# Patient Record
Sex: MALE | Race: WHITE | NOT HISPANIC OR LATINO | Employment: OTHER | URBAN - METROPOLITAN AREA
[De-identification: names, ages, dates, MRNs, and addresses within clinical notes are randomized per-mention and may not be internally consistent; named-entity substitution may affect disease eponyms.]

---

## 2023-04-05 LAB
ALBUMIN (MG/L) IN URINE: 1008.6 MG/L
ALBUMIN/CREATININE (UG/MG) IN URINE: 1748 UG/MG CRT (ref 0–30)
ANION GAP IN SER/PLAS: 14 MMOL/L (ref 10–20)
C PEPTIDE (NG/ML) IN SER/PLAS: 10.5 NG/ML (ref 0.7–3.9)
CALCIUM (MG/DL) IN SER/PLAS: 9.5 MG/DL (ref 8.6–10.6)
CARBON DIOXIDE, TOTAL (MMOL/L) IN SER/PLAS: 25 MMOL/L (ref 21–32)
CHLORIDE (MMOL/L) IN SER/PLAS: 104 MMOL/L (ref 98–107)
CHOLESTEROL (MG/DL) IN SER/PLAS: 257 MG/DL (ref 0–199)
CHOLESTEROL IN HDL (MG/DL) IN SER/PLAS: 38.3 MG/DL
CHOLESTEROL/HDL RATIO: 6.7
CREATININE (MG/DL) IN SER/PLAS: 1.35 MG/DL (ref 0.5–1.3)
CREATININE (MG/DL) IN URINE: 57.7 MG/DL (ref 20–370)
ESTIMATED AVERAGE GLUCOSE FOR HBA1C: 166 MG/DL
GFR MALE: 61 ML/MIN/1.73M2
GLUCOSE (MG/DL) IN SER/PLAS: 166 MG/DL (ref 74–99)
HEMOGLOBIN A1C/HEMOGLOBIN TOTAL IN BLOOD: 7.4 %
LDL: 173 MG/DL (ref 0–99)
NON HDL CHOLESTEROL: 219 MG/DL
POTASSIUM (MMOL/L) IN SER/PLAS: 4.8 MMOL/L (ref 3.5–5.3)
SODIUM (MMOL/L) IN SER/PLAS: 138 MMOL/L (ref 136–145)
TRIGLYCERIDE (MG/DL) IN SER/PLAS: 229 MG/DL (ref 0–149)
UREA NITROGEN (MG/DL) IN SER/PLAS: 31 MG/DL (ref 6–23)
VLDL: 46 MG/DL (ref 0–40)

## 2023-06-19 ENCOUNTER — TELEPHONE (OUTPATIENT)
Dept: PRIMARY CARE | Facility: CLINIC | Age: 58
End: 2023-06-19

## 2023-07-24 ENCOUNTER — TELEPHONE (OUTPATIENT)
Dept: PRIMARY CARE | Facility: CLINIC | Age: 58
End: 2023-07-24

## 2023-07-24 DIAGNOSIS — F31.9 BIPOLAR 1 DISORDER (MULTI): ICD-10-CM

## 2023-07-24 RX ORDER — LURASIDONE HYDROCHLORIDE 40 MG/1
1 TABLET, FILM COATED ORAL DAILY
COMMUNITY
Start: 2020-06-10 | End: 2023-07-24 | Stop reason: SDUPTHER

## 2023-07-24 RX ORDER — LURASIDONE HYDROCHLORIDE 40 MG/1
40 TABLET, FILM COATED ORAL DAILY
Qty: 90 TABLET | Refills: 0 | Status: SHIPPED | OUTPATIENT
Start: 2023-07-24 | End: 2023-09-27 | Stop reason: SDUPTHER

## 2023-07-24 NOTE — TELEPHONE ENCOUNTER
Dr. Magana office called cause he is there now and he has been calling for like 4 days and is not getting anyone to talk with but her needs a refill on his Latuda 40 mg takes it 1 time a day please send to the  pharmacy so when he leaves Dr. Magana office he can go get it cause he told them that when he is I a line and something go wrong he get mad and feels like hurting someone

## 2023-08-28 DIAGNOSIS — K21.9 GASTROESOPHAGEAL REFLUX DISEASE WITHOUT ESOPHAGITIS: Primary | ICD-10-CM

## 2023-08-28 RX ORDER — PANTOPRAZOLE SODIUM 40 MG/1
40 TABLET, DELAYED RELEASE ORAL DAILY
Qty: 30 TABLET | Refills: 2 | Status: SHIPPED | OUTPATIENT
Start: 2023-08-28 | End: 2023-08-28

## 2023-09-22 PROBLEM — R10.11 ABDOMINAL PAIN, ACUTE, RIGHT UPPER QUADRANT: Status: ACTIVE | Noted: 2023-09-22

## 2023-09-22 PROBLEM — M25.551 HIP PAIN, RIGHT: Status: ACTIVE | Noted: 2023-09-22

## 2023-09-22 PROBLEM — G89.28 OTHER CHRONIC POSTPROCEDURAL PAIN: Status: ACTIVE | Noted: 2023-09-22

## 2023-09-22 PROBLEM — Z98.61 S/P PTCA (PERCUTANEOUS TRANSLUMINAL CORONARY ANGIOPLASTY): Status: ACTIVE | Noted: 2023-09-22

## 2023-09-22 PROBLEM — R23.4 ESCHAR OF LOWER LEG: Status: ACTIVE | Noted: 2023-09-22

## 2023-09-22 PROBLEM — R09.81 NASAL CONGESTION WITH RHINORRHEA: Status: ACTIVE | Noted: 2023-09-22

## 2023-09-22 PROBLEM — B19.20 HEPATITIS-C: Status: ACTIVE | Noted: 2023-09-22

## 2023-09-22 PROBLEM — I63.9 CEREBROVASCULAR ACCIDENT (CVA) (MULTI): Status: ACTIVE | Noted: 2023-09-22

## 2023-09-22 PROBLEM — R11.0 NAUSEA IN ADULT: Status: ACTIVE | Noted: 2023-09-22

## 2023-09-22 PROBLEM — R06.02 SHORTNESS OF BREATH ON EXERTION: Status: ACTIVE | Noted: 2023-09-22

## 2023-09-22 PROBLEM — M25.612 JOINT STIFFNESS OF BOTH SHOULDERS: Status: ACTIVE | Noted: 2023-09-22

## 2023-09-22 PROBLEM — G89.4 CHRONIC PAIN SYNDROME: Status: ACTIVE | Noted: 2023-09-22

## 2023-09-22 PROBLEM — R10.11 CHRONIC RUQ PAIN: Status: ACTIVE | Noted: 2023-09-22

## 2023-09-22 PROBLEM — I70.213 ATHEROSCLEROSIS OF NATIVE ARTERY OF BOTH LOWER EXTREMITIES WITH INTERMITTENT CLAUDICATION (CMS-HCC): Status: ACTIVE | Noted: 2023-09-22

## 2023-09-22 PROBLEM — R06.89: Status: ACTIVE | Noted: 2023-09-22

## 2023-09-22 PROBLEM — I25.10 ATHEROSCLEROTIC HEART DISEASE OF NATIVE CORONARY ARTERY WITHOUT ANGINA PECTORIS: Status: ACTIVE | Noted: 2023-09-22

## 2023-09-22 PROBLEM — I50.42 CHRONIC COMBINED SYSTOLIC AND DIASTOLIC CHF (CONGESTIVE HEART FAILURE) (MULTI): Status: ACTIVE | Noted: 2023-09-22

## 2023-09-22 PROBLEM — G47.33 OSA (OBSTRUCTIVE SLEEP APNEA): Status: ACTIVE | Noted: 2023-09-22

## 2023-09-22 PROBLEM — R06.00 PAROXYSMAL NOCTURNAL DYSPNEA: Status: ACTIVE | Noted: 2023-09-22

## 2023-09-22 PROBLEM — R52 BURNING PAIN: Status: ACTIVE | Noted: 2023-09-22

## 2023-09-22 PROBLEM — R93.89 ABNORMAL CXR: Status: ACTIVE | Noted: 2023-09-22

## 2023-09-22 PROBLEM — K76.0 HEPATIC STEATOSIS: Status: ACTIVE | Noted: 2023-09-22

## 2023-09-22 PROBLEM — M54.50 LOW BACK PAIN: Status: ACTIVE | Noted: 2023-09-22

## 2023-09-22 PROBLEM — G89.29 CHRONIC BACK PAIN GREATER THAN 3 MONTHS DURATION: Status: ACTIVE | Noted: 2023-09-22

## 2023-09-22 PROBLEM — G62.2 NEUROPATHY, INFLAMMATORY OR TOXIC (MULTI): Status: ACTIVE | Noted: 2023-09-22

## 2023-09-22 PROBLEM — K21.9 GASTROESOPHAGEAL REFLUX DISEASE: Status: ACTIVE | Noted: 2023-09-22

## 2023-09-22 PROBLEM — I73.9 PERIPHERAL ARTERIAL DISEASE (CMS-HCC): Status: ACTIVE | Noted: 2023-09-22

## 2023-09-22 PROBLEM — L08.9 BACTERIAL SKIN INFECTION: Status: ACTIVE | Noted: 2023-09-22

## 2023-09-22 PROBLEM — G89.29 CHRONIC RUQ PAIN: Status: ACTIVE | Noted: 2023-09-22

## 2023-09-22 PROBLEM — M54.16 CHRONIC LUMBAR RADICULOPATHY: Status: ACTIVE | Noted: 2023-09-22

## 2023-09-22 PROBLEM — K86.9 PANCREATIC MALABSORPTION (HHS-HCC): Status: ACTIVE | Noted: 2023-09-22

## 2023-09-22 PROBLEM — G61.9 NEUROPATHY, INFLAMMATORY OR TOXIC (MULTI): Status: ACTIVE | Noted: 2023-09-22

## 2023-09-22 PROBLEM — R10.9 RIGHT FLANK PAIN: Status: ACTIVE | Noted: 2023-09-22

## 2023-09-22 PROBLEM — B96.89 BACTERIAL SKIN INFECTION: Status: ACTIVE | Noted: 2023-09-22

## 2023-09-22 PROBLEM — K59.00 CONSTIPATION: Status: ACTIVE | Noted: 2023-09-22

## 2023-09-22 PROBLEM — E11.9 TYPE 2 DIABETES MELLITUS (MULTI): Status: ACTIVE | Noted: 2023-09-22

## 2023-09-22 PROBLEM — R06.00 DYSPNEA: Status: ACTIVE | Noted: 2023-09-22

## 2023-09-22 PROBLEM — R09.89 PULMONARY CONGESTION: Status: ACTIVE | Noted: 2023-09-22

## 2023-09-22 PROBLEM — R27.8 COORDINATION IMPAIRMENT: Status: ACTIVE | Noted: 2023-09-22

## 2023-09-22 PROBLEM — J34.89 NASAL CONGESTION WITH RHINORRHEA: Status: ACTIVE | Noted: 2023-09-22

## 2023-09-22 PROBLEM — Z86.73 H/O: CVA (CEREBROVASCULAR ACCIDENT): Status: ACTIVE | Noted: 2023-09-22

## 2023-09-22 PROBLEM — Z95.1 PRESENCE OF AORTOCORONARY BYPASS GRAFT: Status: ACTIVE | Noted: 2023-09-22

## 2023-09-22 PROBLEM — M79.2 NEUROPATHIC PAIN: Status: ACTIVE | Noted: 2023-09-22

## 2023-09-22 PROBLEM — R25.2 SPASTICITY AS LATE EFFECT OF CEREBROVASCULAR ACCIDENT (CVA): Status: ACTIVE | Noted: 2023-09-22

## 2023-09-22 PROBLEM — F31.9 BIPOLAR DEPRESSION (MULTI): Status: ACTIVE | Noted: 2023-09-22

## 2023-09-22 PROBLEM — M25.611 JOINT STIFFNESS OF BOTH SHOULDERS: Status: ACTIVE | Noted: 2023-09-22

## 2023-09-22 PROBLEM — M79.604 PAIN OF RIGHT LOWER EXTREMITY: Status: ACTIVE | Noted: 2023-09-22

## 2023-09-22 PROBLEM — K86.9 PANCREATIC LESION (HHS-HCC): Status: ACTIVE | Noted: 2023-09-22

## 2023-09-22 PROBLEM — R29.6 FREQUENT FALLS: Status: ACTIVE | Noted: 2023-09-22

## 2023-09-22 PROBLEM — N40.0 BPH (BENIGN PROSTATIC HYPERPLASIA): Status: ACTIVE | Noted: 2023-09-22

## 2023-09-22 PROBLEM — E87.5 HYPERKALEMIA: Status: ACTIVE | Noted: 2023-09-22

## 2023-09-22 PROBLEM — I69.398 SPASTICITY AS LATE EFFECT OF CEREBROVASCULAR ACCIDENT (CVA): Status: ACTIVE | Noted: 2023-09-22

## 2023-09-22 PROBLEM — G57.70 CAUSALGIA OF LOWER EXTREMITY: Status: ACTIVE | Noted: 2023-09-22

## 2023-09-22 PROBLEM — F17.200 NICOTINE DEPENDENCE: Status: ACTIVE | Noted: 2023-09-22

## 2023-09-22 PROBLEM — Z95.1 S/P CABG X 5: Status: ACTIVE | Noted: 2023-09-22

## 2023-09-22 PROBLEM — E11.42 DIABETIC POLYNEUROPATHY ASSOCIATED WITH TYPE 2 DIABETES MELLITUS (MULTI): Status: ACTIVE | Noted: 2023-09-22

## 2023-09-22 PROBLEM — I51.9 LEFT VENTRICULAR DYSFUNCTION: Status: ACTIVE | Noted: 2023-09-22

## 2023-09-22 PROBLEM — E11.40 DIABETIC NEUROPATHY, PAINFUL (MULTI): Status: ACTIVE | Noted: 2023-09-22

## 2023-09-22 PROBLEM — R29.898 WEAKNESS OF BOTH LOWER EXTREMITIES: Status: ACTIVE | Noted: 2023-09-22

## 2023-09-22 PROBLEM — J44.9 CHRONIC OBSTRUCTIVE PULMONARY DISEASE (MULTI): Status: ACTIVE | Noted: 2023-09-22

## 2023-09-22 PROBLEM — B02.9 SHINGLES: Status: ACTIVE | Noted: 2023-09-22

## 2023-09-22 PROBLEM — E78.5 HYPERLIPIDEMIA: Status: ACTIVE | Noted: 2023-09-22

## 2023-09-22 PROBLEM — Z86.19 HEPATITIS C VIRUS INFECTION CURED AFTER ANTIVIRAL DRUG THERAPY: Status: ACTIVE | Noted: 2023-09-22

## 2023-09-22 PROBLEM — B18.2 CHRONIC HEPATITIS C VIRUS GENOTYPE 1A INFECTION (MULTI): Status: ACTIVE | Noted: 2023-09-22

## 2023-09-22 PROBLEM — K90.9 PANCREATIC MALABSORPTION (HHS-HCC): Status: ACTIVE | Noted: 2023-09-22

## 2023-09-22 PROBLEM — R26.89 POOR BALANCE: Status: ACTIVE | Noted: 2023-09-22

## 2023-09-22 PROBLEM — I10 HYPERTENSION: Status: ACTIVE | Noted: 2023-09-22

## 2023-09-22 PROBLEM — I73.9 CLAUDICATION (CMS-HCC): Status: ACTIVE | Noted: 2023-09-22

## 2023-09-22 PROBLEM — R29.898 OTHER SYMPTOMS AND SIGNS INVOLVING THE MUSCULOSKELETAL SYSTEM: Status: ACTIVE | Noted: 2023-09-22

## 2023-09-22 PROBLEM — L02.92 RECURRENT BOILS: Status: ACTIVE | Noted: 2023-09-22

## 2023-09-22 PROBLEM — M54.9 CHRONIC BACK PAIN GREATER THAN 3 MONTHS DURATION: Status: ACTIVE | Noted: 2023-09-22

## 2023-09-22 PROBLEM — H53.452 DECREASED PERIPHERAL VISION OF LEFT EYE: Status: ACTIVE | Noted: 2023-09-22

## 2023-09-22 RX ORDER — EZETIMIBE 10 MG/1
10 TABLET ORAL
COMMUNITY
Start: 2023-04-24

## 2023-09-22 RX ORDER — ERGOCALCIFEROL 1.25 MG/1
1 CAPSULE ORAL WEEKLY
COMMUNITY
Start: 2019-04-15 | End: 2023-12-01 | Stop reason: WASHOUT

## 2023-09-22 RX ORDER — SACUBITRIL AND VALSARTAN 97; 103 MG/1; MG/1
1 TABLET, FILM COATED ORAL 2 TIMES DAILY
COMMUNITY
Start: 2022-12-22

## 2023-09-22 RX ORDER — FUROSEMIDE 40 MG/1
20 TABLET ORAL
COMMUNITY
Start: 2023-04-24 | End: 2023-12-01 | Stop reason: WASHOUT

## 2023-09-22 RX ORDER — TAMSULOSIN HYDROCHLORIDE 0.4 MG/1
0.4 CAPSULE ORAL DAILY
COMMUNITY
End: 2023-09-27 | Stop reason: SDUPTHER

## 2023-09-22 RX ORDER — ATORVASTATIN CALCIUM 80 MG/1
80 TABLET, FILM COATED ORAL DAILY
COMMUNITY
Start: 2023-04-24 | End: 2023-12-01 | Stop reason: WASHOUT

## 2023-09-22 RX ORDER — ISOSORBIDE MONONITRATE 60 MG/1
60 TABLET, EXTENDED RELEASE ORAL
COMMUNITY
Start: 2023-04-24 | End: 2023-12-01 | Stop reason: WASHOUT

## 2023-09-22 RX ORDER — ASPIRIN 81 MG/1
81 TABLET ORAL DAILY
COMMUNITY
Start: 2023-01-15 | End: 2023-09-27 | Stop reason: ALTCHOICE

## 2023-09-22 RX ORDER — RANOLAZINE 500 MG/1
500 TABLET, EXTENDED RELEASE ORAL 2 TIMES DAILY
COMMUNITY
Start: 2023-04-24

## 2023-09-22 RX ORDER — ALBUTEROL SULFATE 90 UG/1
2 AEROSOL, METERED RESPIRATORY (INHALATION) EVERY 4 HOURS PRN
COMMUNITY
Start: 2020-11-09 | End: 2024-02-27 | Stop reason: ALTCHOICE

## 2023-09-22 RX ORDER — OMEPRAZOLE 40 MG/1
40 CAPSULE, DELAYED RELEASE ORAL
COMMUNITY
End: 2024-02-27 | Stop reason: ALTCHOICE

## 2023-09-22 RX ORDER — CARVEDILOL 6.25 MG/1
6.25 TABLET ORAL EVERY 12 HOURS
COMMUNITY
Start: 2023-04-24

## 2023-09-22 RX ORDER — FLASH GLUCOSE SENSOR
KIT MISCELLANEOUS
COMMUNITY
Start: 2023-08-18 | End: 2023-12-01 | Stop reason: WASHOUT

## 2023-09-22 RX ORDER — GABAPENTIN 100 MG/1
1 CAPSULE ORAL 2 TIMES DAILY
COMMUNITY
Start: 2023-04-03 | End: 2023-12-01 | Stop reason: WASHOUT

## 2023-09-22 RX ORDER — DAPAGLIFLOZIN 10 MG/1
1 TABLET, FILM COATED ORAL
COMMUNITY
Start: 2021-04-20 | End: 2023-12-01 | Stop reason: WASHOUT

## 2023-09-22 RX ORDER — HYDRALAZINE HYDROCHLORIDE 50 MG/1
50 TABLET, FILM COATED ORAL 3 TIMES DAILY
COMMUNITY
Start: 2023-04-24

## 2023-09-22 RX ORDER — CLOPIDOGREL BISULFATE 75 MG/1
75 TABLET ORAL
COMMUNITY
Start: 2023-04-24 | End: 2023-12-22 | Stop reason: ALTCHOICE

## 2023-09-22 RX ORDER — PEN NEEDLE, DIABETIC 29 G X1/2"
NEEDLE, DISPOSABLE MISCELLANEOUS
COMMUNITY
Start: 2023-06-21 | End: 2023-12-01 | Stop reason: WASHOUT

## 2023-09-22 RX ORDER — PANCRELIPASE 36000; 180000; 114000 [USP'U]/1; [USP'U]/1; [USP'U]/1
2 CAPSULE, DELAYED RELEASE PELLETS ORAL 3 TIMES DAILY PRN
COMMUNITY
Start: 2021-05-20

## 2023-09-22 RX ORDER — INSULIN LISPRO 100 [IU]/ML
INJECTION, SOLUTION INTRAVENOUS; SUBCUTANEOUS
COMMUNITY
Start: 2021-06-08 | End: 2023-12-07 | Stop reason: SDUPTHER

## 2023-09-22 RX ORDER — VELPATASVIR AND SOFOSBUVIR 100; 400 MG/1; MG/1
1 TABLET, FILM COATED ORAL DAILY
COMMUNITY
Start: 2022-07-28 | End: 2023-09-27 | Stop reason: ALTCHOICE

## 2023-09-22 RX ORDER — INSULIN GLARGINE 100 [IU]/ML
INJECTION, SOLUTION SUBCUTANEOUS
COMMUNITY
Start: 2016-09-15 | End: 2023-12-01 | Stop reason: WASHOUT

## 2023-09-22 RX ORDER — LEVETIRACETAM 500 MG/1
1 TABLET ORAL 2 TIMES DAILY
COMMUNITY
Start: 2022-04-19 | End: 2024-02-04

## 2023-09-27 ENCOUNTER — OFFICE VISIT (OUTPATIENT)
Dept: PRIMARY CARE | Facility: CLINIC | Age: 58
End: 2023-09-27
Payer: COMMERCIAL

## 2023-09-27 VITALS
HEART RATE: 66 BPM | HEIGHT: 71 IN | BODY MASS INDEX: 26.46 KG/M2 | WEIGHT: 189 LBS | SYSTOLIC BLOOD PRESSURE: 128 MMHG | DIASTOLIC BLOOD PRESSURE: 74 MMHG

## 2023-09-27 DIAGNOSIS — F31.9 BIPOLAR 1 DISORDER (MULTI): ICD-10-CM

## 2023-09-27 DIAGNOSIS — G62.2 NEUROPATHY, INFLAMMATORY OR TOXIC (MULTI): ICD-10-CM

## 2023-09-27 DIAGNOSIS — E11.51 TYPE 2 DIABETES MELLITUS WITH DIABETIC PERIPHERAL ANGIOPATHY WITHOUT GANGRENE, WITH LONG-TERM CURRENT USE OF INSULIN (MULTI): ICD-10-CM

## 2023-09-27 DIAGNOSIS — Z12.11 COLON CANCER SCREENING: Primary | ICD-10-CM

## 2023-09-27 DIAGNOSIS — B18.2 CHRONIC HEPATITIS C VIRUS GENOTYPE 1A INFECTION (MULTI): ICD-10-CM

## 2023-09-27 DIAGNOSIS — N40.0 BENIGN PROSTATIC HYPERPLASIA, UNSPECIFIED WHETHER LOWER URINARY TRACT SYMPTOMS PRESENT: ICD-10-CM

## 2023-09-27 DIAGNOSIS — G61.9 NEUROPATHY, INFLAMMATORY OR TOXIC (MULTI): ICD-10-CM

## 2023-09-27 DIAGNOSIS — G47.33 OSA (OBSTRUCTIVE SLEEP APNEA): ICD-10-CM

## 2023-09-27 DIAGNOSIS — J42 CHRONIC BRONCHITIS, UNSPECIFIED CHRONIC BRONCHITIS TYPE (MULTI): ICD-10-CM

## 2023-09-27 DIAGNOSIS — F17.200 TOBACCO DEPENDENCE: ICD-10-CM

## 2023-09-27 DIAGNOSIS — Z79.4 TYPE 2 DIABETES MELLITUS WITH DIABETIC PERIPHERAL ANGIOPATHY WITHOUT GANGRENE, WITH LONG-TERM CURRENT USE OF INSULIN (MULTI): ICD-10-CM

## 2023-09-27 DIAGNOSIS — N20.0 RENAL CALCULI: ICD-10-CM

## 2023-09-27 PROCEDURE — 99214 OFFICE O/P EST MOD 30 MIN: CPT | Performed by: CLINICAL NURSE SPECIALIST

## 2023-09-27 PROCEDURE — 3051F HG A1C>EQUAL 7.0%<8.0%: CPT | Performed by: CLINICAL NURSE SPECIALIST

## 2023-09-27 PROCEDURE — 3074F SYST BP LT 130 MM HG: CPT | Performed by: CLINICAL NURSE SPECIALIST

## 2023-09-27 PROCEDURE — 3078F DIAST BP <80 MM HG: CPT | Performed by: CLINICAL NURSE SPECIALIST

## 2023-09-27 RX ORDER — IBUPROFEN 200 MG
1 TABLET ORAL EVERY 24 HOURS
Qty: 30 PATCH | Refills: 0 | Status: SHIPPED | OUTPATIENT
Start: 2023-09-27 | End: 2023-09-27

## 2023-09-27 RX ORDER — LURASIDONE HYDROCHLORIDE 40 MG/1
40 TABLET, FILM COATED ORAL DAILY
Qty: 90 TABLET | Refills: 1 | Status: SHIPPED | OUTPATIENT
Start: 2023-09-27 | End: 2023-09-27

## 2023-09-27 RX ORDER — TAMSULOSIN HYDROCHLORIDE 0.4 MG/1
0.4 CAPSULE ORAL DAILY
Qty: 30 CAPSULE | Refills: 1 | Status: SHIPPED | OUTPATIENT
Start: 2023-09-27 | End: 2023-09-27

## 2023-09-27 ASSESSMENT — ENCOUNTER SYMPTOMS
ACTIVITY CHANGE: 0
DEPRESSION: 1
DIARRHEA: 0
UNEXPECTED WEIGHT CHANGE: 0
CONFUSION: 0
BRUISES/BLEEDS EASILY: 0
CHILLS: 0
WEAKNESS: 1
ABDOMINAL PAIN: 0
FATIGUE: 0
HEMATURIA: 0
APPETITE CHANGE: 0
WHEEZING: 0
NUMBNESS: 1
VOMITING: 0
PALPITATIONS: 0
SLEEP DISTURBANCE: 0
BLOOD IN STOOL: 0
CONSTIPATION: 0
LOSS OF SENSATION IN FEET: 0
TROUBLE SWALLOWING: 0
POLYDIPSIA: 0
JOINT SWELLING: 0
WOUND: 0
SEIZURES: 0
SHORTNESS OF BREATH: 1
ARTHRALGIAS: 0
EYE PAIN: 0
NECK PAIN: 0
PHOTOPHOBIA: 0
FLANK PAIN: 0
HEADACHES: 1
SORE THROAT: 0
COUGH: 0
FEVER: 0
DYSURIA: 0
BACK PAIN: 0
MYALGIAS: 0
CHEST TIGHTNESS: 0
OCCASIONAL FEELINGS OF UNSTEADINESS: 1
NAUSEA: 0
DIZZINESS: 0

## 2023-09-27 ASSESSMENT — PATIENT HEALTH QUESTIONNAIRE - PHQ9
9. THOUGHTS THAT YOU WOULD BE BETTER OFF DEAD, OR OF HURTING YOURSELF: NOT AT ALL
8. MOVING OR SPEAKING SO SLOWLY THAT OTHER PEOPLE COULD HAVE NOTICED. OR THE OPPOSITE, BEING SO FIGETY OR RESTLESS THAT YOU HAVE BEEN MOVING AROUND A LOT MORE THAN USUAL: NEARLY EVERY DAY
SUM OF ALL RESPONSES TO PHQ QUESTIONS 1-9: 21
SUM OF ALL RESPONSES TO PHQ9 QUESTIONS 1 AND 2: 6
10. IF YOU CHECKED OFF ANY PROBLEMS, HOW DIFFICULT HAVE THESE PROBLEMS MADE IT FOR YOU TO DO YOUR WORK, TAKE CARE OF THINGS AT HOME, OR GET ALONG WITH OTHER PEOPLE: NOT DIFFICULT AT ALL
5. POOR APPETITE OR OVEREATING: NOT AT ALL
1. LITTLE INTEREST OR PLEASURE IN DOING THINGS: NEARLY EVERY DAY
7. TROUBLE CONCENTRATING ON THINGS, SUCH AS READING THE NEWSPAPER OR WATCHING TELEVISION: NEARLY EVERY DAY
6. FEELING BAD ABOUT YOURSELF - OR THAT YOU ARE A FAILURE OR HAVE LET YOURSELF OR YOUR FAMILY DOWN: NEARLY EVERY DAY
3. TROUBLE FALLING OR STAYING ASLEEP OR SLEEPING TOO MUCH: NEARLY EVERY DAY
4. FEELING TIRED OR HAVING LITTLE ENERGY: NEARLY EVERY DAY
2. FEELING DOWN, DEPRESSED OR HOPELESS: NEARLY EVERY DAY

## 2023-09-27 ASSESSMENT — COLUMBIA-SUICIDE SEVERITY RATING SCALE - C-SSRS
2. HAVE YOU ACTUALLY HAD ANY THOUGHTS OF KILLING YOURSELF?: NO
6. HAVE YOU EVER DONE ANYTHING, STARTED TO DO ANYTHING, OR PREPARED TO DO ANYTHING TO END YOUR LIFE?: NO
1. IN THE PAST MONTH, HAVE YOU WISHED YOU WERE DEAD OR WISHED YOU COULD GO TO SLEEP AND NOT WAKE UP?: NO

## 2023-09-27 NOTE — PATIENT INSTRUCTIONS

## 2023-09-27 NOTE — PROGRESS NOTES
Subjective   Patient ID: Dereck Shen is a 57 y.o. male who presents for Follow-up (Follow up).  HPI    Here today as a follow up appointment.      Following with Cardiology and [facility]F Clinic. Following with [facility], Dr. Newby.      History of Stroke, follow up with Neurologist outpatient. Patient following with [facility] Neurology. Multiple strokes since 2019. Planning further work up for additional imaging/testing.       Patient followed with Dr. Bowser for Rheumatology. No longer seeing Damari Watkins through [facility].     Followed with [facility] regarding bilateral sciatic nerve pain and lower back discomfort. Then followed with Dr. Obando. Now receiving Medical Marijuana. Chronic complaints of pain. History of Drug Abuse.      Patient followed with GI.     Patient has been taking Atorvastatin for elevated Cholesterol. Tolerating medication at this time. Denies any associated signs or symptoms.     Patient was diagnosed with Diabetes in 2009. Patient has been more consistent in his Insulin dosages recently followed with Endo, Insulin adjusted. Scheduled for November for follow up. Most recent A1C 7.4%.      Chronic complaints of RUQ abdominal pain. Monitoring for Pancreas.    History of Kidney stones, still feel like pain is present.     Review of Systems   Constitutional:  Negative for activity change, appetite change, chills, fatigue, fever and unexpected weight change.   HENT:  Negative for ear pain, hearing loss, nosebleeds, sore throat, tinnitus and trouble swallowing.    Eyes:  Negative for photophobia, pain and visual disturbance.   Respiratory:  Positive for shortness of breath. Negative for cough, chest tightness and wheezing.    Cardiovascular:  Negative for chest pain, palpitations and leg swelling.   Gastrointestinal:  Negative for abdominal pain, blood in stool, constipation, diarrhea, nausea and vomiting.   Endocrine: Negative for cold intolerance, heat  intolerance, polydipsia and polyuria.   Genitourinary:  Negative for dysuria, flank pain and hematuria.   Musculoskeletal:  Negative for arthralgias, back pain, joint swelling, myalgias and neck pain.   Skin:  Negative for pallor, rash and wound.   Allergic/Immunologic: Negative for immunocompromised state.   Neurological:  Positive for weakness, numbness and headaches. Negative for dizziness and seizures.   Hematological:  Does not bruise/bleed easily.   Psychiatric/Behavioral:  Negative for confusion and sleep disturbance.        Objective   Physical Exam  Vitals and nursing note reviewed.   Constitutional:       General: He is not in acute distress.     Appearance: Normal appearance.   HENT:      Head: Normocephalic.      Nose: Nose normal.   Eyes:      Conjunctiva/sclera: Conjunctivae normal.   Neck:      Vascular: No carotid bruit.   Cardiovascular:      Rate and Rhythm: Normal rate and regular rhythm.      Pulses: Normal pulses.      Heart sounds: Normal heart sounds.   Pulmonary:      Effort: Pulmonary effort is normal.      Breath sounds: Normal breath sounds.   Abdominal:      General: Bowel sounds are normal.      Palpations: Abdomen is soft.   Musculoskeletal:         General: Normal range of motion.      Cervical back: Normal range of motion.   Skin:     General: Skin is warm and dry.   Neurological:      Mental Status: He is alert and oriented to person, place, and time. Mental status is at baseline.   Psychiatric:         Mood and Affect: Mood normal.         Behavior: Behavior normal.       Assessment/Plan       Reviewed most recent lab work completed with patient.      Peripheral vascular disease: Status post bypass in both lower extremities. Following with Vascular and Cardiology for management.   Diabetes mellitus type 2 with complications including peripheral vascular disease, retinopathy, nephropathy and peripheral neuropathy. He will continue to follow with Endocrinology. Most recent A1C 7.4%.    COPD. Respiratory status at baseline. Smoking Cessation, restart Patches. Successful in the past.   Pancreatic lesion. Seen on CAT scan November 2020. Dr. Alegria. Stable on last examination.   History of chronic back pain. Now following for Medical Marijuana.   History of stroke with continued loss of left peripheral vision. Stable. Follows with Neurology through [facility].   Combined heart failure. He will continue to follow with the heart failure clinic.   Sleep apnea diagnosed on sleep study February 2021. Managed by pulmonology with CPAP.  Hypertension. Blood pressure controlled at OV today. Continue to monitor.   Hyperlipidemia. Continue high-dose Lipitor. Cardiology added Repatha  Diabetic nephropathy. He will continue to follow with nephrology.  History of chronic hepatitis C. Patient was previously seen by gastroenterology but deemed not a candidate for further treatment.   Bipolar: Continue Latuda that has been prescribed.  Renal calculi: Imaging as ordered.   BPH: Flomax as prescribed.       Pneumovax: November 2016.   Colonoscopy: March 2016. Ordered as requested.   Unable to update immunizations due to Insurance.     Patient was identified as a fall risk. Risk prevention instructions provided.

## 2023-10-01 ENCOUNTER — PHARMACY VISIT (OUTPATIENT)
Dept: PHARMACY | Facility: CLINIC | Age: 58
End: 2023-10-01
Payer: MEDICAID

## 2023-10-01 PROCEDURE — RXMED WILLOW AMBULATORY MEDICATION CHARGE

## 2023-10-02 ENCOUNTER — HOSPITAL ENCOUNTER (OUTPATIENT)
Dept: RADIOLOGY | Facility: HOSPITAL | Age: 58
Discharge: HOME | End: 2023-10-02
Payer: COMMERCIAL

## 2023-10-02 DIAGNOSIS — N20.0 RENAL CALCULI: ICD-10-CM

## 2023-10-02 PROCEDURE — 76770 US EXAM ABDO BACK WALL COMP: CPT

## 2023-10-02 PROCEDURE — 76770 US EXAM ABDO BACK WALL COMP: CPT | Performed by: RADIOLOGY

## 2023-10-03 ENCOUNTER — PHARMACY VISIT (OUTPATIENT)
Dept: PHARMACY | Facility: CLINIC | Age: 58
End: 2023-10-03
Payer: MEDICAID

## 2023-10-04 ENCOUNTER — TELEPHONE (OUTPATIENT)
Dept: PRIMARY CARE | Facility: CLINIC | Age: 58
End: 2023-10-04

## 2023-10-04 ENCOUNTER — HOSPITAL ENCOUNTER (OUTPATIENT)
Dept: VASCULAR MEDICINE | Facility: HOSPITAL | Age: 58
Discharge: HOME | End: 2023-10-04
Payer: COMMERCIAL

## 2023-10-04 DIAGNOSIS — I73.9 PVD (PERIPHERAL VASCULAR DISEASE) (CMS-HCC): ICD-10-CM

## 2023-10-04 DIAGNOSIS — I73.9 PAD (PERIPHERAL ARTERY DISEASE) (CMS-HCC): ICD-10-CM

## 2023-10-04 PROCEDURE — 93926 LOWER EXTREMITY STUDY: CPT | Performed by: SURGERY

## 2023-10-04 PROCEDURE — 93926 LOWER EXTREMITY STUDY: CPT

## 2023-10-04 PROCEDURE — 93922 UPR/L XTREMITY ART 2 LEVELS: CPT

## 2023-10-04 PROCEDURE — 93922 UPR/L XTREMITY ART 2 LEVELS: CPT | Performed by: SURGERY

## 2023-10-04 NOTE — TELEPHONE ENCOUNTER
Stephanie Arce, APRN-CNP  Kerry Masterson CMA; Brit Gamino MA  Please notify patient that Ultrasound is normal. Thank you!

## 2023-10-04 NOTE — PROGRESS NOTES
Dereck Shen is a 58 y.o. male on day 0 of admission presenting with No Principal Problem: There is no principal problem currently on the Problem List. Please update the Problem List and refresh..    Subjective   ***       Objective     Physical Exam    Last Recorded Vitals  There were no vitals taken for this visit.  Intake/Output last 3 Shifts:  No intake/output data recorded.    Relevant Results  {If you would like to pull in Medications, type .meds     If you would like to pull in Lab results for the last 24 hours, type .[id number]    If you would like to pull in Imaging results, type .imgrslt :99}    {Link to Stroke Scoring tools - Link :99}                       Assessment/Plan   ***     {This patient does not have an ACP note on file for this encounter, please fill one out - Advance Care Planning Activity :99}      Danielle Kelley, RT

## 2023-10-05 ENCOUNTER — PHARMACY VISIT (OUTPATIENT)
Dept: PHARMACY | Facility: CLINIC | Age: 58
End: 2023-10-05
Payer: MEDICAID

## 2023-10-06 ENCOUNTER — TELEPHONE (OUTPATIENT)
Dept: VASCULAR SURGERY | Facility: CLINIC | Age: 58
End: 2023-10-06

## 2023-10-06 ENCOUNTER — TELEPHONE (OUTPATIENT)
Dept: GASTROENTEROLOGY | Facility: CLINIC | Age: 58
End: 2023-10-06

## 2023-10-06 NOTE — TELEPHONE ENCOUNTER
Referred by Stephanie Arce OA fail on plavix and xarelto 6 strokes, 4 heart attacks, pancreatic CA, 62lb weight loss in last year/diarrhea. No answer LMOM

## 2023-10-06 NOTE — TELEPHONE ENCOUNTER
Result Communication    Resulted Orders   Vascular US ankle brachial index (ANSON) without exercise    Narrative                  [place] [id number]       Phone [phone] Fax [phone]       Vascular Lab Report           PVR ANSON    Patient Name:      CARMENZA OLEARY   Fernandez Physician:  [id number] Fabian Phillips MD  Study Date:        10/4/2023           Ordering Provider:  [id number] ADELA LYLES  MRN/PID:           [id number]            Fellow:  Accession#:        [id number]        Technologist:       Deb Allen [id number]  Date of Birth/Age: 1965 / 58      Technologist 2:                     years  Gender:            M                   Encounter#:         [id number]  Admission Status:  Outpatient          Location Performed: [facility]       Diagnosis/ICD: Peripheral vascular disease, unspecified-I73.9       CONCLUSIONS:  Right Lower PVR: Normal digital perfusion noted. Biphasic flow is noted in the right posterior tibial artery. Triphasic flow is noted in the right common femoral artery and right dorsalis pedis artery.  Left Lower PVR: Normal digital perfusion noted. Biphasic flow is noted in the left dorsalis pedis artery. Triphasic flow is noted in the left common femoral artery and left posterior tibial artery.     Comparison:  Compared with study from 6/13/2023, No change in the right ANSON. The left ASNON increased from .87 to 1.2 today. TBI's increased from .50 to .93 on the right and .54 to .71 on the left.     Imaging & Doppler Findings:     RIGHT Lower PVR                Pressures Ratios  Right Posterior Tibial (Ankle) 169 mmHg  1.07  Right Dorsalis Pedis (Ankle)   174 mmHg  1.10  Right Digit (Great Toe)        147 mmHg  0.93          LEFT Lower PVR                Pressures Ratios  Left Posterior Tibial (Ankle) 190 mmHg  1.20  Left Dorsalis Pedis (Ankle)   181 mmHg  1.15  Left Digit (Great  Toe)        112 mmHg  0.71                             Right     Left  Brachial Pressure 158 mmHg 151 mmHg          [id number] Fabian Phillips MD  Electronically signed by [id number] Fabian Phillips MD on 10/4/2023 at 5:16:07 PM         ** Final **         11:33 AM      Results were not successfully communicated with the patient and they acknowledged their understanding.    Pt has an upcoming appt with Dr. Peace on 10/19/23 to discuss results in person. Of note, ANSON has increased since intervention!

## 2023-10-09 ENCOUNTER — PHARMACY VISIT (OUTPATIENT)
Dept: PHARMACY | Facility: CLINIC | Age: 58
End: 2023-10-09
Payer: MEDICAID

## 2023-10-09 PROCEDURE — RXMED WILLOW AMBULATORY MEDICATION CHARGE

## 2023-10-17 ENCOUNTER — PHARMACY VISIT (OUTPATIENT)
Dept: PHARMACY | Facility: CLINIC | Age: 58
End: 2023-10-17
Payer: MEDICAID

## 2023-10-17 PROCEDURE — RXMED WILLOW AMBULATORY MEDICATION CHARGE

## 2023-10-18 ENCOUNTER — PHARMACY VISIT (OUTPATIENT)
Dept: PHARMACY | Facility: CLINIC | Age: 58
End: 2023-10-18
Payer: MEDICAID

## 2023-10-18 PROCEDURE — RXMED WILLOW AMBULATORY MEDICATION CHARGE

## 2023-10-19 ENCOUNTER — OFFICE VISIT (OUTPATIENT)
Dept: VASCULAR SURGERY | Facility: CLINIC | Age: 58
End: 2023-10-19
Payer: COMMERCIAL

## 2023-10-19 VITALS
WEIGHT: 193 LBS | HEART RATE: 67 BPM | DIASTOLIC BLOOD PRESSURE: 76 MMHG | SYSTOLIC BLOOD PRESSURE: 111 MMHG | BODY MASS INDEX: 26.92 KG/M2

## 2023-10-19 DIAGNOSIS — R09.89 BRUIT OF LEFT CAROTID ARTERY: Primary | ICD-10-CM

## 2023-10-19 PROCEDURE — 99213 OFFICE O/P EST LOW 20 MIN: CPT | Performed by: INTERNAL MEDICINE

## 2023-10-19 PROCEDURE — 3074F SYST BP LT 130 MM HG: CPT | Performed by: INTERNAL MEDICINE

## 2023-10-19 PROCEDURE — 3051F HG A1C>EQUAL 7.0%<8.0%: CPT | Performed by: INTERNAL MEDICINE

## 2023-10-19 PROCEDURE — 3078F DIAST BP <80 MM HG: CPT | Performed by: INTERNAL MEDICINE

## 2023-10-19 ASSESSMENT — ENCOUNTER SYMPTOMS
HEMATOLOGIC/LYMPHATIC NEGATIVE: 1
PSYCHIATRIC NEGATIVE: 1
CONSTITUTIONAL NEGATIVE: 1
RESPIRATORY NEGATIVE: 1
CARDIOVASCULAR NEGATIVE: 1
GASTROINTESTINAL NEGATIVE: 1
EYES NEGATIVE: 1
ALLERGIC/IMMUNOLOGIC NEGATIVE: 1
ENDOCRINE NEGATIVE: 1

## 2023-10-19 NOTE — PROGRESS NOTES
Subjective   Patient ID: Dereck Shen is a 58 y.o. male who presents for Follow-up (6 mo, results).  HPI  58 year old male with a past medical history of CAD s/p CABG, CVA, hep C, diabetes, HLD, HTN, tobacco abuse, sleep apnea and PAD s/p right femoral to popiteal bypass per Dr. Peace on 2/2020, Left femoral bypass on 9/2020, an aortobifemoral angiogram on 9/2021 and an angiogram with right leg superficial femoral artery thrombectomy with iliofemoral angioplasty on 1/2022 that presents to the office for a 6 mth follow up for PAD. He states that he had a stroke since last office visit. He had a CT neck that shows 68% stenosis on right. He does have right sided arm and leg weakness. He does use a cane prior to the stroke. Recent PVR showed no artery disease b/l. He does complain of right leg weakness.    He continues to smoke tobacco.     Vascular testing:  CT neck- Right ICA is 68%  PVR 10/23: Right Lower PVR: Normal digital perfusion noted. Left Lower PVR: Normal digital perfusion noted.     Left arterial duplex 10/23: The left femoropopliteal bypass graft appears widely patent.   PVR June 2023: No evidence of arterial occlusive disease in the right lower extremity at rest.  Evidence of mild arterial occlusive disease in the left lower extremity at rest.   PVR 12/22:   Right Lower PVR: No evidence of arterial occlusive disease in the right lower extremity at rest.  No evidence of arterial occlusive disease in the left lower extremity at rest.    Procedures per Dr. Peace:   1/22: Angiogram with right leg superficial femoral artery thrombectomy with iliofemoral angioplasty  9/2021: Aortobifemoral angiogram  9/2020: Left femoral bypass  2/2020: Right femoral to popiteal bypass    Review of Systems   Constitutional: Negative.    HENT: Negative.     Eyes: Negative.    Respiratory: Negative.     Cardiovascular: Negative.    Gastrointestinal: Negative.    Endocrine: Negative.    Genitourinary: Negative.     Musculoskeletal:  Positive for gait problem.   Skin: Negative.    Allergic/Immunologic: Negative.    Hematological: Negative.    Psychiatric/Behavioral: Negative.           Objective   Physical Exam  Eyes:      Pupils: Pupils are equal, round, and reactive to light.   Cardiovascular:      Rate and Rhythm: Normal rate.   Pulmonary:      Effort: Pulmonary effort is normal.   Abdominal:      General: Bowel sounds are normal.   Musculoskeletal:         General: Normal range of motion.      Cervical back: Normal range of motion.      Comments: Right sided weakness residual post stroke    Skin:     General: Skin is warm and dry.   Neurological:      General: No focal deficit present.      Mental Status: He is alert.   Psychiatric:         Mood and Affect: Mood normal.         Assessment/Plan   58 year old male with a past medical history of CAD s/p CABG, CVA, hep C, diabetes, HLD, HTN, tobacco abuse, sleep apnea and PAD s/p right femoral to popiteal bypass per Dr. Peace on 2/2020, Left femoral bypass on 9/2020, an aortobifemoral angiogram on 9/2021 and an angiogram with right leg superficial femoral artery thrombectomy with iliofemoral angioplasty on 1/2022 that presents to the office for a 6 mth follow up for PAD.    Plan:  Recent PVR and arterial duplex looks good. No evidence of artery disease. Patent bypass graft. He does complain of right leg burning and weakness post stroke. He is getting PT arranged.  Of note, he had a CTA of the neck that shows 68% stenosis of right ICA. Will obtain a carotid duplex. Will call with results   Follow up in 6 mths.

## 2023-10-20 ENCOUNTER — PHARMACY VISIT (OUTPATIENT)
Dept: PHARMACY | Facility: CLINIC | Age: 58
End: 2023-10-20
Payer: MEDICAID

## 2023-10-20 PROCEDURE — RXMED WILLOW AMBULATORY MEDICATION CHARGE

## 2023-10-23 ENCOUNTER — PHARMACY VISIT (OUTPATIENT)
Dept: PHARMACY | Facility: CLINIC | Age: 58
End: 2023-10-23
Payer: MEDICAID

## 2023-10-23 PROCEDURE — RXMED WILLOW AMBULATORY MEDICATION CHARGE

## 2023-11-09 ENCOUNTER — HOSPITAL ENCOUNTER (OUTPATIENT)
Dept: VASCULAR MEDICINE | Facility: HOSPITAL | Age: 58
Discharge: HOME | End: 2023-11-09
Payer: COMMERCIAL

## 2023-11-09 DIAGNOSIS — R09.89 BRUIT OF LEFT CAROTID ARTERY: ICD-10-CM

## 2023-11-09 PROCEDURE — 93880 EXTRACRANIAL BILAT STUDY: CPT | Performed by: INTERNAL MEDICINE

## 2023-11-09 PROCEDURE — 93880 EXTRACRANIAL BILAT STUDY: CPT

## 2023-11-13 ENCOUNTER — TELEPHONE (OUTPATIENT)
Dept: VASCULAR SURGERY | Facility: CLINIC | Age: 58
End: 2023-11-13

## 2023-11-15 ENCOUNTER — OFFICE VISIT (OUTPATIENT)
Dept: GASTROENTEROLOGY | Facility: CLINIC | Age: 58
End: 2023-11-15
Payer: COMMERCIAL

## 2023-11-15 ENCOUNTER — PHARMACY VISIT (OUTPATIENT)
Dept: PHARMACY | Facility: CLINIC | Age: 58
End: 2023-11-15
Payer: MEDICAID

## 2023-11-15 VITALS
OXYGEN SATURATION: 98 % | HEIGHT: 70 IN | WEIGHT: 195 LBS | SYSTOLIC BLOOD PRESSURE: 142 MMHG | DIASTOLIC BLOOD PRESSURE: 86 MMHG | BODY MASS INDEX: 27.92 KG/M2 | HEART RATE: 68 BPM

## 2023-11-15 DIAGNOSIS — Z12.11 SCREENING FOR COLON CANCER: Primary | ICD-10-CM

## 2023-11-15 PROCEDURE — 3079F DIAST BP 80-89 MM HG: CPT | Performed by: INTERNAL MEDICINE

## 2023-11-15 PROCEDURE — 3077F SYST BP >= 140 MM HG: CPT | Performed by: INTERNAL MEDICINE

## 2023-11-15 PROCEDURE — RXMED WILLOW AMBULATORY MEDICATION CHARGE

## 2023-11-15 PROCEDURE — 99213 OFFICE O/P EST LOW 20 MIN: CPT | Performed by: INTERNAL MEDICINE

## 2023-11-15 PROCEDURE — 3051F HG A1C>EQUAL 7.0%<8.0%: CPT | Performed by: INTERNAL MEDICINE

## 2023-11-15 ASSESSMENT — ENCOUNTER SYMPTOMS
ADENOPATHY: 0
NUMBNESS: 0
UNEXPECTED WEIGHT CHANGE: 0
SHORTNESS OF BREATH: 0
BRUISES/BLEEDS EASILY: 0
WEAKNESS: 0
WHEEZING: 0
PALPITATIONS: 0
BACK PAIN: 1
HEADACHES: 0
ARTHRALGIAS: 1
SORE THROAT: 0
VOICE CHANGE: 0
SEIZURES: 0
TROUBLE SWALLOWING: 0
CHILLS: 0
FATIGUE: 0
NERVOUS/ANXIOUS: 0
MYALGIAS: 0
FEVER: 0
CONFUSION: 0
ROS GI COMMENTS: AS DETAILED ABOVE.
DYSURIA: 0
COUGH: 0
HEMATURIA: 0
DIZZINESS: 0

## 2023-11-15 NOTE — PROGRESS NOTES
[facility]    ASSESSMENT and PLAN:       Dereck Shen is a 58 y.o. male with a significant past medical history of CAD, CHF, COPD, BRUNA, HTN, DM2, HLD, hepatitis C, diabetic neuropathy, bipolar, and pancreatic neuroendocrine tumor who presents for consultation requested by his primary care provider (Stephanie Arce, SUMAN-CNS) for a screening colonoscopy.       Colon cancer screening  Due for surveillance colonoscopy.  Asymptomatic at this time.  No previous difficulties with sedation.  He is on Xarelto and Plavix which will need to be held. He is also Farxiga which is recommended to be held based on anesthesia guidelines.  - colonoscopy scheduled in OR  - OTC bowel prep discussed with patient  - instructions for procedure discussed with patient in the office today and hard copy given to patient today  - hold Plavix for 5-7 days prior to procedure, cardiology to approve  - hold Xarelto for 3 days (based on most recent creatinine) prior to procedure, cardiology to approve  - hold Farxiga for 3 days prior to procedure based on anesthesia guidelines, PCP/endocrine to provide ximena-procedure instructions for diabetes control      Constipation  Likely related to medications. Discussed more regular MiraLAX use with patient today.      Hepatitis C  S/p treatment with Epclusa and achieved cure/SVR. No further treatment needed.  - follow up with Dr. Sage as previously recommended        Follow up with GI as needed.        Thang Gonzalez MD        Gastroenterology    [facility]    Clinical   [facility]        Subjective   HISTORY OF PRESENT ILLNESS:     Chief Complaint  New Patient Visit (Screening colonoscopy - OA fail)    History Of Present Illness:    Dereck Shen is a 58 y.o. male with a significant past medical history of CAD, CHF, COPD, BRUNA, HTN, DM2, HLD, hepatitis C, diabetic neuropathy,  bipolar, and pancreatic neuroendocrine tumor who presents for consultation requested by his primary care provider (Stephanie Arce, APRN-CNS) for a screening colonoscopy.       His medication list includes Xarelto, Plavix, Farxiga, Omeprazole, and Pantoprazole.    He was seen by his PCP on 9/27/2023 at which time they stated that his last colonoscopy was in 2016 and repeat was ordered at that time. His PCP noted that he had seen GI for hepatitis C, but was “deemed not a candidate for further treatment”. At his last visit with hepatology (Dr. Sage) in February 2023 she reported that labs showed successful treatment of hepatitis C.    He does have a history of a pancreatic lesion and has followed with surgical oncology (Dr. Golden). This is suspected to be a neuroendocrine tumor and they are currently planning surveillance with imaging. Notes from oncology state that he completed treatment for hepatitis C with Epclusa.    There are no endoscopy reports in the  EMR.      Today he says that he had a colonoscopy around the age of 51. He says that polyps were found at that time and he was told to have another colonoscopy in 5 years. He says that he has never had any issues with sedation/anesthesia in the past.    He complains of constipation and abdominal discomfort. He uses MiraLAX as needed and that leads to a BM every 4-5 days.      Patient denies any heartburn/GERD, N/V, dysphagia, odynophagia, diarrhea, hematemesis, hematochezia, melena, or weight loss.      Review of systems:   Review of Systems   Constitutional:  Negative for chills, fatigue, fever and unexpected weight change.   HENT:  Negative for congestion, sneezing, sore throat, trouble swallowing and voice change.    Respiratory:  Negative for cough, shortness of breath and wheezing.    Cardiovascular:  Negative for chest pain, palpitations and leg swelling.   Gastrointestinal:         As detailed above.   Genitourinary:  Negative for dysuria and  hematuria.   Musculoskeletal:  Positive for arthralgias and back pain. Negative for myalgias.   Skin:  Negative for pallor and rash.   Neurological:  Negative for dizziness, seizures, syncope, weakness, numbness and headaches.   Hematological:  Negative for adenopathy. Does not bruise/bleed easily.   Psychiatric/Behavioral:  Negative for confusion. The patient is not nervous/anxious.    All other systems reviewed and are negative.      I performed a complete 10 point review of systems and it is negative except as noted in HPI or above.      PAST HISTORIES:       Past Medical History:  He has a past medical history of Abnormal electrocardiogram (ECG) (EKG), Acute on chronic combined systolic (congestive) and diastolic (congestive) heart failure (CMS/[facility]) (10/18/2019), Anxiety disorder, unspecified, Aphasia (07/06/2017), Atherosclerotic heart disease of native coronary artery with unspecified angina pectoris (CMS/[facility]) (11/05/2019), Bipolar disorder, unspecified (CMS/[facility]) (06/10/2020), Body mass index (BMI) 31.0-31.9, adult (10/18/2019), Chronic obstructive pulmonary disease with (acute) exacerbation (CMS/[facility]) (12/13/2021), Chronic obstructive pulmonary disease with (acute) exacerbation (CMS/[facility]) (11/25/2020), Chronic systolic (congestive) heart failure (CMS/[facility]) (02/09/2021), Cutaneous abscess of groin (09/12/2019), Encounter for follow-up examination after completed treatment for conditions other than malignant neoplasm (09/12/2019), Encounter for screening for malignant neoplasm of prostate (09/12/2019), Encounter for screening for other suspected endocrine disorder (09/12/2019), Headache, unspecified (09/10/2019), Hemiplegia and hemiparesis following cerebral infarction affecting unspecified side (CMS/[facility]) (06/21/2021), Obesity, unspecified (10/18/2019), Obstructive sleep apnea (adult) (pediatric) (10/07/2019), Old myocardial infarction, Old myocardial infarction, Opioid abuse, in remission (CMS/[facility]) (04/06/2020),  Other conditions influencing health status (11/25/2020), Other specified abnormal findings of blood chemistry (09/12/2019), Pain in unspecified joint (10/18/2019), Personal history of other diseases of male genital organs (09/12/2019), Personal history of other diseases of the circulatory system (11/05/2019), Personal history of other diseases of the digestive system (10/18/2019), Personal history of other diseases of the musculoskeletal system and connective tissue (07/18/2019), Personal history of other diseases of the musculoskeletal system and connective tissue, Personal history of other diseases of the musculoskeletal system and connective tissue, Personal history of other diseases of the musculoskeletal system and connective tissue (07/18/2019), Personal history of other diseases of the nervous system and sense organs (09/12/2019), Personal history of other endocrine, nutritional and metabolic disease (08/11/2020), Personal history of other endocrine, nutritional and metabolic disease (10/18/2019), Personal history of other infectious and parasitic diseases (10/18/2019), Personal history of other mental and behavioral disorders, Personal history of pneumonia (recurrent) (09/12/2019), Post-traumatic stress disorder, unspecified, Postlaminectomy syndrome, not elsewhere classified (12/08/2015), Proteinuria, unspecified (10/25/2019), Radiculopathy, lumbar region (07/20/2017), and Unqualified visual loss, left eye, normal vision right eye (07/06/2017).    Past Surgical History:  He has a past surgical history that includes Other surgical history (06/02/2016); Knee surgery (06/02/2016); Other surgical history (09/12/2019); Elbow surgery (02/10/2020); Other surgical history (09/30/2019); Other surgical history (10/21/2020); Other surgical history (06/07/2019); Other surgical history (09/21/2020); Coronary angioplasty (03/17/2017); Back surgery (03/17/2017); CT angio aorta and bilateral iliofemoral runoff w and or wo  "IV contrast (7/20/2020); MR angio head wo IV contrast (1/4/2022); MR angio neck wo IV contrast (1/4/2022); and CT angio aorta and bilateral iliofemoral runoff w and or wo IV contrast (1/14/2022).      Social History:  He reports that he has quit smoking. His smoking use included cigarettes. He smoked an average of .5 packs per day. He has never used smokeless tobacco. He reports current drug use. Drug: Marijuana. He reports that he does not drink alcohol.    Family History:  No known GI disease, specifically denies pancreatitis, Crohn's, colon cancer, gastroesophageal cancer, or ulcerative colitis.    Family History   Problem Relation Name Age of Onset    No Known Problems Mother      No Known Problems Father          Allergies:  Celecoxib; Tetanus toxoid, adsorbed; Cephalexin; Hydrocodone; Latex; and Tryptophan      Objective   OBJECTIVE:       Last Recorded Vitals:  Vitals:    11/15/23 0907   BP: 142/86   Pulse: 68   SpO2: 98%   Weight: 88.5 kg (195 lb)   Height: 1.778 m (5' 10\")     /86   Pulse 68   Ht 1.778 m (5' 10\")   Wt 88.5 kg (195 lb)   SpO2 98%   BMI 27.98 kg/m²      Physical Exam:    Physical Exam  Vitals reviewed.   Constitutional:       General: He is not in acute distress.     Appearance: He is not ill-appearing.   HENT:      Head: Normocephalic and atraumatic.   Eyes:      General: No scleral icterus.  Cardiovascular:      Rate and Rhythm: Normal rate and regular rhythm.      Pulses: Normal pulses.      Heart sounds: Normal heart sounds. No murmur heard.  Pulmonary:      Effort: Pulmonary effort is normal. No respiratory distress.      Breath sounds: Normal breath sounds. No wheezing.   Abdominal:      General: Bowel sounds are normal.      Palpations: Abdomen is soft.      Tenderness: There is no abdominal tenderness. There is no rebound.   Musculoskeletal:         General: No swelling or deformity.   Skin:     General: Skin is warm and dry.      Coloration: Skin is not jaundiced.      " Findings: No rash.   Neurological:      General: No focal deficit present.      Mental Status: He is alert and oriented to person, place, and time.   Psychiatric:         Mood and Affect: Mood normal.         Behavior: Behavior normal.         Thought Content: Thought content normal.         Judgment: Judgment normal.         Home Medications:  Prior to Admission medications    Medication Sig Start Date End Date Taking? Authorizing Provider   albuterol 90 mcg/actuation inhaler Inhale 2 puffs every 4 hours if needed. 11/9/20   Historical Provider, MD   atorvastatin (Lipitor) 80 mg tablet Take 1 tablet (80 mg) by mouth once daily. 4/24/23   Historical Provider, MD   atorvastatin (Lipitor) 80 mg tablet TAKE 1 TABLET BY MOUTH DAILY 4/4/23 4/3/24  Haylie Tate MD   BD Insulin Syringe Ultra-Fine 1 mL 31 gauge x 5/16 syringe  6/21/23   Historical Provider, MD   carvedilol (Coreg) 6.25 mg tablet Take 1 tablet (6.25 mg) by mouth every 12 hours. 4/24/23   Historical Provider, MD   carvedilol (Coreg) 6.25 mg tablet TAKE 1 TABLET TWICE DAILY WITH MEALS. 1/24/23 1/23/24  Hilda Hammonds, APRN-CNP   clopidogrel (Plavix) 75 mg tablet Take 1 tablet (75 mg) by mouth once daily. 4/24/23   Historical Provider, MD Herrmann 36,000-114,000- 180,000 unit capsule,delayed release(DR/EC) capsule Take 2 capsules by mouth 3 times a day with meals. 5/20/21   Historical Provider, MD   dapagliflozin propanediol (Farxiga) 10 mg Take 1 tablet (10 mg) by mouth once daily with a meal. 4/20/21   Historical Provider, MD   dapagliflozin propanediol (Farxiga) 10 mg TAKE 1 TABLET BY MOUTH EVERY DAY 4/4/23 4/3/24  Haylie Tate MD   Entresto  mg tablet Take 1 tablet by mouth twice a day. 12/22/22   Historical Provider, MD   ergocalciferol (Vitamin D-2) 1.25 MG (37389 UT) capsule Take 1 capsule (1,250 mcg) by mouth once a week. 4/15/19   Historical Provider, MD   ergocalciferol (Vitamin D-2) 1.25 MG (64124 UT) capsule  "TAKE 1 CAPSULE BY MOUTH ONCE WEEKLY 2/14/23 2/13/24  Stephanie Arce, APRN-CNS   ezetimibe (Zetia) 10 mg tablet Take 1 tablet (10 mg) by mouth once daily. 4/24/23   Historical Provider, MD   FreeStyle En 2 Sensor kit  8/18/23   Historical Provider, MD   FreeStyle En reader misc USE TO CHECK SUGARS FOUR TIMES A DAY 9/12/23 9/11/24  Haylie Tate MD   FreeStyle En sensor system kit USE AS DIRECTED AND CHANGE EVERY 14 DAYS 5/17/23 5/16/24  Haylie Tate MD   furosemide (Lasix) 40 mg tablet Take 0.5 tablets (20 mg) by mouth once daily. 4/24/23   Historical Provider, MD   gabapentin (Neurontin) 100 mg capsule Take 1 capsule (100 mg) by mouth 2 times a day. 4/3/23   Historical Provider, MD   gabapentin (Neurontin) 100 mg capsule TAKE 1 CAPSULE BY MOUTH TWO TIMES A DAY 4/3/23 4/2/24  Haylie Tate MD   gabapentin (Neurontin) 400 mg capsule TAKE 1 CAPSULE BY MOUTH TWO TIMES A DAY 7/19/23 7/18/24  Haylie Tate MD   hydrALAZINE (Apresoline) 50 mg tablet Take 1 tablet (50 mg) by mouth 3 times a day. 4/24/23   Historical Provider, MD   insulin glargine (Lantus) 100 unit/mL injection INJECT 35 UNITS SUBCUTANEOUSLY AT BEDTIME 4/4/23 4/3/24  Haylie Tate MD   insulin lispro (HumaLOG) 100 unit/mL injection INJECT 20 UNIT 3 times daily if gluocse vlaue is over 180mg/dL 6/8/21   Historical Provider, MD   insulin syringe-needle U-100 31G X 5/16\" 1 mL syringe USE AS DIRECTED FOUR TIMES PER DAY 4/3/23 4/2/24  Haylie Tate MD   isosorbide mononitrate ER (Imdur) 60 mg 24 hr tablet Take 1 tablet (60 mg) by mouth once daily. 4/24/23   Historical Provider, MD   Lantus U-100 Insulin 100 unit/mL injection Inject under the skin. 9/15/16   Historical Provider, MD   levETIRAcetam (Keppra) 250 mg tablet TAKE 3 TABLETS BY MOUTH TWO TIMES A DAY FOR 14 DAYS THEN TAKE 4 TABLETS TWO TIMES A DAY 9/25/23 9/24/24     levETIRAcetam (Keppra) 500 mg tablet Take 1 " "tablet (500 mg) by mouth 2 times a day. 4/19/22   Historical Provider, MD   lurasidone (Latuda) 40 mg tablet TAKE 1 TABLET BY MOUTH ONCE DAILY 9/27/23 9/26/24  DELMA Carlin   nicotine (Nicoderm CQ) 21 mg/24 hr patch APPLY 1 PATCH TO SKIN EVERY 24 HOURS AS DIRECTED 9/27/23 9/26/24  DELMA Carlin   omeprazole (PriLOSEC) 40 mg DR capsule Take 1 capsule (40 mg) by mouth once daily in the morning. Take before meals.    Historical Provider, MD   pantoprazole (ProtoNix) 40 mg EC tablet TAKE 1 TABLET BY MOUTH ONCE DAILY 8/28/23 8/27/24  DELMA Carlin   ranolazine (Ranexa) 500 mg 12 hr tablet Take 1 tablet (500 mg) by mouth twice a day. 4/24/23   Historical Provider, MD   rivaroxaban (Xarelto) 20 mg tablet Take 1 tablet (20 mg) by mouth once daily. 4/24/23   Historical Provider, MD   tamsulosin (Flomax) 0.4 mg 24 hr capsule TAKE 1 CAPSULE BY MOUTH ONCE DAILY 9/27/23 9/26/24  DELMA Carlin         Relevant Results Recent labs reviewed in the EMR.  Lab Results   Component Value Date    HGB 14.6 08/10/2023    HGB 16.2 07/26/2022    HGB 15.9 06/01/2022    MCV 90 08/10/2023    MCV 90 07/26/2022    MCV 90 06/01/2022     08/10/2023     07/26/2022     06/01/2022       No results found for: \"FERRITIN\", \"IRON\"    Lab Results   Component Value Date     08/10/2023    K 5.1 08/10/2023     (H) 08/10/2023    BUN 40 (H) 08/10/2023    CREATININE 1.43 (H) 08/10/2023       Lab Results   Component Value Date    BILITOT 0.5 08/10/2023     Lab Results   Component Value Date    ALT 5 (L) 08/10/2023    ALT 10 02/06/2023    ALT 7 (L) 11/04/2022    AST 8 (L) 08/10/2023    AST 10 02/06/2023    AST 9 11/04/2022    ALKPHOS 83 08/10/2023    ALKPHOS 83 02/06/2023    ALKPHOS 72 11/04/2022       No results found for: \"CRP\"    No results found for: \"CALPS\"    Radiology: Reviewed imaging reviewed in the EMR.  Vascular US carotid artery duplex bilateral    Result Date: 11/11/2023     "          [place] [id number]      Phone [phone] Fax [phone]  Vascular Lab Report  VASC US CAROTID ARTERY DUPLEX BILATERAL Patient Name:      CARMENZA CIARA Presley Physician: [id number] Aide Nesbitt MD Study Date:        11/9/2023           Ordering Provider: [id number] SURINDER OWENS MRN/PID:           [id number]            Fellow: Accession#:        [id number]        Technologist:      Danielle Kelley [id number] Date of Birth/Age: 1965 / 58      Technologist 2:                    years Gender:            M                   Encounter#:        [id number] Admission Status:  Outpatient          Location           [facility]                                        Performed:  Diagnosis/ICD: Other specified symptoms and signs involving the circulatory and                respiratory systems-R09.89  CONCLUSIONS: Right Carotid: Findings are consistent with less than 50% stenosis of the right proximal internal carotid artery. Laminar flow seen by color Doppler. Right external carotid artery appears patent with no evidence of stenosis. The right vertebral artery is patent with antegrade flow. No evidence of hemodynamically significant stenosis in the right subclavian artery. Left Carotid: Findings are consistent with less than 50% stenosis of the left proximal internal carotid artery. Laminar flow seen by color Doppler. Left external carotid artery appears patent with no evidence of stenosis. The left vertebral artery is patent with antegrade flow. No evidence of hemodynamically significant stenosis in the left subclavian artery.  Comparison: Compared with study from 9/4/2019, no significant change.  Imaging & Doppler Findings: Right Plaque Morph: The proximal right internal carotid artery demonstrates calcified plaque. The distal right common carotid artery demonstrates heterogenous plaque. Left Plaque  Morph: The proximal left internal carotid artery demonstrates calcified plaque. The distal left common carotid artery demonstrates heterogenous plaque.   Right                       Left   PSV     EDV                PSV      EDV 71 cm/s 9 cm/s    CCA P    78 cm/s  12 cm/s 54 cm/s 11 cm/s   CCA D    61 cm/s  12 cm/s 60 cm/s 19 cm/s   ICA P    52 cm/s  14 cm/s 51 cm/s 19 cm/s   ICA M    63 cm/s  25 cm/s 58 cm/s 21 cm/s   ICA D    49 cm/s  14 cm/s 84 cm/s 3 cm/s     ECA     63 cm/s  5 cm/s 40 cm/s 12 cm/s Vertebral  23 cm/s  6 cm/s 85 cm/s 4 cm/s  Subclavian 114 cm/s 0 cm/s                Right Left ICA/CCA Ratio  1.1  0.9   [id number] Aide Nesbitt MD Electronically signed by [id number] Aide Nesbitt MD on 11/11/2023 at 11:54:08 AM  ** Final **     CTA HEAD W IVCON    Result Date: 10/16/2023  * * *Final Report* * * DATE OF EXAM: Oct 16 2023  3:32PM   [facility]   0022  -  CTA HEAD W IVCON  / ACCESSION #  [id number] PROCEDURE REASON: Occlusion and stenosis of unspecified carotid artery      * * * * Physician Interpretation * * * *  EXAMINATION:  CTA NECK W IVCON, CTA HEAD W IVCON HISTORY:  Recent stroke Aug 2023, rule out ICAD TECHNIQUE:   Spiral high resolution axial images were obtained through the head, neck and superior mediastinum following bolus administration of intravenous contrast for CT angiography.     3D maximum intensity projection images were created, reviewed and archived . MQ:  CTAHN_4 Contrast:  80 mL Omnipaque 350 IV CT Radiation dose: Integrated Dose-Length Product (DLP) for this visit =   860 mGy*cm. CT Dose Reduction Employed: Automated exposure control (AEC) COMPARISON: Brain MRI and head and neck MRA from outside institution 01/01/2022 RESULT: BRAIN: Evaluation of the individual slices of the CTA demonstrates no evidence   of an acute stroke.  ASPECT Score = 10 Hemorrhage:    No evidence of acute intracranial hemorrhage.   ECASS hemorrhagic transformation score: Not Applicable Encephalomalacia in the distribution  of the left anterior MCA and right paramedian PCA territories related to prior ischemic insults. NECK: Soft tissues:   The soft tissue planes are maintained throughout.  No evidence of a soft tissue mass in the neck or superior mediastinum.  No significant lymphadenopathy is seen. Spine:  Alignment is normal.  Mild degenerative changes are present. Lung apices:   The visualized lung apices are clear.  Sternotomy wires. CT ARTERIOGRAM: Extracranial Circulation: Aortic Arch: There is a normal branching pattern from the aortic arch.   There is no significant stenosis in the proximal brachiocephalic vessels.  Mild eccentric fibrofatty plaque at the proximal left subclavian without significant luminal stenosis. Carotid Stenosis: Right Common:  No significant stenosis. Right Internal Carotid  Plaque:  No significant plaque formation. Right Internal Carotid Stenosis (% by NASCET Criteria):  0 proximally, however 60% distally on image 226 of series 3. Left Common:  No significant stenosis. Left Internal Carotid Plaque:  No significant plaque formation. Left Internal Carotid Stenosis (% by NASCET Criteria):  30 Cervical Vertebral Arteries: Patency:  Bilateral Dominance:  Right Severe narrowing at the origin of the left cervical vertebral artery secondary to atherosclerotic plaque. Intracranial Circulation: Anterior Circulation:  Overall reduction in caliber of the distal most right cervical ICA and intracranial ICA segments secondary to the moderate to severe distal cervical ICA stenosis.  Extensive atherosclerotic plaque with calcification throughout the intracranial ICA segments with moderate narrowing of both cavernous ICAs, greater on the left, moderate to severe narrowing of the right distal petrous ICA and severe narrowing of the right supraclinoid ICA moderate narrowing the left supraclinoid ICA.  A1 segments are codominant in luminal caliber.   The ACAs are patent.  MCAs are patent without proximal large vessel  occlusion or high-grade luminal stenosis. Vertebrobasilar Circulation:   Extensive atherosclerotic plaque contributes to severe narrowing of both proximal intradural vertebral arteries.  Right proximal PICA is patent.  Left is not well identified.   AICA and SCA origins are patent.  Basilar artery is normal in course and caliber.  PCAs are patent without high-grade luminal narrowing. The major imaged dural venous sinuses including the superior sagittal, straight, transverse and sigmoid sinuses opacify normally.  Arachnoid granulation projecting into the left transverse sinus.  (topogram) images: No additional findings.    IMPRESSION: Moderate to severe tapered narrowing of the distal right cervical ICA with approximately 68% narrowing by NASCET criteria. Severe narrowing at the origin of the left vertebral artery. Extensive intracranial atherosclerotic disease with areas of moderate to severe narrowing of the intracranial ICA segments as discussed. Atherosclerotic disease contributes to severe narrowing of both proximal intradural V4 segments. 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 Arterial blood flow was measured to detect acute large vessel occlusion by computer aided detection software: Not Performed. Concordance between software and imaging review: Not Applicable. : ORAL   Transcribe Date/Time: Oct 16 2023  4:10P Dictated by : ROLAND JENKINS DO This examination was interpreted and the report reviewed and electronically signed by: ROLAND JENKINS DO on Oct 16 2023  4:23PM  EST    CTA NECK W IVCON    Result Date: 10/16/2023  * * *Final Report* * * DATE OF EXAM: Oct 16 2023  3:32PM   [facility]   0024  -  CTA NECK W IVCON  / ACCESSION #  [id number] PROCEDURE REASON: Occlusion and stenosis of unspecified carotid artery      * * * * Physician Interpretation * * * *  EXAMINATION:  CTA NECK W IVCON, CTA HEAD W IVCON HISTORY:  Recent stroke Aug 2023, rule out ICAD TECHNIQUE:   Spiral  high resolution axial images were obtained through the head, neck and superior mediastinum following bolus administration of intravenous contrast for CT angiography.     3D maximum intensity projection images were created, reviewed and archived . MQ:  CTAHN_4 Contrast:  80 mL Omnipaque 350 IV CT Radiation dose: Integrated Dose-Length Product (DLP) for this visit =   860 mGy*cm. CT Dose Reduction Employed: Automated exposure control (AEC) COMPARISON: Brain MRI and head and neck MRA from outside institution 01/01/2022 RESULT: BRAIN: Evaluation of the individual slices of the CTA demonstrates no evidence   of an acute stroke.  ASPECT Score = 10 Hemorrhage:    No evidence of acute intracranial hemorrhage.   ECASS hemorrhagic transformation score: Not Applicable Encephalomalacia in the distribution of the left anterior MCA and right paramedian PCA territories related to prior ischemic insults. NECK: Soft tissues:   The soft tissue planes are maintained throughout.  No evidence of a soft tissue mass in the neck or superior mediastinum.  No significant lymphadenopathy is seen. Spine:  Alignment is normal.  Mild degenerative changes are present. Lung apices:   The visualized lung apices are clear.  Sternotomy wires. CT ARTERIOGRAM: Extracranial Circulation: Aortic Arch: There is a normal branching pattern from the aortic arch.   There is no significant stenosis in the proximal brachiocephalic vessels.  Mild eccentric fibrofatty plaque at the proximal left subclavian without significant luminal stenosis. Carotid Stenosis: Right Common:  No significant stenosis. Right Internal Carotid  Plaque:  No significant plaque formation. Right Internal Carotid Stenosis (% by NASCET Criteria):  0 proximally, however 60% distally on image 226 of series 3. Left Common:  No significant stenosis. Left Internal Carotid Plaque:  No significant plaque formation. Left Internal Carotid Stenosis (% by NASCET Criteria):  30 Cervical Vertebral  Arteries: Patency:  Bilateral Dominance:  Right Severe narrowing at the origin of the left cervical vertebral artery secondary to atherosclerotic plaque. Intracranial Circulation: Anterior Circulation:  Overall reduction in caliber of the distal most right cervical ICA and intracranial ICA segments secondary to the moderate to severe distal cervical ICA stenosis.  Extensive atherosclerotic plaque with calcification throughout the intracranial ICA segments with moderate narrowing of both cavernous ICAs, greater on the left, moderate to severe narrowing of the right distal petrous ICA and severe narrowing of the right supraclinoid ICA moderate narrowing the left supraclinoid ICA.  A1 segments are codominant in luminal caliber.   The ACAs are patent.  MCAs are patent without proximal large vessel occlusion or high-grade luminal stenosis. Vertebrobasilar Circulation:   Extensive atherosclerotic plaque contributes to severe narrowing of both proximal intradural vertebral arteries.  Right proximal PICA is patent.  Left is not well identified.   AICA and SCA origins are patent.  Basilar artery is normal in course and caliber.  PCAs are patent without high-grade luminal narrowing. The major imaged dural venous sinuses including the superior sagittal, straight, transverse and sigmoid sinuses opacify normally.  Arachnoid granulation projecting into the left transverse sinus.  (topogram) images: No additional findings.    IMPRESSION: Moderate to severe tapered narrowing of the distal right cervical ICA with approximately 68% narrowing by NASCET criteria. Severe narrowing at the origin of the left vertebral artery. Extensive intracranial atherosclerotic disease with areas of moderate to severe narrowing of the intracranial ICA segments as discussed. Atherosclerotic disease contributes to severe narrowing of both proximal intradural V4 segments. 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 Arterial blood flow  was measured to detect acute large vessel occlusion by computer aided detection software: Not Performed. Concordance between software and imaging review: Not Applicable. : [facility]   Transcribe Date/Time: Oct 16 2023  4:10P Dictated by : ROLAND JENKINS DO This examination was interpreted and the report reviewed and electronically signed by: ROLAND JENKINS DO on Oct 16 2023  4:23PM  EST

## 2023-11-15 NOTE — LETTER
November 15, 2023     Stephanie Arce, APRJERZYSaint [facility]  [address]  [facility] [address]    Patient: Dereck Shen   YOB: 1965   Date of Visit: 11/15/2023       Dear Dr. Stephanie Arce, SUMAN-CNS:    Thank you for referring Dereck Shne to me for evaluation. Below are my notes for this consultation.  If you have questions, please do not hesitate to call me. I look forward to following your patient along with you.       Sincerely,     Thang Gonzalez MD      CC: MD Simeon Locke MD  ______________________________________________________________________________________        [facility]    ASSESSMENT and PLAN:       Dereck Shen is a 58 y.o. male with a significant past medical history of CAD, CHF, COPD, BRUNA, HTN, DM2, HLD, hepatitis C, diabetic neuropathy, bipolar, and pancreatic neuroendocrine tumor who presents for consultation requested by his primary care provider (Stephanie Arce, SUMAN-CNS) for a screening colonoscopy.       Colon cancer screening  Due for surveillance colonoscopy.  Asymptomatic at this time.  No previous difficulties with sedation.  He is on Xarelto and Plavix which will need to be held. He is also Farxiga which is recommended to be held based on anesthesia guidelines.  - colonoscopy scheduled in OR  - OTC bowel prep discussed with patient  - instructions for procedure discussed with patient in the office today and hard copy given to patient today  - hold Plavix for 5-7 days prior to procedure, cardiology to approve  - hold Xarelto for 3 days (based on most recent creatinine) prior to procedure, cardiology to approve  - hold Farxiga for 3 days prior to procedure based on anesthesia guidelines, PCP/endocrine to provide ximena-procedure instructions for diabetes control      Constipation  Likely related to medications. Discussed more regular MiraLAX use with patient today.      Hepatitis  C  S/p treatment with Epclusa and achieved cure/SVR. No further treatment needed.  - follow up with Dr. Sage as previously recommended        Follow up with GI as needed.        Thang Gonzalez MD        Gastroenterology    [facility]    Clinical   [facility]        Subjective  HISTORY OF PRESENT ILLNESS:     Chief Complaint  New Patient Visit (Screening colonoscopy - OA fail)    History Of Present Illness:    Dereck Shen is a 58 y.o. male with a significant past medical history of CAD, CHF, COPD, BRUNA, HTN, DM2, HLD, hepatitis C, diabetic neuropathy, bipolar, and pancreatic neuroendocrine tumor who presents for consultation requested by his primary care provider (Stephanie Arce, APRN-CNS) for a screening colonoscopy.       His medication list includes Xarelto, Plavix, Farxiga, Omeprazole, and Pantoprazole.    He was seen by his PCP on 9/27/2023 at which time they stated that his last colonoscopy was in 2016 and repeat was ordered at that time. His PCP noted that he had seen GI for hepatitis C, but was “deemed not a candidate for further treatment”. At his last visit with hepatology (Dr. Sage) in February 2023 she reported that labs showed successful treatment of hepatitis C.    He does have a history of a pancreatic lesion and has followed with surgical oncology (Dr. Golden). This is suspected to be a neuroendocrine tumor and they are currently planning surveillance with imaging. Notes from oncology state that he completed treatment for hepatitis C with Epclusa.    There are no endoscopy reports in the  EMR.      Today he says that he had a colonoscopy around the age of 51. He says that polyps were found at that time and he was told to have another colonoscopy in 5 years. He says that he has never had any issues with sedation/anesthesia in the past.    He complains of constipation and abdominal discomfort. He  uses MiraLAX as needed and that leads to a BM every 4-5 days.      Patient denies any heartburn/GERD, N/V, dysphagia, odynophagia, diarrhea, hematemesis, hematochezia, melena, or weight loss.      Review of systems:   Review of Systems   Constitutional:  Negative for chills, fatigue, fever and unexpected weight change.   HENT:  Negative for congestion, sneezing, sore throat, trouble swallowing and voice change.    Respiratory:  Negative for cough, shortness of breath and wheezing.    Cardiovascular:  Negative for chest pain, palpitations and leg swelling.   Gastrointestinal:         As detailed above.   Genitourinary:  Negative for dysuria and hematuria.   Musculoskeletal:  Positive for arthralgias and back pain. Negative for myalgias.   Skin:  Negative for pallor and rash.   Neurological:  Negative for dizziness, seizures, syncope, weakness, numbness and headaches.   Hematological:  Negative for adenopathy. Does not bruise/bleed easily.   Psychiatric/Behavioral:  Negative for confusion. The patient is not nervous/anxious.    All other systems reviewed and are negative.      I performed a complete 10 point review of systems and it is negative except as noted in HPI or above.      PAST HISTORIES:       Past Medical History:  He has a past medical history of Abnormal electrocardiogram (ECG) (EKG), Acute on chronic combined systolic (congestive) and diastolic (congestive) heart failure (CMS/[facility]) (10/18/2019), Anxiety disorder, unspecified, Aphasia (07/06/2017), Atherosclerotic heart disease of native coronary artery with unspecified angina pectoris (CMS/[facility]) (11/05/2019), Bipolar disorder, unspecified (CMS/[facility]) (06/10/2020), Body mass index (BMI) 31.0-31.9, adult (10/18/2019), Chronic obstructive pulmonary disease with (acute) exacerbation (CMS/[facility]) (12/13/2021), Chronic obstructive pulmonary disease with (acute) exacerbation (CMS/[facility]) (11/25/2020), Chronic systolic (congestive) heart failure (CMS/[facility]) (02/09/2021),  Cutaneous abscess of groin (09/12/2019), Encounter for follow-up examination after completed treatment for conditions other than malignant neoplasm (09/12/2019), Encounter for screening for malignant neoplasm of prostate (09/12/2019), Encounter for screening for other suspected endocrine disorder (09/12/2019), Headache, unspecified (09/10/2019), Hemiplegia and hemiparesis following cerebral infarction affecting unspecified side (CMS/[facility]) (06/21/2021), Obesity, unspecified (10/18/2019), Obstructive sleep apnea (adult) (pediatric) (10/07/2019), Old myocardial infarction, Old myocardial infarction, Opioid abuse, in remission (CMS/[facility]) (04/06/2020), Other conditions influencing health status (11/25/2020), Other specified abnormal findings of blood chemistry (09/12/2019), Pain in unspecified joint (10/18/2019), Personal history of other diseases of male genital organs (09/12/2019), Personal history of other diseases of the circulatory system (11/05/2019), Personal history of other diseases of the digestive system (10/18/2019), Personal history of other diseases of the musculoskeletal system and connective tissue (07/18/2019), Personal history of other diseases of the musculoskeletal system and connective tissue, Personal history of other diseases of the musculoskeletal system and connective tissue, Personal history of other diseases of the musculoskeletal system and connective tissue (07/18/2019), Personal history of other diseases of the nervous system and sense organs (09/12/2019), Personal history of other endocrine, nutritional and metabolic disease (08/11/2020), Personal history of other endocrine, nutritional and metabolic disease (10/18/2019), Personal history of other infectious and parasitic diseases (10/18/2019), Personal history of other mental and behavioral disorders, Personal history of pneumonia (recurrent) (09/12/2019), Post-traumatic stress disorder, unspecified, Postlaminectomy syndrome, not elsewhere  "classified (12/08/2015), Proteinuria, unspecified (10/25/2019), Radiculopathy, lumbar region (07/20/2017), and Unqualified visual loss, left eye, normal vision right eye (07/06/2017).    Past Surgical History:  He has a past surgical history that includes Other surgical history (06/02/2016); Knee surgery (06/02/2016); Other surgical history (09/12/2019); Elbow surgery (02/10/2020); Other surgical history (09/30/2019); Other surgical history (10/21/2020); Other surgical history (06/07/2019); Other surgical history (09/21/2020); Coronary angioplasty (03/17/2017); Back surgery (03/17/2017); CT angio aorta and bilateral iliofemoral runoff w and or wo IV contrast (7/20/2020); MR angio head wo IV contrast (1/4/2022); MR angio neck wo IV contrast (1/4/2022); and CT angio aorta and bilateral iliofemoral runoff w and or wo IV contrast (1/14/2022).      Social History:  He reports that he has quit smoking. His smoking use included cigarettes. He smoked an average of .5 packs per day. He has never used smokeless tobacco. He reports current drug use. Drug: Marijuana. He reports that he does not drink alcohol.    Family History:  No known GI disease, specifically denies pancreatitis, Crohn's, colon cancer, gastroesophageal cancer, or ulcerative colitis.    Family History   Problem Relation Name Age of Onset   • No Known Problems Mother     • No Known Problems Father          Allergies:  Celecoxib; Tetanus toxoid, adsorbed; Cephalexin; Hydrocodone; Latex; and Tryptophan      Objective  OBJECTIVE:       Last Recorded Vitals:  Vitals:    11/15/23 0907   BP: 142/86   Pulse: 68   SpO2: 98%   Weight: 88.5 kg (195 lb)   Height: 1.778 m (5' 10\")     /86   Pulse 68   Ht 1.778 m (5' 10\")   Wt 88.5 kg (195 lb)   SpO2 98%   BMI 27.98 kg/m²      Physical Exam:    Physical Exam  Vitals reviewed.   Constitutional:       General: He is not in acute distress.     Appearance: He is not ill-appearing.   HENT:      Head: Normocephalic " and atraumatic.   Eyes:      General: No scleral icterus.  Cardiovascular:      Rate and Rhythm: Normal rate and regular rhythm.      Pulses: Normal pulses.      Heart sounds: Normal heart sounds. No murmur heard.  Pulmonary:      Effort: Pulmonary effort is normal. No respiratory distress.      Breath sounds: Normal breath sounds. No wheezing.   Abdominal:      General: Bowel sounds are normal.      Palpations: Abdomen is soft.      Tenderness: There is no abdominal tenderness. There is no rebound.   Musculoskeletal:         General: No swelling or deformity.   Skin:     General: Skin is warm and dry.      Coloration: Skin is not jaundiced.      Findings: No rash.   Neurological:      General: No focal deficit present.      Mental Status: He is alert and oriented to person, place, and time.   Psychiatric:         Mood and Affect: Mood normal.         Behavior: Behavior normal.         Thought Content: Thought content normal.         Judgment: Judgment normal.         Home Medications:  Prior to Admission medications    Medication Sig Start Date End Date Taking? Authorizing Provider   albuterol 90 mcg/actuation inhaler Inhale 2 puffs every 4 hours if needed. 11/9/20   Historical Provider, MD   atorvastatin (Lipitor) 80 mg tablet Take 1 tablet (80 mg) by mouth once daily. 4/24/23   Historical Provider, MD   atorvastatin (Lipitor) 80 mg tablet TAKE 1 TABLET BY MOUTH DAILY 4/4/23 4/3/24  Haylie Tate MD   BD Insulin Syringe Ultra-Fine 1 mL 31 gauge x 5/16 syringe  6/21/23   Historical Provider, MD   carvedilol (Coreg) 6.25 mg tablet Take 1 tablet (6.25 mg) by mouth every 12 hours. 4/24/23   Historical Provider, MD   carvedilol (Coreg) 6.25 mg tablet TAKE 1 TABLET TWICE DAILY WITH MEALS. 1/24/23 1/23/24  Hilda Hammonds APRN-CNP   clopidogrel (Plavix) 75 mg tablet Take 1 tablet (75 mg) by mouth once daily. 4/24/23   Historical Provider, MD Herrmann 36,000-114,000- 180,000 unit capsule,delayed  release(DR/EC) capsule Take 2 capsules by mouth 3 times a day with meals. 5/20/21   Historical Provider, MD   dapagliflozin propanediol (Farxiga) 10 mg Take 1 tablet (10 mg) by mouth once daily with a meal. 4/20/21   Historical Provider, MD   dapagliflozin propanediol (Farxiga) 10 mg TAKE 1 TABLET BY MOUTH EVERY DAY 4/4/23 4/3/24  Haylie Tate MD   Entresto  mg tablet Take 1 tablet by mouth twice a day. 12/22/22   Historical Provider, MD   ergocalciferol (Vitamin D-2) 1.25 MG (41794 UT) capsule Take 1 capsule (1,250 mcg) by mouth once a week. 4/15/19   Historical Provider, MD   ergocalciferol (Vitamin D-2) 1.25 MG (83677 UT) capsule TAKE 1 CAPSULE BY MOUTH ONCE WEEKLY 2/14/23 2/13/24  SUMAN Carlin-CNS   ezetimibe (Zetia) 10 mg tablet Take 1 tablet (10 mg) by mouth once daily. 4/24/23   Historical Provider, MD   FreeStyle En 2 Sensor kit  8/18/23   Historical Provider, MD   FreeStyle En reader misc USE TO CHECK SUGARS FOUR TIMES A DAY 9/12/23 9/11/24  Haylie Tate MD   FreeStyle En sensor system kit USE AS DIRECTED AND CHANGE EVERY 14 DAYS 5/17/23 5/16/24  Haylie Tate MD   furosemide (Lasix) 40 mg tablet Take 0.5 tablets (20 mg) by mouth once daily. 4/24/23   Historical Provider, MD   gabapentin (Neurontin) 100 mg capsule Take 1 capsule (100 mg) by mouth 2 times a day. 4/3/23   Historical Provider, MD   gabapentin (Neurontin) 100 mg capsule TAKE 1 CAPSULE BY MOUTH TWO TIMES A DAY 4/3/23 4/2/24  Haylie Tate MD   gabapentin (Neurontin) 400 mg capsule TAKE 1 CAPSULE BY MOUTH TWO TIMES A DAY 7/19/23 7/18/24  Haylie Tate MD   hydrALAZINE (Apresoline) 50 mg tablet Take 1 tablet (50 mg) by mouth 3 times a day. 4/24/23   Historical Provider, MD   insulin glargine (Lantus) 100 unit/mL injection INJECT 35 UNITS SUBCUTANEOUSLY AT BEDTIME 4/4/23 4/3/24  Haylie Tate MD   insulin lispro (HumaLOG) 100 unit/mL  "injection INJECT 20 UNIT 3 times daily if gluocse vlaue is over 180mg/dL 6/8/21   Historical Provider, MD   insulin syringe-needle U-100 31G X 5/16\" 1 mL syringe USE AS DIRECTED FOUR TIMES PER DAY 4/3/23 4/2/24  Haylie Tate MD   isosorbide mononitrate ER (Imdur) 60 mg 24 hr tablet Take 1 tablet (60 mg) by mouth once daily. 4/24/23   Historical Provider, MD   Lantus U-100 Insulin 100 unit/mL injection Inject under the skin. 9/15/16   Historical Provider, MD   levETIRAcetam (Keppra) 250 mg tablet TAKE 3 TABLETS BY MOUTH TWO TIMES A DAY FOR 14 DAYS THEN TAKE 4 TABLETS TWO TIMES A DAY 9/25/23 9/24/24     levETIRAcetam (Keppra) 500 mg tablet Take 1 tablet (500 mg) by mouth 2 times a day. 4/19/22   Historical Provider, MD   lurasidone (Latuda) 40 mg tablet TAKE 1 TABLET BY MOUTH ONCE DAILY 9/27/23 9/26/24  SUMAN Carlin-CNS   nicotine (Nicoderm CQ) 21 mg/24 hr patch APPLY 1 PATCH TO SKIN EVERY 24 HOURS AS DIRECTED 9/27/23 9/26/24  DELMA Carlin   omeprazole (PriLOSEC) 40 mg DR capsule Take 1 capsule (40 mg) by mouth once daily in the morning. Take before meals.    Historical Provider, MD   pantoprazole (ProtoNix) 40 mg EC tablet TAKE 1 TABLET BY MOUTH ONCE DAILY 8/28/23 8/27/24  DELMA Carlin   ranolazine (Ranexa) 500 mg 12 hr tablet Take 1 tablet (500 mg) by mouth twice a day. 4/24/23   Historical Provider, MD   rivaroxaban (Xarelto) 20 mg tablet Take 1 tablet (20 mg) by mouth once daily. 4/24/23   Historical Provider, MD   tamsulosin (Flomax) 0.4 mg 24 hr capsule TAKE 1 CAPSULE BY MOUTH ONCE DAILY 9/27/23 9/26/24  DELMA Carlin         Relevant Results Recent labs reviewed in the EMR.  Lab Results   Component Value Date    HGB 14.6 08/10/2023    HGB 16.2 07/26/2022    HGB 15.9 06/01/2022    MCV 90 08/10/2023    MCV 90 07/26/2022    MCV 90 06/01/2022     08/10/2023     07/26/2022     06/01/2022       No results found for: \"FERRITIN\", " "\"IRON\"    Lab Results   Component Value Date     08/10/2023    K 5.1 08/10/2023     (H) 08/10/2023    BUN 40 (H) 08/10/2023    CREATININE 1.43 (H) 08/10/2023       Lab Results   Component Value Date    BILITOT 0.5 08/10/2023     Lab Results   Component Value Date    ALT 5 (L) 08/10/2023    ALT 10 02/06/2023    ALT 7 (L) 11/04/2022    AST 8 (L) 08/10/2023    AST 10 02/06/2023    AST 9 11/04/2022    ALKPHOS 83 08/10/2023    ALKPHOS 83 02/06/2023    ALKPHOS 72 11/04/2022       No results found for: \"CRP\"    No results found for: \"CALPS\"    Radiology: Reviewed imaging reviewed in the EMR.  Vascular US carotid artery duplex bilateral    Result Date: 11/11/2023              [place] [address]      Phone [phone] Fax [phone]  Vascular Lab Report  [facility] US CAROTID ARTERY DUPLEX BILATERAL Patient Name:      CARMENZA URBINA MAMTA   Reading Physician: [id number] Aide Nesbitt MD Study Date:        11/9/2023           Ordering Provider: [id number] SURINDER OWENS MRN/PID:           [id number]            Fellow: Accession#:        [id number]        Technologist:      Danielle Kelley [id number] Date of Birth/Age: 1965 / 58      Technologist 2:                    years Gender:            M                   Encounter#:        [id number] Admission Status:  Outpatient          Location           [facility]                                        Performed:  Diagnosis/ICD: Other specified symptoms and signs involving the circulatory and                respiratory systems-R09.89  CONCLUSIONS: Right Carotid: Findings are consistent with less than 50% stenosis of the right proximal internal carotid artery. Laminar flow seen by color Doppler. Right external carotid artery appears patent with no evidence of stenosis. The right vertebral artery is patent with antegrade flow. No evidence of hemodynamically significant " stenosis in the right subclavian artery. Left Carotid: Findings are consistent with less than 50% stenosis of the left proximal internal carotid artery. Laminar flow seen by color Doppler. Left external carotid artery appears patent with no evidence of stenosis. The left vertebral artery is patent with antegrade flow. No evidence of hemodynamically significant stenosis in the left subclavian artery.  Comparison: Compared with study from 9/4/2019, no significant change.  Imaging & Doppler Findings: Right Plaque Morph: The proximal right internal carotid artery demonstrates calcified plaque. The distal right common carotid artery demonstrates heterogenous plaque. Left Plaque Morph: The proximal left internal carotid artery demonstrates calcified plaque. The distal left common carotid artery demonstrates heterogenous plaque.   Right                       Left   PSV     EDV                PSV      EDV 71 cm/s 9 cm/s    CCA P    78 cm/s  12 cm/s 54 cm/s 11 cm/s   CCA D    61 cm/s  12 cm/s 60 cm/s 19 cm/s   ICA P    52 cm/s  14 cm/s 51 cm/s 19 cm/s   ICA M    63 cm/s  25 cm/s 58 cm/s 21 cm/s   ICA D    49 cm/s  14 cm/s 84 cm/s 3 cm/s     ECA     63 cm/s  5 cm/s 40 cm/s 12 cm/s Vertebral  23 cm/s  6 cm/s 85 cm/s 4 cm/s  Subclavian 114 cm/s 0 cm/s                Right Left ICA/CCA Ratio  1.1  0.9   [id number] Aide Nesbitt MD Electronically signed by [id number] Aide Nesbitt MD on 11/11/2023 at 11:54:08 AM  ** Final **     CTA HEAD W IVCON    Result Date: 10/16/2023  * * *Final Report* * * DATE OF EXAM: Oct 16 2023  3:32PM   [facility]   0022  -  CTA HEAD W IVCON  / ACCESSION #  [id number] PROCEDURE REASON: Occlusion and stenosis of unspecified carotid artery      * * * * Physician Interpretation * * * *  EXAMINATION:  CTA NECK W IVCON, CTA HEAD W IVCON HISTORY:  Recent stroke Aug 2023, rule out ICAD TECHNIQUE:   Spiral high resolution axial images were obtained through the head, neck and superior mediastinum following bolus administration  of intravenous contrast for CT angiography.     3D maximum intensity projection images were created, reviewed and archived . MQ:  CTAHN_4 Contrast:  80 mL Omnipaque 350 IV CT Radiation dose: Integrated Dose-Length Product (DLP) for this visit =   860 mGy*cm. CT Dose Reduction Employed: Automated exposure control (AEC) COMPARISON: Brain MRI and head and neck MRA from outside institution 01/01/2022 RESULT: BRAIN: Evaluation of the individual slices of the CTA demonstrates no evidence   of an acute stroke.  ASPECT Score = 10 Hemorrhage:    No evidence of acute intracranial hemorrhage.   ECASS hemorrhagic transformation score: Not Applicable Encephalomalacia in the distribution of the left anterior MCA and right paramedian PCA territories related to prior ischemic insults. NECK: Soft tissues:   The soft tissue planes are maintained throughout.  No evidence of a soft tissue mass in the neck or superior mediastinum.  No significant lymphadenopathy is seen. Spine:  Alignment is normal.  Mild degenerative changes are present. Lung apices:   The visualized lung apices are clear.  Sternotomy wires. CT ARTERIOGRAM: Extracranial Circulation: Aortic Arch: There is a normal branching pattern from the aortic arch.   There is no significant stenosis in the proximal brachiocephalic vessels.  Mild eccentric fibrofatty plaque at the proximal left subclavian without significant luminal stenosis. Carotid Stenosis: Right Common:  No significant stenosis. Right Internal Carotid  Plaque:  No significant plaque formation. Right Internal Carotid Stenosis (% by NASCET Criteria):  0 proximally, however 60% distally on image 226 of series 3. Left Common:  No significant stenosis. Left Internal Carotid Plaque:  No significant plaque formation. Left Internal Carotid Stenosis (% by NASCET Criteria):  30 Cervical Vertebral Arteries: Patency:  Bilateral Dominance:  Right Severe narrowing at the origin of the left cervical vertebral artery secondary  to atherosclerotic plaque. Intracranial Circulation: Anterior Circulation:  Overall reduction in caliber of the distal most right cervical ICA and intracranial ICA segments secondary to the moderate to severe distal cervical ICA stenosis.  Extensive atherosclerotic plaque with calcification throughout the intracranial ICA segments with moderate narrowing of both cavernous ICAs, greater on the left, moderate to severe narrowing of the right distal petrous ICA and severe narrowing of the right supraclinoid ICA moderate narrowing the left supraclinoid ICA.  A1 segments are codominant in luminal caliber.   The ACAs are patent.  MCAs are patent without proximal large vessel occlusion or high-grade luminal stenosis. Vertebrobasilar Circulation:   Extensive atherosclerotic plaque contributes to severe narrowing of both proximal intradural vertebral arteries.  Right proximal PICA is patent.  Left is not well identified.   AICA and SCA origins are patent.  Basilar artery is normal in course and caliber.  PCAs are patent without high-grade luminal narrowing. The major imaged dural venous sinuses including the superior sagittal, straight, transverse and sigmoid sinuses opacify normally.  Arachnoid granulation projecting into the left transverse sinus.  (topogram) images: No additional findings.    IMPRESSION: Moderate to severe tapered narrowing of the distal right cervical ICA with approximately 68% narrowing by NASCET criteria. Severe narrowing at the origin of the left vertebral artery. Extensive intracranial atherosclerotic disease with areas of moderate to severe narrowing of the intracranial ICA segments as discussed. Atherosclerotic disease contributes to severe narrowing of both proximal intradural V4 segments. 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 Arterial blood flow was measured to detect acute large vessel occlusion by computer aided detection software: Not Performed. Concordance between  software and imaging review: Not Applicable. : ORAL   Transcribe Date/Time: Oct 16 2023  4:10P Dictated by : ROLAND JENKINS DO This examination was interpreted and the report reviewed and electronically signed by: ROLAND JENKINS DO on Oct 16 2023  4:23PM  EST    CTA NECK W IVCON    Result Date: 10/16/2023  * * *Final Report* * * DATE OF EXAM: Oct 16 2023  3:32PM   [facility]   0024  -  CTA NECK W IVCON  / ACCESSION #  [id number] PROCEDURE REASON: Occlusion and stenosis of unspecified carotid artery      * * * * Physician Interpretation * * * *  EXAMINATION:  CTA NECK W IVCON, CTA HEAD W IVCON HISTORY:  Recent stroke Aug 2023, rule out ICAD TECHNIQUE:   Spiral high resolution axial images were obtained through the head, neck and superior mediastinum following bolus administration of intravenous contrast for CT angiography.     3D maximum intensity projection images were created, reviewed and archived . MQ:  CTAHN_4 Contrast:  80 mL Omnipaque 350 IV CT Radiation dose: Integrated Dose-Length Product (DLP) for this visit =   860 mGy*cm. CT Dose Reduction Employed: Automated exposure control (AEC) COMPARISON: Brain MRI and head and neck MRA from outside institution 01/01/2022 RESULT: BRAIN: Evaluation of the individual slices of the CTA demonstrates no evidence   of an acute stroke.  ASPECT Score = 10 Hemorrhage:    No evidence of acute intracranial hemorrhage.   ECASS hemorrhagic transformation score: Not Applicable Encephalomalacia in the distribution of the left anterior MCA and right paramedian PCA territories related to prior ischemic insults. NECK: Soft tissues:   The soft tissue planes are maintained throughout.  No evidence of a soft tissue mass in the neck or superior mediastinum.  No significant lymphadenopathy is seen. Spine:  Alignment is normal.  Mild degenerative changes are present. Lung apices:   The visualized lung apices are clear.  Sternotomy wires. CT ARTERIOGRAM: Extracranial Circulation:  Aortic Arch: There is a normal branching pattern from the aortic arch.   There is no significant stenosis in the proximal brachiocephalic vessels.  Mild eccentric fibrofatty plaque at the proximal left subclavian without significant luminal stenosis. Carotid Stenosis: Right Common:  No significant stenosis. Right Internal Carotid  Plaque:  No significant plaque formation. Right Internal Carotid Stenosis (% by NASCET Criteria):  0 proximally, however 60% distally on image 226 of series 3. Left Common:  No significant stenosis. Left Internal Carotid Plaque:  No significant plaque formation. Left Internal Carotid Stenosis (% by NASCET Criteria):  30 Cervical Vertebral Arteries: Patency:  Bilateral Dominance:  Right Severe narrowing at the origin of the left cervical vertebral artery secondary to atherosclerotic plaque. Intracranial Circulation: Anterior Circulation:  Overall reduction in caliber of the distal most right cervical ICA and intracranial ICA segments secondary to the moderate to severe distal cervical ICA stenosis.  Extensive atherosclerotic plaque with calcification throughout the intracranial ICA segments with moderate narrowing of both cavernous ICAs, greater on the left, moderate to severe narrowing of the right distal petrous ICA and severe narrowing of the right supraclinoid ICA moderate narrowing the left supraclinoid ICA.  A1 segments are codominant in luminal caliber.   The ACAs are patent.  MCAs are patent without proximal large vessel occlusion or high-grade luminal stenosis. Vertebrobasilar Circulation:   Extensive atherosclerotic plaque contributes to severe narrowing of both proximal intradural vertebral arteries.  Right proximal PICA is patent.  Left is not well identified.   AICA and SCA origins are patent.  Basilar artery is normal in course and caliber.  PCAs are patent without high-grade luminal narrowing. The major imaged dural venous sinuses including the superior sagittal, straight,  transverse and sigmoid sinuses opacify normally.  Arachnoid granulation projecting into the left transverse sinus.  (topogram) images: No additional findings.    IMPRESSION: Moderate to severe tapered narrowing of the distal right cervical ICA with approximately 68% narrowing by NASCET criteria. Severe narrowing at the origin of the left vertebral artery. Extensive intracranial atherosclerotic disease with areas of moderate to severe narrowing of the intracranial ICA segments as discussed. Atherosclerotic disease contributes to severe narrowing of both proximal intradural V4 segments. 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 Arterial blood flow was measured to detect acute large vessel occlusion by computer aided detection software: Not Performed. Concordance between software and imaging review: Not Applicable. : ORAL   Transcribe Date/Time: Oct 16 2023  4:10P Dictated by : ROLAND JENKINS DO This examination was interpreted and the report reviewed and electronically signed by: ROLAND JENKINS DO on Oct 16 2023  4:23PM  EST

## 2023-11-15 NOTE — PATIENT INSTRUCTIONS
You have been scheduled for a colonoscopy.  You were given instructions for preparing for this test in the office today.  If you have questions about these instructions, please call my office at [phone].      You will need to stop taking your Xarelto (Rivaroxaban) for 3 days prior to your procedure. You should talk to the physician that manages this medication (your cardiologist, primary care provider, or vascular team) to make sure that it is safe to temporarily stop this medication. You will likely be able to start taking it again the day after the procedure.      You will need to stop taking your Plavix (Clopidogrel) for 5-7 days prior to your procedure. You should talk to the physician that manages this medication (your cardiologist, primary care provider, or vascular team) to make sure that it is safe to temporarily stop this medication. You will likely be able to start taking it again the day after the procedure.      You are currently prescribed one of the Sodium-glucose transport protein 2 (SGLT2) inhibitor medications such as Empagliflozin (Jardiance), Dapagliflozin (Farxiga), Canagliflozin (Invokana), Bexagliflozin (Brenzavvy), Ertugliflozin (Steglatro). These medications help to control diabetes, but they may put at increased risk for a type of diabetic ketoacidosis (DKA) if you are fasting for significant periods of time such as for a procedure. It is recommended that you stop taking this medication before your procedure to decrease your risk.    You should stop taking this medication for 3 days prior to your procedure.    Since this is used to control diabetes, you should talk to your primary care provider (or whoever manages your diabetes medications) to make sure that they don't recommend any other changes to your medications around the time of your procedure. It is important to keep your diabetes controlled and to avoid any hypoglycemia (low blood sugar). Your primary care provider can help  with that.      After your procedure, you can expect me to talk to you to go over the results of the procedure. If polyps were removed I will usually be able to tell you my initial thoughts about those polyps and give you some idea of when you should have another colonoscopy.    If any polyps are removed during your procedure or if any biopsies are obtained those specimens will go to the pathologists to review under the microscope. Once those results are available they will be sent to me electronically to review. These results will also be available to you at that time through the patient portal. I briefly review all of these results by the next business day to determine if there is any urgent information that needs to be communicated to you right away or anything that would significantly change what I told you at the time of the procedure. If there is nothing urgent this is usually a good sign and I will then do a more extensive review of the result and send you a letter with my final recommendations within a week of the result becoming available. If you have questions or need additional information I urge you to call the office at [phone], but I do ask for patience as I spend a good portion of each day performing procedures like the one you are scheduled for and during those times I am not able to take or return routine phone calls.    You were also given information regarding the schedule for your procedure including the time that you need to arrive to the endoscopy unit.  You will also be contacted 2-3 day prior to your procedure to confirm the final arrival time.  If you have questions about this or if you need to cancel or change this appointment please call my office at [phone].      Follow up with GI as needed.

## 2023-11-16 ENCOUNTER — TELEPHONE (OUTPATIENT)
Dept: GASTROENTEROLOGY | Facility: CLINIC | Age: 58
End: 2023-11-16

## 2023-11-30 NOTE — PATIENT INSTRUCTIONS
Thank you for choosing [facility]  for your health care needs.  If you have any questions, concerns or medical needs, please feel free to contact our office at [phone].    Please ensure you complete your blood work one week before the next scheduled appointment.    To continue Lantus 20 units SQ at bedtime   To continue Lispro 10 units before meals; 20 units if sugars are above 200mg/dL   To continue sliding scale with Lispro insulin with meals:  150-200 - 2 units  201-250 - 4 units  251-300 - 6 units  301-350 - 8 units  >251 - 10 units  To continue Farxiga 10g once daily   To continue the use of the FreeStyle En CGM   Please call regarding what pharmacy is to be used  To follow up with neurology; ask them regarding what medication can be used in placed of Gabapentin   For follow up in 4-5 months with glucose log

## 2023-11-30 NOTE — PROGRESS NOTES
Subjective   Dereck Shen is a 58 y.o. male who presents for follow-up of Type 2 diabetes mellitus. The initial diagnosis of diabetes was made in 2009 .     He admits to having new CVAs in July and August 2023.  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  He is to see  neurology in January.  His dose of Keppra was increased.      Known complications due to diabetes included peripheral neuropathy, cardiovascular disease, cerebrovascular disease, and CAD s/p CABG .  The patient underwent a right femoropopliteal to his right lower extremity in February 2020. He underwent a left femoral popliteal to his left lower extremity in September 2020. He had CABG in September 2019. He has CAD s/p 1 stent. He may need to undergo angioplasty of his left femoropopliteal bypass. He had a CVA in January 2022.     Cardiovascular risk factors include advanced age (older than 55 for men, 65 for women), diabetes mellitus, male gender, and microalbuminuria. The patient is on an ACE inhibitor or angiotensin II receptor blocker.  The patient has not been previously hospitalized due to diabetic ketoacidosis.     Current symptoms/problems include none. His clinical course has been stable.     The patient is currently checking the blood glucose multiple times per day.    Patient is using: continuous glucose monitor  FREESTYLE IGOR CGM DATA  Average 149mg/dL   0% of values below target range  80% of values within target range  20% of values above target range    Hypoglycemia frequency: Denies   Hypoglycemia awareness: N/A      Current Regimen  Lantus 20 units SQ bedtime  Lispro 10-20 units TID AC   Farxiga 10mg once daily        MEALS GOLO diet   Breakfast - oatmeal with red or black raspberries, cream of wheat   Lunch - salad   Dinner 6pm -protein, 3 cups of vegetables, 2 cups of fruit   Snacks - walnuts, cashews   Beverages - diet coke, coffee, water, Glucerna       Objective   /76 (BP Location: Right arm,  "Patient Position: Sitting, BP Cuff Size: Adult)   Pulse 67   Ht 1.778 m (5' 10\")   Wt 87.7 kg (193 lb 6.4 oz)   BMI 27.75 kg/m²   Physical Exam  Vitals and nursing note reviewed.   Constitutional:       General: He is not in acute distress.     Appearance: Normal appearance. He is normal weight.   HENT:      Head: Normocephalic and atraumatic.      Nose: Nose normal.      Mouth/Throat:      Mouth: Mucous membranes are moist.   Eyes:      Extraocular Movements: Extraocular movements intact.   Cardiovascular:      Rate and Rhythm: Normal rate and regular rhythm.   Pulmonary:      Effort: Pulmonary effort is normal.      Breath sounds: Normal breath sounds.   Musculoskeletal:         General: Normal range of motion.   Skin:     General: Skin is warm.   Neurological:      Mental Status: He is alert and oriented to person, place, and time.      Comments: Diminished power to right upper and lower extremities    Psychiatric:         Mood and Affect: Mood normal.         Lab Review  Glucose (mg/dL)   Date Value   08/10/2023 226 (H)   04/04/2023 166 (H)   08/30/2022 109 (H)     Hemoglobin A1C (%)   Date Value   09/27/2023 7.5 (H)   04/04/2023 7.4 (A)   08/30/2022 6.8 (A)   01/05/2022 7.8 (A)     Bicarbonate (mmol/L)   Date Value   08/10/2023 23   04/04/2023 25   08/30/2022 30     Urea Nitrogen (mg/dL)   Date Value   08/10/2023 40 (H)   04/04/2023 31 (H)   08/30/2022 24 (H)     Creatinine (mg/dL)   Date Value   08/10/2023 1.43 (H)   04/04/2023 1.35 (H)   08/30/2022 0.90       Health Maintenance:   Foot Exam:  2019  Eye Exam:  2018  Lipid Panel:  September 27, 2023  Urine Albumin:  April 4, 2023    Assessment/Plan      58-year-old male presents for follow-up for type 2 diabetes. His last hemoglobin A1c was found to be 7.5% as of September 2023. His blood pressure is at goal. He is noted to be on a statin.    Type 2 diabetes mellitus (CMS/[facility])  To continue Lantus 20 units SQ at bedtime   To continue Lispro 10 units before " meals; 20 units if sugars are above 200mg/dL   To continue sliding scale with Lispro insulin with meals:  150-200 - 2 units  201-250 - 4 units  251-300 - 6 units  301-350 - 8 units  >251 - 10 units  To continue Farxiga 10g once daily   To continue the use of the FreeStyle En CGM for the intensive glucose monitoring due to the dynamic nature of insulin requirements, the narrow therapeutic index of insulin and the potentially fatal consequences of treatment  Please call regarding what pharmacy is to be used in order to send supplies   To follow up with neurology; ask them regarding what medication can be used in placed of Gabapentin   Counseled that the goal A1C should be 7% or less  Counseled glycemic control is warranted to prevent microvascular complications         Long-term insulin use (CMS/[facility])  Please rotate insulin injection sites    For follow up in 4-5 months with glucose log

## 2023-12-01 ENCOUNTER — OFFICE VISIT (OUTPATIENT)
Dept: ENDOCRINOLOGY | Facility: CLINIC | Age: 58
End: 2023-12-01
Payer: COMMERCIAL

## 2023-12-01 ENCOUNTER — PHARMACY VISIT (OUTPATIENT)
Dept: PHARMACY | Facility: CLINIC | Age: 58
End: 2023-12-01
Payer: MEDICAID

## 2023-12-01 VITALS
HEIGHT: 70 IN | DIASTOLIC BLOOD PRESSURE: 76 MMHG | BODY MASS INDEX: 27.69 KG/M2 | HEART RATE: 67 BPM | SYSTOLIC BLOOD PRESSURE: 122 MMHG | WEIGHT: 193.4 LBS

## 2023-12-01 DIAGNOSIS — Z79.4 TYPE 2 DIABETES MELLITUS WITHOUT COMPLICATION, WITH LONG-TERM CURRENT USE OF INSULIN (MULTI): Primary | ICD-10-CM

## 2023-12-01 DIAGNOSIS — E11.9 TYPE 2 DIABETES MELLITUS WITHOUT COMPLICATION, WITH LONG-TERM CURRENT USE OF INSULIN (MULTI): Primary | ICD-10-CM

## 2023-12-01 PROCEDURE — RXMED WILLOW AMBULATORY MEDICATION CHARGE

## 2023-12-01 PROCEDURE — 3051F HG A1C>EQUAL 7.0%<8.0%: CPT | Performed by: INTERNAL MEDICINE

## 2023-12-01 PROCEDURE — 99214 OFFICE O/P EST MOD 30 MIN: CPT | Performed by: INTERNAL MEDICINE

## 2023-12-01 PROCEDURE — 3078F DIAST BP <80 MM HG: CPT | Performed by: INTERNAL MEDICINE

## 2023-12-01 PROCEDURE — 3074F SYST BP LT 130 MM HG: CPT | Performed by: INTERNAL MEDICINE

## 2023-12-01 PROCEDURE — 95251 CONT GLUC MNTR ANALYSIS I&R: CPT | Performed by: INTERNAL MEDICINE

## 2023-12-01 RX ORDER — INSULIN LISPRO 100 [IU]/ML
INJECTION, SOLUTION INTRAVENOUS; SUBCUTANEOUS
Qty: 10 ML | Status: CANCELLED | OUTPATIENT
Start: 2023-12-01

## 2023-12-01 RX ORDER — SYRINGE,SAFETY WITH NEEDLE,1ML 25GX1"
SYRINGE (EA) MISCELLANEOUS
Qty: 200 EACH | Refills: 11 | Status: SHIPPED | OUTPATIENT
Start: 2023-12-01 | End: 2024-11-30

## 2023-12-01 RX ORDER — HYDROXYZINE HYDROCHLORIDE 50 MG/1
TABLET, FILM COATED ORAL
Status: ON HOLD | COMMUNITY
End: 2024-04-24 | Stop reason: ENTERED-IN-ERROR

## 2023-12-01 NOTE — TELEPHONE ENCOUNTER
Left voicemail with patient pharmacy at [phone] for his freestyle kavya 2 sensors [facility] at [phone], couldn't find pharmacy in epic

## 2023-12-01 NOTE — TELEPHONE ENCOUNTER
Left voicemail with patient pharmacy at [phone] for his freestyle kavya 2 sensors, [facility] home delivery in [facility] pharmacy was not an option in [facility]

## 2023-12-04 ENCOUNTER — TELEPHONE (OUTPATIENT)
Dept: GASTROENTEROLOGY | Facility: CLINIC | Age: 58
End: 2023-12-04

## 2023-12-04 DIAGNOSIS — E11.9 TYPE 2 DIABETES MELLITUS WITHOUT COMPLICATION, WITH LONG-TERM CURRENT USE OF INSULIN (MULTI): ICD-10-CM

## 2023-12-04 DIAGNOSIS — Z79.4 TYPE 2 DIABETES MELLITUS WITHOUT COMPLICATION, WITH LONG-TERM CURRENT USE OF INSULIN (MULTI): ICD-10-CM

## 2023-12-04 NOTE — TELEPHONE ENCOUNTER
----- Message from Thang Gonzalez MD sent at 2023 12:28 PM EST -----  Regarding: RE: Cardiac Clearance  Go ahead and schedule.    -k    ----- Message -----  From: Delisa Pniedo MA  Sent: 2023  12:00 PM EST  To: Thang Gonzalez MD  Subject: RE: Cardiac Clearance                            Good afternoon Sir!  We have received clearances from Dr. Newby and Dr. Peace but are waiting still to hear back from Stephanie Arce. Do you still recommend waiting for clearance from her or may I proceed with scheduling? Thank you  ----- Message -----  From: Iban Peace DO  Sent: 2023  10:12 AM EST  To: Delisa Pinedo MA  Subject: RE: Cardiac Clearance                            He can come off eliquis from vascular standpoint  ----- Message -----  From: Delisa Pinedo MA  Sent: 2023   9:09 AM EST  To: Stephanie Arce, APRN-CNS; Iban Peace DO; #  Subject: RE: Cardiac Clearance                            Dr. Newby states that the Xarelto is managed either by Dr. Peace or Stephanie Arce. Please advise  ----- Message -----  From: Delisa Pinedo MA  Sent: 2023   9:04 AM EST  To:  [id number]  Subject: Cardiac Clearance                                  Dr. Newby,      Your patient, Dereck Shen (: 1965), is being scheduled for an endoscopic procedure (EGD and/or colonoscopy). They are currently taking an anticoagulant or antiplatelet medication that is being managed by your office. Prior to scheduling the procedure we need to know if it is okay for the patient to temporarily hold this medication for their procedure.    Low dose Aspirin (81 mg per day) is considered safe for endoscopic procedures and should be continued through the day of their procedure.      Current Anticoagulation: Plavix and Xarelto    This medication would need to be held starting the following number of days before the procedure(s).    Xarelto (Rivaroxaban): 3 days before procedure (based on patient's  most recent kidney function)  Plavix (Clopidogrel): 5-7 days before procedure          Please Respond to this Message at your Earliest Convenience (for ease you may copy and paste one of the following responses into your reply)      _______  It is CURRENTLY OKAY (safe) for this patient to temporarily hold the above medication for the duration listed.      _______  It is okay (safe) for this patient to temporarily hold the above medication, BUT ONLY for ______ days prior to the planned procedure      _______  If it is NOT currently safe for the patient to temporarily hold this medication, please indicate how long they would need to wait until it might be safe to hold it.          Patients are typically able to restart these medications the day after the procedure(s). If there are specific instructions for restarting this medication (i.e. dose titration) that you would like your patient to follow, please contact them directly.      If you have any questions or need additional information please reply to this message or call our office at [phone].      Thank you.        Delisa Pinedo  Medical Assistant      /[facility]    [facility]  [address] [place] [address]    Phone: [phone]  Fax: [phone]

## 2023-12-04 NOTE — TELEPHONE ENCOUNTER
PATIENT HAS DECIDED NOT PROCEED WITH SCHEDULING COLONOSCOPY    IF PATIENT DECIDES TO SCHEDULE HOLD    ENTRESTO 3 DAYS  PLAVIX 5 DAYS  AND XARELTO 3 DAYS

## 2023-12-05 PROBLEM — Z79.4 LONG-TERM INSULIN USE (MULTI): Status: ACTIVE | Noted: 2023-12-05

## 2023-12-05 RX ORDER — NAPROXEN SODIUM 220 MG
TABLET ORAL
Qty: 100 EACH | Refills: 11 | Status: SHIPPED | OUTPATIENT
Start: 2023-12-05 | End: 2024-02-27 | Stop reason: SDUPTHER

## 2023-12-05 RX ORDER — BLOOD SUGAR DIAGNOSTIC
1 STRIP MISCELLANEOUS 2 TIMES DAILY
Qty: 200 STRIP | Refills: 11 | Status: SHIPPED | OUTPATIENT
Start: 2023-12-05 | End: 2023-12-07 | Stop reason: SDUPTHER

## 2023-12-05 NOTE — ASSESSMENT & PLAN NOTE
To continue Lantus 20 units SQ at bedtime   To continue Lispro 10 units before meals; 20 units if sugars are above 200mg/dL   To continue sliding scale with Lispro insulin with meals:  150-200 - 2 units  201-250 - 4 units  251-300 - 6 units  301-350 - 8 units  >251 - 10 units  To continue Farxiga 10g once daily   To continue the use of the FreeStyle En CGM for the intensive glucose monitoring due to the dynamic nature of insulin requirements, the narrow therapeutic index of insulin and the potentially fatal consequences of treatment  Please call regarding what pharmacy is to be used in order to send supplies   To follow up with neurology; ask them regarding what medication can be used in placed of Gabapentin   Counseled that the goal A1C should be 7% or less  Counseled glycemic control is warranted to prevent microvascular complications

## 2023-12-06 ENCOUNTER — TELEPHONE (OUTPATIENT)
Dept: PRIMARY CARE | Facility: CLINIC | Age: 58
End: 2023-12-06

## 2023-12-06 ENCOUNTER — PHARMACY VISIT (OUTPATIENT)
Dept: PHARMACY | Facility: CLINIC | Age: 58
End: 2023-12-06
Payer: MEDICAID

## 2023-12-06 DIAGNOSIS — K21.9 GASTROESOPHAGEAL REFLUX DISEASE WITHOUT ESOPHAGITIS: ICD-10-CM

## 2023-12-06 DIAGNOSIS — F31.9 BIPOLAR 1 DISORDER (MULTI): ICD-10-CM

## 2023-12-06 DIAGNOSIS — F17.200 TOBACCO DEPENDENCE: ICD-10-CM

## 2023-12-06 DIAGNOSIS — N40.0 BENIGN PROSTATIC HYPERPLASIA, UNSPECIFIED WHETHER LOWER URINARY TRACT SYMPTOMS PRESENT: ICD-10-CM

## 2023-12-06 PROCEDURE — RXMED WILLOW AMBULATORY MEDICATION CHARGE

## 2023-12-06 RX ORDER — IBUPROFEN 200 MG
TABLET ORAL
Qty: 28 PATCH | Refills: 0 | Status: SHIPPED | OUTPATIENT
Start: 2023-12-06 | End: 2024-01-03 | Stop reason: SDUPTHER

## 2023-12-06 RX ORDER — PANTOPRAZOLE SODIUM 40 MG/1
40 TABLET, DELAYED RELEASE ORAL DAILY
Qty: 90 TABLET | Refills: 1 | Status: SHIPPED | OUTPATIENT
Start: 2023-12-06 | End: 2024-04-08 | Stop reason: SDUPTHER

## 2023-12-06 RX ORDER — LURASIDONE HYDROCHLORIDE 40 MG/1
40 TABLET, FILM COATED ORAL DAILY
Qty: 90 TABLET | Refills: 1 | Status: SHIPPED | OUTPATIENT
Start: 2023-12-06 | End: 2024-04-26 | Stop reason: HOSPADM

## 2023-12-06 RX ORDER — TAMSULOSIN HYDROCHLORIDE 0.4 MG/1
0.4 CAPSULE ORAL DAILY
Qty: 90 CAPSULE | Refills: 1 | Status: SHIPPED | OUTPATIENT
Start: 2023-12-06 | End: 2024-04-08 | Stop reason: SDUPTHER

## 2023-12-06 NOTE — TELEPHONE ENCOUNTER
Refills of     nicotine (Nicoderm CQ) 21 mg/24 hr patch [[id number]]     lurasidone (Latuda) 40 mg tablet [[id number]]     tamsulosin (Flomax) 0.4 mg 24 hr capsule [[id number]]     pantoprazole (ProtoNix) 40 mg EC tablet [[id number]]       [facility]

## 2023-12-07 ENCOUNTER — PHARMACY VISIT (OUTPATIENT)
Dept: PHARMACY | Facility: CLINIC | Age: 58
End: 2023-12-07

## 2023-12-07 DIAGNOSIS — E11.9 TYPE 2 DIABETES MELLITUS WITHOUT COMPLICATION, WITH LONG-TERM CURRENT USE OF INSULIN (MULTI): ICD-10-CM

## 2023-12-07 DIAGNOSIS — Z79.4 TYPE 2 DIABETES MELLITUS WITHOUT COMPLICATION, WITH LONG-TERM CURRENT USE OF INSULIN (MULTI): ICD-10-CM

## 2023-12-07 PROCEDURE — RXMED WILLOW AMBULATORY MEDICATION CHARGE

## 2023-12-07 RX ORDER — BLOOD SUGAR DIAGNOSTIC
1 STRIP MISCELLANEOUS 2 TIMES DAILY
Qty: 200 STRIP | Refills: 11 | Status: SHIPPED | OUTPATIENT
Start: 2023-12-07 | End: 2024-12-06

## 2023-12-07 RX ORDER — INSULIN GLARGINE 100 [IU]/ML
20 INJECTION, SOLUTION SUBCUTANEOUS NIGHTLY
Qty: 10 ML | Refills: 5 | Status: SHIPPED | OUTPATIENT
Start: 2023-12-07 | End: 2024-02-27 | Stop reason: SDUPTHER

## 2023-12-07 RX ORDER — INSULIN LISPRO 100 [IU]/ML
INJECTION, SOLUTION INTRAVENOUS; SUBCUTANEOUS
Qty: 20 ML | Refills: 6 | Status: SHIPPED | OUTPATIENT
Start: 2023-12-07 | End: 2024-02-06 | Stop reason: SDUPTHER

## 2023-12-11 ENCOUNTER — PHARMACY VISIT (OUTPATIENT)
Dept: PHARMACY | Facility: CLINIC | Age: 58
End: 2023-12-11
Payer: MEDICAID

## 2023-12-15 PROCEDURE — RXMED WILLOW AMBULATORY MEDICATION CHARGE

## 2023-12-16 ENCOUNTER — PHARMACY VISIT (OUTPATIENT)
Dept: PHARMACY | Facility: CLINIC | Age: 58
End: 2023-12-16
Payer: MEDICAID

## 2023-12-18 ENCOUNTER — PATIENT OUTREACH (OUTPATIENT)
Dept: PRIMARY CARE | Facility: CLINIC | Age: 58
End: 2023-12-18

## 2023-12-18 NOTE — PROGRESS NOTES
Discharge Facility: [facility]  Discharge Diagnosis: Ischemic Stroke  Admission Date: 13 Dec 23  Discharge Date: 15 Dec 23    PCP Appointment Date: 22 Dec 23 set by me.   Specialist Appointment Date: 29 Dec 23 ([facility])  Hospital Encounter and Summary: Liked    See discharge assessment below for further details     Engagement  Call Start Time: 1107 (12/18/2023 11:17 AM)    Medications  Medications reviewed with patient/caregiver?: Yes (12/18/2023 11:17 AM)  Is the patient having any side effects they believe may be caused by any medication additions or changes?: No (12/18/2023 11:17 AM)  Does the patient have all medications ordered at discharge?: Yes (12/18/2023 11:17 AM)  Prescription Comments: None (12/18/2023 11:17 AM)  Is the patient taking all medications as directed (includes completed medication regime)?: Yes (12/18/2023 11:17 AM)  Medication Comments: pt denies any questions, concerns, or issues with medications at this time. only thing mentioned from patient was noted below (12/18/2023 11:17 AM)    Appointments  Does the patient have a primary care provider?: Yes (follow up set by me for 22 Dec 23) (12/18/2023 11:17 AM)  Care Management Interventions: Verified appointment date/time/provider (12/18/2023 11:17 AM)  Has the patient kept scheduled appointments due by today?: Yes (12/18/2023 11:17 AM)  Care Management Interventions: Advised patient to keep appointment (12/18/2023 11:17 AM)    Self Management  What is the home health agency?: N/A (12/18/2023 11:17 AM)  Has home health visited the patient within 72 hours of discharge?: Not applicable (12/18/2023 11:17 AM)  What Durable Medical Equipment (DME) was ordered?: N/A (12/18/2023 11:17 AM)    Patient Teaching  Does the patient have access to their discharge instructions?: Yes (12/18/2023 11:17 AM)  Care Management Interventions: Reviewed instructions with patient (12/18/2023 11:17 AM)  What is the patient's perception of  their health status since discharge?: Improving (12/18/2023 11:17 AM)  Is the patient/caregiver able to teach back the hierarchy of who to call/visit for symptoms/problems? PCP, Specialist, Home Health nurse, Urgent Care, ED, 911: Yes (12/18/2023 11:17 AM)  Patient/Caregiver Education Comments: none (12/18/2023 11:17 AM)    Wrap Up  Wrap Up Additional Comments: pt concerned about popped blood vessel in right eye. pt has no other complaints and denies any difficulty with vision at this time. pt instructed to bring this up with PCP during office visit but that if symptoms get any worse to go and be evaluated in ED. pt stating understanding. (12/18/2023 11:17 AM)  Call End Time: 1117 (12/18/2023 11:17 AM)     Pt grateful for outreach call and is agreeable to being contacted after PCP appt to see how he is doing.

## 2023-12-22 ENCOUNTER — PHARMACY VISIT (OUTPATIENT)
Dept: PHARMACY | Facility: CLINIC | Age: 58
End: 2023-12-22
Payer: MEDICAID

## 2023-12-22 ENCOUNTER — OFFICE VISIT (OUTPATIENT)
Dept: PRIMARY CARE | Facility: CLINIC | Age: 58
End: 2023-12-22
Payer: COMMERCIAL

## 2023-12-22 VITALS
DIASTOLIC BLOOD PRESSURE: 76 MMHG | HEIGHT: 71 IN | SYSTOLIC BLOOD PRESSURE: 128 MMHG | WEIGHT: 191 LBS | BODY MASS INDEX: 26.74 KG/M2 | HEART RATE: 66 BPM

## 2023-12-22 DIAGNOSIS — G81.91 RIGHT HEMIPARESIS (MULTI): Primary | ICD-10-CM

## 2023-12-22 DIAGNOSIS — G89.4 CHRONIC PAIN SYNDROME: ICD-10-CM

## 2023-12-22 DIAGNOSIS — G82.20 PARAPARESIS (MULTI): ICD-10-CM

## 2023-12-22 DIAGNOSIS — Z09 HOSPITAL DISCHARGE FOLLOW-UP: ICD-10-CM

## 2023-12-22 DIAGNOSIS — I63.9 ISCHEMIC STROKE (MULTI): ICD-10-CM

## 2023-12-22 PROCEDURE — 99214 OFFICE O/P EST MOD 30 MIN: CPT | Performed by: CLINICAL NURSE SPECIALIST

## 2023-12-22 PROCEDURE — 3074F SYST BP LT 130 MM HG: CPT | Performed by: CLINICAL NURSE SPECIALIST

## 2023-12-22 PROCEDURE — RXMED WILLOW AMBULATORY MEDICATION CHARGE

## 2023-12-22 PROCEDURE — 3051F HG A1C>EQUAL 7.0%<8.0%: CPT | Performed by: CLINICAL NURSE SPECIALIST

## 2023-12-22 PROCEDURE — 3078F DIAST BP <80 MM HG: CPT | Performed by: CLINICAL NURSE SPECIALIST

## 2023-12-22 RX ORDER — SILVER SULFADIAZINE 10 G/1000G
CREAM TOPICAL EVERY 12 HOURS
Status: ON HOLD | COMMUNITY
Start: 2020-03-24 | End: 2024-04-24 | Stop reason: ENTERED-IN-ERROR

## 2023-12-22 RX ORDER — ATORVASTATIN CALCIUM 80 MG/1
80 TABLET, FILM COATED ORAL NIGHTLY
COMMUNITY
Start: 2023-12-19

## 2023-12-22 RX ORDER — PEN NEEDLE, DIABETIC 31 GX5/16"
NEEDLE, DISPOSABLE MISCELLANEOUS
COMMUNITY
Start: 2023-12-17 | End: 2024-02-27 | Stop reason: SDUPTHER

## 2023-12-22 RX ORDER — ISOSORBIDE DINITRATE 5 MG/1
TABLET ORAL
Status: ON HOLD | COMMUNITY
Start: 2019-04-15 | End: 2024-04-24 | Stop reason: ENTERED-IN-ERROR

## 2023-12-22 RX ORDER — DAPAGLIFLOZIN 10 MG/1
TABLET, FILM COATED ORAL
COMMUNITY
Start: 2021-04-20 | End: 2024-04-03

## 2023-12-22 RX ORDER — SPIRONOLACTONE 25 MG/1
TABLET ORAL
Status: ON HOLD | COMMUNITY
Start: 2019-04-15 | End: 2024-04-24 | Stop reason: ENTERED-IN-ERROR

## 2023-12-22 RX ORDER — TIZANIDINE 2 MG/1
TABLET ORAL
COMMUNITY
Start: 2021-06-09 | End: 2023-12-22 | Stop reason: SDUPTHER

## 2023-12-22 RX ORDER — TIZANIDINE 2 MG/1
2 TABLET ORAL DAILY PRN
Qty: 30 TABLET | Refills: 0 | Status: SHIPPED | OUTPATIENT
Start: 2023-12-22 | End: 2024-05-01

## 2023-12-22 ASSESSMENT — ENCOUNTER SYMPTOMS
HEMATURIA: 0
NAUSEA: 0
NUMBNESS: 1
APPETITE CHANGE: 0
ABDOMINAL PAIN: 0
SHORTNESS OF BREATH: 0
COUGH: 0
FEVER: 0
ACTIVITY CHANGE: 0
WOUND: 0
DEPRESSION: 1
SORE THROAT: 0
MYALGIAS: 0
VOMITING: 0
CONSTIPATION: 0
JOINT SWELLING: 0
CHEST TIGHTNESS: 0
CHILLS: 0
FATIGUE: 0
BLOOD IN STOOL: 0
DIZZINESS: 0
BRUISES/BLEEDS EASILY: 0
NECK PAIN: 0
CONFUSION: 0
FLANK PAIN: 0
EYE PAIN: 0
BACK PAIN: 1
WHEEZING: 0
PHOTOPHOBIA: 0
SEIZURES: 0
LOSS OF SENSATION IN FEET: 1
DIARRHEA: 0
ARTHRALGIAS: 1
POLYDIPSIA: 0
TROUBLE SWALLOWING: 0
OCCASIONAL FEELINGS OF UNSTEADINESS: 1
PALPITATIONS: 0
HEADACHES: 0
DYSURIA: 0
UNEXPECTED WEIGHT CHANGE: 0
SLEEP DISTURBANCE: 0

## 2023-12-22 ASSESSMENT — COLUMBIA-SUICIDE SEVERITY RATING SCALE - C-SSRS
1. IN THE PAST MONTH, HAVE YOU WISHED YOU WERE DEAD OR WISHED YOU COULD GO TO SLEEP AND NOT WAKE UP?: NO
6. HAVE YOU EVER DONE ANYTHING, STARTED TO DO ANYTHING, OR PREPARED TO DO ANYTHING TO END YOUR LIFE?: NO
2. HAVE YOU ACTUALLY HAD ANY THOUGHTS OF KILLING YOURSELF?: NO

## 2023-12-22 ASSESSMENT — PATIENT HEALTH QUESTIONNAIRE - PHQ9
2. FEELING DOWN, DEPRESSED OR HOPELESS: SEVERAL DAYS
10. IF YOU CHECKED OFF ANY PROBLEMS, HOW DIFFICULT HAVE THESE PROBLEMS MADE IT FOR YOU TO DO YOUR WORK, TAKE CARE OF THINGS AT HOME, OR GET ALONG WITH OTHER PEOPLE: SOMEWHAT DIFFICULT
SUM OF ALL RESPONSES TO PHQ9 QUESTIONS 1 AND 2: 2
1. LITTLE INTEREST OR PLEASURE IN DOING THINGS: SEVERAL DAYS

## 2023-12-22 NOTE — PATIENT INSTRUCTIONS

## 2023-12-22 NOTE — PROGRESS NOTES
Subjective   Patient ID: Dereck Shen is a 58 y.o. male who presents for Hospital Follow-up (Had 8 strokes and now has partial paralysis on his R side, arm fingers and leg).  HPI    Here today as a follow up appointment. Hospital discharge follow up.     Patient was admitted to [facility] on 12/13/2023 with Ischemic Stroke. Patient is planning to have ANKUR done after the Holidays. Planning to start PT after appointment 12/29/2023.      Following with Cardiology and CHF Clinic. Following with [facility], Dr. Newby.      History of Stroke, follow up with Neurologist outpatient. Patient following with [facility] Neurology. Multiple strokes since 2019. Planning further work up for additional imaging/testing as.       Patient followed with Dr. Bowser for Rheumatology. No longer seeing Damari Watkins through [facility].     Followed with [facility] regarding bilateral sciatic nerve pain and lower back discomfort. Then followed with Dr. Obando. Now receiving Medical Marijuana. Chronic complaints of pain. History of Drug Abuse.      Patient followed with GI.     Patient has been taking Atorvastatin for elevated Cholesterol. Tolerating medication at this time. Denies any associated signs or symptoms.     Patient was diagnosed with Diabetes in 2009. Patient has been more consistent in his Insulin dosages recently followed with Endo, Insulin adjusted. Scheduled for November for follow up. Most recent A1C 7.0%.      Chronic complaints of RUQ abdominal pain. Monitoring for Pancreas.    Patient has been wearing Nicotine patches. Has decreased to smoking 1 pack a week.     Review of Systems   Constitutional:  Negative for activity change, appetite change, chills, fatigue, fever and unexpected weight change.   HENT:  Negative for ear pain, hearing loss, nosebleeds, sore throat, tinnitus and trouble swallowing.    Eyes:  Negative for photophobia, pain and visual disturbance.   Respiratory:   Negative for cough, chest tightness, shortness of breath and wheezing.    Cardiovascular:  Negative for chest pain, palpitations and leg swelling.   Gastrointestinal:  Negative for abdominal pain, blood in stool, constipation, diarrhea, nausea and vomiting.   Endocrine: Negative for cold intolerance, heat intolerance, polydipsia and polyuria.   Genitourinary:  Negative for dysuria, flank pain and hematuria.   Musculoskeletal:  Positive for arthralgias and back pain. Negative for joint swelling, myalgias and neck pain.   Skin:  Negative for pallor, rash and wound.   Allergic/Immunologic: Negative for immunocompromised state.   Neurological:  Positive for numbness. Negative for dizziness, seizures and headaches.   Hematological:  Does not bruise/bleed easily.   Psychiatric/Behavioral:  Negative for confusion and sleep disturbance.        Objective   Physical Exam  Vitals and nursing note reviewed.   Constitutional:       General: He is not in acute distress.     Appearance: Normal appearance.   HENT:      Head: Normocephalic.      Nose: Nose normal.   Eyes:      Conjunctiva/sclera: Conjunctivae normal.   Neck:      Vascular: No carotid bruit.   Cardiovascular:      Rate and Rhythm: Normal rate and regular rhythm.      Pulses: Normal pulses.      Heart sounds: Normal heart sounds.   Pulmonary:      Effort: Pulmonary effort is normal.      Breath sounds: Normal breath sounds.   Abdominal:      General: Bowel sounds are normal.      Palpations: Abdomen is soft.   Musculoskeletal:         General: Normal range of motion.      Cervical back: Normal range of motion.   Skin:     General: Skin is warm and dry.   Neurological:      Mental Status: He is alert and oriented to person, place, and time. Mental status is at baseline.   Psychiatric:         Mood and Affect: Mood normal.         Behavior: Behavior normal.       Assessment/Plan        Reviewed most recent lab work completed with patient.      Peripheral vascular  disease: Status post bypass in both lower extremities. Following with Vascular and Cardiology for management.   Diabetes mellitus type 2 with complications including peripheral vascular disease, retinopathy, nephropathy and peripheral neuropathy. He will continue to follow with Endocrinology. Most recent A1C 7.0%.   COPD. Respiratory status at baseline. Smoking Cessation, restart Patches. Successful in the past.   Pancreatic lesion. Seen on CAT scan November 2020. Dr. Alegria. Stable on last examination.   History of chronic back pain. Now following for Medical Marijuana.   History of stroke with continued loss of left peripheral vision. Stable. Follows with Neurology through [facility].   Combined heart failure. He will continue to follow with the heart failure clinic.   Sleep apnea diagnosed on sleep study February 2021. Managed by pulmonology with CPAP.  Hypertension. Blood pressure controlled at OV today. Continue to monitor.   Hyperlipidemia. Continue high-dose Lipitor. Cardiology added Repatha. Most recent LDL 52.  Diabetic nephropathy. He will continue to follow with nephrology.  History of chronic hepatitis C. Patient was previously seen by gastroenterology but deemed not a candidate for further treatment.   Bipolar: Continue Latuda that has been prescribed.  BPH: Flomax as prescribed.    Hospital Discharge Follow Up, Ischemic Stroke: Medications adjusted during hospitalization. Planning outpatient Rehab. Follow up with Specialists as directed. Planning additional tests.      Pneumovax: November 2016.   Colonoscopy: March 2016.   Unable to update immunizations due to Insurance.     Stephanie Arce, SUMAN-CNS 12/22/23 7:35 AM Patient was identified as a fall risk. Risk prevention instructions provided.

## 2023-12-27 ENCOUNTER — PATIENT OUTREACH (OUTPATIENT)
Dept: PRIMARY CARE | Facility: CLINIC | Age: 58
End: 2023-12-27

## 2023-12-27 NOTE — PROGRESS NOTES
Unable to reach patient for call back after patient's follow up appointment with PCP.   SVETLANAM with call back number for patient to call if needed   If no voicemail available call attempts x 2 were made to contact the patient to assist with any questions or concerns patient may have.

## 2024-01-03 DIAGNOSIS — F17.200 TOBACCO DEPENDENCE: ICD-10-CM

## 2024-01-03 RX ORDER — IBUPROFEN 200 MG
TABLET ORAL
Qty: 28 PATCH | Refills: 0 | Status: SHIPPED | OUTPATIENT
Start: 2024-01-03 | End: 2024-04-08 | Stop reason: SDUPTHER

## 2024-01-08 ENCOUNTER — TELEPHONE (OUTPATIENT)
Dept: PRIMARY CARE | Facility: CLINIC | Age: 59
End: 2024-01-08

## 2024-01-08 ENCOUNTER — APPOINTMENT (OUTPATIENT)
Dept: VASCULAR SURGERY | Facility: CLINIC | Age: 59
End: 2024-01-08

## 2024-01-08 DIAGNOSIS — I63.9 ISCHEMIC STROKE (MULTI): ICD-10-CM

## 2024-01-09 PROCEDURE — RXMED WILLOW AMBULATORY MEDICATION CHARGE

## 2024-01-10 ENCOUNTER — PHARMACY VISIT (OUTPATIENT)
Dept: PHARMACY | Facility: CLINIC | Age: 59
End: 2024-01-10
Payer: MEDICAID

## 2024-01-11 ENCOUNTER — PHARMACY VISIT (OUTPATIENT)
Dept: PHARMACY | Facility: CLINIC | Age: 59
End: 2024-01-11

## 2024-01-14 ENCOUNTER — PHARMACY VISIT (OUTPATIENT)
Dept: PHARMACY | Facility: CLINIC | Age: 59
End: 2024-01-14
Payer: MEDICAID

## 2024-01-14 PROCEDURE — RXMED WILLOW AMBULATORY MEDICATION CHARGE

## 2024-01-15 ENCOUNTER — PATIENT OUTREACH (OUTPATIENT)
Dept: PRIMARY CARE | Facility: CLINIC | Age: 59
End: 2024-01-15

## 2024-01-15 ENCOUNTER — PHARMACY VISIT (OUTPATIENT)
Dept: PHARMACY | Facility: CLINIC | Age: 59
End: 2024-01-15

## 2024-01-19 ENCOUNTER — APPOINTMENT (OUTPATIENT)
Dept: VASCULAR SURGERY | Facility: CLINIC | Age: 59
End: 2024-01-19

## 2024-01-24 ENCOUNTER — APPOINTMENT (OUTPATIENT)
Dept: VASCULAR SURGERY | Facility: CLINIC | Age: 59
End: 2024-01-24

## 2024-01-24 ENCOUNTER — APPOINTMENT (OUTPATIENT)
Dept: PRIMARY CARE | Facility: CLINIC | Age: 59
End: 2024-01-24

## 2024-01-24 PROCEDURE — RXMED WILLOW AMBULATORY MEDICATION CHARGE

## 2024-01-25 ENCOUNTER — PHARMACY VISIT (OUTPATIENT)
Dept: PHARMACY | Facility: CLINIC | Age: 59
End: 2024-01-25
Payer: MEDICAID

## 2024-02-04 ENCOUNTER — APPOINTMENT (OUTPATIENT)
Dept: CARDIOLOGY | Facility: HOSPITAL | Age: 59
End: 2024-02-04
Payer: COMMERCIAL

## 2024-02-04 ENCOUNTER — HOSPITAL ENCOUNTER (EMERGENCY)
Facility: HOSPITAL | Age: 59
Discharge: HOME | End: 2024-02-04
Attending: STUDENT IN AN ORGANIZED HEALTH CARE EDUCATION/TRAINING PROGRAM
Payer: COMMERCIAL

## 2024-02-04 ENCOUNTER — APPOINTMENT (OUTPATIENT)
Dept: RADIOLOGY | Facility: HOSPITAL | Age: 59
End: 2024-02-04
Payer: COMMERCIAL

## 2024-02-04 VITALS
SYSTOLIC BLOOD PRESSURE: 128 MMHG | OXYGEN SATURATION: 97 % | HEART RATE: 66 BPM | TEMPERATURE: 98.1 F | HEIGHT: 71 IN | WEIGHT: 189 LBS | RESPIRATION RATE: 18 BRPM | BODY MASS INDEX: 26.46 KG/M2 | DIASTOLIC BLOOD PRESSURE: 74 MMHG

## 2024-02-04 DIAGNOSIS — G40.919 BREAKTHROUGH SEIZURE (MULTI): Primary | ICD-10-CM

## 2024-02-04 LAB
ALBUMIN SERPL BCP-MCNC: 3.7 G/DL (ref 3.4–5)
ALP SERPL-CCNC: 77 U/L (ref 33–120)
ALT SERPL W P-5'-P-CCNC: 19 U/L (ref 10–52)
ANION GAP SERPL CALC-SCNC: 9 MMOL/L (ref 10–20)
AST SERPL W P-5'-P-CCNC: 12 U/L (ref 9–39)
BASOPHILS # BLD AUTO: 0.02 X10*3/UL (ref 0–0.1)
BASOPHILS NFR BLD AUTO: 0.4 %
BILIRUB SERPL-MCNC: 0.7 MG/DL (ref 0–1.2)
BUN SERPL-MCNC: 20 MG/DL (ref 6–23)
CALCIUM SERPL-MCNC: 8.2 MG/DL (ref 8.6–10.3)
CHLORIDE SERPL-SCNC: 107 MMOL/L (ref 98–107)
CO2 SERPL-SCNC: 26 MMOL/L (ref 21–32)
CREAT SERPL-MCNC: 1.08 MG/DL (ref 0.5–1.3)
EGFRCR SERPLBLD CKD-EPI 2021: 80 ML/MIN/1.73M*2
EOSINOPHIL # BLD AUTO: 0.03 X10*3/UL (ref 0–0.7)
EOSINOPHIL NFR BLD AUTO: 0.5 %
ERYTHROCYTE [DISTWIDTH] IN BLOOD BY AUTOMATED COUNT: 12.3 % (ref 11.5–14.5)
ETHANOL SERPL-MCNC: <10 MG/DL
GLUCOSE SERPL-MCNC: 86 MG/DL (ref 74–99)
HCT VFR BLD AUTO: 39.7 % (ref 41–52)
HGB BLD-MCNC: 14 G/DL (ref 13.5–17.5)
IMM GRANULOCYTES # BLD AUTO: 0.01 X10*3/UL (ref 0–0.7)
IMM GRANULOCYTES NFR BLD AUTO: 0.2 % (ref 0–0.9)
LACTATE SERPL-SCNC: 1 MMOL/L (ref 0.4–2)
LYMPHOCYTES # BLD AUTO: 1.98 X10*3/UL (ref 1.2–4.8)
LYMPHOCYTES NFR BLD AUTO: 35.1 %
MAGNESIUM SERPL-MCNC: 2.17 MG/DL (ref 1.6–2.4)
MCH RBC QN AUTO: 31 PG (ref 26–34)
MCHC RBC AUTO-ENTMCNC: 35.3 G/DL (ref 32–36)
MCV RBC AUTO: 88 FL (ref 80–100)
MONOCYTES # BLD AUTO: 0.38 X10*3/UL (ref 0.1–1)
MONOCYTES NFR BLD AUTO: 6.7 %
NEUTROPHILS # BLD AUTO: 3.22 X10*3/UL (ref 1.2–7.7)
NEUTROPHILS NFR BLD AUTO: 57.1 %
NRBC BLD-RTO: 0 /100 WBCS (ref 0–0)
PLATELET # BLD AUTO: 185 X10*3/UL (ref 150–450)
POTASSIUM SERPL-SCNC: 4.1 MMOL/L (ref 3.5–5.3)
PROT SERPL-MCNC: 6.8 G/DL (ref 6.4–8.2)
RBC # BLD AUTO: 4.52 X10*6/UL (ref 4.5–5.9)
SODIUM SERPL-SCNC: 138 MMOL/L (ref 136–145)
WBC # BLD AUTO: 5.6 X10*3/UL (ref 4.4–11.3)

## 2024-02-04 PROCEDURE — 70450 CT HEAD/BRAIN W/O DYE: CPT | Performed by: RADIOLOGY

## 2024-02-04 PROCEDURE — 85025 COMPLETE CBC W/AUTO DIFF WBC: CPT | Performed by: STUDENT IN AN ORGANIZED HEALTH CARE EDUCATION/TRAINING PROGRAM

## 2024-02-04 PROCEDURE — 80157 ASSAY CARBAMAZEPINE FREE: CPT | Performed by: STUDENT IN AN ORGANIZED HEALTH CARE EDUCATION/TRAINING PROGRAM

## 2024-02-04 PROCEDURE — 2500000004 HC RX 250 GENERAL PHARMACY W/ HCPCS (ALT 636 FOR OP/ED): Performed by: STUDENT IN AN ORGANIZED HEALTH CARE EDUCATION/TRAINING PROGRAM

## 2024-02-04 PROCEDURE — 36415 COLL VENOUS BLD VENIPUNCTURE: CPT | Performed by: STUDENT IN AN ORGANIZED HEALTH CARE EDUCATION/TRAINING PROGRAM

## 2024-02-04 PROCEDURE — 80053 COMPREHEN METABOLIC PANEL: CPT | Performed by: STUDENT IN AN ORGANIZED HEALTH CARE EDUCATION/TRAINING PROGRAM

## 2024-02-04 PROCEDURE — 70450 CT HEAD/BRAIN W/O DYE: CPT

## 2024-02-04 PROCEDURE — 2500000004 HC RX 250 GENERAL PHARMACY W/ HCPCS (ALT 636 FOR OP/ED)

## 2024-02-04 PROCEDURE — 96374 THER/PROPH/DIAG INJ IV PUSH: CPT

## 2024-02-04 PROCEDURE — 99285 EMERGENCY DEPT VISIT HI MDM: CPT | Mod: 25

## 2024-02-04 PROCEDURE — 93005 ELECTROCARDIOGRAM TRACING: CPT

## 2024-02-04 PROCEDURE — 96375 TX/PRO/DX INJ NEW DRUG ADDON: CPT

## 2024-02-04 PROCEDURE — 96361 HYDRATE IV INFUSION ADD-ON: CPT

## 2024-02-04 PROCEDURE — 83605 ASSAY OF LACTIC ACID: CPT | Performed by: STUDENT IN AN ORGANIZED HEALTH CARE EDUCATION/TRAINING PROGRAM

## 2024-02-04 PROCEDURE — 82077 ASSAY SPEC XCP UR&BREATH IA: CPT | Performed by: STUDENT IN AN ORGANIZED HEALTH CARE EDUCATION/TRAINING PROGRAM

## 2024-02-04 PROCEDURE — 83735 ASSAY OF MAGNESIUM: CPT | Performed by: STUDENT IN AN ORGANIZED HEALTH CARE EDUCATION/TRAINING PROGRAM

## 2024-02-04 RX ORDER — LORAZEPAM 2 MG/ML
INJECTION INTRAMUSCULAR
Status: COMPLETED
Start: 2024-02-04 | End: 2024-02-04

## 2024-02-04 RX ORDER — OXCARBAZEPINE 150 MG/1
450 TABLET, FILM COATED ORAL ONCE
Status: COMPLETED | OUTPATIENT
Start: 2024-02-04 | End: 2024-02-04

## 2024-02-04 RX ORDER — LORAZEPAM 2 MG/ML
4 INJECTION INTRAMUSCULAR ONCE
Status: COMPLETED | OUTPATIENT
Start: 2024-02-04 | End: 2024-02-04

## 2024-02-04 RX ORDER — LEVETIRACETAM 500 MG/1
500 TABLET ORAL 2 TIMES DAILY
Qty: 60 TABLET | Refills: 0 | Status: SHIPPED | OUTPATIENT
Start: 2024-02-04 | End: 2024-02-27 | Stop reason: WASHOUT

## 2024-02-04 RX ADMIN — LORAZEPAM 4 MG: 2 INJECTION, SOLUTION INTRAMUSCULAR; INTRAVENOUS at 15:16

## 2024-02-04 RX ADMIN — OXCARBAZEPINE 450 MG: 150 TABLET, FILM COATED ORAL at 19:26

## 2024-02-04 RX ADMIN — LORAZEPAM 4 MG: 2 INJECTION INTRAMUSCULAR at 15:16

## 2024-02-04 RX ADMIN — SODIUM CHLORIDE 500 ML: 9 INJECTION, SOLUTION INTRAVENOUS at 14:38

## 2024-02-04 RX ADMIN — LEVETIRACETAM 1700 MG: 100 INJECTION, SOLUTION INTRAVENOUS at 15:16

## 2024-02-04 ASSESSMENT — PAIN DESCRIPTION - LOCATION: LOCATION: SHOULDER

## 2024-02-04 ASSESSMENT — PAIN DESCRIPTION - DESCRIPTORS: DESCRIPTORS: PRESSURE

## 2024-02-04 ASSESSMENT — PAIN - FUNCTIONAL ASSESSMENT: PAIN_FUNCTIONAL_ASSESSMENT: 0-10

## 2024-02-04 ASSESSMENT — PAIN DESCRIPTION - PAIN TYPE: TYPE: ACUTE PAIN

## 2024-02-04 NOTE — ED PROVIDER NOTES
HPI   No chief complaint on file.      Dereck Shen is a 58 y.o. male with a past medical history of CHF, anxiety, aphasia, CAD, bipolar disorder, COPD, strokes with hemiplegia, aortic aneurysm, osteoarthritis, BRUNA, and DM2 presenting to the emergency department after a reported seizure at home. The patients mother reports that they were cooking dinner when the patient reported that he did not feel well and needed to rest.  The patient then went unresponsive and began to foam at the mouth.  After approximately 2-3 minutes, the patient regained consciousness and remained post-ictal with lethargy.  EMS was called and the patient was brought to the emergency department.  The patient endorses sharp shoulder and chest pain as well as a feeling of lethargy and weakness. He denies any shortness of breath, nausea, vomiting, dizziness, and lightheadedness.  The patients mother reports that he has had two medication changes including a change from Plavix to Brilinta and a change from Keppra to Oxcarbazepine.                           No data recorded                Patient History   Past Medical History:   Diagnosis Date    Abnormal electrocardiogram (ECG) (EKG)     Abnormal EKG    Acute on chronic combined systolic (congestive) and diastolic (congestive) heart failure (CMS/[facility]) 10/18/2019    Acute on chronic combined systolic and diastolic congestive heart failure    Anxiety disorder, unspecified     Anxiety    Aphasia 07/06/2017    Aphasia, mixed    Atherosclerotic heart disease of native coronary artery with unspecified angina pectoris (CMS/[facility]) 11/05/2019    Atherosclerosis of native coronary artery of native heart with angina pectoris    Bipolar disorder, unspecified (CMS/[facility]) 06/10/2020    Bipolar 1 disorder    Body mass index (BMI) 31.0-31.9, adult 10/18/2019    Body mass index (BMI) of 31.0 to 31.9 in adult    Chronic obstructive pulmonary disease with (acute) exacerbation (CMS/[facility]) 12/13/2021    COPD  exacerbation    Chronic obstructive pulmonary disease with (acute) exacerbation (CMS/[facility]) 11/25/2020    Acute exacerbation of chronic obstructive pulmonary disease    Chronic systolic (congestive) heart failure (CMS/[facility]) 02/09/2021    Chronic systolic congestive heart failure    Cutaneous abscess of groin 09/12/2019    Abscess of groin    Encounter for follow-up examination after completed treatment for conditions other than malignant neoplasm 09/12/2019    Hospital discharge follow-up    Encounter for screening for malignant neoplasm of prostate 09/12/2019    Screening for prostate cancer    Encounter for screening for other suspected endocrine disorder 09/12/2019    Screening for thyroid disorder    Headache, unspecified 09/10/2019    Chronic daily headache    Hemiplegia and hemiparesis following cerebral infarction affecting unspecified side (CMS/[facility]) 06/21/2021    Hemiplegia following cerebrovascular accident (CVA)    Obesity, unspecified 10/18/2019    Obesity (BMI 30.0-34.9)    Obstructive sleep apnea (adult) (pediatric) 10/07/2019    Obstructive sleep apnea, adult    Old myocardial infarction     History of inferior wall myocardial infarction    Old myocardial infarction     History of myocardial infarction    Opioid abuse, in remission (CMS/[facility]) 04/06/2020    History of opioid abuse    Other conditions influencing health status 11/25/2020    History of cough    Other specified abnormal findings of blood chemistry 09/12/2019    Low testosterone in male    Pain in unspecified joint 10/18/2019    Polyarthralgia    Personal history of other diseases of male genital organs 09/12/2019    History of erectile dysfunction    Personal history of other diseases of the circulatory system 11/05/2019    History of coronary artery disease    Personal history of other diseases of the digestive system 10/18/2019    History of constipation    Personal history of other diseases of the musculoskeletal system and connective  tissue 07/18/2019    History of osteoarthritis    Personal history of other diseases of the musculoskeletal system and connective tissue     History of polymyalgia rheumatica    Personal history of other diseases of the musculoskeletal system and connective tissue     History of osteoporosis    Personal history of other diseases of the musculoskeletal system and connective tissue 07/18/2019    History of low back pain    Personal history of other diseases of the nervous system and sense organs 09/12/2019    History of peripheral neuropathy    Personal history of other endocrine, nutritional and metabolic disease 08/11/2020    History of type 2 diabetes mellitus    Personal history of other endocrine, nutritional and metabolic disease 10/18/2019    History of vitamin D deficiency    Personal history of other infectious and parasitic diseases 10/18/2019    History of hepatitis C virus infection    Personal history of other mental and behavioral disorders     History of panic attacks    Personal history of pneumonia (recurrent) 09/12/2019    History of pneumonia    Post-traumatic stress disorder, unspecified     PTSD (post-traumatic stress disorder)    Postlaminectomy syndrome, not elsewhere classified 12/08/2015    Failed back syndrome of lumbar spine    Proteinuria, unspecified 10/25/2019    Proteinuria, unspecified type    Radiculopathy, lumbar region 07/20/2017    Chronic lumbar radiculopathy    Stroke (CMS/HCC) 07/24/2023    Unqualified visual loss, left eye, normal vision right eye 07/06/2017    Visual loss, left eye     Past Surgical History:   Procedure Laterality Date    BACK SURGERY  03/17/2017    Back Surgery    CORONARY ANGIOPLASTY  03/17/2017    PTCA    CT AORTA AND BILATERAL ILIOFEMORAL RUNOFF ANGIOGRAM W AND/OR WO IV CONTRAST  7/20/2020    CT AORTA AND BILATERAL ILIOFEMORAL RUNOFF ANGIOGRAM W AND/OR WO IV CONTRAST 7/20/2020 [facility]    CT AORTA AND BILATERAL ILIOFEMORAL RUNOFF ANGIOGRAM W  AND/OR WO IV CONTRAST  1/14/2022    CT AORTA AND BILATERAL ILIOFEMORAL RUNOFF ANGIOGRAM W AND/OR WO IV CONTRAST 1/14/2022 [facility]    ELBOW SURGERY  02/10/2020    Elbow Surgery    KNEE SURGERY  06/02/2016    Knee Surgery Left    MR HEAD ANGIO WO IV CONTRAST  1/4/2022    MR HEAD ANGIO WO IV CONTRAST 1/4/2022 [facility]    MR NECK ANGIO WO IV CONTRAST  1/4/2022    MR NECK ANGIO WO IV CONTRAST 1/4/2022 [facility]    OTHER SURGICAL HISTORY  06/02/2016    Dental Surgery    OTHER SURGICAL HISTORY  09/12/2019    Cardiac catheterization with stent placement    OTHER SURGICAL HISTORY  09/30/2019    Coronary artery bypass graft    OTHER SURGICAL HISTORY  10/21/2020    Vascular surgical procedure    OTHER SURGICAL HISTORY  06/07/2019    Tonsillectomy    OTHER SURGICAL HISTORY  09/21/2020    Femoropopliteal bypass     Family History   Problem Relation Name Age of Onset    No Known Problems Mother      No Known Problems Father       Social History     Tobacco Use    Smoking status: Former     Packs/day: .5     Types: Cigarettes    Smokeless tobacco: Never    Tobacco comments:     Wants 21 mg patches   Vaping Use    Vaping Use: Never used   Substance Use Topics    Alcohol use: Never    Drug use: Yes     Types: Marijuana     Comment: Medical card       Physical Exam   ED Triage Vitals   Temp Pulse Resp BP   -- -- -- --      SpO2 Temp src Heart Rate Source Patient Position   -- -- -- --      BP Location FiO2 (%)     -- --       Physical Exam  Vitals and nursing note reviewed.   Constitutional:       General: He is not in acute distress.     Appearance: He is well-developed. He is obese. He is ill-appearing and diaphoretic.   HENT:      Head: Normocephalic and atraumatic.      Right Ear: External ear normal.      Left Ear: External ear normal.      Nose: Nose normal.      Mouth/Throat:      Mouth: Mucous membranes are moist.      Pharynx: Oropharynx is clear.   Eyes:      Extraocular Movements:  Extraocular movements intact.      Conjunctiva/sclera: Conjunctivae normal.      Pupils: Pupils are equal, round, and reactive to light.   Cardiovascular:      Rate and Rhythm: Normal rate and regular rhythm.      Pulses: Normal pulses.      Heart sounds: Normal heart sounds. No murmur heard.  Pulmonary:      Effort: Pulmonary effort is normal. No respiratory distress.      Breath sounds: Rhonchi present.   Abdominal:      General: Bowel sounds are normal.      Palpations: Abdomen is soft.      Tenderness: There is no abdominal tenderness.   Musculoskeletal:         General: No swelling.      Cervical back: Normal range of motion and neck supple.   Skin:     General: Skin is warm.      Capillary Refill: Capillary refill takes less than 2 seconds.   Neurological:      Mental Status: He is alert and oriented to person, place, and time. Mental status is at baseline.      GCS: GCS eye subscore is 4. GCS verbal subscore is 5. GCS motor subscore is 6.      Cranial Nerves: Dysarthria and facial asymmetry present.      Motor: Weakness (Right upper extremity paralysis, right lower extremity severe weakness/deficit) present.      Gait: Tandem walk abnormal.      Comments: Right upper extremity paralysis, right lower extremity severe weakness/deficit   Psychiatric:         Mood and Affect: Mood normal.         ED Course & MDM   ED Course as of 02/04/24 1425   Sun Feb 04, 2024   1423 EKG is interpreted by myself demonstrates sinus rhythm with a ventricular rate of 71, normal axis, normal intervals, no evidence of an acute STEMI or malignant arrhythmia [NS]      ED Course User Index  [NS] Kenny Quinteros MD       Medical Decision Making  Differential diagnoses include epileptic seizures, CVA, infection, and pneumonia. Labs were ordered including CBC, CMP, and carbamazapine level. Imaging ordered including CT scan of the head.        Procedure  Procedures     Viraj Ward  02/04/24 1526

## 2024-02-06 DIAGNOSIS — E11.9 TYPE 2 DIABETES MELLITUS WITHOUT COMPLICATION, WITH LONG-TERM CURRENT USE OF INSULIN (MULTI): ICD-10-CM

## 2024-02-06 DIAGNOSIS — Z79.4 TYPE 2 DIABETES MELLITUS WITHOUT COMPLICATION, WITH LONG-TERM CURRENT USE OF INSULIN (MULTI): ICD-10-CM

## 2024-02-06 LAB
CARBAMAZEPINE FREE SERPL-MCNC: 0.6 UG/ML (ref 0.8–2.4)
SCAN RESULT: ABNORMAL

## 2024-02-06 PROCEDURE — RXMED WILLOW AMBULATORY MEDICATION CHARGE

## 2024-02-06 RX ORDER — INSULIN LISPRO 100 [IU]/ML
INJECTION, SOLUTION INTRAVENOUS; SUBCUTANEOUS
Qty: 20 ML | Refills: 6 | Status: SHIPPED | OUTPATIENT
Start: 2024-02-06 | End: 2024-02-09

## 2024-02-07 ENCOUNTER — PHARMACY VISIT (OUTPATIENT)
Dept: PHARMACY | Facility: CLINIC | Age: 59
End: 2024-02-07
Payer: MEDICAID

## 2024-02-08 LAB
ATRIAL RATE: 71 BPM
P AXIS: 76 DEGREES
PR INTERVAL: 175 MS
Q ONSET: 252 MS
QRS COUNT: 12 BEATS
QRS DURATION: 104 MS
QT INTERVAL: 432 MS
QTC CALCULATION(BAZETT): 470 MS
QTC FREDERICIA: 457 MS
R AXIS: 45 DEGREES
T AXIS: 141 DEGREES
T OFFSET: 468 MS
VENTRICULAR RATE: 71 BPM

## 2024-02-09 ENCOUNTER — PHARMACY VISIT (OUTPATIENT)
Dept: PHARMACY | Facility: CLINIC | Age: 59
End: 2024-02-09
Payer: MEDICAID

## 2024-02-09 PROCEDURE — RXMED WILLOW AMBULATORY MEDICATION CHARGE

## 2024-02-09 RX ORDER — INSULIN LISPRO 100 [IU]/ML
INJECTION, SOLUTION INTRAVENOUS; SUBCUTANEOUS
Qty: 20 ML | Refills: 6 | Status: ON HOLD | OUTPATIENT
Start: 2024-02-09 | End: 2024-04-26

## 2024-02-15 ENCOUNTER — PHARMACY VISIT (OUTPATIENT)
Dept: PHARMACY | Facility: CLINIC | Age: 59
End: 2024-02-15
Payer: MEDICAID

## 2024-02-15 PROCEDURE — RXMED WILLOW AMBULATORY MEDICATION CHARGE

## 2024-02-19 ENCOUNTER — TELEPHONE (OUTPATIENT)
Dept: ENDOCRINOLOGY | Facility: CLINIC | Age: 59
End: 2024-02-19

## 2024-02-19 ENCOUNTER — TELEPHONE (OUTPATIENT)
Dept: PRIMARY CARE | Facility: CLINIC | Age: 59
End: 2024-02-19

## 2024-02-19 NOTE — TELEPHONE ENCOUNTER
Patient called to advise that his Gabapentin is no longer working.  He was seen by you in Dec, 2023 and states that he had discussed this at that appointment but did agree to attempt at a trial of medication again.  He did call his PCP and neurologist, however they will not advise on changing the medication because it had been prescribed by you. He would like to know if this can be changed to something else for his diabetic neuropathy.  His next appointment to see you is not until 5/1/24.

## 2024-02-19 NOTE — TELEPHONE ENCOUNTER
He has been on Gabapentin for a long time & says it is no longer working.  He is asking if it can be changed to something else ?  [facility] pharmacy    Does he need appointment ?

## 2024-02-19 NOTE — TELEPHONE ENCOUNTER
His Gabapentin is not prescribed through us. Comes from Endo. Will need to discuss options with them.

## 2024-02-21 RX ORDER — PEN NEEDLE, DIABETIC 31 GX5/16"
1 NEEDLE, DISPOSABLE MISCELLANEOUS 3 TIMES DAILY
Qty: 300 EACH | Refills: 3 | Status: CANCELLED | OUTPATIENT
Start: 2024-02-21 | End: 2025-02-20

## 2024-02-21 NOTE — TELEPHONE ENCOUNTER
1.Patient called to check on the status of the message.    2.He also is requesting a prescription for the BD UF Jocy pen needles (4 mm X 32g).  Patient is using [facility] pharmacy.  Pended for approval.

## 2024-02-27 ENCOUNTER — OFFICE VISIT (OUTPATIENT)
Dept: PRIMARY CARE | Facility: CLINIC | Age: 59
End: 2024-02-27
Payer: COMMERCIAL

## 2024-02-27 ENCOUNTER — PHARMACY VISIT (OUTPATIENT)
Dept: PHARMACY | Facility: CLINIC | Age: 59
End: 2024-02-27
Payer: MEDICAID

## 2024-02-27 VITALS
DIASTOLIC BLOOD PRESSURE: 70 MMHG | HEART RATE: 87 BPM | BODY MASS INDEX: 28.28 KG/M2 | HEIGHT: 71 IN | WEIGHT: 202 LBS | SYSTOLIC BLOOD PRESSURE: 130 MMHG

## 2024-02-27 DIAGNOSIS — G61.9 NEUROPATHY, INFLAMMATORY OR TOXIC (MULTI): ICD-10-CM

## 2024-02-27 DIAGNOSIS — Z79.4 TYPE 2 DIABETES MELLITUS WITH DIABETIC PERIPHERAL ANGIOPATHY WITHOUT GANGRENE, WITH LONG-TERM CURRENT USE OF INSULIN (MULTI): ICD-10-CM

## 2024-02-27 DIAGNOSIS — E11.9 TYPE 2 DIABETES MELLITUS WITHOUT COMPLICATION, WITH LONG-TERM CURRENT USE OF INSULIN (MULTI): ICD-10-CM

## 2024-02-27 DIAGNOSIS — B18.2 CHRONIC HEPATITIS C VIRUS GENOTYPE 1A INFECTION (MULTI): ICD-10-CM

## 2024-02-27 DIAGNOSIS — N40.0 BENIGN PROSTATIC HYPERPLASIA, UNSPECIFIED WHETHER LOWER URINARY TRACT SYMPTOMS PRESENT: ICD-10-CM

## 2024-02-27 DIAGNOSIS — G81.91 RIGHT HEMIPARESIS (MULTI): ICD-10-CM

## 2024-02-27 DIAGNOSIS — G82.20 PARAPARESIS (MULTI): ICD-10-CM

## 2024-02-27 DIAGNOSIS — G62.2 NEUROPATHY, INFLAMMATORY OR TOXIC (MULTI): ICD-10-CM

## 2024-02-27 DIAGNOSIS — E11.51 TYPE 2 DIABETES MELLITUS WITH DIABETIC PERIPHERAL ANGIOPATHY WITHOUT GANGRENE, WITH LONG-TERM CURRENT USE OF INSULIN (MULTI): ICD-10-CM

## 2024-02-27 DIAGNOSIS — Z12.11 COLON CANCER SCREENING: ICD-10-CM

## 2024-02-27 DIAGNOSIS — F31.9 BIPOLAR 1 DISORDER (MULTI): ICD-10-CM

## 2024-02-27 DIAGNOSIS — J42 CHRONIC BRONCHITIS, UNSPECIFIED CHRONIC BRONCHITIS TYPE (MULTI): ICD-10-CM

## 2024-02-27 DIAGNOSIS — N20.0 RENAL CALCULI: ICD-10-CM

## 2024-02-27 DIAGNOSIS — F31.9 BIPOLAR DEPRESSION (MULTI): Primary | ICD-10-CM

## 2024-02-27 DIAGNOSIS — Z79.4 TYPE 2 DIABETES MELLITUS WITHOUT COMPLICATION, WITH LONG-TERM CURRENT USE OF INSULIN (MULTI): ICD-10-CM

## 2024-02-27 PROCEDURE — 1036F TOBACCO NON-USER: CPT | Performed by: CLINICAL NURSE SPECIALIST

## 2024-02-27 PROCEDURE — 3075F SYST BP GE 130 - 139MM HG: CPT | Performed by: CLINICAL NURSE SPECIALIST

## 2024-02-27 PROCEDURE — 3078F DIAST BP <80 MM HG: CPT | Performed by: CLINICAL NURSE SPECIALIST

## 2024-02-27 PROCEDURE — RXMED WILLOW AMBULATORY MEDICATION CHARGE

## 2024-02-27 PROCEDURE — 99214 OFFICE O/P EST MOD 30 MIN: CPT | Performed by: CLINICAL NURSE SPECIALIST

## 2024-02-27 RX ORDER — OXCARBAZEPINE 150 MG/1
450 TABLET, FILM COATED ORAL 2 TIMES DAILY
COMMUNITY
Start: 2024-01-16 | End: 2025-02-11

## 2024-02-27 RX ORDER — INSULIN GLARGINE 100 [IU]/ML
20 INJECTION, SOLUTION SUBCUTANEOUS NIGHTLY
Qty: 10 ML | Refills: 5 | Status: SHIPPED | OUTPATIENT
Start: 2024-02-27 | End: 2024-08-25

## 2024-02-27 RX ORDER — BLOOD SUGAR DIAGNOSTIC
STRIP MISCELLANEOUS
Qty: 100 EACH | Refills: 11 | Status: SHIPPED | OUTPATIENT
Start: 2024-02-27

## 2024-02-27 RX ORDER — PEN NEEDLE, DIABETIC 30 GX3/16"
NEEDLE, DISPOSABLE MISCELLANEOUS
Qty: 100 EACH | Refills: 2 | Status: SHIPPED | OUTPATIENT
Start: 2024-02-27

## 2024-02-27 SDOH — ECONOMIC STABILITY: FOOD INSECURITY: WITHIN THE PAST 12 MONTHS, YOU WORRIED THAT YOUR FOOD WOULD RUN OUT BEFORE YOU GOT MONEY TO BUY MORE.: SOMETIMES TRUE

## 2024-02-27 SDOH — ECONOMIC STABILITY: FOOD INSECURITY: WITHIN THE PAST 12 MONTHS, THE FOOD YOU BOUGHT JUST DIDN'T LAST AND YOU DIDN'T HAVE MONEY TO GET MORE.: SOMETIMES TRUE

## 2024-02-27 ASSESSMENT — ENCOUNTER SYMPTOMS
JOINT SWELLING: 0
WOUND: 0
APPETITE CHANGE: 0
FLANK PAIN: 0
ABDOMINAL PAIN: 0
SHORTNESS OF BREATH: 1
UNEXPECTED WEIGHT CHANGE: 0
PALPITATIONS: 0
SLEEP DISTURBANCE: 0
TROUBLE SWALLOWING: 0
BACK PAIN: 1
DIZZINESS: 0
EYE PAIN: 0
DEPRESSION: 1
VOMITING: 0
MYALGIAS: 0
WHEEZING: 0
COUGH: 0
CONSTIPATION: 0
FATIGUE: 0
DYSURIA: 0
ARTHRALGIAS: 1
BRUISES/BLEEDS EASILY: 0
ACTIVITY CHANGE: 0
HEMATURIA: 0
NAUSEA: 0
CHEST TIGHTNESS: 0
BLOOD IN STOOL: 0
DIARRHEA: 0
POLYDIPSIA: 0
NECK PAIN: 0
FEVER: 0
SORE THROAT: 0
CONFUSION: 0
SEIZURES: 0
LOSS OF SENSATION IN FEET: 0
OCCASIONAL FEELINGS OF UNSTEADINESS: 1
HEADACHES: 0
PHOTOPHOBIA: 0
CHILLS: 0

## 2024-02-27 ASSESSMENT — PATIENT HEALTH QUESTIONNAIRE - PHQ9
1. LITTLE INTEREST OR PLEASURE IN DOING THINGS: NOT AT ALL
SUM OF ALL RESPONSES TO PHQ9 QUESTIONS 1 AND 2: 1
2. FEELING DOWN, DEPRESSED OR HOPELESS: SEVERAL DAYS

## 2024-02-27 ASSESSMENT — COLUMBIA-SUICIDE SEVERITY RATING SCALE - C-SSRS
1. IN THE PAST MONTH, HAVE YOU WISHED YOU WERE DEAD OR WISHED YOU COULD GO TO SLEEP AND NOT WAKE UP?: NO
2. HAVE YOU ACTUALLY HAD ANY THOUGHTS OF KILLING YOURSELF?: NO
6. HAVE YOU EVER DONE ANYTHING, STARTED TO DO ANYTHING, OR PREPARED TO DO ANYTHING TO END YOUR LIFE?: NO

## 2024-02-27 NOTE — PROGRESS NOTES
Subjective   Patient ID: Dereck Shen is a 58 y.o. male who presents for Follow-up (Follow up/).  HPI    Here today as a follow up appointment.      Following with Cardiology and CHF Clinic. Following with [facility], Dr. Newby.      History of Stroke, follow up with Neurologist outpatient. Patient following with [facility] Neurology. Multiple strokes since 2019. Further work up with [facility]. Recently in the ER with a Seizure, medications adjusted.      Patient followed with Dr. Bowser for Rheumatology. No longer seeing Damari Watkins through [facility]. Does not feel that his Latuda is effective anymore. Not interested in following back with [facility] or being referred to traditional Psychiatry.      Followed with [facility] regarding bilateral sciatic nerve pain and lower back discomfort. Then followed with Dr. Obando. Now receiving Medical Marijuana. Chronic complaints of pain. History of Drug Abuse.      Patient followed with GI.     Patient has been taking Atorvastatin for elevated Cholesterol. Tolerating medication at this time. Denies any associated signs or symptoms.     Patient was diagnosed with Diabetes in 2009. Patient has been more consistent in his Insulin dosages recently followed with Endo, Insulin adjusted. Most recent A1C 7.0%. Having trouble getting a hold of Neurology for medication refills. Does not feel that his current dosage of Gabapentin is effective.      Chronic complaints of RUQ abdominal pain. Monitoring for Pancreas. Planning follow up imaging in the near future. Last done June 2023.      Patient has been wearing Nicotine patches. Has quit smoking 3 weeks ago.     Planning to restart Therapy in March.     Review of Systems   Constitutional:  Negative for activity change, appetite change, chills, fatigue, fever and unexpected weight change.   HENT:  Negative for ear pain, hearing loss, nosebleeds, sore throat, tinnitus and trouble swallowing.     Eyes:  Negative for photophobia, pain and visual disturbance.   Respiratory:  Positive for shortness of breath. Negative for cough, chest tightness and wheezing.         Shortness of breath with exertion.    Cardiovascular:  Negative for chest pain, palpitations and leg swelling.   Gastrointestinal:  Negative for abdominal pain, blood in stool, constipation, diarrhea, nausea and vomiting.   Endocrine: Negative for cold intolerance, heat intolerance, polydipsia and polyuria.   Genitourinary:  Negative for dysuria, flank pain and hematuria.   Musculoskeletal:  Positive for arthralgias and back pain. Negative for joint swelling, myalgias and neck pain.   Skin:  Negative for pallor, rash and wound.   Allergic/Immunologic: Negative for immunocompromised state.   Neurological:  Negative for dizziness, seizures and headaches.   Hematological:  Does not bruise/bleed easily.   Psychiatric/Behavioral:  Negative for confusion and sleep disturbance.        Objective   Physical Exam  Vitals and nursing note reviewed.   Constitutional:       General: He is not in acute distress.     Appearance: Normal appearance.   HENT:      Head: Normocephalic.      Nose: Nose normal.   Eyes:      Conjunctiva/sclera: Conjunctivae normal.   Neck:      Vascular: No carotid bruit.   Cardiovascular:      Rate and Rhythm: Normal rate and regular rhythm.      Pulses: Normal pulses.      Heart sounds: Normal heart sounds.   Pulmonary:      Effort: Pulmonary effort is normal.      Breath sounds: Normal breath sounds.   Abdominal:      General: Bowel sounds are normal.      Palpations: Abdomen is soft.   Musculoskeletal:         General: Normal range of motion.      Cervical back: Normal range of motion.   Skin:     General: Skin is warm and dry.   Neurological:      Mental Status: He is alert and oriented to person, place, and time. Mental status is at baseline.   Psychiatric:         Mood and Affect: Mood normal.         Behavior: Behavior normal.        Assessment/Plan         Reviewed most recent lab work completed with patient.      Peripheral vascular disease: Status post bypass in both lower extremities. Following with Vascular and Cardiology for management.   Diabetes mellitus type 2 with complications including peripheral vascular disease, retinopathy, nephropathy and peripheral neuropathy. He will continue to follow with Endocrinology. Most recent A1C 7.0%.   COPD. Respiratory status at baseline. Continue smoking cessation.   Pancreatic lesion. Seen on CAT scan November 2020. Dr. Alegria. Stable on last examination in June 2023.   History of chronic back pain. Now following for Medical Marijuana.   History of stroke with continued loss of left peripheral vision. Stable. Follows with Neurology through [facility].   Combined heart failure. He will continue to follow with the heart failure clinic.   Sleep apnea diagnosed on sleep study February 2021. Managed by pulmonology with CPAP.  Hypertension. Blood pressure controlled at OV today. Continue to monitor.   Hyperlipidemia. Continue high-dose Lipitor. Cardiology added Repatha. Most recent LDL 52.  Diabetic nephropathy. He will continue to follow with Specialist, previously prescribed by Endo.   History of chronic hepatitis C. Patient was previously seen by gastroenterology but deemed not a candidate for further treatment.   Bipolar: Continue Latuda that has been prescribed. [facility] for recommendations on management. Declined referral to traditional Psychiatry.   BPH: Flomax as prescribed.       Pneumovax: November 2016.   Colonoscopy: March 2016.   Cologuard ordered as requested.   Unable to update immunizations due to Insurance.      Stephanie Arce, SUMAN-CNS 02/27/24 9:08 AM

## 2024-02-28 PROCEDURE — RXMED WILLOW AMBULATORY MEDICATION CHARGE

## 2024-02-29 ENCOUNTER — PHARMACY VISIT (OUTPATIENT)
Dept: PHARMACY | Facility: CLINIC | Age: 59
End: 2024-02-29
Payer: MEDICAID

## 2024-03-01 NOTE — PROGRESS NOTES
Pt referred for ccm by provider, reviewing chart and sent message to provider   Per notes DM and COPD stable  Pt with hyperlipedemia and bipolar, await update on what plan is for CCM

## 2024-03-05 NOTE — TELEPHONE ENCOUNTER
Patient state he no longer need the pen needles, primary sent rx, he also called to check status on the gabapentin

## 2024-03-06 DIAGNOSIS — G62.9 NEUROPATHY: Primary | ICD-10-CM

## 2024-03-06 RX ORDER — GABAPENTIN 400 MG/1
400 CAPSULE ORAL 3 TIMES DAILY
Qty: 90 CAPSULE | Refills: 5 | Status: SHIPPED | OUTPATIENT
Start: 2024-03-06 | End: 2024-09-02

## 2024-03-08 ENCOUNTER — PHARMACY VISIT (OUTPATIENT)
Dept: PHARMACY | Facility: CLINIC | Age: 59
End: 2024-03-08
Payer: MEDICAID

## 2024-03-08 PROCEDURE — RXMED WILLOW AMBULATORY MEDICATION CHARGE

## 2024-03-14 ENCOUNTER — PHARMACY VISIT (OUTPATIENT)
Dept: PHARMACY | Facility: CLINIC | Age: 59
End: 2024-03-14
Payer: MEDICAID

## 2024-03-14 ENCOUNTER — PATIENT OUTREACH (OUTPATIENT)
Dept: PRIMARY CARE | Facility: CLINIC | Age: 59
End: 2024-03-14

## 2024-03-14 PROCEDURE — RXMED WILLOW AMBULATORY MEDICATION CHARGE

## 2024-03-14 NOTE — PROGRESS NOTES
90 day outreach complete. Pt questions and concerns addressed. Pt states they are doing well. Pt has graduated from [facility] program.

## 2024-03-16 LAB — NONINV COLON CA DNA+OCC BLD SCRN STL QL: NEGATIVE

## 2024-03-18 ENCOUNTER — TELEPHONE (OUTPATIENT)
Dept: PRIMARY CARE | Facility: CLINIC | Age: 59
End: 2024-03-18

## 2024-03-18 NOTE — TELEPHONE ENCOUNTER
----- Message from SUMAN Carlin-CNS sent at 3/16/2024  9:54 AM EDT -----  Please let patient know that Cologuard is negative. Thank you!

## 2024-03-19 PROCEDURE — RXMED WILLOW AMBULATORY MEDICATION CHARGE

## 2024-03-20 ENCOUNTER — PHARMACY VISIT (OUTPATIENT)
Dept: PHARMACY | Facility: CLINIC | Age: 59
End: 2024-03-20
Payer: MEDICAID

## 2024-03-20 ENCOUNTER — OFFICE VISIT (OUTPATIENT)
Dept: PRIMARY CARE | Facility: CLINIC | Age: 59
End: 2024-03-20
Payer: COMMERCIAL

## 2024-03-20 VITALS
SYSTOLIC BLOOD PRESSURE: 149 MMHG | HEIGHT: 71 IN | BODY MASS INDEX: 28.53 KG/M2 | OXYGEN SATURATION: 98 % | WEIGHT: 203.8 LBS | DIASTOLIC BLOOD PRESSURE: 82 MMHG | HEART RATE: 85 BPM

## 2024-03-20 DIAGNOSIS — F31.9 BIPOLAR DEPRESSION (MULTI): Primary | ICD-10-CM

## 2024-03-20 PROCEDURE — 99213 OFFICE O/P EST LOW 20 MIN: CPT | Performed by: CLINICAL NURSE SPECIALIST

## 2024-03-20 PROCEDURE — 3077F SYST BP >= 140 MM HG: CPT | Performed by: CLINICAL NURSE SPECIALIST

## 2024-03-20 PROCEDURE — 1036F TOBACCO NON-USER: CPT | Performed by: CLINICAL NURSE SPECIALIST

## 2024-03-20 PROCEDURE — 3079F DIAST BP 80-89 MM HG: CPT | Performed by: CLINICAL NURSE SPECIALIST

## 2024-03-20 ASSESSMENT — ENCOUNTER SYMPTOMS
CHILLS: 0
WHEEZING: 0
ARTHRALGIAS: 1
SLEEP DISTURBANCE: 0
EYE PAIN: 0
BRUISES/BLEEDS EASILY: 0
UNEXPECTED WEIGHT CHANGE: 0
ABDOMINAL PAIN: 0
NECK PAIN: 0
DYSURIA: 0
COUGH: 0
CONFUSION: 0
SORE THROAT: 0
POLYDIPSIA: 0
BACK PAIN: 0
APPETITE CHANGE: 0
CHEST TIGHTNESS: 0
CONSTIPATION: 0
HEMATURIA: 0
BLOOD IN STOOL: 0
VOMITING: 0
TROUBLE SWALLOWING: 0
DIZZINESS: 0
JOINT SWELLING: 0
PALPITATIONS: 0
FEVER: 0
HEADACHES: 0
WOUND: 0
ACTIVITY CHANGE: 0
FLANK PAIN: 0
SHORTNESS OF BREATH: 0
NAUSEA: 0
MYALGIAS: 0
PHOTOPHOBIA: 0
FATIGUE: 0
SEIZURES: 0
DIARRHEA: 0

## 2024-03-20 ASSESSMENT — PATIENT HEALTH QUESTIONNAIRE - PHQ9
9. THOUGHTS THAT YOU WOULD BE BETTER OFF DEAD, OR OF HURTING YOURSELF: NOT AT ALL
10. IF YOU CHECKED OFF ANY PROBLEMS, HOW DIFFICULT HAVE THESE PROBLEMS MADE IT FOR YOU TO DO YOUR WORK, TAKE CARE OF THINGS AT HOME, OR GET ALONG WITH OTHER PEOPLE: EXTREMELY DIFFICULT
5. POOR APPETITE OR OVEREATING: SEVERAL DAYS
7. TROUBLE CONCENTRATING ON THINGS, SUCH AS READING THE NEWSPAPER OR WATCHING TELEVISION: NEARLY EVERY DAY
1. LITTLE INTEREST OR PLEASURE IN DOING THINGS: NEARLY EVERY DAY
4. FEELING TIRED OR HAVING LITTLE ENERGY: NEARLY EVERY DAY
SUM OF ALL RESPONSES TO PHQ9 QUESTIONS 1 AND 2: 6
2. FEELING DOWN, DEPRESSED OR HOPELESS: NEARLY EVERY DAY
8. MOVING OR SPEAKING SO SLOWLY THAT OTHER PEOPLE COULD HAVE NOTICED. OR THE OPPOSITE, BEING SO FIGETY OR RESTLESS THAT YOU HAVE BEEN MOVING AROUND A LOT MORE THAN USUAL: NEARLY EVERY DAY
3. TROUBLE FALLING OR STAYING ASLEEP OR SLEEPING TOO MUCH: NEARLY EVERY DAY

## 2024-03-20 NOTE — PROGRESS NOTES
Subjective   Patient ID: Dereck Shen is a 58 y.o. male who presents for Follow-up (Discuss emotional support animal).  HPI    Patient presents in the office today requesting an Emotional Support Animal.      No longer seeing Damari Watkins through [facility]. Does not feel that his Latuda is effective anymore. Not interested in following back with [facility] or being referred to traditional Psychiatry. Had been referred to [facility]. Planning to follow with Psychiatry now through [facility] in [place].     Patient is following with [facility] through [facility] for Therapy.     Patient has moved into a housing in [facility]. States that he needs form completed. Patient states that he needs a Dog. Reports that he needs to have a Dog to help with his Mental Health. Needs to have one that is mellow and will be able to have there for support. Patient states that he lives alone, limited friends. Needs to have for Companionship. States that he doesn't have many visitors. This will prevent his severe symptoms and he feels gives him a reason to look forward to going home and spending time. Motivation to get moving and provide care.     Review of Systems   Constitutional:  Negative for activity change, appetite change, chills, fatigue, fever and unexpected weight change.   HENT:  Negative for ear pain, hearing loss, nosebleeds, sore throat, tinnitus and trouble swallowing.    Eyes:  Negative for photophobia, pain and visual disturbance.   Respiratory:  Negative for cough, chest tightness, shortness of breath and wheezing.    Cardiovascular:  Negative for chest pain, palpitations and leg swelling.   Gastrointestinal:  Negative for abdominal pain, blood in stool, constipation, diarrhea, nausea and vomiting.   Endocrine: Negative for cold intolerance, heat intolerance, polydipsia and polyuria.   Genitourinary:  Negative for dysuria, flank pain and hematuria.   Musculoskeletal:  Positive for  arthralgias. Negative for back pain, joint swelling, myalgias and neck pain.   Skin:  Negative for pallor, rash and wound.   Allergic/Immunologic: Negative for immunocompromised state.   Neurological:  Negative for dizziness, seizures and headaches.   Hematological:  Does not bruise/bleed easily.   Psychiatric/Behavioral:  Negative for confusion and sleep disturbance.        Objective   Physical Exam  Vitals and nursing note reviewed.   Constitutional:       General: He is not in acute distress.     Appearance: Normal appearance.   HENT:      Head: Normocephalic.      Nose: Nose normal.   Eyes:      Conjunctiva/sclera: Conjunctivae normal.   Neck:      Vascular: No carotid bruit.   Cardiovascular:      Rate and Rhythm: Normal rate and regular rhythm.      Pulses: Normal pulses.      Heart sounds: Normal heart sounds.   Pulmonary:      Effort: Pulmonary effort is normal.      Breath sounds: Normal breath sounds.   Abdominal:      General: Bowel sounds are normal.      Palpations: Abdomen is soft.   Musculoskeletal:         General: Normal range of motion.      Cervical back: Normal range of motion.   Skin:     General: Skin is warm and dry.   Neurological:      Mental Status: He is alert and oriented to person, place, and time. Mental status is at baseline.   Psychiatric:         Mood and Affect: Mood normal.         Behavior: Behavior normal.       Assessment/Plan        Bipolar: Long standing history of mental health disorder. Continue Latuda that has been prescribed. [facility] on [facility]. Now also following with Psychiatry. Form completed as requested.    SUMAN Carlin-CNS 03/20/24 1:03 PM

## 2024-03-20 NOTE — LETTER
March 20, 2024    Dereck Shen  [address] [address]      To whomever this may concern,    Patient is under my professional care. I am intimately aware of the patients medical history and functional restrictions brought by their mental condition. My patient meets the definition of disabled under the American with Disabilities Act, the rehabilitation Act of 1973, and the Fair Housing Act.    As a results of mental illness, my patient has certain limitations related to (Anxiety, depression, social interactions, phobia, ect.) In order to assist in alleviating these difficulties, and to improve their ability to lead a better life while fully enjoying and using the dwelling unit you own and/or manage, I am prescribing an Emotional support animal prescription letter that will help my patient in dealing with their disability better.    I am very much aware of the voluminous professional literature related to the therapeutic benefits of emotional support animals for individuals with mental disabilities, such as that faced by my patient. Upon request, I will share quotations to relevant studies and would be more than willing to answer any questions that you may have regarding my recommendation that my patient should have an emotional support animal. Should you have further questions, please do no hesitate to get in touch with me. My office phone number is .      Sincerely,           SUMAN Carlin-CNS

## 2024-04-04 ENCOUNTER — PHARMACY VISIT (OUTPATIENT)
Dept: PHARMACY | Facility: CLINIC | Age: 59
End: 2024-04-04
Payer: MEDICAID

## 2024-04-04 PROCEDURE — RXMED WILLOW AMBULATORY MEDICATION CHARGE

## 2024-04-06 PROCEDURE — RXMED WILLOW AMBULATORY MEDICATION CHARGE

## 2024-04-08 DIAGNOSIS — F17.200 TOBACCO DEPENDENCE: ICD-10-CM

## 2024-04-08 DIAGNOSIS — K21.9 GASTROESOPHAGEAL REFLUX DISEASE WITHOUT ESOPHAGITIS: ICD-10-CM

## 2024-04-08 DIAGNOSIS — N40.0 BENIGN PROSTATIC HYPERPLASIA, UNSPECIFIED WHETHER LOWER URINARY TRACT SYMPTOMS PRESENT: ICD-10-CM

## 2024-04-08 PROCEDURE — RXMED WILLOW AMBULATORY MEDICATION CHARGE

## 2024-04-08 RX ORDER — PANTOPRAZOLE SODIUM 40 MG/1
40 TABLET, DELAYED RELEASE ORAL DAILY
Qty: 90 TABLET | Refills: 1 | Status: CANCELLED | OUTPATIENT
Start: 2024-04-08 | End: 2024-10-05

## 2024-04-08 RX ORDER — IBUPROFEN 200 MG
TABLET ORAL
Qty: 28 PATCH | Refills: 0 | Status: CANCELLED | OUTPATIENT
Start: 2024-04-08 | End: 2025-04-08

## 2024-04-08 RX ORDER — TAMSULOSIN HYDROCHLORIDE 0.4 MG/1
0.4 CAPSULE ORAL DAILY
Qty: 90 CAPSULE | Refills: 1 | Status: CANCELLED | OUTPATIENT
Start: 2024-04-08 | End: 2024-10-05

## 2024-04-09 PROCEDURE — RXMED WILLOW AMBULATORY MEDICATION CHARGE

## 2024-04-09 RX ORDER — PANTOPRAZOLE SODIUM 40 MG/1
40 TABLET, DELAYED RELEASE ORAL DAILY
Qty: 90 TABLET | Refills: 1 | Status: SHIPPED | OUTPATIENT
Start: 2024-04-09 | End: 2024-10-06

## 2024-04-09 RX ORDER — TAMSULOSIN HYDROCHLORIDE 0.4 MG/1
0.4 CAPSULE ORAL DAILY
Qty: 90 CAPSULE | Refills: 1 | Status: SHIPPED | OUTPATIENT
Start: 2024-04-09 | End: 2024-10-06

## 2024-04-09 RX ORDER — IBUPROFEN 200 MG
TABLET ORAL
Qty: 28 PATCH | Refills: 0 | Status: SHIPPED | OUTPATIENT
Start: 2024-04-09 | End: 2025-04-09

## 2024-04-10 ENCOUNTER — PHARMACY VISIT (OUTPATIENT)
Dept: PHARMACY | Facility: CLINIC | Age: 59
End: 2024-04-10
Payer: MEDICAID

## 2024-04-11 ENCOUNTER — PATIENT OUTREACH (OUTPATIENT)
Dept: PRIMARY CARE | Facility: CLINIC | Age: 59
End: 2024-04-11

## 2024-04-11 ENCOUNTER — PHARMACY VISIT (OUTPATIENT)
Dept: PHARMACY | Facility: CLINIC | Age: 59
End: 2024-04-11
Payer: MEDICAID

## 2024-04-12 ENCOUNTER — PHARMACY VISIT (OUTPATIENT)
Dept: PHARMACY | Facility: CLINIC | Age: 59
End: 2024-04-12
Payer: MEDICAID

## 2024-04-12 PROCEDURE — RXMED WILLOW AMBULATORY MEDICATION CHARGE

## 2024-04-15 ENCOUNTER — PHARMACY VISIT (OUTPATIENT)
Dept: PHARMACY | Facility: CLINIC | Age: 59
End: 2024-04-15
Payer: MEDICAID

## 2024-04-23 ENCOUNTER — APPOINTMENT (OUTPATIENT)
Dept: VASCULAR SURGERY | Facility: CLINIC | Age: 59
End: 2024-04-23

## 2024-04-23 ENCOUNTER — PHARMACY VISIT (OUTPATIENT)
Dept: PHARMACY | Facility: CLINIC | Age: 59
End: 2024-04-23
Payer: MEDICAID

## 2024-04-23 DIAGNOSIS — E11.9 TYPE 2 DIABETES MELLITUS WITHOUT COMPLICATION, WITH LONG-TERM CURRENT USE OF INSULIN (MULTI): Primary | ICD-10-CM

## 2024-04-23 DIAGNOSIS — Z79.4 TYPE 2 DIABETES MELLITUS WITHOUT COMPLICATION, WITH LONG-TERM CURRENT USE OF INSULIN (MULTI): Primary | ICD-10-CM

## 2024-04-23 PROCEDURE — RXMED WILLOW AMBULATORY MEDICATION CHARGE

## 2024-04-23 NOTE — TELEPHONE ENCOUNTER
"Patient called because his kavya reader is not connecting to sensor. Patient requests a new rx for kavya reader be sent to  [facility]. It has been advised to contact [facility] to see if it could possibly be replaced under warranty as well. Patient informed that there is a chance insurance will not pay for replacement due to allowed amount within a short period of time.     Patient was not satisfied with the suggestion made and said he doesn't care and will just walk around with high blood sugar all day, \"it doesn't make me no nevermind\".  "

## 2024-04-24 ENCOUNTER — APPOINTMENT (OUTPATIENT)
Dept: CARDIOLOGY | Facility: HOSPITAL | Age: 59
End: 2024-04-24
Payer: COMMERCIAL

## 2024-04-24 ENCOUNTER — ANESTHESIA (OUTPATIENT)
Dept: OPERATING ROOM | Facility: HOSPITAL | Age: 59
End: 2024-04-24
Payer: COMMERCIAL

## 2024-04-24 ENCOUNTER — APPOINTMENT (OUTPATIENT)
Dept: RADIOLOGY | Facility: HOSPITAL | Age: 59
End: 2024-04-24
Payer: COMMERCIAL

## 2024-04-24 ENCOUNTER — ANESTHESIA EVENT (OUTPATIENT)
Dept: OPERATING ROOM | Facility: HOSPITAL | Age: 59
End: 2024-04-24
Payer: COMMERCIAL

## 2024-04-24 ENCOUNTER — HOSPITAL ENCOUNTER (INPATIENT)
Facility: HOSPITAL | Age: 59
LOS: 2 days | Discharge: HOME | End: 2024-04-26
Attending: EMERGENCY MEDICINE | Admitting: STUDENT IN AN ORGANIZED HEALTH CARE EDUCATION/TRAINING PROGRAM
Payer: COMMERCIAL

## 2024-04-24 DIAGNOSIS — I99.8 LIMB ISCHEMIA: Primary | ICD-10-CM

## 2024-04-24 PROBLEM — M54.16 CHRONIC LUMBAR RADICULOPATHY: Status: RESOLVED | Noted: 2023-09-22 | Resolved: 2024-04-24

## 2024-04-24 LAB
ANION GAP BLDV CALCULATED.4IONS-SCNC: 11 MMOL/L (ref 10–25)
ANION GAP SERPL CALC-SCNC: 15 MMOL/L (ref 10–20)
APTT PPP: 37 SECONDS (ref 27–38)
APTT PPP: NORMAL S
ATRIAL RATE: 94 BPM
BASE EXCESS BLDV CALC-SCNC: 1.6 MMOL/L (ref -2–3)
BASOPHILS # BLD AUTO: 0.02 X10*3/UL (ref 0–0.1)
BASOPHILS NFR BLD AUTO: 0.2 %
BODY TEMPERATURE: 37 DEGREES CELSIUS
BUN SERPL-MCNC: 26 MG/DL (ref 6–23)
CA-I BLDV-SCNC: 1.15 MMOL/L (ref 1.1–1.33)
CALCIUM SERPL-MCNC: 9.2 MG/DL (ref 8.6–10.3)
CHLORIDE BLDV-SCNC: 102 MMOL/L (ref 98–107)
CHLORIDE SERPL-SCNC: 104 MMOL/L (ref 98–107)
CK SERPL-CCNC: 94 U/L (ref 0–325)
CO2 SERPL-SCNC: 23 MMOL/L (ref 21–32)
CREAT SERPL-MCNC: 1.31 MG/DL (ref 0.5–1.3)
CRP SERPL-MCNC: 0.15 MG/DL
EGFRCR SERPLBLD CKD-EPI 2021: 63 ML/MIN/1.73M*2
EOSINOPHIL # BLD AUTO: 0 X10*3/UL (ref 0–0.7)
EOSINOPHIL NFR BLD AUTO: 0 %
ERYTHROCYTE [DISTWIDTH] IN BLOOD BY AUTOMATED COUNT: 13.5 % (ref 11.5–14.5)
ERYTHROCYTE [DISTWIDTH] IN BLOOD BY AUTOMATED COUNT: 13.8 % (ref 11.5–14.5)
ERYTHROCYTE [DISTWIDTH] IN BLOOD BY AUTOMATED COUNT: 14 % (ref 11.5–14.5)
ERYTHROCYTE [SEDIMENTATION RATE] IN BLOOD BY WESTERGREN METHOD: 15 MM/H (ref 0–20)
FIBRINOGEN PPP-MCNC: 201 MG/DL (ref 200–400)
GLUCOSE BLD MANUAL STRIP-MCNC: 143 MG/DL (ref 74–99)
GLUCOSE BLD MANUAL STRIP-MCNC: 143 MG/DL (ref 74–99)
GLUCOSE BLD MANUAL STRIP-MCNC: 164 MG/DL (ref 74–99)
GLUCOSE BLDV-MCNC: 343 MG/DL (ref 74–99)
GLUCOSE SERPL-MCNC: 323 MG/DL (ref 74–99)
HCO3 BLDV-SCNC: 25.9 MMOL/L (ref 22–26)
HCT VFR BLD AUTO: 39 % (ref 41–52)
HCT VFR BLD AUTO: 39.6 % (ref 41–52)
HCT VFR BLD AUTO: 45.5 % (ref 41–52)
HCT VFR BLD EST: 45 % (ref 41–52)
HGB BLD-MCNC: 14 G/DL (ref 13.5–17.5)
HGB BLD-MCNC: 14 G/DL (ref 13.5–17.5)
HGB BLD-MCNC: 15.9 G/DL (ref 13.5–17.5)
HGB BLDV-MCNC: 15 G/DL (ref 13.5–17.5)
IMM GRANULOCYTES # BLD AUTO: 0.03 X10*3/UL (ref 0–0.7)
IMM GRANULOCYTES NFR BLD AUTO: 0.3 % (ref 0–0.9)
INHALED O2 CONCENTRATION: 21 %
INR PPP: 1 (ref 0.9–1.1)
INR PPP: 1.3 (ref 0.9–1.1)
LACTATE BLDV-SCNC: 2.2 MMOL/L (ref 0.4–2)
LACTATE SERPL-SCNC: 0.8 MMOL/L (ref 0.4–2)
LYMPHOCYTES # BLD AUTO: 1.34 X10*3/UL (ref 1.2–4.8)
LYMPHOCYTES NFR BLD AUTO: 14.7 %
MAGNESIUM SERPL-MCNC: 2.16 MG/DL (ref 1.6–2.4)
MCH RBC QN AUTO: 31.8 PG (ref 26–34)
MCH RBC QN AUTO: 32.5 PG (ref 26–34)
MCH RBC QN AUTO: 32.9 PG (ref 26–34)
MCHC RBC AUTO-ENTMCNC: 34.9 G/DL (ref 32–36)
MCHC RBC AUTO-ENTMCNC: 35.4 G/DL (ref 32–36)
MCHC RBC AUTO-ENTMCNC: 35.9 G/DL (ref 32–36)
MCV RBC AUTO: 91 FL (ref 80–100)
MCV RBC AUTO: 92 FL (ref 80–100)
MCV RBC AUTO: 92 FL (ref 80–100)
MONOCYTES # BLD AUTO: 0.54 X10*3/UL (ref 0.1–1)
MONOCYTES NFR BLD AUTO: 5.9 %
NEUTROPHILS # BLD AUTO: 7.17 X10*3/UL (ref 1.2–7.7)
NEUTROPHILS NFR BLD AUTO: 78.9 %
NRBC BLD-RTO: 0 /100 WBCS (ref 0–0)
OXYHGB MFR BLDV: 58.5 % (ref 45–75)
P AXIS: 58 DEGREES
PCO2 BLDV: 39 MM HG (ref 41–51)
PH BLDV: 7.43 PH (ref 7.33–7.43)
PLATELET # BLD AUTO: 157 X10*3/UL (ref 150–450)
PLATELET # BLD AUTO: 184 X10*3/UL (ref 150–450)
PLATELET # BLD AUTO: 223 X10*3/UL (ref 150–450)
PO2 BLDV: 35 MM HG (ref 35–45)
POTASSIUM BLDV-SCNC: 4.6 MMOL/L (ref 3.5–5.3)
POTASSIUM SERPL-SCNC: 4.7 MMOL/L (ref 3.5–5.3)
PR INTERVAL: 180 MS
PROTHROMBIN TIME: 11.5 SECONDS (ref 9.8–12.8)
PROTHROMBIN TIME: 14.4 SECONDS (ref 9.8–12.8)
Q ONSET: 251 MS
QRS COUNT: 15 BEATS
QRS DURATION: 107 MS
QT INTERVAL: 376 MS
QTC CALCULATION(BAZETT): 471 MS
QTC FREDERICIA: 436 MS
R AXIS: 38 DEGREES
RBC # BLD AUTO: 4.25 X10*6/UL (ref 4.5–5.9)
RBC # BLD AUTO: 4.31 X10*6/UL (ref 4.5–5.9)
RBC # BLD AUTO: 5 X10*6/UL (ref 4.5–5.9)
SAO2 % BLDV: 62 % (ref 45–75)
SODIUM BLDV-SCNC: 134 MMOL/L (ref 136–145)
SODIUM SERPL-SCNC: 137 MMOL/L (ref 136–145)
T AXIS: -43 DEGREES
T OFFSET: 439 MS
UFH PPP CHRO-ACNC: 0.9 IU/ML
UFH PPP CHRO-ACNC: 1.2 IU/ML
VENTRICULAR RATE: 94 BPM
WBC # BLD AUTO: 6.2 X10*3/UL (ref 4.4–11.3)
WBC # BLD AUTO: 7.1 X10*3/UL (ref 4.4–11.3)
WBC # BLD AUTO: 9.1 X10*3/UL (ref 4.4–11.3)

## 2024-04-24 PROCEDURE — 85027 COMPLETE CBC AUTOMATED: CPT | Performed by: PHYSICIAN ASSISTANT

## 2024-04-24 PROCEDURE — 76000 FLUOROSCOPY <1 HR PHYS/QHP: CPT

## 2024-04-24 PROCEDURE — 83735 ASSAY OF MAGNESIUM: CPT | Performed by: EMERGENCY MEDICINE

## 2024-04-24 PROCEDURE — 84132 ASSAY OF SERUM POTASSIUM: CPT | Performed by: EMERGENCY MEDICINE

## 2024-04-24 PROCEDURE — 7100000002 HC RECOVERY ROOM TIME - EACH INCREMENTAL 1 MINUTE: Performed by: SURGERY

## 2024-04-24 PROCEDURE — C1769 GUIDE WIRE: HCPCS | Performed by: SURGERY

## 2024-04-24 PROCEDURE — 3700000002 HC GENERAL ANESTHESIA TIME - EACH INCREMENTAL 1 MINUTE: Performed by: SURGERY

## 2024-04-24 PROCEDURE — 85610 PROTHROMBIN TIME: CPT | Performed by: EMERGENCY MEDICINE

## 2024-04-24 PROCEDURE — 86140 C-REACTIVE PROTEIN: CPT | Performed by: EMERGENCY MEDICINE

## 2024-04-24 PROCEDURE — 96374 THER/PROPH/DIAG INJ IV PUSH: CPT | Mod: 59

## 2024-04-24 PROCEDURE — 2500000006 HC RX 250 W HCPCS SELF ADMINISTERED DRUGS (ALT 637 FOR ALL PAYERS): Performed by: PHYSICIAN ASSISTANT

## 2024-04-24 PROCEDURE — 2550000001 HC RX 255 CONTRASTS: Performed by: SURGERY

## 2024-04-24 PROCEDURE — 73706 CT ANGIO LWR EXTR W/O&W/DYE: CPT | Mod: LT

## 2024-04-24 PROCEDURE — 3600000003 HC OR TIME - INITIAL BASE CHARGE - PROCEDURE LEVEL THREE: Performed by: SURGERY

## 2024-04-24 PROCEDURE — 85730 THROMBOPLASTIN TIME PARTIAL: CPT | Performed by: EMERGENCY MEDICINE

## 2024-04-24 PROCEDURE — 2020000001 HC ICU ROOM DAILY

## 2024-04-24 PROCEDURE — 96365 THER/PROPH/DIAG IV INF INIT: CPT | Mod: 59

## 2024-04-24 PROCEDURE — 2500000001 HC RX 250 WO HCPCS SELF ADMINISTERED DRUGS (ALT 637 FOR MEDICARE OP): Performed by: PHYSICIAN ASSISTANT

## 2024-04-24 PROCEDURE — 2500000005 HC RX 250 GENERAL PHARMACY W/O HCPCS: Performed by: PHYSICIAN ASSISTANT

## 2024-04-24 PROCEDURE — B41G1ZZ FLUOROSCOPY OF LEFT LOWER EXTREMITY ARTERIES USING LOW OSMOLAR CONTRAST: ICD-10-PCS | Performed by: SURGERY

## 2024-04-24 PROCEDURE — A4217 STERILE WATER/SALINE, 500 ML: HCPCS | Performed by: SURGERY

## 2024-04-24 PROCEDURE — C1894 INTRO/SHEATH, NON-LASER: HCPCS | Performed by: SURGERY

## 2024-04-24 PROCEDURE — 2500000006 HC RX 250 W HCPCS SELF ADMINISTERED DRUGS (ALT 637 FOR ALL PAYERS): Performed by: STUDENT IN AN ORGANIZED HEALTH CARE EDUCATION/TRAINING PROGRAM

## 2024-04-24 PROCEDURE — 2500000004 HC RX 250 GENERAL PHARMACY W/ HCPCS (ALT 636 FOR OP/ED): Performed by: ANESTHESIOLOGY

## 2024-04-24 PROCEDURE — 2500000004 HC RX 250 GENERAL PHARMACY W/ HCPCS (ALT 636 FOR OP/ED): Performed by: SURGERY

## 2024-04-24 PROCEDURE — 36415 COLL VENOUS BLD VENIPUNCTURE: CPT | Performed by: EMERGENCY MEDICINE

## 2024-04-24 PROCEDURE — 2500000004 HC RX 250 GENERAL PHARMACY W/ HCPCS (ALT 636 FOR OP/ED)

## 2024-04-24 PROCEDURE — 2500000004 HC RX 250 GENERAL PHARMACY W/ HCPCS (ALT 636 FOR OP/ED): Performed by: NURSE ANESTHETIST, CERTIFIED REGISTERED

## 2024-04-24 PROCEDURE — 99291 CRITICAL CARE FIRST HOUR: CPT

## 2024-04-24 PROCEDURE — 3600000008 HC OR TIME - EACH INCREMENTAL 1 MINUTE - PROCEDURE LEVEL THREE: Performed by: SURGERY

## 2024-04-24 PROCEDURE — 2500000001 HC RX 250 WO HCPCS SELF ADMINISTERED DRUGS (ALT 637 FOR MEDICARE OP): Performed by: STUDENT IN AN ORGANIZED HEALTH CARE EDUCATION/TRAINING PROGRAM

## 2024-04-24 PROCEDURE — 83605 ASSAY OF LACTIC ACID: CPT | Performed by: STUDENT IN AN ORGANIZED HEALTH CARE EDUCATION/TRAINING PROGRAM

## 2024-04-24 PROCEDURE — 96376 TX/PRO/DX INJ SAME DRUG ADON: CPT

## 2024-04-24 PROCEDURE — 2550000001 HC RX 255 CONTRASTS: Performed by: EMERGENCY MEDICINE

## 2024-04-24 PROCEDURE — 04CN3ZZ EXTIRPATION OF MATTER FROM LEFT POPLITEAL ARTERY, PERCUTANEOUS APPROACH: ICD-10-PCS | Performed by: SURGERY

## 2024-04-24 PROCEDURE — 2500000005 HC RX 250 GENERAL PHARMACY W/O HCPCS: Performed by: STUDENT IN AN ORGANIZED HEALTH CARE EDUCATION/TRAINING PROGRAM

## 2024-04-24 PROCEDURE — 85025 COMPLETE CBC W/AUTO DIFF WBC: CPT | Performed by: EMERGENCY MEDICINE

## 2024-04-24 PROCEDURE — 7100000001 HC RECOVERY ROOM TIME - INITIAL BASE CHARGE: Performed by: SURGERY

## 2024-04-24 PROCEDURE — 2500000005 HC RX 250 GENERAL PHARMACY W/O HCPCS: Performed by: NURSE ANESTHETIST, CERTIFIED REGISTERED

## 2024-04-24 PROCEDURE — 2500000004 HC RX 250 GENERAL PHARMACY W/ HCPCS (ALT 636 FOR OP/ED): Performed by: PHYSICIAN ASSISTANT

## 2024-04-24 PROCEDURE — 99285 EMERGENCY DEPT VISIT HI MDM: CPT | Mod: 25

## 2024-04-24 PROCEDURE — 85610 PROTHROMBIN TIME: CPT | Performed by: SURGERY

## 2024-04-24 PROCEDURE — 85652 RBC SED RATE AUTOMATED: CPT | Performed by: EMERGENCY MEDICINE

## 2024-04-24 PROCEDURE — 2500000004 HC RX 250 GENERAL PHARMACY W/ HCPCS (ALT 636 FOR OP/ED): Performed by: STUDENT IN AN ORGANIZED HEALTH CARE EDUCATION/TRAINING PROGRAM

## 2024-04-24 PROCEDURE — 36415 COLL VENOUS BLD VENIPUNCTURE: CPT | Performed by: STUDENT IN AN ORGANIZED HEALTH CARE EDUCATION/TRAINING PROGRAM

## 2024-04-24 PROCEDURE — 04CL3ZZ EXTIRPATION OF MATTER FROM LEFT FEMORAL ARTERY, PERCUTANEOUS APPROACH: ICD-10-PCS | Performed by: SURGERY

## 2024-04-24 PROCEDURE — 2500000005 HC RX 250 GENERAL PHARMACY W/O HCPCS: Performed by: SURGERY

## 2024-04-24 PROCEDURE — 3E03317 INTRODUCTION OF OTHER THROMBOLYTIC INTO PERIPHERAL VEIN, PERCUTANEOUS APPROACH: ICD-10-PCS | Performed by: SURGERY

## 2024-04-24 PROCEDURE — 73706 CT ANGIO LWR EXTR W/O&W/DYE: CPT | Mod: LEFT SIDE | Performed by: RADIOLOGY

## 2024-04-24 PROCEDURE — 96366 THER/PROPH/DIAG IV INF ADDON: CPT

## 2024-04-24 PROCEDURE — 99221 1ST HOSP IP/OBS SF/LOW 40: CPT | Performed by: SURGERY

## 2024-04-24 PROCEDURE — 2720000007 HC OR 272 NO HCPCS: Performed by: SURGERY

## 2024-04-24 PROCEDURE — 3700000001 HC GENERAL ANESTHESIA TIME - INITIAL BASE CHARGE: Performed by: SURGERY

## 2024-04-24 PROCEDURE — 2500000002 HC RX 250 W HCPCS SELF ADMINISTERED DRUGS (ALT 637 FOR MEDICARE OP, ALT 636 FOR OP/ED): Performed by: PHYSICIAN ASSISTANT

## 2024-04-24 PROCEDURE — 85384 FIBRINOGEN ACTIVITY: CPT | Performed by: SURGERY

## 2024-04-24 PROCEDURE — 85347 COAGULATION TIME ACTIVATED: CPT

## 2024-04-24 PROCEDURE — 2500000002 HC RX 250 W HCPCS SELF ADMINISTERED DRUGS (ALT 637 FOR MEDICARE OP, ALT 636 FOR OP/ED): Performed by: STUDENT IN AN ORGANIZED HEALTH CARE EDUCATION/TRAINING PROGRAM

## 2024-04-24 PROCEDURE — 37799 UNLISTED PX VASCULAR SURGERY: CPT | Performed by: SURGERY

## 2024-04-24 PROCEDURE — C1887 CATHETER, GUIDING: HCPCS | Performed by: SURGERY

## 2024-04-24 PROCEDURE — 85520 HEPARIN ASSAY: CPT | Performed by: SURGERY

## 2024-04-24 PROCEDURE — 2500000004 HC RX 250 GENERAL PHARMACY W/ HCPCS (ALT 636 FOR OP/ED): Performed by: EMERGENCY MEDICINE

## 2024-04-24 PROCEDURE — S4991 NICOTINE PATCH NONLEGEND: HCPCS | Performed by: STUDENT IN AN ORGANIZED HEALTH CARE EDUCATION/TRAINING PROGRAM

## 2024-04-24 PROCEDURE — 82947 ASSAY GLUCOSE BLOOD QUANT: CPT

## 2024-04-24 PROCEDURE — 99223 1ST HOSP IP/OBS HIGH 75: CPT | Performed by: STUDENT IN AN ORGANIZED HEALTH CARE EDUCATION/TRAINING PROGRAM

## 2024-04-24 PROCEDURE — 93005 ELECTROCARDIOGRAM TRACING: CPT

## 2024-04-24 PROCEDURE — 82550 ASSAY OF CK (CPK): CPT | Performed by: STUDENT IN AN ORGANIZED HEALTH CARE EDUCATION/TRAINING PROGRAM

## 2024-04-24 PROCEDURE — 85520 HEPARIN ASSAY: CPT | Performed by: EMERGENCY MEDICINE

## 2024-04-24 RX ORDER — TAMSULOSIN HYDROCHLORIDE 0.4 MG/1
0.4 CAPSULE ORAL DAILY
Status: DISCONTINUED | OUTPATIENT
Start: 2024-04-24 | End: 2024-04-26 | Stop reason: HOSPADM

## 2024-04-24 RX ORDER — IODIXANOL 270 MG/ML
INJECTION, SOLUTION INTRAVASCULAR AS NEEDED
Status: DISCONTINUED | OUTPATIENT
Start: 2024-04-24 | End: 2024-04-24 | Stop reason: HOSPADM

## 2024-04-24 RX ORDER — LIDOCAINE HYDROCHLORIDE 20 MG/ML
JELLY TOPICAL AS NEEDED
Status: DISCONTINUED | OUTPATIENT
Start: 2024-04-24 | End: 2024-04-24 | Stop reason: HOSPADM

## 2024-04-24 RX ORDER — PROPOFOL 10 MG/ML
INJECTION, EMULSION INTRAVENOUS AS NEEDED
Status: DISCONTINUED | OUTPATIENT
Start: 2024-04-24 | End: 2024-04-24

## 2024-04-24 RX ORDER — ACETAMINOPHEN 160 MG/5ML
650 SOLUTION ORAL EVERY 4 HOURS PRN
Status: DISCONTINUED | OUTPATIENT
Start: 2024-04-24 | End: 2024-04-26 | Stop reason: HOSPADM

## 2024-04-24 RX ORDER — POLYETHYLENE GLYCOL 3350 17 G/17G
17 POWDER, FOR SOLUTION ORAL DAILY PRN
Status: DISCONTINUED | OUTPATIENT
Start: 2024-04-24 | End: 2024-04-26 | Stop reason: HOSPADM

## 2024-04-24 RX ORDER — CARVEDILOL 6.25 MG/1
6.25 TABLET ORAL EVERY 12 HOURS
Status: DISCONTINUED | OUTPATIENT
Start: 2024-04-24 | End: 2024-04-26 | Stop reason: HOSPADM

## 2024-04-24 RX ORDER — ONDANSETRON 4 MG/1
4 TABLET, FILM COATED ORAL EVERY 8 HOURS PRN
Status: DISCONTINUED | OUTPATIENT
Start: 2024-04-24 | End: 2024-04-26 | Stop reason: HOSPADM

## 2024-04-24 RX ORDER — DROPERIDOL 2.5 MG/ML
0.62 INJECTION, SOLUTION INTRAMUSCULAR; INTRAVENOUS ONCE AS NEEDED
Status: DISCONTINUED | OUTPATIENT
Start: 2024-04-24 | End: 2024-04-24 | Stop reason: HOSPADM

## 2024-04-24 RX ORDER — OXCARBAZEPINE 150 MG/1
450 TABLET, FILM COATED ORAL 2 TIMES DAILY
Status: DISCONTINUED | OUTPATIENT
Start: 2024-04-24 | End: 2024-04-26 | Stop reason: HOSPADM

## 2024-04-24 RX ORDER — FAMOTIDINE 10 MG/ML
20 INJECTION INTRAVENOUS ONCE
Status: COMPLETED | OUTPATIENT
Start: 2024-04-24 | End: 2024-04-24

## 2024-04-24 RX ORDER — ISOSORBIDE MONONITRATE 60 MG/1
60 TABLET, EXTENDED RELEASE ORAL DAILY
COMMUNITY

## 2024-04-24 RX ORDER — GLYCOPYRROLATE 0.2 MG/ML
INJECTION INTRAMUSCULAR; INTRAVENOUS AS NEEDED
Status: DISCONTINUED | OUTPATIENT
Start: 2024-04-24 | End: 2024-04-24

## 2024-04-24 RX ORDER — INSULIN LISPRO 100 [IU]/ML
0-5 INJECTION, SOLUTION INTRAVENOUS; SUBCUTANEOUS EVERY 6 HOURS
Status: DISCONTINUED | OUTPATIENT
Start: 2024-04-24 | End: 2024-04-26 | Stop reason: HOSPADM

## 2024-04-24 RX ORDER — ONDANSETRON HYDROCHLORIDE 2 MG/ML
INJECTION, SOLUTION INTRAVENOUS AS NEEDED
Status: DISCONTINUED | OUTPATIENT
Start: 2024-04-24 | End: 2024-04-24

## 2024-04-24 RX ORDER — ROCURONIUM BROMIDE 50 MG/5 ML
SYRINGE (ML) INTRAVENOUS AS NEEDED
Status: DISCONTINUED | OUTPATIENT
Start: 2024-04-24 | End: 2024-04-24

## 2024-04-24 RX ORDER — PANTOPRAZOLE SODIUM 40 MG/1
40 TABLET, DELAYED RELEASE ORAL DAILY
Status: DISCONTINUED | OUTPATIENT
Start: 2024-04-24 | End: 2024-04-26 | Stop reason: HOSPADM

## 2024-04-24 RX ORDER — ALBUTEROL SULFATE 0.83 MG/ML
2.5 SOLUTION RESPIRATORY (INHALATION) ONCE AS NEEDED
Status: DISCONTINUED | OUTPATIENT
Start: 2024-04-24 | End: 2024-04-24 | Stop reason: HOSPADM

## 2024-04-24 RX ORDER — CLINDAMYCIN PHOSPHATE 600 MG/50ML
600 INJECTION, SOLUTION INTRAVENOUS EVERY 8 HOURS
Status: DISCONTINUED | OUTPATIENT
Start: 2024-04-24 | End: 2024-04-26 | Stop reason: HOSPADM

## 2024-04-24 RX ORDER — LABETALOL HYDROCHLORIDE 5 MG/ML
5 INJECTION, SOLUTION INTRAVENOUS ONCE AS NEEDED
Status: DISCONTINUED | OUTPATIENT
Start: 2024-04-24 | End: 2024-04-24 | Stop reason: HOSPADM

## 2024-04-24 RX ORDER — HYDRALAZINE HYDROCHLORIDE 50 MG/1
50 TABLET, FILM COATED ORAL 3 TIMES DAILY
Status: DISCONTINUED | OUTPATIENT
Start: 2024-04-24 | End: 2024-04-26 | Stop reason: HOSPADM

## 2024-04-24 RX ORDER — MEPERIDINE HYDROCHLORIDE 25 MG/ML
12.5 INJECTION INTRAMUSCULAR; INTRAVENOUS; SUBCUTANEOUS EVERY 10 MIN PRN
Status: DISCONTINUED | OUTPATIENT
Start: 2024-04-24 | End: 2024-04-24 | Stop reason: HOSPADM

## 2024-04-24 RX ORDER — ONDANSETRON HYDROCHLORIDE 2 MG/ML
4 INJECTION, SOLUTION INTRAVENOUS ONCE AS NEEDED
Status: DISCONTINUED | OUTPATIENT
Start: 2024-04-24 | End: 2024-04-24 | Stop reason: HOSPADM

## 2024-04-24 RX ORDER — RANOLAZINE 500 MG/1
500 TABLET, EXTENDED RELEASE ORAL 2 TIMES DAILY
Status: DISCONTINUED | OUTPATIENT
Start: 2024-04-24 | End: 2024-04-26 | Stop reason: HOSPADM

## 2024-04-24 RX ORDER — ACETAMINOPHEN 325 MG/1
650 TABLET ORAL EVERY 4 HOURS PRN
Status: DISCONTINUED | OUTPATIENT
Start: 2024-04-24 | End: 2024-04-26 | Stop reason: HOSPADM

## 2024-04-24 RX ORDER — INSULIN GLARGINE 100 [IU]/ML
10 INJECTION, SOLUTION SUBCUTANEOUS NIGHTLY
Status: DISCONTINUED | OUTPATIENT
Start: 2024-04-24 | End: 2024-04-26 | Stop reason: HOSPADM

## 2024-04-24 RX ORDER — HEPARIN SODIUM 10000 [USP'U]/100ML
500 INJECTION, SOLUTION INTRAVENOUS CONTINUOUS
Status: DISCONTINUED | OUTPATIENT
Start: 2024-04-24 | End: 2024-04-25

## 2024-04-24 RX ORDER — FENTANYL CITRATE 50 UG/ML
INJECTION, SOLUTION INTRAMUSCULAR; INTRAVENOUS AS NEEDED
Status: DISCONTINUED | OUTPATIENT
Start: 2024-04-24 | End: 2024-04-24

## 2024-04-24 RX ORDER — SODIUM CHLORIDE, SODIUM LACTATE, POTASSIUM CHLORIDE, CALCIUM CHLORIDE 600; 310; 30; 20 MG/100ML; MG/100ML; MG/100ML; MG/100ML
100 INJECTION, SOLUTION INTRAVENOUS CONTINUOUS
Status: DISCONTINUED | OUTPATIENT
Start: 2024-04-24 | End: 2024-04-24

## 2024-04-24 RX ORDER — HYDRALAZINE HYDROCHLORIDE 20 MG/ML
5 INJECTION INTRAMUSCULAR; INTRAVENOUS EVERY 30 MIN PRN
Status: DISCONTINUED | OUTPATIENT
Start: 2024-04-24 | End: 2024-04-24 | Stop reason: HOSPADM

## 2024-04-24 RX ORDER — EZETIMIBE 10 MG/1
10 TABLET ORAL
Status: DISCONTINUED | OUTPATIENT
Start: 2024-04-24 | End: 2024-04-26 | Stop reason: HOSPADM

## 2024-04-24 RX ORDER — IPRATROPIUM BROMIDE AND ALBUTEROL SULFATE 2.5; .5 MG/3ML; MG/3ML
3 SOLUTION RESPIRATORY (INHALATION) EVERY 6 HOURS PRN
Status: DISCONTINUED | OUTPATIENT
Start: 2024-04-24 | End: 2024-04-26 | Stop reason: HOSPADM

## 2024-04-24 RX ORDER — DAPAGLIFLOZIN 10 MG/1
10 TABLET, FILM COATED ORAL EVERY 24 HOURS
COMMUNITY

## 2024-04-24 RX ORDER — ATORVASTATIN CALCIUM 40 MG/1
80 TABLET, FILM COATED ORAL NIGHTLY
Status: DISCONTINUED | OUTPATIENT
Start: 2024-04-24 | End: 2024-04-26 | Stop reason: HOSPADM

## 2024-04-24 RX ORDER — TALC
3 POWDER (GRAM) TOPICAL NIGHTLY PRN
Status: DISCONTINUED | OUTPATIENT
Start: 2024-04-24 | End: 2024-04-26 | Stop reason: HOSPADM

## 2024-04-24 RX ORDER — LIDOCAINE HYDROCHLORIDE 20 MG/ML
INJECTION, SOLUTION INFILTRATION; PERINEURAL AS NEEDED
Status: DISCONTINUED | OUTPATIENT
Start: 2024-04-24 | End: 2024-04-24

## 2024-04-24 RX ORDER — SODIUM CHLORIDE, SODIUM LACTATE, POTASSIUM CHLORIDE, CALCIUM CHLORIDE 600; 310; 30; 20 MG/100ML; MG/100ML; MG/100ML; MG/100ML
100 INJECTION, SOLUTION INTRAVENOUS CONTINUOUS
Status: DISCONTINUED | OUTPATIENT
Start: 2024-04-24 | End: 2024-04-24 | Stop reason: HOSPADM

## 2024-04-24 RX ORDER — ONDANSETRON HYDROCHLORIDE 2 MG/ML
4 INJECTION, SOLUTION INTRAVENOUS EVERY 8 HOURS PRN
Status: DISCONTINUED | OUTPATIENT
Start: 2024-04-24 | End: 2024-04-26 | Stop reason: HOSPADM

## 2024-04-24 RX ORDER — HYDROMORPHONE HYDROCHLORIDE 1 MG/ML
0.6 INJECTION, SOLUTION INTRAMUSCULAR; INTRAVENOUS; SUBCUTANEOUS EVERY 4 HOURS PRN
Status: DISCONTINUED | OUTPATIENT
Start: 2024-04-24 | End: 2024-04-26 | Stop reason: HOSPADM

## 2024-04-24 RX ORDER — ACETAMINOPHEN 650 MG/1
650 SUPPOSITORY RECTAL EVERY 4 HOURS PRN
Status: DISCONTINUED | OUTPATIENT
Start: 2024-04-24 | End: 2024-04-26 | Stop reason: HOSPADM

## 2024-04-24 RX ORDER — SODIUM CHLORIDE, SODIUM LACTATE, POTASSIUM CHLORIDE, CALCIUM CHLORIDE 600; 310; 30; 20 MG/100ML; MG/100ML; MG/100ML; MG/100ML
100 INJECTION, SOLUTION INTRAVENOUS CONTINUOUS
Status: DISCONTINUED | OUTPATIENT
Start: 2024-04-24 | End: 2024-04-26 | Stop reason: HOSPADM

## 2024-04-24 RX ORDER — MIDAZOLAM HYDROCHLORIDE 1 MG/ML
INJECTION, SOLUTION INTRAMUSCULAR; INTRAVENOUS AS NEEDED
Status: DISCONTINUED | OUTPATIENT
Start: 2024-04-24 | End: 2024-04-24

## 2024-04-24 RX ORDER — HEPARIN SODIUM 5000 [USP'U]/ML
INJECTION, SOLUTION INTRAVENOUS; SUBCUTANEOUS AS NEEDED
Status: DISCONTINUED | OUTPATIENT
Start: 2024-04-24 | End: 2024-04-24

## 2024-04-24 RX ORDER — NORETHINDRONE AND ETHINYL ESTRADIOL 0.5-0.035
KIT ORAL AS NEEDED
Status: DISCONTINUED | OUTPATIENT
Start: 2024-04-24 | End: 2024-04-24

## 2024-04-24 RX ORDER — MORPHINE SULFATE 2 MG/ML
2 INJECTION, SOLUTION INTRAMUSCULAR; INTRAVENOUS EVERY 5 MIN PRN
Status: DISCONTINUED | OUTPATIENT
Start: 2024-04-24 | End: 2024-04-24 | Stop reason: HOSPADM

## 2024-04-24 RX ORDER — FUROSEMIDE 40 MG/1
40 TABLET ORAL DAILY PRN
COMMUNITY

## 2024-04-24 RX ORDER — IBUPROFEN 200 MG
1 TABLET ORAL DAILY
Status: DISCONTINUED | OUTPATIENT
Start: 2024-04-24 | End: 2024-04-26 | Stop reason: HOSPADM

## 2024-04-24 RX ORDER — DEXTROSE 50 % IN WATER (D50W) INTRAVENOUS SYRINGE
12.5
Status: DISCONTINUED | OUTPATIENT
Start: 2024-04-24 | End: 2024-04-26 | Stop reason: HOSPADM

## 2024-04-24 RX ORDER — PHENYLEPHRINE HCL IN 0.9% NACL 1 MG/10 ML
SYRINGE (ML) INTRAVENOUS AS NEEDED
Status: DISCONTINUED | OUTPATIENT
Start: 2024-04-24 | End: 2024-04-24

## 2024-04-24 RX ORDER — GABAPENTIN 400 MG/1
400 CAPSULE ORAL 3 TIMES DAILY
Status: DISCONTINUED | OUTPATIENT
Start: 2024-04-24 | End: 2024-04-26 | Stop reason: HOSPADM

## 2024-04-24 RX ORDER — LIDOCAINE HYDROCHLORIDE 10 MG/ML
0.1 INJECTION, SOLUTION EPIDURAL; INFILTRATION; INTRACAUDAL; PERINEURAL ONCE
Status: DISCONTINUED | OUTPATIENT
Start: 2024-04-24 | End: 2024-04-24 | Stop reason: HOSPADM

## 2024-04-24 RX ORDER — HYDROMORPHONE HYDROCHLORIDE 1 MG/ML
1 INJECTION, SOLUTION INTRAMUSCULAR; INTRAVENOUS; SUBCUTANEOUS ONCE
Status: COMPLETED | OUTPATIENT
Start: 2024-04-24 | End: 2024-04-24

## 2024-04-24 RX ORDER — DEXTROSE 50 % IN WATER (D50W) INTRAVENOUS SYRINGE
25
Status: DISCONTINUED | OUTPATIENT
Start: 2024-04-24 | End: 2024-04-26 | Stop reason: HOSPADM

## 2024-04-24 RX ORDER — HEPARIN SODIUM 10000 [USP'U]/100ML
0-4500 INJECTION, SOLUTION INTRAVENOUS CONTINUOUS
Status: DISCONTINUED | OUTPATIENT
Start: 2024-04-24 | End: 2024-04-24

## 2024-04-24 RX ORDER — CLINDAMYCIN PHOSPHATE 600 MG/50ML
INJECTION, SOLUTION INTRAVENOUS AS NEEDED
Status: DISCONTINUED | OUTPATIENT
Start: 2024-04-24 | End: 2024-04-24

## 2024-04-24 RX ADMIN — Medication 100 MCG: at 10:27

## 2024-04-24 RX ADMIN — FAMOTIDINE 20 MG: 10 INJECTION, SOLUTION INTRAVENOUS at 09:00

## 2024-04-24 RX ADMIN — ACETAMINOPHEN 650 MG: 160 SOLUTION ORAL at 20:35

## 2024-04-24 RX ADMIN — NICOTINE 1 PATCH: 21 PATCH, EXTENDED RELEASE TRANSDERMAL at 08:11

## 2024-04-24 RX ADMIN — HYDROMORPHONE HYDROCHLORIDE 0.5 MG: 1 INJECTION, SOLUTION INTRAMUSCULAR; INTRAVENOUS; SUBCUTANEOUS at 15:36

## 2024-04-24 RX ADMIN — EPHEDRINE SULFATE 5 MG: 50 INJECTION, SOLUTION INTRAVENOUS at 10:40

## 2024-04-24 RX ADMIN — OXCARBAZEPINE 450 MG: 150 TABLET, FILM COATED ORAL at 08:08

## 2024-04-24 RX ADMIN — EPHEDRINE SULFATE 10 MG: 50 INJECTION, SOLUTION INTRAVENOUS at 10:36

## 2024-04-24 RX ADMIN — Medication 100 MCG: at 10:31

## 2024-04-24 RX ADMIN — MIDAZOLAM 2 MG: 1 INJECTION INTRAMUSCULAR; INTRAVENOUS at 10:08

## 2024-04-24 RX ADMIN — CLINDAMYCIN PHOSPHATE 600 MG: 600 INJECTION, SOLUTION INTRAVENOUS at 12:01

## 2024-04-24 RX ADMIN — CARVEDILOL 6.25 MG: 6.25 TABLET, FILM COATED ORAL at 07:30

## 2024-04-24 RX ADMIN — Medication 40 MG: at 10:15

## 2024-04-24 RX ADMIN — ALTEPLASE 1 MG/HR: KIT at 17:39

## 2024-04-24 RX ADMIN — ATORVASTATIN CALCIUM 80 MG: 40 TABLET, FILM COATED ORAL at 20:46

## 2024-04-24 RX ADMIN — ALTEPLASE 8 MG: 2.2 INJECTION, POWDER, LYOPHILIZED, FOR SOLUTION INTRAVENOUS at 10:50

## 2024-04-24 RX ADMIN — EZETIMIBE 10 MG: 10 TABLET ORAL at 07:30

## 2024-04-24 RX ADMIN — TICAGRELOR 90 MG: 90 TABLET ORAL at 21:00

## 2024-04-24 RX ADMIN — ALTEPLASE 8 MG: 2.2 INJECTION, POWDER, LYOPHILIZED, FOR SOLUTION INTRAVENOUS at 10:51

## 2024-04-24 RX ADMIN — FENTANYL CITRATE 50 MCG: 50 INJECTION INTRAMUSCULAR; INTRAVENOUS at 10:14

## 2024-04-24 RX ADMIN — SODIUM CHLORIDE, POTASSIUM CHLORIDE, SODIUM LACTATE AND CALCIUM CHLORIDE 100 ML/HR: 600; 310; 30; 20 INJECTION, SOLUTION INTRAVENOUS at 17:56

## 2024-04-24 RX ADMIN — Medication 100 MCG: at 10:36

## 2024-04-24 RX ADMIN — GABAPENTIN 400 MG: 400 CAPSULE ORAL at 20:35

## 2024-04-24 RX ADMIN — Medication 100 MCG: at 10:40

## 2024-04-24 RX ADMIN — EPHEDRINE SULFATE 5 MG: 50 INJECTION, SOLUTION INTRAVENOUS at 11:11

## 2024-04-24 RX ADMIN — RANOLAZINE 500 MG: 500 TABLET, EXTENDED RELEASE ORAL at 20:46

## 2024-04-24 RX ADMIN — Medication 2 L/MIN: at 20:00

## 2024-04-24 RX ADMIN — Medication 200 MCG: at 10:44

## 2024-04-24 RX ADMIN — EPHEDRINE SULFATE 5 MG: 50 INJECTION, SOLUTION INTRAVENOUS at 10:44

## 2024-04-24 RX ADMIN — SODIUM CHLORIDE, POTASSIUM CHLORIDE, SODIUM LACTATE AND CALCIUM CHLORIDE 100 ML/HR: 600; 310; 30; 20 INJECTION, SOLUTION INTRAVENOUS at 09:01

## 2024-04-24 RX ADMIN — HYDRALAZINE HYDROCHLORIDE 50 MG: 25 TABLET ORAL at 08:11

## 2024-04-24 RX ADMIN — SACUBITRIL AND VALSARTAN 1 TABLET: 97; 103 TABLET, FILM COATED ORAL at 08:08

## 2024-04-24 RX ADMIN — SUGAMMADEX 100 MG: 100 INJECTION, SOLUTION INTRAVENOUS at 12:15

## 2024-04-24 RX ADMIN — ONDANSETRON 4 MG: 2 INJECTION INTRAMUSCULAR; INTRAVENOUS at 11:55

## 2024-04-24 RX ADMIN — Medication: at 08:00

## 2024-04-24 RX ADMIN — IOHEXOL 100 ML: 350 INJECTION, SOLUTION INTRAVENOUS at 04:29

## 2024-04-24 RX ADMIN — SUGAMMADEX 100 MG: 100 INJECTION, SOLUTION INTRAVENOUS at 12:26

## 2024-04-24 RX ADMIN — HEPARIN SODIUM AND DEXTROSE 1600 UNITS/HR: 10000; 5 INJECTION INTRAVENOUS at 04:26

## 2024-04-24 RX ADMIN — TAMSULOSIN HYDROCHLORIDE 0.4 MG: 0.4 CAPSULE ORAL at 08:13

## 2024-04-24 RX ADMIN — PANTOPRAZOLE SODIUM 40 MG: 40 TABLET, DELAYED RELEASE ORAL at 08:11

## 2024-04-24 RX ADMIN — RANOLAZINE 500 MG: 500 TABLET, EXTENDED RELEASE ORAL at 08:13

## 2024-04-24 RX ADMIN — FENTANYL CITRATE 50 MCG: 50 INJECTION INTRAMUSCULAR; INTRAVENOUS at 12:09

## 2024-04-24 RX ADMIN — Medication 100 MCG: at 10:52

## 2024-04-24 RX ADMIN — HYDROMORPHONE HYDROCHLORIDE 0.5 MG: 1 INJECTION, SOLUTION INTRAMUSCULAR; INTRAVENOUS; SUBCUTANEOUS at 15:57

## 2024-04-24 RX ADMIN — GLYCOPYRROLATE 0.2 MG: 0.2 INJECTION, SOLUTION INTRAMUSCULAR; INTRAVENOUS at 10:31

## 2024-04-24 RX ADMIN — PROPOFOL 150 MG: 10 INJECTION, EMULSION INTRAVENOUS at 10:14

## 2024-04-24 RX ADMIN — PANCRELIPASE 2 CAPSULE: 36000; 180000; 114000 CAPSULE, DELAYED RELEASE PELLETS ORAL at 08:08

## 2024-04-24 RX ADMIN — PROPOFOL 50 MG: 10 INJECTION, EMULSION INTRAVENOUS at 12:08

## 2024-04-24 RX ADMIN — TICAGRELOR 90 MG: 90 TABLET ORAL at 08:13

## 2024-04-24 RX ADMIN — HYDROMORPHONE HYDROCHLORIDE 0.6 MG: 1 INJECTION, SOLUTION INTRAMUSCULAR; INTRAVENOUS; SUBCUTANEOUS at 17:50

## 2024-04-24 RX ADMIN — HEPARIN SODIUM 5000 UNITS: 5000 INJECTION INTRAVENOUS; SUBCUTANEOUS at 10:45

## 2024-04-24 RX ADMIN — HYDRALAZINE HYDROCHLORIDE 50 MG: 25 TABLET ORAL at 20:35

## 2024-04-24 RX ADMIN — INSULIN LISPRO 1 UNITS: 100 INJECTION, SOLUTION INTRAVENOUS; SUBCUTANEOUS at 20:36

## 2024-04-24 RX ADMIN — CARVEDILOL 6.25 MG: 6.25 TABLET, FILM COATED ORAL at 20:35

## 2024-04-24 RX ADMIN — LIDOCAINE HYDROCHLORIDE 80 MG: 20 INJECTION, SOLUTION INFILTRATION; PERINEURAL at 10:14

## 2024-04-24 RX ADMIN — CLINDAMYCIN PHOSPHATE 600 MG: 600 INJECTION, SOLUTION INTRAVENOUS at 20:35

## 2024-04-24 RX ADMIN — HYDROMORPHONE HYDROCHLORIDE 0.6 MG: 1 INJECTION, SOLUTION INTRAMUSCULAR; INTRAVENOUS; SUBCUTANEOUS at 22:21

## 2024-04-24 RX ADMIN — INSULIN GLARGINE 10 UNITS: 100 INJECTION, SOLUTION SUBCUTANEOUS at 20:36

## 2024-04-24 RX ADMIN — HYDROMORPHONE HYDROCHLORIDE 0.5 MG: 1 INJECTION, SOLUTION INTRAMUSCULAR; INTRAVENOUS; SUBCUTANEOUS at 02:54

## 2024-04-24 RX ADMIN — GABAPENTIN 400 MG: 400 CAPSULE ORAL at 08:11

## 2024-04-24 RX ADMIN — HYDROMORPHONE HYDROCHLORIDE 1 MG: 1 INJECTION, SOLUTION INTRAMUSCULAR; INTRAVENOUS; SUBCUTANEOUS at 04:24

## 2024-04-24 RX ADMIN — ALTEPLASE 1 MG/HR: KIT at 20:00

## 2024-04-24 SDOH — HEALTH STABILITY: MENTAL HEALTH: CURRENT SMOKER: 0

## 2024-04-24 ASSESSMENT — PAIN - FUNCTIONAL ASSESSMENT
PAIN_FUNCTIONAL_ASSESSMENT: 0-10

## 2024-04-24 ASSESSMENT — PAIN SCALES - GENERAL
PAINLEVEL_OUTOF10: 10 - WORST POSSIBLE PAIN
PAINLEVEL_OUTOF10: 5 - MODERATE PAIN
PAINLEVEL_OUTOF10: 2
PAINLEVEL_OUTOF10: 6
PAINLEVEL_OUTOF10: 10 - WORST POSSIBLE PAIN
PAINLEVEL_OUTOF10: 7
PAIN_LEVEL: 5
PAINLEVEL_OUTOF10: 0 - NO PAIN
PAINLEVEL_OUTOF10: 10 - WORST POSSIBLE PAIN
PAINLEVEL_OUTOF10: 0 - NO PAIN
PAINLEVEL_OUTOF10: 0 - NO PAIN
PAINLEVEL_OUTOF10: 4
PAINLEVEL_OUTOF10: 4
PAINLEVEL_OUTOF10: 2
PAINLEVEL_OUTOF10: 0 - NO PAIN
PAINLEVEL_OUTOF10: 0 - NO PAIN
PAINLEVEL_OUTOF10: 7
PAINLEVEL_OUTOF10: 6
PAINLEVEL_OUTOF10: 5 - MODERATE PAIN
PAINLEVEL_OUTOF10: 10 - WORST POSSIBLE PAIN
PAINLEVEL_OUTOF10: 5 - MODERATE PAIN
PAINLEVEL_OUTOF10: 0 - NO PAIN
PAINLEVEL_OUTOF10: 2
PAINLEVEL_OUTOF10: 2
PAINLEVEL_OUTOF10: 0 - NO PAIN
PAINLEVEL_OUTOF10: 10 - WORST POSSIBLE PAIN

## 2024-04-24 ASSESSMENT — COGNITIVE AND FUNCTIONAL STATUS - GENERAL
TURNING FROM BACK TO SIDE WHILE IN FLAT BAD: TOTAL
HELP NEEDED FOR BATHING: TOTAL
STANDING UP FROM CHAIR USING ARMS: TOTAL
PERSONAL GROOMING: TOTAL
MOVING FROM LYING ON BACK TO SITTING ON SIDE OF FLAT BED WITH BEDRAILS: TOTAL
MOBILITY SCORE: 6
DRESSING REGULAR UPPER BODY CLOTHING: TOTAL
DRESSING REGULAR LOWER BODY CLOTHING: TOTAL
EATING MEALS: TOTAL
CLIMB 3 TO 5 STEPS WITH RAILING: TOTAL
DAILY ACTIVITIY SCORE: 6
TOILETING: TOTAL
MOVING TO AND FROM BED TO CHAIR: TOTAL
WALKING IN HOSPITAL ROOM: TOTAL

## 2024-04-24 ASSESSMENT — LIFESTYLE VARIABLES
TOTAL SCORE: 0
HAVE YOU EVER FELT YOU SHOULD CUT DOWN ON YOUR DRINKING: NO
HAVE PEOPLE ANNOYED YOU BY CRITICIZING YOUR DRINKING: NO
EVER FELT BAD OR GUILTY ABOUT YOUR DRINKING: NO
EVER HAD A DRINK FIRST THING IN THE MORNING TO STEADY YOUR NERVES TO GET RID OF A HANGOVER: NO

## 2024-04-24 ASSESSMENT — ENCOUNTER SYMPTOMS
MYALGIAS: 1
COLOR CHANGE: 1
WEAKNESS: 1

## 2024-04-24 ASSESSMENT — PAIN DESCRIPTION - LOCATION: LOCATION: LEG

## 2024-04-24 ASSESSMENT — PAIN DESCRIPTION - ORIENTATION: ORIENTATION: LEFT;LOWER

## 2024-04-24 NOTE — ANESTHESIA PREPROCEDURE EVALUATION
Patient: Dereck Shen    Procedure Information       Date/Time: 04/24/24 0923    Procedure: left leg angiogram with thrombectomy possible angioplasty *C-ARM* (ANGIO JET) (Left)    Location: [facility] OR 08 / Virtual [facility] OR    Surgeons: Iban Peace, DO            Relevant Problems   Anesthesia (within normal limits)      Cardiac   (+) Atherosclerotic heart disease of native coronary artery without angina pectoris   (+) Hyperlipidemia   (+) Hypertension   (+) Limb ischemia   (+) Peripheral arterial disease (CMS-HCC)      Pulmonary   (+) Chronic obstructive pulmonary disease (Multi)   (+) BRUNA (obstructive sleep apnea)   (+) Shortness of breath on exertion      Neuro   (+) Bipolar depression (Multi)   (+) Causalgia of lower extremity   (+) Diabetic polyneuropathy associated with type 2 diabetes mellitus (Multi)   (+) Ischemic stroke (Multi)      GI   (+) Gastroesophageal reflux disease      /Renal   (+) BPH (benign prostatic hyperplasia)      Liver   (+) Chronic hepatitis C virus genotype 1a infection (Multi)   (+) Hepatic steatosis   (+) Hepatitis-C      Endocrine   (+) Diabetic neuropathy, painful (Multi)   (+) Diabetic polyneuropathy associated with type 2 diabetes mellitus (Multi)   (+) Type 2 diabetes mellitus (Multi)      Musculoskeletal   (+) Causalgia of lower extremity   (+) Chronic pain syndrome      ID   (+) Bacterial skin infection   (+) Chronic hepatitis C virus genotype 1a infection (Multi)   (+) Hepatitis-C   (+) Recurrent boils   (+) Shingles       Clinical information reviewed:   Tobacco  Allergies  Meds  Problems  Med Hx  Surg Hx   Fam Hx  Soc   Hx        NPO Detail:  No data recorded     Physical Exam    Airway  Mallampati: II  TM distance: >3 FB  Neck ROM: full     Cardiovascular - normal exam     Dental   (+) upper dentures, lower dentures     Pulmonary - normal exam     Abdominal - normal exam         Anesthesia Plan    History of general anesthesia?: yes  History of complications of  general anesthesia?: no    ASA 3     general     The patient is not a current smoker.    intravenous induction   Postoperative administration of opioids is intended.  Anesthetic plan and risks discussed with patient.  Use of blood products discussed with patient who.    Plan discussed with CRNA.

## 2024-04-24 NOTE — PROGRESS NOTES
Dereck Shen is a 58 y.o. male admitted for Limb ischemia. Pharmacy reviewed the patient's [id number] medications and allergies for accuracy.    The list below reflects the PTA list prior to pharmacy medication history. A summary a changes to the PTA medication list has been listed below. Please review each medication in order reconciliation for additional clarification and justification.    Source of information:  T2P    Medications added:  Isosorbide mono er 60mg  Farxiga 10mg every day   Furosemide 40mg every day prn  Palmira Root every day prn  Elderberry every day prn    Medications modified:  Atorvastatin 80mg --> 80mg hs  Creon 36,000-114,000-180,000 2c tid --> 2c tid prn    Medications to be removed:  Silver sulfadiazine 1%  Atarax 50mg  Isosorbide Dinitrate 5mg  Latuda 40mg  Spironolactone 25mg     Medications of concern:      Prior to Admission Medications   Prescriptions Last Dose Informant Patient Reported? Taking?   Creon 36,000-114,000- 180,000 unit capsule,delayed release(DR/EC) capsule 2024  Yes Yes   Sig: Take 2 capsules by mouth 3 times a day with meals.   Entresto  mg tablet 2024  Yes Yes   Sig: Take 1 tablet by mouth twice a day.   FreeStyle En sensor system kit   No No   Sig: USE AS DIRECTED AND CHANGE EVERY 14 DAYS   OXcarbazepine (Trileptal) 150 mg tablet 2024  Yes Yes   Sig: Take 3 tablets (450 mg) by mouth twice a day.   atorvastatin (Lipitor) 80 mg tablet 2024  Yes Yes   blood sugar diagnostic (FreeStyle Precision Aristeo Strips) strip   No No   Si each 2 times a day.   blood sugar diagnostic strip   No No   Sig: Use as directed twice daily   carvedilol (Coreg) 6.25 mg tablet 2024  Yes Yes   Sig: Take 1 tablet (6.25 mg) by mouth every 12 hours.   ezetimibe (Zetia) 10 mg tablet   Yes No   Sig: Take 1 tablet (10 mg) by mouth once daily.   flash glucose scanning reader misc   No No   Sig: USE TO CHECK SUGARS FOUR TIMES A DAY   flash glucose sensor  "kit kit   No No   Sig: use as directed daily and change every 14 days   gabapentin (Neurontin) 400 mg capsule 2024  No Yes   Sig: Take 1 capsule (400 mg) by mouth 3 times a day.   hydrALAZINE (Apresoline) 50 mg tablet 2024  Yes Yes   Sig: Take 1 tablet (50 mg) by mouth 3 times a day.   hydrOXYzine HCL (Atarax) 50 mg tablet   Yes No   Si tablet Orally PRN   insulin glargine (Lantus) 100 unit/mL (3 mL) pen 2024  No Yes   Sig: Inject 20 Units under the skin once daily at bedtime.   insulin lispro (HumaLOG) 100 unit/mL injection 2024  No Yes   Sig: INJECT 20 UNIT 3 times daily if gluocse vlaue is over 180mg/dL   insulin syringe-needle U-100 31G X \" 1 mL syringe   No No   Sig: USE AS DIRECTED FOUR TIMES PER DAY   isosorbide dinitrate (Isordil) 5 mg tablet 2024  Yes Yes   Sig: Take by mouth.   lurasidone (Latuda) 40 mg tablet Not Taking  No No   Sig: TAKE 1 TABLET BY MOUTH ONCE DAILY   Patient not taking: Reported on 2024   medical cannabis 2024  Yes Yes   Sig: Not [facility] prescribed   nicotine (Nicoderm CQ) 21 mg/24 hr patch   No No   Sig: APPLY 1 PATCH TO SKIN EVERY 24 HOURS AS DIRECTED   pantoprazole (ProtoNix) 40 mg EC tablet 2024  No Yes   Sig: TAKE 1 TABLET BY MOUTH ONCE DAILY   pen needle, diabetic (BD Ultra-Fine Jocy Pen Needle) 32 gauge x \" needle   No No   Sig: Pen Needles for Insulin.   pen needle, diabetic (TechLITE Pen Needle) 32 gauge x \" needle   No No   Sig: Use to inject 1-4 times daily as directed   ranolazine (Ranexa) 500 mg 12 hr tablet 2024  Yes Yes   Sig: Take 1 tablet (500 mg) by mouth twice a day.   rivaroxaban (Xarelto) 20 mg tablet 2024  Yes Yes   Sig: Take 1 tablet (20 mg) by mouth once daily.   spironolactone (Aldactone) 25 mg tablet Not Taking  Yes No   Sig: Take by mouth once daily.   tamsulosin (Flomax) 0.4 mg 24 hr capsule 2024  No Yes   Sig: TAKE 1 CAPSULE BY MOUTH ONCE DAILY   tiZANidine (Zanaflex) 2 mg tablet   No No   Sig: " Take 1 tablet (2 mg) by mouth once daily as needed for muscle spasms.   ticagrelor (Brilinta) 90 mg tablet 4/23/2024  No Yes   Sig: Take 1 tablet (90 mg) by mouth 2 times a day.      Facility-Administered Medications: None       Ale Motta

## 2024-04-24 NOTE — PROGRESS NOTES
History and chart reviewed.  Patient was not in his room in the ER midday.  Patient was in the OR for most of the day.  I will review him tomorrow.  I agree with Dr. Acevedo's assessment and plan    Ashley Celestin MD

## 2024-04-24 NOTE — SIGNIFICANT EVENT
DR. RAFAL HE CARMEL AWARE PATIENT COMPLAINT OF BACK AND LEFT LOWER LEG PAIN . CALF SOFT MOVING TOES CAN FEEL NURSE TOUCHING FOOT

## 2024-04-24 NOTE — NURSING NOTE
Spoke with Dr. Peace regarding pts decrease in sensation, decrease in color and temp of lower left foot. Per physician, as long as calf stays soft and pt able to move toes/foot then continue to monitor.

## 2024-04-24 NOTE — CONSULTS
Reason For Consult  Acute limb ischemia left lower extremity    History Of Present Illness  Dereck Shen is a 58 y.o. male presenting with 12-hour history of significant left foot pain did wax and wane over the last 24 hours.  Yesterday at 1:00 had pain kind of subsided and subsequently presented to emergency department for evaluation at early a.m. hours.  Patient still has sensation primarily pain narcotics taken with slight improvement  Motor function appears to be intact.  No prior history of PEs previous history of femoropopliteal bypass graft..     Past Medical History  He has a past medical history of Abnormal electrocardiogram (ECG) (EKG), Acute on chronic combined systolic (congestive) and diastolic (congestive) heart failure (Multi) (10/18/2019), Anxiety disorder, unspecified, Aphasia (07/06/2017), Atherosclerotic heart disease of native coronary artery with unspecified angina pectoris (CMS-[facility]) (11/05/2019), Bipolar disorder, unspecified (Multi) (06/10/2020), Body mass index (BMI) 31.0-31.9, adult (10/18/2019), Chronic lumbar radiculopathy (09/22/2023), Chronic obstructive pulmonary disease with (acute) exacerbation (Multi) (12/13/2021), Chronic obstructive pulmonary disease with (acute) exacerbation (Multi) (11/25/2020), Chronic systolic (congestive) heart failure (Multi) (02/09/2021), Cutaneous abscess of groin (09/12/2019), Encounter for follow-up examination after completed treatment for conditions other than malignant neoplasm (09/12/2019), Encounter for screening for malignant neoplasm of prostate (09/12/2019), Encounter for screening for other suspected endocrine disorder (09/12/2019), Headache, unspecified (09/10/2019), Hemiplegia and hemiparesis following cerebral infarction affecting unspecified side (Multi) (06/21/2021), Obesity, unspecified (10/18/2019), Obstructive sleep apnea (adult) (pediatric) (10/07/2019), Old myocardial infarction, Old myocardial infarction, Opioid abuse, in  remission (Multi) (04/06/2020), Other conditions influencing health status (11/25/2020), Other specified abnormal findings of blood chemistry (09/12/2019), Pain in unspecified joint (10/18/2019), Personal history of other diseases of male genital organs (09/12/2019), Personal history of other diseases of the circulatory system (11/05/2019), Personal history of other diseases of the digestive system (10/18/2019), Personal history of other diseases of the musculoskeletal system and connective tissue (07/18/2019), Personal history of other diseases of the musculoskeletal system and connective tissue, Personal history of other diseases of the musculoskeletal system and connective tissue, Personal history of other diseases of the musculoskeletal system and connective tissue (07/18/2019), Personal history of other diseases of the nervous system and sense organs (09/12/2019), Personal history of other endocrine, nutritional and metabolic disease (08/11/2020), Personal history of other endocrine, nutritional and metabolic disease (10/18/2019), Personal history of other infectious and parasitic diseases (10/18/2019), Personal history of other mental and behavioral disorders, Personal history of pneumonia (recurrent) (09/12/2019), Post-traumatic stress disorder, unspecified, Postlaminectomy syndrome, not elsewhere classified (12/08/2015), Proteinuria, unspecified (10/25/2019), Radiculopathy, lumbar region (07/20/2017), Stroke (Multi) (07/24/2023), and Unqualified visual loss, left eye, normal vision right eye (07/06/2017).    Surgical History  He has a past surgical history that includes Other surgical history (06/02/2016); Knee surgery (06/02/2016); Other surgical history (09/12/2019); Elbow surgery (02/10/2020); Other surgical history (09/30/2019); Other surgical history (10/21/2020); Other surgical history (06/07/2019); Other surgical history (09/21/2020); Coronary angioplasty (03/17/2017); Back surgery (03/17/2017); CT  angio aorta and bilateral iliofemoral runoff w and or wo IV contrast (7/20/2020); MR angio head wo IV contrast (1/4/2022); MR angio neck wo IV contrast (1/4/2022); and CT angio aorta and bilateral iliofemoral runoff w and or wo IV contrast (1/14/2022).     Social History  He reports that he has quit smoking. His smoking use included cigarettes. He has never used smokeless tobacco. He reports current drug use. Drug: Marijuana. He reports that he does not drink alcohol.    Family History  Family History   Problem Relation Name Age of Onset    No Known Problems Mother      No Known Problems Father          Allergies  Celecoxib; Ibuprofen; Tetanus toxoid, adsorbed; [id number] antimigraine agents; Cephalexin; Hydrocodone; Latex; and Tryptophan    Review of Systems  Review of Systems    Constitutional:  no generalized malaise overall feels well, energy levels intact, no complaints specifically noted  HEENT:  No blurry vision, no visual aides noted, no hearing loss no ear ache no nose bleeds noted, no dysphagia, no congestion otherwise no pertinent positives noted  Cardiovascular:  no palpitations, chest pain or heaviness noted, no leg swelling, no numbness or tingling in the lower extremity noted  Respiratory:  no shortness of breath, no productive cough noted, no conversation dyspnea or difficulty breathing  Gastrointestinal:  no abdominal pain, no nausea or vomiting, appetite intact, no bowel irregularities noted  Genitourinary:   no urinary incontinence, frequency or urgency issues noted, no hematuria or burning sensation issues  Musculoskeletal:  No muscle aches or pains, no joint discomfort noted, no back pain noted otherwise feels well  Skin: no ulcerations, skin color issues or wounds upper or lower extremities  Neurologic: no dizziness, no hemiplegia, no hemiparesis, no obvious visual deficits noted  Psychiatric: no depression, no memory loss noted, no suicidal ideation  Endocrine: no weight loss or gain, no  "temperature concerns hot or cold intolerance  Hemogolotic/Lymphatic: no bruising, excessive bleeding, no swelling in the groins or neck noted  Acute left lower extremity leg pain.     Physical Exam  Physical exam    Constitutional: alert and in no acute distress verbal  Eyes: No erythema swelling or discharge noted  Neck: supple, symmetric, trachea midline, no masses noted  Cardiovascular: Carotid pulses 2+, no obvious bruit, no Jugular distension noted, no thrill, heart regular rate, lower extremity vascular exam   Pulmonary:  Bilateral breath sounds intact, clear with rales rhonchi or wheeze  Abdomen: soft non tender, no pulsatile masses noted, no rebound rigidity or guarding noted  Skin: intact warm no abnormal turgor  Psychiatric: alert without any obvious cognitive issues, oriented to person, place, and time  Nonpalpable DP and PT pulse bilateral lower extremities cap refill greater than 3 seconds     Last Recorded Vitals  Blood pressure 151/80, pulse 76, temperature 36.8 °C (98.3 °F), temperature source Temporal, resp. rate 17, height 1.803 m (5' 11\"), weight 86.2 kg (190 lb), SpO2 100%.    Relevant Results  CTA reviewed demonstrated femoropopliteal bypass graft left lower extremity to be occluded with popliteal patency two-vessel runoff to the foot.     Assessment/Plan     Acute limb ischemia [place] [facility]  Motor function appears to be intact dorsiflex plantarflexion of the left ankle  We discussed response complications of angiogram with percutaneous mechanical thrombectomy  Possible angioplasty  Patient does understand wishes appear secondary to acute limb ischemic issues.          Iban Peace, DO    "

## 2024-04-24 NOTE — CONSULTS
Critical Care Medicine Consult      Reason For Consult:  Status post left lower extremity angiogram with catheter directed thrombolysis and angiojet thrombectomy, continued catheter directed thrombolysis, postoperative critical care management    History Of Present Illness  Dereck Shen is a 58 y.o. male with past medical history of CAD s/p CABG, PAD, HFrEF, HTN, COPD, BRUNA, multiple prior CVA, HCV, neuropathy, pancreatic insufficiency, GERD, BPH, cLBP, bipolar disorder, PTSD, and tobacco use presented to [facility] ED with complaints of left lower extremity pain. Patient stated that the left lower extremity pain started yesterday 4/23/24 and initially was waxing and waning which then became persistent with loss of sensation and weakness. Patient denied any trauma to left lower extremity, recent surgeries,chest pain, shortness of breath, nausea, vomiting, diarrhea, lightheadedness, dizziness, bleeding, headache, cough, or dysuria. Upon ED workup, it was found that patient's left lower extremity was cold to touch with absent pulses. CTA revealed occlusion to LLE bypass graft. ED interventions included heparin infusion, dilaudid, and vascular consultation. Patient underwent left lower extremity angiogram with catheter directed thrombolysis and angiojet thrombectomy and continue catheter directed thrombolysis with Dr. Peace.     Patient seem and examined in ICU bed 4. Patient lying in bed flat with LLE taped to bed to prevent movement. Patient alert and oriented x4. He reports that he has more sensation in his left foot than what he did when he came in but still has pain. Left lower extremity remains pallor and cold to the touch. Sheath with heparin and altepase infusion noted to right groin. Patient reports that he has been as smoker for 48 years and recently cut his cigarette smoking down to 3-5 cigarettes per day. He stated that he had 3 episodes of left lower extremity pain, loss of sensation, and loss of color  at home prior to coming to the hospital. He reports an extensive history of vascular procedures to bilateral lower extremities.       Past Medical History:   Diagnosis Date    Abnormal electrocardiogram (ECG) (EKG)     Abnormal EKG    Acute on chronic combined systolic (congestive) and diastolic (congestive) heart failure (Multi) 10/18/2019    Acute on chronic combined systolic and diastolic congestive heart failure    Anxiety disorder, unspecified     Anxiety    Aphasia 07/06/2017    Aphasia, mixed    Atherosclerotic heart disease of native coronary artery with unspecified angina pectoris (CMS-Former[facility]) 11/05/2019    Atherosclerosis of native coronary artery of native heart with angina pectoris    Bipolar disorder, unspecified (Multi) 06/10/2020    Bipolar 1 disorder    Body mass index (BMI) 31.0-31.9, adult 10/18/2019    Body mass index (BMI) of 31.0 to 31.9 in adult    Chronic lumbar radiculopathy 09/22/2023    Chronic obstructive pulmonary disease with (acute) exacerbation (Multi) 12/13/2021    COPD exacerbation    Chronic obstructive pulmonary disease with (acute) exacerbation (Multi) 11/25/2020    Acute exacerbation of chronic obstructive pulmonary disease    Chronic systolic (congestive) heart failure (Multi) 02/09/2021    Chronic systolic congestive heart failure    Cutaneous abscess of groin 09/12/2019    Abscess of groin    Encounter for follow-up examination after completed treatment for conditions other than malignant neoplasm 09/12/2019    Hospital discharge follow-up    Encounter for screening for malignant neoplasm of prostate 09/12/2019    Screening for prostate cancer    Encounter for screening for other suspected endocrine disorder 09/12/2019    Screening for thyroid disorder    Headache, unspecified 09/10/2019    Chronic daily headache    Hemiplegia and hemiparesis following cerebral infarction affecting unspecified side (Multi) 06/21/2021    Hemiplegia following cerebrovascular accident (CVA)     Obesity, unspecified 10/18/2019    Obesity (BMI 30.0-34.9)    Obstructive sleep apnea (adult) (pediatric) 10/07/2019    Obstructive sleep apnea, adult    Old myocardial infarction     History of inferior wall myocardial infarction    Old myocardial infarction     History of myocardial infarction    Opioid abuse, in remission (Multi) 04/06/2020    History of opioid abuse    Other conditions influencing health status 11/25/2020    History of cough    Other specified abnormal findings of blood chemistry 09/12/2019    Low testosterone in male    Pain in unspecified joint 10/18/2019    Polyarthralgia    Personal history of other diseases of male genital organs 09/12/2019    History of erectile dysfunction    Personal history of other diseases of the circulatory system 11/05/2019    History of coronary artery disease    Personal history of other diseases of the digestive system 10/18/2019    History of constipation    Personal history of other diseases of the musculoskeletal system and connective tissue 07/18/2019    History of osteoarthritis    Personal history of other diseases of the musculoskeletal system and connective tissue     History of polymyalgia rheumatica    Personal history of other diseases of the musculoskeletal system and connective tissue     History of osteoporosis    Personal history of other diseases of the musculoskeletal system and connective tissue 07/18/2019    History of low back pain    Personal history of other diseases of the nervous system and sense organs 09/12/2019    History of peripheral neuropathy    Personal history of other endocrine, nutritional and metabolic disease 08/11/2020    History of type 2 diabetes mellitus    Personal history of other endocrine, nutritional and metabolic disease 10/18/2019    History of vitamin D deficiency    Personal history of other infectious and parasitic diseases 10/18/2019    History of hepatitis C virus infection    Personal history of other mental  and behavioral disorders     History of panic attacks    Personal history of pneumonia (recurrent) 09/12/2019    History of pneumonia    Post-traumatic stress disorder, unspecified     PTSD (post-traumatic stress disorder)    Postlaminectomy syndrome, not elsewhere classified 12/08/2015    Failed back syndrome of lumbar spine    Proteinuria, unspecified 10/25/2019    Proteinuria, unspecified type    Radiculopathy, lumbar region 07/20/2017    Chronic lumbar radiculopathy    Stroke (Multi) 07/24/2023    Unqualified visual loss, left eye, normal vision right eye 07/06/2017    Visual loss, left eye     Past Surgical History:   Procedure Laterality Date    BACK SURGERY  03/17/2017    Back Surgery    CORONARY ANGIOPLASTY  03/17/2017    PTCA    CT AORTA AND BILATERAL ILIOFEMORAL RUNOFF ANGIOGRAM W AND/OR WO IV CONTRAST  7/20/2020    CT AORTA AND BILATERAL ILIOFEMORAL RUNOFF ANGIOGRAM W AND/OR WO IV CONTRAST 7/20/2020 [facility]    CT AORTA AND BILATERAL ILIOFEMORAL RUNOFF ANGIOGRAM W AND/OR WO IV CONTRAST  1/14/2022    CT AORTA AND BILATERAL ILIOFEMORAL RUNOFF ANGIOGRAM W AND/OR WO IV CONTRAST 1/14/2022 [facility]    ELBOW SURGERY  02/10/2020    Elbow Surgery    KNEE SURGERY  06/02/2016    Knee Surgery Left    MR HEAD ANGIO WO IV CONTRAST  1/4/2022    MR HEAD ANGIO WO IV CONTRAST 1/4/2022 [facility]    MR NECK ANGIO WO IV CONTRAST  1/4/2022    MR NECK ANGIO WO IV CONTRAST 1/4/2022 [facility]    OTHER SURGICAL HISTORY  06/02/2016    Dental Surgery    OTHER SURGICAL HISTORY  09/12/2019    Cardiac catheterization with stent placement    OTHER SURGICAL HISTORY  09/30/2019    Coronary artery bypass graft    OTHER SURGICAL HISTORY  10/21/2020    Vascular surgical procedure    OTHER SURGICAL HISTORY  06/07/2019    Tonsillectomy    OTHER SURGICAL HISTORY  09/21/2020    Femoropopliteal bypass     Medications Prior to Admission   Medication Sig Dispense Refill Last Dose    atorvastatin (Lipitor)  80 mg tablet Take 1 tablet (80 mg) by mouth once daily at bedtime.   4/23/2024    carvedilol (Coreg) 6.25 mg tablet Take 1 tablet (6.25 mg) by mouth every 12 hours.   4/24/2024    Creon 36,000-114,000- 180,000 unit capsule,delayed release(DR/EC) capsule Take 2 capsules by mouth 3 times a day as needed.   4/23/2024    Entresto  mg tablet Take 1 tablet by mouth twice a day.   4/24/2024    gabapentin (Neurontin) 400 mg capsule Take 1 capsule (400 mg) by mouth 3 times a day. 90 capsule 5 4/23/2024    hydrALAZINE (Apresoline) 50 mg tablet Take 1 tablet (50 mg) by mouth 3 times a day.   4/23/2024    insulin glargine (Lantus) 100 unit/mL (3 mL) pen Inject 20 Units under the skin once daily at bedtime. 10 mL 5 4/23/2024    insulin lispro (HumaLOG) 100 unit/mL injection INJECT 20 UNIT 3 times daily if gluocse vlaue is over 180mg/dL (Patient taking differently: Inject 10 Units under the skin 3 times a day with meals. Plus sliding scale if BS is over 200) 20 mL 6 4/23/2024    medical cannabis Not [facility] prescribed   4/23/2024    OXcarbazepine (Trileptal) 150 mg tablet Take 3 tablets (450 mg) by mouth twice a day.   4/23/2024    pantoprazole (ProtoNix) 40 mg EC tablet TAKE 1 TABLET BY MOUTH ONCE DAILY 90 tablet 1 4/23/2024    ranolazine (Ranexa) 500 mg 12 hr tablet Take 1 tablet (500 mg) by mouth twice a day.   4/23/2024    rivaroxaban (Xarelto) 20 mg tablet Take 1 tablet (20 mg) by mouth once daily.   4/23/2024    tamsulosin (Flomax) 0.4 mg 24 hr capsule TAKE 1 CAPSULE BY MOUTH ONCE DAILY 90 capsule 1 4/23/2024    ticagrelor (Brilinta) 90 mg tablet Take 1 tablet (90 mg) by mouth 2 times a day. 180 tablet 1 4/23/2024    blood sugar diagnostic (FreeStyle Precision Aristeo Strips) strip 1 each 2 times a day. 200 strip 11     blood sugar diagnostic strip Use as directed twice daily 100 each 1     dapagliflozin propanediol (Farxiga) 10 mg Take 1 tablet (10 mg) by mouth once every 24 hours.       ELDERBERRY FRUIT ORAL Take 1 tablet by  "mouth once daily as needed.       ezetimibe (Zetia) 10 mg tablet Take 1 tablet (10 mg) by mouth once daily.       flash glucose scanning reader misc USE TO CHECK SUGARS FOUR TIMES A DAY 1 each 0     flash glucose sensor kit kit use as directed daily and change every 14 days 6 each 3     FreeStyle En sensor system kit USE AS DIRECTED AND CHANGE EVERY 14 DAYS 6 each 3     furosemide (Lasix) 40 mg tablet Take 1 tablet (40 mg) by mouth once daily as needed.       GINGER ROOT EXTRACT ORAL Take 1 tablet by mouth once daily as needed.       insulin syringe-needle U-100 31G X 5/16\" 1 mL syringe USE AS DIRECTED FOUR TIMES PER  each 11     isosorbide mononitrate ER (Imdur) 60 mg 24 hr tablet Take 1 tablet (60 mg) by mouth once daily. Do not crush or chew.       lurasidone (Latuda) 40 mg tablet TAKE 1 TABLET BY MOUTH ONCE DAILY (Patient not taking: Reported on 4/24/2024) 90 tablet 1 Not Taking    nicotine (Nicoderm CQ) 21 mg/24 hr patch APPLY 1 PATCH TO SKIN EVERY 24 HOURS AS DIRECTED 28 patch 0     pen needle, diabetic (BD Ultra-Fine Jocy Pen Needle) 32 gauge x 5/32\" needle Pen Needles for Insulin. 100 each 2     pen needle, diabetic (TechLITE Pen Needle) 32 gauge x 1/4\" needle Use to inject 1-4 times daily as directed 100 each 11     tiZANidine (Zanaflex) 2 mg tablet Take 1 tablet (2 mg) by mouth once daily as needed for muscle spasms. 30 tablet 0      Celecoxib; Ibuprofen; Tetanus toxoid, adsorbed; [id number] antimigraine agents; Cephalexin; Hydrocodone; Latex; and Tryptophan  Social History     Tobacco Use    Smoking status: Former     Current packs/day: 0.50     Types: Cigarettes    Smokeless tobacco: Never    Tobacco comments:     Wants 21 mg patches   Vaping Use    Vaping status: Never Used   Substance Use Topics    Alcohol use: Never    Drug use: Yes     Types: Marijuana     Comment: Medical card     Family History   Problem Relation Name Age of Onset    No Known Problems Mother      No Known Problems " Father         Scheduled Medications:   atorvastatin, 80 mg, oral, Nightly  carvedilol, 6.25 mg, oral, q12h  clindamycin, 600 mg, intravenous, q8h  ezetimibe, 10 mg, oral, Daily  gabapentin, 400 mg, oral, TID  hydrALAZINE, 50 mg, oral, TID  insulin glargine, 10 Units, subcutaneous, Nightly  insulin lispro, 0-5 Units, subcutaneous, q6h  nicotine, 1 patch, transdermal, Daily  OXcarbazepine, 450 mg, oral, BID  oxygen, , inhalation, Continuous - Inhalation  pancrelipase (Lip-Prot-Amyl), 2 capsule, oral, TID with meals  pantoprazole, 40 mg, oral, Daily  ranolazine, 500 mg, oral, BID  sacubitriL-valsartan, 1 tablet, oral, BID  tamsulosin, 0.4 mg, oral, Daily  ticagrelor, 90 mg, oral, BID         Continuous Medications:   alteplase (Activase) 8 mg in sodium chloride 0.9% 160 mL (0.05 mg/mL) infusion, 1 mg/hr, Last Rate: 1 mg/hr (04/24/24 1739)  heparin (porcine), 500 Units/hr  lactated Ringer's, 100 mL/hr         PRN Medications:   PRN medications: acetaminophen **OR** acetaminophen **OR** acetaminophen, dextrose, dextrose, glucagon, glucagon, HYDROmorphone, ipratropium-albuteroL, melatonin, ondansetron **OR** ondansetron, polyethylene glycol    Review of Systems:  Review of Systems   Musculoskeletal:  Positive for myalgias.   Skin:  Positive for color change.        Pallor to left lower extremity   Neurological:  Positive for weakness.   All other systems reviewed and are negative.       Objective   Vitals:  Most Recent:  Vitals:    04/24/24 1630   BP:    Pulse:    Resp:    Temp: 36.8 °C (98.2 °F)   SpO2:        24hr Min/Max:  Temp  Min: 36.8 °C (98.2 °F)  Max: 36.9 °C (98.5 °F)  Pulse  Min: 65  Max: 93  BP  Min: 125/76  Max: 169/88  Resp  Min: 17  Max: 20  SpO2  Min: 96 %  Max: 100 %    LDA:   Introducer 04/24/24 Femoral Right (Active)   Placement Date: 04/24/24   Hand Hygiene Completed: Yes  Location: Femoral  Orientation: Right  Description (optional): (c)   Placed by: (c)    Number of days: 0       Urethral Catheter  Non-latex 22 Fr. (Active)   Placement Date: 04/24/24   Catheter Type: Non-latex  Tube Size (Fr.): 22 Fr.  Catheter Balloon Size: 5 mL  Urine Returned: Yes   Number of days: 0           Intake/Output Summary (Last 24 hours) at 4/24/2024 1750  Last data filed at 4/24/2024 1700  Gross per 24 hour   Intake 1170 ml   Output 1425 ml   Net -255 ml           Physical exam:    Physical Exam  Constitutional:       General: He is not in acute distress.     Appearance: Normal appearance. He is obese. He is not ill-appearing.   HENT:      Head: Normocephalic.      Nose: Nose normal.      Mouth/Throat:      Mouth: Mucous membranes are dry.   Eyes:      Extraocular Movements: Extraocular movements intact.      Pupils: Pupils are equal, round, and reactive to light.   Cardiovascular:      Rate and Rhythm: Normal rate and regular rhythm.      Pulses: Normal pulses.      Heart sounds: Normal heart sounds. No murmur heard.  Pulmonary:      Effort: Pulmonary effort is normal. No respiratory distress.      Breath sounds: Normal breath sounds.   Abdominal:      General: Bowel sounds are normal. There is no distension.      Palpations: Abdomen is soft.      Tenderness: There is no abdominal tenderness. There is no guarding.   Musculoskeletal:         General: Tenderness present.      Cervical back: Normal range of motion.      Right lower leg: No edema.      Left lower leg: No edema.      Comments: Limited range of motion to lower extremities per vascular surgery's orders. Chronic right upper extremity weakness status post CVA   Skin:     General: Skin is dry.      Capillary Refill: Capillary refill greater than 3 seconds to bilateral lower extremities     Comments: Left lower leg cold to touch and pale in color.    Neurological:      General: No focal deficit present.      Mental Status: He is alert and oriented to person, place, and time. Mental status is at baseline.      Comments: Decreased sensation and pain to left lower extremity    Psychiatric:         Mood and Affect: Mood normal.         Thought Content: Thought content normal.         Judgment: Judgment normal.          Lab/Radiology/Diagnostic Review:  Results for orders placed or performed during the hospital encounter of 04/24/24 (from the past 24 hour(s))   Electrocardiogram, 12-lead PRN ACS symptoms   Result Value Ref Range    Ventricular Rate 94 BPM    Atrial Rate 94 BPM    [place] Interval 180 ms    QRS Duration 107 ms    QT Interval 376 ms    QTC Calculation(Bazett) 471 ms    P Axis 58 degrees    R Axis 38 degrees    T Axis -43 degrees    QRS Count 15 beats    Q Onset 251 ms    T Offset 439 ms    QTC Fredericia 436 ms   CBC and Auto Differential   Result Value Ref Range    WBC 9.1 4.4 - 11.3 x10*3/uL    nRBC 0.0 0.0 - 0.0 /100 WBCs    RBC 5.00 4.50 - 5.90 x10*6/uL    Hemoglobin 15.9 13.5 - 17.5 g/dL    Hematocrit 45.5 41.0 - 52.0 %    MCV 91 80 - 100 fL    MCH 31.8 26.0 - 34.0 pg    MCHC 34.9 32.0 - 36.0 g/dL    RDW 13.5 11.5 - 14.5 %    Platelets 223 150 - 450 x10*3/uL    Neutrophils % 78.9 40.0 - 80.0 %    Immature Granulocytes %, Automated 0.3 0.0 - 0.9 %    Lymphocytes % 14.7 13.0 - 44.0 %    Monocytes % 5.9 2.0 - 10.0 %    Eosinophils % 0.0 0.0 - 6.0 %    Basophils % 0.2 0.0 - 2.0 %    Neutrophils Absolute 7.17 1.20 - 7.70 x10*3/uL    Immature Granulocytes Absolute, Automated 0.03 0.00 - 0.70 x10*3/uL    Lymphocytes Absolute 1.34 1.20 - 4.80 x10*3/uL    Monocytes Absolute 0.54 0.10 - 1.00 x10*3/uL    Eosinophils Absolute 0.00 0.00 - 0.70 x10*3/uL    Basophils Absolute 0.02 0.00 - 0.10 x10*3/uL   Basic metabolic panel   Result Value Ref Range    Glucose 323 (H) 74 - 99 mg/dL    Sodium 137 136 - 145 mmol/L    Potassium 4.7 3.5 - 5.3 mmol/L    Chloride 104 98 - 107 mmol/L    Bicarbonate 23 21 - 32 mmol/L    Anion Gap 15 10 - 20 mmol/L    Urea Nitrogen 26 (H) 6 - 23 mg/dL    Creatinine 1.31 (H) 0.50 - 1.30 mg/dL    eGFR 63 >60 mL/min/1.73m*2    Calcium 9.2 8.6 - 10.3 mg/dL   Magnesium    Result Value Ref Range    Magnesium 2.16 1.60 - 2.40 mg/dL   Protime-INR   Result Value Ref Range    Protime 14.4 (H) 9.8 - 12.8 seconds    INR 1.3 (H) 0.9 - 1.1   APTT   Result Value Ref Range    aPTT 37 27 - 38 seconds   Blood Gas Venous Full Panel   Result Value Ref Range    POCT pH, Venous 7.43 7.33 - 7.43 pH    POCT pCO2, Venous 39 (L) 41 - 51 mm Hg    POCT pO2, Venous 35 35 - 45 mm Hg    POCT SO2, Venous 62 45 - 75 %    POCT Oxy Hemoglobin, Venous 58.5 45.0 - 75.0 %    POCT Hematocrit Calculated, Venous 45.0 41.0 - 52.0 %    POCT Sodium, Venous 134 (L) 136 - 145 mmol/L    POCT Potassium, Venous 4.6 3.5 - 5.3 mmol/L    POCT Chloride, Venous 102 98 - 107 mmol/L    POCT Ionized Calicum, Venous 1.15 1.10 - 1.33 mmol/L    POCT Glucose, Venous 343 (H) 74 - 99 mg/dL    POCT Lactate, Venous 2.2 (H) 0.4 - 2.0 mmol/L    POCT Base Excess, Venous 1.6 -2.0 - 3.0 mmol/L    POCT HCO3 Calculated, Venous 25.9 22.0 - 26.0 mmol/L    POCT Hemoglobin, Venous 15.0 13.5 - 17.5 g/dL    POCT Anion Gap, Venous 11.0 10.0 - 25.0 mmol/L    Patient Temperature 37.0 degrees Celsius    FiO2 21 %   Sedimentation rate, automated   Result Value Ref Range    Sedimentation Rate 15 0 - 20 mm/h   C-reactive protein   Result Value Ref Range    C-Reactive Protein 0.15 <1.00 mg/dL   Creatine Kinase   Result Value Ref Range    Creatine Kinase 94 0 - 325 U/L   Lactate   Result Value Ref Range    Lactate 0.8 0.4 - 2.0 mmol/L   Heparin Assay, UFH   Result Value Ref Range    Heparin Unfractionated 1.2 (HH) See Comment Below for Therapeutic Ranges IU/mL   POCT GLUCOSE   Result Value Ref Range    POCT Glucose 143 (H) 74 - 99 mg/dL   CBC   Result Value Ref Range    WBC 6.2 4.4 - 11.3 x10*3/uL    nRBC 0.0 0.0 - 0.0 /100 WBCs    RBC 4.31 (L) 4.50 - 5.90 x10*6/uL    Hemoglobin 14.0 13.5 - 17.5 g/dL    Hematocrit 39.6 (L) 41.0 - 52.0 %    MCV 92 80 - 100 fL    MCH 32.5 26.0 - 34.0 pg    MCHC 35.4 32.0 - 36.0 g/dL    RDW 13.8 11.5 - 14.5 %    Platelets 184 150 -  450 x10*3/uL   POCT GLUCOSE   Result Value Ref Range    POCT Glucose 143 (H) 74 - 99 mg/dL     FL less than 1 hour    Result Date: 4/24/2024  These images are not reportable by radiology and will not be interpreted by  Radiologists.    Electrocardiogram, 12-lead PRN ACS symptoms    Result Date: 4/24/2024  Sinus rhythm Inferior infarct, old    CT angio lower extremity left w and or wo IV contrast    Result Date: 4/24/2024  STUDY: CT Angiogram of the left lower extremity; 4/24/2024 4:30 AM INDICATION: Left leg pain.  Cold, pulseless foot. COMPARISON: Vascular Lab 01/25/2022. ACCESSION NUMBER(S): [id number] ORDERING CLINICIAN: BASIM POON TECHNIQUE:  Helical CT is performed from the left pelvis through the left feet after bolus administration of Omnipaque 350 100 mL.  Images are reviewed and processed at a workstation according to the CT angiogram protocol with 3-D and/or MIP post processing imaging generated. Automated mA/kV exposure control was utilized and patient examination was performed in strict accordance with principles of ALARA. FINDINGS: Vascular: Common iliac artery demonstrates moderate to severe calcification partially visualized. Severe internal iliac calcification. Moderate external iliac calcification. Common femoral artery demonstrates moderate calcification without evidence of high-grade stenosis. There is occlusion of the native superficial femoral artery. There is a occlusion involving femoral-popliteal bypass graft. The popliteal artery is partially reconstituted via collateral vessels. There is internal stenosis, at least 70%. Severe calcification involving the anterior tibial artery and the tibioperoneal trunk. Minimal visualized flow. At least 50% stenosis of the tibioperoneal trunk. The posterior tibial and anterior tibial artery demonstrate multifocal occlusion. At the level of the ankle, the posterior tibial artery is reconstituted. The anterior tibial artery is reconstituted. The  peroneal artery appears diminutive and possibly distally occluded. Evaluation is limited secondary to bolus. Pelvis: There is no bowel wall thickening or obstruction.  There is no free fluid.  Lymph nodes are not enlarged.  Urinary bladder is unremarkable. Skeleton:  There are no acute fractures.  No suspicious bony lesions.    Occluded left lower extremity femoral-popliteal bypass graft. Vascular surgery consultation recommended Occlusion of the native superficial femoral artery. Reconstitution of the popliteal artery via collaterals with areas of at least 70% stenosis Severe runoff vascular stenosis, with multifocal occlusions of the anterior and posterior tibial arteries. At the level of the ankle there is two-vessel runoff. Signed by Enrique Daniel MD      Assessment/Plan   Principal Problem:    Limb ischemia    Acute left lower limb ischemia status post left lower extremity angiogram with catheter directed thrombolysis and angiojet thrombectomy, continued catheter directed thrombolysis  - Presented with waxing/waning left lower extremity pain, decreased sensation to LLE, cold to touch, pallor, and non-dopplerable pulses  -CTA revealed occluded left femoral-popliteal bypass graft  -Compliant with brilinta and xarelto at home  - Surgical intervention with Dr. Peace as above  - Hold xarelto, resume when ok with vascular surgery  - Neurovascular checks hourly  - Continue catheter directed thrombolysis with alteplase and heparin via right femoral sheath  - PRN dilaudid for pain control  - Continue NPO status, sips with meds, sips of clears, and ice chips  - Titrate oxygen to maintain SPO2 greater than 90%  - Strict bedrest, flat with bilateral lower extremities straight  - Continuous telemetry monitoring  - Coagulation screen, CBC, and fibrinogen every 6 hours  - Maintain brown catheter with current post-surgical restrictions  - Monitor intake and output  -Monitor for signs and symptoms of bleeding  - Transfuse  PRBC for hemoglobin less than 7 or greater than 7 with signs of bleeding and hemodynamic instability  - Cleocin for surgical prophylaxis   - Continue LR at 100ml/hr    CAD  -History of CAD  - Continue home atorvastatin, zetia, brilinta, and ranolazine  - Hold xarelto, resume when ok with vascular surgery  - Encourage lifestyle and diet modifications  - Encourage smoking cessation    COPD  -Cigarette smoker x 48 years, recently cut back to 3-5 cigarettes  - No home oxygen use  - Currently on 2 liters nasal canula status post procedure  - Titrate oxygen to maintain SPO2 greater than 90%  - PRN duonebs  - Encourage smoking cessation    BRUNA  -History of BRUNA  - Home CPAP, utilize at bedtime    Nicotine dependence  - Cigarette smoker x 48 years, recently cut back to 3-5 cigarettes  - Stated that he uses a nicotine patch at home but removes it to smoke a cigarette and then puts the patch back on.  - Counseled on smoking cessation  - Continue to encourage smoking cessation      I spent 35 minutes of cumulative critical care time with the patient.  Greater than 50% of that time was spent in the direct collaboration and or coordination of care of the patient.

## 2024-04-24 NOTE — ANESTHESIA PROCEDURE NOTES
Airway  Date/Time: 4/24/2024 10:18 AM  Urgency: elective    Airway not difficult    Staffing  Performed: CRNA   Authorized by: PAU Mejia    Performed by: PAU Mejia  Patient location during procedure: OR    Indications and Patient Condition  Indications for airway management: anesthesia  Spontaneous Ventilation: absent  Sedation level: deep  Preoxygenated: yes  Patient position: sniffing  Mask difficulty assessment: 2 - vent by mask + OA or adjuvant +/- NMBA  Planned trial extubation    Final Airway Details  Final airway type: endotracheal airway      Successful airway: ETT  Cuffed: yes   Successful intubation technique: video laryngoscopy (Moreno)  Facilitating devices/methods: intubating stylet  Endotracheal tube insertion site: oral  Blade: Daylin  Blade size: #4  ETT size (mm): 7.5  Cormack-Lehane Classification: grade I - full view of glottis  Placement verified by: capnometry   Cuff volume (mL): 8  Measured from: lips  ETT to lips (cm): 22  Number of attempts at approach: 1    Additional Comments  Atraumatic intubation.

## 2024-04-24 NOTE — OP NOTE
left leg angiogram with thrombectomy initiation lysis therapy*C-ARM* (ANGIO JET) (L) Operative Note     Date: 2024  OR Location: [facility]    Name: Dereck Shen, : 1965, Age: 58 y.o., MRN: [id number], Sex: male    Diagnosis  * No Diagnosis Codes entered * * No Diagnosis Codes entered *     Procedures  left leg angiogram with thrombectomy initiation lysis therapy*C-ARM* (ANGIO JET)  40952 - [place] INTRO OF NEEDLE OR INTRACATHETER UPR/LXTR ARTERY      Surgeons      * Iban Peace - Primary    Resident/Fellow/Other Assistant:  Surgeons and Role:     * Marla Aguilar PA-C - NADIRA First Assist    Procedure Summary  Anesthesia: General  ASA: III  Anesthesia Staff: CRNA: PAU Mejia  Estimated Blood Loss: 50mL  Intra-op Medications:   Administrations occurring from 0923 to 1053 on 24:   Medication Name Total Dose   heparin 25,000 Units in dextrose 5% 250 mL (100 Units/mL) infusion (premix) Cannot be calculated   alteplase (Cathflo Activase) 8 mg in sodium chloride 0.9% 40 mL IVPB 8 mg   alteplase (Cathflo Activase) 8 mg in sodium chloride 0.9% 40 mL IVPB 8 mg              Anesthesia Record               Intraprocedure I/O Totals          Output    Est. Blood Loss 50 mL    Total Output 50 mL          Specimen: No specimens collected     Staff:   Circulator: Inez Michaels RN; Jacqui Huerta RN  Scrub Person: Jojo Guillory; Candy Medina         Drains and/or Catheters: * None in log *    Tourniquet Times:         Implants:     Findings: thrombosis left fem-pop graft    Indications: Dereck Shen is an 58 y.o. male who is having surgery for * No pre-op diagnosis entered *. Acute limb ischemia left with motor function intact [place] IIb    The patient was seen in the preoperative area. The risks, benefits, complications, treatment options, non-operative alternatives, expected recovery and outcomes were discussed with the patient. The possibilities of reaction to medication, pulmonary aspiration,  injury to surrounding structures, bleeding, recurrent infection, the need for additional procedures, failure to diagnose a condition, and creating a complication requiring transfusion or operation were discussed with the patient. The patient concurred with the proposed plan, giving informed consent.  The site of surgery was properly noted/marked if necessary per policy. The patient has been actively warmed in preoperative area. Preoperative antibiotics are not indicated. Venous thrombosis prophylaxis are not indicated.    Procedure Details: Patient is brought to the OR suite placed in supine position administered general anesthetic with LMA respiratory support.  Both groins and left lower extremity sterilely prepped and draped in fashion.  Ultrasound guidance was utilized to cannulate the right common femoral artery unfortunately through the stent advancing the wire to the area with no difficulty.  6 [place] sheath was advanced and contra catheter was placed.  Flush angiography was performed demonstrating the aorta to be widely patent takeoff of bilateral common iliacs are widely patent attention left lower extremity demonstrates extrailiac to be widely patent common femoral artery is widely patent there is what appears to be thrombus in the profunda femoris vessel and the femoropopliteal bypass grafts are completely occluded.  At this point Berenstein catheter was advanced over a stiff angled Glidewire was able to access the occluded femoropopliteal bypass graft advancing the wire down into the graft.  This was exchanged for 035 seeker catheter down to the distal aspect of the bypass.  VT wire was utilized to cross the distal anastomosis into the distal popliteal artery.  The seeker cath was advanced confirming that it was in the intra-arterial position with no issues.  Since I did not have a for up and over sheath I exchanged a wire for a 6 Amplatz stiff wire.  6 [place] Omni AngioJet percutaneous mechanical  thrombectomy device was initiated at the proximal graft in a power pulse mode infusing alteplase.  This was done throughout the entire area of thrombosed graft without event.  We waited 30 minutes.  We Smith the AngioJet throughout the entire thrombosed graft.  There is significant amount of clot burden still present.  At this point I exchanged the short sheath mikaela up and over over the destination sheath and then placed a 50 cm Chandu catheter across the entire femoropopliteal bypass graft into the distal popliteal artery.  We infused alteplase at this point initiating thrombolytic therapy.  I secured the catheter as well as the microcatheter on the right side without event.  Patient Toller procedure awoken sent to PACU in stable condition.    Complications:  None; patient tolerated the procedure well.    Disposition: PACU - hemodynamically stable.  Condition: stable         Additional Details: No qualified vascular fellow was available for assistance in the above-noted procedure.  OSCAR Brooks was available for the entirety of the procedure.  She assisted in all components of the procedure from start to completion.     Attending Attestation: I was present and scrubbed for the entire procedure.    Iban Peace  Phone Number: [phone]

## 2024-04-24 NOTE — H&P (VIEW-ONLY)
Reason For Consult  Acute limb ischemia left lower extremity    History Of Present Illness  Dereck Shen is a 58 y.o. male presenting with 12-hour history of significant left foot pain did wax and wane over the last 24 hours.  Yesterday at 1:00 had pain kind of subsided and subsequently presented to emergency department for evaluation at early a.m. hours.  Patient still has sensation primarily pain narcotics taken with slight improvement  Motor function appears to be intact.  No prior history of PEs previous history of femoropopliteal bypass graft..     Past Medical History  He has a past medical history of Abnormal electrocardiogram (ECG) (EKG), Acute on chronic combined systolic (congestive) and diastolic (congestive) heart failure (Multi) (10/18/2019), Anxiety disorder, unspecified, Aphasia (07/06/2017), Atherosclerotic heart disease of native coronary artery with unspecified angina pectoris (CMS-[facility]) (11/05/2019), Bipolar disorder, unspecified (Multi) (06/10/2020), Body mass index (BMI) 31.0-31.9, adult (10/18/2019), Chronic lumbar radiculopathy (09/22/2023), Chronic obstructive pulmonary disease with (acute) exacerbation (Multi) (12/13/2021), Chronic obstructive pulmonary disease with (acute) exacerbation (Multi) (11/25/2020), Chronic systolic (congestive) heart failure (Multi) (02/09/2021), Cutaneous abscess of groin (09/12/2019), Encounter for follow-up examination after completed treatment for conditions other than malignant neoplasm (09/12/2019), Encounter for screening for malignant neoplasm of prostate (09/12/2019), Encounter for screening for other suspected endocrine disorder (09/12/2019), Headache, unspecified (09/10/2019), Hemiplegia and hemiparesis following cerebral infarction affecting unspecified side (Multi) (06/21/2021), Obesity, unspecified (10/18/2019), Obstructive sleep apnea (adult) (pediatric) (10/07/2019), Old myocardial infarction, Old myocardial infarction, Opioid abuse, in  remission (Multi) (04/06/2020), Other conditions influencing health status (11/25/2020), Other specified abnormal findings of blood chemistry (09/12/2019), Pain in unspecified joint (10/18/2019), Personal history of other diseases of male genital organs (09/12/2019), Personal history of other diseases of the circulatory system (11/05/2019), Personal history of other diseases of the digestive system (10/18/2019), Personal history of other diseases of the musculoskeletal system and connective tissue (07/18/2019), Personal history of other diseases of the musculoskeletal system and connective tissue, Personal history of other diseases of the musculoskeletal system and connective tissue, Personal history of other diseases of the musculoskeletal system and connective tissue (07/18/2019), Personal history of other diseases of the nervous system and sense organs (09/12/2019), Personal history of other endocrine, nutritional and metabolic disease (08/11/2020), Personal history of other endocrine, nutritional and metabolic disease (10/18/2019), Personal history of other infectious and parasitic diseases (10/18/2019), Personal history of other mental and behavioral disorders, Personal history of pneumonia (recurrent) (09/12/2019), Post-traumatic stress disorder, unspecified, Postlaminectomy syndrome, not elsewhere classified (12/08/2015), Proteinuria, unspecified (10/25/2019), Radiculopathy, lumbar region (07/20/2017), Stroke (Multi) (07/24/2023), and Unqualified visual loss, left eye, normal vision right eye (07/06/2017).    Surgical History  He has a past surgical history that includes Other surgical history (06/02/2016); Knee surgery (06/02/2016); Other surgical history (09/12/2019); Elbow surgery (02/10/2020); Other surgical history (09/30/2019); Other surgical history (10/21/2020); Other surgical history (06/07/2019); Other surgical history (09/21/2020); Coronary angioplasty (03/17/2017); Back surgery (03/17/2017); CT  angio aorta and bilateral iliofemoral runoff w and or wo IV contrast (7/20/2020); MR angio head wo IV contrast (1/4/2022); MR angio neck wo IV contrast (1/4/2022); and CT angio aorta and bilateral iliofemoral runoff w and or wo IV contrast (1/14/2022).     Social History  He reports that he has quit smoking. His smoking use included cigarettes. He has never used smokeless tobacco. He reports current drug use. Drug: Marijuana. He reports that he does not drink alcohol.    Family History  Family History   Problem Relation Name Age of Onset    No Known Problems Mother      No Known Problems Father          Allergies  Celecoxib; Ibuprofen; Tetanus toxoid, adsorbed; [id number] antimigraine agents; Cephalexin; Hydrocodone; Latex; and Tryptophan    Review of Systems  Review of Systems    Constitutional:  no generalized malaise overall feels well, energy levels intact, no complaints specifically noted  HEENT:  No blurry vision, no visual aides noted, no hearing loss no ear ache no nose bleeds noted, no dysphagia, no congestion otherwise no pertinent positives noted  Cardiovascular:  no palpitations, chest pain or heaviness noted, no leg swelling, no numbness or tingling in the lower extremity noted  Respiratory:  no shortness of breath, no productive cough noted, no conversation dyspnea or difficulty breathing  Gastrointestinal:  no abdominal pain, no nausea or vomiting, appetite intact, no bowel irregularities noted  Genitourinary:   no urinary incontinence, frequency or urgency issues noted, no hematuria or burning sensation issues  Musculoskeletal:  No muscle aches or pains, no joint discomfort noted, no back pain noted otherwise feels well  Skin: no ulcerations, skin color issues or wounds upper or lower extremities  Neurologic: no dizziness, no hemiplegia, no hemiparesis, no obvious visual deficits noted  Psychiatric: no depression, no memory loss noted, no suicidal ideation  Endocrine: no weight loss or gain, no  "temperature concerns hot or cold intolerance  Hemogolotic/Lymphatic: no bruising, excessive bleeding, no swelling in the groins or neck noted  Acute left lower extremity leg pain.     Physical Exam  Physical exam    Constitutional: alert and in no acute distress verbal  Eyes: No erythema swelling or discharge noted  Neck: supple, symmetric, trachea midline, no masses noted  Cardiovascular: Carotid pulses 2+, no obvious bruit, no Jugular distension noted, no thrill, heart regular rate, lower extremity vascular exam   Pulmonary:  Bilateral breath sounds intact, clear with rales rhonchi or wheeze  Abdomen: soft non tender, no pulsatile masses noted, no rebound rigidity or guarding noted  Skin: intact warm no abnormal turgor  Psychiatric: alert without any obvious cognitive issues, oriented to person, place, and time  Nonpalpable DP and PT pulse bilateral lower extremities cap refill greater than 3 seconds     Last Recorded Vitals  Blood pressure 151/80, pulse 76, temperature 36.8 °C (98.3 °F), temperature source Temporal, resp. rate 17, height 1.803 m (5' 11\"), weight 86.2 kg (190 lb), SpO2 100%.    Relevant Results  CTA reviewed demonstrated femoropopliteal bypass graft left lower extremity to be occluded with popliteal patency two-vessel runoff to the foot.     Assessment/Plan     Acute limb ischemia [place] I[place]  Motor function appears to be intact dorsiflex plantarflexion of the left ankle  We discussed response complications of angiogram with percutaneous mechanical thrombectomy  Possible angioplasty  Patient does understand wishes appear secondary to acute limb ischemic issues.          Iban Peace, DO    "

## 2024-04-24 NOTE — H&P
"Medicine History & Physical:    CC: LLE pain    HPI:  58M hx CAD, PAD, HFrEF, HTN, COPD, BRUNA, CVA, DM2, CKD3, HCV, neuropathy, pancreatic insufficiency, GERD, BPH, cLBP, bipolar/PTSD, tobacco use presents for LLE pain    LLE pain started yesterday. initially waxing/waning, then persistent. had some loss of sensation and weakness but that resolved. pain whole leg, 10/10 initially but now 2-3 s/p dilaudid. no other new sx. no trauma, cp, sob, f/c, n/v, diarrhea. no bleeding, dysuria, ha, dizziness, cough.     PMH: CAD, PAD, HFrEF, HTN, COPD, BRUNA, CVA, DM2, CKD3, HCV, neuropathy, pancreatic insufficiency, GERD, BPH, cLBP, bipolar/PTSD, tobacco use  PSH: CABG, RLE bypass/stent, knee/elbow, PCI, back  FH: n/a  SH: still smoking a few cigarettes/day, trying to quit. no etoh, drugs  Meds: see med rec for full. supps?  Allergies: see list    ED Course:  VSS. LLE cold w/ absent pulses on exam. labs/imaging w/ cr 1.3, ekg wnl. cta w/ occluded LLE bypass graft. consulted vascular on-call, rec'd heparin gtt, vascular consult in am. heparin started, given diladuid. admitted to medicine.    ROS reviewed and negative other than noted in HPI    Visit Vitals  /76   Pulse 75   Temp 36.8 °C (98.3 °F) (Temporal)   Resp 18   Ht 1.803 m (5' 11\")   Wt 86.2 kg (190 lb)   SpO2 96%   BMI 26.50 kg/m²   Smoking Status Former   BSA 2.08 m²     Physical Exam:  General: NAD, well-appearing, cooperative. no jaundice, pallor, rash, bruises  HEENT: NC/AT, MMM, no oral lesions or pharyngeal erythema. PERRL. no scleral icterus or conjunctival injection. neck soft w/o masses or LAD.  CV: RRR, no murmurs, rubs, or gallops. No JVD.  Chest: breathing unlabored. CTAB w/ adequate air-entry.  Abd: non-distended. BS+. soft, non-tender. no masses or organomegaly.  Extr: L foot cold w/ absent pulses. R foot 1+ pulses, cool.   Neuro: AAOx3. CNII-XII intact. 5/5 strength, sensation intact x4.     Results:  - all relevant laboratory and radiographic data " reviewed    Assessment/Plan:  58M hx CAD, PAD, HFrEF, HTN, COPD, BRUNA, CVA, DM2, CKD3, HCV, neuropathy, pancreatic insufficiency, GERD, BPH, cLBP, bipolar/PTSD, tobacco use hospitalized for acute limb ischemia    #acute limb ischemia, L:  #hx PAD s/p bypass:  threatened limb - intact strength/sensation, non-dopplerable pulses, cold. no e/o gangrene or infection. 4/24 cta w/ occluded femoral-popliteal bypass graft. compliant w/ ticagrelor/xarelto  - hold xarelto; heparin gtt  - home ticagrelor, statin; dilaudid prn  - vascular surgery consulted; f/u ck, lactate  - nv checks; counseled on smoking cessation    #CAD: stable; ticagrelor, statin, zetia, ranolazine  #HFrEF: compensated; entresto, coreg; off diuretics  #HTN: stable; coreg, entresto, hydral; hold isordil  #COPD: stable; duonebs prn  #BRUNA: home cpap  #hx CVA: no new deficits. ticagrelor, statin  #DM2: not at goal; iss q6h; 1/2 home glargine (10u)  #CKD3: stable, trend  #HCV: no e/o cirrhosis; outpt ID f/u  #neuropathy: home gabapentin  #pancreatic insufficiency: home creon  #GERD: home ppi  #BPH: home flomax  #cLBP: gabapentin; hold tizanidine (not taking)  #bipolar/PTSD: home trileptal; hold latuda (not taking)  #tobacco use disorder: nicotine prn; counseled    #FEN/GI: npo  #PPx: heparin gtt  #Lines/Tubes/Drains: piv  #Analgesia/Sedation: dilaudid  #Code: full  #Dispo: pending above eval  #Contact: joselin (wife) [phone]    Felipe Acevedo MD

## 2024-04-24 NOTE — SIGNIFICANT EVENT
RIGHT GROIN DRESSING INTACT AT SHEATH SITE . GROIN SHEATH IN PLACE AND CATHETER IN SHEATH IN PLACE AND SECURE WHILE IN PACU.

## 2024-04-24 NOTE — ANESTHESIA POSTPROCEDURE EVALUATION
Patient: Dereck Shen    Procedure Summary       Date: 04/24/24 Room / Location: [facility] 08 / [facility]    Anesthesia Start: 1008 Anesthesia Stop: 1247    Procedure: left leg angiogram with thrombectomy initiation lysis therapy*C-ARM* (ANGIO JET) (Left) Diagnosis:     Surgeons: Iban Peace DO Responsible Provider: PAU Mejia    Anesthesia Type: general ASA Status: 3            Anesthesia Type: general    Vitals Value Taken Time   /91 04/24/24 1552   Temp 36.8 °C (98.2 °F) 04/24/24 1235   Pulse 71 04/24/24 1552   Resp 10 04/24/24 1552   SpO2 100 % 04/24/24 1552   Vitals shown include unfiled device data.    Anesthesia Post Evaluation    Patient location during evaluation: PACU  Patient participation: complete - patient participated  Level of consciousness: awake  Pain score: 5  Pain management: adequate  Airway patency: patent  Cardiovascular status: acceptable  Respiratory status: acceptable  Hydration status: acceptable  Postoperative Nausea and Vomiting: none    There were no known notable events for this encounter.

## 2024-04-24 NOTE — ED PROVIDER NOTES
HPI   Chief Complaint   Patient presents with    Leg Pain    Cold Extremity     Left lower leg pain, and temperature difference. Started at 1pm. Hx of stroke, MI, and DVT       HPI:  58-year-old male with history of coronary artery disease status post CABG multiple prior strokes diabetes and peripheral arterial disease status post right David pop bypass with Dr. Lal in 2020 and a left fem bypass in September 2020.  He states between his 2 legs has had a total of 7 different vascular procedures with the last 1 being done on the right leg by Dr. Wright.  He states he had an appointment with Dr. Lal today around 230 this afternoon however he began having left leg pain around 1:00 this afternoon and he says that he missed his appointment with the vascular surgeon because he was having too much pain but he did not come in and get evaluated because the pain subsided and had went away however then recurred early this morning and woke him up out of sleep with severe pain from the knee down.  His left foot is cold to touch without a palpable pulse.  He has a history of prior strokes and has residual neurologic deficits in the right lower extremity but denies any in the left    Limitations to history: None  Independent Historians: EMS report  External Records Reviewed: EMR record  ------------------------------------------------------------------------------------------------------------------------------------------  ROS: a ten point review of systems was performed and negative except as per HPI.  ------------------------------------------------------------------------------------------------------------------------------------------  PMH / PSH: as per HPI, reviewed in EMR and discussed with the patient []  MEDS:  reviewed in EMR and discussed with the patient []  ALLERGIES: reviewed in EMR[]  SocH:  as per HPI, otherwise reviewed in EMR []  FH:  as per HPI, otherwise reviewed in EMR  []  ------------------------------------------------------------------------------------------------------------------------------------------  Physical Exam:  VS: As documented in the triage note and EMR flowsheet from this visit was reviewed  General: Well appearing. No acute distress.   Eyes:  Extraocular movements grossly intact. No scleral icterus.   HEENT: Atraumatic. Normocephalic.    Neck: Supple. No gross masses  CV: RRR, audible S1/S2, 2+ symmetric peripheral pulses  Resp: Clear to auscultation bilaterally. No respiratory distress.  Non-labored respirations  GI: Soft, non-tender, non-distended, no rebound or gaurding  MSK: Well-healed surgical scars on the bilateral legs left foot is cold to touch without any palpable DP or PT pulse left leg is cool to the mid shin area unable to Doppler pulse.  Patient is able to wiggle his toes and flex and extend the ankle of the left leg but this does reproduce pain  Skin: Warm, dry, no obvious rash.  Neuro: Sensation to light touch is intact but slightly diminished in the left foot compared to the right speech fluent. Awake. Alert. Appropriate conversation.  Psych: Appropriate mood and affect for situation  ------------------------------------------------------------------------------------------------------------------------------------------  Hospital Course / Medical Decision Making:  Independent Interpretations:  EKG -   Twelve-lead EKG performed and independently interpreted by me as showing normal sinus rhythm at a ventricular rate of 94 ST segments are flat without any elevations axis is upright and normal QRS duration of 107 QTc was 471 no signs of acute ischemia    Patient was hooked up to the cardiac monitor peripheral IV access was obtained and labs were drawn.  Patient was complaining of left leg pain and and requesting pain medications he has multiple allergies but has tolerated Dilaudid in the past and received this through the IV for pain control.  He  does report compliance with his Xarelto 20 mg daily as well as his Brilinta.  He states that he has atherosclerotic disease in his aorta and he keeps throwing flecks of clot that has been causing his recurrent vascular issues including recurrent strokes.    Patient's left foot cold to touch and unable to Doppler a DP or PT pulse.  But motor function is intact and sensation to light touch is intact as well.  I discussed the case with Dr. Abebe who is on-call for vascular who advised starting him on high intensity heparin and to continue with pain control CT angio of the left leg is currently pending and patient will need to get ABIs as well and recommended continuing the high intensity heparin and thin having the vascular surgeon decide best course of action especially since he has the Xarelto on board.    Patient's pain was improved with Dilaudid he was discussed with the [facility] hospitalist who agreed with the plan for admission for high intensity heparin and pain control and discussion with vascular surgeon for definitive management        Final diagnosis and disposition: [] Acute ischemic limb            Cherelle Saldana MD                                      [place] Coma Scale Score: 15                     Patient History   Past Medical History:   Diagnosis Date    Abnormal electrocardiogram (ECG) (EKG)     Abnormal EKG    Acute on chronic combined systolic (congestive) and diastolic (congestive) heart failure (Multi) 10/18/2019    Acute on chronic combined systolic and diastolic congestive heart failure    Anxiety disorder, unspecified     Anxiety    Aphasia 07/06/2017    Aphasia, mixed    Atherosclerotic heart disease of native coronary artery with unspecified angina pectoris (CMS-[facility]) 11/05/2019    Atherosclerosis of native coronary artery of native heart with angina pectoris    Bipolar disorder, unspecified (Multi) 06/10/2020    Bipolar 1 disorder    Body mass index (BMI) 31.0-31.9, adult 10/18/2019    Body  mass index (BMI) of 31.0 to 31.9 in adult    Chronic obstructive pulmonary disease with (acute) exacerbation (Multi) 12/13/2021    COPD exacerbation    Chronic obstructive pulmonary disease with (acute) exacerbation (Multi) 11/25/2020    Acute exacerbation of chronic obstructive pulmonary disease    Chronic systolic (congestive) heart failure (Multi) 02/09/2021    Chronic systolic congestive heart failure    Cutaneous abscess of groin 09/12/2019    Abscess of groin    Encounter for follow-up examination after completed treatment for conditions other than malignant neoplasm 09/12/2019    Hospital discharge follow-up    Encounter for screening for malignant neoplasm of prostate 09/12/2019    Screening for prostate cancer    Encounter for screening for other suspected endocrine disorder 09/12/2019    Screening for thyroid disorder    Headache, unspecified 09/10/2019    Chronic daily headache    Hemiplegia and hemiparesis following cerebral infarction affecting unspecified side (Multi) 06/21/2021    Hemiplegia following cerebrovascular accident (CVA)    Obesity, unspecified 10/18/2019    Obesity (BMI 30.0-34.9)    Obstructive sleep apnea (adult) (pediatric) 10/07/2019    Obstructive sleep apnea, adult    Old myocardial infarction     History of inferior wall myocardial infarction    Old myocardial infarction     History of myocardial infarction    Opioid abuse, in remission (Multi) 04/06/2020    History of opioid abuse    Other conditions influencing health status 11/25/2020    History of cough    Other specified abnormal findings of blood chemistry 09/12/2019    Low testosterone in male    Pain in unspecified joint 10/18/2019    Polyarthralgia    Personal history of other diseases of male genital organs 09/12/2019    History of erectile dysfunction    Personal history of other diseases of the circulatory system 11/05/2019    History of coronary artery disease    Personal history of other diseases of the digestive  system 10/18/2019    History of constipation    Personal history of other diseases of the musculoskeletal system and connective tissue 07/18/2019    History of osteoarthritis    Personal history of other diseases of the musculoskeletal system and connective tissue     History of polymyalgia rheumatica    Personal history of other diseases of the musculoskeletal system and connective tissue     History of osteoporosis    Personal history of other diseases of the musculoskeletal system and connective tissue 07/18/2019    History of low back pain    Personal history of other diseases of the nervous system and sense organs 09/12/2019    History of peripheral neuropathy    Personal history of other endocrine, nutritional and metabolic disease 08/11/2020    History of type 2 diabetes mellitus    Personal history of other endocrine, nutritional and metabolic disease 10/18/2019    History of vitamin D deficiency    Personal history of other infectious and parasitic diseases 10/18/2019    History of hepatitis C virus infection    Personal history of other mental and behavioral disorders     History of panic attacks    Personal history of pneumonia (recurrent) 09/12/2019    History of pneumonia    Post-traumatic stress disorder, unspecified     PTSD (post-traumatic stress disorder)    Postlaminectomy syndrome, not elsewhere classified 12/08/2015    Failed back syndrome of lumbar spine    Proteinuria, unspecified 10/25/2019    Proteinuria, unspecified type    Radiculopathy, lumbar region 07/20/2017    Chronic lumbar radiculopathy    Stroke (Multi) 07/24/2023    Unqualified visual loss, left eye, normal vision right eye 07/06/2017    Visual loss, left eye     Past Surgical History:   Procedure Laterality Date    BACK SURGERY  03/17/2017    Back Surgery    CORONARY ANGIOPLASTY  03/17/2017    PTCA    CT AORTA AND BILATERAL ILIOFEMORAL RUNOFF ANGIOGRAM W AND/OR WO IV CONTRAST  7/20/2020    CT AORTA AND BILATERAL ILIOFEMORAL RUNOFF  ANGIOGRAM W AND/OR WO IV CONTRAST 7/20/2020 [facility]    CT AORTA AND BILATERAL ILIOFEMORAL RUNOFF ANGIOGRAM W AND/OR WO IV CONTRAST  1/14/2022    CT AORTA AND BILATERAL ILIOFEMORAL RUNOFF ANGIOGRAM W AND/OR WO IV CONTRAST 1/14/2022 [facility]    ELBOW SURGERY  02/10/2020    Elbow Surgery    KNEE SURGERY  06/02/2016    Knee Surgery Left    MR HEAD ANGIO WO IV CONTRAST  1/4/2022    MR HEAD ANGIO WO IV CONTRAST 1/4/2022 [facility]    MR NECK ANGIO WO IV CONTRAST  1/4/2022    MR NECK ANGIO WO IV CONTRAST 1/4/2022 [facility]    OTHER SURGICAL HISTORY  06/02/2016    Dental Surgery    OTHER SURGICAL HISTORY  09/12/2019    Cardiac catheterization with stent placement    OTHER SURGICAL HISTORY  09/30/2019    Coronary artery bypass graft    OTHER SURGICAL HISTORY  10/21/2020    Vascular surgical procedure    OTHER SURGICAL HISTORY  06/07/2019    Tonsillectomy    OTHER SURGICAL HISTORY  09/21/2020    Femoropopliteal bypass     Family History   Problem Relation Name Age of Onset    No Known Problems Mother      No Known Problems Father       Social History     Tobacco Use    Smoking status: Former     Current packs/day: 0.50     Types: Cigarettes    Smokeless tobacco: Never    Tobacco comments:     Wants 21 mg patches   Vaping Use    Vaping status: Never Used   Substance Use Topics    Alcohol use: Never    Drug use: Yes     Types: Marijuana     Comment: Medical card       Physical Exam   ED Triage Vitals [04/24/24 0247]   Temperature Heart Rate Respirations BP   36.8 °C (98.3 °F) 93 18 169/88      Pulse Ox Temp Source Heart Rate Source Patient Position   99 % Temporal Monitor --      BP Location FiO2 (%)     -- --       Physical Exam    ED Course & MDM        Medical Decision Making      Procedure  Procedures     Cherelle Saldana MD  04/24/24 6758

## 2024-04-25 ENCOUNTER — ANESTHESIA (OUTPATIENT)
Dept: OPERATING ROOM | Facility: HOSPITAL | Age: 59
End: 2024-04-25
Payer: COMMERCIAL

## 2024-04-25 ENCOUNTER — APPOINTMENT (OUTPATIENT)
Dept: RADIOLOGY | Facility: HOSPITAL | Age: 59
End: 2024-04-25
Payer: COMMERCIAL

## 2024-04-25 ENCOUNTER — TELEPHONE (OUTPATIENT)
Dept: CARDIOLOGY | Facility: HOSPITAL | Age: 59
End: 2024-04-25

## 2024-04-25 ENCOUNTER — ANESTHESIA EVENT (OUTPATIENT)
Dept: OPERATING ROOM | Facility: HOSPITAL | Age: 59
End: 2024-04-25
Payer: COMMERCIAL

## 2024-04-25 ENCOUNTER — DOCUMENTATION (OUTPATIENT)
Dept: PRIMARY CARE | Facility: CLINIC | Age: 59
End: 2024-04-25
Payer: COMMERCIAL

## 2024-04-25 LAB
ACT BLD: 197 SEC (ref 89–169)
ACT BLD: 299 SEC (ref 89–169)
ACT BLD: 386 SEC (ref 89–169)
ALBUMIN SERPL BCP-MCNC: 2.6 G/DL (ref 3.4–5)
ALP SERPL-CCNC: 63 U/L (ref 33–120)
ALT SERPL W P-5'-P-CCNC: 9 U/L (ref 10–52)
ANION GAP SERPL CALC-SCNC: 13 MMOL/L (ref 10–20)
ANION GAP SERPL CALC-SCNC: 9 MMOL/L (ref 10–20)
ANION GAP SERPL CALC-SCNC: 9 MMOL/L (ref 10–20)
APTT PPP: 109 SECONDS (ref 27–38)
APTT PPP: 33 SECONDS (ref 27–38)
APTT PPP: 35 SECONDS (ref 27–38)
AST SERPL W P-5'-P-CCNC: 16 U/L (ref 9–39)
BILIRUB SERPL-MCNC: 0.5 MG/DL (ref 0–1.2)
BUN SERPL-MCNC: 20 MG/DL (ref 6–23)
BUN SERPL-MCNC: 20 MG/DL (ref 6–23)
BUN SERPL-MCNC: 21 MG/DL (ref 6–23)
CALCIUM SERPL-MCNC: 7.1 MG/DL (ref 8.6–10.3)
CALCIUM SERPL-MCNC: 7.5 MG/DL (ref 8.6–10.3)
CALCIUM SERPL-MCNC: 8 MG/DL (ref 8.6–10.3)
CHLORIDE SERPL-SCNC: 104 MMOL/L (ref 98–107)
CHLORIDE SERPL-SCNC: 105 MMOL/L (ref 98–107)
CHLORIDE SERPL-SCNC: 108 MMOL/L (ref 98–107)
CO2 SERPL-SCNC: 23 MMOL/L (ref 21–32)
CO2 SERPL-SCNC: 23 MMOL/L (ref 21–32)
CO2 SERPL-SCNC: 27 MMOL/L (ref 21–32)
CREAT SERPL-MCNC: 1.01 MG/DL (ref 0.5–1.3)
CREAT SERPL-MCNC: 1.03 MG/DL (ref 0.5–1.3)
CREAT SERPL-MCNC: 1.14 MG/DL (ref 0.5–1.3)
EGFRCR SERPLBLD CKD-EPI 2021: 75 ML/MIN/1.73M*2
EGFRCR SERPLBLD CKD-EPI 2021: 85 ML/MIN/1.73M*2
EGFRCR SERPLBLD CKD-EPI 2021: 87 ML/MIN/1.73M*2
ERYTHROCYTE [DISTWIDTH] IN BLOOD BY AUTOMATED COUNT: 13.8 % (ref 11.5–14.5)
ERYTHROCYTE [DISTWIDTH] IN BLOOD BY AUTOMATED COUNT: 13.8 % (ref 11.5–14.5)
FIBRINOGEN PPP-MCNC: 155 MG/DL (ref 200–400)
FIBRINOGEN PPP-MCNC: 171 MG/DL (ref 200–400)
FIBRINOGEN PPP-MCNC: 186 MG/DL (ref 200–400)
FIBRINOGEN PPP-MCNC: 221 MG/DL (ref 200–400)
GLUCOSE BLD MANUAL STRIP-MCNC: 147 MG/DL (ref 74–99)
GLUCOSE BLD MANUAL STRIP-MCNC: 152 MG/DL (ref 74–99)
GLUCOSE BLD MANUAL STRIP-MCNC: 166 MG/DL (ref 74–99)
GLUCOSE BLD MANUAL STRIP-MCNC: 166 MG/DL (ref 74–99)
GLUCOSE BLD MANUAL STRIP-MCNC: 210 MG/DL (ref 74–99)
GLUCOSE BLD MANUAL STRIP-MCNC: 228 MG/DL (ref 74–99)
GLUCOSE SERPL-MCNC: 130 MG/DL (ref 74–99)
GLUCOSE SERPL-MCNC: 131 MG/DL (ref 74–99)
GLUCOSE SERPL-MCNC: 148 MG/DL (ref 74–99)
HCT VFR BLD AUTO: 34 % (ref 41–52)
HCT VFR BLD AUTO: 39.5 % (ref 41–52)
HGB BLD-MCNC: 11.8 G/DL (ref 13.5–17.5)
HGB BLD-MCNC: 12.8 G/DL (ref 13.5–17.5)
INR PPP: 1 (ref 0.9–1.1)
INR PPP: 1.1 (ref 0.9–1.1)
MCH RBC QN AUTO: 31.4 PG (ref 26–34)
MCH RBC QN AUTO: 32.3 PG (ref 26–34)
MCHC RBC AUTO-ENTMCNC: 32.4 G/DL (ref 32–36)
MCHC RBC AUTO-ENTMCNC: 34.7 G/DL (ref 32–36)
MCV RBC AUTO: 93 FL (ref 80–100)
MCV RBC AUTO: 97 FL (ref 80–100)
NRBC BLD-RTO: 0 /100 WBCS (ref 0–0)
NRBC BLD-RTO: 0 /100 WBCS (ref 0–0)
PLATELET # BLD AUTO: 126 X10*3/UL (ref 150–450)
PLATELET # BLD AUTO: 142 X10*3/UL (ref 150–450)
POTASSIUM SERPL-SCNC: 4.1 MMOL/L (ref 3.5–5.3)
POTASSIUM SERPL-SCNC: 4.4 MMOL/L (ref 3.5–5.3)
POTASSIUM SERPL-SCNC: 4.7 MMOL/L (ref 3.5–5.3)
PROT SERPL-MCNC: 4.7 G/DL (ref 6.4–8.2)
PROTHROMBIN TIME: 11.7 SECONDS (ref 9.8–12.8)
PROTHROMBIN TIME: 12.1 SECONDS (ref 9.8–12.8)
PROTHROMBIN TIME: 12.2 SECONDS (ref 9.8–12.8)
PROTHROMBIN TIME: 12.3 SECONDS (ref 9.8–12.8)
PROTHROMBIN TIME: 12.5 SECONDS (ref 9.8–12.8)
RBC # BLD AUTO: 3.65 X10*6/UL (ref 4.5–5.9)
RBC # BLD AUTO: 4.08 X10*6/UL (ref 4.5–5.9)
SODIUM SERPL-SCNC: 136 MMOL/L (ref 136–145)
UFH PPP CHRO-ACNC: 0.1 IU/ML
UFH PPP CHRO-ACNC: <0.1 IU/ML
UFH PPP CHRO-ACNC: <0.1 IU/ML
WBC # BLD AUTO: 5.5 X10*3/UL (ref 4.4–11.3)
WBC # BLD AUTO: 7.7 X10*3/UL (ref 4.4–11.3)

## 2024-04-25 PROCEDURE — 36415 COLL VENOUS BLD VENIPUNCTURE: CPT | Performed by: SURGERY

## 2024-04-25 PROCEDURE — 2500000004 HC RX 250 GENERAL PHARMACY W/ HCPCS (ALT 636 FOR OP/ED): Performed by: SURGERY

## 2024-04-25 PROCEDURE — 85027 COMPLETE CBC AUTOMATED: CPT | Performed by: PHYSICIAN ASSISTANT

## 2024-04-25 PROCEDURE — 047N3Z1 DILATION OF LEFT POPLITEAL ARTERY USING DRUG-COATED BALLOON, PERCUTANEOUS APPROACH: ICD-10-PCS | Performed by: SURGERY

## 2024-04-25 PROCEDURE — 2500000006 HC RX 250 W HCPCS SELF ADMINISTERED DRUGS (ALT 637 FOR ALL PAYERS): Performed by: SURGERY

## 2024-04-25 PROCEDURE — 85520 HEPARIN ASSAY: CPT | Performed by: STUDENT IN AN ORGANIZED HEALTH CARE EDUCATION/TRAINING PROGRAM

## 2024-04-25 PROCEDURE — 85610 PROTHROMBIN TIME: CPT | Performed by: STUDENT IN AN ORGANIZED HEALTH CARE EDUCATION/TRAINING PROGRAM

## 2024-04-25 PROCEDURE — 99291 CRITICAL CARE FIRST HOUR: CPT

## 2024-04-25 PROCEDURE — 2500000005 HC RX 250 GENERAL PHARMACY W/O HCPCS: Performed by: SURGERY

## 2024-04-25 PROCEDURE — 2500000002 HC RX 250 W HCPCS SELF ADMINISTERED DRUGS (ALT 637 FOR MEDICARE OP, ALT 636 FOR OP/ED): Performed by: SURGERY

## 2024-04-25 PROCEDURE — C1769 GUIDE WIRE: HCPCS | Performed by: SURGERY

## 2024-04-25 PROCEDURE — C1757 CATH, THROMBECTOMY/EMBOLECT: HCPCS | Performed by: SURGERY

## 2024-04-25 PROCEDURE — C2623 CATH, TRANSLUMIN, DRUG-COAT: HCPCS | Performed by: SURGERY

## 2024-04-25 PROCEDURE — 2500000004 HC RX 250 GENERAL PHARMACY W/ HCPCS (ALT 636 FOR OP/ED): Performed by: NURSE ANESTHETIST, CERTIFIED REGISTERED

## 2024-04-25 PROCEDURE — 85384 FIBRINOGEN ACTIVITY: CPT | Performed by: SURGERY

## 2024-04-25 PROCEDURE — 82947 ASSAY GLUCOSE BLOOD QUANT: CPT

## 2024-04-25 PROCEDURE — 2500000001 HC RX 250 WO HCPCS SELF ADMINISTERED DRUGS (ALT 637 FOR MEDICARE OP): Performed by: PHYSICIAN ASSISTANT

## 2024-04-25 PROCEDURE — 7100000001 HC RECOVERY ROOM TIME - INITIAL BASE CHARGE: Performed by: SURGERY

## 2024-04-25 PROCEDURE — 85384 FIBRINOGEN ACTIVITY: CPT | Performed by: PHYSICIAN ASSISTANT

## 2024-04-25 PROCEDURE — 04CN3ZZ EXTIRPATION OF MATTER FROM LEFT POPLITEAL ARTERY, PERCUTANEOUS APPROACH: ICD-10-PCS | Performed by: SURGERY

## 2024-04-25 PROCEDURE — 2020000001 HC ICU ROOM DAILY

## 2024-04-25 PROCEDURE — 2720000007 HC OR 272 NO HCPCS: Performed by: SURGERY

## 2024-04-25 PROCEDURE — 2500000005 HC RX 250 GENERAL PHARMACY W/O HCPCS: Performed by: NURSE ANESTHETIST, CERTIFIED REGISTERED

## 2024-04-25 PROCEDURE — 80048 BASIC METABOLIC PNL TOTAL CA: CPT | Performed by: PHYSICIAN ASSISTANT

## 2024-04-25 PROCEDURE — 3700000001 HC GENERAL ANESTHESIA TIME - INITIAL BASE CHARGE: Performed by: SURGERY

## 2024-04-25 PROCEDURE — 3600000003 HC OR TIME - INITIAL BASE CHARGE - PROCEDURE LEVEL THREE: Performed by: SURGERY

## 2024-04-25 PROCEDURE — 2500000001 HC RX 250 WO HCPCS SELF ADMINISTERED DRUGS (ALT 637 FOR MEDICARE OP): Performed by: SURGERY

## 2024-04-25 PROCEDURE — 3600000008 HC OR TIME - EACH INCREMENTAL 1 MINUTE - PROCEDURE LEVEL THREE: Performed by: SURGERY

## 2024-04-25 PROCEDURE — 04CL3ZZ EXTIRPATION OF MATTER FROM LEFT FEMORAL ARTERY, PERCUTANEOUS APPROACH: ICD-10-PCS | Performed by: SURGERY

## 2024-04-25 PROCEDURE — 2500000004 HC RX 250 GENERAL PHARMACY W/ HCPCS (ALT 636 FOR OP/ED): Mod: JZ | Performed by: INTERNAL MEDICINE

## 2024-04-25 PROCEDURE — 85520 HEPARIN ASSAY: CPT | Performed by: SURGERY

## 2024-04-25 PROCEDURE — 2500000006 HC RX 250 W HCPCS SELF ADMINISTERED DRUGS (ALT 637 FOR ALL PAYERS): Performed by: PHYSICIAN ASSISTANT

## 2024-04-25 PROCEDURE — 99233 SBSQ HOSP IP/OBS HIGH 50: CPT | Performed by: INTERNAL MEDICINE

## 2024-04-25 PROCEDURE — 2500000004 HC RX 250 GENERAL PHARMACY W/ HCPCS (ALT 636 FOR OP/ED): Mod: JZ | Performed by: PHYSICIAN ASSISTANT

## 2024-04-25 PROCEDURE — 85610 PROTHROMBIN TIME: CPT | Performed by: SURGERY

## 2024-04-25 PROCEDURE — 76000 FLUOROSCOPY <1 HR PHYS/QHP: CPT

## 2024-04-25 PROCEDURE — 37184 PRIM ART M-THRMBC 1ST VSL: CPT | Performed by: SURGERY

## 2024-04-25 PROCEDURE — 2550000001 HC RX 255 CONTRASTS: Performed by: SURGERY

## 2024-04-25 PROCEDURE — 37799 UNLISTED PX VASCULAR SURGERY: CPT | Performed by: PHYSICIAN ASSISTANT

## 2024-04-25 PROCEDURE — 84075 ASSAY ALKALINE PHOSPHATASE: CPT | Performed by: PHYSICIAN ASSISTANT

## 2024-04-25 PROCEDURE — 37224 PR REVSC OPN/PRG FEM/POP W/ANGIOPLASTY UNI: CPT | Performed by: SURGERY

## 2024-04-25 PROCEDURE — 85610 PROTHROMBIN TIME: CPT | Performed by: PHYSICIAN ASSISTANT

## 2024-04-25 PROCEDURE — 3700000002 HC GENERAL ANESTHESIA TIME - EACH INCREMENTAL 1 MINUTE: Performed by: SURGERY

## 2024-04-25 PROCEDURE — A4649 SURGICAL SUPPLIES: HCPCS | Performed by: SURGERY

## 2024-04-25 PROCEDURE — 36415 COLL VENOUS BLD VENIPUNCTURE: CPT | Performed by: PHYSICIAN ASSISTANT

## 2024-04-25 PROCEDURE — 7100000002 HC RECOVERY ROOM TIME - EACH INCREMENTAL 1 MINUTE: Performed by: SURGERY

## 2024-04-25 PROCEDURE — B41G1ZZ FLUOROSCOPY OF LEFT LOWER EXTREMITY ARTERIES USING LOW OSMOLAR CONTRAST: ICD-10-PCS | Performed by: SURGERY

## 2024-04-25 PROCEDURE — 80048 BASIC METABOLIC PNL TOTAL CA: CPT | Mod: CCI | Performed by: SURGERY

## 2024-04-25 PROCEDURE — C1887 CATHETER, GUIDING: HCPCS | Performed by: SURGERY

## 2024-04-25 RX ORDER — LABETALOL HYDROCHLORIDE 5 MG/ML
5 INJECTION, SOLUTION INTRAVENOUS ONCE AS NEEDED
Status: DISCONTINUED | OUTPATIENT
Start: 2024-04-25 | End: 2024-04-25 | Stop reason: HOSPADM

## 2024-04-25 RX ORDER — MORPHINE SULFATE 2 MG/ML
2 INJECTION, SOLUTION INTRAMUSCULAR; INTRAVENOUS EVERY 5 MIN PRN
Status: DISCONTINUED | OUTPATIENT
Start: 2024-04-25 | End: 2024-04-25 | Stop reason: HOSPADM

## 2024-04-25 RX ORDER — FENTANYL CITRATE 50 UG/ML
INJECTION, SOLUTION INTRAMUSCULAR; INTRAVENOUS AS NEEDED
Status: DISCONTINUED | OUTPATIENT
Start: 2024-04-25 | End: 2024-04-25

## 2024-04-25 RX ORDER — HEPARIN SODIUM 5000 [USP'U]/ML
INJECTION, SOLUTION INTRAVENOUS; SUBCUTANEOUS AS NEEDED
Status: DISCONTINUED | OUTPATIENT
Start: 2024-04-25 | End: 2024-04-25

## 2024-04-25 RX ORDER — PROPOFOL 10 MG/ML
INJECTION, EMULSION INTRAVENOUS AS NEEDED
Status: DISCONTINUED | OUTPATIENT
Start: 2024-04-25 | End: 2024-04-25

## 2024-04-25 RX ORDER — FAMOTIDINE 10 MG/ML
20 INJECTION INTRAVENOUS ONCE
Status: COMPLETED | OUTPATIENT
Start: 2024-04-25 | End: 2024-04-25

## 2024-04-25 RX ORDER — HEPARIN SODIUM 10000 [USP'U]/100ML
0-4500 INJECTION, SOLUTION INTRAVENOUS CONTINUOUS
Status: DISCONTINUED | OUTPATIENT
Start: 2024-04-25 | End: 2024-04-26 | Stop reason: HOSPADM

## 2024-04-25 RX ORDER — IODIXANOL 320 MG/ML
INJECTION, SOLUTION INTRAVASCULAR AS NEEDED
Status: DISCONTINUED | OUTPATIENT
Start: 2024-04-25 | End: 2024-04-25 | Stop reason: HOSPADM

## 2024-04-25 RX ORDER — LIDOCAINE HYDROCHLORIDE 10 MG/ML
0.1 INJECTION, SOLUTION EPIDURAL; INFILTRATION; INTRACAUDAL; PERINEURAL ONCE
Status: DISCONTINUED | OUTPATIENT
Start: 2024-04-25 | End: 2024-04-25 | Stop reason: HOSPADM

## 2024-04-25 RX ORDER — LIDOCAINE HCL/PF 100 MG/5ML
SYRINGE (ML) INTRAVENOUS AS NEEDED
Status: DISCONTINUED | OUTPATIENT
Start: 2024-04-25 | End: 2024-04-25

## 2024-04-25 RX ORDER — HYDRALAZINE HYDROCHLORIDE 20 MG/ML
5 INJECTION INTRAMUSCULAR; INTRAVENOUS EVERY 30 MIN PRN
Status: DISCONTINUED | OUTPATIENT
Start: 2024-04-25 | End: 2024-04-25 | Stop reason: HOSPADM

## 2024-04-25 RX ORDER — ALBUTEROL SULFATE 0.83 MG/ML
2.5 SOLUTION RESPIRATORY (INHALATION) ONCE AS NEEDED
Status: DISCONTINUED | OUTPATIENT
Start: 2024-04-25 | End: 2024-04-25 | Stop reason: HOSPADM

## 2024-04-25 RX ORDER — SODIUM CHLORIDE, SODIUM LACTATE, POTASSIUM CHLORIDE, CALCIUM CHLORIDE 600; 310; 30; 20 MG/100ML; MG/100ML; MG/100ML; MG/100ML
100 INJECTION, SOLUTION INTRAVENOUS CONTINUOUS
Status: DISCONTINUED | OUTPATIENT
Start: 2024-04-25 | End: 2024-04-25 | Stop reason: HOSPADM

## 2024-04-25 RX ORDER — DROPERIDOL 2.5 MG/ML
0.62 INJECTION, SOLUTION INTRAMUSCULAR; INTRAVENOUS ONCE AS NEEDED
Status: DISCONTINUED | OUTPATIENT
Start: 2024-04-25 | End: 2024-04-25 | Stop reason: HOSPADM

## 2024-04-25 RX ORDER — MEPERIDINE HYDROCHLORIDE 25 MG/ML
12.5 INJECTION INTRAMUSCULAR; INTRAVENOUS; SUBCUTANEOUS EVERY 10 MIN PRN
Status: DISCONTINUED | OUTPATIENT
Start: 2024-04-25 | End: 2024-04-25 | Stop reason: HOSPADM

## 2024-04-25 RX ORDER — NORETHINDRONE AND ETHINYL ESTRADIOL 0.5-0.035
KIT ORAL AS NEEDED
Status: DISCONTINUED | OUTPATIENT
Start: 2024-04-25 | End: 2024-04-25

## 2024-04-25 RX ADMIN — ONDANSETRON 4 MG: 2 INJECTION INTRAMUSCULAR; INTRAVENOUS at 13:52

## 2024-04-25 RX ADMIN — ONDANSETRON HYDROCHLORIDE 4 MG: 4 TABLET, FILM COATED ORAL at 23:29

## 2024-04-25 RX ADMIN — OXCARBAZEPINE 450 MG: 150 TABLET, FILM COATED ORAL at 20:47

## 2024-04-25 RX ADMIN — EZETIMIBE 10 MG: 10 TABLET ORAL at 13:12

## 2024-04-25 RX ADMIN — HEPARIN SODIUM 1184 UNITS/HR: 10000 INJECTION, SOLUTION INTRAVENOUS at 13:26

## 2024-04-25 RX ADMIN — ACETAMINOPHEN 650 MG: 160 SOLUTION ORAL at 00:39

## 2024-04-25 RX ADMIN — PANCRELIPASE 2 CAPSULE: 36000; 180000; 114000 CAPSULE, DELAYED RELEASE PELLETS ORAL at 17:43

## 2024-04-25 RX ADMIN — HYDRALAZINE HYDROCHLORIDE 50 MG: 25 TABLET ORAL at 20:47

## 2024-04-25 RX ADMIN — CLINDAMYCIN PHOSPHATE 600 MG: 600 INJECTION, SOLUTION INTRAVENOUS at 16:07

## 2024-04-25 RX ADMIN — GABAPENTIN 400 MG: 400 CAPSULE ORAL at 15:53

## 2024-04-25 RX ADMIN — HYDRALAZINE HYDROCHLORIDE 50 MG: 25 TABLET ORAL at 15:52

## 2024-04-25 RX ADMIN — ALTEPLASE 1 MG/HR: KIT at 03:51

## 2024-04-25 RX ADMIN — TICAGRELOR 90 MG: 90 TABLET ORAL at 20:47

## 2024-04-25 RX ADMIN — ACETAMINOPHEN 650 MG: 325 TABLET ORAL at 13:46

## 2024-04-25 RX ADMIN — ATORVASTATIN CALCIUM 80 MG: 40 TABLET, FILM COATED ORAL at 20:46

## 2024-04-25 RX ADMIN — HEPARIN SODIUM 5000 UNITS: 5000 INJECTION INTRAVENOUS; SUBCUTANEOUS at 09:48

## 2024-04-25 RX ADMIN — ACETAMINOPHEN 650 MG: 160 SOLUTION ORAL at 05:25

## 2024-04-25 RX ADMIN — SODIUM CHLORIDE, POTASSIUM CHLORIDE, SODIUM LACTATE AND CALCIUM CHLORIDE 100 ML/HR: 600; 310; 30; 20 INJECTION, SOLUTION INTRAVENOUS at 03:51

## 2024-04-25 RX ADMIN — LIDOCAINE HYDROCHLORIDE 100 MG: 20 INJECTION, SOLUTION INTRAVENOUS at 09:32

## 2024-04-25 RX ADMIN — INSULIN GLARGINE 10 UNITS: 100 INJECTION, SOLUTION SUBCUTANEOUS at 20:45

## 2024-04-25 RX ADMIN — EPHEDRINE SULFATE 25 MG: 50 INJECTION, SOLUTION INTRAVENOUS at 09:50

## 2024-04-25 RX ADMIN — CLINDAMYCIN PHOSPHATE 600 MG: 600 INJECTION, SOLUTION INTRAVENOUS at 03:49

## 2024-04-25 RX ADMIN — GABAPENTIN 400 MG: 400 CAPSULE ORAL at 20:47

## 2024-04-25 RX ADMIN — FAMOTIDINE 20 MG: 10 INJECTION, SOLUTION INTRAVENOUS at 13:13

## 2024-04-25 RX ADMIN — OXCARBAZEPINE 450 MG: 150 TABLET, FILM COATED ORAL at 00:23

## 2024-04-25 RX ADMIN — SACUBITRIL AND VALSARTAN 1 TABLET: 97; 103 TABLET, FILM COATED ORAL at 20:47

## 2024-04-25 RX ADMIN — HEPARIN SODIUM 1284 UNITS/HR: 10000 INJECTION, SOLUTION INTRAVENOUS at 20:48

## 2024-04-25 RX ADMIN — CLINDAMYCIN PHOSPHATE 600 MG: 600 INJECTION, SOLUTION INTRAVENOUS at 20:46

## 2024-04-25 RX ADMIN — INSULIN LISPRO 1 UNITS: 100 INJECTION, SOLUTION INTRAVENOUS; SUBCUTANEOUS at 02:27

## 2024-04-25 RX ADMIN — PROPOFOL 200 MG: 10 INJECTION, EMULSION INTRAVENOUS at 09:32

## 2024-04-25 RX ADMIN — PANCRELIPASE 2 CAPSULE: 36000; 180000; 114000 CAPSULE, DELAYED RELEASE PELLETS ORAL at 13:12

## 2024-04-25 RX ADMIN — CARVEDILOL 6.25 MG: 6.25 TABLET, FILM COATED ORAL at 21:00

## 2024-04-25 RX ADMIN — SODIUM CHLORIDE, POTASSIUM CHLORIDE, SODIUM LACTATE AND CALCIUM CHLORIDE 100 ML/HR: 600; 310; 30; 20 INJECTION, SOLUTION INTRAVENOUS at 12:15

## 2024-04-25 RX ADMIN — FENTANYL CITRATE 100 MCG: 50 INJECTION INTRAMUSCULAR; INTRAVENOUS at 09:32

## 2024-04-25 RX ADMIN — RANOLAZINE 500 MG: 500 TABLET, EXTENDED RELEASE ORAL at 20:47

## 2024-04-25 RX ADMIN — HYDROMORPHONE HYDROCHLORIDE 0.6 MG: 1 INJECTION, SOLUTION INTRAMUSCULAR; INTRAVENOUS; SUBCUTANEOUS at 23:29

## 2024-04-25 RX ADMIN — CLINDAMYCIN PHOSPHATE 600 MG: 600 INJECTION, SOLUTION INTRAVENOUS at 09:32

## 2024-04-25 RX ADMIN — SACUBITRIL AND VALSARTAN 1 TABLET: 97; 103 TABLET, FILM COATED ORAL at 00:23

## 2024-04-25 RX ADMIN — HYDROMORPHONE HYDROCHLORIDE 0.6 MG: 1 INJECTION, SOLUTION INTRAMUSCULAR; INTRAVENOUS; SUBCUTANEOUS at 07:07

## 2024-04-25 RX ADMIN — INSULIN LISPRO 2 UNITS: 100 INJECTION, SOLUTION INTRAVENOUS; SUBCUTANEOUS at 20:45

## 2024-04-25 RX ADMIN — HYDROMORPHONE HYDROCHLORIDE 0.6 MG: 1 INJECTION, SOLUTION INTRAMUSCULAR; INTRAVENOUS; SUBCUTANEOUS at 02:27

## 2024-04-25 RX ADMIN — EPHEDRINE SULFATE 25 MG: 50 INJECTION, SOLUTION INTRAVENOUS at 09:38

## 2024-04-25 SDOH — HEALTH STABILITY: MENTAL HEALTH: CURRENT SMOKER: 0

## 2024-04-25 SDOH — SOCIAL STABILITY: SOCIAL INSECURITY: DO YOU FEEL UNSAFE GOING BACK TO THE PLACE WHERE YOU ARE LIVING?: NO

## 2024-04-25 SDOH — SOCIAL STABILITY: SOCIAL INSECURITY: WERE YOU ABLE TO COMPLETE ALL THE BEHAVIORAL HEALTH SCREENINGS?: YES

## 2024-04-25 SDOH — SOCIAL STABILITY: SOCIAL INSECURITY: HAVE YOU HAD THOUGHTS OF HARMING ANYONE ELSE?: NO

## 2024-04-25 SDOH — SOCIAL STABILITY: SOCIAL INSECURITY: DOES ANYONE TRY TO KEEP YOU FROM HAVING/CONTACTING OTHER FRIENDS OR DOING THINGS OUTSIDE YOUR HOME?: NO

## 2024-04-25 SDOH — SOCIAL STABILITY: SOCIAL INSECURITY: HAS ANYONE EVER THREATENED TO HURT YOUR FAMILY OR YOUR PETS?: NO

## 2024-04-25 SDOH — SOCIAL STABILITY: SOCIAL INSECURITY: ABUSE: ADULT

## 2024-04-25 SDOH — SOCIAL STABILITY: SOCIAL INSECURITY: DO YOU FEEL ANYONE HAS EXPLOITED OR TAKEN ADVANTAGE OF YOU FINANCIALLY OR OF YOUR PERSONAL PROPERTY?: NO

## 2024-04-25 SDOH — SOCIAL STABILITY: SOCIAL INSECURITY: HAVE YOU HAD ANY THOUGHTS OF HARMING ANYONE ELSE?: NO

## 2024-04-25 SDOH — SOCIAL STABILITY: SOCIAL INSECURITY: ARE THERE ANY APPARENT SIGNS OF INJURIES/BEHAVIORS THAT COULD BE RELATED TO ABUSE/NEGLECT?: NO

## 2024-04-25 SDOH — SOCIAL STABILITY: SOCIAL INSECURITY: ARE YOU OR HAVE YOU BEEN THREATENED OR ABUSED PHYSICALLY, EMOTIONALLY, OR SEXUALLY BY ANYONE?: NO

## 2024-04-25 ASSESSMENT — LIFESTYLE VARIABLES
AUDIT-C TOTAL SCORE: 0
HOW MANY STANDARD DRINKS CONTAINING ALCOHOL DO YOU HAVE ON A TYPICAL DAY: PATIENT DOES NOT DRINK
HOW OFTEN DO YOU HAVE 6 OR MORE DRINKS ON ONE OCCASION: NEVER
SKIP TO QUESTIONS 9-10: 1
HOW OFTEN DO YOU HAVE A DRINK CONTAINING ALCOHOL: NEVER
AUDIT-C TOTAL SCORE: 0

## 2024-04-25 ASSESSMENT — PAIN SCALES - GENERAL
PAIN_LEVEL: 0
PAINLEVEL_OUTOF10: 3
PAINLEVEL_OUTOF10: 10 - WORST POSSIBLE PAIN
PAINLEVEL_OUTOF10: 0 - NO PAIN
PAINLEVEL_OUTOF10: 0 - NO PAIN
PAINLEVEL_OUTOF10: 7
PAINLEVEL_OUTOF10: 0 - NO PAIN
PAINLEVEL_OUTOF10: 0 - NO PAIN
PAINLEVEL_OUTOF10: 3
PAINLEVEL_OUTOF10: 9
PAINLEVEL_OUTOF10: 0 - NO PAIN
PAINLEVEL_OUTOF10: 6
PAINLEVEL_OUTOF10: 0 - NO PAIN
PAINLEVEL_OUTOF10: 0 - NO PAIN
PAINLEVEL_OUTOF10: 9
PAINLEVEL_OUTOF10: 7

## 2024-04-25 ASSESSMENT — COGNITIVE AND FUNCTIONAL STATUS - GENERAL
MOBILITY SCORE: 6
WALKING IN HOSPITAL ROOM: TOTAL
HELP NEEDED FOR BATHING: TOTAL
DRESSING REGULAR UPPER BODY CLOTHING: TOTAL
CLIMB 3 TO 5 STEPS WITH RAILING: TOTAL
STANDING UP FROM CHAIR USING ARMS: TOTAL
MOBILITY SCORE: 24
MOVING FROM LYING ON BACK TO SITTING ON SIDE OF FLAT BED WITH BEDRAILS: TOTAL
PERSONAL GROOMING: TOTAL
DAILY ACTIVITIY SCORE: 24
TOILETING: TOTAL
PATIENT BASELINE BEDBOUND: NO
TURNING FROM BACK TO SIDE WHILE IN FLAT BAD: TOTAL
MOVING TO AND FROM BED TO CHAIR: TOTAL
EATING MEALS: TOTAL
DRESSING REGULAR LOWER BODY CLOTHING: TOTAL
DAILY ACTIVITIY SCORE: 6

## 2024-04-25 ASSESSMENT — PAIN - FUNCTIONAL ASSESSMENT
PAIN_FUNCTIONAL_ASSESSMENT: 0-10

## 2024-04-25 ASSESSMENT — ACTIVITIES OF DAILY LIVING (ADL)
DRESSING YOURSELF: INDEPENDENT
LACK_OF_TRANSPORTATION: NO
BATHING: INDEPENDENT
PATIENT'S MEMORY ADEQUATE TO SAFELY COMPLETE DAILY ACTIVITIES?: YES
HEARING - LEFT EAR: FUNCTIONAL
TOILETING: INDEPENDENT
GROOMING: INDEPENDENT
JUDGMENT_ADEQUATE_SAFELY_COMPLETE_DAILY_ACTIVITIES: YES
ADEQUATE_TO_COMPLETE_ADL: YES
FEEDING YOURSELF: INDEPENDENT
HEARING - RIGHT EAR: FUNCTIONAL
WALKS IN HOME: INDEPENDENT

## 2024-04-25 ASSESSMENT — PATIENT HEALTH QUESTIONNAIRE - PHQ9
1. LITTLE INTEREST OR PLEASURE IN DOING THINGS: NOT AT ALL
2. FEELING DOWN, DEPRESSED OR HOPELESS: NOT AT ALL
SUM OF ALL RESPONSES TO PHQ9 QUESTIONS 1 & 2: 0

## 2024-04-25 ASSESSMENT — PAIN DESCRIPTION - LOCATION
LOCATION: LEG
LOCATION: BACK

## 2024-04-25 ASSESSMENT — PAIN DESCRIPTION - ORIENTATION: ORIENTATION: LEFT

## 2024-04-25 NOTE — ANESTHESIA POSTPROCEDURE EVALUATION
Patient: Dereck Shen    Procedure Summary       Date: 04/25/24 Room / Location: [facility] 08 / Virtual [facility]    Anesthesia Start: 0928 Anesthesia Stop:     Procedure: left Angiogram Lower Extremity *C-ARM* ANGIO JET, angioplasty, thrombectomy (Left) Diagnosis:     Surgeons: Iban Peace DO Responsible Provider: PAU Smith    Anesthesia Type: general ASA Status: 3            Anesthesia Type: general    Vitals Value Taken Time   /74 04/25/24 1041   Temp 98.2 04/25/24 1041   Pulse 63 04/25/24 1041   Resp 9 04/25/24 1041   SpO2 100 04/25/24 1041       Anesthesia Post Evaluation    Patient location during evaluation: PACU  Patient participation: complete - patient cannot participate  Level of consciousness: sedated  Pain score: 0  Pain management: adequate  Airway patency: patent  Cardiovascular status: acceptable  Respiratory status: acceptable  Hydration status: acceptable  Postoperative Nausea and Vomiting: none        No notable events documented.     Patient is a 73y old  Female who presents with a chief complaint of sepsis/acute renal failure/chest pain (2019 08:46)        HPI:  72 yo F PMHx of Breast Ca s/p right mastectomy chemotherapy tx (), Lung Ca, COPD (no home O2), hysterectomy, appendectomy, cholecystectomy, femoral hernia repair, multiple SBOs w/ resection, opioid dependence, p/w chest and abdominal pain for the last 2 days. Pt has had several episodes of vomiting and has had poor PO intake over the last 2 days as well.    Pt with multiple repeat admissions with CTs read as SBO's but all have resolved with either oral narcan or Movantic/Relistor. Patient has known narcotic bowel but does not take meds at home because she does not like the diarrhea, which results in repeat readmissions for the same problem. Patient is at her baseline mentals status when seen, endorses nausea or vomiting but no abdominal pain.  Prior recent admission also with significant electrolyte abnormalities and severe SHAKIRA, often requiring ICU admission.    Currently feeling "much better" no further nausea. Multiple loose BMs (4-5 documented, loose) no further abdominal distension. feeling hungry  +PNA on imaging +cough, chronic smoker with known lung CA - on IV ABx, no hempotysis    PAST MEDICAL & SURGICAL HISTORY:  Back pain  Bowel obstruction: multiple hospitalizations  Kidney stone  Emphysema  Narcotic Dependence  S/P Radiation Therapy  S/P Chemotherapy, Time Since Greater than 12 Weeks  Narcotic Dysmotile Cilia Syndrome  Chronic Pain Following Surgery or Procedure: following right breast mastectomy  Ankle Fracture: right anle fx s/p cast   History of Small Bowel Obstruction: 2010  Asthma  Breast Cancer  S/P hernia surgery: femoral hernia -   S/P Exploratory Laparotomy  S/P Appendectomy  S/P Cholecystectomy  Liver Mass s/p liver rsxn: s/p resection 2009  History of Hysterectomy:   S/P Right Mastectomy:       Allergies  Biaxin (Rash)  Cipro (Headache)  Flagyl (Headache)  penicillin (Rash; Swelling)  sulfa drugs (Unknown)      MEDICATIONS  (STANDING):  aztreonam  IVPB      aztreonam  IVPB 1000 milliGRAM(s) IV Intermittent every 12 hours  buDESOnide   0.5 milliGRAM(s) Respule 0.5 milliGRAM(s) Inhalation two times a day  heparin  Injectable 5000 Unit(s) SubCutaneous every 12 hours  pantoprazole    Tablet 40 milliGRAM(s) Oral before breakfast  sodium chloride 0.9%. 1000 milliLiter(s) (100 mL/Hr) IV Continuous <Continuous>  tiotropium 18 MICROgram(s) Capsule 1 Capsule(s) Inhalation daily  vancomycin  IVPB 1000 milliGRAM(s) IV Intermittent once    MEDICATIONS  (PRN):  acetaminophen   Tablet .. 650 milliGRAM(s) Oral every 6 hours PRN Moderate Pain (4 - 6)  ALBUTerol    90 MICROgram(s) HFA Inhaler 1 Puff(s) Inhalation every 4 hours PRN Bronchospasm  diazepam    Tablet 2 milliGRAM(s) Oral at bedtime PRN anxiety      Social History:  Marital Status:  (   )    (  X ) Single    (   )    (  )   Lives with: ( X ) alone  (  ) children   (  ) spouse   (  ) parents  (  ) other    Substance Use (street drugs): ( X ) never used  (  ) other:  Tobacco Usage:  (   ) never smoked   (   ) former smoker   ( X  ) current smoker  (     ) pack year  (        ) last cigarette date  Alcohol Usage: denies    Family History   IBD (  ) Yes   (X  ) No  GI Malignancy (  )  Yes    (X ) No      Advanced Directives: ( X    ) None    (      ) DNR    (     ) DNI    (     ) Health Care Proxy:     Review of Systems:    General:  No fevers, chills, night sweats  CV:  No pain, palpitations, hypo/hypertension  Resp:  No dyspnea,+cough +COPD +lung cancer  GI:  see HPI  :  No pain, bleeding, incontinence, nocturia  Muscle: +chronic back pain  Neuro:  No weakness, tingling, memory problems  Psych:  No fatigue, insomnia, mood problems, depression  Endocrine:  No polyuria, polydypsia, cold/heat intolerance  Heme:  No petechiae, ecchymosis, easy bruisability  Skin:  No rash, tattoos, scars, edema      Vital Signs Last 24 Hrs  T(C): 36.7 (2019 03:30), Max: 37.3 (2019 17:00)  T(F): 98 (2019 03:30), Max: 99.2 (2019 17:00)  HR: 78 (2019 03:30) (78 - 123)  BP: 101/60 (2019 03:30) (90/54 - 117/68)  BP(mean): 79 (2019 17:47) (75 - 79)  RR: 16 (2019 03:30) (16 - 24)  SpO2: 98% (2019 03:30) (88% - 100%)    PHYSICAL EXAM:    Constitutional: NAD, well-developed thin frail elderly female non toxic appearing, alert   Neck: No LAD, supple no JVD  Respiratory: distant BS +rhonchi  Cardiovascular: S1 and S2, RRR  Gastrointestinal: BS+, soft, NT/ND, neg HSM  Extremities: No peripheral edema, cyanosis +clubbing of fingertips  Vascular: 2+ peripheral pulses  Neurological: A/O x 3, no focal deficits  Psychiatric: anxious, normal affect  Skin: No rashes, anicteric        LABS:                        10.7   3.3   )-----------( 267      ( 2019 18:47 )             33.9         139  |  88<L>  |  63<H>  ----------------------------<  340<H>  4.0   |  23  |  5.08<H>    Ca    5.8<LL>      2019 18:47    TPro  6.8  /  Alb  3.6  /  TBili  0.4  /  DBili  x   /  AST  50<H>  /  ALT  41  /  AlkPhos  144<H>      PT/INR - ( 2019 18:47 )   PT: 19.9 sec;   INR: 1.70 ratio    PTT - ( 2019 18:47 )  PTT:29.3 sec    Urinalysis Basic - ( 2019 17:49 )    Color: Yellow / Appearance: Slightly Turbid / S.016 / pH: x  Gluc: x / Ketone: Negative  / Bili: Negative / Urobili: Negative   Blood: x / Protein: 100 / Nitrite: Negative   Leuk Esterase: Large / RBC: 2 /hpf /  /HPF   Sq Epi: x / Non Sq Epi: 1 /hpf / Bacteria: Many      RADIOLOGY & ADDITIONAL TESTS:  < from: CT Abdomen and Pelvis No Cont (19 @ 21:07) >  FINDINGS:     LOWER CHEST: Patchy right middle and lower lobe consolidations, more   pronounced in the right lower lobe. Multiple tree-in bud opacities in the   visualized bilateral lung; increased compared with prior exam.    Limited evaluation of intra-abdominal and pelvic organs due to lack of IV   contrast and lack of intra-abdominal fat.    LIVER: Status post partial right hepatic lobectomy.  BILE DUCTS: Tubular branching low attenuation within the remaining right   hepatic lobe periphery likely represents dilated biliary ducts.  GALLBLADDER: Not well visualized.  SPLEEN: Within normal limits.  PANCREAS: Atrophic.  ADRENALS: Within normal limits.  KIDNEYS/URETERS: No hydronephrosis. Multiple nonobstructive bilateral   intrarenal calculi. Calculi.    BLADDER: Underdistended and not well visualized.  REPRODUCTIVE ORGANS: Not well visualized.    BOWEL: Evaluation of bowel limited by lack of oral and intravenous   contrast and paucity of intra-abdominal fat. Numerous fluid-filled   dilated small bowel loops throughout the abdomen, as well as distention   of the stomach with apparent transitioning in the right lower quadrant.   Colon is collapsed on the current exam. Appendix is not visualized.  PERITONEUM: No ascites or pneumoperitoneum.  VESSELS:  Atherosclerotic calcifications of the aortoiliac tree.  RETROPERITONEUM: No bulky lymphadenopathy.    ABDOMINAL WALL: Small right femoral hernia containing a short segment of   small bowel.  BONES: Degenerative changes of the spine. Diffuse bony demineralization.    IMPRESSION:     Limited evaluation of intra-abdominal and pelvic organs due to lack of IV   contrast and lack of intra-abdominal fat.    Numerous fluid-filled dilated small bowel loops throughout the abdomen,   as well as, distention of the stomach with apparent transitioning in the   right lower quadrant similar to that seen on prior exam from 2019.   Small right femoral hernia containing a short segment of small bowel.   Colon is collapsed on the current exam.    New right lower lobe and right middle lobe airspace opacities concerning   for pneumonia.      < from: Xray Chest 1 View- PORTABLE-Urgent (19 @ 17:41) >  INTERPRETATION:  Right lower lung opacity compatible with pneumonia.

## 2024-04-25 NOTE — PROGRESS NOTES
Dereck Shen is a 58 y.o. male on day 1 of admission presenting with Limb ischemia.      Subjective      58M hx CAD, PAD, HFrEF, HTN, COPD, BRUNA, CVA, DM2, CKD3, HCV, neuropathy, pancreatic insufficiency, GERD, BPH, cLBP, bipolar/PTSD, tobacco use presents for LLE pain     LLE pain started yesterday. initially waxing/waning, then persistent. had some loss of sensation and weakness but that resolved. pain whole leg, 10/10 initially but now 2-3 s/p dilaudid. no other new sx. no trauma, cp, sob, f/c, n/v, diarrhea. no bleeding, dysuria, ha, dizziness, cough.      PMH: CAD, PAD, HFrEF, HTN, COPD, BRUNA, CVA, DM2, CKD3, HCV, neuropathy, pancreatic insufficiency, GERD, BPH, cLBP, bipolar/PTSD, tobacco use  PSH: CABG, RLE bypass/stent, knee/elbow, PCI, back  FH: n/a  SH: still smoking a few cigarettes/day, trying to quit. no etoh, drugs  Meds: see med rec for full. supps?  Allergies: see list     ED Course:  VSS. LLE cold w/ absent pulses on exam. labs/imaging w/ cr 1.3, ekg wnl. cta w/ occluded LLE bypass graft. consulted vascular on-call, rec'd heparin gtt, vascular consult in am. heparin started, given diladuid. admitted to medicine.     4/25: No acute events overnight.  Patient denies any leg pain although he has some back stiffness from lying in bed for the last 24 to 48 hours.  He denies any chest pain, shortness of breath, palpitation or lightheadedness.  He denies any abdominal pain.  Vascular surgery recommendations much appreciated.  Continue heparin drip.  Patient will require at least another 24 to 48 hours of inpatient service.    Objective     Last Recorded Vitals  /88   Pulse 75   Temp 36.2 °C (97.2 °F) (Temporal)   Resp (!) 7   Wt 65.8 kg (145 lb)   SpO2 97%   Intake/Output last 3 Shifts:    Intake/Output Summary (Last 24 hours) at 4/25/2024 1643  Last data filed at 4/25/2024 1313  Gross per 24 hour   Intake 2625.66 ml   Output 1475 ml   Net 1150.66 ml       Admission Weight  Weight: 86.2 kg  (190 lb) (04/24/24 0247)    Daily Weight  04/25/24 : 65.8 kg (145 lb)    Image Results  FL less than 1 hour  These images are not reportable by radiology and will not be interpreted   by  Radiologists.      Physical Exam  CONSTITUTIONAL - alert, in no acute distress, not ill-appearing  SKIN - normal skin color ,warm  HEAD - no trauma, normocephalic  EYES - pupils are equal and reactive to light  CHEST - clear to auscultation, no wheezing, no crackles and no rales, good effort  CARDIAC - regular rate and regular rhythm, no murmur  ABDOMEN - no organomegaly, soft, nontender, nondistended, normal bowel sounds, no guarding/rebound/rigidity  EXTREMITIES - no edema, no deformities  NEUROLOGICAL - alert, oriented x3 and Xigduo right-sided hemiparesis  PSYCHIATRIC - alert, pleasant and cordial, age-appropriate    Relevant Results               Assessment/Plan   This patient currently has cardiac telemetry ordered; if you would like to modify or discontinue the telemetry order, click here to go to the orders activity to modify/discontinue the order.       58M hx CAD, PAD, HFrEF, HTN, COPD, BRUNA, CVA, DM2, CKD3, HCV, neuropathy, pancreatic insufficiency, GERD, BPH, cLBP, bipolar/PTSD, tobacco use hospitalized for acute limb ischemia     #acute limb ischemia, L:  #hx PAD s/p bypass:  threatened limb - intact strength/sensation, non-dopplerable pulses, cold. no e/o gangrene or infection. 4/24 cta w/ occluded femoral-popliteal bypass graft. compliant w/ ticagrelor/xarelto  - hold xarelto; heparin gtt  - home ticagrelor, statin; dilaudid prn  - vascular surgery consulted; f/u ck, lactate  - nv checks; counseled on smoking cessation  4/25: Vascular surgery recommendations appreciated.  Patient had catheter directed thrombolysis and thrombectomy.  Continue heparin drip.  Will discuss anticoagulation with the vascular surgery team.     #CAD: stable; ticagrelor, statin, zetia, ranolazine  #HFrEF: compensated; entresto, coreg; off  diuretics  #HTN: stable; coreg, entresto, hydral; hold isordil  #COPD: stable; duonebs prn  #BRUNA: home cpap  #hx CVA: no new deficits. ticagrelor, statin  #DM2: not at goal; iss q6h; 1/2 home glargine (10u)  #CKD3: stable, trend  #HCV: no e/o cirrhosis; outpt ID f/u  #neuropathy: home gabapentin  #pancreatic insufficiency: home creon  #GERD: home ppi  #BPH: home flomax  #cLBP: gabapentin; hold tizanidine (not taking)  #bipolar/PTSD: home trileptal; hold latuda (not taking)  #tobacco use disorder: nicotine prn; counseled     #FEN/GI: npo  #PPx: heparin gtt  #Lines/Tubes/Drains: piv  #Analgesia/Sedation: dilaudid  #Code: full  #Dispo: pending above eval  #Contact: joselin (wife) [phone]         Ashley Celestin MD

## 2024-04-25 NOTE — ANESTHESIA PREPROCEDURE EVALUATION
Patient: Dereck Shen    Procedure Information       Date/Time: 04/25/24 0930    Procedure: left Angiogram Lower Extremity *C-ARM* ANGIO JET (Left)    Location: [facility] / [facility]    Surgeons: Iban Peace, DO            Relevant Problems   Anesthesia (within normal limits)      Cardiac   (+) Atherosclerotic heart disease of native coronary artery without angina pectoris   (+) Hyperlipidemia   (+) Hypertension   (+) Limb ischemia   (+) Peripheral arterial disease (CMS-HCC)      Pulmonary   (+) Chronic obstructive pulmonary disease (Multi)   (+) BRUNA (obstructive sleep apnea)   (+) Shortness of breath on exertion      Neuro   (+) Bipolar depression (Multi)   (+) Causalgia of lower extremity   (+) Diabetic polyneuropathy associated with type 2 diabetes mellitus (Multi)   (+) Ischemic stroke (Multi)      GI   (+) Gastroesophageal reflux disease      /Renal   (+) BPH (benign prostatic hyperplasia)      Liver   (+) Chronic hepatitis C virus genotype 1a infection (Multi)   (+) Hepatic steatosis   (+) Hepatitis-C      Endocrine   (+) Diabetic neuropathy, painful (Multi)   (+) Diabetic polyneuropathy associated with type 2 diabetes mellitus (Multi)   (+) Type 2 diabetes mellitus (Multi)      Musculoskeletal   (+) Causalgia of lower extremity   (+) Chronic pain syndrome      ID   (+) Bacterial skin infection   (+) Chronic hepatitis C virus genotype 1a infection (Multi)   (+) Hepatitis-C   (+) Recurrent boils   (+) Shingles       Clinical information reviewed:   Tobacco  Allergies  Meds  Problems  Med Hx  Surg Hx   Fam Hx  Soc   Hx        NPO Detail:  NPO/Void Status  Date of Last Liquid: 04/22/24  Time of Last Liquid: 2300  Date of Last Solid: 04/22/24  Time of Last Solid: 1700  Last Intake Type: Clear fluids         Physical Exam    Airway  Mallampati: II  TM distance: >3 FB  Neck ROM: full     Cardiovascular - normal exam     Dental - normal exam     Pulmonary - normal exam     Abdominal - normal  exam         Anesthesia Plan    History of general anesthesia?: yes  History of complications of general anesthesia?: no    ASA 3     general     The patient is not a current smoker.    intravenous induction   Postoperative administration of opioids is intended.  Anesthetic plan and risks discussed with patient.  Use of blood products discussed with patient who.    Plan discussed with CRNA.

## 2024-04-25 NOTE — PROGRESS NOTES
Critical Care Daily Progress        Subjective   Patient is a 58 y.o. male admitted on 4/24/2024  2:31 AM with the following indication(s) for ICU care: Status post left lower extremity angiogram with catheter directed thrombolysis and angiojet thrombectomy, continued catheter directed thrombolysis, postoperative critical care management .     Interval History: Patient seen and examined in ICU bed 4. Patient alert and oriented x4, lying in bed. Patient recently returned from OR. Patient returned to OR this morning with Dr. Peace for removal of right femoral sheath and thrombectomy. Patient states that his pain is improved greatly after this morning's procedure. Noted with improved color to left lower extremity and dopplerable DP and PT pulses. Patient is hemodynamically stable. Critical care will sign off, please re-consult if needed.     Complaints: has none..     Scheduled Medications:   atorvastatin, 80 mg, oral, Nightly  carvedilol, 6.25 mg, oral, q12h  clindamycin, 600 mg, intravenous, q8h  ezetimibe, 10 mg, oral, Daily  gabapentin, 400 mg, oral, TID  heparin (porcine) 5,000 Units in sodium chloride 0.9% 500 mL irrigation, 5,000 Units, irrigation, Once  heparin, 80 Units/kg, intravenous, Once  hydrALAZINE, 50 mg, oral, TID  insulin glargine, 10 Units, subcutaneous, Nightly  insulin lispro, 0-5 Units, subcutaneous, q6h  nicotine, 1 patch, transdermal, Daily  OXcarbazepine, 450 mg, oral, BID  oxygen, , inhalation, Continuous - Inhalation  pancrelipase (Lip-Prot-Amyl), 2 capsule, oral, TID with meals  pantoprazole, 40 mg, oral, Daily  ranolazine, 500 mg, oral, BID  sacubitriL-valsartan, 1 tablet, oral, BID  tamsulosin, 0.4 mg, oral, Daily  ticagrelor, 90 mg, oral, BID         Continuous Medications:   alteplase (Activase) 8 mg in sodium chloride 0.9% 160 mL (0.05 mg/mL) infusion, 1 mg/hr, Last Rate: 1 mg/hr (04/25/24 0446)  heparin (porcine), 500 Units/hr  heparin, 0-4,500 Units/hr  lactated Ringer's, 100 mL/hr,  Last Rate: 100 mL/hr (04/25/24 1215)         PRN Medications:   PRN medications: acetaminophen **OR** acetaminophen **OR** acetaminophen, dextrose, dextrose, glucagon, glucagon, HYDROmorphone, ipratropium-albuteroL, melatonin, ondansetron **OR** ondansetron, polyethylene glycol    Objective   Vitals:  Most Recent:  Vitals:    04/25/24 1218   BP:    Pulse:    Resp:    Temp: 36.4 °C (97.5 °F)   SpO2:        24hr Min/Max:  Temp  Min: 36 °C (96.8 °F)  Max: 37 °C (98.6 °F)  Pulse  Min: 63  Max: 75  BP  Min: 92/59  Max: 172/91  Resp  Min: 6  Max: 17  SpO2  Min: 96 %  Max: 100 %    LDA:  Urethral Catheter Non-latex 22 Fr. (Active)   Placement Date: 04/24/24   Catheter Type: Non-latex  Tube Size (Fr.): 22 Fr.  Catheter Balloon Size: 5 mL  Urine Returned: Yes   Number of days: 1         Vent settings:  FiO2 (%):  [21 %] 21 %    Hemodynamic parameters for last 24 hours:       I/O:    Intake/Output Summary (Last 24 hours) at 4/25/2024 1319  Last data filed at 4/25/2024 1313  Gross per 24 hour   Intake 2625.66 ml   Output 1475 ml   Net 1150.66 ml       Physical Exam:   Physical Exam  Constitutional:       General: He is not in acute distress.     Appearance: Normal appearance. He is obese.   HENT:      Head: Normocephalic.      Nose: Nose normal.      Mouth/Throat:      Mouth: Mucous membranes are moist.   Eyes:      Extraocular Movements: Extraocular movements intact.      Pupils: Pupils are equal, round, and reactive to light.   Cardiovascular:      Rate and Rhythm: Normal rate and regular rhythm.      Pulses:           Dorsalis pedis pulses are detected w/ Doppler on the left side.        Posterior tibial pulses are detected w/ Doppler on the left side.      Heart sounds: Normal heart sounds. No murmur heard.  Pulmonary:      Effort: Pulmonary effort is normal. No respiratory distress.      Breath sounds: Normal breath sounds.   Abdominal:      General: Bowel sounds are normal. There is no distension.      Palpations:  Abdomen is soft.      Tenderness: There is no abdominal tenderness.   Musculoskeletal:         General: Normal range of motion.      Cervical back: Normal range of motion.   Skin:     General: Skin is warm and dry.      Capillary Refill: Capillary refill takes 2 to 3 seconds.      Comments: Right femoral puncture site covered with occlusive dressing, no bloody drainage or hematoma noted.    Neurological:      General: No focal deficit present.      Mental Status: He is alert and oriented to person, place, and time. Mental status is at baseline.   Psychiatric:         Mood and Affect: Mood normal.         Thought Content: Thought content normal.         Judgment: Judgment normal.          Lab/Radiology/Diagnostic Review:  Results for orders placed or performed during the hospital encounter of 04/24/24 (from the past 24 hour(s))   CBC   Result Value Ref Range    WBC 6.2 4.4 - 11.3 x10*3/uL    nRBC 0.0 0.0 - 0.0 /100 WBCs    RBC 4.31 (L) 4.50 - 5.90 x10*6/uL    Hemoglobin 14.0 13.5 - 17.5 g/dL    Hematocrit 39.6 (L) 41.0 - 52.0 %    MCV 92 80 - 100 fL    MCH 32.5 26.0 - 34.0 pg    MCHC 35.4 32.0 - 36.0 g/dL    RDW 13.8 11.5 - 14.5 %    Platelets 184 150 - 450 x10*3/uL   POCT GLUCOSE   Result Value Ref Range    POCT Glucose 143 (H) 74 - 99 mg/dL   CBC   Result Value Ref Range    WBC 7.1 4.4 - 11.3 x10*3/uL    nRBC 0.0 0.0 - 0.0 /100 WBCs    RBC 4.25 (L) 4.50 - 5.90 x10*6/uL    Hemoglobin 14.0 13.5 - 17.5 g/dL    Hematocrit 39.0 (L) 41.0 - 52.0 %    MCV 92 80 - 100 fL    MCH 32.9 26.0 - 34.0 pg    MCHC 35.9 32.0 - 36.0 g/dL    RDW 14.0 11.5 - 14.5 %    Platelets 157 150 - 450 x10*3/uL   Fibrinogen   Result Value Ref Range    Fibrinogen 201 200 - 400 mg/dL   Coagulation Screen   Result Value Ref Range    Protime 11.5 9.8 - 12.8 seconds    INR 1.0 0.9 - 1.1    aPTT     Heparin Assay   Result Value Ref Range    Heparin Unfractionated 0.9 See Comment Below for Therapeutic Ranges IU/mL   POCT GLUCOSE   Result Value Ref Range     POCT Glucose 164 (H) 74 - 99 mg/dL   Fibrinogen   Result Value Ref Range    Fibrinogen 171 (L) 200 - 400 mg/dL   Basic Metabolic Panel   Result Value Ref Range    Glucose 131 (H) 74 - 99 mg/dL    Sodium 136 136 - 145 mmol/L    Potassium 4.1 3.5 - 5.3 mmol/L    Chloride 108 (H) 98 - 107 mmol/L    Bicarbonate 23 21 - 32 mmol/L    Anion Gap 9 (L) 10 - 20 mmol/L    Urea Nitrogen 20 6 - 23 mg/dL    Creatinine 1.01 0.50 - 1.30 mg/dL    eGFR 87 >60 mL/min/1.73m*2    Calcium 7.1 (L) 8.6 - 10.3 mg/dL   CBC   Result Value Ref Range    WBC 5.5 4.4 - 11.3 x10*3/uL    nRBC 0.0 0.0 - 0.0 /100 WBCs    RBC 3.65 (L) 4.50 - 5.90 x10*6/uL    Hemoglobin 11.8 (L) 13.5 - 17.5 g/dL    Hematocrit 34.0 (L) 41.0 - 52.0 %    MCV 93 80 - 100 fL    MCH 32.3 26.0 - 34.0 pg    MCHC 34.7 32.0 - 36.0 g/dL    RDW 13.8 11.5 - 14.5 %    Platelets 126 (L) 150 - 450 x10*3/uL   Heparin Assay   Result Value Ref Range    Heparin Unfractionated <0.1 See Comment Below for Therapeutic Ranges IU/mL   Protime-INR   Result Value Ref Range    Protime 12.2 9.8 - 12.8 seconds    INR 1.1 0.9 - 1.1   POCT GLUCOSE   Result Value Ref Range    POCT Glucose 166 (H) 74 - 99 mg/dL   Comprehensive metabolic panel   Result Value Ref Range    Glucose 130 (H) 74 - 99 mg/dL    Sodium 136 136 - 145 mmol/L    Potassium 4.7 3.5 - 5.3 mmol/L    Chloride 105 98 - 107 mmol/L    Bicarbonate 23 21 - 32 mmol/L    Anion Gap 13 10 - 20 mmol/L    Urea Nitrogen 20 6 - 23 mg/dL    Creatinine 1.03 0.50 - 1.30 mg/dL    eGFR 85 >60 mL/min/1.73m*2    Calcium 7.5 (L) 8.6 - 10.3 mg/dL    Albumin 2.6 (L) 3.4 - 5.0 g/dL    Alkaline Phosphatase 63 33 - 120 U/L    Total Protein 4.7 (L) 6.4 - 8.2 g/dL    AST 16 9 - 39 U/L    Bilirubin, Total 0.5 0.0 - 1.2 mg/dL    ALT 9 (L) 10 - 52 U/L   CBC   Result Value Ref Range    WBC 7.7 4.4 - 11.3 x10*3/uL    nRBC 0.0 0.0 - 0.0 /100 WBCs    RBC 4.08 (L) 4.50 - 5.90 x10*6/uL    Hemoglobin 12.8 (L) 13.5 - 17.5 g/dL    Hematocrit 39.5 (L) 41.0 - 52.0 %    MCV 97  80 - 100 fL    MCH 31.4 26.0 - 34.0 pg    MCHC 32.4 32.0 - 36.0 g/dL    RDW 13.8 11.5 - 14.5 %    Platelets 142 (L) 150 - 450 x10*3/uL   Fibrinogen   Result Value Ref Range    Fibrinogen 155 (L) 200 - 400 mg/dL   Heparin Assay   Result Value Ref Range    Heparin Unfractionated <0.1 See Comment Below for Therapeutic Ranges IU/mL   Protime-INR   Result Value Ref Range    Protime 12.5 9.8 - 12.8 seconds    INR 1.1 0.9 - 1.1   POCT GLUCOSE   Result Value Ref Range    POCT Glucose 166 (H) 74 - 99 mg/dL   POCT GLUCOSE   Result Value Ref Range    POCT Glucose 152 (H) 74 - 99 mg/dL   Coagulation Screen   Result Value Ref Range    Protime 12.3 9.8 - 12.8 seconds    INR 1.1 0.9 - 1.1    aPTT 109 (HH) 27 - 38 seconds   Fibrinogen   Result Value Ref Range    Fibrinogen 186 (L) 200 - 400 mg/dL   POCT GLUCOSE   Result Value Ref Range    POCT Glucose 147 (H) 74 - 99 mg/dL     FL less than 1 hour    Result Date: 4/25/2024  These images are not reportable by radiology and will not be interpreted by  Radiologists.    Electrocardiogram, 12-lead PRN ACS symptoms    Result Date: 4/24/2024  Sinus rhythm Inferior infarct, old See ED provider note for full interpretation and clinical correlation Confirmed by Zulema Stephen ([id number]) on 4/24/2024 8:18:25 PM    FL less than 1 hour    Result Date: 4/24/2024  These images are not reportable by radiology and will not be interpreted by  Radiologists.    CT angio lower extremity left w and or wo IV contrast    Result Date: 4/24/2024  STUDY: CT Angiogram of the left lower extremity; 4/24/2024 4:30 AM INDICATION: Left leg pain.  Cold, pulseless foot. COMPARISON: Vascular Lab 01/25/2022. ACCESSION NUMBER(S): [id number] ORDERING CLINICIAN: BASIM POON TECHNIQUE:  Helical CT is performed from the left pelvis through the left feet after bolus administration of Omnipaque 350 100 mL.  Images are reviewed and processed at a workstation according to the CT angiogram protocol with 3-D and/or  MIP post processing imaging generated. Automated mA/kV exposure control was utilized and patient examination was performed in strict accordance with principles of ALARA. FINDINGS: Vascular: Common iliac artery demonstrates moderate to severe calcification partially visualized. Severe internal iliac calcification. Moderate external iliac calcification. Common femoral artery demonstrates moderate calcification without evidence of high-grade stenosis. There is occlusion of the native superficial femoral artery. There is a occlusion involving femoral-popliteal bypass graft. The popliteal artery is partially reconstituted via collateral vessels. There is internal stenosis, at least 70%. Severe calcification involving the anterior tibial artery and the tibioperoneal trunk. Minimal visualized flow. At least 50% stenosis of the tibioperoneal trunk. The posterior tibial and anterior tibial artery demonstrate multifocal occlusion. At the level of the ankle, the posterior tibial artery is reconstituted. The anterior tibial artery is reconstituted. The peroneal artery appears diminutive and possibly distally occluded. Evaluation is limited secondary to bolus. Pelvis: There is no bowel wall thickening or obstruction.  There is no free fluid.  Lymph nodes are not enlarged.  Urinary bladder is unremarkable. Skeleton:  There are no acute fractures.  No suspicious bony lesions.    Occluded left lower extremity femoral-popliteal bypass graft. Vascular surgery consultation recommended Occlusion of the native superficial femoral artery. Reconstitution of the popliteal artery via collaterals with areas of at least 70% stenosis Severe runoff vascular stenosis, with multifocal occlusions of the anterior and posterior tibial arteries. At the level of the ankle there is two-vessel runoff. Signed by Enrique Daniel MD        Assessment/Plan   Principal Problem:    Limb ischemia    Acute left lower limb ischemia status post left lower  extremity angiogram with catheter directed thrombolysis and angiojet thrombectomy, continued catheter directed thrombolysis  - Presented with waxing/waning left lower extremity pain, decreased sensation to LLE, cold to touch, pallor, and non-dopplerable pulses  -CTA revealed occluded left femoral-popliteal bypass graft  -Compliant with brilinta and xarelto at home  - Surgical intervention with Dr. Peace as above  - Hold xarelto, resume when ok with vascular surgery  - Neurovascular checks hourly  - Continue catheter directed thrombolysis with alteplase and heparin via right femoral sheath  - PRN dilaudid for pain control  - Continue NPO status, sips with meds, sips of clears, and ice chips  - Titrate oxygen to maintain SPO2 greater than 90%  - Strict bedrest, flat with bilateral lower extremities straight  - Continuous telemetry monitoring  - Coagulation screen, CBC, and fibrinogen every 6 hours  - Maintain brown catheter with current post-surgical restrictions  - Monitor intake and output  -Monitor for signs and symptoms of bleeding  - Transfuse PRBC for hemoglobin less than 7 or greater than 7 with signs of bleeding and hemodynamic instability  - Cleocin for surgical prophylaxis   - Continue LR at 100ml/hr  4/25/24: Returned to OR with Dr. Peace for thrombectomy and femoral sheath removal today  - Heparin infusion per vascular surgery  - Follow CBC and heparin assay per protocol  - Follow coagulation screen and fibrinogen every 6 hours x48 hours total after initiation of alteplase.  - Resume oral diet  - Monitor right femoral puncture site for signs of bleeding or hematoma.   - Neurovascular checks as above.   - Resume activity as tolerated after bedrest status post procedure discontinued.    CAD  -History of CAD  - Continue home atorvastatin, zetia, brilinta, and ranolazine  - Hold xarelto, resume when ok with vascular surgery  - Encourage lifestyle and diet modifications  - Encourage smoking cessation  4/25/24:  Continue to fold xarelto  - Heparin infusion per vascular surgery.     COPD  -Cigarette smoker x 48 years, recently cut back to 3-5 cigarettes  - No home oxygen use  - Currently on 2 liters nasal canula status post procedure  - Titrate oxygen to maintain SPO2 greater than 90%  - PRN duonebs  - Encourage smoking cessation  4/25/24: On room air. No shortness of breath.   - Continue management as above.      BRUNA  -History of BRUNA  - Home CPAP, utilize at bedtime     Nicotine dependence  - Cigarette smoker x 48 years, recently cut back to 3-5 cigarettes  - Stated that he uses a nicotine patch at home but removes it to smoke a cigarette and then puts the patch back on.  - Counseled on smoking cessation  - Continue to encourage smoking cessation  4/25/24: Continue nicotine patch use.     I spent 30 minutes of cumulative critical care time with the patient.  Greater than 50% of that time was spent in the direct collaboration and or coordination of care of the patient.

## 2024-04-25 NOTE — OP NOTE
left Angiogram Lower Extremity *C-ARM* ANGIO JET, angioplasty, thrombectomy (L) Operative Note     Date: 2024 - 2024  OR Location: [facility]    Name: Dereck Shen, : 1965, Age: 58 y.o., MRN: [id number], Sex: male    Diagnosis  * No Diagnosis Codes entered * * No Diagnosis Codes entered *     Procedures  left Angiogram Lower Extremity *C-ARM* ANGIO JET, angioplasty, thrombectomy  [id number] - SD INTRO OF NEEDLE OR INTRACATHETER UPR/LXTR ARTERY      Surgeons      * Iban Peace - Primary    Resident/Fellow/Other Assistant:  Surgeons and Role:  * No surgeons found with a matching role *    Procedure Summary  Anesthesia: General  ASA: III  Anesthesia Staff: CRNA: PAU Smith  Estimated Blood Loss: 25mL  Intra-op Medications:   Administrations occurring from 0930 to 1100 on 24:   Medication Name Total Dose   heparin (porcine) 5,000 Units in sodium chloride 0.9% 100 mL irrigation 5,000 Units   iodixanol (VISIPaque) 320 mg iodine/mL injection 90 mL   atorvastatin (Lipitor) tablet 80 mg Cannot be calculated   carvedilol (Coreg) tablet 6.25 mg Cannot be calculated   clindamycin (Cleocin) 600 mg in dextrose 5% water 50 mL 50 mL   dextrose 50 % injection 12.5 g Cannot be calculated   dextrose 50 % injection 25 g Cannot be calculated   ezetimibe (Zetia) tablet 10 mg Cannot be calculated   gabapentin (Neurontin) capsule 400 mg Cannot be calculated   glucagon (Glucagen) injection 1 mg Cannot be calculated   glucagon (Glucagen) injection 1 mg Cannot be calculated   hydrALAZINE (Apresoline) tablet 50 mg Cannot be calculated   HYDROmorphone (Dilaudid) injection 0.6 mg Cannot be calculated   insulin glargine (Lantus) injection 10 Units Cannot be calculated   insulin lispro (HumaLOG) injection 0-5 Units Cannot be calculated   ipratropium-albuteroL (Duo-Neb) 0.5-2.5 mg/3 mL nebulizer solution 3 mL Cannot be calculated   melatonin tablet 3 mg Cannot be calculated   nicotine (Nicoderm CQ) 21 mg/24 hr  patch 1 patch Cannot be calculated   OXcarbazepine (Trileptal) tablet 450 mg Cannot be calculated   pancrelipase (Lip-Prot-Amyl) (Creon) 36,000-114,000- 180,000 unit per capsule 2 capsule Cannot be calculated   pantoprazole (ProtoNix) EC tablet 40 mg Cannot be calculated   polyethylene glycol (Glycolax, Miralax) packet 17 g Cannot be calculated   ranolazine (Ranexa) 12 hr tablet 500 mg Cannot be calculated   sacubitriL-valsartan (Entresto)  mg per tablet 1 tablet Cannot be calculated   tamsulosin (Flomax) 24 hr capsule 0.4 mg Cannot be calculated   ticagrelor (Brilinta) tablet 90 mg Cannot be calculated              Anesthesia Record               Intraprocedure I/O Totals       None           Specimen: No specimens collected     Staff:   Circulator: Brady Aguilar RN  Relief Circulator: Tash Serrato RN  Scrub Person: Lawrence Reilly         Drains and/or Catheters:   Urethral Catheter Non-latex 22 Fr. (Active)   Site Assessment Clean;Skin intact 04/25/24 0800   Collection Container Standard drainage bag 04/25/24 0800   Securement Method Securing device (Describe) 04/25/24 0800   Reason for Continuing Urinary Catheterization surgical procedures: urological/gynecological, pelvic oncology, anal, prolonged surgical procedure 04/25/24 0800   Output (mL) 50 mL 04/25/24 0700       Tourniquet Times:         Implants:     Findings: patent bypass graft, outflow stenosis    Indications: Dereck Shen is an 58 y.o. male who is having surgery for * No pre-op diagnosis entered *. Acute limb eschemia left lower extremity    The patient was seen in the preoperative area. The risks, benefits, complications, treatment options, non-operative alternatives, expected recovery and outcomes were discussed with the patient. The possibilities of reaction to medication, pulmonary aspiration, injury to surrounding structures, bleeding, recurrent infection, the need for additional procedures, failure to diagnose a condition, and  creating a complication requiring transfusion or operation were discussed with the patient. The patient concurred with the proposed plan, giving informed consent.  The site of surgery was properly noted/marked if necessary per policy. The patient has been actively warmed in preoperative area. Preoperative antibiotics have been ordered and given within 1 hours of incision. Venous thrombosis prophylaxis are not indicated.    Procedure Details: Patient was brought to the OR suite placed in the supine position administered general anesthetic with LMA respiratory support.  Both groins are sterilely prepped Dresen fashion standard fashion as well as the entire catheter set system.  The catheter and Chandu were disengaged without event.  I advanced the 035 angled Glidewire down into the distal popliteal without issue.  Contra arteriography was performed after removing the Chandu catheter.  This demonstrated the graft as well as common femoral to be widely patent down to the distal anastomosis which appeared to have some thrombus as well as the at the proximal anastomosis.  The 6 [place]ian AngioJet Omni was again reintroduced and advanced through the proximal As well as through the graft to the distal on multiple passes without event.  Completion demonstrate the proximal area to be completely clean there is still stenosis noted at the distal anastomosis.  A 5 x 80 Stilwell XL drug-coated balloon was deployed in inflated over the outflow stenosis.  Completion angiography demonstrated brisk flow down into the popliteal artery with peroneal runoff with reconstitution of the anterior posterior tib at the ankle.  Patient Toller procedure well catheter wires removed compression is applied X hemostasis maintained patient was woken sent to PACU in stable condition.  Complications:  None; patient tolerated the procedure well.    Disposition: PACU - hemodynamically stable.  Condition: stable         Additional Details:      Attending  Attestation: I was present and scrubbed for the entire procedure.    Iban Peace  Phone Number: [phone]

## 2024-04-25 NOTE — PROGRESS NOTES
Attempted to meet with patient to discuss discharge plans. Patient is currently out of the room for procedure. TCC following, will attempt to meet with patient once they have returned to the room.

## 2024-04-25 NOTE — ANESTHESIA PROCEDURE NOTES
Airway  Date/Time: 4/25/2024 9:29 AM  Urgency: elective    Airway not difficult    Staffing  Performed: CRNA   Authorized by: PAU Smith    Performed by: PAU Smith  Patient location during procedure: OR    Indications and Patient Condition  Indications for airway management: anesthesia  Spontaneous Ventilation: absent  Sedation level: deep  Preoxygenated: yes  Patient position: sniffing  MILS maintained throughout  Mask difficulty assessment: 0 - not attempted    Final Airway Details  Final airway type: supraglottic airway      Successful airway: Supraglottic airway: IGEL.  Size 5     Number of attempts at approach: 1  Ventilation between attempts: none  Number of other approaches attempted: 0          
Male

## 2024-04-26 ENCOUNTER — PHARMACY VISIT (OUTPATIENT)
Dept: PHARMACY | Facility: CLINIC | Age: 59
End: 2024-04-26
Payer: MEDICAID

## 2024-04-26 VITALS
OXYGEN SATURATION: 96 % | BODY MASS INDEX: 28.3 KG/M2 | RESPIRATION RATE: 17 BRPM | TEMPERATURE: 97.3 F | DIASTOLIC BLOOD PRESSURE: 74 MMHG | HEART RATE: 65 BPM | SYSTOLIC BLOOD PRESSURE: 119 MMHG | HEIGHT: 71 IN | WEIGHT: 202.16 LBS

## 2024-04-26 LAB
ALBUMIN SERPL BCP-MCNC: 3.2 G/DL (ref 3.4–5)
ALP SERPL-CCNC: 75 U/L (ref 33–120)
ALT SERPL W P-5'-P-CCNC: 11 U/L (ref 10–52)
ANION GAP SERPL CALC-SCNC: 11 MMOL/L
APTT PPP: 47 SECONDS (ref 27–38)
APTT PPP: 60 SECONDS (ref 27–38)
AST SERPL W P-5'-P-CCNC: 13 U/L (ref 9–39)
BASOPHILS # BLD AUTO: 0.02 X10*3/UL (ref 0–0.1)
BASOPHILS NFR BLD AUTO: 0.4 %
BILIRUB SERPL-MCNC: 0.5 MG/DL (ref 0–1.2)
BUN SERPL-MCNC: 19 MG/DL (ref 6–23)
CALCIUM SERPL-MCNC: 7.8 MG/DL (ref 8.6–10.3)
CHLORIDE SERPL-SCNC: 102 MMOL/L (ref 98–107)
CO2 SERPL-SCNC: 27 MMOL/L (ref 21–32)
CREAT SERPL-MCNC: 1.14 MG/DL (ref 0.5–1.3)
EGFRCR SERPLBLD CKD-EPI 2021: 75 ML/MIN/1.73M*2
EOSINOPHIL # BLD AUTO: 0.09 X10*3/UL (ref 0–0.7)
EOSINOPHIL NFR BLD AUTO: 1.6 %
ERYTHROCYTE [DISTWIDTH] IN BLOOD BY AUTOMATED COUNT: 13.2 % (ref 11.5–14.5)
FIBRINOGEN PPP-MCNC: 237 MG/DL (ref 200–400)
FIBRINOGEN PPP-MCNC: 253 MG/DL (ref 200–400)
GLUCOSE BLD MANUAL STRIP-MCNC: 156 MG/DL (ref 74–99)
GLUCOSE BLD MANUAL STRIP-MCNC: 181 MG/DL (ref 74–99)
GLUCOSE BLD MANUAL STRIP-MCNC: 197 MG/DL (ref 74–99)
GLUCOSE SERPL-MCNC: 147 MG/DL (ref 74–99)
HCT VFR BLD AUTO: 34.3 % (ref 41–52)
HGB BLD-MCNC: 11.9 G/DL (ref 13.5–17.5)
IMM GRANULOCYTES # BLD AUTO: 0.01 X10*3/UL (ref 0–0.7)
IMM GRANULOCYTES NFR BLD AUTO: 0.2 % (ref 0–0.9)
INR PPP: 1 (ref 0.9–1.1)
INR PPP: 1 (ref 0.9–1.1)
LYMPHOCYTES # BLD AUTO: 1.41 X10*3/UL (ref 1.2–4.8)
LYMPHOCYTES NFR BLD AUTO: 25.6 %
MCH RBC QN AUTO: 32.1 PG (ref 26–34)
MCHC RBC AUTO-ENTMCNC: 34.7 G/DL (ref 32–36)
MCV RBC AUTO: 93 FL (ref 80–100)
MONOCYTES # BLD AUTO: 0.57 X10*3/UL (ref 0.1–1)
MONOCYTES NFR BLD AUTO: 10.3 %
NEUTROPHILS # BLD AUTO: 3.41 X10*3/UL (ref 1.2–7.7)
NEUTROPHILS NFR BLD AUTO: 61.9 %
NRBC BLD-RTO: 0 /100 WBCS (ref 0–0)
PLATELET # BLD AUTO: 133 X10*3/UL (ref 150–450)
POTASSIUM SERPL-SCNC: 3.9 MMOL/L (ref 3.5–5.3)
PROT SERPL-MCNC: 5.7 G/DL (ref 6.4–8.2)
PROTHROMBIN TIME: 11.4 SECONDS (ref 9.8–12.8)
PROTHROMBIN TIME: 11.5 SECONDS (ref 9.8–12.8)
RBC # BLD AUTO: 3.71 X10*6/UL (ref 4.5–5.9)
SODIUM SERPL-SCNC: 136 MMOL/L (ref 136–145)
UFH PPP CHRO-ACNC: 0.3 IU/ML
UFH PPP CHRO-ACNC: 0.4 IU/ML
WBC # BLD AUTO: 5.5 X10*3/UL (ref 4.4–11.3)

## 2024-04-26 PROCEDURE — 2500000002 HC RX 250 W HCPCS SELF ADMINISTERED DRUGS (ALT 637 FOR MEDICARE OP, ALT 636 FOR OP/ED): Performed by: SURGERY

## 2024-04-26 PROCEDURE — 2500000004 HC RX 250 GENERAL PHARMACY W/ HCPCS (ALT 636 FOR OP/ED): Performed by: SURGERY

## 2024-04-26 PROCEDURE — 85025 COMPLETE CBC W/AUTO DIFF WBC: CPT | Performed by: INTERNAL MEDICINE

## 2024-04-26 PROCEDURE — 85384 FIBRINOGEN ACTIVITY: CPT | Performed by: SURGERY

## 2024-04-26 PROCEDURE — 2500000006 HC RX 250 W HCPCS SELF ADMINISTERED DRUGS (ALT 637 FOR ALL PAYERS): Performed by: SURGERY

## 2024-04-26 PROCEDURE — 36415 COLL VENOUS BLD VENIPUNCTURE: CPT | Performed by: SURGERY

## 2024-04-26 PROCEDURE — S4991 NICOTINE PATCH NONLEGEND: HCPCS | Performed by: SURGERY

## 2024-04-26 PROCEDURE — 99239 HOSP IP/OBS DSCHRG MGMT >30: CPT | Performed by: INTERNAL MEDICINE

## 2024-04-26 PROCEDURE — RXMED WILLOW AMBULATORY MEDICATION CHARGE

## 2024-04-26 PROCEDURE — 2500000001 HC RX 250 WO HCPCS SELF ADMINISTERED DRUGS (ALT 637 FOR MEDICARE OP): Performed by: SURGERY

## 2024-04-26 PROCEDURE — 85610 PROTHROMBIN TIME: CPT | Performed by: SURGERY

## 2024-04-26 PROCEDURE — 85520 HEPARIN ASSAY: CPT | Performed by: SURGERY

## 2024-04-26 PROCEDURE — 82947 ASSAY GLUCOSE BLOOD QUANT: CPT

## 2024-04-26 PROCEDURE — 80053 COMPREHEN METABOLIC PANEL: CPT | Performed by: SURGERY

## 2024-04-26 RX ORDER — INSULIN LISPRO 100 [IU]/ML
10 INJECTION, SOLUTION INTRAVENOUS; SUBCUTANEOUS
Qty: 9 ML | Refills: 0 | Status: SHIPPED | OUTPATIENT
Start: 2024-04-26 | End: 2024-05-26

## 2024-04-26 RX ORDER — OXYCODONE AND ACETAMINOPHEN 5; 325 MG/1; MG/1
1 TABLET ORAL EVERY 6 HOURS PRN
Qty: 5 TABLET | Refills: 0 | Status: SHIPPED | OUTPATIENT
Start: 2024-04-26 | End: 2024-05-26

## 2024-04-26 RX ADMIN — PANTOPRAZOLE SODIUM 40 MG: 40 TABLET, DELAYED RELEASE ORAL at 08:09

## 2024-04-26 RX ADMIN — CLINDAMYCIN PHOSPHATE 600 MG: 600 INJECTION, SOLUTION INTRAVENOUS at 11:32

## 2024-04-26 RX ADMIN — TICAGRELOR 90 MG: 90 TABLET ORAL at 08:08

## 2024-04-26 RX ADMIN — NICOTINE 1 PATCH: 21 PATCH, EXTENDED RELEASE TRANSDERMAL at 08:07

## 2024-04-26 RX ADMIN — EZETIMIBE 10 MG: 10 TABLET ORAL at 08:07

## 2024-04-26 RX ADMIN — HYDROMORPHONE HYDROCHLORIDE 0.6 MG: 1 INJECTION, SOLUTION INTRAMUSCULAR; INTRAVENOUS; SUBCUTANEOUS at 11:34

## 2024-04-26 RX ADMIN — RANOLAZINE 500 MG: 500 TABLET, EXTENDED RELEASE ORAL at 08:07

## 2024-04-26 RX ADMIN — HYDROMORPHONE HYDROCHLORIDE 0.6 MG: 1 INJECTION, SOLUTION INTRAMUSCULAR; INTRAVENOUS; SUBCUTANEOUS at 08:05

## 2024-04-26 RX ADMIN — CARVEDILOL 6.25 MG: 6.25 TABLET, FILM COATED ORAL at 08:09

## 2024-04-26 RX ADMIN — INSULIN LISPRO 1 UNITS: 100 INJECTION, SOLUTION INTRAVENOUS; SUBCUTANEOUS at 08:25

## 2024-04-26 RX ADMIN — HYDRALAZINE HYDROCHLORIDE 50 MG: 25 TABLET ORAL at 08:09

## 2024-04-26 RX ADMIN — GABAPENTIN 400 MG: 400 CAPSULE ORAL at 08:10

## 2024-04-26 RX ADMIN — GABAPENTIN 400 MG: 400 CAPSULE ORAL at 15:39

## 2024-04-26 RX ADMIN — PANCRELIPASE 2 CAPSULE: 36000; 180000; 114000 CAPSULE, DELAYED RELEASE PELLETS ORAL at 08:09

## 2024-04-26 RX ADMIN — OXCARBAZEPINE 450 MG: 150 TABLET, FILM COATED ORAL at 08:06

## 2024-04-26 RX ADMIN — HYDROMORPHONE HYDROCHLORIDE 0.6 MG: 1 INJECTION, SOLUTION INTRAMUSCULAR; INTRAVENOUS; SUBCUTANEOUS at 03:38

## 2024-04-26 RX ADMIN — SACUBITRIL AND VALSARTAN 1 TABLET: 97; 103 TABLET, FILM COATED ORAL at 08:09

## 2024-04-26 RX ADMIN — INSULIN LISPRO 1 UNITS: 100 INJECTION, SOLUTION INTRAVENOUS; SUBCUTANEOUS at 13:29

## 2024-04-26 RX ADMIN — TAMSULOSIN HYDROCHLORIDE 0.4 MG: 0.4 CAPSULE ORAL at 08:07

## 2024-04-26 RX ADMIN — PANCRELIPASE 2 CAPSULE: 36000; 180000; 114000 CAPSULE, DELAYED RELEASE PELLETS ORAL at 11:33

## 2024-04-26 RX ADMIN — CLINDAMYCIN PHOSPHATE 600 MG: 600 INJECTION, SOLUTION INTRAVENOUS at 03:38

## 2024-04-26 RX ADMIN — HYDRALAZINE HYDROCHLORIDE 50 MG: 25 TABLET ORAL at 15:39

## 2024-04-26 RX ADMIN — HEPARIN SODIUM 1284 UNITS/HR: 10000 INJECTION, SOLUTION INTRAVENOUS at 10:30

## 2024-04-26 ASSESSMENT — COGNITIVE AND FUNCTIONAL STATUS - GENERAL
MOBILITY SCORE: 23
TOILETING: TOTAL
HELP NEEDED FOR BATHING: A LITTLE
DAILY ACTIVITIY SCORE: 17
DRESSING REGULAR UPPER BODY CLOTHING: A LITTLE
CLIMB 3 TO 5 STEPS WITH RAILING: A LITTLE
DRESSING REGULAR LOWER BODY CLOTHING: A LITTLE
PERSONAL GROOMING: A LITTLE

## 2024-04-26 ASSESSMENT — PAIN DESCRIPTION - ORIENTATION: ORIENTATION: LEFT

## 2024-04-26 ASSESSMENT — PAIN SCALES - GENERAL
PAINLEVEL_OUTOF10: 8

## 2024-04-26 ASSESSMENT — PAIN DESCRIPTION - DESCRIPTORS: DESCRIPTORS: ACHING;JABBING

## 2024-04-26 ASSESSMENT — PAIN DESCRIPTION - LOCATION: LOCATION: ANKLE

## 2024-04-26 ASSESSMENT — PAIN - FUNCTIONAL ASSESSMENT
PAIN_FUNCTIONAL_ASSESSMENT: 0-10

## 2024-04-26 NOTE — PROGRESS NOTES
04/26/24 1120   Discharge Planning   Living Arrangements Alone   Support Systems Family members;Parent   Assistance Needed none   Type of Residence Private residence   Home or Post Acute Services Other (Comment)  (outpatient PT/OT/ST at [facility])   Patient expects to be discharged to: mother's home   Does the patient need discharge transport arranged? No     Discharge planning assessment completed with patient. His mother is at bedside as well. Patient lives alone, plans to discharge to his mother's house for a few days. He confirmed his PCP is Stephanie Arce. Patient is independent at home, attends outpatient PT/OT/ST through the [facility] system. He plans to resume outpatient services when he is able to do so. Patient and mom are aware that Care Transitions is available should any needs arise.

## 2024-04-29 ENCOUNTER — PATIENT OUTREACH (OUTPATIENT)
Dept: PRIMARY CARE | Facility: CLINIC | Age: 59
End: 2024-04-29
Payer: COMMERCIAL

## 2024-04-29 NOTE — PROGRESS NOTES
Discharge Facility: [place]  Discharge Diagnosis: Vascular Insuffiency    Admission Date: 24 Apr 24  Discharge Date: 26 Apr 24    PCP Appointment Date: Tasked to office for scheduling  Specialist Appointment Date: 1 May 24 (Baudilio Patel)  Hospital Encounter and Summary: Linked    See discharge assessment below for further details     Engagement  Call Start Time: 0924 (4/29/2024  9:34 AM)    Medications  Medications reviewed with patient/caregiver?: Yes (4/29/2024  9:34 AM)  Is the patient having any side effects they believe may be caused by any medication additions or changes?: No (4/29/2024  9:34 AM)  Does the patient have all medications ordered at discharge?: Yes (4/29/2024  9:34 AM)  Prescription Comments: None (4/29/2024  9:34 AM)  Is the patient taking all medications as directed (includes completed medication regime)?: Yes (4/29/2024  9:34 AM)  Medication Comments: None (4/29/2024  9:34 AM)    Appointments  Does the patient have a primary care provider?: Yes (Tasked to office for scheduling.) (4/29/2024  9:34 AM)  Has the patient kept scheduled appointments due by today?: Yes (4/29/2024  9:34 AM)    Self Management  What is the home health agency?: N/A (4/29/2024  9:34 AM)  Has home health visited the patient within 72 hours of discharge?: Not applicable (4/29/2024  9:34 AM)  What Durable Medical Equipment (DME) was ordered?: N/A (4/29/2024  9:34 AM)    Patient Teaching  Does the patient have access to their discharge instructions?: Yes (4/29/2024  9:34 AM)  Care Management Interventions: Reviewed instructions with patient (4/29/2024  9:34 AM)  What is the patient's perception of their health status since discharge?: Improving (4/29/2024  9:34 AM)  Is the patient/caregiver able to teach back the hierarchy of who to call/visit for symptoms/problems? PCP, Specialist, Home Health nurse, Urgent Care, ED, 911: Yes (4/29/2024  9:34 AM)  Patient/Caregiver Education Comments: None (4/29/2024  9:34 AM)    Wrap  Up  Wrap Up Additional Comments: None (4/29/2024  9:34 AM)  Call End Time: 0934 (4/29/2024  9:34 AM)     Pt grateful for outreach call and is agreeable to being contacted in 2 wks to see how he is doing.

## 2024-04-29 NOTE — DISCHARGE SUMMARY
Discharge Diagnosis   58M hx CAD, PAD, HFrEF, HTN, COPD, BRUNA, CVA, DM2, CKD3, HCV, neuropathy, pancreatic insufficiency, GERD, BPH, cLBP, bipolar/PTSD, tobacco use hospitalized for acute limb ischemia     #acute limb ischemia, L:  #hx PAD s/p bypass:  threatened limb - intact strength/sensation, non-dopplerable pulses, cold. no e/o gangrene or infection. 4/24 cta w/ occluded femoral-popliteal bypass graft. compliant w/ ticagrelor/xarelto  - hold xarelto; heparin gtt  - home ticagrelor, statin; dilaudid prn  - vascular surgery consulted; f/u ck, lactate  - nv checks; counseled on smoking cessation  4/25: Vascular surgery recommendations appreciated.  Patient had catheter directed thrombolysis and thrombectomy.  Continue heparin drip.  Will discuss anticoagulation with the vascular surgery team.  4/26: Patient cleared for discharge home.  Continue Brilinta and Xarelto on discharge.  Patient to follow with vascular surgery clinic in 1 week's time.    #Residual right-sided hemiparesis from prior stroke     #CAD: stable; ticagrelor, statin, zetia, ranolazine  #HFrEF: compensated; entresto, coreg; off diuretics  #HTN: stable; coreg, entresto, hydral; hold isordil  #COPD: stable; duonebs prn  #BRUNA: home cpap  #hx CVA: no new deficits. ticagrelor, statin  #DM2: not at goal; iss q6h; 1/2 home glargine (10u)  #CKD3: stable, trend  #HCV: no e/o cirrhosis; outpt ID f/u  #neuropathy: home gabapentin  #pancreatic insufficiency: home creon  #GERD: home ppi  #BPH: home flomax  #cLBP: gabapentin; hold tizanidine (not taking)  #bipolar/PTSD: home trileptal; hold latuda (not taking)  #tobacco use disorder: nicotine prn; counseled     #FEN/GI: npo  #PPx: heparin gtt  #Lines/Tubes/Drains: piv  #Analgesia/Sedation: dilaudid  #Code: full  #Dispo: pending above eval  #Contact: joselin (wife) [phone]    Discharge Meds     Your medication list        CONTINUE taking these medications        Instructions Last Dose Given Next Dose Due  "  atorvastatin 80 mg tablet  Commonly known as: Lipitor           BD Insulin Syringe Ultra-Fine 1 mL 31 gauge x 5/16 syringe  Generic drug: insulin syringe-needle U-100      USE AS DIRECTED FOUR TIMES PER DAY       BD Ultra-Fine Micro Pen Needle 32 gauge x 1/4\" needle  Generic drug: pen needle, diabetic      Use to inject 1-4 times daily as directed       pen needle, diabetic 32 gauge x 5/32\" needle  Commonly known as: BD Ultra-Fine Jocy Pen Needle      Pen Needles for Insulin.       Brilinta 90 mg tablet  Generic drug: ticagrelor      Take 1 tablet (90 mg) by mouth 2 times a day.       carvedilol 6.25 mg tablet  Commonly known as: Coreg           Creon 36,000-114,000- 180,000 unit capsule,delayed release(DR/EC) capsule  Generic drug: pancrelipase (Lip-Prot-Amyl)           ELDERBERRY FRUIT ORAL           Entresto  mg tablet  Generic drug: sacubitriL-valsartan           ezetimibe 10 mg tablet  Commonly known as: Zetia           Farxiga 10 mg  Generic drug: dapagliflozin propanediol           flash glucose sensor kit kit      USE AS DIRECTED AND CHANGE EVERY 14 DAYS       FreeStyle En 2 Sensor kit  Generic drug: flash glucose sensor kit      use as directed daily and change every 14 days       FreeStyle En 2 [place] misc  Generic drug: flash glucose scanning reader      USE TO CHECK SUGARS FOUR TIMES A DAY       FreeStyle Precision [place] Strips strip  Generic drug: blood sugar diagnostic      1 each 2 times a day.       blood sugar diagnostic strip      Use as directed twice daily       furosemide 40 mg tablet  Commonly known as: Lasix           gabapentin 400 mg capsule  Commonly known as: Neurontin      Take 1 capsule (400 mg) by mouth 3 times a day.       JAIMEE ROOT EXTRACT ORAL           hydrALAZINE 50 mg tablet  Commonly known as: Apresoline           insulin lispro 100 unit/mL injection  Commonly known as: HumaLOG      Inject 10 Units under the skin 3 times a day with meals. Plus sliding scale if BS is " over 200       isosorbide mononitrate ER 60 mg 24 hr tablet  Commonly known as: Imdur           Lantus Solostar U-100 Insulin 100 unit/mL (3 mL) pen  Generic drug: insulin glargine      Inject 20 Units under the skin once daily at bedtime.       medical cannabis           nicotine 21 mg/24 hr patch  Commonly known as: Nicoderm CQ      APPLY 1 PATCH TO SKIN EVERY 24 HOURS AS DIRECTED       OXcarbazepine 150 mg tablet  Commonly known as: Trileptal           pantoprazole 40 mg EC tablet  Commonly known as: ProtoNix      TAKE 1 TABLET BY MOUTH ONCE DAILY       ranolazine 500 mg 12 hr tablet  Commonly known as: Ranexa           rivaroxaban 20 mg tablet  Commonly known as: Xarelto           tamsulosin 0.4 mg 24 hr capsule  Commonly known as: Flomax      TAKE 1 CAPSULE BY MOUTH ONCE DAILY       tiZANidine 2 mg tablet  Commonly known as: Zanaflex      Take 1 tablet (2 mg) by mouth once daily as needed for muscle spasms.              STOP taking these medications      lurasidone 40 mg tablet  Commonly known as: Latuda                  Where to Get Your Medications        These medications were sent to [facility]  [address] [id number]      Hours: 8AM to 6PM Mon-Fri, 8AM to 4PM Sat-Sun Phone: [phone]   insulin lispro 100 unit/mL injection         Test Results Pending At Discharge  Pending Labs       No current pending labs.            Hospital Course  58M hx CAD, PAD, HFrEF, HTN, COPD, BRUNA, CVA, DM2, CKD3, HCV, neuropathy, pancreatic insufficiency, GERD, BPH, cLBP, bipolar/PTSD, tobacco use presents for LLE pain     LLE pain started yesterday. initially waxing/waning, then persistent. had some loss of sensation and weakness but that resolved. pain whole leg, 10/10 initially but now 2-3 s/p dilaudid. no other new sx. no trauma, cp, sob, f/c, n/v, diarrhea. no bleeding, dysuria, ha, dizziness, cough.      PMH: CAD, PAD, HFrEF, HTN, COPD, BRUNA, CVA, DM2, CKD3, HCV, neuropathy, pancreatic  insufficiency, GERD, BPH, cLBP, bipolar/PTSD, tobacco use  PSH: CABG, RLE bypass/stent, knee/elbow, PCI, back  FH: n/a  SH: still smoking a few cigarettes/day, trying to quit. no etoh, drugs  Meds: see med rec for full. supps?  Allergies: see list     ED Course:  VSS. LLE cold w/ absent pulses on exam. labs/imaging w/ cr 1.3, ekg wnl. cta w/ occluded LLE bypass graft. consulted vascular on-call, rec'd heparin gtt, vascular consult in am. heparin started, given diladuid. admitted to medicine.     4/25: No acute events overnight.  Patient denies any leg pain although he has some back stiffness from lying in bed for the last 24 to 48 hours.  He denies any chest pain, shortness of breath, palpitation or lightheadedness.  He denies any abdominal pain.  Vascular surgery recommendations much appreciated.  Continue heparin drip.  Patient will require at least another 24 to 48 hours of inpatient service.  4/26: No acute events overnight.  Patient doing very well.  Vascular surgery recommendation much appreciated.  Plan to discharge home today.  Continue Brilinta and Xarelto.  Patient is to follow in the vascular surgery clinic in 1 week's time.    Pertinent Physical Exam At Time of Discharge  Physical Exam  CONSTITUTIONAL - alert, in no acute distress, not ill-appearing  CHEST - clear to auscultation, no wheezing, no crackles and no rales, good effort  CARDIAC - regular rate and regular rhythm, no murmur  ABDOMEN - no organomegaly, soft, nontender, nondistended, normal bowel sounds, no guarding/rebound/rigidity  EXTREMITIES - no edema, no deformities  NEUROLOGICAL - alert, oriented x3 and residual right-sided weakness  PSYCHIATRIC - alert, pleasant and cordial, age-appropriate   Outpatient Follow-Up  Future Appointments   Date Time Provider Department Center   5/1/2024 11:15 AM Haylie Tate MD [id number] [facility]   5/8/2024  4:00 PM [facility]   5/16/2024  3:15 PM Iban Peace, DO  [id number] [facility]   5/30/2024  9:00 AM Stephanie Arce, APRN-CNS [id number] [facility]     Discharge planning took more than 30 minutes    Ashley Celestin MD

## 2024-04-30 ENCOUNTER — PHARMACY VISIT (OUTPATIENT)
Dept: PHARMACY | Facility: CLINIC | Age: 59
End: 2024-04-30
Payer: MEDICAID

## 2024-04-30 PROCEDURE — RXMED WILLOW AMBULATORY MEDICATION CHARGE

## 2024-04-30 NOTE — PROGRESS NOTES
Subjective   Dereck Shen is a 58 y.o. male who presents for follow-up of Type 2 diabetes mellitus. The initial diagnosis of diabetes was made in 2009 .     He was found to have critical limb ischemia and recently underwent left leg angiogram with thrombectomy initiation lysis therapy.    He was discharged from pain clinics due to marijuana use years ago.      Known complications due to diabetes included peripheral neuropathy, cardiovascular disease, cerebrovascular disease, and CAD s/p CABG .  The patient underwent a right femoropopliteal to his right lower extremity in February 2020. He underwent a left femoral popliteal to his left lower extremity in September 2020. He had CABG in September 2019. He has CAD s/p 1 stent. He may need to undergo angioplasty of his left femoropopliteal bypass. He has had multiple CVAs. He recently had critical limb ischemia.      Cardiovascular risk factors include advanced age (older than 55 for men, 65 for women), diabetes mellitus, male gender, and microalbuminuria. The patient is on an ACE inhibitor or angiotensin II receptor blocker.  The patient has not been previously hospitalized due to diabetic ketoacidosis.     Current symptoms/problems include none. His clinical course has been stable.     The patient is currently checking the blood glucose multiple times per day.    Patient is using: continuous glucose monitor  Reportedly 150-180mg/dL  He got a replacement reader yesterday and had to send his old one back    Hypoglycemia frequency: 63mg/dL   Hypoglycemia awareness: N/A      Current Regimen  Lantus 20 units SQ bedtime  Lispro 10 units four times daily   Farxiga 10mg once daily        MEALS - GOLO diet  Breakfast - oatmeal with strawberries   Lunch - salad, tuna fish   Dinner - vegertables, fruit, nothing bigger than his fist, steak, fish  Snacks - walnuts, cashews   Beverages - diet coke, coffee, water, Glucerna     Review of Systems   Constitutional:  Positive for  "fatigue.   HENT:  Positive for hearing loss.    Respiratory:  Positive for apnea and shortness of breath.    Cardiovascular:  Positive for chest pain.   Gastrointestinal:  Positive for abdominal pain, constipation, diarrhea, nausea and vomiting.   Genitourinary:         BPH  Lack of erection    Musculoskeletal:  Positive for arthralgias, back pain and myalgias.   Neurological:  Positive for numbness.        Tingling    Psychiatric/Behavioral:  The patient is nervous/anxious.         Depression  Anxiety attacks    All other systems reviewed and are negative.      Objective   Visit Vitals  Ht 1.803 m (5' 11\")   Wt 90.3 kg (199 lb)   BMI 27.75 kg/m²   Smoking Status Former   BSA 2.13 m²         Physical Exam  Vitals and nursing note reviewed.   Constitutional:       General: He is not in acute distress.     Appearance: Normal appearance. He is normal weight.   HENT:      Head: Normocephalic and atraumatic.      Nose: Nose normal.      Mouth/Throat:      Mouth: Mucous membranes are moist.   Eyes:      Extraocular Movements: Extraocular movements intact.   Cardiovascular:      Rate and Rhythm: Normal rate and regular rhythm.   Pulmonary:      Effort: Pulmonary effort is normal.      Breath sounds: Normal breath sounds.   Musculoskeletal:         General: Normal range of motion.   Skin:     General: Skin is warm.   Neurological:      Mental Status: He is alert and oriented to person, place, and time.   Psychiatric:         Mood and Affect: Mood normal.         Lab Review  Glucose (mg/dL)   Date Value   04/26/2024 147 (H)   04/25/2024 148 (H)   04/25/2024 130 (H)     POC HEMOGLOBIN A1c (%)   Date Value   05/01/2024 7.3 (A)     Hemoglobin A1C (%)   Date Value   12/13/2023 7.0 (H)   09/27/2023 7.5 (H)   04/04/2023 7.4 (A)   08/30/2022 6.8 (A)   01/05/2022 7.8 (A)     Bicarbonate (mmol/L)   Date Value   04/26/2024 27   04/25/2024 27   04/25/2024 23     Urea Nitrogen (mg/dL)   Date Value   04/26/2024 19   04/25/2024 21 " "  04/25/2024 20     Creatinine (mg/dL)   Date Value   04/26/2024 1.14   04/25/2024 1.14   04/25/2024 1.03     Lab Results   Component Value Date    CHOL 257 (H) 04/04/2023    CHOL 131 04/18/2022    CHOL 117 01/05/2022     Lab Results   Component Value Date    HDL 38.3 (A) 04/04/2023    HDL 39.7 (A) 04/18/2022    HDL 34.7 (A) 01/05/2022     No results found for: \"LDLCALC\"  Lab Results   Component Value Date    TRIG 229 (H) 04/04/2023    TRIG 99 04/18/2022    TRIG 124 01/05/2022     Lab Results   Component Value Date    TSH 2.77 04/18/2022       Health Maintenance:   Foot Exam:  2019  Eye Exam:  April 18, 2024  Urine Albumin:  April 4, 2023    Assessment/Plan      58-year-old male presents for follow-up for type 2 diabetes.  His blood pressure is at goal. He is noted to be on a statin.    Type 2 diabetes mellitus (Multi)    To continue Lantus 20 units SQ at bedtime   To continue Lispro 10 units before meals  To continue sliding scale with Lispro insulin with meals:  150-200 - 2 units  201-250 - 4 units  251-300 - 6 units  301-350 - 8 units  >251 - 10 units  To continue Farxiga 10g once daily   To restart the use of the FreeStyle En CGM for the intensive glucose monitoring due to the dynamic nature of insulin requirements, the narrow therapeutic index of insulin and the potentially fatal consequences of treatment  Counseled that the goal A1C should be 7% or less  Counseled glycemic control is warranted to prevent microvascular complications         Long-term insulin use (Multi)  Please rotate insulin injection sites    For follow up in 4-5 months with glucose log      "

## 2024-04-30 NOTE — PATIENT INSTRUCTIONS
Thank you for choosing [facility]  for your health care needs.  If you have any questions, concerns or medical needs, please feel free to contact our office at [phone].    Please ensure you complete your blood work one week before the next scheduled appointment.    To continue Lantus 20 units SQ at bedtime   To continue Lispro 10 units before meals  To continue sliding scale with Lispro insulin with meals:  150-200 - 2 units  201-250 - 4 units  251-300 - 6 units  301-350 - 8 units  >251 - 10 units  To continue Farxiga 10g once daily   To restart the use of the FreeStyle En CGM   Counseled that the goal A1C should be 7% or less  Counseled glycemic control is warranted to prevent microvascular complications  For follow up in 4-5 months with glucose log

## 2024-05-01 ENCOUNTER — OFFICE VISIT (OUTPATIENT)
Dept: ENDOCRINOLOGY | Facility: CLINIC | Age: 59
End: 2024-05-01
Payer: COMMERCIAL

## 2024-05-01 VITALS — BODY MASS INDEX: 27.86 KG/M2 | WEIGHT: 199 LBS | HEIGHT: 71 IN

## 2024-05-01 DIAGNOSIS — E11.9 TYPE 2 DIABETES MELLITUS WITHOUT COMPLICATION, WITH LONG-TERM CURRENT USE OF INSULIN (MULTI): Primary | ICD-10-CM

## 2024-05-01 DIAGNOSIS — Z79.4 TYPE 2 DIABETES MELLITUS WITHOUT COMPLICATION, WITH LONG-TERM CURRENT USE OF INSULIN (MULTI): Primary | ICD-10-CM

## 2024-05-01 LAB — POC HEMOGLOBIN A1C: 7.3 % (ref 4.2–6.5)

## 2024-05-01 PROCEDURE — 99214 OFFICE O/P EST MOD 30 MIN: CPT | Performed by: INTERNAL MEDICINE

## 2024-05-01 PROCEDURE — 83036 HEMOGLOBIN GLYCOSYLATED A1C: CPT | Performed by: INTERNAL MEDICINE

## 2024-05-01 RX ORDER — LANCETS
1 EACH MISCELLANEOUS
Qty: 200 EACH | Refills: 11 | Status: SHIPPED | OUTPATIENT
Start: 2024-05-01 | End: 2025-05-01

## 2024-05-01 ASSESSMENT — ENCOUNTER SYMPTOMS
NAUSEA: 1
NUMBNESS: 1
APNEA: 1
NERVOUS/ANXIOUS: 1
ARTHRALGIAS: 1
SHORTNESS OF BREATH: 1
CONSTIPATION: 1
DIARRHEA: 1
BACK PAIN: 1
VOMITING: 1
FATIGUE: 1
ABDOMINAL PAIN: 1
MYALGIAS: 1

## 2024-05-02 ENCOUNTER — OFFICE VISIT (OUTPATIENT)
Dept: VASCULAR SURGERY | Facility: CLINIC | Age: 59
End: 2024-05-02
Payer: COMMERCIAL

## 2024-05-02 VITALS
HEART RATE: 73 BPM | SYSTOLIC BLOOD PRESSURE: 139 MMHG | WEIGHT: 195 LBS | BODY MASS INDEX: 27.2 KG/M2 | DIASTOLIC BLOOD PRESSURE: 80 MMHG

## 2024-05-02 DIAGNOSIS — I73.9 PAD (PERIPHERAL ARTERY DISEASE) (CMS-HCC): Primary | ICD-10-CM

## 2024-05-02 PROCEDURE — 3079F DIAST BP 80-89 MM HG: CPT | Performed by: SURGERY

## 2024-05-02 PROCEDURE — 3075F SYST BP GE 130 - 139MM HG: CPT | Performed by: SURGERY

## 2024-05-02 PROCEDURE — 99213 OFFICE O/P EST LOW 20 MIN: CPT | Performed by: SURGERY

## 2024-05-02 PROCEDURE — 1036F TOBACCO NON-USER: CPT | Performed by: SURGERY

## 2024-05-02 NOTE — PROGRESS NOTES
Subjective   Patient ID: Dereck Shen is a 58 y.o. male who presents for Follow-up (Hosp ).  HPI  Patient is overall doing well  Minimal pain and discomfort left foot at this time  Overall satisfied with current result  He is ambulatory still having some right-sided discomfort primarily in the quad muscle  Does state some coolness in the calf region.    Review of Systems    Objective   Physical Exam  Physical exam    Constitutional: alert and in no acute distress verbal  Eyes: No erythema swelling or discharge noted  Neck: supple, symmetric, trachea midline, no masses noted  Cardiovascular: Carotid pulses 2+, no obvious bruit, no Jugular distension noted, no thrill, heart regular rate, lower extremity vascular exam intact, cap refill <2 sec  Pulmonary:  Bilateral breath sounds intact, clear with rales rhonchi or wheeze  Abdomen: soft non tender, no pulsatile masses noted, no rebound rigidity or guarding noted  Skin: intact warm no abnormal turgor  Psychiatric: alert without any obvious cognitive issues, oriented to person, place, and time    Assessment/Plan   Status post thrombolytics left femoropopliteal bypass graft  Check arterial duplex bilateral lower extremities  Call with result  Short-term 4-week follow-up         Iban Peace DO 05/02/24 2:14 PM

## 2024-05-05 NOTE — ASSESSMENT & PLAN NOTE
To continue Lantus 20 units SQ at bedtime   To continue Lispro 10 units before meals  To continue sliding scale with Lispro insulin with meals:  150-200 - 2 units  201-250 - 4 units  251-300 - 6 units  301-350 - 8 units  >251 - 10 units  To continue Farxiga 10g once daily   To restart the use of the FreeStyle En CGM for the intensive glucose monitoring due to the dynamic nature of insulin requirements, the narrow therapeutic index of insulin and the potentially fatal consequences of treatment  Counseled that the goal A1C should be 7% or less  Counseled glycemic control is warranted to prevent microvascular complications

## 2024-05-06 DIAGNOSIS — I63.9 ISCHEMIC STROKE (MULTI): Primary | ICD-10-CM

## 2024-05-06 DIAGNOSIS — I50.42 CHRONIC COMBINED SYSTOLIC AND DIASTOLIC CHF (CONGESTIVE HEART FAILURE) (MULTI): ICD-10-CM

## 2024-05-06 DIAGNOSIS — J42 CHRONIC BRONCHITIS, UNSPECIFIED CHRONIC BRONCHITIS TYPE (MULTI): ICD-10-CM

## 2024-05-06 DIAGNOSIS — E11.42 DIABETIC POLYNEUROPATHY ASSOCIATED WITH TYPE 2 DIABETES MELLITUS (MULTI): ICD-10-CM

## 2024-05-06 PROCEDURE — RXMED WILLOW AMBULATORY MEDICATION CHARGE

## 2024-05-08 ENCOUNTER — APPOINTMENT (OUTPATIENT)
Dept: NUTRITION | Facility: HOSPITAL | Age: 59
End: 2024-05-08
Payer: COMMERCIAL

## 2024-05-09 ENCOUNTER — PHARMACY VISIT (OUTPATIENT)
Dept: PHARMACY | Facility: CLINIC | Age: 59
End: 2024-05-09
Payer: MEDICAID

## 2024-05-13 ENCOUNTER — APPOINTMENT (OUTPATIENT)
Dept: PRIMARY CARE | Facility: CLINIC | Age: 59
End: 2024-05-13

## 2024-05-14 ENCOUNTER — PATIENT OUTREACH (OUTPATIENT)
Dept: PRIMARY CARE | Facility: CLINIC | Age: 59
End: 2024-05-14
Payer: COMMERCIAL

## 2024-05-14 NOTE — PROGRESS NOTES
2 wk call back complete. Follow up appt made for outside 14 day window. Pt has no questions and states he is doing well at this time.

## 2024-05-15 ENCOUNTER — APPOINTMENT (OUTPATIENT)
Dept: VASCULAR MEDICINE | Facility: HOSPITAL | Age: 59
End: 2024-05-15
Payer: COMMERCIAL

## 2024-05-16 ENCOUNTER — APPOINTMENT (OUTPATIENT)
Dept: VASCULAR SURGERY | Facility: CLINIC | Age: 59
End: 2024-05-16
Payer: COMMERCIAL

## 2024-05-23 ENCOUNTER — HOSPITAL ENCOUNTER (OUTPATIENT)
Dept: VASCULAR MEDICINE | Facility: HOSPITAL | Age: 59
Discharge: HOME | End: 2024-05-23
Payer: COMMERCIAL

## 2024-05-23 DIAGNOSIS — I73.9 PVD (PERIPHERAL VASCULAR DISEASE) (CMS-HCC): ICD-10-CM

## 2024-05-23 DIAGNOSIS — I73.9 PAD (PERIPHERAL ARTERY DISEASE) (CMS-HCC): ICD-10-CM

## 2024-05-23 PROCEDURE — 93922 UPR/L XTREMITY ART 2 LEVELS: CPT | Performed by: INTERNAL MEDICINE

## 2024-05-23 PROCEDURE — 93925 LOWER EXTREMITY STUDY: CPT | Performed by: INTERNAL MEDICINE

## 2024-05-23 PROCEDURE — 93922 UPR/L XTREMITY ART 2 LEVELS: CPT

## 2024-05-23 PROCEDURE — 93925 LOWER EXTREMITY STUDY: CPT

## 2024-05-28 ENCOUNTER — TELEPHONE (OUTPATIENT)
Dept: VASCULAR SURGERY | Facility: CLINIC | Age: 59
End: 2024-05-28
Payer: COMMERCIAL

## 2024-05-28 PROCEDURE — RXMED WILLOW AMBULATORY MEDICATION CHARGE

## 2024-05-28 NOTE — TELEPHONE ENCOUNTER
Result Communication    Resulted Orders   Vascular US lower extremity arterial duplex bilateral    [facility]  [address] [id number]       Phone [phone] Fax [phone]       Vascular Lab Report     [facility] US LOWER EXTREMITY ARTERIAL DUPLEX BILATERAL    Patient Name:      CARMENZA OLEARY    Fernandez Physician:  [id number] Marylou Saleh MD, HANNAVI  Study Date:        5/23/2024            Ordering Provider:  [id number] SURINDER JERZY OWENS  MRN/PID:           [id number]             Fellow:  Accession#:        [id number]         Technologist:       Danielle Kelley                                                              RVT  Date of Birth/Age: 1965 / 58 years Technologist 2:  Gender:            M                    Encounter#:         [id number]  Admission Status:  Outpatient           Location Performed: [facility]       Diagnosis/ICD: Peripheral vascular disease, unspecified-I73.9  CPT Codes:     21700 Peripheral artery Lower arterial Duplex complete       **Report Amended**  Report Amended By: [id number] Marylou Saleh MD, RPVI      Date and Time: 5/23/2024 at 5:20:29 PM       CONCLUSIONS:     Right Lower Arterial: No evidence of hemodynamically significant stenosis found in the right lower extremity. Known occluded bypass graft noted in right lower extremity. There are two superficial femoral artery (SFA) stents noted in the right lower extremity. Both stents appear patent. Dampeed, monophasic flow in the profunda femoral artery.    Left Bypass Graft: The left femoropopliteal bypass graft appears widely patent. The bypass is constructed of PTFE graft.    Stents: Right lower extremity SFA Stent 1 velcoities: Pre stent:95 cm/s, Prox: 58 cm/s, Mid: 64.5 cm/s, Distal: 70 cm/s, Post stent: 65 cm/s    Right lower extremity SFA stent 2 Pre stent: 123 cm/s, Prox: 134 cm/s, Mid: 121 cm/s, Distal:  117 cm/s. Post: 119 cm/s.     Comparison:  Compared with study from 10/4/2023, left leg bypass graft is again patent; right leg not imaged on the prior study.     Imaging & Doppler Findings:     Left Fem-Pop Bypass Graft Surveillance:  Inflow Artery    240 cm/s  Prox Anast       82 cm/s  Prox Graft       38 cm/s  Prox/Mid Graft   38 cm/s  Mid Graft        32 cm/s  Mid/Distal Graft 33 cm/s  Distal Graft     31 cm/s  Distal Anast     24 cm/s  Outflow Artery   57 cm/s     Right                     Left    PSV                      PSV  99 cm/s         EIA  106 cm/s        CFA  22 cm/s  Profunda Proximal  76 cm/s    SFA Proximal  161 cm/s      SFA Mid  108 cm/s    SFA Distal  60 cm/s      Popliteal  75 cm/s    BARB Proximal  52 cm/s       BARB Mid  69 cm/s     BARB Distal  60 cm/s  Peroneal Proximal  53 cm/s    Peroneal Mid  37 cm/s   Peroneal Distal  61 cm/s    PTA Proximal  56 cm/s       PTA Mid  41 cm/s     PTA Distal       [id number] Marylou Saleh MD, VINI  Electronically signed by [id number] Marylou Saleh MD, VINI on 5/23/2024 at 5:20:29 PM         ** Final (Updated) **         12:02 PM      Results were not successfully communicated with the patient and they did not acknowledge their understanding.  Result Communication    Resulted Orders   Vascular US lower extremity [place] [address]       Phone [phone] Fax [phone]       Vascular Lab Report     [facility]OWER EXTREMITY [facility]ERIAL [facility]UP[facility]ILATERAL    Patient Name:      CARMENZA Presley Physician:  [id number] Marylou Saleh MD, RPVI  Study Date:        5/23/2024            Ordering Provider:  [id number] SURINDER OWESN  MRN/PID:           [id number]             Fellow:  Accession#:        [id number]         Technologist:       Danielle Kelley                                                               RVT  Date of Birth/Age: 1965 / 58 years Technologist 2:  Gender:            M                    Encounter#:         [id number]  Admission Status:  Outpatient           [facility] Performed[facility]       Diagnosis/ICD: Peripheral vascular disease, unspecified-I73.9  CPT Codes:     83238 Peripheral artery Lower arterial Duplex complete       **Report Amended**  Report Amended By: [id number] Marylou Saleh MD, RPVI      Date and Time: 5/23/2024 at 5:20:29 PM       CONCLUSIONS:     Right Lower Arterial: No evidence of hemodynamically significant stenosis found in the right lower extremity. Known occluded bypass graft noted in right lower extremity. There are two superficial femoral artery (SFA) stents noted in the right lower extremity. Both stents appear patent. Dampeed, monophasic flow in the profunda femoral artery.    Left Bypass Graft: The left femoropopliteal bypass graft appears widely patent. The bypass is constructed of PTFE graft.    Stents: Right lower extremity SFA Stent 1 velcoities: Pre stent:95 cm/s, Prox: 58 cm/s, Mid: 64.5 cm/s, Distal: 70 cm/s, Post stent: 65 cm/s    Right lower extremity SFA stent 2 Pre stent: 123 cm/s, Prox: 134 cm/s, Mid: 121 cm/s, Distal: 117 cm/s. Post: 119 cm/s.     Comparison:  Compared with study from 10/4/2023, left leg bypass graft is again patent; right leg not imaged on the prior study.     Imaging & Doppler Findings:     Left Fem-Pop Bypass Graft Surveillance:  Inflow Artery    240 cm/s  Prox Anast       82 cm/s  Prox Graft       38 cm/s  Prox/Mid Graft   38 cm/s  Mid Graft        32 cm/s  Mid/Distal Graft 33 cm/s  Distal Graft     31 cm/s  Distal Anast     24 cm/s  Outflow Artery   57 cm/s     Right                     Left    PSV                      PSV  99 cm/s         EIA  106 cm/s        CFA  22 cm/s  Profunda Proximal  76 cm/s    SFA Proximal  161 cm/s      SFA Mid  108 cm/s    SFA Distal  60 cm/s      Popliteal  75 cm/s    BARB Proximal  52 cm/s        BARB Mid  69 cm/s     BARB Distal  60 cm/s  Peroneal Proximal  53 cm/s    Peroneal Mid  37 cm/s   Peroneal Distal  61 cm/s    PTA Proximal  56 cm/s       PTA Mid  41 cm/s     PTA Distal       [id number] Marylou Saleh MD, RPVI  Electronically signed by [id number] Marylou Saleh MD, RPVI on 5/23/2024 at 5:20:29 PM         ** Final (Updated) **         12:02 PM      Results were not successfully communicated with the patient and they did not acknowledge their understanding.    Pt has an upcoming appt with Dr. Peace this week to discuss results

## 2024-05-29 ENCOUNTER — PATIENT OUTREACH (OUTPATIENT)
Dept: PRIMARY CARE | Facility: CLINIC | Age: 59
End: 2024-05-29
Payer: COMMERCIAL

## 2024-05-29 ENCOUNTER — PHARMACY VISIT (OUTPATIENT)
Dept: PHARMACY | Facility: CLINIC | Age: 59
End: 2024-05-29
Payer: MEDICAID

## 2024-05-29 PROCEDURE — RXMED WILLOW AMBULATORY MEDICATION CHARGE

## 2024-05-29 NOTE — PROGRESS NOTES
Unable to reach patient for one month discharge follow up call.   LVM with call back number for patient to call if needed   If no voicemail available call attempts x 2 were made to contact the patient to assist with any questions or concerns patient may have.

## 2024-05-30 ENCOUNTER — OFFICE VISIT (OUTPATIENT)
Dept: VASCULAR SURGERY | Facility: CLINIC | Age: 59
End: 2024-05-30
Payer: COMMERCIAL

## 2024-05-30 ENCOUNTER — APPOINTMENT (OUTPATIENT)
Dept: PRIMARY CARE | Facility: CLINIC | Age: 59
End: 2024-05-30
Payer: COMMERCIAL

## 2024-05-30 ENCOUNTER — PHARMACY VISIT (OUTPATIENT)
Dept: PHARMACY | Facility: CLINIC | Age: 59
End: 2024-05-30
Payer: MEDICAID

## 2024-05-30 VITALS
HEART RATE: 73 BPM | SYSTOLIC BLOOD PRESSURE: 99 MMHG | DIASTOLIC BLOOD PRESSURE: 64 MMHG | BODY MASS INDEX: 26.67 KG/M2 | WEIGHT: 191.2 LBS

## 2024-05-30 DIAGNOSIS — I73.9 PERIPHERAL ARTERIAL DISEASE (CMS-HCC): Primary | ICD-10-CM

## 2024-05-30 DIAGNOSIS — I99.8 LIMB ISCHEMIA: ICD-10-CM

## 2024-05-30 PROCEDURE — 3074F SYST BP LT 130 MM HG: CPT | Performed by: SURGERY

## 2024-05-30 PROCEDURE — 99213 OFFICE O/P EST LOW 20 MIN: CPT | Performed by: SURGERY

## 2024-05-30 PROCEDURE — 3078F DIAST BP <80 MM HG: CPT | Performed by: SURGERY

## 2024-05-30 NOTE — PROGRESS NOTES
Subjective   Patient ID: Dereck Shen is a 58 y.o. male who presents for No chief complaint on file..  HPI  Patient at this point overall doing very well has some neuropathic changes to the feet however he is active and amatory no calf claudication noted distance eyes back to his usual baseline  No other specific complaints concerns or questions in the office.    Review of Systems    Objective   Physical Exam  Physical exam    Constitutional: alert and in no acute distress verbal  Eyes: No erythema swelling or discharge noted  Neck: supple, symmetric, trachea midline, no masses noted  Cardiovascular: Carotid pulses 2+, no obvious bruit, no Jugular distension noted, no thrill, heart regular rate, lower extremity vascular exam intact, cap refill <2 sec  Pulmonary:  Bilateral breath sounds intact, clear with rales rhonchi or wheeze  Abdomen: soft non tender, no pulsatile masses noted, no rebound rigidity or guarding noted  Skin: intact warm no abnormal turgor  Psychiatric: alert without any obvious cognitive issues, oriented to person, place, and time    Assessment/Plan   Peripheral chill disease with recent critical limb ischemia left lower extremity status post percutaneous intervention  Arterial duplex doneMay 23 demonstrates patent bilateral lower extremities with mild vascular sufficiency of both lower extremities back to baseline  Repeat imaging in 6 months  This was ordered  Continue Brilinta and Eliquis  Continue activity and ambulation as tolerated  Follow-up in 6 months         Iban Peace DO 05/30/24 1:18 PM

## 2024-06-05 PROCEDURE — RXMED WILLOW AMBULATORY MEDICATION CHARGE

## 2024-06-06 ENCOUNTER — PHARMACY VISIT (OUTPATIENT)
Dept: PHARMACY | Facility: CLINIC | Age: 59
End: 2024-06-06
Payer: MEDICAID

## 2024-06-29 ENCOUNTER — PHARMACY VISIT (OUTPATIENT)
Dept: PHARMACY | Facility: CLINIC | Age: 59
End: 2024-06-29
Payer: MEDICAID

## 2024-06-29 PROCEDURE — RXMED WILLOW AMBULATORY MEDICATION CHARGE

## 2024-07-03 PROCEDURE — RXMED WILLOW AMBULATORY MEDICATION CHARGE

## 2024-07-06 ENCOUNTER — PHARMACY VISIT (OUTPATIENT)
Dept: PHARMACY | Facility: CLINIC | Age: 59
End: 2024-07-06
Payer: MEDICAID

## 2024-07-25 ENCOUNTER — PATIENT OUTREACH (OUTPATIENT)
Dept: PRIMARY CARE | Facility: CLINIC | Age: 59
End: 2024-07-25
Payer: COMMERCIAL

## 2024-07-25 NOTE — PROGRESS NOTES
90 day outreach complete. Pt questions and concerns addressed. Pt states they are doing well. Pt has graduated from [facility] program.

## 2024-07-29 PROCEDURE — RXMED WILLOW AMBULATORY MEDICATION CHARGE

## 2024-07-30 ENCOUNTER — PHARMACY VISIT (OUTPATIENT)
Dept: PHARMACY | Facility: CLINIC | Age: 59
End: 2024-07-30
Payer: MEDICAID

## 2024-07-31 PROCEDURE — RXMED WILLOW AMBULATORY MEDICATION CHARGE

## 2024-08-01 ENCOUNTER — PHARMACY VISIT (OUTPATIENT)
Dept: PHARMACY | Facility: CLINIC | Age: 59
End: 2024-08-01
Payer: MEDICAID

## 2024-08-07 PROCEDURE — RXMED WILLOW AMBULATORY MEDICATION CHARGE

## 2024-08-09 ENCOUNTER — PHARMACY VISIT (OUTPATIENT)
Dept: PHARMACY | Facility: CLINIC | Age: 59
End: 2024-08-09
Payer: MEDICAID

## 2024-08-14 PROCEDURE — RXMED WILLOW AMBULATORY MEDICATION CHARGE

## 2024-08-15 DIAGNOSIS — E11.9 TYPE 2 DIABETES MELLITUS WITHOUT COMPLICATION, WITH LONG-TERM CURRENT USE OF INSULIN (MULTI): ICD-10-CM

## 2024-08-15 DIAGNOSIS — Z79.4 TYPE 2 DIABETES MELLITUS WITHOUT COMPLICATION, WITH LONG-TERM CURRENT USE OF INSULIN (MULTI): ICD-10-CM

## 2024-08-15 DIAGNOSIS — I99.8 LIMB ISCHEMIA: ICD-10-CM

## 2024-08-15 RX ORDER — INSULIN GLARGINE 100 [IU]/ML
20 INJECTION, SOLUTION SUBCUTANEOUS NIGHTLY
Qty: 10 ML | Refills: 5 | Status: CANCELLED | OUTPATIENT
Start: 2024-08-15 | End: 2025-02-11

## 2024-08-15 RX ORDER — INSULIN GLARGINE 100 [IU]/ML
20 INJECTION, SOLUTION SUBCUTANEOUS NIGHTLY
Qty: 10 ML | Refills: 5 | Status: SHIPPED | OUTPATIENT
Start: 2024-08-15 | End: 2025-02-11

## 2024-08-15 RX ORDER — INSULIN LISPRO 100 [IU]/ML
10 INJECTION, SOLUTION INTRAVENOUS; SUBCUTANEOUS
Qty: 9 ML | Refills: 0 | Status: SHIPPED | OUTPATIENT
Start: 2024-08-15 | End: 2024-08-16 | Stop reason: SDUPTHER

## 2024-08-16 ENCOUNTER — PHARMACY VISIT (OUTPATIENT)
Dept: PHARMACY | Facility: CLINIC | Age: 59
End: 2024-08-16
Payer: MEDICAID

## 2024-08-16 DIAGNOSIS — I99.8 LIMB ISCHEMIA: ICD-10-CM

## 2024-08-16 PROCEDURE — RXMED WILLOW AMBULATORY MEDICATION CHARGE

## 2024-08-16 RX ORDER — INSULIN LISPRO 100 [IU]/ML
10 INJECTION, SOLUTION INTRAVENOUS; SUBCUTANEOUS
Qty: 15 ML | Refills: 0 | Status: SHIPPED | OUTPATIENT
Start: 2024-08-16 | End: 2024-10-05

## 2024-08-17 ENCOUNTER — PHARMACY VISIT (OUTPATIENT)
Dept: PHARMACY | Facility: CLINIC | Age: 59
End: 2024-08-17
Payer: MEDICAID

## 2024-08-26 PROCEDURE — RXMED WILLOW AMBULATORY MEDICATION CHARGE

## 2024-08-28 ENCOUNTER — PHARMACY VISIT (OUTPATIENT)
Dept: PHARMACY | Facility: CLINIC | Age: 59
End: 2024-08-28
Payer: MEDICAID

## 2024-08-30 ENCOUNTER — PHARMACY VISIT (OUTPATIENT)
Dept: PHARMACY | Facility: CLINIC | Age: 59
End: 2024-08-30
Payer: MEDICAID

## 2024-08-30 PROCEDURE — RXMED WILLOW AMBULATORY MEDICATION CHARGE

## 2024-09-12 ENCOUNTER — TELEPHONE (OUTPATIENT)
Dept: VASCULAR SURGERY | Facility: HOSPITAL | Age: 59
End: 2024-09-12
Payer: COMMERCIAL

## 2024-09-12 NOTE — TELEPHONE ENCOUNTER
Pt states he is having cramping in leg that is getting worse, warm to the touch. He is worried about a blood clot.      Per Dr. Peace pt advised to go to er.

## 2024-09-13 ENCOUNTER — APPOINTMENT (OUTPATIENT)
Dept: RADIOLOGY | Facility: HOSPITAL | Age: 59
End: 2024-09-13
Payer: COMMERCIAL

## 2024-09-13 ENCOUNTER — HOSPITAL ENCOUNTER (EMERGENCY)
Facility: HOSPITAL | Age: 59
Discharge: HOME | End: 2024-09-13
Attending: EMERGENCY MEDICINE
Payer: COMMERCIAL

## 2024-09-13 VITALS
DIASTOLIC BLOOD PRESSURE: 88 MMHG | HEART RATE: 61 BPM | HEIGHT: 70 IN | TEMPERATURE: 98.5 F | RESPIRATION RATE: 14 BRPM | BODY MASS INDEX: 26.48 KG/M2 | SYSTOLIC BLOOD PRESSURE: 108 MMHG | WEIGHT: 185 LBS | OXYGEN SATURATION: 98 %

## 2024-09-13 DIAGNOSIS — M79.605 ACUTE LEG PAIN, LEFT: ICD-10-CM

## 2024-09-13 DIAGNOSIS — I73.9 PAD (PERIPHERAL ARTERY DISEASE) (CMS-HCC): Primary | ICD-10-CM

## 2024-09-13 DIAGNOSIS — E86.0 MILD DEHYDRATION: ICD-10-CM

## 2024-09-13 LAB
ALBUMIN SERPL BCP-MCNC: 3.7 G/DL (ref 3.4–5)
ALP SERPL-CCNC: 101 U/L (ref 33–120)
ALT SERPL W P-5'-P-CCNC: 8 U/L (ref 10–52)
ANION GAP SERPL CALC-SCNC: 12 MMOL/L (ref 10–20)
APTT PPP: 43 SECONDS (ref 27–38)
AST SERPL W P-5'-P-CCNC: 9 U/L (ref 9–39)
BASOPHILS # BLD AUTO: 0.03 X10*3/UL (ref 0–0.1)
BASOPHILS NFR BLD AUTO: 0.5 %
BILIRUB SERPL-MCNC: 0.3 MG/DL (ref 0–1.2)
BUN SERPL-MCNC: 37 MG/DL (ref 6–23)
CALCIUM SERPL-MCNC: 8.2 MG/DL (ref 8.6–10.3)
CHLORIDE SERPL-SCNC: 104 MMOL/L (ref 98–107)
CO2 SERPL-SCNC: 25 MMOL/L (ref 21–32)
CREAT SERPL-MCNC: 1.53 MG/DL (ref 0.5–1.3)
EGFRCR SERPLBLD CKD-EPI 2021: 53 ML/MIN/1.73M*2
EOSINOPHIL # BLD AUTO: 0.07 X10*3/UL (ref 0–0.7)
EOSINOPHIL NFR BLD AUTO: 1.2 %
ERYTHROCYTE [DISTWIDTH] IN BLOOD BY AUTOMATED COUNT: 13.4 % (ref 11.5–14.5)
GLUCOSE SERPL-MCNC: 256 MG/DL (ref 74–99)
HCT VFR BLD AUTO: 42.2 % (ref 41–52)
HGB BLD-MCNC: 14.8 G/DL (ref 13.5–17.5)
IMM GRANULOCYTES # BLD AUTO: 0.03 X10*3/UL (ref 0–0.7)
IMM GRANULOCYTES NFR BLD AUTO: 0.5 % (ref 0–0.9)
INR PPP: 1.4 (ref 0.9–1.1)
LYMPHOCYTES # BLD AUTO: 1.61 X10*3/UL (ref 1.2–4.8)
LYMPHOCYTES NFR BLD AUTO: 27.2 %
MCH RBC QN AUTO: 32.4 PG (ref 26–34)
MCHC RBC AUTO-ENTMCNC: 35.1 G/DL (ref 32–36)
MCV RBC AUTO: 92 FL (ref 80–100)
MONOCYTES # BLD AUTO: 0.49 X10*3/UL (ref 0.1–1)
MONOCYTES NFR BLD AUTO: 8.3 %
NEUTROPHILS # BLD AUTO: 3.68 X10*3/UL (ref 1.2–7.7)
NEUTROPHILS NFR BLD AUTO: 62.3 %
NRBC BLD-RTO: 0 /100 WBCS (ref 0–0)
PLATELET # BLD AUTO: 233 X10*3/UL (ref 150–450)
POTASSIUM SERPL-SCNC: 5.4 MMOL/L (ref 3.5–5.3)
POTASSIUM SERPL-SCNC: 6.8 MMOL/L (ref 3.5–5.3)
PROT SERPL-MCNC: 7.1 G/DL (ref 6.4–8.2)
PROTHROMBIN TIME: 16.1 SECONDS (ref 9.8–12.8)
RBC # BLD AUTO: 4.57 X10*6/UL (ref 4.5–5.9)
SODIUM SERPL-SCNC: 134 MMOL/L (ref 136–145)
WBC # BLD AUTO: 5.9 X10*3/UL (ref 4.4–11.3)

## 2024-09-13 PROCEDURE — 75635 CT ANGIO ABDOMINAL ARTERIES: CPT

## 2024-09-13 PROCEDURE — 2500000001 HC RX 250 WO HCPCS SELF ADMINISTERED DRUGS (ALT 637 FOR MEDICARE OP): Performed by: NURSE PRACTITIONER

## 2024-09-13 PROCEDURE — 2550000001 HC RX 255 CONTRASTS: Performed by: EMERGENCY MEDICINE

## 2024-09-13 PROCEDURE — 85025 COMPLETE CBC W/AUTO DIFF WBC: CPT | Performed by: NURSE PRACTITIONER

## 2024-09-13 PROCEDURE — 85730 THROMBOPLASTIN TIME PARTIAL: CPT | Performed by: NURSE PRACTITIONER

## 2024-09-13 PROCEDURE — 2500000004 HC RX 250 GENERAL PHARMACY W/ HCPCS (ALT 636 FOR OP/ED): Performed by: NURSE PRACTITIONER

## 2024-09-13 PROCEDURE — 75635 CT ANGIO ABDOMINAL ARTERIES: CPT | Performed by: RADIOLOGY

## 2024-09-13 PROCEDURE — 85610 PROTHROMBIN TIME: CPT | Performed by: NURSE PRACTITIONER

## 2024-09-13 PROCEDURE — 99284 EMERGENCY DEPT VISIT MOD MDM: CPT | Mod: 25 | Performed by: NURSE PRACTITIONER

## 2024-09-13 PROCEDURE — 80053 COMPREHEN METABOLIC PANEL: CPT | Performed by: NURSE PRACTITIONER

## 2024-09-13 PROCEDURE — 36415 COLL VENOUS BLD VENIPUNCTURE: CPT | Performed by: NURSE PRACTITIONER

## 2024-09-13 PROCEDURE — 84132 ASSAY OF SERUM POTASSIUM: CPT | Performed by: NURSE PRACTITIONER

## 2024-09-13 PROCEDURE — 96360 HYDRATION IV INFUSION INIT: CPT | Performed by: NURSE PRACTITIONER

## 2024-09-13 RX ORDER — ACETAMINOPHEN 325 MG/1
975 TABLET ORAL ONCE
Status: COMPLETED | OUTPATIENT
Start: 2024-09-13 | End: 2024-09-13

## 2024-09-13 RX ORDER — OXYCODONE HYDROCHLORIDE 5 MG/1
5 TABLET ORAL EVERY 6 HOURS PRN
Qty: 12 TABLET | Refills: 0 | Status: SHIPPED | OUTPATIENT
Start: 2024-09-13 | End: 2024-09-16

## 2024-09-13 RX ORDER — OXYCODONE HYDROCHLORIDE 5 MG/1
5 TABLET ORAL EVERY 6 HOURS PRN
Qty: 12 TABLET | Refills: 0 | Status: SHIPPED | OUTPATIENT
Start: 2024-09-13 | End: 2024-09-13

## 2024-09-13 RX ORDER — OXYCODONE AND ACETAMINOPHEN 5; 325 MG/1; MG/1
1 TABLET ORAL ONCE
Status: COMPLETED | OUTPATIENT
Start: 2024-09-13 | End: 2024-09-13

## 2024-09-13 ASSESSMENT — PAIN DESCRIPTION - LOCATION
LOCATION: LEG
LOCATION: LEG

## 2024-09-13 ASSESSMENT — PAIN DESCRIPTION - ORIENTATION
ORIENTATION: LEFT
ORIENTATION: LEFT

## 2024-09-13 ASSESSMENT — LIFESTYLE VARIABLES
TOTAL SCORE: 0
HAVE PEOPLE ANNOYED YOU BY CRITICIZING YOUR DRINKING: NO
EVER FELT BAD OR GUILTY ABOUT YOUR DRINKING: NO
EVER HAD A DRINK FIRST THING IN THE MORNING TO STEADY YOUR NERVES TO GET RID OF A HANGOVER: NO
HAVE YOU EVER FELT YOU SHOULD CUT DOWN ON YOUR DRINKING: NO

## 2024-09-13 ASSESSMENT — PAIN DESCRIPTION - PAIN TYPE
TYPE: ACUTE PAIN
TYPE: ACUTE PAIN

## 2024-09-13 ASSESSMENT — PAIN SCALES - GENERAL
PAINLEVEL_OUTOF10: 9
PAINLEVEL_OUTOF10: 8
PAINLEVEL_OUTOF10: 8

## 2024-09-13 ASSESSMENT — PAIN - FUNCTIONAL ASSESSMENT
PAIN_FUNCTIONAL_ASSESSMENT: 0-10
PAIN_FUNCTIONAL_ASSESSMENT: 0-10

## 2024-09-13 ASSESSMENT — PAIN DESCRIPTION - FREQUENCY
FREQUENCY: CONSTANT/CONTINUOUS
FREQUENCY: CONSTANT/CONTINUOUS

## 2024-09-13 ASSESSMENT — PAIN DESCRIPTION - DESCRIPTORS: DESCRIPTORS: ACHING

## 2024-09-13 NOTE — ED PROVIDER NOTES
HPI   Chief Complaint   Patient presents with    leg pain concern for DVT     Left calf pain, started Sunday, pain is constant, movement makes the pain worse. History of blood clots in legs.  April 26th, Dr Peace had to revise the pt's left leg bypass surgery to clear blood clots out per pt.       Patient presents to the emergency department for evaluation of left calf pain beginning Sunday, 5 days ago. Patient has a history of severe CAD s/p 5v-CABG (L-Dg^LAD, S-R AM^PDA, S-LatCx 9/2019), PCI of the native Cx 1/4/2021 (grafts patent), chronic combined systolic and diastolic heart failure with severe LV dysfunction prior to CABG and moderate LV dysfunction after CABG such that he did not meet indications for ICD implant at that time, HTN, hyperlipidemia, recurrent strokes (most recently L MCA 8/2023 and thought to be atheroembolic), post-infarct seizures, severe lower extremity PAD with prior revascularization (R fem-pop bypass 2/24/20, L fem-pop bypass 9/14/20, L fem PTA of the graft, R iliac PTA 2/2022, occluded graft RLE by duplex 5/23/24, LLE and DANYA PTA 4/2024), RLE DVT 2/2022 managed by Dr. Peace.  He was last evaluated by Dr. Peace in May of this year and reports compliance with his Brilinta and Xarelto.  He presents to the emergency department today with concern for DVT as he has had them in the past.  Since [place] dog [place] to the game at the [facility] stadi on Sunday, he has had a persistent knot sensation in his left calf.  This is giving him concern for DVT.  He has not noticed any skin temperature changes, color changes, fever, open wounds or changes in baseline chest pain shortness of breath.  He has taken occasional Tylenol for his symptoms.  1 leg has not been more swollen than the other.  He has noticed over the last month with his claudication that he has not been able to push further without pain and actually the window of his push has narrowed as he gets pain sooner in the  left leg than he used to get causing concern for stent narrowing.  He has reduced his smoking and is currently down to 5 cigarettes a day.      History provided by:  Patient   used: No                          [place] Coma Scale Score: 15                  Patient History   Past Medical History:   Diagnosis Date    Abnormal electrocardiogram (ECG) (EKG)     Abnormal EKG    Acute on chronic combined systolic (congestive) and diastolic (congestive) heart failure (Multi) 10/18/2019    Acute on chronic combined systolic and diastolic congestive heart failure    Anxiety disorder, unspecified     Anxiety    Aphasia 07/06/2017    Aphasia, mixed    Atherosclerotic heart disease of native coronary artery with unspecified angina pectoris (CMS-[facility]) 11/05/2019    Atherosclerosis of native coronary artery of native heart with angina pectoris    Bipolar disorder, unspecified (Multi) 06/10/2020    Bipolar 1 disorder    Body mass index (BMI) 31.0-31.9, adult 10/18/2019    Body mass index (BMI) of 31.0 to 31.9 in adult    Chronic lumbar radiculopathy 09/22/2023    Chronic obstructive pulmonary disease with (acute) exacerbation (Multi) 12/13/2021    COPD exacerbation    Chronic obstructive pulmonary disease with (acute) exacerbation (Multi) 11/25/2020    Acute exacerbation of chronic obstructive pulmonary disease    Chronic systolic (congestive) heart failure (Multi) 02/09/2021    Chronic systolic congestive heart failure    Cutaneous abscess of groin 09/12/2019    Abscess of groin    Encounter for follow-up examination after completed treatment for conditions other than malignant neoplasm 09/12/2019    Hospital discharge follow-up    Encounter for screening for malignant neoplasm of prostate 09/12/2019    Screening for prostate cancer    Encounter for screening for other suspected endocrine disorder 09/12/2019    Screening for thyroid disorder    Headache, unspecified 09/10/2019    Chronic daily headache     Hemiplegia and hemiparesis following cerebral infarction affecting unspecified side (Multi) 06/21/2021    Hemiplegia following cerebrovascular accident (CVA)    Obesity, unspecified 10/18/2019    Obesity (BMI 30.0-34.9)    Obstructive sleep apnea (adult) (pediatric) 10/07/2019    Obstructive sleep apnea, adult    Old myocardial infarction     History of inferior wall myocardial infarction    Old myocardial infarction     History of myocardial infarction    Opioid abuse, in remission (Multi) 04/06/2020    History of opioid abuse    Other conditions influencing health status 11/25/2020    History of cough    Other specified abnormal findings of blood chemistry 09/12/2019    Low testosterone in male    Pain in unspecified joint 10/18/2019    Polyarthralgia    Personal history of other diseases of male genital organs 09/12/2019    History of erectile dysfunction    Personal history of other diseases of the circulatory system 11/05/2019    History of coronary artery disease    Personal history of other diseases of the digestive system 10/18/2019    History of constipation    Personal history of other diseases of the musculoskeletal system and connective tissue 07/18/2019    History of osteoarthritis    Personal history of other diseases of the musculoskeletal system and connective tissue     History of polymyalgia rheumatica    Personal history of other diseases of the musculoskeletal system and connective tissue     History of osteoporosis    Personal history of other diseases of the musculoskeletal system and connective tissue 07/18/2019    History of low back pain    Personal history of other diseases of the nervous system and sense organs 09/12/2019    History of peripheral neuropathy    Personal history of other endocrine, nutritional and metabolic disease 08/11/2020    History of type 2 diabetes mellitus    Personal history of other endocrine, nutritional and metabolic disease 10/18/2019    History of vitamin D  deficiency    Personal history of other infectious and parasitic diseases 10/18/2019    History of hepatitis C virus infection    Personal history of other mental and behavioral disorders     History of panic attacks    Personal history of pneumonia (recurrent) 09/12/2019    History of pneumonia    Post-traumatic stress disorder, unspecified     PTSD (post-traumatic stress disorder)    Postlaminectomy syndrome, not elsewhere classified 12/08/2015    Failed back syndrome of lumbar spine    Proteinuria, unspecified 10/25/2019    Proteinuria, unspecified type    Radiculopathy, lumbar region 07/20/2017    Chronic lumbar radiculopathy    Stroke (Multi) 07/24/2023    Unqualified visual loss, left eye, normal vision right eye 07/06/2017    Visual loss, left eye     Past Surgical History:   Procedure Laterality Date    BACK SURGERY  03/17/2017    Back Surgery    CORONARY ANGIOPLASTY  03/17/2017    PTCA    CT AORTA AND BILATERAL ILIOFEMORAL RUNOFF ANGIOGRAM W AND/OR WO IV CONTRAST  7/20/2020    CT AORTA AND BILATERAL ILIOFEMORAL RUNOFF ANGIOGRAM W AND/OR WO IV CONTRAST 7/20/2020 [facility]    CT AORTA AND BILATERAL ILIOFEMORAL RUNOFF ANGIOGRAM W AND/OR WO IV CONTRAST  1/14/2022    CT AORTA AND BILATERAL ILIOFEMORAL RUNOFF ANGIOGRAM W AND/OR WO IV CONTRAST 1/14/2022 [facility]    ELBOW SURGERY  02/10/2020    Elbow Surgery    KNEE SURGERY  06/02/2016    Knee Surgery Left    MR HEAD ANGIO WO IV CONTRAST  1/4/2022    MR HEAD ANGIO WO IV CONTRAST 1/4/2022 [facility]    MR NECK ANGIO WO IV CONTRAST  1/4/2022    MR NECK ANGIO WO IV CONTRAST 1/4/2022 [facility]    OTHER SURGICAL HISTORY  06/02/2016    Dental Surgery    OTHER SURGICAL HISTORY  09/12/2019    Cardiac catheterization with stent placement    OTHER SURGICAL HISTORY  09/30/2019    Coronary artery bypass graft    OTHER SURGICAL HISTORY  10/21/2020    Vascular surgical procedure    OTHER SURGICAL HISTORY  06/07/2019    Tonsillectomy     "OTHER SURGICAL HISTORY  09/21/2020    Femoropopliteal bypass     Family History   Problem Relation Name Age of Onset    No Known Problems Mother      No Known Problems Father       Social History     Tobacco Use    Smoking status: Every Day     Current packs/day: 0.50     Types: Cigarettes     Passive exposure: Current (5-7 cigarettes a day)    Smokeless tobacco: Never   Vaping Use    Vaping status: Never Used   Substance Use Topics    Alcohol use: Never    Drug use: Yes     Types: Marijuana     Comment: Medical card       Physical Exam   Visit Vitals  /88   Pulse 61   Temp 36.9 °C (98.5 °F)   Resp 14   Ht 1.778 m (5' 10\")   Wt 83.9 kg (185 lb)   SpO2 98%   BMI 26.54 kg/m²   Smoking Status Every Day   BSA 2.04 m²      Physical Exam  Vitals reviewed.   Constitutional:       General: He is not in acute distress.     Appearance: Normal appearance.   HENT:      Head: Normocephalic and atraumatic.      Mouth/Throat:      Lips: Pink.      Mouth: Mucous membranes are moist.   Cardiovascular:      Rate and Rhythm: Normal rate and regular rhythm.      Pulses:           Dorsalis pedis pulses are 2+ on the right side and detected w/ Doppler on the left side.        Posterior tibial pulses are 2+ on the right side and 2+ on the left side.      Heart sounds: No murmur heard.     Comments: The left calf measures 36 cm, the right calf measures 37 cm.  There is no localized foot/ankle edema bilaterally.  No palpable cords to the bilateral lower extremities.  Clinically does not appear to have a DVT.  The Doppler to the left pedal pulse is minimally heard with the left posterior tibial bounding.   Pulmonary:      Effort: Pulmonary effort is normal.      Breath sounds: Normal breath sounds. No wheezing or rhonchi.   Abdominal:      General: Bowel sounds are normal.      Palpations: Abdomen is soft.      Tenderness: There is no abdominal tenderness.   Musculoskeletal:      Cervical back: Full passive range of motion without pain " and neck supple.      Right lower leg: No edema.      Left lower leg: No edema.      Comments: CHOI randomly. No bony tenderness to the BLE.    Skin:     General: Skin is warm and dry.      Capillary Refill: Capillary refill takes less than 2 seconds.      Findings: No erythema.   Neurological:      General: No focal deficit present.      Mental Status: He is alert and oriented to person, place, and time.         CT angio aorta and bilateral iliofemoral runoff w and or wo IV contrast   Final Result        VASCULAR:        1. Left Lower Extremity: Occlusion of the left femoropopliteal artery   bypass graft which now demonstrates extrinsic compression by a   prominent lymph node near its origin. There is also chronic occlusion   of the left native SFA with reconstitution as a small caliber   diseased popliteal artery. Below the left knee, there is only faint   opacification of the trifurcation which also demonstrates proximal   disease. Further evaluation is limited due to suboptimal   opacification, consider catheter angiography for better evaluation.        2. Right Lower Extremity: Occluded right femoral to above the knee   popliteal artery bypass graft. There is an endovascular stent that   extends from the distal right common iliac artery through the   proximal SFA which contains areas of noncalcified disease resulting   in mild stenosis proximally. There is a bare segment of the right SFA   that demonstrates heavily calcified disease in its distal most   portion resulting in focal moderate to severe stenosis. The distal   3rd of the SFA contains a stent which terminates in the above the   knee popliteal artery. The stent demonstrates segments of extrinsic   compression by adjacent bulky calcific disease resulting in moderate   stenosis of its mid segment. The remainder of the stent is patent   below the knee, there is faint opacification of very small caliber   tibial vessels. Further evaluation is limited due to  suboptimal   opacification, consider catheter angiography for better evaluation.        3. No thoracic or abdominal aortic aneurysm or acute aortic pathology.        4. Within limitations of non-delayed phase imaging, there is no   definitive evidence of deep venous thrombosis. However there is   persistent clinical concern for deep venous thrombosis ultrasound can   be performed for further evaluation.        NON-VASCULAR:   1. Nonspecific bilateral lymphadenopathy, not significantly changed   compared to prior examination dating back to 02/15/2022.   2. No acute abnormality of the chest, abdomen or pelvis.             I personally reviewed the images/study and I agree with the findings   as stated by resident Momo Bajwa. This study was interpreted   at [place], [place].        MACRO:   Critical Finding:  See findings. Notification was initiated on   9/13/2024 at 4:18 pm by  Momo Bajwa.  (**-YCF-**) Instructions:        Signed by: Cory Fuentes 9/13/2024 5:11 PM   Dictation workstation:   [id number]          Labs Reviewed   COMPREHENSIVE METABOLIC PANEL - Abnormal       Result Value    Glucose 256 (*)     Sodium 134 (*)     Potassium 6.8 (*)     Chloride 104      Bicarbonate 25      Anion Gap 12      Urea Nitrogen 37 (*)     Creatinine 1.53 (*)     eGFR 53 (*)     Calcium 8.2 (*)     Albumin 3.7      Alkaline Phosphatase 101      Total Protein 7.1      AST 9      Bilirubin, Total 0.3      ALT 8 (*)    APTT - Abnormal    aPTT 43 (*)     Narrative:     The APTT is no longer used for monitoring Unfractionated Heparin Therapy. For monitoring Heparin Therapy, use the Heparin Assay.   PROTIME-INR - Abnormal    Protime 16.1 (*)     INR 1.4 (*)    POTASSIUM - Abnormal    Potassium 5.4 (*)    CBC WITH AUTO DIFFERENTIAL    WBC 5.9      nRBC 0.0      RBC 4.57      Hemoglobin 14.8      Hematocrit 42.2      MCV 92      MCH 32.4      MCHC 35.1      RDW 13.4       Platelets 233      Neutrophils % 62.3      Immature Granulocytes %, Automated 0.5      Lymphocytes % 27.2      Monocytes % 8.3      Eosinophils % 1.2      Basophils % 0.5      Neutrophils Absolute 3.68      Immature Granulocytes Absolute, Automated 0.03      Lymphocytes Absolute 1.61      Monocytes Absolute 0.49      Eosinophils Absolute 0.07      Basophils Absolute 0.03         ED Course & MDM     Medical Decision Making  Patient's clinical exam is more consistent with peripheral artery disease than that of a DVT.  Patient had presented with concern for DVT.  Given his clinical exam and pulses, as discussed with ER attending, plan is for CTA of the aorta with runoff to evaluate patency of his femoropopliteal bypass and graft revascularization.  Patient has no changes in baseline chest pain or shortness of breath suggestive of PE although this was considered.  His pulse ox is 99% and again he has no clinical evidence of DVT.  Plan is for baseline labs, renal function and CTA of the aorta with runoff.  Patient provides consent.        ED Course as of 09/13/24 1820   Fri Sep 13, 2024   1435 POTASSIUM(!!): 6.8  Repeat ordered [NA]   1436 EGFR(!): 53 [NA]   1437 Patient's BUN and creatinine are elevated at 37 and 1.53 respectively.  His GFR is 53 therefore he is ready for CTA of the aorta with runoff.  Review of his last EF is 40% from May 2023 therefore he was given a 500 cc fluid bolus over 2 hours. [NA]   1555 POTASSIUM(!): 5.4  No need for hyperkalemia meds per Dr. Salamanca. [NA]   1800 POTASSIUM(!): 5.4 [SG]   1815 Results received from radiology.  Patient reevaluated by myself and Dr. Saldana.  We reevaluated pulses in which we were able to both Doppler pedal and posterior tibial pulses bilaterally.  Skin remains pink warm and dry with no pallor and no coolness.  Patient prefers outpatient management.  He has tolerated 500 cc of IV fluid bolus and has no shortness of breath.  He is tolerating oral fluids.  He is made  aware of his renal function today and the importance of oral rehydration over the weekend as he declines repeat kidney function testing prior to disposition as consultation to Dr. Wright vascular services on-call advises patient can continue outpatient treatment and management with Brilinta and Xarelto as previously prescribed.  Plan is for a dose of Tylenol prior to departure with a short course oxycodone.  Patient will contact his primary care provider on Monday for a BMP recheck and contact Dr Peace as well for close follow-up with vascular services. Strict return precautions discussed. Patient is non-toxic, not hypoxic and appropriate for this outpatient management plan which they prefer. Encouragement to arrange close follow up was discussed as well as provided in a written handout of discharge instructions. Patient was educated on signs of symptoms to watch for indicative of re-evaluation in the emergency department setting to include any worsening of current symptoms. They verbalized understanding of instructions and is amenable to this treatment plan with no social determinants of health that would obscure this plan. Patient departed in stable condition.  [NA]      ED Course User Index  [NA] Cande Lopez, APRN-CNP  [SG] Cherelle Saldana MD         Diagnoses as of 09/13/24 1820   PAD (peripheral artery disease) (CMS-[facility])   Acute leg pain, left   Mild dehydration          Your medication list        START taking these medications        Instructions Last Dose Given Next Dose Due   oxyCODONE 5 mg immediate release tablet  Commonly known as: Roxicodone      Take 1 tablet (5 mg) by mouth every 6 hours if needed for severe pain (7 - 10) for up to 3 days.              CONTINUE taking these medications        Instructions Last Dose Given Next Dose Due   BD Insulin Syringe Ultra-Fine 1 mL 31 gauge x 5/16 syringe  Generic drug: insulin syringe-needle U-100      USE AS DIRECTED FOUR TIMES PER DAY       BD  "Ultra-Fine Micro Pen Needle 32 gauge x 1/4\" needle  Generic drug: pen needle, diabetic      Use to inject 1-4 times daily as directed       pen needle, diabetic 32 gauge x 5/32\" needle  Commonly known as: BD Ultra-Fine Jocy Pen Needle      Pen Needles for Insulin.       flash glucose sensor kit kit      USE AS DIRECTED AND CHANGE EVERY 14 DAYS       FreeStyle En 2 Sensor kit  Generic drug: flash glucose sensor kit      use as directed daily and change every 14 days       FreeStyle En 2 [place] misc  Generic drug: flash glucose scanning reader      USE TO CHECK SUGARS FOUR TIMES A DAY       lancets misc      1 each 4 times a day before meals.              ASK your doctor about these medications        Instructions Last Dose Given Next Dose Due   atorvastatin 80 mg tablet  Commonly known as: Lipitor           carvedilol 6.25 mg tablet  Commonly known as: Coreg           Creon 36,000-114,000- 180,000 unit capsule,delayed release(DR/EC) capsule  Generic drug: pancrelipase (Lip-Prot-Amyl)           ELDERBERRY FRUIT ORAL           Entresto  mg tablet  Generic drug: sacubitriL-valsartan           ezetimibe 10 mg tablet  Commonly known as: Zetia           Farxiga 10 mg  Generic drug: dapagliflozin propanediol           FreeStyle Precision Aristeo Strips strip  Generic drug: blood sugar diagnostic      1 each 2 times a day.       FreeStyle Precision Aristeo Strips strip  Generic drug: blood sugar diagnostic      Use as directed twice daily       furosemide 40 mg tablet  Commonly known as: Lasix           gabapentin 400 mg capsule  Commonly known as: Neurontin      Take 1 capsule (400 mg) by mouth 3 times a day.       JAIMEE ROOT EXTRACT ORAL           hydrALAZINE 50 mg tablet  Commonly known as: Apresoline           insulin lispro 100 unit/mL injection  Commonly known as: HumaLOG      Inject 10 Units under the skin 3 times a day with meals. Plus sliding scale if BS is over 200       isosorbide mononitrate ER 60 mg 24 hr " tablet  Commonly known as: Imdur           Lantus Solostar U-100 Insulin 100 unit/mL (3 mL) pen  Generic drug: insulin glargine      Inject 20 Units under the skin once daily at bedtime.       medical cannabis           nicotine 21 mg/24 hr patch  Commonly known as: Nicoderm CQ      APPLY 1 PATCH TO SKIN EVERY 24 HOURS AS DIRECTED       OXcarbazepine 150 mg tablet  Commonly known as: Trileptal           pantoprazole 40 mg EC tablet  Commonly known as: ProtoNix      TAKE 1 TABLET BY MOUTH ONCE DAILY       ranolazine 500 mg 12 hr tablet  Commonly known as: Ranexa           rivaroxaban 20 mg tablet  Commonly known as: Xarelto           tamsulosin 0.4 mg 24 hr capsule  Commonly known as: Flomax      TAKE 1 CAPSULE BY MOUTH ONCE DAILY       tiZANidine 2 mg tablet  Commonly known as: Zanaflex      Take 1 tablet (2 mg) by mouth once daily as needed for muscle spasms.                 Where to Get Your Medications        These medications were sent to [id number] [place] [id number] [address] [address]      Phone: [phone]   oxyCODONE 5 mg immediate release tablet         Procedure  None     *This report was transcribed using voice recognition software.  Every effort was made to ensure accuracy; however, inadvertent computerized transcription errors may be present.*  ORESTES Benoit  09/13/24         ORESTES Benoit  09/13/24 [id number]

## 2024-09-16 ENCOUNTER — TELEPHONE (OUTPATIENT)
Dept: VASCULAR SURGERY | Facility: HOSPITAL | Age: 59
End: 2024-09-16
Payer: COMMERCIAL

## 2024-09-18 ENCOUNTER — APPOINTMENT (OUTPATIENT)
Dept: VASCULAR SURGERY | Facility: HOSPITAL | Age: 59
End: 2024-09-18
Payer: COMMERCIAL

## 2024-09-19 ENCOUNTER — TELEPHONE (OUTPATIENT)
Dept: PRIMARY CARE | Facility: CLINIC | Age: 59
End: 2024-09-19

## 2024-09-19 ENCOUNTER — LAB (OUTPATIENT)
Dept: LAB | Facility: LAB | Age: 59
End: 2024-09-19
Payer: COMMERCIAL

## 2024-09-19 ENCOUNTER — TELEPHONE (OUTPATIENT)
Dept: CARDIOLOGY | Facility: HOSPITAL | Age: 59
End: 2024-09-19

## 2024-09-19 ENCOUNTER — OFFICE VISIT (OUTPATIENT)
Dept: VASCULAR SURGERY | Facility: HOSPITAL | Age: 59
End: 2024-09-19
Payer: COMMERCIAL

## 2024-09-19 ENCOUNTER — PHARMACY VISIT (OUTPATIENT)
Dept: PHARMACY | Facility: CLINIC | Age: 59
End: 2024-09-19
Payer: MEDICAID

## 2024-09-19 VITALS
DIASTOLIC BLOOD PRESSURE: 72 MMHG | WEIGHT: 185 LBS | BODY MASS INDEX: 26.54 KG/M2 | SYSTOLIC BLOOD PRESSURE: 109 MMHG | HEART RATE: 67 BPM

## 2024-09-19 DIAGNOSIS — U07.1 COVID: Primary | ICD-10-CM

## 2024-09-19 DIAGNOSIS — E87.5 HYPERKALEMIA: ICD-10-CM

## 2024-09-19 DIAGNOSIS — I70.213 ATHEROSCLEROSIS OF NATIVE ARTERY OF BOTH LOWER EXTREMITIES WITH INTERMITTENT CLAUDICATION (CMS-HCC): Primary | ICD-10-CM

## 2024-09-19 DIAGNOSIS — I73.9 PERIPHERAL ARTERIAL DISEASE (CMS-HCC): ICD-10-CM

## 2024-09-19 DIAGNOSIS — I70.213 ATHEROSCLEROSIS OF NATIVE ARTERY OF BOTH LOWER EXTREMITIES WITH INTERMITTENT CLAUDICATION (CMS-HCC): ICD-10-CM

## 2024-09-19 DIAGNOSIS — I99.8 LIMB ISCHEMIA: Primary | ICD-10-CM

## 2024-09-19 LAB
ANION GAP SERPL CALC-SCNC: 16 MMOL/L (ref 10–20)
BUN SERPL-MCNC: 27 MG/DL (ref 6–23)
CALCIUM SERPL-MCNC: 8.2 MG/DL (ref 8.6–10.3)
CHLORIDE SERPL-SCNC: 100 MMOL/L (ref 98–107)
CO2 SERPL-SCNC: 22 MMOL/L (ref 21–32)
CREAT SERPL-MCNC: 1.55 MG/DL (ref 0.5–1.3)
EGFRCR SERPLBLD CKD-EPI 2021: 52 ML/MIN/1.73M*2
GLUCOSE SERPL-MCNC: 156 MG/DL (ref 74–99)
POTASSIUM SERPL-SCNC: 4.6 MMOL/L (ref 3.5–5.3)
SODIUM SERPL-SCNC: 133 MMOL/L (ref 136–145)

## 2024-09-19 PROCEDURE — 3074F SYST BP LT 130 MM HG: CPT | Performed by: SURGERY

## 2024-09-19 PROCEDURE — 80048 BASIC METABOLIC PNL TOTAL CA: CPT

## 2024-09-19 PROCEDURE — RXMED WILLOW AMBULATORY MEDICATION CHARGE

## 2024-09-19 PROCEDURE — 3078F DIAST BP <80 MM HG: CPT | Performed by: SURGERY

## 2024-09-19 PROCEDURE — 99213 OFFICE O/P EST LOW 20 MIN: CPT | Performed by: SURGERY

## 2024-09-19 PROCEDURE — 36415 COLL VENOUS BLD VENIPUNCTURE: CPT

## 2024-09-19 RX ORDER — OXYCODONE HYDROCHLORIDE 5 MG/1
5 TABLET ORAL EVERY 6 HOURS PRN
Qty: 15 TABLET | Refills: 0 | Status: SHIPPED | OUTPATIENT
Start: 2024-09-19 | End: 2024-09-26

## 2024-09-19 NOTE — H&P (VIEW-ONLY)
Subjective   Patient ID: Dereck Shen is a 58 y.o. male who presents for Follow-up (DVT/PAD ).  HPI  Patient is here for follow-up secondary to peripheral arterial disease  It does appear that his left femoropopliteal bypass graft is down  He is having pain with short distance claudication primarily calf and foot at rest and in a dependent position improved    Review of Systems    Objective   Physical Exam  Physical exam    Constitutional: alert and in no acute distress verbal  Eyes: No erythema swelling or discharge noted  Neck: supple, symmetric, trachea midline, no masses noted  Cardiovascular: Carotid pulses 2+, no obvious bruit, no Jugular distension noted, no thrill, heart regular rate, lower extremity vascular exam intact, cap refill <2 sec  Pulmonary:  Bilateral breath sounds intact, clear with rales rhonchi or wheeze  Abdomen: soft non tender, no pulsatile masses noted, no rebound rigidity or guarding noted  Skin: intact warm no abnormal turgor  Psychiatric: alert without any obvious cognitive issues, oriented to person, place, and time    Assessment/Plan   Peripheral arterial disease  4-week history of short distance left foot claudication pain  CT reviewed demonstrating femoropopliteal bypass graft is occluded  Patient undergo angio next week with endovascular team           Iban Peace DO 09/19/24 3:49 PM

## 2024-09-19 NOTE — TELEPHONE ENCOUNTER
Dr. Wright and Dr. Peace discussed his case.  Recommendation is for peripheral angio possible PTA next week with Dr. Wright.    Orders placed.  Called patient with plan.   He is agreeable and will go first thing in the morning for the labs.  He tells me multiple people in his family currently have Covid so he is planning on going to ER to be tested.  Discussed with Ty and sent her a message to have someone review labs and then call to get it scheduled tomorrow for next week likely with hydration.

## 2024-09-19 NOTE — TELEPHONE ENCOUNTER
Unable to take Paxlovid due to his blood thinner medications. Will need to know his symptoms to see if we can do symptoms management. With any acute symptoms, will need to be seen in the ER. Thank you!

## 2024-09-20 ENCOUNTER — TELEPHONE (OUTPATIENT)
Dept: CARDIOLOGY | Facility: HOSPITAL | Age: 59
End: 2024-09-20
Payer: COMMERCIAL

## 2024-09-20 NOTE — TELEPHONE ENCOUNTER
Spoke with patients mother said kidney levels have been little off wanted to know if that would be an issue.     Current Covid symptoms: fatigue

## 2024-09-20 NOTE — TELEPHONE ENCOUNTER
Patient notified. States surgery is now 10/4/2024. States he is not on Plavix he is on Xarelto ans Berlinta. Spoke with geremias it is still a Blood thinner. Not able to take Paxlovid with any blood thinner. Patient notified

## 2024-09-20 NOTE — TELEPHONE ENCOUNTER
Called cath instructions to patient including review of date and arrival time.  Verified no need for dye prep.  Labs reviewed.  Nothing to eat or drink after midnight except Carvedilol/brilinta with a sip of water the morning of the cath. Please hold Xarelto 2 days prior and morning of procedure. You will need a .  Bring your ID, insurance cards and either a perfect list of the medications you are swallowing or the medications in original bottles.  Wear comfortable clothing.  There is a possibility of staying over night for observation so pack a bag with necessities for that.  Patient correctly repeated instructions, verbalized understanding and is agreeable to the plan.                 Called patient and spoke with him about the procedure that Kyle is going to do. Told him about his three hour hydration arrival time 7:30 and start time will be 10:30.     Nurse will need to call with instructions

## 2024-09-20 NOTE — TELEPHONE ENCOUNTER
9/20/24  8569  This is a plan of care note for a 58 year old patient with a history of CVA, CAD, PVD, DM, CHF who was referred for a LE angiogram.    Patient has no allergy to IV dye  Patient has an elevated serum creatinine  Patient has recent labs  Patient does not take metformin  Patient does take OAC    Called to give pre procedure     instructions for LE angiogram to include  Date of procedure, show time of procedure and procedure time  NPO after midnight x for Coreg and Brilinta.  To hold Xarelto 2 days before procedure.   To have a ride due to sedation  To shower the night before and wear loose comfortable clothes  To bring an overnight bag in case of overnight stay  To bring an ID card, insurance card and list of medications.    Patient verbalized understanding of instructions; did ask which method of entry Dr. Wright was going to use and if he could be put under for the procedure.    Per Dr. Wright the groin approach will be used and if patient requested he will need general anesthesia.      Task sent to Ty to call anesthesia.    Will inform patient.    ----- Message from Nurse Sanaz TREVIZO sent at 9/19/2024  3:41 PM EDT -----  Regarding: Needs peripheral angiogram next week  Needs peripheral angiogram next week.  Recently had SAMEERA.  Getting repeat BMP early tomorrow.  He may need hydration based on labs.  May be a good idea to see results prior to scheduling.  
Patient/Caregiver provided printed discharge information.

## 2024-09-20 NOTE — TELEPHONE ENCOUNTER
Called patient to schedule procedure and he told me he tested positive for COVID today. I said we will have to go out seven days then and he will have to re test before coming back in

## 2024-09-20 NOTE — TELEPHONE ENCOUNTER
There's already a message in there with Cardiology. They are aware that he tested positive for COVID and have recommended that he reschedule.

## 2024-09-20 NOTE — TELEPHONE ENCOUNTER
Patient called and asked if pcp can call  to see if patient can take the paxlovid for his surgery,surgeon wanted to discuss it

## 2024-09-23 ENCOUNTER — TELEPHONE (OUTPATIENT)
Dept: CARDIOLOGY | Facility: HOSPITAL | Age: 59
End: 2024-09-23
Payer: COMMERCIAL

## 2024-09-28 ENCOUNTER — PHARMACY VISIT (OUTPATIENT)
Dept: PHARMACY | Facility: CLINIC | Age: 59
End: 2024-09-28
Payer: MEDICAID

## 2024-09-28 DIAGNOSIS — I99.8 LIMB ISCHEMIA: ICD-10-CM

## 2024-09-28 PROCEDURE — RXMED WILLOW AMBULATORY MEDICATION CHARGE

## 2024-09-28 RX ORDER — INSULIN LISPRO 100 [IU]/ML
10 INJECTION, SOLUTION INTRAVENOUS; SUBCUTANEOUS
Qty: 15 ML | Refills: 0 | Status: SHIPPED | OUTPATIENT
Start: 2024-09-28 | End: 2024-10-28

## 2024-10-01 ENCOUNTER — APPOINTMENT (OUTPATIENT)
Dept: ENDOCRINOLOGY | Facility: CLINIC | Age: 59
End: 2024-10-01
Payer: COMMERCIAL

## 2024-10-01 VITALS
HEIGHT: 70 IN | WEIGHT: 189.4 LBS | SYSTOLIC BLOOD PRESSURE: 120 MMHG | DIASTOLIC BLOOD PRESSURE: 66 MMHG | BODY MASS INDEX: 27.11 KG/M2 | HEART RATE: 69 BPM

## 2024-10-01 DIAGNOSIS — Z79.4 TYPE 2 DIABETES MELLITUS WITHOUT COMPLICATION, WITH LONG-TERM CURRENT USE OF INSULIN (MULTI): Primary | ICD-10-CM

## 2024-10-01 DIAGNOSIS — E11.9 TYPE 2 DIABETES MELLITUS WITHOUT COMPLICATION, WITH LONG-TERM CURRENT USE OF INSULIN (MULTI): Primary | ICD-10-CM

## 2024-10-01 DIAGNOSIS — Z79.4 LONG-TERM INSULIN USE (MULTI): ICD-10-CM

## 2024-10-01 LAB — POC HEMOGLOBIN A1C: 8.2 % (ref 4.2–6.5)

## 2024-10-01 PROCEDURE — 99214 OFFICE O/P EST MOD 30 MIN: CPT | Performed by: INTERNAL MEDICINE

## 2024-10-01 PROCEDURE — 3078F DIAST BP <80 MM HG: CPT | Performed by: INTERNAL MEDICINE

## 2024-10-01 PROCEDURE — 3074F SYST BP LT 130 MM HG: CPT | Performed by: INTERNAL MEDICINE

## 2024-10-01 PROCEDURE — 3008F BODY MASS INDEX DOCD: CPT | Performed by: INTERNAL MEDICINE

## 2024-10-01 PROCEDURE — 83036 HEMOGLOBIN GLYCOSYLATED A1C: CPT | Performed by: INTERNAL MEDICINE

## 2024-10-01 PROCEDURE — 95251 CONT GLUC MNTR ANALYSIS I&R: CPT | Performed by: INTERNAL MEDICINE

## 2024-10-01 RX ORDER — LEVETIRACETAM 100 MG/ML
1500 SOLUTION ORAL 2 TIMES DAILY
COMMUNITY
Start: 2024-08-29

## 2024-10-01 RX ORDER — TICAGRELOR 90 MG/1
90 TABLET ORAL 2 TIMES DAILY
COMMUNITY
Start: 2024-07-30

## 2024-10-01 ASSESSMENT — ENCOUNTER SYMPTOMS
UNEXPECTED WEIGHT CHANGE: 1
ARTHRALGIAS: 1
APNEA: 1
SLEEP DISTURBANCE: 1
FATIGUE: 1
DIARRHEA: 1
ROS SKIN COMMENTS: DRY SKIN
WEAKNESS: 1
CONSTIPATION: 1
NUMBNESS: 1
NERVOUS/ANXIOUS: 1
MYALGIAS: 1
BACK PAIN: 1

## 2024-10-01 NOTE — PROGRESS NOTES
Subjective   Dereck Shen is a 58 y.o. male who presents for follow-up of Type 2 diabetes mellitus. The initial diagnosis of diabetes was made in 2009 .     He was found to have critical limb ischemia and recently underwent left leg angiogram with thrombectomy initiation lysis therapy.  He has to have his leg redone again on 10/4 as he has 100% blockage to left SFA.      He recently had COVID     Known complications due to diabetes included peripheral neuropathy, cardiovascular disease, cerebrovascular disease, and CAD s/p CABG .  The patient underwent a right femoropopliteal to his right lower extremity in February 2020. He underwent a left femoral popliteal to his left lower extremity in September 2020. He had CABG in September 2019. He has CAD s/p 1 stent. He may need to undergo angioplasty of his left femoropopliteal bypass. He has had multiple CVAs. He recently had critical limb ischemia.      Cardiovascular risk factors include advanced age (older than 55 for men, 65 for women), diabetes mellitus, male gender, and microalbuminuria. The patient is on an ACE inhibitor or angiotensin II receptor blocker.  The patient has not been previously hospitalized due to diabetic ketoacidosis.     Current symptoms/problems include none. His clinical course has been stable.     The patient is currently checking the blood glucose multiple times per day.    Patient is using: continuous glucose monitor                                                            Hypoglycemia frequency: 0%  Hypoglycemia awareness: N/A      Current Regimen  Lantus 20 units SQ bedtime  Lispro 10 units four times daily   Farxiga 10mg once daily        MEALS -   Breakfast - oatmeal with strawberries   Lunch - salad, tuna fish   Dinner - vegetables, fillet mignon, fruit   Snacks - strawberries, bananas  Beverages - diet coke, coffee, water, Glucerna     Review of Systems   Constitutional:  Positive for fatigue and unexpected weight change (Weight  "loss).   Respiratory:  Positive for apnea.    Gastrointestinal:  Positive for constipation and diarrhea.   Genitourinary:         Nocturia x 1   Musculoskeletal:  Positive for arthralgias, back pain and myalgias.   Skin:         Dry skin    Neurological:  Positive for weakness and numbness.        Tingling    Psychiatric/Behavioral:  Positive for sleep disturbance. The patient is nervous/anxious.         Depression   Panic attacks  Memory loss   All other systems reviewed and are negative.      Objective   Visit Vitals  Smoking Status Every Day     Visit Vitals  /66 (BP Location: Left arm, Patient Position: Sitting, BP Cuff Size: Adult)   Pulse 69   Ht 1.778 m (5' 10\")   Wt 85.9 kg (189 lb 6.4 oz)   BMI 27.18 kg/m²   Smoking Status Every Day   BSA 2.06 m²           Physical Exam  Vitals and nursing note reviewed.   Constitutional:       General: He is not in acute distress.     Appearance: Normal appearance. He is normal weight.   HENT:      Head: Normocephalic and atraumatic.      Nose: Nose normal.      Mouth/Throat:      Mouth: Mucous membranes are moist.   Eyes:      Extraocular Movements: Extraocular movements intact.   Pulmonary:      Effort: Pulmonary effort is normal.   Musculoskeletal:         General: Normal range of motion.   Neurological:      Mental Status: He is alert and oriented to person, place, and time.   Psychiatric:         Mood and Affect: Mood normal.         Lab Review  Lab Results   Component Value Date    HGBA1C 8.2 (A) 10/01/2024     Glucose (mg/dL)   Date Value   09/19/2024 156 (H)   09/13/2024 256 (H)   04/26/2024 147 (H)     POC HEMOGLOBIN A1c (%)   Date Value   05/01/2024 7.3 (A)     Hemoglobin A1C (%)   Date Value   12/13/2023 7.0 (H)   09/27/2023 7.5 (H)   04/04/2023 7.4 (A)   08/30/2022 6.8 (A)   01/05/2022 7.8 (A)     Bicarbonate (mmol/L)   Date Value   09/19/2024 22   09/13/2024 25   04/26/2024 27     Urea Nitrogen (mg/dL)   Date Value   09/19/2024 27 (H)   09/13/2024 37 (H) " "  04/26/2024 19     Creatinine (mg/dL)   Date Value   09/19/2024 1.55 (H)   09/13/2024 1.53 (H)   04/26/2024 1.14     Lab Results   Component Value Date    CHOL 257 (H) 04/04/2023    CHOL 131 04/18/2022    CHOL 117 01/05/2022     Lab Results   Component Value Date    HDL 38.3 (A) 04/04/2023    HDL 39.7 (A) 04/18/2022    HDL 34.7 (A) 01/05/2022     No results found for: \"LDLCALC\"  Lab Results   Component Value Date    TRIG 229 (H) 04/04/2023    TRIG 99 04/18/2022    TRIG 124 01/05/2022     Lab Results   Component Value Date    TSH 2.77 04/18/2022       Health Maintenance:   Foot Exam:  2019  Eye Exam:  April 18, 2024  Urine Albumin:  April 4, 2023    CGM Interpretation/Plan   14 day CGM download was reviewed in detail as documented above and will be attached to chart.  A minimum of 72 hours of glucose data was used to inform the management plan outlined below.  46% of values is within target range.      Assessment/Plan      58-year-old male presents for follow-up for type 2 diabetes.  His blood pressure is at goal. He is noted to be on a statin.    Type 2 diabetes mellitus (Multi)  To continue Lantus 20 units SQ at bedtime   To increase Lispro to 14 units before meals  To continue sliding scale with Lispro insulin with meals:  150-200 - 2 units  201-250 - 4 units  251-300 - 6 units  301-350 - 8 units  >251 - 10 units  To continue Farxiga 10g once daily   To continue the use of your CGM for the intensive glucose monitoring due to the dynamic nature of insulin requirements, the narrow therapeutic index of insulin and the potentially fatal consequences of treatment  Counseled that the time in range should be >70%  Counseled that the goal A1C should be 7% or less  Counseled glycemic control is warranted to prevent microvascular complications      Long-term insulin use (Multi)  Please rotate insulin injection sites    For follow up in 4- months with glucose log       "

## 2024-10-01 NOTE — PATIENT INSTRUCTIONS
Thank you for choosing [facility]  for your health care needs.  If you have any questions, concerns or medical needs, please feel free to contact our office at [phone].    Please ensure you complete your blood work one week before the next scheduled appointment.    To continue Lantus 20 units SQ at bedtime   To increase Lispro to 14 units before meals  To continue sliding scale with Lispro insulin with meals:  150-200 - 2 units  201-250 - 4 units  251-300 - 6 units  301-350 - 8 units  >251 - 10 units  To continue Farxiga 10g once daily   To continue the use of your CGM for the intensive glucose monitoring due to the dynamic nature of insulin requirements, the narrow therapeutic index of insulin and the potentially fatal consequences of treatment  Counseled that the time in range should be >70%  Counseled that the goal A1C should be 7% or less  Counseled glycemic control is warranted to prevent microvascular complications  For follow up in 4- months with glucose log

## 2024-10-04 ENCOUNTER — HOSPITAL ENCOUNTER (OUTPATIENT)
Facility: HOSPITAL | Age: 59
Setting detail: OUTPATIENT SURGERY
Discharge: HOME | End: 2024-10-04
Attending: INTERNAL MEDICINE | Admitting: INTERNAL MEDICINE
Payer: COMMERCIAL

## 2024-10-04 ENCOUNTER — PHARMACY VISIT (OUTPATIENT)
Dept: PHARMACY | Facility: CLINIC | Age: 59
End: 2024-10-04
Payer: MEDICAID

## 2024-10-04 ENCOUNTER — ANESTHESIA (OUTPATIENT)
Dept: CARDIOLOGY | Facility: HOSPITAL | Age: 59
End: 2024-10-04
Payer: COMMERCIAL

## 2024-10-04 ENCOUNTER — APPOINTMENT (OUTPATIENT)
Dept: CARDIOLOGY | Facility: HOSPITAL | Age: 59
End: 2024-10-04
Payer: COMMERCIAL

## 2024-10-04 ENCOUNTER — ANESTHESIA EVENT (OUTPATIENT)
Dept: CARDIOLOGY | Facility: HOSPITAL | Age: 59
End: 2024-10-04
Payer: COMMERCIAL

## 2024-10-04 VITALS
DIASTOLIC BLOOD PRESSURE: 85 MMHG | OXYGEN SATURATION: 100 % | HEART RATE: 67 BPM | RESPIRATION RATE: 16 BRPM | TEMPERATURE: 97.7 F | SYSTOLIC BLOOD PRESSURE: 145 MMHG

## 2024-10-04 DIAGNOSIS — I70.713: ICD-10-CM

## 2024-10-04 DIAGNOSIS — I73.9 PERIPHERAL ARTERIAL DISEASE (CMS-HCC): ICD-10-CM

## 2024-10-04 DIAGNOSIS — I99.8 LIMB ISCHEMIA: Primary | ICD-10-CM

## 2024-10-04 DIAGNOSIS — I70.623: ICD-10-CM

## 2024-10-04 DIAGNOSIS — I70.213 ATHEROSCLEROSIS OF NATIVE ARTERY OF BOTH LOWER EXTREMITIES WITH INTERMITTENT CLAUDICATION (CMS-HCC): Primary | ICD-10-CM

## 2024-10-04 DIAGNOSIS — I99.8 LIMB ISCHEMIA: ICD-10-CM

## 2024-10-04 LAB
ATRIAL RATE: 65 BPM
P AXIS: 14 DEGREES
P OFFSET: 186 MS
P ONSET: 151 MS
PR INTERVAL: 134 MS
Q ONSET: 218 MS
QRS COUNT: 11 BEATS
QRS DURATION: 82 MS
QT INTERVAL: 422 MS
QTC CALCULATION(BAZETT): 438 MS
QTC FREDERICIA: 433 MS
R AXIS: 12 DEGREES
T AXIS: 94 DEGREES
T OFFSET: 429 MS
VENTRICULAR RATE: 65 BPM

## 2024-10-04 PROCEDURE — 3700000002 HC GENERAL ANESTHESIA TIME - EACH INCREMENTAL 1 MINUTE: Performed by: INTERNAL MEDICINE

## 2024-10-04 PROCEDURE — 2500000004 HC RX 250 GENERAL PHARMACY W/ HCPCS (ALT 636 FOR OP/ED): Performed by: INTERNAL MEDICINE

## 2024-10-04 PROCEDURE — 36247 INS CATH ABD/L-EXT ART 3RD: CPT

## 2024-10-04 PROCEDURE — 93005 ELECTROCARDIOGRAM TRACING: CPT

## 2024-10-04 PROCEDURE — 75716 ARTERY X-RAYS ARMS/LEGS: CPT | Performed by: INTERNAL MEDICINE

## 2024-10-04 PROCEDURE — 7100000002 HC RECOVERY ROOM TIME - EACH INCREMENTAL 1 MINUTE: Performed by: INTERNAL MEDICINE

## 2024-10-04 PROCEDURE — 7100000009 HC PHASE TWO TIME - INITIAL BASE CHARGE: Performed by: INTERNAL MEDICINE

## 2024-10-04 PROCEDURE — C1894 INTRO/SHEATH, NON-LASER: HCPCS | Performed by: INTERNAL MEDICINE

## 2024-10-04 PROCEDURE — C1769 GUIDE WIRE: HCPCS | Performed by: INTERNAL MEDICINE

## 2024-10-04 PROCEDURE — 7100000001 HC RECOVERY ROOM TIME - INITIAL BASE CHARGE: Performed by: INTERNAL MEDICINE

## 2024-10-04 PROCEDURE — 96373 THER/PROPH/DIAG INJ IA: CPT | Performed by: INTERNAL MEDICINE

## 2024-10-04 PROCEDURE — 2500000004 HC RX 250 GENERAL PHARMACY W/ HCPCS (ALT 636 FOR OP/ED): Performed by: NURSE ANESTHETIST, CERTIFIED REGISTERED

## 2024-10-04 PROCEDURE — 2550000001 HC RX 255 CONTRASTS: Performed by: INTERNAL MEDICINE

## 2024-10-04 PROCEDURE — 99152 MOD SED SAME PHYS/QHP 5/>YRS: CPT | Performed by: INTERNAL MEDICINE

## 2024-10-04 PROCEDURE — 7100000010 HC PHASE TWO TIME - EACH INCREMENTAL 1 MINUTE: Performed by: INTERNAL MEDICINE

## 2024-10-04 PROCEDURE — 2500000004 HC RX 250 GENERAL PHARMACY W/ HCPCS (ALT 636 FOR OP/ED): Performed by: NURSE PRACTITIONER

## 2024-10-04 PROCEDURE — RXMED WILLOW AMBULATORY MEDICATION CHARGE

## 2024-10-04 PROCEDURE — 2720000007 HC OR 272 NO HCPCS: Performed by: INTERNAL MEDICINE

## 2024-10-04 PROCEDURE — 3700000001 HC GENERAL ANESTHESIA TIME - INITIAL BASE CHARGE: Performed by: INTERNAL MEDICINE

## 2024-10-04 PROCEDURE — 93010 ELECTROCARDIOGRAM REPORT: CPT | Performed by: STUDENT IN AN ORGANIZED HEALTH CARE EDUCATION/TRAINING PROGRAM

## 2024-10-04 PROCEDURE — 2500000004 HC RX 250 GENERAL PHARMACY W/ HCPCS (ALT 636 FOR OP/ED): Performed by: ANESTHESIOLOGY

## 2024-10-04 RX ORDER — ONDANSETRON HYDROCHLORIDE 2 MG/ML
INJECTION, SOLUTION INTRAVENOUS AS NEEDED
Status: DISCONTINUED | OUTPATIENT
Start: 2024-10-04 | End: 2024-10-04

## 2024-10-04 RX ORDER — HYDRALAZINE HYDROCHLORIDE 20 MG/ML
5 INJECTION INTRAMUSCULAR; INTRAVENOUS EVERY 30 MIN PRN
Status: DISCONTINUED | OUTPATIENT
Start: 2024-10-04 | End: 2024-10-04 | Stop reason: HOSPADM

## 2024-10-04 RX ORDER — MORPHINE SULFATE 2 MG/ML
2 INJECTION, SOLUTION INTRAMUSCULAR; INTRAVENOUS EVERY 5 MIN PRN
Status: DISCONTINUED | OUTPATIENT
Start: 2024-10-04 | End: 2024-10-04 | Stop reason: HOSPADM

## 2024-10-04 RX ORDER — LIDOCAINE HYDROCHLORIDE 20 MG/ML
INJECTION, SOLUTION INFILTRATION; PERINEURAL AS NEEDED
Status: DISCONTINUED | OUTPATIENT
Start: 2024-10-04 | End: 2024-10-04 | Stop reason: HOSPADM

## 2024-10-04 RX ORDER — ALBUTEROL SULFATE 0.83 MG/ML
2.5 SOLUTION RESPIRATORY (INHALATION) ONCE AS NEEDED
Status: DISCONTINUED | OUTPATIENT
Start: 2024-10-04 | End: 2024-10-04 | Stop reason: HOSPADM

## 2024-10-04 RX ORDER — MEPERIDINE HYDROCHLORIDE 25 MG/ML
12.5 INJECTION INTRAMUSCULAR; INTRAVENOUS; SUBCUTANEOUS EVERY 10 MIN PRN
Status: DISCONTINUED | OUTPATIENT
Start: 2024-10-04 | End: 2024-10-04 | Stop reason: HOSPADM

## 2024-10-04 RX ORDER — PROPOFOL 10 MG/ML
INJECTION, EMULSION INTRAVENOUS AS NEEDED
Status: DISCONTINUED | OUTPATIENT
Start: 2024-10-04 | End: 2024-10-04

## 2024-10-04 RX ORDER — IODIXANOL 320 MG/ML
INJECTION, SOLUTION INTRAVASCULAR AS NEEDED
Status: DISCONTINUED | OUTPATIENT
Start: 2024-10-04 | End: 2024-10-04 | Stop reason: HOSPADM

## 2024-10-04 RX ORDER — OXYCODONE AND ACETAMINOPHEN 5; 325 MG/1; MG/1
1 TABLET ORAL EVERY 6 HOURS PRN
Qty: 15 TABLET | Refills: 0 | Status: SHIPPED | OUTPATIENT
Start: 2024-10-04

## 2024-10-04 RX ORDER — DROPERIDOL 2.5 MG/ML
0.62 INJECTION, SOLUTION INTRAMUSCULAR; INTRAVENOUS ONCE AS NEEDED
Status: DISCONTINUED | OUTPATIENT
Start: 2024-10-04 | End: 2024-10-04 | Stop reason: HOSPADM

## 2024-10-04 RX ORDER — MIDAZOLAM HYDROCHLORIDE 1 MG/ML
INJECTION, SOLUTION INTRAMUSCULAR; INTRAVENOUS AS NEEDED
Status: DISCONTINUED | OUTPATIENT
Start: 2024-10-04 | End: 2024-10-04

## 2024-10-04 RX ORDER — SODIUM CHLORIDE 9 MG/ML
100 INJECTION, SOLUTION INTRAVENOUS CONTINUOUS
Status: DISCONTINUED | OUTPATIENT
Start: 2024-10-04 | End: 2024-10-04 | Stop reason: HOSPADM

## 2024-10-04 RX ORDER — LIDOCAINE HYDROCHLORIDE 10 MG/ML
0.1 INJECTION, SOLUTION EPIDURAL; INFILTRATION; INTRACAUDAL; PERINEURAL ONCE
Status: DISCONTINUED | OUTPATIENT
Start: 2024-10-04 | End: 2024-10-04 | Stop reason: HOSPADM

## 2024-10-04 RX ORDER — DIPHENHYDRAMINE HYDROCHLORIDE 50 MG/ML
12.5 INJECTION INTRAMUSCULAR; INTRAVENOUS ONCE AS NEEDED
Status: DISCONTINUED | OUTPATIENT
Start: 2024-10-04 | End: 2024-10-04 | Stop reason: HOSPADM

## 2024-10-04 RX ORDER — HYDROMORPHONE HYDROCHLORIDE 1 MG/ML
0.4 INJECTION, SOLUTION INTRAMUSCULAR; INTRAVENOUS; SUBCUTANEOUS ONCE
Status: DISCONTINUED | OUTPATIENT
Start: 2024-10-04 | End: 2024-10-04 | Stop reason: HOSPADM

## 2024-10-04 RX ORDER — LABETALOL HYDROCHLORIDE 5 MG/ML
5 INJECTION, SOLUTION INTRAVENOUS ONCE AS NEEDED
Status: DISCONTINUED | OUTPATIENT
Start: 2024-10-04 | End: 2024-10-04 | Stop reason: HOSPADM

## 2024-10-04 RX ORDER — SODIUM CHLORIDE, SODIUM LACTATE, POTASSIUM CHLORIDE, CALCIUM CHLORIDE 600; 310; 30; 20 MG/100ML; MG/100ML; MG/100ML; MG/100ML
100 INJECTION, SOLUTION INTRAVENOUS CONTINUOUS
Status: DISCONTINUED | OUTPATIENT
Start: 2024-10-04 | End: 2024-10-04 | Stop reason: HOSPADM

## 2024-10-04 RX ORDER — SODIUM CHLORIDE 9 MG/ML
75 INJECTION, SOLUTION INTRAVENOUS CONTINUOUS
Status: DISCONTINUED | OUTPATIENT
Start: 2024-10-04 | End: 2024-10-04 | Stop reason: HOSPADM

## 2024-10-04 RX ORDER — FENTANYL CITRATE 50 UG/ML
INJECTION, SOLUTION INTRAMUSCULAR; INTRAVENOUS AS NEEDED
Status: DISCONTINUED | OUTPATIENT
Start: 2024-10-04 | End: 2024-10-04

## 2024-10-04 RX ORDER — ONDANSETRON HYDROCHLORIDE 2 MG/ML
4 INJECTION, SOLUTION INTRAVENOUS ONCE AS NEEDED
Status: DISCONTINUED | OUTPATIENT
Start: 2024-10-04 | End: 2024-10-04 | Stop reason: HOSPADM

## 2024-10-04 SDOH — HEALTH STABILITY: MENTAL HEALTH: CURRENT SMOKER: 1

## 2024-10-04 ASSESSMENT — PAIN DESCRIPTION - ORIENTATION: ORIENTATION: LEFT

## 2024-10-04 ASSESSMENT — PAIN SCALES - GENERAL
PAINLEVEL_OUTOF10: 5 - MODERATE PAIN
PAINLEVEL_OUTOF10: 7
PAINLEVEL_OUTOF10: 5 - MODERATE PAIN
PAINLEVEL_OUTOF10: 4
PAINLEVEL_OUTOF10: 7
PAINLEVEL_OUTOF10: 7
PAINLEVEL_OUTOF10: 4
PAINLEVEL_OUTOF10: 5 - MODERATE PAIN
PAINLEVEL_OUTOF10: 7
PAINLEVEL_OUTOF10: 4
PAINLEVEL_OUTOF10: 5 - MODERATE PAIN
PAIN_LEVEL: 0
PAINLEVEL_OUTOF10: 7
PAINLEVEL_OUTOF10: 4
PAINLEVEL_OUTOF10: 7
PAINLEVEL_OUTOF10: 4
PAINLEVEL_OUTOF10: 4

## 2024-10-04 ASSESSMENT — COLUMBIA-SUICIDE SEVERITY RATING SCALE - C-SSRS
6. HAVE YOU EVER DONE ANYTHING, STARTED TO DO ANYTHING, OR PREPARED TO DO ANYTHING TO END YOUR LIFE?: NO
1. IN THE PAST MONTH, HAVE YOU WISHED YOU WERE DEAD OR WISHED YOU COULD GO TO SLEEP AND NOT WAKE UP?: NO
2. HAVE YOU ACTUALLY HAD ANY THOUGHTS OF KILLING YOURSELF?: NO

## 2024-10-04 ASSESSMENT — PAIN DESCRIPTION - DESCRIPTORS: DESCRIPTORS: ACHING

## 2024-10-04 ASSESSMENT — PAIN DESCRIPTION - LOCATION: LOCATION: LEG

## 2024-10-04 NOTE — Clinical Note
Vessel(s): left iliac artery, left CFA, left PFA and left SFA. Injected with hand injections. Single view taken.

## 2024-10-04 NOTE — ANESTHESIA POSTPROCEDURE EVALUATION
Patient: Dereck Shen    Procedure Summary       Date: 10/04/24 Room / Location: [facility] CATH LAB 2 / Virtual [facility] Card[facility] Lab    Anesthesia Start: 1045 Anesthesia Stop: 1151    Procedure: Lower Extremity Angiogram (Bilateral) Diagnosis:       Atherosclerosis of native artery of both lower extremities with intermittent claudication (CMS-HCC)      Peripheral arterial disease (CMS-HCC)      (Atherosclerosis of native artery of both lower extremities with intermittent claudication (CMS-HCC) [I70.213])      (Peripheral arterial disease (CMS-HCC) [I73.9])    Providers: Baljinder Wright MD Responsible Provider: PAU Trevino    Anesthesia Type: MAC ASA Status: 4            Anesthesia Type: MAC    Vitals Value Taken Time   /83 10/04/24 1151   Temp 97.8 10/04/24 1151   Pulse 66 10/04/24 1151   Resp 18 10/04/24 1151   SpO2 100 10/04/24 1151       Anesthesia Post Evaluation    Patient location during evaluation: PACU  Patient participation: complete - patient participated  Level of consciousness: awake and alert  Pain score: 0  Pain management: adequate  Airway patency: patent  Cardiovascular status: acceptable  Respiratory status: acceptable  Hydration status: acceptable  Postoperative Nausea and Vomiting: none        There were no known notable events for this encounter.

## 2024-10-04 NOTE — Clinical Note
The left DP pulse is non-palpable. The right DP pulse is 2+. The left PT pulse is non-palpable. The right PT pulse is 2+.

## 2024-10-04 NOTE — Clinical Note
Patient removed from OS waitlist after  donor liver transplant. OS ID [id number].    Donor Has Risk Criteria for Transmission of HIV/HCV/HBV: No  Recipient Notified of Risk Criteria: NA   There were no immediate complications during the procedure.

## 2024-10-04 NOTE — Clinical Note
Vessel(s): right peroneal artery, right BARB, right tibio-peroneal trunk and right dorsalis pedis artery. Injected with hand injections. Single view taken.

## 2024-10-04 NOTE — Clinical Note
Vessel(s): left peroneal artery, left BARB and left tibio-peroneal trunk. Injected with hand injections. Single view taken.

## 2024-10-04 NOTE — ANESTHESIA PREPROCEDURE EVALUATION
Patient: Dereck Shen    Procedure Information       Date/Time: 10/04/24 1030    Procedure: Lower Extremity Angiogram (Bilateral) - w / anesthesia  3 hr hydration  / arrive 7:30    Location: [facility] C[facility] LAB 2 / Virtual [facility] Cardiac Cath Lab    Providers: Baljinder Wright MD            Relevant Problems   Anesthesia (within normal limits)  Awareness during MAC anesthesia      Cardiac   (+) Atherosclerotic heart disease of native coronary artery without angina pectoris   (+) Hyperlipidemia   (+) Hypertension   (+) Limb ischemia   (+) Peripheral arterial disease (CMS-HCC)      Pulmonary   (+) Chronic obstructive pulmonary disease (Multi)   (+) BRUNA (obstructive sleep apnea)   (+) Shortness of breath on exertion      Neuro   (+) Bipolar depression (Multi)   (+) Causalgia of lower extremity   (+) Diabetic polyneuropathy associated with type 2 diabetes mellitus (Multi)   (+) Ischemic stroke (Multi)      GI   (+) Gastroesophageal reflux disease      /Renal   (+) BPH (benign prostatic hyperplasia)      Liver   (+) Chronic hepatitis C virus genotype 1a infection (Multi)   (+) Hepatic steatosis   (+) Hepatitis-C      Endocrine   (+) Diabetic neuropathy, painful (Multi)   (+) Diabetic polyneuropathy associated with type 2 diabetes mellitus (Multi)   (+) Type 2 diabetes mellitus      Musculoskeletal   (+) Causalgia of lower extremity   (+) Chronic pain syndrome      ID   (+) Bacterial skin infection   (+) Chronic hepatitis C virus genotype 1a infection (Multi)   (+) Hepatitis-C   (+) Recurrent boils   (+) Shingles       Clinical information reviewed:   Tobacco  Allergies  Meds   Med Hx  Surg Hx   Fam Hx  Soc Hx        NPO Detail:  NPO/Void Status  Date of Last Liquid: 10/04/24  Time of Last Liquid: 0630  Date of Last Solid: 10/03/24  Time of Last Solid: 2100  Last Intake Type: Clear fluids  Time of Last Void: 0530         Physical Exam    Airway  Mallampati: III  TM distance: >3 FB  Neck ROM: full     Cardiovascular -  normal exam     Dental   (+) upper dentures, lower dentures     Pulmonary - normal exam     Abdominal   (+) obese             Anesthesia Plan    History of general anesthesia?: yes  History of complications of general anesthesia?: no    ASA 4     MAC     The patient is a current smoker.  Patient was previously instructed to abstain from smoking on day of procedure.  Patient smoked on day of procedure.  Education provided regarding risk of obstructive sleep apnea.  intravenous induction   Anesthetic plan and risks discussed with patient.  Use of blood products discussed with patient who.    Plan discussed with attending.

## 2024-10-04 NOTE — POST-PROCEDURE NOTE
Physician Transition of Care Summary  Invasive Cardiovascular Lab    Procedure Date: 10/4/2024  Attending:    * Baljinder Wright - Primary  Resident/Fellow/Other Assistant: Surgeons and Role:  * No surgeons found with a matching role *    Indications:   Pre-op Diagnosis      * Atherosclerosis of native artery of both lower extremities with intermittent claudication (CMS-[facility]) [I70.213]     * Peripheral arterial disease (CMS-[facility]) [I73.9]    Post-procedure diagnosis:   Post-op Diagnosis     * Atherosclerosis of native artery of both lower extremities with intermittent claudication (CMS-[facility]) [I70.213]     * Peripheral arterial disease (CMS-[facility]) [I73.9]    Procedure(s):   Lower Extremity Angiogram  61559 - CHG ANGIOGRAPHY EXTREMITY UNILATERAL RS&I        Procedure Findings:   100% Left SFA    Description of the Procedure:   Peripheral angiogram    Complications:   None    Stents/Implants:   Implants       No implant documentation for this case.            Anticoagulation/Antiplatelet Plan:   None    Estimated Blood Loss:   5 mL    Anesthesia: Moderate Sedation Anesthesia Staff: CRNA: SUMAN Trevino-CRNA    Any Specimen(s) Removed:   No specimens collected during this procedure.    Disposition:   Home      Electronically signed by: Baljinder Wright MD, 10/4/2024 11:28 AM

## 2024-10-04 NOTE — INTERVAL H&P NOTE
12/03/2020  Morphine Sulfate ER  30  60  30  0  Insurance  [employer] JANNETTE BERMUDEZ  12/03/2020  HYDROcodone-Acetaminophen  10  120  30  0  [facility]    Lov:  12/03  VV  Next ov:  02/04     H&P reviewed. The patient was examined and there are no changes to the H&P.

## 2024-10-06 NOTE — ASSESSMENT & PLAN NOTE
To continue Lantus 20 units SQ at bedtime   To increase Lispro to 14 units before meals  To continue sliding scale with Lispro insulin with meals:  150-200 - 2 units  201-250 - 4 units  251-300 - 6 units  301-350 - 8 units  >251 - 10 units  To continue Farxiga 10g once daily   To continue the use of your CGM for the intensive glucose monitoring due to the dynamic nature of insulin requirements, the narrow therapeutic index of insulin and the potentially fatal consequences of treatment  Counseled that the time in range should be >70%  Counseled that the goal A1C should be 7% or less  Counseled glycemic control is warranted to prevent microvascular complications

## 2024-10-07 ENCOUNTER — TELEPHONE (OUTPATIENT)
Dept: CARDIOLOGY | Facility: HOSPITAL | Age: 59
End: 2024-10-07
Payer: COMMERCIAL

## 2024-10-07 DIAGNOSIS — I99.8 LIMB ISCHEMIA: Primary | ICD-10-CM

## 2024-10-07 NOTE — TELEPHONE ENCOUNTER
Called leg angio instructions to patient including review of date and arrival time.  Verified no need for dye prep.  Labs ordered.  Nothing to eat or drink after midnight except Brilinta/Carvedilol with a sip of water the morning of the cath.  Please hold Xarelto two days prior and day of procedure. You will need a .  Bring your ID, insurance cards and either a perfect list of the medications you are swallowing or the medications in original bottles.  Wear comfortable clothing.  There is a possibility of staying over night for observation so pack a bag with necessities for that.  Patient correctly repeated instructions, verbalized understanding and is agreeable to the plan.

## 2024-10-11 DIAGNOSIS — I99.8 LIMB ISCHEMIA: ICD-10-CM

## 2024-10-14 PROCEDURE — RXMED WILLOW AMBULATORY MEDICATION CHARGE

## 2024-10-14 RX ORDER — OXYCODONE AND ACETAMINOPHEN 5; 325 MG/1; MG/1
1 TABLET ORAL EVERY 6 HOURS PRN
Qty: 15 TABLET | Refills: 0 | Status: SHIPPED | OUTPATIENT
Start: 2024-10-14

## 2024-10-15 ENCOUNTER — PHARMACY VISIT (OUTPATIENT)
Dept: PHARMACY | Facility: CLINIC | Age: 59
End: 2024-10-15
Payer: MEDICAID

## 2024-10-16 ENCOUNTER — LAB (OUTPATIENT)
Dept: LAB | Facility: LAB | Age: 59
End: 2024-10-16
Payer: COMMERCIAL

## 2024-10-16 DIAGNOSIS — I99.8 LIMB ISCHEMIA: ICD-10-CM

## 2024-10-16 LAB
ANION GAP SERPL CALC-SCNC: 13 MMOL/L (ref 10–20)
BUN SERPL-MCNC: 20 MG/DL (ref 6–23)
CALCIUM SERPL-MCNC: 8.6 MG/DL (ref 8.6–10.3)
CHLORIDE SERPL-SCNC: 99 MMOL/L (ref 98–107)
CO2 SERPL-SCNC: 27 MMOL/L (ref 21–32)
CREAT SERPL-MCNC: 1.09 MG/DL (ref 0.5–1.3)
EGFRCR SERPLBLD CKD-EPI 2021: 79 ML/MIN/1.73M*2
ERYTHROCYTE [DISTWIDTH] IN BLOOD BY AUTOMATED COUNT: 13.6 % (ref 11.5–14.5)
GLUCOSE SERPL-MCNC: 285 MG/DL (ref 74–99)
HCT VFR BLD AUTO: 45.8 % (ref 41–52)
HGB BLD-MCNC: 15.7 G/DL (ref 13.5–17.5)
MCH RBC QN AUTO: 32 PG (ref 26–34)
MCHC RBC AUTO-ENTMCNC: 34.3 G/DL (ref 32–36)
MCV RBC AUTO: 93 FL (ref 80–100)
NRBC BLD-RTO: 0 /100 WBCS (ref 0–0)
PLATELET # BLD AUTO: 254 X10*3/UL (ref 150–450)
POTASSIUM SERPL-SCNC: 4.8 MMOL/L (ref 3.5–5.3)
RBC # BLD AUTO: 4.91 X10*6/UL (ref 4.5–5.9)
SODIUM SERPL-SCNC: 134 MMOL/L (ref 136–145)
WBC # BLD AUTO: 7.9 X10*3/UL (ref 4.4–11.3)

## 2024-10-16 PROCEDURE — 85027 COMPLETE CBC AUTOMATED: CPT

## 2024-10-16 PROCEDURE — 36415 COLL VENOUS BLD VENIPUNCTURE: CPT

## 2024-10-16 PROCEDURE — 80048 BASIC METABOLIC PNL TOTAL CA: CPT

## 2024-10-21 ENCOUNTER — ANESTHESIA EVENT (OUTPATIENT)
Dept: CARDIOLOGY | Facility: HOSPITAL | Age: 59
End: 2024-10-21
Payer: COMMERCIAL

## 2024-10-21 ENCOUNTER — ANESTHESIA (OUTPATIENT)
Dept: CARDIOLOGY | Facility: HOSPITAL | Age: 59
End: 2024-10-21
Payer: COMMERCIAL

## 2024-10-21 ENCOUNTER — APPOINTMENT (OUTPATIENT)
Dept: CARDIOLOGY | Facility: HOSPITAL | Age: 59
End: 2024-10-21
Payer: COMMERCIAL

## 2024-10-21 ENCOUNTER — HOSPITAL ENCOUNTER (OUTPATIENT)
Facility: HOSPITAL | Age: 59
Discharge: HOME | End: 2024-10-22
Attending: INTERNAL MEDICINE | Admitting: INTERNAL MEDICINE
Payer: COMMERCIAL

## 2024-10-21 DIAGNOSIS — I73.9 PAD (PERIPHERAL ARTERY DISEASE) (CMS-HCC): Primary | ICD-10-CM

## 2024-10-21 DIAGNOSIS — I99.8 LIMB ISCHEMIA: ICD-10-CM

## 2024-10-21 DIAGNOSIS — I70.422 ATHEROSCLEROSIS OF AUTOLOGOUS VEIN BYPASS GRAFT(S) OF THE EXTREMITIES WITH REST PAIN, LEFT LEG: ICD-10-CM

## 2024-10-21 DIAGNOSIS — I73.9 PERIPHERAL ARTERIAL DISEASE (CMS-HCC): ICD-10-CM

## 2024-10-21 LAB
ACT BLD: NORMAL S
GLUCOSE BLD MANUAL STRIP-MCNC: 163 MG/DL (ref 74–99)
GLUCOSE BLD MANUAL STRIP-MCNC: 163 MG/DL (ref 74–99)
GLUCOSE BLD MANUAL STRIP-MCNC: 209 MG/DL (ref 74–99)
GLUCOSE BLD MANUAL STRIP-MCNC: 223 MG/DL (ref 74–99)

## 2024-10-21 PROCEDURE — 7100000001 HC RECOVERY ROOM TIME - INITIAL BASE CHARGE: Performed by: INTERNAL MEDICINE

## 2024-10-21 PROCEDURE — 99253 IP/OBS CNSLTJ NEW/EST LOW 45: CPT | Performed by: NURSE PRACTITIONER

## 2024-10-21 PROCEDURE — C1887 CATHETER, GUIDING: HCPCS | Performed by: INTERNAL MEDICINE

## 2024-10-21 PROCEDURE — 37252 INTRVASC US NONCORONARY 1ST: CPT | Performed by: INTERNAL MEDICINE

## 2024-10-21 PROCEDURE — C1725 CATH, TRANSLUMIN NON-LASER: HCPCS | Performed by: INTERNAL MEDICINE

## 2024-10-21 PROCEDURE — 2720000007 HC OR 272 NO HCPCS: Performed by: INTERNAL MEDICINE

## 2024-10-21 PROCEDURE — S4991 NICOTINE PATCH NONLEGEND: HCPCS | Performed by: NURSE PRACTITIONER

## 2024-10-21 PROCEDURE — 99152 MOD SED SAME PHYS/QHP 5/>YRS: CPT | Performed by: INTERNAL MEDICINE

## 2024-10-21 PROCEDURE — 85347 COAGULATION TIME ACTIVATED: CPT

## 2024-10-21 PROCEDURE — C1769 GUIDE WIRE: HCPCS | Performed by: INTERNAL MEDICINE

## 2024-10-21 PROCEDURE — 2500000004 HC RX 250 GENERAL PHARMACY W/ HCPCS (ALT 636 FOR OP/ED): Performed by: ANESTHESIOLOGY

## 2024-10-21 PROCEDURE — 2500000001 HC RX 250 WO HCPCS SELF ADMINISTERED DRUGS (ALT 637 FOR MEDICARE OP): Performed by: NURSE PRACTITIONER

## 2024-10-21 PROCEDURE — 75710 ARTERY X-RAYS ARM/LEG: CPT | Performed by: INTERNAL MEDICINE

## 2024-10-21 PROCEDURE — 3700000013 HC SEDATION LEVEL 5+ TIME - EACH ADDITIONAL 15 MINUTES: Performed by: INTERNAL MEDICINE

## 2024-10-21 PROCEDURE — 37224 HC REVASCULARIZE FEM/POP ARTERY,ANGIOPLASTY: CPT | Performed by: INTERNAL MEDICINE

## 2024-10-21 PROCEDURE — 2500000004 HC RX 250 GENERAL PHARMACY W/ HCPCS (ALT 636 FOR OP/ED): Performed by: NURSE ANESTHETIST, CERTIFIED REGISTERED

## 2024-10-21 PROCEDURE — 7100000002 HC RECOVERY ROOM TIME - EACH INCREMENTAL 1 MINUTE: Performed by: INTERNAL MEDICINE

## 2024-10-21 PROCEDURE — 2550000001 HC RX 255 CONTRASTS: Performed by: INTERNAL MEDICINE

## 2024-10-21 PROCEDURE — 3700000001 HC GENERAL ANESTHESIA TIME - INITIAL BASE CHARGE: Performed by: INTERNAL MEDICINE

## 2024-10-21 PROCEDURE — 93005 ELECTROCARDIOGRAM TRACING: CPT

## 2024-10-21 PROCEDURE — 92978 ENDOLUMINL IVUS OCT C 1ST: CPT | Performed by: INTERNAL MEDICINE

## 2024-10-21 PROCEDURE — 2500000004 HC RX 250 GENERAL PHARMACY W/ HCPCS (ALT 636 FOR OP/ED): Performed by: INTERNAL MEDICINE

## 2024-10-21 PROCEDURE — 82947 ASSAY GLUCOSE BLOOD QUANT: CPT

## 2024-10-21 PROCEDURE — 37224 PR REVSC OPN/PRG FEM/POP W/ANGIOPLASTY UNI: CPT | Performed by: INTERNAL MEDICINE

## 2024-10-21 PROCEDURE — 99153 MOD SED SAME PHYS/QHP EA: CPT | Performed by: INTERNAL MEDICINE

## 2024-10-21 PROCEDURE — C2623 CATH, TRANSLUMIN, DRUG-COAT: HCPCS | Performed by: INTERNAL MEDICINE

## 2024-10-21 PROCEDURE — C1894 INTRO/SHEATH, NON-LASER: HCPCS | Performed by: INTERNAL MEDICINE

## 2024-10-21 PROCEDURE — 96373 THER/PROPH/DIAG INJ IA: CPT | Mod: 59 | Performed by: INTERNAL MEDICINE

## 2024-10-21 PROCEDURE — 3700000012 HC SEDATION LEVEL 5+ TIME - INITIAL 15 MINUTES 5/> YEARS: Performed by: INTERNAL MEDICINE

## 2024-10-21 PROCEDURE — 3700000002 HC GENERAL ANESTHESIA TIME - EACH INCREMENTAL 1 MINUTE: Performed by: INTERNAL MEDICINE

## 2024-10-21 PROCEDURE — 7100000011 HC EXTENDED STAY RECOVERY HOURLY - NURSING UNIT

## 2024-10-21 PROCEDURE — 2500000004 HC RX 250 GENERAL PHARMACY W/ HCPCS (ALT 636 FOR OP/ED): Performed by: NURSE PRACTITIONER

## 2024-10-21 PROCEDURE — C1753 CATH, INTRAVAS ULTRASOUND: HCPCS | Performed by: INTERNAL MEDICINE

## 2024-10-21 PROCEDURE — 2500000002 HC RX 250 W HCPCS SELF ADMINISTERED DRUGS (ALT 637 FOR MEDICARE OP, ALT 636 FOR OP/ED): Performed by: NURSE PRACTITIONER

## 2024-10-21 RX ORDER — IBUPROFEN 200 MG
1 TABLET ORAL DAILY
Status: DISCONTINUED | OUTPATIENT
Start: 2024-10-21 | End: 2024-10-22 | Stop reason: HOSPADM

## 2024-10-21 RX ORDER — ONDANSETRON HYDROCHLORIDE 2 MG/ML
4 INJECTION, SOLUTION INTRAVENOUS ONCE AS NEEDED
Status: DISCONTINUED | OUTPATIENT
Start: 2024-10-21 | End: 2024-10-21

## 2024-10-21 RX ORDER — IODIXANOL 320 MG/ML
INJECTION, SOLUTION INTRAVASCULAR AS NEEDED
Status: DISCONTINUED | OUTPATIENT
Start: 2024-10-21 | End: 2024-10-21 | Stop reason: HOSPADM

## 2024-10-21 RX ORDER — OXYCODONE AND ACETAMINOPHEN 5; 325 MG/1; MG/1
2 TABLET ORAL EVERY 6 HOURS PRN
Status: DISCONTINUED | OUTPATIENT
Start: 2024-10-21 | End: 2024-10-22 | Stop reason: HOSPADM

## 2024-10-21 RX ORDER — FENTANYL CITRATE 50 UG/ML
INJECTION, SOLUTION INTRAMUSCULAR; INTRAVENOUS AS NEEDED
Status: DISCONTINUED | OUTPATIENT
Start: 2024-10-21 | End: 2024-10-21

## 2024-10-21 RX ORDER — OXYCODONE AND ACETAMINOPHEN 5; 325 MG/1; MG/1
1 TABLET ORAL EVERY 6 HOURS PRN
Status: DISCONTINUED | OUTPATIENT
Start: 2024-10-21 | End: 2024-10-22 | Stop reason: HOSPADM

## 2024-10-21 RX ORDER — GABAPENTIN 400 MG/1
400 CAPSULE ORAL 3 TIMES DAILY
Status: DISCONTINUED | OUTPATIENT
Start: 2024-10-21 | End: 2024-10-22 | Stop reason: HOSPADM

## 2024-10-21 RX ORDER — PROPOFOL 10 MG/ML
INJECTION, EMULSION INTRAVENOUS CONTINUOUS PRN
Status: DISCONTINUED | OUTPATIENT
Start: 2024-10-21 | End: 2024-10-21

## 2024-10-21 RX ORDER — ACETAMINOPHEN 650 MG/1
650 SUPPOSITORY RECTAL EVERY 6 HOURS PRN
Status: DISCONTINUED | OUTPATIENT
Start: 2024-10-21 | End: 2024-10-21

## 2024-10-21 RX ORDER — DAPAGLIFLOZIN 5 MG/1
10 TABLET, FILM COATED ORAL EVERY 24 HOURS
Status: DISCONTINUED | OUTPATIENT
Start: 2024-10-21 | End: 2024-10-22 | Stop reason: HOSPADM

## 2024-10-21 RX ORDER — HYDRALAZINE HYDROCHLORIDE 50 MG/1
50 TABLET, FILM COATED ORAL 3 TIMES DAILY
Status: DISCONTINUED | OUTPATIENT
Start: 2024-10-21 | End: 2024-10-22 | Stop reason: HOSPADM

## 2024-10-21 RX ORDER — RANOLAZINE 500 MG/1
500 TABLET, EXTENDED RELEASE ORAL 2 TIMES DAILY
Status: DISCONTINUED | OUTPATIENT
Start: 2024-10-21 | End: 2024-10-22 | Stop reason: HOSPADM

## 2024-10-21 RX ORDER — SODIUM CHLORIDE 9 MG/ML
1000 INJECTION, SOLUTION INTRAVENOUS ONCE AS NEEDED
Status: DISCONTINUED | OUTPATIENT
Start: 2024-10-21 | End: 2024-10-22 | Stop reason: HOSPADM

## 2024-10-21 RX ORDER — ONDANSETRON HYDROCHLORIDE 2 MG/ML
INJECTION, SOLUTION INTRAVENOUS AS NEEDED
Status: DISCONTINUED | OUTPATIENT
Start: 2024-10-21 | End: 2024-10-21

## 2024-10-21 RX ORDER — ACETAMINOPHEN 325 MG/1
650 TABLET ORAL EVERY 6 HOURS PRN
Status: DISCONTINUED | OUTPATIENT
Start: 2024-10-21 | End: 2024-10-22 | Stop reason: HOSPADM

## 2024-10-21 RX ORDER — PROTAMINE SULFATE 10 MG/ML
INJECTION, SOLUTION INTRAVENOUS CONTINUOUS PRN
Status: COMPLETED | OUTPATIENT
Start: 2024-10-21 | End: 2024-10-21

## 2024-10-21 RX ORDER — ISOSORBIDE MONONITRATE 60 MG/1
60 TABLET, EXTENDED RELEASE ORAL DAILY
Status: DISCONTINUED | OUTPATIENT
Start: 2024-10-21 | End: 2024-10-22 | Stop reason: HOSPADM

## 2024-10-21 RX ORDER — LIDOCAINE HYDROCHLORIDE 20 MG/ML
INJECTION, SOLUTION INFILTRATION; PERINEURAL AS NEEDED
Status: DISCONTINUED | OUTPATIENT
Start: 2024-10-21 | End: 2024-10-21 | Stop reason: HOSPADM

## 2024-10-21 RX ORDER — OXCARBAZEPINE 150 MG/1
450 TABLET, FILM COATED ORAL 2 TIMES DAILY
Status: DISCONTINUED | OUTPATIENT
Start: 2024-10-21 | End: 2024-10-22 | Stop reason: HOSPADM

## 2024-10-21 RX ORDER — INSULIN GLARGINE 100 [IU]/ML
20 INJECTION, SOLUTION SUBCUTANEOUS NIGHTLY
Status: DISCONTINUED | OUTPATIENT
Start: 2024-10-21 | End: 2024-10-22 | Stop reason: HOSPADM

## 2024-10-21 RX ORDER — HYDROMORPHONE HYDROCHLORIDE 0.2 MG/ML
0.2 INJECTION INTRAMUSCULAR; INTRAVENOUS; SUBCUTANEOUS EVERY 5 MIN PRN
Status: DISCONTINUED | OUTPATIENT
Start: 2024-10-21 | End: 2024-10-21

## 2024-10-21 RX ORDER — DEXTROSE 50 % IN WATER (D50W) INTRAVENOUS SYRINGE
12.5
Status: DISCONTINUED | OUTPATIENT
Start: 2024-10-21 | End: 2024-10-22 | Stop reason: HOSPADM

## 2024-10-21 RX ORDER — MIDAZOLAM HYDROCHLORIDE 1 MG/ML
INJECTION, SOLUTION INTRAMUSCULAR; INTRAVENOUS AS NEEDED
Status: DISCONTINUED | OUTPATIENT
Start: 2024-10-21 | End: 2024-10-21

## 2024-10-21 RX ORDER — HEPARIN SODIUM 1000 [USP'U]/ML
INJECTION, SOLUTION INTRAVENOUS; SUBCUTANEOUS AS NEEDED
Status: DISCONTINUED | OUTPATIENT
Start: 2024-10-21 | End: 2024-10-21 | Stop reason: HOSPADM

## 2024-10-21 RX ORDER — INSULIN LISPRO 100 [IU]/ML
10 INJECTION, SOLUTION INTRAVENOUS; SUBCUTANEOUS
Status: DISCONTINUED | OUTPATIENT
Start: 2024-10-21 | End: 2024-10-22 | Stop reason: HOSPADM

## 2024-10-21 RX ORDER — ATORVASTATIN CALCIUM 40 MG/1
80 TABLET, FILM COATED ORAL NIGHTLY
Status: DISCONTINUED | OUTPATIENT
Start: 2024-10-21 | End: 2024-10-22 | Stop reason: HOSPADM

## 2024-10-21 RX ORDER — ACETAMINOPHEN 10 MG/ML
1000 INJECTION, SOLUTION INTRAVENOUS ONCE
Status: DISCONTINUED | OUTPATIENT
Start: 2024-10-21 | End: 2024-10-21

## 2024-10-21 RX ORDER — LEVETIRACETAM 100 MG/ML
1500 SOLUTION ORAL 2 TIMES DAILY
Status: DISCONTINUED | OUTPATIENT
Start: 2024-10-21 | End: 2024-10-22

## 2024-10-21 RX ORDER — CARVEDILOL 6.25 MG/1
6.25 TABLET ORAL EVERY 12 HOURS
Status: DISCONTINUED | OUTPATIENT
Start: 2024-10-21 | End: 2024-10-22 | Stop reason: HOSPADM

## 2024-10-21 RX ORDER — SODIUM CHLORIDE 9 MG/ML
75 INJECTION, SOLUTION INTRAVENOUS CONTINUOUS
Status: ACTIVE | OUTPATIENT
Start: 2024-10-21 | End: 2024-10-21

## 2024-10-21 RX ORDER — FUROSEMIDE 40 MG/1
40 TABLET ORAL DAILY PRN
Status: DISCONTINUED | OUTPATIENT
Start: 2024-10-21 | End: 2024-10-22 | Stop reason: HOSPADM

## 2024-10-21 RX ORDER — ACETAMINOPHEN 160 MG/5ML
650 SOLUTION ORAL EVERY 6 HOURS PRN
Status: DISCONTINUED | OUTPATIENT
Start: 2024-10-21 | End: 2024-10-21

## 2024-10-21 RX ORDER — PANTOPRAZOLE SODIUM 40 MG/1
40 TABLET, DELAYED RELEASE ORAL DAILY
Status: DISCONTINUED | OUTPATIENT
Start: 2024-10-21 | End: 2024-10-22 | Stop reason: HOSPADM

## 2024-10-21 RX ORDER — DEXTROSE 50 % IN WATER (D50W) INTRAVENOUS SYRINGE
25
Status: DISCONTINUED | OUTPATIENT
Start: 2024-10-21 | End: 2024-10-22 | Stop reason: HOSPADM

## 2024-10-21 RX ORDER — EZETIMIBE 10 MG/1
10 TABLET ORAL
Status: DISCONTINUED | OUTPATIENT
Start: 2024-10-21 | End: 2024-10-22 | Stop reason: HOSPADM

## 2024-10-21 RX ORDER — TAMSULOSIN HYDROCHLORIDE 0.4 MG/1
0.4 CAPSULE ORAL DAILY
Status: DISCONTINUED | OUTPATIENT
Start: 2024-10-21 | End: 2024-10-22 | Stop reason: HOSPADM

## 2024-10-21 RX ORDER — TIZANIDINE 4 MG/1
2 TABLET ORAL DAILY PRN
Status: DISCONTINUED | OUTPATIENT
Start: 2024-10-21 | End: 2024-10-22 | Stop reason: HOSPADM

## 2024-10-21 SDOH — SOCIAL STABILITY: SOCIAL INSECURITY: DO YOU FEEL ANYONE HAS EXPLOITED OR TAKEN ADVANTAGE OF YOU FINANCIALLY OR OF YOUR PERSONAL PROPERTY?: NO

## 2024-10-21 SDOH — SOCIAL STABILITY: SOCIAL INSECURITY: ABUSE: ADULT

## 2024-10-21 SDOH — SOCIAL STABILITY: SOCIAL INSECURITY: DO YOU FEEL UNSAFE GOING BACK TO THE PLACE WHERE YOU ARE LIVING?: NO

## 2024-10-21 SDOH — SOCIAL STABILITY: SOCIAL INSECURITY: DOES ANYONE TRY TO KEEP YOU FROM HAVING/CONTACTING OTHER FRIENDS OR DOING THINGS OUTSIDE YOUR HOME?: NO

## 2024-10-21 SDOH — SOCIAL STABILITY: SOCIAL INSECURITY: HAVE YOU HAD ANY THOUGHTS OF HARMING ANYONE ELSE?: NO

## 2024-10-21 SDOH — SOCIAL STABILITY: SOCIAL INSECURITY: HAVE YOU HAD THOUGHTS OF HARMING ANYONE ELSE?: NO

## 2024-10-21 SDOH — SOCIAL STABILITY: SOCIAL INSECURITY: WERE YOU ABLE TO COMPLETE ALL THE BEHAVIORAL HEALTH SCREENINGS?: YES

## 2024-10-21 SDOH — SOCIAL STABILITY: SOCIAL INSECURITY: HAS ANYONE EVER THREATENED TO HURT YOUR FAMILY OR YOUR PETS?: NO

## 2024-10-21 SDOH — SOCIAL STABILITY: SOCIAL INSECURITY: ARE YOU OR HAVE YOU BEEN THREATENED OR ABUSED PHYSICALLY, EMOTIONALLY, OR SEXUALLY BY ANYONE?: NO

## 2024-10-21 SDOH — SOCIAL STABILITY: SOCIAL INSECURITY: ARE THERE ANY APPARENT SIGNS OF INJURIES/BEHAVIORS THAT COULD BE RELATED TO ABUSE/NEGLECT?: NO

## 2024-10-21 SDOH — HEALTH STABILITY: MENTAL HEALTH: CURRENT SMOKER: 1

## 2024-10-21 ASSESSMENT — PAIN SCALES - GENERAL
PAINLEVEL_OUTOF10: 7
PAINLEVEL_OUTOF10: 8
PAINLEVEL_OUTOF10: 5 - MODERATE PAIN
PAINLEVEL_OUTOF10: 0 - NO PAIN
PAINLEVEL_OUTOF10: 7
PAINLEVEL_OUTOF10: 5 - MODERATE PAIN
PAINLEVEL_OUTOF10: 8
PAINLEVEL_OUTOF10: 3
PAINLEVEL_OUTOF10: 7
PAINLEVEL_OUTOF10: 8
PAINLEVEL_OUTOF10: 6
PAIN_LEVEL: 8
PAINLEVEL_OUTOF10: 8

## 2024-10-21 ASSESSMENT — COGNITIVE AND FUNCTIONAL STATUS - GENERAL
PATIENT BASELINE BEDBOUND: NO
MOBILITY SCORE: 22
WALKING IN HOSPITAL ROOM: A LITTLE
DRESSING REGULAR UPPER BODY CLOTHING: A LITTLE
STANDING UP FROM CHAIR USING ARMS: A LITTLE
DRESSING REGULAR LOWER BODY CLOTHING: A LITTLE
DRESSING REGULAR UPPER BODY CLOTHING: A LITTLE
MOBILITY SCORE: 19
CLIMB 3 TO 5 STEPS WITH RAILING: A LITTLE
TOILETING: A LITTLE
HELP NEEDED FOR BATHING: A LITTLE
WALKING IN HOSPITAL ROOM: A LITTLE
TOILETING: A LITTLE
DRESSING REGULAR LOWER BODY CLOTHING: A LITTLE
DAILY ACTIVITIY SCORE: 20
WALKING IN HOSPITAL ROOM: A LITTLE
MOVING TO AND FROM BED TO CHAIR: A LITTLE
DRESSING REGULAR LOWER BODY CLOTHING: A LITTLE
DAILY ACTIVITIY SCORE: 20
HELP NEEDED FOR BATHING: A LITTLE
CLIMB 3 TO 5 STEPS WITH RAILING: A LITTLE
MOBILITY SCORE: 22
TURNING FROM BACK TO SIDE WHILE IN FLAT BAD: A LITTLE
CLIMB 3 TO 5 STEPS WITH RAILING: A LITTLE
DAILY ACTIVITIY SCORE: 23

## 2024-10-21 ASSESSMENT — PAIN DESCRIPTION - ORIENTATION
ORIENTATION: LEFT

## 2024-10-21 ASSESSMENT — LIFESTYLE VARIABLES
AUDIT-C TOTAL SCORE: 0
HOW OFTEN DO YOU HAVE A DRINK CONTAINING ALCOHOL: NEVER
AUDIT-C TOTAL SCORE: 0
SKIP TO QUESTIONS 9-10: 1
HOW OFTEN DO YOU HAVE 6 OR MORE DRINKS ON ONE OCCASION: NEVER
HOW MANY STANDARD DRINKS CONTAINING ALCOHOL DO YOU HAVE ON A TYPICAL DAY: PATIENT DOES NOT DRINK

## 2024-10-21 ASSESSMENT — PAIN DESCRIPTION - LOCATION
LOCATION: LEG

## 2024-10-21 ASSESSMENT — PAIN - FUNCTIONAL ASSESSMENT
PAIN_FUNCTIONAL_ASSESSMENT: 0-10

## 2024-10-21 ASSESSMENT — ACTIVITIES OF DAILY LIVING (ADL)
BATHING: INDEPENDENT
GROOMING: INDEPENDENT
HEARING - LEFT EAR: FUNCTIONAL
WALKS IN HOME: INDEPENDENT
DRESSING YOURSELF: INDEPENDENT
FEEDING YOURSELF: INDEPENDENT
JUDGMENT_ADEQUATE_SAFELY_COMPLETE_DAILY_ACTIVITIES: YES
TOILETING: INDEPENDENT
HEARING - RIGHT EAR: FUNCTIONAL
PATIENT'S MEMORY ADEQUATE TO SAFELY COMPLETE DAILY ACTIVITIES?: YES
ADEQUATE_TO_COMPLETE_ADL: YES
LACK_OF_TRANSPORTATION: NO

## 2024-10-21 ASSESSMENT — PAIN SCALES - PAIN ASSESSMENT IN ADVANCED DEMENTIA (PAINAD)
TOTALSCORE: MEDICATION (SEE MAR)
TOTALSCORE: MEDICATION (SEE MAR)

## 2024-10-21 ASSESSMENT — PAIN DESCRIPTION - DESCRIPTORS
DESCRIPTORS: ACHING
DESCRIPTORS: ACHING;CRAMPING
DESCRIPTORS: ACHING;CRAMPING;PRESSURE

## 2024-10-21 ASSESSMENT — PATIENT HEALTH QUESTIONNAIRE - PHQ9
1. LITTLE INTEREST OR PLEASURE IN DOING THINGS: NOT AT ALL
SUM OF ALL RESPONSES TO PHQ9 QUESTIONS 1 & 2: 0
2. FEELING DOWN, DEPRESSED OR HOPELESS: NOT AT ALL

## 2024-10-21 NOTE — Clinical Note
Patient Clipped and Prepped: left popliteal. Prepped with ChloraPrep, a minimum of 3 minute dry time, longer if needed, no pooling noted, patient draped in sterile fashion.

## 2024-10-21 NOTE — Clinical Note
Vessel(s): left PTA, left peroneal artery, left tibio-peroneal trunk and left dorsalis pedis artery. Injected with hand injections. Multiple views taken.

## 2024-10-21 NOTE — Clinical Note
Accessed site: left popliteal vein.   Ultrasound guidance was used. Access pulled, manual pressure applied and will re-attempt access into the artery

## 2024-10-21 NOTE — H&P
History Of Present Illness  Dereck Shen is a 59 y.o. male presenting with Hx of PVD with multiple PTAs and Bypass. Left Fem-pop by pass graft was occluded, We have been asked to see if we can intervene on native SFA. Plan for retropopliteal approach.     Past Medical History  Past Medical History:   Diagnosis Date    Abnormal electrocardiogram (ECG) (EKG)     Abnormal EKG    Acute on chronic combined systolic (congestive) and diastolic (congestive) heart failure 10/18/2019    Acute on chronic combined systolic and diastolic congestive heart failure    Anxiety disorder, unspecified     Anxiety    Aphasia 07/06/2017    Aphasia, mixed    Atherosclerotic heart disease of native coronary artery with unspecified angina pectoris 11/05/2019    Atherosclerosis of native coronary artery of native heart with angina pectoris    Bipolar disorder, unspecified (Multi) 06/10/2020    Bipolar 1 disorder    Body mass index (BMI) 31.0-31.9, adult 10/18/2019    Body mass index (BMI) of 31.0 to 31.9 in adult    Chronic lumbar radiculopathy 09/22/2023    Chronic obstructive pulmonary disease with (acute) exacerbation (Multi) 12/13/2021    COPD exacerbation    Chronic obstructive pulmonary disease with (acute) exacerbation (Multi) 11/25/2020    Acute exacerbation of chronic obstructive pulmonary disease    Chronic systolic (congestive) heart failure 02/09/2021    Chronic systolic congestive heart failure    Cutaneous abscess of groin 09/12/2019    Abscess of groin    Encounter for follow-up examination after completed treatment for conditions other than malignant neoplasm 09/12/2019    Hospital discharge follow-up    Encounter for screening for malignant neoplasm of prostate 09/12/2019    Screening for prostate cancer    Encounter for screening for other suspected endocrine disorder 09/12/2019    Screening for thyroid disorder    Headache, unspecified 09/10/2019    Chronic daily headache    Hemiplegia and hemiparesis following  cerebral infarction affecting unspecified side (Multi) 06/21/2021    Hemiplegia following cerebrovascular accident (CVA)    Obesity, unspecified 10/18/2019    Obesity (BMI 30.0-34.9)    Obstructive sleep apnea (adult) (pediatric) 10/07/2019    Obstructive sleep apnea, adult    Old myocardial infarction     History of inferior wall myocardial infarction    Old myocardial infarction     History of myocardial infarction    Opioid abuse, in remission (Multi) 04/06/2020    History of opioid abuse    Other conditions influencing health status 11/25/2020    History of cough    Other specified abnormal findings of blood chemistry 09/12/2019    Low testosterone in male    Pain in unspecified joint 10/18/2019    Polyarthralgia    Personal history of other diseases of male genital organs 09/12/2019    History of erectile dysfunction    Personal history of other diseases of the circulatory system 11/05/2019    History of coronary artery disease    Personal history of other diseases of the digestive system 10/18/2019    History of constipation    Personal history of other diseases of the musculoskeletal system and connective tissue 07/18/2019    History of osteoarthritis    Personal history of other diseases of the musculoskeletal system and connective tissue     History of polymyalgia rheumatica    Personal history of other diseases of the musculoskeletal system and connective tissue     History of osteoporosis    Personal history of other diseases of the musculoskeletal system and connective tissue 07/18/2019    History of low back pain    Personal history of other diseases of the nervous system and sense organs 09/12/2019    History of peripheral neuropathy    Personal history of other endocrine, nutritional and metabolic disease 08/11/2020    History of type 2 diabetes mellitus    Personal history of other endocrine, nutritional and metabolic disease 10/18/2019    History of vitamin D deficiency    Personal history of other  infectious and parasitic diseases 10/18/2019    History of hepatitis C virus infection    Personal history of other mental and behavioral disorders     History of panic attacks    Personal history of pneumonia (recurrent) 09/12/2019    History of pneumonia    Post-traumatic stress disorder, unspecified     PTSD (post-traumatic stress disorder)    Postlaminectomy syndrome, not elsewhere classified 12/08/2015    Failed back syndrome of lumbar spine    Proteinuria, unspecified 10/25/2019    Proteinuria, unspecified type    Radiculopathy, lumbar region 07/20/2017    Chronic lumbar radiculopathy    Stroke (Multi) 07/24/2023    Unqualified visual loss, left eye, normal vision right eye 07/06/2017    Visual loss, left eye       Surgical History  Past Surgical History:   Procedure Laterality Date    BACK SURGERY  03/17/2017    Back Surgery    CORONARY ANGIOPLASTY  03/17/2017    PTCA    CT AORTA AND BILATERAL ILIOFEMORAL RUNOFF ANGIOGRAM W AND/OR WO IV CONTRAST  7/20/2020    CT AORTA AND BILATERAL ILIOFEMORAL RUNOFF ANGIOGRAM W AND/OR WO IV CONTRAST 7/20/2020 [facility]    CT AORTA AND BILATERAL ILIOFEMORAL RUNOFF ANGIOGRAM W AND/OR WO IV CONTRAST  1/14/2022    CT AORTA AND BILATERAL ILIOFEMORAL RUNOFF ANGIOGRAM W AND/OR WO IV CONTRAST 1/14/2022 [facility]    ELBOW SURGERY  02/10/2020    Elbow Surgery    INVASIVE VASCULAR PROCEDURE Bilateral 10/4/2024    Procedure: Lower Extremity Angiogram;  Surgeon: Baljinder Wright MD;  Location: [facility] Card[facility];  Service: Cardiovascular;  Laterality: Bilateral;  w / anesthesia  3 hr hydration  / arrive 7:30    KNEE SURGERY  06/02/2016    Knee Surgery Left    MR HEAD ANGIO WO IV CONTRAST  1/4/2022    MR HEAD ANGIO WO IV CONTRAST 1/4/2022 [facility]    MR NECK ANGIO WO IV CONTRAST  1/4/2022    MR NECK ANGIO WO IV CONTRAST 1/4/2022 [facility]    OTHER SURGICAL HISTORY  06/02/2016    Dental Surgery    OTHER SURGICAL HISTORY  09/12/2019    Cardiac  "catheterization with stent placement    OTHER SURGICAL HISTORY  09/30/2019    Coronary artery bypass graft    OTHER SURGICAL HISTORY  10/21/2020    Vascular surgical procedure    OTHER SURGICAL HISTORY  06/07/2019    Tonsillectomy    OTHER SURGICAL HISTORY  09/21/2020    Femoropopliteal bypass        Social History  He reports that he has been smoking cigarettes. He has been exposed to tobacco smoke. He has never used smokeless tobacco. He reports current drug use. Drug: Marijuana. He reports that he does not drink alcohol.    Family History  Family History   Problem Relation Name Age of Onset    No Known Problems Mother      No Known Problems Father          Allergies  Celecoxib; Ibuprofen; Tetanus toxoid, adsorbed; [id number] antimigraine agents; Cephalexin; Hydrocodone; Latex; and Tryptophan    Review of Systems     Physical Exam  HENT:      Head: Normocephalic and atraumatic.   Eyes:      Pupils: Pupils are equal, round, and reactive to light.   Cardiovascular:      Rate and Rhythm: Normal rate and regular rhythm.   Pulmonary:      Effort: Pulmonary effort is normal.   Abdominal:      General: Abdomen is flat.      Palpations: Abdomen is soft.   Musculoskeletal:         General: Normal range of motion.      Cervical back: Normal range of motion.   Skin:     General: Skin is warm.   Neurological:      General: No focal deficit present.      Mental Status: He is alert and oriented to person, place, and time.          Last Recorded Vitals  Blood pressure 160/87, pulse 79, temperature 36.8 °C (98.3 °F), temperature source Temporal, resp. rate 16, height 1.803 m (5' 11\"), weight 83.9 kg (185 lb), SpO2 100%.    Relevant Results             Assessment/Plan   Assessment & Plan  Peripheral arterial disease (CMS-[facility])    Limb ischemia      1) PVD with rest pain of left leg and lifestyle limiting claudication  Plan for left fem intervention       I spent 30 minutes in the professional and overall care of this " patient.      Baljinder Wright MD

## 2024-10-21 NOTE — POST-PROCEDURE NOTE
Physician Transition of Care Summary  Invasive Cardiovascular Lab    Procedure Date: 10/21/2024  Attending:    Leonard Wright - Primary  Resident/Fellow/Other Assistant: Surgeons and Role:  * No surgeons found with a matching role *    Indications:   Pre-op Diagnosis      * Limb ischemia [I99.8]     * Peripheral arterial disease (CMS-HCC) [I73.9]    Post-procedure diagnosis:   Post-op Diagnosis     * Limb ischemia [I99.8]     * Peripheral arterial disease (CMS-HCC) [I73.9]    Procedure(s):   Lower Extremity Angiogram  87517 - CHG ANGIOGRAPHY EXTREMITY UNILATERAL RS&I    Angioplasty - Lower Extremity        Procedure Findings:   Left SFA with total chronic occlusion   See full cath report for details    Description of the Procedure:   Successful angioplasty of the left SFA   Left popliteal artery access     Complications:   None     Stents/Implants:   Implants       No implant documentation for this case.            Anticoagulation/Antiplatelet Plan:   Continue Brilinta, Restart Xarelto tomorrow     Estimated Blood Loss:   10 mL    Anesthesia: Moderate Sedation Anesthesia Staff: CRNA: PAU Trevino    Any Specimen(s) Removed:   No specimens collected during this procedure.    Disposition:   PACU and then to ICU for overnight extended recovery       Electronically signed by: ORESTES Land, 10/21/2024 9:48 AM

## 2024-10-21 NOTE — Clinical Note
There were no immediate complications during the procedure. Cartilage Graft Text: The defect edges were debeveled with a #15 scalpel blade.  Given the location of the defect, shape of the defect, the fact the defect involved a full thickness cartilage defect a cartilage graft was deemed most appropriate.  An appropriate donor site was identified, cleansed, and anesthetized. The cartilage graft was then harvested and transferred to the recipient site, oriented appropriately and then sutured into place.  The secondary defect was then repaired using a primary closure.

## 2024-10-21 NOTE — PROGRESS NOTES
Dereck Shen is a 59 y.o. male admitted for PAD (peripheral artery disease) (CMS-[facility]). Pharmacy reviewed the patient's [id number] medications and allergies for accuracy.    The list below reflects the PTA list prior to pharmacy medication history. A summary a changes to the PTA medication list has been listed below. Please review each medication in order reconciliation for additional clarification and justification.    Source of information:    Medications added:    Medications modified:  INSULIN LISPRO 10 UNITS--------->30UNITS TID PLUS SLIDING SCALE IF BS IS OVER 200    Medications to be removed:  LEVETITACETAM    Medications of concern:  GABAPENTIN 400MG 1 TID LAST FILL 24 30DS  TIZANIDINE 2MG 1 every day PRN      Prior to Admission Medications   Prescriptions Last Dose Informant Patient Reported? Taking?   Brilinta 90 mg tablet   Yes No   Sig: Take 1 tablet (90 mg) by mouth 2 times a day.   Creon 36,000-114,000- 180,000 unit capsule,delayed release(DR/EC) capsule   Yes No   Sig: Take 2 capsules by mouth 3 times a day as needed.   ELDERBERRY FRUIT ORAL   Yes No   Sig: Take 1 tablet by mouth once daily as needed.   Entresto  mg tablet   Yes No   Sig: Take 1 tablet by mouth twice a day.   FreeStyle En sensor system kit   No No   Sig: USE AS DIRECTED AND CHANGE EVERY 14 DAYS   GINGER ROOT EXTRACT ORAL   Yes No   Sig: Take 1 tablet by mouth once daily as needed.   OXcarbazepine (Trileptal) 150 mg tablet   Yes No   Sig: Take 3 tablets (450 mg) by mouth twice a day.   atorvastatin (Lipitor) 80 mg tablet   Yes No   Sig: Take 1 tablet (80 mg) by mouth once daily at bedtime.   blood sugar diagnostic (FreeStyle Precision Aristeo Strips) strip   No No   Si each 2 times a day.   blood sugar diagnostic strip   No No   Sig: Use as directed twice daily   carvedilol (Coreg) 6.25 mg tablet   Yes No   Sig: Take 1 tablet (6.25 mg) by mouth every 12 hours.   dapagliflozin propanediol (Farxiga) 10 mg   Yes  "No   Sig: Take 1 tablet (10 mg) by mouth once every 24 hours.   ezetimibe (Zetia) 10 mg tablet   Yes No   Sig: Take 1 tablet (10 mg) by mouth once daily.   flash glucose scanning reader misc   No No   Sig: USE TO CHECK SUGARS FOUR TIMES A DAY   flash glucose sensor kit kit   No No   Sig: use as directed daily and change every 14 days   furosemide (Lasix) 40 mg tablet   Yes No   Sig: Take 1 tablet (40 mg) by mouth once daily as needed.   gabapentin (Neurontin) 400 mg capsule   No No   Sig: Take 1 capsule (400 mg) by mouth 3 times a day.   hydrALAZINE (Apresoline) 50 mg tablet   Yes No   Sig: Take 1 tablet (50 mg) by mouth 3 times a day.   insulin glargine (Lantus) 100 unit/mL (3 mL) pen   No No   Sig: Inject 20 Units under the skin once daily at bedtime.   insulin lispro (HumaLOG) 100 unit/mL injection   No No   Sig: Inject 10 Units under the skin 3 times a day with meals. Plus sliding scale if BS is over 200   insulin syringe-needle U-100 31G X \" 1 mL syringe   No No   Sig: USE AS DIRECTED FOUR TIMES PER DAY   isosorbide mononitrate ER (Imdur) 60 mg 24 hr tablet   Yes No   Sig: Take 1 tablet (60 mg) by mouth once daily. Do not crush or chew.   lancets misc   No No   Si each 4 times a day before meals.   levETIRAcetam 500 mg/5 mL (5 mL) solution   Yes No   Sig: Take 15 mL (1,500 mg) by mouth 2 times a day.   medical cannabis   Yes No   Sig: Not  prescribed   nicotine (Nicoderm CQ) 21 mg/24 hr patch   No No   Sig: APPLY 1 PATCH TO SKIN EVERY 24 HOURS AS DIRECTED   oxyCODONE-acetaminophen (Percocet) 5-325 mg tablet   No No   Sig: Take 1 tablet by mouth every 6 hours if needed for severe pain (7 - 10).   pantoprazole (ProtoNix) 40 mg EC tablet   No No   Sig: TAKE 1 TABLET BY MOUTH ONCE DAILY   pen needle, diabetic (BD Ultra-Fine Jocy Pen Needle) 32 gauge x \" needle   No No   Sig: Pen Needles for Insulin.   pen needle, diabetic (TechLITE Pen Needle) 32 gauge x \" needle   No No   Sig: Use to inject 1-4 " times daily as directed   ranolazine (Ranexa) 500 mg 12 hr tablet   Yes No   Sig: Take 1 tablet (500 mg) by mouth twice a day.   rivaroxaban (Xarelto) 20 mg tablet   Yes No   Sig: Take 1 tablet (20 mg) by mouth once daily.   tamsulosin (Flomax) 0.4 mg 24 hr capsule   No No   Sig: TAKE 1 CAPSULE BY MOUTH ONCE DAILY   tiZANidine (Zanaflex) 2 mg tablet   No No   Sig: Take 1 tablet (2 mg) by mouth once daily as needed for muscle spasms.      Facility-Administered Medications: None       Cristy Peterson

## 2024-10-21 NOTE — ANESTHESIA PREPROCEDURE EVALUATION
Patient: Dereck Shen    Procedure Information       Date/Time: 10/21/24 0730    Procedure: Lower Extremity Angiogram - w/ anesthesia    Location: [facility]    Providers: Baljinder Wright MD            Relevant Problems   Anesthesia (within normal limits)      Cardiac   (+) Atherosclerotic heart disease of native coronary artery without angina pectoris   (+) Hyperlipidemia   (+) Hypertension   (+) Limb ischemia   (+) Peripheral arterial disease (CMS-HCC)      Pulmonary   (+) Chronic obstructive pulmonary disease (Multi)   (+) BRUNA (obstructive sleep apnea)   (+) Shortness of breath on exertion      Neuro   (+) Bipolar depression (Multi)   (+) Causalgia of lower extremity   (+) Diabetic polyneuropathy associated with type 2 diabetes mellitus (Multi)   (+) Ischemic stroke (Multi)      GI   (+) Gastroesophageal reflux disease      /Renal   (+) BPH (benign prostatic hyperplasia)      Liver   (+) Chronic hepatitis C virus genotype 1a infection (Multi)   (+) Hepatic steatosis   (+) Hepatitis-C      Endocrine   (+) Diabetic neuropathy, painful (Multi)   (+) Diabetic polyneuropathy associated with type 2 diabetes mellitus (Multi)   (+) Type 2 diabetes mellitus      Musculoskeletal   (+) Causalgia of lower extremity   (+) Chronic pain syndrome      ID   (+) Bacterial skin infection   (+) Chronic hepatitis C virus genotype 1a infection (Multi)   (+) Hepatitis-C   (+) Recurrent boils   (+) Shingles       Clinical information reviewed:   Tobacco  Allergies  Meds   Med Hx  Surg Hx   Fam Hx  Soc Hx        NPO Detail:  NPO/Void Status  Date of Last Liquid: 10/21/24  Time of Last Liquid: 0600  Date of Last Solid: 10/20/24  Time of Last Solid: 2000  Last Intake Type: Clear fluids (sips w meds)         Physical Exam    Airway  Mallampati: II     Cardiovascular - normal exam     Dental   (+) upper dentures, lower dentures     Pulmonary - normal exam     Abdominal - normal exam              Anesthesia Plan    History of general anesthesia?: yes  History of complications of general anesthesia?: no    ASA 3     MAC     The patient is a current smoker.  Patient was previously instructed to abstain from smoking on day of procedure.  Patient did not smoke on day of procedure.    intravenous induction   Anesthetic plan and risks discussed with patient.  Use of blood products discussed with patient who.    Plan discussed with attending.

## 2024-10-21 NOTE — Clinical Note
Spoke to Patient, she is taking tylenol only for pain and feels she is doing ok.  I notified her of when she can start bathing as well as walking with her walker will be the only therapy she needs at this time.       Vessel(s): left SFA. Balloon inserted. Drug coated balloon

## 2024-10-21 NOTE — Clinical Note
Vessel(s): left iliac artery, left CFA, left PFA and left SFA. Injected with hand injections. Multiple views taken.

## 2024-10-21 NOTE — DISCHARGE INSTRUCTIONS
If you have any questions, please call the Cath Lab Nurse Practitioner Eugeniejose m Purvis at [phone] and leave a message. She will return your call the same day if calling before 3 PM, M-F. If you call on the weekend you can expect a call back on Monday morning. You may also call the Cath Lab at [phone] M-F, 7-3:30 with any questions. Weekends and after hours please call your cardiologist office number to reach a physician on call. The heart group office number is [phone]           CARDIAC CATHETERIZATION DISCHARGE INSTRUCTIONS     FOR SUDDEN AND SEVERE CHEST PAIN, SHORTNESS OF BREATH, EXCESSIVE BLEEDING, SIGNS OF STROKE, OR CHANGES IN MENTAL STATUS YOU SHOULD CALL 911 IMMEDIATELY.     If your provider has prescribed aspirin and/or clopidogrel (Plavix), or prasugrel (Effient), or ticagrelor (Brilinta), DO NOT STOP THESE MEDICATIONS for any reason without talking to your cardiologist first. If any of these were prescribed, you must take them every day without missing a single dose. If you are getting low on these medications, contact your provider immediately for a refill.     FOR NEXT 24 HOURS  - Upon discharge, you should return home and rest for the remainder of the day and evening. You do not have to stay on bed rest but should not be very active.  It is recommended a responsible adult be with you for the first 24 hours after the procedure.    - No driving for 24 hours after procedure. Please arrange for someone to drive you home from the hospital today.     - Do not drive, operate machinery, or use power tools for 24 hours after your procedure.     - Do not make any legal decisions for 24 hours after your procedure.     - Do not drink alcoholic beverages for 24 hours after your procedure.    WOUND CARE   *FOR FEMORAL (LEG) ACCESS*  ·      Avoid heavy lifting (over 10 pounds) for 7 days, squatting or excessive bending for 2 days, and strenuous exercise for 7 days.  ·      No submerged bathing, swimming, or  hot tubs for the next 7 days, or until fully healed.  ·      Avoid sexual activity for 3-4 days until any groin discomfort has ceased.     *FOR RADIAL (WRIST) ACCESS*  ·      No lifting more than 5 pounds or excessive use of the wrist for 24 hours - for example, treat your wrist as if it is sprained.  ·      Do not engage in vigorous activities (tennis, golf, bowling, weights) for at least 48 hours after the procedure.  ·      Do not submerge the wrist for 7 days after the procedure.  ·      You should expect mild tingling in your hand and tenderness at the puncture site for up to 3 days.    - The transparent dressing should be removed from the site 24 hours after the procedure.  Wash the site gently with soap and water. Rinse well and pat dry. Keep the area clean and dry. You may apply a Band-Aid to the site. Avoid lotions, ointments, or powders until fully healed.     - You may shower the day after your procedure.      - It is normal to notice a small bruise around the puncture site and/or a small grape sized or smaller lump. Any large bruising or large lump warrants a call to the office.     - If bleeding should occur, lay down and apply pressure to the affected area for 10 minutes.  If the bleeding stops notify your physician.  If there is a large amount of bleeding or spurting of blood CALL 911 immediately.  DO NOT drive yourself to the hospital.    - You may experience some tenderness, bruising or minimal inflammation.  If you have any concerns, you may contact the Cath Lab or if any of these symptoms become excessive, contact your cardiologist or go to the emergency room.     OTHER INSTRUCTIONS  - You may take acetaminophen (Tylenol) as directed for discomfort.  If pain is not relieved with acetaminophen (Tylenol), contact your doctor.    - If you notice or experience any of the following, you should notify your doctor or seek medical attention  Chest pain or discomfort  Change in mental status or weakness in  extremities.  Dizziness, light headedness, or feeling faint.  Change in the site where the procedure was performed, such as bleeding or an increased area of bruising or swelling.  Tingling, numbness, pain, or coolness in the leg/arm beyond the site where the procedure was performed.  Signs of infection (i.e. shaking chills, temperature > 100 degrees Fahrenheit, warmth, redness) in the leg/arm area where the procedure was performed.  Changes in urination   Bloody or black stools  Vomiting blood  Severe nose bleeds  Any excessive bleeding    - If you DO NOT have an appointment with your cardiologist within 2-4 weeks following your procedure, please contact their office.      Important ways to reduce your risk of coronary artery disease by healthy diet, maintaining a healthy weight, exercising regularly and stop using tobacco products.     Mediterranean Diet    About this topic  This is a heart healthy diet. It is based on widely used foods and cooking styles from many countries around the [place]. The main pattern for the diet is more plant foods and monounsaturated fats, or good fats, like olive oil. Protein in this diet comes from seafood, legumes, nuts, seeds, and poultry and eggs in lowered amounts. You will also eat more whole grains, vegetables, and fruits and moderate amounts of alcohol are also included. This diet has less red meats, dairy products, and processed foods.    What will the results be?  Your diet will have less saturated fat, cholesterol, calories, sodium, and added sugars. Your diet will be higher in fiber. This will help to keep your blood sugar steady. This diet lowers the chance of heart disease and other health problems.  What lifestyle changes are needed?  If you do not often eat this way, you will need to change your eating habits. Be sure to get regular exercise. It is believed to help the health benefits of this diet.  What changes to diet are needed?  You may need to limit the  "amount of red meat and processed foods in your diet. Ask your dietitian for help planning meals that are right for you.  What foods are good to eat?  Plenty of fish and other seafood  Fresh, frozen, or canned fruits and vegetables  Nuts and nut butters and dried beans, lentils, or peas  Foods high in fiber like whole grains and whole grain products  Olive oil (good fat), peanut or canola oil, margarine, or spreads that list vegetable oil as the first ingredient and do not contain trans fat or partially hydrogenated oil  Small amounts of poultry and eggs  What foods should be limited or avoided?  Red meats  Sweets, desserts, and processed foods  Butter, oils, and fats that contain trans fats or are hydrogenated or partially hydrogenated  Gravies and sauces  What problems could happen?  Your weight may rise because your diet will be higher in fat from olive oil and nuts.  You may have lower iron levels. Be sure to eat foods rich in iron. Also, eat foods rich in vitamin C. This will help your body take in iron.  You may have lower calcium levels because you are eating less dairy products. Ask your doctor if you need to take a calcium supplement.  Wine is often thought of as part of a Mediterranean diet. It is not needed and you may choose not to include it. Avoid wine if you are prone to alcohol abuse or are pregnant. Also, avoid it if you are at risk for breast cancer, have liver problems, or have other illnesses that make it important for you to not have alcohol.  When do I need to call the doctor?  If you have any concerns about your diet     Health risks of obesity    The Basics  Written by the doctors and editors at [facility]  What does it mean to have obesity? -- Doctors use a calculation called \"body mass index,\" or \"BMI,\" to decide whether a person is underweight, at a healthy weight, overweight, or has obesity.  Your BMI will tell you which category you are in based on your weight and height (figure 1):  ?If " "your BMI is between 25 and 29.9, you are overweight.  ?If your BMI is 30 or greater, you have obesity.  Obesity is a problem, because it increases the risks of many different health problems. It can also make it hard for you to move, breathe, and do other things that people who are at a healthy weight can do easily.  What are the health risks of obesity? -- Having obesity increases a person's risk of developing many health problems. Here are just a few examples:  ?Diabetes  ?High blood pressure  ?High cholesterol  ?Heart disease (including heart attacks)  ?Stroke  ?Sleep apnea (a disorder in which you stop breathing for short periods while asleep)  ?Asthma  ?Cancer  Does having obesity shorten a person's life? -- Yes. Studies show that people with obesity die younger than people who are a healthy weight. They also show that the risk of death goes up the heavier a person is. The degree of increased risk depends on how long the person has had obesity, and on what other medical problems they have.  People with \"central obesity\" might also be at risk of dying younger. Central obesity means carrying extra weight in the belly area, even if the BMI is normal.  Should I see an expert? -- Yes. If you are overweight or have obesity, you can talk to your doctor or nurse. They might have suggestions for healthy ways to lose weight. It can also help to work with a dietitian (food and nutrition expert). A dietitian can help you choose healthy foods and plan meals.  Are there medical treatments that can help me lose weight? -- Yes. There are medicines and surgery to help with weight loss. But those treatments are only for people who have not been able to lose weight through lifestyle changes such as diet and exercise. Also, weight loss treatments do not take the place of diet and exercise. People who have those treatments must also change how they eat and how active they are.  What can I do to prevent the problems caused by " obesity? -- The best thing that you can do is lose weight. But even if weight loss is not possible, you can improve your health and lower your risk if you:  ?Become more active - Many types of physical activity can help, including walking. You can start with a few minutes a day and add more as you get stronger and build up your endurance. Anything that gets your body moving is good for you.  ?Improve your diet - It is healthy to have regular meal times, eat smaller portions, and not skip meals. Limit sweets, and avoid processed foods. Try to eat more vegetables and fruits instead.  ?Quit smoking (if you smoke) - Some people start eating more after they stop smoking, so try to make healthy food choices. Even if it increases your appetite, quitting smoking is still one of the best things that you can do to improve your health.  ?Limit alcohol - For females, drink no more than 1 drink a day. For males, drink no more than 2 drinks a day.  What causes obesity? -- The thing that increases a person's risk the most is having an unhealthy lifestyle. Most people develop obesity because they eat too much, eat unhealthy foods, and move too little. That's especially true of people who watch too much TV. But there are also other things that seem to increase the risk of obesity that many people do not know about. Here are some things that might affect a person's chance of developing obesity:  ?Mother's habits during and after pregnancy - People who eat a lot of calories, have diabetes, or smoke during pregnancy have a higher chance of having babies who have obesity as adults. Also, babies who drink formula might be more likely than  babies to develop obesity later in life.  ?Habits and weight gain during childhood - Children or teens who are overweight or have obesity are more likely to become have obesity as an adult.  ?Sleeping too little - People who do not get enough sleep are more likely to develop obesity than  "people who sleep enough.  ?Taking certain medicines - Long-term use of certain medicines, such as some medicines to treat depression, can cause weight gain. If you are concerned that 1 of your medicines might be making you gain weight, talk to your doctor or nurse. They might be able to switch you to a different medicine instead.  ?Certain hormonal conditions - Some hormonal problems can increase the risk of developing obesity. For example, a condition called \"polycystic ovary syndrome\" can cause weight gain, along with other symptoms like irregular periods.  What if I want to get pregnant? -- If you are overweight or have obesity, it might be harder to get pregnant. For males, obesity can also cause sex problems, like having trouble getting or keeping an erection. This is more likely if you also have high blood pressure or diabetes.  What if my child has obesity? -- In children, obesity has many of the same risks as it does in adults. For example, it can increase the risk of diabetes, high blood pressure, asthma, and sleep apnea. It can also cause added problems related to childhood. For example, obesity can make children grow faster than normal and cause girls to go through puberty earlier than usual.  All topics are updated as new evidence becomes available and our peer review process is complete.  This topic retrieved from [employer] on: Jan 11, 2024.  Topic 29064 Version 17.0  Release: 31.6.4 - C32.10  © 2024 [employer], [employer]. and/or its affiliates. All rights reserved.  figure 1: Your body mass index (BMI)        If you use tobacco products please review   Quitting smoking    The Basics  Written by the doctors and editors at [employer]  What are the benefits of quitting smoking? -- Quitting smoking can dramatically improve your health and help you live longer. It lowers your risk of heart disease, lung disease, kidney failure, cancer, infection, stomach problems, and diabetes.  Quitting smoking can also lower your " "chances of getting osteoporosis, a condition that makes your bones weak.  Quitting is not easy for most people, and it might take several tries to completely quit. But help and support are available. Quitting smoking will improve your health no matter how old you are, even if you have smoked for a long time.  What should I do if I want to quit smoking? -- It's a good idea to start by talking with your doctor or nurse. It is possible to quit on your own, without help. But getting help greatly increases your chances of quitting successfully.  When you are ready to quit, you will make a plan to:  ?Set a quit date.  ?Tell your family and friends that you plan to quit.  ?Plan ahead for the challenges you will face, such as cigarette cravings.  ?Remove cigarettes from your home, car, and work.  How can my doctor or nurse help? -- Your doctor or nurse can give you advice on the best way to quit. They can also give you medicines to:  ?Reduce your craving for cigarettes  ?Reduce your \"withdrawal\" symptoms (symptoms that happen when you stop smoking)  Your doctor or nurse can also help you find a counselor to talk to. For most people who are trying to quit smoking, it works best to use both medicines and counseling.  You can also get help from a free phone line (7-434-QUITNOW, or 1-806.595.4371) or go online to www.smokefree.gov.  What are the symptoms of withdrawal? -- When you stop smoking, you might have symptoms such as:  ?Trouble sleeping  ?Feeling irritable, anxious, or restless  ?Getting frustrated or angry  ?Having trouble thinking clearly  These symptoms can be hard to deal with, which is why it can be so hard to quit. But medicines can help.  Some people who stop smoking become temporarily depressed. Some people need treatment for depression, such as counseling or medicines or both. People with depression might:  ?No longer enjoy or care about doing the things they used to like to do  ?Feel sad, down, hopeless, " nervous, or cranky most of the day, almost every day  ?Lose or gain weight  ?Sleep too much or too little  ?Feel tired or like they have no energy  ?Feel guilty or like they are worth nothing  ?Forget things or feel confused  ?Move and speak more slowly than usual  ?Act restless or have trouble staying still  ?Think about death or suicide  If you think you might be depressed, tell your doctor or nurse right away. They can talk to you about your symptoms and recommend treatment if needed.  Get help right away if you are thinking of hurting or killing yourself! -- Sometimes, people with depression think of hurting or killing themselves. If you ever feel like you might hurt yourself, help is available:  ?In the , contact the [employer] [place] Suicide & Crisis Lifeline:  To speak to someone, call or text [employer] [place].  To talk to someone online, go to www.[employer]/chat.  ?Call your doctor or nurse and tell them that it is an emergency.  ?Call for an ambulance (in the [place] and [place], call 9-1-1).  ?Go to the emergency department at your local hospital.  If you think your partner might have depression, or if you are worried that they might hurt themselves, get them help right away.  How does counseling work? -- A counselor can help you figure out:  ?What triggers you to want to smoke, and how to handle these situations  ?How to resist cravings  ?What you can do differently if you have tried to quit before  You can meet with a counselor in 1-on-1 sessions or as part of a group. There are other ways to get counseling, too, such as over the phone, through text messaging, or online.  How do medicines help you stop smoking? -- Different medicines work in different ways:  ?Nicotine replacement therapy - Nicotine is the main drug in cigarettes, and the reason they are addictive. These medicines reduce your body's craving for nicotine. They also help with withdrawal symptoms.  There are different forms of nicotine replacement, including skin  "patches, lozenges, gum, nasal sprays, and inhalers. Most can be bought without a prescription. Also, health insurance might cover some or all of the cost.  It often helps to use 2 forms of nicotine replacement. For example, you might wear a patch all the time, plus use gum or lozenges when you get a craving to smoke.  ?Varenicline - Varenicline (brand names: Chantix, Champix) is a prescription medicine that reduces withdrawal symptoms and cigarette cravings. Varenicline can increase the effects of alcohol in some people. It's a good idea to limit drinking while you're taking it, at least until you know how it affects you.  Even if you are not yet ready to commit to a quit date, varenicline can help reduce cravings. This can make it easier to quit when you are ready.  ?Bupropion - Bupropion (sample brand names: Wellbutrin, Zyban) is a prescription medicine that reduces your desire to smoke. It is also available in a generic version, which is cheaper than the brand name medicines. Doctors do not usually prescribe bupropion for people with seizures or who have had seizures in the past.  It might also be helpful to combine nicotine replacement with bupropion or varenicline. In some cases, a person might even take both bupropion and varenicline. Your doctor or nurse can help you figure out the best combination for you.  What about e-cigarettes? -- Sometimes people wonder if using electronic cigarettes, or \"e-cigarettes,\" can help them quit smoking. Using e-cigarettes is also called \"vaping.\" Doctors do not recommend e-cigarettes in place of using of medicines and counseling. That's because e-cigarettes still contain nicotine as well as other substances that might be harmful. It's also not clear how they can affect a person's health in the long term.  What if I am pregnant and I smoke? -- If you are pregnant, it's really important for the health of your baby that you quit. Ask your doctor what options you have, and what " is safest for your baby.  What if I have already smoked for a long time? -- The longer you have smoked, the higher your chances are of having health problems. But it is never too late to quit smoking. It helps your health even if you are older or have smoked for many years. The best thing you can do to lower your chance of having a health problem caused by smoking is to quit.  Will I gain weight if I quit? -- You might gain a few pounds. This can be frustrating for some people. But it's important to remember that you are improving your health by quitting smoking. You can help prevent gaining a lot of weight by staying active and eating a healthy diet.  What if I am not able to quit? -- If you don't quit on your first try, or if you quit but then start smoking again, don't give up hope. Lots of people have to try more than once before they are able to completely quit.  It might help to try to understand why quitting did not work. There might be something you can do differently when you try again. It can help to figure out which situations make you want to smoke, so you can avoid them.  What else can I do to improve my chances of quitting? -- You can:  ?Get regular exercise. Any type of physical activity, even gentle forms of movement, is good for your health. Physical activity can also help reduce stress.  ?Stay away from people who smoke and places that make you want to smoke. If people close to you smoke, ask them to quit with you or avoid smoking around you.  ?Carry gum, hard candy, or something to put in your mouth. If you get a craving for a cigarette, try 1 of these instead.  ?Don't give up, even if you start smoking again. It takes most people a few tries before they succeed.  All topics are updated as new evidence becomes available and our peer review process is complete.

## 2024-10-21 NOTE — ANESTHESIA POSTPROCEDURE EVALUATION
Patient: Dereck Shen    Procedure Summary       Date: 10/21/24 Room / Location: [facility] / [facility]    Anesthesia Start: 0736 Anesthesia Stop: 0920    Procedures:       Lower Extremity Angiogram      Angioplasty - Lower Extremity Diagnosis:       Limb ischemia      Peripheral arterial disease (CMS-HCC)      (Limb ischemia [I99.8])      (Peripheral arterial disease (CMS-HCC) [I73.9])    Providers: Baljinder Wright MD Responsible Provider: SUMAN Trevino-ELLY    Anesthesia Type: MAC ASA Status: 3            Anesthesia Type: MAC    Vitals Value Taken Time   /86 10/21/24 0920   Temp 97.0 10/21/24 0920   Pulse 70 10/21/24 0920   Resp 16 10/21/24 0920   SpO2 99 % 10/21/24 0919   Vitals shown include unfiled device data.    Anesthesia Post Evaluation    Patient location during evaluation: PACU  Patient participation: complete - patient participated  Level of consciousness: awake and alert  Pain score: 8  Pain management: adequate  Airway patency: patent  Cardiovascular status: acceptable and hemodynamically stable  Respiratory status: acceptable and room air  Hydration status: acceptable  Postoperative Nausea and Vomiting: none  Comments: PT'S VITAL SIGNS DO NOT REFLECT THE INTENSITY OF PAIN PROCLAIMED.  PAIN IS ATTRIBUTED TO REPERFUSION      There were no known notable events for this encounter.

## 2024-10-22 ENCOUNTER — PHARMACY VISIT (OUTPATIENT)
Dept: PHARMACY | Facility: CLINIC | Age: 59
End: 2024-10-22
Payer: MEDICAID

## 2024-10-22 VITALS
BODY MASS INDEX: 27.44 KG/M2 | WEIGHT: 196 LBS | HEIGHT: 71 IN | RESPIRATION RATE: 11 BRPM | OXYGEN SATURATION: 97 % | HEART RATE: 70 BPM | TEMPERATURE: 97.3 F | SYSTOLIC BLOOD PRESSURE: 158 MMHG | DIASTOLIC BLOOD PRESSURE: 102 MMHG

## 2024-10-22 PROBLEM — I99.8 LIMB ISCHEMIA: Status: RESOLVED | Noted: 2024-04-24 | Resolved: 2024-10-22

## 2024-10-22 LAB
ALBUMIN SERPL BCP-MCNC: 3 G/DL (ref 3.4–5)
ANION GAP SERPL CALC-SCNC: 9 MMOL/L (ref 10–20)
BUN SERPL-MCNC: 29 MG/DL (ref 6–23)
CALCIUM SERPL-MCNC: 7.7 MG/DL (ref 8.6–10.3)
CHLORIDE SERPL-SCNC: 104 MMOL/L (ref 98–107)
CHOLEST SERPL-MCNC: 172 MG/DL (ref 0–199)
CHOLESTEROL/HDL RATIO: 4.8
CO2 SERPL-SCNC: 26 MMOL/L (ref 21–32)
CREAT SERPL-MCNC: 1.32 MG/DL (ref 0.5–1.3)
EGFRCR SERPLBLD CKD-EPI 2021: 63 ML/MIN/1.73M*2
ERYTHROCYTE [DISTWIDTH] IN BLOOD BY AUTOMATED COUNT: 13.9 % (ref 11.5–14.5)
GLUCOSE BLD MANUAL STRIP-MCNC: 187 MG/DL (ref 74–99)
GLUCOSE SERPL-MCNC: 142 MG/DL (ref 74–99)
HCT VFR BLD AUTO: 34.4 % (ref 41–52)
HDLC SERPL-MCNC: 35.8 MG/DL
HGB BLD-MCNC: 12 G/DL (ref 13.5–17.5)
LDLC SERPL CALC-MCNC: 107 MG/DL
MCH RBC QN AUTO: 32.1 PG (ref 26–34)
MCHC RBC AUTO-ENTMCNC: 34.9 G/DL (ref 32–36)
MCV RBC AUTO: 92 FL (ref 80–100)
NON HDL CHOLESTEROL: 136 MG/DL (ref 0–149)
NRBC BLD-RTO: 0 /100 WBCS (ref 0–0)
PHOSPHATE SERPL-MCNC: 3.9 MG/DL (ref 2.5–4.9)
PLATELET # BLD AUTO: 198 X10*3/UL (ref 150–450)
POTASSIUM SERPL-SCNC: 4.6 MMOL/L (ref 3.5–5.3)
RBC # BLD AUTO: 3.74 X10*6/UL (ref 4.5–5.9)
SODIUM SERPL-SCNC: 134 MMOL/L (ref 136–145)
TRIGL SERPL-MCNC: 147 MG/DL (ref 0–149)
VLDL: 29 MG/DL (ref 0–40)
WBC # BLD AUTO: 5.4 X10*3/UL (ref 4.4–11.3)

## 2024-10-22 PROCEDURE — 36415 COLL VENOUS BLD VENIPUNCTURE: CPT | Performed by: NURSE PRACTITIONER

## 2024-10-22 PROCEDURE — 2500000001 HC RX 250 WO HCPCS SELF ADMINISTERED DRUGS (ALT 637 FOR MEDICARE OP): Performed by: NURSE PRACTITIONER

## 2024-10-22 PROCEDURE — 2500000002 HC RX 250 W HCPCS SELF ADMINISTERED DRUGS (ALT 637 FOR MEDICARE OP, ALT 636 FOR OP/ED): Performed by: NURSE PRACTITIONER

## 2024-10-22 PROCEDURE — 85027 COMPLETE CBC AUTOMATED: CPT | Performed by: NURSE PRACTITIONER

## 2024-10-22 PROCEDURE — 99239 HOSP IP/OBS DSCHRG MGMT >30: CPT | Performed by: NURSE PRACTITIONER

## 2024-10-22 PROCEDURE — 80069 RENAL FUNCTION PANEL: CPT | Performed by: NURSE PRACTITIONER

## 2024-10-22 PROCEDURE — 99233 SBSQ HOSP IP/OBS HIGH 50: CPT | Performed by: PHYSICIAN ASSISTANT

## 2024-10-22 PROCEDURE — 80061 LIPID PANEL: CPT | Performed by: NURSE PRACTITIONER

## 2024-10-22 PROCEDURE — RXMED WILLOW AMBULATORY MEDICATION CHARGE

## 2024-10-22 PROCEDURE — 82947 ASSAY GLUCOSE BLOOD QUANT: CPT

## 2024-10-22 PROCEDURE — 7100000011 HC EXTENDED STAY RECOVERY HOURLY - NURSING UNIT

## 2024-10-22 PROCEDURE — 2500000004 HC RX 250 GENERAL PHARMACY W/ HCPCS (ALT 636 FOR OP/ED): Performed by: INTERNAL MEDICINE

## 2024-10-22 PROCEDURE — S4991 NICOTINE PATCH NONLEGEND: HCPCS | Performed by: NURSE PRACTITIONER

## 2024-10-22 RX ORDER — ASPIRIN 81 MG/1
81 TABLET ORAL DAILY
Qty: 30 TABLET | Refills: 0 | Status: SHIPPED | OUTPATIENT
Start: 2024-10-22 | End: 2024-11-21

## 2024-10-22 RX ORDER — ASPIRIN 81 MG/1
81 TABLET ORAL DAILY
Status: DISCONTINUED | OUTPATIENT
Start: 2024-10-22 | End: 2024-10-22 | Stop reason: HOSPADM

## 2024-10-22 RX ORDER — OXYCODONE AND ACETAMINOPHEN 5; 325 MG/1; MG/1
1 TABLET ORAL EVERY 6 HOURS PRN
Qty: 15 TABLET | Refills: 0 | Status: SHIPPED | OUTPATIENT
Start: 2024-10-22

## 2024-10-22 SDOH — SOCIAL STABILITY: SOCIAL NETWORK: HOW OFTEN DO YOU GET TOGETHER WITH FRIENDS OR RELATIVES?: THREE TIMES A WEEK

## 2024-10-22 SDOH — SOCIAL STABILITY: SOCIAL INSECURITY: WITHIN THE LAST YEAR, HAVE YOU BEEN AFRAID OF YOUR PARTNER OR EX-PARTNER?: NO

## 2024-10-22 SDOH — SOCIAL STABILITY: SOCIAL INSECURITY
WITHIN THE LAST YEAR, HAVE YOU BEEN RAPED OR FORCED TO HAVE ANY KIND OF SEXUAL ACTIVITY BY YOUR PARTNER OR EX-PARTNER?: NO

## 2024-10-22 SDOH — ECONOMIC STABILITY: FOOD INSECURITY: WITHIN THE PAST 12 MONTHS, THE FOOD YOU BOUGHT JUST DIDN'T LAST AND YOU DIDN'T HAVE MONEY TO GET MORE.: SOMETIMES TRUE

## 2024-10-22 SDOH — HEALTH STABILITY: MENTAL HEALTH
DO YOU FEEL STRESS - TENSE, RESTLESS, NERVOUS, OR ANXIOUS, OR UNABLE TO SLEEP AT NIGHT BECAUSE YOUR MIND IS TROUBLED ALL THE TIME - THESE DAYS?: NOT AT ALL

## 2024-10-22 SDOH — SOCIAL STABILITY: SOCIAL INSECURITY
WITHIN THE LAST YEAR, HAVE YOU BEEN KICKED, HIT, SLAPPED, OR OTHERWISE PHYSICALLY HURT BY YOUR PARTNER OR EX-PARTNER?: NO

## 2024-10-22 SDOH — ECONOMIC STABILITY: FOOD INSECURITY
WITHIN THE PAST 12 MONTHS, YOU WORRIED THAT YOUR FOOD WOULD RUN OUT BEFORE YOU GOT THE MONEY TO BUY MORE.: SOMETIMES TRUE

## 2024-10-22 SDOH — SOCIAL STABILITY: SOCIAL NETWORK
DO YOU BELONG TO ANY CLUBS OR ORGANIZATIONS SUCH AS CHURCH GROUPS, UNIONS, FRATERNAL OR ATHLETIC GROUPS, OR SCHOOL GROUPS?: YES

## 2024-10-22 SDOH — ECONOMIC STABILITY: INCOME INSECURITY: IN THE PAST 12 MONTHS HAS THE ELECTRIC, GAS, OIL, OR WATER COMPANY THREATENED TO SHUT OFF SERVICES IN YOUR HOME?: NO

## 2024-10-22 SDOH — ECONOMIC STABILITY: HOUSING INSECURITY: IN THE LAST 12 MONTHS, WAS THERE A TIME WHEN YOU WERE NOT ABLE TO PAY THE MORTGAGE OR RENT ON TIME?: NO

## 2024-10-22 SDOH — SOCIAL STABILITY: SOCIAL INSECURITY: WITHIN THE LAST YEAR, HAVE YOU BEEN HUMILIATED OR EMOTIONALLY ABUSED IN OTHER WAYS BY YOUR PARTNER OR EX-PARTNER?: NO

## 2024-10-22 SDOH — ECONOMIC STABILITY: HOUSING INSECURITY: AT ANY TIME IN THE PAST 12 MONTHS, WERE YOU HOMELESS OR LIVING IN A SHELTER (INCLUDING NOW)?: NO

## 2024-10-22 SDOH — ECONOMIC STABILITY: FOOD INSECURITY: HOW HARD IS IT FOR YOU TO PAY FOR THE VERY BASICS LIKE FOOD, HOUSING, MEDICAL CARE, AND HEATING?: NOT HARD AT ALL

## 2024-10-22 SDOH — SOCIAL STABILITY: SOCIAL NETWORK
IN A TYPICAL WEEK, HOW MANY TIMES DO YOU TALK ON THE PHONE WITH FAMILY, FRIENDS, OR NEIGHBORS?: MORE THAN THREE TIMES A WEEK

## 2024-10-22 SDOH — SOCIAL STABILITY: SOCIAL NETWORK: HOW OFTEN DO YOU ATTEND CHURCH OR RELIGIOUS SERVICES?: MORE THAN 4 TIMES PER YEAR

## 2024-10-22 SDOH — ECONOMIC STABILITY: TRANSPORTATION INSECURITY: IN THE PAST 12 MONTHS, HAS LACK OF TRANSPORTATION KEPT YOU FROM MEDICAL APPOINTMENTS OR FROM GETTING MEDICATIONS?: YES

## 2024-10-22 SDOH — ECONOMIC STABILITY: HOUSING INSECURITY: IN THE PAST 12 MONTHS, HOW MANY TIMES HAVE YOU MOVED WHERE YOU WERE LIVING?: 1

## 2024-10-22 ASSESSMENT — ACTIVITIES OF DAILY LIVING (ADL)
EFFECT OF PAIN ON DAILY ACTIVITIES: DIFFICULTY SLEEPING
LACK_OF_TRANSPORTATION: YES
LACK_OF_TRANSPORTATION: NO

## 2024-10-22 ASSESSMENT — ENCOUNTER SYMPTOMS
BACK PAIN: 0
FACIAL SWELLING: 0
FREQUENCY: 0
WEAKNESS: 0
FLANK PAIN: 0
SORE THROAT: 0
DIZZINESS: 0
WHEEZING: 0
CHILLS: 0
BLOOD IN STOOL: 0
DIARRHEA: 0
EYE PAIN: 0
FATIGUE: 0
HEADACHES: 0
TROUBLE SWALLOWING: 0
JOINT SWELLING: 0
NAUSEA: 0
CHEST TIGHTNESS: 0
COUGH: 0
FEVER: 0
HEMATURIA: 0
CONSTIPATION: 0
NUMBNESS: 0
WOUND: 0
HALLUCINATIONS: 0
DIAPHORESIS: 0
LIGHT-HEADEDNESS: 0
SHORTNESS OF BREATH: 0
PALPITATIONS: 0
ABDOMINAL PAIN: 0
APPETITE CHANGE: 0
BRUISES/BLEEDS EASILY: 0
DYSURIA: 0
VOMITING: 0

## 2024-10-22 ASSESSMENT — COGNITIVE AND FUNCTIONAL STATUS - GENERAL
DAILY ACTIVITIY SCORE: 20
DRESSING REGULAR UPPER BODY CLOTHING: A LITTLE
TOILETING: A LITTLE
CLIMB 3 TO 5 STEPS WITH RAILING: A LITTLE
MOBILITY SCORE: 22
WALKING IN HOSPITAL ROOM: A LITTLE
DRESSING REGULAR LOWER BODY CLOTHING: A LITTLE
HELP NEEDED FOR BATHING: A LITTLE

## 2024-10-22 ASSESSMENT — PAIN - FUNCTIONAL ASSESSMENT
PAIN_FUNCTIONAL_ASSESSMENT: WONG-BAKER FACES
PAIN_FUNCTIONAL_ASSESSMENT: 0-10
PAIN_FUNCTIONAL_ASSESSMENT: 0-10

## 2024-10-22 ASSESSMENT — PAIN SCALES - GENERAL
PAINLEVEL_OUTOF10: 9
PAINLEVEL_OUTOF10: 6
PAINLEVEL_OUTOF10: 9

## 2024-10-22 ASSESSMENT — PAIN DESCRIPTION - DESCRIPTORS
DESCRIPTORS: ACHING;CRAMPING;THROBBING
DESCRIPTORS: ACHING;CRAMPING;THROBBING

## 2024-10-22 ASSESSMENT — PAIN DESCRIPTION - LOCATION: LOCATION: LEG

## 2024-10-22 ASSESSMENT — PAIN SCALES - WONG BAKER: WONGBAKER_NUMERICALRESPONSE: NO HURT

## 2024-10-22 NOTE — PROGRESS NOTES
"Dereck Shen is a 59 y.o. male on day 0 of admission presenting with PAD (peripheral artery disease) (CMS-[facility]).      Subjective   Dereck Shen is a 59 y.o. male with PMHx of CAD s/p CABG, HFpEF, Bipolar disorder, BPH, DM, GERD, HTN, HLD, BRUNA, Opiate abuse in remission, severe lower extremity PAD with prior revascularization (R fem-pop bypass 2/24/20, L fem-pop bypass 9/14/20, L fem PTA of the graft, R iliac PTA 2/2022, occluded graft RLE by duplex 5/23/24, LLE and DANYA PTA 4/2024 who is s/p successful angioplasty of the left SFA 10/21/24. Medicine consulted for medical management.  He c/o 7/10 LLE pain. He says his calf feels \"weird\" but denies numbness.     10/22/2024: No acute events overnight. Vitals stable, has had some low Bps overnight but improved to 119/73 this morning. Cr elevated 1.32, not ASMEERA as baseline appears ~1.0-1.1. Hgb 12.0.   Patient eager for dc to home today. He needs short course of pain medications until can follow up with RAULITO GlezS reviewed, recently given percocet 5/325 for total of 15 pills by Kobi Mistry. Will give 1 time rx for same         Review of Systems   Constitutional:  Negative for appetite change, chills, diaphoresis, fatigue and fever.   HENT:  Negative for congestion, ear pain, facial swelling, hearing loss, nosebleeds, sore throat, tinnitus and trouble swallowing.    Eyes:  Negative for pain.   Respiratory:  Negative for cough, chest tightness, shortness of breath and wheezing.    Cardiovascular:  Negative for chest pain, palpitations and leg swelling.   Gastrointestinal:  Negative for abdominal pain, blood in stool, constipation, diarrhea, nausea and vomiting.   Genitourinary:  Negative for dysuria, flank pain, frequency, hematuria and urgency.   Musculoskeletal:  Negative for back pain and joint swelling.        L calf pain   Skin:  Negative for rash and wound.   Neurological:  Negative for dizziness, syncope, weakness, light-headedness, numbness and headaches. "   Hematological:  Does not bruise/bleed easily.   Psychiatric/Behavioral:  Negative for behavioral problems, hallucinations and suicidal ideas.           Objective     Last Recorded Vitals  /73   Pulse 68   Temp 36.3 °C (97.3 °F)   Resp 15   Wt 88.9 kg (196 lb)   SpO2 99%     Image Results  Invasive vascular procedure    Result Date: 10/21/2024       [facility], Cath Lab [address] [place] [address]    Phone [phone] Fax [phone] Cardiovascular Catheterization Report Patient Name:      CARMENZA OLEARY   Performing           [id number] Wright                                        Physician:           MD Study Date:        10/21/2024          Verifying Physician: Marilin Wright MD MRN/PID:           [id number]            Cardiologist: Accession#:        [id number]        Ordering Provider:   Marilin WRIGHT Date of Birth/Age: 1965 / 59      Fellow:                    years Gender:            M                   Fellow: Encounter#:        [id number]  Study: Peripheral Intervention  Indications: CARMENZA OLEARY is a 59 year old male who presents with peripheral artery disease, dyslipidemia, hypertension and tobacco use.  Procedure Description: After infiltration with 2% Lidocaine utilizing two-dimensional ultrasound and fluoroscopic guidance, the Left Popliteal was cannulated using a modified Seldinger technique. Subsequently a 6 Georgian sheath was placed antegrade in the Left Popliteal. Selective angiogram of the left popliteal artery was performed by injection thru the sheath in the popliteal artery.  Left Lower Extremity Arterial Findings: Left Common Iliac Artery: The left common iliac artery revealed no evidence of significant disease. Left Extermal Iliac Artery: The left external iliac artery revealed no evidence of significant disease. Left Internal Iliac Artery: The left internal iliac artery  revealed no evidence of significant disease. Left Common Femoral Artery: The left common femoral artery revealed no evidence of significant disease. Left Profunda Femoris Artery: The left profunda femoris artery revealed no evidence of significant disease. Left Superior Femoral Artery: The left superficial femoral artery revealed chronic occlusion and total occlusion. Left Popliteal Artery: The left popliteal artery revealed no evidence of significant disease. Left Tibial-Peroneal Trunk: The left tibial-peroneal trunk revealed no evidence of significant disease. Left Anterior Tibial Artery: The left anterior tibial artery revealed chronic occlusion and total occlusion. Left Posterior Tibial Artery: The left posterior tibial artery revealed severe atherosclerotic disease and diffuse atherosclerotic disease. Left Peroneal Artery: The left peroneal artery revealed no evidence of significant disease. Left Dorsalis Pedis Artery: The left dorsalis pedis artery revealed no evidence of significant disease.  Peripheral Interventions:  Percutaneous peripheral intervention of the entire left superficial femoral artery. The vessel was dilated using a compliant 4.0 mm x 200 mm balloon dilated to 8 NENITA. The stent was post dilated using a drug coated 5.0 mm x 250 mm balloon at 8 NENITA, and a drug coated 5.0 mm x 120 mm balloon at 8 NENITA. The stenosis was successfully reduced from 100% to <10%.  Hemo Personnel: +---------------+---------+ Name           Duty      +---------------+---------+ Baljinder Wright MDPROC MD 1 +---------------+---------+  Hemodynamic Pressures:  +----+---------------------+----------+-------------+--------------+---------+ Site      Date Time      Phase NameSystolic mmHgDiastolic mmHgMean mmHg +----+---------------------+----------+-------------+--------------+---------+   AO10/21/2024 8:14:06 AM  AIR REST          112            49       95  +----+---------------------+----------+-------------+--------------+---------+ LSFA10/21/2024 8:42:24 AM  AIR REST          108            53       76 +----+---------------------+----------+-------------+--------------+---------+  Oxygen Saturation %: +-----------+------------+ Sample SiteHB (g/100ml) +-----------+------------+     SYS ART        15.7 +-----------+------------+     SYS SMOOTH        15.7 +-----------+------------+     PUL ART        15.7 +-----------+------------+     PUL SMOOTH        15.7 +-----------+------------+  Complications: No in-lab complications observed.  Cardiac Cath Post Procedure Notes: Post Procedure           Angioplasty of SFA. Diagnosis: Blood Loss:              Estimated blood loss during the procedure was <10 ml                          mls. Specimens Removed:       Number of specimen(s) removed: none.  Recommendations: Maximize medical therapy. Agressive risk factor modification efforts. Patient counseled and advised to discontinue smoking. ____________________________________________________________________________________ CONCLUSIONS:  1. Successful recannalization of totally occluded left SFA. ICD 10 Codes: Atherosclerosis of autologous vein bypass graft(s) of the extremities with rest pain, left leg-I70.422  CPT Codes: Angiography, Extremity,uni,S&I (PER)-05861; Revasc Fem/Popl Angioplasty unilat(PER)-84345; IVUS non coronary initial vessel-66267  [id number] Baljinder Wright MD Performing Physician Electronically signed by [id number] Baljinder Wright MD on 10/21/2024 at 9:20:41 AM  ** Final **     Invasive vascular procedure    Result Date: 10/5/2024       [facility], Cath Lab [address] [place] [address]    Phone [phone] Fax [phone] Cardiovascular Catheterization Report Patient Name:      CARMENZA OLEARY   Performing           [id number] Baljinder Wright                                        Physician:           MD Study Date:        10/4/2024            Verifying Physician: [id number] Baljinder Wright MD MRN/PID:           [id number]            Cardiologist: Accession#:        [id number]        Ordering Provider:   [id number] WRIGHT Date of Birth/Age: 1965 / 59      Fellow:                    years Gender:            M                   Fellow: Encounter#:        [id number]  Study: Peripheral Angiogram  Indications: CARMENZA OLEARY is a 59 year old male who presents with peripheral artery disease.  Procedure Description: After infiltration with 2% Lidocaine utilizing two-dimensional ultrasound and fluoroscopic guidance, the left femoral artery was cannulated using a modified Seldinger technique. Subsequently a 5 Uzbek sheath was placed antegrade in the left femoral artery. Selective angiogram of the left lower extremity was performed by injection thru the sheath in the left CFA. An IM catheter was used from left groin to selectively engage the right CFA and selective angio of the right CFA with distal runoff was obtained.  Right Lower Extremity Arterial Findings: Right Common Iliac Artery: The right common iliac artery revealed no evidence of significant disease. Right Extermal Iliac Artery: The right external iliac artery revealed a previous patent stent. Right Internal Iliac Artery: The right internal iliac artery revealed no evidence of significant disease. Right Common Femoral Artery: The right common femoral artery revealed no evidence of significant disease. Right Profunda Femoris Artery: The right profunda femoris artery revealed no evidence of significant disease. Right Superior Femoral Artery: The right superficial femoral artery revealed a previous patent stent. Right Popliteal Artery: The right popliteal artery revealed no evidence of significant disease. Right Tibial-Peroneal Trunk: The right tibial-peroneal trunk revealed no evidence of significant disease. Right Anterior Tibial Artery: The  right anterior tibial artery revealed no evidence of significant disease. Right Posterior Tibial Artery: The right posterior tibial artery revealed no evidence of significant disease. Right Peroneal Artery: The right peroneal artery revealed no evidence of significant disease. Right Dorsalis Pedis Artery: The right dorsalis pedis artery revealed no evidence of significant disease.  Left Lower Extremity Arterial Findings: Left Common Iliac Artery: The left common iliac artery revealed no evidence of significant disease. Left Extermal Iliac Artery: The left external iliac artery revealed no evidence of significant disease. Left Internal Iliac Artery: The left internal iliac artery revealed no evidence of significant disease. Left Common Femoral Artery: The left common femoral artery revealed no evidence of significant disease. Left Profunda Femoris Artery: The left profunda femoris artery revealed no evidence of significant disease. Left Superior Femoral Artery: The left superficial femoral artery revealed chronic occlusion and total occlusion. Left Fem-pop bypass was occluded. Left Popliteal Artery: The left popliteal artery revealed no evidence of significant disease. Left Tibial-Peroneal Trunk: The left tibial-peroneal trunk revealed no evidence of significant disease. Left Anterior Tibial Artery: The left anterior tibial artery revealed no evidence of significant disease. Left Posterior Tibial Artery: The left posterior tibial artery revealed no evidence of significant disease. Left Peroneal Artery: The left peroneal artery revealed no evidence of significant disease. Left Dorsalis Pedis Artery: The left dorsalis pedis artery revealed no evidence of significant disease.  Hemo Personnel: +---------------+---------+ Name           Duty      +---------------+---------+ Baljinder Wright MDPJEY GANDARA 1 +---------------+---------+  Hemodynamic Pressures:   +----+---------------------+----------+-------------+--------------+---------+ Site      Date Time      Phase NameSystolic mmHgDiastolic mmHgMean mmHg +----+---------------------+----------+-------------+--------------+---------+  LFA10/4/2024 11:17:03 AM  AIR REST           96            47       64 +----+---------------------+----------+-------------+--------------+---------+  Oxygen Saturation %: +-----------+------------+ Sample SiteHB (g/100ml) +-----------+------------+     SYS ART        14.8 +-----------+------------+     SYS SMOOTH        14.8 +-----------+------------+     PUL ART        14.8 +-----------+------------+     PUL SMOOTH        14.8 +-----------+------------+  Complications: No in-lab complications observed.  Cardiac Cath Post Procedure Notes: Post Procedure           Left SFA occlusion. Diagnosis: Blood Loss:              Estimated blood loss during the procedure was <10 ml                          mls. Specimens Removed:       Number of specimen(s) removed: none.  Recommendations: Maximize medical therapy. Plan for revascularization of left SFA from retropopliteal approach. ____________________________________________________________________________________ CONCLUSIONS:  1. Chronically occluded left SFA and left fem-pop bypass. ICD 10 Codes: Atherosclerosis of other type of bypass graft(s) of the extremities with intermittent claudication, bilateral legs-I70.713; Atherosclerosis of nonbiological bypass graft(s) of the extremities with rest pain, bilateral legs-I70.623  CPT Codes: Angiography, Extremity,Bilat ,S&I (PER)-40861  [id number] Baljinder Wright MD Performing Physician Electronically signed by [id number] Baljinder Wright MD on 10/5/2024 at 9:58:27 AM  ** Final **     ECG 12 lead STAT    Result Date: 10/4/2024  Normal sinus rhythm Possible Left atrial enlargement Inferior infarct , age undetermined Abnormal ECG Confirmed by Romeo Kruse ([id number]) on 10/4/2024 5:51:19 PM       Lab  Results  Results for orders placed or performed during the hospital encounter of 10/21/24 (from the past 24 hours)   ACTIVATED CLOTTING TIME LOW   Result Value Ref Range    POCT Activated Clotting Time Low Range     POCT GLUCOSE   Result Value Ref Range    POCT Glucose 163 (H) 74 - 99 mg/dL   POCT GLUCOSE   Result Value Ref Range    POCT Glucose 163 (H) 74 - 99 mg/dL   POCT GLUCOSE   Result Value Ref Range    POCT Glucose 209 (H) 74 - 99 mg/dL   POCT GLUCOSE   Result Value Ref Range    POCT Glucose 223 (H) 74 - 99 mg/dL   CBC   Result Value Ref Range    WBC 5.4 4.4 - 11.3 x10*3/uL    nRBC 0.0 0.0 - 0.0 /100 WBCs    RBC 3.74 (L) 4.50 - 5.90 x10*6/uL    Hemoglobin 12.0 (L) 13.5 - 17.5 g/dL    Hematocrit 34.4 (L) 41.0 - 52.0 %    MCV 92 80 - 100 fL    MCH 32.1 26.0 - 34.0 pg    MCHC 34.9 32.0 - 36.0 g/dL    RDW 13.9 11.5 - 14.5 %    Platelets 198 150 - 450 x10*3/uL   Renal Function Panel   Result Value Ref Range    Glucose 142 (H) 74 - 99 mg/dL    Sodium 134 (L) 136 - 145 mmol/L    Potassium 4.6 3.5 - 5.3 mmol/L    Chloride 104 98 - 107 mmol/L    Bicarbonate 26 21 - 32 mmol/L    Anion Gap 9 (L) 10 - 20 mmol/L    Urea Nitrogen 29 (H) 6 - 23 mg/dL    Creatinine 1.32 (H) 0.50 - 1.30 mg/dL    eGFR 63 >60 mL/min/1.73m*2    Calcium 7.7 (L) 8.6 - 10.3 mg/dL    Phosphorus 3.9 2.5 - 4.9 mg/dL    Albumin 3.0 (L) 3.4 - 5.0 g/dL     *Note: Due to a large number of results and/or encounters for the requested time period, some results have not been displayed. A complete set of results can be found in Results Review.        Medications  Scheduled medications:  atorvastatin, 80 mg, oral, Nightly  carvedilol, 6.25 mg, oral, q12h  [Held by provider] dapagliflozin propanediol, 10 mg, oral, q24h  ezetimibe, 10 mg, oral, Daily  influenza, 0.5 mL, intramuscular, During hospitalization  gabapentin, 400 mg, oral, TID  hydrALAZINE, 50 mg, oral, TID  insulin glargine, 20 Units, subcutaneous, Nightly  insulin lispro, 10 Units, subcutaneous,  TID  isosorbide mononitrate ER, 60 mg, oral, Daily  [Held by provider] levETIRAcetam, 1,500 mg, oral, BID  nicotine, 1 patch, transdermal, Daily  OXcarbazepine, 450 mg, oral, BID  pantoprazole, 40 mg, oral, Daily  pneumoc 20-sushila conj-dip cr(PF), 0.5 mL, intramuscular, During hospitalization  ranolazine, 500 mg, oral, BID  [Held by provider] rivaroxaban, 20 mg, oral, Daily  [Held by provider] sacubitriL-valsartan 97 mg-103 mg, 1 tablet, oral, BID  tamsulosin, 0.4 mg, oral, Daily  ticagrelor, 90 mg, oral, BID      Continuous medications:     PRN medications:  PRN medications: acetaminophen **OR** [DISCONTINUED] acetaminophen **OR** [DISCONTINUED] acetaminophen, dextrose, dextrose, [Held by provider] furosemide, glucagon, glucagon, oxyCODONE-acetaminophen, oxyCODONE-acetaminophen, oxygen, pancrelipase (Lip-Prot-Amyl), sodium chloride 0.9%, tiZANidine     Physical Exam  Constitutional:       General: He is not in acute distress.     Appearance: Normal appearance.   HENT:      Head: Normocephalic and atraumatic.      Right Ear: External ear normal.      Left Ear: External ear normal.      Nose: Nose normal.      Mouth/Throat:      Mouth: Mucous membranes are moist.      Pharynx: Oropharynx is clear.   Eyes:      Extraocular Movements: Extraocular movements intact.      Conjunctiva/sclera: Conjunctivae normal.      Pupils: Pupils are equal, round, and reactive to light.   Cardiovascular:      Rate and Rhythm: Normal rate and regular rhythm.      Pulses: Normal pulses.      Heart sounds: Normal heart sounds.      Comments: Post-operative dressing is C/D/I, did not remove dressing  NVS intact distal to operative site   Pulmonary:      Effort: Pulmonary effort is normal. No respiratory distress.      Breath sounds: Normal breath sounds. No wheezing, rhonchi or rales.   Abdominal:      General: Bowel sounds are normal.      Palpations: Abdomen is soft.      Tenderness: There is no abdominal tenderness. There is no right CVA  tenderness, left CVA tenderness, guarding or rebound.   Musculoskeletal:         General: No swelling. Normal range of motion.      Cervical back: Normal range of motion and neck supple.   Skin:     General: Skin is warm and dry.      Capillary Refill: Capillary refill takes less than 2 seconds.      Findings: No lesion or rash.   Neurological:      General: No focal deficit present.      Mental Status: He is alert and oriented to person, place, and time. Mental status is at baseline.   Psychiatric:         Mood and Affect: Mood normal.         Behavior: Behavior normal.                  Assessment/Plan   This patient currently has cardiac telemetry ordered; if you would like to modify or discontinue the telemetry order, click here to go to the orders activity to modify/discontinue the order.  Severe PAD s/p angioplasty of the left SFA 10/21/24  Pt is s/p multiple revascularization procedures in the past  Post-op course per primary  Pain control  Brilinta, statin, zetia   Incentive Spirometry      CAD s/p CABG  Continue Lipitor and Coreg   On imdur and ranexa      BRUNA  Pt is no longer on CPAP     Tobacco dependence  Continue nicotine patch      Diabetes  Continue SSI and home Lantus, home Farxiga held post-procedure     HTN, HFpEF  BP is controlled, home Entresto held post-procedure     Hx RLE DVT  Home Xarelto held- defer resumption to primary      Hx bipolar disorder and PTSD  No longer on keppra    Code Status: Full Code          DVT ppx: Per primary     Please see orders for more complete plan    Thank you for involving us in the care of this very pleasant patient. We will follow along with you while they remain admitted. Please contact Hospitalist service if any clarification needed.     Lisa Marlow PA-C

## 2024-10-22 NOTE — PROGRESS NOTES
Spiritual Care Visit    Clinical Encounter Type  Visited With: Patient  Routine Visit: Introduction    Tenriism Encounters  Tenriism Needs: Prayer

## 2024-10-22 NOTE — PROGRESS NOTES
10/22/24 0920   Discharge Planning   Living Arrangements Alone   Support Systems Parent;Family members   Assistance Needed none   Type of Residence Private residence   Number of Stairs to Enter Residence 26   Number of Stairs Within Residence 0   Do you have animals or pets at home? No   Who is requesting discharge planning? Provider   Home or Post Acute Services None   Expected Discharge Disposition Home   Does the patient need discharge transport arranged? No   Financial Resource Strain   How hard is it for you to pay for the very basics like food, housing, medical care, and heating? Not hard   Housing Stability   In the last 12 months, was there a time when you were not able to pay the mortgage or rent on time? N   In the past 12 months, how many times have you moved where you were living? 0   At any time in the past 12 months, were you homeless or living in a shelter (including now)? N   Transportation Needs   In the past 12 months, has lack of transportation kept you from medical appointments or from getting medications? no   In the past 12 months, has lack of transportation kept you from meetings, work, or from getting things needed for daily living? No     Spoke with pt and verified address, phone number and emergency contact information. PCP is Dr. Arce, last seen in May and preferred pharmacy is  [facility], denies issues obtaining or affording medications and takes as ordered. Pt is diabetic and has a CGM and plenty of supplies. He uses a cane,walker or rollator and occasionally a C-Pap. Pt is going to be staying with his mother for 1 week after discharge due to the number of steps he has to get into his apartment.  Pt is independent, lives at home alone and feels safe. Pt denies needs. Plan is home no needs. [facility] 20/22 per nursing. ADOD is today. CT to follow. Anna Mosley BSN/RN-TCC

## 2024-10-22 NOTE — PROGRESS NOTES
Medication Education     Medication education for Dereck Shen was provided to the patient  for the following medication(s):  Aspirin  Oxycodone-acetaminophen     Medication education provided by a Pharmacist:  ADR Counseling Dose, frequency, storage How to take and what to do if a dose is missed Proper dose, indication, possible ADRs How the medication works and benefits of taking it Benefits of taking the medication  Importance of compliance Any drug interactions (including OTCs and herbvals) and importance of notifying a healthcare provider of any medication changes Potential duration of therapy    Identified potential barriers to education:  None    Method(s) of Education:  Verbal    An opportunity to ask questions and receive answers was provided.     Assessment of understanding the patient :  2= meets goals/outcomes    Additional Notes (if applicable): Patient had questions about duration of aspirin therapy. Counseled patient to speak with Dr. Wright about duration of therapy during follow-up appointment on November 7th.    ACE CUNHA

## 2024-10-22 NOTE — DISCHARGE SUMMARY
Discharge Diagnosis  PAD (peripheral artery disease) (CMS-Formerly [facility])    Issues Requiring Follow-Up  PAD s/p PTA left SFA on 10/21/24 - needs to f/u with Dr. Peace.  Message sent to Dr. Peace's office to arrange appt.    Test Results Pending At Discharge  Pending Labs       No current pending labs.            Hospital Course   Dereck Shen was admitted after peripheral angiogram with Dr. Baljinder Wright.  Peripheral angiogram 10/21/24 via left popliteal artery access and is s/p PTA of left SFA.  He continues to c/o left calf pain and taking oxycodone/acetaminophen 5/325 improves his symptoms.  He will be seen by hospitalist service prior to discharge to determine home going pain medications as he was receiving Percocet 5/325 mg script from Dr. Peace prior to admission and reports that he has used them all.  No complications with left popliteal access site.  Pulses easily found with doppler left posterior tibial and left dorsalis pedis.      Plan will be for triple therapy with aspirin 81 mg daily, ticagrelor and Xarelto for 30 days.  After 30 days, recommend stopping aspirin and continuing ticagrelor and Xarelto.  He will f/u with Dr. Peace for further management of PVD.  He follows with Dr. Newby at [facility] for cardiology.  Keppra was initially listed as one of his home medications, but patient reports that he has not taken this for years and only takes oxcarbazepine.  Lipid panel added to morning lab work and is pending.  Currently on atorvastatin 80 mg daily.  Goal LDL <70.  Recommend complete smoking cessation - currently using nicotine patch.    Pertinent Physical Exam At Time of Discharge  Vitals reviewed.   Constitutional:       Appearance: Healthy appearance.   Pulmonary:      Effort: Pulmonary effort is normal.      Breath sounds: Normal breath sounds.   Cardiovascular:      PMI at left midclavicular line. Normal rate. Regular rhythm. S1 with normal intensity. S2 with normal intensity.       Murmurs: There is no  "murmur.      Comments: Pulses easily found with doppler left posterior tibial and left dorsalis pedis  Left popliteal procedure site without swelling, hematoma, ecchymosis or bleeding.  Dressing dry and intact.    Edema:     Peripheral edema absent.   Abdominal:      General: Bowel sounds are normal.   Skin:     General: Skin is warm and dry.   Psychiatric:         Attention and Perception: Attention normal.         Mood and Affect: Mood normal.         Behavior: Behavior is cooperative.          Home Medications     Medication List      START taking these medications     aspirin 81 mg EC tablet; Take 1 tablet (81 mg) by mouth once daily.     CONTINUE taking these medications     atorvastatin 80 mg tablet; Commonly known as: Lipitor   BD Insulin Syringe Ultra-Fine 1 mL 31 gauge x 5/16 syringe; Generic   drug: insulin syringe-needle U-100; USE AS DIRECTED FOUR TIMES PER DAY   * BD Ultra-Fine Micro Pen Needle 32 gauge x 1/4\" needle; Generic drug:   pen needle, diabetic; Use to inject 1-4 times daily as directed   * pen needle, diabetic 32 gauge x 5/32\" needle; Commonly known as: BD   Ultra-Fine Jocy Pen Needle; Pen Needles for Insulin.   Brilinta 90 mg tablet; Generic drug: ticagrelor   carvedilol 6.25 mg tablet; Commonly known as: Coreg   Creon 36,000-114,000- 180,000 unit capsule,delayed release(DR/EC)   capsule; Generic drug: pancrelipase (Lip-Prot-Amyl)   ELDERBERRY FRUIT ORAL   Entresto  mg tablet; Generic drug: sacubitriL-valsartan 97 mg-103   mg   ezetimibe 10 mg tablet; Commonly known as: Zetia   Farxiga 10 mg; Generic drug: dapagliflozin propanediol   * flash glucose sensor kit kit; USE AS DIRECTED AND CHANGE EVERY 14 DAYS   * FreeStyle En 2 Sensor kit; Generic drug: flash glucose sensor kit;   use as directed daily and change every 14 days   FreeStyle En 2 Stites misc; Generic drug: flash glucose scanning   reader; USE TO CHECK SUGARS FOUR TIMES A DAY   * FreeStyle Precision Aristeo Strips strip; " Generic drug: blood sugar   diagnostic; 1 each 2 times a day.   * FreeStyle Precision Aristeo Strips strip; Generic drug: blood sugar   diagnostic; Use as directed twice daily   furosemide 40 mg tablet; Commonly known as: Lasix   gabapentin 400 mg capsule; Commonly known as: Neurontin; Take 1 capsule   (400 mg) by mouth 3 times a day.   GINGER ROOT EXTRACT ORAL   hydrALAZINE 50 mg tablet; Commonly known as: Apresoline   insulin lispro 100 unit/mL injection; Commonly known as: HumaLOG; Inject   10 Units under the skin 3 times a day with meals. Plus sliding scale if BS   is over 200   isosorbide mononitrate ER 60 mg 24 hr tablet; Commonly known as: Imdur   lancets misc; 1 each 4 times a day before meals.   Lantus Solostar U-100 Insulin 100 unit/mL (3 mL) pen; Generic drug:   insulin glargine; Inject 20 Units under the skin once daily at bedtime.   medical cannabis   nicotine 21 mg/24 hr patch; Commonly known as: Nicoderm CQ; APPLY 1   PATCH TO SKIN EVERY 24 HOURS AS DIRECTED   OXcarbazepine 150 mg tablet; Commonly known as: Trileptal   oxyCODONE-acetaminophen 5-325 mg tablet; Commonly known as: Percocet;   Take 1 tablet by mouth every 6 hours if needed for severe pain (7 - 10).   pantoprazole 40 mg EC tablet; Commonly known as: ProtoNix; TAKE 1 TABLET   BY MOUTH ONCE DAILY   ranolazine 500 mg 12 hr tablet; Commonly known as: Ranexa   rivaroxaban 20 mg tablet; Commonly known as: Xarelto   tamsulosin 0.4 mg 24 hr capsule; Commonly known as: Flomax; TAKE 1   CAPSULE BY MOUTH ONCE DAILY   tiZANidine 2 mg tablet; Commonly known as: Zanaflex; Take 1 tablet (2   mg) by mouth once daily as needed for muscle spasms.  * This list has 6 medication(s) that are the same as other medications   prescribed for you. Read the directions carefully, and ask your doctor or   other care provider to review them with you.       Outpatient Follow-Up  Future Appointments   Date Time Provider Department Center   12/2/2024  1:00 PM POR VASC 6 PORVSC  [place] [facility]   12/10/2024  1:45 PM Iban Peace DO [id number] [facility]   2/4/2025  4:15 PM Haylie Tate MD [id number] [facility]      40 minutes spent on coordinating patient discharge.      Nya Vee, APRN-CNP

## 2024-10-28 ENCOUNTER — PHARMACY VISIT (OUTPATIENT)
Dept: PHARMACY | Facility: CLINIC | Age: 59
End: 2024-10-28
Payer: MEDICAID

## 2024-10-28 PROCEDURE — RXMED WILLOW AMBULATORY MEDICATION CHARGE

## 2024-11-07 ENCOUNTER — APPOINTMENT (OUTPATIENT)
Dept: CARDIOLOGY | Facility: HOSPITAL | Age: 59
End: 2024-11-07
Payer: COMMERCIAL

## 2024-11-12 ENCOUNTER — TELEPHONE (OUTPATIENT)
Dept: PRIMARY CARE | Facility: CLINIC | Age: 59
End: 2024-11-12
Payer: COMMERCIAL

## 2024-11-12 NOTE — TELEPHONE ENCOUNTER
Patient wanting to schedule physical and also go over med changes he had with surgery done on 10/21. Told patient nothing available until 1/2025. Please advise if he can be seen sooner or will 1/2025 be ok. I will call patient back. Thanks!

## 2024-11-12 NOTE — TELEPHONE ENCOUNTER
He does follow with multiple Specialists so as long as he is up to date with their appointments ok to schedule anytime. Thank you!

## 2024-11-15 DIAGNOSIS — D3A.8 BENIGN NEUROENDOCRINE NEOPLASM OF PANCREAS: Primary | ICD-10-CM

## 2024-11-18 ENCOUNTER — APPOINTMENT (OUTPATIENT)
Dept: PRIMARY CARE | Facility: CLINIC | Age: 59
End: 2024-11-18
Payer: COMMERCIAL

## 2024-11-19 ENCOUNTER — APPOINTMENT (OUTPATIENT)
Dept: RADIOLOGY | Facility: HOSPITAL | Age: 59
End: 2024-11-19
Payer: COMMERCIAL

## 2024-11-19 ENCOUNTER — HOSPITAL ENCOUNTER (INPATIENT)
Facility: HOSPITAL | Age: 59
LOS: 2 days | Discharge: HOME | End: 2024-11-21
Attending: STUDENT IN AN ORGANIZED HEALTH CARE EDUCATION/TRAINING PROGRAM | Admitting: INTERNAL MEDICINE
Payer: COMMERCIAL

## 2024-11-19 ENCOUNTER — ANESTHESIA EVENT (OUTPATIENT)
Dept: OPERATING ROOM | Facility: HOSPITAL | Age: 59
End: 2024-11-19
Payer: COMMERCIAL

## 2024-11-19 ENCOUNTER — APPOINTMENT (OUTPATIENT)
Dept: CARDIOLOGY | Facility: HOSPITAL | Age: 59
End: 2024-11-19
Payer: COMMERCIAL

## 2024-11-19 ENCOUNTER — ANESTHESIA (OUTPATIENT)
Dept: OPERATING ROOM | Facility: HOSPITAL | Age: 59
End: 2024-11-19
Payer: COMMERCIAL

## 2024-11-19 DIAGNOSIS — R06.02 SHORTNESS OF BREATH ON EXERTION: ICD-10-CM

## 2024-11-19 DIAGNOSIS — I73.9 PAD (PERIPHERAL ARTERY DISEASE) (CMS-HCC): ICD-10-CM

## 2024-11-19 DIAGNOSIS — I50.9 ACUTE ON CHRONIC CONGESTIVE HEART FAILURE, UNSPECIFIED HEART FAILURE TYPE: ICD-10-CM

## 2024-11-19 DIAGNOSIS — I50.20 UNSPECIFIED SYSTOLIC (CONGESTIVE) HEART FAILURE: ICD-10-CM

## 2024-11-19 DIAGNOSIS — I70.222 CRITICAL LIMB ISCHEMIA OF LEFT LOWER EXTREMITY (MULTI): Primary | ICD-10-CM

## 2024-11-19 DIAGNOSIS — I99.8 ACUTE LOWER LIMB ISCHEMIA: ICD-10-CM

## 2024-11-19 DIAGNOSIS — I70.202 FEMORAL ARTERY OCCLUSION, LEFT (CMS-HCC): ICD-10-CM

## 2024-11-19 LAB
ABO GROUP (TYPE) IN BLOOD: NORMAL
ALBUMIN SERPL BCP-MCNC: 3.9 G/DL (ref 3.4–5)
ALP SERPL-CCNC: 118 U/L (ref 33–120)
ALT SERPL W P-5'-P-CCNC: 8 U/L (ref 10–52)
ANION GAP BLDV CALCULATED.4IONS-SCNC: 13 MMOL/L (ref 10–25)
ANION GAP SERPL CALC-SCNC: 12 MMOL/L (ref 10–20)
ANTIBODY SCREEN: NORMAL
APTT PPP: 36 SECONDS (ref 27–38)
AST SERPL W P-5'-P-CCNC: 8 U/L (ref 9–39)
ATRIAL RATE: 99 BPM
BASE EXCESS BLDV CALC-SCNC: -0.2 MMOL/L (ref -2–3)
BASOPHILS # BLD AUTO: 0.03 X10*3/UL (ref 0–0.1)
BASOPHILS NFR BLD AUTO: 0.3 %
BILIRUB SERPL-MCNC: 0.4 MG/DL (ref 0–1.2)
BNP SERPL-MCNC: 997 PG/ML (ref 0–99)
BODY TEMPERATURE: 37 DEGREES CELSIUS
BUN SERPL-MCNC: 22 MG/DL (ref 6–23)
CA-I BLDV-SCNC: 1.21 MMOL/L (ref 1.1–1.33)
CALCIUM SERPL-MCNC: 8.8 MG/DL (ref 8.6–10.3)
CARDIAC TROPONIN I PNL SERPL HS: 14 NG/L (ref 0–20)
CARDIAC TROPONIN I PNL SERPL HS: 15 NG/L (ref 0–20)
CHLORIDE BLDV-SCNC: 102 MMOL/L (ref 98–107)
CHLORIDE SERPL-SCNC: 103 MMOL/L (ref 98–107)
CO2 SERPL-SCNC: 24 MMOL/L (ref 21–32)
CREAT SERPL-MCNC: 1.25 MG/DL (ref 0.5–1.3)
EGFRCR SERPLBLD CKD-EPI 2021: 67 ML/MIN/1.73M*2
EOSINOPHIL # BLD AUTO: 0.07 X10*3/UL (ref 0–0.7)
EOSINOPHIL NFR BLD AUTO: 0.8 %
ERYTHROCYTE [DISTWIDTH] IN BLOOD BY AUTOMATED COUNT: 13.5 % (ref 11.5–14.5)
GLUCOSE BLD MANUAL STRIP-MCNC: 190 MG/DL (ref 74–99)
GLUCOSE BLD MANUAL STRIP-MCNC: 196 MG/DL (ref 74–99)
GLUCOSE BLD MANUAL STRIP-MCNC: 227 MG/DL (ref 74–99)
GLUCOSE BLD MANUAL STRIP-MCNC: 250 MG/DL (ref 74–99)
GLUCOSE BLD MANUAL STRIP-MCNC: 276 MG/DL (ref 74–99)
GLUCOSE BLD MANUAL STRIP-MCNC: 361 MG/DL (ref 74–99)
GLUCOSE BLDV-MCNC: 284 MG/DL (ref 74–99)
GLUCOSE SERPL-MCNC: 257 MG/DL (ref 74–99)
HCO3 BLDV-SCNC: 25.4 MMOL/L (ref 22–26)
HCT VFR BLD AUTO: 48.2 % (ref 41–52)
HCT VFR BLD EST: 48 % (ref 41–52)
HGB BLD-MCNC: 16.5 G/DL (ref 13.5–17.5)
HGB BLDV-MCNC: 16.1 G/DL (ref 13.5–17.5)
IMM GRANULOCYTES # BLD AUTO: 0.02 X10*3/UL (ref 0–0.7)
IMM GRANULOCYTES NFR BLD AUTO: 0.2 % (ref 0–0.9)
INHALED O2 CONCENTRATION: 44 %
INR PPP: 1 (ref 0.9–1.1)
LACTATE BLDV-SCNC: 2 MMOL/L (ref 0.4–2)
LACTATE SERPL-SCNC: 2 MMOL/L (ref 0.4–2)
LIPASE SERPL-CCNC: 24 U/L (ref 9–82)
LYMPHOCYTES # BLD AUTO: 1.96 X10*3/UL (ref 1.2–4.8)
LYMPHOCYTES NFR BLD AUTO: 21.7 %
MAGNESIUM SERPL-MCNC: 2.14 MG/DL (ref 1.6–2.4)
MCH RBC QN AUTO: 31.7 PG (ref 26–34)
MCHC RBC AUTO-ENTMCNC: 34.2 G/DL (ref 32–36)
MCV RBC AUTO: 93 FL (ref 80–100)
MONOCYTES # BLD AUTO: 0.54 X10*3/UL (ref 0.1–1)
MONOCYTES NFR BLD AUTO: 6 %
NEUTROPHILS # BLD AUTO: 6.41 X10*3/UL (ref 1.2–7.7)
NEUTROPHILS NFR BLD AUTO: 71 %
NRBC BLD-RTO: 0 /100 WBCS (ref 0–0)
OXYHGB MFR BLDV: 45.1 % (ref 45–75)
P AXIS: 57 DEGREES
PCO2 BLDV: 44 MM HG (ref 41–51)
PH BLDV: 7.37 PH (ref 7.33–7.43)
PLATELET # BLD AUTO: 259 X10*3/UL (ref 150–450)
PO2 BLDV: 35 MM HG (ref 35–45)
POTASSIUM BLDV-SCNC: 5 MMOL/L (ref 3.5–5.3)
POTASSIUM SERPL-SCNC: 4.4 MMOL/L (ref 3.5–5.3)
PR INTERVAL: 172 MS
PROT SERPL-MCNC: 7.4 G/DL (ref 6.4–8.2)
PROTHROMBIN TIME: 10.9 SECONDS (ref 9.8–12.8)
Q ONSET: 252 MS
QRS COUNT: 16 BEATS
QRS DURATION: 109 MS
QT INTERVAL: 376 MS
QTC CALCULATION(BAZETT): 483 MS
QTC FREDERICIA: 444 MS
R AXIS: 54 DEGREES
RBC # BLD AUTO: 5.2 X10*6/UL (ref 4.5–5.9)
RH FACTOR (ANTIGEN D): NORMAL
SAO2 % BLDV: 49 % (ref 45–75)
SARS-COV-2 RNA RESP QL NAA+PROBE: NOT DETECTED
SODIUM BLDV-SCNC: 135 MMOL/L (ref 136–145)
SODIUM SERPL-SCNC: 135 MMOL/L (ref 136–145)
T AXIS: 161 DEGREES
T OFFSET: 440 MS
UFH PPP CHRO-ACNC: 0.3 IU/ML
UFH PPP CHRO-ACNC: 0.4 IU/ML
VENTRICULAR RATE: 99 BPM
WBC # BLD AUTO: 9 X10*3/UL (ref 4.4–11.3)

## 2024-11-19 PROCEDURE — 84132 ASSAY OF SERUM POTASSIUM: CPT | Performed by: STUDENT IN AN ORGANIZED HEALTH CARE EDUCATION/TRAINING PROGRAM

## 2024-11-19 PROCEDURE — 3700000001 HC GENERAL ANESTHESIA TIME - INITIAL BASE CHARGE: Performed by: SURGERY

## 2024-11-19 PROCEDURE — 2500000002 HC RX 250 W HCPCS SELF ADMINISTERED DRUGS (ALT 637 FOR MEDICARE OP, ALT 636 FOR OP/ED): Performed by: STUDENT IN AN ORGANIZED HEALTH CARE EDUCATION/TRAINING PROGRAM

## 2024-11-19 PROCEDURE — 93005 ELECTROCARDIOGRAM TRACING: CPT

## 2024-11-19 PROCEDURE — 83735 ASSAY OF MAGNESIUM: CPT | Performed by: STUDENT IN AN ORGANIZED HEALTH CARE EDUCATION/TRAINING PROGRAM

## 2024-11-19 PROCEDURE — 2500000002 HC RX 250 W HCPCS SELF ADMINISTERED DRUGS (ALT 637 FOR MEDICARE OP, ALT 636 FOR OP/ED): Performed by: INTERNAL MEDICINE

## 2024-11-19 PROCEDURE — 2500000005 HC RX 250 GENERAL PHARMACY W/O HCPCS: Performed by: ANESTHESIOLOGY

## 2024-11-19 PROCEDURE — 04CS0ZZ EXTIRPATION OF MATTER FROM LEFT POSTERIOR TIBIAL ARTERY, OPEN APPROACH: ICD-10-PCS | Performed by: SURGERY

## 2024-11-19 PROCEDURE — 75635 CT ANGIO ABDOMINAL ARTERIES: CPT | Performed by: RADIOLOGY

## 2024-11-19 PROCEDURE — 84484 ASSAY OF TROPONIN QUANT: CPT | Performed by: STUDENT IN AN ORGANIZED HEALTH CARE EDUCATION/TRAINING PROGRAM

## 2024-11-19 PROCEDURE — 2500000004 HC RX 250 GENERAL PHARMACY W/ HCPCS (ALT 636 FOR OP/ED): Performed by: STUDENT IN AN ORGANIZED HEALTH CARE EDUCATION/TRAINING PROGRAM

## 2024-11-19 PROCEDURE — 71275 CT ANGIOGRAPHY CHEST: CPT | Performed by: RADIOLOGY

## 2024-11-19 PROCEDURE — 2500000004 HC RX 250 GENERAL PHARMACY W/ HCPCS (ALT 636 FOR OP/ED): Performed by: SURGERY

## 2024-11-19 PROCEDURE — 99223 1ST HOSP IP/OBS HIGH 75: CPT | Performed by: INTERNAL MEDICINE

## 2024-11-19 PROCEDURE — 34203 REMOVAL OF LEG ARTERY CLOT: CPT | Performed by: SURGERY

## 2024-11-19 PROCEDURE — 99221 1ST HOSP IP/OBS SF/LOW 40: CPT | Performed by: SURGERY

## 2024-11-19 PROCEDURE — 85730 THROMBOPLASTIN TIME PARTIAL: CPT | Performed by: STUDENT IN AN ORGANIZED HEALTH CARE EDUCATION/TRAINING PROGRAM

## 2024-11-19 PROCEDURE — 94640 AIRWAY INHALATION TREATMENT: CPT

## 2024-11-19 PROCEDURE — 36415 COLL VENOUS BLD VENIPUNCTURE: CPT | Performed by: STUDENT IN AN ORGANIZED HEALTH CARE EDUCATION/TRAINING PROGRAM

## 2024-11-19 PROCEDURE — 27602 DECOMPRESSION OF LOWER LEG: CPT | Performed by: PHYSICIAN ASSISTANT

## 2024-11-19 PROCEDURE — 2500000004 HC RX 250 GENERAL PHARMACY W/ HCPCS (ALT 636 FOR OP/ED): Performed by: ANESTHESIOLOGY

## 2024-11-19 PROCEDURE — 7100000001 HC RECOVERY ROOM TIME - INITIAL BASE CHARGE: Performed by: SURGERY

## 2024-11-19 PROCEDURE — 85520 HEPARIN ASSAY: CPT | Performed by: PHYSICIAN ASSISTANT

## 2024-11-19 PROCEDURE — 2500000004 HC RX 250 GENERAL PHARMACY W/ HCPCS (ALT 636 FOR OP/ED): Performed by: PHYSICIAN ASSISTANT

## 2024-11-19 PROCEDURE — 2020000001 HC ICU ROOM DAILY

## 2024-11-19 PROCEDURE — 3600000009 HC OR TIME - EACH INCREMENTAL 1 MINUTE - PROCEDURE LEVEL FOUR: Performed by: SURGERY

## 2024-11-19 PROCEDURE — 35371 RECHANNELING OF ARTERY: CPT | Performed by: SURGERY

## 2024-11-19 PROCEDURE — 83605 ASSAY OF LACTIC ACID: CPT | Performed by: STUDENT IN AN ORGANIZED HEALTH CARE EDUCATION/TRAINING PROGRAM

## 2024-11-19 PROCEDURE — 83690 ASSAY OF LIPASE: CPT | Performed by: STUDENT IN AN ORGANIZED HEALTH CARE EDUCATION/TRAINING PROGRAM

## 2024-11-19 PROCEDURE — 96374 THER/PROPH/DIAG INJ IV PUSH: CPT

## 2024-11-19 PROCEDURE — 04UL0KZ SUPPLEMENT LEFT FEMORAL ARTERY WITH NONAUTOLOGOUS TISSUE SUBSTITUTE, OPEN APPROACH: ICD-10-PCS | Performed by: SURGERY

## 2024-11-19 PROCEDURE — C1768 GRAFT, VASCULAR: HCPCS | Performed by: SURGERY

## 2024-11-19 PROCEDURE — 85347 COAGULATION TIME ACTIVATED: CPT

## 2024-11-19 PROCEDURE — 2500000001 HC RX 250 WO HCPCS SELF ADMINISTERED DRUGS (ALT 637 FOR MEDICARE OP): Performed by: PHYSICIAN ASSISTANT

## 2024-11-19 PROCEDURE — 35371 RECHANNELING OF ARTERY: CPT | Performed by: PHYSICIAN ASSISTANT

## 2024-11-19 PROCEDURE — 86901 BLOOD TYPING SEROLOGIC RH(D): CPT | Performed by: ANESTHESIOLOGY

## 2024-11-19 PROCEDURE — 96376 TX/PRO/DX INJ SAME DRUG ADON: CPT

## 2024-11-19 PROCEDURE — 75635 CT ANGIO ABDOMINAL ARTERIES: CPT

## 2024-11-19 PROCEDURE — 87635 SARS-COV-2 COVID-19 AMP PRB: CPT | Performed by: STUDENT IN AN ORGANIZED HEALTH CARE EDUCATION/TRAINING PROGRAM

## 2024-11-19 PROCEDURE — 04CL0ZZ EXTIRPATION OF MATTER FROM LEFT FEMORAL ARTERY, OPEN APPROACH: ICD-10-PCS | Performed by: SURGERY

## 2024-11-19 PROCEDURE — 2500000001 HC RX 250 WO HCPCS SELF ADMINISTERED DRUGS (ALT 637 FOR MEDICARE OP): Performed by: ANESTHESIOLOGY

## 2024-11-19 PROCEDURE — 3700000002 HC GENERAL ANESTHESIA TIME - EACH INCREMENTAL 1 MINUTE: Performed by: SURGERY

## 2024-11-19 PROCEDURE — 2720000007 HC OR 272 NO HCPCS: Performed by: SURGERY

## 2024-11-19 PROCEDURE — 99254 IP/OBS CNSLTJ NEW/EST MOD 60: CPT | Performed by: INTERNAL MEDICINE

## 2024-11-19 PROCEDURE — 2500000002 HC RX 250 W HCPCS SELF ADMINISTERED DRUGS (ALT 637 FOR MEDICARE OP, ALT 636 FOR OP/ED): Performed by: PHYSICIAN ASSISTANT

## 2024-11-19 PROCEDURE — 2500000001 HC RX 250 WO HCPCS SELF ADMINISTERED DRUGS (ALT 637 FOR MEDICARE OP): Performed by: INTERNAL MEDICINE

## 2024-11-19 PROCEDURE — 2500000005 HC RX 250 GENERAL PHARMACY W/O HCPCS: Performed by: PHYSICIAN ASSISTANT

## 2024-11-19 PROCEDURE — 85610 PROTHROMBIN TIME: CPT | Performed by: STUDENT IN AN ORGANIZED HEALTH CARE EDUCATION/TRAINING PROGRAM

## 2024-11-19 PROCEDURE — 99291 CRITICAL CARE FIRST HOUR: CPT | Performed by: STUDENT IN AN ORGANIZED HEALTH CARE EDUCATION/TRAINING PROGRAM

## 2024-11-19 PROCEDURE — 7100000002 HC RECOVERY ROOM TIME - EACH INCREMENTAL 1 MINUTE: Performed by: SURGERY

## 2024-11-19 PROCEDURE — 2500000004 HC RX 250 GENERAL PHARMACY W/ HCPCS (ALT 636 FOR OP/ED): Performed by: INTERNAL MEDICINE

## 2024-11-19 PROCEDURE — 2780000003 HC OR 278 NO HCPCS: Performed by: SURGERY

## 2024-11-19 PROCEDURE — 2500000005 HC RX 250 GENERAL PHARMACY W/O HCPCS: Performed by: SURGERY

## 2024-11-19 PROCEDURE — 27602 DECOMPRESSION OF LOWER LEG: CPT | Performed by: SURGERY

## 2024-11-19 PROCEDURE — 99291 CRITICAL CARE FIRST HOUR: CPT | Performed by: INTERNAL MEDICINE

## 2024-11-19 PROCEDURE — C1757 CATH, THROMBECTOMY/EMBOLECT: HCPCS | Performed by: SURGERY

## 2024-11-19 PROCEDURE — 71275 CT ANGIOGRAPHY CHEST: CPT

## 2024-11-19 PROCEDURE — 82947 ASSAY GLUCOSE BLOOD QUANT: CPT

## 2024-11-19 PROCEDURE — 3600000004 HC OR TIME - INITIAL BASE CHARGE - PROCEDURE LEVEL FOUR: Performed by: SURGERY

## 2024-11-19 PROCEDURE — 85025 COMPLETE CBC W/AUTO DIFF WBC: CPT | Performed by: STUDENT IN AN ORGANIZED HEALTH CARE EDUCATION/TRAINING PROGRAM

## 2024-11-19 PROCEDURE — 83880 ASSAY OF NATRIURETIC PEPTIDE: CPT | Performed by: STUDENT IN AN ORGANIZED HEALTH CARE EDUCATION/TRAINING PROGRAM

## 2024-11-19 PROCEDURE — 34203 REMOVAL OF LEG ARTERY CLOT: CPT | Performed by: PHYSICIAN ASSISTANT

## 2024-11-19 PROCEDURE — 96375 TX/PRO/DX INJ NEW DRUG ADDON: CPT

## 2024-11-19 PROCEDURE — 2550000001 HC RX 255 CONTRASTS: Performed by: STUDENT IN AN ORGANIZED HEALTH CARE EDUCATION/TRAINING PROGRAM

## 2024-11-19 DEVICE — XENOSURE BIOLOGIC PATCH, 1CM X 14CM, EIFU
Type: IMPLANTABLE DEVICE | Site: ARTERIAL | Status: FUNCTIONAL
Brand: XENOSURE BIOLOGIC PATCH

## 2024-11-19 RX ORDER — SENNOSIDES 8.6 MG/1
2 TABLET ORAL NIGHTLY
Status: DISCONTINUED | OUTPATIENT
Start: 2024-11-19 | End: 2024-11-21 | Stop reason: HOSPADM

## 2024-11-19 RX ORDER — ONDANSETRON HYDROCHLORIDE 2 MG/ML
4 INJECTION, SOLUTION INTRAVENOUS ONCE
Status: COMPLETED | OUTPATIENT
Start: 2024-11-19 | End: 2024-11-19

## 2024-11-19 RX ORDER — ACETAMINOPHEN 325 MG/1
975 TABLET ORAL ONCE
Status: COMPLETED | OUTPATIENT
Start: 2024-11-19 | End: 2024-11-19

## 2024-11-19 RX ORDER — DEXMEDETOMIDINE IN 0.9 % NACL 20 MCG/5ML
SYRINGE (ML) INTRAVENOUS AS NEEDED
Status: DISCONTINUED | OUTPATIENT
Start: 2024-11-19 | End: 2024-11-19

## 2024-11-19 RX ORDER — ALBUTEROL SULFATE 0.83 MG/ML
2.5 SOLUTION RESPIRATORY (INHALATION) ONCE
Status: DISCONTINUED | OUTPATIENT
Start: 2024-11-19 | End: 2024-11-19 | Stop reason: HOSPADM

## 2024-11-19 RX ORDER — NORETHINDRONE AND ETHINYL ESTRADIOL 0.5-0.035
KIT ORAL AS NEEDED
Status: DISCONTINUED | OUTPATIENT
Start: 2024-11-19 | End: 2024-11-19

## 2024-11-19 RX ORDER — MIDAZOLAM HYDROCHLORIDE 1 MG/ML
INJECTION, SOLUTION INTRAMUSCULAR; INTRAVENOUS AS NEEDED
Status: DISCONTINUED | OUTPATIENT
Start: 2024-11-19 | End: 2024-11-19

## 2024-11-19 RX ORDER — PROPOFOL 10 MG/ML
INJECTION, EMULSION INTRAVENOUS AS NEEDED
Status: DISCONTINUED | OUTPATIENT
Start: 2024-11-19 | End: 2024-11-19

## 2024-11-19 RX ORDER — ROCURONIUM BROMIDE 50 MG/5 ML
SYRINGE (ML) INTRAVENOUS AS NEEDED
Status: DISCONTINUED | OUTPATIENT
Start: 2024-11-19 | End: 2024-11-19

## 2024-11-19 RX ORDER — RANOLAZINE 500 MG/1
500 TABLET, EXTENDED RELEASE ORAL 2 TIMES DAILY
Status: DISCONTINUED | OUTPATIENT
Start: 2024-11-19 | End: 2024-11-21 | Stop reason: HOSPADM

## 2024-11-19 RX ORDER — FUROSEMIDE 10 MG/ML
40 INJECTION INTRAMUSCULAR; INTRAVENOUS DAILY
Status: DISCONTINUED | OUTPATIENT
Start: 2024-11-19 | End: 2024-11-20

## 2024-11-19 RX ORDER — HYDROMORPHONE HYDROCHLORIDE 1 MG/ML
1 INJECTION, SOLUTION INTRAMUSCULAR; INTRAVENOUS; SUBCUTANEOUS ONCE
Status: COMPLETED | OUTPATIENT
Start: 2024-11-19 | End: 2024-11-19

## 2024-11-19 RX ORDER — ACETAMINOPHEN 325 MG/1
650 TABLET ORAL EVERY 6 HOURS SCHEDULED
Status: DISCONTINUED | OUTPATIENT
Start: 2024-11-19 | End: 2024-11-21 | Stop reason: HOSPADM

## 2024-11-19 RX ORDER — INSULIN GLARGINE 100 [IU]/ML
20 INJECTION, SOLUTION SUBCUTANEOUS NIGHTLY
Status: DISCONTINUED | OUTPATIENT
Start: 2024-11-19 | End: 2024-11-21 | Stop reason: HOSPADM

## 2024-11-19 RX ORDER — PHENYLEPHRINE HCL IN 0.9% NACL 1 MG/10 ML
SYRINGE (ML) INTRAVENOUS AS NEEDED
Status: DISCONTINUED | OUTPATIENT
Start: 2024-11-19 | End: 2024-11-19

## 2024-11-19 RX ORDER — ATORVASTATIN CALCIUM 40 MG/1
80 TABLET, FILM COATED ORAL NIGHTLY
Status: DISCONTINUED | OUTPATIENT
Start: 2024-11-19 | End: 2024-11-21 | Stop reason: HOSPADM

## 2024-11-19 RX ORDER — DEXTROSE 50 % IN WATER (D50W) INTRAVENOUS SYRINGE
12.5
Status: DISCONTINUED | OUTPATIENT
Start: 2024-11-19 | End: 2024-11-21 | Stop reason: HOSPADM

## 2024-11-19 RX ORDER — ASPIRIN 81 MG/1
81 TABLET ORAL DAILY
Status: DISCONTINUED | OUTPATIENT
Start: 2024-11-19 | End: 2024-11-21 | Stop reason: HOSPADM

## 2024-11-19 RX ORDER — FAMOTIDINE 10 MG/ML
20 INJECTION INTRAVENOUS ONCE
Status: COMPLETED | OUTPATIENT
Start: 2024-11-19 | End: 2024-11-19

## 2024-11-19 RX ORDER — OXCARBAZEPINE 150 MG/1
450 TABLET, FILM COATED ORAL 2 TIMES DAILY
Status: DISCONTINUED | OUTPATIENT
Start: 2024-11-19 | End: 2024-11-21 | Stop reason: HOSPADM

## 2024-11-19 RX ORDER — METOCLOPRAMIDE HYDROCHLORIDE 5 MG/ML
10 INJECTION INTRAMUSCULAR; INTRAVENOUS ONCE
Status: COMPLETED | OUTPATIENT
Start: 2024-11-19 | End: 2024-11-19

## 2024-11-19 RX ORDER — CLINDAMYCIN PHOSPHATE 600 MG/50ML
600 INJECTION, SOLUTION INTRAVENOUS ONCE
Status: COMPLETED | OUTPATIENT
Start: 2024-11-19 | End: 2024-11-19

## 2024-11-19 RX ORDER — ONDANSETRON HYDROCHLORIDE 2 MG/ML
4 INJECTION, SOLUTION INTRAVENOUS EVERY 8 HOURS PRN
Status: DISCONTINUED | OUTPATIENT
Start: 2024-11-19 | End: 2024-11-21 | Stop reason: HOSPADM

## 2024-11-19 RX ORDER — HEPARIN SODIUM 10000 [USP'U]/100ML
0-4500 INJECTION, SOLUTION INTRAVENOUS CONTINUOUS
Status: DISCONTINUED | OUTPATIENT
Start: 2024-11-19 | End: 2024-11-19 | Stop reason: SDUPTHER

## 2024-11-19 RX ORDER — SODIUM CITRATE AND CITRIC ACID MONOHYDRATE 334; 500 MG/5ML; MG/5ML
30 SOLUTION ORAL ONCE
Status: COMPLETED | OUTPATIENT
Start: 2024-11-19 | End: 2024-11-19

## 2024-11-19 RX ORDER — MORPHINE SULFATE 4 MG/ML
4 INJECTION INTRAVENOUS ONCE
Status: COMPLETED | OUTPATIENT
Start: 2024-11-19 | End: 2024-11-19

## 2024-11-19 RX ORDER — HEPARIN SODIUM 1000 [USP'U]/ML
INJECTION, SOLUTION INTRAVENOUS; SUBCUTANEOUS AS NEEDED
Status: DISCONTINUED | OUTPATIENT
Start: 2024-11-19 | End: 2024-11-19

## 2024-11-19 RX ORDER — FENTANYL CITRATE 50 UG/ML
INJECTION, SOLUTION INTRAMUSCULAR; INTRAVENOUS AS NEEDED
Status: DISCONTINUED | OUTPATIENT
Start: 2024-11-19 | End: 2024-11-19

## 2024-11-19 RX ORDER — CARVEDILOL 6.25 MG/1
6.25 TABLET ORAL EVERY 12 HOURS
Status: DISCONTINUED | OUTPATIENT
Start: 2024-11-19 | End: 2024-11-21 | Stop reason: HOSPADM

## 2024-11-19 RX ORDER — SODIUM CHLORIDE, SODIUM LACTATE, POTASSIUM CHLORIDE, CALCIUM CHLORIDE 600; 310; 30; 20 MG/100ML; MG/100ML; MG/100ML; MG/100ML
75 INJECTION, SOLUTION INTRAVENOUS CONTINUOUS
Status: ACTIVE | OUTPATIENT
Start: 2024-11-19 | End: 2024-11-19

## 2024-11-19 RX ORDER — ONDANSETRON HYDROCHLORIDE 2 MG/ML
4 INJECTION, SOLUTION INTRAVENOUS ONCE AS NEEDED
Status: DISCONTINUED | OUTPATIENT
Start: 2024-11-19 | End: 2024-11-19 | Stop reason: HOSPADM

## 2024-11-19 RX ORDER — ISOSORBIDE MONONITRATE 60 MG/1
60 TABLET, EXTENDED RELEASE ORAL DAILY
Status: DISCONTINUED | OUTPATIENT
Start: 2024-11-19 | End: 2024-11-21 | Stop reason: HOSPADM

## 2024-11-19 RX ORDER — FUROSEMIDE 10 MG/ML
40 INJECTION INTRAMUSCULAR; INTRAVENOUS ONCE
Status: COMPLETED | OUTPATIENT
Start: 2024-11-19 | End: 2024-11-19

## 2024-11-19 RX ORDER — ONDANSETRON 4 MG/1
4 TABLET, ORALLY DISINTEGRATING ORAL EVERY 8 HOURS PRN
Status: DISCONTINUED | OUTPATIENT
Start: 2024-11-19 | End: 2024-11-21 | Stop reason: HOSPADM

## 2024-11-19 RX ORDER — NALOXONE HYDROCHLORIDE 1 MG/ML
0.2 INJECTION INTRAMUSCULAR; INTRAVENOUS; SUBCUTANEOUS EVERY 5 MIN PRN
Status: DISCONTINUED | OUTPATIENT
Start: 2024-11-19 | End: 2024-11-21 | Stop reason: HOSPADM

## 2024-11-19 RX ORDER — HEPARIN SODIUM 10000 [USP'U]/100ML
0-4500 INJECTION, SOLUTION INTRAVENOUS CONTINUOUS
Status: DISCONTINUED | OUTPATIENT
Start: 2024-11-19 | End: 2024-11-20

## 2024-11-19 RX ORDER — INSULIN LISPRO 100 [IU]/ML
0-10 INJECTION, SOLUTION INTRAVENOUS; SUBCUTANEOUS
Status: DISCONTINUED | OUTPATIENT
Start: 2024-11-20 | End: 2024-11-21 | Stop reason: HOSPADM

## 2024-11-19 RX ORDER — HYDRALAZINE HYDROCHLORIDE 50 MG/1
50 TABLET, FILM COATED ORAL 3 TIMES DAILY
Status: DISCONTINUED | OUTPATIENT
Start: 2024-11-19 | End: 2024-11-21 | Stop reason: HOSPADM

## 2024-11-19 RX ORDER — DOCUSATE SODIUM 100 MG/1
100 CAPSULE, LIQUID FILLED ORAL 2 TIMES DAILY
Status: DISCONTINUED | OUTPATIENT
Start: 2024-11-19 | End: 2024-11-21 | Stop reason: HOSPADM

## 2024-11-19 RX ORDER — INSULIN LISPRO 100 [IU]/ML
0-10 INJECTION, SOLUTION INTRAVENOUS; SUBCUTANEOUS EVERY 4 HOURS
Status: DISCONTINUED | OUTPATIENT
Start: 2024-11-19 | End: 2024-11-19

## 2024-11-19 RX ORDER — SODIUM CHLORIDE 9 MG/ML
20 INJECTION, SOLUTION INTRAVENOUS CONTINUOUS
Status: DISCONTINUED | OUTPATIENT
Start: 2024-11-19 | End: 2024-11-19

## 2024-11-19 RX ORDER — DEXTROSE 50 % IN WATER (D50W) INTRAVENOUS SYRINGE
25
Status: DISCONTINUED | OUTPATIENT
Start: 2024-11-19 | End: 2024-11-21 | Stop reason: HOSPADM

## 2024-11-19 RX ORDER — TAMSULOSIN HYDROCHLORIDE 0.4 MG/1
0.4 CAPSULE ORAL DAILY
Status: DISCONTINUED | OUTPATIENT
Start: 2024-11-19 | End: 2024-11-21 | Stop reason: HOSPADM

## 2024-11-19 RX ORDER — HYDROMORPHONE HYDROCHLORIDE 0.2 MG/ML
0.2 INJECTION INTRAMUSCULAR; INTRAVENOUS; SUBCUTANEOUS EVERY 5 MIN PRN
Status: DISCONTINUED | OUTPATIENT
Start: 2024-11-19 | End: 2024-11-19 | Stop reason: HOSPADM

## 2024-11-19 RX ORDER — IPRATROPIUM BROMIDE AND ALBUTEROL SULFATE 2.5; .5 MG/3ML; MG/3ML
3 SOLUTION RESPIRATORY (INHALATION) ONCE
Status: COMPLETED | OUTPATIENT
Start: 2024-11-19 | End: 2024-11-19

## 2024-11-19 RX ORDER — PROTAMINE SULFATE 10 MG/ML
INJECTION, SOLUTION INTRAVENOUS AS NEEDED
Status: DISCONTINUED | OUTPATIENT
Start: 2024-11-19 | End: 2024-11-19

## 2024-11-19 SDOH — SOCIAL STABILITY: SOCIAL INSECURITY: DOES ANYONE TRY TO KEEP YOU FROM HAVING/CONTACTING OTHER FRIENDS OR DOING THINGS OUTSIDE YOUR HOME?: NO

## 2024-11-19 SDOH — SOCIAL STABILITY: SOCIAL INSECURITY: WITHIN THE LAST YEAR, HAVE YOU BEEN AFRAID OF YOUR PARTNER OR EX-PARTNER?: NO

## 2024-11-19 SDOH — ECONOMIC STABILITY: HOUSING INSECURITY: AT ANY TIME IN THE PAST 12 MONTHS, WERE YOU HOMELESS OR LIVING IN A SHELTER (INCLUDING NOW)?: NO

## 2024-11-19 SDOH — SOCIAL STABILITY: SOCIAL INSECURITY: WITHIN THE LAST YEAR, HAVE YOU BEEN HUMILIATED OR EMOTIONALLY ABUSED IN OTHER WAYS BY YOUR PARTNER OR EX-PARTNER?: NO

## 2024-11-19 SDOH — ECONOMIC STABILITY: HOUSING INSECURITY: IN THE LAST 12 MONTHS, WAS THERE A TIME WHEN YOU WERE NOT ABLE TO PAY THE MORTGAGE OR RENT ON TIME?: NO

## 2024-11-19 SDOH — ECONOMIC STABILITY: FOOD INSECURITY: WITHIN THE PAST 12 MONTHS, THE FOOD YOU BOUGHT JUST DIDN'T LAST AND YOU DIDN'T HAVE MONEY TO GET MORE.: NEVER TRUE

## 2024-11-19 SDOH — SOCIAL STABILITY: SOCIAL INSECURITY: ABUSE: ADULT

## 2024-11-19 SDOH — ECONOMIC STABILITY: FOOD INSECURITY: WITHIN THE PAST 12 MONTHS, YOU WORRIED THAT YOUR FOOD WOULD RUN OUT BEFORE YOU GOT THE MONEY TO BUY MORE.: NEVER TRUE

## 2024-11-19 SDOH — SOCIAL STABILITY: SOCIAL INSECURITY: WERE YOU ABLE TO COMPLETE ALL THE BEHAVIORAL HEALTH SCREENINGS?: YES

## 2024-11-19 SDOH — SOCIAL STABILITY: SOCIAL INSECURITY: DO YOU FEEL UNSAFE GOING BACK TO THE PLACE WHERE YOU ARE LIVING?: NO

## 2024-11-19 SDOH — SOCIAL STABILITY: SOCIAL INSECURITY: HAVE YOU HAD THOUGHTS OF HARMING ANYONE ELSE?: YES

## 2024-11-19 SDOH — ECONOMIC STABILITY: INCOME INSECURITY: IN THE PAST 12 MONTHS HAS THE ELECTRIC, GAS, OIL, OR WATER COMPANY THREATENED TO SHUT OFF SERVICES IN YOUR HOME?: NO

## 2024-11-19 SDOH — HEALTH STABILITY: MENTAL HEALTH: CURRENT SMOKER: 1

## 2024-11-19 SDOH — ECONOMIC STABILITY: TRANSPORTATION INSECURITY: IN THE PAST 12 MONTHS, HAS LACK OF TRANSPORTATION KEPT YOU FROM MEDICAL APPOINTMENTS OR FROM GETTING MEDICATIONS?: NO

## 2024-11-19 SDOH — SOCIAL STABILITY: SOCIAL INSECURITY: ARE YOU OR HAVE YOU BEEN THREATENED OR ABUSED PHYSICALLY, EMOTIONALLY, OR SEXUALLY BY ANYONE?: NO

## 2024-11-19 SDOH — SOCIAL STABILITY: SOCIAL INSECURITY: HAS ANYONE EVER THREATENED TO HURT YOUR FAMILY OR YOUR PETS?: NO

## 2024-11-19 SDOH — ECONOMIC STABILITY: HOUSING INSECURITY: IN THE PAST 12 MONTHS, HOW MANY TIMES HAVE YOU MOVED WHERE YOU WERE LIVING?: 0

## 2024-11-19 SDOH — ECONOMIC STABILITY: FOOD INSECURITY: HOW HARD IS IT FOR YOU TO PAY FOR THE VERY BASICS LIKE FOOD, HOUSING, MEDICAL CARE, AND HEATING?: NOT HARD AT ALL

## 2024-11-19 SDOH — SOCIAL STABILITY: SOCIAL INSECURITY: DO YOU FEEL ANYONE HAS EXPLOITED OR TAKEN ADVANTAGE OF YOU FINANCIALLY OR OF YOUR PERSONAL PROPERTY?: NO

## 2024-11-19 SDOH — SOCIAL STABILITY: SOCIAL INSECURITY: ARE THERE ANY APPARENT SIGNS OF INJURIES/BEHAVIORS THAT COULD BE RELATED TO ABUSE/NEGLECT?: NO

## 2024-11-19 SDOH — SOCIAL STABILITY: SOCIAL INSECURITY: HAVE YOU HAD ANY THOUGHTS OF HARMING ANYONE ELSE?: YES

## 2024-11-19 ASSESSMENT — LIFESTYLE VARIABLES
SKIP TO QUESTIONS 9-10: 1
HOW OFTEN DO YOU HAVE 6 OR MORE DRINKS ON ONE OCCASION: NEVER
HAVE PEOPLE ANNOYED YOU BY CRITICIZING YOUR DRINKING: NO
AUDIT-C TOTAL SCORE: 0
TOTAL SCORE: 0
AUDIT-C TOTAL SCORE: 0
HOW MANY STANDARD DRINKS CONTAINING ALCOHOL DO YOU HAVE ON A TYPICAL DAY: PATIENT DOES NOT DRINK
HAVE YOU EVER FELT YOU SHOULD CUT DOWN ON YOUR DRINKING: NO
EVER FELT BAD OR GUILTY ABOUT YOUR DRINKING: NO
HOW OFTEN DO YOU HAVE A DRINK CONTAINING ALCOHOL: NEVER
EVER HAD A DRINK FIRST THING IN THE MORNING TO STEADY YOUR NERVES TO GET RID OF A HANGOVER: NO

## 2024-11-19 ASSESSMENT — PAIN - FUNCTIONAL ASSESSMENT
PAIN_FUNCTIONAL_ASSESSMENT: 0-10
PAIN_FUNCTIONAL_ASSESSMENT: FLACC (FACE, LEGS, ACTIVITY, CRY, CONSOLABILITY)
PAIN_FUNCTIONAL_ASSESSMENT: 0-10
PAIN_FUNCTIONAL_ASSESSMENT: FLACC (FACE, LEGS, ACTIVITY, CRY, CONSOLABILITY)
PAIN_FUNCTIONAL_ASSESSMENT: 0-10

## 2024-11-19 ASSESSMENT — ENCOUNTER SYMPTOMS
ABDOMINAL PAIN: 0
FEVER: 0
DIFFICULTY URINATING: 1
COUGH: 0
SHORTNESS OF BREATH: 0
FACIAL SWELLING: 0
HEADACHES: 0

## 2024-11-19 ASSESSMENT — ACTIVITIES OF DAILY LIVING (ADL)
DRESSING YOURSELF: INDEPENDENT
ADEQUATE_TO_COMPLETE_ADL: YES
WALKS IN HOME: INDEPENDENT
HEARING - RIGHT EAR: FUNCTIONAL
LACK_OF_TRANSPORTATION: NO
FEEDING YOURSELF: INDEPENDENT
LACK_OF_TRANSPORTATION: NO
PATIENT'S MEMORY ADEQUATE TO SAFELY COMPLETE DAILY ACTIVITIES?: YES
HEARING - LEFT EAR: FUNCTIONAL
GROOMING: INDEPENDENT
TOILETING: INDEPENDENT
BATHING: INDEPENDENT
JUDGMENT_ADEQUATE_SAFELY_COMPLETE_DAILY_ACTIVITIES: YES

## 2024-11-19 ASSESSMENT — PATIENT HEALTH QUESTIONNAIRE - PHQ9
1. LITTLE INTEREST OR PLEASURE IN DOING THINGS: MORE THAN HALF THE DAYS
SUM OF ALL RESPONSES TO PHQ9 QUESTIONS 1 & 2: 4
2. FEELING DOWN, DEPRESSED OR HOPELESS: MORE THAN HALF THE DAYS

## 2024-11-19 ASSESSMENT — PAIN SCALES - GENERAL
PAINLEVEL_OUTOF10: 0 - NO PAIN
PAINLEVEL_OUTOF10: 0 - NO PAIN
PAINLEVEL_OUTOF10: 8
PAINLEVEL_OUTOF10: 8
PAINLEVEL_OUTOF10: 0 - NO PAIN
PAINLEVEL_OUTOF10: 9
PAINLEVEL_OUTOF10: 8
PAIN_LEVEL: 3
PAINLEVEL_OUTOF10: 3
PAINLEVEL_OUTOF10: 7
PAINLEVEL_OUTOF10: 7
PAINLEVEL_OUTOF10: 10 - WORST POSSIBLE PAIN
PAINLEVEL_OUTOF10: 8
PAINLEVEL_OUTOF10: 9
PAINLEVEL_OUTOF10: 10 - WORST POSSIBLE PAIN
PAINLEVEL_OUTOF10: 7
PAINLEVEL_OUTOF10: 0 - NO PAIN
PAINLEVEL_OUTOF10: 9

## 2024-11-19 ASSESSMENT — COGNITIVE AND FUNCTIONAL STATUS - GENERAL
DRESSING REGULAR UPPER BODY CLOTHING: A LITTLE
TURNING FROM BACK TO SIDE WHILE IN FLAT BAD: A LITTLE
WALKING IN HOSPITAL ROOM: A LITTLE
CLIMB 3 TO 5 STEPS WITH RAILING: A LOT
DRESSING REGULAR LOWER BODY CLOTHING: A LITTLE
EATING MEALS: A LITTLE
MOBILITY SCORE: 18
PATIENT BASELINE BEDBOUND: NO
PERSONAL GROOMING: A LITTLE
HELP NEEDED FOR BATHING: A LITTLE
DAILY ACTIVITIY SCORE: 18
TOILETING: A LITTLE
MOVING TO AND FROM BED TO CHAIR: A LITTLE
STANDING UP FROM CHAIR USING ARMS: A LITTLE

## 2024-11-19 ASSESSMENT — PAIN DESCRIPTION - LOCATION
LOCATION: GROIN
LOCATION: LEG
LOCATION: LEG
LOCATION: KNEE

## 2024-11-19 ASSESSMENT — PAIN DESCRIPTION - PAIN TYPE: TYPE: ACUTE PAIN

## 2024-11-19 ASSESSMENT — PAIN DESCRIPTION - DESCRIPTORS
DESCRIPTORS: TIGHTNESS;ACHING;CRAMPING
DESCRIPTORS: SORE
DESCRIPTORS: BURNING
DESCRIPTORS: ACHING;BURNING;TIGHTNESS

## 2024-11-19 ASSESSMENT — PAIN DESCRIPTION - ORIENTATION
ORIENTATION: LEFT

## 2024-11-19 NOTE — ANESTHESIA PROCEDURE NOTES
Airway  Date/Time: 11/19/2024 9:34 AM  Urgency: elective    Airway not difficult    Staffing  Performed: attending   Authorized by: Geovany Castillo MD    Performed by: Geovany Castillo MD  Patient location during procedure: OR    Indications and Patient Condition  Indications for airway management: anesthesia and airway protection  Spontaneous Ventilation: absent  Sedation level: deep  Preoxygenated: yes  Patient position: sniffing  Mask difficulty assessment: 2 - vent by mask + OA or adjuvant +/- NMBA    Final Airway Details  Final airway type: endotracheal airway      Successful airway: ETT  Cuffed: yes   Successful intubation technique: video laryngoscopy  Facilitating devices/methods: intubating stylet  Endotracheal tube insertion site: oral  Blade: Daylin  Blade size: #4  ETT size (mm): 7.5  Cormack-Lehane Classification: grade I - full view of glottis  Placement verified by: capnometry   Measured from: lips  ETT to lips (cm): 23  Number of attempts at approach: 1

## 2024-11-19 NOTE — PROGRESS NOTES
Dereck Shen is a 59 y.o. male admitted for Critical limb ischemia of left lower extremity (Multi). Pharmacy reviewed the patient's [id number] medications and allergies for accuracy.    The list below reflects the PTA list prior to pharmacy medication history. A summary a changes to the PTA medication list has been listed below. Please review each medication in order reconciliation for additional clarification and justification.    Source of information:  Pharmacy, dr visit, discharge paperwork from 10/21/24 (previous medrec)  Medications added:    Medications modified:    Medications to be removed:  Oxycodone/Apap 5/325mg     Medications of concern:      Prior to Admission Medications   Prescriptions Last Dose Informant Patient Reported? Taking?   Brilinta 90 mg tablet   Yes No   Sig: Take 1 tablet (90 mg) by mouth 2 times a day.   Creon 36,000-114,000- 180,000 unit capsule,delayed release(DR/EC) capsule   Yes No   Sig: Take 2 capsules by mouth 3 times a day as needed.   ELDERBERRY FRUIT ORAL   Yes No   Sig: Take 1 tablet by mouth once daily as needed.   Entresto  mg tablet   Yes No   Sig: Take 1 tablet by mouth twice a day.   FreeStyle En sensor system kit   No No   Sig: USE AS DIRECTED AND CHANGE EVERY 14 DAYS   GINGER ROOT EXTRACT ORAL   Yes No   Sig: Take 1 tablet by mouth once daily as needed.   OXcarbazepine (Trileptal) 150 mg tablet   Yes No   Sig: Take 3 tablets (450 mg) by mouth twice a day.   aspirin 81 mg EC tablet   No No   Sig: Take 1 tablet (81 mg) by mouth once daily.   atorvastatin (Lipitor) 80 mg tablet   Yes No   Sig: Take 1 tablet (80 mg) by mouth once daily at bedtime.   blood sugar diagnostic (FreeStyle Precision Aristeo Strips) strip   No No   Si each 2 times a day.   blood sugar diagnostic strip   No No   Sig: Use as directed twice daily   carvedilol (Coreg) 6.25 mg tablet   Yes No   Sig: Take 1 tablet (6.25 mg) by mouth every 12 hours.   dapagliflozin propanediol  "(Farxiga) 10 mg   Yes No   Sig: Take 1 tablet (10 mg) by mouth once every 24 hours.   ezetimibe (Zetia) 10 mg tablet   Yes No   Sig: Take 1 tablet (10 mg) by mouth once daily.   flash glucose scanning reader misc   No No   Sig: USE TO CHECK SUGARS FOUR TIMES A DAY   flash glucose sensor kit kit   No No   Sig: use as directed daily and change every 14 days   furosemide (Lasix) 40 mg tablet   Yes No   Sig: Take 1 tablet (40 mg) by mouth once daily as needed.   gabapentin (Neurontin) 400 mg capsule   No No   Sig: Take 1 capsule (400 mg) by mouth 3 times a day.   hydrALAZINE (Apresoline) 50 mg tablet   Yes No   Sig: Take 1 tablet (50 mg) by mouth 3 times a day.   insulin glargine (Lantus) 100 unit/mL (3 mL) pen   No No   Sig: Inject 20 Units under the skin once daily at bedtime.   insulin lispro (HumaLOG) 100 unit/mL injection   No No   Sig: Inject 10 Units under the skin 3 times a day with meals. Plus sliding scale if BS is over 200   Patient taking differently: Inject 30 Units under the skin 3 times daily (morning, midday, late afternoon).   insulin syringe-needle U-100 31G X \" 1 mL syringe   No No   Sig: USE AS DIRECTED FOUR TIMES PER DAY   isosorbide mononitrate ER (Imdur) 60 mg 24 hr tablet   Yes No   Sig: Take 1 tablet (60 mg) by mouth once daily. Do not crush or chew.   lancets misc   No No   Si each 4 times a day before meals.   medical cannabis   Yes No   Sig: Not [facility] prescribed   nicotine (Nicoderm CQ) 21 mg/24 hr patch   No No   Sig: APPLY 1 PATCH TO SKIN EVERY 24 HOURS AS DIRECTED   oxyCODONE-acetaminophen (Percocet) 5-325 mg tablet   No No   Sig: Take 1 tablet by mouth every 6 hours if needed for severe pain (7 - 10).   pantoprazole (ProtoNix) 40 mg EC tablet   No No   Sig: TAKE 1 TABLET BY MOUTH ONCE DAILY   pen needle, diabetic (BD Ultra-Fine Jocy Pen Needle) 32 gauge x \" needle   No No   Sig: Pen Needles for Insulin.   pen needle, diabetic (TechLITE Pen Needle) 32 gauge x \" needle   No No "   Sig: Use to inject 1-4 times daily as directed   ranolazine (Ranexa) 500 mg 12 hr tablet   Yes No   Sig: Take 1 tablet (500 mg) by mouth twice a day.   rivaroxaban (Xarelto) 20 mg tablet   Yes No   Sig: Take 1 tablet (20 mg) by mouth once daily.   tamsulosin (Flomax) 0.4 mg 24 hr capsule   No No   Sig: TAKE 1 CAPSULE BY MOUTH ONCE DAILY   tiZANidine (Zanaflex) 2 mg tablet   No No   Sig: Take 1 tablet (2 mg) by mouth once daily as needed for muscle spasms.      Facility-Administered Medications: None       Ale Motta

## 2024-11-19 NOTE — ED PROVIDER NOTES
HPI   Chief Complaint   Patient presents with    Leg Pain    Shortness of Breath       This is a 59-year-old male with past medical history of severe coronary disease status post CABG, PCI, chronic combined systolic and diastolic heart failure preserved EF LV dysfunction, ICD implant, hypertension, leukemia, recurrent strokes,, PAD who had surgery Dr. Wright in October for a left PTA left SFA presents to the emergency department for pain of his left leg that started an hour ago and seen in his leg feels cold.  He reports also feeling short of breath but denies any chest pain.  He denies any coughing, nausea, vomiting no other issues or concerns otherwise.                          [place] Coma Scale Score: 15                  Patient History   Past Medical History:   Diagnosis Date    (HFpEF) heart failure with preserved ejection fraction     Aphasia     Atherosclerotic heart disease of native coronary artery with unspecified angina pectoris 11/05/2019    Bipolar disorder, unspecified (Multi)     BPH (benign prostatic hyperplasia)     Diabetes (Multi)     DVT (deep venous thrombosis) (Multi) 02/2022    RLE    Erectile dysfunction     GERD (gastroesophageal reflux disease)     Hepatitis C     HLD (hyperlipidemia)     HTN (hypertension)     Obstructive sleep apnea (adult) (pediatric)     Opioid abuse, in remission (Multi)     PAD (peripheral artery disease) (CMS-[facility])     Polymyalgia rheumatica (Multi)     Post-traumatic stress disorder, unspecified     Stroke (Multi) 07/24/2023    multiple     Past Surgical History:   Procedure Laterality Date    BACK SURGERY      CORONARY ANGIOPLASTY WITH STENT PLACEMENT      CORONARY ARTERY BYPASS GRAFT      CT ANGIO AORTA AND BILATERAL ILIOFEMORAL RUNOFF W AND OR WO IV CONTRAST  07/20/2020    CT ANGIO AORTA AND BILATERAL ILIOFEMORAL RUNOFF W AND OR WO IV CONTRAST  01/14/2022    DENTAL SURGERY      ELBOW SURGERY      INVASIVE VASCULAR PROCEDURE Bilateral 10/04/2024    Procedure: Lower  Extremity Angiogram;  Surgeon: Baljinder Wright MD;  Location: [facility];  Service: Cardiovascular;  Laterality: Bilateral;  w / anesthesia  3 hr hydration  / arrive 7:30    INVASIVE VASCULAR PROCEDURE N/A 10/21/2024    Procedure: Lower Extremity Angiogram;  Surgeon: Baljinder Wright MD;  Location: [facility];  Service: Cardiovascular;  Laterality: N/A;  w/ anesthesia    INVASIVE VASCULAR PROCEDURE N/A 10/21/2024    Procedure: Angioplasty - Lower Extremity;  Surgeon: Baljinder Wright MD;  Location: [facility];  Service: Cardiovascular;  Laterality: N/A;    KNEE SURGERY Left     MR HEAD ANGIO WO IV CONTRAST  01/04/2022    MR NECK ANGIO WO IV CONTRAST  01/04/2022    TONSILLECTOMY      TRANSLUMINAL ATHERECTOMY FEMORAL-POPLITEAL / TIBIOPERONEAL       Family History   Problem Relation Name Age of Onset    No Known Problems Mother      No Known Problems Father       Social History     Tobacco Use    Smoking status: Every Day     Current packs/day: 0.50     Types: Cigarettes     Passive exposure: Current (5-7 cigarettes a day)    Smokeless tobacco: Never   Vaping Use    Vaping status: Never Used   Substance Use Topics    Alcohol use: Never    Drug use: Yes     Types: Marijuana     Comment: Medical card       Physical Exam   ED Triage Vitals   Temperature Heart Rate Respirations BP   11/19/24 0336 11/19/24 0339 11/19/24 0338 11/19/24 0338   36.8 °C (98.2 °F) 97 20 178/87      Pulse Ox Temp src Heart Rate Source Patient Position   11/19/24 0338 -- -- --   (!) 85 %         BP Location FiO2 (%)     -- --             Physical Exam  Constitutional:       General: He is in acute distress.      Appearance: He is ill-appearing.   HENT:      Head: Normocephalic and atraumatic.      Right Ear: Tympanic membrane normal.      Left Ear: Tympanic membrane normal.      Mouth/Throat:      Mouth: Mucous membranes are moist.   Eyes:      Extraocular Movements: Extraocular movements intact.      Conjunctiva/sclera:  Conjunctivae normal.      Pupils: Pupils are equal, round, and reactive to light.   Cardiovascular:      Rate and Rhythm: Normal rate and regular rhythm.      Heart sounds: No murmur heard.  Pulmonary:      Effort: Pulmonary effort is normal. No respiratory distress.      Breath sounds: No stridor. Wheezing present. No rales.   Abdominal:      General: Bowel sounds are normal. There is no distension.      Tenderness: There is no abdominal tenderness. There is no guarding or rebound.   Musculoskeletal:         General: No swelling, tenderness or deformity. Normal range of motion.      Comments: He has dopplerable pulses on his right leg but unable to find Doppler pulses on his left for DP and PT.  Left leg does feel cold on exam.   Skin:     General: Skin is warm and dry.      Coloration: Skin is not jaundiced.      Findings: No bruising or lesion.   Neurological:      General: No focal deficit present.      Mental Status: He is alert and oriented to person, place, and time. Mental status is at baseline.      Cranial Nerves: No cranial nerve deficit.      Motor: No weakness.   Psychiatric:         Mood and Affect: Mood normal.       Labs Reviewed   CBC WITH AUTO DIFFERENTIAL       Result Value    WBC 9.0      nRBC 0.0      RBC 5.20      Hemoglobin 16.5      Hematocrit 48.2      MCV 93      MCH 31.7      MCHC 34.2      RDW 13.5      Platelets 259      Neutrophils % 71.0      Immature Granulocytes %, Automated 0.2      Lymphocytes % 21.7      Monocytes % 6.0      Eosinophils % 0.8      Basophils % 0.3      Neutrophils Absolute 6.41      Immature Granulocytes Absolute, Automated 0.02      Lymphocytes Absolute 1.96      Monocytes Absolute 0.54      Eosinophils Absolute 0.07      Basophils Absolute 0.03     MAGNESIUM   COMPREHENSIVE METABOLIC PANEL   LIPASE   LACTATE   PROTIME-INR   APTT   TROPONIN SERIES- (INITIAL, 1 HR)    Narrative:     The following orders were created for panel order Troponin I Series, High  Sensitivity (0, 1 HR).  Procedure                               Abnormality         Status                     ---------                               -----------         ------                     Troponin I, High Sensiti...[[id number]]                      In process                 Troponin, High Sensitivi...[[id number]]                                                   Please view results for these tests on the individual orders.   B-TYPE NATRIURETIC PEPTIDE   BLOOD GAS VENOUS FULL PANEL   SARS-COV-2 PCR   SERIAL TROPONIN-INITIAL   SERIAL TROPONIN, 1 HOUR     CT angio chest for pulmonary embolism    (Results Pending)   CT angio aorta and bilateral iliofemoral runoff including without contrast if performed    (Results Pending)       ED Course & MDM   ED Course as of 11/19/24 0549   Tu Nov 19, 2024   0412 EKG on my read shows sinus rhythm at rate 90 bpm he does have appears to be peaked T waves, no ST elevations seen, normal intervals. [CF]   0440 IMPRESSION:  No acute pulmonary embolus to the segmental level.      Thickened interlobular septal markings with scattered ground-glass  opacities with an upper lung zone predominance. Findings may be seen  with mixed interstitial/alveolar pulmonary edema. Other superimposed  infectious or inflammatory processes are also not excluded.   [CF]      ED Course User Index  [CF] Morena Gee MD         Diagnoses as of 11/19/24 0549   Acute on chronic congestive heart failure, unspecified heart failure type   Acute lower limb ischemia       Medical Decision Making  59-year-old male past medical history of PAD with left stents in the past, congestive heart failure, CABG presents emergency department for left leg pain found to be hypoxic at 85% requiring 6 L.  He did have wheezing on exam and was given treatment of albuterol.  Pulses were unable to be dopplerable in his left leg.  Patient was started on heparin and CT PE and CT angio runoff were ordered.  He was found to  have signs of interstitial edema and his BNP was elevated.  He is now saturating well on 6 L.  His CT angio runoff showed concerns of an new occlusion just below the left knee at the trifurcation.  Dr. Wright who is on for vascular was informed who recommended continuing heparin and to make patient n.p.o. and that Dr. Peace with vascular surgery would operate on him later this morning.  I spoke with Dr. Sinclair who was agreeable with admission and requested 40 of IV Lasix for patient.  Given patient will require surgery patient was admitted to the ICU.        Procedure  Critical Care    Performed by: Morena Gee MD  Authorized by: Morena Gee MD    Critical care provider statement:     Critical care time (minutes):  30    Critical care time was exclusive of:  Separately billable procedures and treating other patients    Critical care was necessary to treat or prevent imminent or life-threatening deterioration of the following conditions:  Circulatory failure    Critical care was time spent personally by me on the following activities:  Blood draw for specimens, development of treatment plan with patient or surrogate, discussions with consultants, examination of patient, evaluation of patient's response to treatment, ordering and performing treatments and interventions, ordering and review of laboratory studies, ordering and review of radiographic studies, pulse oximetry, re-evaluation of patient's condition and review of old charts    Care discussed with: admitting provider         Morena Gee MD  11/19/24 6046       Morena Gee MD  11/19/24 9129

## 2024-11-19 NOTE — CONSULTS
Reason For Consult  Acute limb ischemia left lower extremity    History Of Present Illness  Dereck Shen is a 59 y.o. male presenting with 6 to 8 hours of left lower extremity discoloration and increasing left foot pain  Has had multiple previous lower extremity revascularizations with bypass and native SFA angioplasty  At this time significant pain and discomfort unable to move his digits other than his first digit slightly  Subtle dorsi and plantarflexion available  .     Past Medical History  He has a past medical history of (HFpEF) heart failure with preserved ejection fraction, Aphasia, Atherosclerotic heart disease of native coronary artery with unspecified angina pectoris (11/05/2019), Bipolar disorder, unspecified (Multi), BPH (benign prostatic hyperplasia), Diabetes (Multi), DVT (deep venous thrombosis) (Multi) (02/2022), Erectile dysfunction, GERD (gastroesophageal reflux disease), Hepatitis C, HLD (hyperlipidemia), HTN (hypertension), Obstructive sleep apnea (adult) (pediatric), Opioid abuse, in remission (Multi), PAD (peripheral artery disease) (CMS-HCC), Polymyalgia rheumatica (Multi), Post-traumatic stress disorder, unspecified, and Stroke (Multi) (07/24/2023).    Surgical History  He has a past surgical history that includes Knee surgery (Left); Elbow surgery; Back surgery; CT angio aorta and bilateral iliofemoral runoff including without contrast if performed (07/20/2020); MR angio head wo IV contrast (01/04/2022); MR angio neck wo IV contrast (01/04/2022); CT angio aorta and bilateral iliofemoral runoff including without contrast if performed (01/14/2022); Invasive Vascular Procedure (Bilateral, 10/04/2024); Dental surgery; Coronary angioplasty with stent; Coronary artery bypass graft; Tonsillectomy; Transluminal atherectomy femoral-popliteal / tibioperoneal; Invasive Vascular Procedure (N/A, 10/21/2024); and Invasive Vascular Procedure (N/A, 10/21/2024).     Social History  He reports that he  has been smoking cigarettes. He has been exposed to tobacco smoke. He has never used smokeless tobacco. He reports current drug use. Drug: Marijuana. He reports that he does not drink alcohol.    Family History  Family History   Problem Relation Name Age of Onset    No Known Problems Mother      No Known Problems Father          Allergies  Celecoxib; Ibuprofen; Tetanus toxoid, adsorbed; [id number] antimigraine agents; Cephalexin; Hydrocodone; Latex; and Tryptophan    Review of Systems  Review of Systems    Constitutional:  no generalized malaise overall feels well, energy levels intact, no complaints specifically noted  HEENT:  No blurry vision, no visual aides noted, no hearing loss no ear ache no nose bleeds noted, no dysphagia, no congestion otherwise no pertinent positives noted  Cardiovascular:  no palpitations, chest pain or heaviness noted, no leg swelling, no numbness or tingling in the lower extremity noted  Respiratory:  no shortness of breath, no productive cough noted, no conversation dyspnea or difficulty breathing  Gastrointestinal:  no abdominal pain, no nausea or vomiting, appetite intact, no bowel irregularities noted  Genitourinary:   no urinary incontinence, frequency or urgency issues noted, no hematuria or burning sensation issues  Musculoskeletal:  No muscle aches or pains, no joint discomfort noted, no back pain noted otherwise feels well  Skin: no ulcerations, skin color issues or wounds upper or lower extremities  Neurologic: no dizziness, no hemiplegia, no hemiparesis, no obvious visual deficits noted  Psychiatric: no depression, no memory loss noted, no suicidal ideation  Endocrine: no weight loss or gain, no temperature concerns hot or cold intolerance  Hemogolotic/Lymphatic: no bruising, excessive bleeding, no swelling in the groins or neck noted       Physical Exam  Physical exam    Constitutional: alert and in no acute distress verbal  Eyes: No erythema swelling or discharge  "noted  Neck: supple, symmetric, trachea midline, no masses noted  Cardiovascular: Carotid pulses 2+, no obvious bruit, no Jugular distension noted, no thrill, heart regular rate, lower extremity vascular exam intact, cap refill <2 sec  Pulmonary:  Bilateral breath sounds intact, clear with rales rhonchi or wheeze  Abdomen: soft non tender, no pulsatile masses noted, no rebound rigidity or guarding noted  Skin: intact warm no abnormal turgor  Psychiatric: alert without any obvious cognitive issues, oriented to person, place, and time  None dopplerable DP and PT pulse  Minimal dorsi and plantarflexion left ankle right lower extremity intact motion  Patient has no digital motion other than his great toe       Last Recorded Vitals  Blood pressure (!) 170/100, pulse 86, temperature 36.8 °C (98.2 °F), temperature source Temporal, resp. rate 24, height 1.803 m (5' 11\"), weight 87.2 kg (192 lb 4.8 oz), SpO2 95%.    Relevant Results  CT demonstrates left SFA occlusion with what appears to be an acute thrombus  There is diffuse tibial runoff difficult evaluate secondary to poor opacification  No lactic acidosis noted  Troponins within normal limits       Assessment/Plan     Acute limb ischemia [place] 2B  Explained tumor status complications left femoral exploration SFA prompt thrombectomy  Patient has significant tibial occlusive disease based on previous angiogram which was reviewed  I explained to him high risk of limb loss at this point  Patient does understand and will proceed.  All options given        Iban Peace, DO    "

## 2024-11-19 NOTE — H&P (VIEW-ONLY)
Reason For Consult  Acute limb ischemia left lower extremity    History Of Present Illness  Dereck Shen is a 59 y.o. male presenting with 6 to 8 hours of left lower extremity discoloration and increasing left foot pain  Has had multiple previous lower extremity revascularizations with bypass and native SFA angioplasty  At this time significant pain and discomfort unable to move his digits other than his first digit slightly  Subtle dorsi and plantarflexion available  .     Past Medical History  He has a past medical history of (HFpEF) heart failure with preserved ejection fraction, Aphasia, Atherosclerotic heart disease of native coronary artery with unspecified angina pectoris (11/05/2019), Bipolar disorder, unspecified (Multi), BPH (benign prostatic hyperplasia), Diabetes (Multi), DVT (deep venous thrombosis) (Multi) (02/2022), Erectile dysfunction, GERD (gastroesophageal reflux disease), Hepatitis C, HLD (hyperlipidemia), HTN (hypertension), Obstructive sleep apnea (adult) (pediatric), Opioid abuse, in remission (Multi), PAD (peripheral artery disease) (CMS-HCC), Polymyalgia rheumatica (Multi), Post-traumatic stress disorder, unspecified, and Stroke (Multi) (07/24/2023).    Surgical History  He has a past surgical history that includes Knee surgery (Left); Elbow surgery; Back surgery; CT angio aorta and bilateral iliofemoral runoff including without contrast if performed (07/20/2020); MR angio head wo IV contrast (01/04/2022); MR angio neck wo IV contrast (01/04/2022); CT angio aorta and bilateral iliofemoral runoff including without contrast if performed (01/14/2022); Invasive Vascular Procedure (Bilateral, 10/04/2024); Dental surgery; Coronary angioplasty with stent; Coronary artery bypass graft; Tonsillectomy; Transluminal atherectomy femoral-popliteal / tibioperoneal; Invasive Vascular Procedure (N/A, 10/21/2024); and Invasive Vascular Procedure (N/A, 10/21/2024).     Social History  He reports that he  has been smoking cigarettes. He has been exposed to tobacco smoke. He has never used smokeless tobacco. He reports current drug use. Drug: Marijuana. He reports that he does not drink alcohol.    Family History  Family History   Problem Relation Name Age of Onset    No Known Problems Mother      No Known Problems Father          Allergies  Celecoxib; Ibuprofen; Tetanus toxoid, adsorbed; [id number] antimigraine agents; Cephalexin; Hydrocodone; Latex; and Tryptophan    Review of Systems  Review of Systems    Constitutional:  no generalized malaise overall feels well, energy levels intact, no complaints specifically noted  HEENT:  No blurry vision, no visual aides noted, no hearing loss no ear ache no nose bleeds noted, no dysphagia, no congestion otherwise no pertinent positives noted  Cardiovascular:  no palpitations, chest pain or heaviness noted, no leg swelling, no numbness or tingling in the lower extremity noted  Respiratory:  no shortness of breath, no productive cough noted, no conversation dyspnea or difficulty breathing  Gastrointestinal:  no abdominal pain, no nausea or vomiting, appetite intact, no bowel irregularities noted  Genitourinary:   no urinary incontinence, frequency or urgency issues noted, no hematuria or burning sensation issues  Musculoskeletal:  No muscle aches or pains, no joint discomfort noted, no back pain noted otherwise feels well  Skin: no ulcerations, skin color issues or wounds upper or lower extremities  Neurologic: no dizziness, no hemiplegia, no hemiparesis, no obvious visual deficits noted  Psychiatric: no depression, no memory loss noted, no suicidal ideation  Endocrine: no weight loss or gain, no temperature concerns hot or cold intolerance  Hemogolotic/Lymphatic: no bruising, excessive bleeding, no swelling in the groins or neck noted       Physical Exam  Physical exam    Constitutional: alert and in no acute distress verbal  Eyes: No erythema swelling or discharge  "noted  Neck: supple, symmetric, trachea midline, no masses noted  Cardiovascular: Carotid pulses 2+, no obvious bruit, no Jugular distension noted, no thrill, heart regular rate, lower extremity vascular exam intact, cap refill <2 sec  Pulmonary:  Bilateral breath sounds intact, clear with rales rhonchi or wheeze  Abdomen: soft non tender, no pulsatile masses noted, no rebound rigidity or guarding noted  Skin: intact warm no abnormal turgor  Psychiatric: alert without any obvious cognitive issues, oriented to person, place, and time  None dopplerable DP and PT pulse  Minimal dorsi and plantarflexion left ankle right lower extremity intact motion  Patient has no digital motion other than his great toe       Last Recorded Vitals  Blood pressure (!) 170/100, pulse 86, temperature 36.8 °C (98.2 °F), temperature source Temporal, resp. rate 24, height 1.803 m (5' 11\"), weight 87.2 kg (192 lb 4.8 oz), SpO2 95%.    Relevant Results  CT demonstrates left SFA occlusion with what appears to be an acute thrombus  There is diffuse tibial runoff difficult evaluate secondary to poor opacification  No lactic acidosis noted  Troponins within normal limits       Assessment/Plan     Acute limb ischemia [place] 2B  Explained tumor status complications left femoral exploration SFA prompt thrombectomy  Patient has significant tibial occlusive disease based on previous angiogram which was reviewed  I explained to him high risk of limb loss at this point  Patient does understand and will proceed.  All options given        Iban Peace, DO    "

## 2024-11-19 NOTE — ANESTHESIA POSTPROCEDURE EVALUATION
Patient: Dereck Shen    Procedure Summary       Date: 11/19/24 Room / Location: [facility] 08 / [facility]    Anesthesia Start: 0914 Anesthesia Stop: 1209    Procedures:       Left femoral exploration with left femoral endarterectomy. Femoral & tibial thrombectomy with patch angioplasty and left fore compartment fasciotomy (Left: Leg Lower)      . (Left) Diagnosis:     Surgeons: Iban Peace DO Responsible Provider: Geovany Castillo MD    Anesthesia Type: general ASA Status: 3 - Emergent            Anesthesia Type: general    Vitals Value Taken Time   /71 11/19/24 1240   Temp 36.4 °C (97.6 °F) 11/19/24 1158   Pulse 69 11/19/24 1248   Resp 12 11/19/24 1248   SpO2 100 % 11/19/24 1248   Vitals shown include unfiled device data.    Anesthesia Post Evaluation    Patient location during evaluation: PACU  Patient participation: complete - patient participated  Level of consciousness: awake and alert  Pain score: 3  Pain management: satisfactory to patient  Airway patency: patent  Cardiovascular status: hemodynamically stable  Respiratory status: room air  Hydration status: acceptable  Postoperative Nausea and Vomiting: mild  Comments: BS TO BE CHECKED ON ICU ARRIVAL FOR TREATMENT.    There were no known notable events for this encounter.

## 2024-11-19 NOTE — CONSULTS
Inpatient consult to Cardiology  Consult performed by: Baljinder Wright MD  Consult ordered by: Marla Aguilar PA-C        History Of Present Illness:    Dereck Shen is a 59 y.o. male with past medical history of severe coronary disease status post CABG, PCI, chronic combined systolic and diastolic heart failure with LV dysfunction, ICD implant, hypertension, leukemia, recurrent strokes,, PAD who had surgery in October for a left PTA left SFA complains of pain of his left leg that started late last night/early this morning and states that left his leg feels cold. He also noted that his left leg was white after walking and then a combination of white and purple coloration of the leg. He reports also feeling short of breath. He states that he did have chest pain but used Imdur to help alleviate the pain. He denies any coughing, nausea, vomiting no other issues or concerns otherwise.   BNP was elevated and he was hypoxic on presentation  He is now s/p embolectomy and fasciotomy     Last Recorded Vitals:  Vitals:    11/19/24 1425 11/19/24 1500 11/19/24 1517 11/19/24 1600   BP:       Pulse: 68 67  67   Resp: (!) 7 11  16   Temp:   36.6 °C (97.9 °F)    TempSrc:   Temporal    SpO2: 95% 98%  93%   Weight:       Height:           Last Labs:  CBC - 11/19/2024:  3:59 AM  9.0 16.5 259    48.2      CMP - 11/19/2024:  3:59 AM  8.8 7.4 8 --- 0.4   3.9 3.9 8 118      PTT - 11/19/2024:  3:59 AM  1.0   10.9 36     Troponin I, High Sensitivity   Date/Time Value Ref Range Status   11/19/2024 04:57 AM 15 0 - 20 ng/L Final   11/19/2024 03:59 AM 14 0 - 20 ng/L Final     BNP   Date/Time Value Ref Range Status   11/19/2024 03:59  (H) 0 - 99 pg/mL Final   06/23/2021 01:54  (H) 0 - 99 pg/mL Final     Comment:     .  <100 pg/mL - Heart failure unlikely  100-299 pg/mL - Intermediate probability of acute heart  .               failure exacerbation. Correlate with clinical  .               context and patient history.    >=300 pg/mL  - Heart Failure likely. Correlate with clinical  .               context and patient history.  BNP testing is performed using different testing   methodology at [facility] than at other   system hospitals. Direct result comparisons should   only be made within the same method.     12/17/2020 02:16  (H) 0 - 99 pg/mL Final     Comment:     .  <100 pg/mL - Heart failure unlikely  100-299 pg/mL - Intermediate probability of acute heart  .               failure exacerbation. Correlate with clinical  .               context and patient history.    >=300 pg/mL - Heart Failure likely. Correlate with clinical  .               context and patient history.  BNP testing is performed using different testing   methodology at [facility] than at other   system hospitals. Direct result comparisons should   only be made within the same method.       POC HEMOGLOBIN A1c   Date/Time Value Ref Range Status   10/01/2024 01:47 PM 8.2 (A) 4.2 - 6.5 % Final   05/01/2024 11:33 AM 7.3 (A) 4.2 - 6.5 % Final     LDL Calculated   Date/Time Value Ref Range Status   10/22/2024 05:13  (H) <=99 mg/dL Final     Comment:                                 Near   Borderline      AGE      Desirable  Optimal    High     High     Very High     0-19 Y     0 - 109     ---    110-129   >/= 130     ----    20-24 Y     0 - 119     ---    120-159   >/= 160     ----      >24 Y     0 -  99   100-129  130-159   160-189     >/=190       VLDL   Date/Time Value Ref Range Status   10/22/2024 05:13 AM 29 0 - 40 mg/dL Final   04/04/2023 02:31 PM 46 (H) 0 - 40 mg/dL Final   04/18/2022 04:03 PM 20 0 - 40 mg/dL Final   01/05/2022 06:04 AM 25 0 - 40 mg/dL Final      Last I/O:  I/O last 3 completed shifts:  In: - (0 mL/kg)   Out: 750 (8.9 mL/kg) [Urine:750 (0.2 mL/kg/hr)]  Weight: 84.4 kg     Past Cardiology Tests (Last 3 Years):  EKG:  ECG 12 lead 11/19/2024      ECG 12 lead STAT 10/04/2024      Electrocardiogram, 12-lead PRN ACS symptoms  "04/24/2024      ECG 12 lead 02/04/2024    Echo:  No results found for this or any previous visit from the past 1095 days.    Ejection Fractions:  No results found for: \"EF\"  Cath:  No results found for this or any previous visit from the past 1095 days.    Stress Test:  NUCLEAR STRESS TEST 05/10/2023    Cardiac Imaging:  No results found for this or any previous visit from the past 1095 days.      Past Medical History:  He has a past medical history of (HFpEF) heart failure with preserved ejection fraction, Aphasia, Atherosclerotic heart disease of native coronary artery with unspecified angina pectoris (11/05/2019), Bipolar disorder, unspecified (Multi), BPH (benign prostatic hyperplasia), Diabetes (Multi), DVT (deep venous thrombosis) (Multi) (02/2022), Erectile dysfunction, GERD (gastroesophageal reflux disease), Hepatitis C, HLD (hyperlipidemia), HTN (hypertension), Obstructive sleep apnea (adult) (pediatric), Opioid abuse, in remission (Multi), PAD (peripheral artery disease) (CMS-HCC), Polymyalgia rheumatica (Multi), Post-traumatic stress disorder, unspecified, and Stroke (Multi) (07/24/2023).    Past Surgical History:  He has a past surgical history that includes Knee surgery (Left); Elbow surgery; Back surgery; CT angio aorta and bilateral iliofemoral runoff including without contrast if performed (07/20/2020); MR angio head wo IV contrast (01/04/2022); MR angio neck wo IV contrast (01/04/2022); CT angio aorta and bilateral iliofemoral runoff including without contrast if performed (01/14/2022); Invasive Vascular Procedure (Bilateral, 10/04/2024); Dental surgery; Coronary angioplasty with stent; Coronary artery bypass graft; Tonsillectomy; Transluminal atherectomy femoral-popliteal / tibioperoneal; Invasive Vascular Procedure (N/A, 10/21/2024); and Invasive Vascular Procedure (N/A, 10/21/2024).      Social History:  He reports that he has been smoking cigarettes. He has been exposed to tobacco smoke. He has " never used smokeless tobacco. He reports current drug use. Drug: Marijuana. He reports that he does not drink alcohol.    Family History:  Family History   Problem Relation Name Age of Onset    No Known Problems Mother      No Known Problems Father          Allergies:  Celecoxib; Ibuprofen; Tetanus toxoid, adsorbed; [id number] antimigraine agents; Cephalexin; Hydrocodone; Latex; and Tryptophan    Inpatient Medications:  Scheduled medications   Medication Dose Route Frequency    acetaminophen  650 mg oral q6h PEDRO    aspirin  81 mg oral Daily    atorvastatin  80 mg oral Nightly    carvedilol  6.25 mg oral q12h    docusate sodium  100 mg oral BID    hydrALAZINE  50 mg oral TID    insulin glargine  20 Units subcutaneous Nightly    insulin lispro  0-10 Units subcutaneous q4h    isosorbide mononitrate ER  60 mg oral Daily    OXcarbazepine  450 mg oral BID    oxygen   inhalation Continuous - Inhalation    ranolazine  500 mg oral BID    sennosides  2 tablet oral Nightly    tamsulosin  0.4 mg oral Daily    ticagrelor  90 mg oral BID     PRN medications   Medication    dextrose    dextrose    glucagon    glucagon    heparin    HYDROmorphone    naloxone    ondansetron ODT    Or    ondansetron     Continuous Medications   Medication Dose Last Rate    heparin  0-4,500 Units/hr 1,500 Units/hr (11/19/24 1402)    lactated Ringer's  75 mL/hr 75 mL/hr (11/19/24 1410)    sodium chloride 0.9%  20 mL/hr       Outpatient Medications:  Current Outpatient Medications   Medication Instructions    aspirin 81 mg, oral, Daily    atorvastatin (Lipitor) 80 mg tablet Take 1 tablet (80 mg) by mouth once daily at bedtime.    blood sugar diagnostic (FreeStyle Precision Aristeo Strips) strip 1 each, miscellaneous, 2 times daily    blood sugar diagnostic strip Use as directed twice daily    Brilinta 90 mg, oral, 2 times daily    carvedilol (COREG) 6.25 mg, oral, Every 12 hours    Creon 36,000-114,000- 180,000 unit capsule,delayed release(DR/EC)  "capsule 2 capsules, oral, 3 times daily PRN    dapagliflozin propanediol (FARXIGA) 10 mg, oral, Every 24 hours    ELDERBERRY FRUIT ORAL 1 tablet, oral, Daily PRN    Entresto  mg tablet 1 tablet, oral, 2 times daily    ezetimibe (ZETIA) 10 mg, oral, Daily RT    flash glucose scanning reader misc USE TO CHECK SUGARS FOUR TIMES A DAY    flash glucose sensor kit kit use as directed daily and change every 14 days    FreeStyle En sensor system kit USE AS DIRECTED AND CHANGE EVERY 14 DAYS    furosemide (LASIX) 40 mg, oral, Daily PRN    gabapentin (NEURONTIN) 400 mg, oral, 3 times daily    JAIMEE ROOT EXTRACT ORAL 1 tablet, oral, Daily PRN    hydrALAZINE (APRESOLINE) 50 mg, oral, 3 times daily    insulin lispro (HumaLOG) 100 unit/mL injection Inject 10 Units under the skin 3 times a day with meals. Plus sliding scale if BS is over 200    insulin syringe-needle U-100 31G X 5/16\" 1 mL syringe USE AS DIRECTED FOUR TIMES PER DAY    isosorbide mononitrate ER (IMDUR) 60 mg, oral, Daily, Do not crush or chew.    lancets misc 1 each, miscellaneous, 4 times daily before meals and nightly    Lantus Solostar U-100 Insulin 20 Units, subcutaneous, Nightly    medical cannabis Not [facility] prescribed    nicotine (Nicoderm CQ) 21 mg/24 hr patch APPLY 1 PATCH TO SKIN EVERY 24 HOURS AS DIRECTED    OXcarbazepine (TRILEPTAL) 450 mg, oral, 2 times daily    oxyCODONE-acetaminophen (Percocet) 5-325 mg tablet 1 tablet, oral, Every 6 hours PRN    pantoprazole (ProtoNix) 40 mg EC tablet TAKE 1 TABLET BY MOUTH ONCE DAILY    pen needle, diabetic (BD Ultra-Fine Jocy Pen Needle) 32 gauge x 5/32\" needle Pen Needles for Insulin.    pen needle, diabetic (TechLITE Pen Needle) 32 gauge x 1/4\" needle Use to inject 1-4 times daily as directed    ranolazine (RANEXA) 500 mg, oral, 2 times daily    rivaroxaban (XARELTO) 20 mg, oral, Daily RT    tamsulosin (Flomax) 0.4 mg 24 hr capsule TAKE 1 CAPSULE BY MOUTH ONCE DAILY    tiZANidine (ZANAFLEX) 2 mg, oral, " Daily PRN       Physical Exam:  Constitutional: Patient is resting comfortably. No acute distress      Head and Face: Face is symmetric. Head is grossly normal with no step-offs     Neck: Supple. Trachea midline. No LAD. No JVD.     Lungs: Clear to auscultation bilaterally. Effort normal     Cardiovascular: Normal heart rate and eddie. No murmurs, gallops, or rubs.     Abdominal: Soft, non-tender, non-distended. Bowel sounds present in all four quadrants     Pelvic/: No CVA tenderness     Extremities: Doppler pulse in both foot   Neurological: Patient is A&Ox3. Speech is normal. No focal neural deficits.     Dermatologic: Normal sun and age related skin changes. Skin is warm and dry. Skin tone appropriate for ethnicity. No erythema or lesions noted.     Psychiatric/behavioral: Appropriate mood and affect. No auditory or visual hallucinations.     Assessment/Plan   1) Acute ischemic leg  S/P Thrombectomy and fasciotomy  Continue to monitor  2) Acute on chronic systolic heart failure  Hx of ischemic CMP followed by Dr. Newby  Continue Coreg, Hydralazine and imdur  Needs gentle diuresis  Monitor renal function closely  Check echo  Peripheral IV 11/19/24 20 G Left Antecubital (Active)   Site Assessment Clean;Dry;Intact 11/19/24 1312   Dressing Type Transparent 11/19/24 1312   Line Status Infusing 11/19/24 1312   Dressing Status Clean;Dry;Occlusive 11/19/24 1312   Number of days: 0       Peripheral IV 11/19/24 20 G Right Antecubital (Active)   Site Assessment Clean;Dry;Intact 11/19/24 1300   Dressing Type Transparent 11/19/24 1300   Line Status Heparin locked 11/19/24 1300   Dressing Status Clean;Dry;Occlusive 11/19/24 1300   Number of days: 0       Arterial Line 11/19/24 Left Radial (Active)   Site Assessment Clean;Dry;Intact 11/19/24 1300   Line Status Pulsatile blood flow 11/19/24 1300   Art Line Waveform Appropriate 11/19/24 1300   Art Line Interventions Zeroed and calibrated;Leveled;Connections checked and  tightened;Flushed per protocol 11/19/24 1300   Color/Movement/Sensation Capillary refill less than 3 sec;Distal pulses palpable 11/19/24 1300   Dressing Type Antimicrobial patch;Transparent 11/19/24 1300   Dressing Status Clean;Dry;Occlusive 11/19/24 1300   Number of days: 0       Urethral Catheter Non-latex;Straight-tip 16 Fr. (Active)   Output (mL) 100 mL 11/19/24 1500   Number of days: 0       Code Status:  Full Code    I spent 30 minutes in the professional and overall care of this patient.        Baljinder Wright MD

## 2024-11-19 NOTE — H&P
History Of Present Illness  Dereck Shen is a 59 y.o. male presenting with with past medical history of severe coronary disease status post CABG, PCI, chronic combined systolic and diastolic heart failure preserved EF LV dysfunction, ICD implant, hypertension, leukemia, recurrent strokes,, PAD who had surgery Dr. Wright in October for a left PTA left SFA complains of pain of his left leg that started late last night/early this morning and states that left his leg feels cold. He also noted that his left leg was white after walking and then a combination of white and purple coloration of the leg. He reports also feeling short of breath. He states that he did have chest pain but used Imdur to help alleviate the pain. He denies any coughing, nausea, vomiting no other issues or concerns otherwise.     ED Course:   Vitals on presentation:  36.8 °C (98.2 °F), 97 HR 20 /87 BP 85% on RA  Labs: CBC unremarkable, CMP - sodium 135, glucose 257  BNP - 997  Imaging  CT Angio aorta and bilateral iliofemoral runoff - Persistent occlusion of the left femoral/popliteal bypass graft and native left superficial femoral artery. There is also new occlusion versus severe stenosis involving the vessels below the left knee beginning at the level of the trifurcation with the anterior tibial, posterior tibial and peroneal arteries not well seen through the calf, ankle and foot.  Re demonstrated occlusion of the right femoropopliteal bypass graft stent. There is good opacification of the native right superficial femoral artery with three-vessel runoff to the right ankle.  CT PE - No acute PE  Pulses were unable to be dopplerable in his left leg  Interventions: 6 L NC, 40 mg IV Lasix, Heparin, Albuterol, npo for surgery in am with Dr. Peace    12-point ROS reviewed and found to be negative aside from aforementioned positives in HPI and/or noted in dedicated ROS section below.      Past Medical History  He has a past medical history of  (HFpEF) heart failure with preserved ejection fraction, Aphasia, Atherosclerotic heart disease of native coronary artery with unspecified angina pectoris (11/05/2019), Bipolar disorder, unspecified (Multi), BPH (benign prostatic hyperplasia), Diabetes (Multi), DVT (deep venous thrombosis) (Multi) (02/2022), Erectile dysfunction, GERD (gastroesophageal reflux disease), Hepatitis C, HLD (hyperlipidemia), HTN (hypertension), Obstructive sleep apnea (adult) (pediatric), Opioid abuse, in remission (Multi), PAD (peripheral artery disease) (CMS-HCC), Polymyalgia rheumatica (Multi), Post-traumatic stress disorder, unspecified, and Stroke (Multi) (07/24/2023).    Surgical History  He has a past surgical history that includes Knee surgery (Left); Elbow surgery; Back surgery; CT angio aorta and bilateral iliofemoral runoff including without contrast if performed (07/20/2020); MR angio head wo IV contrast (01/04/2022); MR angio neck wo IV contrast (01/04/2022); CT angio aorta and bilateral iliofemoral runoff including without contrast if performed (01/14/2022); Invasive Vascular Procedure (Bilateral, 10/04/2024); Dental surgery; Coronary angioplasty with stent; Coronary artery bypass graft; Tonsillectomy; Transluminal atherectomy femoral-popliteal / tibioperoneal; Invasive Vascular Procedure (N/A, 10/21/2024); and Invasive Vascular Procedure (N/A, 10/21/2024).     Social History  He reports that he has been smoking cigarettes. He has been exposed to tobacco smoke. He has never used smokeless tobacco. He reports current drug use. Drug: Marijuana. He reports that he does not drink alcohol.    Family History  Family History   Problem Relation Name Age of Onset    No Known Problems Mother      No Known Problems Father          Allergies  Celecoxib; Ibuprofen; Tetanus toxoid, adsorbed; [id number] antimigraine agents; Cephalexin; Hydrocodone; Latex; and Tryptophan    Review of Systems  Constitutional:  Negative for fever, chills,  night sweats, weight loss   Respiratory:  Negative for cough, shortness of breath and wheezing.    Cardiovascular:  Negative for chest pain and leg swelling.   Neurological:  Positive for weakness   Musculoskeletal: Positive for muscle aches, negative for  joint discomfort or back pain   Skin: Negative for ulcerations and wounds       Physical Exam  Constitutional: Patient is resting comfortably. No acute distress     Head and Face: Face is symmetric. Head is grossly normal with no step-offs    Neck: Supple. Trachea midline. No LAD. No JVD.    Lungs: Clear to auscultation bilaterally. Effort normal    Cardiovascular: Normal heart rate and eddie. No murmurs, gallops, or rubs.    Abdominal: Soft, non-tender, non-distended. Bowel sounds present in all four quadrants    Pelvic/: No CVA tenderness    Extremities: Doppler pulses detected on right left but not identified on left left for DP and PT. Left leg cold on exam. Significant pain unable to move digits other than left great toe slightly.       Neurological: Patient is A&Ox3. Speech is normal. No focal neural deficits.    Dermatologic: Normal sun and age related skin changes. Skin is warm and dry. Skin tone appropriate for ethnicity. No erythema or lesions noted.    Psychiatric/behavioral: Appropriate mood and affect. No auditory or visual hallucinations.    Last Recorded Vitals  BP (!) 176/98   Pulse 93   Temp 36.8 °C (98.2 °F) (Temporal)   Resp (!) 27   Wt 87.2 kg (192 lb 4.8 oz)   SpO2 97%     Relevant Results  ECG 12 lead    Result Date: 11/19/2024  Sinus rhythm Probable left atrial enlargement Borderline repolarization abnormality ST elevation, consider anterior injury Borderline prolonged QT interval    CT angio aorta and bilateral iliofemoral runoff including without contrast if performed    Result Date: 11/19/2024  Interpreted By:  Finkelstein, Evan, STUDY: CT ANGIO AORTA AND BILATERAL ILIOFEMORAL RUN OFF INCLUDING WITHOUT CONTRAST IF PERFORMED;   11/19/2024 4:42 am   INDICATION: Signs/Symptoms:no pulses left leg.     COMPARISON: CT runoff 09/13/2024   ACCESSION NUMBER(S): [id number]   ORDERING CLINICIAN: ACE VALENZUELA   TECHNIQUE: Contiguous axial CTA images were acquired through the abdomen and pelvis and bilateral lower extremities following the administration of 100 mL Omnipaque 350 intravenous contrast. Sagittal and coronal images were reconstructed. Maximum intensity projection and three dimensional images were constructed at a separate workstation.   FINDINGS: VASCULAR FINDINGS:   ABDOMINAL AORTA & ILIAC ARTERIES: No evidence of abdominal aortic aneurysm or dissection.   Moderate atherosclerotic calcifications throughout the abdominal aorta and major proximal vasculature. Celiac axis, superior mesenteric artery, and inferior mesenteric artery are patent without any significant narrowing. Bilateral renal arteries are patent without any significant.   Right common iliac and external iliac arteries are patent without any significant narrowing. Patent right common/external iliac stent. Moderate to severe narrowing of the internal iliac arteries bilaterally. Left common and external iliac arteries are patent.     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 Common femoral artery is patent.  Native superficial femoral artery is patent. There is a stent within the mid to lower aspect of the native right superficial femoral artery which is patent. Profunda femoral artery is patent.  Popliteal artery is patent. Tibioperoneal trunk, posterior tibial artery, peroneal artery, and anterior tibial artery are patent. Three-vessel runoff at the ankle.   LEFT LOWER EXTREMITY: Common femoral artery is patent. 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 Redemonstrated occlusion of the native superficial femoral artery, similar compared to prior imaging. There is reconstitution of the popliteal  artery. Poor opacification of the trifurcation of vessels just below the knee with poor opacification of the vessels throughout the entirety of the calf, ankle and foot, new/worsened compared to prior imaging..   ---------------------------------------------------------------   NON-VASCULAR FINDINGS:   LIVER: Within normal limits.   BILE DUCTS: Nondilated.   GALLBLADDER: No evidence of calcified gallstones or wall thickening.   PANCREAS: Within normal limits.   SPLEEN: A splenule is present. The spleen is otherwise unremarkable..   ADRENALS: Within normal limits.   KIDNEYS, URETERS, URINARY BLADDER: Unremarkable. No evidence of hydronephrosis.   BOWEL: No evidence of abnormal dilatation or obstruction of the small or large bowel. No evidence of pneumoperitoneum.   REPRODUCTIVE ORGANS:  Unremarkable   PERITONEUM / RETROPERITONEUM / LYMPH NODES: No evidence of any free fluid. No evidence of any significantly enlarged intra-abdominal or pelvic lymph nodes.   ABDOMINAL WALL: Fat containing inguinal hernias.   LOWER CHEST: Please refer to separately dictated CT chest report.   BONES: No evidence of suspicious lytic or blastic osseous lesions.       Persistent occlusion of the left femoral/popliteal bypass graft and native left superficial femoral artery. There is also new occlusion versus severe stenosis involving the vessels below the left knee beginning at the level of the trifurcation with the anterior tibial, posterior tibial and peroneal arteries not well seen through the calf, ankle and foot.   No acute abnormality of the abdomen or pelvis.   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 There is good opacification of the native right superficial femoral artery with three-vessel runoff to the right ankle.   MACRO: None.   Signed by: Evan Finkelstein 11/19/2024 5:20 AM Dictation workstation:   [id number]    CT angio chest for pulmonary embolism    Result Date: 11/19/2024  Interpreted  By:  Finkelstein, Evan, STUDY: CT ANGIO CHEST FOR PULMONARY EMBOLISM;  11/19/2024 4:27 am   INDICATION: Signs/Symptoms:sob wih hypoxia.     COMPARISON: CT chest 06/23/2021   ACCESSION NUMBER(S): [id number]   ORDERING CLINICIAN: ACE VALENZUELA   TECHNIQUE: Axial CTA images of the chest after intravenous administration of 50 mL Omnipaque 350 using CT angiographic technique. Coronal and sagittal images are reconstructed. MIP images were created and reviewed.   FINDINGS: CHEST WALL AND LOWER NECK: Within normal limits. ABDOMEN: No acute abnormality of the partially visualized abdomen.   VASCULAR: AORTA: No aortic aneurysm. Mild atherosclerotic disease. PULMONARY ARTERY: Normal caliber. No pulmonary embolus to the segmental level.   CHEST:   HEART: Normal size. No pericardial effusion. MEDIASTINUM AND REJI: No pathologically enlarged thoracic lymph nodes. LUNG, PLEURA, LARGE AIRWAYS: Trace left pleural effusion. Thickened interlobular septal markings. Ground-glass opacities scattered throughout the lungs bilaterally with an upper lung zone predominance.   BONES: No acute osseous abnormality. Median sternotomy wires are present.       No acute pulmonary embolus to the segmental level.   Thickened interlobular septal markings with scattered ground-glass opacities with an upper lung zone predominance. Findings may be seen with mixed interstitial/alveolar pulmonary edema. Other superimposed infectious or inflammatory processes are also not excluded.   Trace left pleural effusion   MACRO: None.   Signed by: Evan Finkelstein 11/19/2024 4:36 AM Dictation workstation:   [id number]     Results from last 7 days   Lab Units 11/19/24  0359   WBC AUTO x10*3/uL 9.0   HEMOGLOBIN g/dL 16.5   HEMATOCRIT % 48.2   PLATELETS AUTO x10*3/uL 259   NEUTROS PCT AUTO % 71.0   LYMPHS PCT AUTO % 21.7   MONOS PCT AUTO % 6.0   EOS PCT AUTO % 0.8     Results from last 7 days   Lab Units 11/19/24  0359   WBC AUTO x10*3/uL 9.0   HEMOGLOBIN g/dL  16.5   HEMATOCRIT % 48.2   PLATELETS AUTO x10*3/uL 259   NEUTROS PCT AUTO % 71.0   LYMPHS PCT AUTO % 21.7   MONOS PCT AUTO % 6.0   EOS PCT AUTO % 0.8     Results from last 7 days   Lab Units 11/19/24  0359   SODIUM mmol/L 135*   POTASSIUM mmol/L 4.4   CHLORIDE mmol/L 103   CO2 mmol/L 24   BUN mg/dL 22   CREATININE mg/dL 1.25   CALCIUM mg/dL 8.8   PROTEIN TOTAL g/dL 7.4   BILIRUBIN TOTAL mg/dL 0.4   ALK PHOS U/L 118   ALT U/L 8*   AST U/L 8*   GLUCOSE mg/dL 257*     Scheduled medications  albuterol, 2.5 mg, nebulization, Once  aspirin, 81 mg, oral, Daily  atorvastatin, 80 mg, oral, Nightly  carvedilol, 6.25 mg, oral, q12h  heparin (porcine) 5,000 Units in sodium chloride 0.9% 500 mL irrigation, 5,000 Units, irrigation, Once  hydrALAZINE, 50 mg, oral, TID  [Transfer Hold] insulin glargine, 20 Units, subcutaneous, Nightly  [Transfer Hold] insulin lispro, 0-10 Units, subcutaneous, q4h  isosorbide mononitrate ER, 60 mg, oral, Daily  OXcarbazepine, 450 mg, oral, BID  oxygen, , inhalation, Continuous - Inhalation  ranolazine, 500 mg, oral, BID  tamsulosin, 0.4 mg, oral, Daily  ticagrelor, 90 mg, oral, BID      Continuous medications  heparin, 0-4,500 Units/hr, Last Rate: 1,500 Units/hr (11/19/24 0459)  sodium chloride 0.9%, 20 mL/hr      PRN medications  PRN medications: [Transfer Hold] dextrose, [Transfer Hold] dextrose, [Transfer Hold] glucagon, [Transfer Hold] glucagon, heparin         Assessment/Plan   Critical Limb Ischemia of left lower extremity   Peripheral Artery Disease   Continue Heparin Drip  Vascular surgery consulted and appreciated   NPO - scheduled for surgery 11/19 am with Dr. Peace   CT chest for PE and CT aorta/bilateral iliofemoral runoff completed   Continue to monitor    Coronary Artery Disease   Acute on chronic congestive heart failure   Continue home medications  Continue to monitor      Hypertension   Continue home medications     Type 2 Diabetes Mellitus   Continue home medications   Lantus  nightly and lispro sliding scale     Hyperlipidemia   Continue home medications     DVT Prophylaxis   SCDs, Heparin            NANCY LEE    Pt seen and examined  with my Medical student . I have modified the note to reflect my documentation of HPI and assessment and plan.    Ashley Celestin MD MRCP

## 2024-11-19 NOTE — CONSULTS
Critical Care Medicine Consult    Inpatient consult to Intensivist  Consult performed by: Enrique Arnold MD  Consult ordered by: Ant Sinclair MD          Subjective   Patient is a 59 y.o. male admitted on 11/19/2024  3:41 AM  with chief complaint of left leg pain.  Pain started suddenly around 11 PM last night.  Patient came to the emergency department and was found to have a cool grayish discolored distal left lower extremity with distal pulses that could not be dopplerable.  He was anticoagulated with heparin and vascular surgeon consulted.  Vascular surgeon currently at the bedside and also reviewing CT imaging studies.    Patient smokes 2 packs of cigarettes per day.  He has been smoking for 48 years.  He has multiple medical problems including diabetes, BPH, BRUNA not using regular CPAP, ischemic cardiomyopathy with decreased ejection fraction, history of coronary artery bypass surgery about 5 years ago, hepatitis C, hyperlipidemia, hypertension, PMR, a diffuse vasculopath with multiple lower extremity procedures.      Past Medical History:   Diagnosis Date    (HFpEF) heart failure with preserved ejection fraction     Aphasia     Atherosclerotic heart disease of native coronary artery with unspecified angina pectoris 11/05/2019    Bipolar disorder, unspecified (Multi)     BPH (benign prostatic hyperplasia)     Diabetes (Multi)     DVT (deep venous thrombosis) (Multi) 02/2022    RLE    Erectile dysfunction     GERD (gastroesophageal reflux disease)     Hepatitis C     HLD (hyperlipidemia)     HTN (hypertension)     Obstructive sleep apnea (adult) (pediatric)     Opioid abuse, in remission (Multi)     PAD (peripheral artery disease) ([facility])     Polymyalgia rheumatica (Multi)     Post-traumatic stress disorder, unspecified     Stroke (Multi) 07/24/2023    multiple     Past Surgical History:   Procedure Laterality Date    BACK SURGERY      CORONARY ANGIOPLASTY WITH STENT PLACEMENT      CORONARY ARTERY BYPASS  GRAFT      CT ANGIO AORTA AND BILATERAL ILIOFEMORAL RUNOFF W AND OR WO IV CONTRAST  07/20/2020    CT ANGIO AORTA AND BILATERAL ILIOFEMORAL RUNOFF W AND OR WO IV CONTRAST  01/14/2022    DENTAL SURGERY      ELBOW SURGERY      INVASIVE VASCULAR PROCEDURE Bilateral 10/04/2024    Procedure: Lower Extremity Angiogram;  Surgeon: Baljinder Wright MD;  Location: [facility];  Service: Cardiovascular;  Laterality: Bilateral;  w / anesthesia  3 hr hydration  / arrive 7:30    INVASIVE VASCULAR PROCEDURE N/A 10/21/2024    Procedure: Lower Extremity Angiogram;  Surgeon: Baljinder Wright MD;  Location: [facility];  Service: Cardiovascular;  Laterality: N/A;  w/ anesthesia    INVASIVE VASCULAR PROCEDURE N/A 10/21/2024    Procedure: Angioplasty - Lower Extremity;  Surgeon: Baljinder Wright MD;  Location: [facility];  Service: Cardiovascular;  Laterality: N/A;    KNEE SURGERY Left     MR HEAD ANGIO WO IV CONTRAST  01/04/2022    MR NECK ANGIO WO IV CONTRAST  01/04/2022    TONSILLECTOMY      TRANSLUMINAL ATHERECTOMY FEMORAL-POPLITEAL / TIBIOPERONEAL       Medications Prior to Admission   Medication Sig Dispense Refill Last Dose/Taking    aspirin 81 mg EC tablet Take 1 tablet (81 mg) by mouth once daily. 30 tablet 0     atorvastatin (Lipitor) 80 mg tablet Take 1 tablet (80 mg) by mouth once daily at bedtime.       blood sugar diagnostic (FreeStyle Precision Aristeo Strips) strip 1 each 2 times a day. 200 strip 11     blood sugar diagnostic strip Use as directed twice daily 100 each 1     Brilinta 90 mg tablet Take 1 tablet (90 mg) by mouth 2 times a day.       carvedilol (Coreg) 6.25 mg tablet Take 1 tablet (6.25 mg) by mouth every 12 hours.       Creon 36,000-114,000- 180,000 unit capsule,delayed release(DR/EC) capsule Take 2 capsules by mouth 3 times a day as needed.       dapagliflozin propanediol (Farxiga) 10 mg Take 1 tablet (10 mg) by mouth once every 24 hours.       ELDERBERRY FRUIT ORAL Take 1 tablet by mouth once  "daily as needed.       Entresto  mg tablet Take 1 tablet by mouth twice a day.       ezetimibe (Zetia) 10 mg tablet Take 1 tablet (10 mg) by mouth once daily.       flash glucose scanning reader misc USE TO CHECK SUGARS FOUR TIMES A DAY 1 each 0     flash glucose sensor kit kit use as directed daily and change every 14 days 6 each 3     FreeStyle En sensor system kit USE AS DIRECTED AND CHANGE EVERY 14 DAYS 6 each 3     furosemide (Lasix) 40 mg tablet Take 1 tablet (40 mg) by mouth once daily as needed.       gabapentin (Neurontin) 400 mg capsule Take 1 capsule (400 mg) by mouth 3 times a day. 90 capsule 5     GINGER ROOT EXTRACT ORAL Take 1 tablet by mouth once daily as needed.       hydrALAZINE (Apresoline) 50 mg tablet Take 1 tablet (50 mg) by mouth 3 times a day.       insulin glargine (Lantus) 100 unit/mL (3 mL) pen Inject 20 Units under the skin once daily at bedtime. 10 mL 5     insulin lispro (HumaLOG) 100 unit/mL injection Inject 10 Units under the skin 3 times a day with meals. Plus sliding scale if BS is over 200 (Patient taking differently: Inject 30 Units under the skin 3 times daily (morning, midday, late afternoon).) 15 mL 0     insulin syringe-needle U-100 31G X 5/16\" 1 mL syringe USE AS DIRECTED FOUR TIMES PER  each 11     isosorbide mononitrate ER (Imdur) 60 mg 24 hr tablet Take 1 tablet (60 mg) by mouth once daily. Do not crush or chew.       lancets misc 1 each 4 times a day before meals. 200 each 11     medical cannabis Not [facility] prescribed       nicotine (Nicoderm CQ) 21 mg/24 hr patch APPLY 1 PATCH TO SKIN EVERY 24 HOURS AS DIRECTED 28 patch 0     OXcarbazepine (Trileptal) 150 mg tablet Take 3 tablets (450 mg) by mouth twice a day.       oxyCODONE-acetaminophen (Percocet) 5-325 mg tablet Take 1 tablet by mouth every 6 hours if needed for severe pain (7 - 10). 15 tablet 0     pantoprazole (ProtoNix) 40 mg EC tablet TAKE 1 TABLET BY MOUTH ONCE DAILY 90 tablet 1     pen needle, " "diabetic (BD Ultra-Fine Jocy Pen Needle) 32 gauge x 5/32\" needle Pen Needles for Insulin. 100 each 2     pen needle, diabetic (TechLITE Pen Needle) 32 gauge x 1/4\" needle Use to inject 1-4 times daily as directed 100 each 11     ranolazine (Ranexa) 500 mg 12 hr tablet Take 1 tablet (500 mg) by mouth twice a day.       rivaroxaban (Xarelto) 20 mg tablet Take 1 tablet (20 mg) by mouth once daily.       tamsulosin (Flomax) 0.4 mg 24 hr capsule TAKE 1 CAPSULE BY MOUTH ONCE DAILY 90 capsule 1     tiZANidine (Zanaflex) 2 mg tablet Take 1 tablet (2 mg) by mouth once daily as needed for muscle spasms. 30 tablet 0      Celecoxib; Ibuprofen; Tetanus toxoid, adsorbed; [id number] antimigraine agents; Cephalexin; Hydrocodone; Latex; and Tryptophan  Social History     Tobacco Use    Smoking status: Every Day     Current packs/day: 0.50     Types: Cigarettes     Passive exposure: Current (5-7 cigarettes a day)    Smokeless tobacco: Never   Vaping Use    Vaping status: Never Used   Substance Use Topics    Alcohol use: Never    Drug use: Yes     Types: Marijuana     Comment: Medical card     Family History   Problem Relation Name Age of Onset    No Known Problems Mother      No Known Problems Father         Scheduled Medications:   aspirin, 81 mg, oral, Daily  atorvastatin, 80 mg, oral, Nightly  carvedilol, 6.25 mg, oral, q12h  hydrALAZINE, 50 mg, oral, TID  insulin glargine, 20 Units, subcutaneous, Nightly  insulin lispro, 0-10 Units, subcutaneous, q4h  isosorbide mononitrate ER, 60 mg, oral, Daily  OXcarbazepine, 450 mg, oral, BID  ranolazine, 500 mg, oral, BID  tamsulosin, 0.4 mg, oral, Daily  ticagrelor, 90 mg, oral, BID         Continuous Medications:   heparin, 0-4,500 Units/hr, Last Rate: 1,500 Units/hr (11/19/24 2975)         PRN Medications:   PRN medications: dextrose, dextrose, glucagon, glucagon, heparin    Review of Systems   Constitutional:  Negative for fever.   HENT:  Negative for facial swelling.    Eyes:  " Negative for visual disturbance.   Respiratory:  Negative for cough and shortness of breath.    Cardiovascular:  Negative for chest pain.   Gastrointestinal:  Negative for abdominal pain.   Genitourinary:  Positive for difficulty urinating.   Neurological:  Negative for headaches.   All other systems reviewed and are negative.       Physical Exam  Vitals reviewed.   Constitutional:       General: He is not in acute distress.     Appearance: Normal appearance. He is not ill-appearing, toxic-appearing or diaphoretic.   HENT:      Head: Normocephalic and atraumatic.      Nose: No congestion.      Mouth/Throat:      Pharynx: No oropharyngeal exudate or posterior oropharyngeal erythema.   Eyes:      General: No scleral icterus.  Cardiovascular:      Rate and Rhythm: Normal rate and regular rhythm.      Heart sounds: Normal heart sounds.      Comments: Well-healed median sternotomy incision scar  Pulmonary:      Effort: No respiratory distress.      Breath sounds: Rales (Few bibasilar crackles) present. No wheezing.   Abdominal:      Palpations: Abdomen is soft. There is no mass.      Tenderness: There is no guarding or rebound.   Musculoskeletal:      Cervical back: Neck supple. No rigidity.      Comments: Scar in the left femoral area, the distal left lower extremity is gray and cool.  Distal right lower extremity is pink with fair perfusion.  Patient can only move his left great toe slightly, cannot move his other 4 toes.  Dorsi and plantarflexion at the ankle is decreased.   Skin:     Findings: No rash.   Neurological:      General: No focal deficit present.      Mental Status: He is alert and oriented to person, place, and time.      Cranial Nerves: No cranial nerve deficit.         Objective   Vitals:  Most Recent:  Vitals:    11/19/24 0800   BP: (!) 170/100   Pulse: 86   Resp: 24   Temp:    SpO2: 95%       24hr Min/Max:  Temp  Min: 36.8 °C (98.2 °F)  Max: 36.8 °C (98.2 °F)  Pulse  Min: 86  Max: 101  BP  Min:  168/111  Max: 185/101  Resp  Min: 16  Max: 27  SpO2  Min: 85 %  Max: 97 %        Intake/Output Summary (Last 24 hours) at 11/19/2024 0809  Last data filed at 11/19/2024 0630  Gross per 24 hour   Intake --   Output 750 ml   Net -750 ml       Lab/Radiology/Diagnostic Review:  Results for orders placed or performed during the hospital encounter of 11/19/24 (from the past 24 hours)   CBC and Auto Differential   Result Value Ref Range    WBC 9.0 4.4 - 11.3 x10*3/uL    nRBC 0.0 0.0 - 0.0 /100 WBCs    RBC 5.20 4.50 - 5.90 x10*6/uL    Hemoglobin 16.5 13.5 - 17.5 g/dL    Hematocrit 48.2 41.0 - 52.0 %    MCV 93 80 - 100 fL    MCH 31.7 26.0 - 34.0 pg    MCHC 34.2 32.0 - 36.0 g/dL    RDW 13.5 11.5 - 14.5 %    Platelets 259 150 - 450 x10*3/uL    Neutrophils % 71.0 40.0 - 80.0 %    Immature Granulocytes %, Automated 0.2 0.0 - 0.9 %    Lymphocytes % 21.7 13.0 - 44.0 %    Monocytes % 6.0 2.0 - 10.0 %    Eosinophils % 0.8 0.0 - 6.0 %    Basophils % 0.3 0.0 - 2.0 %    Neutrophils Absolute 6.41 1.20 - 7.70 x10*3/uL    Immature Granulocytes Absolute, Automated 0.02 0.00 - 0.70 x10*3/uL    Lymphocytes Absolute 1.96 1.20 - 4.80 x10*3/uL    Monocytes Absolute 0.54 0.10 - 1.00 x10*3/uL    Eosinophils Absolute 0.07 0.00 - 0.70 x10*3/uL    Basophils Absolute 0.03 0.00 - 0.10 x10*3/uL   Magnesium   Result Value Ref Range    Magnesium 2.14 1.60 - 2.40 mg/dL   Comprehensive metabolic panel   Result Value Ref Range    Glucose 257 (H) 74 - 99 mg/dL    Sodium 135 (L) 136 - 145 mmol/L    Potassium 4.4 3.5 - 5.3 mmol/L    Chloride 103 98 - 107 mmol/L    Bicarbonate 24 21 - 32 mmol/L    Anion Gap 12 10 - 20 mmol/L    Urea Nitrogen 22 6 - 23 mg/dL    Creatinine 1.25 0.50 - 1.30 mg/dL    eGFR 67 >60 mL/min/1.73m*2    Calcium 8.8 8.6 - 10.3 mg/dL    Albumin 3.9 3.4 - 5.0 g/dL    Alkaline Phosphatase 118 33 - 120 U/L    Total Protein 7.4 6.4 - 8.2 g/dL    AST 8 (L) 9 - 39 U/L    Bilirubin, Total 0.4 0.0 - 1.2 mg/dL    ALT 8 (L) 10 - 52 U/L   Lipase    Result Value Ref Range    Lipase 24 9 - 82 U/L   Lactate   Result Value Ref Range    Lactate 2.0 0.4 - 2.0 mmol/L   Protime-INR   Result Value Ref Range    Protime 10.9 9.8 - 12.8 seconds    INR 1.0 0.9 - 1.1   aPTT   Result Value Ref Range    aPTT 36 27 - 38 seconds   B-Type Natriuretic Peptide   Result Value Ref Range     (H) 0 - 99 pg/mL   Blood Gas Venous Full Panel   Result Value Ref Range    POCT pH, Venous 7.37 7.33 - 7.43 pH    POCT pCO2, Venous 44 41 - 51 mm Hg    POCT pO2, Venous 35 35 - 45 mm Hg    POCT SO2, Venous 49 45 - 75 %    POCT Oxy Hemoglobin, Venous 45.1 45.0 - 75.0 %    POCT Hematocrit Calculated, Venous 48.0 41.0 - 52.0 %    POCT Sodium, Venous 135 (L) 136 - 145 mmol/L    POCT Potassium, Venous 5.0 3.5 - 5.3 mmol/L    POCT Chloride, Venous 102 98 - 107 mmol/L    POCT Ionized Calicum, Venous 1.21 1.10 - 1.33 mmol/L    POCT Glucose, Venous 284 (H) 74 - 99 mg/dL    POCT Lactate, Venous 2.0 0.4 - 2.0 mmol/L    POCT Base Excess, Venous -0.2 -2.0 - 3.0 mmol/L    POCT HCO3 Calculated, Venous 25.4 22.0 - 26.0 mmol/L    POCT Hemoglobin, Venous 16.1 13.5 - 17.5 g/dL    POCT Anion Gap, Venous 13.0 10.0 - 25.0 mmol/L    Patient Temperature 37.0 degrees Celsius    FiO2 44 %   Troponin I, High Sensitivity, Initial   Result Value Ref Range    Troponin I, High Sensitivity 14 0 - 20 ng/L   Sars-CoV-2 PCR   Result Value Ref Range    Coronavirus 2019, PCR Not Detected Not Detected   Troponin, High Sensitivity, 1 Hour   Result Value Ref Range    Troponin I, High Sensitivity 15 0 - 20 ng/L     *Note: Due to a large number of results and/or encounters for the requested time period, some results have not been displayed. A complete set of results can be found in Results Review.     CT angio aorta and bilateral iliofemoral runoff including without contrast if performed    Result Date: 11/19/2024  Interpreted By:  Finkelstein, Evan, STUDY: CT ANGIO AORTA AND BILATERAL ILIOFEMORAL RUN OFF INCLUDING WITHOUT  CONTRAST IF PERFORMED;  11/19/2024 4:42 am   INDICATION: Signs/Symptoms:no pulses left leg.     COMPARISON: CT runoff 09/13/2024   ACCESSION NUMBER(S): [id number]   ORDERING CLINICIAN: ACE VALENZUELA   TECHNIQUE: Contiguous axial CTA images were acquired through the abdomen and pelvis and bilateral lower extremities following the administration of 100 mL Omnipaque 350 intravenous contrast. Sagittal and coronal images were reconstructed. Maximum intensity projection and three dimensional images were constructed at a separate workstation.   FINDINGS: VASCULAR FINDINGS:   ABDOMINAL AORTA & ILIAC ARTERIES: No evidence of abdominal aortic aneurysm or dissection.   Moderate atherosclerotic calcifications throughout the abdominal aorta and major proximal vasculature. Celiac axis, superior mesenteric artery, and inferior mesenteric artery are patent without any significant narrowing. Bilateral renal arteries are patent without any significant.   Right common iliac and external iliac arteries are patent without any significant narrowing. Patent right common/external iliac stent. Moderate to severe narrowing of the internal iliac arteries bilaterally. Left common and external iliac arteries are patent.     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 Common femoral artery is patent.  Native superficial femoral artery is patent. There is a stent within the mid to lower aspect of the native right superficial femoral artery which is patent. Profunda femoral artery is patent.  Popliteal artery is patent. Tibioperoneal trunk, posterior tibial artery, peroneal artery, and anterior tibial artery are patent. Three-vessel runoff at the ankle.   LEFT LOWER EXTREMITY: Common femoral artery is patent. 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 Redemonstrated occlusion of the native superficial femoral artery, similar compared to prior imaging. There is  reconstitution of the popliteal artery. Poor opacification of the trifurcation of vessels just below the knee with poor opacification of the vessels throughout the entirety of the calf, ankle and foot, new/worsened compared to prior imaging..   ---------------------------------------------------------------   NON-VASCULAR FINDINGS:   LIVER: Within normal limits.   BILE DUCTS: Nondilated.   GALLBLADDER: No evidence of calcified gallstones or wall thickening.   PANCREAS: Within normal limits.   SPLEEN: A splenule is present. The spleen is otherwise unremarkable..   ADRENALS: Within normal limits.   KIDNEYS, URETERS, URINARY BLADDER: Unremarkable. No evidence of hydronephrosis.   BOWEL: No evidence of abnormal dilatation or obstruction of the small or large bowel. No evidence of pneumoperitoneum.   REPRODUCTIVE ORGANS:  Unremarkable   PERITONEUM / RETROPERITONEUM / LYMPH NODES: No evidence of any free fluid. No evidence of any significantly enlarged intra-abdominal or pelvic lymph nodes.   ABDOMINAL WALL: Fat containing inguinal hernias.   LOWER CHEST: Please refer to separately dictated CT chest report.   BONES: No evidence of suspicious lytic or blastic osseous lesions.       Persistent occlusion of the left femoral/popliteal bypass graft and native left superficial femoral artery. There is also new occlusion versus severe stenosis involving the vessels below the left knee beginning at the level of the trifurcation with the anterior tibial, posterior tibial and peroneal arteries not well seen through the calf, ankle and foot.   No acute abnormality of the abdomen or pelvis.   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 There is good opacification of the native right superficial femoral artery with three-vessel runoff to the right ankle.   MACRO: None.   Signed by: Evan Finkelstein 11/19/2024 5:20 AM Dictation workstation:   [id number]    CT angio chest for pulmonary embolism    Result  Date: 11/19/2024  Interpreted By:  Finkelstein, Evan, STUDY: CT ANGIO CHEST FOR PULMONARY EMBOLISM;  11/19/2024 4:27 am   INDICATION: Signs/Symptoms:sob wih hypoxia.     COMPARISON: CT chest 06/23/2021   ACCESSION NUMBER(S): [id number]   ORDERING CLINICIAN: ACE VALENZUELA   TECHNIQUE: Axial CTA images of the chest after intravenous administration of 50 mL Omnipaque 350 using CT angiographic technique. Coronal and sagittal images are reconstructed. MIP images were created and reviewed.   FINDINGS: CHEST WALL AND LOWER NECK: Within normal limits. ABDOMEN: No acute abnormality of the partially visualized abdomen.   VASCULAR: AORTA: No aortic aneurysm. Mild atherosclerotic disease. PULMONARY ARTERY: Normal caliber. No pulmonary embolus to the segmental level.   CHEST:   HEART: Normal size. No pericardial effusion. MEDIASTINUM AND REJI: No pathologically enlarged thoracic lymph nodes. LUNG, PLEURA, LARGE AIRWAYS: Trace left pleural effusion. Thickened interlobular septal markings. Ground-glass opacities scattered throughout the lungs bilaterally with an upper lung zone predominance.   BONES: No acute osseous abnormality. Median sternotomy wires are present.       No acute pulmonary embolus to the segmental level.   Thickened interlobular septal markings with scattered ground-glass opacities with an upper lung zone predominance. Findings may be seen with mixed interstitial/alveolar pulmonary edema. Other superimposed infectious or inflammatory processes are also not excluded.   Trace left pleural effusion   MACRO: None.   Signed by: Evan Finkelstein 11/19/2024 4:36 AM Dictation workstation:   [id number]    Invasive vascular procedure    Result Date: 10/21/2024       [facility], Cath Lab [address] [place], [place] [id number]    Phone [phone] Fax [phone] Cardiovascular Catheterization Report Patient Name:      CARMENZA M MAMTA   Performing           [id number] Baljinder Wright                                         Physician:           MD Study Date:        10/21/2024          Verifying Physician: [id number] Wright MD MRN/PID:           [id number]            Cardiologist: Accession#:        [id number]        Ordering Provider:   [id number] DANNA WRIGHT Date of Birth/Age: 1965 / 59      Fellow:                    years Gender:            M                   Fellow: Encounter#:        [id number]  Study: Peripheral Intervention  Indications: CARMENZA OLEARY is a 59 year old male who presents with peripheral artery disease, dyslipidemia, hypertension and tobacco use.  Procedure Description: After infiltration with 2% Lidocaine utilizing two-dimensional ultrasound and fluoroscopic guidance, the Left Popliteal was cannulated using a modified Seldinger technique. Subsequently a 6 Swazi sheath was placed antegrade in the Left Popliteal. Selective angiogram of the left popliteal artery was performed by injection thru the sheath in the popliteal artery.  Left Lower Extremity Arterial Findings: Left Common Iliac Artery: The left common iliac artery revealed no evidence of significant disease. Left Extermal Iliac Artery: The left external iliac artery revealed no evidence of significant disease. Left Internal Iliac Artery: The left internal iliac artery revealed no evidence of significant disease. Left Common Femoral Artery: The left common femoral artery revealed no evidence of significant disease. Left Profunda Femoris Artery: The left profunda femoris artery revealed no evidence of significant disease. Left Superior Femoral Artery: The left superficial femoral artery revealed chronic occlusion and total occlusion. Left Popliteal Artery: The left popliteal artery revealed no evidence of significant disease. Left Tibial-Peroneal Trunk: The left tibial-peroneal trunk revealed no evidence of significant disease. Left Anterior Tibial Artery: The left anterior  tibial artery revealed chronic occlusion and total occlusion. Left Posterior Tibial Artery: The left posterior tibial artery revealed severe atherosclerotic disease and diffuse atherosclerotic disease. Left Peroneal Artery: The left peroneal artery revealed no evidence of significant disease. Left Dorsalis Pedis Artery: The left dorsalis pedis artery revealed no evidence of significant disease.  Peripheral Interventions:  Percutaneous peripheral intervention of the entire left superficial femoral artery. The vessel was dilated using a compliant 4.0 mm x 200 mm balloon dilated to 8 NENITA. The stent was post dilated using a drug coated 5.0 mm x 250 mm balloon at 8 NENITA, and a drug coated 5.0 mm x 120 mm balloon at 8 NENITA. The stenosis was successfully reduced from 100% to <10%.  Hemo Personnel: +---------------+---------+ Name           Duty      +---------------+---------+ Baljinder Wright MD 1 +---------------+---------+  Hemodynamic Pressures:  +----+---------------------+----------+-------------+--------------+---------+ Site      Date Time      Phase NameSystolic mmHgDiastolic mmHgMean mmHg +----+---------------------+----------+-------------+--------------+---------+   AO10/21/2024 8:14:06 AM  AIR REST          112            49       71 +----+---------------------+----------+-------------+--------------+---------+ LSFA10/21/2024 8:42:24 AM  AIR REST          108            53       76 +----+---------------------+----------+-------------+--------------+---------+  Oxygen Saturation %: +-----------+------------+ Sample SiteHB (g/100ml) +-----------+------------+     SYS ART        15.7 +-----------+------------+     SYS SMOOTH        15.7 +-----------+------------+     PUL ART        15.7 +-----------+------------+     PUL SMOOTH        15.7 +-----------+------------+  Complications: No in-lab complications observed.  Cardiac Cath Post Procedure Notes: Post Procedure            Angioplasty of SFA. Diagnosis: Blood Loss:              Estimated blood loss during the procedure was <10 ml                          mls. Specimens Removed:       Number of specimen(s) removed: none.  Recommendations: Maximize medical therapy. Agressive risk factor modification efforts. Patient counseled and advised to discontinue smoking. ____________________________________________________________________________________ CONCLUSIONS:  1. Successful recannalization of totally occluded left SFA. ICD 10 Codes: Atherosclerosis of autologous vein bypass graft(s) of the extremities with rest pain, left leg-I70.422  CPT Codes: Angiography, Extremity,uni,S&I (PER)-48727; Revasc Fem/Popl Angioplasty unilat(PER)-50280; IVUS non coronary initial vessel-81798  [id number] Baljinder Wright MD Performing Physician Electronically signed by [id number] Baljinder Wright MD on 10/21/2024 at 9:20:41 AM  ** Final **     Assessment & Recommendation  Assessment & Plan  Critical limb ischemia of left lower extremity (Multi)    Vascular surgeon attending to this problem.  Patient at clear risk for loss of limb.    Currently in no respiratory distress with oxygen saturation 99% on room air despite CT findings and few crackles at the bases.  Last pulmonology visit note reviewed; only on as needed [place].  Patient with known heart disease/cardiomyopathy.  Dr. Wright and several other cardiologists have seen in the past.  Blood pressure a little on the high side, no need to treated aggressively at the present time--blood pressure at cardiologist office visit with Dr. Newby was 162/105.  Patient needs to cease from cigarette smoking.  Recommended adherence to CPAP therapy for his sleep apnea.  Patient with history of strokes as well.  Postinfarct seizures.  Hepatitis C has been treated.  Blood sugar control is quite good with A1c of 7 at last check.  Last dose of Xarelto at 4:30 AM on 1117.  Now on heparin drip.      I spent 38 minutes in the professional and  overall care of this patient.    Enrique Arnold MD

## 2024-11-19 NOTE — ANESTHESIA PREPROCEDURE EVALUATION
Patient: Dereck Shen    Procedure Information       Date/Time: 11/19/24 0845    Procedures:       left femoral exploration with thrombectomy *c-arm* (Left: Leg Lower)      . (Left)    Location: [facility] / [facility]    Surgeons: Iban Peace, DO            Relevant Problems   Anesthesia (within normal limits)      Cardiac   (+) Atherosclerotic heart disease of native coronary artery without angina pectoris   (+) Critical limb ischemia of left lower extremity (Multi)   (+) Hyperlipidemia   (+) Hypertension   (+) PAD (peripheral artery disease) (CMS-HCC)   (+) Peripheral arterial disease (CMS-HCC)      Pulmonary   (+) Chronic obstructive pulmonary disease (Multi)   (+) BRUNA (obstructive sleep apnea)   (+) Shortness of breath on exertion      Neuro   (+) Bipolar depression (Multi)   (+) Causalgia of lower extremity   (+) Diabetic polyneuropathy associated with type 2 diabetes mellitus (Multi)   (+) Ischemic stroke (Multi)      GI   (+) Gastroesophageal reflux disease      /Renal   (+) BPH (benign prostatic hyperplasia)      Liver   (+) Chronic hepatitis C virus genotype 1a infection (Multi)   (+) Hepatic steatosis   (+) Hepatitis-C      Endocrine   (+) Diabetic neuropathy, painful (Multi)   (+) Diabetic polyneuropathy associated with type 2 diabetes mellitus (Multi)   (+) Type 2 diabetes mellitus      Musculoskeletal   (+) Causalgia of lower extremity   (+) Chronic pain syndrome      ID   (+) Bacterial skin infection   (+) Chronic hepatitis C virus genotype 1a infection (Multi)   (+) Hepatitis-C   (+) Recurrent boils   (+) Shingles       Clinical information reviewed:   Tobacco  Allergies  Meds  Problems  Med Hx  Surg Hx   Fam Hx  Soc   Hx        NPO Detail:  No data recorded     Physical Exam    Airway  Mallampati: II  TM distance: >3 FB  Neck ROM: full     Cardiovascular - normal exam     Dental - normal exam     Pulmonary - normal exam     Abdominal - normal exam         Anesthesia  Plan    History of general anesthesia?: yes  History of complications of general anesthesia?: no    ASA 3 - emergent     general   (A-line  GA)  The patient is a current smoker.  Patient was previously instructed to abstain from smoking on day of procedure.  Patient did not smoke on day of procedure.    intravenous induction   Postoperative administration of opioids is intended.  Anesthetic plan and risks discussed with patient.

## 2024-11-19 NOTE — OP NOTE
Left femoral exploration with left femoral endarterectomy. Femoral & tibial thrombectomy with patch angioplasty and left fore compartment fasciotomy (L), . (L) Operative Note     Date: 2024  OR Location: [facility]    Name: Dereck Shen, : 1965, Age: 59 y.o., MRN: [id number], Sex: male    Diagnosis  * No Diagnosis Codes entered * * No Diagnosis Codes entered *     Procedures  Left femoral exploration with left femoral endarterectomy. Femoral & tibial thrombectomy with patch angioplasty and left fore compartment fasciotomy  79812 - SD EXPLORATION N/FLWD SURG LOWER EXTREMITY ARTERY    .  41729 - SD EMBLC/THRMBC AX BRACH INNOMINATE SUBCLA ART      Surgeons      * Iban Peace - Primary    Resident/Fellow/Other Assistant:  Surgeons and Role:     * Marla Aguilar PA-C - NADIR[facility] First Assist    Staff:   Circulator: Kerry Ibrahim Person: Jeane    Anesthesia Staff: Anesthesiologist: Geovany Castillo MD  SRNA: Abdon Kevin    Procedure Summary  Anesthesia: General  ASA: III  Estimated Blood Loss: 100mL  Intra-op Medications:   Administrations occurring from 0845 to 1045 on 24:   Medication Name Total Dose   gelatin absorbable (Gelfoam) 100 sponge 1 each   thrombin-bovine (JMI) 5,000 unit topical solution 10,000 Units   dextrose 50 % injection 12.5 g Cannot be calculated   dextrose 50 % injection 25 g Cannot be calculated   glucagon (Glucagen) injection 1 mg Cannot be calculated   glucagon (Glucagen) injection 1 mg Cannot be calculated   insulin glargine (Lantus) injection 20 Units Cannot be calculated   insulin lispro injection 0-10 Units Cannot be calculated   acetaminophen (Tylenol) tablet 975 mg 975 mg   clindamycin (Cleocin) 600 mg in dextrose 5% IV 50 mL 600 mg   famotidine PF (Pepcid) injection 20 mg 20 mg   metoclopramide (Reglan) injection 10 mg 10 mg   nitroglycerin (Vitaliy-Bid) 2 % ointment 0.5 inch 0.5 inch   ondansetron (Zofran) injection 4 mg 4 mg   sodium citrate-citric acid (Bicitra) solution 30  mL 30 mL   dexmedeTOMidine 4 mcg/mL in NS 5 mL syringe 8 mcg   fentaNYL (Sublimaze) injection 50 mcg/mL 100 mcg   heparin 1,000 units/mL 7,000 Units   LR bolus Cannot be calculated   LR bolus Cannot be calculated   midazolam (Versed) injection 1 mg/mL 2 mg   oxygen (O2) therapy 202 L   phenylephrine 100 mcg/mL syringe 10 mL (prefilled) 100 mcg   propofol (Diprivan) injection 10 mg/mL 175 mg   rocuronium (Zemuron) 50 mg/5 mL prefilled syringe 60 mg              Anesthesia Record               Intraprocedure I/O Totals          Intake    LR bolus 1300.00 mL    Total Intake 1300 mL       Output    Urine 700 mL    Est. Blood Loss 100 mL    Total Output 800 mL       Net    Net Volume 500 mL          Specimen:   ID Type Source Tests Collected by Time   1 : THROMBUS LEFT COMMON & SUPERFICIAL FEMORAL ARTERY Tissue CLOT/THROMBUS SURGICAL PATHOLOGY EXAM Iban Peace DO 11/19/2024 1021                 Drains and/or Catheters:   Urethral Catheter Non-latex;Straight-tip 16 Fr. (Active)   Output (mL) 150 mL 11/19/24 1200       Tourniquet Times:         Implants:  Implants       Type Name Action Serial No.      Implant PATCH, VASCULAR, BIOLOGIC, 1CM X 14CM, TAPERED - [id number] Implanted               Findings: Common femoral thrombus left SFA thrombus tibial thrombus    Indications: Dereck Shen is an 59 y.o. male who is having surgery for * No pre-op diagnosis entered *.  Critical limb ischemia left lower extremity.  Patient has acute limb with no dorsi or plantarflexion minimal motion noted in the first great digit no motion in digits 2 through 5  Significant mottling left foot      The patient was seen in the preoperative area. The risks, benefits, complications, treatment options, non-operative alternatives, expected recovery and outcomes were discussed with the patient. The possibilities of reaction to medication, pulmonary aspiration, injury to surrounding structures, bleeding, recurrent infection, the need for  additional procedures, failure to diagnose a condition, and creating a complication requiring transfusion or operation were discussed with the patient. The patient concurred with the proposed plan, giving informed consent.  The site of surgery was properly noted/marked if necessary per policy. The patient has been actively warmed in preoperative area. Preoperative antibiotics have been ordered and given within 1 hours of incision. Venous thrombosis prophylaxis are not indicated.    Procedure Details: Patient is brought to the OR suite placed in supine position administered general anesthetic with endotracheal tube intubation left lower extremity sterilely prepped and draped standard fashion.  Vertical femoral incision was made subcutaneous dissection electrocautery the previous femoropopliteal bypass graft is identified distally transected section was removed without event and will and ligated with 3-0 Prolene suture.  The arterial anastomosis with the graft was then utilized to manipulate the common femoral artery dissection was undertaken proximally distally the proximal dissection of the common femoral artery was circumvented vessel loop and soft in nature profunda femoris vessels also identified distal SFA was also identified with no event.  Patient systemically heparinized.  The femoral circuit was clamped the graft was completely excised vertically without event.  There is significant organized clot burden there is superficial artery plaque which was excised with clean edge down distally and had to be transected several tacking sutures were placed the to prevent dissection.  Noted in the common femoral.  At this point #3 and 4 Maynor catheter was advanced downstream of the SFA and significant clot burden was extracted and the SFA as well as some small small clot burden in the tibial tibials.  Retrograde bleeding is noted in the SFA.  The common femoral clot was circumferentially dissected in a retrograde  fashion balloon was advanced and essentially the clot burden exposed excellent antegrade bleeding is noted.  Once this was removed there was backbleeding noted in the profunda.  At this point patch angioplasty was initially utilizing a bovine pericardial patch circumferential fashion with 6-0 Prolene suture.  This was met in the middle without event.  I repressurized the circuit.  Several repair sutures were placed with 6-0 Prolene suture.  There is excellent Doppler signal noted in the profundus as well as the distal SFA past the patch angioplasty.  There is dopplerable DP pulse into the foot.  Subsequently a 4 compartment fasciotomy was performed anterior medial as well as posterior lateral incision was made the fasciotomy was extended superiorly medially superiorly inferiorly with Metzenbaum scissors.  This was packed with sterile dressing and compressive wrap was placed.  Again reexamining wound X hemostasis maintained copious irrigated wound separately cervical sutures were placed as well as skin clips sterile dressing was applied patient was woken extubated to the PACU in stable condition.    Complications:  None; patient tolerated the procedure well.    Disposition: PACU - hemodynamically stable.  Condition: stable           Additional Details: No qualified vascular fellow was available for assistance in the above-noted procedure.  OSCAR Brooks was available for the entirety of the procedure.  She assisted in all components of the procedure from start to completion.      Attending Attestation: I was present and scrubbed for the entire procedure.    Iban Peace  Phone Number: [phone]

## 2024-11-19 NOTE — ED TRIAGE NOTES
Pt. Arrived to the ED via private car for left leg pain pt states that the pain started around 0200 today. Pt. States that he had surgery on 10/21/24 to repair artery and they entered through the back of his left knee. Pt. Bryanna any swelling, redness or warmth at the site of his knee. Pt. Also states that he is feeling SOB and is 85% on room air in triage. Pt. Does not wear supplemental O2 at home

## 2024-11-19 NOTE — ANESTHESIA PROCEDURE NOTES
Arterial Line:    Date/Time: 11/19/2024 9:34 AM    Staffing  Performed: SRNA   Authorized by: Geovany Castillo MD    Performed by: Geovany Castillo MD    An arterial line was placed. Procedure performed using ultrasound guidance.in the OR for the following indication(s): continuous blood pressure monitoring and blood sampling needed.    A 20 gauge (size), 1 and 3/4 inch (length), Arrow (type) catheter was placed into the Left radial artery, secured by Tegaderm,   Seldinger technique not used.  Events:  patient tolerated procedure well with no complications.

## 2024-11-20 ENCOUNTER — APPOINTMENT (OUTPATIENT)
Dept: CARDIOLOGY | Facility: HOSPITAL | Age: 59
End: 2024-11-20
Payer: COMMERCIAL

## 2024-11-20 ENCOUNTER — TELEPHONE (OUTPATIENT)
Dept: ENDOCRINOLOGY | Facility: CLINIC | Age: 59
End: 2024-11-20
Payer: COMMERCIAL

## 2024-11-20 LAB
ACT BLD: 181 SEC (ref 89–169)
ACT BLD: 277 SEC (ref 89–169)
ALBUMIN SERPL BCP-MCNC: 3.2 G/DL (ref 3.4–5)
ALP SERPL-CCNC: 84 U/L (ref 33–120)
ALT SERPL W P-5'-P-CCNC: 7 U/L (ref 10–52)
ANION GAP SERPL CALC-SCNC: 11 MMOL/L (ref 10–20)
AORTIC VALVE MEAN GRADIENT: 4 MMHG
AORTIC VALVE PEAK VELOCITY: 1.46 M/S
AST SERPL W P-5'-P-CCNC: 9 U/L (ref 9–39)
AV PEAK GRADIENT: 9 MMHG
AVA (PEAK VEL): 2.84 CM2
AVA (VTI): 2.79 CM2
BASOPHILS # BLD AUTO: 0.02 X10*3/UL (ref 0–0.1)
BASOPHILS NFR BLD AUTO: 0.3 %
BILIRUB SERPL-MCNC: 0.5 MG/DL (ref 0–1.2)
BUN SERPL-MCNC: 26 MG/DL (ref 6–23)
CALCIUM SERPL-MCNC: 8 MG/DL (ref 8.6–10.3)
CHLORIDE SERPL-SCNC: 99 MMOL/L (ref 98–107)
CO2 SERPL-SCNC: 28 MMOL/L (ref 21–32)
CREAT SERPL-MCNC: 1.52 MG/DL (ref 0.5–1.3)
EGFRCR SERPLBLD CKD-EPI 2021: 53 ML/MIN/1.73M*2
EJECTION FRACTION APICAL 4 CHAMBER: 37.8
EJECTION FRACTION: 38 %
EOSINOPHIL # BLD AUTO: 0.15 X10*3/UL (ref 0–0.7)
EOSINOPHIL NFR BLD AUTO: 2.2 %
ERYTHROCYTE [DISTWIDTH] IN BLOOD BY AUTOMATED COUNT: 13.6 % (ref 11.5–14.5)
GLUCOSE BLD MANUAL STRIP-MCNC: 149 MG/DL (ref 74–99)
GLUCOSE BLD MANUAL STRIP-MCNC: 197 MG/DL (ref 74–99)
GLUCOSE BLD MANUAL STRIP-MCNC: 199 MG/DL (ref 74–99)
GLUCOSE BLD MANUAL STRIP-MCNC: 211 MG/DL (ref 74–99)
GLUCOSE BLD MANUAL STRIP-MCNC: 215 MG/DL (ref 74–99)
GLUCOSE SERPL-MCNC: 202 MG/DL (ref 74–99)
HCT VFR BLD AUTO: 35.6 % (ref 41–52)
HGB BLD-MCNC: 12 G/DL (ref 13.5–17.5)
IMM GRANULOCYTES # BLD AUTO: 0.02 X10*3/UL (ref 0–0.7)
IMM GRANULOCYTES NFR BLD AUTO: 0.3 % (ref 0–0.9)
INR PPP: 1 (ref 0.9–1.1)
LEFT ATRIUM VOLUME AREA LENGTH INDEX BSA: 24.8 ML/M2
LEFT VENTRICLE INTERNAL DIMENSION DIASTOLE: 6.1 CM (ref 3.5–6)
LEFT VENTRICULAR OUTFLOW TRACT DIAMETER: 2.2 CM
LV EJECTION FRACTION BIPLANE: 38 %
LYMPHOCYTES # BLD AUTO: 1.51 X10*3/UL (ref 1.2–4.8)
LYMPHOCYTES NFR BLD AUTO: 21.8 %
MAGNESIUM SERPL-MCNC: 1.98 MG/DL (ref 1.6–2.4)
MCH RBC QN AUTO: 31.6 PG (ref 26–34)
MCHC RBC AUTO-ENTMCNC: 33.7 G/DL (ref 32–36)
MCV RBC AUTO: 94 FL (ref 80–100)
MITRAL VALVE E/A RATIO: 1.17
MONOCYTES # BLD AUTO: 0.57 X10*3/UL (ref 0.1–1)
MONOCYTES NFR BLD AUTO: 8.2 %
NEUTROPHILS # BLD AUTO: 4.67 X10*3/UL (ref 1.2–7.7)
NEUTROPHILS NFR BLD AUTO: 67.2 %
NRBC BLD-RTO: 0 /100 WBCS (ref 0–0)
PHOSPHATE SERPL-MCNC: 4 MG/DL (ref 2.5–4.9)
PLATELET # BLD AUTO: 199 X10*3/UL (ref 150–450)
POTASSIUM SERPL-SCNC: 4.4 MMOL/L (ref 3.5–5.3)
PROT SERPL-MCNC: 5.9 G/DL (ref 6.4–8.2)
PROTHROMBIN TIME: 11 SECONDS (ref 9.8–12.8)
RBC # BLD AUTO: 3.8 X10*6/UL (ref 4.5–5.9)
RIGHT VENTRICLE PEAK SYSTOLIC PRESSURE: 19 MMHG
SODIUM SERPL-SCNC: 134 MMOL/L (ref 136–145)
TRICUSPID ANNULAR PLANE SYSTOLIC EXCURSION: 1.9 CM
UFH PPP CHRO-ACNC: 0.4 IU/ML
WBC # BLD AUTO: 6.9 X10*3/UL (ref 4.4–11.3)

## 2024-11-20 PROCEDURE — C8929 TTE W OR WO FOL WCON,DOPPLER: HCPCS

## 2024-11-20 PROCEDURE — 83735 ASSAY OF MAGNESIUM: CPT

## 2024-11-20 PROCEDURE — 2500000005 HC RX 250 GENERAL PHARMACY W/O HCPCS: Performed by: PHYSICIAN ASSISTANT

## 2024-11-20 PROCEDURE — 85610 PROTHROMBIN TIME: CPT | Performed by: INTERNAL MEDICINE

## 2024-11-20 PROCEDURE — 93306 TTE W/DOPPLER COMPLETE: CPT | Performed by: INTERNAL MEDICINE

## 2024-11-20 PROCEDURE — 2500000002 HC RX 250 W HCPCS SELF ADMINISTERED DRUGS (ALT 637 FOR MEDICARE OP, ALT 636 FOR OP/ED): Performed by: PHYSICIAN ASSISTANT

## 2024-11-20 PROCEDURE — 2500000001 HC RX 250 WO HCPCS SELF ADMINISTERED DRUGS (ALT 637 FOR MEDICARE OP): Performed by: PHYSICIAN ASSISTANT

## 2024-11-20 PROCEDURE — 99233 SBSQ HOSP IP/OBS HIGH 50: CPT | Performed by: INTERNAL MEDICINE

## 2024-11-20 PROCEDURE — 80053 COMPREHEN METABOLIC PANEL: CPT | Performed by: INTERNAL MEDICINE

## 2024-11-20 PROCEDURE — 84100 ASSAY OF PHOSPHORUS: CPT

## 2024-11-20 PROCEDURE — 85025 COMPLETE CBC W/AUTO DIFF WBC: CPT | Performed by: INTERNAL MEDICINE

## 2024-11-20 PROCEDURE — 2500000002 HC RX 250 W HCPCS SELF ADMINISTERED DRUGS (ALT 637 FOR MEDICARE OP, ALT 636 FOR OP/ED)

## 2024-11-20 PROCEDURE — 2500000004 HC RX 250 GENERAL PHARMACY W/ HCPCS (ALT 636 FOR OP/ED): Performed by: PHYSICIAN ASSISTANT

## 2024-11-20 PROCEDURE — 85520 HEPARIN ASSAY: CPT | Performed by: PHYSICIAN ASSISTANT

## 2024-11-20 PROCEDURE — 2020000001 HC ICU ROOM DAILY

## 2024-11-20 PROCEDURE — 36415 COLL VENOUS BLD VENIPUNCTURE: CPT | Performed by: PHYSICIAN ASSISTANT

## 2024-11-20 PROCEDURE — 99232 SBSQ HOSP IP/OBS MODERATE 35: CPT | Performed by: PHYSICIAN ASSISTANT

## 2024-11-20 PROCEDURE — 2500000004 HC RX 250 GENERAL PHARMACY W/ HCPCS (ALT 636 FOR OP/ED): Performed by: INTERNAL MEDICINE

## 2024-11-20 PROCEDURE — 82947 ASSAY GLUCOSE BLOOD QUANT: CPT

## 2024-11-20 RX ORDER — OXYCODONE HYDROCHLORIDE 5 MG/1
5 TABLET ORAL EVERY 4 HOURS PRN
Status: DISCONTINUED | OUTPATIENT
Start: 2024-11-20 | End: 2024-11-21 | Stop reason: HOSPADM

## 2024-11-20 RX ORDER — EZETIMIBE 10 MG/1
10 TABLET ORAL NIGHTLY
Status: DISCONTINUED | OUTPATIENT
Start: 2024-11-20 | End: 2024-11-21 | Stop reason: HOSPADM

## 2024-11-20 RX ORDER — OXYCODONE HYDROCHLORIDE 10 MG/1
10 TABLET ORAL EVERY 4 HOURS PRN
Status: DISCONTINUED | OUTPATIENT
Start: 2024-11-20 | End: 2024-11-21 | Stop reason: HOSPADM

## 2024-11-20 RX ORDER — FUROSEMIDE 40 MG/1
40 TABLET ORAL DAILY
Status: DISCONTINUED | OUTPATIENT
Start: 2024-11-20 | End: 2024-11-21 | Stop reason: HOSPADM

## 2024-11-20 ASSESSMENT — ENCOUNTER SYMPTOMS
EYE DISCHARGE: 0
COUGH: 0
VOMITING: 0
COLOR CHANGE: 0
CHILLS: 0
FEVER: 0
EYE REDNESS: 0
ABDOMINAL PAIN: 0
SPEECH DIFFICULTY: 0
DIFFICULTY URINATING: 0
SORE THROAT: 0
ORTHOPNEA: 0
NECK PAIN: 0
PALPITATIONS: 0
DIARRHEA: 0
BACK PAIN: 0
ABDOMINAL DISTENTION: 0
ARTHRALGIAS: 0
CONSTIPATION: 0
NAUSEA: 0
SHORTNESS OF BREATH: 0
DYSURIA: 0
FREQUENCY: 0
WEAKNESS: 0
LIGHT-HEADEDNESS: 0
DIZZINESS: 0
TROUBLE SWALLOWING: 0
WHEEZING: 0

## 2024-11-20 ASSESSMENT — PAIN SCALES - GENERAL
PAINLEVEL_OUTOF10: 6
PAINLEVEL_OUTOF10: 0 - NO PAIN
PAINLEVEL_OUTOF10: 8
PAINLEVEL_OUTOF10: 0 - NO PAIN
PAINLEVEL_OUTOF10: 5 - MODERATE PAIN
PAINLEVEL_OUTOF10: 0 - NO PAIN
PAINLEVEL_OUTOF10: 8
PAINLEVEL_OUTOF10: 8
PAINLEVEL_OUTOF10: 5 - MODERATE PAIN
PAINLEVEL_OUTOF10: 8
PAINLEVEL_OUTOF10: 6
PAINLEVEL_OUTOF10: 8
PAINLEVEL_OUTOF10: 8
PAINLEVEL_OUTOF10: 4
PAINLEVEL_OUTOF10: 7
PAINLEVEL_OUTOF10: 6
PAINLEVEL_OUTOF10: 8
PAINLEVEL_OUTOF10: 9

## 2024-11-20 ASSESSMENT — PAIN - FUNCTIONAL ASSESSMENT
PAIN_FUNCTIONAL_ASSESSMENT: 0-10

## 2024-11-20 ASSESSMENT — COGNITIVE AND FUNCTIONAL STATUS - GENERAL
EATING MEALS: A LITTLE
MOBILITY SCORE: 17
TURNING FROM BACK TO SIDE WHILE IN FLAT BAD: A LITTLE
HELP NEEDED FOR BATHING: A LITTLE
DAILY ACTIVITIY SCORE: 18
TOILETING: A LITTLE
WALKING IN HOSPITAL ROOM: A LOT
CLIMB 3 TO 5 STEPS WITH RAILING: A LOT
DRESSING REGULAR UPPER BODY CLOTHING: A LITTLE
STANDING UP FROM CHAIR USING ARMS: A LITTLE
DRESSING REGULAR LOWER BODY CLOTHING: A LITTLE
PERSONAL GROOMING: A LITTLE
MOVING TO AND FROM BED TO CHAIR: A LITTLE

## 2024-11-20 ASSESSMENT — PAIN DESCRIPTION - ORIENTATION
ORIENTATION: LEFT

## 2024-11-20 ASSESSMENT — PAIN DESCRIPTION - LOCATION
LOCATION: GROIN
LOCATION: LEG
LOCATION: GROIN

## 2024-11-20 ASSESSMENT — PAIN DESCRIPTION - DESCRIPTORS
DESCRIPTORS: OTHER (COMMENT)
DESCRIPTORS: BURNING

## 2024-11-20 NOTE — PROGRESS NOTES
Social work consult placed for SNF Placement. SW reviewed pt's chart and communicated with supervisor ERICA Garcia RN. No SW needs foreseen at this time. SW signing off; available upon request.    Fran Guillen, MSW, LSW ([id number])   Care Transitions

## 2024-11-20 NOTE — PROGRESS NOTES
Dereck Shen is a 59 y.o. male on day 1 of admission presenting with Critical limb ischemia of left lower extremity (Multi).      Subjective   History Of Present Illness  Dereck Shen is a 59 y.o. male presenting with with past medical history of severe coronary disease status post CABG, PCI, chronic combined systolic and diastolic heart failure preserved EF LV dysfunction, ICD implant, hypertension, leukemia, recurrent strokes,, PAD who had surgery Dr. Wright in October for a left PTA left SFA complains of pain of his left leg that started late last night/early this morning and states that left his leg feels cold. He also noted that his left leg was white after walking and then a combination of white and purple coloration of the leg. He reports also feeling short of breath. He states that he did have chest pain but used Imdur to help alleviate the pain. He denies any coughing, nausea, vomiting no other issues or concerns otherwise.      ED Course:   Vitals on presentation:  36.8 °C (98.2 °F), 97 HR 20 /87 BP 85% on RA  Labs: CBC unremarkable, CMP - sodium 135, glucose 257  BNP - 997  Imaging  CT Angio aorta and bilateral iliofemoral runoff - Persistent occlusion of the left femoral/popliteal bypass graft and native left superficial femoral artery. There is also new occlusion versus severe stenosis involving the vessels below the left knee beginning at the level of the trifurcation with the anterior tibial, posterior tibial and peroneal arteries not well seen through the calf, ankle and foot.  Re demonstrated occlusion of the right femoropopliteal bypass graft stent. There is good opacification of the native right superficial femoral artery with three-vessel runoff to the right ankle.  CT PE - No acute PE  Pulses were unable to be dopplerable in his left leg  Interventions: 6 L NC, 40 mg IV Lasix, Heparin, Albuterol, npo for surgery in am with Dr. Peace    11/20: Patient seen this morning. S/p  embolectomy and fasciotomy performed 11/19 am. Patient states feeling better than yesterday and is not complain of any pain. Cardiology consulted and recommends checking echo which was done yesterday, awaiting results. Continuing current medication regimen. Continue recommendations per Vascular Surgery. Will continue to monitor.          Objective     Last Recorded Vitals  /64   Pulse 71   Temp 36 °C (96.8 °F) (Temporal)   Resp 14   Wt 87.2 kg (192 lb 4.8 oz)   SpO2 97%   Intake/Output last 3 Shifts:    Intake/Output Summary (Last 24 hours) at 11/20/2024 0702  Last data filed at 11/20/2024 0600  Gross per 24 hour   Intake 2936.75 ml   Output 3585 ml   Net -648.25 ml       Admission Weight  Weight: 84.4 kg (186 lb) (11/19/24 0338)    Daily Weight  11/19/24 : 87.2 kg (192 lb 4.8 oz)    Image Results  ECG 12 lead  Sinus rhythm  Probable left atrial enlargement  Borderline repolarization abnormality  ST elevation, consider anterior injury  Borderline prolonged QT interval    See ED provider note for full interpretation and clinical correlation  Confirmed by Zulema Stephen ([id number]) on 11/19/2024 11:07:14 AM  CT angio aorta and bilateral iliofemoral runoff including without contrast if performed  Narrative: Interpreted By:  Finkelstein, Evan,   STUDY:  CT ANGIO AORTA AND BILATERAL ILIOFEMORAL RUN OFF INCLUDING WITHOUT  CONTRAST IF PERFORMED;  11/19/2024 4:42 am      INDICATION:  Signs/Symptoms:no pulses left leg.          COMPARISON:  CT runoff 09/13/2024      ACCESSION NUMBER(S):  [id number]      ORDERING CLINICIAN:  ACE VALENZUELA      TECHNIQUE:  Contiguous axial CTA images were acquired through the abdomen and  pelvis and bilateral lower extremities following the administration  of 100 mL Omnipaque 350 intravenous contrast. Sagittal and coronal  images were reconstructed. Maximum intensity projection and three  dimensional images were constructed at a separate workstation.      FINDINGS:  VASCULAR  FINDINGS:      ABDOMINAL AORTA & ILIAC ARTERIES:  No evidence of abdominal aortic aneurysm or dissection.      Moderate atherosclerotic calcifications throughout the abdominal  aorta and major proximal vasculature. Celiac axis, superior  mesenteric artery, and inferior mesenteric artery are patent without  any significant narrowing. Bilateral renal arteries are patent  without any significant.      Right common iliac and external iliac arteries are patent without any  significant narrowing. Patent right common/external iliac stent.  Moderate to severe narrowing of the internal iliac arteries  bilaterally. Left common and external iliac arteries are patent.          RIGHT LOWER EXTREMITY:  Redemonstrated occlusion of the right femoropopliteal bypass graft  stent. Common femoral artery is patent.  Native superficial femoral  artery is patent. There is a stent within the mid to lower aspect of  the native right superficial femoral artery which is patent. Profunda  femoral artery is patent.  Popliteal artery is patent. Tibioperoneal  trunk, posterior tibial artery, peroneal artery, and anterior tibial  artery are patent. Three-vessel runoff at the ankle.      LEFT LOWER EXTREMITY:  Common femoral artery is patent. Persistent occlusion of the left  femoropopliteal bypass graft. Redemonstrated occlusion of the native  superficial femoral artery, similar compared to prior imaging. There  is reconstitution of the popliteal artery. Poor opacification of the  trifurcation of vessels just below the knee with poor opacification  of the vessels throughout the entirety of the calf, ankle and foot,  new/worsened compared to prior imaging..      ---------------------------------------------------------------      NON-VASCULAR FINDINGS:      LIVER: Within normal limits.      BILE DUCTS: Nondilated.      GALLBLADDER: No evidence of calcified gallstones or wall thickening.      PANCREAS: Within normal limits.      SPLEEN: A splenule  is present. The spleen is otherwise unremarkable..      ADRENALS: Within normal limits.      KIDNEYS, URETERS, URINARY BLADDER: Unremarkable. No evidence of  hydronephrosis.      BOWEL: No evidence of abnormal dilatation or obstruction of the small  or large bowel. No evidence of pneumoperitoneum.      REPRODUCTIVE ORGANS:  Unremarkable      PERITONEUM / RETROPERITONEUM / LYMPH NODES: No evidence of any free  fluid. No evidence of any significantly enlarged intra-abdominal or  pelvic lymph nodes.      ABDOMINAL WALL: Fat containing inguinal hernias.      LOWER CHEST: Please refer to separately dictated CT chest report.      BONES: No evidence of suspicious lytic or blastic osseous lesions.      Impression: Persistent occlusion of the left femoral/popliteal bypass graft and  native left superficial femoral artery. There is also new occlusion  versus severe stenosis involving the vessels below the left knee  beginning at the level of the trifurcation with the anterior tibial,  posterior tibial and peroneal arteries not well seen through the  calf, ankle and foot.      No acute abnormality of the abdomen or pelvis.      Redemonstrated occlusion of the right femoropopliteal bypass graft  stent. There is good opacification of the native right superficial  femoral artery with three-vessel runoff to the right ankle.      MACRO:  None.      Signed by: Evan Finkelstein 11/19/2024 5:20 AM  Dictation workstation:   [id number]  CT angio chest for pulmonary embolism  Narrative: Interpreted By:  Finkelstein, Evan,   STUDY:  CT ANGIO CHEST FOR PULMONARY EMBOLISM;  11/19/2024 4:27 am      INDICATION:  Signs/Symptoms:sob wih hypoxia.          COMPARISON:  CT chest 06/23/2021      ACCESSION NUMBER(S):  [id number]      ORDERING CLINICIAN:  ACE VALENZUELA      TECHNIQUE:  Axial CTA images of the chest after intravenous administration of 50  mL Omnipaque 350 using CT angiographic technique. Coronal and  sagittal images are  reconstructed. MIP images were created and  reviewed.      FINDINGS:  CHEST WALL AND LOWER NECK: Within normal limits.  ABDOMEN: No acute abnormality of the partially visualized abdomen.      VASCULAR:  AORTA: No aortic aneurysm. Mild atherosclerotic disease.  PULMONARY ARTERY: Normal caliber. No pulmonary embolus to the  segmental level.      CHEST:      HEART: Normal size. No pericardial effusion.  MEDIASTINUM AND REJI: No pathologically enlarged thoracic lymph nodes.  LUNG, PLEURA, LARGE AIRWAYS: Trace left pleural effusion. Thickened  interlobular septal markings. Ground-glass opacities scattered  throughout the lungs bilaterally with an upper lung zone predominance.      BONES: No acute osseous abnormality. Median sternotomy wires are  present.      Impression: No acute pulmonary embolus to the segmental level.      Thickened interlobular septal markings with scattered ground-glass  opacities with an upper lung zone predominance. Findings may be seen  with mixed interstitial/alveolar pulmonary edema. Other superimposed  infectious or inflammatory processes are also not excluded.      Trace left pleural effusion      MACRO:  None.      Signed by: Evan Finkelstein 11/19/2024 4:36 AM  Dictation workstation:   [id number]      Physical Exam  Constitutional: Patient is resting comfortably. No acute distress      Head and Face: Face is symmetric. Head is grossly normal with no step-offs     Neck: Supple. Trachea midline. No LAD. No JVD.     Lungs: Clear to auscultation bilaterally. Effort normal     Cardiovascular: Normal heart rate and eddie. No murmurs, gallops, or rubs.     Abdominal: Soft, non-tender, non-distended. Bowel sounds present in all four quadrants     Pelvic/: No CVA tenderness     Extremities: Doppler pulse in both feet, able to flex and extend toes on both feet    Neurological: Patient is A&Ox3. Speech is normal. No focal neural deficits.    Dermatologic: Normal sun and age related skin changes.  Skin is warm and dry. Skin tone appropriate for ethnicity. Post op bandaged on left leg      Psychiatric/behavioral: Appropriate mood and affect. No auditory or visual hallucinations.    Relevant Results    Results from last 7 days   Lab Units 11/20/24  0400 11/19/24  0359   WBC AUTO x10*3/uL 6.9 9.0   HEMOGLOBIN g/dL 12.0* 16.5   HEMATOCRIT % 35.6* 48.2   PLATELETS AUTO x10*3/uL 199 259   NEUTROS PCT AUTO % 67.2 71.0   LYMPHS PCT AUTO % 21.8 21.7   MONOS PCT AUTO % 8.2 6.0   EOS PCT AUTO % 2.2 0.8     Results from last 7 days   Lab Units 11/20/24  0400 11/19/24  0359   SODIUM mmol/L 134* 135*   POTASSIUM mmol/L 4.4 4.4   CHLORIDE mmol/L 99 103   CO2 mmol/L 28 24   BUN mg/dL 26* 22   CREATININE mg/dL 1.52* 1.25   CALCIUM mg/dL 8.0* 8.8   PROTEIN TOTAL g/dL 5.9* 7.4   BILIRUBIN TOTAL mg/dL 0.5 0.4   ALK PHOS U/L 84 118   ALT U/L 7* 8*   AST U/L 9 8*   GLUCOSE mg/dL 202* 257*     Scheduled medications  acetaminophen, 650 mg, oral, q6h PEDRO  aspirin, 81 mg, oral, Daily  atorvastatin, 80 mg, oral, Nightly  carvedilol, 6.25 mg, oral, q12h  docusate sodium, 100 mg, oral, BID  furosemide, 40 mg, intravenous, Daily  hydrALAZINE, 50 mg, oral, TID  insulin glargine, 20 Units, subcutaneous, Nightly  insulin lispro, 0-10 Units, subcutaneous, TID AC  isosorbide mononitrate ER, 60 mg, oral, Daily  OXcarbazepine, 450 mg, oral, BID  oxygen, , inhalation, Continuous - Inhalation  perflutren lipid microspheres, 0.5-10 mL of dilution, intravenous, Once in imaging  perflutren protein A microsphere, 0.5 mL, intravenous, Once in imaging  ranolazine, 500 mg, oral, BID  sennosides, 2 tablet, oral, Nightly  sulfur hexafluoride microsphr, 2 mL, intravenous, Once in imaging  tamsulosin, 0.4 mg, oral, Daily  ticagrelor, 90 mg, oral, BID      Continuous medications  heparin, 0-4,500 Units/hr, Last Rate: 1,500 Units/hr (11/19/24 1402)      PRN medications  PRN medications: dextrose, dextrose, glucagon, glucagon, heparin, HYDROmorphone, naloxone,  ondansetron ODT **OR** ondansetron         Assessment/Plan      Critical Limb Ischemia of left lower extremity   Peripheral Artery Disease   Continue Heparin Drip  Vascular surgery consulted and appreciated   NPO - scheduled for surgery 11/19 am with Dr. Peace   CT chest for PE and CT aorta/bilateral iliofemoral runoff completed   Continue to monitor  11/20: Patient seen, s/p embolectomy and fasciotomy performed 11/19 am. Cardiology consulted and recommends checking echo done yesterday, awaiting results. Continue recommendations per Vascular Surgery. Will continue to monitor      Coronary Artery Disease   Acute on chronic congestive heart failure   Continue home medications  Continue to monitor    11/20: Patient seen this morning. Continue on current medication regimen, per cardiology. Will continue to monitor      Hypertension   Continue home medications      Type 2 Diabetes Mellitus   Continue home medications   Lantus nightly and lispro sliding scale      Hyperlipidemia   Continue home medications      DVT Prophylaxis   SCDs, Heparin                   NANCY LEE    Pt seen and examined  with my Medical student . I have modified the note to reflect my documentation of HPI and assessment and plan.    Ashley Celestin MD MRCP

## 2024-11-20 NOTE — PROGRESS NOTES
11/20/24 1046   11/20/24 1046   Discharge Planning   Living Arrangements Alone   Support Systems Parent   Assistance Needed None   Type of Residence Private residence   Do you have animals or pets at home? No   Home or Post Acute Services None   Expected Discharge Disposition Home   Does the patient need discharge transport arranged? No     Spoke with pt and pt mother at bedside. Pt from home and lives in alone in an apartment. Pt endorses will stay with mother at [address] [facility] in [place], [place] for maybe a week after discharge. Pt appeared alert and oriented. Pt states being independent and uses a Rolator and cane at home. Pt states still drives and is able to obtain meds and get to appointments. Mother can assist with transportation if needed. PCP is Stephanie Acosta and has an apt for this Friday. Offered HHC and pt declined. Pt states goes to [facility] in [place] for [facility]. Discussed [facility] program and what they offer. Pt declined and feels medical needs are well managed at this time. Pt states does have a Bipap with 10/4 settings and only uses it about 1-2 times per week. Gets supplies through [facility]. Pt inquired about a lift chair. Explained that insurance does not cover a lift chair. Pt endorses use of medical marijuana once per day at night for PTSD, BP control, and sleep. Pt intends to switch all meds to [facility] pharmacy. Pt george any home going needs. Pt aware to reach out if needs arise.   Annie Garcia RN, BSN, Nikki/ Cecilia ALEJO Supervisor

## 2024-11-20 NOTE — PROGRESS NOTES
Critical Care Daily Progress        Subjective   Patient is a 59 y.o. male admitted on 11/19/2024  3:41 AM with acute leg pain.     Interval History: Postop day 1 left lower extremity surgery with femoral exploration endarterectomy thrombectomy patch angioplasty and fasciotomy.  Developed urinary retention necessitating replacement of Douglass catheter.    Complaints: Pain in the left groin and just inferior to the left knee but now has no pain in the foot and can wiggle toes and plantar and dorsiflex his ankle much better today.  Asking if he can go home.    Scheduled Medications:   acetaminophen, 650 mg, oral, q6h PEDRO  aspirin, 81 mg, oral, Daily  atorvastatin, 80 mg, oral, Nightly  carvedilol, 6.25 mg, oral, q12h  docusate sodium, 100 mg, oral, BID  furosemide, 40 mg, intravenous, Daily  hydrALAZINE, 50 mg, oral, TID  insulin glargine, 20 Units, subcutaneous, Nightly  insulin lispro, 0-10 Units, subcutaneous, TID AC  isosorbide mononitrate ER, 60 mg, oral, Daily  OXcarbazepine, 450 mg, oral, BID  oxygen, , inhalation, Continuous - Inhalation  perflutren lipid microspheres, 0.5-10 mL of dilution, intravenous, Once in imaging  perflutren protein A microsphere, 0.5 mL, intravenous, Once in imaging  ranolazine, 500 mg, oral, BID  sennosides, 2 tablet, oral, Nightly  sulfur hexafluoride microsphr, 2 mL, intravenous, Once in imaging  tamsulosin, 0.4 mg, oral, Daily  ticagrelor, 90 mg, oral, BID         Continuous Medications:   heparin, 0-4,500 Units/hr, Last Rate: 1,500 Units/hr (11/19/24 1402)         PRN Medications:   PRN medications: dextrose, dextrose, glucagon, glucagon, heparin, HYDROmorphone, naloxone, ondansetron ODT **OR** ondansetron    Objective   Vitals:  Most Recent:  Vitals:    11/20/24 0700   BP:    Pulse: 71   Resp: 14   Temp:    SpO2: 97%       24hr Min/Max:  Temp  Min: 36 °C (96.8 °F)  Max: 36.7 °C (98 °F)  Pulse  Min: 66  Max: 86  BP  Min: 90/44  Max: 170/100  Resp  Min: 7  Max: 25  SpO2  Min: 87 %   Max: 100 %        I/O:    Intake/Output Summary (Last 24 hours) at 11/20/2024 0731  Last data filed at 11/20/2024 0600  Gross per 24 hour   Intake 2936.75 ml   Output 3585 ml   Net -648.25 ml       Physical Exam:   Physical Exam  Vitals reviewed.   Constitutional:       General: He is not in acute distress.     Appearance: Normal appearance. He is not toxic-appearing.   HENT:      Head: Normocephalic and atraumatic.      Nose: No congestion.      Mouth/Throat:      Pharynx: No oropharyngeal exudate.   Eyes:      General: No scleral icterus.  Cardiovascular:      Rate and Rhythm: Normal rate and regular rhythm.      Heart sounds: No murmur heard.  Pulmonary:      Effort: No respiratory distress.      Breath sounds: Normal breath sounds. No wheezing or rales.   Abdominal:      General: There is no distension.      Palpations: Abdomen is soft.      Tenderness: There is no guarding or rebound.   Musculoskeletal:      Cervical back: Neck supple. No rigidity.      Comments: Left leg looks much better.  It appears perfused!  He can move his toes well and has no pain in his very distal lower extremity on the left.   Skin:     Findings: No rash.   Neurological:      General: No focal deficit present.      Mental Status: He is alert. Mental status is at baseline.          Lab/Radiology/Diagnostic Review:  Results for orders placed or performed during the hospital encounter of 11/19/24 (from the past 24 hours)   POCT GLUCOSE   Result Value Ref Range    POCT Glucose 361 (H) 74 - 99 mg/dL   Type And Screen   Result Value Ref Range    ABO TYPE O     Rh TYPE NEG     ANTIBODY SCREEN NEG    ACTIVATED CLOTTING TIME LOW   Result Value Ref Range    POCT Activated Clotting Time Low Range 181 (H) 89 - 169 sec   POCT GLUCOSE   Result Value Ref Range    POCT Glucose 276 (H) 74 - 99 mg/dL   ACTIVATED CLOTTING TIME LOW   Result Value Ref Range    POCT Activated Clotting Time Low Range 277 (H) 89 - 169 sec   POCT GLUCOSE   Result Value Ref  Range    POCT Glucose 250 (H) 74 - 99 mg/dL   POCT GLUCOSE   Result Value Ref Range    POCT Glucose 227 (H) 74 - 99 mg/dL   Heparin Assay, UFH   Result Value Ref Range    Heparin Unfractionated 0.4 See Comment Below for Therapeutic Ranges IU/mL   POCT GLUCOSE   Result Value Ref Range    POCT Glucose 196 (H) 74 - 99 mg/dL   POCT GLUCOSE   Result Value Ref Range    POCT Glucose 190 (H) 74 - 99 mg/dL   Heparin Assay, UFH   Result Value Ref Range    Heparin Unfractionated 0.3 See Comment Below for Therapeutic Ranges IU/mL   Comprehensive Metabolic Panel   Result Value Ref Range    Glucose 202 (H) 74 - 99 mg/dL    Sodium 134 (L) 136 - 145 mmol/L    Potassium 4.4 3.5 - 5.3 mmol/L    Chloride 99 98 - 107 mmol/L    Bicarbonate 28 21 - 32 mmol/L    Anion Gap 11 10 - 20 mmol/L    Urea Nitrogen 26 (H) 6 - 23 mg/dL    Creatinine 1.52 (H) 0.50 - 1.30 mg/dL    eGFR 53 (L) >60 mL/min/1.73m*2    Calcium 8.0 (L) 8.6 - 10.3 mg/dL    Albumin 3.2 (L) 3.4 - 5.0 g/dL    Alkaline Phosphatase 84 33 - 120 U/L    Total Protein 5.9 (L) 6.4 - 8.2 g/dL    AST 9 9 - 39 U/L    Bilirubin, Total 0.5 0.0 - 1.2 mg/dL    ALT 7 (L) 10 - 52 U/L   CBC and Auto Differential   Result Value Ref Range    WBC 6.9 4.4 - 11.3 x10*3/uL    nRBC 0.0 0.0 - 0.0 /100 WBCs    RBC 3.80 (L) 4.50 - 5.90 x10*6/uL    Hemoglobin 12.0 (L) 13.5 - 17.5 g/dL    Hematocrit 35.6 (L) 41.0 - 52.0 %    MCV 94 80 - 100 fL    MCH 31.6 26.0 - 34.0 pg    MCHC 33.7 32.0 - 36.0 g/dL    RDW 13.6 11.5 - 14.5 %    Platelets 199 150 - 450 x10*3/uL    Neutrophils % 67.2 40.0 - 80.0 %    Immature Granulocytes %, Automated 0.3 0.0 - 0.9 %    Lymphocytes % 21.8 13.0 - 44.0 %    Monocytes % 8.2 2.0 - 10.0 %    Eosinophils % 2.2 0.0 - 6.0 %    Basophils % 0.3 0.0 - 2.0 %    Neutrophils Absolute 4.67 1.20 - 7.70 x10*3/uL    Immature Granulocytes Absolute, Automated 0.02 0.00 - 0.70 x10*3/uL    Lymphocytes Absolute 1.51 1.20 - 4.80 x10*3/uL    Monocytes Absolute 0.57 0.10 - 1.00 x10*3/uL     Eosinophils Absolute 0.15 0.00 - 0.70 x10*3/uL    Basophils Absolute 0.02 0.00 - 0.10 x10*3/uL   Protime-INR   Result Value Ref Range    Protime 11.0 9.8 - 12.8 seconds    INR 1.0 0.9 - 1.1   Magnesium   Result Value Ref Range    Magnesium 1.98 1.60 - 2.40 mg/dL   Phosphorus   Result Value Ref Range    Phosphorus 4.0 2.5 - 4.9 mg/dL   Heparin Assay, UFH   Result Value Ref Range    Heparin Unfractionated 0.4 See Comment Below for Therapeutic Ranges IU/mL     *Note: Due to a large number of results and/or encounters for the requested time period, some results have not been displayed. A complete set of results can be found in Results Review.     ECG 12 lead    Result Date: 11/19/2024  Sinus rhythm Probable left atrial enlargement Borderline repolarization abnormality ST elevation, consider anterior injury Borderline prolonged QT interval See ED provider note for full interpretation and clinical correlation Confirmed by Zulema Stephen ([id number]) on 11/19/2024 11:07:14 AM    CT angio aorta and bilateral iliofemoral runoff including without contrast if performed    Result Date: 11/19/2024  Interpreted By:  Finkelstein, Evan, STUDY: CT ANGIO AORTA AND BILATERAL ILIOFEMORAL RUN OFF INCLUDING WITHOUT CONTRAST IF PERFORMED;  11/19/2024 4:42 am   INDICATION: Signs/Symptoms:no pulses left leg.     COMPARISON: CT runoff 09/13/2024   ACCESSION NUMBER(S): [id number]   ORDERING CLINICIAN: ACE VALENZUELA   TECHNIQUE: Contiguous axial CTA images were acquired through the abdomen and pelvis and bilateral lower extremities following the administration of 100 mL Omnipaque 350 intravenous contrast. Sagittal and coronal images were reconstructed. Maximum intensity projection and three dimensional images were constructed at a separate workstation.   FINDINGS: VASCULAR FINDINGS:   ABDOMINAL AORTA & ILIAC ARTERIES: No evidence of abdominal aortic aneurysm or dissection.   Moderate atherosclerotic calcifications throughout the  abdominal aorta and major proximal vasculature. Celiac axis, superior mesenteric artery, and inferior mesenteric artery are patent without any significant narrowing. Bilateral renal arteries are patent without any significant.   Right common iliac and external iliac arteries are patent without any significant narrowing. Patent right common/external iliac stent. Moderate to severe narrowing of the internal iliac arteries bilaterally. Left common and external iliac arteries are patent.     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 Common femoral artery is patent.  Native superficial femoral artery is patent. There is a stent within the mid to lower aspect of the native right superficial femoral artery which is patent. Profunda femoral artery is patent.  Popliteal artery is patent. Tibioperoneal trunk, posterior tibial artery, peroneal artery, and anterior tibial artery are patent. Three-vessel runoff at the ankle.   LEFT LOWER EXTREMITY: Common femoral artery is patent. 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 Redemonstrated occlusion of the native superficial femoral artery, similar compared to prior imaging. There is reconstitution of the popliteal artery. Poor opacification of the trifurcation of vessels just below the knee with poor opacification of the vessels throughout the entirety of the calf, ankle and foot, new/worsened compared to prior imaging..   ---------------------------------------------------------------   NON-VASCULAR FINDINGS:   LIVER: Within normal limits.   BILE DUCTS: Nondilated.   GALLBLADDER: No evidence of calcified gallstones or wall thickening.   PANCREAS: Within normal limits.   SPLEEN: A splenule is present. The spleen is otherwise unremarkable..   ADRENALS: Within normal limits.   KIDNEYS, URETERS, URINARY BLADDER: Unremarkable. No evidence of hydronephrosis.   BOWEL: No evidence of abnormal dilatation or  obstruction of the small or large bowel. No evidence of pneumoperitoneum.   REPRODUCTIVE ORGANS:  Unremarkable   PERITONEUM / RETROPERITONEUM / LYMPH NODES: No evidence of any free fluid. No evidence of any significantly enlarged intra-abdominal or pelvic lymph nodes.   ABDOMINAL WALL: Fat containing inguinal hernias.   LOWER CHEST: Please refer to separately dictated CT chest report.   BONES: No evidence of suspicious lytic or blastic osseous lesions.       Persistent occlusion of the left femoral/popliteal bypass graft and native left superficial femoral artery. There is also new occlusion versus severe stenosis involving the vessels below the left knee beginning at the level of the trifurcation with the anterior tibial, posterior tibial and peroneal arteries not well seen through the calf, ankle and foot.   No acute abnormality of the abdomen or pelvis.   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 There is good opacification of the native right superficial femoral artery with three-vessel runoff to the right ankle.   MACRO: None.   Signed by: Evan Finkelstein 11/19/2024 5:20 AM Dictation workstation:   [id number]    CT angio chest for pulmonary embolism    Result Date: 11/19/2024  Interpreted By:  Finkelstein, Evan, STUDY: CT ANGIO CHEST FOR PULMONARY EMBOLISM;  11/19/2024 4:27 am   INDICATION: Signs/Symptoms:sob wih hypoxia.     COMPARISON: CT chest 06/23/2021   ACCESSION NUMBER(S): [id number]   ORDERING CLINICIAN: ACE VALENZUELA   TECHNIQUE: Axial CTA images of the chest after intravenous administration of 50 mL Omnipaque 350 using CT angiographic technique. Coronal and sagittal images are reconstructed. MIP images were created and reviewed.   FINDINGS: CHEST WALL AND LOWER NECK: Within normal limits. ABDOMEN: No acute abnormality of the partially visualized abdomen.   VASCULAR: AORTA: No aortic aneurysm. Mild atherosclerotic disease. PULMONARY ARTERY: Normal caliber. No  pulmonary embolus to the segmental level.   CHEST:   HEART: Normal size. No pericardial effusion. MEDIASTINUM AND REJI: No pathologically enlarged thoracic lymph nodes. LUNG, PLEURA, LARGE AIRWAYS: Trace left pleural effusion. Thickened interlobular septal markings. Ground-glass opacities scattered throughout the lungs bilaterally with an upper lung zone predominance.   BONES: No acute osseous abnormality. Median sternotomy wires are present.       No acute pulmonary embolus to the segmental level.   Thickened interlobular septal markings with scattered ground-glass opacities with an upper lung zone predominance. Findings may be seen with mixed interstitial/alveolar pulmonary edema. Other superimposed infectious or inflammatory processes are also not excluded.   Trace left pleural effusion   MACRO: None.   Signed by: Evan Finkelstein 11/19/2024 4:36 AM Dictation workstation:   [id number]    Invasive vascular procedure    Result Date: 10/21/2024       [facility], Cath Lab [address] [place] [address]    Phone [phone] Fax [phone] Cardiovascular Catheterization Report Patient Name:      CARMENZA OLEARY   Performing           [id number] Wright                                        Physician:           MD Study Date:        10/21/2024          Verifying Physician: Marilin Wright MD MRN/PID:           [id number]            Cardiologist: Accession#:        [id number]        Ordering Provider:   Marilin WRIGHT Date of Birth/Age: 1965 / 59      Fellow:                    years Gender:            M                   Fellow: Encounter#:        [id number]  Study: Peripheral Intervention  Indications: CARMENZA OLEARY is a 59 year old male who presents with peripheral artery disease, dyslipidemia, hypertension and tobacco use.  Procedure Description: After infiltration with 2% Lidocaine utilizing two-dimensional  ultrasound and fluoroscopic guidance, the Left Popliteal was cannulated using a modified Seldinger technique. Subsequently a 6 Wolof sheath was placed antegrade in the Left Popliteal. Selective angiogram of the left popliteal artery was performed by injection thru the sheath in the popliteal artery.  Left Lower Extremity Arterial Findings: Left Common Iliac Artery: The left common iliac artery revealed no evidence of significant disease. Left Extermal Iliac Artery: The left external iliac artery revealed no evidence of significant disease. Left Internal Iliac Artery: The left internal iliac artery revealed no evidence of significant disease. Left Common Femoral Artery: The left common femoral artery revealed no evidence of significant disease. Left Profunda Femoris Artery: The left profunda femoris artery revealed no evidence of significant disease. Left Superior Femoral Artery: The left superficial femoral artery revealed chronic occlusion and total occlusion. Left Popliteal Artery: The left popliteal artery revealed no evidence of significant disease. Left Tibial-Peroneal Trunk: The left tibial-peroneal trunk revealed no evidence of significant disease. Left Anterior Tibial Artery: The left anterior tibial artery revealed chronic occlusion and total occlusion. Left Posterior Tibial Artery: The left posterior tibial artery revealed severe atherosclerotic disease and diffuse atherosclerotic disease. Left Peroneal Artery: The left peroneal artery revealed no evidence of significant disease. Left Dorsalis Pedis Artery: The left dorsalis pedis artery revealed no evidence of significant disease.  Peripheral Interventions:  Percutaneous peripheral intervention of the entire left superficial femoral artery. The vessel was dilated using a compliant 4.0 mm x 200 mm balloon dilated to 8 NENITA. The stent was post dilated using a drug coated 5.0 mm x 250 mm balloon at 8 NENITA, and a drug coated 5.0 mm x 120 mm balloon at 8 NENITA.  The stenosis was successfully reduced from 100% to <10%.  Hemo Personnel: +---------------+---------+ Name           Duty      +---------------+---------+ Baljinder Wright MD 1 +---------------+---------+  Hemodynamic Pressures:  +----+---------------------+----------+-------------+--------------+---------+ Site      Date Time      Phase NameSystolic mmHgDiastolic mmHgMean mmHg +----+---------------------+----------+-------------+--------------+---------+   AO10/21/2024 8:14:06 AM  AIR REST          112            49       71 +----+---------------------+----------+-------------+--------------+---------+ LSFA10/21/2024 8:42:24 AM  AIR REST          108            53       76 +----+---------------------+----------+-------------+--------------+---------+  Oxygen Saturation %: +-----------+------------+ Sample SiteHB (g/100ml) +-----------+------------+     SYS ART        15.7 +-----------+------------+     SYS SMOOTH        15.7 +-----------+------------+     PUL ART        15.7 +-----------+------------+     PUL SMOOTH        15.7 +-----------+------------+  Complications: No in-lab complications observed.  Cardiac Cath Post Procedure Notes: Post Procedure           Angioplasty of SFA. Diagnosis: Blood Loss:              Estimated blood loss during the procedure was <10 ml                          mls. Specimens Removed:       Number of specimen(s) removed: none.  Recommendations: Maximize medical therapy. Agressive risk factor modification efforts. Patient counseled and advised to discontinue smoking. ____________________________________________________________________________________ CONCLUSIONS:  1. Successful recannalization of totally occluded left SFA. 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  Remains on heparin drip, possibly can transition back to his DOAC.  Will defer to the vascular service.  Patient asks if he can ambulate and wants to go home.  Has some issues pertaining to urinary retention.  Vitals look excellent on room air at rest.    COPD.  Asymptomatic presently.  Chronic therapy can continue, tobacco cessation is a must.    Cardiomyopathy with 35% ejection fraction/chronic systolic congestive heart failure.  Lungs have cleared.  Saturations are in the mid to high 90s on room air.  On chronic medications.    Acute kidney injury.  Creatinine has bumped up possibly related to dye load and some volume depletion.  Hydrate orally and track labs (IV fluid in short supply).    Diabetes mellitus.  Blood sugars reasonably well-controlled.  Check before meals and at bedtime and continue insulin.    Remainder of chronic problems appear stable.  Hospitalist following as well as cardiologist. Critical care medicine service will sign off.  Please reconsult if patient's condition changes.    Dragon dictation software was used to dictate this note and thus there may be minor errors in translation/transcription including garbled speech or misspellings. Please contact for clarification if needed.     Enrique Arnold MD

## 2024-11-20 NOTE — PROGRESS NOTES
"GENERAL SURGERY PROGRESS NOTE    Dereck Shen   1965   [id number]     Dereck Shen is a 59 y.o. male on day 1 of admission presenting with Critical limb ischemia of left lower extremity (Multi).      Subjective  Pt PANCHO at bedside. NAEO. Overall feeling much improved since surgery. Pain has almost completely resolved. Able to feel distal left extremity.    Review of Systems   Constitutional:  Negative for chills and fever.   HENT:  Negative for congestion, sore throat and trouble swallowing.    Eyes:  Negative for discharge and redness.   Respiratory:  Negative for cough and shortness of breath.    Cardiovascular:  Negative for chest pain and palpitations.   Gastrointestinal:  Negative for abdominal distention, abdominal pain, constipation, diarrhea, nausea and vomiting.   Endocrine: Negative for cold intolerance and heat intolerance.   Genitourinary:  Negative for difficulty urinating, dysuria, frequency and urgency.   Musculoskeletal:  Negative for arthralgias, back pain and neck pain.   Skin:  Negative for color change and rash.   Neurological:  Negative for dizziness, speech difficulty, weakness and light-headedness.       Objective    Last Recorded Vitals  Blood pressure 148/80, pulse 68, temperature 36 °C (96.8 °F), temperature source Temporal, resp. rate 23, height 1.803 m (5' 11\"), weight 87.2 kg (192 lb 4.8 oz), SpO2 99%.    Intake/Output last 3 Shifts:  I/O last 3 completed shifts:  In: 2936.8 (33.7 mL/kg) [P.O.:960; I.V.:676.8 (7.8 mL/kg); IV Piggyback:1300]  Out: 4335 (49.7 mL/kg) [Urine:4235 (1.3 mL/kg/hr); Blood:100]  Weight: 87.2 kg     Gen: NAD.  A&Ox3  HEENT: NC/AT.  Moist mucous membranes.  Neck: Normal range of motion.  CV: Regular rate. Multiphasic pulses to the b/l DP and PT  Chest: Normal chest rise.  Normal respiratory effort.  Abdomen: Soft.  NT/ND.  No rigidity or guarding. Douglass in place. Groin incision soft, no swelling.  Extremities: No edema.  Moving all extremities. Slightly " delayed capillary refill. Sensation intact. Pulse exam above. LLE calf wrapped, no saturation of the dressing noted.    Relevant Results  Results for orders placed or performed during the hospital encounter of 11/19/24 (from the past 24 hours)   POCT GLUCOSE   Result Value Ref Range    POCT Glucose 227 (H) 74 - 99 mg/dL   Heparin Assay, UFH   Result Value Ref Range    Heparin Unfractionated 0.4 See Comment Below for Therapeutic Ranges IU/mL   POCT GLUCOSE   Result Value Ref Range    POCT Glucose 196 (H) 74 - 99 mg/dL   POCT GLUCOSE   Result Value Ref Range    POCT Glucose 190 (H) 74 - 99 mg/dL   Heparin Assay, UFH   Result Value Ref Range    Heparin Unfractionated 0.3 See Comment Below for Therapeutic Ranges IU/mL   Comprehensive Metabolic Panel   Result Value Ref Range    Glucose 202 (H) 74 - 99 mg/dL    Sodium 134 (L) 136 - 145 mmol/L    Potassium 4.4 3.5 - 5.3 mmol/L    Chloride 99 98 - 107 mmol/L    Bicarbonate 28 21 - 32 mmol/L    Anion Gap 11 10 - 20 mmol/L    Urea Nitrogen 26 (H) 6 - 23 mg/dL    Creatinine 1.52 (H) 0.50 - 1.30 mg/dL    eGFR 53 (L) >60 mL/min/1.73m*2    Calcium 8.0 (L) 8.6 - 10.3 mg/dL    Albumin 3.2 (L) 3.4 - 5.0 g/dL    Alkaline Phosphatase 84 33 - 120 U/L    Total Protein 5.9 (L) 6.4 - 8.2 g/dL    AST 9 9 - 39 U/L    Bilirubin, Total 0.5 0.0 - 1.2 mg/dL    ALT 7 (L) 10 - 52 U/L   CBC and Auto Differential   Result Value Ref Range    WBC 6.9 4.4 - 11.3 x10*3/uL    nRBC 0.0 0.0 - 0.0 /100 WBCs    RBC 3.80 (L) 4.50 - 5.90 x10*6/uL    Hemoglobin 12.0 (L) 13.5 - 17.5 g/dL    Hematocrit 35.6 (L) 41.0 - 52.0 %    MCV 94 80 - 100 fL    MCH 31.6 26.0 - 34.0 pg    MCHC 33.7 32.0 - 36.0 g/dL    RDW 13.6 11.5 - 14.5 %    Platelets 199 150 - 450 x10*3/uL    Neutrophils % 67.2 40.0 - 80.0 %    Immature Granulocytes %, Automated 0.3 0.0 - 0.9 %    Lymphocytes % 21.8 13.0 - 44.0 %    Monocytes % 8.2 2.0 - 10.0 %    Eosinophils % 2.2 0.0 - 6.0 %    Basophils % 0.3 0.0 - 2.0 %    Neutrophils Absolute 4.67  1.20 - 7.70 x10*3/uL    Immature Granulocytes Absolute, Automated 0.02 0.00 - 0.70 x10*3/uL    Lymphocytes Absolute 1.51 1.20 - 4.80 x10*3/uL    Monocytes Absolute 0.57 0.10 - 1.00 x10*3/uL    Eosinophils Absolute 0.15 0.00 - 0.70 x10*3/uL    Basophils Absolute 0.02 0.00 - 0.10 x10*3/uL   Protime-INR   Result Value Ref Range    Protime 11.0 9.8 - 12.8 seconds    INR 1.0 0.9 - 1.1   Magnesium   Result Value Ref Range    Magnesium 1.98 1.60 - 2.40 mg/dL   Phosphorus   Result Value Ref Range    Phosphorus 4.0 2.5 - 4.9 mg/dL   Heparin Assay, UFH   Result Value Ref Range    Heparin Unfractionated 0.4 See Comment Below for Therapeutic Ranges IU/mL   POCT GLUCOSE   Result Value Ref Range    POCT Glucose 199 (H) 74 - 99 mg/dL   Transthoracic Echo (TTE) Complete   Result Value Ref Range    BSA 2.09 m2   POCT GLUCOSE   Result Value Ref Range    POCT Glucose 215 (H) 74 - 99 mg/dL     *Note: Due to a large number of results and/or encounters for the requested time period, some results have not been displayed. A complete set of results can be found in Results Review.       ECG 12 lead    Result Date: 11/19/2024  Sinus rhythm Probable left atrial enlargement Borderline repolarization abnormality ST elevation, consider anterior injury Borderline prolonged QT interval See ED provider note for full interpretation and clinical correlation Confirmed by Zulema Stephen ([id number]) on 11/19/2024 11:07:14 AM    CT angio aorta and bilateral iliofemoral runoff including without contrast if performed    Result Date: 11/19/2024  Interpreted By:  Finkelstein, Evan, STUDY: CT ANGIO AORTA AND BILATERAL ILIOFEMORAL RUN OFF INCLUDING WITHOUT CONTRAST IF PERFORMED;  11/19/2024 4:42 am   INDICATION: Signs/Symptoms:no pulses left leg.     COMPARISON: CT runoff 09/13/2024   ACCESSION NUMBER(S): [id number]   ORDERING CLINICIAN: ACE VALENZUELA   TECHNIQUE: Contiguous axial CTA images were acquired through the abdomen and pelvis and bilateral  lower extremities following the administration of 100 mL Omnipaque 350 intravenous contrast. Sagittal and coronal images were reconstructed. Maximum intensity projection and three dimensional images were constructed at a separate workstation.   FINDINGS: VASCULAR FINDINGS:   ABDOMINAL AORTA & ILIAC ARTERIES: No evidence of abdominal aortic aneurysm or dissection.   Moderate atherosclerotic calcifications throughout the abdominal aorta and major proximal vasculature. Celiac axis, superior mesenteric artery, and inferior mesenteric artery are patent without any significant narrowing. Bilateral renal arteries are patent without any significant.   Right common iliac and external iliac arteries are patent without any significant narrowing. Patent right common/external iliac stent. Moderate to severe narrowing of the internal iliac arteries bilaterally. Left common and external iliac arteries are patent.     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 Common femoral artery is patent.  Native superficial femoral artery is patent. There is a stent within the mid to lower aspect of the native right superficial femoral artery which is patent. Profunda femoral artery is patent.  Popliteal artery is patent. Tibioperoneal trunk, posterior tibial artery, peroneal artery, and anterior tibial artery are patent. Three-vessel runoff at the ankle.   LEFT LOWER EXTREMITY: Common femoral artery is patent. 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 Redemonstrated occlusion of the native superficial femoral artery, similar compared to prior imaging. There is reconstitution of the popliteal artery. Poor opacification of the trifurcation of vessels just below the knee with poor opacification of the vessels throughout the entirety of the calf, ankle and foot, new/worsened compared to prior imaging..   ---------------------------------------------------------------    NON-VASCULAR FINDINGS:   LIVER: Within normal limits.   BILE DUCTS: Nondilated.   GALLBLADDER: No evidence of calcified gallstones or wall thickening.   PANCREAS: Within normal limits.   SPLEEN: A splenule is present. The spleen is otherwise unremarkable..   ADRENALS: Within normal limits.   KIDNEYS, URETERS, URINARY BLADDER: Unremarkable. No evidence of hydronephrosis.   BOWEL: No evidence of abnormal dilatation or obstruction of the small or large bowel. No evidence of pneumoperitoneum.   REPRODUCTIVE ORGANS:  Unremarkable   PERITONEUM / RETROPERITONEUM / LYMPH NODES: No evidence of any free fluid. No evidence of any significantly enlarged intra-abdominal or pelvic lymph nodes.   ABDOMINAL WALL: Fat containing inguinal hernias.   LOWER CHEST: Please refer to separately dictated CT chest report.   BONES: No evidence of suspicious lytic or blastic osseous lesions.       Persistent occlusion of the left femoral/popliteal bypass graft and native left superficial femoral artery. There is also new occlusion versus severe stenosis involving the vessels below the left knee beginning at the level of the trifurcation with the anterior tibial, posterior tibial and peroneal arteries not well seen through the calf, ankle and foot.   No acute abnormality of the abdomen or pelvis.   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 There is good opacification of the native right superficial femoral artery with three-vessel runoff to the right ankle.   MACRO: None.   Signed by: Evan Finkelstein 11/19/2024 5:20 AM Dictation workstation:   [id number]    CT angio chest for pulmonary embolism    Result Date: 11/19/2024  Interpreted By:  Finkelstein, Evan, STUDY: CT ANGIO CHEST FOR PULMONARY EMBOLISM;  11/19/2024 4:27 am   INDICATION: Signs/Symptoms:sob wih hypoxia.     COMPARISON: CT chest 06/23/2021   ACCESSION NUMBER(S): [id number]   ORDERING CLINICIAN: ACE VALENZUELA   TECHNIQUE: Axial CTA images of  the chest after intravenous administration of 50 mL Omnipaque 350 using CT angiographic technique. Coronal and sagittal images are reconstructed. MIP images were created and reviewed.   FINDINGS: CHEST WALL AND LOWER NECK: Within normal limits. ABDOMEN: No acute abnormality of the partially visualized abdomen.   VASCULAR: AORTA: No aortic aneurysm. Mild atherosclerotic disease. PULMONARY ARTERY: Normal caliber. No pulmonary embolus to the segmental level.   CHEST:   HEART: Normal size. No pericardial effusion. MEDIASTINUM AND REJI: No pathologically enlarged thoracic lymph nodes. LUNG, PLEURA, LARGE AIRWAYS: Trace left pleural effusion. Thickened interlobular septal markings. Ground-glass opacities scattered throughout the lungs bilaterally with an upper lung zone predominance.   BONES: No acute osseous abnormality. Median sternotomy wires are present.       No acute pulmonary embolus to the segmental level.   Thickened interlobular septal markings with scattered ground-glass opacities with an upper lung zone predominance. Findings may be seen with mixed interstitial/alveolar pulmonary edema. Other superimposed infectious or inflammatory processes are also not excluded.   Trace left pleural effusion   MACRO: None.   Signed by: Evan Finkelstein 11/19/2024 4:36 AM Dictation workstation:   [id number]      Assessment and Plan  Principal Problem:    Critical limb ischemia of left lower extremity (Multi)    59 y.o. male with acute limb ischemia s/p Left femoral exploration, left femoral endarterectomy, femoral and tibial thrombectomy with patch angioplasty, left fore compartment fasciotomy    Patient is doing well, appears to have appropriate flow to the distal extremity  Continue vascular checks  May remove brown  Ambulate w/ PT/OT  Continue Eliquis, ASA, Brilinta  Appreciate medical and ICU care    Discussed with Dr. Kobi Sierra, DO  PGY3  Vascular surgery

## 2024-11-20 NOTE — TELEPHONE ENCOUNTER
Patient has been contacted to schedule hospital follow up visit. Dr. Tate would like for him to be scheduled 12/16/24. Voicemail left requesting call back to confirm.

## 2024-11-20 NOTE — PROGRESS NOTES
PROGRESS NOTE    HPI:  Dereck Shen is a 59 y.o. male with past medical history of severe coronary disease status post CABG, PCI, chronic combined systolic and diastolic heart failure with LV dysfunction, ICD implant, hypertension, leukemia, recurrent strokes,, PAD who had surgery in October for a left PTA left SFA complains of pain of his left leg that started late last night/early this morning and states that left his leg feels cold. He also noted that his left leg was white after walking and then a combination of white and purple coloration of the leg. He reports also feeling short of breath. He states that he did have chest pain but used Imdur to help alleviate the pain. He denies any coughing, nausea, vomiting no other issues or concerns otherwise.   BNP was elevated and he was hypoxic on presentation  He is now s/p embolectomy and fasciotomy    Subjective Data:  Patient sitting up at bedside eating breakfast.  Currently being seen by vascular service status post embolectomy and fasciotomy yesterday.  Is able to wiggle his toes with no significant pain and feels significantly better.  With control of pain no chest pain or shortness of breath or palpitations.  He is sinus rhythm with controlled blood pressures.  Patient currently with Douglass catheter which will need to be removed prior to discharge.  He will be resumed on oral anticoagulant today.  Dr. Wright was concerned with the shortness of breath and concern for possibility of slight volume overload.  His lungs are clear today.  Creatinine has bumped up slightly to 1.52.  Repeat order for echo per Dr. Wright needs to be performed today.  Patient is hopeful for discharge today.    Overnight Events:    None--     Objective Data:  Last Recorded Vitals:  Vitals:    11/20/24 0500 11/20/24 0600 11/20/24 0700 11/20/24 0800   BP: 93/51 107/64  133/63   BP Location:       Patient Position:       Pulse: 66 72 71 70   Resp: 12 18 14 13   Temp:       TempSrc:        SpO2: 92% 97% 97% 96%   Weight:       Height:           Last Labs:  CBC - 11/20/2024:  4:00 AM  6.9 12.0 199    35.6      CMP - 11/20/2024:  4:00 AM  8.0 5.9 9 --- 0.5   4.0 3.2 7 84      PTT - 11/19/2024:  3:59 AM  1.0   11.0 36     Last I/O:  I/O last 3 completed shifts:  In: 2936.8 (33.7 mL/kg) [P.O.:960; I.V.:676.8 (7.8 mL/kg); IV Piggyback:1300]  Out: 4335 (49.7 mL/kg) [Urine:4235 (1.3 mL/kg/hr); Blood:100]  Weight: 87.2 kg     Past Cardiology Tests (Last 3 Years):  Echo: Pending today      Inpatient Medications:  Scheduled medications   Medication Dose Route Frequency    acetaminophen  650 mg oral q6h PEDRO    aspirin  81 mg oral Daily    atorvastatin  80 mg oral Nightly    carvedilol  6.25 mg oral q12h    docusate sodium  100 mg oral BID    furosemide  40 mg intravenous Daily    hydrALAZINE  50 mg oral TID    insulin glargine  20 Units subcutaneous Nightly    insulin lispro  0-10 Units subcutaneous TID AC    isosorbide mononitrate ER  60 mg oral Daily    OXcarbazepine  450 mg oral BID    oxygen   inhalation Continuous - Inhalation    perflutren lipid microspheres  0.5-10 mL of dilution intravenous Once in imaging    perflutren protein A microsphere  0.5 mL intravenous Once in imaging    ranolazine  500 mg oral BID    sennosides  2 tablet oral Nightly    sulfur hexafluoride microsphr  2 mL intravenous Once in imaging    tamsulosin  0.4 mg oral Daily    ticagrelor  90 mg oral BID       Review of Systems   Constitutional: Negative for fever and malaise/fatigue.   Cardiovascular:  Negative for chest pain, orthopnea and palpitations.   Respiratory:  Negative for shortness of breath and wheezing.    Skin:  Negative for itching and rash.   Musculoskeletal:         Significant improvement in pain to the left lower extremity.  He is able to wiggle his toes at this time   Gastrointestinal:  Negative for abdominal pain, diarrhea, nausea and vomiting.   Genitourinary:  Negative for dysuria.   Neurological:  Negative for  weakness.        Physical Exam  Constitutional:       General: He is not in acute distress.     Appearance: Normal appearance.   HENT:      Mouth/Throat:      Mouth: Mucous membranes are moist.   Neck:      Comments: No JVD  Cardiovascular:      Rate and Rhythm: Normal rate and regular rhythm.      Heart sounds: Normal heart sounds. No murmur heard.     Comments: Telemetry sinus rhythm  Pulmonary:      Effort: Pulmonary effort is normal.      Breath sounds: Normal breath sounds. No rales.   Abdominal:      General: Abdomen is flat. Bowel sounds are normal.      Palpations: Abdomen is soft.   Musculoskeletal:         General: No swelling.      Comments: Dressing to left lower extremity.  Currently being evaluated by vascular surgery   Skin:     General: Skin is warm and dry.   Neurological:      Mental Status: He is alert and oriented to person, place, and time.   Psychiatric:         Mood and Affect: Mood normal.        ASSESSMENT/PLAN  1) Acute ischemic leg  S/P Thrombectomy and fasciotomy  Continue to monitor  2) Acute on chronic systolic heart failure  Hx of ischemic CMP followed by Dr. Newby  Continue Coreg, Hydralazine and imdur  Needs gentle diuresis  Monitor renal function closely  Check echo  11-20-24: Patient is significantly improved in regards to pain to the left lower extremity.  Currently being evaluated by vascular surgery.  Telemetry is sinus rhythm.  Blood pressures are well-controlled.  Discussed the need for complete and total smoking cessation.  With slight bump in creatinine and no evidence of volume overload will discontinue IV Lasix and resume usual home dose of oral Lasix 40 mg once daily.  Repeat echo has been ordered by Dr. Wright and is pending this morning.  Previously known to have EF around 35%.  On outpatient basis patient appears to follow with Dr. [facility]by at [facility].  For now continue aspirin and he is being reloaded on Eliquis with discontinuation of heparin infusion, high  intensity dose atorvastatin, carvedilol, hydralazine, isosorbide, Ranexa and Brilinta.  Patient will need resumed on his usual home dose of Entresto on discharge as well.  Patient will need his Douglass catheter removed to make sure that he can void prior to discharge as well.  Patient hoping for discharge today.  Case reviewed with vascular surgery.      Teo Garcia PA-C  11/20/2024  8:38 AM

## 2024-11-21 ENCOUNTER — PHARMACY VISIT (OUTPATIENT)
Dept: PHARMACY | Facility: CLINIC | Age: 59
End: 2024-11-21
Payer: MEDICAID

## 2024-11-21 VITALS
TEMPERATURE: 98.2 F | DIASTOLIC BLOOD PRESSURE: 78 MMHG | OXYGEN SATURATION: 98 % | HEIGHT: 71 IN | RESPIRATION RATE: 16 BRPM | HEART RATE: 75 BPM | WEIGHT: 208.11 LBS | BODY MASS INDEX: 29.14 KG/M2 | SYSTOLIC BLOOD PRESSURE: 130 MMHG

## 2024-11-21 DIAGNOSIS — E11.9 TYPE 2 DIABETES MELLITUS WITHOUT COMPLICATION, WITH LONG-TERM CURRENT USE OF INSULIN (MULTI): Primary | ICD-10-CM

## 2024-11-21 DIAGNOSIS — Z79.4 TYPE 2 DIABETES MELLITUS WITHOUT COMPLICATION, WITH LONG-TERM CURRENT USE OF INSULIN (MULTI): Primary | ICD-10-CM

## 2024-11-21 DIAGNOSIS — K21.9 GASTROESOPHAGEAL REFLUX DISEASE WITHOUT ESOPHAGITIS: ICD-10-CM

## 2024-11-21 LAB
ANION GAP SERPL CALC-SCNC: 11 MMOL/L
BASOPHILS # BLD AUTO: 0.02 X10*3/UL (ref 0–0.1)
BASOPHILS NFR BLD AUTO: 0.4 %
BUN SERPL-MCNC: 23 MG/DL (ref 6–23)
CALCIUM SERPL-MCNC: 8.5 MG/DL (ref 8.6–10.3)
CHLORIDE SERPL-SCNC: 98 MMOL/L (ref 98–107)
CO2 SERPL-SCNC: 27 MMOL/L (ref 21–32)
CREAT SERPL-MCNC: 1.29 MG/DL (ref 0.5–1.3)
EGFRCR SERPLBLD CKD-EPI 2021: 64 ML/MIN/1.73M*2
EOSINOPHIL # BLD AUTO: 0.07 X10*3/UL (ref 0–0.7)
EOSINOPHIL NFR BLD AUTO: 1.2 %
ERYTHROCYTE [DISTWIDTH] IN BLOOD BY AUTOMATED COUNT: 13.2 % (ref 11.5–14.5)
GLUCOSE BLD MANUAL STRIP-MCNC: 120 MG/DL (ref 74–99)
GLUCOSE SERPL-MCNC: 148 MG/DL (ref 74–99)
HCT VFR BLD AUTO: 33.1 % (ref 41–52)
HGB BLD-MCNC: 11.6 G/DL (ref 13.5–17.5)
IMM GRANULOCYTES # BLD AUTO: 0.01 X10*3/UL (ref 0–0.7)
IMM GRANULOCYTES NFR BLD AUTO: 0.2 % (ref 0–0.9)
INR PPP: 1.6 (ref 0.9–1.1)
LYMPHOCYTES # BLD AUTO: 1.09 X10*3/UL (ref 1.2–4.8)
LYMPHOCYTES NFR BLD AUTO: 19.3 %
MCH RBC QN AUTO: 31.6 PG (ref 26–34)
MCHC RBC AUTO-ENTMCNC: 35 G/DL (ref 32–36)
MCV RBC AUTO: 90 FL (ref 80–100)
MONOCYTES # BLD AUTO: 0.47 X10*3/UL (ref 0.1–1)
MONOCYTES NFR BLD AUTO: 8.3 %
NEUTROPHILS # BLD AUTO: 4 X10*3/UL (ref 1.2–7.7)
NEUTROPHILS NFR BLD AUTO: 70.6 %
NRBC BLD-RTO: 0 /100 WBCS (ref 0–0)
PLATELET # BLD AUTO: 179 X10*3/UL (ref 150–450)
POTASSIUM SERPL-SCNC: 4.2 MMOL/L (ref 3.5–5.3)
PROTHROMBIN TIME: 17.9 SECONDS (ref 9.8–12.8)
RBC # BLD AUTO: 3.67 X10*6/UL (ref 4.5–5.9)
SODIUM SERPL-SCNC: 132 MMOL/L (ref 136–145)
WBC # BLD AUTO: 5.7 X10*3/UL (ref 4.4–11.3)

## 2024-11-21 PROCEDURE — 2500000001 HC RX 250 WO HCPCS SELF ADMINISTERED DRUGS (ALT 637 FOR MEDICARE OP): Performed by: STUDENT IN AN ORGANIZED HEALTH CARE EDUCATION/TRAINING PROGRAM

## 2024-11-21 PROCEDURE — 2500000002 HC RX 250 W HCPCS SELF ADMINISTERED DRUGS (ALT 637 FOR MEDICARE OP, ALT 636 FOR OP/ED): Performed by: STUDENT IN AN ORGANIZED HEALTH CARE EDUCATION/TRAINING PROGRAM

## 2024-11-21 PROCEDURE — RXMED WILLOW AMBULATORY MEDICATION CHARGE

## 2024-11-21 PROCEDURE — 99239 HOSP IP/OBS DSCHRG MGMT >30: CPT | Performed by: INTERNAL MEDICINE

## 2024-11-21 PROCEDURE — 97165 OT EVAL LOW COMPLEX 30 MIN: CPT | Mod: GO | Performed by: OCCUPATIONAL THERAPIST

## 2024-11-21 PROCEDURE — 97161 PT EVAL LOW COMPLEX 20 MIN: CPT | Mod: GP

## 2024-11-21 PROCEDURE — 80048 BASIC METABOLIC PNL TOTAL CA: CPT | Performed by: INTERNAL MEDICINE

## 2024-11-21 PROCEDURE — 82947 ASSAY GLUCOSE BLOOD QUANT: CPT

## 2024-11-21 PROCEDURE — 85025 COMPLETE CBC W/AUTO DIFF WBC: CPT | Performed by: INTERNAL MEDICINE

## 2024-11-21 PROCEDURE — 99233 SBSQ HOSP IP/OBS HIGH 50: CPT | Performed by: INTERNAL MEDICINE

## 2024-11-21 PROCEDURE — 85610 PROTHROMBIN TIME: CPT | Performed by: INTERNAL MEDICINE

## 2024-11-21 PROCEDURE — 36415 COLL VENOUS BLD VENIPUNCTURE: CPT | Performed by: INTERNAL MEDICINE

## 2024-11-21 RX ORDER — FLASH GLUCOSE SENSOR
KIT MISCELLANEOUS
Qty: 6 EACH | Refills: 3 | Status: CANCELLED | OUTPATIENT
Start: 2024-11-21

## 2024-11-21 RX ORDER — PANTOPRAZOLE SODIUM 40 MG/1
40 TABLET, DELAYED RELEASE ORAL DAILY
Qty: 90 TABLET | Refills: 3 | Status: SHIPPED | OUTPATIENT
Start: 2024-11-21 | End: 2025-11-16

## 2024-11-21 RX ORDER — OXYCODONE HYDROCHLORIDE 5 MG/1
5 TABLET ORAL EVERY 6 HOURS PRN
Qty: 15 TABLET | Refills: 0 | Status: SHIPPED | OUTPATIENT
Start: 2024-11-21 | End: 2024-11-26

## 2024-11-21 ASSESSMENT — COGNITIVE AND FUNCTIONAL STATUS - GENERAL
MOBILITY SCORE: 17
TOILETING: A LITTLE
HELP NEEDED FOR BATHING: A LOT
DAILY ACTIVITIY SCORE: 20
TURNING FROM BACK TO SIDE WHILE IN FLAT BAD: A LITTLE
MOVING TO AND FROM BED TO CHAIR: A LITTLE
DRESSING REGULAR LOWER BODY CLOTHING: A LITTLE
WALKING IN HOSPITAL ROOM: A LITTLE
STANDING UP FROM CHAIR USING ARMS: A LITTLE
CLIMB 3 TO 5 STEPS WITH RAILING: TOTAL

## 2024-11-21 ASSESSMENT — ENCOUNTER SYMPTOMS
EYE REDNESS: 0
VOMITING: 0
DIARRHEA: 0
PALPITATIONS: 0
EYE DISCHARGE: 0
TROUBLE SWALLOWING: 0
ABDOMINAL DISTENTION: 0
LIGHT-HEADEDNESS: 0
COUGH: 0
NECK PAIN: 0
DIZZINESS: 0
SPEECH DIFFICULTY: 0
FEVER: 0
FREQUENCY: 0
NAUSEA: 0
BACK PAIN: 0
DIFFICULTY URINATING: 0
CONSTIPATION: 0
CHILLS: 0
ARTHRALGIAS: 0
SORE THROAT: 0
COLOR CHANGE: 0
DYSURIA: 0
SHORTNESS OF BREATH: 0
WEAKNESS: 0
ABDOMINAL PAIN: 0

## 2024-11-21 ASSESSMENT — PAIN SCALES - GENERAL
PAINLEVEL_OUTOF10: 0 - NO PAIN
PAINLEVEL_OUTOF10: 0 - NO PAIN
PAINLEVEL_OUTOF10: 7
PAINLEVEL_OUTOF10: 6

## 2024-11-21 ASSESSMENT — ACTIVITIES OF DAILY LIVING (ADL)
ADL_ASSISTANCE: INDEPENDENT
ADL_ASSISTANCE: INDEPENDENT

## 2024-11-21 ASSESSMENT — PAIN - FUNCTIONAL ASSESSMENT
PAIN_FUNCTIONAL_ASSESSMENT: 0-10

## 2024-11-21 NOTE — PROGRESS NOTES
"GENERAL SURGERY PROGRESS NOTE    Dereck Shen   1965   [id number]     Dereck Shen is a 59 y.o. male on day 2 of admission presenting with Critical limb ischemia of left lower extremity (Multi).      Subjective  Pt PANCHO at bedside. NAEO. Continues to improve since surgery. Pain has almost completely resolved. Pt plans to go home w/o homecare.    Review of Systems   Constitutional:  Negative for chills and fever.   HENT:  Negative for congestion, sore throat and trouble swallowing.    Eyes:  Negative for discharge and redness.   Respiratory:  Negative for cough and shortness of breath.    Cardiovascular:  Negative for chest pain and palpitations.   Gastrointestinal:  Negative for abdominal distention, abdominal pain, constipation, diarrhea, nausea and vomiting.   Endocrine: Negative for cold intolerance and heat intolerance.   Genitourinary:  Negative for difficulty urinating, dysuria, frequency and urgency.   Musculoskeletal:  Negative for arthralgias, back pain and neck pain.   Skin:  Negative for color change and rash.   Neurological:  Negative for dizziness, speech difficulty, weakness and light-headedness.       Objective    Last Recorded Vitals  Blood pressure 107/72, pulse 73, temperature 36.8 °C (98.2 °F), temperature source Temporal, resp. rate 18, height 1.803 m (5' 11\"), weight 94.4 kg (208 lb 1.8 oz), SpO2 95%.    Intake/Output last 3 Shifts:  I/O last 3 completed shifts:  In: 1339.5 (14.2 mL/kg) [P.O.:960; I.V.:379.5 (4 mL/kg)]  Out: 4892 (51.8 mL/kg) [Urine:4892 (1.4 mL/kg/hr)]  Weight: 94.4 kg     Gen: NAD.  A&Ox3  HEENT: NC/AT.  Moist mucous membranes.  Neck: Normal range of motion.  CV: Regular rate. Multiphasic pulses to the b/l DP and PT  Chest: Normal chest rise.  Normal respiratory effort.  Abdomen: Soft.  NT/ND.  No rigidity or guarding. Douglass in place. Groin incision soft, no swelling.  Extremities: No edema.  Moving all extremities. Slightly delayed capillary refill. Sensation " intact. Pulse exam above. LLE calf wrapped, no saturation of the dressing noted.    Relevant Results  Results for orders placed or performed during the hospital encounter of 11/19/24 (from the past 24 hours)   Transthoracic Echo (TTE) Complete   Result Value Ref Range    LVOT diam 2.20 cm    LV Biplane EF 38 %    MV E/A ratio 1.17     Tricuspid annular plane systolic excursion 1.9 cm    AV mn grad 4 mmHg    LA vol index A/L 24.8 ml/m2    AV pk leslie 1.46 m/s    LV EF 38 %    RVSP 19.0 mmHg    LVIDd 6.10 cm    Aortic Valve Area by Continuity of VTI 2.79 cm2    Aortic Valve Area by Continuity of Peak Velocity 2.84 cm2    AV pk grad 9 mmHg    LV A4C EF 37.8    POCT GLUCOSE   Result Value Ref Range    POCT Glucose 215 (H) 74 - 99 mg/dL   POCT GLUCOSE   Result Value Ref Range    POCT Glucose 149 (H) 74 - 99 mg/dL   POCT GLUCOSE   Result Value Ref Range    POCT Glucose 197 (H) 74 - 99 mg/dL   POCT GLUCOSE   Result Value Ref Range    POCT Glucose 211 (H) 74 - 99 mg/dL   CBC and Auto Differential   Result Value Ref Range    WBC 5.7 4.4 - 11.3 x10*3/uL    nRBC 0.0 0.0 - 0.0 /100 WBCs    RBC 3.67 (L) 4.50 - 5.90 x10*6/uL    Hemoglobin 11.6 (L) 13.5 - 17.5 g/dL    Hematocrit 33.1 (L) 41.0 - 52.0 %    MCV 90 80 - 100 fL    MCH 31.6 26.0 - 34.0 pg    MCHC 35.0 32.0 - 36.0 g/dL    RDW 13.2 11.5 - 14.5 %    Platelets 179 150 - 450 x10*3/uL    Neutrophils % 70.6 40.0 - 80.0 %    Immature Granulocytes %, Automated 0.2 0.0 - 0.9 %    Lymphocytes % 19.3 13.0 - 44.0 %    Monocytes % 8.3 2.0 - 10.0 %    Eosinophils % 1.2 0.0 - 6.0 %    Basophils % 0.4 0.0 - 2.0 %    Neutrophils Absolute 4.00 1.20 - 7.70 x10*3/uL    Immature Granulocytes Absolute, Automated 0.01 0.00 - 0.70 x10*3/uL    Lymphocytes Absolute 1.09 (L) 1.20 - 4.80 x10*3/uL    Monocytes Absolute 0.47 0.10 - 1.00 x10*3/uL    Eosinophils Absolute 0.07 0.00 - 0.70 x10*3/uL    Basophils Absolute 0.02 0.00 - 0.10 x10*3/uL   Basic Metabolic Panel   Result Value Ref Range    Glucose  148 (H) 74 - 99 mg/dL    Sodium 132 (L) 136 - 145 mmol/L    Potassium 4.2 3.5 - 5.3 mmol/L    Chloride 98 98 - 107 mmol/L    Bicarbonate 27 21 - 32 mmol/L    Anion Gap 11 mmol/L    Urea Nitrogen 23 6 - 23 mg/dL    Creatinine 1.29 0.50 - 1.30 mg/dL    eGFR 64 >60 mL/min/1.73m*2    Calcium 8.5 (L) 8.6 - 10.3 mg/dL   Protime-INR   Result Value Ref Range    Protime 17.9 (H) 9.8 - 12.8 seconds    INR 1.6 (H) 0.9 - 1.1   POCT GLUCOSE   Result Value Ref Range    POCT Glucose 120 (H) 74 - 99 mg/dL     *Note: Due to a large number of results and/or encounters for the requested time period, some results have not been displayed. A complete set of results can be found in Results Review.       ECG 12 lead    Result Date: 11/19/2024  Sinus rhythm Probable left atrial enlargement Borderline repolarization abnormality ST elevation, consider anterior injury Borderline prolonged QT interval See ED provider note for full interpretation and clinical correlation Confirmed by Zulema Stephen ([id number]) on 11/19/2024 11:07:14 AM    CT angio aorta and bilateral iliofemoral runoff including without contrast if performed    Result Date: 11/19/2024  Interpreted By:  Finkelstein, Evan, STUDY: CT ANGIO AORTA AND BILATERAL ILIOFEMORAL RUN OFF INCLUDING WITHOUT CONTRAST IF PERFORMED;  11/19/2024 4:42 am   INDICATION: Signs/Symptoms:no pulses left leg.     COMPARISON: CT runoff 09/13/2024   ACCESSION NUMBER(S): [id number]   ORDERING CLINICIAN: ACE VALENZUELA   TECHNIQUE: Contiguous axial CTA images were acquired through the abdomen and pelvis and bilateral lower extremities following the administration of 100 mL Omnipaque 350 intravenous contrast. Sagittal and coronal images were reconstructed. Maximum intensity projection and three dimensional images were constructed at a separate workstation.   FINDINGS: VASCULAR FINDINGS:   ABDOMINAL AORTA & ILIAC ARTERIES: No evidence of abdominal aortic aneurysm or dissection.   Moderate  atherosclerotic calcifications throughout the abdominal aorta and major proximal vasculature. Celiac axis, superior mesenteric artery, and inferior mesenteric artery are patent without any significant narrowing. Bilateral renal arteries are patent without any significant.   Right common iliac and external iliac arteries are patent without any significant narrowing. Patent right common/external iliac stent. Moderate to severe narrowing of the internal iliac arteries bilaterally. Left common and external iliac arteries are patent.     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 Common femoral artery is patent.  Native superficial femoral artery is patent. There is a stent within the mid to lower aspect of the native right superficial femoral artery which is patent. Profunda femoral artery is patent.  Popliteal artery is patent. Tibioperoneal trunk, posterior tibial artery, peroneal artery, and anterior tibial artery are patent. Three-vessel runoff at the ankle.   LEFT LOWER EXTREMITY: Common femoral artery is patent. 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 Redemonstrated occlusion of the native superficial femoral artery, similar compared to prior imaging. There is reconstitution of the popliteal artery. Poor opacification of the trifurcation of vessels just below the knee with poor opacification of the vessels throughout the entirety of the calf, ankle and foot, new/worsened compared to prior imaging..   ---------------------------------------------------------------   NON-VASCULAR FINDINGS:   LIVER: Within normal limits.   BILE DUCTS: Nondilated.   GALLBLADDER: No evidence of calcified gallstones or wall thickening.   PANCREAS: Within normal limits.   SPLEEN: A splenule is present. The spleen is otherwise unremarkable..   ADRENALS: Within normal limits.   KIDNEYS, URETERS, URINARY BLADDER: Unremarkable. No evidence of hydronephrosis.    BOWEL: No evidence of abnormal dilatation or obstruction of the small or large bowel. No evidence of pneumoperitoneum.   REPRODUCTIVE ORGANS:  Unremarkable   PERITONEUM / RETROPERITONEUM / LYMPH NODES: No evidence of any free fluid. No evidence of any significantly enlarged intra-abdominal or pelvic lymph nodes.   ABDOMINAL WALL: Fat containing inguinal hernias.   LOWER CHEST: Please refer to separately dictated CT chest report.   BONES: No evidence of suspicious lytic or blastic osseous lesions.       Persistent occlusion of the left femoral/popliteal bypass graft and native left superficial femoral artery. There is also new occlusion versus severe stenosis involving the vessels below the left knee beginning at the level of the trifurcation with the anterior tibial, posterior tibial and peroneal arteries not well seen through the calf, ankle and foot.   No acute abnormality of the abdomen or pelvis.   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 There is good opacification of the native right superficial femoral artery with three-vessel runoff to the right ankle.   MACRO: None.   Signed by: Evan Finkelstein 11/19/2024 5:20 AM Dictation workstation:   [id number]    CT angio chest for pulmonary embolism    Result Date: 11/19/2024  Interpreted By:  Finkelstein, Evan, STUDY: CT ANGIO CHEST FOR PULMONARY EMBOLISM;  11/19/2024 4:27 am   INDICATION: Signs/Symptoms:sob wih hypoxia.     COMPARISON: CT chest 06/23/2021   ACCESSION NUMBER(S): [id number]   ORDERING CLINICIAN: ACE VALENZUELA   TECHNIQUE: Axial CTA images of the chest after intravenous administration of 50 mL Omnipaque 350 using CT angiographic technique. Coronal and sagittal images are reconstructed. MIP images were created and reviewed.   FINDINGS: CHEST WALL AND LOWER NECK: Within normal limits. ABDOMEN: No acute abnormality of the partially visualized abdomen.   VASCULAR: AORTA: No aortic aneurysm. Mild atherosclerotic  disease. PULMONARY ARTERY: Normal caliber. No pulmonary embolus to the segmental level.   CHEST:   HEART: Normal size. No pericardial effusion. MEDIASTINUM AND REJI: No pathologically enlarged thoracic lymph nodes. LUNG, PLEURA, LARGE AIRWAYS: Trace left pleural effusion. Thickened interlobular septal markings. Ground-glass opacities scattered throughout the lungs bilaterally with an upper lung zone predominance.   BONES: No acute osseous abnormality. Median sternotomy wires are present.       No acute pulmonary embolus to the segmental level.   Thickened interlobular septal markings with scattered ground-glass opacities with an upper lung zone predominance. Findings may be seen with mixed interstitial/alveolar pulmonary edema. Other superimposed infectious or inflammatory processes are also not excluded.   Trace left pleural effusion   MACRO: None.   Signed by: Evan Finkelstein 11/19/2024 4:36 AM Dictation workstation:   [id number]      Assessment and Plan  Principal Problem:    Critical limb ischemia of left lower extremity (Multi)    59 y.o. male with acute limb ischemia s/p Left femoral exploration, left femoral endarterectomy, femoral and tibial thrombectomy with patch angioplasty, left fore compartment fasciotomy    Patient is doing well, improved warmth to left lower extremity  Monitor for voiding s/p brown removal  Ambulate as tolerated  Continue Eliquis, ASA, Brilinta  Appreciate medical care  Appropriate for discharge from vascular perspective today, will need to follow up with Dr. Peace in 2 weeks.    Will discuss with Dr. Kobi Sierra, DO  PGY3  Vascular surgery

## 2024-11-21 NOTE — DISCHARGE SUMMARY
"Discharge Diagnosis  Critical Limb Ischemia of left lower extremity   Peripheral Artery Disease   Coronary Artery Disease   Acute on chronic congestive heart failure   Hypertension   Type 2 Diabetes Mellitus   Hyperlipidemia     Issues Requiring Follow-Up  Follow up with your PCP after discharge   Follow up with Vascular Surgeon after discharge     Discharge Meds     Medication List      CONTINUE taking these medications     BD Insulin Syringe Ultra-Fine 1 mL 31 gauge x 5/16 syringe; Generic   drug: insulin syringe-needle U-100; USE AS DIRECTED FOUR TIMES PER DAY   * BD Ultra-Fine Micro Pen Needle 32 gauge x 1/4\" needle; Generic drug:   pen needle, diabetic; Use to inject 1-4 times daily as directed   * pen needle, diabetic 32 gauge x 5/32\" needle; Commonly known as: BD   Ultra-Fine Jocy Pen Needle; Pen Needles for Insulin.   * flash glucose sensor kit kit; USE AS DIRECTED AND CHANGE EVERY 14 DAYS   * FreeStyle En 2 Sensor kit; Generic drug: flash glucose sensor kit;   use as directed daily and change every 14 days   FreeStyle En 2 [place] misc; Generic drug: flash glucose scanning   reader; USE TO CHECK SUGARS FOUR TIMES A DAY   lancets misc; 1 each 4 times a day before meals.  * This list has 4 medication(s) that are the same as other medications   prescribed for you. Read the directions carefully, and ask your doctor or   other care provider to review them with you.     ASK your doctor about these medications     aspirin 81 mg EC tablet; Take 1 tablet (81 mg) by mouth once daily.   atorvastatin 80 mg tablet; Commonly known as: Lipitor   Brilinta 90 mg tablet; Generic drug: ticagrelor   carvedilol 6.25 mg tablet; Commonly known as: Coreg   Creon 36,000-114,000- 180,000 unit capsule,delayed release(DR/EC)   capsule; Generic drug: pancrelipase (Lip-Prot-Amyl)   ELDERBERRY FRUIT ORAL   Entresto  mg tablet; Generic drug: sacubitriL-valsartan   ezetimibe 10 mg tablet; Commonly known as: Zetia   Farxiga 10 " mg; Generic drug: dapagliflozin propanediol   * FreeStyle Precision Aristeo Strips strip; Generic drug: blood sugar   diagnostic; 1 each 2 times a day.   * FreeStyle Precision Aristeo Strips strip; Generic drug: blood sugar   diagnostic; Use as directed twice daily   furosemide 40 mg tablet; Commonly known as: Lasix   gabapentin 400 mg capsule; Commonly known as: Neurontin; Take 1 capsule   (400 mg) by mouth 3 times a day.   GINGER ROOT EXTRACT ORAL   hydrALAZINE 50 mg tablet; Commonly known as: Apresoline   insulin lispro 100 unit/mL injection; Commonly known as: HumaLOG; Inject   10 Units under the skin 3 times a day with meals. Plus sliding scale if BS   is over 200   isosorbide mononitrate ER 60 mg 24 hr tablet; Commonly known as: Imdur   Lantus Solostar U-100 Insulin 100 unit/mL (3 mL) pen; Generic drug:   insulin glargine; Inject 20 Units under the skin once daily at bedtime.   medical cannabis   nicotine 21 mg/24 hr patch; Commonly known as: Nicoderm CQ; APPLY 1   PATCH TO SKIN EVERY 24 HOURS AS DIRECTED   OXcarbazepine 150 mg tablet; Commonly known as: Trileptal   oxyCODONE-acetaminophen 5-325 mg tablet; Commonly known as: Percocet;   Take 1 tablet by mouth every 6 hours if needed for severe pain (7 - 10).   pantoprazole 40 mg EC tablet; Commonly known as: ProtoNix; TAKE 1 TABLET   BY MOUTH ONCE DAILY   ranolazine 500 mg 12 hr tablet; Commonly known as: Ranexa   tamsulosin 0.4 mg 24 hr capsule; Commonly known as: Flomax; TAKE 1   CAPSULE BY MOUTH ONCE DAILY   tiZANidine 2 mg tablet; Commonly known as: Zanaflex; Take 1 tablet (2   mg) by mouth once daily as needed for muscle spasms.  * This list has 2 medication(s) that are the same as other medications   prescribed for you. Read the directions carefully, and ask your doctor or   other care provider to review them with you.       Test Results Pending At Discharge  Pending Labs       Order Current Status    Surgical Pathology Exam In [facility]  Course   Dereck Shen is a 59 y.o. male presenting with with past medical history of severe coronary disease status post CABG, PCI, chronic combined systolic and diastolic heart failure preserved EF LV dysfunction, ICD implant, hypertension, leukemia, recurrent strokes,, PAD who had surgery Dr. Wright in October for a left PTA left SFA complains of pain of his left leg that started late last night/early this morning and states that left his leg feels cold. He also noted that his left leg was white after walking and then a combination of white and purple coloration of the leg. He reports also feeling short of breath. He states that he did have chest pain but used Imdur to help alleviate the pain. He denies any coughing, nausea, vomiting no other issues or concerns otherwise.      ED Course:   Vitals on presentation:  36.8 °C (98.2 °F), 97 HR 20 /87 BP 85% on RA  Labs: CBC unremarkable, CMP - sodium 135, glucose 257  BNP - 997  Imaging  CT Angio aorta and bilateral iliofemoral runoff - Persistent occlusion of the left femoral/popliteal bypass graft and native left superficial femoral artery. There is also new occlusion versus severe stenosis involving the vessels below the left knee beginning at the level of the trifurcation with the anterior tibial, posterior tibial and peroneal arteries not well seen through the calf, ankle and foot.  Re demonstrated occlusion of the right femoropopliteal bypass graft stent. There is good opacification of the native right superficial femoral artery with three-vessel runoff to the right ankle.  CT PE - No acute PE  Pulses were unable to be dopplerable in his left leg  Interventions: 6 L NC, 40 mg IV Lasix, Heparin, Albuterol, npo for surgery in am with Dr. Peace     11/20: Patient seen this morning. S/p embolectomy and fasciotomy performed 11/19 am. Patient states feeling better than yesterday and is not complain of any pain. Cardiology consulted and recommends checking  echo which was done yesterday, awaiting results. Continuing current medication regimen. Continue recommendations per Vascular Surgery. Will continue to monitor.      11/21: Patient seen this morning. S/p embolectomy and fasciotomy performed 11/19 am. Patient states feeling much improved since surgery and the pain has almost complete resolved. He states being able to feel and move left extremity. Echo completed 11/19 shows LV sys function moderately decreased with 38% EF previous echo shoed 35%, abnormal wall motion, abnormal pattern of LV diastolic filling, and an akinetic inferolateral segment and basal inferior wall. Continue current medication regiment per cardiology. Continue recommendations per Vascular Surgery.     Pertinent Physical Exam At Time of Discharge  Physical Exam  Constitutional: Patient is resting comfortably. No acute distress      Head and Face: Face is symmetric. Head is grossly normal with no step-offs     Neck: Supple. Trachea midline. No LAD. No JVD.     Lungs: Clear to auscultation bilaterally. Effort normal     Cardiovascular: Normal heart rate and eddie. No murmurs, gallops, or rubs.     Abdominal: Soft, non-tender, non-distended. Bowel sounds present in all four quadrants     Pelvic/: No CVA tenderness     Extremities: Doppler pulse in both feet, able to flex and extend toes on both feet     Neurological: Patient is A&Ox3. Speech is normal. No focal neural deficits.     Dermatologic: Normal sun and age related skin changes. Skin is warm and dry. Skin tone appropriate for ethnicity. Post op bandaged on left leg      Psychiatric/behavioral: Appropriate mood and affect. No auditory or visual hallucinations.    Outpatient Follow-Up  Future Appointments   Date Time Provider Department Center   11/22/2024  8:20 AM SUMAN Carlin[facility] [id number] [facility]   12/2/2024  1:30 PM POR VASC 6 PORVSC [facility]   12/6/2024  9:00 AM POR MRI [facility]   12/10/2024  1:45 PM Iban Peace  DO [id number] [facility]   12/19/2024  1:45 PM Haylie Tate MD [id number] [facility]   2/4/2025  4:15 PM Haylie Tate MD [id number] [facility]     Discharge planning took more than 30 minutes.      NANCY LEE    Pt seen and examined  with my Medical student . I have modified the note to reflect my documentation of HPI and assessment and plan.    Ashley Celestin MD MRCP

## 2024-11-21 NOTE — PROGRESS NOTES
Dereck Shen is a 59 y.o. male on day 2 of admission presenting with Critical limb ischemia of left lower extremity (Multi).      Subjective   History Of Present Illness  Dereck Shen is a 59 y.o. male presenting with with past medical history of severe coronary disease status post CABG, PCI, chronic combined systolic and diastolic heart failure preserved EF LV dysfunction, ICD implant, hypertension, leukemia, recurrent strokes,, PAD who had surgery Dr. Wright in October for a left PTA left SFA complains of pain of his left leg that started late last night/early this morning and states that left his leg feels cold. He also noted that his left leg was white after walking and then a combination of white and purple coloration of the leg. He reports also feeling short of breath. He states that he did have chest pain but used Imdur to help alleviate the pain. He denies any coughing, nausea, vomiting no other issues or concerns otherwise.      ED Course:   Vitals on presentation:  36.8 °C (98.2 °F), 97 HR 20 /87 BP 85% on RA  Labs: CBC unremarkable, CMP - sodium 135, glucose 257  BNP - 997  Imaging  CT Angio aorta and bilateral iliofemoral runoff - Persistent occlusion of the left femoral/popliteal bypass graft and native left superficial femoral artery. There is also new occlusion versus severe stenosis involving the vessels below the left knee beginning at the level of the trifurcation with the anterior tibial, posterior tibial and peroneal arteries not well seen through the calf, ankle and foot.  Re demonstrated occlusion of the right femoropopliteal bypass graft stent. There is good opacification of the native right superficial femoral artery with three-vessel runoff to the right ankle.  CT PE - No acute PE  Pulses were unable to be dopplerable in his left leg  Interventions: 6 L NC, 40 mg IV Lasix, Heparin, Albuterol, npo for surgery in am with Dr. Peace    11/20: Patient seen this morning. S/p  embolectomy and fasciotomy performed 11/19 am. Patient states feeling better than yesterday and is not complain of any pain. Cardiology consulted and recommends checking echo which was done yesterday, awaiting results. Continuing current medication regimen. Continue recommendations per Vascular Surgery. Will continue to monitor.     11/21: Patient seen this morning. S/p embolectomy and fasciotomy performed 11/19 am. Patient states feeling much improved since surgery and the pain has almost complete resolved. He states being able to feel and move left extremity. Echo completed 11/19 shows LV sys function moderately decreased with 38% EF previous echo shoed 35%, abnormal wall motion, abnormal pattern of LV diastolic filling, and an akinetic inferolateral segment and basal inferior wall. Continue current medication regiment per cardiology. Continue recommendations per Vascular Surgery. Will continue to monitor         Objective     Last Recorded Vitals  /76 (BP Location: Right arm, Patient Position: Lying)   Pulse 72   Temp 36.3 °C (97.3 °F) (Temporal)   Resp 18   Wt 94.4 kg (208 lb 1.8 oz)   SpO2 95%   Intake/Output last 3 Shifts:    Intake/Output Summary (Last 24 hours) at 11/21/2024 0659  Last data filed at 11/21/2024 0600  Gross per 24 hour   Intake --   Output 4032 ml   Net -4032 ml       Admission Weight  Weight: 84.4 kg (186 lb) (11/19/24 0338)    Daily Weight  11/21/24 : 94.4 kg (208 lb 1.8 oz)    Image Results  Transthoracic Echo (TTE) [place] [id number]       Phone [phone] Fax [phone]    TRANSTHORACIC ECHOCARDIOGRAM REPORT    Patient Name:       [facility]   Reading Physician:    [id number] Britney Rush MD  Study Date:         11/20/2024          Ordering Provider:    [id number] DANNA SEARS  MRN/PID:            [id number]             Fellow:  Accession#:         [id number]        Nurse:                Noemi Villa RN  Date of Birth/Age:  1965 / 59      Sonographer:          Laxmi crisostomo RDCS  Gender Assigned at  M                   Additional Staff:  Birth:  Height:             180.34 cm           Admit Date:           11/19/2024  Weight:             87.09 kg            Admission Status:     Inpatient -                                                                Routine  BSA / BMI:          2.07 m2 / 26.78     Department Location:  [facility]                      kg/m2  Blood Pressure: 107 /64 mmHg    Study Type:    TRANSTHORACIC ECHO (TTE) COMPLETE  Diagnosis/ICD: Shortness of breath-R06.02; Unspecified systolic (congestive)                 heart failure (CHF)-I50.20  Indication:    CHF, SHORTNESS OF BREATH  CPT Codes:     Echo Complete w Full Doppler-[id number]    Patient History:  Pertinent History: CABG x5 2019, HTN.    Study Detail: The following Echo studies were performed: 2D, M-Mode, Doppler and                color flow. Technically challenging study due to prominent lung                artifact and body habitus. Optison used as a contrast agent for                endocardial border definition. Total contrast used for this                procedure was 3 mL via IV push.       PHYSICIAN INTERPRETATION:  Left Ventricle: The left ventricular systolic function is moderately decreased, with a Salinas's biplane calculated ejection fraction of 38%. Wall motion is abnormal. The left ventricular cavity size is normal. There is normal septal and normal posterior left ventricular wall thickness. Spectral Doppler shows an abnormal pattern of left ventricular diastolic filling. Akinetic inferolateral segment, and basal inferior wall.  Left Atrium: The left atrium is normal in size.  Right Ventricle: The right ventricle was not well visualized. Right ventricular systolic function  not assessed.  Right Atrium: The right atrium is normal in size.  Aortic Valve: The aortic valve appears structurally normal. The aortic valve dimensionless index is 0.73. There is no evidence of aortic valve regurgitation. The peak instantaneous gradient of the aortic valve is 9 mmHg. The mean gradient of the aortic valve is 4 mmHg.  Mitral Valve: The mitral valve is normal in structure. There is no evidence of mitral valve regurgitation.  Tricuspid Valve: The tricuspid valve is structurally normal. There is trace tricuspid regurgitation.  Pulmonic Valve: The pulmonic valve is structurally normal. There is no indication of pulmonic valve regurgitation.  Pericardium: No pericardial effusion noted.  Aorta: The aortic root is normal.  Systemic Veins: The inferior vena cava appears normal in size, with IVC inspiratory collapse greater than 50%.       CONCLUSIONS:   1. Poorly visualized anatomical structures due to suboptimal image quality.   2. The left ventricular systolic function is moderately decreased, with a Salinas's biplane calculated ejection fraction of 38%.   3. Abnormal wall motion.   4. Spectral Doppler shows an abnormal pattern of left ventricular diastolic filling.   5. Akinetic inferolateral segment, and basal inferior wall.    QUANTITATIVE DATA SUMMARY:     2D MEASUREMENTS:           Normal Ranges:  Ao Root d:       2.80 cm   (2.0-3.7cm)  LAs:             3.60 cm   (2.7-4.0cm)  IVSd:            0.70 cm   (0.6-1.1cm)  LVPWd:           0.70 cm   (0.6-1.1cm)  LVIDd:           6.10 cm   (3.9-5.9cm)  LVIDs:           5.30 cm  LV Mass Index:   78.5 g/m2  LV % FS          13.1 %       LA VOLUME:                    Normal Ranges:  LA Vol A4C:        48.7 ml    (22+/-6mL/m2)  LA Vol A2C:        49.8 ml  LA Vol BP:         51.4 ml  LA Vol Index A4C:  23.5ml/m2  LA Vol Index A2C:  24.0 ml/m2  LA Vol Index BP:   24.8 ml/m2  LA Area A4C:       17.6 cm2  LA Area A2C:       18.6 cm2  LA Major Axis A4C: 5.4 cm  LA  Major Axis A2C: 5.9 cm  LA Volume Index:   24.8 ml/m2       RA VOLUME BY A/L METHOD:          Normal Ranges:  RA Area A4C:             13.5 cm2       AORTA MEASUREMENTS:         Normal Ranges:  Ao Sinus, d:        2.80 cm (2.1-3.5cm)  Ao STJ, d:          2.60 cm (1.7-3.4cm)  Asc Ao, d:          2.90 cm (2.1-3.4cm)       LV SYSTOLIC FUNCTION BY 2D PLANIMETRY (MOD):                       Normal Ranges:  EF-A4C View:    38 % (>=55%)  EF-A2C View:    39 %  EF-Biplane:     38 %  LV EF Reported: 38 %       LV DIASTOLIC FUNCTION:             Normal Ranges:  MV Peak E:             0.75 m/s    (0.7-1.2 m/s)  MV Peak A:             0.65 m/s    (0.42-0.7 m/s)  E/A Ratio:             1.17        (1.0-2.2)  MV e'                  0.069 m/s   (>8.0)  MV lateral e'          0.08 m/s  MV medial e'           0.06 m/s  MV A Dur:              109.00 msec  E/e' Ratio:            10.91       (<8.0)       MITRAL VALVE:          Normal Ranges:  MV DT:        211 msec (150-240msec)       AORTIC VALVE:                     Normal Ranges:  AoV Vmax:                1.46 m/s (<=1.7m/s)  AoV Peak P.5 mmHg (<20mmHg)  AoV Mean P.0 mmHg (1.7-11.5mmHg)  LVOT Max Ayad:            1.09 m/s (<=1.1m/s)  AoV VTI:                 29.40 cm (18-25cm)  LVOT VTI:                21.60 cm  LVOT Diameter:           2.20 cm  (1.8-2.4cm)  AoV Area, VTI:           2.79 cm2 (2.5-5.5cm2)  AoV Area,Vmax:           2.84 cm2 (2.5-4.5cm2)  AoV Dimensionless Index: 0.73       RIGHT VENTRICLE:  TAPSE: 19.1 mm       TRICUSPID VALVE/RVSP:          Normal Ranges:  Peak TR Velocity:     2.00 m/s  RV Syst Pressure:     19 mmHg  (< 30mmHg)  IVC Diam:             1.60 cm       [id number] Britney Rush MD  Electronically signed on 2024 at 1:49:12 PM       ** Final **      Physical Exam  Constitutional: Patient is resting comfortably. No acute distress      Head and Face: Face is symmetric. Head is grossly normal with no step-offs     Neck:  Supple. Trachea midline. No LAD. No JVD.     Lungs: Clear to auscultation bilaterally. Effort normal     Cardiovascular: Normal heart rate and eddie. No murmurs, gallops, or rubs.     Abdominal: Soft, non-tender, non-distended. Bowel sounds present in all four quadrants     Pelvic/: No CVA tenderness     Extremities: Doppler pulse in both feet, able to flex and extend toes on both feet    Neurological: Patient is A&Ox3. Speech is normal. No focal neural deficits.    Dermatologic: Normal sun and age related skin changes. Skin is warm and dry. Skin tone appropriate for ethnicity. Post op bandaged on left leg      Psychiatric/behavioral: Appropriate mood and affect. No auditory or visual hallucinations.    Relevant Results    Results from last 7 days   Lab Units 11/21/24 0323 11/20/24 0400 11/19/24  0359   WBC AUTO x10*3/uL 5.7 6.9 9.0   HEMOGLOBIN g/dL 11.6* 12.0* 16.5   HEMATOCRIT % 33.1* 35.6* 48.2   PLATELETS AUTO x10*3/uL 179 199 259   NEUTROS PCT AUTO % 70.6 67.2 71.0   LYMPHS PCT AUTO % 19.3 21.8 21.7   MONOS PCT AUTO % 8.3 8.2 6.0   EOS PCT AUTO % 1.2 2.2 0.8     Results from last 7 days   Lab Units 11/21/24 0323 11/20/24  0400 11/19/24  0359   SODIUM mmol/L 132* 134* 135*   POTASSIUM mmol/L 4.2 4.4 4.4   CHLORIDE mmol/L 98 99 103   CO2 mmol/L 27 28 24   BUN mg/dL 23 26* 22   CREATININE mg/dL 1.29 1.52* 1.25   CALCIUM mg/dL 8.5* 8.0* 8.8   PROTEIN TOTAL g/dL  --  5.9* 7.4   BILIRUBIN TOTAL mg/dL  --  0.5 0.4   ALK PHOS U/L  --  84 118   ALT U/L  --  7* 8*   AST U/L  --  9 8*   GLUCOSE mg/dL 148* 202* 257*     Scheduled medications  acetaminophen, 650 mg, oral, q6h PEDRO  apixaban, 10 mg, oral, BID   Followed by  [START ON 11/27/2024] apixaban, 5 mg, oral, BID  aspirin, 81 mg, oral, Daily  atorvastatin, 80 mg, oral, Nightly  carvedilol, 6.25 mg, oral, q12h  docusate sodium, 100 mg, oral, BID  ezetimibe, 10 mg, oral, Nightly  furosemide, 40 mg, oral, Daily  hydrALAZINE, 50 mg, oral, TID  insulin glargine, 20  Units, subcutaneous, Nightly  insulin lispro, 0-10 Units, subcutaneous, TID AC  isosorbide mononitrate ER, 60 mg, oral, Daily  OXcarbazepine, 450 mg, oral, BID  oxygen, , inhalation, Continuous - Inhalation  perflutren lipid microspheres, 0.5-10 mL of dilution, intravenous, Once in imaging  ranolazine, 500 mg, oral, BID  sennosides, 2 tablet, oral, Nightly  sulfur hexafluoride microsphr, 2 mL, intravenous, Once in imaging  tamsulosin, 0.4 mg, oral, Daily  ticagrelor, 90 mg, oral, BID      Continuous medications       PRN medications  PRN medications: dextrose, dextrose, glucagon, glucagon, HYDROmorphone, naloxone, ondansetron ODT **OR** ondansetron, oxyCODONE, oxyCODONE         Assessment/Plan      Critical Limb Ischemia of left lower extremity   Peripheral Artery Disease   Continue Heparin Drip  Vascular surgery consulted and appreciated   NPO - scheduled for surgery 11/19 am with Dr. Peace   CT chest for PE and CT aorta/bilateral iliofemoral runoff completed   Continue to monitor  11/20: Patient seen, s/p embolectomy and fasciotomy performed 11/19 am. Cardiology consulted and recommends checking echo done yesterday, awaiting results. Continue recommendations per Vascular Surgery. Will continue to monitor   11/21: Patient seen, s/p embolectomy and fasciotomy performed 11/19 am. Echo completed 11/19 shows LV sys function moderately decreased with 38% EF previous echo shoed 35%, abnormal wall motion, abnormal pattern of LV diastolic filling, and an akinetic inferolateral segment and basal inferior wall. Continue recommendations per Vascular Surgery. Will continue to monitor      Coronary Artery Disease   Acute on chronic congestive heart failure   Continue home medications  Continue to monitor    11/20: Patient seen this morning. Continue on current medication regimen, per cardiology. Will continue to monitor   11/21: Patient seen this morning. Continue on current medication regimen, per cardiology. Will continue to  monitor      Hypertension   Continue home medications      Type 2 Diabetes Mellitus   Continue home medications   Lantus nightly and lispro sliding scale      Hyperlipidemia   Continue home medications      DVT Prophylaxis   SCDs, Heparin                   NANCY LEE    Pt seen and examined  with my Medical student . I have modified the note to reflect my documentation of HPI and assessment and plan.    Ashley Celestin MD MRCP

## 2024-11-21 NOTE — PROGRESS NOTES
Occupational Therapy    Evaluation    Patient Name: Dereck Shen  MRN: [id number]  Today's Date: 11/21/2024  Time Calculation  Start Time: 1006  Stop Time: 1025  Time Calculation (min): 19 min  2030/2030-A    Assessment  IP OT Assessment  OT Assessment: Min A ADLs which mother can assist if needed  Prognosis: Good  Medical Staff Made Aware: Yes  End of Session Communication: Bedside nurse  End of Session Patient Position: Bed, 1 rail up       11/21/24 1006   Inpatient Plan   No Skilled OT No acute OT goals identified   OT - OK to Discharge Yes   OT Discharge Recommendations Low intensity level of continued care   OT Recommended Transfer Status Stand by assist     Subjective     Current Problem:  1. Critical limb ischemia of left lower extremity (Multi)  apixaban (Eliquis) 5 mg tablet    oxyCODONE (Roxicodone) 5 mg immediate release tablet    Referral to Primary Care - Family Practice    Referral to Vascular Surgery      2. Acute on chronic congestive heart failure, unspecified heart failure type        3. Acute lower limb ischemia        4. Femoral artery occlusion, left (CMS-HCC)  Surgical Pathology Exam    Surgical Pathology Exam      5. Shortness of breath on exertion  Transthoracic Echo (TTE) Complete    Transthoracic Echo (TTE) Complete      6. Unspecified systolic (congestive) heart failure  Transthoracic Echo (TTE) Complete          General:  General  Reason for Referral: L leg endart. and thrombectomy 11/20  Referred By: Kobi  Family/Caregiver Present: Yes (mother)  Patient Position Received: Bed, 3 rail up  General Comment: pt. agreeable to OOB mobility    Precautions:  LE Weight Bearing Status: Weight Bearing as Tolerated    Vital Signs:see nurses notes        Pain:  Pain Assessment  Pain Assessment:  (mod. L leg pain hip-to-ankle)    Objective     Cognition:  Overall Cognitive Status: Within Functional Limits  Orientation Level: Oriented X4         Home Living:  Type of Home: Apartment  Lives With:  Alone  Home Adaptive Equipment: Walker rolling or standard  Home Layout:  (in 3rd floor apt. but going to mother's home, 1-level)  Bathroom Shower/Tub: Tub/shower unit  Bathroom Equipment: Shower chair with back     Prior Function:  Level of [place]: Independent with ADLs and functional transfers, Independent with homemaking with ambulation  ADL Assistance: Independent  Homemaking Assistance: Independent  Ambulatory Assistance: Independent    IADL History:  Homemaking Responsibilities: Yes  Meal Prep Responsibility: Primary  Laundry Responsibility: Primary  Cleaning Responsibility: Primary    ADL:  LE Dressing Assistance: Stand by (to don pants while seated EOB)  Toileting Assistance with Device:  (CGA with FWW to/from bathroom)    Activity Tolerance:  Endurance: Decreased tolerance for upright activites    Bed Mobility/Transfers:   Bed Mobility  Bed Mobility:  (SBA in/out bed)  Transfers  Transfer:  (Min.A  stand-to-sit)    Ambulation/Gait Training:  Functional Mobility  Functional Mobility Performed:  (CGA amb. with FWW in room and hallway)    Sitting Balance:WNL        Standing Balance:SBA with FWW        Vision: Vision - Basic Assessment  Current Vision: No visual deficits      Sensation:  Light Touch: No apparent deficits    Strength:  Strength Comments: UEs WNL       Coordination:  Movements are Fluid and Coordinated: Yes     Hand Function:  Hand Function  Gross Grasp: Functional        Outcome Measures: [facility] Daily Activity  Putting on and taking off regular lower body clothing: A little  Bathing (including washing, rinsing, drying): A lot  Putting on and taking off regular upper body clothing: None  Toileting, which includes using toilet, bedpan or urinal: A little  Taking care of personal grooming such as brushing teeth: None  Eating Meals: None  Daily Activity - Total Score: 20                       EDUCATION:     Education Documentation  No documentation found.  Education Comments  No comments  found.

## 2024-11-21 NOTE — DOCUMENTATION CLARIFICATION NOTE
"    PATIENT:               CARMENZA OLE[facility]  ACCT #:                  [id number]  MRN:                       [id number]  :                       1965  ADMIT DATE:       2024 3:41 AM  DISCH DATE:        2024 12:13 PM  RESPONDING PROVIDER #:        [id number]          PROVIDER RESPONSE TEXT:    Acute Hypoxemic Respiratory Failure    CDI QUERY TEXT:    Clarification    Instruction:    Based on your assessment of the patient and the clinical information, please provide the requested documentation by clicking on the appropriate radio button and enter any additional information if prompted.    Question: Is there a diagnosis indicative of the clinical information    When answering this query, please exercise your independent professional judgment. The fact that a question is being asked, does not imply that any particular answer is desired or expected.    The patient's clinical indicators include:  Clinical Information:  59-year-old male with past medical history of severe coronary disease status post CABG, PCI, chronic combined systolic and diastolic heart failure preserved EF LV dysfunction, ICD implant, hypertension, leukemia, recurrent strokes,, PAD who had surgery Dr. Wright in October for a left PTA left SFA presents to the emergency department for pain of his left leg that started an hour ago and seen in his leg feels cold    Clinical Indicators:    ED note  - \"He reports also feeling short of breath but denies any chest pain........Diagnoses as of 24 0549 Acute on chronic congestive heart failure\"     Cards - \"BNP was elevated and he was hypoxic on presentation\"     at 0335 SpO2 85,  at 0447, , RR 20, 185/101, SpO2 94 on 6L NC,  at 0527 HR 98, RR 20, 168/111, SpO2 92 on 6L NC,  at 0800 36.8, HR 93, RR 27, 176/98, SpO2 97    CT chest  - Trace left pleural effusion. Thickened interlobular septal markings. Ground-glass opacities scattered  throughout the lungs " bilaterally with an upper lung zone predominance.    Treatment: supplemental oxygen, CT chest, labs, monitoring VS    Risk Factors: Acute on Chronic CHF  Options provided:  -- Acute Hypoxemic Respiratory Failure  -- No acute respiratory failure  -- Other - I will add my own diagnosis  -- Refer to Clinical Documentation Reviewer    Query created by: Teresa Chambers on 11/21/2024 11:05 AM      Electronically signed by:  BELLO GAR MD 11/21/2024 12:39 PM

## 2024-11-22 ENCOUNTER — HOME HEALTH ADMISSION (OUTPATIENT)
Dept: HOME HEALTH SERVICES | Facility: HOME HEALTH | Age: 59
End: 2024-11-22
Payer: COMMERCIAL

## 2024-11-22 ENCOUNTER — TELEPHONE (OUTPATIENT)
Dept: VASCULAR SURGERY | Facility: HOSPITAL | Age: 59
End: 2024-11-22

## 2024-11-22 ENCOUNTER — PATIENT OUTREACH (OUTPATIENT)
Dept: PRIMARY CARE | Facility: CLINIC | Age: 59
End: 2024-11-22

## 2024-11-22 ENCOUNTER — HOME CARE VISIT (OUTPATIENT)
Dept: HOME HEALTH SERVICES | Facility: HOME HEALTH | Age: 59
End: 2024-11-22
Payer: COMMERCIAL

## 2024-11-22 ENCOUNTER — PHARMACY VISIT (OUTPATIENT)
Dept: PHARMACY | Facility: CLINIC | Age: 59
End: 2024-11-22
Payer: MEDICAID

## 2024-11-22 ENCOUNTER — DOCUMENTATION (OUTPATIENT)
Dept: HOME HEALTH SERVICES | Facility: HOME HEALTH | Age: 59
End: 2024-11-22

## 2024-11-22 ENCOUNTER — APPOINTMENT (OUTPATIENT)
Dept: PRIMARY CARE | Facility: CLINIC | Age: 59
End: 2024-11-22
Payer: COMMERCIAL

## 2024-11-22 VITALS
RESPIRATION RATE: 16 BRPM | HEIGHT: 71 IN | OXYGEN SATURATION: 96 % | TEMPERATURE: 97.3 F | HEART RATE: 72 BPM | DIASTOLIC BLOOD PRESSURE: 64 MMHG | SYSTOLIC BLOOD PRESSURE: 138 MMHG | WEIGHT: 186 LBS | BODY MASS INDEX: 26.04 KG/M2

## 2024-11-22 DIAGNOSIS — I99.8 LIMB ISCHEMIA: Primary | ICD-10-CM

## 2024-11-22 PROCEDURE — G0299 HHS/HOSPICE OF RN EA 15 MIN: HCPCS

## 2024-11-22 PROCEDURE — RXMED WILLOW AMBULATORY MEDICATION CHARGE

## 2024-11-22 RX ORDER — ONDANSETRON 4 MG/1
4 TABLET, FILM COATED ORAL EVERY 8 HOURS PRN
Qty: 10 TABLET | Refills: 0 | Status: SHIPPED | OUTPATIENT
Start: 2024-11-22 | End: 2024-11-26

## 2024-11-22 RX ORDER — FLASH GLUCOSE SENSOR
KIT MISCELLANEOUS
Qty: 2 EACH | Refills: 11 | Status: SHIPPED | OUTPATIENT
Start: 2024-11-22

## 2024-11-22 ASSESSMENT — ACTIVITIES OF DAILY LIVING (ADL)
ENTERING_EXITING_HOME: SUPERVISION
OASIS_M1830: 04

## 2024-11-22 ASSESSMENT — ENCOUNTER SYMPTOMS
PAIN LOCATION - PAIN SEVERITY: 5/10
SUBJECTIVE PAIN PROGRESSION: GRADUALLY IMPROVING
FATIGUES EASILY: 1
PERSON REPORTING PAIN: PATIENT
APPETITE LEVEL: FAIR
PAIN LOCATION - PAIN FREQUENCY: INTERMITTENT
LOWEST PAIN SEVERITY IN PAST 24 HOURS: 4/10
PAIN: 1
PAIN SEVERITY GOAL: 0/10
INDIGESTION: 1
CONSTIPATION: 1
LAST BOWEL MOVEMENT: 67163
PAIN LOCATION - PAIN QUALITY: BURNING
HYPERTENSION: 1
PAIN LOCATION: LEFT LEG
HIGHEST PAIN SEVERITY IN PAST 24 HOURS: 5/10
STOOL FREQUENCY: DAILY

## 2024-11-22 NOTE — HH CARE COORDINATION
Home Care received a Referral for Nursing. We have processed the referral for a Start of Care on 11/23-11/24.     If you have any questions or concerns, please feel free to contact us at [phone]. Follow the prompts, enter your five digit zip code, and you will be directed to your care team on [facility].

## 2024-11-22 NOTE — PROGRESS NOTES
Discharge Facility: [facility]  Discharge Diagnosis: Critical limb ischemia of LLE  Admission Date: 19 Nov 24  Discharge Date: 21 Nov 24    PCP Appointment Date: Pt declined, wishes to follow with Vasc Surg  Specialist Appointment Date: 3 Dec 24 ([facility])  Hospital Encounter and Summary Linked: Yes    See discharge assessment below for further details     Engagement  Call Start Time: 1009 (Spoke with patient) (11/22/2024 10:18 AM)    Medications  Medications reviewed with patient/caregiver?: Yes (11/22/2024 10:18 AM)  Is the patient having any side effects they believe may be caused by any medication additions or changes?: No (11/22/2024 10:18 AM)  Does the patient have all medications ordered at discharge?: Yes (11/22/2024 10:18 AM)  Prescription Comments: pt able to obtain and afford all medications (11/22/2024 10:18 AM)  Is the patient taking all medications as directed (includes completed medication regime)?: Yes (11/22/2024 10:18 AM)  Medication Comments: pt's questions regarding medications answered. (11/22/2024 10:18 AM)    Appointments  Does the patient have a primary care provider?: Yes (pt declining to make follow up appt with PCP at this time.) (11/22/2024 10:18 AM)  Has the patient kept scheduled appointments due by today?: Yes (11/22/2024 10:18 AM)    Self Management  What is the home health agency?: None (11/22/2024 10:18 AM)  Has home health visited the patient within 72 hours of discharge?: Not applicable (11/22/2024 10:18 AM)  What Durable Medical Equipment (DME) was ordered?: None (11/22/2024 10:18 AM)    Patient Teaching  Does the patient have access to their discharge instructions?: Yes (11/22/2024 10:18 AM)  Care Management Interventions: Reviewed instructions with patient (11/22/2024 10:18 AM)  What is the patient's perception of their health status since discharge?: Improving (11/22/2024 10:18 AM)  Is the patient/caregiver able to teach back the hierarchy of who to call/visit for  symptoms/problems? PCP, Specialist, Home Health nurse, Urgent Care, ED, 911: Yes (11/22/2024 10:18 AM)  Patient/Caregiver Education Comments: None (11/22/2024 10:18 AM)    Wrap Up  Wrap Up Additional Comments: pt states he is doing well since discharge. questions regarding medications answered. pt not wishing to make follow up appt with PCP at this time. states he will follow with Vasc surg (11/22/2024 10:18 AM)  Call End Time: 1019 (11/22/2024 10:18 AM)    Pt declining to make PCP follow up appt at this time. Pt states he is doing well and will follow with Vasc Surg. Pt's questions regarding medications answer.

## 2024-11-22 NOTE — TELEPHONE ENCOUNTER
Pts mother is calling, needs dressing change and was not sent home with instructions, states wound care has not been set up for them.   Please advise.  Also eliquis is causing nausea. And not able to eat  Danna [phone]

## 2024-11-26 ENCOUNTER — TELEPHONE (OUTPATIENT)
Dept: VASCULAR SURGERY | Facility: HOSPITAL | Age: 59
End: 2024-11-26
Payer: COMMERCIAL

## 2024-11-26 PROCEDURE — RXMED WILLOW AMBULATORY MEDICATION CHARGE

## 2024-11-26 RX ORDER — OXYCODONE AND ACETAMINOPHEN 5; 325 MG/1; MG/1
1 TABLET ORAL EVERY 6 HOURS PRN
Qty: 5 TABLET | Refills: 0 | Status: SHIPPED | OUTPATIENT
Start: 2024-11-26 | End: 2024-11-29 | Stop reason: WASHOUT

## 2024-11-27 ENCOUNTER — HOME CARE VISIT (OUTPATIENT)
Dept: HOME HEALTH SERVICES | Facility: HOME HEALTH | Age: 59
End: 2024-11-27
Payer: COMMERCIAL

## 2024-11-27 ENCOUNTER — PHARMACY VISIT (OUTPATIENT)
Dept: PHARMACY | Facility: CLINIC | Age: 59
End: 2024-11-27
Payer: MEDICAID

## 2024-11-27 VITALS
OXYGEN SATURATION: 97 % | TEMPERATURE: 98 F | HEART RATE: 68 BPM | SYSTOLIC BLOOD PRESSURE: 118 MMHG | RESPIRATION RATE: 16 BRPM | DIASTOLIC BLOOD PRESSURE: 64 MMHG

## 2024-11-27 PROCEDURE — G0299 HHS/HOSPICE OF RN EA 15 MIN: HCPCS

## 2024-11-27 ASSESSMENT — ENCOUNTER SYMPTOMS
DIZZINESS: 1
SUBJECTIVE PAIN PROGRESSION: WAXING AND WANING
PAIN LOCATION - PAIN SEVERITY: 5/10
APPETITE LEVEL: GOOD
LOWER EXTREMITY EDEMA: 1
CHANGE IN APPETITE: UNCHANGED
MUSCLE WEAKNESS: 1
LAST BOWEL MOVEMENT: 67171
HYPERTENSION: 1
FATIGUES EASILY: 1
PAIN: 1
PAIN LOCATION: LEFT LEG
PERSON REPORTING PAIN: PATIENT

## 2024-11-28 ENCOUNTER — HOME CARE VISIT (OUTPATIENT)
Dept: HOME HEALTH SERVICES | Facility: HOME HEALTH | Age: 59
End: 2024-11-28
Payer: COMMERCIAL

## 2024-11-28 VITALS — OXYGEN SATURATION: 99 % | HEART RATE: 64 BPM | TEMPERATURE: 97.1 F

## 2024-11-28 PROCEDURE — G0299 HHS/HOSPICE OF RN EA 15 MIN: HCPCS

## 2024-11-28 ASSESSMENT — ENCOUNTER SYMPTOMS
CHANGE IN APPETITE: UNCHANGED
DYSPNEA ACTIVITY LEVEL: AFTER AMBULATING 10 - 20 FT
CONTUSION: 1
DENIES PAIN: 1
PERSON REPORTING PAIN: PATIENT
APPETITE LEVEL: GOOD
SHORTNESS OF BREATH: 1

## 2024-11-29 ENCOUNTER — PHARMACY VISIT (OUTPATIENT)
Dept: PHARMACY | Facility: CLINIC | Age: 59
End: 2024-11-29
Payer: MEDICAID

## 2024-11-29 ENCOUNTER — TELEPHONE (OUTPATIENT)
Dept: VASCULAR SURGERY | Facility: HOSPITAL | Age: 59
End: 2024-11-29
Payer: COMMERCIAL

## 2024-11-29 DIAGNOSIS — E11.9 TYPE 2 DIABETES MELLITUS WITHOUT COMPLICATION, UNSPECIFIED WHETHER LONG TERM INSULIN USE (MULTI): ICD-10-CM

## 2024-11-29 DIAGNOSIS — Z86.73 H/O: CVA (CEREBROVASCULAR ACCIDENT): ICD-10-CM

## 2024-11-29 DIAGNOSIS — I50.42 CHRONIC COMBINED SYSTOLIC AND DIASTOLIC CHF (CONGESTIVE HEART FAILURE): Primary | ICD-10-CM

## 2024-11-29 DIAGNOSIS — J42 CHRONIC BRONCHITIS, UNSPECIFIED CHRONIC BRONCHITIS TYPE (MULTI): ICD-10-CM

## 2024-11-29 LAB
LABORATORY COMMENT REPORT: NORMAL
PATH REPORT.FINAL DX SPEC: NORMAL
PATH REPORT.GROSS SPEC: NORMAL
PATH REPORT.MICROSCOPIC SPEC OTHER STN: NORMAL
PATH REPORT.RELEVANT HX SPEC: NORMAL
PATH REPORT.TOTAL CANCER: NORMAL

## 2024-11-29 PROCEDURE — RXMED WILLOW AMBULATORY MEDICATION CHARGE

## 2024-11-29 RX ORDER — OXYCODONE AND ACETAMINOPHEN 5; 325 MG/1; MG/1
1 TABLET ORAL EVERY 6 HOURS PRN
Qty: 16 TABLET | Refills: 0 | Status: SHIPPED | OUTPATIENT
Start: 2024-11-29 | End: 2024-12-03

## 2024-12-02 ENCOUNTER — HOSPITAL ENCOUNTER (OUTPATIENT)
Dept: VASCULAR MEDICINE | Facility: HOSPITAL | Age: 59
Discharge: HOME | End: 2024-12-02
Payer: COMMERCIAL

## 2024-12-02 DIAGNOSIS — I73.9 PERIPHERAL ARTERIAL DISEASE (CMS-HCC): ICD-10-CM

## 2024-12-02 DIAGNOSIS — I73.9 PVD (PERIPHERAL VASCULAR DISEASE) (CMS-HCC): ICD-10-CM

## 2024-12-02 PROCEDURE — 93925 LOWER EXTREMITY STUDY: CPT

## 2024-12-02 PROCEDURE — 93922 UPR/L XTREMITY ART 2 LEVELS: CPT

## 2024-12-02 PROCEDURE — 93922 UPR/L XTREMITY ART 2 LEVELS: CPT | Performed by: INTERNAL MEDICINE

## 2024-12-02 PROCEDURE — 93925 LOWER EXTREMITY STUDY: CPT | Performed by: INTERNAL MEDICINE

## 2024-12-03 ENCOUNTER — PHARMACY VISIT (OUTPATIENT)
Dept: PHARMACY | Facility: CLINIC | Age: 59
End: 2024-12-03
Payer: MEDICAID

## 2024-12-03 ENCOUNTER — APPOINTMENT (OUTPATIENT)
Dept: VASCULAR SURGERY | Facility: HOSPITAL | Age: 59
End: 2024-12-03
Payer: COMMERCIAL

## 2024-12-03 VITALS
BODY MASS INDEX: 28.03 KG/M2 | SYSTOLIC BLOOD PRESSURE: 159 MMHG | WEIGHT: 201 LBS | DIASTOLIC BLOOD PRESSURE: 88 MMHG | HEART RATE: 75 BPM

## 2024-12-03 DIAGNOSIS — I73.9 PAD (PERIPHERAL ARTERY DISEASE) (CMS-HCC): ICD-10-CM

## 2024-12-03 DIAGNOSIS — I99.8 LIMB ISCHEMIA: Primary | ICD-10-CM

## 2024-12-03 PROCEDURE — 3049F LDL-C 100-129 MG/DL: CPT | Performed by: SURGERY

## 2024-12-03 PROCEDURE — 99211 OFF/OP EST MAY X REQ PHY/QHP: CPT | Performed by: SURGERY

## 2024-12-03 PROCEDURE — 3077F SYST BP >= 140 MM HG: CPT | Performed by: SURGERY

## 2024-12-03 PROCEDURE — RXMED WILLOW AMBULATORY MEDICATION CHARGE

## 2024-12-03 PROCEDURE — 3079F DIAST BP 80-89 MM HG: CPT | Performed by: SURGERY

## 2024-12-03 RX ORDER — OXYCODONE AND ACETAMINOPHEN 5; 325 MG/1; MG/1
1 TABLET ORAL EVERY 6 HOURS PRN
Qty: 16 TABLET | Refills: 0 | Status: SHIPPED | OUTPATIENT
Start: 2024-12-03 | End: 2024-12-07

## 2024-12-03 NOTE — PROGRESS NOTES
Subjective   Patient ID: Dereck Shen is a 59 y.o. male who presents for No chief complaint on file..  HPI  59 year old male presents for postoperative follow up visit s/p left femoral endarterectomy left femoral-tibial thrombectomy and 4 compartment fasciotomy 11/19/24 for acute critical limb ischemia. Overall doing well postoperatively. No rest pain of the foot. No claudication. Only complaint is pain at the fasciotomy and groin sites. Has had home healt care nursing to assist with dressing changes, starts going to wound care clinic tomorrow. Continues on eliquis, plavix and asa, is tolerating without issue.     11/19/24 left femoral endarterectomy left femoral-tibial thrombectomy and 4 compartment fasciotomy for acute critical limb ischemia. (Dr Peace)    Review of Systems   Constitutional:  Negative for chills, fatigue and fever.   HENT:  Negative for congestion, sore throat and trouble swallowing.    Eyes:  Negative for visual disturbance.   Respiratory:  Negative for cough, shortness of breath and wheezing.    Cardiovascular:  Negative for chest pain, palpitations and leg swelling.   Gastrointestinal:  Negative for abdominal pain, constipation, diarrhea, nausea and vomiting.   Endocrine: Negative for cold intolerance and heat intolerance.   Genitourinary:  Negative for difficulty urinating.   Musculoskeletal:  Negative for arthralgias, joint swelling and neck pain.   Skin:  Negative for color change and wound.   Neurological:  Negative for dizziness, seizures, syncope, facial asymmetry, speech difficulty, weakness, light-headedness, numbness and headaches.   Hematological:  Negative for adenopathy.   Psychiatric/Behavioral:  Negative for behavioral problems, confusion and sleep disturbance.      Vitals:    12/03/24 1421   BP: 159/88   BP Location: Right arm   Pulse: 75   Weight: 91.2 kg (201 lb)      Objective   Physical Exam  Constitutional:       Appearance: Normal appearance.   HENT:      Head:  Normocephalic.      Right Ear: External ear normal.      Left Ear: External ear normal.      Nose: Nose normal.      Mouth/Throat:      Mouth: Mucous membranes are moist.   Eyes:      Extraocular Movements: Extraocular movements intact.   Neck:      Vascular: No carotid bruit.   Cardiovascular:      Rate and Rhythm: Normal rate and regular rhythm.      Pulses: Normal pulses.      Comments: Mild LLE edema. LLE warm to the toes, motor and sensory intact. AT/PT signals by doppler, foot is warm and well perfused.     Left groin incision with staples, staples removed today, incision is c/d/I    Left medial and later calf fasciotomy open incisions healthy tissue, no surrounding erythema, filling in nicely   Pulmonary:      Effort: Pulmonary effort is normal. No respiratory distress.      Breath sounds: Normal breath sounds.   Abdominal:      Palpations: Abdomen is soft.      Tenderness: There is no abdominal tenderness.   Musculoskeletal:         General: No swelling or tenderness.      Cervical back: Normal range of motion and neck supple.      Right lower leg: No edema.      Left lower leg: Edema present.   Skin:     General: Skin is warm and dry.      Capillary Refill: Capillary refill takes less than 2 seconds.      Findings: No lesion.   Neurological:      General: No focal deficit present.      Mental Status: He is alert and oriented to person, place, and time. Mental status is at baseline.   Psychiatric:         Mood and Affect: Mood normal.         Behavior: Behavior normal.         Thought Content: Thought content normal.         Judgment: Judgment normal.         Assessment/Plan   Problem List Items Addressed This Visit             ICD-10-CM    PAD (peripheral artery disease) (CMS-[facility]) I73.9    Relevant Medications    oxyCODONE-acetaminophen (Percocet) 5-325 mg tablet    Other Relevant Orders    Vascular US PVR Without Exercise    Vascular US lower extremity arterial duplex left     Other Visit Diagnoses          Codes    Limb ischemia    -  Primary I99.8    Relevant Medications    oxyCODONE-acetaminophen (Percocet) 5-325 mg tablet    Other Relevant Orders    Vascular US PVR Without Exercise    Vascular US lower extremity arterial duplex left          59 year old male presents for postoperative follow up visit s/p left femoral endarterectomy left femoral-tibial thrombectomy and 4 compartment fasciotomy 11/19/24 for acute critical limb ischemia. Overall doing well postoperatively. No rest pain of the foot. No claudication.   -reviewed recent arterial duplex and PVR   -incisional and fasciotomy site pain, will send percocet rx to pharmacy  -continue wound care clinic, has appointment tomorrow  -staples removed today from groin incision.   -repeat arterial duplex and PVR/ANSON in 3 months  -ambulation as tolerated  -follow up in 3 months  -call with questions or concerns  Dr Peace present and in agreement        Marla Aguilar PA-C 12/03/24 3:52 PM

## 2024-12-04 ENCOUNTER — APPOINTMENT (OUTPATIENT)
Dept: WOUND CARE | Facility: CLINIC | Age: 59
End: 2024-12-04
Payer: COMMERCIAL

## 2024-12-04 ASSESSMENT — ENCOUNTER SYMPTOMS
NAUSEA: 0
COUGH: 0
LIGHT-HEADEDNESS: 0
SLEEP DISTURBANCE: 0
WEAKNESS: 0
CONFUSION: 0
NECK PAIN: 0
HEADACHES: 0
WHEEZING: 0
DIARRHEA: 0
CONSTIPATION: 0
SEIZURES: 0
PALPITATIONS: 0
FATIGUE: 0
ARTHRALGIAS: 0
ADENOPATHY: 0
FEVER: 0
COLOR CHANGE: 0
CHILLS: 0
DIFFICULTY URINATING: 0
SPEECH DIFFICULTY: 0
NUMBNESS: 0
JOINT SWELLING: 0
WOUND: 0
DIZZINESS: 0
VOMITING: 0
TROUBLE SWALLOWING: 0
SORE THROAT: 0
ABDOMINAL PAIN: 0
FACIAL ASYMMETRY: 0
SHORTNESS OF BREATH: 0

## 2024-12-05 ENCOUNTER — HOME CARE VISIT (OUTPATIENT)
Dept: HOME HEALTH SERVICES | Facility: HOME HEALTH | Age: 59
End: 2024-12-05
Payer: COMMERCIAL

## 2024-12-05 NOTE — HOME HEALTH
Spoke with patient who reports he is okay to miss SN visit this week. Independent in surgical wound care. Has no additional needs at this time.

## 2024-12-06 ENCOUNTER — APPOINTMENT (OUTPATIENT)
Dept: RADIOLOGY | Facility: HOSPITAL | Age: 59
End: 2024-12-06
Payer: COMMERCIAL

## 2024-12-06 ENCOUNTER — DOCUMENTATION (OUTPATIENT)
Dept: PRIMARY CARE | Facility: CLINIC | Age: 59
End: 2024-12-06
Payer: COMMERCIAL

## 2024-12-10 ENCOUNTER — PHARMACY VISIT (OUTPATIENT)
Dept: PHARMACY | Facility: CLINIC | Age: 59
End: 2024-12-10
Payer: MEDICAID

## 2024-12-10 ENCOUNTER — OFFICE VISIT (OUTPATIENT)
Dept: WOUND CARE | Facility: CLINIC | Age: 59
End: 2024-12-10
Payer: COMMERCIAL

## 2024-12-10 ENCOUNTER — APPOINTMENT (OUTPATIENT)
Dept: VASCULAR SURGERY | Facility: CLINIC | Age: 59
End: 2024-12-10
Payer: COMMERCIAL

## 2024-12-10 DIAGNOSIS — I73.9 PAD (PERIPHERAL ARTERY DISEASE) (CMS-HCC): ICD-10-CM

## 2024-12-10 DIAGNOSIS — I99.8 LIMB ISCHEMIA: ICD-10-CM

## 2024-12-10 PROCEDURE — 11042 DBRDMT SUBQ TIS 1ST 20SQCM/<: CPT

## 2024-12-10 PROCEDURE — RXMED WILLOW AMBULATORY MEDICATION CHARGE

## 2024-12-10 PROCEDURE — 99213 OFFICE O/P EST LOW 20 MIN: CPT

## 2024-12-10 RX ORDER — OXYCODONE AND ACETAMINOPHEN 5; 325 MG/1; MG/1
1 TABLET ORAL EVERY 6 HOURS PRN
Qty: 16 TABLET | Refills: 0 | Status: SHIPPED | OUTPATIENT
Start: 2024-12-10 | End: 2024-12-14

## 2024-12-10 RX ORDER — DOXYCYCLINE 100 MG/1
100 CAPSULE ORAL 2 TIMES DAILY
Qty: 10 CAPSULE | Refills: 0 | OUTPATIENT
Start: 2024-12-10

## 2024-12-11 ENCOUNTER — PHARMACY VISIT (OUTPATIENT)
Dept: PHARMACY | Facility: CLINIC | Age: 59
End: 2024-12-11

## 2024-12-11 PROCEDURE — RXMED WILLOW AMBULATORY MEDICATION CHARGE

## 2024-12-11 RX ORDER — ISOSORBIDE MONONITRATE 60 MG/1
60 TABLET, EXTENDED RELEASE ORAL DAILY
Qty: 90 TABLET | Refills: 1 | OUTPATIENT
Start: 2024-06-14

## 2024-12-11 RX ORDER — HYDRALAZINE HYDROCHLORIDE 50 MG/1
50 TABLET, FILM COATED ORAL 3 TIMES DAILY
Qty: 270 TABLET | Refills: 1 | OUTPATIENT
Start: 2024-08-16

## 2024-12-11 RX ORDER — DAPAGLIFLOZIN 10 MG/1
10 TABLET, FILM COATED ORAL
Qty: 90 TABLET | Refills: 2 | OUTPATIENT
Start: 2024-10-11

## 2024-12-11 RX ORDER — PEN NEEDLE, DIABETIC 30 GX3/16"
NEEDLE, DISPOSABLE MISCELLANEOUS
Qty: 400 EACH | Refills: 0 | OUTPATIENT
Start: 2023-12-22

## 2024-12-11 RX ORDER — ATORVASTATIN CALCIUM 80 MG/1
80 TABLET, FILM COATED ORAL NIGHTLY
Qty: 90 TABLET | Refills: 2 | OUTPATIENT
Start: 2024-10-24

## 2024-12-13 ENCOUNTER — HOME CARE VISIT (OUTPATIENT)
Dept: HOME HEALTH SERVICES | Facility: HOME HEALTH | Age: 59
End: 2024-12-13
Payer: COMMERCIAL

## 2024-12-13 ENCOUNTER — PHARMACY VISIT (OUTPATIENT)
Dept: PHARMACY | Facility: CLINIC | Age: 59
End: 2024-12-13
Payer: MEDICAID

## 2024-12-13 PROCEDURE — G0299 HHS/HOSPICE OF RN EA 15 MIN: HCPCS

## 2024-12-13 SDOH — ECONOMIC STABILITY: GENERAL

## 2024-12-13 ASSESSMENT — ENCOUNTER SYMPTOMS
PAIN LOCATION: LEFT LEG
PAIN LOCATION - PAIN QUALITY: ACHY
DIARRHEA: 1
PAIN LOCATION - PAIN SEVERITY: 6/10
SUBJECTIVE PAIN PROGRESSION: GRADUALLY IMPROVING
STOOL FREQUENCY: DAILY
LOWEST PAIN SEVERITY IN PAST 24 HOURS: 4/10
HIGHEST PAIN SEVERITY IN PAST 24 HOURS: 7/10
PAIN LOCATION - PAIN FREQUENCY: CONSTANT
APPETITE LEVEL: FAIR
PERSON REPORTING PAIN: PATIENT
PAIN: 1

## 2024-12-13 ASSESSMENT — ACTIVITIES OF DAILY LIVING (ADL): MONEY MANAGEMENT (EXPENSES/BILLS): INDEPENDENT

## 2024-12-16 DIAGNOSIS — I99.8 LIMB ISCHEMIA: ICD-10-CM

## 2024-12-16 DIAGNOSIS — G62.9 NEUROPATHY: ICD-10-CM

## 2024-12-16 DIAGNOSIS — I73.9 PAD (PERIPHERAL ARTERY DISEASE) (CMS-HCC): ICD-10-CM

## 2024-12-16 RX ORDER — OXYCODONE AND ACETAMINOPHEN 5; 325 MG/1; MG/1
1 TABLET ORAL EVERY 6 HOURS PRN
Qty: 16 TABLET | Refills: 0 | Status: CANCELLED | OUTPATIENT
Start: 2024-12-16 | End: 2024-12-20

## 2024-12-17 ENCOUNTER — APPOINTMENT (OUTPATIENT)
Dept: RADIOLOGY | Facility: HOSPITAL | Age: 59
End: 2024-12-17
Payer: COMMERCIAL

## 2024-12-17 ENCOUNTER — APPOINTMENT (OUTPATIENT)
Dept: WOUND CARE | Facility: CLINIC | Age: 59
End: 2024-12-17
Payer: COMMERCIAL

## 2024-12-17 ENCOUNTER — APPOINTMENT (OUTPATIENT)
Dept: CARDIOLOGY | Facility: HOSPITAL | Age: 59
End: 2024-12-17
Payer: COMMERCIAL

## 2024-12-17 ENCOUNTER — HOSPITAL ENCOUNTER (INPATIENT)
Facility: HOSPITAL | Age: 59
End: 2024-12-17
Attending: EMERGENCY MEDICINE | Admitting: HOSPITALIST
Payer: COMMERCIAL

## 2024-12-17 ENCOUNTER — APPOINTMENT (OUTPATIENT)
Dept: VASCULAR MEDICINE | Facility: HOSPITAL | Age: 59
End: 2024-12-17
Payer: COMMERCIAL

## 2024-12-17 DIAGNOSIS — I73.9 PERIPHERAL VASCULAR DISEASE, UNSPECIFIED (CMS-HCC): ICD-10-CM

## 2024-12-17 DIAGNOSIS — Z98.61 S/P PTCA (PERCUTANEOUS TRANSLUMINAL CORONARY ANGIOPLASTY): Primary | ICD-10-CM

## 2024-12-17 DIAGNOSIS — I70.222 CRITICAL LIMB ISCHEMIA OF LEFT LOWER EXTREMITY (MULTI): ICD-10-CM

## 2024-12-17 DIAGNOSIS — I73.9 PAD (PERIPHERAL ARTERY DISEASE) (CMS-HCC): ICD-10-CM

## 2024-12-17 DIAGNOSIS — Z01.818 ENCOUNTER FOR OTHER PREPROCEDURAL EXAMINATION: ICD-10-CM

## 2024-12-17 DIAGNOSIS — I77.9: ICD-10-CM

## 2024-12-17 DIAGNOSIS — I70.209 ARTERIAL OCCLUSION, LOWER EXTREMITY (CMS-HCC): ICD-10-CM

## 2024-12-17 LAB
ALBUMIN SERPL BCP-MCNC: 3.6 G/DL (ref 3.4–5)
ALP SERPL-CCNC: 97 U/L (ref 33–120)
ALT SERPL W P-5'-P-CCNC: 6 U/L (ref 10–52)
ANION GAP SERPL CALC-SCNC: 12 MMOL/L (ref 10–20)
APTT PPP: 33 SECONDS (ref 27–38)
AST SERPL W P-5'-P-CCNC: 7 U/L (ref 9–39)
ATRIAL RATE: 78 BPM
BASOPHILS # BLD AUTO: 0.02 X10*3/UL (ref 0–0.1)
BASOPHILS NFR BLD AUTO: 0.3 %
BILIRUB SERPL-MCNC: 0.3 MG/DL (ref 0–1.2)
BNP SERPL-MCNC: 139 PG/ML (ref 0–99)
BUN SERPL-MCNC: 39 MG/DL (ref 6–23)
CALCIUM SERPL-MCNC: 8.6 MG/DL (ref 8.6–10.3)
CARDIAC TROPONIN I PNL SERPL HS: 14 NG/L (ref 0–20)
CHLORIDE SERPL-SCNC: 106 MMOL/L (ref 98–107)
CO2 SERPL-SCNC: 23 MMOL/L (ref 21–32)
CREAT SERPL-MCNC: 1.53 MG/DL (ref 0.5–1.3)
D DIMER PPP FEU-MCNC: 1012 NG/ML FEU
EGFRCR SERPLBLD CKD-EPI 2021: 52 ML/MIN/1.73M*2
EOSINOPHIL # BLD AUTO: 0.31 X10*3/UL (ref 0–0.7)
EOSINOPHIL NFR BLD AUTO: 5.1 %
ERYTHROCYTE [DISTWIDTH] IN BLOOD BY AUTOMATED COUNT: 13.4 % (ref 11.5–14.5)
GLUCOSE BLD MANUAL STRIP-MCNC: 175 MG/DL (ref 74–99)
GLUCOSE BLD MANUAL STRIP-MCNC: 254 MG/DL (ref 74–99)
GLUCOSE BLD MANUAL STRIP-MCNC: 278 MG/DL (ref 74–99)
GLUCOSE SERPL-MCNC: 162 MG/DL (ref 74–99)
HCT VFR BLD AUTO: 38.1 % (ref 41–52)
HGB BLD-MCNC: 13.2 G/DL (ref 13.5–17.5)
IMM GRANULOCYTES # BLD AUTO: 0.02 X10*3/UL (ref 0–0.7)
IMM GRANULOCYTES NFR BLD AUTO: 0.3 % (ref 0–0.9)
INR PPP: 1.2 (ref 0.9–1.1)
LYMPHOCYTES # BLD AUTO: 1.84 X10*3/UL (ref 1.2–4.8)
LYMPHOCYTES NFR BLD AUTO: 30.5 %
MCH RBC QN AUTO: 31.6 PG (ref 26–34)
MCHC RBC AUTO-ENTMCNC: 34.6 G/DL (ref 32–36)
MCV RBC AUTO: 91 FL (ref 80–100)
MONOCYTES # BLD AUTO: 0.44 X10*3/UL (ref 0.1–1)
MONOCYTES NFR BLD AUTO: 7.3 %
NEUTROPHILS # BLD AUTO: 3.41 X10*3/UL (ref 1.2–7.7)
NEUTROPHILS NFR BLD AUTO: 56.5 %
NRBC BLD-RTO: 0 /100 WBCS (ref 0–0)
P AXIS: 50 DEGREES
PLATELET # BLD AUTO: 232 X10*3/UL (ref 150–450)
POTASSIUM SERPL-SCNC: 4.1 MMOL/L (ref 3.5–5.3)
PR INTERVAL: 156 MS
PROT SERPL-MCNC: 6.8 G/DL (ref 6.4–8.2)
PROTHROMBIN TIME: 13.1 SECONDS (ref 9.8–12.8)
Q ONSET: 249 MS
QRS COUNT: 13 BEATS
QRS DURATION: 108 MS
QT INTERVAL: 386 MS
QTC CALCULATION(BAZETT): 446 MS
QTC FREDERICIA: 425 MS
R AXIS: 46 DEGREES
RBC # BLD AUTO: 4.18 X10*6/UL (ref 4.5–5.9)
RBC MORPH BLD: NORMAL
SODIUM SERPL-SCNC: 137 MMOL/L (ref 136–145)
T AXIS: 65 DEGREES
T OFFSET: 442 MS
UFH PPP CHRO-ACNC: 0.7 IU/ML
UFH PPP CHRO-ACNC: 0.9 IU/ML
UFH PPP CHRO-ACNC: 1.8 IU/ML
VENTRICULAR RATE: 80 BPM
WBC # BLD AUTO: 6 X10*3/UL (ref 4.4–11.3)

## 2024-12-17 PROCEDURE — 83880 ASSAY OF NATRIURETIC PEPTIDE: CPT | Performed by: EMERGENCY MEDICINE

## 2024-12-17 PROCEDURE — RXMED WILLOW AMBULATORY MEDICATION CHARGE

## 2024-12-17 PROCEDURE — S4991 NICOTINE PATCH NONLEGEND: HCPCS | Performed by: NURSE PRACTITIONER

## 2024-12-17 PROCEDURE — 99285 EMERGENCY DEPT VISIT HI MDM: CPT | Mod: 25 | Performed by: EMERGENCY MEDICINE

## 2024-12-17 PROCEDURE — 85520 HEPARIN ASSAY: CPT | Performed by: EMERGENCY MEDICINE

## 2024-12-17 PROCEDURE — 99222 1ST HOSP IP/OBS MODERATE 55: CPT | Performed by: NURSE PRACTITIONER

## 2024-12-17 PROCEDURE — 75635 CT ANGIO ABDOMINAL ARTERIES: CPT | Mod: FOREIGN READ | Performed by: RADIOLOGY

## 2024-12-17 PROCEDURE — 85379 FIBRIN DEGRADATION QUANT: CPT | Performed by: EMERGENCY MEDICINE

## 2024-12-17 PROCEDURE — 93922 UPR/L XTREMITY ART 2 LEVELS: CPT | Performed by: SURGERY

## 2024-12-17 PROCEDURE — 2500000001 HC RX 250 WO HCPCS SELF ADMINISTERED DRUGS (ALT 637 FOR MEDICARE OP): Performed by: NURSE PRACTITIONER

## 2024-12-17 PROCEDURE — 93922 UPR/L XTREMITY ART 2 LEVELS: CPT

## 2024-12-17 PROCEDURE — 36415 COLL VENOUS BLD VENIPUNCTURE: CPT | Performed by: EMERGENCY MEDICINE

## 2024-12-17 PROCEDURE — 2550000001 HC RX 255 CONTRASTS: Performed by: EMERGENCY MEDICINE

## 2024-12-17 PROCEDURE — 2060000001 HC INTERMEDIATE ICU ROOM DAILY

## 2024-12-17 PROCEDURE — 2500000002 HC RX 250 W HCPCS SELF ADMINISTERED DRUGS (ALT 637 FOR MEDICARE OP, ALT 636 FOR OP/ED): Performed by: PHYSICIAN ASSISTANT

## 2024-12-17 PROCEDURE — 75635 CT ANGIO ABDOMINAL ARTERIES: CPT

## 2024-12-17 PROCEDURE — 82947 ASSAY GLUCOSE BLOOD QUANT: CPT

## 2024-12-17 PROCEDURE — 84484 ASSAY OF TROPONIN QUANT: CPT | Performed by: EMERGENCY MEDICINE

## 2024-12-17 PROCEDURE — 2500000002 HC RX 250 W HCPCS SELF ADMINISTERED DRUGS (ALT 637 FOR MEDICARE OP, ALT 636 FOR OP/ED): Performed by: NURSE PRACTITIONER

## 2024-12-17 PROCEDURE — 96376 TX/PRO/DX INJ SAME DRUG ADON: CPT

## 2024-12-17 PROCEDURE — 85025 COMPLETE CBC W/AUTO DIFF WBC: CPT | Performed by: EMERGENCY MEDICINE

## 2024-12-17 PROCEDURE — 80053 COMPREHEN METABOLIC PANEL: CPT | Performed by: EMERGENCY MEDICINE

## 2024-12-17 PROCEDURE — 96375 TX/PRO/DX INJ NEW DRUG ADDON: CPT

## 2024-12-17 PROCEDURE — 96374 THER/PROPH/DIAG INJ IV PUSH: CPT

## 2024-12-17 PROCEDURE — 2500000004 HC RX 250 GENERAL PHARMACY W/ HCPCS (ALT 636 FOR OP/ED): Performed by: EMERGENCY MEDICINE

## 2024-12-17 PROCEDURE — 85610 PROTHROMBIN TIME: CPT | Performed by: EMERGENCY MEDICINE

## 2024-12-17 PROCEDURE — 2500000001 HC RX 250 WO HCPCS SELF ADMINISTERED DRUGS (ALT 637 FOR MEDICARE OP): Performed by: PHYSICIAN ASSISTANT

## 2024-12-17 PROCEDURE — 93005 ELECTROCARDIOGRAM TRACING: CPT

## 2024-12-17 PROCEDURE — 99254 IP/OBS CNSLTJ NEW/EST MOD 60: CPT | Performed by: PHYSICIAN ASSISTANT

## 2024-12-17 PROCEDURE — 2500000004 HC RX 250 GENERAL PHARMACY W/ HCPCS (ALT 636 FOR OP/ED): Performed by: NURSE PRACTITIONER

## 2024-12-17 PROCEDURE — 2500000001 HC RX 250 WO HCPCS SELF ADMINISTERED DRUGS (ALT 637 FOR MEDICARE OP)

## 2024-12-17 RX ORDER — DEXTROSE 50 % IN WATER (D50W) INTRAVENOUS SYRINGE
12.5
Status: DISCONTINUED | OUTPATIENT
Start: 2024-12-17 | End: 2024-12-24 | Stop reason: HOSPADM

## 2024-12-17 RX ORDER — RANOLAZINE 500 MG/1
500 TABLET, EXTENDED RELEASE ORAL 2 TIMES DAILY
Status: DISCONTINUED | OUTPATIENT
Start: 2024-12-17 | End: 2024-12-24 | Stop reason: HOSPADM

## 2024-12-17 RX ORDER — INSULIN GLARGINE 100 [IU]/ML
20 INJECTION, SOLUTION SUBCUTANEOUS NIGHTLY
Status: DISCONTINUED | OUTPATIENT
Start: 2024-12-17 | End: 2024-12-18

## 2024-12-17 RX ORDER — POLYETHYLENE GLYCOL 3350 17 G/17G
17 POWDER, FOR SOLUTION ORAL DAILY
Status: DISCONTINUED | OUTPATIENT
Start: 2024-12-17 | End: 2024-12-24 | Stop reason: HOSPADM

## 2024-12-17 RX ORDER — GABAPENTIN 400 MG/1
400 CAPSULE ORAL 3 TIMES DAILY
Status: DISCONTINUED | OUTPATIENT
Start: 2024-12-17 | End: 2024-12-24 | Stop reason: HOSPADM

## 2024-12-17 RX ORDER — HEPARIN SODIUM 10000 [USP'U]/100ML
0-4500 INJECTION, SOLUTION INTRAVENOUS CONTINUOUS
Status: DISCONTINUED | OUTPATIENT
Start: 2024-12-17 | End: 2024-12-18

## 2024-12-17 RX ORDER — DEXTROSE 50 % IN WATER (D50W) INTRAVENOUS SYRINGE
25
Status: DISCONTINUED | OUTPATIENT
Start: 2024-12-17 | End: 2024-12-24 | Stop reason: HOSPADM

## 2024-12-17 RX ORDER — HYDROMORPHONE HYDROCHLORIDE 1 MG/ML
1 INJECTION, SOLUTION INTRAMUSCULAR; INTRAVENOUS; SUBCUTANEOUS
Status: DISCONTINUED | OUTPATIENT
Start: 2024-12-17 | End: 2024-12-17

## 2024-12-17 RX ORDER — HYDRALAZINE HYDROCHLORIDE 50 MG/1
50 TABLET, FILM COATED ORAL 3 TIMES DAILY
Status: DISCONTINUED | OUTPATIENT
Start: 2024-12-17 | End: 2024-12-24 | Stop reason: HOSPADM

## 2024-12-17 RX ORDER — SACUBITRIL AND VALSARTAN 97; 103 MG/1; MG/1
1 TABLET, FILM COATED ORAL 2 TIMES DAILY
Status: DISCONTINUED | OUTPATIENT
Start: 2024-12-17 | End: 2024-12-24 | Stop reason: HOSPADM

## 2024-12-17 RX ORDER — TAMSULOSIN HYDROCHLORIDE 0.4 MG/1
0.4 CAPSULE ORAL DAILY
Status: DISCONTINUED | OUTPATIENT
Start: 2024-12-17 | End: 2024-12-24 | Stop reason: HOSPADM

## 2024-12-17 RX ORDER — GABAPENTIN 400 MG/1
400 CAPSULE ORAL 3 TIMES DAILY
Qty: 90 CAPSULE | Refills: 5 | Status: ON HOLD | OUTPATIENT
Start: 2024-12-17 | End: 2025-06-15

## 2024-12-17 RX ORDER — ACETAMINOPHEN 325 MG/1
650 TABLET ORAL EVERY 4 HOURS PRN
Status: DISCONTINUED | OUTPATIENT
Start: 2024-12-17 | End: 2024-12-18

## 2024-12-17 RX ORDER — OXYCODONE AND ACETAMINOPHEN 5; 325 MG/1; MG/1
TABLET ORAL
Status: COMPLETED
Start: 2024-12-17 | End: 2024-12-17

## 2024-12-17 RX ORDER — TAMSULOSIN HYDROCHLORIDE 0.4 MG/1
0.4 CAPSULE ORAL DAILY PRN
COMMUNITY

## 2024-12-17 RX ORDER — DAPAGLIFLOZIN 5 MG/1
10 TABLET, FILM COATED ORAL DAILY
Status: DISCONTINUED | OUTPATIENT
Start: 2024-12-17 | End: 2024-12-24 | Stop reason: HOSPADM

## 2024-12-17 RX ORDER — OXYCODONE AND ACETAMINOPHEN 5; 325 MG/1; MG/1
1 TABLET ORAL ONCE
Status: COMPLETED | OUTPATIENT
Start: 2024-12-17 | End: 2024-12-17

## 2024-12-17 RX ORDER — ISOSORBIDE MONONITRATE 60 MG/1
60 TABLET, EXTENDED RELEASE ORAL DAILY
Status: DISCONTINUED | OUTPATIENT
Start: 2024-12-17 | End: 2024-12-24 | Stop reason: HOSPADM

## 2024-12-17 RX ORDER — INSULIN LISPRO 100 [IU]/ML
0-5 INJECTION, SOLUTION INTRAVENOUS; SUBCUTANEOUS
Status: DISCONTINUED | OUTPATIENT
Start: 2024-12-17 | End: 2024-12-18

## 2024-12-17 RX ORDER — PANTOPRAZOLE SODIUM 40 MG/1
40 TABLET, DELAYED RELEASE ORAL
Status: DISCONTINUED | OUTPATIENT
Start: 2024-12-18 | End: 2024-12-24 | Stop reason: HOSPADM

## 2024-12-17 RX ORDER — OXYCODONE AND ACETAMINOPHEN 5; 325 MG/1; MG/1
1 TABLET ORAL EVERY 6 HOURS PRN
Status: DISCONTINUED | OUTPATIENT
Start: 2024-12-17 | End: 2024-12-18

## 2024-12-17 RX ORDER — EZETIMIBE 10 MG/1
10 TABLET ORAL NIGHTLY
Status: DISCONTINUED | OUTPATIENT
Start: 2024-12-17 | End: 2024-12-24 | Stop reason: HOSPADM

## 2024-12-17 RX ORDER — IBUPROFEN 200 MG
1 TABLET ORAL DAILY
Status: DISCONTINUED | OUTPATIENT
Start: 2024-12-17 | End: 2024-12-24 | Stop reason: HOSPADM

## 2024-12-17 RX ORDER — ATORVASTATIN CALCIUM 40 MG/1
80 TABLET, FILM COATED ORAL NIGHTLY
Status: DISCONTINUED | OUTPATIENT
Start: 2024-12-17 | End: 2024-12-24 | Stop reason: HOSPADM

## 2024-12-17 RX ORDER — ASPIRIN 81 MG/1
81 TABLET ORAL DAILY
Status: DISCONTINUED | OUTPATIENT
Start: 2024-12-17 | End: 2024-12-24 | Stop reason: HOSPADM

## 2024-12-17 RX ORDER — HYDROMORPHONE HYDROCHLORIDE 1 MG/ML
1 INJECTION, SOLUTION INTRAMUSCULAR; INTRAVENOUS; SUBCUTANEOUS
Status: DISCONTINUED | OUTPATIENT
Start: 2024-12-17 | End: 2024-12-17 | Stop reason: SDUPTHER

## 2024-12-17 RX ORDER — CARVEDILOL 6.25 MG/1
6.25 TABLET ORAL 2 TIMES DAILY
Status: DISCONTINUED | OUTPATIENT
Start: 2024-12-17 | End: 2024-12-24 | Stop reason: HOSPADM

## 2024-12-17 RX ORDER — OXCARBAZEPINE 150 MG/1
450 TABLET, FILM COATED ORAL 2 TIMES DAILY
Status: DISCONTINUED | OUTPATIENT
Start: 2024-12-17 | End: 2024-12-24 | Stop reason: HOSPADM

## 2024-12-17 RX ADMIN — GABAPENTIN 400 MG: 400 CAPSULE ORAL at 09:30

## 2024-12-17 RX ADMIN — HYDRALAZINE HYDROCHLORIDE 50 MG: 50 TABLET ORAL at 16:21

## 2024-12-17 RX ADMIN — HYDROMORPHONE HYDROCHLORIDE 0.2 MG: 0.5 INJECTION, SOLUTION INTRAMUSCULAR; INTRAVENOUS; SUBCUTANEOUS at 20:29

## 2024-12-17 RX ADMIN — HYDRALAZINE HYDROCHLORIDE 50 MG: 50 TABLET ORAL at 20:32

## 2024-12-17 RX ADMIN — CARVEDILOL 6.25 MG: 6.25 TABLET, FILM COATED ORAL at 20:32

## 2024-12-17 RX ADMIN — INSULIN GLARGINE 20 UNITS: 100 INJECTION, SOLUTION SUBCUTANEOUS at 20:34

## 2024-12-17 RX ADMIN — TICAGRELOR 90 MG: 90 TABLET ORAL at 20:32

## 2024-12-17 RX ADMIN — GABAPENTIN 400 MG: 400 CAPSULE ORAL at 20:32

## 2024-12-17 RX ADMIN — CARVEDILOL 6.25 MG: 6.25 TABLET, FILM COATED ORAL at 09:29

## 2024-12-17 RX ADMIN — OXYCODONE AND ACETAMINOPHEN 1 TABLET: 5; 325 TABLET ORAL at 01:18

## 2024-12-17 RX ADMIN — NICOTINE 1 PATCH: 21 PATCH, EXTENDED RELEASE TRANSDERMAL at 09:29

## 2024-12-17 RX ADMIN — RANOLAZINE 500 MG: 500 TABLET, EXTENDED RELEASE ORAL at 20:32

## 2024-12-17 RX ADMIN — EZETIMIBE 10 MG: 10 TABLET ORAL at 20:32

## 2024-12-17 RX ADMIN — IOHEXOL 90 ML: 350 INJECTION, SOLUTION INTRAVENOUS at 06:37

## 2024-12-17 RX ADMIN — OXCARBAZEPINE 450 MG: 150 TABLET, FILM COATED ORAL at 20:32

## 2024-12-17 RX ADMIN — TAMSULOSIN HYDROCHLORIDE 0.4 MG: 0.4 CAPSULE ORAL at 11:48

## 2024-12-17 RX ADMIN — INSULIN LISPRO 3 UNITS: 100 INJECTION, SOLUTION INTRAVENOUS; SUBCUTANEOUS at 17:22

## 2024-12-17 RX ADMIN — SACUBITRIL AND VALSARTAN 1 TABLET: 97; 103 TABLET, FILM COATED ORAL at 09:33

## 2024-12-17 RX ADMIN — OXYCODONE HYDROCHLORIDE AND ACETAMINOPHEN 1 TABLET: 5; 325 TABLET ORAL at 12:59

## 2024-12-17 RX ADMIN — HEPARIN SODIUM 1500 UNITS/HR: 10000 INJECTION, SOLUTION INTRAVENOUS at 09:32

## 2024-12-17 RX ADMIN — ATORVASTATIN CALCIUM 80 MG: 40 TABLET, FILM COATED ORAL at 20:32

## 2024-12-17 RX ADMIN — OXYCODONE HYDROCHLORIDE AND ACETAMINOPHEN 1 TABLET: 5; 325 TABLET ORAL at 01:18

## 2024-12-17 RX ADMIN — SACUBITRIL AND VALSARTAN 1 TABLET: 97; 103 TABLET, FILM COATED ORAL at 21:56

## 2024-12-17 RX ADMIN — HYDRALAZINE HYDROCHLORIDE 50 MG: 50 TABLET ORAL at 09:29

## 2024-12-17 RX ADMIN — HYDROMORPHONE HYDROCHLORIDE 0.2 MG: 0.5 INJECTION, SOLUTION INTRAMUSCULAR; INTRAVENOUS; SUBCUTANEOUS at 16:19

## 2024-12-17 RX ADMIN — HYDROMORPHONE HYDROCHLORIDE 1 MG: 1 INJECTION, SOLUTION INTRAMUSCULAR; INTRAVENOUS; SUBCUTANEOUS at 05:14

## 2024-12-17 RX ADMIN — ISOSORBIDE MONONITRATE 60 MG: 60 TABLET, EXTENDED RELEASE ORAL at 09:29

## 2024-12-17 RX ADMIN — GABAPENTIN 400 MG: 400 CAPSULE ORAL at 16:21

## 2024-12-17 RX ADMIN — OXCARBAZEPINE 450 MG: 150 TABLET, FILM COATED ORAL at 09:33

## 2024-12-17 RX ADMIN — RANOLAZINE 500 MG: 500 TABLET, EXTENDED RELEASE ORAL at 09:29

## 2024-12-17 RX ADMIN — DAPAGLIFLOZIN 10 MG: 5 TABLET, FILM COATED ORAL at 09:33

## 2024-12-17 RX ADMIN — HYDROMORPHONE HYDROCHLORIDE 1 MG: 1 INJECTION, SOLUTION INTRAMUSCULAR; INTRAVENOUS; SUBCUTANEOUS at 07:25

## 2024-12-17 RX ADMIN — ASPIRIN 81 MG: 81 TABLET, COATED ORAL at 16:21

## 2024-12-17 RX ADMIN — INSULIN LISPRO 1 UNITS: 100 INJECTION, SOLUTION INTRAVENOUS; SUBCUTANEOUS at 10:49

## 2024-12-17 SDOH — ECONOMIC STABILITY: FOOD INSECURITY: HOW HARD IS IT FOR YOU TO PAY FOR THE VERY BASICS LIKE FOOD, HOUSING, MEDICAL CARE, AND HEATING?: NOT HARD AT ALL

## 2024-12-17 SDOH — SOCIAL STABILITY: SOCIAL INSECURITY: WITHIN THE LAST YEAR, HAVE YOU BEEN AFRAID OF YOUR PARTNER OR EX-PARTNER?: NO

## 2024-12-17 SDOH — ECONOMIC STABILITY: INCOME INSECURITY: IN THE PAST 12 MONTHS HAS THE ELECTRIC, GAS, OIL, OR WATER COMPANY THREATENED TO SHUT OFF SERVICES IN YOUR HOME?: NO

## 2024-12-17 SDOH — SOCIAL STABILITY: SOCIAL INSECURITY: WERE YOU ABLE TO COMPLETE ALL THE BEHAVIORAL HEALTH SCREENINGS?: YES

## 2024-12-17 SDOH — HEALTH STABILITY: PHYSICAL HEALTH: ON AVERAGE, HOW MANY DAYS PER WEEK DO YOU ENGAGE IN MODERATE TO STRENUOUS EXERCISE (LIKE A BRISK WALK)?: 0 DAYS

## 2024-12-17 SDOH — ECONOMIC STABILITY: HOUSING INSECURITY: IN THE LAST 12 MONTHS, WAS THERE A TIME WHEN YOU WERE NOT ABLE TO PAY THE MORTGAGE OR RENT ON TIME?: NO

## 2024-12-17 SDOH — ECONOMIC STABILITY: HOUSING INSECURITY: AT ANY TIME IN THE PAST 12 MONTHS, WERE YOU HOMELESS OR LIVING IN A SHELTER (INCLUDING NOW)?: NO

## 2024-12-17 SDOH — SOCIAL STABILITY: SOCIAL INSECURITY: ABUSE: ADULT

## 2024-12-17 SDOH — HEALTH STABILITY: PHYSICAL HEALTH
HOW OFTEN DO YOU NEED TO HAVE SOMEONE HELP YOU WHEN YOU READ INSTRUCTIONS, PAMPHLETS, OR OTHER WRITTEN MATERIAL FROM YOUR DOCTOR OR PHARMACY?: NEVER

## 2024-12-17 SDOH — ECONOMIC STABILITY: FOOD INSECURITY: WITHIN THE PAST 12 MONTHS, THE FOOD YOU BOUGHT JUST DIDN'T LAST AND YOU DIDN'T HAVE MONEY TO GET MORE.: NEVER TRUE

## 2024-12-17 SDOH — SOCIAL STABILITY: SOCIAL INSECURITY: WITHIN THE LAST YEAR, HAVE YOU BEEN HUMILIATED OR EMOTIONALLY ABUSED IN OTHER WAYS BY YOUR PARTNER OR EX-PARTNER?: NO

## 2024-12-17 SDOH — ECONOMIC STABILITY: HOUSING INSECURITY: IN THE PAST 12 MONTHS, HOW MANY TIMES HAVE YOU MOVED WHERE YOU WERE LIVING?: 0

## 2024-12-17 SDOH — ECONOMIC STABILITY: FOOD INSECURITY: WITHIN THE PAST 12 MONTHS, YOU WORRIED THAT YOUR FOOD WOULD RUN OUT BEFORE YOU GOT THE MONEY TO BUY MORE.: NEVER TRUE

## 2024-12-17 SDOH — ECONOMIC STABILITY: TRANSPORTATION INSECURITY: IN THE PAST 12 MONTHS, HAS LACK OF TRANSPORTATION KEPT YOU FROM MEDICAL APPOINTMENTS OR FROM GETTING MEDICATIONS?: NO

## 2024-12-17 SDOH — HEALTH STABILITY: PHYSICAL HEALTH: ON AVERAGE, HOW MANY MINUTES DO YOU ENGAGE IN EXERCISE AT THIS LEVEL?: 0 MIN

## 2024-12-17 SDOH — SOCIAL STABILITY: SOCIAL INSECURITY: HAVE YOU HAD THOUGHTS OF HARMING ANYONE ELSE?: NO

## 2024-12-17 SDOH — HEALTH STABILITY: MENTAL HEALTH
DO YOU FEEL STRESS - TENSE, RESTLESS, NERVOUS, OR ANXIOUS, OR UNABLE TO SLEEP AT NIGHT BECAUSE YOUR MIND IS TROUBLED ALL THE TIME - THESE DAYS?: VERY MUCH

## 2024-12-17 ASSESSMENT — ENCOUNTER SYMPTOMS
SLEEP DISTURBANCE: 0
NECK PAIN: 0
JOINT SWELLING: 0
NUMBNESS: 1
DIZZINESS: 0
LIGHT-HEADEDNESS: 0
SHORTNESS OF BREATH: 0
DIARRHEA: 0
VOMITING: 0
WOUND: 0
FEVER: 0
TROUBLE SWALLOWING: 0
DIFFICULTY URINATING: 0
ARTHRALGIAS: 0
SORE THROAT: 0
WEAKNESS: 0
COUGH: 0
CONFUSION: 0
COLOR CHANGE: 1
HEADACHES: 0
CONSTIPATION: 0
SPEECH DIFFICULTY: 0
WHEEZING: 0
FACIAL ASYMMETRY: 0
PALPITATIONS: 0
CHILLS: 0
ABDOMINAL PAIN: 0
FATIGUE: 0
NAUSEA: 0
SEIZURES: 0
ADENOPATHY: 0

## 2024-12-17 ASSESSMENT — PAIN SCALES - GENERAL
PAINLEVEL_OUTOF10: 9
PAINLEVEL_OUTOF10: 8
PAINLEVEL_OUTOF10: 9
PAINLEVEL_OUTOF10: 9
PAINLEVEL_OUTOF10: 10 - WORST POSSIBLE PAIN
PAINLEVEL_OUTOF10: 6
PAINLEVEL_OUTOF10: 5 - MODERATE PAIN
PAINLEVEL_OUTOF10: 8
PAINLEVEL_OUTOF10: 9
PAINLEVEL_OUTOF10: 5 - MODERATE PAIN
PAINLEVEL_OUTOF10: 8

## 2024-12-17 ASSESSMENT — PAIN DESCRIPTION - LOCATION
LOCATION: LEG

## 2024-12-17 ASSESSMENT — COGNITIVE AND FUNCTIONAL STATUS - GENERAL
DAILY ACTIVITIY SCORE: 24
PATIENT BASELINE BEDBOUND: NO
MOBILITY SCORE: 24

## 2024-12-17 ASSESSMENT — ACTIVITIES OF DAILY LIVING (ADL)
DRESSING YOURSELF: INDEPENDENT
HEARING - LEFT EAR: FUNCTIONAL
HEARING - RIGHT EAR: FUNCTIONAL
TOILETING: INDEPENDENT
WALKS IN HOME: INDEPENDENT
GROOMING: INDEPENDENT
JUDGMENT_ADEQUATE_SAFELY_COMPLETE_DAILY_ACTIVITIES: YES
PATIENT'S MEMORY ADEQUATE TO SAFELY COMPLETE DAILY ACTIVITIES?: YES
LACK_OF_TRANSPORTATION: NO
FEEDING YOURSELF: INDEPENDENT
ADEQUATE_TO_COMPLETE_ADL: YES
LACK_OF_TRANSPORTATION: NO
BATHING: INDEPENDENT

## 2024-12-17 ASSESSMENT — LIFESTYLE VARIABLES
AUDIT-C TOTAL SCORE: 0
EVER FELT BAD OR GUILTY ABOUT YOUR DRINKING: NO
HOW OFTEN DO YOU HAVE 6 OR MORE DRINKS ON ONE OCCASION: NEVER
AUDIT-C TOTAL SCORE: 0
HAVE YOU EVER FELT YOU SHOULD CUT DOWN ON YOUR DRINKING: NO
TOTAL SCORE: 0
EVER HAD A DRINK FIRST THING IN THE MORNING TO STEADY YOUR NERVES TO GET RID OF A HANGOVER: NO
HOW OFTEN DO YOU HAVE A DRINK CONTAINING ALCOHOL: NEVER
HAVE PEOPLE ANNOYED YOU BY CRITICIZING YOUR DRINKING: NO
HOW MANY STANDARD DRINKS CONTAINING ALCOHOL DO YOU HAVE ON A TYPICAL DAY: PATIENT DOES NOT DRINK
SKIP TO QUESTIONS 9-10: 1

## 2024-12-17 ASSESSMENT — PAIN DESCRIPTION - ORIENTATION
ORIENTATION: LEFT

## 2024-12-17 ASSESSMENT — PAIN DESCRIPTION - PROGRESSION: CLINICAL_PROGRESSION: NOT CHANGED

## 2024-12-17 ASSESSMENT — PAIN - FUNCTIONAL ASSESSMENT
PAIN_FUNCTIONAL_ASSESSMENT: 0-10

## 2024-12-17 NOTE — H&P (VIEW-ONLY)
Inpatient consult to Vascular Surgery  Consult performed by: Marla Aguilar PA-C  Consult ordered by: Cherelle Saldana MD  Reason for consult: LLE limb ichemia          Reason For Consult  LLE limb ischemia    History Of Present Illness  Dereck Shen is a 59 y.o. male with past medical history of CAD s/p CABG, heart failure, HTN, recurrent strokes and severe PAD with history of multiple left lower extremity revascularizations including R fem-pop bypass 2/24/20; L fem-ak pop bypass w/graft 9/14/20; L fem PTA of the graft; R iliac PTA 2/2022; LLE and DANYA PTA 4/2024; angioplasty of the left SFA 10/21/24; with bypass and native SFA angioplasty. Most recently left femoral and tibial thrombectomy with patch angioplasty and 4 compartment fasciotomy 11/19/24 for acute limb ischemia. Reports he noted discoloration of the left foot and temperature change 3 days ago, did not have any pain or motor sensory changes in the foot at that time. He began to have left foot pain starting yesterday which prompted him to present to ER. CTA in the ER with reocclusion of SFA and popliteal. Currently on heparin drip. Pain controlled with analgesia. Since his last procedure, 11/19/24 he had been ambulatory until yesterday, no claudication, no rest pain. Has been following with wound care for fasciotomy sites and left groin incision. He has been taking his Eliquis, brillinta and asa81 daily as directed. Has not missed any doses.     Past Medical History  He has a past medical history of (HFpEF) heart failure with preserved ejection fraction, Aphasia, Atherosclerotic heart disease of native coronary artery with unspecified angina pectoris (11/05/2019), Bipolar disorder, unspecified (Multi), BPH (benign prostatic hyperplasia), Diabetes (Multi), DVT (deep venous thrombosis) (Multi) (02/2022), Erectile dysfunction, GERD (gastroesophageal reflux disease), Hepatitis C, HLD (hyperlipidemia), HTN (hypertension), Obstructive sleep apnea  (adult) (pediatric), Opioid abuse, in remission (Multi), PAD (peripheral artery disease) (CMS-HCC), Polymyalgia rheumatica (Multi), Post-traumatic stress disorder, unspecified, and Stroke (Multi) (07/24/2023).    Surgical History  He has a past surgical history that includes Knee surgery (Left); Elbow surgery; Back surgery; CT angio aorta and bilateral iliofemoral runoff including without contrast if performed (07/20/2020); MR angio head wo IV contrast (01/04/2022); MR angio neck wo IV contrast (01/04/2022); CT angio aorta and bilateral iliofemoral runoff including without contrast if performed (01/14/2022); Invasive Vascular Procedure (Bilateral, 10/04/2024); Dental surgery; Coronary angioplasty with stent; Coronary artery bypass graft; Tonsillectomy; Transluminal atherectomy femoral-popliteal / tibioperoneal; Invasive Vascular Procedure (N/A, 10/21/2024); and Invasive Vascular Procedure (N/A, 10/21/2024).     Social History  He reports that he has been smoking cigarettes. He has been exposed to tobacco smoke. He has never used smokeless tobacco. He reports current drug use. Drug: Marijuana. He reports that he does not drink alcohol.    Family History  Family History   Problem Relation Name Age of Onset    No Known Problems Mother      No Known Problems Father          Allergies  Celecoxib; Ibuprofen; Tetanus toxoid, adsorbed; [id number] antimigraine agents; Cephalexin; Hydrocodone; Latex; and Tryptophan    Review of Systems   Constitutional:  Negative for chills, fatigue and fever.   HENT:  Negative for congestion, sore throat and trouble swallowing.    Eyes:  Negative for visual disturbance.   Respiratory:  Negative for cough, shortness of breath and wheezing.    Cardiovascular:  Negative for chest pain, palpitations and leg swelling.   Gastrointestinal:  Negative for abdominal pain, constipation, diarrhea, nausea and vomiting.   Endocrine: Negative for cold intolerance and heat intolerance.   Genitourinary:   Negative for difficulty urinating.   Musculoskeletal:  Negative for arthralgias, joint swelling and neck pain.   Skin:  Positive for color change. Negative for wound.   Neurological:  Positive for numbness. Negative for dizziness, seizures, syncope, facial asymmetry, speech difficulty, weakness, light-headedness and headaches.   Hematological:  Negative for adenopathy.   Psychiatric/Behavioral:  Negative for behavioral problems, confusion and sleep disturbance.         Physical Exam  Constitutional:       Appearance: Normal appearance.   HENT:      Head: Normocephalic.      Right Ear: External ear normal.      Left Ear: External ear normal.      Nose: Nose normal.      Mouth/Throat:      Mouth: Mucous membranes are moist.   Eyes:      Extraocular Movements: Extraocular movements intact.   Neck:      Vascular: No carotid bruit.   Cardiovascular:      Rate and Rhythm: Normal rate and regular rhythm.      Comments: Left lower extremity warm to the mid shin, cool foot and toes. Minimal motor function of the left foot, able to wiggle the big toe and minimally dorsi and platarflex the ankle. Decreased sensation noted. (+) monophasic popliteal and PT signal by doppler.     Left groin incision healing, small area superior portion of incision with fibrinous slough, no surrounding cellulitis, no drainage    LLE Medial and lateral faciotomy open fasciotomy incisions no surrounding erythema, no drainage.   Pulmonary:      Effort: Pulmonary effort is normal. No respiratory distress.      Breath sounds: Normal breath sounds.   Abdominal:      Palpations: Abdomen is soft.      Tenderness: There is no abdominal tenderness.   Musculoskeletal:         General: No swelling or tenderness.      Cervical back: Normal range of motion and neck supple.      Right lower leg: No edema.      Left lower leg: No edema.   Skin:     General: Skin is warm and dry.      Capillary Refill: Capillary refill takes less than 2 seconds.      Findings: No  "lesion.   Neurological:      General: No focal deficit present.      Mental Status: He is alert and oriented to person, place, and time. Mental status is at baseline.   Psychiatric:         Mood and Affect: Mood normal.         Behavior: Behavior normal.         Thought Content: Thought content normal.         Judgment: Judgment normal.          Last Recorded Vitals  Blood pressure 135/82, pulse 73, temperature 36.4 °C (97.6 °F), temperature source Temporal, resp. rate 18, height 1.803 m (5' 11\"), weight 84.8 kg (187 lb), SpO2 96%.  Vitals:    12/17/24 1300 12/17/24 1330 12/17/24 1351 12/17/24 1528   BP: 104/83 102/50 152/85 135/82   BP Location: Left arm Left arm Right arm Right arm   Patient Position: Sitting Sitting Lying Lying   Pulse: 71 78 73 73   Resp: 18 20 18 18   Temp:   36.5 °C (97.7 °F) 36.4 °C (97.6 °F)   TempSrc:   Temporal Temporal   SpO2: 97% 97% 100% 96%   Weight:       Height:          Relevant Results  Results for orders placed or performed during the hospital encounter of 12/17/24 (from the past 24 hours)   ECG 12 lead   Result Value Ref Range    Ventricular Rate 80 BPM    Atrial Rate 78 BPM    NJ Interval 156 ms    QRS Duration 108 ms    QT Interval 386 ms    QTC Calculation(Bazett) 446 ms    P Axis 50 degrees    R Axis 46 degrees    T Axis 65 degrees    QRS Count 13 beats    Q Onset 249 ms    T Offset 442 ms    QTC Fredericia 425 ms   CBC and Auto Differential   Result Value Ref Range    WBC 6.0 4.4 - 11.3 x10*3/uL    nRBC 0.0 0.0 - 0.0 /100 WBCs    RBC 4.18 (L) 4.50 - 5.90 x10*6/uL    Hemoglobin 13.2 (L) 13.5 - 17.5 g/dL    Hematocrit 38.1 (L) 41.0 - 52.0 %    MCV 91 80 - 100 fL    MCH 31.6 26.0 - 34.0 pg    MCHC 34.6 32.0 - 36.0 g/dL    RDW 13.4 11.5 - 14.5 %    Platelets 232 150 - 450 x10*3/uL    Neutrophils % 56.5 40.0 - 80.0 %    Immature Granulocytes %, Automated 0.3 0.0 - 0.9 %    Lymphocytes % 30.5 13.0 - 44.0 %    Monocytes % 7.3 2.0 - 10.0 %    Eosinophils % 5.1 0.0 - 6.0 %    " Basophils % 0.3 0.0 - 2.0 %    Neutrophils Absolute 3.41 1.20 - 7.70 x10*3/uL    Immature Granulocytes Absolute, Automated 0.02 0.00 - 0.70 x10*3/uL    Lymphocytes Absolute 1.84 1.20 - 4.80 x10*3/uL    Monocytes Absolute 0.44 0.10 - 1.00 x10*3/uL    Eosinophils Absolute 0.31 0.00 - 0.70 x10*3/uL    Basophils Absolute 0.02 0.00 - 0.10 x10*3/uL   Comprehensive metabolic panel   Result Value Ref Range    Glucose 162 (H) 74 - 99 mg/dL    Sodium 137 136 - 145 mmol/L    Potassium 4.1 3.5 - 5.3 mmol/L    Chloride 106 98 - 107 mmol/L    Bicarbonate 23 21 - 32 mmol/L    Anion Gap 12 10 - 20 mmol/L    Urea Nitrogen 39 (H) 6 - 23 mg/dL    Creatinine 1.53 (H) 0.50 - 1.30 mg/dL    eGFR 52 (L) >60 mL/min/1.73m*2    Calcium 8.6 8.6 - 10.3 mg/dL    Albumin 3.6 3.4 - 5.0 g/dL    Alkaline Phosphatase 97 33 - 120 U/L    Total Protein 6.8 6.4 - 8.2 g/dL    AST 7 (L) 9 - 39 U/L    Bilirubin, Total 0.3 0.0 - 1.2 mg/dL    ALT 6 (L) 10 - 52 U/L   Troponin I, High Sensitivity   Result Value Ref Range    Troponin I, High Sensitivity 14 0 - 20 ng/L   B-Type Natriuretic Peptide   Result Value Ref Range     (H) 0 - 99 pg/mL   Morphology   Result Value Ref Range    RBC Morphology No significant RBC morphology present    Coagulation Screen   Result Value Ref Range    Protime 13.1 (H) 9.8 - 12.8 seconds    INR 1.2 (H) 0.9 - 1.1    aPTT 33 27 - 38 seconds   D-Dimer, Quantitative VTE Exclusion   Result Value Ref Range    D-Dimer, Quantitative VTE Exclusion 1,012 (H) <=500 ng/mL FEU   POCT GLUCOSE   Result Value Ref Range    POCT Glucose 175 (H) 74 - 99 mg/dL   Heparin Assay, UFH   Result Value Ref Range    Heparin Unfractionated 1.8 (HH) See Comment Below for Therapeutic Ranges IU/mL     *Note: Due to a large number of results and/or encounters for the requested time period, some results have not been displayed. A complete set of results can be found in Results Review.      12/17/24 CTA Aorta  Vascular:  Abdominal Aorta:   The celiac, SMA,  and DENISE demonstrate no significant stenosis.  The  right and left renal arteries demonstrate no significant stenosis.   The abdominal aorta is not aneurysmal and demonstrates no significant  occlusive disease.  Right Run-off:   The right common iliac artery shows mild stenosis diffusely.  There is  a stent along the entire right external iliac and right common femoral  arteries.  The stent demonstrates areas of mild in-stent stenosis.  There is an occluded right femoral-popliteal bypass graft.  The native right femoral popliteal arteries demonstrate mild  atherosclerotic contour.  There is a stent extending from the mid SFA  to the proximal popliteal artery.  At the distal SFA the stent  demonstrates crush distortion but maintains a patent lumen, though  with areas of mild in-stent stenosis.  The popliteal artery remains patent throughout its course.  There is  patent three-vessel runoff to the ankle, and 2 vessels into the foot.   Opacification became too weak to follow the vessels to the plantar  arch.  Left Run-off:   The left common iliac artery demonstrates mild diffuse atherosclerotic  stenosis.  The left external iliac artery and left common femoral  arteries are relatively normal.  The common femoral artery bifurcation  is patent.  The native left SFA occludes just beyond its origin.  The  profunda femoral artery and its branches remain patent.  There is a chronically occluded left femoral-popliteal bypass graft.   The native left SFA does not reconstitute, remaining occluded  throughout its course.  There is a 2 cm length patent segment of the left popliteal artery,  which is otherwise occluded.  Distal to the patent segment, the  popliteal artery is occluded across the level of the joint.  The distal popliteal artery reconstitutes remains patent into its  trifurcation.  3 patent infrapopliteal vessels are visualized proximally.  The tibial  vessels remain patent to the ankle, where the dorsalis pedis  and  lateral plantar seen across the ankle joint.  The vessels cannot be  followed to the plantar arch due to weak contrast opacification.  Abdomen:  The lung bases are clear.  The liver is unremarkable.  The pancreas,  spleen, adrenal glands, and kidneys demonstrate no acute pathology.  There is no free air or lymph node enlargement.  Pelvis:  There is no bowel wall thickening or obstruction.  There is no free  fluid.  Lymph nodes are not enlarged.  Urinary bladder is  unremarkable.  At the left inguinal region there is thick soft tissue in the  subcutaneous fat overlying the femoral vessels, as well as 2 prominent  lymph nodes.  Skeleton:    There are no acute fractures.  No suspicious bony lesions.  IMPRESSION:  No significant aortic disease.  Mild bilateral common iliac artery  stenosis.  Patent left external iliac and common femoral arteries.  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  Chronic  occlusion of the native left superficial femoral artery.  A 2 cm segment of the left P1 popliteal artery reconstitutes.  The  terminal left popliteal artery also reconstitutes.  Other portions of  the popliteal artery are occluded.  Patent three-vessel infrapopliteal runoff to the left hindfoot, beyond  which contrast opacification was too weak to visualize.  Thick subcutaneous soft tissue - presumed scarred tissue - overlying  the left femoral vessels at the left groin.  Correlate for possibility  of cellulitis.  Patent right iliac and right femoral stents.  Patent right popliteal  artery.  Patent three-vessel infrapopliteal runoff to the right ankle.    12/17/24 PVR/MICHAELA  PRELIMINARY CONCLUSIONS:     Right Lower PVR: No evidence of arterial occlusive disease in the right lower extremity at rest. Normal digital perfusion noted. Multiphasic flow is noted in the right common femoral artery, right popliteal artery, right posterior tibial artery and right dorsalis pedis artery.  Left Lower PVR: Evidence  of severe arterial occlusive disease in the left lower extremity at rest. Decreased digital perfusion noted. Monophasic flow is noted in the left common femoral artery, left popliteal artery, left posterior tibial artery and left dorsalis pedis artery. PTA is inaudible.  Flat toe waveform(TBI=0).     Imaging & Doppler Findings:     RIGHT Lower PVR                Pressures Ratios  Right Posterior Tibial (Ankle) 141 mmHg  1.22  Right Dorsalis Pedis (Ankle)   126 mmHg  1.09  Right Digit (Great Toe)        112 mmHg  0.97           LEFT Lower PVR                Pressures Ratios  Left Posterior Tibial (Ankle) 0 mmHg    0.00  Left Dorsalis Pedis (Ankle)   33 mmHg   0.28  Left Digit (Great Toe)        0 mmHg    0.00     Assessment/Plan   59 year old male with past medical history of CAD s/p CABG, heart failure, HTN, recurrent strokes and severe PAD with history of multiple left lower extremity revascularizations now with critical limb ischemia of the LLE with reocclusion of SFA and popliteal.  Minimal revascularization options at this point and high risk of limb loss discussed with patient. Dr Peace to discuss further with [facility] vascular. Continue heparin drip. Continue DAPT with asa81 and brillinta. Continue neurovascular checks. Upper extremity vein mapping ordered-to be completed 12/18/24 as discussed with vasc lab. OK for diet at this time. Wound care consult for ongoing fasciotomy wound care. Pain control as needed.     Discussed above course of care and treatment plan with Dr Peace who was present at bedside and in agreement. Discussed with primary medical team.

## 2024-12-17 NOTE — ED TRIAGE NOTES
Pt to ed for L Leg pain, redness, and ice cold to touch. Also states that Pt [facility] has had 3 surgeries on leg due to PAD. [facility] surgeries were in September, October, and November with staples removed on 03 dec. [facility] was healing well until about 3 days ago. [facility] has been in contact with surgeon and has appt on Wednesday but pain is getting worse.

## 2024-12-17 NOTE — ED NOTES
Pt arrives to ED via car from home with c/o left leg pain and numbness.    Code Status:  Full Code    HPI     Chief Complaint   Patient presents with    Post-op Problem    Leg Pain       /77 (BP Location: Left arm, Patient Position: Sitting)   Pulse 64   Temp 36.6 °C (97.9 °F)   Resp 18   Wt 84.8 kg (187 lb)   SpO2 97%     Nella Coma Scale Score: 15      LDA:   Peripheral IV 12/17/24 20 G Left Antecubital (Active)   Placement Date/Time: 12/17/24 0334   Size (Gauge): 20 G  Orientation: Left  Location: Antecubital  Site Prep: Alcohol  Insertion attempts: 1  Patient Tolerance: Tolerated well   Number of days: 0        BACKGROUND  Past Medical History:   Diagnosis Date    (HFpEF) heart failure with preserved ejection fraction     Aphasia     Atherosclerotic heart disease of native coronary artery with unspecified angina pectoris 11/05/2019    Bipolar disorder, unspecified (Multi)     BPH (benign prostatic hyperplasia)     Diabetes (Multi)     DVT (deep venous thrombosis) (Multi) 02/2022    RLE    Erectile dysfunction     GERD (gastroesophageal reflux disease)     Hepatitis C     HLD (hyperlipidemia)     HTN (hypertension)     Obstructive sleep apnea (adult) (pediatric)     Opioid abuse, in remission (Multi)     PAD (peripheral artery disease) (CMS-[facility])     Polymyalgia rheumatica (Multi)     Post-traumatic stress disorder, unspecified     Stroke (Multi) 07/24/2023    multiple     Past Surgical History:   Procedure Laterality Date    BACK SURGERY      CORONARY ANGIOPLASTY WITH STENT PLACEMENT      CORONARY ARTERY BYPASS GRAFT      CT ANGIO AORTA AND BILATERAL ILIOFEMORAL RUN OFF INCLUDING WITHOUT CONTRAST IF PERFORMED  07/20/2020    CT ANGIO AORTA AND BILATERAL ILIOFEMORAL RUN OFF INCLUDING WITHOUT CONTRAST IF PERFORMED  01/14/2022    DENTAL SURGERY      ELBOW SURGERY      INVASIVE VASCULAR PROCEDURE Bilateral 10/04/2024    Procedure: Lower Extremity Angiogram;  Surgeon: Baljinder Wright MD;  Location: [facility]  Cardiac [facility] Lab;  Service: Cardiovascular;  Laterality: Bilateral;  w / anesthesia  3 hr hydration  / arrive 7:30    INVASIVE VASCULAR PROCEDURE N/A 10/21/2024    Procedure: Lower Extremity Angiogram;  Surgeon: Baljinder Wright MD;  Location: [facility];  Service: Cardiovascular;  Laterality: N/A;  w/ anesthesia    INVASIVE VASCULAR PROCEDURE N/A 10/21/2024    Procedure: Angioplasty - Lower Extremity;  Surgeon: Baljinder Wright MD;  Location: [facility];  Service: Cardiovascular;  Laterality: N/A;    KNEE SURGERY Left     MR HEAD ANGIO WO IV CONTRAST  01/04/2022    MR NECK ANGIO WO IV CONTRAST  01/04/2022    TONSILLECTOMY      TRANSLUMINAL ATHERECTOMY FEMORAL-POPLITEAL / TIBIOPERONEAL       No current facility-administered medications on file prior to encounter.     Current Outpatient Medications on File Prior to Encounter   Medication Sig Dispense Refill    apixaban (Eliquis) 5 mg tablet Take 2 tablets (10 mg) by mouth 2 times a day for 6 days, THEN 1 tablet (5 mg) 2 times a day. 84 tablet 0    aspirin 81 mg chewable tablet Chew 1 tablet (81 mg) once daily.      atorvastatin (Lipitor) 80 mg tablet Take 1 tablet (80 mg) by mouth once daily at bedtime.      atorvastatin (Lipitor) 80 mg tablet Take 1 tablet (80 mg) by mouth once daily at bedtime. 90 tablet 2    blood sugar diagnostic strip Use as directed twice daily 100 each 1    Brilinta 90 mg tablet Take 1 tablet (90 mg) by mouth 2 times a day.      carvedilol (Coreg) 6.25 mg tablet Take 1 tablet (6.25 mg) by mouth every 12 hours.      Creon 36,000-114,000- 180,000 unit capsule,delayed release(DR/EC) capsule Take 2 capsules by mouth 3 times a day as needed.      dapagliflozin propanediol (Farxiga) 10 mg Take 1 tablet (10 mg) by mouth once every 24 hours.      dapagliflozin propanediol (Farxiga) 10 mg Take 1 tablet (10 mg) by mouth once daily with breakfast. 90 tablet 2    doxycycline (Vibramycin) 100 mg capsule Take 1 capsule (100 mg) by mouth 2 times  a day. 10 capsule 0    ELDERBERRY FRUIT ORAL Take 1 tablet by mouth once daily as needed.      Entresto  mg tablet Take 1 tablet by mouth twice a day.      ezetimibe (Zetia) 10 mg tablet Take 1 tablet (10 mg) by mouth once daily.      flash glucose scanning reader misc USE TO CHECK SUGARS FOUR TIMES A DAY 1 each 0    flash glucose sensor kit (FreeStyle En 2 Sensor) kit use as directed daily and change every 14 days 2 each 11    furosemide (Lasix) 40 mg tablet Take 1 tablet (40 mg) by mouth once daily as needed.      gabapentin (Neurontin) 400 mg capsule Take 1 capsule (400 mg) by mouth 3 times a day. 90 capsule 5    GINGER ROOT EXTRACT ORAL Take 1 tablet by mouth once daily as needed.      hydrALAZINE (Apresoline) 50 mg tablet Take 1 tablet (50 mg) by mouth 3 times a day.      hydrALAZINE (Apresoline) 50 mg tablet Take 1 tablet (50 mg) by mouth 3 times a day. 270 tablet 1    insulin glargine (Lantus) 100 unit/mL (3 mL) pen Inject 20 Units under the skin once daily at bedtime. 10 mL 5    insulin lispro (HumaLOG) 100 unit/mL injection Inject 10 Units under the skin 3 times a day with meals. Plus sliding scale if BS is over 200 15 mL 0    isosorbide mononitrate ER (Imdur) 60 mg 24 hr tablet Take 1 tablet (60 mg) by mouth once daily. Do not crush or chew.      isosorbide mononitrate ER (Imdur) 60 mg 24 hr tablet Take 1 tablet (60 mg) by mouth once daily. 90 tablet 1    lancets misc 1 each 4 times a day before meals. 200 each 11    medical cannabis Not [facility] prescribed      nicotine (Nicoderm CQ) 21 mg/24 hr patch APPLY 1 PATCH TO SKIN EVERY 24 HOURS AS DIRECTED 28 patch 0    OXcarbazepine (Trileptal) 150 mg tablet Take 3 tablets (450 mg) by mouth twice a day.      [] oxyCODONE-acetaminophen (Percocet) 5-325 mg tablet Take 1 tablet by mouth every 6 hours if needed for severe pain (7 - 10) for up to 4 days. 16 tablet 0    pantoprazole (ProtoNix) 40 mg EC tablet TAKE 1 TABLET BY MOUTH ONCE DAILY 90 tablet 3     "pen needle, diabetic (BD Ultra-Fine Jocy Pen Needle) 32 gauge x 5/32\" needle Pen Needles for Insulin. 100 each 2    pen needle, diabetic (BD Ultra-Fine Jocy Pen Needle) 32 gauge x 5/32\" needle Use to inject insulin up to 8 times per day. 400 each 0    pen needle, diabetic (TechLITE Pen Needle) 32 gauge x 1/4\" needle Use to inject 1-4 times daily as directed 100 each 11    ranolazine (Ranexa) 500 mg 12 hr tablet Take 1 tablet (500 mg) by mouth twice a day.      ticagrelor (Brilinta) 90 mg tablet Take 1 tablet (90 mg) by mouth 2 times a day. 180 tablet 0    tiZANidine (Zanaflex) 2 mg tablet Take 1 tablet (2 mg) by mouth once daily as needed for muscle spasms. 30 tablet 0        ASSESSMENT  Diagnoses as of 12/17/24 0953   Arterial occlusion, lower extremity (CMS-HCC)   Peripheral artery occlusion (CMS-HCC)       Medications Currently Running:  heparin, 0-4,500 Units/hr, Last Rate: 1,500 Units/hr (12/17/24 0932)         Medications Given:  ED Medication Administration from 12/17/2024 0017 to 12/17/2024 0953         Date/Time Order Dose Route Action Action by     12/17/2024 0118 EST oxyCODONE-acetaminophen (Percocet) 5-325 mg per tablet 1 tablet 1 tablet oral Given YUSUF Zhang     12/17/2024 0514 EST HYDROmorphone (Dilaudid) injection 1 mg 1 mg intravenous Given JC Larry     12/17/2024 0637 EST iohexol (OMNIPaque) 350 mg iodine/mL solution 90 mL 90 mL intravenous Given YUSUF Bradley     12/17/2024 0725 EST HYDROmorphone (Dilaudid) injection 1 mg 1 mg intravenous Given CHARLES Jay     12/17/2024 0852 EST polyethylene glycol (Glycolax, Miralax) packet 17 g 17 g oral Not Given CHARLES Jay     12/17/2024 0929 EST carvedilol (Coreg) tablet 6.25 mg 6.25 mg oral Given CHARLES Jay     12/17/2024 0929 EST hydrALAZINE (Apresoline) tablet 50 mg 50 mg oral Given CHARLES Jay     12/17/2024 0929 EST isosorbide mononitrate ER (Imdur) 24 hr tablet 60 mg 60 mg oral Given CHARLES Jay     12/17/2024 0929 EST nicotine (Nicoderm CQ) 21 mg/24 " hr patch 1 patch 1 patch transdermal Medication Applied Johnston, D 12/17/2024 0929 EST ranolazine (Ranexa) 12 hr tablet 500 mg 500 mg oral Given Johnston, D 12/17/2024 0930 EST gabapentin (Neurontin) capsule 400 mg 400 mg oral Given Johnston, D 12/17/2024 0932 EST heparin 25,000 Units in dextrose 5% 250 mL (100 Units/mL) infusion (premix) 1,500 Units/hr intravenous New Bag Johnston, D 12/17/2024 0933 EST dapagliflozin propanediol (Farxiga) tablet 10 mg 10 mg oral Given Johnston, D 12/17/2024 0933 EST OXcarbazepine (Trileptal) tablet 450 mg 450 mg oral Given Johnston, D 12/17/2024 0933 EST sacubitriL-valsartan (Entresto)  mg per tablet 1 tablet 1 tablet oral Given Johnston, D 12/17/2024 0943 EST heparin bolus from bag 6,784 Units 0 Units intravenous Stopped Johnston, D                 RESULTS    Imaging:  Vascular US ankle brachial index (ANSON) without exercise         CT angio aorta and bilateral iliofemoral runoff including without contrast if performed   Final Result   No significant aortic disease.  Mild bilateral common iliac artery   stenosis.  Patent left external iliac and common femoral arteries.   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  Chronic   occlusion of the native left superficial femoral artery.   A 2 cm segment of the left P1 popliteal artery reconstitutes.  The   terminal left popliteal artery also reconstitutes.  Other portions of   the popliteal artery are occluded.   Patent three-vessel infrapopliteal runoff to the left hindfoot, beyond   which contrast opacification was too weak to visualize.   Thick subcutaneous soft tissue - presumed scarred tissue - overlying   the left femoral vessels at the left groin.  Correlate for possibility   of cellulitis.   Patent right iliac and right femoral stents.  Patent right popliteal   artery.  Patent three-vessel infrapopliteal runoff to the right ankle.   No findings of an acute process in the  abdomen or pelvis.   Signed by Simeon Barrera MD         }  Labs ::99  Abnormal Labs Reviewed   CBC WITH AUTO DIFFERENTIAL - Abnormal; Notable for the following components:       Result Value    RBC 4.18 (*)     Hemoglobin 13.2 (*)     Hematocrit 38.1 (*)     All other components within normal limits   COMPREHENSIVE METABOLIC PANEL - Abnormal; Notable for the following components:    Glucose 162 (*)     Urea Nitrogen 39 (*)     Creatinine 1.53 (*)     eGFR 52 (*)     AST 7 (*)     ALT 6 (*)     All other components within normal limits   COAGULATION SCREEN - Abnormal; Notable for the following components:    Protime 13.1 (*)     INR 1.2 (*)     All other components within normal limits    Narrative:     The APTT is no longer used for monitoring Unfractionated Heparin Therapy. For monitoring Heparin Therapy, use the Heparin Assay.   D-DIMER, VTE EXCLUSION - Abnormal; Notable for the following components:    D-Dimer, Quantitative VTE Exclusion 1,012 (*)     All other components within normal limits    Narrative:     The VTE Exclusion D-Dimer assay is reported in ng/mL Fibrinogen Equivalent Units (FEU).                                    Per 's instructions for use, a value of less than 500 ng/mL (FEU) may help to exclude DVT or PE in outpatients when the assay is used with a clinical pretest probability assessment.(AE must utilize and document eCalc 'Wells Score Deep Vein Thrombosis Risk' for DVT exclusion only. Emergency Department should utilize  Guidelines for Emergency Department Use of the VTE Exclusion D-Dimer and Clinical Pretest probability assessment model for DVT or PE exclusion.)   B-TYPE NATRIURETIC PEPTIDE - Abnormal; Notable for the following components:     (*)     All other components within normal limits    Narrative:        <100 pg/mL - Heart failure unlikely                  100-299 pg/mL - Intermediate probability of acute heart                                  failure  exacerbation. Correlate with clinical                                  context and patient history.                    >=300 pg/mL - Heart Failure likely. Correlate with clinical                                  context and patient history.                                    BNP testing is performed using different testing methodology at [facility] than at other [facility] hospitals. Direct result comparisons should only be made within the same method.                                    Bryanna Jay RN  12/17/24 0994

## 2024-12-17 NOTE — CARE PLAN
Problem: Diabetes  Goal: No changes in neurological exam by end of shift  Outcome: Progressing  Goal: Learn about and adhere to nutrition recommendations by end of shift  Outcome: Progressing  Goal: Increase self care and/or family involovement by end of shift  Outcome: Progressing     Problem: Pain  Goal: Turns in bed with improved pain control throughout the shift  Outcome: Progressing   The patient's goals for the shift include      The clinical goals for the shift include theraputi heparin drip

## 2024-12-17 NOTE — PROGRESS NOTES
Dereck Shen is a 59 y.o. male admitted for Peripheral artery occlusion (CMS-HCC). Pharmacy reviewed the patient's [id number] medications and allergies for accuracy.    The list below reflects the PTA list prior to pharmacy medication history. A summary a changes to the PTA medication list has been listed below. Please review each medication in order reconciliation for additional clarification and justification.    Source of information: t2p     Medications added:  Flomax .4mg - 1 every day prn     Medications modified:    Medications to be removed:  Lipitor 80mg- duplicate therapy   Brillinta 90mg - duplicate therapy   Farxiga 10mg- duplicate therapy   Doxy 100mg   Hydralazine 50mg - duplicate therapy   Imdur 60mg - duplicate therapy   Percocet 5-325mg    Medications of concern:      Prior to Admission Medications   Prescriptions Last Dose Informant Patient Reported? Taking?   Brilinta 90 mg tablet   Yes No   Sig: Take 1 tablet (90 mg) by mouth 2 times a day.   Creon 36,000-114,000- 180,000 unit capsule,delayed release(DR/EC) capsule   Yes No   Sig: Take 2 capsules by mouth 3 times a day as needed.   ELDERBERRY FRUIT ORAL   Yes No   Sig: Take 1 tablet by mouth once daily as needed.   Entresto  mg tablet   Yes No   Sig: Take 1 tablet by mouth twice a day.   GINGER ROOT EXTRACT ORAL   Yes No   Sig: Take 1 tablet by mouth once daily as needed.   OXcarbazepine (Trileptal) 150 mg tablet   Yes No   Sig: Take 3 tablets (450 mg) by mouth twice a day.   apixaban (Eliquis) 5 mg tablet   No No   Sig: Take 2 tablets (10 mg) by mouth 2 times a day for 6 days, THEN 1 tablet (5 mg) 2 times a day.   aspirin 81 mg chewable tablet   Yes No   Sig: Chew 1 tablet (81 mg) once daily.   atorvastatin (Lipitor) 80 mg tablet   Yes No   Sig: Take 1 tablet (80 mg) by mouth once daily at bedtime.   atorvastatin (Lipitor) 80 mg tablet   No No   Sig: Take 1 tablet (80 mg) by mouth once daily at bedtime.   blood sugar  diagnostic strip   No No   Sig: Use as directed twice daily   carvedilol (Coreg) 6.25 mg tablet   Yes No   Sig: Take 1 tablet (6.25 mg) by mouth every 12 hours.   dapagliflozin propanediol (Farxiga) 10 mg   Yes No   Sig: Take 1 tablet (10 mg) by mouth once every 24 hours.   dapagliflozin propanediol (Farxiga) 10 mg   No No   Sig: Take 1 tablet (10 mg) by mouth once daily with breakfast.   doxycycline (Vibramycin) 100 mg capsule   No No   Sig: Take 1 capsule (100 mg) by mouth 2 times a day.   ezetimibe (Zetia) 10 mg tablet   Yes No   Sig: Take 1 tablet (10 mg) by mouth once daily.   flash glucose scanning reader misc   No No   Sig: USE TO CHECK SUGARS FOUR TIMES A DAY   flash glucose sensor kit (FreeStyle En 2 Sensor) kit   No No   Sig: use as directed daily and change every 14 days   furosemide (Lasix) 40 mg tablet   Yes No   Sig: Take 1 tablet (40 mg) by mouth once daily as needed.   gabapentin (Neurontin) 400 mg capsule   No No   Sig: Take 1 capsule (400 mg) by mouth 3 times a day.   hydrALAZINE (Apresoline) 50 mg tablet   Yes No   Sig: Take 1 tablet (50 mg) by mouth 3 times a day.   hydrALAZINE (Apresoline) 50 mg tablet   No No   Sig: Take 1 tablet (50 mg) by mouth 3 times a day.   insulin glargine (Lantus) 100 unit/mL (3 mL) pen   No No   Sig: Inject 20 Units under the skin once daily at bedtime.   insulin lispro (HumaLOG) 100 unit/mL injection   No No   Sig: Inject 10 Units under the skin 3 times a day with meals. Plus sliding scale if BS is over 200   isosorbide mononitrate ER (Imdur) 60 mg 24 hr tablet   Yes No   Sig: Take 1 tablet (60 mg) by mouth once daily. Do not crush or chew.   isosorbide mononitrate ER (Imdur) 60 mg 24 hr tablet   No No   Sig: Take 1 tablet (60 mg) by mouth once daily.   lancets misc   No No   Si each 4 times a day before meals.   medical cannabis   Yes No   Sig: Not [facility] prescribed   nicotine (Nicoderm CQ) 21 mg/24 hr patch   No No   Sig: APPLY 1 PATCH TO SKIN EVERY 24 HOURS AS  "DIRECTED   oxyCODONE-acetaminophen (Percocet) 5-325 mg tablet   No No   Sig: Take 1 tablet by mouth every 6 hours if needed for severe pain (7 - 10) for up to 4 days.   pantoprazole (ProtoNix) 40 mg EC tablet   No No   Sig: TAKE 1 TABLET BY MOUTH ONCE DAILY   pen needle, diabetic (BD Ultra-Fine Jocy Pen Needle) 32 gauge x 5/32\" needle   No No   Sig: Pen Needles for Insulin.   pen needle, diabetic (BD Ultra-Fine Jocy Pen Needle) 32 gauge x 5/32\" needle   No No   Sig: Use to inject insulin up to 8 times per day.   pen needle, diabetic (TechLITE Pen Needle) 32 gauge x 1/4\" needle   No No   Sig: Use to inject 1-4 times daily as directed   ranolazine (Ranexa) 500 mg 12 hr tablet   Yes No   Sig: Take 1 tablet (500 mg) by mouth twice a day.   tiZANidine (Zanaflex) 2 mg tablet   No No   Sig: Take 1 tablet (2 mg) by mouth once daily as needed for muscle spasms.   ticagrelor (Brilinta) 90 mg tablet   No No   Sig: Take 1 tablet (90 mg) by mouth 2 times a day.      Facility-Administered Medications: None       ZANE COULTER        "

## 2024-12-17 NOTE — H&P
[facility] MEDICINE HISTORY AND PHYSICAL    History Of Present Illness     Dereck Shen is a 59 y.o. male with PMHx of CAD s/p CABG, severe lower extremity PAD with prior revascularization (R fem-pop bypass 2/24/20; L fem-pop bypass 9/14/20; L fem PTA of the graft; R iliac PTA 2/2022; LLE and DANYA PTA 4/2024; angioplasty of the left SFA 10/21/24; and left femoral and tibial thrombectomy with patch angioplasty 11/19/24. He presented with L Leg pain, redness, and ice cold to touch for three days. He has been compliant with all his medications including his Eliquis, Brilinta and aspirin.   ED workup was significant for creatinine 1.53. CT angio of the lower extremities does show what appears to be reocclusion of his SFA and popliteal. Case was discussed with Dr. Kruse who is on for vascular who recommended Dr. Lal consultation and initiation on heparin gtt.   Remainder of ROS reviewed and negative except as indicated in HPI.     Objective     Past Medical History  He has a past medical history of (HFpEF) heart failure with preserved ejection fraction, Aphasia, Atherosclerotic heart disease of native coronary artery with unspecified angina pectoris (11/05/2019), Bipolar disorder, unspecified (Multi), BPH (benign prostatic hyperplasia), Diabetes (Multi), DVT (deep venous thrombosis) (Multi) (02/2022), Erectile dysfunction, GERD (gastroesophageal reflux disease), Hepatitis C, HLD (hyperlipidemia), HTN (hypertension), Obstructive sleep apnea (adult) (pediatric), Opioid abuse, in remission (Multi), PAD (peripheral artery disease) (CMS-HCC), Polymyalgia rheumatica (Multi), Post-traumatic stress disorder, unspecified, and Stroke (Multi) (07/24/2023).    Surgical History  He has a past surgical history that includes Knee surgery (Left); Elbow surgery; Back surgery; CT angio aorta and bilateral iliofemoral runoff including without contrast if performed (07/20/2020); MR angio head wo IV contrast (01/04/2022); MR  angio neck wo IV contrast (01/04/2022); CT angio aorta and bilateral iliofemoral runoff including without contrast if performed (01/14/2022); Invasive Vascular Procedure (Bilateral, 10/04/2024); Dental surgery; Coronary angioplasty with stent; Coronary artery bypass graft; Tonsillectomy; Transluminal atherectomy femoral-popliteal / tibioperoneal; Invasive Vascular Procedure (N/A, 10/21/2024); and Invasive Vascular Procedure (N/A, 10/21/2024).    Social History     Tobacco Use    Smoking status: Every Day     Current packs/day: 0.50     Types: Cigarettes     Passive exposure: Current (5-7 cigarettes a day)    Smokeless tobacco: Never   Vaping Use    Vaping status: Never Used   Substance Use Topics    Alcohol use: Never    Drug use: Yes     Types: Marijuana     Comment: Medical card       Family History  Family History   Problem Relation Name Age of Onset    No Known Problems Mother      No Known Problems Father         Allergies  Celecoxib; Ibuprofen; Tetanus toxoid, adsorbed; [id number] antimigraine agents; Cephalexin; Hydrocodone; Latex; and Tryptophan    Vitals:    12/17/24 0814   BP: 116/75   Pulse: 68   Resp: 16   Temp:    SpO2: 96%       Vitals:    12/17/24 0021   Weight: 84.8 kg (187 lb)       Scheduled medications  atorvastatin, 80 mg, oral, Nightly  carvedilol, 6.25 mg, oral, BID  dapagliflozin propanediol, 10 mg, oral, Daily  ezetimibe, 10 mg, oral, Nightly  gabapentin, 400 mg, oral, TID  hydrALAZINE, 50 mg, oral, TID  insulin glargine, 20 Units, subcutaneous, Nightly  insulin lispro, 0-5 Units, subcutaneous, TID AC  isosorbide mononitrate ER, 60 mg, oral, Daily  nicotine, 1 patch, transdermal, Daily  OXcarbazepine, 450 mg, oral, BID  [START ON 12/18/2024] pantoprazole, 40 mg, oral, Daily before breakfast  polyethylene glycol, 17 g, oral, Daily  ranolazine, 500 mg, oral, BID  sacubitriL-valsartan, 1 tablet, oral, BID      Continuous medications  heparin, 0-4,500 Units/hr      PRN medications  PRN  medications: acetaminophen, dextrose, dextrose, glucagon, glucagon, heparin, HYDROmorphone, oxyCODONE-acetaminophen    Results for orders placed or performed during the hospital encounter of 12/17/24 (from the past 24 hours)   ECG 12 lead   Result Value Ref Range    Ventricular Rate 80 BPM    Atrial Rate 78 BPM    ID Interval 156 ms    QRS Duration 108 ms    QT Interval 386 ms    QTC Calculation(Bazett) 446 ms    P Axis 50 degrees    R Axis 46 degrees    T Axis 65 degrees    QRS Count 13 beats    Q Onset 249 ms    T Offset 442 ms    QTC Fredericia 425 ms   CBC and Auto Differential   Result Value Ref Range    WBC 6.0 4.4 - 11.3 x10*3/uL    nRBC 0.0 0.0 - 0.0 /100 WBCs    RBC 4.18 (L) 4.50 - 5.90 x10*6/uL    Hemoglobin 13.2 (L) 13.5 - 17.5 g/dL    Hematocrit 38.1 (L) 41.0 - 52.0 %    MCV 91 80 - 100 fL    MCH 31.6 26.0 - 34.0 pg    MCHC 34.6 32.0 - 36.0 g/dL    RDW 13.4 11.5 - 14.5 %    Platelets 232 150 - 450 x10*3/uL    Neutrophils % 56.5 40.0 - 80.0 %    Immature Granulocytes %, Automated 0.3 0.0 - 0.9 %    Lymphocytes % 30.5 13.0 - 44.0 %    Monocytes % 7.3 2.0 - 10.0 %    Eosinophils % 5.1 0.0 - 6.0 %    Basophils % 0.3 0.0 - 2.0 %    Neutrophils Absolute 3.41 1.20 - 7.70 x10*3/uL    Immature Granulocytes Absolute, Automated 0.02 0.00 - 0.70 x10*3/uL    Lymphocytes Absolute 1.84 1.20 - 4.80 x10*3/uL    Monocytes Absolute 0.44 0.10 - 1.00 x10*3/uL    Eosinophils Absolute 0.31 0.00 - 0.70 x10*3/uL    Basophils Absolute 0.02 0.00 - 0.10 x10*3/uL   Comprehensive metabolic panel   Result Value Ref Range    Glucose 162 (H) 74 - 99 mg/dL    Sodium 137 136 - 145 mmol/L    Potassium 4.1 3.5 - 5.3 mmol/L    Chloride 106 98 - 107 mmol/L    Bicarbonate 23 21 - 32 mmol/L    Anion Gap 12 10 - 20 mmol/L    Urea Nitrogen 39 (H) 6 - 23 mg/dL    Creatinine 1.53 (H) 0.50 - 1.30 mg/dL    eGFR 52 (L) >60 mL/min/1.73m*2    Calcium 8.6 8.6 - 10.3 mg/dL    Albumin 3.6 3.4 - 5.0 g/dL    Alkaline Phosphatase 97 33 - 120 U/L    Total  Protein 6.8 6.4 - 8.2 g/dL    AST 7 (L) 9 - 39 U/L    Bilirubin, Total 0.3 0.0 - 1.2 mg/dL    ALT 6 (L) 10 - 52 U/L   Troponin I, High Sensitivity   Result Value Ref Range    Troponin I, High Sensitivity 14 0 - 20 ng/L   B-Type Natriuretic Peptide   Result Value Ref Range     (H) 0 - 99 pg/mL   Morphology   Result Value Ref Range    RBC Morphology No significant RBC morphology present    Coagulation Screen   Result Value Ref Range    Protime 13.1 (H) 9.8 - 12.8 seconds    INR 1.2 (H) 0.9 - 1.1    aPTT 33 27 - 38 seconds   D-Dimer, Quantitative VTE Exclusion   Result Value Ref Range    D-Dimer, Quantitative VTE Exclusion 1,012 (H) <=500 ng/mL FEU     *Note: Due to a large number of results and/or encounters for the requested time period, some results have not been displayed. A complete set of results can be found in Results Review.         I personally reviewed all pertinent labwork, imaging and vital signs, as well as medications, nursing, therapy, discharge planning and consult notes.     Constitutional: Well developed, awake, alert, calm, oriented x4, no acute distress, cooperative   Eyes: EOMI, clear sclerae   ENMT: mucous membranes moist, no lesions seen   Head/Neck: Neck supple, no apparent injury, head atraumatic   Respiratory/Thorax: CTAB, good chest expansion, respirations even and unlabored   Cardiovascular: Regular rate and rhythm, no murmurs/rubs/gallops, normal S1 and S 2, weak LLE DPP, foot is mildly erythematous with decreased sensation, slightly cool   Gastrointestinal: Abdomen nondistended, soft, nontender, +BS, no bruits   Musculoskeletal: ROM intact, no joint swelling, normal  strength   Extremities: no cyanosis, edema, contusions or clubbing   Neurological: mildly decreased left foot sensation, pt alert and oriented x4   Psychological: Appropriate affect and behavior, pleasant   Skin: Warm and dry, two deep scabbed incisions on LLE are W/D/I       Assessment/Plan     Reocclusion of LLE SFA  and popliteal arteries  Hx severe lower extremity PAD with multiple prior revascularizations  Most recently L femoral and tibial thrombectomy 11/19/24  Pt is compliant with Eliquis, Brilinta and aspirin   Dr. Lal consulted, continue heparin gtt, keep NPO for now    Hx CAD s/p CABG  Continue DAPT while pt is on heparin gtt per vascular surgery, continue statin and Coreg    SAMEERA  Creatinine 1.53, no hx CKD, monitor for now    Diabetes  Continue Lantus and start SSI protocol    HTN  BP is controlled on home meds    DVT ppx: heparin gtt    Discharge disposition  Home when stable        Sarah Cooper, CNP  [facility]

## 2024-12-17 NOTE — CONSULTS
Inpatient consult to Vascular Surgery  Consult performed by: Marla Aguilar PA-C  Consult ordered by: Cherelle Saldana MD  Reason for consult: LLE limb ichemia          Reason For Consult  LLE limb ischemia    History Of Present Illness  Dereck Shen is a 59 y.o. male with past medical history of CAD s/p CABG, heart failure, HTN, recurrent strokes and severe PAD with history of multiple left lower extremity revascularizations including R fem-pop bypass 2/24/20; L fem-ak pop bypass w/graft 9/14/20; L fem PTA of the graft; R iliac PTA 2/2022; LLE and DANYA PTA 4/2024; angioplasty of the left SFA 10/21/24; with bypass and native SFA angioplasty. Most recently left femoral and tibial thrombectomy with patch angioplasty and 4 compartment fasciotomy 11/19/24 for acute limb ischemia. Reports he noted discoloration of the left foot and temperature change 3 days ago, did not have any pain or motor sensory changes in the foot at that time. He began to have left foot pain starting yesterday which prompted him to present to ER. CTA in the ER with reocclusion of SFA and popliteal. Currently on heparin drip. Pain controlled with analgesia. Since his last procedure, 11/19/24 he had been ambulatory until yesterday, no claudication, no rest pain. Has been following with wound care for fasciotomy sites and left groin incision. He has been taking his Eliquis, brillinta and asa81 daily as directed. Has not missed any doses.     Past Medical History  He has a past medical history of (HFpEF) heart failure with preserved ejection fraction, Aphasia, Atherosclerotic heart disease of native coronary artery with unspecified angina pectoris (11/05/2019), Bipolar disorder, unspecified (Multi), BPH (benign prostatic hyperplasia), Diabetes (Multi), DVT (deep venous thrombosis) (Multi) (02/2022), Erectile dysfunction, GERD (gastroesophageal reflux disease), Hepatitis C, HLD (hyperlipidemia), HTN (hypertension), Obstructive sleep apnea  (adult) (pediatric), Opioid abuse, in remission (Multi), PAD (peripheral artery disease) (CMS-HCC), Polymyalgia rheumatica (Multi), Post-traumatic stress disorder, unspecified, and Stroke (Multi) (07/24/2023).    Surgical History  He has a past surgical history that includes Knee surgery (Left); Elbow surgery; Back surgery; CT angio aorta and bilateral iliofemoral runoff including without contrast if performed (07/20/2020); MR angio head wo IV contrast (01/04/2022); MR angio neck wo IV contrast (01/04/2022); CT angio aorta and bilateral iliofemoral runoff including without contrast if performed (01/14/2022); Invasive Vascular Procedure (Bilateral, 10/04/2024); Dental surgery; Coronary angioplasty with stent; Coronary artery bypass graft; Tonsillectomy; Transluminal atherectomy femoral-popliteal / tibioperoneal; Invasive Vascular Procedure (N/A, 10/21/2024); and Invasive Vascular Procedure (N/A, 10/21/2024).     Social History  He reports that he has been smoking cigarettes. He has been exposed to tobacco smoke. He has never used smokeless tobacco. He reports current drug use. Drug: Marijuana. He reports that he does not drink alcohol.    Family History  Family History   Problem Relation Name Age of Onset    No Known Problems Mother      No Known Problems Father          Allergies  Celecoxib; Ibuprofen; Tetanus toxoid, adsorbed; [id number] antimigraine agents; Cephalexin; Hydrocodone; Latex; and Tryptophan    Review of Systems   Constitutional:  Negative for chills, fatigue and fever.   HENT:  Negative for congestion, sore throat and trouble swallowing.    Eyes:  Negative for visual disturbance.   Respiratory:  Negative for cough, shortness of breath and wheezing.    Cardiovascular:  Negative for chest pain, palpitations and leg swelling.   Gastrointestinal:  Negative for abdominal pain, constipation, diarrhea, nausea and vomiting.   Endocrine: Negative for cold intolerance and heat intolerance.   Genitourinary:   Negative for difficulty urinating.   Musculoskeletal:  Negative for arthralgias, joint swelling and neck pain.   Skin:  Positive for color change. Negative for wound.   Neurological:  Positive for numbness. Negative for dizziness, seizures, syncope, facial asymmetry, speech difficulty, weakness, light-headedness and headaches.   Hematological:  Negative for adenopathy.   Psychiatric/Behavioral:  Negative for behavioral problems, confusion and sleep disturbance.         Physical Exam  Constitutional:       Appearance: Normal appearance.   HENT:      Head: Normocephalic.      Right Ear: External ear normal.      Left Ear: External ear normal.      Nose: Nose normal.      Mouth/Throat:      Mouth: Mucous membranes are moist.   Eyes:      Extraocular Movements: Extraocular movements intact.   Neck:      Vascular: No carotid bruit.   Cardiovascular:      Rate and Rhythm: Normal rate and regular rhythm.      Comments: Left lower extremity warm to the mid shin, cool foot and toes. Minimal motor function of the left foot, able to wiggle the big toe and minimally dorsi and platarflex the ankle. Decreased sensation noted. (+) monophasic popliteal and PT signal by doppler.     Left groin incision healing, small area superior portion of incision with fibrinous slough, no surrounding cellulitis, no drainage    LLE Medial and lateral faciotomy open fasciotomy incisions no surrounding erythema, no drainage.   Pulmonary:      Effort: Pulmonary effort is normal. No respiratory distress.      Breath sounds: Normal breath sounds.   Abdominal:      Palpations: Abdomen is soft.      Tenderness: There is no abdominal tenderness.   Musculoskeletal:         General: No swelling or tenderness.      Cervical back: Normal range of motion and neck supple.      Right lower leg: No edema.      Left lower leg: No edema.   Skin:     General: Skin is warm and dry.      Capillary Refill: Capillary refill takes less than 2 seconds.      Findings: No  "lesion.   Neurological:      General: No focal deficit present.      Mental Status: He is alert and oriented to person, place, and time. Mental status is at baseline.   Psychiatric:         Mood and Affect: Mood normal.         Behavior: Behavior normal.         Thought Content: Thought content normal.         Judgment: Judgment normal.          Last Recorded Vitals  Blood pressure 135/82, pulse 73, temperature 36.4 °C (97.6 °F), temperature source Temporal, resp. rate 18, height 1.803 m (5' 11\"), weight 84.8 kg (187 lb), SpO2 96%.  Vitals:    12/17/24 1300 12/17/24 1330 12/17/24 1351 12/17/24 1528   BP: 104/83 102/50 152/85 135/82   BP Location: Left arm Left arm Right arm Right arm   Patient Position: Sitting Sitting Lying Lying   Pulse: 71 78 73 73   Resp: 18 20 18 18   Temp:   36.5 °C (97.7 °F) 36.4 °C (97.6 °F)   TempSrc:   Temporal Temporal   SpO2: 97% 97% 100% 96%   Weight:       Height:          Relevant Results  Results for orders placed or performed during the hospital encounter of 12/17/24 (from the past 24 hours)   ECG 12 lead   Result Value Ref Range    Ventricular Rate 80 BPM    Atrial Rate 78 BPM    NH Interval 156 ms    QRS Duration 108 ms    QT Interval 386 ms    QTC Calculation(Bazett) 446 ms    P Axis 50 degrees    R Axis 46 degrees    T Axis 65 degrees    QRS Count 13 beats    Q Onset 249 ms    T Offset 442 ms    QTC Fredericia 425 ms   CBC and Auto Differential   Result Value Ref Range    WBC 6.0 4.4 - 11.3 x10*3/uL    nRBC 0.0 0.0 - 0.0 /100 WBCs    RBC 4.18 (L) 4.50 - 5.90 x10*6/uL    Hemoglobin 13.2 (L) 13.5 - 17.5 g/dL    Hematocrit 38.1 (L) 41.0 - 52.0 %    MCV 91 80 - 100 fL    MCH 31.6 26.0 - 34.0 pg    MCHC 34.6 32.0 - 36.0 g/dL    RDW 13.4 11.5 - 14.5 %    Platelets 232 150 - 450 x10*3/uL    Neutrophils % 56.5 40.0 - 80.0 %    Immature Granulocytes %, Automated 0.3 0.0 - 0.9 %    Lymphocytes % 30.5 13.0 - 44.0 %    Monocytes % 7.3 2.0 - 10.0 %    Eosinophils % 5.1 0.0 - 6.0 %    " Basophils % 0.3 0.0 - 2.0 %    Neutrophils Absolute 3.41 1.20 - 7.70 x10*3/uL    Immature Granulocytes Absolute, Automated 0.02 0.00 - 0.70 x10*3/uL    Lymphocytes Absolute 1.84 1.20 - 4.80 x10*3/uL    Monocytes Absolute 0.44 0.10 - 1.00 x10*3/uL    Eosinophils Absolute 0.31 0.00 - 0.70 x10*3/uL    Basophils Absolute 0.02 0.00 - 0.10 x10*3/uL   Comprehensive metabolic panel   Result Value Ref Range    Glucose 162 (H) 74 - 99 mg/dL    Sodium 137 136 - 145 mmol/L    Potassium 4.1 3.5 - 5.3 mmol/L    Chloride 106 98 - 107 mmol/L    Bicarbonate 23 21 - 32 mmol/L    Anion Gap 12 10 - 20 mmol/L    Urea Nitrogen 39 (H) 6 - 23 mg/dL    Creatinine 1.53 (H) 0.50 - 1.30 mg/dL    eGFR 52 (L) >60 mL/min/1.73m*2    Calcium 8.6 8.6 - 10.3 mg/dL    Albumin 3.6 3.4 - 5.0 g/dL    Alkaline Phosphatase 97 33 - 120 U/L    Total Protein 6.8 6.4 - 8.2 g/dL    AST 7 (L) 9 - 39 U/L    Bilirubin, Total 0.3 0.0 - 1.2 mg/dL    ALT 6 (L) 10 - 52 U/L   Troponin I, High Sensitivity   Result Value Ref Range    Troponin I, High Sensitivity 14 0 - 20 ng/L   B-Type Natriuretic Peptide   Result Value Ref Range     (H) 0 - 99 pg/mL   Morphology   Result Value Ref Range    RBC Morphology No significant RBC morphology present    Coagulation Screen   Result Value Ref Range    Protime 13.1 (H) 9.8 - 12.8 seconds    INR 1.2 (H) 0.9 - 1.1    aPTT 33 27 - 38 seconds   D-Dimer, Quantitative VTE Exclusion   Result Value Ref Range    D-Dimer, Quantitative VTE Exclusion 1,012 (H) <=500 ng/mL FEU   POCT GLUCOSE   Result Value Ref Range    POCT Glucose 175 (H) 74 - 99 mg/dL   Heparin Assay, UFH   Result Value Ref Range    Heparin Unfractionated 1.8 (HH) See Comment Below for Therapeutic Ranges IU/mL     *Note: Due to a large number of results and/or encounters for the requested time period, some results have not been displayed. A complete set of results can be found in Results Review.      12/17/24 CTA Aorta  Vascular:  Abdominal Aorta:   The celiac, SMA,  and DENISE demonstrate no significant stenosis.  The  right and left renal arteries demonstrate no significant stenosis.   The abdominal aorta is not aneurysmal and demonstrates no significant  occlusive disease.  Right Run-off:   The right common iliac artery shows mild stenosis diffusely.  There is  a stent along the entire right external iliac and right common femoral  arteries.  The stent demonstrates areas of mild in-stent stenosis.  There is an occluded right femoral-popliteal bypass graft.  The native right femoral popliteal arteries demonstrate mild  atherosclerotic contour.  There is a stent extending from the mid SFA  to the proximal popliteal artery.  At the distal SFA the stent  demonstrates crush distortion but maintains a patent lumen, though  with areas of mild in-stent stenosis.  The popliteal artery remains patent throughout its course.  There is  patent three-vessel runoff to the ankle, and 2 vessels into the foot.   Opacification became too weak to follow the vessels to the plantar  arch.  Left Run-off:   The left common iliac artery demonstrates mild diffuse atherosclerotic  stenosis.  The left external iliac artery and left common femoral  arteries are relatively normal.  The common femoral artery bifurcation  is patent.  The native left SFA occludes just beyond its origin.  The  profunda femoral artery and its branches remain patent.  There is a chronically occluded left femoral-popliteal bypass graft.   The native left SFA does not reconstitute, remaining occluded  throughout its course.  There is a 2 cm length patent segment of the left popliteal artery,  which is otherwise occluded.  Distal to the patent segment, the  popliteal artery is occluded across the level of the joint.  The distal popliteal artery reconstitutes remains patent into its  trifurcation.  3 patent infrapopliteal vessels are visualized proximally.  The tibial  vessels remain patent to the ankle, where the dorsalis pedis  and  lateral plantar seen across the ankle joint.  The vessels cannot be  followed to the plantar arch due to weak contrast opacification.  Abdomen:  The lung bases are clear.  The liver is unremarkable.  The pancreas,  spleen, adrenal glands, and kidneys demonstrate no acute pathology.  There is no free air or lymph node enlargement.  Pelvis:  There is no bowel wall thickening or obstruction.  There is no free  fluid.  Lymph nodes are not enlarged.  Urinary bladder is  unremarkable.  At the left inguinal region there is thick soft tissue in the  subcutaneous fat overlying the femoral vessels, as well as 2 prominent  lymph nodes.  Skeleton:    There are no acute fractures.  No suspicious bony lesions.  IMPRESSION:  No significant aortic disease.  Mild bilateral common iliac artery  stenosis.  Patent left external iliac and common femoral arteries.  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  Chronic  occlusion of the native left superficial femoral artery.  A 2 cm segment of the left P1 popliteal artery reconstitutes.  The  terminal left popliteal artery also reconstitutes.  Other portions of  the popliteal artery are occluded.  Patent three-vessel infrapopliteal runoff to the left hindfoot, beyond  which contrast opacification was too weak to visualize.  Thick subcutaneous soft tissue - presumed scarred tissue - overlying  the left femoral vessels at the left groin.  Correlate for possibility  of cellulitis.  Patent right iliac and right femoral stents.  Patent right popliteal  artery.  Patent three-vessel infrapopliteal runoff to the right ankle.    12/17/24 PVR/MICHAELA  PRELIMINARY CONCLUSIONS:     Right Lower PVR: No evidence of arterial occlusive disease in the right lower extremity at rest. Normal digital perfusion noted. Multiphasic flow is noted in the right common femoral artery, right popliteal artery, right posterior tibial artery and right dorsalis pedis artery.  Left Lower PVR: Evidence  of severe arterial occlusive disease in the left lower extremity at rest. Decreased digital perfusion noted. Monophasic flow is noted in the left common femoral artery, left popliteal artery, left posterior tibial artery and left dorsalis pedis artery. PTA is inaudible.  Flat toe waveform(TBI=0).     Imaging & Doppler Findings:     RIGHT Lower PVR                Pressures Ratios  Right Posterior Tibial (Ankle) 141 mmHg  1.22  Right Dorsalis Pedis (Ankle)   126 mmHg  1.09  Right Digit (Great Toe)        112 mmHg  0.97           LEFT Lower PVR                Pressures Ratios  Left Posterior Tibial (Ankle) 0 mmHg    0.00  Left Dorsalis Pedis (Ankle)   33 mmHg   0.28  Left Digit (Great Toe)        0 mmHg    0.00     Assessment/Plan   59 year old male with past medical history of CAD s/p CABG, heart failure, HTN, recurrent strokes and severe PAD with history of multiple left lower extremity revascularizations now with critical limb ischemia of the LLE with reocclusion of SFA and popliteal.  Minimal revascularization options at this point and high risk of limb loss discussed with patient. Dr Peace to discuss further with [facility] vascular. Continue heparin drip. Continue DAPT with asa81 and brillinta. Continue neurovascular checks. Upper extremity vein mapping ordered-to be completed 12/18/24 as discussed with vasc lab. OK for diet at this time. Wound care consult for ongoing fasciotomy wound care. Pain control as needed.     Discussed above course of care and treatment plan with Dr Peace who was present at bedside and in agreement. Discussed with primary medical team.

## 2024-12-17 NOTE — ED PROVIDER NOTES
HPI   Chief Complaint   Patient presents with    Post-op Problem    Leg Pain       HPI:  59-year-old male with peripheral arterial disease who has had multiple vascular surgeries including left femoral artery surgery and fasciotomy to the left calf presenting to the ED with 3 days of progressive swelling discoloration and now pain to the top of the left foot.  He says that initially had some swelling to the top of the left foot but then yesterday noticed that it felt ice cold when he took his sock off and then could not get any sleep last night because of foot pain.  He was told by his vascular surgeon to come into the ED for evaluation if he experiences any pain he is compliant with all his medications including his Eliquis Brilinta and aspirin    Limitations to history: [None     External Records Reviewed: EMR records  ------------------------------------------------------------------------------------------------------------------------------------------  ROS: a ten point review of systems was performed and negative except as per HPI.  ------------------------------------------------------------------------------------------------------------------------------------------  PMH / PSH: as per HPI, reviewed in EMR and discussed with the patient []  MEDS:  reviewed in EMR and discussed with the patient []  ALLERGIES: reviewed in EMR[]  SocH:  as per HPI, otherwise reviewed in EMR []  FH:  as per HPI, otherwise reviewed in EMR []  ------------------------------------------------------------------------------------------------------------------------------------------  Physical Exam:  VS: As documented in the triage note and EMR flowsheet from this visit was reviewed  General: Well appearing. No acute distress.   Eyes:  Extraocular movements grossly intact. No scleral icterus.   HEENT: Atraumatic. Normocephalic.    Neck: Supple. No gross masses  CV: RRR, audible S1/S2, 2+ symmetric peripheral pulses  Resp: Clear to  auscultation bilaterally. No respiratory distress.  Non-labored respirations  GI: Soft, non-tender, non-distended, no rebound or gaurding  MSK: Left foot with slight purple discoloration cold to touch dopplerable PT pulse unable to locate by Doppler left DP pulse symmetric muscle bulk. No gross step offs or deformities.  Fasciotomy sites to the left calf appear well clean no drainage  Skin: Warm, dry, no obvious rash.  Neuro: Speech fluent. Awake. Alert. Appropriate conversation.  Psych: Appropriate mood and affect for situation  ------------------------------------------------------------------------------------------------------------------------------------------  Hospital Course / Medical Decision Makin-year-old male with known peripheral arterial disease has had multiple vascular surgeries on the left extremity presents with 3 days of worsening foot pain and swelling.  Foot is cold to touch unable to Doppler pulses his discharge summary from  they were unable to palpate pulses at that time however at the end of November it appears that Dr. Zambrano did a revascularization procedure and had documented dopplerable pulses at that time.    Patient was hooked up to the cardiac monitor peripheral IV access obtained labs are drawn.  Labs were reassuring troponin was negative he last took his Eliquis yesterday evening around 5 PM.  CT angio of the lower extremities does show what appears to be reocclusion of his SFA and popliteal.  Case discussed with Dr. Kruse who is on for vascular who recommended Dr. Zambrano consultation and initiation on heparin when and hospitalization patient given Dilaudid for pain control feeling better after ED treatment admitted to [facility] in stable condition    Patient care discussed with: [Admitting team, vascular consultant  Social Determinants affecting care: [Problems affording transportation, inability to afford prescriptions, lack of housing, lack of insurance]    Final  diagnosis and disposition: [] Acute on chronic ischemic limb reocclusion of left leg arterial disease            Cherelle Saldana MD                          Patient History   Past Medical History:   Diagnosis Date    (HFpEF) heart failure with preserved ejection fraction     Aphasia     Atherosclerotic heart disease of native coronary artery with unspecified angina pectoris 11/05/2019    Bipolar disorder, unspecified (Multi)     BPH (benign prostatic hyperplasia)     Diabetes (Multi)     DVT (deep venous thrombosis) (Multi) 02/2022    RLE    Erectile dysfunction     GERD (gastroesophageal reflux disease)     Hepatitis C     HLD (hyperlipidemia)     HTN (hypertension)     Obstructive sleep apnea (adult) (pediatric)     Opioid abuse, in remission (Multi)     PAD (peripheral artery disease) (CMS-HCC)     Polymyalgia rheumatica (Multi)     Post-traumatic stress disorder, unspecified     Stroke (Multi) 07/24/2023    multiple     Past Surgical History:   Procedure Laterality Date    BACK SURGERY      CORONARY ANGIOPLASTY WITH STENT PLACEMENT      CORONARY ARTERY BYPASS GRAFT      CT ANGIO AORTA AND BILATERAL ILIOFEMORAL RUN OFF INCLUDING WITHOUT CONTRAST IF PERFORMED  07/20/2020    CT ANGIO AORTA AND BILATERAL ILIOFEMORAL RUN OFF INCLUDING WITHOUT CONTRAST IF PERFORMED  01/14/2022    DENTAL SURGERY      ELBOW SURGERY      INVASIVE VASCULAR PROCEDURE Bilateral 10/04/2024    Procedure: Lower Extremity Angiogram;  Surgeon: Baljinder Wright MD;  Location: [facility];  Service: Cardiovascular;  Laterality: Bilateral;  w / anesthesia  3 hr hydration  / arrive 7:30    INVASIVE VASCULAR PROCEDURE N/A 10/21/2024    Procedure: Lower Extremity Angiogram;  Surgeon: Baljinder Wright MD;  Location: [facility];  Service: Cardiovascular;  Laterality: N/A;  w/ anesthesia    INVASIVE VASCULAR PROCEDURE N/A 10/21/2024    Procedure: Angioplasty - Lower Extremity;  Surgeon: Baljinder Wright MD;  Location: [facility];   Service: Cardiovascular;  Laterality: N/A;    KNEE SURGERY Left     MR HEAD ANGIO WO IV CONTRAST  01/04/2022    MR NECK ANGIO WO IV CONTRAST  01/04/2022    TONSILLECTOMY      TRANSLUMINAL ATHERECTOMY FEMORAL-POPLITEAL / TIBIOPERONEAL       Family History   Problem Relation Name Age of Onset    No Known Problems Mother      No Known Problems Father       Social History     Tobacco Use    Smoking status: Every Day     Current packs/day: 0.50     Types: Cigarettes     Passive exposure: Current (5-7 cigarettes a day)    Smokeless tobacco: Never   Vaping Use    Vaping status: Never Used   Substance Use Topics    Alcohol use: Never    Drug use: Yes     Types: Marijuana     Comment: Medical card       Physical Exam   ED Triage Vitals [12/17/24 0021]   Temperature Heart Rate Respirations BP   36.6 °C (97.9 °F) 81 18 (!) 169/91      Pulse Ox Temp src Heart Rate Source Patient Position   99 % -- -- --      BP Location FiO2 (%)     -- --       Physical Exam      ED Course & MDM                  No data recorded     [place] Coma Scale Score: 15 (12/17/24 0218 : Raven Larry RN)                           Medical Decision Making      Procedure  Procedures     Cherelle Saldana MD  12/17/24 0884

## 2024-12-18 ENCOUNTER — ANESTHESIA (OUTPATIENT)
Dept: OPERATING ROOM | Facility: HOSPITAL | Age: 59
End: 2024-12-18
Payer: COMMERCIAL

## 2024-12-18 ENCOUNTER — APPOINTMENT (OUTPATIENT)
Dept: VASCULAR MEDICINE | Facility: HOSPITAL | Age: 59
End: 2024-12-18
Payer: COMMERCIAL

## 2024-12-18 ENCOUNTER — ANESTHESIA EVENT (OUTPATIENT)
Dept: OPERATING ROOM | Facility: HOSPITAL | Age: 59
End: 2024-12-18
Payer: COMMERCIAL

## 2024-12-18 ENCOUNTER — APPOINTMENT (OUTPATIENT)
Dept: VASCULAR SURGERY | Facility: HOSPITAL | Age: 59
End: 2024-12-18
Payer: COMMERCIAL

## 2024-12-18 LAB
ABO GROUP (TYPE) IN BLOOD: NORMAL
ANION GAP SERPL CALC-SCNC: 10 MMOL/L (ref 10–20)
ANTIBODY SCREEN: NORMAL
BUN SERPL-MCNC: 36 MG/DL (ref 6–23)
CALCIUM SERPL-MCNC: 9.1 MG/DL (ref 8.6–10.3)
CHLORIDE SERPL-SCNC: 102 MMOL/L (ref 98–107)
CO2 SERPL-SCNC: 29 MMOL/L (ref 21–32)
CREAT SERPL-MCNC: 1.41 MG/DL (ref 0.5–1.3)
EGFRCR SERPLBLD CKD-EPI 2021: 58 ML/MIN/1.73M*2
ERYTHROCYTE [DISTWIDTH] IN BLOOD BY AUTOMATED COUNT: 13.3 % (ref 11.5–14.5)
GLUCOSE BLD MANUAL STRIP-MCNC: 149 MG/DL (ref 74–99)
GLUCOSE BLD MANUAL STRIP-MCNC: 157 MG/DL (ref 74–99)
GLUCOSE BLD MANUAL STRIP-MCNC: 159 MG/DL (ref 74–99)
GLUCOSE BLD MANUAL STRIP-MCNC: 182 MG/DL (ref 74–99)
GLUCOSE BLD MANUAL STRIP-MCNC: 213 MG/DL (ref 74–99)
GLUCOSE SERPL-MCNC: 190 MG/DL (ref 74–99)
HCT VFR BLD AUTO: 40.6 % (ref 41–52)
HGB BLD-MCNC: 14 G/DL (ref 13.5–17.5)
MCH RBC QN AUTO: 32.1 PG (ref 26–34)
MCHC RBC AUTO-ENTMCNC: 34.5 G/DL (ref 32–36)
MCV RBC AUTO: 93 FL (ref 80–100)
NRBC BLD-RTO: 0 /100 WBCS (ref 0–0)
PLATELET # BLD AUTO: 252 X10*3/UL (ref 150–450)
POTASSIUM SERPL-SCNC: 4.2 MMOL/L (ref 3.5–5.3)
RBC # BLD AUTO: 4.36 X10*6/UL (ref 4.5–5.9)
RH FACTOR (ANTIGEN D): NORMAL
SODIUM SERPL-SCNC: 137 MMOL/L (ref 136–145)
UFH PPP CHRO-ACNC: 0.5 IU/ML
UFH PPP CHRO-ACNC: 0.6 IU/ML
UFH PPP CHRO-ACNC: 0.6 IU/ML
WBC # BLD AUTO: 6.3 X10*3/UL (ref 4.4–11.3)

## 2024-12-18 PROCEDURE — 7100000001 HC RECOVERY ROOM TIME - INITIAL BASE CHARGE: Performed by: SURGERY

## 2024-12-18 PROCEDURE — 2500000004 HC RX 250 GENERAL PHARMACY W/ HCPCS (ALT 636 FOR OP/ED): Performed by: ANESTHESIOLOGY

## 2024-12-18 PROCEDURE — 2500000004 HC RX 250 GENERAL PHARMACY W/ HCPCS (ALT 636 FOR OP/ED): Performed by: PHYSICIAN ASSISTANT

## 2024-12-18 PROCEDURE — 04BN0ZZ EXCISION OF LEFT POPLITEAL ARTERY, OPEN APPROACH: ICD-10-PCS | Performed by: SURGERY

## 2024-12-18 PROCEDURE — 2500000004 HC RX 250 GENERAL PHARMACY W/ HCPCS (ALT 636 FOR OP/ED): Performed by: NURSE ANESTHETIST, CERTIFIED REGISTERED

## 2024-12-18 PROCEDURE — 85520 HEPARIN ASSAY: CPT | Performed by: PHYSICIAN ASSISTANT

## 2024-12-18 PROCEDURE — 3600000004 HC OR TIME - INITIAL BASE CHARGE - PROCEDURE LEVEL FOUR: Performed by: SURGERY

## 2024-12-18 PROCEDURE — 35656 BPG FEMORAL-POPLITEAL: CPT | Performed by: PHYSICIAN ASSISTANT

## 2024-12-18 PROCEDURE — 2720000007 HC OR 272 NO HCPCS: Performed by: SURGERY

## 2024-12-18 PROCEDURE — 93970 EXTREMITY STUDY: CPT

## 2024-12-18 PROCEDURE — 93970 EXTREMITY STUDY: CPT | Performed by: SURGERY

## 2024-12-18 PROCEDURE — 2500000005 HC RX 250 GENERAL PHARMACY W/O HCPCS: Performed by: ANESTHESIOLOGY

## 2024-12-18 PROCEDURE — 99233 SBSQ HOSP IP/OBS HIGH 50: CPT | Performed by: PHYSICIAN ASSISTANT

## 2024-12-18 PROCEDURE — 36415 COLL VENOUS BLD VENIPUNCTURE: CPT | Performed by: NURSE PRACTITIONER

## 2024-12-18 PROCEDURE — 36415 COLL VENOUS BLD VENIPUNCTURE: CPT | Performed by: EMERGENCY MEDICINE

## 2024-12-18 PROCEDURE — 2500000001 HC RX 250 WO HCPCS SELF ADMINISTERED DRUGS (ALT 637 FOR MEDICARE OP): Performed by: PHYSICIAN ASSISTANT

## 2024-12-18 PROCEDURE — 2500000002 HC RX 250 W HCPCS SELF ADMINISTERED DRUGS (ALT 637 FOR MEDICARE OP, ALT 636 FOR OP/ED): Performed by: PHYSICIAN ASSISTANT

## 2024-12-18 PROCEDURE — 2020000001 HC ICU ROOM DAILY

## 2024-12-18 PROCEDURE — 85520 HEPARIN ASSAY: CPT | Performed by: EMERGENCY MEDICINE

## 2024-12-18 PROCEDURE — 82947 ASSAY GLUCOSE BLOOD QUANT: CPT

## 2024-12-18 PROCEDURE — 82374 ASSAY BLOOD CARBON DIOXIDE: CPT | Performed by: NURSE PRACTITIONER

## 2024-12-18 PROCEDURE — C1768 GRAFT, VASCULAR: HCPCS | Performed by: SURGERY

## 2024-12-18 PROCEDURE — 2500000001 HC RX 250 WO HCPCS SELF ADMINISTERED DRUGS (ALT 637 FOR MEDICARE OP): Performed by: NURSE PRACTITIONER

## 2024-12-18 PROCEDURE — 83036 HEMOGLOBIN GLYCOSYLATED A1C: CPT | Mod: PORLAB | Performed by: PHYSICIAN ASSISTANT

## 2024-12-18 PROCEDURE — 2500000005 HC RX 250 GENERAL PHARMACY W/O HCPCS: Performed by: SURGERY

## 2024-12-18 PROCEDURE — 2500000004 HC RX 250 GENERAL PHARMACY W/ HCPCS (ALT 636 FOR OP/ED): Performed by: SURGERY

## 2024-12-18 PROCEDURE — 2500000005 HC RX 250 GENERAL PHARMACY W/O HCPCS: Performed by: NURSE ANESTHETIST, CERTIFIED REGISTERED

## 2024-12-18 PROCEDURE — 2500000004 HC RX 250 GENERAL PHARMACY W/ HCPCS (ALT 636 FOR OP/ED): Performed by: EMERGENCY MEDICINE

## 2024-12-18 PROCEDURE — 35656 BPG FEMORAL-POPLITEAL: CPT | Performed by: SURGERY

## 2024-12-18 PROCEDURE — 85027 COMPLETE CBC AUTOMATED: CPT | Performed by: NURSE PRACTITIONER

## 2024-12-18 PROCEDURE — 2500000001 HC RX 250 WO HCPCS SELF ADMINISTERED DRUGS (ALT 637 FOR MEDICARE OP): Performed by: ANESTHESIOLOGY

## 2024-12-18 PROCEDURE — 2500000002 HC RX 250 W HCPCS SELF ADMINISTERED DRUGS (ALT 637 FOR MEDICARE OP, ALT 636 FOR OP/ED): Performed by: NURSE PRACTITIONER

## 2024-12-18 PROCEDURE — 99291 CRITICAL CARE FIRST HOUR: CPT

## 2024-12-18 PROCEDURE — 7100000002 HC RECOVERY ROOM TIME - EACH INCREMENTAL 1 MINUTE: Performed by: SURGERY

## 2024-12-18 PROCEDURE — 99254 IP/OBS CNSLTJ NEW/EST MOD 60: CPT | Performed by: INTERNAL MEDICINE

## 2024-12-18 PROCEDURE — 2500000004 HC RX 250 GENERAL PHARMACY W/ HCPCS (ALT 636 FOR OP/ED)

## 2024-12-18 PROCEDURE — 86901 BLOOD TYPING SEROLOGIC RH(D): CPT | Performed by: SURGERY

## 2024-12-18 PROCEDURE — 3600000009 HC OR TIME - EACH INCREMENTAL 1 MINUTE - PROCEDURE LEVEL FOUR: Performed by: SURGERY

## 2024-12-18 PROCEDURE — 2780000003 HC OR 278 NO HCPCS: Performed by: SURGERY

## 2024-12-18 PROCEDURE — 2500000004 HC RX 250 GENERAL PHARMACY W/ HCPCS (ALT 636 FOR OP/ED): Mod: JZ | Performed by: PHYSICIAN ASSISTANT

## 2024-12-18 PROCEDURE — 041L0AL BYPASS LEFT FEMORAL ARTERY TO POPLITEAL ARTERY WITH AUTOLOGOUS ARTERIAL TISSUE, OPEN APPROACH: ICD-10-PCS | Performed by: SURGERY

## 2024-12-18 PROCEDURE — 3700000002 HC GENERAL ANESTHESIA TIME - EACH INCREMENTAL 1 MINUTE: Performed by: SURGERY

## 2024-12-18 PROCEDURE — 3700000001 HC GENERAL ANESTHESIA TIME - INITIAL BASE CHARGE: Performed by: SURGERY

## 2024-12-18 DEVICE — PROPATEN VASCULAR GRAFT TW RR 8MMX80CM 70CM RINGS HEPARIN
Type: IMPLANTABLE DEVICE | Site: ARTERIAL | Status: FUNCTIONAL
Brand: GORE PROPATEN VASCULAR GRAFT

## 2024-12-18 RX ORDER — INSULIN LISPRO 100 [IU]/ML
14 INJECTION, SOLUTION INTRAVENOUS; SUBCUTANEOUS
Status: DISCONTINUED | OUTPATIENT
Start: 2024-12-18 | End: 2024-12-24 | Stop reason: HOSPADM

## 2024-12-18 RX ORDER — FAMOTIDINE 10 MG/ML
20 INJECTION INTRAVENOUS ONCE
Status: COMPLETED | OUTPATIENT
Start: 2024-12-18 | End: 2024-12-18

## 2024-12-18 RX ORDER — NORETHINDRONE AND ETHINYL ESTRADIOL 0.5-0.035
KIT ORAL AS NEEDED
Status: DISCONTINUED | OUTPATIENT
Start: 2024-12-18 | End: 2024-12-18

## 2024-12-18 RX ORDER — NALOXONE HYDROCHLORIDE 1 MG/ML
0.2 INJECTION INTRAMUSCULAR; INTRAVENOUS; SUBCUTANEOUS EVERY 5 MIN PRN
Status: DISCONTINUED | OUTPATIENT
Start: 2024-12-18 | End: 2024-12-24 | Stop reason: HOSPADM

## 2024-12-18 RX ORDER — CLINDAMYCIN PHOSPHATE 600 MG/50ML
600 INJECTION, SOLUTION INTRAVENOUS EVERY 8 HOURS
Status: COMPLETED | OUTPATIENT
Start: 2024-12-18 | End: 2024-12-19

## 2024-12-18 RX ORDER — PHENYLEPHRINE HCL IN 0.9% NACL 1 MG/10 ML
SYRINGE (ML) INTRAVENOUS AS NEEDED
Status: DISCONTINUED | OUTPATIENT
Start: 2024-12-18 | End: 2024-12-18

## 2024-12-18 RX ORDER — CLINDAMYCIN PHOSPHATE 600 MG/50ML
600 INJECTION, SOLUTION INTRAVENOUS ONCE
Status: COMPLETED | OUTPATIENT
Start: 2024-12-18 | End: 2024-12-18

## 2024-12-18 RX ORDER — ETOMIDATE 2 MG/ML
INJECTION INTRAVENOUS AS NEEDED
Status: DISCONTINUED | OUTPATIENT
Start: 2024-12-18 | End: 2024-12-18

## 2024-12-18 RX ORDER — PHENYLEPHRINE 10 MG/250 ML(40 MCG/ML)IN 0.9 % SOD.CHLORIDE INTRAVENOUS
0-2 CONTINUOUS
Status: DISCONTINUED | OUTPATIENT
Start: 2024-12-18 | End: 2024-12-19

## 2024-12-18 RX ORDER — PHENYLEPHRINE 10 MG/250 ML(40 MCG/ML)IN 0.9 % SOD.CHLORIDE INTRAVENOUS
CONTINUOUS PRN
Status: DISCONTINUED | OUTPATIENT
Start: 2024-12-18 | End: 2024-12-18

## 2024-12-18 RX ORDER — HEPARIN SODIUM 5000 [USP'U]/ML
INJECTION, SOLUTION INTRAVENOUS; SUBCUTANEOUS AS NEEDED
Status: DISCONTINUED | OUTPATIENT
Start: 2024-12-18 | End: 2024-12-18

## 2024-12-18 RX ORDER — ALBUTEROL SULFATE 90 UG/1
2 INHALANT RESPIRATORY (INHALATION) EVERY 4 HOURS PRN
Status: DISCONTINUED | OUTPATIENT
Start: 2024-12-18 | End: 2024-12-24 | Stop reason: HOSPADM

## 2024-12-18 RX ORDER — DOBUTAMINE HYDROCHLORIDE 400 MG/100ML
2.5-2 INJECTION INTRAVENOUS CONTINUOUS
Status: DISCONTINUED | OUTPATIENT
Start: 2024-12-18 | End: 2024-12-18

## 2024-12-18 RX ORDER — ONDANSETRON HYDROCHLORIDE 2 MG/ML
4 INJECTION, SOLUTION INTRAVENOUS EVERY 8 HOURS PRN
Status: DISCONTINUED | OUTPATIENT
Start: 2024-12-18 | End: 2024-12-24 | Stop reason: HOSPADM

## 2024-12-18 RX ORDER — HEPARIN SODIUM 10000 [USP'U]/100ML
0-4500 INJECTION, SOLUTION INTRAVENOUS CONTINUOUS
Status: DISCONTINUED | OUTPATIENT
Start: 2024-12-18 | End: 2024-12-20

## 2024-12-18 RX ORDER — MORPHINE SULFATE 2 MG/ML
2 INJECTION, SOLUTION INTRAMUSCULAR; INTRAVENOUS EVERY 5 MIN PRN
Status: DISCONTINUED | OUTPATIENT
Start: 2024-12-18 | End: 2024-12-18 | Stop reason: HOSPADM

## 2024-12-18 RX ORDER — MORPHINE SULFATE 4 MG/ML
4 INJECTION INTRAVENOUS EVERY 5 MIN PRN
Status: DISCONTINUED | OUTPATIENT
Start: 2024-12-18 | End: 2024-12-18 | Stop reason: HOSPADM

## 2024-12-18 RX ORDER — PROPOFOL 10 MG/ML
INJECTION, EMULSION INTRAVENOUS AS NEEDED
Status: DISCONTINUED | OUTPATIENT
Start: 2024-12-18 | End: 2024-12-18

## 2024-12-18 RX ORDER — ROCURONIUM BROMIDE 50 MG/5 ML
SYRINGE (ML) INTRAVENOUS AS NEEDED
Status: DISCONTINUED | OUTPATIENT
Start: 2024-12-18 | End: 2024-12-18

## 2024-12-18 RX ORDER — ALBUTEROL SULFATE 0.83 MG/ML
2.5 SOLUTION RESPIRATORY (INHALATION) ONCE
Status: DISCONTINUED | OUTPATIENT
Start: 2024-12-18 | End: 2024-12-18 | Stop reason: HOSPADM

## 2024-12-18 RX ORDER — DOCUSATE SODIUM 100 MG/1
100 CAPSULE, LIQUID FILLED ORAL 2 TIMES DAILY
Status: DISCONTINUED | OUTPATIENT
Start: 2024-12-18 | End: 2024-12-24 | Stop reason: HOSPADM

## 2024-12-18 RX ORDER — GLYCOPYRROLATE 0.2 MG/ML
INJECTION INTRAMUSCULAR; INTRAVENOUS AS NEEDED
Status: DISCONTINUED | OUTPATIENT
Start: 2024-12-18 | End: 2024-12-18

## 2024-12-18 RX ORDER — ACETAMINOPHEN 325 MG/1
650 TABLET ORAL EVERY 6 HOURS
Status: DISCONTINUED | OUTPATIENT
Start: 2024-12-18 | End: 2024-12-24 | Stop reason: HOSPADM

## 2024-12-18 RX ORDER — OXYCODONE HYDROCHLORIDE 5 MG/1
5 TABLET ORAL EVERY 4 HOURS PRN
Status: DISCONTINUED | OUTPATIENT
Start: 2024-12-18 | End: 2024-12-24 | Stop reason: HOSPADM

## 2024-12-18 RX ORDER — ONDANSETRON HYDROCHLORIDE 2 MG/ML
4 INJECTION, SOLUTION INTRAVENOUS ONCE AS NEEDED
Status: DISCONTINUED | OUTPATIENT
Start: 2024-12-18 | End: 2024-12-18 | Stop reason: HOSPADM

## 2024-12-18 RX ORDER — PROTAMINE SULFATE 10 MG/ML
INJECTION, SOLUTION INTRAVENOUS AS NEEDED
Status: DISCONTINUED | OUTPATIENT
Start: 2024-12-18 | End: 2024-12-18

## 2024-12-18 RX ORDER — INSULIN LISPRO 100 [IU]/ML
0-10 INJECTION, SOLUTION INTRAVENOUS; SUBCUTANEOUS
Status: DISCONTINUED | OUTPATIENT
Start: 2024-12-18 | End: 2024-12-24 | Stop reason: HOSPADM

## 2024-12-18 RX ORDER — METOCLOPRAMIDE HYDROCHLORIDE 5 MG/ML
10 INJECTION INTRAMUSCULAR; INTRAVENOUS ONCE
Status: COMPLETED | OUTPATIENT
Start: 2024-12-18 | End: 2024-12-18

## 2024-12-18 RX ORDER — ACETAMINOPHEN 10 MG/ML
1000 INJECTION, SOLUTION INTRAVENOUS ONCE
Status: COMPLETED | OUTPATIENT
Start: 2024-12-18 | End: 2024-12-18

## 2024-12-18 RX ORDER — SODIUM CHLORIDE 9 MG/ML
20 INJECTION, SOLUTION INTRAVENOUS CONTINUOUS
Status: DISCONTINUED | OUTPATIENT
Start: 2024-12-18 | End: 2024-12-19

## 2024-12-18 RX ORDER — ACETAMINOPHEN 325 MG/1
975 TABLET ORAL ONCE
Status: COMPLETED | OUTPATIENT
Start: 2024-12-18 | End: 2024-12-18

## 2024-12-18 RX ORDER — FENTANYL CITRATE 50 UG/ML
INJECTION, SOLUTION INTRAMUSCULAR; INTRAVENOUS AS NEEDED
Status: DISCONTINUED | OUTPATIENT
Start: 2024-12-18 | End: 2024-12-18

## 2024-12-18 RX ORDER — OXYCODONE HYDROCHLORIDE 10 MG/1
10 TABLET ORAL EVERY 4 HOURS PRN
Status: DISCONTINUED | OUTPATIENT
Start: 2024-12-18 | End: 2024-12-24 | Stop reason: HOSPADM

## 2024-12-18 RX ORDER — LIDOCAINE HCL/PF 100 MG/5ML
SYRINGE (ML) INTRAVENOUS AS NEEDED
Status: DISCONTINUED | OUTPATIENT
Start: 2024-12-18 | End: 2024-12-18

## 2024-12-18 RX ORDER — ONDANSETRON 4 MG/1
4 TABLET, ORALLY DISINTEGRATING ORAL EVERY 8 HOURS PRN
Status: DISCONTINUED | OUTPATIENT
Start: 2024-12-18 | End: 2024-12-24 | Stop reason: HOSPADM

## 2024-12-18 RX ORDER — INSULIN LISPRO 100 [IU]/ML
10 INJECTION, SOLUTION INTRAVENOUS; SUBCUTANEOUS
Status: DISCONTINUED | OUTPATIENT
Start: 2024-12-18 | End: 2024-12-18

## 2024-12-18 RX ORDER — INSULIN GLARGINE 100 [IU]/ML
24 INJECTION, SOLUTION SUBCUTANEOUS NIGHTLY
Status: DISCONTINUED | OUTPATIENT
Start: 2024-12-18 | End: 2024-12-20

## 2024-12-18 RX ORDER — MIDAZOLAM HYDROCHLORIDE 1 MG/ML
INJECTION, SOLUTION INTRAMUSCULAR; INTRAVENOUS AS NEEDED
Status: DISCONTINUED | OUTPATIENT
Start: 2024-12-18 | End: 2024-12-18

## 2024-12-18 RX ORDER — ONDANSETRON HYDROCHLORIDE 2 MG/ML
4 INJECTION, SOLUTION INTRAVENOUS ONCE
Status: COMPLETED | OUTPATIENT
Start: 2024-12-18 | End: 2024-12-18

## 2024-12-18 RX ORDER — SODIUM CITRATE AND CITRIC ACID MONOHYDRATE 334; 500 MG/5ML; MG/5ML
30 SOLUTION ORAL ONCE
Status: COMPLETED | OUTPATIENT
Start: 2024-12-18 | End: 2024-12-18

## 2024-12-18 RX ADMIN — INSULIN LISPRO 2 UNITS: 100 INJECTION, SOLUTION INTRAVENOUS; SUBCUTANEOUS at 08:33

## 2024-12-18 RX ADMIN — OXYCODONE HYDROCHLORIDE 10 MG: 10 TABLET ORAL at 20:51

## 2024-12-18 RX ADMIN — MORPHINE SULFATE 2 MG: 2 INJECTION, SOLUTION INTRAMUSCULAR; INTRAVENOUS at 16:24

## 2024-12-18 RX ADMIN — Medication 0.5 MCG/KG/MIN: at 16:05

## 2024-12-18 RX ADMIN — SODIUM CITRATE AND CITRIC ACID MONOHYDRATE 30 ML: 500; 334 SOLUTION ORAL at 12:12

## 2024-12-18 RX ADMIN — HYDROMORPHONE HYDROCHLORIDE 0.4 MG: 1 INJECTION, SOLUTION INTRAMUSCULAR; INTRAVENOUS; SUBCUTANEOUS at 00:05

## 2024-12-18 RX ADMIN — ACETAMINOPHEN 1000 MG: 10 INJECTION INTRAVENOUS at 16:07

## 2024-12-18 RX ADMIN — CLINDAMYCIN PHOSPHATE 600 MG: 600 INJECTION, SOLUTION INTRAVENOUS at 20:50

## 2024-12-18 RX ADMIN — HYDROMORPHONE HYDROCHLORIDE 0.4 MG: 1 INJECTION, SOLUTION INTRAMUSCULAR; INTRAVENOUS; SUBCUTANEOUS at 09:27

## 2024-12-18 RX ADMIN — OXYCODONE HYDROCHLORIDE AND ACETAMINOPHEN 1 TABLET: 5; 325 TABLET ORAL at 06:23

## 2024-12-18 RX ADMIN — ATORVASTATIN CALCIUM 80 MG: 40 TABLET, FILM COATED ORAL at 20:50

## 2024-12-18 RX ADMIN — TICAGRELOR 90 MG: 90 TABLET ORAL at 20:51

## 2024-12-18 RX ADMIN — ACETAMINOPHEN 975 MG: 325 TABLET ORAL at 12:12

## 2024-12-18 RX ADMIN — INSULIN GLARGINE 24 UNITS: 100 INJECTION, SOLUTION SUBCUTANEOUS at 20:37

## 2024-12-18 RX ADMIN — FAMOTIDINE 20 MG: 10 INJECTION, SOLUTION INTRAVENOUS at 12:05

## 2024-12-18 RX ADMIN — TICAGRELOR 90 MG: 90 TABLET ORAL at 10:22

## 2024-12-18 RX ADMIN — HYDROMORPHONE HYDROCHLORIDE 0.4 MG: 1 INJECTION, SOLUTION INTRAMUSCULAR; INTRAVENOUS; SUBCUTANEOUS at 04:50

## 2024-12-18 RX ADMIN — ACETAMINOPHEN 650 MG: 325 TABLET ORAL at 20:51

## 2024-12-18 RX ADMIN — METOCLOPRAMIDE 10 MG: 5 INJECTION, SOLUTION INTRAMUSCULAR; INTRAVENOUS at 12:05

## 2024-12-18 RX ADMIN — ONDANSETRON 4 MG: 2 INJECTION INTRAMUSCULAR; INTRAVENOUS at 12:05

## 2024-12-18 RX ADMIN — HYDROMORPHONE HYDROCHLORIDE 0.4 MG: 1 INJECTION, SOLUTION INTRAMUSCULAR; INTRAVENOUS; SUBCUTANEOUS at 22:05

## 2024-12-18 RX ADMIN — EZETIMIBE 10 MG: 10 TABLET ORAL at 20:51

## 2024-12-18 RX ADMIN — PANTOPRAZOLE SODIUM 40 MG: 40 TABLET, DELAYED RELEASE ORAL at 06:23

## 2024-12-18 RX ADMIN — GABAPENTIN 400 MG: 400 CAPSULE ORAL at 20:51

## 2024-12-18 RX ADMIN — NITROGLYCERIN 0.5 INCH: 20 OINTMENT TOPICAL at 12:05

## 2024-12-18 RX ADMIN — HEPARIN SODIUM 1300 UNITS/HR: 10000 INJECTION, SOLUTION INTRAVENOUS at 07:51

## 2024-12-18 RX ADMIN — DOCUSATE SODIUM 100 MG: 100 CAPSULE, LIQUID FILLED ORAL at 20:51

## 2024-12-18 RX ADMIN — RANOLAZINE 500 MG: 500 TABLET, EXTENDED RELEASE ORAL at 20:51

## 2024-12-18 SDOH — HEALTH STABILITY: MENTAL HEALTH: CURRENT SMOKER: 1

## 2024-12-18 ASSESSMENT — PAIN SCALES - GENERAL
PAINLEVEL_OUTOF10: 10 - WORST POSSIBLE PAIN
PAINLEVEL_OUTOF10: 3
PAINLEVEL_OUTOF10: 5 - MODERATE PAIN
PAINLEVEL_OUTOF10: 9
PAINLEVEL_OUTOF10: 10 - WORST POSSIBLE PAIN
PAINLEVEL_OUTOF10: 8
PAINLEVEL_OUTOF10: 3
PAINLEVEL_OUTOF10: 6
PAINLEVEL_OUTOF10: 9
PAINLEVEL_OUTOF10: 10 - WORST POSSIBLE PAIN
PAINLEVEL_OUTOF10: 10 - WORST POSSIBLE PAIN
PAINLEVEL_OUTOF10: 3
PAINLEVEL_OUTOF10: 9
PAINLEVEL_OUTOF10: 0 - NO PAIN
PAINLEVEL_OUTOF10: 9
PAINLEVEL_OUTOF10: 3
PAINLEVEL_OUTOF10: 6
PAINLEVEL_OUTOF10: 5 - MODERATE PAIN
PAIN_LEVEL: 7
PAINLEVEL_OUTOF10: 10 - WORST POSSIBLE PAIN
PAINLEVEL_OUTOF10: 3
PAINLEVEL_OUTOF10: 5 - MODERATE PAIN
PAINLEVEL_OUTOF10: 3
PAINLEVEL_OUTOF10: 8

## 2024-12-18 ASSESSMENT — ENCOUNTER SYMPTOMS
NAUSEA: 0
BRUISES/BLEEDS EASILY: 0
CHILLS: 0
BACK PAIN: 0
SHORTNESS OF BREATH: 0
WEAKNESS: 0
HEADACHES: 0
FATIGUE: 0
DIARRHEA: 0
CONSTIPATION: 0
EYE PAIN: 0
WHEEZING: 0
COUGH: 0
FACIAL SWELLING: 0
DYSURIA: 0
HALLUCINATIONS: 0
TROUBLE SWALLOWING: 0
HEMATURIA: 0
FREQUENCY: 0
CHEST TIGHTNESS: 0
DIAPHORESIS: 0
LIGHT-HEADEDNESS: 0
SORE THROAT: 0
JOINT SWELLING: 0
ABDOMINAL PAIN: 0
FLANK PAIN: 0
FEVER: 0
WOUND: 0
PALPITATIONS: 0
BLOOD IN STOOL: 0
DIZZINESS: 0
VOMITING: 0
APPETITE CHANGE: 0
NUMBNESS: 0

## 2024-12-18 ASSESSMENT — COGNITIVE AND FUNCTIONAL STATUS - GENERAL
DRESSING REGULAR UPPER BODY CLOTHING: A LITTLE
WALKING IN HOSPITAL ROOM: A LOT
PERSONAL GROOMING: A LITTLE
MOVING TO AND FROM BED TO CHAIR: A LITTLE
MOVING FROM LYING ON BACK TO SITTING ON SIDE OF FLAT BED WITH BEDRAILS: A LITTLE
DAILY ACTIVITIY SCORE: 17
TOILETING: A LOT
TURNING FROM BACK TO SIDE WHILE IN FLAT BAD: A LITTLE
MOBILITY SCORE: 24
HELP NEEDED FOR BATHING: A LOT
CLIMB 3 TO 5 STEPS WITH RAILING: A LOT
DRESSING REGULAR LOWER BODY CLOTHING: A LITTLE
STANDING UP FROM CHAIR USING ARMS: A LITTLE
MOBILITY SCORE: 16

## 2024-12-18 ASSESSMENT — PAIN DESCRIPTION - DESCRIPTORS
DESCRIPTORS: BURNING
DESCRIPTORS: CRUSHING;JABBING
DESCRIPTORS: ACHING
DESCRIPTORS: BURNING

## 2024-12-18 ASSESSMENT — PAIN DESCRIPTION - LOCATION
LOCATION: LEG

## 2024-12-18 ASSESSMENT — PAIN - FUNCTIONAL ASSESSMENT
PAIN_FUNCTIONAL_ASSESSMENT: 0-10

## 2024-12-18 ASSESSMENT — PAIN DESCRIPTION - ORIENTATION
ORIENTATION: LEFT

## 2024-12-18 ASSESSMENT — COLUMBIA-SUICIDE SEVERITY RATING SCALE - C-SSRS
6. HAVE YOU EVER DONE ANYTHING, STARTED TO DO ANYTHING, OR PREPARED TO DO ANYTHING TO END YOUR LIFE?: NO
2. HAVE YOU ACTUALLY HAD ANY THOUGHTS OF KILLING YOURSELF?: NO
1. IN THE PAST MONTH, HAVE YOU WISHED YOU WERE DEAD OR WISHED YOU COULD GO TO SLEEP AND NOT WAKE UP?: NO

## 2024-12-18 ASSESSMENT — PAIN SCALES - WONG BAKER: WONGBAKER_NUMERICALRESPONSE: HURTS LITTLE BIT

## 2024-12-18 NOTE — PROGRESS NOTES
Dereck Shen is a 59 y.o. male on day 1 of admission presenting with Peripheral artery occlusion (CMS-[facility]).      Subjective   Dereck Shen is a 59 y.o. male with PMHx of CAD s/p CABG, severe lower extremity PAD with prior revascularization (R fem-pop bypass 2/24/20; L fem-pop bypass 9/14/20; L fem PTA of the graft; R iliac PTA 2/2022; LLE and DANYA PTA 4/2024; angioplasty of the left SFA 10/21/24; and left femoral and tibial thrombectomy with patch angioplasty 11/19/24. He presented with L Leg pain, redness, and ice cold to touch for three days. He has been compliant with all his medications including his Eliquis, Brilinta and aspirin.   ED workup was significant for creatinine 1.53. CT angio of the lower extremities does show what appears to be reocclusion of his SFA and popliteal. Patient evaluated by Dr. Peace- continue hep gtt, DAPT, statin- Minimal revascularization options at this point and high risk of limb loss, obtain upper extremity vein mapping     12/18/2024: No acute events overnight. Vitals stable. CBC/BMP reviewed, creatinine improving slowly.  Glucose 213- appreciate endocrinology recommendations. Check A1c- most recent A1C of 8.2% as of October 2024   Going to OR today with Dr. Peace         Review of Systems   Constitutional:  Negative for appetite change, chills, diaphoresis, fatigue and fever.   HENT:  Negative for congestion, ear pain, facial swelling, hearing loss, nosebleeds, sore throat, tinnitus and trouble swallowing.    Eyes:  Negative for pain.   Respiratory:  Negative for cough, chest tightness, shortness of breath and wheezing.    Cardiovascular:  Negative for chest pain, palpitations and leg swelling.   Gastrointestinal:  Negative for abdominal pain, blood in stool, constipation, diarrhea, nausea and vomiting.   Genitourinary:  Negative for dysuria, flank pain, frequency, hematuria and urgency.   Musculoskeletal:  Negative for back pain and joint swelling.        Bilateral foot  pain   Skin:  Negative for rash and wound.   Neurological:  Negative for dizziness, syncope, weakness, light-headedness, numbness and headaches.   Hematological:  Does not bruise/bleed easily.   Psychiatric/Behavioral:  Negative for behavioral problems, hallucinations and suicidal ideas.           Objective     Last Recorded Vitals  /79 (BP Location: Right arm, Patient Position: Lying)   Pulse 92   Temp 36.9 °C (98.4 °F) (Temporal)   Resp 18   Wt 88.8 kg (195 lb 12.3 oz)   SpO2 93%     Image Results  ECG 12 lead    Result Date: 12/17/2024  Sinus rhythm Inferior infarct, old Baseline wander in lead(s) II,III,aVR,aVL,aVF,V1,V2,V5 See ED provider note for full interpretation and clinical correlation Confirmed by Nay Black ([id number]) on 12/17/2024 11:40:07 AM    Vascular US ankle brachial index (ANSON) without exercise    Result Date: 12/17/2024  Preliminary Cardiology [place] [address]      Phone [phone] Fax [phone]            [facility] Lab Report  [facility] (ANSON[facility] WITHOUT EXERCISE  Patient Name:     CARMENZA Presley Physician: [id number] Darlyn OLEARY MD Study Date:       12/17/2024       Ordering Provider: [id number] BASIM POON MRN/PID:          [id number]         Fellow: Accession#:       [id number]     Technologist:      Deb Allen RVT YOB: 1965        Technologist 2: Gender:           M                Encounter#:        [id number] Admission Status: Emergency        Location           [facility]                                    Performed:  Diagnosis/ICD: Peripheral vascular disease, unspecified-I73.9 CPT Codes:     06855 Peripheral artery ANSON Only  Pertinent History: Pt presents to the ER with LLE pain and a cold left foot.  **CRITICAL RESULT** Critical Result: severe arterial disease of LLE Notification  called to Cherelle Poon on 12/17/2024 at 9:31:33 AM.  PRELIMINARY CONCLUSIONS:  Right Lower PVR: No evidence of arterial occlusive disease in the right lower extremity at rest. Normal digital perfusion noted. Multiphasic flow is noted in the right common femoral artery, right popliteal artery, right posterior tibial artery and right dorsalis pedis artery. Left Lower PVR: Evidence of severe arterial occlusive disease in the left lower extremity at rest. Decreased digital perfusion noted. Monophasic flow is noted in the left common femoral artery, left popliteal artery, left posterior tibial artery and left dorsalis pedis artery. PTA is inaudible. Flat toe waveform(TBI=0).  Imaging & Doppler Findings:  RIGHT Lower PVR                Pressures Ratios Right Posterior Tibial (Ankle) 141 mmHg  1.22 Right Dorsalis Pedis (Ankle)   126 mmHg  1.09 Right Digit (Great Toe)        112 mmHg  0.97   LEFT Lower PVR                Pressures Ratios Left Posterior Tibial (Ankle) 0 mmHg    0.00 Left Dorsalis Pedis (Ankle)   33 mmHg   0.28 Left Digit (Great Toe)        0 mmHg    0.00                      Right Brachial Pressure 116 mmHg   VASCULAR PRELIMINARY REPORT completed by Deb Allen RVT on 12/17/2024 at 9:32:24 AM  ** Final **     CT angio aorta and bilateral iliofemoral runoff including without contrast if performed    Result Date: 12/17/2024  STUDY: CT Angiogram of the Abdomen, Pelvis, and Bilateral Lower Extremities; 12/17/2024 6:38 AM INDICATION: Cold left foot. COMPARISON: CTA AP w/runoffs 11/19/2024, CTA chest 11/19/2024, CTA AP w/runoffs 09/13/2024. ACCESSION NUMBER(S): [id number] ORDERING CLINICIAN: CHERELLE POON TECHNIQUE:  Helical CT is performed from the aortic diaphragmatic hiatus through the feet after bolus administration of 90 mL of Omnipaque 350.  Images are reviewed and processed at a workstation according to the CT angiogram protocol with 3-D and/or MIP post processing imaging generated. Automated mA/kV  exposure control was utilized and patient examination was performed in strict accordance with principles of ALARA. FINDINGS: Vascular: Abdominal Aorta: The celiac, SMA, and DENISE demonstrate no significant stenosis.  The right and left renal arteries demonstrate no significant stenosis. The abdominal aorta is not aneurysmal and demonstrates no significant occlusive disease. Right Run-off: The right common iliac artery shows mild stenosis diffusely.  There is a stent along the entire right external iliac and right common femoral arteries.  The stent demonstrates areas of mild in-stent stenosis. There is an occluded right femoral-popliteal bypass graft. The native right femoral popliteal arteries demonstrate mild atherosclerotic contour.  There is a stent extending from the mid SFA to the proximal popliteal artery.  At the distal SFA the stent demonstrates crush distortion but maintains a patent lumen, though with areas of mild in-stent stenosis. The popliteal artery remains patent throughout its course.  There is patent three-vessel runoff to the ankle, and 2 vessels into the foot. Opacification became too weak to follow the vessels to the plantar arch. Left Run-off: The left common iliac artery demonstrates mild diffuse atherosclerotic stenosis.  The left external iliac artery and left common femoral arteries are relatively normal.  The common femoral artery bifurcation is patent.  The native left SFA occludes just beyond its origin.  The profunda femoral artery and its branches remain patent. There is a chronically occluded left femoral-popliteal bypass graft. The native left SFA does not reconstitute, remaining occluded throughout its course. There is a 2 cm length patent segment of the left popliteal artery, which is otherwise occluded.  Distal to the patent segment, the popliteal artery is occluded across the level of the joint. The distal popliteal artery reconstitutes remains patent into its trifurcation. 3  patent infrapopliteal vessels are visualized proximally.  The tibial vessels remain patent to the ankle, where the dorsalis pedis and lateral plantar seen across the ankle joint.  The vessels cannot be followed to the plantar arch due to weak contrast opacification. Abdomen: The lung bases are clear.  The liver is unremarkable.  The pancreas, spleen, adrenal glands, and kidneys demonstrate no acute pathology. There is no free air or lymph node enlargement. Pelvis: There is no bowel wall thickening or obstruction.  There is no free fluid.  Lymph nodes are not enlarged.  Urinary bladder is unremarkable. At the left inguinal region there is thick soft tissue in the subcutaneous fat overlying the femoral vessels, as well as 2 prominent lymph nodes. Skeleton:  There are no acute fractures.  No suspicious bony lesions.    No significant aortic disease.  Mild bilateral common iliac artery stenosis.  Patent left external iliac and common femoral arteries. 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  Chronic occlusion of the native left superficial femoral artery. A 2 cm segment of the left P1 popliteal artery reconstitutes.  The terminal left popliteal artery also reconstitutes.  Other portions of the popliteal artery are occluded. Patent three-vessel infrapopliteal runoff to the left hindfoot, beyond which contrast opacification was too weak to visualize. Thick subcutaneous soft tissue - presumed scarred tissue - overlying the left femoral vessels at the left groin.  Correlate for possibility of cellulitis. Patent right iliac and right femoral stents.  Patent right popliteal artery.  Patent three-vessel infrapopliteal runoff to the right ankle. No findings of an acute process in the abdomen or pelvis. Signed by Simeon Barrera MD    Vascular US Lower Extremity Arterial Duplex Bilateral    Result Date: 12/3/2024              [address] [address]      Phone  [phone] Fax [phone]  Vascular Lab Report  VASC US LOWER EXTREMITY ARTERIAL DUPLEX BILATERAL Patient Name:      CARMENZA URBINA MAMTA    Reading Physician:  [id number] Britney Rush MD Study Date:        12/2/2024            Ordering Provider:  [id number] ADELA LYLES MRN/PID:           [id number]             Fellow: Accession#:        [id number]         Technologist:       Deb Allen RVT Date of Birth/Age: 1965 / 59 years Technologist 2: Gender:            M                    Encounter#:         [id number] Admission Status:  Outpatient           Location Performed: [facility]  Diagnosis/ICD: Peripheral vascular disease, unspecified-I73.9 CPT Codes:     23568 Peripheral artery Lower arterial Duplex complete  Pertinent History: LLE intervention(11/19/2024).  CONCLUSIONS: Right Lower Arterial: The profunda was monophasic and dampened on prior exam and today is not well visualized and appears occluded. All remaining vessels appear patent. Left Lower Arterial: No occlusion or stenosis noted in the vessels visualized. The distal BARB is dampened and appears nearly occluded. Working around staples and dressings. The EIA is not visualized. The proximal and mid CFA and the profunda is not visualized. Minimal visualization of calf vessels.  Imaging & Doppler Findings:  Right                       Left   PSV                        PSV 89 cm/s         EIA 95 cm/s         CFA        78 cm/s  0 cm/s  Profunda Proximal 80 cm/s    SFA Proximal    88 cm/s 148 cm/s      SFA Mid      100 cm/s 112 cm/s    SFA Distal     108 cm/s 73 cm/s      Popliteal     74 cm/s 100 cm/s   BARB Proximal 86 cm/s       BARB Mid 53 cm/s     BARB Distal     11 cm/s 48 cm/s  Peroneal Proximal 55 cm/s    Peroneal Mid    77 cm/s 40 cm/s   Peroneal Distal 34 cm/s    PTA Proximal    48 cm/s 31 cm/s       PTA Mid 52 cm/s     PTA Distal  [id number] Britney Rush MD Electronically signed by  [id number] Britney Rush MD on 12/3/2024 at 11:43:03 AM  ** Final **     Vascular US ankle brachial index (ANSON) without exercise    Result Date: 12/3/2024              [place] [address]      Phone [phone] Fax [phone]  Vascular Lab Report  [facility] US ANKLE BRACHIAL INDEX (ANSON) WITHOUT EXERCISE Patient Name:      CARMENAZ Presley Physician:  [id number] Britney Rush MD Study Date:        12/2/2024            Ordering Provider:  [id number] ADELA LYLES MRN/PID:           [id number]             Fellow: Accession#:        [id number]         Technologist:       Deb Allen RVT Date of Birth/Age: 1965 / 59 years Technologist 2: Gender:            M                    Encounter#:         [id number] Admission Status:  Outpatient           Location Performed: [facility]  Diagnosis/ICD: Peripheral vascular disease, unspecified-I73.9 CPT Codes:     33645 Peripheral artery ANSON Only  Pertinent History: Recent LLE intervention(11/19/2024).  CONCLUSIONS: Right Lower PVR: No evidence of arterial occlusive disease in the right lower extremity at rest. Normal digital perfusion noted. Multiphasic flow is noted in the right common femoral artery, right posterior tibial artery and right dorsalis pedis artery. Left Lower PVR: Evidence of moderate arterial occlusive disease in the left lower extremity at rest. Decreased digital perfusion noted. Monophasic flow is noted in the left posterior tibial artery and left dorsalis pedis artery. Multiphasic flow is noted in the left common femoral artery.  Comparison: Compared with study from 5/23/2024, The left ANSON decreased from .96 to .67 today. No significant change on the right.  Imaging & Doppler Findings:  RIGHT Lower PVR                Pressures Ratios Right Posterior Tibial (Ankle) 169 mmHg  0.94 Right Dorsalis Pedis (Ankle)   158 mmHg  0.88 Right Digit  (Great Toe)        113 mmHg  0.63   LEFT Lower PVR                Pressures Ratios Left Posterior Tibial (Ankle) 109 mmHg  0.61 Left Dorsalis Pedis (Ankle)   120 mmHg  0.67 Left Digit (Great Toe)        105 mmHg  0.58                      Right Brachial Pressure 180 mmHg   [id number] Rush MD Electronically signed by Enma Rush MD on 12/3/2024 at 11:38:55 AM  ** Final **     Transthoracic Echo (TTE) Complete    Result Date: 11/20/2024              [place] [address]      Phone [phone] Fax [phone] TRANSTHORACIC ECHOCARDIOGRAM REPORT Patient Name:       CARMENZA OLEARY   Reading Physician:    Enma Rush MD Study Date:         11/20/2024          Ordering Provider:    [id number] SEARS MRN/PID:            [id number]            Fellow: Accession#:         [id number]        Nurse:                Noemi Villa RN Date of Birth/Age:  1965 / 59      Sonographer:          Laxmi crisostomo RDCS Gender Assigned at  M                   Additional Staff: Birth: Height:             180.34 cm           Admit Date:           11/19/2024 Weight:             87.09 kg            Admission Status:     Inpatient -                                                               Routine BSA / BMI:          2.07 m2 / 26.78     Department Location:  [facility]                     kg/m2 Blood Pressure: 107 /64 mmHg Study Type:    TRANSTHORACIC ECHO (TTE) COMPLETE Diagnosis/ICD: Shortness of breath-R06.02; Unspecified systolic (congestive)                heart failure (CHF)-I50.20 Indication:    CHF, SHORTNESS OF BREATH CPT Codes:     Echo Complete w Full Doppler-40814 Patient History: Pertinent History: CABG x5 2019, HTN. Study Detail: The following Echo studies were performed: 2D, M-Mode, Doppler and               color  flow. Technically challenging study due to prominent lung               artifact and body habitus. Optison used as a contrast agent for               endocardial border definition. Total contrast used for this               procedure was 3 mL via IV push.  PHYSICIAN INTERPRETATION: Left Ventricle: The left ventricular systolic function is moderately decreased, with a Salinas's biplane calculated ejection fraction of 38%. Wall motion is abnormal. The left ventricular cavity size is normal. There is normal septal and normal posterior left ventricular wall thickness. Spectral Doppler shows an abnormal pattern of left ventricular diastolic filling. Akinetic inferolateral segment, and basal inferior wall. Left Atrium: The left atrium is normal in size. Right Ventricle: The right ventricle was not well visualized. Right ventricular systolic function not assessed. Right Atrium: The right atrium is normal in size. Aortic Valve: The aortic valve appears structurally normal. The aortic valve dimensionless index is 0.73. There is no evidence of aortic valve regurgitation. The peak instantaneous gradient of the aortic valve is 9 mmHg. The mean gradient of the aortic valve is 4 mmHg. Mitral Valve: The mitral valve is normal in structure. There is no evidence of mitral valve regurgitation. Tricuspid Valve: The tricuspid valve is structurally normal. There is trace tricuspid regurgitation. Pulmonic Valve: The pulmonic valve is structurally normal. There is no indication of pulmonic valve regurgitation. Pericardium: No pericardial effusion noted. Aorta: The aortic root is normal. Systemic Veins: The inferior vena cava appears normal in size, with IVC inspiratory collapse greater than 50%.  CONCLUSIONS:  1. Poorly visualized anatomical structures due to suboptimal image quality.  2. The left ventricular systolic function is moderately decreased, with a Salinas's biplane calculated ejection fraction of 38%.  3. Abnormal wall motion.   4. Spectral Doppler shows an abnormal pattern of left ventricular diastolic filling.  5. Akinetic inferolateral segment, and basal inferior wall. QUANTITATIVE DATA SUMMARY:  2D MEASUREMENTS:           Normal Ranges: Ao Root d:       2.80 cm   (2.0-3.7cm) LAs:             3.60 cm   (2.7-4.0cm) IVSd:            0.70 cm   (0.6-1.1cm) LVPWd:           0.70 cm   (0.6-1.1cm) LVIDd:           6.10 cm   (3.9-5.9cm) LVIDs:           5.30 cm LV Mass Index:   78.5 g/m2 LV % FS          13.1 %  LA VOLUME:                    Normal Ranges: LA Vol A4C:        48.7 ml    (22+/-6mL/m2) LA Vol A2C:        49.8 ml LA Vol BP:         51.4 ml LA Vol Index A4C:  23.5ml/m2 LA Vol Index A2C:  24.0 ml/m2 LA Vol Index BP:   24.8 ml/m2 LA Area A4C:       17.6 cm2 LA Area A2C:       18.6 cm2 LA Major Axis A4C: 5.4 cm LA Major Axis A2C: 5.9 cm LA Volume Index:   24.8 ml/m2  RA VOLUME BY A/L METHOD:          Normal Ranges: RA Area A4C:             13.5 cm2  AORTA MEASUREMENTS:         Normal Ranges: Ao Sinus, d:        2.80 cm (2.1-3.5cm) Ao STJ, d:          2.60 cm (1.7-3.4cm) Asc Ao, d:          2.90 cm (2.1-3.4cm)  LV SYSTOLIC FUNCTION BY 2D PLANIMETRY (MOD):                      Normal Ranges: EF-A4C View:    38 % (>=55%) EF-A2C View:    39 % EF-Biplane:     38 % LV EF Reported: 38 %  LV DIASTOLIC FUNCTION:             Normal Ranges: MV Peak E:             0.75 m/s    (0.7-1.2 m/s) MV Peak A:             0.65 m/s    (0.42-0.7 m/s) E/A Ratio:             1.17        (1.0-2.2) MV e'                  0.069 m/s   (>8.0) MV lateral e'          0.08 m/s MV medial e'           0.06 m/s MV A Dur:              109.00 msec E/e' Ratio:            10.91       (<8.0)  MITRAL VALVE:          Normal Ranges: MV DT:        211 msec (150-240msec)  AORTIC VALVE:                     Normal Ranges: AoV Vmax:                1.46 m/s (<=1.7m/s) AoV Peak P.5 mmHg (<20mmHg) AoV Mean P.0 mmHg (1.7-11.5mmHg) LVOT Max Ayad:             1.09 m/s (<=1.1m/s) AoV VTI:                 29.40 cm (18-25cm) LVOT VTI:                21.60 cm LVOT Diameter:           2.20 cm  (1.8-2.4cm) AoV Area, VTI:           2.79 cm2 (2.5-5.5cm2) AoV Area,Vmax:           2.84 cm2 (2.5-4.5cm2) AoV Dimensionless Index: 0.73  RIGHT VENTRICLE: TAPSE: 19.1 mm  TRICUSPID VALVE/RVSP:          Normal Ranges: Peak TR Velocity:     2.00 m/s RV Syst Pressure:     19 mmHg  (< 30mmHg) IVC Diam:             1.60 cm  [id number] Britney Rush MD Electronically signed on 11/20/2024 at 1:49:12 PM  ** Final **     ECG 12 lead    Result Date: 11/19/2024  Sinus rhythm Probable left atrial enlargement Borderline repolarization abnormality ST elevation, consider anterior injury Borderline prolonged QT interval See ED provider note for full interpretation and clinical correlation Confirmed by Zulema Stephen ([id number]) on 11/19/2024 11:07:14 AM    CT angio aorta and bilateral iliofemoral runoff including without contrast if performed    Result Date: 11/19/2024  Interpreted By:  Finkelstein, Evan, STUDY: CT ANGIO AORTA AND BILATERAL ILIOFEMORAL RUN OFF INCLUDING WITHOUT CONTRAST IF PERFORMED;  11/19/2024 4:42 am   INDICATION: Signs/Symptoms:no pulses left leg.     COMPARISON: CT runoff 09/13/2024   ACCESSION NUMBER(S): [id number]   ORDERING CLINICIAN: ACE VALENZUELA   TECHNIQUE: Contiguous axial CTA images were acquired through the abdomen and pelvis and bilateral lower extremities following the administration of 100 mL Omnipaque 350 intravenous contrast. Sagittal and coronal images were reconstructed. Maximum intensity projection and three dimensional images were constructed at a separate workstation.   FINDINGS: VASCULAR FINDINGS:   ABDOMINAL AORTA & ILIAC ARTERIES: No evidence of abdominal aortic aneurysm or dissection.   Moderate atherosclerotic calcifications throughout the abdominal aorta and major proximal vasculature. Celiac axis, superior mesenteric artery, and inferior  mesenteric artery are patent without any significant narrowing. Bilateral renal arteries are patent without any significant.   Right common iliac and external iliac arteries are patent without any significant narrowing. Patent right common/external iliac stent. Moderate to severe narrowing of the internal iliac arteries bilaterally. Left common and external iliac arteries are patent.     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 Common femoral artery is patent.  Native superficial femoral artery is patent. There is a stent within the mid to lower aspect of the native right superficial femoral artery which is patent. Profunda femoral artery is patent.  Popliteal artery is patent. Tibioperoneal trunk, posterior tibial artery, peroneal artery, and anterior tibial artery are patent. Three-vessel runoff at the ankle.   LEFT LOWER EXTREMITY: Common femoral artery is patent. 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 Redemonstrated occlusion of the native superficial femoral artery, similar compared to prior imaging. There is reconstitution of the popliteal artery. Poor opacification of the trifurcation of vessels just below the knee with poor opacification of the vessels throughout the entirety of the calf, ankle and foot, new/worsened compared to prior imaging..   ---------------------------------------------------------------   NON-VASCULAR FINDINGS:   LIVER: Within normal limits.   BILE DUCTS: Nondilated.   GALLBLADDER: No evidence of calcified gallstones or wall thickening.   PANCREAS: Within normal limits.   SPLEEN: A splenule is present. The spleen is otherwise unremarkable..   ADRENALS: Within normal limits.   KIDNEYS, URETERS, URINARY BLADDER: Unremarkable. No evidence of hydronephrosis.   BOWEL: No evidence of abnormal dilatation or obstruction of the small or large bowel. No evidence of pneumoperitoneum.   REPRODUCTIVE ORGANS:   Unremarkable   PERITONEUM / RETROPERITONEUM / LYMPH NODES: No evidence of any free fluid. No evidence of any significantly enlarged intra-abdominal or pelvic lymph nodes.   ABDOMINAL WALL: Fat containing inguinal hernias.   LOWER CHEST: Please refer to separately dictated CT chest report.   BONES: No evidence of suspicious lytic or blastic osseous lesions.       Persistent occlusion of the left femoral/popliteal bypass graft and native left superficial femoral artery. There is also new occlusion versus severe stenosis involving the vessels below the left knee beginning at the level of the trifurcation with the anterior tibial, posterior tibial and peroneal arteries not well seen through the calf, ankle and foot.   No acute abnormality of the abdomen or pelvis.   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 There is good opacification of the native right superficial femoral artery with three-vessel runoff to the right ankle.   MACRO: None.   Signed by: Evan Finkelstein 11/19/2024 5:20 AM Dictation workstation:   [id number]    CT angio chest for pulmonary embolism    Result Date: 11/19/2024  Interpreted By:  Finkelstein, Evan, STUDY: CT ANGIO CHEST FOR PULMONARY EMBOLISM;  11/19/2024 4:27 am   INDICATION: Signs/Symptoms:sob wih hypoxia.     COMPARISON: CT chest 06/23/2021   ACCESSION NUMBER(S): [id number]   ORDERING CLINICIAN: ACE VALENZUELA   TECHNIQUE: Axial CTA images of the chest after intravenous administration of 50 mL Omnipaque 350 using CT angiographic technique. Coronal and sagittal images are reconstructed. MIP images were created and reviewed.   FINDINGS: CHEST WALL AND LOWER NECK: Within normal limits. ABDOMEN: No acute abnormality of the partially visualized abdomen.   VASCULAR: AORTA: No aortic aneurysm. Mild atherosclerotic disease. PULMONARY ARTERY: Normal caliber. No pulmonary embolus to the segmental level.   CHEST:   HEART: Normal size. No pericardial effusion.  MEDIASTINUM AND REJI: No pathologically enlarged thoracic lymph nodes. LUNG, PLEURA, LARGE AIRWAYS: Trace left pleural effusion. Thickened interlobular septal markings. Ground-glass opacities scattered throughout the lungs bilaterally with an upper lung zone predominance.   BONES: No acute osseous abnormality. Median sternotomy wires are present.       No acute pulmonary embolus to the segmental level.   Thickened interlobular septal markings with scattered ground-glass opacities with an upper lung zone predominance. Findings may be seen with mixed interstitial/alveolar pulmonary edema. Other superimposed infectious or inflammatory processes are also not excluded.   Trace left pleural effusion   MACRO: None.   Signed by: Evan Finkelstein 11/19/2024 4:36 AM Dictation workstation:   [id number]       Lab Results  Results for orders placed or performed during the hospital encounter of 12/17/24 (from the past 24 hours)   POCT GLUCOSE   Result Value Ref Range    POCT Glucose 175 (H) 74 - 99 mg/dL   Heparin Assay, [facility]   Result Value Ref Range    Heparin Unfractionated 1.8 (HH) See Comment Below for Therapeutic Ranges IU/mL   POCT GLUCOSE   Result Value Ref Range    POCT Glucose 254 (H) 74 - 99 mg/dL   Heparin Assay, [facility]   Result Value Ref Range    Heparin Unfractionated 0.9 See Comment Below for Therapeutic Ranges IU/mL   POCT GLUCOSE   Result Value Ref Range    POCT Glucose 278 (H) 74 - 99 mg/dL   Heparin Assay, [facility]   Result Value Ref Range    Heparin Unfractionated 0.7 See Comment Below for Therapeutic Ranges IU/mL   Heparin Assay, [facility]   Result Value Ref Range    Heparin Unfractionated 0.6 See Comment Below for Therapeutic Ranges IU/mL   Heparin Assay, [facility]FH   Result Value Ref Range    Heparin Unfractionated 0.6 See Comment Below for Therapeutic Ranges IU/mL   CBC   Result Value Ref Range    WBC 6.3 4.4 - 11.3 x10*3/uL    nRBC 0.0 0.0 - 0.0 /100 WBCs    RBC 4.36 (L) 4.50 - 5.90 x10*6/uL    Hemoglobin 14.0 13.5 - 17.5  g/dL    Hematocrit 40.6 (L) 41.0 - 52.0 %    MCV 93 80 - 100 fL    MCH 32.1 26.0 - 34.0 pg    MCHC 34.5 32.0 - 36.0 g/dL    RDW 13.3 11.5 - 14.5 %    Platelets 252 150 - 450 x10*3/uL   Basic metabolic panel   Result Value Ref Range    Glucose 190 (H) 74 - 99 mg/dL    Sodium 137 136 - 145 mmol/L    Potassium 4.2 3.5 - 5.3 mmol/L    Chloride 102 98 - 107 mmol/L    Bicarbonate 29 21 - 32 mmol/L    Anion Gap 10 10 - 20 mmol/L    Urea Nitrogen 36 (H) 6 - 23 mg/dL    Creatinine 1.41 (H) 0.50 - 1.30 mg/dL    eGFR 58 (L) >60 mL/min/1.73m*2    Calcium 9.1 8.6 - 10.3 mg/dL   POCT GLUCOSE   Result Value Ref Range    POCT Glucose 213 (H) 74 - 99 mg/dL     *Note: Due to a large number of results and/or encounters for the requested time period, some results have not been displayed. A complete set of results can be found in Results Review.        Medications  Scheduled medications:  aspirin, 81 mg, oral, Daily  atorvastatin, 80 mg, oral, Nightly  carvedilol, 6.25 mg, oral, BID  dapagliflozin propanediol, 10 mg, oral, Daily  ezetimibe, 10 mg, oral, Nightly  gabapentin, 400 mg, oral, TID  hydrALAZINE, 50 mg, oral, TID  insulin glargine, 20 Units, subcutaneous, Nightly  insulin lispro, 0-10 Units, subcutaneous, TID AC  insulin lispro, 10 Units, subcutaneous, TID AC  isosorbide mononitrate ER, 60 mg, oral, Daily  nicotine, 1 patch, transdermal, Daily  OXcarbazepine, 450 mg, oral, BID  pantoprazole, 40 mg, oral, Daily before breakfast  polyethylene glycol, 17 g, oral, Daily  ranolazine, 500 mg, oral, BID  sacubitriL-valsartan, 1 tablet, oral, BID  tamsulosin, 0.4 mg, oral, Daily  ticagrelor, 90 mg, oral, BID      Continuous medications:  heparin, 0-4,500 Units/hr, Last Rate: 1,300 Units/hr (12/18/24 0754)      PRN medications:  PRN medications: acetaminophen, dextrose, dextrose, glucagon, glucagon, heparin, HYDROmorphone, HYDROmorphone, oxyCODONE-acetaminophen     Physical Exam  Constitutional:       General: He is not in acute  distress.     Appearance: Normal appearance.   HENT:      Head: Normocephalic and atraumatic.      Right Ear: External ear normal.      Left Ear: External ear normal.      Nose: Nose normal.      Mouth/Throat:      Mouth: Mucous membranes are moist.      Pharynx: Oropharynx is clear.   Eyes:      Extraocular Movements: Extraocular movements intact.      Conjunctiva/sclera: Conjunctivae normal.      Pupils: Pupils are equal, round, and reactive to light.   Cardiovascular:      Rate and Rhythm: Normal rate and regular rhythm.      Pulses: Normal pulses.      Heart sounds: Normal heart sounds.      Comments: Unable to palpate bilateral DP/PT pulses  L foot is pink but cold to touch  Not much motor movement of L foot  Pulmonary:      Effort: Pulmonary effort is normal. No respiratory distress.      Breath sounds: Normal breath sounds. No wheezing, rhonchi or rales.   Abdominal:      General: Bowel sounds are normal.      Palpations: Abdomen is soft.      Tenderness: There is no abdominal tenderness. There is no right CVA tenderness, left CVA tenderness, guarding or rebound.   Musculoskeletal:      Cervical back: Normal range of motion and neck supple.      Comments: Minimal movement of L great toe, no movement about the ankle   Skin:     General: Skin is warm and dry.      Findings: Lesion (L groin) present. No rash.   Neurological:      General: No focal deficit present.      Mental Status: He is alert and oriented to person, place, and time. Mental status is at baseline.   Psychiatric:         Mood and Affect: Mood normal.         Behavior: Behavior normal.                  Assessment/Plan   This patient currently has cardiac telemetry ordered; if you would like to modify or discontinue the telemetry order, click here to go to the orders activity to modify/discontinue the order.  Critical limb ischemia of LLE with reocclusion of L SFA and popliteal arteries  Hx severe lower extremity PAD with multiple prior  revascularizations  Most recently L femoral and tibial thrombectomy 11/19/24 with 4 compartment fasciotomy   Pt reports he is compliant with Eliquis, Brilinta and aspirin   Dr. Peace consulted continue hep gtt, DAPT, statin- Minimal revascularization options at this point and high risk of limb loss, obtain upper extremity vein mapping   Wound care consult for wound care of fasciotomy sites      Hx CAD s/p CABG  Continue DAPT while pt is on heparin gtt per vascular surgery, continue statin and Coreg     SAMEERA  No hx CKD, monitor for now  Avoid nephrotoxins  Consider nephrology consult if worsening      IDDM-II with hyperglycemia  Continue Lantus and humalog 10u TID AC  A1C of 8.2% as of October 2024   Start SSI protocol  Consult endocrinology  Goal glucose <180 for better wound healing      HTN  BP is controlled on home meds    Code Status: Full Code          DVT ppx: Heparin drip     Please see orders for more complete plan    Lisa Marlow PA-C

## 2024-12-18 NOTE — CARE PLAN
The patient's goals for the shift include  staying informed     The clinical goals for the shift include Patient will remain hemodynamically stable throughout the shift.    Over the shift, the patient did not make progress toward the following goals. Barriers to progression include understanding. Recommendations to address these barriers include education.

## 2024-12-18 NOTE — ANESTHESIA PREPROCEDURE EVALUATION
Patient: Dereck Shen    Procedure Information       Date/Time: 12/18/24 1145    Procedure: Creation Bypass Graft Femoral Popliteal Artery Left Limb (Left)    Location: [facility] 08 / Virtual [facility] OR    Surgeons: Iban Peace, DO            Relevant Problems   Anesthesia (within normal limits)      Cardiac   (+) Atherosclerotic heart disease of native coronary artery without angina pectoris   (+) Critical limb ischemia of left lower extremity (Multi)   (+) Hyperlipidemia   (+) Hypertension   (+) PAD (peripheral artery disease) (CMS-HCC)   (+) Peripheral arterial disease (CMS-HCC)   (+) Peripheral artery occlusion (CMS-HCC)      Pulmonary   (+) Chronic obstructive pulmonary disease (Multi)   (+) BRUNA (obstructive sleep apnea)   (+) Shortness of breath on exertion      Neuro   (+) Bipolar depression (Multi)   (+) Causalgia of lower extremity   (+) Diabetic polyneuropathy associated with type 2 diabetes mellitus (Multi)   (+) Ischemic stroke (Multi)      GI   (+) Gastroesophageal reflux disease      /Renal   (+) BPH (benign prostatic hyperplasia)      Liver   (+) Chronic hepatitis C virus genotype 1a infection (Multi)   (+) Hepatic steatosis   (+) Hepatitis-C      Endocrine   (+) Diabetic neuropathy, painful (Multi)   (+) Diabetic polyneuropathy associated with type 2 diabetes mellitus (Multi)   (+) Type 2 diabetes mellitus      Musculoskeletal   (+) Causalgia of lower extremity   (+) Chronic pain syndrome      ID   (+) Bacterial skin infection   (+) Chronic hepatitis C virus genotype 1a infection (Multi)   (+) Hepatitis-C   (+) Recurrent boils   (+) Shingles       Clinical information reviewed:   Tobacco  Allergies  Meds   Med Hx  Surg Hx   Fam Hx  Soc Hx        NPO Detail:  NPO/Void Status  Date of Last Liquid: 12/17/24  Time of Last Liquid: 2350  Date of Last Solid: 12/17/24  Time of Last Solid: 1250  Time of Last Void: 0600         Physical Exam    Airway  Mallampati: III  TM distance: >3 FB  Neck ROM:  full     Cardiovascular - normal exam     Dental   (+) upper dentures, lower dentures     Pulmonary - normal exam     Abdominal - normal exam             Anesthesia Plan    History of general anesthesia?: yes  History of complications of general anesthesia?: no    ASA 3 - emergent     general     The patient is a current smoker.  Patient was previously instructed to abstain from smoking on day of procedure.  Patient did not smoke on day of procedure.    intravenous induction   Anesthetic plan and risks discussed with patient.  Use of blood products discussed with patient who consented to blood products.    Plan discussed with CRNA.

## 2024-12-18 NOTE — ANESTHESIA POSTPROCEDURE EVALUATION
Patient: Dereck Shen    Procedure Summary       Date: 12/18/24 Room / Location: [facility] 08 / [facility]    Anesthesia Start: 1220 Anesthesia Stop: 1528    Procedure: Creation Bypass Graft Femoral Popliteal Artery Left Limb (Left) Diagnosis:       Critical limb ischemia of left lower extremity (Multi)      (Critical limb ischemia of left lower extremity (Multi) [I70.222])    Surgeons: Iban Peace DO Responsible Provider: SUMAN Mejia-CRNA    Anesthesia Type: general ASA Status: 3 - Emergent            Anesthesia Type: general    Vitals Value Taken Time   /66 12/18/24 1630   Temp 36.5 °C (97.7 °F) 12/18/24 1610   Pulse 57 12/18/24 1633   Resp 14 12/18/24 1633   SpO2 97 % 12/18/24 1633   Vitals shown include unfiled device data.    Anesthesia Post Evaluation    Patient location during evaluation: PACU  Patient participation: complete - patient participated  Level of consciousness: sleepy but conscious and awake  Pain score: 7  Pain management: adequate  Airway patency: patent  Cardiovascular status: hemodynamically stable  Respiratory status: room air  Hydration status: acceptable  Postoperative Nausea and Vomiting: none  Comments: PT'S VITAL SIGNS DO NOT REFELECT THE INTENSITY OF PAIN PROCLAIMED    There were no known notable events for this encounter.

## 2024-12-18 NOTE — PROGRESS NOTES
SW attempted to meet with pt to introduce discharge planning. Pt off the floor at this time in procedure. SW to follow once pt returns to floor and as time permits.     LANETTE Riggs ([id number])   Care Transitions

## 2024-12-18 NOTE — ANESTHESIA PROCEDURE NOTES
Airway  Date/Time: 12/18/2024 12:30 PM  Urgency: elective    Airway not difficult    Staffing  Performed: CRNA   Authorized by: PAU Mejia    Performed by: PAU Mejia  Patient location during procedure: OR    Indications and Patient Condition  Indications for airway management: anesthesia  Spontaneous Ventilation: absent  Sedation level: deep  Preoxygenated: yes  Patient position: sniffing  Mask difficulty assessment: 2 - vent by mask + OA or adjuvant +/- NMBA  Planned trial extubation    Final Airway Details  Final airway type: endotracheal airway      Successful airway: ETT  Cuffed: yes   Successful intubation technique: video laryngoscopy (Moreno)  Facilitating devices/methods: intubating stylet  Endotracheal tube insertion site: oral  Blade: Daylin  Blade size: #4  ETT size (mm): 7.5  Cormack-Lehane Classification: grade I - full view of glottis  Placement verified by: chest auscultation and capnometry   Cuff volume (mL): 9  Measured from: lips  ETT to lips (cm): 24  Number of attempts at approach: 1    Additional Comments  Atraumatic intubation.

## 2024-12-18 NOTE — CARE PLAN
The patient's goals for the shift include stay informed.      The clinical goals for the shift include Patient will remain hemodynamically stable throughout the shift.    Over the shift, the patient did make progress toward the following goals. Barriers to progression include understanding. Recommendations to address these barriers include education.

## 2024-12-18 NOTE — CONSULTS
Wound Care Consult     Visit Date: 12/18/2024      Patient Name: Dereck Shen         MRN: [id number]           YOB: 1965   Patient seen for lateral tibia wounds complicated by PMH of CAD s/p CABG, severe lower extremity PAD with prior revascularization (R fem-pop bypass 2/24/20; L fem-pop bypass 9/14/20; L fem PTA of the graft; R iliac PTA 2/2022; LLE and DANYA PTA 4/2024; angioplasty of the left SFA 10/21/24; and left femoral and tibial thrombectomy with patch angioplasty 11/19/24. He presented with L Leg pain, redness, and ice cold to touch for three days. He has been compliant with all his medications including his Eliquis, Brilinta and aspirin. Patient Alert and oriented. Complains of pain in left leg. Bedside RN treated patient for pain before exam. Patient scheduled for surgery with Vascular for reocclusion in his left leg. Exam conducted with Coby JOE and mother at bedside. Skin hygiene and dressing care provided. See detailed assessment below from flowsheet. Recommendations below, reviewed with Lisa MOORE.     Pertinent Labs:   Albumin   Date Value Ref Range Status   12/17/2024 3.6 3.4 - 5.0 g/dL Final   12/06/2019 4.0 3.4 - 5.0 g/dL Final     ALBUMIN (MG/L) IN URINE   Date Value Ref Range Status   04/04/2023 1,008.6 Not Established mg/L Final       Wound Assessment:  Wound 11/19/24 Incision Groin Left;Proximal (Active)   Wound Image   12/17/24 1439   Kyung-Wound Assessment Pink 12/17/24 1439   Drainage Amount None 12/17/24 1439   Dressing Gauze 12/17/24 1439   Dressing Changed New 12/17/24 1439   Dressing Status Clean;Dry 12/18/24 0415       Wound 12/18/24 Incision Pretibial Left (Active)   Wound Image   12/18/24 0949   Site Assessment Eschar;Sloughing 12/18/24 0949   Kyung-Wound Assessment Red;Swelling;Painful 12/18/24 0949   Shape oval 12/18/24 0949   Wound Length (cm) 7 cm 12/18/24 0949   Wound Width (cm) 1.2 cm 12/18/24 0949   Wound Surface Area (cm^2) 8.4 cm^2 12/18/24 0949   Wound  Depth (cm) 0.5 cm 12/18/24 0949   Wound Volume (cm^3) 4.2 cm^3 12/18/24 0949   Wound Healing % -492 12/18/24 0949   Margins Epibole (Rolled edges) 12/18/24 0949   Treatments Cleansed 12/18/24 0949   Drainage Description Serosanguineous 12/18/24 0949   Drainage Amount Scant 12/18/24 0949   Dressing Hydrogel;ABD 12/18/24 0949   Dressing Changed Changed 12/18/24 0949   Dressing Status Clean;Dry 12/18/24 0415       Wound 12/17/24 Pretibial Left;Other (Comment) (Active)   Wound Image   12/18/24 0929   Shape oval 12/18/24 0929   Wound Length (cm) 7 cm 12/18/24 0929   Wound Width (cm) 1 cm 12/18/24 0929   Wound Surface Area (cm^2) 7 cm^2 12/18/24 0929   Wound Depth (cm) 0.5 cm 12/18/24 0929   Wound Volume (cm^3) 3.5 cm^3 12/18/24 0929   Margins Epibole (Rolled edges) 12/18/24 0929   Drainage Description Serosanguineous 12/18/24 0929   Drainage Amount Scant 12/18/24 0929   Dressing Wet-moist;ABD 12/18/24 0929   Dressing Changed Changed 12/18/24 0929   Dressing Status Clean;Dry 12/18/24 0415     Wound location: Proximal Left Tibia   Treatment protocol recommended:  Cleanse with Vashe and pat dry.  Apply hydrogel to aquacel extra cut to size  Cover with ABD and wrap with Kerlix  Apply Ace wrap  Offload heels.    Wound location: Medial Left tibia   Treatment protocol recommended:  Cleanse with vashe and pat dry.  Apply moistened Aquacel extra cut to size  Cover with ABD and wrap with Kerlix  Apply Ace wrap  Offload heels.    Therapeutic surface: Patient on Versa Care AIR standard alternating pressure mattress. Patient able to turn self. Recommend offloading heels.     Nursing updated, continue pressure injury preventions, wound care to be completed by nursing per orders. and re-consult wound RN if needed.    See above recommendations for treatment. I would recommend follow up in [facility] upon discharge or patient will likely continue to require skilled assistance with skin hygiene and wound care upon  discharge.    Please contact me with questions or changes in patient condition.     Alejandra Ortiz RN  12/18/2024  10:24 AM        Alejandra Ortiz RN

## 2024-12-18 NOTE — CONSULTS
Inpatient consult to Endocrinology  Consult performed by: Haylie Tate MD  Consult ordered by: Lisa Centeno PA-C  Reason for consult: IDDM-II withh hyperglycemia, recurrent vascular issues/wounds          Reason For Consult  IDDM-II withh hyperglycemia, recurrent vascular issues/wounds     History Of Present Illness  Dereck Shen is a 59 y.o. male presenting with left leg pain.   He has severe lower extremity PAD with prior revascularization (R fem-pop bypass 2/24/20; L fem-pop bypass 9/14/20; L fem PTA of the graft; R iliac PTA 2/2022; LLE and DANYA PTA 4/2024; angioplasty of the left SFA 10/21/24; and left femoral and tibial thrombectomy with patch angioplasty 11/19/24. He states that he has develoepd erythema and his foot has been cold to the touch for hte last seven days. He has been adherent with Eliquis, Brilinta and aspirin.  CT angio of the lower extremities revealed reocclusion of his SFA and popliteal.   He was started on an heparin infusion.  He is currently NPO for OR at 11:30am.      He has been diabetic since 2009.      His states that his glycemic control has been elevated compared to what was happening before.       Past Medical History  He has a past medical history of (HFpEF) heart failure with preserved ejection fraction, Aphasia, Atherosclerotic heart disease of native coronary artery with unspecified angina pectoris (11/05/2019), Bipolar disorder, unspecified (Multi), BPH (benign prostatic hyperplasia), Diabetes (Multi), DVT (deep venous thrombosis) (Multi) (02/2022), Erectile dysfunction, GERD (gastroesophageal reflux disease), Hepatitis C, HLD (hyperlipidemia), HTN (hypertension), Obstructive sleep apnea (adult) (pediatric), Opioid abuse, in remission (Multi), PAD (peripheral artery disease) (CMS-HCC), Polymyalgia rheumatica (Multi), Post-traumatic stress disorder, unspecified, and Stroke (Multi) (07/24/2023).    Surgical History  He has a past surgical history that  includes Knee surgery (Left); Elbow surgery; Back surgery; CT angio aorta and bilateral iliofemoral runoff including without contrast if performed (07/20/2020); MR angio head wo IV contrast (01/04/2022); MR angio neck wo IV contrast (01/04/2022); CT angio aorta and bilateral iliofemoral runoff including without contrast if performed (01/14/2022); Invasive Vascular Procedure (Bilateral, 10/04/2024); Dental surgery; Coronary angioplasty with stent; Coronary artery bypass graft; Tonsillectomy; Transluminal atherectomy femoral-popliteal / tibioperoneal; Invasive Vascular Procedure (N/A, 10/21/2024); and Invasive Vascular Procedure (N/A, 10/21/2024).     Social History  He reports that he has been smoking cigarettes. He has been exposed to tobacco smoke. He has never used smokeless tobacco. He reports current drug use. Drug: Marijuana. He reports that he does not drink alcohol.    Family History  Family History   Problem Relation Name Age of Onset    No Known Problems Mother      No Known Problems Father          Allergies  Celecoxib; Ibuprofen; Tetanus toxoid, adsorbed; [id number] antimigraine agents; Cephalexin; Hydrocodone; Latex; and Tryptophan    Review of Systems   Musculoskeletal:         Leg pain    All other systems reviewed and are negative.       Physical Exam  Vitals and nursing note reviewed.   Constitutional:       General: He is not in acute distress.     Appearance: Normal appearance. He is normal weight.   HENT:      Head: Normocephalic and atraumatic.      Nose: Nose normal.      Mouth/Throat:      Mouth: Mucous membranes are moist.   Eyes:      Extraocular Movements: Extraocular movements intact.   Pulmonary:      Effort: Pulmonary effort is normal.   Musculoskeletal:         General: Normal range of motion.   Skin:     Comments: ACE dressing to mid left left clean and dry    Neurological:      Mental Status: He is alert and oriented to person, place, and time.   Psychiatric:         Mood and  "Affect: Mood normal.          Last Recorded Vitals  Blood pressure 136/79, pulse 92, temperature 36.9 °C (98.4 °F), temperature source Temporal, resp. rate 18, height 1.803 m (5' 11\"), weight 88.8 kg (195 lb 12.3 oz), SpO2 93%.    Relevant Results  Scheduled medications  aspirin, 81 mg, oral, Daily  atorvastatin, 80 mg, oral, Nightly  carvedilol, 6.25 mg, oral, BID  dapagliflozin propanediol, 10 mg, oral, Daily  ezetimibe, 10 mg, oral, Nightly  gabapentin, 400 mg, oral, TID  hydrALAZINE, 50 mg, oral, TID  insulin glargine, 20 Units, subcutaneous, Nightly  insulin lispro, 0-10 Units, subcutaneous, TID AC  insulin lispro, 10 Units, subcutaneous, TID AC  isosorbide mononitrate ER, 60 mg, oral, Daily  nicotine, 1 patch, transdermal, Daily  OXcarbazepine, 450 mg, oral, BID  pantoprazole, 40 mg, oral, Daily before breakfast  polyethylene glycol, 17 g, oral, Daily  ranolazine, 500 mg, oral, BID  sacubitriL-valsartan, 1 tablet, oral, BID  tamsulosin, 0.4 mg, oral, Daily  ticagrelor, 90 mg, oral, BID      Continuous medications  heparin, 0-4,500 Units/hr, Last Rate: 1,300 Units/hr (12/18/24 0751)      PRN medications  PRN medications: acetaminophen, dextrose, dextrose, glucagon, glucagon, heparin, HYDROmorphone, HYDROmorphone, oxyCODONE-acetaminophen    Results for orders placed or performed during the hospital encounter of 12/17/24 (from the past 96 hours)   ECG 12 lead   Result Value Ref Range    Ventricular Rate 80 BPM    Atrial Rate 78 BPM    NJ Interval 156 ms    QRS Duration 108 ms    QT Interval 386 ms    QTC Calculation(Bazett) 446 ms    P Axis 50 degrees    R Axis 46 degrees    T Axis 65 degrees    QRS Count 13 beats    Q Onset 249 ms    T Offset 442 ms    QTC Fredericia 425 ms   CBC and Auto Differential   Result Value Ref Range    WBC 6.0 4.4 - 11.3 x10*3/uL    nRBC 0.0 0.0 - 0.0 /100 WBCs    RBC 4.18 (L) 4.50 - 5.90 x10*6/uL    Hemoglobin 13.2 (L) 13.5 - 17.5 g/dL    Hematocrit 38.1 (L) 41.0 - 52.0 %    MCV 91 80 " - 100 fL    MCH 31.6 26.0 - 34.0 pg    MCHC 34.6 32.0 - 36.0 g/dL    RDW 13.4 11.5 - 14.5 %    Platelets 232 150 - 450 x10*3/uL    Neutrophils % 56.5 40.0 - 80.0 %    Immature Granulocytes %, Automated 0.3 0.0 - 0.9 %    Lymphocytes % 30.5 13.0 - 44.0 %    Monocytes % 7.3 2.0 - 10.0 %    Eosinophils % 5.1 0.0 - 6.0 %    Basophils % 0.3 0.0 - 2.0 %    Neutrophils Absolute 3.41 1.20 - 7.70 x10*3/uL    Immature Granulocytes Absolute, Automated 0.02 0.00 - 0.70 x10*3/uL    Lymphocytes Absolute 1.84 1.20 - 4.80 x10*3/uL    Monocytes Absolute 0.44 0.10 - 1.00 x10*3/uL    Eosinophils Absolute 0.31 0.00 - 0.70 x10*3/uL    Basophils Absolute 0.02 0.00 - 0.10 x10*3/uL   Comprehensive metabolic panel   Result Value Ref Range    Glucose 162 (H) 74 - 99 mg/dL    Sodium 137 136 - 145 mmol/L    Potassium 4.1 3.5 - 5.3 mmol/L    Chloride 106 98 - 107 mmol/L    Bicarbonate 23 21 - 32 mmol/L    Anion Gap 12 10 - 20 mmol/L    Urea Nitrogen 39 (H) 6 - 23 mg/dL    Creatinine 1.53 (H) 0.50 - 1.30 mg/dL    eGFR 52 (L) >60 mL/min/1.73m*2    Calcium 8.6 8.6 - 10.3 mg/dL    Albumin 3.6 3.4 - 5.0 g/dL    Alkaline Phosphatase 97 33 - 120 U/L    Total Protein 6.8 6.4 - 8.2 g/dL    AST 7 (L) 9 - 39 U/L    Bilirubin, Total 0.3 0.0 - 1.2 mg/dL    ALT 6 (L) 10 - 52 U/L   Troponin I, High Sensitivity   Result Value Ref Range    Troponin I, High Sensitivity 14 0 - 20 ng/L   B-Type Natriuretic Peptide   Result Value Ref Range     (H) 0 - 99 pg/mL   Morphology   Result Value Ref Range    RBC Morphology No significant RBC morphology present    Coagulation Screen   Result Value Ref Range    Protime 13.1 (H) 9.8 - 12.8 seconds    INR 1.2 (H) 0.9 - 1.1    aPTT 33 27 - 38 seconds   D-Dimer, Quantitative VTE Exclusion   Result Value Ref Range    D-Dimer, Quantitative VTE Exclusion 1,012 (H) <=500 ng/mL FEU   POCT GLUCOSE   Result Value Ref Range    POCT Glucose 175 (H) 74 - 99 mg/dL   Heparin Assay, UFH   Result Value Ref Range    Heparin  Unfractionated 1.8 (HH) See Comment Below for Therapeutic Ranges IU/mL   POCT GLUCOSE   Result Value Ref Range    POCT Glucose 254 (H) 74 - 99 mg/dL   Heparin Assay, UFH   Result Value Ref Range    Heparin Unfractionated 0.9 See Comment Below for Therapeutic Ranges IU/mL   POCT GLUCOSE   Result Value Ref Range    POCT Glucose 278 (H) 74 - 99 mg/dL   Heparin Assay, UFH   Result Value Ref Range    Heparin Unfractionated 0.7 See Comment Below for Therapeutic Ranges IU/mL   Heparin Assay, UFH   Result Value Ref Range    Heparin Unfractionated 0.6 See Comment Below for Therapeutic Ranges IU/mL   Heparin Assay, UFH   Result Value Ref Range    Heparin Unfractionated 0.6 See Comment Below for Therapeutic Ranges IU/mL   CBC   Result Value Ref Range    WBC 6.3 4.4 - 11.3 x10*3/uL    nRBC 0.0 0.0 - 0.0 /100 WBCs    RBC 4.36 (L) 4.50 - 5.90 x10*6/uL    Hemoglobin 14.0 13.5 - 17.5 g/dL    Hematocrit 40.6 (L) 41.0 - 52.0 %    MCV 93 80 - 100 fL    MCH 32.1 26.0 - 34.0 pg    MCHC 34.5 32.0 - 36.0 g/dL    RDW 13.3 11.5 - 14.5 %    Platelets 252 150 - 450 x10*3/uL   Basic metabolic panel   Result Value Ref Range    Glucose 190 (H) 74 - 99 mg/dL    Sodium 137 136 - 145 mmol/L    Potassium 4.2 3.5 - 5.3 mmol/L    Chloride 102 98 - 107 mmol/L    Bicarbonate 29 21 - 32 mmol/L    Anion Gap 10 10 - 20 mmol/L    Urea Nitrogen 36 (H) 6 - 23 mg/dL    Creatinine 1.41 (H) 0.50 - 1.30 mg/dL    eGFR 58 (L) >60 mL/min/1.73m*2    Calcium 9.1 8.6 - 10.3 mg/dL   POCT GLUCOSE   Result Value Ref Range    POCT Glucose 213 (H) 74 - 99 mg/dL     *Note: Due to a large number of results and/or encounters for the requested time period, some results have not been displayed. A complete set of results can be found in Results Review.      ECG 12 lead    Result Date: 12/17/2024  Sinus rhythm Inferior infarct, old Baseline wander in lead(s) II,III,aVR,aVL,aVF,V1,V2,V5 See ED provider note for full interpretation and clinical correlation Confirmed by Nay Black  ([id number]) on 12/17/2024 11:40:07 AM    Vascular US ankle brachial index (ANSON) without exercise    Result Date: 12/17/2024  Preliminary Cardiology [place] [address]      Phone [phone] Fax [phone]            Preliminary Vascular Lab Report  VAS US ANKLE BRACHIAL INDEX (ANSON) WITHOUT EXERCISE  Patient Name:     CARMENZA Presley Physician: [id number] Darlyn OLEARY MD Study Date:       12/17/2024       Ordering Provider: [id number] CHERELLE POON MRN/PID:          [id number]         Fellow: Accession#:       [id number]     Technologist:      Deb Allen RVYUSUF YOB: 1965        Technologist 2: Gender:           M                Encounter#:        [id number] Admission Status: Emergency        Location           [facility]                                    Performed:  Diagnosis/ICD: Peripheral vascular disease, unspecified-I73.9 CPT Codes:     40326 Peripheral artery ANSON Only  Pertinent History: Pt presents to the ER with LLE pain and a cold left foot.  **CRITICAL RESULT** Critical Result: severe arterial disease of LLE Notification called to Cherelle Poon on 12/17/2024 at 9:31:33 AM.  PRELIMINARY CONCLUSIONS:  Right Lower PVR: No evidence of arterial occlusive disease in the right lower extremity at rest. Normal digital perfusion noted. Multiphasic flow is noted in the right common femoral artery, right popliteal artery, right posterior tibial artery and right dorsalis pedis artery. Left Lower PVR: Evidence of severe arterial occlusive disease in the left lower extremity at rest. Decreased digital perfusion noted. Monophasic flow is noted in the left common femoral artery, left popliteal artery, left posterior tibial artery and left dorsalis pedis artery. PTA is inaudible. Flat toe waveform(TBI=0).  Imaging & Doppler Findings:  RIGHT Lower PVR                 Pressures Ratios Right Posterior Tibial (Ankle) 141 mmHg  1.22 Right Dorsalis Pedis (Ankle)   126 mmHg  1.09 Right Digit (Great Toe)        112 mmHg  0.97   LEFT Lower PVR                Pressures Ratios Left Posterior Tibial (Ankle) 0 mmHg    0.00 Left Dorsalis Pedis (Ankle)   33 mmHg   0.28 Left Digit (Great Toe)        0 mmHg    0.00                      Right Brachial Pressure 116 mmHg   VASCULAR PRELIMINARY REPORT completed by Deb Allen T on 12/17/2024 at 9:32:24 AM  ** Final **     CT angio aorta and bilateral iliofemoral runoff including without contrast if performed    Result Date: 12/17/2024  STUDY: CT Angiogram of the Abdomen, Pelvis, and Bilateral Lower Extremities; 12/17/2024 6:38 AM INDICATION: Cold left foot. COMPARISON: CTA AP w/runoffs 11/19/2024, CTA chest 11/19/2024, CTA AP w/runoffs 09/13/2024. ACCESSION NUMBER(S): [id number] ORDERING CLINICIAN: BASIM POON TECHNIQUE:  Helical CT is performed from the aortic diaphragmatic hiatus through the feet after bolus administration of 90 mL of Omnipaque 350.  Images are reviewed and processed at a workstation according to the CT angiogram protocol with 3-D and/or MIP post processing imaging generated. Automated mA/kV exposure control was utilized and patient examination was performed in strict accordance with principles of ALARA. FINDINGS: Vascular: Abdominal Aorta: The celiac, SMA, and DENISE demonstrate no significant stenosis.  The right and left renal arteries demonstrate no significant stenosis. The abdominal aorta is not aneurysmal and demonstrates no significant occlusive disease. Right Run-off: The right common iliac artery shows mild stenosis diffusely.  There is a stent along the entire right external iliac and right common femoral arteries.  The stent demonstrates areas of mild in-stent stenosis. There is an occluded right femoral-popliteal bypass graft. The native right femoral popliteal arteries demonstrate mild atherosclerotic contour.   There is a stent extending from the mid SFA to the proximal popliteal artery.  At the distal SFA the stent demonstrates crush distortion but maintains a patent lumen, though with areas of mild in-stent stenosis. The popliteal artery remains patent throughout its course.  There is patent three-vessel runoff to the ankle, and 2 vessels into the foot. Opacification became too weak to follow the vessels to the plantar arch. Left Run-off: The left common iliac artery demonstrates mild diffuse atherosclerotic stenosis.  The left external iliac artery and left common femoral arteries are relatively normal.  The common femoral artery bifurcation is patent.  The native left SFA occludes just beyond its origin.  The profunda femoral artery and its branches remain patent. There is a chronically occluded left femoral-popliteal bypass graft. The native left SFA does not reconstitute, remaining occluded throughout its course. There is a 2 cm length patent segment of the left popliteal artery, which is otherwise occluded.  Distal to the patent segment, the popliteal artery is occluded across the level of the joint. The distal popliteal artery reconstitutes remains patent into its trifurcation. 3 patent infrapopliteal vessels are visualized proximally.  The tibial vessels remain patent to the ankle, where the dorsalis pedis and lateral plantar seen across the ankle joint.  The vessels cannot be followed to the plantar arch due to weak contrast opacification. Abdomen: The lung bases are clear.  The liver is unremarkable.  The pancreas, spleen, adrenal glands, and kidneys demonstrate no acute pathology. There is no free air or lymph node enlargement. Pelvis: There is no bowel wall thickening or obstruction.  There is no free fluid.  Lymph nodes are not enlarged.  Urinary bladder is unremarkable. At the left inguinal region there is thick soft tissue in the subcutaneous fat overlying the femoral vessels, as well as 2 prominent lymph  nodes. Skeleton:  There are no acute fractures.  No suspicious bony lesions.    No significant aortic disease.  Mild bilateral common iliac artery stenosis.  Patent left external iliac and common femoral arteries. 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  Chronic occlusion of the native left superficial femoral artery. A 2 cm segment of the left P1 popliteal artery reconstitutes.  The terminal left popliteal artery also reconstitutes.  Other portions of the popliteal artery are occluded. Patent three-vessel infrapopliteal runoff to the left hindfoot, beyond which contrast opacification was too weak to visualize. Thick subcutaneous soft tissue - presumed scarred tissue - overlying the left femoral vessels at the left groin.  Correlate for possibility of cellulitis. Patent right iliac and right femoral stents.  Patent right popliteal artery.  Patent three-vessel infrapopliteal runoff to the right ankle. No findings of an acute process in the abdomen or pelvis. Signed by Simeon Barrera MD    Vascular US Lower Extremity Arterial Duplex Bilateral    Result Date: 12/3/2024              [place] [id number]      Phone [phone] Fax [phone]  Vascular Lab Report  [facility] US LOWER EXTREMITY ARTERIAL DUPLEX BILATERAL Patient Name:      CARMENZA CIARA OLEARY    Reading Physician:  [id number] Britney Rush MD Study Date:        12/2/2024            Ordering Provider:  [id number] ADELA LYLES MRN/PID:           [id number]             Fellow: Accession#:        [id number]         Technologist:       Deb Allen RVT Date of Birth/Age: 1965 / 59 years Technologist 2: Gender:            M                    Encounter#:         [id number] Admission Status:  Outpatient           Location Performed: [facility]  Diagnosis/ICD: Peripheral vascular disease, unspecified-I73.9 CPT Codes:     50741  Peripheral artery Lower arterial Duplex complete  Pertinent History: LLE intervention(11/19/2024).  CONCLUSIONS: Right Lower Arterial: The profunda was monophasic and dampened on prior exam and today is not well visualized and appears occluded. All remaining vessels appear patent. Left Lower Arterial: No occlusion or stenosis noted in the vessels visualized. The distal BARB is dampened and appears nearly occluded. Working around staples and dressings. The EIA is not visualized. The proximal and mid CFA and the profunda is not visualized. Minimal visualization of calf vessels.  Imaging & Doppler Findings:  Right                       Left   PSV                        PSV 89 cm/s         EIA 95 cm/s         CFA        78 cm/s  0 cm/s  Profunda Proximal 80 cm/s    SFA Proximal    88 cm/s 148 cm/s      SFA Mid      100 cm/s 112 cm/s    SFA Distal     108 cm/s 73 cm/s      Popliteal     74 cm/s 100 cm/s   BARB Proximal 86 cm/s       BARB Mid 53 cm/s     BARB Distal     11 cm/s 48 cm/s  Peroneal Proximal 55 cm/s    Peroneal Mid    77 cm/s 40 cm/s   Peroneal Distal 34 cm/s    PTA Proximal    48 cm/s 31 cm/s       PTA Mid 52 cm/s     PTA Distal  [id number] Britney Rush MD Electronically signed by [id number] Britney Rush MD on 12/3/2024 at 11:43:03 AM  ** Final **     Vascular US ankle brachial index (ANSON) without exercise    Result Date: 12/3/2024              [place] [id number]      Phone [phone] Fax [phone]  Vascular Lab Report  [facility] US ANKLE BRACHIAL INDEX (ANSON) WITHOUT EXERCISE Patient Name:      CARMENZA Presley Physician:  [id number] Britney Rush MD Study Date:        12/2/2024            Ordering Provider:  [id number] ADELA LYLES MRN/PID:           [id number]             Fellow: Accession#:        [id number]         Technologist:       Deb Allen RVYUSUF Date of Birth/Age: 1965 / 59 years  Technologist 2: Gender:            M                    Encounter#:         [id number] Admission Status:  Outpatient           Location Performed: [facility]  Diagnosis/ICD: Peripheral vascular disease, unspecified-I73.9 CPT Codes:     59058 Peripheral artery ANSON Only  Pertinent History: Recent LLE intervention(11/19/2024).  CONCLUSIONS: Right Lower PVR: No evidence of arterial occlusive disease in the right lower extremity at rest. Normal digital perfusion noted. Multiphasic flow is noted in the right common femoral artery, right posterior tibial artery and right dorsalis pedis artery. Left Lower PVR: Evidence of moderate arterial occlusive disease in the left lower extremity at rest. Decreased digital perfusion noted. Monophasic flow is noted in the left posterior tibial artery and left dorsalis pedis artery. Multiphasic flow is noted in the left common femoral artery.  Comparison: Compared with study from 5/23/2024, The left ANSON decreased from .96 to .67 today. No significant change on the right.  Imaging & Doppler Findings:  RIGHT Lower PVR                Pressures Ratios Right Posterior Tibial (Ankle) 169 mmHg  0.94 Right Dorsalis Pedis (Ankle)   158 mmHg  0.88 Right Digit (Great Toe)        113 mmHg  0.63   LEFT Lower PVR                Pressures Ratios Left Posterior Tibial (Ankle) 109 mmHg  0.61 Left Dorsalis Pedis (Ankle)   120 mmHg  0.67 Left Digit (Great Toe)        105 mmHg  0.58                      Right Brachial Pressure 180 mmHg   [id number] Rush MD Electronically signed by [id number] Rush MD on 12/3/2024 at 11:38:55 AM  ** Final **     Transthoracic Echo (TTE) Complete    Result Date: 11/20/2024              [place] [address]      Phone [phone] Fax [phone] TRANSTHORACIC ECHOCARDIOGRAM REPORT Patient Name:       CARMENZA OLEARY   Reading Physician:    Enma Tan                                                                Adri GANDARA Study Date:         11/20/2024          Ordering Provider:    [id number] DANNA SEARS MRN/PID:            [id number]            Fellow: Accession#:         [id number]        Nurse:                Noemi Villa RN Date of Birth/Age:  1965 / 59      Sonographer:          Laxmi crisostomo RDCS Gender Assigned at  M                   Additional Staff: Birth: Height:             180.34 cm           Admit Date:           11/19/2024 Weight:             87.09 kg            Admission Status:     Inpatient -                                                               Routine BSA / BMI:          2.07 m2 / 26.78     Department Location:  [facility]                     kg/m2 Blood Pressure: 107 /64 mmHg Study Type:    TRANSTHORACIC ECHO (TTE) COMPLETE Diagnosis/ICD: Shortness of breath-R06.02; Unspecified systolic (congestive)                heart failure (CHF)-I50.20 Indication:    CHF, SHORTNESS OF BREATH CPT Codes:     Echo Complete w Full Doppler-11079 Patient History: Pertinent History: CABG x5 2019, HTN. Study Detail: The following Echo studies were performed: 2D, M-Mode, Doppler and               color flow. Technically challenging study due to prominent lung               artifact and body habitus. Optison used as a contrast agent for               endocardial border definition. Total contrast used for this               procedure was 3 mL via IV push.  PHYSICIAN INTERPRETATION: Left Ventricle: The left ventricular systolic function is moderately decreased, with a Salinas's biplane calculated ejection fraction of 38%. Wall motion is abnormal. The left ventricular cavity size is normal. There is normal septal and normal posterior left ventricular wall thickness. Spectral Doppler shows an abnormal pattern of left ventricular diastolic filling. Akinetic inferolateral segment, and basal inferior wall. Left Atrium: The left atrium is  normal in size. Right Ventricle: The right ventricle was not well visualized. Right ventricular systolic function not assessed. Right Atrium: The right atrium is normal in size. Aortic Valve: The aortic valve appears structurally normal. The aortic valve dimensionless index is 0.73. There is no evidence of aortic valve regurgitation. The peak instantaneous gradient of the aortic valve is 9 mmHg. The mean gradient of the aortic valve is 4 mmHg. Mitral Valve: The mitral valve is normal in structure. There is no evidence of mitral valve regurgitation. Tricuspid Valve: The tricuspid valve is structurally normal. There is trace tricuspid regurgitation. Pulmonic Valve: The pulmonic valve is structurally normal. There is no indication of pulmonic valve regurgitation. Pericardium: No pericardial effusion noted. Aorta: The aortic root is normal. Systemic Veins: The inferior vena cava appears normal in size, with IVC inspiratory collapse greater than 50%.  CONCLUSIONS:  1. Poorly visualized anatomical structures due to suboptimal image quality.  2. The left ventricular systolic function is moderately decreased, with a Salinas's biplane calculated ejection fraction of 38%.  3. Abnormal wall motion.  4. Spectral Doppler shows an abnormal pattern of left ventricular diastolic filling.  5. Akinetic inferolateral segment, and basal inferior wall. QUANTITATIVE DATA SUMMARY:  2D MEASUREMENTS:           Normal Ranges: Ao Root d:       2.80 cm   (2.0-3.7cm) LAs:             3.60 cm   (2.7-4.0cm) IVSd:            0.70 cm   (0.6-1.1cm) LVPWd:           0.70 cm   (0.6-1.1cm) LVIDd:           6.10 cm   (3.9-5.9cm) LVIDs:           5.30 cm LV Mass Index:   78.5 g/m2 LV % FS          13.1 %  LA VOLUME:                    Normal Ranges: LA Vol A4C:        48.7 ml    (22+/-6mL/m2) LA Vol A2C:        49.8 ml LA Vol BP:         51.4 ml LA Vol Index A4C:  23.5ml/m2 LA Vol Index A2C:  24.0 ml/m2 LA Vol Index BP:   24.8 ml/m2 LA Area A4C:        17.6 cm2 LA Area A2C:       18.6 cm2 LA Major Axis A4C: 5.4 cm LA Major Axis A2C: 5.9 cm LA Volume Index:   24.8 ml/m2  RA VOLUME BY A/L METHOD:          Normal Ranges: RA Area A4C:             13.5 cm2  AORTA MEASUREMENTS:         Normal Ranges: Ao Sinus, d:        2.80 cm (2.1-3.5cm) Ao STJ, d:          2.60 cm (1.7-3.4cm) Asc Ao, d:          2.90 cm (2.1-3.4cm)  LV SYSTOLIC FUNCTION BY 2D PLANIMETRY (MOD):                      Normal Ranges: EF-A4C View:    38 % (>=55%) EF-A2C View:    39 % EF-Biplane:     38 % LV EF Reported: 38 %  LV DIASTOLIC FUNCTION:             Normal Ranges: MV Peak E:             0.75 m/s    (0.7-1.2 m/s) MV Peak A:             0.65 m/s    (0.42-0.7 m/s) E/A Ratio:             1.17        (1.0-2.2) MV e'                  0.069 m/s   (>8.0) MV lateral e'          0.08 m/s MV medial e'           0.06 m/s MV A Dur:              109.00 msec E/e' Ratio:            10.91       (<8.0)  MITRAL VALVE:          Normal Ranges: MV DT:        211 msec (150-240msec)  AORTIC VALVE:                     Normal Ranges: AoV Vmax:                1.46 m/s (<=1.7m/s) AoV Peak P.5 mmHg (<20mmHg) AoV Mean P.0 mmHg (1.7-11.5mmHg) LVOT Max Ayad:            1.09 m/s (<=1.1m/s) AoV VTI:                 29.40 cm (18-25cm) LVOT VTI:                21.60 cm LVOT Diameter:           2.20 cm  (1.8-2.4cm) AoV Area, VTI:           2.79 cm2 (2.5-5.5cm2) AoV Area,Vmax:           2.84 cm2 (2.5-4.5cm2) AoV Dimensionless Index: 0.73  RIGHT VENTRICLE: TAPSE: 19.1 mm  TRICUSPID VALVE/RVSP:          Normal Ranges: Peak TR Velocity:     2.00 m/s RV Syst Pressure:     19 mmHg  (< 30mmHg) IVC Diam:             1.60 cm  [id number] Britney Rush MD Electronically signed on 2024 at 1:49:12 PM  ** Final **     ECG 12 lead    Result Date: 2024  Sinus rhythm Probable left atrial enlargement Borderline repolarization abnormality ST elevation, consider anterior injury Borderline prolonged QT  interval See ED provider note for full interpretation and clinical correlation Confirmed by Zulema Stephen ([id number]) on 11/19/2024 11:07:14 AM    CT angio aorta and bilateral iliofemoral runoff including without contrast if performed    Result Date: 11/19/2024  Interpreted By:  Finkelstein, Evan, STUDY: CT ANGIO AORTA AND BILATERAL ILIOFEMORAL RUN OFF INCLUDING WITHOUT CONTRAST IF PERFORMED;  11/19/2024 4:42 am   INDICATION: Signs/Symptoms:no pulses left leg.     COMPARISON: CT runoff 09/13/2024   ACCESSION NUMBER(S): [id number]   ORDERING CLINICIAN: ACE VALENZUELA   TECHNIQUE: Contiguous axial CTA images were acquired through the abdomen and pelvis and bilateral lower extremities following the administration of 100 mL Omnipaque 350 intravenous contrast. Sagittal and coronal images were reconstructed. Maximum intensity projection and three dimensional images were constructed at a separate workstation.   FINDINGS: VASCULAR FINDINGS:   ABDOMINAL AORTA & ILIAC ARTERIES: No evidence of abdominal aortic aneurysm or dissection.   Moderate atherosclerotic calcifications throughout the abdominal aorta and major proximal vasculature. Celiac axis, superior mesenteric artery, and inferior mesenteric artery are patent without any significant narrowing. Bilateral renal arteries are patent without any significant.   Right common iliac and external iliac arteries are patent without any significant narrowing. Patent right common/external iliac stent. Moderate to severe narrowing of the internal iliac arteries bilaterally. Left common and external iliac arteries are patent.     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 Common femoral artery is patent.  Native superficial femoral artery is patent. There is a stent within the mid to lower aspect of the native right superficial femoral artery which is patent. Profunda femoral artery is patent.  Popliteal artery is patent.  Tibioperoneal trunk, posterior tibial artery, peroneal artery, and anterior tibial artery are patent. Three-vessel runoff at the ankle.   LEFT LOWER EXTREMITY: Common femoral artery is patent. 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 Redemonstrated occlusion of the native superficial femoral artery, similar compared to prior imaging. There is reconstitution of the popliteal artery. Poor opacification of the trifurcation of vessels just below the knee with poor opacification of the vessels throughout the entirety of the calf, ankle and foot, new/worsened compared to prior imaging..   ---------------------------------------------------------------   NON-VASCULAR FINDINGS:   LIVER: Within normal limits.   BILE DUCTS: Nondilated.   GALLBLADDER: No evidence of calcified gallstones or wall thickening.   PANCREAS: Within normal limits.   SPLEEN: A splenule is present. The spleen is otherwise unremarkable..   ADRENALS: Within normal limits.   KIDNEYS, URETERS, URINARY BLADDER: Unremarkable. No evidence of hydronephrosis.   BOWEL: No evidence of abnormal dilatation or obstruction of the small or large bowel. No evidence of pneumoperitoneum.   REPRODUCTIVE ORGANS:  Unremarkable   PERITONEUM / RETROPERITONEUM / LYMPH NODES: No evidence of any free fluid. No evidence of any significantly enlarged intra-abdominal or pelvic lymph nodes.   ABDOMINAL WALL: Fat containing inguinal hernias.   LOWER CHEST: Please refer to separately dictated CT chest report.   BONES: No evidence of suspicious lytic or blastic osseous lesions.       Persistent occlusion of the left femoral/popliteal bypass graft and native left superficial femoral artery. There is also new occlusion versus severe stenosis involving the vessels below the left knee beginning at the level of the trifurcation with the anterior tibial, posterior tibial and peroneal arteries not well seen through the calf, ankle and foot.   No acute abnormality of the  abdomen or pelvis.   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 There is good opacification of the native right superficial femoral artery with three-vessel runoff to the right ankle.   MACRO: None.   Signed by: Evan Finkelstein 11/19/2024 5:20 AM Dictation workstation:   [id number]    CT angio chest for pulmonary embolism    Result Date: 11/19/2024  Interpreted By:  Finkelstein, Evan, STUDY: CT ANGIO CHEST FOR PULMONARY EMBOLISM;  11/19/2024 4:27 am   INDICATION: Signs/Symptoms:sob wih hypoxia.     COMPARISON: CT chest 06/23/2021   ACCESSION NUMBER(S): [id number]   ORDERING CLINICIAN: ACE VALENZUELA   TECHNIQUE: Axial CTA images of the chest after intravenous administration of 50 mL Omnipaque 350 using CT angiographic technique. Coronal and sagittal images are reconstructed. MIP images were created and reviewed.   FINDINGS: CHEST WALL AND LOWER NECK: Within normal limits. ABDOMEN: No acute abnormality of the partially visualized abdomen.   VASCULAR: AORTA: No aortic aneurysm. Mild atherosclerotic disease. PULMONARY ARTERY: Normal caliber. No pulmonary embolus to the segmental level.   CHEST:   HEART: Normal size. No pericardial effusion. MEDIASTINUM AND REJI: No pathologically enlarged thoracic lymph nodes. LUNG, PLEURA, LARGE AIRWAYS: Trace left pleural effusion. Thickened interlobular septal markings. Ground-glass opacities scattered throughout the lungs bilaterally with an upper lung zone predominance.   BONES: No acute osseous abnormality. Median sternotomy wires are present.       No acute pulmonary embolus to the segmental level.   Thickened interlobular septal markings with scattered ground-glass opacities with an upper lung zone predominance. Findings may be seen with mixed interstitial/alveolar pulmonary edema. Other superimposed infectious or inflammatory processes are also not excluded.   Trace left pleural effusion   MACRO: None.   Signed by: Evan Finkelstein  11/19/2024 4:36 AM Dictation workstation:   [id number]       Assessment/Plan     IMPRESSION:  TYPE 2 DIABETES MELLITUS  Long term insulin use   A1C of 8.2% as of October 2024    RECOMMENDATIONS:  To increase Lantus to 24 units subcutaneous bedtime  To increase Humalog to 14 units TID AC   To continue insulin correction scale TID AC   Above insulin regimen will be resumed once back from procedure   NPO currently for OR   [facility]    Hypoglycemic protocol   Awaiting A1C value   Will continue to follow    Thank you for the courtesy of this consult     Haylie Tate MD

## 2024-12-19 ENCOUNTER — APPOINTMENT (OUTPATIENT)
Dept: ENDOCRINOLOGY | Facility: CLINIC | Age: 59
End: 2024-12-19
Payer: COMMERCIAL

## 2024-12-19 LAB
ANION GAP SERPL CALC-SCNC: 10 MMOL/L (ref 10–20)
BUN SERPL-MCNC: 31 MG/DL (ref 6–23)
CALCIUM SERPL-MCNC: 8 MG/DL (ref 8.6–10.3)
CHLORIDE SERPL-SCNC: 103 MMOL/L (ref 98–107)
CO2 SERPL-SCNC: 26 MMOL/L (ref 21–32)
CREAT SERPL-MCNC: 1.3 MG/DL (ref 0.5–1.3)
EGFRCR SERPLBLD CKD-EPI 2021: 64 ML/MIN/1.73M*2
ERYTHROCYTE [DISTWIDTH] IN BLOOD BY AUTOMATED COUNT: 13.4 % (ref 11.5–14.5)
EST. AVERAGE GLUCOSE BLD GHB EST-MCNC: 180 MG/DL
GLUCOSE BLD MANUAL STRIP-MCNC: 129 MG/DL (ref 74–99)
GLUCOSE BLD MANUAL STRIP-MCNC: 134 MG/DL (ref 74–99)
GLUCOSE BLD MANUAL STRIP-MCNC: 156 MG/DL (ref 74–99)
GLUCOSE BLD MANUAL STRIP-MCNC: 82 MG/DL (ref 74–99)
GLUCOSE BLD MANUAL STRIP-MCNC: 83 MG/DL (ref 74–99)
GLUCOSE SERPL-MCNC: 162 MG/DL (ref 74–99)
HBA1C MFR BLD: 7.9 %
HCT VFR BLD AUTO: 32.8 % (ref 41–52)
HGB BLD-MCNC: 11.1 G/DL (ref 13.5–17.5)
MAGNESIUM SERPL-MCNC: 2.02 MG/DL (ref 1.6–2.4)
MCH RBC QN AUTO: 31.6 PG (ref 26–34)
MCHC RBC AUTO-ENTMCNC: 33.8 G/DL (ref 32–36)
MCV RBC AUTO: 93 FL (ref 80–100)
NRBC BLD-RTO: 0 /100 WBCS (ref 0–0)
PHOSPHATE SERPL-MCNC: 4 MG/DL (ref 2.5–4.9)
PLATELET # BLD AUTO: 217 X10*3/UL (ref 150–450)
POTASSIUM SERPL-SCNC: 4.4 MMOL/L (ref 3.5–5.3)
RBC # BLD AUTO: 3.51 X10*6/UL (ref 4.5–5.9)
SODIUM SERPL-SCNC: 135 MMOL/L (ref 136–145)
UFH PPP CHRO-ACNC: 0.4 IU/ML
WBC # BLD AUTO: 7.4 X10*3/UL (ref 4.4–11.3)

## 2024-12-19 PROCEDURE — S4991 NICOTINE PATCH NONLEGEND: HCPCS | Performed by: PHYSICIAN ASSISTANT

## 2024-12-19 PROCEDURE — 2500000002 HC RX 250 W HCPCS SELF ADMINISTERED DRUGS (ALT 637 FOR MEDICARE OP, ALT 636 FOR OP/ED): Performed by: PHYSICIAN ASSISTANT

## 2024-12-19 PROCEDURE — 83735 ASSAY OF MAGNESIUM: CPT | Performed by: PHYSICIAN ASSISTANT

## 2024-12-19 PROCEDURE — 99211 OFF/OP EST MAY X REQ PHY/QHP: CPT | Performed by: SURGERY

## 2024-12-19 PROCEDURE — 36415 COLL VENOUS BLD VENIPUNCTURE: CPT | Performed by: PHYSICIAN ASSISTANT

## 2024-12-19 PROCEDURE — 2500000001 HC RX 250 WO HCPCS SELF ADMINISTERED DRUGS (ALT 637 FOR MEDICARE OP): Performed by: PHYSICIAN ASSISTANT

## 2024-12-19 PROCEDURE — 2500000004 HC RX 250 GENERAL PHARMACY W/ HCPCS (ALT 636 FOR OP/ED): Mod: JZ | Performed by: PHYSICIAN ASSISTANT

## 2024-12-19 PROCEDURE — 99233 SBSQ HOSP IP/OBS HIGH 50: CPT | Performed by: INTERNAL MEDICINE

## 2024-12-19 PROCEDURE — 85027 COMPLETE CBC AUTOMATED: CPT | Performed by: PHYSICIAN ASSISTANT

## 2024-12-19 PROCEDURE — 1100000001 HC PRIVATE ROOM DAILY

## 2024-12-19 PROCEDURE — 2500000004 HC RX 250 GENERAL PHARMACY W/ HCPCS (ALT 636 FOR OP/ED)

## 2024-12-19 PROCEDURE — 80048 BASIC METABOLIC PNL TOTAL CA: CPT | Performed by: PHYSICIAN ASSISTANT

## 2024-12-19 PROCEDURE — 82947 ASSAY GLUCOSE BLOOD QUANT: CPT

## 2024-12-19 PROCEDURE — 97116 GAIT TRAINING THERAPY: CPT | Mod: GP | Performed by: PHYSICAL THERAPIST

## 2024-12-19 PROCEDURE — 97161 PT EVAL LOW COMPLEX 20 MIN: CPT | Mod: GP | Performed by: PHYSICAL THERAPIST

## 2024-12-19 PROCEDURE — 99232 SBSQ HOSP IP/OBS MODERATE 35: CPT | Performed by: INTERNAL MEDICINE

## 2024-12-19 PROCEDURE — 84100 ASSAY OF PHOSPHORUS: CPT

## 2024-12-19 PROCEDURE — 85520 HEPARIN ASSAY: CPT | Performed by: PHYSICIAN ASSISTANT

## 2024-12-19 PROCEDURE — 99233 SBSQ HOSP IP/OBS HIGH 50: CPT | Performed by: PHYSICIAN ASSISTANT

## 2024-12-19 PROCEDURE — 2500000004 HC RX 250 GENERAL PHARMACY W/ HCPCS (ALT 636 FOR OP/ED): Performed by: PHYSICIAN ASSISTANT

## 2024-12-19 RX ORDER — PHENYLEPHRINE 10 MG/250 ML(40 MCG/ML)IN 0.9 % SOD.CHLORIDE INTRAVENOUS
0-2 CONTINUOUS
Status: DISCONTINUED | OUTPATIENT
Start: 2024-12-19 | End: 2024-12-19

## 2024-12-19 RX ADMIN — ACETAMINOPHEN 650 MG: 325 TABLET ORAL at 20:21

## 2024-12-19 RX ADMIN — OXYCODONE 5 MG: 5 TABLET ORAL at 13:03

## 2024-12-19 RX ADMIN — OXYCODONE HYDROCHLORIDE 10 MG: 10 TABLET ORAL at 02:33

## 2024-12-19 RX ADMIN — OXYCODONE HYDROCHLORIDE 10 MG: 10 TABLET ORAL at 22:59

## 2024-12-19 RX ADMIN — ACETAMINOPHEN 650 MG: 325 TABLET ORAL at 02:32

## 2024-12-19 RX ADMIN — HYDROMORPHONE HYDROCHLORIDE 0.4 MG: 1 INJECTION, SOLUTION INTRAMUSCULAR; INTRAVENOUS; SUBCUTANEOUS at 15:08

## 2024-12-19 RX ADMIN — GABAPENTIN 400 MG: 400 CAPSULE ORAL at 08:20

## 2024-12-19 RX ADMIN — Medication 0.5 MCG/KG/MIN: at 02:10

## 2024-12-19 RX ADMIN — PANTOPRAZOLE SODIUM 40 MG: 40 TABLET, DELAYED RELEASE ORAL at 06:01

## 2024-12-19 RX ADMIN — ACETAMINOPHEN 650 MG: 325 TABLET ORAL at 08:20

## 2024-12-19 RX ADMIN — ATORVASTATIN CALCIUM 80 MG: 40 TABLET, FILM COATED ORAL at 20:21

## 2024-12-19 RX ADMIN — CLINDAMYCIN PHOSPHATE 600 MG: 600 INJECTION, SOLUTION INTRAVENOUS at 03:36

## 2024-12-19 RX ADMIN — DOCUSATE SODIUM 100 MG: 100 CAPSULE, LIQUID FILLED ORAL at 08:21

## 2024-12-19 RX ADMIN — TICAGRELOR 90 MG: 90 TABLET ORAL at 08:21

## 2024-12-19 RX ADMIN — RANOLAZINE 500 MG: 500 TABLET, EXTENDED RELEASE ORAL at 08:20

## 2024-12-19 RX ADMIN — RANOLAZINE 500 MG: 500 TABLET, EXTENDED RELEASE ORAL at 20:21

## 2024-12-19 RX ADMIN — OXYCODONE HYDROCHLORIDE 10 MG: 10 TABLET ORAL at 08:20

## 2024-12-19 RX ADMIN — Medication 0.4 MCG/KG/MIN: at 03:30

## 2024-12-19 RX ADMIN — EZETIMIBE 10 MG: 10 TABLET ORAL at 20:21

## 2024-12-19 RX ADMIN — INSULIN LISPRO 14 UNITS: 100 INJECTION, SOLUTION INTRAVENOUS; SUBCUTANEOUS at 08:12

## 2024-12-19 RX ADMIN — OXCARBAZEPINE 450 MG: 150 TABLET, FILM COATED ORAL at 08:19

## 2024-12-19 RX ADMIN — HYDROMORPHONE HYDROCHLORIDE 0.4 MG: 1 INJECTION, SOLUTION INTRAMUSCULAR; INTRAVENOUS; SUBCUTANEOUS at 11:57

## 2024-12-19 RX ADMIN — OXCARBAZEPINE 450 MG: 150 TABLET, FILM COATED ORAL at 00:08

## 2024-12-19 RX ADMIN — HYDROMORPHONE HYDROCHLORIDE 0.4 MG: 1 INJECTION, SOLUTION INTRAMUSCULAR; INTRAVENOUS; SUBCUTANEOUS at 06:30

## 2024-12-19 RX ADMIN — HYDROMORPHONE HYDROCHLORIDE 0.4 MG: 1 INJECTION, SOLUTION INTRAMUSCULAR; INTRAVENOUS; SUBCUTANEOUS at 03:34

## 2024-12-19 RX ADMIN — INSULIN LISPRO 14 UNITS: 100 INJECTION, SOLUTION INTRAVENOUS; SUBCUTANEOUS at 16:34

## 2024-12-19 RX ADMIN — GABAPENTIN 400 MG: 400 CAPSULE ORAL at 20:20

## 2024-12-19 RX ADMIN — ACETAMINOPHEN 650 MG: 325 TABLET ORAL at 15:07

## 2024-12-19 RX ADMIN — TICAGRELOR 90 MG: 90 TABLET ORAL at 20:21

## 2024-12-19 RX ADMIN — NICOTINE 1 PATCH: 21 PATCH, EXTENDED RELEASE TRANSDERMAL at 08:21

## 2024-12-19 RX ADMIN — GABAPENTIN 400 MG: 400 CAPSULE ORAL at 15:08

## 2024-12-19 RX ADMIN — TAMSULOSIN HYDROCHLORIDE 0.4 MG: 0.4 CAPSULE ORAL at 08:20

## 2024-12-19 RX ADMIN — OXCARBAZEPINE 450 MG: 150 TABLET, FILM COATED ORAL at 20:20

## 2024-12-19 RX ADMIN — HEPARIN SODIUM 1300 UNITS/HR: 10000 INJECTION, SOLUTION INTRAVENOUS at 07:24

## 2024-12-19 RX ADMIN — DOCUSATE SODIUM 100 MG: 100 CAPSULE, LIQUID FILLED ORAL at 20:20

## 2024-12-19 RX ADMIN — SODIUM CHLORIDE 500 ML: 9 INJECTION, SOLUTION INTRAVENOUS at 01:07

## 2024-12-19 RX ADMIN — OXYCODONE HYDROCHLORIDE 10 MG: 10 TABLET ORAL at 17:30

## 2024-12-19 RX ADMIN — HYDROMORPHONE HYDROCHLORIDE 0.4 MG: 1 INJECTION, SOLUTION INTRAMUSCULAR; INTRAVENOUS; SUBCUTANEOUS at 19:07

## 2024-12-19 RX ADMIN — ASPIRIN 81 MG: 81 TABLET, COATED ORAL at 08:21

## 2024-12-19 ASSESSMENT — ENCOUNTER SYMPTOMS
WEAKNESS: 0
NUMBNESS: 0
NAUSEA: 0
DIARRHEA: 0
HEADACHES: 0
CHILLS: 0
BACK PAIN: 0
DIZZINESS: 0
WOUND: 0
ABDOMINAL PAIN: 0
FEVER: 0
SORE THROAT: 0
CHEST TIGHTNESS: 0
FREQUENCY: 0
FACIAL SWELLING: 0
HEMATURIA: 0
DIAPHORESIS: 0
DYSURIA: 0
LIGHT-HEADEDNESS: 0
BRUISES/BLEEDS EASILY: 0
FLANK PAIN: 0
FATIGUE: 0
TROUBLE SWALLOWING: 0
CONSTIPATION: 0
VOMITING: 0
APPETITE CHANGE: 0
PALPITATIONS: 0
BLOOD IN STOOL: 0
COUGH: 0
JOINT SWELLING: 0
SHORTNESS OF BREATH: 0
EYE PAIN: 0
HALLUCINATIONS: 0
WHEEZING: 0

## 2024-12-19 ASSESSMENT — COGNITIVE AND FUNCTIONAL STATUS - GENERAL
WALKING IN HOSPITAL ROOM: A LOT
DRESSING REGULAR LOWER BODY CLOTHING: A LOT
MOVING TO AND FROM BED TO CHAIR: A LOT
MOVING TO AND FROM BED TO CHAIR: A LOT
MOVING FROM LYING ON BACK TO SITTING ON SIDE OF FLAT BED WITH BEDRAILS: A LITTLE
DAILY ACTIVITIY SCORE: 13
HELP NEEDED FOR BATHING: A LITTLE
WALKING IN HOSPITAL ROOM: A LOT
DRESSING REGULAR LOWER BODY CLOTHING: A LOT
PERSONAL GROOMING: A LITTLE
MOVING TO AND FROM BED TO CHAIR: A LOT
HELP NEEDED FOR BATHING: A LITTLE
MOVING FROM LYING ON BACK TO SITTING ON SIDE OF FLAT BED WITH BEDRAILS: A LITTLE
CLIMB 3 TO 5 STEPS WITH RAILING: TOTAL
TURNING FROM BACK TO SIDE WHILE IN FLAT BAD: A LITTLE
DRESSING REGULAR UPPER BODY CLOTHING: A LOT
STANDING UP FROM CHAIR USING ARMS: A LITTLE
STANDING UP FROM CHAIR USING ARMS: A LOT
PERSONAL GROOMING: A LOT
DAILY ACTIVITIY SCORE: 19
MOVING FROM LYING ON BACK TO SITTING ON SIDE OF FLAT BED WITH BEDRAILS: A LITTLE
CLIMB 3 TO 5 STEPS WITH RAILING: TOTAL
CLIMB 3 TO 5 STEPS WITH RAILING: TOTAL
MOBILITY SCORE: 14
TURNING FROM BACK TO SIDE WHILE IN FLAT BAD: A LITTLE
MOVING TO AND FROM BED TO CHAIR: A LOT
STANDING UP FROM CHAIR USING ARMS: A LITTLE
STANDING UP FROM CHAIR USING ARMS: A LOT
WALKING IN HOSPITAL ROOM: A LOT
TURNING FROM BACK TO SIDE WHILE IN FLAT BAD: A LITTLE
MOBILITY SCORE: 14
TOILETING: A LITTLE
DAILY ACTIVITIY SCORE: 19
DRESSING REGULAR LOWER BODY CLOTHING: A LOT
CLIMB 3 TO 5 STEPS WITH RAILING: TOTAL
PERSONAL GROOMING: A LITTLE
TOILETING: A LITTLE
WALKING IN HOSPITAL ROOM: A LOT
HELP NEEDED FOR BATHING: A LOT
TURNING FROM BACK TO SIDE WHILE IN FLAT BAD: A LITTLE
MOBILITY SCORE: 13
EATING MEALS: A LITTLE
MOBILITY SCORE: 14
TOILETING: A LOT

## 2024-12-19 ASSESSMENT — PAIN SCALES - GENERAL
PAINLEVEL_OUTOF10: 8
PAINLEVEL_OUTOF10: 10 - WORST POSSIBLE PAIN
PAINLEVEL_OUTOF10: 8
PAINLEVEL_OUTOF10: 10 - WORST POSSIBLE PAIN
PAINLEVEL_OUTOF10: 8
PAINLEVEL_OUTOF10: 10 - WORST POSSIBLE PAIN
PAINLEVEL_OUTOF10: 3
PAINLEVEL_OUTOF10: 10 - WORST POSSIBLE PAIN
PAINLEVEL_OUTOF10: 9
PAINLEVEL_OUTOF10: 10 - WORST POSSIBLE PAIN
PAINLEVEL_OUTOF10: 8
PAINLEVEL_OUTOF10: 10 - WORST POSSIBLE PAIN
PAINLEVEL_OUTOF10: 8
PAINLEVEL_OUTOF10: 6

## 2024-12-19 ASSESSMENT — PAIN DESCRIPTION - DESCRIPTORS
DESCRIPTORS: BURNING
DESCRIPTORS: BURNING

## 2024-12-19 ASSESSMENT — PAIN DESCRIPTION - LOCATION
LOCATION: LEG
LOCATION: GROIN
LOCATION: LEG
LOCATION: LEG

## 2024-12-19 ASSESSMENT — PAIN - FUNCTIONAL ASSESSMENT
PAIN_FUNCTIONAL_ASSESSMENT: 0-10

## 2024-12-19 ASSESSMENT — PAIN DESCRIPTION - ORIENTATION
ORIENTATION: LEFT

## 2024-12-19 ASSESSMENT — ACTIVITIES OF DAILY LIVING (ADL): LACK_OF_TRANSPORTATION: NO

## 2024-12-19 NOTE — PROGRESS NOTES
Critical Care Daily Progress        Subjective   Patient is a 59 y.o. male admitted on 12/17/2024  1:30 AM.     Interval History: Weaned off pressors.     Complaints: has no complaint of leg pain..     Scheduled Medications:   acetaminophen, 650 mg, oral, q6h  aspirin, 81 mg, oral, Daily  atorvastatin, 80 mg, oral, Nightly  [Held by provider] carvedilol, 6.25 mg, oral, BID  [Held by provider] dapagliflozin propanediol, 10 mg, oral, Daily  docusate sodium, 100 mg, oral, BID  ezetimibe, 10 mg, oral, Nightly  gabapentin, 400 mg, oral, TID  [Held by provider] hydrALAZINE, 50 mg, oral, TID  insulin glargine, 24 Units, subcutaneous, Nightly  insulin lispro, 0-10 Units, subcutaneous, TID AC  insulin lispro, 14 Units, subcutaneous, TID AC  isosorbide mononitrate ER, 60 mg, oral, Daily  nicotine, 1 patch, transdermal, Daily  OXcarbazepine, 450 mg, oral, BID  oxygen, , inhalation, Continuous - Inhalation  pantoprazole, 40 mg, oral, Daily before breakfast  polyethylene glycol, 17 g, oral, Daily  ranolazine, 500 mg, oral, BID  [Held by provider] sacubitriL-valsartan, 1 tablet, oral, BID  tamsulosin, 0.4 mg, oral, Daily  ticagrelor, 90 mg, oral, BID         Continuous Medications:   heparin, 0-4,500 Units/hr, Last Rate: 1,300 Units/hr (12/19/24 0724)  phenylephrine, 0-2 mcg/kg/min, Last Rate: Stopped (12/19/24 0515)         PRN Medications:   PRN medications: albuterol, dextrose, dextrose, glucagon, glucagon, HYDROmorphone, naloxone, ondansetron ODT **OR** ondansetron, oxyCODONE, oxyCODONE    Objective   Vitals:  Most Recent:  Vitals:    12/19/24 1130   BP: 90/73   Pulse: 80   Resp: 12   Temp:    SpO2: 98%       24hr Min/Max:  Temp  Min: 35.7 °C (96.3 °F)  Max: 36.6 °C (97.8 °F)  Pulse  Min: 54  Max: 87  BP  Min: 68/46  Max: 159/83  Resp  Min: 0  Max: 23  SpO2  Min: 90 %  Max: 100 %        I/O:    Intake/Output Summary (Last 24 hours) at 12/19/2024 1147  Last data filed at 12/19/2024 0900  Gross per 24 hour   Intake 3621.8 ml    Output 1615 ml   Net 2006.8 ml       Physical Exam:   Physical Exam  Constitutional:       General: He is not in acute distress.     Appearance: He is not toxic-appearing.   HENT:      Head: Normocephalic and atraumatic.      Nose: No congestion.      Mouth/Throat:      Pharynx: No oropharyngeal exudate.   Eyes:      General: No scleral icterus.  Cardiovascular:      Rate and Rhythm: Normal rate and regular rhythm.      Heart sounds: No murmur heard.  Pulmonary:      Effort: No respiratory distress.      Breath sounds: Normal breath sounds.   Abdominal:      Palpations: Abdomen is soft.   Musculoskeletal:      Cervical back: Neck supple. No rigidity.      Comments: Pulses in both legs   Skin:     Findings: No rash.   Neurological:      General: No focal deficit present.      Mental Status: He is alert and oriented to person, place, and time. Mental status is at baseline.          Lab/Radiology/Diagnostic Review:  Results for orders placed or performed during the hospital encounter of 12/17/24 (from the past 24 hours)   Type and Screen   Result Value Ref Range    ABO TYPE O     Rh TYPE NEG     ANTIBODY SCREEN NEG    POCT GLUCOSE   Result Value Ref Range    POCT Glucose 182 (H) 74 - 99 mg/dL   POCT GLUCOSE   Result Value Ref Range    POCT Glucose 149 (H) 74 - 99 mg/dL   Heparin Assay, UFH   Result Value Ref Range    Heparin Unfractionated 0.5 See Comment Below for Therapeutic Ranges IU/mL   POCT GLUCOSE   Result Value Ref Range    POCT Glucose 159 (H) 74 - 99 mg/dL   Basic Metabolic Panel   Result Value Ref Range    Glucose 162 (H) 74 - 99 mg/dL    Sodium 135 (L) 136 - 145 mmol/L    Potassium 4.4 3.5 - 5.3 mmol/L    Chloride 103 98 - 107 mmol/L    Bicarbonate 26 21 - 32 mmol/L    Anion Gap 10 10 - 20 mmol/L    Urea Nitrogen 31 (H) 6 - 23 mg/dL    Creatinine 1.30 0.50 - 1.30 mg/dL    eGFR 64 >60 mL/min/1.73m*2    Calcium 8.0 (L) 8.6 - 10.3 mg/dL   CBC   Result Value Ref Range    WBC 7.4 4.4 - 11.3 x10*3/uL    nRBC  0.0 0.0 - 0.0 /100 WBCs    RBC 3.51 (L) 4.50 - 5.90 x10*6/uL    Hemoglobin 11.1 (L) 13.5 - 17.5 g/dL    Hematocrit 32.8 (L) 41.0 - 52.0 %    MCV 93 80 - 100 fL    MCH 31.6 26.0 - 34.0 pg    MCHC 33.8 32.0 - 36.0 g/dL    RDW 13.4 11.5 - 14.5 %    Platelets 217 150 - 450 x10*3/uL   Magnesium   Result Value Ref Range    Magnesium 2.02 1.60 - 2.40 mg/dL   Phosphorus   Result Value Ref Range    Phosphorus 4.0 2.5 - 4.9 mg/dL   Heparin Assay, UFH   Result Value Ref Range    Heparin Unfractionated 0.4 See Comment Below for Therapeutic Ranges IU/mL   POCT GLUCOSE   Result Value Ref Range    POCT Glucose 134 (H) 74 - 99 mg/dL   POCT GLUCOSE   Result Value Ref Range    POCT Glucose 82 74 - 99 mg/dL     *Note: Due to a large number of results and/or encounters for the requested time period, some results have not been displayed. A complete set of results can be found in Results Review.     Vascular US upper extremity vein mapping bilateral    Result Date: 12/18/2024              [place] [address]      Phone [phone] Fax [phone]  [facility]  [facility] BILATERAL Patient Name:      CARMENZA Presley Physician:  [id number] Fabian Phillips MD Study Date:        12/18/2024          Ordering Provider:  [id number] DARIUS BURT MRN/PID:           [id number]            Fellow: Accession#:        [id number]        Technologist:       Danielle Kelley                                                            RVT Date of Birth/Age: 1965 / 59      Technologist 2:     Deb Allen RVT                    years Gender:            M                   Encounter#:         [id number] Admission Status:  Outpatient          Location Performed: [facility]  Diagnosis/ICD: Encounter for other preprocedural examination-Z01.818 CPT Codes:     04954 Vein mapping complete  CONCLUSIONS: Right  Upper Vein Mapping: Chronic disease is documented in the right cephalic proximal forearm, cephalic mid forearm, cephalic distal forearm, basilic proximal forearm, basilic mid forearm and basilic distal forearm veins. Left Upper Vein Mapping: Left cephalic and basilic veins appear widely patent with no evidence of thrombosis or fibrosis.  Imaging & Doppler Findings:  Right                   Compress Thrombus  Diam Cephalic Prox Arm         Yes      None   2.4 mm Cephalic Mid Arm          Yes      None   2.3 mm Cephalic Distal Arm       Yes      None   1.7 mm Cephalic Prox Forearm      No    Fibrotic Cephalic Mid Forearm       No    Fibrotic Cephalic Distal Forearm    No    Fibrotic Basilic Prox Arm          Yes      None   4.2 mm Basilic Mid Arm           Yes      None   3.7 mm Basilic Distal Arm        Yes      None   3.5 mm Basilic Prox Forearm       No    Fibrotic Basilic Mid Forearm        No    Fibrotic Basilic Distal Forearm     No    Fibrotic  Left                    Compress Thrombus  Diam Cephalic Prox Arm         Yes      None   2.4 mm Cephalic Mid Arm          Yes      None   2.3 mm Cephalic Distal Arm       Yes      None   2.1 mm Cephalic Prox Forearm     Yes      None   3.1 mm Cephalic Mid Forearm      Yes      None   1.8 mm Cephalic Distal Forearm   Yes      None   1.4 mm Basilic Prox Arm          Yes      None   3.3 mm Basilic Mid Arm           Yes      None   2.7 mm Basilic Distal Arm        Yes      None   2.2 mm Basilic Prox Forearm      Yes      None   1.9 mm Basilic Mid Forearm       Yes      None   1.1 mm Basilic Distal Forearm    Yes      None   0.7 mm  [id number] Fabian Phillips MD Electronically signed by [id number] Fabian Phillips MD on 12/18/2024 at 6:21:05 PM  ** Final **     ECG 12 lead    Result Date: 12/17/2024  Sinus rhythm Inferior infarct, old Baseline wander in lead(s) II,III,aVR,aVL,aVF,V1,V2,V5 See ED provider note for full interpretation and clinical correlation Confirmed by Nay Black ([id number])  on 12/17/2024 11:40:07 AM    Vascular US ankle brachial index (ANSON) without exercise    Result Date: 12/17/2024  Preliminary [place] [address]      Phone [phone] Fax [phone]            Preliminary Vascular Lab Report  [facility] US ANKLE BRACHIAL INDEX (ANSON) WITHOUT EXERCISE  Patient Name:     CARMENZA CIARA Presley Physician: [id number] Darlyn OLEARY MD Study Date:       12/17/2024       Ordering Provider: [id number] CHERELLE POON MRN/PID:          [id number]         Fellow: Accession#:       [id number]     Technologist:      Deb Allen RVYUSUF YOB: 1965        Technologist 2: Gender:           M                Encounter#:        [id number] Admission Status: Emergency        Location           [facility]                                    Performed:  Diagnosis/ICD: Peripheral vascular disease, unspecified-I73.9 CPT Codes:     03782 Peripheral artery ANSON Only  Pertinent History: Pt presents to the ER with LLE pain and a cold left foot.  **CRITICAL RESULT** Critical Result: severe arterial disease of LLE Notification called to Cherelle Poon on 12/17/2024 at 9:31:33 AM.  PRELIMINARY CONCLUSIONS:  Right Lower PVR: No evidence of arterial occlusive disease in the right lower extremity at rest. Normal digital perfusion noted. Multiphasic flow is noted in the right common femoral artery, right popliteal artery, right posterior tibial artery and right dorsalis pedis artery. Left Lower PVR: Evidence of severe arterial occlusive disease in the left lower extremity at rest. Decreased digital perfusion noted. Monophasic flow is noted in the left common femoral artery, left popliteal artery, left posterior tibial artery and left dorsalis pedis artery. PTA is inaudible. Flat toe waveform(TBI=0).  Imaging & Doppler Findings:  RIGHT Lower PVR                 Pressures Ratios Right Posterior Tibial (Ankle) 141 mmHg  1.22 Right Dorsalis Pedis (Ankle)   126 mmHg  1.09 Right Digit (Great Toe)        112 mmHg  0.97   LEFT Lower PVR                Pressures Ratios Left Posterior Tibial (Ankle) 0 mmHg    0.00 Left Dorsalis Pedis (Ankle)   33 mmHg   0.28 Left Digit (Great Toe)        0 mmHg    0.00                      Right Brachial Pressure 116 mmHg   VASCULAR PRELIMINARY REPORT completed by Deb Allen T on 12/17/2024 at 9:32:24 AM  ** Final **     CT angio aorta and bilateral iliofemoral runoff including without contrast if performed    Result Date: 12/17/2024  STUDY: CT Angiogram of the Abdomen, Pelvis, and Bilateral Lower Extremities; 12/17/2024 6:38 AM INDICATION: Cold left foot. COMPARISON: CTA AP w/runoffs 11/19/2024, CTA chest 11/19/2024, CTA AP w/runoffs 09/13/2024. ACCESSION NUMBER(S): [id number] ORDERING CLINICIAN: BASIM POON TECHNIQUE:  Helical CT is performed from the aortic diaphragmatic hiatus through the feet after bolus administration of 90 mL of Omnipaque 350.  Images are reviewed and processed at a workstation according to the CT angiogram protocol with 3-D and/or MIP post processing imaging generated. Automated mA/kV exposure control was utilized and patient examination was performed in strict accordance with principles of ALARA. FINDINGS: Vascular: Abdominal Aorta: The celiac, SMA, and DENISE demonstrate no significant stenosis.  The right and left renal arteries demonstrate no significant stenosis. The abdominal aorta is not aneurysmal and demonstrates no significant occlusive disease. Right Run-off: The right common iliac artery shows mild stenosis diffusely.  There is a stent along the entire right external iliac and right common femoral arteries.  The stent demonstrates areas of mild in-stent stenosis. There is an occluded right femoral-popliteal bypass graft. The native right femoral popliteal arteries demonstrate mild atherosclerotic contour.   There is a stent extending from the mid SFA to the proximal popliteal artery.  At the distal SFA the stent demonstrates crush distortion but maintains a patent lumen, though with areas of mild in-stent stenosis. The popliteal artery remains patent throughout its course.  There is patent three-vessel runoff to the ankle, and 2 vessels into the foot. Opacification became too weak to follow the vessels to the plantar arch. Left Run-off: The left common iliac artery demonstrates mild diffuse atherosclerotic stenosis.  The left external iliac artery and left common femoral arteries are relatively normal.  The common femoral artery bifurcation is patent.  The native left SFA occludes just beyond its origin.  The profunda femoral artery and its branches remain patent. There is a chronically occluded left femoral-popliteal bypass graft. The native left SFA does not reconstitute, remaining occluded throughout its course. There is a 2 cm length patent segment of the left popliteal artery, which is otherwise occluded.  Distal to the patent segment, the popliteal artery is occluded across the level of the joint. The distal popliteal artery reconstitutes remains patent into its trifurcation. 3 patent infrapopliteal vessels are visualized proximally.  The tibial vessels remain patent to the ankle, where the dorsalis pedis and lateral plantar seen across the ankle joint.  The vessels cannot be followed to the plantar arch due to weak contrast opacification. Abdomen: The lung bases are clear.  The liver is unremarkable.  The pancreas, spleen, adrenal glands, and kidneys demonstrate no acute pathology. There is no free air or lymph node enlargement. Pelvis: There is no bowel wall thickening or obstruction.  There is no free fluid.  Lymph nodes are not enlarged.  Urinary bladder is unremarkable. At the left inguinal region there is thick soft tissue in the subcutaneous fat overlying the femoral vessels, as well as 2 prominent lymph  nodes. Skeleton:  There are no acute fractures.  No suspicious bony lesions.    No significant aortic disease.  Mild bilateral common iliac artery stenosis.  Patent left external iliac and common femoral arteries. 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  Chronic occlusion of the native left superficial femoral artery. A 2 cm segment of the left P1 popliteal artery reconstitutes.  The terminal left popliteal artery also reconstitutes.  Other portions of the popliteal artery are occluded. Patent three-vessel infrapopliteal runoff to the left hindfoot, beyond which contrast opacification was too weak to visualize. Thick subcutaneous soft tissue - presumed scarred tissue - overlying the left femoral vessels at the left groin.  Correlate for possibility of cellulitis. Patent right iliac and right femoral stents.  Patent right popliteal artery.  Patent three-vessel infrapopliteal runoff to the right ankle. No findings of an acute process in the abdomen or pelvis. Signed by Simeon Barrera MD    Vascular US Lower Extremity Arterial Duplex Bilateral    Result Date: 12/3/2024              [place] [id number]      Phone [phone] Fax [phone]  Vascular Lab Report  [facility] US LOWER EXTREMITY ARTERIAL DUPLEX BILATERAL Patient Name:      CARMENZA CIARA OLEARY    Reading Physician:  [id number] Britney Rush MD Study Date:        12/2/2024            Ordering Provider:  [id number] ADELA LYLES MRN/PID:           [id number]             Fellow: Accession#:        [id number]         Technologist:       Deb Allen RVT Date of Birth/Age: 1965 / 59 years Technologist 2: Gender:            M                    Encounter#:         [id number] Admission Status:  Outpatient           Location Performed: [facility]  Diagnosis/ICD: Peripheral vascular disease, unspecified-I73.9 CPT Codes:     03976  Peripheral artery Lower arterial Duplex complete  Pertinent History: LLE intervention(11/19/2024).  CONCLUSIONS: Right Lower Arterial: The profunda was monophasic and dampened on prior exam and today is not well visualized and appears occluded. All remaining vessels appear patent. Left Lower Arterial: No occlusion or stenosis noted in the vessels visualized. The distal BARB is dampened and appears nearly occluded. Working around staples and dressings. The EIA is not visualized. The proximal and mid CFA and the profunda is not visualized. Minimal visualization of calf vessels.  Imaging & Doppler Findings:  Right                       Left   PSV                        PSV 89 cm/s         EIA 95 cm/s         CFA        78 cm/s  0 cm/s  Profunda Proximal 80 cm/s    SFA Proximal    88 cm/s 148 cm/s      SFA Mid      100 cm/s 112 cm/s    SFA Distal     108 cm/s 73 cm/s      Popliteal     74 cm/s 100 cm/s   BARB Proximal 86 cm/s       BARB Mid 53 cm/s     BARB Distal     11 cm/s 48 cm/s  Peroneal Proximal 55 cm/s    Peroneal Mid    77 cm/s 40 cm/s   Peroneal Distal 34 cm/s    PTA Proximal    48 cm/s 31 cm/s       PTA Mid 52 cm/s     PTA Distal  [id number] Britney Rush MD Electronically signed by [id number] Britney Rush MD on 12/3/2024 at 11:43:03 AM  ** Final **     Vascular US ankle brachial index (ANSON) without exercise    Result Date: 12/3/2024              [place] [id number]      Phone [phone] Fax [phone]  Vascular Lab Report  [facility] US ANKLE BRACHIAL INDEX (ANSON) WITHOUT EXERCISE Patient Name:      CARMENZA Presley Physician:  [id number] Britney Rush MD Study Date:        12/2/2024            Ordering Provider:  [id number] ADELA LYLES MRN/PID:           [id number]             Fellow: Accession#:        [id number]         Technologist:       Deb Allen RVYUSUF Date of Birth/Age: 1965 / 59 years  Technologist 2: Gender:            M                    Encounter#:         [id number] Admission Status:  Outpatient           Location Performed: [facility]  Diagnosis/ICD: Peripheral vascular disease, unspecified-I73.9 CPT Codes:     12749 Peripheral artery ANSON Only  Pertinent History: Recent LLE intervention(11/19/2024).  CONCLUSIONS: Right Lower PVR: No evidence of arterial occlusive disease in the right lower extremity at rest. Normal digital perfusion noted. Multiphasic flow is noted in the right common femoral artery, right posterior tibial artery and right dorsalis pedis artery. Left Lower PVR: Evidence of moderate arterial occlusive disease in the left lower extremity at rest. Decreased digital perfusion noted. Monophasic flow is noted in the left posterior tibial artery and left dorsalis pedis artery. Multiphasic flow is noted in the left common femoral artery.  Comparison: Compared with study from 5/23/2024, The left ANSON decreased from .96 to .67 today. No significant change on the right.  Imaging & Doppler Findings:  RIGHT Lower PVR                Pressures Ratios Right Posterior Tibial (Ankle) 169 mmHg  0.94 Right Dorsalis Pedis (Ankle)   158 mmHg  0.88 Right Digit (Great Toe)        113 mmHg  0.63   LEFT Lower PVR                Pressures Ratios Left Posterior Tibial (Ankle) 109 mmHg  0.61 Left Dorsalis Pedis (Ankle)   120 mmHg  0.67 Left Digit (Great Toe)        105 mmHg  0.58                      Right Brachial Pressure 180 mmHg   [id number] Rush MD Electronically signed by [id number] Rush MD on 12/3/2024 at 11:38:55 AM  ** Final **     Transthoracic Echo (TTE) Complete    Result Date: 11/20/2024              [place] [address]      Phone [phone] Fax [phone] TRANSTHORACIC ECHOCARDIOGRAM REPORT Patient Name:       CARMENZA OLEARY   Reading Physician:    Enma Tan                                                                Adri GANDARA Study Date:         11/20/2024          Ordering Provider:    [id number] DANNA SEARS MRN/PID:            [id number]            Fellow: Accession#:         [id number]        Nurse:                Noemi Villa RN Date of Birth/Age:  1965 / 59      Sonographer:          Laxmi crisostomo RDCS Gender Assigned at  M                   Additional Staff: Birth: Height:             180.34 cm           Admit Date:           11/19/2024 Weight:             87.09 kg            Admission Status:     Inpatient -                                                               Routine BSA / BMI:          2.07 m2 / 26.78     Department Location:  [facility]                     kg/m2 Blood Pressure: 107 /64 mmHg Study Type:    TRANSTHORACIC ECHO (TTE) COMPLETE Diagnosis/ICD: Shortness of breath-R06.02; Unspecified systolic (congestive)                heart failure (CHF)-I50.20 Indication:    CHF, SHORTNESS OF BREATH CPT Codes:     Echo Complete w Full Doppler-08008 Patient History: Pertinent History: CABG x5 2019, HTN. Study Detail: The following Echo studies were performed: 2D, M-Mode, Doppler and               color flow. Technically challenging study due to prominent lung               artifact and body habitus. Optison used as a contrast agent for               endocardial border definition. Total contrast used for this               procedure was 3 mL via IV push.  PHYSICIAN INTERPRETATION: Left Ventricle: The left ventricular systolic function is moderately decreased, with a Salinas's biplane calculated ejection fraction of 38%. Wall motion is abnormal. The left ventricular cavity size is normal. There is normal septal and normal posterior left ventricular wall thickness. Spectral Doppler shows an abnormal pattern of left ventricular diastolic filling. Akinetic inferolateral segment, and basal inferior wall. Left Atrium: The left atrium is  normal in size. Right Ventricle: The right ventricle was not well visualized. Right ventricular systolic function not assessed. Right Atrium: The right atrium is normal in size. Aortic Valve: The aortic valve appears structurally normal. The aortic valve dimensionless index is 0.73. There is no evidence of aortic valve regurgitation. The peak instantaneous gradient of the aortic valve is 9 mmHg. The mean gradient of the aortic valve is 4 mmHg. Mitral Valve: The mitral valve is normal in structure. There is no evidence of mitral valve regurgitation. Tricuspid Valve: The tricuspid valve is structurally normal. There is trace tricuspid regurgitation. Pulmonic Valve: The pulmonic valve is structurally normal. There is no indication of pulmonic valve regurgitation. Pericardium: No pericardial effusion noted. Aorta: The aortic root is normal. Systemic Veins: The inferior vena cava appears normal in size, with IVC inspiratory collapse greater than 50%.  CONCLUSIONS:  1. Poorly visualized anatomical structures due to suboptimal image quality.  2. The left ventricular systolic function is moderately decreased, with a Salinas's biplane calculated ejection fraction of 38%.  3. Abnormal wall motion.  4. Spectral Doppler shows an abnormal pattern of left ventricular diastolic filling.  5. Akinetic inferolateral segment, and basal inferior wall. QUANTITATIVE DATA SUMMARY:  2D MEASUREMENTS:           Normal Ranges: Ao Root d:       2.80 cm   (2.0-3.7cm) LAs:             3.60 cm   (2.7-4.0cm) IVSd:            0.70 cm   (0.6-1.1cm) LVPWd:           0.70 cm   (0.6-1.1cm) LVIDd:           6.10 cm   (3.9-5.9cm) LVIDs:           5.30 cm LV Mass Index:   78.5 g/m2 LV % FS          13.1 %  LA VOLUME:                    Normal Ranges: LA Vol A4C:        48.7 ml    (22+/-6mL/m2) LA Vol A2C:        49.8 ml LA Vol BP:         51.4 ml LA Vol Index A4C:  23.5ml/m2 LA Vol Index A2C:  24.0 ml/m2 LA Vol Index BP:   24.8 ml/m2 LA Area A4C:        17.6 cm2 LA Area A2C:       18.6 cm2 LA Major Axis A4C: 5.4 cm LA Major Axis A2C: 5.9 cm LA Volume Index:   24.8 ml/m2  RA VOLUME BY A/L METHOD:          Normal Ranges: RA Area A4C:             13.5 cm2  AORTA MEASUREMENTS:         Normal Ranges: Ao Sinus, d:        2.80 cm (2.1-3.5cm) Ao STJ, d:          2.60 cm (1.7-3.4cm) Asc Ao, d:          2.90 cm (2.1-3.4cm)  LV SYSTOLIC FUNCTION BY 2D PLANIMETRY (MOD):                      Normal Ranges: EF-A4C View:    38 % (>=55%) EF-A2C View:    39 % EF-Biplane:     38 % LV EF Reported: 38 %  LV DIASTOLIC FUNCTION:             Normal Ranges: MV Peak E:             0.75 m/s    (0.7-1.2 m/s) MV Peak A:             0.65 m/s    (0.42-0.7 m/s) E/A Ratio:             1.17        (1.0-2.2) MV e'                  0.069 m/s   (>8.0) MV lateral e'          0.08 m/s MV medial e'           0.06 m/s MV A Dur:              109.00 msec E/e' Ratio:            10.91       (<8.0)  MITRAL VALVE:          Normal Ranges: MV DT:        211 msec (150-240msec)  AORTIC VALVE:                     Normal Ranges: AoV Vmax:                1.46 m/s (<=1.7m/s) AoV Peak P.5 mmHg (<20mmHg) AoV Mean P.0 mmHg (1.7-11.5mmHg) LVOT Max Ayad:            1.09 m/s (<=1.1m/s) AoV VTI:                 29.40 cm (18-25cm) LVOT VTI:                21.60 cm LVOT Diameter:           2.20 cm  (1.8-2.4cm) AoV Area, VTI:           2.79 cm2 (2.5-5.5cm2) AoV Area,Vmax:           2.84 cm2 (2.5-4.5cm2) AoV Dimensionless Index: 0.73  RIGHT VENTRICLE: TAPSE: 19.1 mm  TRICUSPID VALVE/RVSP:          Normal Ranges: Peak TR Velocity:     2.00 m/s RV Syst Pressure:     19 mmHg  (< 30mmHg) IVC Diam:             1.60 cm  [id number] Britney Rush MD Electronically signed on 2024 at 1:49:12 PM  ** Final **         This patient has a urinary catheter   Reason for the urinary catheter remaining today? Urine catheter unnecessary, will be removed today               Assessment/Plan   Assessment &  Plan  Peripheral artery occlusion ([facility])    Critical limb ischemia of left lower extremity (Multi)  Local wound care.  Track labs.  Transition back to Eliquis and discontinue heparin per vascular surgeon.  Continue DAPT.  Gradually reinstitute antihypertensive drugs.  Remove Douglass today.  Holding blood pressure off of pressors.    Diabetes mellitus, type II.  Receiving insulin, glucose monitoring, and endocrinology care.    Cardiac issues.  History of decreased ejection fraction and hyperlipidemia on Lipitor and Zetia.  Continue Ranexa and DAPT.  On room air with adequate saturations in the high 90s/no pulmonary edema present.    History of CVA.  Seems to have minimal residual, clinically.    History of COPD and active tobacco use.  Encouraged complete smoking cessation, clearly will increase risk of loss of limb.    Has done well all morning.  Critical care services do not appear to be needed.  Will sign off at the current time and defer to vascular surgeon.  Hospitalist following as well.      Dragon dictation software was used to dictate this note and thus there may be minor errors in translation/transcription including garbled speech or misspellings. Please contact for clarification if needed.     Enrique Arnold MD

## 2024-12-19 NOTE — PROGRESS NOTES
"Dereck Shen is a 59 y.o. male on day 2 of admission presenting with Peripheral artery occlusion (CMS-[facility]ly [facility]).    Subjective   Patient complains of pain.  He has ordered breakfast.      I have reviewed histories, allergies and medications have been reviewed and there are no changes       Objective     Physical Exam  Vitals and nursing note reviewed.   Constitutional:       General: He is not in acute distress.     Appearance: Normal appearance. He is normal weight.   HENT:      Head: Normocephalic and atraumatic.      Nose: Nose normal.      Mouth/Throat:      Mouth: Mucous membranes are moist.   Eyes:      Extraocular Movements: Extraocular movements intact.   Cardiovascular:      Rate and Rhythm: Normal rate and regular rhythm.   Pulmonary:      Effort: Pulmonary effort is normal.   Musculoskeletal:         General: Normal range of motion.   Skin:     General: Skin is warm.      Comments: Dressing clean and dry to left leg    Neurological:      Mental Status: He is alert and oriented to person, place, and time.   Psychiatric:         Mood and Affect: Mood normal.         Last Recorded Vitals  Blood pressure (!) 137/93, pulse 85, temperature 36.2 °C (97.2 °F), temperature source Temporal, resp. rate 21, height 1.803 m (5' 11\"), weight 87.7 kg (193 lb 4.8 oz), SpO2 100%.  Intake/Output last 3 Shifts:  I/O last 3 completed shifts:  In: 3575 (40.8 mL/kg) [P.O.:960; I.V.:1865 (21.3 mL/kg); IV Piggyback:750]  Out: 2715 (31 mL/kg) [Urine:2415 (0.8 mL/kg/hr); Blood:300]  Weight: 87.7 kg     Relevant Results  Results from last 7 days   Lab Units 12/19/24  0416 12/18/24  2247 12/18/24  1959 12/18/24  1614 12/18/24  1120 12/18/24  0814 12/18/24  0449 12/17/24  1044 12/17/24  0333   POCT GLUCOSE mg/dL  --  159* 149* 182* 157* 213*  --    < >  --    GLUCOSE mg/dL 162*  --   --   --   --   --  190*  --  162*    < > = values in this interval not displayed.     Scheduled medications  acetaminophen, 650 mg, oral, q6h  aspirin, " 81 mg, oral, Daily  atorvastatin, 80 mg, oral, Nightly  [Held by provider] carvedilol, 6.25 mg, oral, BID  [Held by provider] dapagliflozin propanediol, 10 mg, oral, Daily  docusate sodium, 100 mg, oral, BID  ezetimibe, 10 mg, oral, Nightly  gabapentin, 400 mg, oral, TID  [Held by provider] hydrALAZINE, 50 mg, oral, TID  insulin glargine, 24 Units, subcutaneous, Nightly  insulin lispro, 0-10 Units, subcutaneous, TID AC  insulin lispro, 14 Units, subcutaneous, TID AC  isosorbide mononitrate ER, 60 mg, oral, Daily  nicotine, 1 patch, transdermal, Daily  OXcarbazepine, 450 mg, oral, BID  oxygen, , inhalation, Continuous - Inhalation  pantoprazole, 40 mg, oral, Daily before breakfast  polyethylene glycol, 17 g, oral, Daily  ranolazine, 500 mg, oral, BID  [Held by provider] sacubitriL-valsartan, 1 tablet, oral, BID  tamsulosin, 0.4 mg, oral, Daily  ticagrelor, 90 mg, oral, BID      Continuous medications  heparin, 0-4,500 Units/hr, Last Rate: 1,300 Units/hr (12/19/24 0724)  phenylephrine, 0-2 mcg/kg/min, Last Rate: Stopped (12/19/24 0515)      PRN medications  PRN medications: albuterol, dextrose, dextrose, glucagon, glucagon, HYDROmorphone, naloxone, ondansetron ODT **OR** ondansetron, oxyCODONE, oxyCODONE    Results for orders placed or performed during the hospital encounter of 12/17/24 (from the past 24 hours)   POCT GLUCOSE   Result Value Ref Range    POCT Glucose 213 (H) 74 - 99 mg/dL   POCT GLUCOSE   Result Value Ref Range    POCT Glucose 157 (H) 74 - 99 mg/dL   Type and Screen   Result Value Ref Range    ABO TYPE O     Rh TYPE NEG     ANTIBODY SCREEN NEG    POCT GLUCOSE   Result Value Ref Range    POCT Glucose 182 (H) 74 - 99 mg/dL   POCT GLUCOSE   Result Value Ref Range    POCT Glucose 149 (H) 74 - 99 mg/dL   Heparin Assay, UFH   Result Value Ref Range    Heparin Unfractionated 0.5 See Comment Below for Therapeutic Ranges IU/mL   POCT GLUCOSE   Result Value Ref Range    POCT Glucose 159 (H) 74 - 99 mg/dL   Basic  Metabolic Panel   Result Value Ref Range    Glucose 162 (H) 74 - 99 mg/dL    Sodium 135 (L) 136 - 145 mmol/L    Potassium 4.4 3.5 - 5.3 mmol/L    Chloride 103 98 - 107 mmol/L    Bicarbonate 26 21 - 32 mmol/L    Anion Gap 10 10 - 20 mmol/L    Urea Nitrogen 31 (H) 6 - 23 mg/dL    Creatinine 1.30 0.50 - 1.30 mg/dL    eGFR 64 >60 mL/min/1.73m*2    Calcium 8.0 (L) 8.6 - 10.3 mg/dL   CBC   Result Value Ref Range    WBC 7.4 4.4 - 11.3 x10*3/uL    nRBC 0.0 0.0 - 0.0 /100 WBCs    RBC 3.51 (L) 4.50 - 5.90 x10*6/uL    Hemoglobin 11.1 (L) 13.5 - 17.5 g/dL    Hematocrit 32.8 (L) 41.0 - 52.0 %    MCV 93 80 - 100 fL    MCH 31.6 26.0 - 34.0 pg    MCHC 33.8 32.0 - 36.0 g/dL    RDW 13.4 11.5 - 14.5 %    Platelets 217 150 - 450 x10*3/uL   Magnesium   Result Value Ref Range    Magnesium 2.02 1.60 - 2.40 mg/dL   Phosphorus   Result Value Ref Range    Phosphorus 4.0 2.5 - 4.9 mg/dL   Heparin Assay, UFH   Result Value Ref Range    Heparin Unfractionated 0.4 See Comment Below for Therapeutic Ranges IU/mL     *Note: Due to a large number of results and/or encounters for the requested time period, some results have not been displayed. A complete set of results can be found in Results Review.      Vascular US upper extremity vein mapping bilateral    Result Date: 12/18/2024              [place] [id number]      Phone [phone] Fax [phone]  Vascular Lab Report  VASC US UPPER EXTREMITY VEIN MAPPING BILATERAL Patient Name:      CARMENZA Presley Physician:  [id number] Fabian Phillips MD Study Date:        12/18/2024          Ordering Provider:  [id number] DARIUS BURT MRN/PID:           [id number]            Fellow: Accession#:        [id number]        Technologist:       Danielle Kelley                                                            RVT Date of Birth/Age: 1965 / 59      Technologist 2:     Deb Allen RVT                     years Gender:            M                   Encounter#:         [id number] Admission Status:  Outpatient          Location Performed: [facility]  Diagnosis/ICD: Encounter for other preprocedural examination-Z01.818 CPT Codes:     19625 Vein mapping complete  CONCLUSIONS: Right Upper Vein Mapping: Chronic disease is documented in the right cephalic proximal forearm, cephalic mid forearm, cephalic distal forearm, basilic proximal forearm, basilic mid forearm and basilic distal forearm veins. Left Upper Vein Mapping: Left cephalic and basilic veins appear widely patent with no evidence of thrombosis or fibrosis.  Imaging & Doppler Findings:  Right                   Compress Thrombus  Diam Cephalic Prox Arm         Yes      None   2.4 mm Cephalic Mid Arm          Yes      None   2.3 mm Cephalic Distal Arm       Yes      None   1.7 mm Cephalic Prox Forearm      No    Fibrotic Cephalic Mid Forearm       No    Fibrotic Cephalic Distal Forearm    No    Fibrotic Basilic Prox Arm          Yes      None   4.2 mm Basilic Mid Arm           Yes      None   3.7 mm Basilic Distal Arm        Yes      None   3.5 mm Basilic Prox Forearm       No    Fibrotic Basilic Mid Forearm        No    Fibrotic Basilic Distal Forearm     No    Fibrotic  Left                    Compress Thrombus  Diam Cephalic Prox Arm         Yes      None   2.4 mm Cephalic Mid Arm          Yes      None   2.3 mm Cephalic Distal Arm       Yes      None   2.1 mm Cephalic Prox Forearm     Yes      None   3.1 mm Cephalic Mid Forearm      Yes      None   1.8 mm Cephalic Distal Forearm   Yes      None   1.4 mm Basilic Prox Arm          Yes      None   3.3 mm Basilic Mid Arm           Yes      None   2.7 mm Basilic Distal Arm        Yes      None   2.2 mm Basilic Prox Forearm      Yes      None   1.9 mm Basilic Mid Forearm       Yes      None   1.1 mm Basilic Distal Forearm    Yes      None   0.7 mm  [id number] Fabian Phillips MD Electronically signed by  [id number] Fabian Phillips MD on 12/18/2024 at 6:21:05 PM  ** Final **     ECG 12 lead    Result Date: 12/17/2024  Sinus rhythm Inferior infarct, old Baseline wander in lead(s) II,III,aVR,aVL,aVF,V1,V2,V5 See ED provider note for full interpretation and clinical correlation Confirmed by Nay Black ([id number]) on 12/17/2024 11:40:07 AM    Vascular US ankle brachial index (ANSON) without exercise    Result Date: 12/17/2024  Preliminary [place]iology [place] [address]      Phone [phone] Fax [phone]            Preliminary Vascular Lab Report  VASC US ANKLE BRACHIAL INDEX (ANSON) WITHOUT EXERCISE  Patient Name:     CARMENZA Presley Physician: [id number] Darlyn OLEARY MD Study Date:       12/17/2024       Ordering Provider: [id number] CHERELLE POON MRN/PID:          [id number]         Fellow: Accession#:       [id number]     Technologist:      Deb Allen RVT YOB: 1965        Technologist 2: Gender:           M                Encounter#:        [id number] Admission Status: Emergency        Location           [facility]                                    Performed:  Diagnosis/ICD: Peripheral vascular disease, unspecified-I73.9 CPT Codes:     46285 Peripheral artery ANSON Only  Pertinent History: Pt presents to the ER with LLE pain and a cold left foot.  **CRITICAL RESULT** Critical Result: severe arterial disease of LLE Notification called to Cherelle Poon on 12/17/2024 at 9:31:33 AM.  PRELIMINARY CONCLUSIONS:  Right Lower PVR: No evidence of arterial occlusive disease in the right lower extremity at rest. Normal digital perfusion noted. Multiphasic flow is noted in the right common femoral artery, right popliteal artery, right posterior tibial artery and right dorsalis pedis artery. Left Lower PVR: Evidence of severe arterial occlusive disease in the left lower extremity  at rest. Decreased digital perfusion noted. Monophasic flow is noted in the left common femoral artery, left popliteal artery, left posterior tibial artery and left dorsalis pedis artery. PTA is inaudible. Flat toe waveform(TBI=0).  Imaging & Doppler Findings:  RIGHT Lower PVR                Pressures Ratios Right Posterior Tibial (Ankle) 141 mmHg  1.22 Right Dorsalis Pedis (Ankle)   126 mmHg  1.09 Right Digit (Great Toe)        112 mmHg  0.97   LEFT Lower PVR                Pressures Ratios Left Posterior Tibial (Ankle) 0 mmHg    0.00 Left Dorsalis Pedis (Ankle)   33 mmHg   0.28 Left Digit (Great Toe)        0 mmHg    0.00                      Right Brachial Pressure 116 mmHg   VASCULAR PRELIMINARY REPORT completed by Deb Allen T on 12/17/2024 at 9:32:24 AM  ** Final **     CT angio aorta and bilateral iliofemoral runoff including without contrast if performed    Result Date: 12/17/2024  STUDY: CT Angiogram of the Abdomen, Pelvis, and Bilateral Lower Extremities; 12/17/2024 6:38 AM INDICATION: Cold left foot. COMPARISON: CTA AP w/runoffs 11/19/2024, CTA chest 11/19/2024, CTA AP w/runoffs 09/13/2024. ACCESSION NUMBER(S): [id number] ORDERING CLINICIAN: BASIM POON TECHNIQUE:  Helical CT is performed from the aortic diaphragmatic hiatus through the feet after bolus administration of 90 mL of Omnipaque 350.  Images are reviewed and processed at a workstation according to the CT angiogram protocol with 3-D and/or MIP post processing imaging generated. Automated mA/kV exposure control was utilized and patient examination was performed in strict accordance with principles of ALARA. FINDINGS: Vascular: Abdominal Aorta: The celiac, SMA, and DENISE demonstrate no significant stenosis.  The right and left renal arteries demonstrate no significant stenosis. The abdominal aorta is not aneurysmal and demonstrates no significant occlusive disease. Right Run-off: The right common iliac artery shows mild stenosis diffusely.   There is a stent along the entire right external iliac and right common femoral arteries.  The stent demonstrates areas of mild in-stent stenosis. There is an occluded right femoral-popliteal bypass graft. The native right femoral popliteal arteries demonstrate mild atherosclerotic contour.  There is a stent extending from the mid SFA to the proximal popliteal artery.  At the distal SFA the stent demonstrates crush distortion but maintains a patent lumen, though with areas of mild in-stent stenosis. The popliteal artery remains patent throughout its course.  There is patent three-vessel runoff to the ankle, and 2 vessels into the foot. Opacification became too weak to follow the vessels to the plantar arch. Left Run-off: The left common iliac artery demonstrates mild diffuse atherosclerotic stenosis.  The left external iliac artery and left common femoral arteries are relatively normal.  The common femoral artery bifurcation is patent.  The native left SFA occludes just beyond its origin.  The profunda femoral artery and its branches remain patent. There is a chronically occluded left femoral-popliteal bypass graft. The native left SFA does not reconstitute, remaining occluded throughout its course. There is a 2 cm length patent segment of the left popliteal artery, which is otherwise occluded.  Distal to the patent segment, the popliteal artery is occluded across the level of the joint. The distal popliteal artery reconstitutes remains patent into its trifurcation. 3 patent infrapopliteal vessels are visualized proximally.  The tibial vessels remain patent to the ankle, where the dorsalis pedis and lateral plantar seen across the ankle joint.  The vessels cannot be followed to the plantar arch due to weak contrast opacification. Abdomen: The lung bases are clear.  The liver is unremarkable.  The pancreas, spleen, adrenal glands, and kidneys demonstrate no acute pathology. There is no free air or lymph node  enlargement. Pelvis: There is no bowel wall thickening or obstruction.  There is no free fluid.  Lymph nodes are not enlarged.  Urinary bladder is unremarkable. At the left inguinal region there is thick soft tissue in the subcutaneous fat overlying the femoral vessels, as well as 2 prominent lymph nodes. Skeleton:  There are no acute fractures.  No suspicious bony lesions.    No significant aortic disease.  Mild bilateral common iliac artery stenosis.  Patent left external iliac and common femoral arteries. 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  Chronic occlusion of the native left superficial femoral artery. A 2 cm segment of the left P1 popliteal artery reconstitutes.  The terminal left popliteal artery also reconstitutes.  Other portions of the popliteal artery are occluded. Patent three-vessel infrapopliteal runoff to the left hindfoot, beyond which contrast opacification was too weak to visualize. Thick subcutaneous soft tissue - presumed scarred tissue - overlying the left femoral vessels at the left groin.  Correlate for possibility of cellulitis. Patent right iliac and right femoral stents.  Patent right popliteal artery.  Patent three-vessel infrapopliteal runoff to the right ankle. No findings of an acute process in the abdomen or pelvis. Signed by Simeon Barrera MD    Vascular US Lower Extremity Arterial Duplex Bilateral    Result Date: 12/3/2024              [place] [address]      Phone [phone] Fax [phone]  Vascular Lab Report  [facility] US LOWER EXTREMITY ARTERIAL DUPLEX BILATERAL Patient Name:      CARMENZA URBINA MAMTA    Reading Physician:  [id number] Britney Rush MD Study Date:        12/2/2024            Ordering Provider:  [id number] ADELA LYLES MRN/PID:           [id number]             Fellow: Accession#:        [id number]         Technologist:       Deb Allen RVT  Date of Birth/Age: 1965 / 59 years Technologist 2: Gender:            M                    Encounter#:         [id number] Admission Status:  Outpatient           Location Performed: [facility]  Diagnosis/ICD: Peripheral vascular disease, unspecified-I73.9 CPT Codes:     10647 Peripheral artery Lower arterial Duplex complete  Pertinent History: LLE intervention(11/19/2024).  CONCLUSIONS: Right Lower Arterial: The profunda was monophasic and dampened on prior exam and today is not well visualized and appears occluded. All remaining vessels appear patent. Left Lower Arterial: No occlusion or stenosis noted in the vessels visualized. The distal BARB is dampened and appears nearly occluded. Working around staples and dressings. The EIA is not visualized. The proximal and mid CFA and the profunda is not visualized. Minimal visualization of calf vessels.  Imaging & Doppler Findings:  Right                       Left   PSV                        PSV 89 cm/s         EIA 95 cm/s         CFA        78 cm/s  0 cm/s  Profunda Proximal 80 cm/s    SFA Proximal    88 cm/s 148 cm/s      SFA Mid      100 cm/s 112 cm/s    SFA Distal     108 cm/s 73 cm/s      Popliteal     74 cm/s 100 cm/s   BARB Proximal 86 cm/s       BARB Mid 53 cm/s     BARB Distal     11 cm/s 48 cm/s  Peroneal Proximal 55 cm/s    Peroneal Mid    77 cm/s 40 cm/s   Peroneal Distal 34 cm/s    PTA Proximal    48 cm/s 31 cm/s       PTA Mid 52 cm/s     PTA Distal  [id number] Britney Rush MD Electronically signed by [id number] Britney Rush MD on 12/3/2024 at 11:43:03 AM  ** Final **     Vascular US ankle brachial index (ANSON) without exercise    Result Date: 12/3/2024              [place] [address]      Phone [phone] Fax [phone]  Vascular Lab Report  [facility] US ANKLE BRACHIAL INDEX (ANSON) WITHOUT EXERCISE Patient Name:      CARMENZA Presley Physician:  Enma Tan                                                              Adri GANDARA Study Date:        12/2/2024            Ordering Provider:  [id number] ADELA LYLES MRN/PID:           [id number]             Fellow: Accession#:        [id number]         Technologist:       Deb Allen RVYUSUF Date of Birth/Age: 1965 / 59 years Technologist 2: Gender:            M                    Encounter#:         [id number] Admission Status:  Outpatient           Location Performed: [facility]  Diagnosis/ICD: Peripheral vascular disease, unspecified-I73.9 CPT Codes:     95438 Peripheral artery ANSON Only  Pertinent History: Recent LLE intervention(11/19/2024).  CONCLUSIONS: Right Lower PVR: No evidence of arterial occlusive disease in the right lower extremity at rest. Normal digital perfusion noted. Multiphasic flow is noted in the right common femoral artery, right posterior tibial artery and right dorsalis pedis artery. Left Lower PVR: Evidence of moderate arterial occlusive disease in the left lower extremity at rest. Decreased digital perfusion noted. Monophasic flow is noted in the left posterior tibial artery and left dorsalis pedis artery. Multiphasic flow is noted in the left common femoral artery.  Comparison: Compared with study from 5/23/2024, The left ANSON decreased from .96 to .67 today. No significant change on the right.  Imaging & Doppler Findings:  RIGHT Lower PVR                Pressures Ratios Right Posterior Tibial (Ankle) 169 mmHg  0.94 Right Dorsalis Pedis (Ankle)   158 mmHg  0.88 Right Digit (Great Toe)        113 mmHg  0.63   LEFT Lower PVR                Pressures Ratios Left Posterior Tibial (Ankle) 109 mmHg  0.61 Left Dorsalis Pedis (Ankle)   120 mmHg  0.67 Left Digit (Great Toe)        105 mmHg  0.58                      Right Brachial Pressure 180 mmHg   [id number] Britney Rush MD Electronically signed by [id number] Rush MD on 12/3/2024 at 11:38:55 AM  ** Final **     Transthoracic Echo (TTE) Complete    Result Date:  11/20/2024              [place] [id number]      Phone [phone] Fax [phone] TRANSTHORACIC ECHOCARDIOGRAM REPORT Patient Name:       CARMENZA URBINA MAMTA Presley Physician:    [id number] Britney Rush MD Study Date:         11/20/2024          Ordering Provider:    [id number] DANNA SEARS MRN/PID:            [id number]            Fellow: Accession#:         [id number]        Nurse:                Noemi Villa RN Date of Birth/Age:  1965 / 59      Sonographer:          Laxmi crisostomo RDCS Gender Assigned at                     Additional Staff: Birth: Height:             180.34 cm           Admit Date:           11/19/2024 Weight:             87.09 kg            Admission Status:     Inpatient -                                                               Routine BSA / BMI:          2.07 m2 / 26.78     Department Location:  [facility]                     kg/m2 Blood Pressure: 107 /64 mmHg Study Type:    TRANSTHORACIC ECHO (TTE) COMPLETE Diagnosis/ICD: Shortness of breath-R06.02; Unspecified systolic (congestive)                heart failure (CHF)-I50.20 Indication:    CHF, SHORTNESS OF BREATH CPT Codes:     Echo Complete w Full Doppler-74022 Patient History: Pertinent History: CABG x5 2019, HTN. Study Detail: The following Echo studies were performed: 2D, M-Mode, Doppler and               color flow. Technically challenging study due to prominent lung               artifact and body habitus. Optison used as a contrast agent for               endocardial border definition. Total contrast used for this               procedure was 3 mL via IV push.  PHYSICIAN INTERPRETATION: Left Ventricle: The left ventricular systolic function is moderately decreased, with a Salinas's biplane calculated ejection fraction of 38%. Wall motion is abnormal.  The left ventricular cavity size is normal. There is normal septal and normal posterior left ventricular wall thickness. Spectral Doppler shows an abnormal pattern of left ventricular diastolic filling. Akinetic inferolateral segment, and basal inferior wall. Left Atrium: The left atrium is normal in size. Right Ventricle: The right ventricle was not well visualized. Right ventricular systolic function not assessed. Right Atrium: The right atrium is normal in size. Aortic Valve: The aortic valve appears structurally normal. The aortic valve dimensionless index is 0.73. There is no evidence of aortic valve regurgitation. The peak instantaneous gradient of the aortic valve is 9 mmHg. The mean gradient of the aortic valve is 4 mmHg. Mitral Valve: The mitral valve is normal in structure. There is no evidence of mitral valve regurgitation. Tricuspid Valve: The tricuspid valve is structurally normal. There is trace tricuspid regurgitation. Pulmonic Valve: The pulmonic valve is structurally normal. There is no indication of pulmonic valve regurgitation. Pericardium: No pericardial effusion noted. Aorta: The aortic root is normal. Systemic Veins: The inferior vena cava appears normal in size, with IVC inspiratory collapse greater than 50%.  CONCLUSIONS:  1. Poorly visualized anatomical structures due to suboptimal image quality.  2. The left ventricular systolic function is moderately decreased, with a Salinas's biplane calculated ejection fraction of 38%.  3. Abnormal wall motion.  4. Spectral Doppler shows an abnormal pattern of left ventricular diastolic filling.  5. Akinetic inferolateral segment, and basal inferior wall. QUANTITATIVE DATA SUMMARY:  2D MEASUREMENTS:           Normal Ranges: Ao Root d:       2.80 cm   (2.0-3.7cm) LAs:             3.60 cm   (2.7-4.0cm) IVSd:            0.70 cm   (0.6-1.1cm) LVPWd:           0.70 cm   (0.6-1.1cm) LVIDd:           6.10 cm   (3.9-5.9cm) LVIDs:           5.30 cm LV Mass  Index:   78.5 g/m2 LV % FS          13.1 %  LA VOLUME:                    Normal Ranges: LA Vol A4C:        48.7 ml    (22+/-6mL/m2) LA Vol A2C:        49.8 ml LA Vol BP:         51.4 ml LA Vol Index A4C:  23.5ml/m2 LA Vol Index A2C:  24.0 ml/m2 LA Vol Index BP:   24.8 ml/m2 LA Area A4C:       17.6 cm2 LA Area A2C:       18.6 cm2 LA Major Axis A4C: 5.4 cm LA Major Axis A2C: 5.9 cm LA Volume Index:   24.8 ml/m2  RA VOLUME BY A/L METHOD:          Normal Ranges: RA Area A4C:             13.5 cm2  AORTA MEASUREMENTS:         Normal Ranges: Ao Sinus, d:        2.80 cm (2.1-3.5cm) Ao STJ, d:          2.60 cm (1.7-3.4cm) Asc Ao, d:          2.90 cm (2.1-3.4cm)  LV SYSTOLIC FUNCTION BY 2D PLANIMETRY (MOD):                      Normal Ranges: EF-A4C View:    38 % (>=55%) EF-A2C View:    39 % EF-Biplane:     38 % LV EF Reported: 38 %  LV DIASTOLIC FUNCTION:             Normal Ranges: MV Peak E:             0.75 m/s    (0.7-1.2 m/s) MV Peak A:             0.65 m/s    (0.42-0.7 m/s) E/A Ratio:             1.17        (1.0-2.2) MV e'                  0.069 m/s   (>8.0) MV lateral e'          0.08 m/s MV medial e'           0.06 m/s MV A Dur:              109.00 msec E/e' Ratio:            10.91       (<8.0)  MITRAL VALVE:          Normal Ranges: MV DT:        211 msec (150-240msec)  AORTIC VALVE:                     Normal Ranges: AoV Vmax:                1.46 m/s (<=1.7m/s) AoV Peak P.5 mmHg (<20mmHg) AoV Mean P.0 mmHg (1.7-11.5mmHg) LVOT Max Ayad:            1.09 m/s (<=1.1m/s) AoV VTI:                 29.40 cm (18-25cm) LVOT VTI:                21.60 cm LVOT Diameter:           2.20 cm  (1.8-2.4cm) AoV Area, VTI:           2.79 cm2 (2.5-5.5cm2) AoV Area,Vmax:           2.84 cm2 (2.5-4.5cm2) AoV Dimensionless Index: 0.73  RIGHT VENTRICLE: TAPSE: 19.1 mm  TRICUSPID VALVE/RVSP:          Normal Ranges: Peak TR Velocity:     2.00 m/s RV Syst Pressure:     19 mmHg  (< 30mmHg) IVC Diam:              1.60   [id number] Britney Rush MD Electronically signed on 11/20/2024 at 1:49:12 PM  ** Final **                Assessment/Plan   Assessment & Plan  Peripheral artery occlusion (CMS-HCC)    Critical limb ischemia of left lower extremity (Multi)    IMPRESSION:  TYPE 2 DIABETES MELLITUS  Long term insulin use   A1C of 8.2% as of October 2024     RECOMMENDATIONS:  To continue Lantus 24 units subcutaneous bedtime  To continue Humalog 14 units TID AC   To continue insulin correction scale TID AC   Diabetic diet   Accu-Cheks ACHS    Hypoglycemic protocol   Awaiting A1C value   Will continue to follow      Haylie Tate MD

## 2024-12-19 NOTE — PROGRESS NOTES
Dereck Shen is a 59 y.o. male on day 2 of admission presenting with Peripheral artery occlusion (CMS-[facility]).      Subjective   Dereck Shen is a 59 y.o. male with PMHx of CAD s/p CABG, severe lower extremity PAD with prior revascularization (R fem-pop bypass 2/24/20; L fem-pop bypass 9/14/20; L fem PTA of the graft; R iliac PTA 2/2022; LLE and DANYA PTA 4/2024; angioplasty of the left SFA 10/21/24; and left femoral and tibial thrombectomy with patch angioplasty 11/19/24. He presented with L Leg pain, redness, and ice cold to touch for three days. He has been compliant with all his medications including his Eliquis, Brilinta and aspirin.   ED workup was significant for creatinine 1.53. CT angio of the lower extremities does show what appears to be reocclusion of his SFA and popliteal. Patient evaluated by Dr. Peace- continue hep gtt, DAPT, statin- Minimal revascularization options at this point and high risk of limb loss, obtain upper extremity vein mapping     12/19/2024: No acute events overnight. He had left femoral-below knee bypass with graft with Dr. Peace yesterday.  Vitals stable. CBC/BMP reviewed, creatinine improving slowly to 1.30 today (baseline ~1.0-1.3).  Glucose 162- appreciate endocrinology recommendations. Pain is tolerable, still currently needing IV meds but notes the PO meds work longer for him         Review of Systems   Constitutional:  Negative for appetite change, chills, diaphoresis, fatigue and fever.   HENT:  Negative for congestion, ear pain, facial swelling, hearing loss, nosebleeds, sore throat, tinnitus and trouble swallowing.    Eyes:  Negative for pain.   Respiratory:  Negative for cough, chest tightness, shortness of breath and wheezing.    Cardiovascular:  Negative for chest pain, palpitations and leg swelling.   Gastrointestinal:  Negative for abdominal pain, blood in stool, constipation, diarrhea, nausea and vomiting.   Genitourinary:  Negative for dysuria, flank pain,  frequency, hematuria and urgency.   Musculoskeletal:  Negative for back pain and joint swelling.        Bilateral foot pain   Skin:  Negative for rash and wound.   Neurological:  Negative for dizziness, syncope, weakness, light-headedness, numbness and headaches.   Hematological:  Does not bruise/bleed easily.   Psychiatric/Behavioral:  Negative for behavioral problems, hallucinations and suicidal ideas.           Objective     Last Recorded Vitals  BP (!) 137/93   Pulse 85   Temp 36.2 °C (97.2 °F) (Temporal)   Resp 21   Wt 87.7 kg (193 lb 4.8 oz)   SpO2 100%     Image Results  ECG 12 lead    Result Date: 12/17/2024  Sinus rhythm Inferior infarct, old Baseline wander in lead(s) II,III,aVR,aVL,aVF,V1,V2,V5 See ED provider note for full interpretation and clinical correlation Confirmed by Nay Black ([id number]) on 12/17/2024 11:40:07 AM    Vascular US ankle brachial index (ANSON) without exercise    Result Date: 12/17/2024  Preliminary Cardiology Report              [place] [address]      Phone [phone] Fax [phone]            Preliminary Vascular Lab Report  VASC US ANKLE BRACHIAL INDEX (ANSON) WITHOUT EXERCISE  Patient Name:     CARMENZA Presley Physician: [id number] Darlyn OLEARY MD Study Date:       12/17/2024       Ordering Provider: [id number] BASIM POON MRN/PID:          [id number]         Fellow: Accession#:       [id number]     Technologist:      Deb Allen RVT YOB: 1965        Technologist 2: Gender:           M                Encounter#:        [id number] Admission Status: Emergency        Location           [facility]                                    Performed:  Diagnosis/ICD: Peripheral vascular disease, unspecified-I73.9 CPT Codes:     91942 Peripheral artery ANSON Only  Pertinent History: Pt presents to the ER with LLE pain and a cold left foot.  **CRITICAL  RESULT** Critical Result: severe arterial disease of LLE Notification called to Cherelle Poon on 12/17/2024 at 9:31:33 AM.  PRELIMINARY CONCLUSIONS:  Right Lower PVR: No evidence of arterial occlusive disease in the right lower extremity at rest. Normal digital perfusion noted. Multiphasic flow is noted in the right common femoral artery, right popliteal artery, right posterior tibial artery and right dorsalis pedis artery. Left Lower PVR: Evidence of severe arterial occlusive disease in the left lower extremity at rest. Decreased digital perfusion noted. Monophasic flow is noted in the left common femoral artery, left popliteal artery, left posterior tibial artery and left dorsalis pedis artery. PTA is inaudible. Flat toe waveform(TBI=0).  Imaging & Doppler Findings:  RIGHT Lower PVR                Pressures Ratios Right Posterior Tibial (Ankle) 141 mmHg  1.22 Right Dorsalis Pedis (Ankle)   126 mmHg  1.09 Right Digit (Great Toe)        112 mmHg  0.97   LEFT Lower PVR                Pressures Ratios Left Posterior Tibial (Ankle) 0 mmHg    0.00 Left Dorsalis Pedis (Ankle)   33 mmHg   0.28 Left Digit (Great Toe)        0 mmHg    0.00                      Right Brachial Pressure 116 mmHg   VASCULAR PRELIMINARY REPORT completed by Deb Allen RVT on 12/17/2024 at 9:32:24 AM  ** Final **     CT angio aorta and bilateral iliofemoral runoff including without contrast if performed    Result Date: 12/17/2024  STUDY: CT Angiogram of the Abdomen, Pelvis, and Bilateral Lower Extremities; 12/17/2024 6:38 AM INDICATION: Cold left foot. COMPARISON: CTA AP w/runoffs 11/19/2024, CTA chest 11/19/2024, CTA AP w/runoffs 09/13/2024. ACCESSION NUMBER(S): [id number] ORDERING CLINICIAN: CHERELLE POON TECHNIQUE:  Helical CT is performed from the aortic diaphragmatic hiatus through the feet after bolus administration of 90 mL of Omnipaque 350.  Images are reviewed and processed at a workstation according to the CT angiogram protocol  with 3-D and/or MIP post processing imaging generated. Automated mA/kV exposure control was utilized and patient examination was performed in strict accordance with principles of ALARA. FINDINGS: Vascular: Abdominal Aorta: The celiac, SMA, and DENISE demonstrate no significant stenosis.  The right and left renal arteries demonstrate no significant stenosis. The abdominal aorta is not aneurysmal and demonstrates no significant occlusive disease. Right Run-off: The right common iliac artery shows mild stenosis diffusely.  There is a stent along the entire right external iliac and right common femoral arteries.  The stent demonstrates areas of mild in-stent stenosis. There is an occluded right femoral-popliteal bypass graft. The native right femoral popliteal arteries demonstrate mild atherosclerotic contour.  There is a stent extending from the mid SFA to the proximal popliteal artery.  At the distal SFA the stent demonstrates crush distortion but maintains a patent lumen, though with areas of mild in-stent stenosis. The popliteal artery remains patent throughout its course.  There is patent three-vessel runoff to the ankle, and 2 vessels into the foot. Opacification became too weak to follow the vessels to the plantar arch. Left Run-off: The left common iliac artery demonstrates mild diffuse atherosclerotic stenosis.  The left external iliac artery and left common femoral arteries are relatively normal.  The common femoral artery bifurcation is patent.  The native left SFA occludes just beyond its origin.  The profunda femoral artery and its branches remain patent. There is a chronically occluded left femoral-popliteal bypass graft. The native left SFA does not reconstitute, remaining occluded throughout its course. There is a 2 cm length patent segment of the left popliteal artery, which is otherwise occluded.  Distal to the patent segment, the popliteal artery is occluded across the level of the joint. The distal  popliteal artery reconstitutes remains patent into its trifurcation. 3 patent infrapopliteal vessels are visualized proximally.  The tibial vessels remain patent to the ankle, where the dorsalis pedis and lateral plantar seen across the ankle joint.  The vessels cannot be followed to the plantar arch due to weak contrast opacification. Abdomen: The lung bases are clear.  The liver is unremarkable.  The pancreas, spleen, adrenal glands, and kidneys demonstrate no acute pathology. There is no free air or lymph node enlargement. Pelvis: There is no bowel wall thickening or obstruction.  There is no free fluid.  Lymph nodes are not enlarged.  Urinary bladder is unremarkable. At the left inguinal region there is thick soft tissue in the subcutaneous fat overlying the femoral vessels, as well as 2 prominent lymph nodes. Skeleton:  There are no acute fractures.  No suspicious bony lesions.    No significant aortic disease.  Mild bilateral common iliac artery stenosis.  Patent left external iliac and common femoral arteries. 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  Chronic occlusion of the native left superficial femoral artery. A 2 cm segment of the left P1 popliteal artery reconstitutes.  The terminal left popliteal artery also reconstitutes.  Other portions of the popliteal artery are occluded. Patent three-vessel infrapopliteal runoff to the left hindfoot, beyond which contrast opacification was too weak to visualize. Thick subcutaneous soft tissue - presumed scarred tissue - overlying the left femoral vessels at the left groin.  Correlate for possibility of cellulitis. Patent right iliac and right femoral stents.  Patent right popliteal artery.  Patent three-vessel infrapopliteal runoff to the right ankle. No findings of an acute process in the abdomen or pelvis. Signed by Simeon Barrera MD    Vascular US Lower Extremity Arterial Duplex Bilateral    Result Date: 12/3/2024              [place] [address]      Phone [phone] Fax [phone]  V[facility] Lab Report  [facility] BILATERAL Patient Name:      CARMENZA URBINA MAMTA Presley Physician:  [id number] Britney Rush MD Study Date:        12/2/2024            Ordering Provider:  [id number] ADELA LYLES MRN/PID:           [id number]             Fellow: Accession#:        [id number]         Technologist:       Deb Allen RVYUSUF Date of Birth/Age: 1965 / 59 years Technologist 2: Gender:            M                    Encounter#:         [id number] Admission Status:  Outpatient           Location Performed: [facility]  Diagnosis/ICD: Peripheral vascular disease, unspecified-I73.9 CPT Codes:     52022 Peripheral artery Lower arterial Duplex complete  Pertinent History: LLE intervention(11/19/2024).  CONCLUSIONS: Right Lower Arterial: The profunda was monophasic and dampened on prior exam and today is not well visualized and appears occluded. All remaining vessels appear patent. Left Lower Arterial: No occlusion or stenosis noted in the vessels visualized. The distal BARB is dampened and appears nearly occluded. Working around staples and dressings. The EIA is not visualized. The proximal and mid CFA and the profunda is not visualized. Minimal visualization of calf vessels.  Imaging & Doppler Findings:  Right                       Left   PSV                        PSV 89 cm/s         EIA 95 cm/s         CFA        78 cm/s  0 cm/s  Profunda Proximal 80 cm/s    SFA Proximal    88 cm/s 148 cm/s      SFA Mid      100 cm/s 112 cm/s    SFA Distal     108 cm/s 73 cm/s      Popliteal     74 cm/s 100 cm/s   BARB Proximal 86 cm/s       BARB Mid 53 cm/s     BARB Distal     11 cm/s 48 cm/s  Peroneal Proximal 55 cm/s    Peroneal Mid    77 cm/s 40 cm/s   Peroneal Distal 34 cm/s    PTA Proximal    48 cm/s 31 cm/s       PTA Mid 52 cm/s      PTA Distal  [id number] Britney Rush MD Electronically signed by [id number] Britney Rush MD on 12/3/2024 at 11:43:03 AM  ** Final **     Vascular US ankle brachial index (ANSON) without exercise    Result Date: 12/3/2024              [place] [id number]      Phone [phone] Fax [phone]  Vascular Lab Report  VASC US ANKLE BRACHIAL INDEX (ANSON) WITHOUT EXERCISE Patient Name:      CARMENZA Presley Physician:  [id number] Rush MD Study Date:        12/2/2024            Ordering Provider:  [id number] ADELA LYLES MRN/PID:           [id number]             Fellow: Accession#:        [id number]         Technologist:       Deb Allen RVT Date of Birth/Age: 1965 / 59 years Technologist 2: Gender:            M                    Encounter#:         [id number] Admission Status:  Outpatient           Location Performed: [facility]  Diagnosis/ICD: Peripheral vascular disease, unspecified-I73.9 CPT Codes:     22710 Peripheral artery ANSON Only  Pertinent History: Recent LLE intervention(11/19/2024).  CONCLUSIONS: Right Lower PVR: No evidence of arterial occlusive disease in the right lower extremity at rest. Normal digital perfusion noted. Multiphasic flow is noted in the right common femoral artery, right posterior tibial artery and right dorsalis pedis artery. Left Lower PVR: Evidence of moderate arterial occlusive disease in the left lower extremity at rest. Decreased digital perfusion noted. Monophasic flow is noted in the left posterior tibial artery and left dorsalis pedis artery. Multiphasic flow is noted in the left common femoral artery.  Comparison: Compared with study from 5/23/2024, The left ANSON decreased from .96 to .67 today. No significant change on the right.  Imaging & Doppler Findings:  RIGHT Lower PVR                Pressures Ratios Right Posterior Tibial (Ankle) 169  mmHg  0.94 Right Dorsalis Pedis (Ankle)   158 mmHg  0.88 Right Digit (Great Toe)        113 mmHg  0.63   LEFT Lower PVR                Pressures Ratios Left Posterior Tibial (Ankle) 109 mmHg  0.61 Left Dorsalis Pedis (Ankle)   120 mmHg  0.67 Left Digit (Great Toe)        105 mmHg  0.58                      Right Brachial Pressure 180 mmHg   [id number] Rush MD Electronically signed by Enma Rush MD on 12/3/2024 at 11:38:55 AM  ** Final **     Transthoracic Echo (TTE) Complete    Result Date: 11/20/2024              [place] [address]      Phone [phone] Fax [phone] TRANSTHORACIC ECHOCARDIOGRAM REPORT Patient Name:       CARMENZA OLEARY   Reading Physician:    [id number] Rush MD Study Date:         11/20/2024          Ordering Provider:    [id number] SEARS MRN/PID:            [id number]            Fellow: Accession#:         [id number]        Nurse:                Noemi Villa RN Date of Birth/Age:  1965 / 59      Sonographer:          Laxmi crisostomo RDCS Gender Assigned at  M                   Additional Staff: Birth: Height:             180.34 cm           Admit Date:           11/19/2024 Weight:             87.09 kg            Admission Status:     Inpatient -                                                               Routine BSA / BMI:          2.07 m2 / 26.78     Department Location:  [facility]                     kg/m2 Blood Pressure: 107 /64 mmHg Study Type:    TRANSTHORACIC ECHO (TTE) COMPLETE Diagnosis/ICD: Shortness of breath-R06.02; Unspecified systolic (congestive)                heart failure (CHF)-I50.20 Indication:    CHF, SHORTNESS OF BREATH CPT Codes:     Echo Complete w Full Doppler-41895 Patient History: Pertinent History: CABG x5 2019, HTN. Study Detail: The following Echo  studies were performed: 2D, M-Mode, Doppler and               color flow. Technically challenging study due to prominent lung               artifact and body habitus. Optison used as a contrast agent for               endocardial border definition. Total contrast used for this               procedure was 3 mL via IV push.  PHYSICIAN INTERPRETATION: Left Ventricle: The left ventricular systolic function is moderately decreased, with a Salinas's biplane calculated ejection fraction of 38%. Wall motion is abnormal. The left ventricular cavity size is normal. There is normal septal and normal posterior left ventricular wall thickness. Spectral Doppler shows an abnormal pattern of left ventricular diastolic filling. Akinetic inferolateral segment, and basal inferior wall. Left Atrium: The left atrium is normal in size. Right Ventricle: The right ventricle was not well visualized. Right ventricular systolic function not assessed. Right Atrium: The right atrium is normal in size. Aortic Valve: The aortic valve appears structurally normal. The aortic valve dimensionless index is 0.73. There is no evidence of aortic valve regurgitation. The peak instantaneous gradient of the aortic valve is 9 mmHg. The mean gradient of the aortic valve is 4 mmHg. Mitral Valve: The mitral valve is normal in structure. There is no evidence of mitral valve regurgitation. Tricuspid Valve: The tricuspid valve is structurally normal. There is trace tricuspid regurgitation. Pulmonic Valve: The pulmonic valve is structurally normal. There is no indication of pulmonic valve regurgitation. Pericardium: No pericardial effusion noted. Aorta: The aortic root is normal. Systemic Veins: The inferior vena cava appears normal in size, with IVC inspiratory collapse greater than 50%.  CONCLUSIONS:  1. Poorly visualized anatomical structures due to suboptimal image quality.  2. The left ventricular systolic function is moderately decreased, with a Salinas's  biplane calculated ejection fraction of 38%.  3. Abnormal wall motion.  4. Spectral Doppler shows an abnormal pattern of left ventricular diastolic filling.  5. Akinetic inferolateral segment, and basal inferior wall. QUANTITATIVE DATA SUMMARY:  2D MEASUREMENTS:           Normal Ranges: Ao Root d:       2.80 cm   (2.0-3.7cm) LAs:             3.60 cm   (2.7-4.0cm) IVSd:            0.70 cm   (0.6-1.1cm) LVPWd:           0.70 cm   (0.6-1.1cm) LVIDd:           6.10 cm   (3.9-5.9cm) LVIDs:           5.30 cm LV Mass Index:   78.5 g/m2 LV % FS          13.1 %  LA VOLUME:                    Normal Ranges: LA Vol A4C:        48.7 ml    (22+/-6mL/m2) LA Vol A2C:        49.8 ml LA Vol BP:         51.4 ml LA Vol Index A4C:  23.5ml/m2 LA Vol Index A2C:  24.0 ml/m2 LA Vol Index BP:   24.8 ml/m2 LA Area A4C:       17.6 cm2 LA Area A2C:       18.6 cm2 LA Major Axis A4C: 5.4 cm LA Major Axis A2C: 5.9 cm LA Volume Index:   24.8 ml/m2  RA VOLUME BY A/L METHOD:          Normal Ranges: RA Area A4C:             13.5 cm2  AORTA MEASUREMENTS:         Normal Ranges: Ao Sinus, d:        2.80 cm (2.1-3.5cm) Ao STJ, d:          2.60 cm (1.7-3.4cm) Asc Ao, d:          2.90 cm (2.1-3.4cm)  LV SYSTOLIC FUNCTION BY 2D PLANIMETRY (MOD):                      Normal Ranges: EF-A4C View:    38 % (>=55%) EF-A2C View:    39 % EF-Biplane:     38 % LV EF Reported: 38 %  LV DIASTOLIC FUNCTION:             Normal Ranges: MV Peak E:             0.75 m/s    (0.7-1.2 m/s) MV Peak A:             0.65 m/s    (0.42-0.7 m/s) E/A Ratio:             1.17        (1.0-2.2) MV e'                  0.069 m/s   (>8.0) MV lateral e'          0.08 m/s MV medial e'           0.06 m/s MV A Dur:              109.00 msec E/e' Ratio:            10.91       (<8.0)  MITRAL VALVE:          Normal Ranges: MV DT:        211 msec (150-240msec)  AORTIC VALVE:                     Normal Ranges: AoV Vmax:                1.46 m/s (<=1.7m/s) AoV Peak P.5 mmHg (<20mmHg) AoV  Mean P.0 mmHg (1.7-11.5mmHg) LVOT Max Ayad:            1.09 m/s (<=1.1m/s) AoV VTI:                 29.40 cm (18-25cm) LVOT VTI:                21.60 cm LVOT Diameter:           2.20 cm  (1.8-2.4cm) AoV Area, VTI:           2.79 cm2 (2.5-5.5cm2) AoV Area,Vmax:           2.84 cm2 (2.5-4.5cm2) AoV Dimensionless Index: 0.73  RIGHT VENTRICLE: TAPSE: 19.1 mm  TRICUSPID VALVE/RVSP:          Normal Ranges: Peak TR Velocity:     2.00 m/s RV Syst Pressure:     19 mmHg  (< 30mmHg) IVC Diam:             1.60 cm  [id number] Britney Rush MD Electronically signed on 2024 at 1:49:12 PM  ** Final **     ECG 12 lead    Result Date: 2024  Sinus rhythm Probable left atrial enlargement Borderline repolarization abnormality ST elevation, consider anterior injury Borderline prolonged QT interval See ED provider note for full interpretation and clinical correlation Confirmed by Zulema Stephen ([id number]) on 2024 11:07:14 AM    CT angio aorta and bilateral iliofemoral runoff including without contrast if performed    Result Date: 2024  Interpreted By:  Finkelstein, Evan, STUDY: CT ANGIO AORTA AND BILATERAL ILIOFEMORAL RUN OFF INCLUDING WITHOUT CONTRAST IF PERFORMED;  2024 4:42 am   INDICATION: Signs/Symptoms:no pulses left leg.     COMPARISON: CT runoff 2024   ACCESSION NUMBER(S): [id number]   ORDERING CLINICIAN: ACE VALENZUELA   TECHNIQUE: Contiguous axial CTA images were acquired through the abdomen and pelvis and bilateral lower extremities following the administration of 100 mL Omnipaque 350 intravenous contrast. Sagittal and coronal images were reconstructed. Maximum intensity projection and three dimensional images were constructed at a separate workstation.   FINDINGS: VASCULAR FINDINGS:   ABDOMINAL AORTA & ILIAC ARTERIES: No evidence of abdominal aortic aneurysm or dissection.   Moderate atherosclerotic calcifications throughout the abdominal aorta and major proximal  vasculature. Celiac axis, superior mesenteric artery, and inferior mesenteric artery are patent without any significant narrowing. Bilateral renal arteries are patent without any significant.   Right common iliac and external iliac arteries are patent without any significant narrowing. Patent right common/external iliac stent. Moderate to severe narrowing of the internal iliac arteries bilaterally. Left common and external iliac arteries are patent.     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 Common femoral artery is patent.  Native superficial femoral artery is patent. There is a stent within the mid to lower aspect of the native right superficial femoral artery which is patent. Profunda femoral artery is patent.  Popliteal artery is patent. Tibioperoneal trunk, posterior tibial artery, peroneal artery, and anterior tibial artery are patent. Three-vessel runoff at the ankle.   LEFT LOWER EXTREMITY: Common femoral artery is patent. 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 Redemonstrated occlusion of the native superficial femoral artery, similar compared to prior imaging. There is reconstitution of the popliteal artery. Poor opacification of the trifurcation of vessels just below the knee with poor opacification of the vessels throughout the entirety of the calf, ankle and foot, new/worsened compared to prior imaging..   ---------------------------------------------------------------   NON-VASCULAR FINDINGS:   LIVER: Within normal limits.   BILE DUCTS: Nondilated.   GALLBLADDER: No evidence of calcified gallstones or wall thickening.   PANCREAS: Within normal limits.   SPLEEN: A splenule is present. The spleen is otherwise unremarkable..   ADRENALS: Within normal limits.   KIDNEYS, URETERS, URINARY BLADDER: Unremarkable. No evidence of hydronephrosis.   BOWEL: No evidence of abnormal dilatation or obstruction of the small or large bowel.  No evidence of pneumoperitoneum.   REPRODUCTIVE ORGANS:  Unremarkable   PERITONEUM / RETROPERITONEUM / LYMPH NODES: No evidence of any free fluid. No evidence of any significantly enlarged intra-abdominal or pelvic lymph nodes.   ABDOMINAL WALL: Fat containing inguinal hernias.   LOWER CHEST: Please refer to separately dictated CT chest report.   BONES: No evidence of suspicious lytic or blastic osseous lesions.       Persistent occlusion of the left femoral/popliteal bypass graft and native left superficial femoral artery. There is also new occlusion versus severe stenosis involving the vessels below the left knee beginning at the level of the trifurcation with the anterior tibial, posterior tibial and peroneal arteries not well seen through the calf, ankle and foot.   No acute abnormality of the abdomen or pelvis.   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 There is good opacification of the native right superficial femoral artery with three-vessel runoff to the right ankle.   MACRO: None.   Signed by: Evan Finkelstein 11/19/2024 5:20 AM Dictation workstation:   [id number]    CT angio chest for pulmonary embolism    Result Date: 11/19/2024  Interpreted By:  Finkelstein, Evan, STUDY: CT ANGIO CHEST FOR PULMONARY EMBOLISM;  11/19/2024 4:27 am   INDICATION: Signs/Symptoms:sob wih hypoxia.     COMPARISON: CT chest 06/23/2021   ACCESSION NUMBER(S): [id number]   ORDERING CLINICIAN: ACE VALENZUELA   TECHNIQUE: Axial CTA images of the chest after intravenous administration of 50 mL Omnipaque 350 using CT angiographic technique. Coronal and sagittal images are reconstructed. MIP images were created and reviewed.   FINDINGS: CHEST WALL AND LOWER NECK: Within normal limits. ABDOMEN: No acute abnormality of the partially visualized abdomen.   VASCULAR: AORTA: No aortic aneurysm. Mild atherosclerotic disease. PULMONARY ARTERY: Normal caliber. No pulmonary embolus to the segmental level.    CHEST:   HEART: Normal size. No pericardial effusion. MEDIASTINUM AND REJI: No pathologically enlarged thoracic lymph nodes. LUNG, PLEURA, LARGE AIRWAYS: Trace left pleural effusion. Thickened interlobular septal markings. Ground-glass opacities scattered throughout the lungs bilaterally with an upper lung zone predominance.   BONES: No acute osseous abnormality. Median sternotomy wires are present.       No acute pulmonary embolus to the segmental level.   Thickened interlobular septal markings with scattered ground-glass opacities with an upper lung zone predominance. Findings may be seen with mixed interstitial/alveolar pulmonary edema. Other superimposed infectious or inflammatory processes are also not excluded.   Trace left pleural effusion   MACRO: None.   Signed by: Evan Finkelstein 11/19/2024 4:36 AM Dictation workstation:   [id number]       Lab Results  Results for orders placed or performed during the hospital encounter of 12/17/24 (from the past 24 hours)   POCT GLUCOSE   Result Value Ref Range    POCT Glucose 213 (H) 74 - 99 mg/dL   POCT GLUCOSE   Result Value Ref Range    POCT Glucose 157 (H) 74 - 99 mg/dL   Type and Screen   Result Value Ref Range    ABO TYPE O     Rh TYPE NEG     ANTIBODY SCREEN NEG    POCT GLUCOSE   Result Value Ref Range    POCT Glucose 182 (H) 74 - 99 mg/dL   POCT GLUCOSE   Result Value Ref Range    POCT Glucose 149 (H) 74 - 99 mg/dL   Heparin Assay, UFH   Result Value Ref Range    Heparin Unfractionated 0.5 See Comment Below for Therapeutic Ranges IU/mL   POCT GLUCOSE   Result Value Ref Range    POCT Glucose 159 (H) 74 - 99 mg/dL   Basic Metabolic Panel   Result Value Ref Range    Glucose 162 (H) 74 - 99 mg/dL    Sodium 135 (L) 136 - 145 mmol/L    Potassium 4.4 3.5 - 5.3 mmol/L    Chloride 103 98 - 107 mmol/L    Bicarbonate 26 21 - 32 mmol/L    Anion Gap 10 10 - 20 mmol/L    Urea Nitrogen 31 (H) 6 - 23 mg/dL    Creatinine 1.30 0.50 - 1.30 mg/dL    eGFR 64 >60 mL/min/1.73m*2     Calcium 8.0 (L) 8.6 - 10.3 mg/dL   CBC   Result Value Ref Range    WBC 7.4 4.4 - 11.3 x10*3/uL    nRBC 0.0 0.0 - 0.0 /100 WBCs    RBC 3.51 (L) 4.50 - 5.90 x10*6/uL    Hemoglobin 11.1 (L) 13.5 - 17.5 g/dL    Hematocrit 32.8 (L) 41.0 - 52.0 %    MCV 93 80 - 100 fL    MCH 31.6 26.0 - 34.0 pg    MCHC 33.8 32.0 - 36.0 g/dL    RDW 13.4 11.5 - 14.5 %    Platelets 217 150 - 450 x10*3/uL   Magnesium   Result Value Ref Range    Magnesium 2.02 1.60 - 2.40 mg/dL   Phosphorus   Result Value Ref Range    Phosphorus 4.0 2.5 - 4.9 mg/dL   Heparin Assay, UFH   Result Value Ref Range    Heparin Unfractionated 0.4 See Comment Below for Therapeutic Ranges IU/mL     *Note: Due to a large number of results and/or encounters for the requested time period, some results have not been displayed. A complete set of results can be found in Results Review.        Medications  Scheduled medications:  acetaminophen, 650 mg, oral, q6h  aspirin, 81 mg, oral, Daily  atorvastatin, 80 mg, oral, Nightly  [Held by provider] carvedilol, 6.25 mg, oral, BID  [Held by provider] dapagliflozin propanediol, 10 mg, oral, Daily  docusate sodium, 100 mg, oral, BID  ezetimibe, 10 mg, oral, Nightly  gabapentin, 400 mg, oral, TID  [Held by provider] hydrALAZINE, 50 mg, oral, TID  insulin glargine, 24 Units, subcutaneous, Nightly  insulin lispro, 0-10 Units, subcutaneous, TID AC  insulin lispro, 14 Units, subcutaneous, TID AC  isosorbide mononitrate ER, 60 mg, oral, Daily  nicotine, 1 patch, transdermal, Daily  OXcarbazepine, 450 mg, oral, BID  oxygen, , inhalation, Continuous - Inhalation  pantoprazole, 40 mg, oral, Daily before breakfast  polyethylene glycol, 17 g, oral, Daily  ranolazine, 500 mg, oral, BID  [Held by provider] sacubitriL-valsartan, 1 tablet, oral, BID  tamsulosin, 0.4 mg, oral, Daily  ticagrelor, 90 mg, oral, BID      Continuous medications:  heparin, 0-4,500 Units/hr, Last Rate: 1,300 Units/hr (12/19/24 0724)  phenylephrine, 0-2 mcg/kg/min, Last  Rate: Stopped (12/19/24 0515)      PRN medications:  PRN medications: albuterol, dextrose, dextrose, glucagon, glucagon, HYDROmorphone, naloxone, ondansetron ODT **OR** ondansetron, oxyCODONE, oxyCODONE     Physical Exam  Constitutional:       General: He is not in acute distress.     Appearance: Normal appearance.   HENT:      Head: Normocephalic and atraumatic.      Right Ear: External ear normal.      Left Ear: External ear normal.      Nose: Nose normal.      Mouth/Throat:      Mouth: Mucous membranes are moist.      Pharynx: Oropharynx is clear.   Eyes:      Extraocular Movements: Extraocular movements intact.      Conjunctiva/sclera: Conjunctivae normal.      Pupils: Pupils are equal, round, and reactive to light.   Cardiovascular:      Rate and Rhythm: Normal rate and regular rhythm.      Pulses: Normal pulses.      Heart sounds: Normal heart sounds.      Comments: Unable to palpate bilateral DP/PT pulses  L foot is pink and warm  Pulmonary:      Effort: Pulmonary effort is normal. No respiratory distress.      Breath sounds: Normal breath sounds. No wheezing, rhonchi or rales.   Abdominal:      General: Bowel sounds are normal.      Palpations: Abdomen is soft.      Tenderness: There is no abdominal tenderness. There is no right CVA tenderness, left CVA tenderness, guarding or rebound.   Musculoskeletal:      Cervical back: Normal range of motion and neck supple.      Comments: Minimal movement of L ankle   Skin:     General: Skin is warm and dry.      Findings: Lesion (L groin, LLE) present. No rash.      Comments: Post-operative dressing with small amount of bloody strikethrough, did not remove dressing  NVS intact distal to operative site    Neurological:      General: No focal deficit present.      Mental Status: He is alert and oriented to person, place, and time. Mental status is at baseline.   Psychiatric:         Mood and Affect: Mood normal.         Behavior: Behavior normal.                   Assessment/Plan      Critical limb ischemia of LLE with reocclusion of L SFA and popliteal arteries s/p left femoral-below knee bypass with graft 12/18/24  Hx severe lower extremity PAD with multiple prior revascularizations  Most recently L femoral and tibial thrombectomy 11/19/24 with 4 compartment fasciotomy   Pt reports he is compliant with Eliquis, Brilinta and aspirin   Monitor incision sites for signs of bleeding  Neurovascular checks and pulse checks  Wound care consult for wound care of fasciotomy sites   Continue aspirin and brilinta  Continue heparin infusion post revascularization- Continue to hold home Eliquis- will defer timing of transition back to DOAC to vascular surgery service  Pain control     Hx CAD s/p CABG  Continue home Lipitor and Zetia  Continue home aspirin and brilinta  Continue home ranexa     SAMEERA  Baseline appears to be ~1.0-1.3  Avoid nephrotoxins  Consider nephrology consult if worsening   Hold home farxiga and entresto in setting of SAMEERA, resume as appropriate      IDDM-II with hyperglycemia  Continue Lantus and humalog TID AC  A1C of 8.2% as of October 2024   Start SSI protocol  Consult endocrinology  Goal glucose <180 for better wound healing      HTN  BP is controlled on home meds    History of multiple CVA  History of R PCA stroke in 2019, L MCA stroke in 2021, bihemispheric small infarcts in 2022, and L MCA scattered embolic infarcts in 2023   Continue as above    COPD without acute exacerbation  Cigarette smoker x48 years, continues to smoke but cut back to 3 cigarettes per day   Albuterol MDI as needed for shortness of breath/wheezing     BRUNA  Noncompliant with CPAP    Tobacco use disorder   Reports that he has cut back to 3 cigarettes per day and has been trying very hard to quit.   Smoking cessation education >3 minutes provided   NRT if needed    Code Status: Full Code          DVT ppx: Heparin drip     Please see orders for more complete plan    Lisa Marlow  FRANK

## 2024-12-19 NOTE — PROGRESS NOTES
"Dereck Shen is a 59 y.o. male on day 2 of admission presenting with Peripheral artery occlusion (CMS-[facility]).    Subjective   Patient overall doing very well  States some tightness in his left calf however no pain or discomfort in his foot  He does state his range of motion is much improved at this time         Objective     Physical Exam  Physical exam    Constitutional: alert and in no acute distress verbal  Eyes: No erythema swelling or discharge noted  Neck: supple, symmetric, trachea midline, no masses noted  Cardiovascular: Carotid pulses 2+, no obvious bruit, no Jugular distension noted, no thrill, heart regular rate, lower extremity vascular exam intact, cap refill <2 sec  Pulmonary:  Bilateral breath sounds intact, clear with rales rhonchi or wheeze  Abdomen: soft non tender, no pulsatile masses noted, no rebound rigidity or guarding noted  Skin: intact warm no abnormal turgor  Psychiatric: alert without any obvious cognitive issues, oriented to person, place, and time  Provide DP PT left foot with cap refill less than 2 seconds    Last Recorded Vitals  Blood pressure 115/66, pulse 85, temperature 36.2 °C (97.2 °F), temperature source Temporal, resp. rate 10, height 1.803 m (5' 11\"), weight 87.7 kg (193 lb 4.8 oz), SpO2 97%.  Intake/Output last 3 Shifts:  I/O last 3 completed shifts:  In: 3575 (40.8 mL/kg) [P.O.:960; I.V.:1865 (21.3 mL/kg); IV Piggyback:750]  Out: 2715 (31 mL/kg) [Urine:2415 (0.8 mL/kg/hr); Blood:300]  Weight: 87.7 kg     Relevant Results                This patient has a urinary catheter   Reason for the urinary catheter remaining today? Urine catheter unnecessary, will be removed today               Assessment/Plan   Left SFA occlusion  Status post left femoropopliteal bypass with vein patch left distal anastomosis postop day 1  Increase activity PT OT for eval  DC Oduglass catheter today  Okay for transfer to general surgical floor             Iban Peace, DO      "

## 2024-12-19 NOTE — PROGRESS NOTES
Physical Therapy    Physical Therapy Evaluation    Patient Name: Dereck Shen  MRN: [id number]  Today's Date: 12/19/2024   Time Calculation  Start Time: 1333  Stop Time: 1400  Time Calculation (min): 27 min  3315/3315-A    Assessment/Plan   PT Assessment  PT Assessment Results: Decreased strength, Decreased range of motion, Decreased mobility, Pain, Decreased safety awareness, Impaired judgement (LLE)  Rehab Prognosis: Good  Barriers to Discharge Home: Cognition needs, Physical needs  Cognition Needs: Insight of patient limited regarding functional ability/needs, Recollection or understanding of precautions/restrictions limited  Physical Needs: Ambulating household distances limited by function/safety, High falls risk due to function or environment  Evaluation/Treatment Tolerance: Patient tolerated treatment well  Barriers to Participation: Insight into problems  End of Session Communication: Bedside nurse  Assessment Comment: pt placido poor safety awareness, recurrent falls at baseline complicated by acute LLE pain. Recommend LOW INTENSITY REHAB for home safety assessment  End of Session Patient Position: Bed, 3 rail up, Alarm on  IP OR SWING BED PT PLAN  Inpatient or Swing Bed: Inpatient  PT Plan  Treatment/Interventions: Bed mobility, Transfer training, Gait training, Neuromuscular re-education, Therapeutic exercise, Therapeutic activity (gentle SELF AROM LLE only)  PT Plan: Ongoing PT  PT Frequency: 5 times per week  PT Discharge Recommendations: Low intensity level of continued care  PT Recommended Transfer Status: Assist x1  PT - OK to Discharge: Yes    Subjective     Current Problem:  1. Critical limb ischemia of left lower extremity (Multi)  Vascular US upper extremity vein mapping bilateral    Vascular US upper extremity vein mapping bilateral    Case Request Operating Room: Creation Bypass Graft Femoral Popliteal Artery    Case Request Operating Room: Creation Bypass Graft Femoral Popliteal Artery     "  2. Arterial occlusion, lower extremity (CMS-HCC)        3. Peripheral artery occlusion (CMS-HCC)        4. Peripheral vascular disease, unspecified (CMS-HCC)  Vascular US ankle brachial index (ANSON) without exercise      5. Encounter for other preprocedural examination  Vascular US upper extremity vein mapping bilateral        General Visit Information:  General  Reason for Referral: RLe pain, redness. VASCULAR SURG 12/18 LLE for critical limb ischemia  Referred By: BASSEM BURT/SAL LYLES. PT FOR IMPAIRED MOBILITY/GAIT TRAINING/RECENT SURG PERISPHERAL VASCULAR  Past Medical History Relevant to Rehab: CAD s/p CABG, severe lower extremity PAD with prior revascularization (R fem-pop bypass 2/24/20; L fem-pop bypass 9/14/20; L fem PTA of the graft; R iliac PTA 2/2022; LLE and DANYA PTA 4/2024; angioplasty of the left SFA 10/21/24; and left femoral and tibial thrombectomy with patch angioplasty 11/19/24.  Family/Caregiver Present: No  Prior to Session Communication: Bedside nurse  Patient Position Received: Bed, 3 rail up  General Comment: pt seen in ICU, pt offers contradictory statements/ argumentative, perseverates on brown    Home Living:  Home Living  Type of Home: Apartment  Lives With: Alone  Home Layout: One level  Home Access: Stairs to enter with rails  Entrance Stairs-Rails: Both  Entrance Stairs-Number of Steps: 32 (NO elevator to 3rd floor APT)  Home Living Comments: NOTE: pt had been staying with his mother in HER APT x6 mos d/t multiple surgeries (NO STEPS TO ENTER at mother's APT)    Prior Level of Function:  Prior Function Per Pt/Caregiver Report  Level of [place]: Independent with ADLs and functional transfers    Precautions:  Precautions  LE Weight Bearing Status: Weight Bearing as Tolerated  Medical Precautions: Fall precautions  Precautions Comment: high risk falls: pt states he \"FALLS ALOT AT HOME because on all those meds\" (describes lightheaded)     Objective     Pain:  Pain Assessment  Pain " Assessment: 0-10  0-10 (Numeric) Pain Score: 8  Pain Type: Surgical pain (Vascular pain)  Pain Location: Leg  Pain Orientation: Left  Clinical Progression: Gradually worsening (with leg in dep position)  Pain Interventions: Elevated  Response to Interventions: Content/relaxed    Cognition:  Cognition  Overall Cognitive Status: Within Functional Limits  Orientation Level: Disoriented to situation  Safety/Judgement: Exceptions to WFL  Impulsive: Moderately    General Assessments:  General Observation  General Observation: transfer OOB, worked on standing with improved WB LLE stability, progressed from stepping in place to amb kvng//forth around bed, RTB to elevate LLE   Activity Tolerance  Endurance: Tolerates 10 - 20 min exercise with multiple rests  Activity Tolerance Comments: RLE pain  Sensation  Sensation Comment: LLE post vascular surg     Dynamic Sitting Balance  Dynamic Sitting-Comments: good  Dynamic Standing Balance  Dynamic Standing-Comments: fair walker support plus assist    Functional Assessments:     Bed Mobility 1  Bed Mobility 1: Supine to sitting, Sitting to supine  Level of Assistance 1: Contact guard, Close supervision  Transfer 1  Transfer From 1: Bed to  Technique 1: Sit to stand  Transfer Device 1: Walker  Transfer Level of Assistance 1: Minimum assistance, +2, Maximum verbal cues, Maximum tactile cues  Trials/Comments 1: excessive instructions needed for safety awareness  Ambulation/Gait Training 1  Device 1: Rolling walker  Assistance 1: Minimum assistance (+1 for lines)  Quality of Gait 1: Antalgic  Comments/Distance (ft) 1: 25  Stairs  Stairs: No       Extremity/Trunk Assessments:        RLE   RLE : Within Functional Limits  LLE   LLE : Exceptions to WFL  AROM LLE (degrees)  LLE AROM Comment: grossly 50%, pt holding LLE in flexed/ext rotation positioning  Strength LLE  LLE Overall Strength: Unable to assess, Due to pain, Greater than or equal to 3/5 as evidenced by functional  mobility    Outcome Measures:     [facility] Basic Mobility  Turning from your back to your side while in a flat bed without using bedrails: None  Moving from lying on your back to sitting on the side of a flat bed without using bedrails: A little  Moving to and from bed to chair (including a wheelchair): A lot  Standing up from a chair using your arms (e.g. wheelchair or bedside chair): A lot  To walk in hospital room: A lot  Climbing 3-5 steps with railing: Total  Basic Mobility - Total Score: 14     FSS-ICU  Ambulation: Walks <50 feet with any assistance x1 or walks any distance with assistance x2 people  Rolling: Modified independence, requires use of assistive device  Sitting: Modified independence, requires use of assistive device  Transfer Sit-to-Stand: Minimal assistance (performs 75% or more of task)  Transfer Supine-to-Sit: Minimal assistance (performs 75% or more of task)  Total Score: 21     Goals:  Encounter Problems       Encounter Problems (Active)       Mobility       STG - Patient will ambulate household distance using FWW modified indep safely/consistently       Start:  12/19/24    Expected End:  01/02/25               PT Transfers       STG - Patient will transfer sit to and from stand using FWW modified indep safely/consistently       Start:  12/19/24    Expected End:  01/02/25               Pain - Adult          Strengthening        RLE ONLY: Pt will perform 10+ reps AROM  to improve functional strength needed for improved mobility, SELF/AROM LLE as prateek       Start:  12/19/24    Expected End:  01/02/25                 Education Documentation  Mobility Training, taught by Janel Luther, PT at 12/19/2024  3:55 PM.  Learner: Patient  Readiness: Nonacceptance  Method: Explanation, Demonstration  Response: No Evidence of Learning  Comment: safe use walker for mobility, fall prevention--OOB only with assist    Education Comments  No comments found.

## 2024-12-19 NOTE — PROGRESS NOTES
12/19/24 1500   Discharge Planning   Living Arrangements Alone   Support Systems Family members   Type of Residence Private residence   Home or Post Acute Services In home services   Expected Discharge Disposition Home   Does the patient need discharge transport arranged? No   Financial Resource Strain   How hard is it for you to pay for the very basics like food, housing, medical care, and heating? Not hard   Housing Stability   In the last 12 months, was there a time when you were not able to pay the mortgage or rent on time? N   At any time in the past 12 months, were you homeless or living in a shelter (including now)? N   Transportation Needs   In the past 12 months, has lack of transportation kept you from medical appointments or from getting medications? no   In the past 12 months, has lack of transportation kept you from meetings, work, or from getting things needed for daily living? No   Stroke Family Assessment   Stroke Family Assessment Needed No   Intensity of Service   Intensity of Service >30 min     Discharge planning assessment completed with pt. Pt is currently staying with his mom in floor level apartment and does have his own place in [place] but is on third floor. Pt prefers to return home with mom at discharge with [facility]. [facility] [place] and his nephew does drive him to appointments. Pt is independent with ADLs including cleaning, groceries, bathing, etc. Pt does have and utilizes walker, cane and rollator. PCP is DELMA Dumas and next appointment is Jan. 3rd. [facility]. Pt not on 02 but did mention he has CPAP machine at home. SW to follow.

## 2024-12-19 NOTE — PROGRESS NOTES
Per Lisa MARIE during rounds pt will remain for a couple of days. Pt bypass of leg completed yesterday. SW following for discharge planning.

## 2024-12-19 NOTE — OP NOTE
Creation Bypass Graft Femoral Popliteal Artery Left Limb (L) Operative Note     Date: 2024  OR Location: [facility]    Name: Dereck Shen, : 1965, Age: 59 y.o., MRN: [id number], Sex: male    Diagnosis  Pre-op Diagnosis      * Critical limb ischemia of left lower extremity (Multi) [I70.222] Post-op Diagnosis     * Critical limb ischemia of left lower extremity (Multi) [I70.222]     Procedures  Creation Bypass Graft Femoral Popliteal Artery Left Limb  [id number] - LA BYPASS GRAFT OTHR,FEM-POP      Surgeons      * Iban Peace - Primary    Resident/Fellow/Other Assistant:  Surgeons and Role:     * Marla Aguilar PA-C - NADIRA First Assist    Staff:   Amadeoulator: Mere  Circulator: Inez Ibrahim Person: Shayy    Anesthesia Staff: CRNA: SUMAN Mejia-CRNA    Procedure Summary  Anesthesia: General  ASA: III  Estimated Blood Loss: 300mL  Intra-op Medications:   Administrations occurring from 1145 to 1415 on 24:   Medication Name Total Dose   aspirin EC tablet 81 mg Cannot be calculated   HYDROmorphone (Dilaudid) injection 0.4 mg Cannot be calculated   insulin glargine (Lantus) injection 24 Units Cannot be calculated   insulin lispro injection 0-10 Units Cannot be calculated   insulin lispro injection 14 Units Cannot be calculated   polyethylene glycol (Glycolax, Miralax) packet 17 g Cannot be calculated   ticagrelor (Brilinta) tablet 90 mg Cannot be calculated   acetaminophen (Tylenol) tablet 975 mg 975 mg   clindamycin (Cleocin) 600 mg in dextrose 5% IV 50 mL 600 mg   famotidine PF (Pepcid) injection 20 mg 20 mg   metoclopramide (Reglan) injection 10 mg 10 mg   nitroglycerin (Vitaliy-Bid) 2 % ointment 0.5 inch 0.5 inch   ondansetron (Zofran) injection 4 mg 4 mg   sodium citrate-citric acid (Bicitra) solution 30 mL 30 mL   acetaminophen (Tylenol) tablet 650 mg Cannot be calculated   ePHEDrine injection 45 mg   etomidate (Amidate) injection 16 mg   fentaNYL (Sublimaze) injection 50 mcg/mL 25 mcg    glycopyrrolate (Robinul) injection 0.2 mg   heparin injection 5,000 units/mL 8,000 Units   heparin bolus from bag 3,000-6,000 Units Cannot be calculated   HYDROmorphone (Dilaudid) injection 0.2 mg Cannot be calculated   lidocaine (cardiac) injection 2% prefilled syringe 80 mg   midazolam (Versed) injection 1 mg/mL 2 mg   oxyCODONE-acetaminophen (Percocet) 5-325 mg per tablet 1 tablet Cannot be calculated   phenylephrine (Aristeo-Synephrine) 10 mg/250 mL NS (40 mcg/mL) infusion 1.66 mg   phenylephrine 100 mcg/mL syringe 10 mL (prefilled) 700 mcg   propofol (Diprivan) injection 10 mg/mL 50 mg   rocuronium (Zemuron) 50 mg/5 mL prefilled syringe 50 mg   sodium chloride 0.9% infusion 95.83 mL              Anesthesia Record               Intraprocedure I/O Totals          Intake    Dexmedetomidine 0.00 mL    The total shown is the total volume documented since Anesthesia Start was filed.    Phenylephrine Drip 0.00 mL    The total shown is the total volume documented since Anesthesia Start was filed.    sodium chloride 0.9% infusion 550.00 mL    clindamycin (Cleocin) 600 mg in dextrose 5% IV 50 mL 50.00 mL    Total Intake 600 mL       Output    Urine 500 mL    Est. Blood Loss 300 mL    Total Output 800 mL       Net    Net Volume -200 mL          Specimen: No specimens collected              Drains and/or Catheters:   Urethral Catheter Non-latex 18 Fr. (Active)   Site Assessment Clean;Skin intact 12/19/24 1316   Collection Container Standard drainage bag 12/19/24 0400   Securement Method Securing device (Describe) 12/19/24 0400   Reason for Continuing Urinary Catheterization surgical procedures: urological/gynecological, pelvic oncology, anal, prolonged surgical procedure 12/19/24 0400   Output (mL) 0 mL 12/19/24 1556       Tourniquet Times:         Implants:  Implants       Type Name Action Serial No.      Implant GRAFT, KARLA, 8 MM X 80 CM, THIN WALL, RING 70CM - [id number] Implanted [id number]               Findings:      Indications: Dereck Shen is an 59 y.o. male who is having surgery for Critical limb ischemia of left lower extremity (Multi) [I70.222].      The patient was seen in the preoperative area. The risks, benefits, complications, treatment options, non-operative alternatives, expected recovery and outcomes were discussed with the patient. The possibilities of reaction to medication, pulmonary aspiration, injury to surrounding structures, bleeding, recurrent infection, the need for additional procedures, failure to diagnose a condition, and creating a complication requiring transfusion or operation were discussed with the patient. The patient concurred with the proposed plan, giving informed consent.  The site of surgery was properly noted/marked if necessary per policy. The patient has been actively warmed in preoperative area. Preoperative antibiotics have been ordered and given within 1 hours of incision. Venous thrombosis prophylaxis are not indicated.    Procedure Details: Patient is brought to the OR suite placed in supine position administered general anesthetic with endotracheal tube intubation.  Left lower extremity sterilely prepped and draped standard fashion.  Bony popliteal incision was made subcutaneous dissection electrocautery the popliteal artery is identified proximally distally after adequate length circumvented vessel loop.  Small branches GSV is also identified proximal and distally circumvented with 2-0 silk suture 5 cm segment is transected ligated proximally distally it was copiously heparinized and left and sterile saline.  It was marked proximal to distal femoral incision was made over previous incision subcutaneous dissection undertaken electrocautery blunt dissection primarily all way down to the common femoral over the previous noted patch angioplasty.  There is excellent pulsatility was dissected proximally distally without event.  Patient systemically systemically  heparinized.  Attention to the below-knee popliteal segment dissing distally and proximally clamped arteriotomy made 3 cm in patch angioplasty with the previously harvested vein was made after opening it and cutting it to appropriate size this was anastomosed with 6-0 Prolene suture circumferential fashion starting at 2 ends meeting in the middle.  I unclamped this with X hemostasis.  Subsartorial plane was made and I tunneled a 8 mm ringed propatent graft with a [place] tunneler without event.  The proximal femoral artery was clamped arteriotomy made over the previously noted patch anastomosis formulae 6-0 Prolene suture in circumferential fashion I copiously antegrade blood this clamped the graft and copiously heparinized.  The above-knee popliteal patch angioplasty was incised over the Goodman patch and anastomosis made with 6-0 Prolene suture circumferential fashion.  I unclamped this in the standard fashion excellent flow was noted distally Doppler signals noted in DP and the ankle multiphasic.  Hemostasis maintained copious irrigated both wounds of tissue Vicryl sutures were placed as well as skin clips sterile dressing was applied patient was woken vascular status intact sent to PACU in stable condition.    Complications:  None; patient tolerated the procedure well.    Disposition: PACU - hemodynamically stable.  Condition: stable       Additional Details: No qualified vascular fellow was available for assistance in the above-noted procedure.  OSCAR Brooks was available for the entirety of the procedure.  She assisted in all components of the procedure from start to completion.     Attending Attestation: I was present and scrubbed for the entire procedure.    Iban Peace  Phone Number: [phone]

## 2024-12-19 NOTE — PROGRESS NOTES
Occupational Therapy                 Therapy Communication Note    Patient Name: Dereck Shen  MRN: [id number]  Department: [facility]  Room: 08/08-A  Today's Date: 12/19/2024     Discipline: Occupational Therapy    OT Missed Visit: Yes     Missed Visit Reason: Missed Visit Reason:  (OT eval attempted. patient unavailable. speaking with SW at bedside.)    Missed Time: Attempt    Comment:

## 2024-12-19 NOTE — ASSESSMENT & PLAN NOTE
Local wound care.  Track labs.  Transition back to Eliquis and discontinue heparin per vascular surgeon.  Continue DAPT.  Gradually reinstitute antihypertensive drugs.  Remove Douglass today.  Holding blood pressure off of pressors.    Diabetes mellitus, type II.  Receiving insulin, glucose monitoring, and endocrinology care.    Cardiac issues.  History of decreased ejection fraction and hyperlipidemia on Lipitor and Zetia.  Continue Ranexa and DAPT.  On room air with adequate saturations in the high 90s/no pulmonary edema present.    History of CVA.  Seems to have minimal residual, clinically.    History of COPD and active tobacco use.  Encouraged complete smoking cessation, clearly will increase risk of loss of limb.    Has done well all morning.  Critical care services do not appear to be needed.  Will sign off at the current time and defer to vascular surgeon.  Hospitalist following as well.

## 2024-12-19 NOTE — CONSULTS
"Nutrition Initial Assessment:   Nutrition Assessment    Reason for Assessment: Admission nursing screening    Medical history per chart:  60 y/o male with past medical history of CAD s/p CABG, heart failure, HTN, DM, recurrent strokes and severe PAD with history of multiple left lower extremity revascularizations now with critical limb ischemia of the LLE with reocclusion of SFA and popliteal. Per note, high risk of limb loss. S/p creation bypass graft femoral popliteal artery of left limb on 12/18/24. Found to have SAMEERA.    12/19/24: Discussed care during IDT rounds. Pt endorsed decreased PO intake since admission. Hypothesized consuming ~50% of meals since admit. Denied unintentional wt loss or poor PO intake prior to admission. Requesting Ensure Max Protein TID. Endorsed the need to swallow twice during eating/drinking since a stroke in 2022--hypothesized this occurs 3x/week and denied previous hx of a following modified consistency diet. Discussed wt discrepancy since admission--pt believes 187# from 12/17/24 is most accurate reading.    Nutrition History:     Food and Nutrient History: 12/19/24: Typically eats fruits, vegetables, nuts/seeds, Premier Protein shake before every meal. Occasionally has seafood or filet mignon. Pt hypothesized that he was \"overeating on the good stuff\" prior to admission, though did have a decreased appetite.        Current Diet: Adult diet Consistent Carb; CCD 60 gm/meal  Supplement(s): No  Average meal Intake during admission:  100% of 1 documented meal per flowsheets.    Average supplement intake during admission: none ordered at this time     Nutrition Related Findings:   Oral Symptoms: swallowing Teeth: Dentures upper, Dentures lower     Food allergies: NKFA. is allergic to celecoxib; ibuprofen; tetanus toxoid, adsorbed; [id number] antimigraine agents; cephalexin; hydrocodone; latex; and tryptophan.  Meds/Labs reviewed.  acetaminophen, 650 mg, oral, q6h  aspirin, 81 mg, " "oral, Daily  atorvastatin, 80 mg, oral, Nightly  [Held by provider] carvedilol, 6.25 mg, oral, BID  [Held by provider] dapagliflozin propanediol, 10 mg, oral, Daily  docusate sodium, 100 mg, oral, BID  ezetimibe, 10 mg, oral, Nightly  gabapentin, 400 mg, oral, TID  [Held by provider] hydrALAZINE, 50 mg, oral, TID  insulin glargine, 24 Units, subcutaneous, Nightly  insulin lispro, 0-10 Units, subcutaneous, TID AC  insulin lispro, 14 Units, subcutaneous, TID AC  isosorbide mononitrate ER, 60 mg, oral, Daily  nicotine, 1 patch, transdermal, Daily  OXcarbazepine, 450 mg, oral, BID  oxygen, , inhalation, Continuous - Inhalation  pantoprazole, 40 mg, oral, Daily before breakfast  polyethylene glycol, 17 g, oral, Daily  ranolazine, 500 mg, oral, BID  [Held by provider] sacubitriL-valsartan, 1 tablet, oral, BID  tamsulosin, 0.4 mg, oral, Daily  ticagrelor, 90 mg, oral, BID       heparin, 0-4,500 Units/hr, Last Rate: 1,300 Units/hr (12/19/24 0724)         Nutrition Significant Labs:    Results from last 7 days   Lab Units 12/19/24  0416 12/18/24  0449 12/17/24  0333   GLUCOSE mg/dL 162* 190* 162*   SODIUM mmol/L 135* 137 137   POTASSIUM mmol/L 4.4 4.2 4.1   CHLORIDE mmol/L 103 102 106   CO2 mmol/L 26 29 23   BUN mg/dL 31* 36* 39*   CREATININE mg/dL 1.30 1.41* 1.53*   EGFR mL/min/1.73m*2 64 58* 52*   CALCIUM mg/dL 8.0* 9.1 8.6   PHOSPHORUS mg/dL 4.0  --   --    MAGNESIUM mg/dL 2.02  --   --      Lab Results   Component Value Date    HGBA1C 7.9 (H) 12/18/2024    HGBA1C 8.2 (A) 10/01/2024     Results from last 7 days   Lab Units 12/19/24  1138 12/19/24  0809 12/18/24 2247 12/18/24 1959 12/18/24  1614 12/18/24  1120 12/18/24  0814 12/17/24 2010   POCT GLUCOSE mg/dL 82 134* 159* 149* 182* 157* 213* 278*       Anthropometrics:  Height: 180.3 cm (5' 11\")   Weight: 87.7 kg (193 lb 4.8 oz)   BMI (Calculated): 26.97  IBW/kg (Dietitian Calculated): 78.1 kg        Weight History:   Wt Readings from Last 10 Encounters:   12/19/24 87.7 " kg (193 lb 4.8 oz)   12/03/24 91.2 kg (201 lb)   11/22/24 84.4 kg (186 lb)   11/21/24 94.4 kg (208 lb 1.8 oz)   10/22/24 88.9 kg (196 lb)   10/01/24 85.9 kg (189 lb 6.4 oz)   09/19/24 83.9 kg (185 lb)   09/13/24 83.9 kg (185 lb)   05/30/24 86.7 kg (191 lb 3.2 oz)   05/02/24 88.5 kg (195 lb)        Weight Change %:  Weight History / % Weight Change: 12/19/24: Pt reports he began trying to lose wt in ?2008 at 278#; has lost wt gradually over time and has now been stable at 185-187# x 1-1.5 years. Chart review of wt hx reveals fluctuations between 185-208# since May 2024--may be related to fluid fluctuations with CHF diagnosis.             Nutrition Focused Physical Exam Findings:  defer: Pt denied unintentional wt loss prior to admission and endorsed good PO intake (although decreased appetite) prior to admit.  Subcutaneous Fat Loss:      Muscle Wasting:     Edema:  Edema: none (No edema present per notes)  Physical Findings:  Skin: Positive (incision wounds x2, pretibial wound x1 per wound RN)    Estimated Needs:   Total Energy Estimated Needs (kCal): 2120 kCal  Method for Estimating Needs: 25 kcal/kg (using admission wt 84.8 kg)  Total Protein Estimated Needs (g): 102 g  Method for Estimating Needs: 1.2 g/kg (using admission wt 84.8 kg)  Total Fluid Estimated Needs (mL): 2000 mL  Method for Estimating Needs: suggested upper limit with CHF, or per provider        Nutrition Diagnosis   Nutrition Diagnosis:  Malnutrition Diagnosis  Patient has Malnutrition Diagnosis: No    Nutrition Diagnosis  Patient has Nutrition Diagnosis: Yes  Diagnosis Status (1): New  Nutrition Diagnosis 1: Increased nutrient needs (protein, vitamins, minerals)  Related to (1): increased metabolic demand secondary to healing  As Evidenced by (1): multiple wounds documented  Additional Nutrition Diagnosis: Diagnosis 2  Nutrition Diagnosis 2: Predicted inadequate energy intake  Related to (2): decreased appetite  As Evidenced by (2): patient report  of consuming approximately 50% of meals during admission       Nutrition Interventions/Recommendations   Nutrition Interventions and Recommendations:        Nutrition Prescription:  Individualized Nutrition Prescription Provided for : Supplements        Nutrition Interventions:   Food and/or Nutrient Delivery Interventions  Interventions: Medical food supplement  Medical Food Supplement: Commercial beverage  Goal: consume >75%  Additional Interventions: Will place order for Ensure Max Protein to encourage adequate protein intake to optimize wound healing. Pt requesting supplement TID.    Coordination of Nutrition Care by a Nutrition Professional  Collaboration and Referral of Nutrition Care: Collaboration by nutrition professional with other providers (Dr. Cruz, HEATH pSears)  Goal: Consider SLP evaluation due to pt report of need to swallow multiple times when eating/drinking since stroke in 2022. (Notified HEATH Spears of pt reported swallowing issues)    Nutrition Education:   Education Documentation  No documentation found.      Nutrition Counseling  Counseling Theoretical Approach: Other (Comment)  Goal: Discussed ONS, diet, increased protein needs with wound healing       Nutrition Monitoring and Evaluation   Monitoring/Evaluation:   Food/Nutrient Related History Monitoring  Monitoring and Evaluation Plan: Amount of food  Amount of Food: Estimated amout of food  Criteria: consume >75%    Body Composition/Growth/Weight History  Monitoring and Evaluation Plan: Weight change  Weight Change: Weight change percentage  Criteria: stable weight    Biochemical Data, Medical Tests and Procedures  Monitoring and Evaluation Plan: Electrolyte/renal panel, Glucose/endocrine profile  Electrolyte and Renal Panel: Magnesium, Phosphorus, Potassium, Sodium, BUN, Creatinine  Criteria: WDL  Glucose/Endocrine Profile: Glucose, casual, Hemoglobin A1c (HgbA1c)  Criteria: WDL    Nutrition Focused Physical Findings  Monitoring and  Evaluation Plan: Skin  Skin: Impaired wound healing  Criteria: promote healing            Time Spent/Follow-up Reminder:   Follow Up  Time Spent (min): 65 minutes  Last Date of Nutrition Visit: 12/19/24  Nutrition Follow-Up Needed?: Dietitian to reassess per policy  Follow up Comment: 12/22-12/24

## 2024-12-20 ENCOUNTER — HOME CARE VISIT (OUTPATIENT)
Dept: HOME HEALTH SERVICES | Facility: HOME HEALTH | Age: 59
End: 2024-12-20
Payer: COMMERCIAL

## 2024-12-20 LAB
ANION GAP SERPL CALC-SCNC: 13 MMOL/L (ref 10–20)
BUN SERPL-MCNC: 28 MG/DL (ref 6–23)
CALCIUM SERPL-MCNC: 8.3 MG/DL (ref 8.6–10.3)
CHLORIDE SERPL-SCNC: 99 MMOL/L (ref 98–107)
CO2 SERPL-SCNC: 25 MMOL/L (ref 21–32)
CREAT SERPL-MCNC: 1.22 MG/DL (ref 0.5–1.3)
EGFRCR SERPLBLD CKD-EPI 2021: 69 ML/MIN/1.73M*2
ERYTHROCYTE [DISTWIDTH] IN BLOOD BY AUTOMATED COUNT: 13.2 % (ref 11.5–14.5)
GLUCOSE BLD MANUAL STRIP-MCNC: 128 MG/DL (ref 74–99)
GLUCOSE BLD MANUAL STRIP-MCNC: 129 MG/DL (ref 74–99)
GLUCOSE BLD MANUAL STRIP-MCNC: 149 MG/DL (ref 74–99)
GLUCOSE BLD MANUAL STRIP-MCNC: 154 MG/DL (ref 74–99)
GLUCOSE SERPL-MCNC: 112 MG/DL (ref 74–99)
HCT VFR BLD AUTO: 30.4 % (ref 41–52)
HGB BLD-MCNC: 10.3 G/DL (ref 13.5–17.5)
MAGNESIUM SERPL-MCNC: 2.03 MG/DL (ref 1.6–2.4)
MCH RBC QN AUTO: 31.4 PG (ref 26–34)
MCHC RBC AUTO-ENTMCNC: 33.9 G/DL (ref 32–36)
MCV RBC AUTO: 93 FL (ref 80–100)
NRBC BLD-RTO: 0 /100 WBCS (ref 0–0)
PLATELET # BLD AUTO: 198 X10*3/UL (ref 150–450)
POTASSIUM SERPL-SCNC: 4.4 MMOL/L (ref 3.5–5.3)
RBC # BLD AUTO: 3.28 X10*6/UL (ref 4.5–5.9)
SODIUM SERPL-SCNC: 133 MMOL/L (ref 136–145)
UFH PPP CHRO-ACNC: 0.4 IU/ML
WBC # BLD AUTO: 6.1 X10*3/UL (ref 4.4–11.3)

## 2024-12-20 PROCEDURE — 85520 HEPARIN ASSAY: CPT | Performed by: PHYSICIAN ASSISTANT

## 2024-12-20 PROCEDURE — 2500000001 HC RX 250 WO HCPCS SELF ADMINISTERED DRUGS (ALT 637 FOR MEDICARE OP): Performed by: PHYSICIAN ASSISTANT

## 2024-12-20 PROCEDURE — 83735 ASSAY OF MAGNESIUM: CPT | Performed by: PHYSICIAN ASSISTANT

## 2024-12-20 PROCEDURE — 82374 ASSAY BLOOD CARBON DIOXIDE: CPT | Performed by: PHYSICIAN ASSISTANT

## 2024-12-20 PROCEDURE — 97165 OT EVAL LOW COMPLEX 30 MIN: CPT | Mod: GO

## 2024-12-20 PROCEDURE — 2500000004 HC RX 250 GENERAL PHARMACY W/ HCPCS (ALT 636 FOR OP/ED): Performed by: PHYSICIAN ASSISTANT

## 2024-12-20 PROCEDURE — 82947 ASSAY GLUCOSE BLOOD QUANT: CPT

## 2024-12-20 PROCEDURE — 97110 THERAPEUTIC EXERCISES: CPT | Mod: GP,CQ

## 2024-12-20 PROCEDURE — S4991 NICOTINE PATCH NONLEGEND: HCPCS | Performed by: PHYSICIAN ASSISTANT

## 2024-12-20 PROCEDURE — 2500000001 HC RX 250 WO HCPCS SELF ADMINISTERED DRUGS (ALT 637 FOR MEDICARE OP): Performed by: SURGERY

## 2024-12-20 PROCEDURE — 2500000002 HC RX 250 W HCPCS SELF ADMINISTERED DRUGS (ALT 637 FOR MEDICARE OP, ALT 636 FOR OP/ED): Performed by: PHYSICIAN ASSISTANT

## 2024-12-20 PROCEDURE — 2500000005 HC RX 250 GENERAL PHARMACY W/O HCPCS: Performed by: PHYSICIAN ASSISTANT

## 2024-12-20 PROCEDURE — 99233 SBSQ HOSP IP/OBS HIGH 50: CPT | Performed by: PHYSICIAN ASSISTANT

## 2024-12-20 PROCEDURE — 1100000001 HC PRIVATE ROOM DAILY

## 2024-12-20 PROCEDURE — 97116 GAIT TRAINING THERAPY: CPT | Mod: GP,CQ

## 2024-12-20 PROCEDURE — 97535 SELF CARE MNGMENT TRAINING: CPT | Mod: GO

## 2024-12-20 PROCEDURE — 85027 COMPLETE CBC AUTOMATED: CPT | Performed by: PHYSICIAN ASSISTANT

## 2024-12-20 PROCEDURE — 36415 COLL VENOUS BLD VENIPUNCTURE: CPT | Performed by: PHYSICIAN ASSISTANT

## 2024-12-20 PROCEDURE — 99233 SBSQ HOSP IP/OBS HIGH 50: CPT | Performed by: INTERNAL MEDICINE

## 2024-12-20 RX ORDER — ACETAMINOPHEN 500 MG
5 TABLET ORAL NIGHTLY PRN
Status: DISCONTINUED | OUTPATIENT
Start: 2024-12-20 | End: 2024-12-24 | Stop reason: HOSPADM

## 2024-12-20 RX ORDER — INSULIN GLARGINE 100 [IU]/ML
20 INJECTION, SOLUTION SUBCUTANEOUS NIGHTLY
Status: DISCONTINUED | OUTPATIENT
Start: 2024-12-20 | End: 2024-12-24 | Stop reason: HOSPADM

## 2024-12-20 RX ADMIN — INSULIN LISPRO 14 UNITS: 100 INJECTION, SOLUTION INTRAVENOUS; SUBCUTANEOUS at 11:57

## 2024-12-20 RX ADMIN — APIXABAN 5 MG: 5 TABLET, FILM COATED ORAL at 09:51

## 2024-12-20 RX ADMIN — ACETAMINOPHEN 650 MG: 325 TABLET ORAL at 21:08

## 2024-12-20 RX ADMIN — ACETAMINOPHEN 650 MG: 325 TABLET ORAL at 14:06

## 2024-12-20 RX ADMIN — Medication 5 MG: at 22:50

## 2024-12-20 RX ADMIN — ACETAMINOPHEN 650 MG: 325 TABLET ORAL at 01:41

## 2024-12-20 RX ADMIN — OXCARBAZEPINE 450 MG: 150 TABLET, FILM COATED ORAL at 21:08

## 2024-12-20 RX ADMIN — OXYCODONE HYDROCHLORIDE 10 MG: 10 TABLET ORAL at 08:19

## 2024-12-20 RX ADMIN — NICOTINE 1 PATCH: 21 PATCH, EXTENDED RELEASE TRANSDERMAL at 08:32

## 2024-12-20 RX ADMIN — HYDROMORPHONE HYDROCHLORIDE 0.4 MG: 1 INJECTION, SOLUTION INTRAMUSCULAR; INTRAVENOUS; SUBCUTANEOUS at 14:06

## 2024-12-20 RX ADMIN — OXYCODONE HYDROCHLORIDE 10 MG: 10 TABLET ORAL at 21:09

## 2024-12-20 RX ADMIN — RANOLAZINE 500 MG: 500 TABLET, EXTENDED RELEASE ORAL at 08:18

## 2024-12-20 RX ADMIN — GABAPENTIN 400 MG: 400 CAPSULE ORAL at 08:19

## 2024-12-20 RX ADMIN — TICAGRELOR 90 MG: 90 TABLET ORAL at 21:08

## 2024-12-20 RX ADMIN — INSULIN LISPRO 14 UNITS: 100 INJECTION, SOLUTION INTRAVENOUS; SUBCUTANEOUS at 16:39

## 2024-12-20 RX ADMIN — Medication 21 PERCENT: at 20:06

## 2024-12-20 RX ADMIN — DOCUSATE SODIUM 100 MG: 100 CAPSULE, LIQUID FILLED ORAL at 21:08

## 2024-12-20 RX ADMIN — GABAPENTIN 400 MG: 400 CAPSULE ORAL at 21:08

## 2024-12-20 RX ADMIN — RANOLAZINE 500 MG: 500 TABLET, EXTENDED RELEASE ORAL at 21:08

## 2024-12-20 RX ADMIN — ATORVASTATIN CALCIUM 80 MG: 40 TABLET, FILM COATED ORAL at 21:08

## 2024-12-20 RX ADMIN — OXCARBAZEPINE 450 MG: 150 TABLET, FILM COATED ORAL at 08:18

## 2024-12-20 RX ADMIN — HYDROMORPHONE HYDROCHLORIDE 0.4 MG: 1 INJECTION, SOLUTION INTRAMUSCULAR; INTRAVENOUS; SUBCUTANEOUS at 18:25

## 2024-12-20 RX ADMIN — HYDROMORPHONE HYDROCHLORIDE 0.4 MG: 1 INJECTION, SOLUTION INTRAMUSCULAR; INTRAVENOUS; SUBCUTANEOUS at 09:51

## 2024-12-20 RX ADMIN — ACETAMINOPHEN 650 MG: 325 TABLET ORAL at 08:19

## 2024-12-20 RX ADMIN — GABAPENTIN 400 MG: 400 CAPSULE ORAL at 14:06

## 2024-12-20 RX ADMIN — ISOSORBIDE MONONITRATE 60 MG: 60 TABLET, EXTENDED RELEASE ORAL at 08:18

## 2024-12-20 RX ADMIN — HEPARIN SODIUM 1300 UNITS/HR: 10000 INJECTION, SOLUTION INTRAVENOUS at 06:07

## 2024-12-20 RX ADMIN — ASPIRIN 81 MG: 81 TABLET, COATED ORAL at 08:19

## 2024-12-20 RX ADMIN — OXYCODONE HYDROCHLORIDE 10 MG: 10 TABLET ORAL at 12:49

## 2024-12-20 RX ADMIN — INSULIN LISPRO 14 UNITS: 100 INJECTION, SOLUTION INTRAVENOUS; SUBCUTANEOUS at 08:19

## 2024-12-20 RX ADMIN — HYDROMORPHONE HYDROCHLORIDE 0.4 MG: 1 INJECTION, SOLUTION INTRAMUSCULAR; INTRAVENOUS; SUBCUTANEOUS at 04:09

## 2024-12-20 RX ADMIN — TICAGRELOR 90 MG: 90 TABLET ORAL at 08:31

## 2024-12-20 RX ADMIN — OXYCODONE HYDROCHLORIDE 10 MG: 10 TABLET ORAL at 17:03

## 2024-12-20 RX ADMIN — INSULIN LISPRO 2 UNITS: 100 INJECTION, SOLUTION INTRAVENOUS; SUBCUTANEOUS at 16:39

## 2024-12-20 RX ADMIN — TAMSULOSIN HYDROCHLORIDE 0.4 MG: 0.4 CAPSULE ORAL at 08:19

## 2024-12-20 RX ADMIN — HYDROMORPHONE HYDROCHLORIDE 0.4 MG: 1 INJECTION, SOLUTION INTRAMUSCULAR; INTRAVENOUS; SUBCUTANEOUS at 00:09

## 2024-12-20 RX ADMIN — PANTOPRAZOLE SODIUM 40 MG: 40 TABLET, DELAYED RELEASE ORAL at 06:07

## 2024-12-20 RX ADMIN — OXYCODONE HYDROCHLORIDE 10 MG: 10 TABLET ORAL at 03:02

## 2024-12-20 RX ADMIN — DOCUSATE SODIUM 100 MG: 100 CAPSULE, LIQUID FILLED ORAL at 08:18

## 2024-12-20 RX ADMIN — APIXABAN 5 MG: 5 TABLET, FILM COATED ORAL at 21:08

## 2024-12-20 RX ADMIN — HYDROMORPHONE HYDROCHLORIDE 0.4 MG: 1 INJECTION, SOLUTION INTRAMUSCULAR; INTRAVENOUS; SUBCUTANEOUS at 22:48

## 2024-12-20 RX ADMIN — EZETIMIBE 10 MG: 10 TABLET ORAL at 21:08

## 2024-12-20 ASSESSMENT — PAIN DESCRIPTION - ORIENTATION
ORIENTATION: LEFT
ORIENTATION: RIGHT
ORIENTATION: RIGHT
ORIENTATION: LEFT

## 2024-12-20 ASSESSMENT — COGNITIVE AND FUNCTIONAL STATUS - GENERAL
DRESSING REGULAR LOWER BODY CLOTHING: A LOT
WALKING IN HOSPITAL ROOM: A LOT
MOVING FROM LYING ON BACK TO SITTING ON SIDE OF FLAT BED WITH BEDRAILS: A LITTLE
DRESSING REGULAR LOWER BODY CLOTHING: A LOT
MOVING TO AND FROM BED TO CHAIR: A LOT
WALKING IN HOSPITAL ROOM: A LOT
MOBILITY SCORE: 15
DAILY ACTIVITIY SCORE: 19
DRESSING REGULAR LOWER BODY CLOTHING: A LOT
CLIMB 3 TO 5 STEPS WITH RAILING: TOTAL
TOILETING: A LITTLE
MOVING TO AND FROM BED TO CHAIR: A LOT
HELP NEEDED FOR BATHING: A LOT
TURNING FROM BACK TO SIDE WHILE IN FLAT BAD: A LITTLE
WALKING IN HOSPITAL ROOM: A LOT
DRESSING REGULAR UPPER BODY CLOTHING: A LITTLE
HELP NEEDED FOR BATHING: A LITTLE
TOILETING: A LITTLE
PERSONAL GROOMING: A LITTLE
TOILETING: A LOT
MOBILITY SCORE: 14
TURNING FROM BACK TO SIDE WHILE IN FLAT BAD: A LITTLE
TURNING FROM BACK TO SIDE WHILE IN FLAT BAD: A LITTLE
PERSONAL GROOMING: A LITTLE
CLIMB 3 TO 5 STEPS WITH RAILING: TOTAL
PERSONAL GROOMING: A LITTLE
DAILY ACTIVITIY SCORE: 19
DAILY ACTIVITIY SCORE: 16
MOBILITY SCORE: 14
HELP NEEDED FOR BATHING: A LITTLE
STANDING UP FROM CHAIR USING ARMS: A LITTLE
STANDING UP FROM CHAIR USING ARMS: A LITTLE
MOVING TO AND FROM BED TO CHAIR: A LOT
CLIMB 3 TO 5 STEPS WITH RAILING: TOTAL
STANDING UP FROM CHAIR USING ARMS: A LOT

## 2024-12-20 ASSESSMENT — PAIN DESCRIPTION - LOCATION
LOCATION: LEG

## 2024-12-20 ASSESSMENT — ENCOUNTER SYMPTOMS
CHILLS: 0
TROUBLE SWALLOWING: 0
APPETITE CHANGE: 0
BRUISES/BLEEDS EASILY: 0
FLANK PAIN: 0
WHEEZING: 0
LIGHT-HEADEDNESS: 0
PALPITATIONS: 0
NAUSEA: 0
CONSTIPATION: 0
WEAKNESS: 0
COUGH: 0
EYE PAIN: 0
BACK PAIN: 0
CHEST TIGHTNESS: 0
DYSURIA: 0
SHORTNESS OF BREATH: 0
DIARRHEA: 0
FEVER: 0
FATIGUE: 0
FACIAL SWELLING: 0
VOMITING: 0
WOUND: 0
HALLUCINATIONS: 0
SORE THROAT: 0
FREQUENCY: 0
BLOOD IN STOOL: 0
JOINT SWELLING: 0
DIAPHORESIS: 0
NUMBNESS: 0
HEMATURIA: 0
DIZZINESS: 0
HEADACHES: 0
ABDOMINAL PAIN: 0

## 2024-12-20 ASSESSMENT — PAIN SCALES - GENERAL
PAINLEVEL_OUTOF10: 8
PAINLEVEL_OUTOF10: 9
PAINLEVEL_OUTOF10: 7
PAINLEVEL_OUTOF10: 7
PAINLEVEL_OUTOF10: 8
PAINLEVEL_OUTOF10: 6
PAINLEVEL_OUTOF10: 6
PAINLEVEL_OUTOF10: 8
PAINLEVEL_OUTOF10: 6
PAINLEVEL_OUTOF10: 9
PAINLEVEL_OUTOF10: 8
PAINLEVEL_OUTOF10: 8
PAINLEVEL_OUTOF10: 7
PAINLEVEL_OUTOF10: 8
PAINLEVEL_OUTOF10: 8
PAINLEVEL_OUTOF10: 6
PAINLEVEL_OUTOF10: 7

## 2024-12-20 ASSESSMENT — ACTIVITIES OF DAILY LIVING (ADL)
HOME_MANAGEMENT_TIME_ENTRY: 12
ADL_ASSISTANCE: INDEPENDENT
BATHING_ASSISTANCE: MODERATE

## 2024-12-20 NOTE — DOCUMENTATION CLARIFICATION NOTE
PATIENT:               [facility]  ACCT #:                  [id number]  MRN:                       [id number]  :                       1965  ADMIT DATE:       2024 1:30 AM  DISCH DATE:  RESPONDING PROVIDER #:        [id number]          PROVIDER RESPONSE TEXT:    Macrocytic anemia    CDI QUERY TEXT:    Clarification    Instruction:    Based on your assessment of the patient and the clinical information, please provide the requested documentation by clicking on the appropriate radio button and enter any additional information if prompted.    Question: Is there a diagnosis indicative of the lab values and clinical indicators    When answering this query, please exercise your independent professional judgment. The fact that a question is being asked, does not imply that any particular answer is desired or expected.    The patient's clinical indicators include:  Clinical Information:  - 58 yo male admitted with Reocclusion of LLE SFA and popliteal arteries.  PMH includes PAD w 3 prior surgeries in last 3 mo, dCHF, CAD, DM, CVAs with r side weakness, DVTs and continued smoking.    Clinical Indicators:  -     hgb 13.2,  hct 38.1      hgb 14.0,  hct 40.6      hgb 11.1,    hct 32.8     hgb 10.3,  hct 30.4      Treatment: Type and screen, lab monitoring, 500 cc IVF bolus on .    Risk Factors: Postop revascularization procedure on .  Options provided:  -- Acute blood loss anemia  -- Macrocytic anemia  -- Anemia due to chronic disease, Please specify chronic disease below  -- Other - I will add my own diagnosis  -- Refer to Clinical Documentation Reviewer    Query created by: Niru Rizvi on 2024 12:57 PM      Electronically signed by:  GIOVANNI SIMENTAL PA-C 2024 2:32 PM

## 2024-12-20 NOTE — CARE PLAN
Problem: Diabetes  Goal: Achieve decreasing blood glucose levels by end of shift  Outcome: Progressing  Goal: Increase stability of blood glucose readings by end of shift  Outcome: Progressing  Goal: Decrease in ketones present in urine by end of shift  Outcome: Progressing  Goal: Maintain electrolyte levels within acceptable range throughout shift  Outcome: Progressing  Goal: Maintain glucose levels >70mg/dl to <250mg/dl throughout shift  Outcome: Progressing  Goal: No changes in neurological exam by end of shift  Outcome: Progressing  Goal: Learn about and adhere to nutrition recommendations by end of shift  Outcome: Progressing  Goal: Vital signs within normal range for age by end of shift  Outcome: Progressing  Goal: Increase self care and/or family involovement by end of shift  Outcome: Progressing  Goal: Receive DSME education by end of shift  Outcome: Progressing     Problem: Pain  Goal: Takes deep breaths with improved pain control throughout the shift  Outcome: Progressing  Goal: Turns in bed with improved pain control throughout the shift  Outcome: Progressing  Goal: Walks with improved pain control throughout the shift  Outcome: Progressing  Goal: Performs ADL's with improved pain control throughout shift  Outcome: Progressing  Goal: Participates in PT with improved pain control throughout the shift  Outcome: Progressing  Goal: Free from opioid side effects throughout the shift  Outcome: Progressing  Goal: Free from acute confusion related to pain meds throughout the shift  Outcome: Progressing     Problem: Pain - Adult  Goal: Verbalizes/displays adequate comfort level or baseline comfort level  Outcome: Progressing     Problem: Safety - Adult  Goal: Free from fall injury  Outcome: Progressing     Problem: Discharge Planning  Goal: Discharge to home or other facility with appropriate resources  Outcome: Progressing     Problem: Chronic Conditions and Co-morbidities  Goal: Patient's chronic conditions  and co-morbidity symptoms are monitored and maintained or improved  Outcome: Progressing     Problem: Nutrition  Goal: Oral intake greater 75%  Outcome: Progressing  Goal: Consume prescribed supplement  Outcome: Progressing  Goal: BG  mg/dL  Outcome: Progressing  Goal: Lab values WNL  Outcome: Progressing  Goal: Electrolytes WNL  Outcome: Progressing  Goal: Promote healing  Outcome: Progressing  Goal: Maintain stable weight  Outcome: Progressing  Goal: Reduce weight from edema/fluid  Outcome: Progressing     Problem: Skin  Goal: Decreased wound size/increased tissue granulation at next dressing change  Outcome: Progressing  Flowsheets (Taken 12/20/2024 0727)  Decreased wound size/increased tissue granulation at next dressing change: Promote sleep for wound healing  Goal: Participates in plan/prevention/treatment measures  Outcome: Progressing  Flowsheets (Taken 12/20/2024 0727)  Participates in plan/prevention/treatment measures:   Discuss with provider PT/OT consult   Increase activity/out of bed for meals  Goal: Prevent/manage excess moisture  Outcome: Progressing  Flowsheets (Taken 12/20/2024 0727)  Prevent/manage excess moisture: Moisturize dry skin  Goal: Prevent/minimize sheer/friction injuries  Outcome: Progressing  Flowsheets (Taken 12/20/2024 0727)  Prevent/minimize sheer/friction injuries: Turn/reposition every 2 hours/use positioning/transfer devices  Goal: Promote/optimize nutrition  Outcome: Progressing  Flowsheets (Taken 12/20/2024 0727)  Promote/optimize nutrition: Monitor/record intake including meals  Goal: Promote skin healing  Outcome: Progressing  Flowsheets (Taken 12/20/2024 0727)  Promote skin healing:   Protective dressings over bony prominences   Turn/reposition every 2 hours/use positioning/transfer devices    The patient's goals for the shift include      The clinical goals for the shift include Patient will remain free from falls and injury during shift

## 2024-12-20 NOTE — PROGRESS NOTES
Occupational Therapy    Evaluation/Treatment    Patient Name: Dereck Shen  MRN: [id number]  Department: [facility]– [place] [facility] [address]  Room: [address] [place] [address]-A  Today's Date: 12/20/24  Time Calculation  Start Time: 1123  Stop Time: 1145  Time Calculation (min): 22 min  Additional evaluation time 9:32 to 9:44 AM for interview (12 minutes )       Assessment:  OT Assessment: Pt is 59 year old male admitted for vascular surgery. Pt requiring SBA-MOD A for mobility and MIN-MOD A for ADLs. Education on adaptive equipment  to promote independence with ADLs. Pt would benefit from skilled OT services during hospital stay to address goals.  Prognosis: Good  Barriers to Discharge Home: Caregiver assistance, Physical needs  Caregiver Assistance: Patient lives alone and/or does not have reliable caregiver assistance (No barrier, if able to stay with mother)  Physical Needs: Stair navigation to access bed limited by function/safety, Intermittent ADL assistance needed, Intermittent mobility assistance needed  Evaluation/Treatment Tolerance: Patient tolerated treatment well  End of Session Communication: Bedside nurse  End of Session Patient Position: Bed, 3 rail up, Alarm on  OT Assessment Results: Decreased ADL status, Decreased safe judgment during ADL, Decreased endurance, Decreased functional mobility, Decreased IADLs  Prognosis: Good  Evaluation/Treatment Tolerance: Patient tolerated treatment well  Plan:  Treatment Interventions: ADL retraining, Functional transfer training, Endurance training, Equipment evaluation/education, Compensatory technique education  OT Frequency: 3 times per week  OT Discharge Recommendations: Low intensity level of continued care  Equipment Recommended upon Discharge:  (Discussed hip kit and clamp-on tub grab bar)  OT Recommended Transfer Status: Assist of 1  OT - OK to Discharge: Yes (When medically appropriate)  Treatment Interventions: ADL retraining, Functional transfer training, Endurance training, Equipment  evaluation/education, Compensatory technique education    Subjective   Current Problem:  1. Critical limb ischemia of left lower extremity (Multi)  Vascular US upper extremity vein mapping bilateral    Vascular US upper extremity vein mapping bilateral    Case Request Operating Room: Creation Bypass Graft Femoral Popliteal Artery    Case Request Operating Room: Creation Bypass Graft Femoral Popliteal Artery      2. Arterial occlusion, lower extremity (CMS-HCC)        3. Peripheral artery occlusion (CMS-HCC)        4. Peripheral vascular disease, unspecified (CMS-HCC)  Vascular US ankle brachial index (ANSON) without exercise      5. Encounter for other preprocedural examination  Vascular US upper extremity vein mapping bilateral      6. PAD (peripheral artery disease) (CMS-HCC)  Referral to Wound Clinic        General:   OT Received On: 12/20/24  General  Reason for Referral: POD#2 s/p left fem-below knee popliteal bypass with vein patch left distal anastomosis  Referred By: Kobi  Past Medical History Relevant to Rehab: CAD s/p CABG, severe lower extremity PAD with prior revascularization (R fem-pop bypass 2/24/20; L fem-pop bypass 9/14/20; L fem PTA of the graft; R iliac PTA 2/2022; LLE and DANYA PTA 4/2024; angioplasty of the left SFA 10/21/24; and left femoral and tibial thrombectomy with patch angioplasty 11/19/24.  Family/Caregiver Present: No  Prior to Session Communication: Bedside nurse  Patient Position Received: Bed, 3 rail up, Alarm on  General Comment: Pt supine upon arrival. Attempted to see 9:32-9:44AM , Participated in interview process but pt requested OT to return for mobility 2/2 fatigue following PT session. Pt pleasant and cooperative during both session.   Precautions:  Hearing/Visual Limitations: WFL (+) glass  LE Weight Bearing Status: Weight Bearing as Tolerated  Medical Precautions: Fall precautions    Vital Sign (Past 2hrs)                 Pain:  Pain Assessment  Pain Assessment: 0-10  0-10  (Numeric) Pain Score: 6  Pain Type: Acute pain, Surgical pain  Pain Location: Leg  Pain Orientation: Left    Objective   Cognition:  Overall Cognitive Status: Within Functional Limits  Orientation Level: Oriented X4           Home Living:  Type of Home: Apartment  Lives With: Alone  Home Adaptive Equipment: Walker rolling or standard, Cane  Home Layout: One level  Home Access: Stairs to enter with rails  Entrance Stairs-Rails: Both  Entrance Stairs-Number of Steps: 3 flights, no elevator  Bathroom Shower/Tub: Tub/shower unit  Bathroom Toilet: Standard  Bathroom Equipment: Shower chair with back (Plan for grab bar)  Home Living Comments: Plan to stay with mother following discharge 2/2 stairs to enter apartment. Pt reports mother lives in 1 level apartment without stair to enter.  Prior Function:  Level of [place]: Independent with ADLs and functional transfers  ADL Assistance: Independent  Homemaking Assistance: Independent  Ambulatory Assistance: Independent (MOD I with cane vs walker)  IADL History:     ADL:  Eating Assistance: Independent  Grooming Assistance: Stand by (Anticipated, seated)  Bathing Assistance: Moderate (Anticipated)  UE Dressing Assistance: Stand by (Anticipated)  LE Dressing Assistance: Minimal (Donning hospital pants with reacher)  LE Dressing Deficit: Use of adaptive equipment  Toileting Assistance with Device: Moderate (Anticipated)  Activities of Daily Living: LE Bathing  LE Bathing Comments: Discussed AE and DME for shower set up to promote independence and safety. Discussed use of clamp on grab bar.    LE Dressing  LE Dressing: Yes  LE Dressing Adaptive Equipment: Reacher  Pants Level of Assistance: Minimum assistance, Moderate verbal cues  LE Dressing Where Assessed: Edge of bed  LE Dressing Comments: Donning pants with reacher seated EOB, sit>stand with MIN A. Partial assistance to don over hips in standing. Demo provided for use of sock aid  Activity Tolerance:  Endurance:  Tolerates 10 - 20 min exercise with multiple rests  Functional Standing Tolerance:     Bed Mobility/Transfers: Bed Mobility  Bed Mobility: Yes  Bed Mobility 1  Bed Mobility 1: Supine to sitting, Sitting to supine  Level of Assistance 1: Close supervision    Transfers  Transfer: Yes  Transfer 1  Transfer From 1: Sit to, Stand to  Transfer to 1: Sit, Stand  Technique 1: Sit to stand, Stand to sit  Transfer Device 1: Walker  Transfer Level of Assistance 1: Minimum assistance, Minimal verbal cues  Trials/Comments 1: Weakness in LLE, MIN cuing for technique. Moderate hip flexion for initial standing posture. Able to improve upright posture with increase time.      Functional Mobility:  Functional Mobility  Functional Mobility Performed: No  Sitting Balance:  Static Sitting Balance  Static Sitting-Balance Support: No upper extremity supported  Static Sitting-Level of Assistance: Close supervision  Dynamic Sitting Balance  Dynamic Sitting-Balance Support: Bilateral upper extremity supported  Dynamic Sitting-Level of Assistance: Close supervision  Standing Balance:  Static Standing Balance  Static Standing-Balance Support: Bilateral upper extremity supported  Static Standing-Level of Assistance: Minimum assistance  Dynamic Standing Balance  Dynamic Standing-Balance Support: Bilateral upper extremity supported  Dynamic Standing-Level of Assistance: Minimum assistance, Moderate assistance       Vision:Vision - Basic Assessment  Current Vision:  ((+) glasses)  Sensation:  Sensation Comment: Numbness in LLE  Strength:  Strength Comments: BUE grossly WFL  Other Activity:     Home Environment:     Perception:     Coordination:      Hand Function:  Hand Function  Gross Grasp: Functional  Coordination: Functional      Outcome Measures: [facility] Daily Activity  Putting on and taking off regular lower body clothing: A lot  Bathing (including washing, rinsing, drying): A lot  Putting on and taking off regular upper body clothing: A  little  Toileting, which includes using toilet, bedpan or urinal: A lot  Taking care of personal grooming such as brushing teeth: A little  Eating Meals: None  Daily Activity - Total Score: 16        Education Documentation  Precautions, taught by Alexandra Canada OT at 12/20/2024 12:20 PM.  Learner: Patient  Readiness: Acceptance  Method: Explanation  Response: Verbalizes Understanding    ADL Training, taught by Alexandra Canada OT at 12/20/2024 12:20 PM.  Learner: Patient  Readiness: Acceptance  Method: Explanation  Response: Verbalizes Understanding    Education Comments  No comments found.        OP EDUCATION:  Education  Education Comment: Education provided on hip kit items to improve energy conservation and independence in ADLs. Demo provided for use of items. Pt performed lower body dressing with reacher, therapist provided cuing for technique. Discussion regarding set up and problem solving for bathing. Pt receptive to all education.    Goals:  Encounter Problems       Encounter Problems (Active)       ADLs       Patient will perform UB and LB bathing with supervision level of assistance. (Progressing)       Start:  12/20/24    Expected End:  01/03/25            Patient with complete lower body dressing with stand by assist level of assistance  with reacher, shoe horn, and sock-aid (Progressing)       Start:  12/20/24    Expected End:  01/03/25            Patient will complete daily grooming tasks  with supervision level of assistance and PRN adaptive equipment. (Progressing)       Start:  12/20/24    Expected End:  01/03/25            Patient will complete toileting including hygiene clothing management/hygiene with stand by assist level of assistance. (Progressing)       Start:  12/20/24    Expected End:  01/03/25               TRANSFERS       Patient will complete functional transfers with least restrictive device with stand by assist level of assistance. (Progressing)       Start:  12/20/24    Expected  End:  01/03/25

## 2024-12-20 NOTE — PROGRESS NOTES
"Dereck Shen is a 59 y.o. male on day 3 of admission presenting with Peripheral artery occlusion (CMS-HCC).    Subjective   Patient admits to pain and swelling to his left leg.  He also has left groin pain.  He has been cleared to ambulate.    He has already eaten breakfast.    Lantus was not given last evening due to glucose value of 83mg/dL.     I have reviewed histories, allergies and medications have been reviewed and there are no changes       Objective     Physical Exam  Vitals and nursing note reviewed.   Constitutional:       General: He is not in acute distress.     Appearance: Normal appearance. He is normal weight.   HENT:      Head: Normocephalic and atraumatic.      Nose: Nose normal.      Mouth/Throat:      Mouth: Mucous membranes are moist.   Eyes:      Extraocular Movements: Extraocular movements intact.   Pulmonary:      Effort: Pulmonary effort is normal.   Musculoskeletal:         General: Normal range of motion.      Comments: Dressing to left leg clean and dry   Healed surgical scar to medial thigh    Neurological:      Mental Status: He is alert and oriented to person, place, and time.   Psychiatric:         Mood and Affect: Mood normal.         Last Recorded Vitals  Blood pressure 111/76, pulse 88, temperature 36.9 °C (98.4 °F), temperature source Temporal, resp. rate 18, height 1.803 m (5' 11\"), weight 87.7 kg (193 lb 4.8 oz), SpO2 97%.  Intake/Output last 3 Shifts:  I/O last 3 completed shifts:  In: 3861.8 (44 mL/kg) [P.O.:1440; I.V.:1671.8 (19.1 mL/kg); IV Piggyback:750]  Out: 3434 (39.2 mL/kg) [Urine:3134 (1 mL/kg/hr); Blood:300]  Weight: 87.7 kg     Relevant Results  Results from last 7 days   Lab Units 12/20/24  0437 12/19/24 2122 12/19/24 2009 12/19/24  1603 12/19/24  1138 12/19/24  0809 12/19/24  0416 12/18/24  0814 12/18/24  0449 12/17/24  1044 12/17/24  0333   POCT GLUCOSE mg/dL  --  129* 83 156* 82 134*  --    < >  --    < >  --    GLUCOSE mg/dL 112*  --   --   --   --   --  " 162*  --  190*  --  162*    < > = values in this interval not displayed.     Scheduled medications  acetaminophen, 650 mg, oral, q6h  aspirin, 81 mg, oral, Daily  atorvastatin, 80 mg, oral, Nightly  [Held by provider] carvedilol, 6.25 mg, oral, BID  [Held by provider] dapagliflozin propanediol, 10 mg, oral, Daily  docusate sodium, 100 mg, oral, BID  ezetimibe, 10 mg, oral, Nightly  gabapentin, 400 mg, oral, TID  [Held by provider] hydrALAZINE, 50 mg, oral, TID  insulin glargine, 24 Units, subcutaneous, Nightly  insulin lispro, 0-10 Units, subcutaneous, TID AC  insulin lispro, 14 Units, subcutaneous, TID AC  isosorbide mononitrate ER, 60 mg, oral, Daily  nicotine, 1 patch, transdermal, Daily  OXcarbazepine, 450 mg, oral, BID  oxygen, , inhalation, Continuous - Inhalation  pantoprazole, 40 mg, oral, Daily before breakfast  polyethylene glycol, 17 g, oral, Daily  ranolazine, 500 mg, oral, BID  [Held by provider] sacubitriL-valsartan, 1 tablet, oral, BID  tamsulosin, 0.4 mg, oral, Daily  ticagrelor, 90 mg, oral, BID      Continuous medications  heparin, 0-4,500 Units/hr, Last Rate: 1,300 Units/hr (12/20/24 0607)      PRN medications  PRN medications: [Transfer Hold] albuterol, dextrose, dextrose, glucagon, glucagon, HYDROmorphone, naloxone, ondansetron ODT **OR** ondansetron, oxyCODONE, oxyCODONE    Results for orders placed or performed during the hospital encounter of 12/17/24 (from the past 24 hours)   POCT GLUCOSE   Result Value Ref Range    POCT Glucose 134 (H) 74 - 99 mg/dL   POCT GLUCOSE   Result Value Ref Range    POCT Glucose 82 74 - 99 mg/dL   POCT GLUCOSE   Result Value Ref Range    POCT Glucose 156 (H) 74 - 99 mg/dL   POCT GLUCOSE   Result Value Ref Range    POCT Glucose 83 74 - 99 mg/dL   POCT GLUCOSE   Result Value Ref Range    POCT Glucose 129 (H) 74 - 99 mg/dL   Basic Metabolic Panel   Result Value Ref Range    Glucose 112 (H) 74 - 99 mg/dL    Sodium 133 (L) 136 - 145 mmol/L    Potassium 4.4 3.5 - 5.3  mmol/L    Chloride 99 98 - 107 mmol/L    Bicarbonate 25 21 - 32 mmol/L    Anion Gap 13 10 - 20 mmol/L    Urea Nitrogen 28 (H) 6 - 23 mg/dL    Creatinine 1.22 0.50 - 1.30 mg/dL    eGFR 69 >60 mL/min/1.73m*2    Calcium 8.3 (L) 8.6 - 10.3 mg/dL   CBC   Result Value Ref Range    WBC 6.1 4.4 - 11.3 x10*3/uL    nRBC 0.0 0.0 - 0.0 /100 WBCs    RBC 3.28 (L) 4.50 - 5.90 x10*6/uL    Hemoglobin 10.3 (L) 13.5 - 17.5 g/dL    Hematocrit 30.4 (L) 41.0 - 52.0 %    MCV 93 80 - 100 fL    MCH 31.4 26.0 - 34.0 pg    MCHC 33.9 32.0 - 36.0 g/dL    RDW 13.2 11.5 - 14.5 %    Platelets 198 150 - 450 x10*3/uL   Magnesium   Result Value Ref Range    Magnesium 2.03 1.60 - 2.40 mg/dL   Heparin Assay, UFH   Result Value Ref Range    Heparin Unfractionated 0.4 See Comment Below for Therapeutic Ranges IU/mL     *Note: Due to a large number of results and/or encounters for the requested time period, some results have not been displayed. A complete set of results can be found in Results Review.      Vascular US upper extremity vein mapping bilateral    Result Date: 12/18/2024              [place] [id number]      Phone [phone] Fax [phone]  Vascular Lab Report  VASC US UPPER EXTREMITY VEIN MAPPING BILATERAL Patient Name:      CARMENZAABDON Presley Physician:  [id number] Fabian Phillips MD Study Date:        12/18/2024          Ordering Provider:  [id number] DARIUS BURT MRN/PID:           [id number]            Fellow: Accession#:        [id number]        Technologist:       Danielle Kelley                                                            RVT Date of Birth/Age: 1965 / 59      Technologist 2:     Deb Allen RVT                    years Gender:            M                   Encounter#:         [id number] Admission Status:  Outpatient          Location Performed: [facility]  Diagnosis/ICD: Encounter for other  preprocedural examination-Z01.818 CPT Codes:     18254 Vein mapping complete  CONCLUSIONS: Right Upper Vein Mapping: Chronic disease is documented in the right cephalic proximal forearm, cephalic mid forearm, cephalic distal forearm, basilic proximal forearm, basilic mid forearm and basilic distal forearm veins. Left Upper Vein Mapping: Left cephalic and basilic veins appear widely patent with no evidence of thrombosis or fibrosis.  Imaging & Doppler Findings:  Right                   Compress Thrombus  Diam Cephalic Prox Arm         Yes      None   2.4 mm Cephalic Mid Arm          Yes      None   2.3 mm Cephalic Distal Arm       Yes      None   1.7 mm Cephalic Prox Forearm      No    Fibrotic Cephalic Mid Forearm       No    Fibrotic Cephalic Distal Forearm    No    Fibrotic Basilic Prox Arm          Yes      None   4.2 mm Basilic Mid Arm           Yes      None   3.7 mm Basilic Distal Arm        Yes      None   3.5 mm Basilic Prox Forearm       No    Fibrotic Basilic Mid Forearm        No    Fibrotic Basilic Distal Forearm     No    Fibrotic  Left                    Compress Thrombus  Diam Cephalic Prox Arm         Yes      None   2.4 mm Cephalic Mid Arm          Yes      None   2.3 mm Cephalic Distal Arm       Yes      None   2.1 mm Cephalic Prox Forearm     Yes      None   3.1 mm Cephalic Mid Forearm      Yes      None   1.8 mm Cephalic Distal Forearm   Yes      None   1.4 mm Basilic Prox Arm          Yes      None   3.3 mm Basilic Mid Arm           Yes      None   2.7 mm Basilic Distal Arm        Yes      None   2.2 mm Basilic Prox Forearm      Yes      None   1.9 mm Basilic Mid Forearm       Yes      None   1.1 mm Basilic Distal Forearm    Yes      None   0.7 mm  [id number] Fabian Phillips MD Electronically signed by [id number] Fabian Phillips MD on 12/18/2024 at 6:21:05 PM  ** Final **     ECG 12 lead    Result Date: 12/17/2024  Sinus rhythm Inferior infarct, old Baseline wander in lead(s) II,III,aVR,aVL,aVF,V1,V2,V5 See ED  provider note for full interpretation and clinical correlation Confirmed by Nay Black ([id number]) on 12/17/2024 11:40:07 AM    Vascular US ankle brachial index (ANSON) without exercise    Result Date: 12/17/2024  Preliminary Cardiology [place] [address]      Phone [phone] Fax [phone]            Preliminary Vascular Lab Report  VASC US ANKLE BRACHIAL INDEX (ANSON) WITHOUT EXERCISE  Patient Name:     CARMENZA Presley Physician: [id number] Darlyn OLEARY MD Study Date:       12/17/2024       Ordering Provider: [id number] CHERELLE POON MRN/PID:          [id number]         Fellow: Accession#:       [id number]     Technologist:      Deb Allen RVT YOB: 1965        Technologist 2: Gender:           M                Encounter#:        [id number] Admission Status: Emergency        Location           [facility]                                    Performed:  Diagnosis/ICD: Peripheral vascular disease, unspecified-I73.9 CPT Codes:     34226 Peripheral artery ANSON Only  Pertinent History: Pt presents to the ER with LLE pain and a cold left foot.  **CRITICAL RESULT** Critical Result: severe arterial disease of LLE Notification called to Cherelle Poon on 12/17/2024 at 9:31:33 AM.  PRELIMINARY CONCLUSIONS:  Right Lower PVR: No evidence of arterial occlusive disease in the right lower extremity at rest. Normal digital perfusion noted. Multiphasic flow is noted in the right common femoral artery, right popliteal artery, right posterior tibial artery and right dorsalis pedis artery. Left Lower PVR: Evidence of severe arterial occlusive disease in the left lower extremity at rest. Decreased digital perfusion noted. Monophasic flow is noted in the left common femoral artery, left popliteal artery, left posterior tibial artery and left dorsalis pedis artery. PTA is inaudible.  Flat toe waveform(TBI=0).  Imaging & Doppler Findings:  RIGHT Lower PVR                Pressures Ratios Right Posterior Tibial (Ankle) 141 mmHg  1.22 Right Dorsalis Pedis (Ankle)   126 mmHg  1.09 Right Digit (Great Toe)        112 mmHg  0.97   LEFT Lower PVR                Pressures Ratios Left Posterior Tibial (Ankle) 0 mmHg    0.00 Left Dorsalis Pedis (Ankle)   33 mmHg   0.28 Left Digit (Great Toe)        0 mmHg    0.00                      Right Brachial Pressure 116 mmHg   VASCULAR PRELIMINARY REPORT completed by Deb Allen RVT on 12/17/2024 at 9:32:24 AM  ** Final **     CT angio aorta and bilateral iliofemoral runoff including without contrast if performed    Result Date: 12/17/2024  STUDY: CT Angiogram of the Abdomen, Pelvis, and Bilateral Lower Extremities; 12/17/2024 6:38 AM INDICATION: Cold left foot. COMPARISON: CTA AP w/runoffs 11/19/2024, CTA chest 11/19/2024, CTA AP w/runoffs 09/13/2024. ACCESSION NUMBER(S): [id number] ORDERING CLINICIAN: BASIM POON TECHNIQUE:  Helical CT is performed from the aortic diaphragmatic hiatus through the feet after bolus administration of 90 mL of Omnipaque 350.  Images are reviewed and processed at a workstation according to the CT angiogram protocol with 3-D and/or MIP post processing imaging generated. Automated mA/kV exposure control was utilized and patient examination was performed in strict accordance with principles of ALARA. FINDINGS: Vascular: Abdominal Aorta: The celiac, SMA, and DENISE demonstrate no significant stenosis.  The right and left renal arteries demonstrate no significant stenosis. The abdominal aorta is not aneurysmal and demonstrates no significant occlusive disease. Right Run-off: The right common iliac artery shows mild stenosis diffusely.  There is a stent along the entire right external iliac and right common femoral arteries.  The stent demonstrates areas of mild in-stent stenosis. There is an occluded right femoral-popliteal bypass graft.  The native right femoral popliteal arteries demonstrate mild atherosclerotic contour.  There is a stent extending from the mid SFA to the proximal popliteal artery.  At the distal SFA the stent demonstrates crush distortion but maintains a patent lumen, though with areas of mild in-stent stenosis. The popliteal artery remains patent throughout its course.  There is patent three-vessel runoff to the ankle, and 2 vessels into the foot. Opacification became too weak to follow the vessels to the plantar arch. Left Run-off: The left common iliac artery demonstrates mild diffuse atherosclerotic stenosis.  The left external iliac artery and left common femoral arteries are relatively normal.  The common femoral artery bifurcation is patent.  The native left SFA occludes just beyond its origin.  The profunda femoral artery and its branches remain patent. There is a chronically occluded left femoral-popliteal bypass graft. The native left SFA does not reconstitute, remaining occluded throughout its course. There is a 2 cm length patent segment of the left popliteal artery, which is otherwise occluded.  Distal to the patent segment, the popliteal artery is occluded across the level of the joint. The distal popliteal artery reconstitutes remains patent into its trifurcation. 3 patent infrapopliteal vessels are visualized proximally.  The tibial vessels remain patent to the ankle, where the dorsalis pedis and lateral plantar seen across the ankle joint.  The vessels cannot be followed to the plantar arch due to weak contrast opacification. Abdomen: The lung bases are clear.  The liver is unremarkable.  The pancreas, spleen, adrenal glands, and kidneys demonstrate no acute pathology. There is no free air or lymph node enlargement. Pelvis: There is no bowel wall thickening or obstruction.  There is no free fluid.  Lymph nodes are not enlarged.  Urinary bladder is unremarkable. At the left inguinal region there is thick soft  tissue in the subcutaneous fat overlying the femoral vessels, as well as 2 prominent lymph nodes. Skeleton:  There are no acute fractures.  No suspicious bony lesions.    No significant aortic disease.  Mild bilateral common iliac artery stenosis.  Patent left external iliac and common femoral arteries. 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  Chronic occlusion of the native left superficial femoral artery. A 2 cm segment of the left P1 popliteal artery reconstitutes.  The terminal left popliteal artery also reconstitutes.  Other portions of the popliteal artery are occluded. Patent three-vessel infrapopliteal runoff to the left hindfoot, beyond which contrast opacification was too weak to visualize. Thick subcutaneous soft tissue - presumed scarred tissue - overlying the left femoral vessels at the left groin.  Correlate for possibility of cellulitis. Patent right iliac and right femoral stents.  Patent right popliteal artery.  Patent three-vessel infrapopliteal runoff to the right ankle. No findings of an acute process in the abdomen or pelvis. Signed by Simeon Barrera MD    Vascular US Lower Extremity Arterial Duplex Bilateral    Result Date: 12/3/2024              [place] [id number]      Phone [phone] Fax [phone]  [facility]  [facility] Patient Name:      CARMENZA OLEARY    Reading Physician:  [id number] Britney Rush MD Study Date:        12/2/2024            Ordering Provider:  [id number] ADELA LYLES MRN/PID:           [id number]             Fellow: Accession#:        [id number]         Technologist:       Deb Allen RVYUSUF Date of Birth/Age: 1965 / 59 years Technologist 2: Gender:            M                    Encounter#:         [id number] Admission Status:  Outpatient           Location Performed: [facility]   Diagnosis/ICD: Peripheral vascular disease, unspecified-I73.9 CPT Codes:     07266 Peripheral artery Lower arterial Duplex complete  Pertinent History: LLE intervention(11/19/2024).  CONCLUSIONS: Right Lower Arterial: The profunda was monophasic and dampened on prior exam and today is not well visualized and appears occluded. All remaining vessels appear patent. Left Lower Arterial: No occlusion or stenosis noted in the vessels visualized. The distal BARB is dampened and appears nearly occluded. Working around staples and dressings. The EIA is not visualized. The proximal and mid CFA and the profunda is not visualized. Minimal visualization of calf vessels.  Imaging & Doppler Findings:  Right                       Left   PSV                        PSV 89 cm/s         EIA 95 cm/s         CFA        78 cm/s  0 cm/s  Profunda Proximal 80 cm/s    SFA Proximal    88 cm/s 148 cm/s      SFA Mid      100 cm/s 112 cm/s    SFA Distal     108 cm/s 73 cm/s      Popliteal     74 cm/s 100 cm/s   BARB Proximal 86 cm/s       BARB Mid 53 cm/s     BARB Distal     11 cm/s 48 cm/s  Peroneal Proximal 55 cm/s    Peroneal Mid    77 cm/s 40 cm/s   Peroneal Distal 34 cm/s    PTA Proximal    48 cm/s 31 cm/s       PTA Mid 52 cm/s     PTA Distal  [id number] Britney Rush MD Electronically signed by [id number] Britney Rush MD on 12/3/2024 at 11:43:03 AM  ** Final **     Vascular US ankle brachial index (ANSON) without exercise    Result Date: 12/3/2024              [place] [address]      Phone [phone] Fax [phone]  Vascular Lab Report  [facility] US ANKLE BRACHIAL INDEX (ANSON) WITHOUT EXERCISE Patient Name:      CARMENZA Presley Physician:  [id number] Rush MD Study Date:        12/2/2024            Ordering Provider:  [id number] ADELA LYLES MRN/PID:           [id number]             Fellow: Accession#:        [id number]          Technologist:       Deb Allen RVYUSUF Date of Birth/Age: 1965 / 59 years Technologist 2: Gender:            M                    Encounter#:         [id number] Admission Status:  Outpatient           Location Performed: [facility]  Diagnosis/ICD: Peripheral vascular disease, unspecified-I73.9 CPT Codes:     19042 Peripheral artery ANSON Only  Pertinent History: Recent LLE intervention(11/19/2024).  CONCLUSIONS: Right Lower PVR: No evidence of arterial occlusive disease in the right lower extremity at rest. Normal digital perfusion noted. Multiphasic flow is noted in the right common femoral artery, right posterior tibial artery and right dorsalis pedis artery. Left Lower PVR: Evidence of moderate arterial occlusive disease in the left lower extremity at rest. Decreased digital perfusion noted. Monophasic flow is noted in the left posterior tibial artery and left dorsalis pedis artery. Multiphasic flow is noted in the left common femoral artery.  Comparison: Compared with study from 5/23/2024, The left ANSON decreased from .96 to .67 today. No significant change on the right.  Imaging & Doppler Findings:  RIGHT Lower PVR                Pressures Ratios Right Posterior Tibial (Ankle) 169 mmHg  0.94 Right Dorsalis Pedis (Ankle)   158 mmHg  0.88 Right Digit (Great Toe)        113 mmHg  0.63   LEFT Lower PVR                Pressures Ratios Left Posterior Tibial (Ankle) 109 mmHg  0.61 Left Dorsalis Pedis (Ankle)   120 mmHg  0.67 Left Digit (Great Toe)        105 mmHg  0.58                      Right Brachial Pressure 180 mmHg   [id number] Britney Rush MD Electronically signed by [id number] Britney Rush MD on 12/3/2024 at 11:38:55 AM  ** Final **     Transthoracic Echo (TTE) Complete    Result Date: 11/20/2024              [place] [address]      Phone [phone] Fax [phone] TRANSTHORACIC ECHOCARDIOGRAM REPORT Patient Name:       CARMENZA M MAMTA    Reading Physician:    [id number] Britney Rush MD Study Date:         11/20/2024          Ordering Provider:    [id number] DANNA SEARS MRN/PID:            [id number]            Fellow: Accession#:         [id number]        Nurse:                Noemi Villa RN Date of Birth/Age:  1965 / 59      Sonographer:          Laxmi crisostomo RDCS Gender Assigned at  M                   Additional Staff: Birth: Height:             180.34 cm           Admit Date:           11/19/2024 Weight:             87.09 kg            Admission Status:     Inpatient -                                                               Routine BSA / BMI:          2.07 m2 / 26.78     Department Location:  [facility]                     kg/m2 Blood Pressure: 107 /64 mmHg Study Type:    TRANSTHORACIC ECHO (TTE) COMPLETE Diagnosis/ICD: Shortness of breath-R06.02; Unspecified systolic (congestive)                heart failure (CHF)-I50.20 Indication:    CHF, SHORTNESS OF BREATH CPT Codes:     Echo Complete w Full Doppler-86237 Patient History: Pertinent History: CABG x5 2019, HTN. Study Detail: The following Echo studies were performed: 2D, M-Mode, Doppler and               color flow. Technically challenging study due to prominent lung               artifact and body habitus. Optison used as a contrast agent for               endocardial border definition. Total contrast used for this               procedure was 3 mL via IV push.  PHYSICIAN INTERPRETATION: Left Ventricle: The left ventricular systolic function is moderately decreased, with a Salinas's biplane calculated ejection fraction of 38%. Wall motion is abnormal. The left ventricular cavity size is normal. There is normal septal and normal posterior left ventricular wall thickness. Spectral Doppler shows an abnormal pattern of left ventricular diastolic filling.  Akinetic inferolateral segment, and basal inferior wall. Left Atrium: The left atrium is normal in size. Right Ventricle: The right ventricle was not well visualized. Right ventricular systolic function not assessed. Right Atrium: The right atrium is normal in size. Aortic Valve: The aortic valve appears structurally normal. The aortic valve dimensionless index is 0.73. There is no evidence of aortic valve regurgitation. The peak instantaneous gradient of the aortic valve is 9 mmHg. The mean gradient of the aortic valve is 4 mmHg. Mitral Valve: The mitral valve is normal in structure. There is no evidence of mitral valve regurgitation. Tricuspid Valve: The tricuspid valve is structurally normal. There is trace tricuspid regurgitation. Pulmonic Valve: The pulmonic valve is structurally normal. There is no indication of pulmonic valve regurgitation. Pericardium: No pericardial effusion noted. Aorta: The aortic root is normal. Systemic Veins: The inferior vena cava appears normal in size, with IVC inspiratory collapse greater than 50%.  CONCLUSIONS:  1. Poorly visualized anatomical structures due to suboptimal image quality.  2. The left ventricular systolic function is moderately decreased, with a Salinas's biplane calculated ejection fraction of 38%.  3. Abnormal wall motion.  4. Spectral Doppler shows an abnormal pattern of left ventricular diastolic filling.  5. Akinetic inferolateral segment, and basal inferior wall. QUANTITATIVE DATA SUMMARY:  2D MEASUREMENTS:           Normal Ranges: Ao Root d:       2.80 cm   (2.0-3.7cm) LAs:             3.60 cm   (2.7-4.0cm) IVSd:            0.70 cm   (0.6-1.1cm) LVPWd:           0.70 cm   (0.6-1.1cm) LVIDd:           6.10 cm   (3.9-5.9cm) LVIDs:           5.30 cm LV Mass Index:   78.5 g/m2 LV % FS          13.1 %  LA VOLUME:                    Normal Ranges: LA Vol A4C:        48.7 ml    (22+/-6mL/m2) LA Vol A2C:        49.8 ml LA Vol BP:         51.4 ml LA Vol Index A4C:   23.5ml/m2 LA Vol Index A2C:  24.0 ml/m2 LA Vol Index BP:   24.8 ml/m2 LA Area A4C:       17.6 cm2 LA Area A2C:       18.6 cm2 LA Major Axis A4C: 5.4 cm LA Major Axis A2C: 5.9 cm LA Volume Index:   24.8 ml/m2  RA VOLUME BY A/L METHOD:          Normal Ranges: RA Area A4C:             13.5 cm2  AORTA MEASUREMENTS:         Normal Ranges: Ao Sinus, d:        2.80 cm (2.1-3.5cm) Ao STJ, d:          2.60 cm (1.7-3.4cm) Asc Ao, d:          2.90 cm (2.1-3.4cm)  LV SYSTOLIC FUNCTION BY 2D PLANIMETRY (MOD):                      Normal Ranges: EF-A4C View:    38 % (>=55%) EF-A2C View:    39 % EF-Biplane:     38 % LV EF Reported: 38 %  LV DIASTOLIC FUNCTION:             Normal Ranges: MV Peak E:             0.75 m/s    (0.7-1.2 m/s) MV Peak A:             0.65 m/s    (0.42-0.7 m/s) E/A Ratio:             1.17        (1.0-2.2) MV e'                  0.069 m/s   (>8.0) MV lateral e'          0.08 m/s MV medial e'           0.06 m/s MV A Dur:              109.00 msec E/e' Ratio:            10.91       (<8.0)  MITRAL VALVE:          Normal Ranges: MV DT:        211 msec (150-240msec)  AORTIC VALVE:                     Normal Ranges: AoV Vmax:                1.46 m/s (<=1.7m/s) AoV Peak P.5 mmHg (<20mmHg) AoV Mean P.0 mmHg (1.7-11.5mmHg) LVOT Max Ayad:            1.09 m/s (<=1.1m/s) AoV VTI:                 29.40 cm (18-25cm) LVOT VTI:                21.60 cm LVOT Diameter:           2.20 cm  (1.8-2.4cm) AoV Area, VTI:           2.79 cm2 (2.5-5.5cm2) AoV Area,Vmax:           2.84 cm2 (2.5-4.5cm2) AoV Dimensionless Index: 0.73  RIGHT VENTRICLE: TAPSE: 19.1 mm  TRICUSPID VALVE/RVSP:          Normal Ranges: Peak TR Velocity:     2.00 m/s RV Syst Pressure:     19 mmHg  (< 30mmHg) IVC Diam:             1.60 cm  [id number] Britney Rush MD Electronically signed on 2024 at 1:49:12 PM  ** Final **                Assessment/Plan   Assessment & Plan  Peripheral artery occlusion (CMS-HCC)    Critical limb  ischemia of left lower extremity (Multi)        IMPRESSION:  TYPE 2 DIABETES MELLITUS  Long term insulin use   A1C of 7.9%      RECOMMENDATIONS:  To decrease Lantus to 20 units subcutaneous bedtime  To continue Humalog 14 units TID AC   To continue insulin correction scale TID AC   Diabetic diet   Accu-Cheks ACHS    Hypoglycemic protocol   Counseled that the goal A1C should be 7% or less  Counseled glycemic control is warranted to prevent microvascular complications  Will continue to follow    Haylie Tate MD     oral

## 2024-12-20 NOTE — PROGRESS NOTES
Dereck Shen is a 59 y.o. male on day 3 of admission presenting with Peripheral artery occlusion (CMS-[facility]).      Subjective   Dereck Shen is a 59 y.o. male with PMHx of CAD s/p CABG, severe lower extremity PAD with prior revascularization (R fem-pop bypass 2/24/20; L fem-pop bypass 9/14/20; L fem PTA of the graft; R iliac PTA 2/2022; LLE and DANYA PTA 4/2024; angioplasty of the left SFA 10/21/24; and left femoral and tibial thrombectomy with patch angioplasty 11/19/24. He presented with L Leg pain, redness, and ice cold to touch for three days. He has been compliant with all his medications including his Eliquis, Brilinta and aspirin. ED workup was significant for creatinine 1.53. CT angio of the lower extremities does show what appears to be reocclusion of his SFA and popliteal. Patient evaluated by Dr. Peace- continue hep gtt, DAPT, statin- Minimal revascularization options at this point and high risk of limb loss, obtain upper extremity vein mapping. Patient is now status post left femoropopliteal bypass with vein patch left distal anastomosis 12/18/24 with Dr. Peace    12/20/2024: No acute events overnight. Vitals stable. Pain 7-8/10. CBC/BMP reviewed, creatinine improving slowly to 1.22 today (baseline ~1.0-1.3).  Glucose 112- appreciate endocrinology recommendations. Start working with PT/OT and trying to wean off IV pain meds   He reports more pain today than previous surgeries. Vascular surgery going to reassess soon.          Review of Systems   Constitutional:  Negative for appetite change, chills, diaphoresis, fatigue and fever.   HENT:  Negative for congestion, ear pain, facial swelling, hearing loss, nosebleeds, sore throat, tinnitus and trouble swallowing.    Eyes:  Negative for pain.   Respiratory:  Negative for cough, chest tightness, shortness of breath and wheezing.    Cardiovascular:  Negative for chest pain, palpitations and leg swelling.   Gastrointestinal:  Negative for abdominal pain,  blood in stool, constipation, diarrhea, nausea and vomiting.   Genitourinary:  Negative for dysuria, flank pain, frequency, hematuria and urgency.   Musculoskeletal:  Negative for back pain and joint swelling.        Bilateral foot pain  L groin pain   Skin:  Negative for rash and wound.   Neurological:  Negative for dizziness, syncope, weakness, light-headedness, numbness and headaches.   Hematological:  Does not bruise/bleed easily.   Psychiatric/Behavioral:  Negative for behavioral problems, hallucinations and suicidal ideas.           Objective     Last Recorded Vitals  /76 (BP Location: Left arm, Patient Position: Lying)   Pulse 88   Temp 36.9 °C (98.4 °F) (Temporal)   Resp 18   Wt 90.6 kg (199 lb 11.2 oz)   SpO2 97%     Image Results  ECG 12 lead    Result Date: 12/17/2024  Sinus rhythm Inferior infarct, old Baseline wander in lead(s) II,III,aVR,aVL,aVF,V1,V2,V5 See ED provider note for full interpretation and clinical correlation Confirmed by Nay Black ([id number]) on 12/17/2024 11:40:07 AM    Vascular US ankle brachial index (ANSON) without exercise    Result Date: 12/17/2024  Preliminary [place] [address]      Phone [phone] Fax [phone]            Preliminary Vascular Lab Report  [facility] US ANKLE BRACHIAL INDEX (ANSON) WITHOUT EXERCISE  Patient Name:     CARMENZA Presley Physician: [id number] Darlyn OLEARY MD Study Date:       12/17/2024       Ordering Provider: [id number] BASIM POON MRN/PID:          [id number]         Fellow: Accession#:       [id number]     Technologist:      Deb Allen RVT YOB: 1965        Technologist 2: Gender:           M                Encounter#:        [id number] Admission Status: Emergency        Location           [facility]                                    Performed:  Diagnosis/ICD: Peripheral  vascular disease, unspecified-I73.9 CPT Codes:     86796 Peripheral artery ANSON Only  Pertinent History: Pt presents to the ER with LLE pain and a cold left foot.  **CRITICAL RESULT** Critical Result: severe arterial disease of LLE Notification called to Cherelle Poon on 12/17/2024 at 9:31:33 AM.  PRELIMINARY CONCLUSIONS:  Right Lower PVR: No evidence of arterial occlusive disease in the right lower extremity at rest. Normal digital perfusion noted. Multiphasic flow is noted in the right common femoral artery, right popliteal artery, right posterior tibial artery and right dorsalis pedis artery. Left Lower PVR: Evidence of severe arterial occlusive disease in the left lower extremity at rest. Decreased digital perfusion noted. Monophasic flow is noted in the left common femoral artery, left popliteal artery, left posterior tibial artery and left dorsalis pedis artery. PTA is inaudible. Flat toe waveform(TBI=0).  Imaging & Doppler Findings:  RIGHT Lower PVR                Pressures Ratios Right Posterior Tibial (Ankle) 141 mmHg  1.22 Right Dorsalis Pedis (Ankle)   126 mmHg  1.09 Right Digit (Great Toe)        112 mmHg  0.97   LEFT Lower PVR                Pressures Ratios Left Posterior Tibial (Ankle) 0 mmHg    0.00 Left Dorsalis Pedis (Ankle)   33 mmHg   0.28 Left Digit (Great Toe)        0 mmHg    0.00                      Right Brachial Pressure 116 mmHg   VASCULAR PRELIMINARY REPORT completed by Deb Allen RVT on 12/17/2024 at 9:32:24 AM  ** Final **     CT angio aorta and bilateral iliofemoral runoff including without contrast if performed    Result Date: 12/17/2024  STUDY: CT Angiogram of the Abdomen, Pelvis, and Bilateral Lower Extremities; 12/17/2024 6:38 AM INDICATION: Cold left foot. COMPARISON: CTA AP w/runoffs 11/19/2024, CTA chest 11/19/2024, CTA AP w/runoffs 09/13/2024. ACCESSION NUMBER(S): [id number] ORDERING CLINICIAN: CHERELLE POON TECHNIQUE:  Helical CT is performed from the aortic  diaphragmatic hiatus through the feet after bolus administration of 90 mL of Omnipaque 350.  Images are reviewed and processed at a workstation according to the CT angiogram protocol with 3-D and/or MIP post processing imaging generated. Automated mA/kV exposure control was utilized and patient examination was performed in strict accordance with principles of ALARA. FINDINGS: Vascular: Abdominal Aorta: The celiac, SMA, and DENISE demonstrate no significant stenosis.  The right and left renal arteries demonstrate no significant stenosis. The abdominal aorta is not aneurysmal and demonstrates no significant occlusive disease. Right Run-off: The right common iliac artery shows mild stenosis diffusely.  There is a stent along the entire right external iliac and right common femoral arteries.  The stent demonstrates areas of mild in-stent stenosis. There is an occluded right femoral-popliteal bypass graft. The native right femoral popliteal arteries demonstrate mild atherosclerotic contour.  There is a stent extending from the mid SFA to the proximal popliteal artery.  At the distal SFA the stent demonstrates crush distortion but maintains a patent lumen, though with areas of mild in-stent stenosis. The popliteal artery remains patent throughout its course.  There is patent three-vessel runoff to the ankle, and 2 vessels into the foot. Opacification became too weak to follow the vessels to the plantar arch. Left Run-off: The left common iliac artery demonstrates mild diffuse atherosclerotic stenosis.  The left external iliac artery and left common femoral arteries are relatively normal.  The common femoral artery bifurcation is patent.  The native left SFA occludes just beyond its origin.  The profunda femoral artery and its branches remain patent. There is a chronically occluded left femoral-popliteal bypass graft. The native left SFA does not reconstitute, remaining occluded throughout its course. There is a 2 cm length  patent segment of the left popliteal artery, which is otherwise occluded.  Distal to the patent segment, the popliteal artery is occluded across the level of the joint. The distal popliteal artery reconstitutes remains patent into its trifurcation. 3 patent infrapopliteal vessels are visualized proximally.  The tibial vessels remain patent to the ankle, where the dorsalis pedis and lateral plantar seen across the ankle joint.  The vessels cannot be followed to the plantar arch due to weak contrast opacification. Abdomen: The lung bases are clear.  The liver is unremarkable.  The pancreas, spleen, adrenal glands, and kidneys demonstrate no acute pathology. There is no free air or lymph node enlargement. Pelvis: There is no bowel wall thickening or obstruction.  There is no free fluid.  Lymph nodes are not enlarged.  Urinary bladder is unremarkable. At the left inguinal region there is thick soft tissue in the subcutaneous fat overlying the femoral vessels, as well as 2 prominent lymph nodes. Skeleton:  There are no acute fractures.  No suspicious bony lesions.    No significant aortic disease.  Mild bilateral common iliac artery stenosis.  Patent left external iliac and common femoral arteries. 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  Chronic occlusion of the native left superficial femoral artery. A 2 cm segment of the left P1 popliteal artery reconstitutes.  The terminal left popliteal artery also reconstitutes.  Other portions of the popliteal artery are occluded. Patent three-vessel infrapopliteal runoff to the left hindfoot, beyond which contrast opacification was too weak to visualize. Thick subcutaneous soft tissue - presumed scarred tissue - overlying the left femoral vessels at the left groin.  Correlate for possibility of cellulitis. Patent right iliac and right femoral stents.  Patent right popliteal artery.  Patent three-vessel infrapopliteal runoff to the right ankle. No findings of  an acute process in the abdomen or pelvis. Signed by Simeon Barrera MD    Vascular US Lower Extremity Arterial Duplex Bilateral    Result Date: 12/3/2024              [place] [address]      Phone [phone] Fax [phone]  Vascular Lab Report  [facility] US LOWER EXTREMITY ARTERIAL DUPLEX BILATERAL Patient Name:      CARMENZA OLEARY    Reading Physician:  [id number] Britney Rush MD Study Date:        12/2/2024            Ordering Provider:  [id number] ADELA LYLES MRN/PID:           [id number]             Fellow: Accession#:        [id number]         Technologist:       Deb Allen RVYUSUF Date of Birth/Age: 1965 / 59 years Technologist 2: Gender:            M                    Encounter#:         [id number] Admission Status:  Outpatient           Location Performed: [facility]  Diagnosis/ICD: Peripheral vascular disease, unspecified-I73.9 CPT Codes:     35851 Peripheral artery Lower arterial Duplex complete  Pertinent History: LLE intervention(11/19/2024).  CONCLUSIONS: Right Lower Arterial: The profunda was monophasic and dampened on prior exam and today is not well visualized and appears occluded. All remaining vessels appear patent. Left Lower Arterial: No occlusion or stenosis noted in the vessels visualized. The distal BARB is dampened and appears nearly occluded. Working around staples and dressings. The EIA is not visualized. The proximal and mid CFA and the profunda is not visualized. Minimal visualization of calf vessels.  Imaging & Doppler Findings:  Right                       Left   PSV                        PSV 89 cm/s         EIA 95 cm/s         CFA        78 cm/s  0 cm/s  Profunda Proximal 80 cm/s    SFA Proximal    88 cm/s 148 cm/s      SFA Mid      100 cm/s 112 cm/s    SFA Distal     108 cm/s 73 cm/s      Popliteal     74 cm/s 100 cm/s   BARB Proximal 86 cm/s       BARB Mid 53 cm/s      BARB Distal     11 cm/s 48 cm/s  Peroneal Proximal 55 cm/s    Peroneal Mid    77 cm/s 40 cm/s   Peroneal Distal 34 cm/s    PTA Proximal    48 cm/s 31 cm/s       PTA Mid 52 cm/s     PTA Distal  [id number] Britney Rush MD Electronically signed by [id number] Rush MD on 12/3/2024 at 11:43:03 AM  ** Final **     Vascular US ankle brachial index (ANSON) without exercise    Result Date: 12/3/2024              [place] [address]      Phone [phone] Fax [phone]  Vascular Lab Report  VASC US ANKLE BRACHIAL INDEX (ANSON) WITHOUT EXERCISE Patient Name:      CARMENZA Presley Physician:  [id number] Rush MD Study Date:        12/2/2024            Ordering Provider:  [id number] ADELA LYLES MRN/PID:           [id number]             Fellow: Accession#:        [id number]         Technologist:       Deb Allen RVT Date of Birth/Age: 1965 / 59 years Technologist 2: Gender:            M                    Encounter#:         [id number] Admission Status:  Outpatient           Location Performed: [facility]  Diagnosis/ICD: Peripheral vascular disease, unspecified-I73.9 CPT Codes:     95534 Peripheral artery ANSON Only  Pertinent History: Recent LLE intervention(11/19/2024).  CONCLUSIONS: Right Lower PVR: No evidence of arterial occlusive disease in the right lower extremity at rest. Normal digital perfusion noted. Multiphasic flow is noted in the right common femoral artery, right posterior tibial artery and right dorsalis pedis artery. Left Lower PVR: Evidence of moderate arterial occlusive disease in the left lower extremity at rest. Decreased digital perfusion noted. Monophasic flow is noted in the left posterior tibial artery and left dorsalis pedis artery. Multiphasic flow is noted in the left common femoral artery.  Comparison: Compared with study from 5/23/2024, The left ANSON  decreased from .96 to .67 today. No significant change on the right.  Imaging & Doppler Findings:  RIGHT Lower PVR                Pressures Ratios Right Posterior Tibial (Ankle) 169 mmHg  0.94 Right Dorsalis Pedis (Ankle)   158 mmHg  0.88 Right Digit (Great Toe)        113 mmHg  0.63   LEFT Lower PVR                Pressures Ratios Left Posterior Tibial (Ankle) 109 mmHg  0.61 Left Dorsalis Pedis (Ankle)   120 mmHg  0.67 Left Digit (Great Toe)        105 mmHg  0.58                      Right Brachial Pressure 180 mmHg   [id number] Rush MD Electronically signed by Enma Rush MD on 12/3/2024 at 11:38:55 AM  ** Final **     Transthoracic Echo (TTE) Complete    Result Date: 11/20/2024              [place] [address]      Phone [phone] Fax [phone] TRANSTHORACIC ECHOCARDIOGRAM REPORT Patient Name:       CARMENZA URBINA MAMTA   Reading Physician:    [id number] Rush MD Study Date:         11/20/2024          Ordering Provider:    [id number] SEARS MRN/PID:            [id number]            Fellow: Accession#:         [id number]        Nurse:                Noemi Villa RN Date of Birth/Age:  1965 / 59      Sonographer:          Laxmi crisostomo RDCS Gender Assigned at  M                   Additional Staff: Birth: Height:             180.34 cm           Admit Date:           11/19/2024 Weight:             87.09 kg            Admission Status:     Inpatient -                                                               Routine BSA / BMI:          2.07 m2 / 26.78     Department Location:  [facility]                     kg/m2 Blood Pressure: 107 /64 mmHg Study Type:    TRANSTHORACIC ECHO (TTE) COMPLETE Diagnosis/ICD: Shortness of breath-R06.02; Unspecified systolic (congestive)                heart failure  (CHF)-I50.20 Indication:    CHF, SHORTNESS OF BREATH CPT Codes:     Echo Complete w Full Doppler-01029 Patient History: Pertinent History: CABG x5 2019, HTN. Study Detail: The following Echo studies were performed: 2D, M-Mode, Doppler and               color flow. Technically challenging study due to prominent lung               artifact and body habitus. Optison used as a contrast agent for               endocardial border definition. Total contrast used for this               procedure was 3 mL via IV push.  PHYSICIAN INTERPRETATION: Left Ventricle: The left ventricular systolic function is moderately decreased, with a Salinas's biplane calculated ejection fraction of 38%. Wall motion is abnormal. The left ventricular cavity size is normal. There is normal septal and normal posterior left ventricular wall thickness. Spectral Doppler shows an abnormal pattern of left ventricular diastolic filling. Akinetic inferolateral segment, and basal inferior wall. Left Atrium: The left atrium is normal in size. Right Ventricle: The right ventricle was not well visualized. Right ventricular systolic function not assessed. Right Atrium: The right atrium is normal in size. Aortic Valve: The aortic valve appears structurally normal. The aortic valve dimensionless index is 0.73. There is no evidence of aortic valve regurgitation. The peak instantaneous gradient of the aortic valve is 9 mmHg. The mean gradient of the aortic valve is 4 mmHg. Mitral Valve: The mitral valve is normal in structure. There is no evidence of mitral valve regurgitation. Tricuspid Valve: The tricuspid valve is structurally normal. There is trace tricuspid regurgitation. Pulmonic Valve: The pulmonic valve is structurally normal. There is no indication of pulmonic valve regurgitation. Pericardium: No pericardial effusion noted. Aorta: The aortic root is normal. Systemic Veins: The inferior vena cava appears normal in size, with IVC inspiratory collapse  greater than 50%.  CONCLUSIONS:  1. Poorly visualized anatomical structures due to suboptimal image quality.  2. The left ventricular systolic function is moderately decreased, with a Salinas's biplane calculated ejection fraction of 38%.  3. Abnormal wall motion.  4. Spectral Doppler shows an abnormal pattern of left ventricular diastolic filling.  5. Akinetic inferolateral segment, and basal inferior wall. QUANTITATIVE DATA SUMMARY:  2D MEASUREMENTS:           Normal Ranges: Ao Root d:       2.80 cm   (2.0-3.7cm) LAs:             3.60 cm   (2.7-4.0cm) IVSd:            0.70 cm   (0.6-1.1cm) LVPWd:           0.70 cm   (0.6-1.1cm) LVIDd:           6.10 cm   (3.9-5.9cm) LVIDs:           5.30 cm LV Mass Index:   78.5 g/m2 LV % FS          13.1 %  LA VOLUME:                    Normal Ranges: LA Vol A4C:        48.7 ml    (22+/-6mL/m2) LA Vol A2C:        49.8 ml LA Vol BP:         51.4 ml LA Vol Index A4C:  23.5ml/m2 LA Vol Index A2C:  24.0 ml/m2 LA Vol Index BP:   24.8 ml/m2 LA Area A4C:       17.6 cm2 LA Area A2C:       18.6 cm2 LA Major Axis A4C: 5.4 cm LA Major Axis A2C: 5.9 cm LA Volume Index:   24.8 ml/m2  RA VOLUME BY A/L METHOD:          Normal Ranges: RA Area A4C:             13.5 cm2  AORTA MEASUREMENTS:         Normal Ranges: Ao Sinus, d:        2.80 cm (2.1-3.5cm) Ao STJ, d:          2.60 cm (1.7-3.4cm) Asc Ao, d:          2.90 cm (2.1-3.4cm)  LV SYSTOLIC FUNCTION BY 2D PLANIMETRY (MOD):                      Normal Ranges: EF-A4C View:    38 % (>=55%) EF-A2C View:    39 % EF-Biplane:     38 % LV EF Reported: 38 %  LV DIASTOLIC FUNCTION:             Normal Ranges: MV Peak E:             0.75 m/s    (0.7-1.2 m/s) MV Peak A:             0.65 m/s    (0.42-0.7 m/s) E/A Ratio:             1.17        (1.0-2.2) MV e'                  0.069 m/s   (>8.0) MV lateral e'          0.08 m/s MV medial e'           0.06 m/s MV A Dur:              109.00 msec E/e' Ratio:            10.91       (<8.0)  MITRAL VALVE:           Normal Ranges: MV DT:        211 msec (150-240msec)  AORTIC VALVE:                     Normal Ranges: AoV Vmax:                1.46 m/s (<=1.7m/s) AoV Peak P.5 mmHg (<20mmHg) AoV Mean P.0 mmHg (1.7-11.5mmHg) LVOT Max Ayad:            1.09 m/s (<=1.1m/s) AoV VTI:                 29.40 cm (18-25cm) LVOT VTI:                21.60 cm LVOT Diameter:           2.20 cm  (1.8-2.4cm) AoV Area, VTI:           2.79 cm2 (2.5-5.5cm2) AoV Area,Vmax:           2.84 cm2 (2.5-4.5cm2) AoV Dimensionless Index: 0.73  RIGHT VENTRICLE: TAPSE: 19.1 mm  TRICUSPID VALVE/RVSP:          Normal Ranges: Peak TR Velocity:     2.00 m/s RV Syst Pressure:     19 mmHg  (< 30mmHg) IVC Diam:             1.60 cm  [id number] Britney Rush MD Electronically signed on 2024 at 1:49:12 PM  ** Final **     ECG 12 lead    Result Date: 2024  Sinus rhythm Probable left atrial enlargement Borderline repolarization abnormality ST elevation, consider anterior injury Borderline prolonged QT interval See ED provider note for full interpretation and clinical correlation Confirmed by Zulema Stephen ([id number]) on 2024 11:07:14 AM    CT angio aorta and bilateral iliofemoral runoff including without contrast if performed    Result Date: 2024  Interpreted By:  Finkelstein, Evan, STUDY: CT ANGIO AORTA AND BILATERAL ILIOFEMORAL RUN OFF INCLUDING WITHOUT CONTRAST IF PERFORMED;  2024 4:42 am   INDICATION: Signs/Symptoms:no pulses left leg.     COMPARISON: CT runoff 2024   ACCESSION NUMBER(S): [id number]   ORDERING CLINICIAN: ACE VALENZUELA   TECHNIQUE: Contiguous axial CTA images were acquired through the abdomen and pelvis and bilateral lower extremities following the administration of 100 mL Omnipaque 350 intravenous contrast. Sagittal and coronal images were reconstructed. Maximum intensity projection and three dimensional images were constructed at a separate workstation.   FINDINGS: VASCULAR  FINDINGS:   ABDOMINAL AORTA & ILIAC ARTERIES: No evidence of abdominal aortic aneurysm or dissection.   Moderate atherosclerotic calcifications throughout the abdominal aorta and major proximal vasculature. Celiac axis, superior mesenteric artery, and inferior mesenteric artery are patent without any significant narrowing. Bilateral renal arteries are patent without any significant.   Right common iliac and external iliac arteries are patent without any significant narrowing. Patent right common/external iliac stent. Moderate to severe narrowing of the internal iliac arteries bilaterally. Left common and external iliac arteries are patent.     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 Common femoral artery is patent.  Native superficial femoral artery is patent. There is a stent within the mid to lower aspect of the native right superficial femoral artery which is patent. Profunda femoral artery is patent.  Popliteal artery is patent. Tibioperoneal trunk, posterior tibial artery, peroneal artery, and anterior tibial artery are patent. Three-vessel runoff at the ankle.   LEFT LOWER EXTREMITY: Common femoral artery is patent. 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 Redemonstrated occlusion of the native superficial femoral artery, similar compared to prior imaging. There is reconstitution of the popliteal artery. Poor opacification of the trifurcation of vessels just below the knee with poor opacification of the vessels throughout the entirety of the calf, ankle and foot, new/worsened compared to prior imaging..   ---------------------------------------------------------------   NON-VASCULAR FINDINGS:   LIVER: Within normal limits.   BILE DUCTS: Nondilated.   GALLBLADDER: No evidence of calcified gallstones or wall thickening.   PANCREAS: Within normal limits.   SPLEEN: A splenule is present. The spleen is otherwise unremarkable..    ADRENALS: Within normal limits.   KIDNEYS, URETERS, URINARY BLADDER: Unremarkable. No evidence of hydronephrosis.   BOWEL: No evidence of abnormal dilatation or obstruction of the small or large bowel. No evidence of pneumoperitoneum.   REPRODUCTIVE ORGANS:  Unremarkable   PERITONEUM / RETROPERITONEUM / LYMPH NODES: No evidence of any free fluid. No evidence of any significantly enlarged intra-abdominal or pelvic lymph nodes.   ABDOMINAL WALL: Fat containing inguinal hernias.   LOWER CHEST: Please refer to separately dictated CT chest report.   BONES: No evidence of suspicious lytic or blastic osseous lesions.       Persistent occlusion of the left femoral/popliteal bypass graft and native left superficial femoral artery. There is also new occlusion versus severe stenosis involving the vessels below the left knee beginning at the level of the trifurcation with the anterior tibial, posterior tibial and peroneal arteries not well seen through the calf, ankle and foot.   No acute abnormality of the abdomen or pelvis.   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 There is good opacification of the native right superficial femoral artery with three-vessel runoff to the right ankle.   MACRO: None.   Signed by: Evan Finkelstein 11/19/2024 5:20 AM Dictation workstation:   [id number]    CT angio chest for pulmonary embolism    Result Date: 11/19/2024  Interpreted By:  Finkelstein, Evan, STUDY: CT ANGIO CHEST FOR PULMONARY EMBOLISM;  11/19/2024 4:27 am   INDICATION: Signs/Symptoms:sob wih hypoxia.     COMPARISON: CT chest 06/23/2021   ACCESSION NUMBER(S): [id number]   ORDERING CLINICIAN: ACE VALENZUELA   TECHNIQUE: Axial CTA images of the chest after intravenous administration of 50 mL Omnipaque 350 using CT angiographic technique. Coronal and sagittal images are reconstructed. MIP images were created and reviewed.   FINDINGS: CHEST WALL AND LOWER NECK: Within normal limits. ABDOMEN: No  acute abnormality of the partially visualized abdomen.   VASCULAR: AORTA: No aortic aneurysm. Mild atherosclerotic disease. PULMONARY ARTERY: Normal caliber. No pulmonary embolus to the segmental level.   CHEST:   HEART: Normal size. No pericardial effusion. MEDIASTINUM AND REJI: No pathologically enlarged thoracic lymph nodes. LUNG, PLEURA, LARGE AIRWAYS: Trace left pleural effusion. Thickened interlobular septal markings. Ground-glass opacities scattered throughout the lungs bilaterally with an upper lung zone predominance.   BONES: No acute osseous abnormality. Median sternotomy wires are present.       No acute pulmonary embolus to the segmental level.   Thickened interlobular septal markings with scattered ground-glass opacities with an upper lung zone predominance. Findings may be seen with mixed interstitial/alveolar pulmonary edema. Other superimposed infectious or inflammatory processes are also not excluded.   Trace left pleural effusion   MACRO: None.   Signed by: Evan Finkelstein 11/19/2024 4:36 AM Dictation workstation:   [id number]       Lab Results  Results for orders placed or performed during the hospital encounter of 12/17/24 (from the past 24 hours)   POCT GLUCOSE   Result Value Ref Range    POCT Glucose 82 74 - 99 mg/dL   POCT GLUCOSE   Result Value Ref Range    POCT Glucose 156 (H) 74 - 99 mg/dL   POCT GLUCOSE   Result Value Ref Range    POCT Glucose 83 74 - 99 mg/dL   POCT GLUCOSE   Result Value Ref Range    POCT Glucose 129 (H) 74 - 99 mg/dL   Basic Metabolic Panel   Result Value Ref Range    Glucose 112 (H) 74 - 99 mg/dL    Sodium 133 (L) 136 - 145 mmol/L    Potassium 4.4 3.5 - 5.3 mmol/L    Chloride 99 98 - 107 mmol/L    Bicarbonate 25 21 - 32 mmol/L    Anion Gap 13 10 - 20 mmol/L    Urea Nitrogen 28 (H) 6 - 23 mg/dL    Creatinine 1.22 0.50 - 1.30 mg/dL    eGFR 69 >60 mL/min/1.73m*2    Calcium 8.3 (L) 8.6 - 10.3 mg/dL   CBC   Result Value Ref Range    WBC 6.1 4.4 - 11.3 x10*3/uL    nRBC  0.0 0.0 - 0.0 /100 WBCs    RBC 3.28 (L) 4.50 - 5.90 x10*6/uL    Hemoglobin 10.3 (L) 13.5 - 17.5 g/dL    Hematocrit 30.4 (L) 41.0 - 52.0 %    MCV 93 80 - 100 fL    MCH 31.4 26.0 - 34.0 pg    MCHC 33.9 32.0 - 36.0 g/dL    RDW 13.2 11.5 - 14.5 %    Platelets 198 150 - 450 x10*3/uL   Magnesium   Result Value Ref Range    Magnesium 2.03 1.60 - 2.40 mg/dL   Heparin Assay, UFH   Result Value Ref Range    Heparin Unfractionated 0.4 See Comment Below for Therapeutic Ranges IU/mL   POCT GLUCOSE   Result Value Ref Range    POCT Glucose 149 (H) 74 - 99 mg/dL     *Note: Due to a large number of results and/or encounters for the requested time period, some results have not been displayed. A complete set of results can be found in Results Review.        Medications  Scheduled medications:  acetaminophen, 650 mg, oral, q6h  aspirin, 81 mg, oral, Daily  atorvastatin, 80 mg, oral, Nightly  [Held by provider] carvedilol, 6.25 mg, oral, BID  [Held by provider] dapagliflozin propanediol, 10 mg, oral, Daily  docusate sodium, 100 mg, oral, BID  ezetimibe, 10 mg, oral, Nightly  gabapentin, 400 mg, oral, TID  [Held by provider] hydrALAZINE, 50 mg, oral, TID  insulin glargine, 24 Units, subcutaneous, Nightly  insulin lispro, 0-10 Units, subcutaneous, TID AC  insulin lispro, 14 Units, subcutaneous, TID AC  isosorbide mononitrate ER, 60 mg, oral, Daily  nicotine, 1 patch, transdermal, Daily  OXcarbazepine, 450 mg, oral, BID  oxygen, , inhalation, Continuous - Inhalation  pantoprazole, 40 mg, oral, Daily before breakfast  polyethylene glycol, 17 g, oral, Daily  ranolazine, 500 mg, oral, BID  [Held by provider] sacubitriL-valsartan, 1 tablet, oral, BID  tamsulosin, 0.4 mg, oral, Daily  ticagrelor, 90 mg, oral, BID      Continuous medications:  heparin, 0-4,500 Units/hr, Last Rate: 1,300 Units/hr (12/20/24 0607)      PRN medications:  PRN medications: [Transfer Hold] albuterol, dextrose, dextrose, glucagon, glucagon, HYDROmorphone, naloxone,  ondansetron ODT **OR** ondansetron, oxyCODONE, oxyCODONE     Physical Exam  Constitutional:       General: He is not in acute distress.     Appearance: Normal appearance.   HENT:      Head: Normocephalic and atraumatic.      Right Ear: External ear normal.      Left Ear: External ear normal.      Nose: Nose normal.      Mouth/Throat:      Mouth: Mucous membranes are moist.      Pharynx: Oropharynx is clear.   Eyes:      Extraocular Movements: Extraocular movements intact.      Conjunctiva/sclera: Conjunctivae normal.      Pupils: Pupils are equal, round, and reactive to light.   Cardiovascular:      Rate and Rhythm: Normal rate and regular rhythm.      Pulses: Normal pulses.      Heart sounds: Normal heart sounds.      Comments: Unable to palpate bilateral DP/PT pulses  L foot is pink and warm  Pulmonary:      Effort: Pulmonary effort is normal. No respiratory distress.      Breath sounds: Normal breath sounds. No wheezing, rhonchi or rales.   Abdominal:      General: Bowel sounds are normal.      Palpations: Abdomen is soft.      Tenderness: There is no abdominal tenderness. There is no right CVA tenderness, left CVA tenderness, guarding or rebound.   Musculoskeletal:      Cervical back: Normal range of motion and neck supple.      Comments: Minimal movement of L ankle   Skin:     General: Skin is warm and dry.      Findings: Lesion (L groin, LLE) present. No rash.      Comments: Post-operative dressing with small amount of bloody strikethrough, did not remove dressing  NVS intact distal to operative site    Neurological:      General: No focal deficit present.      Mental Status: He is alert and oriented to person, place, and time. Mental status is at baseline.   Psychiatric:         Mood and Affect: Mood normal.         Behavior: Behavior normal.                  Assessment/Plan      Critical limb ischemia of LLE with reocclusion of L SFA and popliteal arteries- s/p left femoropopliteal bypass with vein patch  left distal anastomosis 12/18/24  Hx severe lower extremity PAD with multiple prior revascularizations  Most recently L femoral and tibial thrombectomy 11/19/24 with 4 compartment fasciotomy   Pt reports he is compliant with Eliquis, Brilinta and aspirin   Monitor incision sites for signs of bleeding  Neurovascular checks and pulse checks  Wound care consult for wound care of fasciotomy sites   Continue aspirin and brilinta  Heparin drip changed back to PO Eliquis 12/20/24  Pain control as able     Hx CAD s/p CABG  Continue home Lipitor and Zetia  Continue home aspirin and brilinta  Continue home ranexa     SAMEERA  Baseline appears to be ~1.0-1.3  Avoid nephrotoxins  Consider nephrology consult if worsening   Hold home farxiga and entresto in setting of SAMEERA, resume as appropriate      IDDM-II with hyperglycemia  Continue Lantus and humalog TID AC  A1C of 8.2% as of October 2024   Start SSI protocol  Consult endocrinology  Goal glucose <180 for better wound healing      HTN  BP is low- hold home meds and reintroduce as able    History of multiple CVA  History of R PCA stroke in 2019, L MCA stroke in 2021, bihemispheric small infarcts in 2022, and L MCA scattered embolic infarcts in 2023   Continue as above    COPD without acute exacerbation  Cigarette smoker x48 years, continues to smoke but cut back to 3 cigarettes per day   Albuterol MDI as needed for shortness of breath/wheezing     BRUNA  Noncompliant with CPAP    Tobacco use disorder   Reports that he has cut back to 3 cigarettes per day and has been trying very hard to quit.   Smoking cessation education >3 minutes provided   NRT if needed    Code Status: Full Code          DVT ppx: Heparin drip     Please see orders for more complete plan    Lisa Marlow PA-C

## 2024-12-20 NOTE — PROGRESS NOTES
Physical Therapy    Physical Therapy Treatment    Patient Name: Dereck Shen  MRN: [id number]  Department: [facility]  Room: [address] [place] [address]  Today's Date: 12/20/2024  Time Calculation  Start Time: 0829  Stop Time: 0855  Time Calculation (min): 26 min         Assessment/Plan   PT Assessment  End of Session Communication: Bedside nurse  Assessment Comment: patient incresed gait to 45 fet, still with limtied knee flexion . verbal cues for heel strike. patient wiht  left knee threateningt to buckle at beggining  of standing and gait  but improved with gait  End of Session Patient Position: Bed, 3 rail up (sitting EOB with alarm on)  PT Plan  Inpatient/Swing Bed or Outpatient: Inpatient  PT Plan  Treatment/Interventions: Bed mobility, Transfer training, Gait training, Neuromuscular re-education, Therapeutic exercise, Therapeutic activity (gentle SELF AROM LLE only)  PT Plan: Ongoing PT  PT Frequency: 5 times per week  PT Discharge Recommendations: Low intensity level of continued care  PT Recommended Transfer Status: Assist x1  PT - OK to Discharge: Yes      General Visit Information:   PT  Visit  PT Received On: 12/20/24  Response to Previous Treatment: Patient reporting fatigue but able to participate.  General  Prior to Session Communication: Bedside nurse  Patient Position Received: Bed, 3 rail up, Alarm on  General Comment: patient in bed agrees to therapy.   Subjective   Precautions:       Vital Signs (Past 2hrs)                 Objective   Pain:  Pain Assessment  Pain Assessment: 0-10  0-10 (Numeric) Pain Score: 8  Pain Type: Acute pain  Pain Location: Leg  Pain Orientation: Right  Pain Interventions: Ambulation/increased activity  Response to Interventions: No change in pain  Cognition:  Cognition  Overall Cognitive Status: Within Functional Limits    Activity Tolerance:  Activity Tolerance  Endurance: Tolerates 30 min exercise with multiple rests  Treatments:  patient performed YOLIE LE ankle pumps, SAQ, heel slides,  hip abduction 20 reps each with est between. transfer training supine to sit with min assist. sit to stand with min assist. gait training with FWW and min assist x2  45 feet with min assist x2 stand to sit with min assist.    Outcome Measures:  [facility] Basic Mobility  Turning from your back to your side while in a flat bed without using bedrails: None  Moving from lying on your back to sitting on the side of a flat bed without using bedrails: A little  Moving to and from bed to chair (including a wheelchair): A lot  Standing up from a chair using your arms (e.g. wheelchair or bedside chair): A lot  To walk in hospital room: A lot  Climbing 3-5 steps with railing: Total  Basic Mobility - Total Score: 14    Education Documentation  Body Mechanics, taught by Kerrie Car PTA at 12/20/2024  9:48 AM.  Learner: Patient  Readiness: Acceptance  Method: Explanation  Response: Verbalizes Understanding    Home Exercise Program, taught by Kerrie Car PTA at 12/20/2024  9:48 AM.  Learner: Patient  Readiness: Acceptance  Method: Explanation  Response: Verbalizes Understanding    Mobility Training, taught by Kerrie Car PTA at 12/20/2024  9:48 AM.  Learner: Patient  Readiness: Acceptance  Method: Explanation  Response: Verbalizes Understanding    Education Comments  No comments found.        OP EDUCATION:       Encounter Problems       Encounter Problems (Active)       Mobility       STG - Patient will ambulate household distance using FWW modified indep safely/consistently (Progressing)       Start:  12/19/24    Expected End:  01/02/25               PT Transfers       STG - Patient will transfer sit to and from stand using FWW modified indep safely/consistently (Progressing)       Start:  12/19/24    Expected End:  01/02/25               Pain - Adult          Strengthening        RLE ONLY: Pt will perform 10+ reps AROM  to improve functional strength needed for improved mobility, SELF/AROM LLE as prateek (Progressing)        Start:  12/19/24    Expected End:  01/02/25

## 2024-12-20 NOTE — PROGRESS NOTES
Dereck Sehn is a 59 y.o. male on day 3 of admission presenting with Peripheral artery occlusion (CMS-HCC).    Subjective   POD#2 s/p left fem-below knee popliteal bypass with vein patch left distal anastomosis     Overall doing well. Some incisional pain but managed with analgesia. Left foot feels good, improvement of motor and sensory function per patient. Douglass removed today, voiding without issue. OOB and ambulated with PT. Hep gtt discontinued Eliquis started this morning.        Objective     Physical Exam  Constitutional:       Appearance: Normal appearance.   HENT:      Head: Normocephalic.      Right Ear: External ear normal.      Left Ear: External ear normal.      Nose: Nose normal.      Mouth/Throat:      Mouth: Mucous membranes are moist.   Eyes:      Extraocular Movements: Extraocular movements intact.   Neck:      Vascular: No carotid bruit.   Cardiovascular:      Rate and Rhythm: Normal rate and regular rhythm.      Pulses: Normal pulses.      Comments: LLE warm to the toes, left foot warm and well perfused. Motor and sensory function of the left foot much improved from preoperatively. Strong PT/AT/DP signals by doppler. Left groin and left prxomal medial calf incisions with staples in place c/d/I soft surrounding. Left medial and lateral calf open fasciotomy incisions healing. No drainage.   Pulmonary:      Effort: Pulmonary effort is normal. No respiratory distress.      Breath sounds: Normal breath sounds.   Abdominal:      Palpations: Abdomen is soft.      Tenderness: There is no abdominal tenderness.   Musculoskeletal:         General: No swelling or tenderness.      Cervical back: Normal range of motion and neck supple.      Right lower leg: No edema.      Left lower leg: Edema present.   Skin:     General: Skin is warm and dry.      Capillary Refill: Capillary refill takes less than 2 seconds.      Findings: No lesion.   Neurological:      General: No focal deficit present.      Mental  "Status: He is alert and oriented to person, place, and time. Mental status is at baseline.   Psychiatric:         Mood and Affect: Mood normal.         Behavior: Behavior normal.         Thought Content: Thought content normal.         Judgment: Judgment normal.         Last Recorded Vitals  Blood pressure 127/75, pulse 91, temperature 37.3 °C (99.1 °F), temperature source Temporal, resp. rate 18, height 1.803 m (5' 11\"), weight 90.6 kg (199 lb 11.2 oz), SpO2 96%.  Vitals:    12/20/24 0135 12/20/24 0445 12/20/24 0600 12/20/24 0952   BP: 101/62 111/76  127/75   BP Location: Left arm Left arm  Left arm   Patient Position: Lying Lying  Lying   Pulse: 87 88  91   Resp: 17 18  18   Temp: 36.6 °C (97.9 °F) 36.9 °C (98.4 °F)  37.3 °C (99.1 °F)   TempSrc: Temporal Temporal  Temporal   SpO2: 94% 97%  96%   Weight:   90.6 kg (199 lb 11.2 oz)    Height:          Intake/Output last 3 Shifts:  I/O last 3 completed shifts:  In: 2511.8 (27.7 mL/kg) [P.O.:1440; I.V.:471.8 (5.2 mL/kg); IV Piggyback:600]  Out: 2059 (22.7 mL/kg) [Urine:2059 (0.6 mL/kg/hr)]  Weight: 90.6 kg     Relevant Results         Results for orders placed or performed during the hospital encounter of 12/17/24 (from the past 24 hours)   POCT GLUCOSE   Result Value Ref Range    POCT Glucose 156 (H) 74 - 99 mg/dL   POCT GLUCOSE   Result Value Ref Range    POCT Glucose 83 74 - 99 mg/dL   POCT GLUCOSE   Result Value Ref Range    POCT Glucose 129 (H) 74 - 99 mg/dL   Basic Metabolic Panel   Result Value Ref Range    Glucose 112 (H) 74 - 99 mg/dL    Sodium 133 (L) 136 - 145 mmol/L    Potassium 4.4 3.5 - 5.3 mmol/L    Chloride 99 98 - 107 mmol/L    Bicarbonate 25 21 - 32 mmol/L    Anion Gap 13 10 - 20 mmol/L    Urea Nitrogen 28 (H) 6 - 23 mg/dL    Creatinine 1.22 0.50 - 1.30 mg/dL    eGFR 69 >60 mL/min/1.73m*2    Calcium 8.3 (L) 8.6 - 10.3 mg/dL   CBC   Result Value Ref Range    WBC 6.1 4.4 - 11.3 x10*3/uL    nRBC 0.0 0.0 - 0.0 /100 WBCs    RBC 3.28 (L) 4.50 - 5.90 " x10*6/uL    Hemoglobin 10.3 (L) 13.5 - 17.5 g/dL    Hematocrit 30.4 (L) 41.0 - 52.0 %    MCV 93 80 - 100 fL    MCH 31.4 26.0 - 34.0 pg    MCHC 33.9 32.0 - 36.0 g/dL    RDW 13.2 11.5 - 14.5 %    Platelets 198 150 - 450 x10*3/uL   Magnesium   Result Value Ref Range    Magnesium 2.03 1.60 - 2.40 mg/dL   Heparin Assay, UFH   Result Value Ref Range    Heparin Unfractionated 0.4 See Comment Below for Therapeutic Ranges IU/mL   POCT GLUCOSE   Result Value Ref Range    POCT Glucose 149 (H) 74 - 99 mg/dL   POCT GLUCOSE   Result Value Ref Range    POCT Glucose 129 (H) 74 - 99 mg/dL     *Note: Due to a large number of results and/or encounters for the requested time period, some results have not been displayed. A complete set of results can be found in Results Review.      Scheduled medications  acetaminophen, 650 mg, oral, q6h  apixaban, 5 mg, oral, BID  aspirin, 81 mg, oral, Daily  atorvastatin, 80 mg, oral, Nightly  [Held by provider] carvedilol, 6.25 mg, oral, BID  [Held by provider] dapagliflozin propanediol, 10 mg, oral, Daily  docusate sodium, 100 mg, oral, BID  ezetimibe, 10 mg, oral, Nightly  gabapentin, 400 mg, oral, TID  [Held by provider] hydrALAZINE, 50 mg, oral, TID  insulin glargine, 20 Units, subcutaneous, Nightly  insulin lispro, 0-10 Units, subcutaneous, TID AC  insulin lispro, 14 Units, subcutaneous, TID AC  isosorbide mononitrate ER, 60 mg, oral, Daily  nicotine, 1 patch, transdermal, Daily  OXcarbazepine, 450 mg, oral, BID  oxygen, , inhalation, Continuous - Inhalation  pantoprazole, 40 mg, oral, Daily before breakfast  polyethylene glycol, 17 g, oral, Daily  ranolazine, 500 mg, oral, BID  [Held by provider] sacubitriL-valsartan, 1 tablet, oral, BID  tamsulosin, 0.4 mg, oral, Daily  ticagrelor, 90 mg, oral, BID      Continuous medications     PRN medications  PRN medications: albuterol, dextrose, dextrose, glucagon, glucagon, HYDROmorphone, naloxone, ondansetron ODT **OR** ondansetron, oxyCODONE, oxyCODONE                         Assessment/Plan   Assessment & Plan  Peripheral artery occlusion (CMS-HCC)    Critical limb ischemia of left lower extremity (Multi)  59 year old male PAD s/p multiple lower extremity revascularizations, critical limb ischemia with Left SFA occlusion now POD#2 s/p left fem-below knee popliteal bypass with vein patch left distal anastomosis. Overall doing well postoperatively. Hemodynamically stable. Pain controlled. Left foot warm, well perfused strong pedal signals by doppler  -Pain control as needed, wean IV narcotics  -continue peripheral vascular checks   -Encourage IS and deep breathing  -Diet as tolerated, bowel reg  -Douglass discontinued today, voiding without issue  -close monitoring of fluid status, strict I&Os  -Encourage PT/OT, OOB and ambulation  -wound care consult for continued wound care of prior fasciotomy sites  -social work/transitional care coordinator for home care services on discharge vs STR  -continue care per primary hospitalist team  -discussed above course of care and treatment plan with Dr Peace who is in agreement. Discussed with primary hospitalist medical team.         Marla Aguilar PA-C

## 2024-12-20 NOTE — ASSESSMENT & PLAN NOTE
59 year old male PAD s/p multiple lower extremity revascularizations, critical limb ischemia with Left SFA occlusion now POD#2 s/p left fem-below knee popliteal bypass with vein patch left distal anastomosis. Overall doing well postoperatively. Hemodynamically stable. Pain controlled. Left foot warm, well perfused strong pedal signals by doppler  -Pain control as needed, wean IV narcotics  -continue peripheral vascular checks   -Encourage IS and deep breathing  -Diet as tolerated, bowel reg  -Douglass discontinued today, voiding without issue  -close monitoring of fluid status, strict I&Os  -Encourage PT/OT, OOB and ambulation  -wound care consult for continued wound care of prior fasciotomy sites  -social work/transitional care coordinator for home care services on discharge vs STR  -continue care per primary hospitalist team  -discussed above course of care and treatment plan with Dr Peace who is in agreement. Discussed with primary hospitalist medical team.

## 2024-12-20 NOTE — CARE PLAN
The patient's goals for the shift include      The clinical goals for the shift include patient will remain safe and free from falls during this shift      Problem: Diabetes  Goal: Achieve decreasing blood glucose levels by end of shift  Outcome: Progressing  Goal: Increase stability of blood glucose readings by end of shift  Outcome: Progressing  Goal: Decrease in ketones present in urine by end of shift  Outcome: Progressing  Goal: Maintain electrolyte levels within acceptable range throughout shift  Outcome: Progressing  Goal: Maintain glucose levels >70mg/dl to <250mg/dl throughout shift  Outcome: Progressing  Goal: No changes in neurological exam by end of shift  Outcome: Progressing  Goal: Learn about and adhere to nutrition recommendations by end of shift  Outcome: Progressing  Goal: Vital signs within normal range for age by end of shift  Outcome: Progressing  Goal: Increase self care and/or family involovement by end of shift  Outcome: Progressing  Goal: Receive DSME education by end of shift  Outcome: Progressing     Problem: Pain  Goal: Takes deep breaths with improved pain control throughout the shift  Outcome: Progressing  Goal: Turns in bed with improved pain control throughout the shift  Outcome: Progressing  Goal: Walks with improved pain control throughout the shift  Outcome: Progressing  Goal: Performs ADL's with improved pain control throughout shift  Outcome: Progressing  Goal: Participates in PT with improved pain control throughout the shift  Outcome: Progressing  Goal: Free from opioid side effects throughout the shift  Outcome: Progressing  Goal: Free from acute confusion related to pain meds throughout the shift  Outcome: Progressing     Problem: Pain - Adult  Goal: Verbalizes/displays adequate comfort level or baseline comfort level  Outcome: Progressing     Problem: Safety - Adult  Goal: Free from fall injury  Outcome: Progressing     Problem: Discharge Planning  Goal: Discharge to home or  other facility with appropriate resources  Outcome: Progressing     Problem: Chronic Conditions and Co-morbidities  Goal: Patient's chronic conditions and co-morbidity symptoms are monitored and maintained or improved  Outcome: Progressing     Problem: Nutrition  Goal: Oral intake greater 75%  Outcome: Progressing  Goal: Consume prescribed supplement  Outcome: Progressing  Goal: BG  mg/dL  Outcome: Progressing  Goal: Lab values WNL  Outcome: Progressing  Goal: Electrolytes WNL  Outcome: Progressing  Goal: Promote healing  Outcome: Progressing  Goal: Maintain stable weight  Outcome: Progressing  Goal: Reduce weight from edema/fluid  Outcome: Progressing     Problem: Skin  Goal: Decreased wound size/increased tissue granulation at next dressing change  Outcome: Progressing  Flowsheets (Taken 12/19/2024 2035)  Decreased wound size/increased tissue granulation at next dressing change: Promote sleep for wound healing  Goal: Participates in plan/prevention/treatment measures  Outcome: Progressing  Goal: Prevent/manage excess moisture  Outcome: Progressing  Flowsheets (Taken 12/19/2024 2035)  Prevent/manage excess moisture: Moisturize dry skin  Goal: Prevent/minimize sheer/friction injuries  Outcome: Progressing  Goal: Promote/optimize nutrition  Outcome: Progressing  Goal: Promote skin healing  Outcome: Progressing

## 2024-12-21 LAB
ANION GAP SERPL CALC-SCNC: 15 MMOL/L (ref 10–20)
APPEARANCE UR: CLEAR
BILIRUB UR STRIP.AUTO-MCNC: NEGATIVE MG/DL
BUN SERPL-MCNC: 38 MG/DL (ref 6–23)
CALCIUM SERPL-MCNC: 8.1 MG/DL (ref 8.6–10.3)
CHLORIDE SERPL-SCNC: 98 MMOL/L (ref 98–107)
CO2 SERPL-SCNC: 23 MMOL/L (ref 21–32)
COLOR UR: YELLOW
CREAT SERPL-MCNC: 1.64 MG/DL (ref 0.5–1.3)
EGFRCR SERPLBLD CKD-EPI 2021: 48 ML/MIN/1.73M*2
ERYTHROCYTE [DISTWIDTH] IN BLOOD BY AUTOMATED COUNT: 13.2 % (ref 11.5–14.5)
GLUCOSE BLD MANUAL STRIP-MCNC: 119 MG/DL (ref 74–99)
GLUCOSE BLD MANUAL STRIP-MCNC: 152 MG/DL (ref 74–99)
GLUCOSE BLD MANUAL STRIP-MCNC: 159 MG/DL (ref 74–99)
GLUCOSE BLD MANUAL STRIP-MCNC: 244 MG/DL (ref 74–99)
GLUCOSE SERPL-MCNC: 147 MG/DL (ref 74–99)
GLUCOSE UR STRIP.AUTO-MCNC: ABNORMAL MG/DL
HCT VFR BLD AUTO: 24.3 % (ref 41–52)
HGB BLD-MCNC: 8.5 G/DL (ref 13.5–17.5)
HOLD SPECIMEN: NORMAL
HYALINE CASTS #/AREA URNS AUTO: ABNORMAL /LPF
KETONES UR STRIP.AUTO-MCNC: NEGATIVE MG/DL
LEUKOCYTE ESTERASE UR QL STRIP.AUTO: NEGATIVE
MAGNESIUM SERPL-MCNC: 1.97 MG/DL (ref 1.6–2.4)
MCH RBC QN AUTO: 32.2 PG (ref 26–34)
MCHC RBC AUTO-ENTMCNC: 35 G/DL (ref 32–36)
MCV RBC AUTO: 92 FL (ref 80–100)
MUCOUS THREADS #/AREA URNS AUTO: ABNORMAL /LPF
NITRITE UR QL STRIP.AUTO: NEGATIVE
NRBC BLD-RTO: 0 /100 WBCS (ref 0–0)
PH UR STRIP.AUTO: 5 [PH]
PLATELET # BLD AUTO: 179 X10*3/UL (ref 150–450)
POTASSIUM SERPL-SCNC: 4.7 MMOL/L (ref 3.5–5.3)
PROT UR STRIP.AUTO-MCNC: ABNORMAL MG/DL
RBC # BLD AUTO: 2.64 X10*6/UL (ref 4.5–5.9)
RBC # UR STRIP.AUTO: NEGATIVE /UL
RBC #/AREA URNS AUTO: ABNORMAL /HPF
SODIUM SERPL-SCNC: 131 MMOL/L (ref 136–145)
SP GR UR STRIP.AUTO: 1.02
UROBILINOGEN UR STRIP.AUTO-MCNC: NORMAL MG/DL
WBC # BLD AUTO: 7.1 X10*3/UL (ref 4.4–11.3)
WBC #/AREA URNS AUTO: ABNORMAL /HPF

## 2024-12-21 PROCEDURE — 2500000004 HC RX 250 GENERAL PHARMACY W/ HCPCS (ALT 636 FOR OP/ED): Performed by: PHYSICIAN ASSISTANT

## 2024-12-21 PROCEDURE — 97112 NEUROMUSCULAR REEDUCATION: CPT | Mod: GO,CO

## 2024-12-21 PROCEDURE — 97535 SELF CARE MNGMENT TRAINING: CPT | Mod: GO,CO

## 2024-12-21 PROCEDURE — 81001 URINALYSIS AUTO W/SCOPE: CPT | Performed by: FAMILY MEDICINE

## 2024-12-21 PROCEDURE — 97530 THERAPEUTIC ACTIVITIES: CPT | Mod: GO,CO

## 2024-12-21 PROCEDURE — 97110 THERAPEUTIC EXERCISES: CPT | Mod: GP,CQ

## 2024-12-21 PROCEDURE — 2500000001 HC RX 250 WO HCPCS SELF ADMINISTERED DRUGS (ALT 637 FOR MEDICARE OP): Performed by: PHYSICIAN ASSISTANT

## 2024-12-21 PROCEDURE — 82947 ASSAY GLUCOSE BLOOD QUANT: CPT

## 2024-12-21 PROCEDURE — 99231 SBSQ HOSP IP/OBS SF/LOW 25: CPT | Performed by: INTERNAL MEDICINE

## 2024-12-21 PROCEDURE — 2500000002 HC RX 250 W HCPCS SELF ADMINISTERED DRUGS (ALT 637 FOR MEDICARE OP, ALT 636 FOR OP/ED): Performed by: PHYSICIAN ASSISTANT

## 2024-12-21 PROCEDURE — 36415 COLL VENOUS BLD VENIPUNCTURE: CPT | Performed by: PHYSICIAN ASSISTANT

## 2024-12-21 PROCEDURE — 2500000002 HC RX 250 W HCPCS SELF ADMINISTERED DRUGS (ALT 637 FOR MEDICARE OP, ALT 636 FOR OP/ED): Performed by: INTERNAL MEDICINE

## 2024-12-21 PROCEDURE — 1100000001 HC PRIVATE ROOM DAILY

## 2024-12-21 PROCEDURE — 97530 THERAPEUTIC ACTIVITIES: CPT | Mod: GP,CQ

## 2024-12-21 PROCEDURE — 83735 ASSAY OF MAGNESIUM: CPT | Performed by: PHYSICIAN ASSISTANT

## 2024-12-21 PROCEDURE — 2500000001 HC RX 250 WO HCPCS SELF ADMINISTERED DRUGS (ALT 637 FOR MEDICARE OP): Performed by: SURGERY

## 2024-12-21 PROCEDURE — 99211 OFF/OP EST MAY X REQ PHY/QHP: CPT | Performed by: SURGERY

## 2024-12-21 PROCEDURE — S4991 NICOTINE PATCH NONLEGEND: HCPCS | Performed by: PHYSICIAN ASSISTANT

## 2024-12-21 PROCEDURE — 99233 SBSQ HOSP IP/OBS HIGH 50: CPT | Performed by: FAMILY MEDICINE

## 2024-12-21 PROCEDURE — 2500000005 HC RX 250 GENERAL PHARMACY W/O HCPCS: Performed by: PHYSICIAN ASSISTANT

## 2024-12-21 PROCEDURE — 97116 GAIT TRAINING THERAPY: CPT | Mod: GP,CQ

## 2024-12-21 PROCEDURE — 85027 COMPLETE CBC AUTOMATED: CPT | Performed by: PHYSICIAN ASSISTANT

## 2024-12-21 PROCEDURE — 80048 BASIC METABOLIC PNL TOTAL CA: CPT | Performed by: PHYSICIAN ASSISTANT

## 2024-12-21 RX ADMIN — Medication 5 MG: at 20:20

## 2024-12-21 RX ADMIN — HYDROMORPHONE HYDROCHLORIDE 0.4 MG: 1 INJECTION, SOLUTION INTRAMUSCULAR; INTRAVENOUS; SUBCUTANEOUS at 12:02

## 2024-12-21 RX ADMIN — OXYCODONE HYDROCHLORIDE 10 MG: 10 TABLET ORAL at 01:43

## 2024-12-21 RX ADMIN — RANOLAZINE 500 MG: 500 TABLET, EXTENDED RELEASE ORAL at 09:09

## 2024-12-21 RX ADMIN — NICOTINE 1 PATCH: 21 PATCH, EXTENDED RELEASE TRANSDERMAL at 09:09

## 2024-12-21 RX ADMIN — GABAPENTIN 400 MG: 400 CAPSULE ORAL at 09:09

## 2024-12-21 RX ADMIN — APIXABAN 5 MG: 5 TABLET, FILM COATED ORAL at 09:09

## 2024-12-21 RX ADMIN — INSULIN LISPRO 6 UNITS: 100 INJECTION, SOLUTION INTRAVENOUS; SUBCUTANEOUS at 12:27

## 2024-12-21 RX ADMIN — HYDROMORPHONE HYDROCHLORIDE 0.4 MG: 1 INJECTION, SOLUTION INTRAMUSCULAR; INTRAVENOUS; SUBCUTANEOUS at 22:16

## 2024-12-21 RX ADMIN — HYDROMORPHONE HYDROCHLORIDE 0.4 MG: 1 INJECTION, SOLUTION INTRAMUSCULAR; INTRAVENOUS; SUBCUTANEOUS at 17:57

## 2024-12-21 RX ADMIN — INSULIN LISPRO 14 UNITS: 100 INJECTION, SOLUTION INTRAVENOUS; SUBCUTANEOUS at 12:40

## 2024-12-21 RX ADMIN — Medication 21 PERCENT: at 08:54

## 2024-12-21 RX ADMIN — TICAGRELOR 90 MG: 90 TABLET ORAL at 20:18

## 2024-12-21 RX ADMIN — Medication 21 PERCENT: at 20:24

## 2024-12-21 RX ADMIN — ASPIRIN 81 MG: 81 TABLET, COATED ORAL at 09:09

## 2024-12-21 RX ADMIN — RANOLAZINE 500 MG: 500 TABLET, EXTENDED RELEASE ORAL at 20:18

## 2024-12-21 RX ADMIN — ACETAMINOPHEN 650 MG: 325 TABLET ORAL at 20:18

## 2024-12-21 RX ADMIN — ONDANSETRON 4 MG: 2 INJECTION INTRAMUSCULAR; INTRAVENOUS at 16:24

## 2024-12-21 RX ADMIN — ATORVASTATIN CALCIUM 80 MG: 40 TABLET, FILM COATED ORAL at 20:18

## 2024-12-21 RX ADMIN — ACETAMINOPHEN 650 MG: 325 TABLET ORAL at 15:09

## 2024-12-21 RX ADMIN — OXCARBAZEPINE 450 MG: 150 TABLET, FILM COATED ORAL at 09:08

## 2024-12-21 RX ADMIN — GABAPENTIN 400 MG: 400 CAPSULE ORAL at 20:18

## 2024-12-21 RX ADMIN — PANTOPRAZOLE SODIUM 40 MG: 40 TABLET, DELAYED RELEASE ORAL at 05:03

## 2024-12-21 RX ADMIN — INSULIN GLARGINE 20 UNITS: 100 INJECTION, SOLUTION SUBCUTANEOUS at 20:24

## 2024-12-21 RX ADMIN — OXYCODONE HYDROCHLORIDE 10 MG: 10 TABLET ORAL at 20:18

## 2024-12-21 RX ADMIN — OXYCODONE HYDROCHLORIDE 10 MG: 10 TABLET ORAL at 13:09

## 2024-12-21 RX ADMIN — APIXABAN 5 MG: 5 TABLET, FILM COATED ORAL at 20:18

## 2024-12-21 RX ADMIN — OXCARBAZEPINE 450 MG: 150 TABLET, FILM COATED ORAL at 20:18

## 2024-12-21 RX ADMIN — TICAGRELOR 90 MG: 90 TABLET ORAL at 09:10

## 2024-12-21 RX ADMIN — ACETAMINOPHEN 650 MG: 325 TABLET ORAL at 01:43

## 2024-12-21 RX ADMIN — DOCUSATE SODIUM 100 MG: 100 CAPSULE, LIQUID FILLED ORAL at 20:18

## 2024-12-21 RX ADMIN — DOCUSATE SODIUM 100 MG: 100 CAPSULE, LIQUID FILLED ORAL at 09:08

## 2024-12-21 RX ADMIN — EZETIMIBE 10 MG: 10 TABLET ORAL at 20:18

## 2024-12-21 RX ADMIN — GABAPENTIN 400 MG: 400 CAPSULE ORAL at 15:09

## 2024-12-21 RX ADMIN — OXYCODONE HYDROCHLORIDE 10 MG: 10 TABLET ORAL at 09:09

## 2024-12-21 RX ADMIN — TAMSULOSIN HYDROCHLORIDE 0.4 MG: 0.4 CAPSULE ORAL at 09:09

## 2024-12-21 RX ADMIN — ACETAMINOPHEN 650 MG: 325 TABLET ORAL at 09:09

## 2024-12-21 ASSESSMENT — COGNITIVE AND FUNCTIONAL STATUS - GENERAL
TURNING FROM BACK TO SIDE WHILE IN FLAT BAD: A LITTLE
WALKING IN HOSPITAL ROOM: A LITTLE
MOBILITY SCORE: 19
HELP NEEDED FOR BATHING: A LITTLE
TURNING FROM BACK TO SIDE WHILE IN FLAT BAD: A LITTLE
STANDING UP FROM CHAIR USING ARMS: A LITTLE
DRESSING REGULAR UPPER BODY CLOTHING: A LITTLE
MOVING TO AND FROM BED TO CHAIR: A LITTLE
MOVING FROM LYING ON BACK TO SITTING ON SIDE OF FLAT BED WITH BEDRAILS: A LITTLE
MOVING TO AND FROM BED TO CHAIR: A LITTLE
CLIMB 3 TO 5 STEPS WITH RAILING: A LOT
CLIMB 3 TO 5 STEPS WITH RAILING: A LITTLE
MOBILITY SCORE: 15
MOVING TO AND FROM BED TO CHAIR: A LITTLE
DAILY ACTIVITIY SCORE: 19
DRESSING REGULAR LOWER BODY CLOTHING: A LOT
HELP NEEDED FOR BATHING: A LITTLE
WALKING IN HOSPITAL ROOM: A LOT
DRESSING REGULAR UPPER BODY CLOTHING: A LITTLE
CLIMB 3 TO 5 STEPS WITH RAILING: A LOT
WALKING IN HOSPITAL ROOM: A LOT
MOVING FROM LYING ON BACK TO SITTING ON SIDE OF FLAT BED WITH BEDRAILS: A LITTLE
MOBILITY SCORE: 15
DAILY ACTIVITIY SCORE: 19
TURNING FROM BACK TO SIDE WHILE IN FLAT BAD: A LITTLE
TOILETING: A LITTLE
STANDING UP FROM CHAIR USING ARMS: A LOT
STANDING UP FROM CHAIR USING ARMS: A LOT
DRESSING REGULAR LOWER BODY CLOTHING: A LOT
TOILETING: A LITTLE

## 2024-12-21 ASSESSMENT — ENCOUNTER SYMPTOMS
FATIGUE: 0
DIARRHEA: 0
CONSTIPATION: 0
DIAPHORESIS: 0
BLOOD IN STOOL: 0
JOINT SWELLING: 0
FACIAL SWELLING: 0
LIGHT-HEADEDNESS: 0
WOUND: 0
PALPITATIONS: 0
WHEEZING: 0
CHILLS: 0
SORE THROAT: 0
WEAKNESS: 0
FEVER: 0
CHEST TIGHTNESS: 0
HEADACHES: 0
SHORTNESS OF BREATH: 0
FREQUENCY: 0
HALLUCINATIONS: 0
NAUSEA: 0
HEMATURIA: 0
DYSURIA: 0
BACK PAIN: 0
NUMBNESS: 0
ABDOMINAL PAIN: 0
BRUISES/BLEEDS EASILY: 0
COUGH: 0
APPETITE CHANGE: 0
VOMITING: 0
TROUBLE SWALLOWING: 0
DIZZINESS: 0
FLANK PAIN: 0
EYE PAIN: 0

## 2024-12-21 ASSESSMENT — PAIN DESCRIPTION - LOCATION
LOCATION: LEG

## 2024-12-21 ASSESSMENT — PAIN DESCRIPTION - ORIENTATION
ORIENTATION: LEFT

## 2024-12-21 ASSESSMENT — PAIN SCALES - GENERAL
PAINLEVEL_OUTOF10: 7
PAINLEVEL_OUTOF10: 10 - WORST POSSIBLE PAIN
PAINLEVEL_OUTOF10: 7
PAINLEVEL_OUTOF10: 7
PAINLEVEL_OUTOF10: 9
PAINLEVEL_OUTOF10: 7
PAINLEVEL_OUTOF10: 10 - WORST POSSIBLE PAIN
PAINLEVEL_OUTOF10: 9
PAINLEVEL_OUTOF10: 6
PAINLEVEL_OUTOF10: 10 - WORST POSSIBLE PAIN
PAINLEVEL_OUTOF10: 9
PAINLEVEL_OUTOF10: 10 - WORST POSSIBLE PAIN
PAINLEVEL_OUTOF10: 9
PAINLEVEL_OUTOF10: 5 - MODERATE PAIN
PAINLEVEL_OUTOF10: 8
PAINLEVEL_OUTOF10: 6

## 2024-12-21 ASSESSMENT — PAIN DESCRIPTION - DESCRIPTORS
DESCRIPTORS: DISCOMFORT;SHARP
DESCRIPTORS: DISCOMFORT;SORE
DESCRIPTORS: DISCOMFORT
DESCRIPTORS: ACHING;DISCOMFORT
DESCRIPTORS: DISCOMFORT;SORE
DESCRIPTORS: DISCOMFORT
DESCRIPTORS: DISCOMFORT;SORE
DESCRIPTORS: DISCOMFORT;SORE
DESCRIPTORS: DISCOMFORT

## 2024-12-21 ASSESSMENT — ACTIVITIES OF DAILY LIVING (ADL): HOME_MANAGEMENT_TIME_ENTRY: 8

## 2024-12-21 NOTE — PROGRESS NOTES
Occupational Therapy    OT Treatment    Patient Name: Dereck Shen  MRN: [id number]  Today's Date: 12/21/2024  Time Calculation  Start Time: 1056  Stop Time: 1134  Time Calculation (min): 38 min       3315/3315-A    Assessment:  Evaluation/Treatment Tolerance: Patient limited by pain  End of Session Communication: Bedside nurse  End of Session Patient Position: Up in chair  OT Assessment Results: Decreased endurance, Decreased functional mobility  Evaluation/Treatment Tolerance: Patient limited by pain    Plan:  Treatment Interventions: Functional transfer training, Endurance training, Neuromuscular reeducation, Patient/family training  OT Recommended Transfer Status: Maximum assist, Assist of 2  Treatment Interventions: Functional transfer training, Endurance training, Neuromuscular reeducation, Patient/family training  Subjective Pt states that he is having 10/10 pain in his LLE    Current Problem:  Patient Active Problem List   Diagnosis    Abdominal pain, acute, right upper quadrant    Abnormal CXR    Atherosclerosis of native artery of both lower extremities with intermittent claudication (CMS-HCC)    Atherosclerotic heart disease of native coronary artery without angina pectoris    Bacterial skin infection    Bipolar depression (Multi)    BPH (benign prostatic hyperplasia)    Burning pain    Causalgia of lower extremity    Hip pain, right    Pain of right lower extremity    Ischemic stroke (Multi)    Chronic combined systolic and diastolic CHF (congestive heart failure)    Chronic back pain greater than 3 months duration    Chronic hepatitis C virus genotype 1a infection (Multi)    Chronic obstructive pulmonary disease (Multi)    Chronic pain syndrome    Chronic RUQ pain    Claudication (CMS-HCC)    Coordination impairment    Decreased peripheral vision of left eye    Diabetic neuropathy, painful (Multi)    Diabetic polyneuropathy associated with type 2 diabetes mellitus (Multi)    Eschar of lower leg     Frequent falls    Gastroesophageal reflux disease    H/O: CVA (cerebrovascular accident)    Hepatic steatosis    Hepatitis C virus infection cured after antiviral drug therapy    Hepatitis-C    Hyperkalemia    Hyperlipidemia    Hypertension    Joint stiffness of both shoulders    Left ventricular dysfunction    Low back pain    Nasal congestion with rhinorrhea    Constipation    Nausea in adult    Neuropathic pain    Nicotine dependence    Neuropathy, inflammatory or toxic (Multi)    BRUNA (obstructive sleep apnea)    Other symptoms and signs involving the musculoskeletal system    Pancreatic lesion (HHS-HCC)    Pancreatic malabsorption (HHS-HCC)    Paroxysmal nocturnal dyspnea    Peripheral arterial disease (CMS-HCC)    Poor balance    Other chronic postprocedural pain    Presence of aortocoronary bypass graft    Pulmonary congestion    Recurrent boils    Reduced chest expansion on inspiration    S/P CABG x 5    Right flank pain    Paraparesis (Multi)    Type 2 diabetes mellitus    Spasticity as late effect of cerebrovascular accident (CVA)    Shortness of breath on exertion    Dyspnea    Shingles    S/P PTCA (percutaneous transluminal coronary angioplasty)    Long-term insulin use (Multi)    Right hemiparesis (Multi)    Hospital discharge follow-up    PAD (peripheral artery disease) (CMS-HCC)    Critical limb ischemia of left lower extremity (Multi)    Peripheral artery occlusion (CMS-HCC)       General:  OT Received On: 12/21/24  Family/Caregiver Present: Yes  Co-Treatment: PT  Co-Treatment Reason: To maximize safety while working on specific goals  Prior to Session Communication: Bedside nurse  Patient Position Received: Bed, 3 rail up         Pain:  Pain Assessment  Pain Assessment: 0-10  0-10 (Numeric) Pain Score: 10 - Worst possible pain  Pain Type: Acute pain  Pain Location: Leg  Pain Orientation: Left  Pain Descriptors: Discomfort  Pain Interventions: Medication (See MAR)  Response to Interventions:  (Pt had  been medicated 2 hrs earlier but endorsed 10/10 pain with no grimacing or groaning)  Objective      Activities of Daily Living:       Functional Standing Tolerance: good       Bed Mobility/Transfers: Bed Mobility  Bed Mobility: Yes  Bed Mobility 2  Bed Mobility  2: Supine to sitting  Level of Assistance 2: Contact guard        Therapy/Activity:          Balance/Neuromuscular Re-Education  Balance/Neuromuscular Re-Education Activity Performed:  (static standing for 5 min)    Strength: Generalized strength       Other Activity:  Other Activity Performed: Yes    Outcome Measures:   Education Documentation  Precautions, taught by KHALIF Morrison at 12/21/2024 12:21 PM.  Learner: Patient  Readiness: Acceptance  Method: Explanation, Demonstration  Response: Verbalizes Understanding, Demonstrated Understanding    ADL Training, taught by KHALIF Morrison at 12/21/2024 12:21 PM.  Learner: Patient  Readiness: Acceptance  Method: Explanation, Demonstration  Response: Verbalizes Understanding, Demonstrated Understanding    Body Mechanics, taught by KHALIF Morrison at 12/21/2024 12:21 PM.  Learner: Patient  Readiness: Acceptance  Method: Explanation, Demonstration  Response: Verbalizes Understanding, Demonstrated Understanding    Home Exercise Program, taught by KHALIF Morrison at 12/21/2024 12:21 PM.  Learner: Patient  Readiness: Acceptance  Method: Explanation, Demonstration  Response: Verbalizes Understanding, Demonstrated Understanding    Mobility Training, taught by KHALIF Morrison at 12/21/2024 12:21 PM.  Learner: Patient  Readiness: Acceptance  Method: Explanation, Demonstration  Response: Verbalizes Understanding, Demonstrated Understanding    Education Comments  No comments found.        EDUCATION:  Education  Education Comment: Education provided on hip kit items to improve energy conservation and independence in ADLs. Demo provided for use of items. Pt performed lower body  dressing with reacher, therapist provided cuing for technique. Discussion regarding set up and problem solving for bathing. Pt receptive to all education.    Goals:  Encounter Problems       Encounter Problems (Active)       ADLs       Patient will perform UB and LB bathing with supervision level of assistance. (Progressing)       Start:  12/20/24    Expected End:  01/03/25            Patient with complete lower body dressing with stand by assist level of assistance  with reacher, shoe horn, and sock-aid (Not Progressing)       Start:  12/20/24    Expected End:  01/03/25            Patient will complete daily grooming tasks  with supervision level of assistance and PRN adaptive equipment. (Progressing)       Start:  12/20/24    Expected End:  01/03/25            Patient will complete toileting including hygiene clothing management/hygiene with stand by assist level of assistance. (Progressing)       Start:  12/20/24    Expected End:  01/03/25               TRANSFERS       Patient will complete functional transfers with least restrictive device with stand by assist level of assistance. (Progressing)       Start:  12/20/24    Expected End:  01/03/25

## 2024-12-21 NOTE — PROGRESS NOTES
Dereck Shen is a 59 y.o. male on day 4 of admission presenting with Peripheral artery occlusion (CMS-HCC).    Subjective   POD#3 s/p left fem-below knee popliteal bypass with vein patch left distal anastomosis     Had urinary retention overnight and replacement of brown. Patient has been working with PT. Pulse exam stable with palpable dp pulses noted. Patient does report worsening calf pain. Hgb this AM is 8.5 from 10.3        Objective     Physical Exam  Constitutional:       Appearance: Normal appearance.   HENT:      Head: Normocephalic.      Right Ear: External ear normal.      Left Ear: External ear normal.      Nose: Nose normal.      Mouth/Throat:      Mouth: Mucous membranes are moist.   Eyes:      Extraocular Movements: Extraocular movements intact.   Neck:      Vascular: No carotid bruit.   Cardiovascular:      Rate and Rhythm: Normal rate and regular rhythm.      Pulses: Normal pulses.      Comments: LLE warm to touch well perfused. Motor and sensory function of the left foot intact. Palpable DP.  Strong PT/AT/DP signals by doppler. Left groin and left prxomal medial calf incisions with staples in place c/d/I soft surrounding with minimal swelling. Left medial and lateral calf open fasciotomy incisions healing. No drainage.   Pulmonary:      Effort: Pulmonary effort is normal. No respiratory distress.      Breath sounds: Normal breath sounds.   Abdominal:      Palpations: Abdomen is soft.      Tenderness: There is no abdominal tenderness.   Genitourinary:     Comments: Brown present with adequate UOP  Musculoskeletal:         General: No swelling or tenderness.      Cervical back: Normal range of motion and neck supple.      Right lower leg: No edema.      Left lower leg: Edema present.   Skin:     General: Skin is warm and dry.      Capillary Refill: Capillary refill takes less than 2 seconds.      Findings: No lesion.   Neurological:      General: No focal deficit present.      Mental Status: He  "is alert and oriented to person, place, and time. Mental status is at baseline.   Psychiatric:         Mood and Affect: Mood normal.         Behavior: Behavior normal.         Thought Content: Thought content normal.         Judgment: Judgment normal.         Last Recorded Vitals  Blood pressure 93/58, pulse 81, temperature 36.5 °C (97.7 °F), temperature source Temporal, resp. rate 17, height 1.803 m (5' 11\"), weight 91.9 kg (202 lb 8 oz), SpO2 97%.  Vitals:    12/21/24 0548 12/21/24 0559 12/21/24 0903 12/21/24 1315   BP: 96/61  108/63 93/58   BP Location: Right arm  Right arm Right arm   Patient Position: Lying  Lying Lying   Pulse: 79  83 81   Resp: 18  18 17   Temp: 37 °C (98.6 °F)  36.7 °C (98 °F) 36.5 °C (97.7 °F)   TempSrc: Temporal  Temporal Temporal   SpO2: 91%  98% 97%   Weight:  91.9 kg (202 lb 8 oz)     Height:          Intake/Output last 3 Shifts:  I/O last 3 completed shifts:  In: 240 (2.6 mL/kg) [P.O.:240]  Out: 1750 (19.1 mL/kg) [Urine:1750 (0.5 mL/kg/hr)]  Weight: 91.9 kg     Relevant Results         Results for orders placed or performed during the hospital encounter of 12/17/24 (from the past 24 hours)   POCT GLUCOSE   Result Value Ref Range    POCT Glucose 154 (H) 74 - 99 mg/dL   POCT GLUCOSE   Result Value Ref Range    POCT Glucose 128 (H) 74 - 99 mg/dL   Basic Metabolic Panel   Result Value Ref Range    Glucose 147 (H) 74 - 99 mg/dL    Sodium 131 (L) 136 - 145 mmol/L    Potassium 4.7 3.5 - 5.3 mmol/L    Chloride 98 98 - 107 mmol/L    Bicarbonate 23 21 - 32 mmol/L    Anion Gap 15 10 - 20 mmol/L    Urea Nitrogen 38 (H) 6 - 23 mg/dL    Creatinine 1.64 (H) 0.50 - 1.30 mg/dL    eGFR 48 (L) >60 mL/min/1.73m*2    Calcium 8.1 (L) 8.6 - 10.3 mg/dL   CBC   Result Value Ref Range    WBC 7.1 4.4 - 11.3 x10*3/uL    nRBC 0.0 0.0 - 0.0 /100 WBCs    RBC 2.64 (L) 4.50 - 5.90 x10*6/uL    Hemoglobin 8.5 (L) 13.5 - 17.5 g/dL    Hematocrit 24.3 (L) 41.0 - 52.0 %    MCV 92 80 - 100 fL    MCH 32.2 26.0 - 34.0 pg    " MCHC 35.0 32.0 - 36.0 g/dL    RDW 13.2 11.5 - 14.5 %    Platelets 179 150 - 450 x10*3/uL   Magnesium   Result Value Ref Range    Magnesium 1.97 1.60 - 2.40 mg/dL   POCT GLUCOSE   Result Value Ref Range    POCT Glucose 159 (H) 74 - 99 mg/dL   Urinalysis with Reflex Culture and Microscopic   Result Value Ref Range    Color, Urine Yellow Light-Yellow, Yellow, Dark-Yellow    Appearance, Urine Clear Clear    Specific Gravity, Urine 1.021 1.005 - 1.035    pH, Urine 5.0 5.0, 5.5, 6.0, 6.5, 7.0, 7.5, 8.0    Protein, Urine 30 (1+) (A) NEGATIVE, 10 (TRACE), 20 (TRACE) mg/dL    Glucose, Urine OVER (4+) (A) Normal mg/dL    Blood, Urine NEGATIVE NEGATIVE    Ketones, Urine NEGATIVE NEGATIVE mg/dL    Bilirubin, Urine NEGATIVE NEGATIVE    Urobilinogen, Urine Normal Normal mg/dL    Nitrite, Urine NEGATIVE NEGATIVE    Leukocyte Esterase, Urine NEGATIVE NEGATIVE   Urinalysis Microscopic   Result Value Ref Range    WBC, Urine 1-5 1-5, NONE /HPF    RBC, Urine 3-5 NONE, 1-2, 3-5 /HPF    Mucus, Urine FEW Reference range not established. /LPF    Hyaline Casts, Urine 4+ (A) NONE /LPF   POCT GLUCOSE   Result Value Ref Range    POCT Glucose 244 (H) 74 - 99 mg/dL     *Note: Due to a large number of results and/or encounters for the requested time period, some results have not been displayed. A complete set of results can be found in Results Review.      Scheduled medications  acetaminophen, 650 mg, oral, q6h  apixaban, 5 mg, oral, BID  aspirin, 81 mg, oral, Daily  atorvastatin, 80 mg, oral, Nightly  [Held by provider] carvedilol, 6.25 mg, oral, BID  [Held by provider] dapagliflozin propanediol, 10 mg, oral, Daily  docusate sodium, 100 mg, oral, BID  ezetimibe, 10 mg, oral, Nightly  gabapentin, 400 mg, oral, TID  [Held by provider] hydrALAZINE, 50 mg, oral, TID  insulin glargine, 20 Units, subcutaneous, Nightly  insulin lispro, 0-10 Units, subcutaneous, TID AC  insulin lispro, 14 Units, subcutaneous, TID AC  isosorbide mononitrate ER, 60 mg,  oral, Daily  nicotine, 1 patch, transdermal, Daily  OXcarbazepine, 450 mg, oral, BID  oxygen, , inhalation, Continuous - Inhalation  pantoprazole, 40 mg, oral, Daily before breakfast  polyethylene glycol, 17 g, oral, Daily  ranolazine, 500 mg, oral, BID  [Held by provider] sacubitriL-valsartan, 1 tablet, oral, BID  tamsulosin, 0.4 mg, oral, Daily  ticagrelor, 90 mg, oral, BID      Continuous medications     PRN medications  PRN medications: albuterol, dextrose, dextrose, glucagon, glucagon, HYDROmorphone, melatonin, naloxone, ondansetron ODT **OR** ondansetron, oxyCODONE, oxyCODONE                        Assessment/Plan   Assessment & Plan  Peripheral artery occlusion (CMS-[facility])    Critical limb ischemia of left lower extremity (Multi)    59 year old male PAD s/p multiple lower extremity revascularizations, critical limb ischemia with Left SFA occlusion now POD#2 s/p left fem-below knee popliteal bypass with vein patch left distal anastomosis.     -Pain control as needed, wean IV narcotics  -continue peripheral vascular checks   -Encourage IS and deep breathing  -Diet as tolerated, bowel reg  -Douglass replaced, urology consult for urinary retention  -close monitoring of fluid status, strict I&Os  -Encourage PT/OT, OOB and ambulation  -wound care consult for continued wound care of prior fasciotomy sites  -social work/transitional care coordinator for home care services on discharge vs STR  -continue care per primary hospitalist team  - AM RON [place] Dr. Kobi Barrera, DO PGY-3  General Surgery

## 2024-12-21 NOTE — PROGRESS NOTES
Dereck Shen is a 59 y.o. male on day 4 of admission presenting with Peripheral artery occlusion (CMS-[facility]).      Subjective   Dereck Shen is a 59 y.o. male with PMHx of CAD s/p CABG, severe lower extremity PAD with prior revascularization (R fem-pop bypass 2/24/20; L fem-pop bypass 9/14/20; L fem PTA of the graft; R iliac PTA 2/2022; LLE and DANYA PTA 4/2024; angioplasty of the left SFA 10/21/24; and left femoral and tibial thrombectomy with patch angioplasty 11/19/24. He presented with L Leg pain, redness, and ice cold to touch for three days. He has been compliant with all his medications including his Eliquis, Brilinta and aspirin. ED workup was significant for creatinine 1.53. CT angio of the lower extremities does show what appears to be reocclusion of his SFA and popliteal. Patient evaluated by Dr. Peace- continue hep gtt, DAPT, statin- Minimal revascularization options at this point and high risk of limb loss, obtain upper extremity vein mapping. Patient is now status post left femoropopliteal bypass with vein patch left distal anastomosis 12/18/24 with Dr. Peace    12/20/2024: No acute events overnight. Vitals stable. Pain 7-8/10. CBC/BMP reviewed, creatinine improving slowly to 1.22 today (baseline ~1.0-1.3).  Glucose 112- appreciate endocrinology recommendations. Start working with PT/OT and trying to wean off IV pain meds   He reports more pain today than previous surgeries. Vascular surgery going to reassess soon.     12/21:               Today patient seen in the bedside chair in the presence of his mother.  He is awake alert and interactive appropriately and appears NAD at the time of visit.  Nurses reported significant bladder scan this morning with straight cath yielding 725 mL and then later bladder scan for 580.  In view of this acute urinary retention will place Douglass catheter and obtain UA/C&S.  Patient does report he has had urinary symptoms of urgency.  Apparently had seen urologist  years ago before he had the symptoms with unremarkable workup though describes enlarged prostate.  He is on Flomax.  Vascular surgery describes good perfusion postoperatively.  Await their recommendations for timing of discharge.           Review of Systems   Constitutional:  Negative for appetite change, chills, diaphoresis, fatigue and fever.   HENT:  Negative for congestion, ear pain, facial swelling, hearing loss, nosebleeds, sore throat, tinnitus and trouble swallowing.    Eyes:  Negative for pain.   Respiratory:  Negative for cough, chest tightness, shortness of breath and wheezing.    Cardiovascular:  Negative for chest pain, palpitations and leg swelling.   Gastrointestinal:  Negative for abdominal pain, blood in stool, constipation, diarrhea, nausea and vomiting.   Genitourinary:  Positive for urgency. Negative for dysuria, flank pain, frequency and hematuria.   Musculoskeletal:  Negative for back pain and joint swelling.        Bilateral foot pain  L groin pain   Skin:  Negative for rash and wound.   Neurological:  Negative for dizziness, syncope, weakness, light-headedness, numbness and headaches.   Hematological:  Does not bruise/bleed easily.   Psychiatric/Behavioral:  Negative for behavioral problems, hallucinations and suicidal ideas.           Objective     Last Recorded Vitals  /67   Pulse 71   Temp 36.1 °C (97 °F) (Temporal)   Resp 20   Wt 91.9 kg (202 lb 8 oz)   SpO2 96%     Image Results  ECG 12 lead    Result Date: 12/17/2024  Sinus rhythm Inferior infarct, old Baseline wander in lead(s) II,III,aVR,aVL,aVF,V1,V2,V5 See ED provider note for full interpretation and clinical correlation Confirmed by Nay Black ([id number]) on 12/17/2024 11:40:07 AM    Vascular US ankle brachial index (ANSON) without exercise    Result Date: 12/17/2024  Preliminary Cardiology Report              [place] [id number]      Phone [phone] Fax [phone]             Preliminary Vascular Lab Report  [facility] US ANKLE BRACHIAL INDEX (ANSON) WITHOUT EXERCISE  Patient Name:     CARMENZA URBINA       Fernandez Physician: [id number] Darlyn OLEARY MD Study Date:       12/17/2024       Ordering Provider: [id number] CHERELLE POON MRN/PID:          [id number]         Fellow: Accession#:       [id number]     Technologist:      Deb Allen RVYUSUF YOB: 1965        Technologist 2: Gender:           M                Encounter#:        [id number] Admission Status: Emergency        Location           [facility]                                    Performed:  Diagnosis/ICD: Peripheral vascular disease, unspecified-I73.9 CPT Codes:     62056 Peripheral artery ANSON Only  Pertinent History: Pt presents to the ER with LLE pain and a cold left foot.  **CRITICAL RESULT** Critical Result: severe arterial disease of LLE Notification called to Cherelle Poon on 12/17/2024 at 9:31:33 AM.  PRELIMINARY CONCLUSIONS:  Right Lower PVR: No evidence of arterial occlusive disease in the right lower extremity at rest. Normal digital perfusion noted. Multiphasic flow is noted in the right common femoral artery, right popliteal artery, right posterior tibial artery and right dorsalis pedis artery. Left Lower PVR: Evidence of severe arterial occlusive disease in the left lower extremity at rest. Decreased digital perfusion noted. Monophasic flow is noted in the left common femoral artery, left popliteal artery, left posterior tibial artery and left dorsalis pedis artery. PTA is inaudible. Flat toe waveform(TBI=0).  Imaging & Doppler Findings:  RIGHT Lower PVR                Pressures Ratios Right Posterior Tibial (Ankle) 141 mmHg  1.22 Right Dorsalis Pedis (Ankle)   126 mmHg  1.09 Right Digit (Great Toe)        112 mmHg  0.97   LEFT Lower PVR                Pressures Ratios Left Posterior Tibial (Ankle) 0 mmHg    0.00 Left Dorsalis Pedis (Ankle)   33  mmHg   0.28 Left Digit (Great Toe)        0 mmHg    0.00                      Right Brachial Pressure 116 mmHg   VASCULAR PRELIMINARY REPORT completed by Deb Allen T on 12/17/2024 at 9:32:24 AM  ** Final **     CT angio aorta and bilateral iliofemoral runoff including without contrast if performed    Result Date: 12/17/2024  STUDY: CT Angiogram of the Abdomen, Pelvis, and Bilateral Lower Extremities; 12/17/2024 6:38 AM INDICATION: Cold left foot. COMPARISON: CTA AP w/runoffs 11/19/2024, CTA chest 11/19/2024, CTA AP w/runoffs 09/13/2024. ACCESSION NUMBER(S): [id number] ORDERING CLINICIAN: BASIM POON TECHNIQUE:  Helical CT is performed from the aortic diaphragmatic hiatus through the feet after bolus administration of 90 mL of Omnipaque 350.  Images are reviewed and processed at a workstation according to the CT angiogram protocol with 3-D and/or MIP post processing imaging generated. Automated mA/kV exposure control was utilized and patient examination was performed in strict accordance with principles of ALARA. FINDINGS: Vascular: Abdominal Aorta: The celiac, SMA, and DENISE demonstrate no significant stenosis.  The right and left renal arteries demonstrate no significant stenosis. The abdominal aorta is not aneurysmal and demonstrates no significant occlusive disease. Right Run-off: The right common iliac artery shows mild stenosis diffusely.  There is a stent along the entire right external iliac and right common femoral arteries.  The stent demonstrates areas of mild in-stent stenosis. There is an occluded right femoral-popliteal bypass graft. The native right femoral popliteal arteries demonstrate mild atherosclerotic contour.  There is a stent extending from the mid SFA to the proximal popliteal artery.  At the distal SFA the stent demonstrates crush distortion but maintains a patent lumen, though with areas of mild in-stent stenosis. The popliteal artery remains patent throughout its course.  There is  patent three-vessel runoff to the ankle, and 2 vessels into the foot. Opacification became too weak to follow the vessels to the plantar arch. Left Run-off: The left common iliac artery demonstrates mild diffuse atherosclerotic stenosis.  The left external iliac artery and left common femoral arteries are relatively normal.  The common femoral artery bifurcation is patent.  The native left SFA occludes just beyond its origin.  The profunda femoral artery and its branches remain patent. There is a chronically occluded left femoral-popliteal bypass graft. The native left SFA does not reconstitute, remaining occluded throughout its course. There is a 2 cm length patent segment of the left popliteal artery, which is otherwise occluded.  Distal to the patent segment, the popliteal artery is occluded across the level of the joint. The distal popliteal artery reconstitutes remains patent into its trifurcation. 3 patent infrapopliteal vessels are visualized proximally.  The tibial vessels remain patent to the ankle, where the dorsalis pedis and lateral plantar seen across the ankle joint.  The vessels cannot be followed to the plantar arch due to weak contrast opacification. Abdomen: The lung bases are clear.  The liver is unremarkable.  The pancreas, spleen, adrenal glands, and kidneys demonstrate no acute pathology. There is no free air or lymph node enlargement. Pelvis: There is no bowel wall thickening or obstruction.  There is no free fluid.  Lymph nodes are not enlarged.  Urinary bladder is unremarkable. At the left inguinal region there is thick soft tissue in the subcutaneous fat overlying the femoral vessels, as well as 2 prominent lymph nodes. Skeleton:  There are no acute fractures.  No suspicious bony lesions.    No significant aortic disease.  Mild bilateral common iliac artery stenosis.  Patent left external iliac and common femoral arteries. 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   Chronic occlusion of the native left superficial femoral artery. A 2 cm segment of the left P1 popliteal artery reconstitutes.  The terminal left popliteal artery also reconstitutes.  Other portions of the popliteal artery are occluded. Patent three-vessel infrapopliteal runoff to the left hindfoot, beyond which contrast opacification was too weak to visualize. Thick subcutaneous soft tissue - presumed scarred tissue - overlying the left femoral vessels at the left groin.  Correlate for possibility of cellulitis. Patent right iliac and right femoral stents.  Patent right popliteal artery.  Patent three-vessel infrapopliteal runoff to the right ankle. No findings of an acute process in the abdomen or pelvis. Signed by Simeon Barrera MD    Vascular US Lower Extremity Arterial Duplex Bilateral    Result Date: 12/3/2024              [place] [address]      Phone [phone] Fax [phone]  Vascular Lab Report  [facility] US LOWER EXTREMITY ARTERIAL DUPLEX BILATERAL Patient Name:      CARMENZA OLEARY    Reading Physician:  [id number] Britney Rush MD Study Date:        12/2/2024            Ordering Provider:  [id number] ADELA LYLES MRN/PID:           [id number]             Fellow: Accession#:        [id number]         Technologist:       Deb Allen RVYUSUF Date of Birth/Age: 1965 / 59 years Technologist 2: Gender:            M                    Encounter#:         [id number] Admission Status:  Outpatient           Location Performed: [facility]  Diagnosis/ICD: Peripheral vascular disease, unspecified-I73.9 CPT Codes:     22903 Peripheral artery Lower arterial Duplex complete  Pertinent History: LLE intervention(11/19/2024).  CONCLUSIONS: Right Lower Arterial: The profunda was monophasic and dampened on prior exam and today is not well visualized and appears occluded. All remaining vessels appear patent. Left  Lower Arterial: No occlusion or stenosis noted in the vessels visualized. The distal BARB is dampened and appears nearly occluded. Working around staples and dressings. The EIA is not visualized. The proximal and mid CFA and the profunda is not visualized. Minimal visualization of calf vessels.  Imaging & Doppler Findings:  Right                       Left   PSV                        PSV 89 cm/s         EIA 95 cm/s         CFA        78 cm/s  0 cm/s  Profunda Proximal 80 cm/s    SFA Proximal    88 cm/s 148 cm/s      SFA Mid      100 cm/s 112 cm/s    SFA Distal     108 cm/s 73 cm/s      Popliteal     74 cm/s 100 cm/s   BARB Proximal 86 cm/s       BARB Mid 53 cm/s     BARB Distal     11 cm/s 48 cm/s  Peroneal Proximal 55 cm/s    Peroneal Mid    77 cm/s 40 cm/s   Peroneal Distal 34 cm/s    PTA Proximal    48 cm/s 31 cm/s       PTA Mid 52 cm/s     PTA Distal  [id number] Britney Rush MD Electronically signed by [id number] Britney Rush MD on 12/3/2024 at 11:43:03 AM  ** Final **     Vascular US ankle brachial index (ANSON) without exercise    Result Date: 12/3/2024              [place] [address]      Phone [phone] Fax [phone]  Vascular Lab Report  [facility] US ANKLE BRACHIAL INDEX (ANSON) WITHOUT EXERCISE Patient Name:      CARMENZA Presley Physician:  [id number] Britney Rush MD Study Date:        12/2/2024            Ordering Provider:  [id number] ADELA LYLES MRN/PID:           [id number]             Fellow: Accession#:        [id number]         Technologist:       Deb Allen RVT Date of Birth/Age: 1965 / 59 years Technologist 2: Gender:            M                    Encounter#:         [id number] Admission Status:  Outpatient           Location Performed: [facility]  Diagnosis/ICD: Peripheral vascular disease, unspecified-I73.9 CPT Codes:     69096 Peripheral artery ANSON Only   Pertinent History: Recent LLE intervention(11/19/2024).  CONCLUSIONS: Right Lower PVR: No evidence of arterial occlusive disease in the right lower extremity at rest. Normal digital perfusion noted. Multiphasic flow is noted in the right common femoral artery, right posterior tibial artery and right dorsalis pedis artery. Left Lower PVR: Evidence of moderate arterial occlusive disease in the left lower extremity at rest. Decreased digital perfusion noted. Monophasic flow is noted in the left posterior tibial artery and left dorsalis pedis artery. Multiphasic flow is noted in the left common femoral artery.  Comparison: Compared with study from 5/23/2024, The left ANSON decreased from .96 to .67 today. No significant change on the right.  Imaging & Doppler Findings:  RIGHT Lower PVR                Pressures Ratios Right Posterior Tibial (Ankle) 169 mmHg  0.94 Right Dorsalis Pedis (Ankle)   158 mmHg  0.88 Right Digit (Great Toe)        113 mmHg  0.63   LEFT Lower PVR                Pressures Ratios Left Posterior Tibial (Ankle) 109 mmHg  0.61 Left Dorsalis Pedis (Ankle)   120 mmHg  0.67 Left Digit (Great Toe)        105 mmHg  0.58                      Right Brachial Pressure 180 mmHg   [id number] Rush MD Electronically signed by Enma Rush MD on 12/3/2024 at 11:38:55 AM  ** Final **     Transthoracic Echo (TTE) Complete    Result Date: 11/20/2024              [place] [address]      Phone [phone] Fax [phone] TRANSTHORACIC ECHOCARDIOGRAM REPORT Patient Name:       CARMENZA OLEARY   Reading Physician:    [id number] Rush MD Study Date:         11/20/2024          Ordering Provider:    [id number] DANNA SEARS MRN/PID:            [id number]            Fellow: Accession#:         [id number]        Nurse:                Noemi Villa RN Date of Birth/Age:  1965 / 59       Sonographer:          Laxmi crisostomo                                     RDRADHA Gender Assigned at  M                   Additional Staff: Birth: Height:             180.34 cm           Admit Date:           11/19/2024 Weight:             87.09 kg            Admission Status:     Inpatient -                                                               Routine BSA / BMI:          2.07 m2 / 26.78     Department Location:  [facility]                     kg/m2 Blood Pressure: 107 /64 mmHg Study Type:    TRANSTHORACIC ECHO (TTE) COMPLETE Diagnosis/ICD: Shortness of breath-R06.02; Unspecified systolic (congestive)                heart failure (CHF)-I50.20 Indication:    CHF, SHORTNESS OF BREATH CPT Codes:     Echo Complete w Full Doppler-64219 Patient History: Pertinent History: CABG x5 2019, HTN. Study Detail: The following Echo studies were performed: 2D, M-Mode, Doppler and               color flow. Technically challenging study due to prominent lung               artifact and body habitus. Optison used as a contrast agent for               endocardial border definition. Total contrast used for this               procedure was 3 mL via IV push.  PHYSICIAN INTERPRETATION: Left Ventricle: The left ventricular systolic function is moderately decreased, with a Salinas's biplane calculated ejection fraction of 38%. Wall motion is abnormal. The left ventricular cavity size is normal. There is normal septal and normal posterior left ventricular wall thickness. Spectral Doppler shows an abnormal pattern of left ventricular diastolic filling. Akinetic inferolateral segment, and basal inferior wall. Left Atrium: The left atrium is normal in size. Right Ventricle: The right ventricle was not well visualized. Right ventricular systolic function not assessed. Right Atrium: The right atrium is normal in size. Aortic Valve: The aortic valve appears structurally normal. The aortic valve dimensionless index is  0.73. There is no evidence of aortic valve regurgitation. The peak instantaneous gradient of the aortic valve is 9 mmHg. The mean gradient of the aortic valve is 4 mmHg. Mitral Valve: The mitral valve is normal in structure. There is no evidence of mitral valve regurgitation. Tricuspid Valve: The tricuspid valve is structurally normal. There is trace tricuspid regurgitation. Pulmonic Valve: The pulmonic valve is structurally normal. There is no indication of pulmonic valve regurgitation. Pericardium: No pericardial effusion noted. Aorta: The aortic root is normal. Systemic Veins: The inferior vena cava appears normal in size, with IVC inspiratory collapse greater than 50%.  CONCLUSIONS:  1. Poorly visualized anatomical structures due to suboptimal image quality.  2. The left ventricular systolic function is moderately decreased, with a Salinas's biplane calculated ejection fraction of 38%.  3. Abnormal wall motion.  4. Spectral Doppler shows an abnormal pattern of left ventricular diastolic filling.  5. Akinetic inferolateral segment, and basal inferior wall. QUANTITATIVE DATA SUMMARY:  2D MEASUREMENTS:           Normal Ranges: Ao Root d:       2.80 cm   (2.0-3.7cm) LAs:             3.60 cm   (2.7-4.0cm) IVSd:            0.70 cm   (0.6-1.1cm) LVPWd:           0.70 cm   (0.6-1.1cm) LVIDd:           6.10 cm   (3.9-5.9cm) LVIDs:           5.30 cm LV Mass Index:   78.5 g/m2 LV % FS          13.1 %  LA VOLUME:                    Normal Ranges: LA Vol A4C:        48.7 ml    (22+/-6mL/m2) LA Vol A2C:        49.8 ml LA Vol BP:         51.4 ml LA Vol Index A4C:  23.5ml/m2 LA Vol Index A2C:  24.0 ml/m2 LA Vol Index BP:   24.8 ml/m2 LA Area A4C:       17.6 cm2 LA Area A2C:       18.6 cm2 LA Major Axis A4C: 5.4 cm LA Major Axis A2C: 5.9 cm LA Volume Index:   24.8 ml/m2  RA VOLUME BY A/L METHOD:          Normal Ranges: RA Area A4C:             13.5 cm2  AORTA MEASUREMENTS:         Normal Ranges: Ao Sinus, d:        2.80 cm  (2.1-3.5cm) Ao STJ, d:          2.60 cm (1.7-3.4cm) Asc Ao, d:          2.90 cm (2.1-3.4cm)  LV SYSTOLIC FUNCTION BY 2D PLANIMETRY (MOD):                      Normal Ranges: EF-A4C View:    38 % (>=55%) EF-A2C View:    39 % EF-Biplane:     38 % LV EF Reported: 38 %  LV DIASTOLIC FUNCTION:             Normal Ranges: MV Peak E:             0.75 m/s    (0.7-1.2 m/s) MV Peak A:             0.65 m/s    (0.42-0.7 m/s) E/A Ratio:             1.17        (1.0-2.2) MV e'                  0.069 m/s   (>8.0) MV lateral e'          0.08 m/s MV medial e'           0.06 m/s MV A Dur:              109.00 msec E/e' Ratio:            10.91       (<8.0)  MITRAL VALVE:          Normal Ranges: MV DT:        211 msec (150-240msec)  AORTIC VALVE:                     Normal Ranges: AoV Vmax:                1.46 m/s (<=1.7m/s) AoV Peak P.5 mmHg (<20mmHg) AoV Mean P.0 mmHg (1.7-11.5mmHg) LVOT Max Ayad:            1.09 m/s (<=1.1m/s) AoV VTI:                 29.40 cm (18-25cm) LVOT VTI:                21.60 cm LVOT Diameter:           2.20 cm  (1.8-2.4cm) AoV Area, VTI:           2.79 cm2 (2.5-5.5cm2) AoV Area,Vmax:           2.84 cm2 (2.5-4.5cm2) AoV Dimensionless Index: 0.73  RIGHT VENTRICLE: TAPSE: 19.1 mm  TRICUSPID VALVE/RVSP:          Normal Ranges: Peak TR Velocity:     2.00 m/s RV Syst Pressure:     19 mmHg  (< 30mmHg) IVC Diam:             1.60 cm  [id number] Britney Rush MD Electronically signed on 2024 at 1:49:12 PM  ** Final **     ECG 12 lead    Result Date: 2024  Sinus rhythm Probable left atrial enlargement Borderline repolarization abnormality ST elevation, consider anterior injury Borderline prolonged QT interval See ED provider note for full interpretation and clinical correlation Confirmed by Zulema Stephen ([id number]) on 2024 11:07:14 AM    CT angio aorta and bilateral iliofemoral runoff including without contrast if performed    Result Date: 2024  Interpreted By:   Finkelstein, Evan, STUDY: CT ANGIO AORTA AND BILATERAL ILIOFEMORAL RUN OFF INCLUDING WITHOUT CONTRAST IF PERFORMED;  11/19/2024 4:42 am   INDICATION: Signs/Symptoms:no pulses left leg.     COMPARISON: CT runoff 09/13/2024   ACCESSION NUMBER(S): [id number]   ORDERING CLINICIAN: ACE VALENZUELA   TECHNIQUE: Contiguous axial CTA images were acquired through the abdomen and pelvis and bilateral lower extremities following the administration of 100 mL Omnipaque 350 intravenous contrast. Sagittal and coronal images were reconstructed. Maximum intensity projection and three dimensional images were constructed at a separate workstation.   FINDINGS: VASCULAR FINDINGS:   ABDOMINAL AORTA & ILIAC ARTERIES: No evidence of abdominal aortic aneurysm or dissection.   Moderate atherosclerotic calcifications throughout the abdominal aorta and major proximal vasculature. Celiac axis, superior mesenteric artery, and inferior mesenteric artery are patent without any significant narrowing. Bilateral renal arteries are patent without any significant.   Right common iliac and external iliac arteries are patent without any significant narrowing. Patent right common/external iliac stent. Moderate to severe narrowing of the internal iliac arteries bilaterally. Left common and external iliac arteries are patent.     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 Common femoral artery is patent.  Native superficial femoral artery is patent. There is a stent within the mid to lower aspect of the native right superficial femoral artery which is patent. Profunda femoral artery is patent.  Popliteal artery is patent. Tibioperoneal trunk, posterior tibial artery, peroneal artery, and anterior tibial artery are patent. Three-vessel runoff at the ankle.   LEFT LOWER EXTREMITY: Common femoral artery is patent. 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 Redemonstrated occlusion  of the native superficial femoral artery, similar compared to prior imaging. There is reconstitution of the popliteal artery. Poor opacification of the trifurcation of vessels just below the knee with poor opacification of the vessels throughout the entirety of the calf, ankle and foot, new/worsened compared to prior imaging..   ---------------------------------------------------------------   NON-VASCULAR FINDINGS:   LIVER: Within normal limits.   BILE DUCTS: Nondilated.   GALLBLADDER: No evidence of calcified gallstones or wall thickening.   PANCREAS: Within normal limits.   SPLEEN: A splenule is present. The spleen is otherwise unremarkable..   ADRENALS: Within normal limits.   KIDNEYS, URETERS, URINARY BLADDER: Unremarkable. No evidence of hydronephrosis.   BOWEL: No evidence of abnormal dilatation or obstruction of the small or large bowel. No evidence of pneumoperitoneum.   REPRODUCTIVE ORGANS:  Unremarkable   PERITONEUM / RETROPERITONEUM / LYMPH NODES: No evidence of any free fluid. No evidence of any significantly enlarged intra-abdominal or pelvic lymph nodes.   ABDOMINAL WALL: Fat containing inguinal hernias.   LOWER CHEST: Please refer to separately dictated CT chest report.   BONES: No evidence of suspicious lytic or blastic osseous lesions.       Persistent occlusion of the left femoral/popliteal bypass graft and native left superficial femoral artery. There is also new occlusion versus severe stenosis involving the vessels below the left knee beginning at the level of the trifurcation with the anterior tibial, posterior tibial and peroneal arteries not well seen through the calf, ankle and foot.   No acute abnormality of the abdomen or pelvis.   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 There is good opacification of the native right superficial femoral artery with three-vessel runoff to the right ankle.   MACRO: None.   Signed by: Evan Finkelstein 11/19/2024 5:20 AM  Dictation workstation:   [id number]    CT angio chest for pulmonary embolism    Result Date: 11/19/2024  Interpreted By:  Finkelstein, Evan, STUDY: CT ANGIO CHEST FOR PULMONARY EMBOLISM;  11/19/2024 4:27 am   INDICATION: Signs/Symptoms:sob wih hypoxia.     COMPARISON: CT chest 06/23/2021   ACCESSION NUMBER(S): [id number]   ORDERING CLINICIAN: ACE VALENZUELA   TECHNIQUE: Axial CTA images of the chest after intravenous administration of 50 mL Omnipaque 350 using CT angiographic technique. Coronal and sagittal images are reconstructed. MIP images were created and reviewed.   FINDINGS: CHEST WALL AND LOWER NECK: Within normal limits. ABDOMEN: No acute abnormality of the partially visualized abdomen.   VASCULAR: AORTA: No aortic aneurysm. Mild atherosclerotic disease. PULMONARY ARTERY: Normal caliber. No pulmonary embolus to the segmental level.   CHEST:   HEART: Normal size. No pericardial effusion. MEDIASTINUM AND REJI: No pathologically enlarged thoracic lymph nodes. LUNG, PLEURA, LARGE AIRWAYS: Trace left pleural effusion. Thickened interlobular septal markings. Ground-glass opacities scattered throughout the lungs bilaterally with an upper lung zone predominance.   BONES: No acute osseous abnormality. Median sternotomy wires are present.       No acute pulmonary embolus to the segmental level.   Thickened interlobular septal markings with scattered ground-glass opacities with an upper lung zone predominance. Findings may be seen with mixed interstitial/alveolar pulmonary edema. Other superimposed infectious or inflammatory processes are also not excluded.   Trace left pleural effusion   MACRO: None.   Signed by: Evan Finkelstein 11/19/2024 4:36 AM Dictation workstation:   [id number]       Lab Results  Results for orders placed or performed during the hospital encounter of 12/17/24 (from the past 24 hours)   POCT GLUCOSE   Result Value Ref Range    POCT Glucose 128 (H) 74 - 99 mg/dL   Basic Metabolic Panel    Result Value Ref Range    Glucose 147 (H) 74 - 99 mg/dL    Sodium 131 (L) 136 - 145 mmol/L    Potassium 4.7 3.5 - 5.3 mmol/L    Chloride 98 98 - 107 mmol/L    Bicarbonate 23 21 - 32 mmol/L    Anion Gap 15 10 - 20 mmol/L    Urea Nitrogen 38 (H) 6 - 23 mg/dL    Creatinine 1.64 (H) 0.50 - 1.30 mg/dL    eGFR 48 (L) >60 mL/min/1.73m*2    Calcium 8.1 (L) 8.6 - 10.3 mg/dL   CBC   Result Value Ref Range    WBC 7.1 4.4 - 11.3 x10*3/uL    nRBC 0.0 0.0 - 0.0 /100 WBCs    RBC 2.64 (L) 4.50 - 5.90 x10*6/uL    Hemoglobin 8.5 (L) 13.5 - 17.5 g/dL    Hematocrit 24.3 (L) 41.0 - 52.0 %    MCV 92 80 - 100 fL    MCH 32.2 26.0 - 34.0 pg    MCHC 35.0 32.0 - 36.0 g/dL    RDW 13.2 11.5 - 14.5 %    Platelets 179 150 - 450 x10*3/uL   Magnesium   Result Value Ref Range    Magnesium 1.97 1.60 - 2.40 mg/dL   POCT GLUCOSE   Result Value Ref Range    POCT Glucose 159 (H) 74 - 99 mg/dL   Urinalysis with Reflex Culture and Microscopic   Result Value Ref Range    Color, Urine Yellow Light-Yellow, Yellow, Dark-Yellow    Appearance, Urine Clear Clear    Specific Gravity, Urine 1.021 1.005 - 1.035    pH, Urine 5.0 5.0, 5.5, 6.0, 6.5, 7.0, 7.5, 8.0    Protein, Urine 30 (1+) (A) NEGATIVE, 10 (TRACE), 20 (TRACE) mg/dL    Glucose, Urine OVER (4+) (A) Normal mg/dL    Blood, Urine NEGATIVE NEGATIVE    Ketones, Urine NEGATIVE NEGATIVE mg/dL    Bilirubin, Urine NEGATIVE NEGATIVE    Urobilinogen, Urine Normal Normal mg/dL    Nitrite, Urine NEGATIVE NEGATIVE    Leukocyte Esterase, Urine NEGATIVE NEGATIVE   Urinalysis Microscopic   Result Value Ref Range    WBC, Urine 1-5 1-5, NONE /HPF    RBC, Urine 3-5 NONE, 1-2, 3-5 /HPF    Mucus, Urine FEW Reference range not established. /LPF    Hyaline Casts, Urine 4+ (A) NONE /LPF   POCT GLUCOSE   Result Value Ref Range    POCT Glucose 244 (H) 74 - 99 mg/dL   POCT GLUCOSE   Result Value Ref Range    POCT Glucose 119 (H) 74 - 99 mg/dL     *Note: Due to a large number of results and/or encounters for the requested time  period, some results have not been displayed. A complete set of results can be found in Results Review.        Medications  Scheduled medications:  acetaminophen, 650 mg, oral, q6h  apixaban, 5 mg, oral, BID  aspirin, 81 mg, oral, Daily  atorvastatin, 80 mg, oral, Nightly  [Held by provider] carvedilol, 6.25 mg, oral, BID  [Held by provider] dapagliflozin propanediol, 10 mg, oral, Daily  docusate sodium, 100 mg, oral, BID  ezetimibe, 10 mg, oral, Nightly  gabapentin, 400 mg, oral, TID  [Held by provider] hydrALAZINE, 50 mg, oral, TID  insulin glargine, 20 Units, subcutaneous, Nightly  insulin lispro, 0-10 Units, subcutaneous, TID AC  insulin lispro, 14 Units, subcutaneous, TID AC  isosorbide mononitrate ER, 60 mg, oral, Daily  nicotine, 1 patch, transdermal, Daily  OXcarbazepine, 450 mg, oral, BID  oxygen, , inhalation, Continuous - Inhalation  pantoprazole, 40 mg, oral, Daily before breakfast  polyethylene glycol, 17 g, oral, Daily  ranolazine, 500 mg, oral, BID  [Held by provider] sacubitriL-valsartan, 1 tablet, oral, BID  tamsulosin, 0.4 mg, oral, Daily  ticagrelor, 90 mg, oral, BID      Continuous medications:       PRN medications:  PRN medications: albuterol, dextrose, dextrose, glucagon, glucagon, HYDROmorphone, melatonin, naloxone, ondansetron ODT **OR** ondansetron, oxyCODONE, oxyCODONE     Physical Exam  Constitutional:       General: He is not in acute distress.     Appearance: Normal appearance.   HENT:      Head: Normocephalic and atraumatic.      Right Ear: External ear normal.      Left Ear: External ear normal.      Nose: Nose normal.      Mouth/Throat:      Mouth: Mucous membranes are moist.      Pharynx: Oropharynx is clear.   Eyes:      Extraocular Movements: Extraocular movements intact.      Conjunctiva/sclera: Conjunctivae normal.      Pupils: Pupils are equal, round, and reactive to light.   Cardiovascular:      Rate and Rhythm: Normal rate and regular rhythm.      Pulses: Normal pulses.       Heart sounds: Normal heart sounds.      Comments: Unable to palpate bilateral DP/PT pulses  L foot is pink and warm with reasonable capillary refill  Pulmonary:      Effort: Pulmonary effort is normal. No respiratory distress.      Breath sounds: Normal breath sounds. No wheezing, rhonchi or rales.   Abdominal:      General: Bowel sounds are normal.      Palpations: Abdomen is soft.      Tenderness: There is no abdominal tenderness. There is no right CVA tenderness, left CVA tenderness, guarding or rebound.   Musculoskeletal:      Cervical back: Normal range of motion and neck supple.      Comments: Minimal movement of L ankle   Skin:     General: Skin is warm and dry.      Findings: Lesion (L groin, LLE) present. No rash.      Comments: Post-operative dressing with small amount of bloody strikethrough, did not remove dressing  NVS intact distal to operative site    Neurological:      General: No focal deficit present.      Mental Status: He is alert and oriented to person, place, and time. Mental status is at baseline.   Psychiatric:         Mood and Affect: Mood normal.         Behavior: Behavior normal.                  Assessment/Plan      Critical limb ischemia of LLE with reocclusion of L SFA and popliteal arteries- s/p left femoropopliteal bypass with vein patch left distal anastomosis 12/18/24  Hx severe lower extremity PAD with multiple prior revascularizations  Most recently L femoral and tibial thrombectomy 11/19/24 with 4 compartment fasciotomy   Pt reports he is compliant with Eliquis, Brilinta and aspirin   Monitor incision sites for signs of bleeding  Neurovascular checks and pulse checks  Wound care consult for wound care of fasciotomy sites   Continue aspirin and brilinta  Heparin drip changed back to PO Eliquis 12/20/24  Pain control as able  12/21: Appears to be doing well postoperatively.  Await vascular surgery discharge timing.    Acute urinary retention  12/21: Nurses noted significant urinary  retention which required following straight cath.  Douglass catheter placed and urine studies ordered.  Likely will discharge at this point with Douglass catheter unless can do voiding trial day of discharge.  Will need to follow-up with urology.  Apparently has history of BPH.     Hx CAD s/p CABG  Continue home Lipitor and Zetia  Continue home aspirin and brilinta  Continue home ranexa     SAMEERA  Baseline appears to be ~1.0-1.3  Avoid nephrotoxins  Consider nephrology consult if worsening   Hold home farxiga and entresto in setting of SAMEERA, resume as appropriate      IDDM-II with hyperglycemia  Continue Lantus and humalog TID AC  A1C of 8.2% as of October 2024   Start SSI protocol  Consult endocrinology  Goal glucose <180 for better wound healing      HTN  BP is low- hold home meds and reintroduce as able    History of multiple CVA  History of R PCA stroke in 2019, L MCA stroke in 2021, bihemispheric small infarcts in 2022, and L MCA scattered embolic infarcts in 2023   Continue as above    COPD without acute exacerbation  Cigarette smoker x48 years, continues to smoke but cut back to 3 cigarettes per day   Albuterol MDI as needed for shortness of breath/wheezing     BRUNA  Noncompliant with CPAP    Tobacco use disorder   Reports that he has cut back to 3 cigarettes per day and has been trying very hard to quit.   Smoking cessation education >3 minutes provided   NRT if needed    Code Status: Full Code          DVT ppx: Eliquis     Please see orders for more complete plan    Teo Carranza MD

## 2024-12-21 NOTE — CARE PLAN
The patient's goals for the shift include        Problem: Diabetes  Goal: Achieve decreasing blood glucose levels by end of shift  Outcome: Progressing  Goal: Increase stability of blood glucose readings by end of shift  Outcome: Progressing  Goal: Decrease in ketones present in urine by end of shift  Outcome: Progressing  Goal: Maintain electrolyte levels within acceptable range throughout shift  Outcome: Progressing  Goal: Maintain glucose levels >70mg/dl to <250mg/dl throughout shift  Outcome: Progressing  Goal: No changes in neurological exam by end of shift  Outcome: Progressing  Goal: Learn about and adhere to nutrition recommendations by end of shift  Outcome: Progressing  Goal: Vital signs within normal range for age by end of shift  Outcome: Progressing  Goal: Increase self care and/or family involovement by end of shift  Outcome: Progressing  Goal: Receive DSME education by end of shift  Outcome: Progressing     Problem: Pain  Goal: Takes deep breaths with improved pain control throughout the shift  Outcome: Progressing  Goal: Turns in bed with improved pain control throughout the shift  Outcome: Progressing  Goal: Walks with improved pain control throughout the shift  Outcome: Progressing  Goal: Performs ADL's with improved pain control throughout shift  Outcome: Progressing  Goal: Participates in PT with improved pain control throughout the shift  Outcome: Progressing  Goal: Free from opioid side effects throughout the shift  Outcome: Progressing  Goal: Free from acute confusion related to pain meds throughout the shift  Outcome: Progressing     Problem: Pain - Adult  Goal: Verbalizes/displays adequate comfort level or baseline comfort level  Outcome: Progressing     Problem: Safety - Adult  Goal: Free from fall injury  Outcome: Progressing     Problem: Discharge Planning  Goal: Discharge to home or other facility with appropriate resources  Outcome: Progressing     Problem: Chronic Conditions and  Co-morbidities  Goal: Patient's chronic conditions and co-morbidity symptoms are monitored and maintained or improved  Outcome: Progressing     Problem: Nutrition  Goal: Oral intake greater 75%  Outcome: Progressing  Goal: Consume prescribed supplement  Outcome: Progressing  Goal: BG  mg/dL  Outcome: Progressing  Goal: Lab values WNL  Outcome: Progressing  Goal: Electrolytes WNL  Outcome: Progressing  Goal: Promote healing  Outcome: Progressing  Goal: Maintain stable weight  Outcome: Progressing  Goal: Reduce weight from edema/fluid  Outcome: Progressing     Problem: Skin  Goal: Decreased wound size/increased tissue granulation at next dressing change  Outcome: Progressing  Flowsheets (Taken 12/21/2024 0739)  Decreased wound size/increased tissue granulation at next dressing change: Promote sleep for wound healing  Goal: Participates in plan/prevention/treatment measures  Outcome: Progressing  Flowsheets (Taken 12/21/2024 0739)  Participates in plan/prevention/treatment measures:   Discuss with provider PT/OT consult   Elevate heels   Increase activity/out of bed for meals  Goal: Prevent/manage excess moisture  Outcome: Progressing  Flowsheets (Taken 12/21/2024 0739)  Prevent/manage excess moisture:   Cleanse incontinence/protect with barrier cream   Moisturize dry skin   Follow provider orders for dressing changes   Monitor for/manage infection if present  Goal: Prevent/minimize sheer/friction injuries  Outcome: Progressing  Flowsheets (Taken 12/21/2024 0739)  Prevent/minimize sheer/friction injuries:   Complete micro-shifts as needed if patient unable. Adjust patient position to relieve pressure points, not a full turn   HOB 30 degrees or less   Increase activity/out of bed for meals   Turn/reposition every 2 hours/use positioning/transfer devices  Goal: Promote/optimize nutrition  Outcome: Progressing  Flowsheets (Taken 12/21/2024 0739)  Promote/optimize nutrition: Monitor/record intake including  meals  Goal: Promote skin healing  Outcome: Progressing  Flowsheets (Taken 12/21/2024 8398)  Promote skin healing:   Assess skin/pad under line(s)/device(s)   Ensure correct size (line/device) and apply per  instructions   Rotate device position/do not position patient on device

## 2024-12-21 NOTE — PROGRESS NOTES
Physical Therapy    Physical Therapy Treatment    Patient Name: Dereck Shen  MRN: [id number]  Department: [facility] 3 E  Room: [address] [place], [place] [address]  Today's Date: 12/21/2024  Time Calculation  Start Time: 1055  Stop Time: 1133  Time Calculation (min): 38 min         Assessment/Plan   PT Assessment  PT Assessment Results: Decreased strength, Decreased endurance, Decreased mobility, Impaired balance, Pain (pt. increased gait to 2 x's 20' with FWW and CGA of 1-2. pt. encouraged to progress with all mobility, bur reports 10/10 pain declined to ambulate further. pt. up in chair, doc present, mother present, Staff to place Douglass.)  End of Session Communication: Bedside nurse, PCT/NA/CTA  End of Session Patient Position: Up in chair, Alarm off, not on at start of session  PT Plan  Inpatient/Swing Bed or Outpatient: Inpatient  PT Plan  Treatment/Interventions: Bed mobility, Transfer training, Gait training, Neuromuscular re-education, Therapeutic exercise, Therapeutic activity (gentle SELF AROM LLE only)  PT Plan: Ongoing PT  PT Frequency: 5 times per week  PT Discharge Recommendations: Low intensity level of continued care  PT Recommended Transfer Status: Assist x1  PT - OK to Discharge: Yes      General Visit Information:      General  Family/Caregiver Present: Yes  Co-Treatment: OT  Co-Treatment Reason: To maximize functional mobility outcomes, fall risk  Prior to Session Communication: Bedside nurse, PCT/NA/JOSEPH  Patient Position Received: Bed, 3 rail up, Alarm on  General Comment: pt. supine in bed, reports 10/10 pain but declines to wait for  nursing to bring pain meds and reports much time has passed since asking. Agreeable for tx, Nursing awaiting conclusion of theerapy session to place Douglass.    Subjective   Precautions:  Precautions  LE Weight Bearing Status: Weight Bearing as Tolerated    Vital Signs (Past 2hrs)                 Objective   Pain:  Pain Assessment  Pain Assessment: 0-10  0-10 (Numeric) Pain Score: 10 - Worst  possible pain  Pain Type: Acute pain  Pain Location: Leg  Pain Orientation: Left  Cognition:  Cognition  Orientation Level: Oriented X4  Coordination:     Postural Control:     Extremity/Trunk Assessments:    Activity Tolerance:  Activity Tolerance  Endurance: Tolerates 10 - 20 min exercise with multiple rests  Treatments:  Therapeutic Exercise  Therapeutic Exercise Performed: Yes (AP's, LAQ's, Marching, Hip Abd./Add all x's 18 reps each  Yanelis. LE's. Would benefit from addition of tband on RLE.)         Bed Mobility  Bed Mobility: Yes (Supine--->Sit CGA of 1-2)    Ambulation/Gait Training  Ambulation/Gait Training Performed: Yes (Gait training with FWW and CGA of 2 x's 20' with vc's to extend stride length.)  Transfers  Transfer: Yes (Sit<----> Stand from EOB CGA of 1-2 with vc's for hand placement.)    Outcome Measures:  [facility] Basic Mobility  Turning from your back to your side while in a flat bed without using bedrails: None  Moving from lying on your back to sitting on the side of a flat bed without using bedrails: A little  Moving to and from bed to chair (including a wheelchair): A little  Standing up from a chair using your arms (e.g. wheelchair or bedside chair): A little  To walk in hospital room: A little  Climbing 3-5 steps with railing: A little  Basic Mobility - Total Score: 19    Education Documentation  Precautions, taught by Tarah Rockwell PTA at 12/21/2024 11:58 AM.  Learner: Patient  Readiness: Acceptance  Method: Explanation, Demonstration  Response: Verbalizes Understanding, Demonstrated Understanding    ADL Training, taught by Tarah Rockwell PTA at 12/21/2024 11:58 AM.  Learner: Patient  Readiness: Acceptance  Method: Explanation, Demonstration  Response: Verbalizes Understanding, Demonstrated Understanding    Body Mechanics, taught by Tarah Rockwell PTA at 12/21/2024 11:58 AM.  Learner: Patient  Readiness: Acceptance  Method: Explanation, Demonstration  Response: Verbalizes  Understanding, Demonstrated Understanding    Home Exercise Program, taught by Tarah Rockwell PTA at 12/21/2024 11:58 AM.  Learner: Patient  Readiness: Acceptance  Method: Explanation, Demonstration  Response: Verbalizes Understanding, Demonstrated Understanding    Mobility Training, taught by Tarah Rockwell PTA at 12/21/2024 11:58 AM.  Learner: Patient  Readiness: Acceptance  Method: Explanation, Demonstration  Response: Verbalizes Understanding, Demonstrated Understanding    Education Comments  No comments found.        OP EDUCATION:  Outpatient Education  Education Provided: Other (Increase mobililty with assist.)    Encounter Problems       Encounter Problems (Active)       Mobility       STG - Patient will ambulate household distance using FWW modified indep safely/consistently (Progressing)       Start:  12/19/24    Expected End:  01/02/25               PT Transfers       STG - Patient will transfer sit to and from stand using FWW modified indep safely/consistently (Progressing)       Start:  12/19/24    Expected End:  01/02/25               Pain - Adult          Strengthening        RLE ONLY: Pt will perform 10+ reps AROM  to improve functional strength needed for improved mobility, SELF/AROM LLE as prateek (Progressing)       Start:  12/19/24    Expected End:  01/02/25

## 2024-12-21 NOTE — PROGRESS NOTES
"Dereck Shen is a 59 y.o. male on day 4 of admission presenting with Peripheral artery occlusion (CMS-[facility]).    Subjective   Left leg pain  Appetite intact  Glargine documented as refused last night      Scheduled medications  acetaminophen, 650 mg, oral, q6h  apixaban, 5 mg, oral, BID  aspirin, 81 mg, oral, Daily  atorvastatin, 80 mg, oral, Nightly  [Held by provider] carvedilol, 6.25 mg, oral, BID  [Held by provider] dapagliflozin propanediol, 10 mg, oral, Daily  docusate sodium, 100 mg, oral, BID  ezetimibe, 10 mg, oral, Nightly  gabapentin, 400 mg, oral, TID  [Held by provider] hydrALAZINE, 50 mg, oral, TID  insulin glargine, 20 Units, subcutaneous, Nightly  insulin lispro, 0-10 Units, subcutaneous, TID AC  insulin lispro, 14 Units, subcutaneous, TID AC  isosorbide mononitrate ER, 60 mg, oral, Daily  nicotine, 1 patch, transdermal, Daily  OXcarbazepine, 450 mg, oral, BID  oxygen, , inhalation, Continuous - Inhalation  pantoprazole, 40 mg, oral, Daily before breakfast  polyethylene glycol, 17 g, oral, Daily  ranolazine, 500 mg, oral, BID  [Held by provider] sacubitriL-valsartan, 1 tablet, oral, BID  tamsulosin, 0.4 mg, oral, Daily  ticagrelor, 90 mg, oral, BID         Objective   Physical Exam  Constitutional:       General: He is not in acute distress.  Neurological:      Mental Status: He is alert.         Last Recorded Vitals  Blood pressure 108/63, pulse 83, temperature 36.7 °C (98 °F), temperature source Temporal, resp. rate 18, height 1.803 m (5' 11\"), weight 91.9 kg (202 lb 8 oz), SpO2 98%.  Intake/Output last 3 Shifts:  I/O last 3 completed shifts:  In: 240 (2.6 mL/kg) [P.O.:240]  Out: 1750 (19.1 mL/kg) [Urine:1750 (0.5 mL/kg/hr)]  Weight: 91.9 kg     Relevant Results  Results from last 7 days   Lab Units 12/21/24  0647 12/21/24  0500 12/20/24  2014 12/20/24  1614 12/20/24  1120 12/20/24  0712 12/20/24  0437 12/19/24  0809 12/19/24  0416 12/18/24  0814 12/18/24  0449 12/17/24  1044 12/17/24  0333 "   POCT GLUCOSE mg/dL 159*  --  128* 154* 129* 149*  --    < >  --    < >  --    < >  --    GLUCOSE mg/dL  --  147*  --   --   --   --  112*  --  162*  --  190*  --  162*    < > = values in this interval not displayed.         Assessment/Plan   Assessment & Plan  Peripheral artery occlusion (CMS-[facility])    Critical limb ischemia of left lower extremity (Multi)    TYPE 2 DIABETES MELLITUS  LONG TERM CURRENT INSULIN USE  Glucose control adequate  No glargine last night      RECOMMENDATIONS  Continue lispro 14 units QAC plus scale  HOLD if NPO or glucose under 100 mg/dl.     Plan to resume glargine at 20 units juan pablo Pollard MD

## 2024-12-21 NOTE — CARE PLAN
The patient's goals for the shift include      The clinical goals for the shift include patient will remain safe and free from falls during this shift      Problem: Diabetes  Goal: Achieve decreasing blood glucose levels by end of shift  Outcome: Progressing  Goal: Increase stability of blood glucose readings by end of shift  Outcome: Progressing  Goal: Decrease in ketones present in urine by end of shift  Outcome: Progressing  Goal: Maintain electrolyte levels within acceptable range throughout shift  Outcome: Progressing  Goal: Maintain glucose levels >70mg/dl to <250mg/dl throughout shift  Outcome: Progressing  Goal: No changes in neurological exam by end of shift  Outcome: Progressing  Goal: Learn about and adhere to nutrition recommendations by end of shift  Outcome: Progressing  Goal: Vital signs within normal range for age by end of shift  Outcome: Progressing  Goal: Increase self care and/or family involovement by end of shift  Outcome: Progressing  Goal: Receive DSME education by end of shift  Outcome: Progressing     Problem: Pain  Goal: Takes deep breaths with improved pain control throughout the shift  Outcome: Progressing  Goal: Turns in bed with improved pain control throughout the shift  Outcome: Progressing  Goal: Walks with improved pain control throughout the shift  Outcome: Progressing  Goal: Performs ADL's with improved pain control throughout shift  Outcome: Progressing  Goal: Participates in PT with improved pain control throughout the shift  Outcome: Progressing  Goal: Free from opioid side effects throughout the shift  Outcome: Progressing  Goal: Free from acute confusion related to pain meds throughout the shift  Outcome: Progressing     Problem: Pain - Adult  Goal: Verbalizes/displays adequate comfort level or baseline comfort level  Outcome: Progressing     Problem: Safety - Adult  Goal: Free from fall injury  Outcome: Progressing     Problem: Discharge Planning  Goal: Discharge to home or  other facility with appropriate resources  Outcome: Progressing     Problem: Chronic Conditions and Co-morbidities  Goal: Patient's chronic conditions and co-morbidity symptoms are monitored and maintained or improved  Outcome: Progressing     Problem: Nutrition  Goal: Oral intake greater 75%  Outcome: Progressing  Goal: Consume prescribed supplement  Outcome: Progressing  Goal: BG  mg/dL  Outcome: Progressing  Goal: Lab values WNL  Outcome: Progressing  Goal: Electrolytes WNL  Outcome: Progressing  Goal: Promote healing  Outcome: Progressing  Goal: Maintain stable weight  Outcome: Progressing  Goal: Reduce weight from edema/fluid  Outcome: Progressing     Problem: Skin  Goal: Decreased wound size/increased tissue granulation at next dressing change  Outcome: Progressing  Flowsheets (Taken 12/20/2024 1940)  Decreased wound size/increased tissue granulation at next dressing change: Promote sleep for wound healing  Goal: Participates in plan/prevention/treatment measures  Outcome: Progressing  Goal: Prevent/manage excess moisture  Outcome: Progressing  Flowsheets (Taken 12/20/2024 1940)  Prevent/manage excess moisture:   Monitor for/manage infection if present   Moisturize dry skin  Goal: Prevent/minimize sheer/friction injuries  Outcome: Progressing  Flowsheets (Taken 12/20/2024 1940)  Prevent/minimize sheer/friction injuries:   Turn/reposition every 2 hours/use positioning/transfer devices   Use pull sheet  Goal: Promote/optimize nutrition  Outcome: Progressing  Goal: Promote skin healing  Outcome: Progressing

## 2024-12-22 VITALS
BODY MASS INDEX: 28.59 KG/M2 | HEIGHT: 71 IN | WEIGHT: 204.2 LBS | TEMPERATURE: 98.9 F | OXYGEN SATURATION: 96 % | RESPIRATION RATE: 18 BRPM | HEART RATE: 86 BPM | DIASTOLIC BLOOD PRESSURE: 76 MMHG | SYSTOLIC BLOOD PRESSURE: 111 MMHG

## 2024-12-22 LAB
ANION GAP SERPL CALC-SCNC: 10 MMOL/L (ref 10–20)
BUN SERPL-MCNC: 50 MG/DL (ref 6–23)
CALCIUM SERPL-MCNC: 8 MG/DL (ref 8.6–10.3)
CHLORIDE SERPL-SCNC: 100 MMOL/L (ref 98–107)
CO2 SERPL-SCNC: 23 MMOL/L (ref 21–32)
CREAT SERPL-MCNC: 1.63 MG/DL (ref 0.5–1.3)
EGFRCR SERPLBLD CKD-EPI 2021: 49 ML/MIN/1.73M*2
ERYTHROCYTE [DISTWIDTH] IN BLOOD BY AUTOMATED COUNT: 12.9 % (ref 11.5–14.5)
ERYTHROCYTE [DISTWIDTH] IN BLOOD BY AUTOMATED COUNT: 13.1 % (ref 11.5–14.5)
GLUCOSE BLD MANUAL STRIP-MCNC: 133 MG/DL (ref 74–99)
GLUCOSE BLD MANUAL STRIP-MCNC: 135 MG/DL (ref 74–99)
GLUCOSE BLD MANUAL STRIP-MCNC: 136 MG/DL (ref 74–99)
GLUCOSE BLD MANUAL STRIP-MCNC: 189 MG/DL (ref 74–99)
GLUCOSE SERPL-MCNC: 141 MG/DL (ref 74–99)
HCT VFR BLD AUTO: 23.9 % (ref 41–52)
HCT VFR BLD AUTO: 24.1 % (ref 41–52)
HGB BLD-MCNC: 8.3 G/DL (ref 13.5–17.5)
HGB BLD-MCNC: 8.4 G/DL (ref 13.5–17.5)
MAGNESIUM SERPL-MCNC: 2.03 MG/DL (ref 1.6–2.4)
MCH RBC QN AUTO: 31.6 PG (ref 26–34)
MCH RBC QN AUTO: 31.9 PG (ref 26–34)
MCHC RBC AUTO-ENTMCNC: 34.7 G/DL (ref 32–36)
MCHC RBC AUTO-ENTMCNC: 34.9 G/DL (ref 32–36)
MCV RBC AUTO: 91 FL (ref 80–100)
MCV RBC AUTO: 92 FL (ref 80–100)
NRBC BLD-RTO: 0 /100 WBCS (ref 0–0)
NRBC BLD-RTO: 0 /100 WBCS (ref 0–0)
PLATELET # BLD AUTO: 166 X10*3/UL (ref 150–450)
PLATELET # BLD AUTO: 186 X10*3/UL (ref 150–450)
POTASSIUM SERPL-SCNC: 4.7 MMOL/L (ref 3.5–5.3)
RBC # BLD AUTO: 2.63 X10*6/UL (ref 4.5–5.9)
RBC # BLD AUTO: 2.63 X10*6/UL (ref 4.5–5.9)
SODIUM SERPL-SCNC: 128 MMOL/L (ref 136–145)
WBC # BLD AUTO: 4.8 X10*3/UL (ref 4.4–11.3)
WBC # BLD AUTO: 5.9 X10*3/UL (ref 4.4–11.3)

## 2024-12-22 PROCEDURE — 1100000001 HC PRIVATE ROOM DAILY

## 2024-12-22 PROCEDURE — 2500000004 HC RX 250 GENERAL PHARMACY W/ HCPCS (ALT 636 FOR OP/ED): Performed by: PHYSICIAN ASSISTANT

## 2024-12-22 PROCEDURE — 85027 COMPLETE CBC AUTOMATED: CPT | Performed by: PHYSICIAN ASSISTANT

## 2024-12-22 PROCEDURE — 2500000001 HC RX 250 WO HCPCS SELF ADMINISTERED DRUGS (ALT 637 FOR MEDICARE OP): Performed by: PHYSICIAN ASSISTANT

## 2024-12-22 PROCEDURE — 2500000002 HC RX 250 W HCPCS SELF ADMINISTERED DRUGS (ALT 637 FOR MEDICARE OP, ALT 636 FOR OP/ED): Performed by: INTERNAL MEDICINE

## 2024-12-22 PROCEDURE — 2500000005 HC RX 250 GENERAL PHARMACY W/O HCPCS: Performed by: PHYSICIAN ASSISTANT

## 2024-12-22 PROCEDURE — 2500000001 HC RX 250 WO HCPCS SELF ADMINISTERED DRUGS (ALT 637 FOR MEDICARE OP): Performed by: SURGERY

## 2024-12-22 PROCEDURE — 83735 ASSAY OF MAGNESIUM: CPT | Performed by: PHYSICIAN ASSISTANT

## 2024-12-22 PROCEDURE — S4991 NICOTINE PATCH NONLEGEND: HCPCS | Performed by: PHYSICIAN ASSISTANT

## 2024-12-22 PROCEDURE — 80048 BASIC METABOLIC PNL TOTAL CA: CPT | Performed by: PHYSICIAN ASSISTANT

## 2024-12-22 PROCEDURE — 99233 SBSQ HOSP IP/OBS HIGH 50: CPT | Performed by: FAMILY MEDICINE

## 2024-12-22 PROCEDURE — 2500000002 HC RX 250 W HCPCS SELF ADMINISTERED DRUGS (ALT 637 FOR MEDICARE OP, ALT 636 FOR OP/ED): Performed by: PHYSICIAN ASSISTANT

## 2024-12-22 PROCEDURE — 82947 ASSAY GLUCOSE BLOOD QUANT: CPT

## 2024-12-22 PROCEDURE — 97116 GAIT TRAINING THERAPY: CPT | Mod: GP,CQ

## 2024-12-22 PROCEDURE — 36415 COLL VENOUS BLD VENIPUNCTURE: CPT | Performed by: PHYSICIAN ASSISTANT

## 2024-12-22 PROCEDURE — 2500000004 HC RX 250 GENERAL PHARMACY W/ HCPCS (ALT 636 FOR OP/ED): Performed by: FAMILY MEDICINE

## 2024-12-22 RX ORDER — SODIUM CHLORIDE 9 MG/ML
100 INJECTION, SOLUTION INTRAVENOUS CONTINUOUS
Status: DISCONTINUED | OUTPATIENT
Start: 2024-12-22 | End: 2024-12-24 | Stop reason: HOSPADM

## 2024-12-22 RX ADMIN — INSULIN LISPRO 2 UNITS: 100 INJECTION, SOLUTION INTRAVENOUS; SUBCUTANEOUS at 11:38

## 2024-12-22 RX ADMIN — ACETAMINOPHEN 650 MG: 325 TABLET ORAL at 14:42

## 2024-12-22 RX ADMIN — Medication 21 PERCENT: at 20:08

## 2024-12-22 RX ADMIN — GABAPENTIN 400 MG: 400 CAPSULE ORAL at 20:03

## 2024-12-22 RX ADMIN — EZETIMIBE 10 MG: 10 TABLET ORAL at 20:03

## 2024-12-22 RX ADMIN — OXYCODONE 5 MG: 5 TABLET ORAL at 14:42

## 2024-12-22 RX ADMIN — GABAPENTIN 400 MG: 400 CAPSULE ORAL at 14:42

## 2024-12-22 RX ADMIN — PANTOPRAZOLE SODIUM 40 MG: 40 TABLET, DELAYED RELEASE ORAL at 06:25

## 2024-12-22 RX ADMIN — ACETAMINOPHEN 650 MG: 325 TABLET ORAL at 08:06

## 2024-12-22 RX ADMIN — ASPIRIN 81 MG: 81 TABLET, COATED ORAL at 08:06

## 2024-12-22 RX ADMIN — ATORVASTATIN CALCIUM 80 MG: 40 TABLET, FILM COATED ORAL at 20:02

## 2024-12-22 RX ADMIN — NICOTINE 1 PATCH: 21 PATCH, EXTENDED RELEASE TRANSDERMAL at 08:05

## 2024-12-22 RX ADMIN — DOCUSATE SODIUM 100 MG: 100 CAPSULE, LIQUID FILLED ORAL at 08:05

## 2024-12-22 RX ADMIN — HYDROMORPHONE HYDROCHLORIDE 0.4 MG: 1 INJECTION, SOLUTION INTRAMUSCULAR; INTRAVENOUS; SUBCUTANEOUS at 22:18

## 2024-12-22 RX ADMIN — SODIUM CHLORIDE 100 ML/HR: 9 INJECTION, SOLUTION INTRAVENOUS at 14:42

## 2024-12-22 RX ADMIN — OXCARBAZEPINE 450 MG: 150 TABLET, FILM COATED ORAL at 20:03

## 2024-12-22 RX ADMIN — TICAGRELOR 90 MG: 90 TABLET ORAL at 20:03

## 2024-12-22 RX ADMIN — Medication 21 PERCENT: at 07:59

## 2024-12-22 RX ADMIN — RANOLAZINE 500 MG: 500 TABLET, EXTENDED RELEASE ORAL at 20:03

## 2024-12-22 RX ADMIN — APIXABAN 5 MG: 5 TABLET, FILM COATED ORAL at 08:05

## 2024-12-22 RX ADMIN — OXYCODONE 5 MG: 5 TABLET ORAL at 20:02

## 2024-12-22 RX ADMIN — Medication 5 MG: at 20:02

## 2024-12-22 RX ADMIN — INSULIN GLARGINE 20 UNITS: 100 INJECTION, SOLUTION SUBCUTANEOUS at 20:02

## 2024-12-22 RX ADMIN — OXYCODONE HYDROCHLORIDE 10 MG: 10 TABLET ORAL at 06:27

## 2024-12-22 RX ADMIN — ACETAMINOPHEN 650 MG: 325 TABLET ORAL at 20:02

## 2024-12-22 RX ADMIN — OXCARBAZEPINE 450 MG: 150 TABLET, FILM COATED ORAL at 08:05

## 2024-12-22 RX ADMIN — TAMSULOSIN HYDROCHLORIDE 0.4 MG: 0.4 CAPSULE ORAL at 08:06

## 2024-12-22 RX ADMIN — ACETAMINOPHEN 650 MG: 325 TABLET ORAL at 01:08

## 2024-12-22 RX ADMIN — HYDROMORPHONE HYDROCHLORIDE 0.4 MG: 1 INJECTION, SOLUTION INTRAMUSCULAR; INTRAVENOUS; SUBCUTANEOUS at 08:06

## 2024-12-22 RX ADMIN — TICAGRELOR 90 MG: 90 TABLET ORAL at 08:06

## 2024-12-22 RX ADMIN — INSULIN LISPRO 14 UNITS: 100 INJECTION, SOLUTION INTRAVENOUS; SUBCUTANEOUS at 11:40

## 2024-12-22 RX ADMIN — APIXABAN 5 MG: 5 TABLET, FILM COATED ORAL at 20:03

## 2024-12-22 RX ADMIN — RANOLAZINE 500 MG: 500 TABLET, EXTENDED RELEASE ORAL at 08:05

## 2024-12-22 RX ADMIN — GABAPENTIN 400 MG: 400 CAPSULE ORAL at 08:05

## 2024-12-22 RX ADMIN — INSULIN LISPRO 14 UNITS: 100 INJECTION, SOLUTION INTRAVENOUS; SUBCUTANEOUS at 08:07

## 2024-12-22 RX ADMIN — INSULIN LISPRO 14 UNITS: 100 INJECTION, SOLUTION INTRAVENOUS; SUBCUTANEOUS at 16:22

## 2024-12-22 RX ADMIN — OXYCODONE 5 MG: 5 TABLET ORAL at 10:31

## 2024-12-22 RX ADMIN — ISOSORBIDE MONONITRATE 60 MG: 60 TABLET, EXTENDED RELEASE ORAL at 08:06

## 2024-12-22 RX ADMIN — DOCUSATE SODIUM 100 MG: 100 CAPSULE, LIQUID FILLED ORAL at 20:03

## 2024-12-22 RX ADMIN — HYDROMORPHONE HYDROCHLORIDE 0.4 MG: 1 INJECTION, SOLUTION INTRAMUSCULAR; INTRAVENOUS; SUBCUTANEOUS at 16:30

## 2024-12-22 ASSESSMENT — COGNITIVE AND FUNCTIONAL STATUS - GENERAL
STANDING UP FROM CHAIR USING ARMS: A LITTLE
MOVING TO AND FROM BED TO CHAIR: A LITTLE
DAILY ACTIVITIY SCORE: 19
MOBILITY SCORE: 18
TOILETING: A LITTLE
CLIMB 3 TO 5 STEPS WITH RAILING: A LOT
DRESSING REGULAR UPPER BODY CLOTHING: A LITTLE
HELP NEEDED FOR BATHING: A LITTLE
MOBILITY SCORE: 15
MOVING TO AND FROM BED TO CHAIR: A LITTLE
DAILY ACTIVITIY SCORE: 19
MOBILITY SCORE: 20
TURNING FROM BACK TO SIDE WHILE IN FLAT BAD: A LITTLE
CLIMB 3 TO 5 STEPS WITH RAILING: A LOT
WALKING IN HOSPITAL ROOM: A LOT
HELP NEEDED FOR BATHING: A LITTLE
STANDING UP FROM CHAIR USING ARMS: A LOT
DRESSING REGULAR LOWER BODY CLOTHING: A LOT
DRESSING REGULAR UPPER BODY CLOTHING: A LITTLE
CLIMB 3 TO 5 STEPS WITH RAILING: A LOT
STANDING UP FROM CHAIR USING ARMS: A LITTLE
TOILETING: A LITTLE
DRESSING REGULAR LOWER BODY CLOTHING: A LOT
WALKING IN HOSPITAL ROOM: A LITTLE
WALKING IN HOSPITAL ROOM: A LOT
MOVING FROM LYING ON BACK TO SITTING ON SIDE OF FLAT BED WITH BEDRAILS: A LITTLE

## 2024-12-22 ASSESSMENT — ENCOUNTER SYMPTOMS
FEVER: 0
WEAKNESS: 0
TROUBLE SWALLOWING: 0
DIARRHEA: 0
JOINT SWELLING: 0
DIAPHORESIS: 0
VOMITING: 0
COUGH: 0
SHORTNESS OF BREATH: 0
WHEEZING: 0
NAUSEA: 0
CHILLS: 0
HEADACHES: 0
FATIGUE: 0
EYE PAIN: 0
DIZZINESS: 0
BRUISES/BLEEDS EASILY: 0
DYSURIA: 0
ABDOMINAL PAIN: 0
LIGHT-HEADEDNESS: 0
WOUND: 0
HEMATURIA: 0
BACK PAIN: 0
PALPITATIONS: 0
SORE THROAT: 0
NUMBNESS: 0
CONSTIPATION: 0
FACIAL SWELLING: 0
HALLUCINATIONS: 0
FLANK PAIN: 0
BLOOD IN STOOL: 0
CHEST TIGHTNESS: 0
FREQUENCY: 0
APPETITE CHANGE: 0

## 2024-12-22 ASSESSMENT — PAIN DESCRIPTION - LOCATION
LOCATION: LEG

## 2024-12-22 ASSESSMENT — PAIN - FUNCTIONAL ASSESSMENT
PAIN_FUNCTIONAL_ASSESSMENT: 0-10

## 2024-12-22 ASSESSMENT — PAIN SCALES - GENERAL
PAINLEVEL_OUTOF10: 9
PAINLEVEL_OUTOF10: 10 - WORST POSSIBLE PAIN
PAINLEVEL_OUTOF10: 5 - MODERATE PAIN
PAINLEVEL_OUTOF10: 6
PAINLEVEL_OUTOF10: 6
PAINLEVEL_OUTOF10: 7
PAINLEVEL_OUTOF10: 7
PAINLEVEL_OUTOF10: 8
PAINLEVEL_OUTOF10: 8
PAINLEVEL_OUTOF10: 9
PAINLEVEL_OUTOF10: 5 - MODERATE PAIN
PAINLEVEL_OUTOF10: 6
PAINLEVEL_OUTOF10: 4
PAINLEVEL_OUTOF10: 7

## 2024-12-22 ASSESSMENT — PAIN DESCRIPTION - ORIENTATION
ORIENTATION: LEFT

## 2024-12-22 ASSESSMENT — PAIN DESCRIPTION - DESCRIPTORS
DESCRIPTORS: DISCOMFORT;SORE
DESCRIPTORS: DISCOMFORT;SORE
DESCRIPTORS: DISCOMFORT
DESCRIPTORS: DISCOMFORT;SORE
DESCRIPTORS: DISCOMFORT
DESCRIPTORS: DISCOMFORT;SORE
DESCRIPTORS: DISCOMFORT;SHARP

## 2024-12-22 NOTE — CARE PLAN
The patient's goals for the shift include        Problem: Diabetes  Goal: Achieve decreasing blood glucose levels by end of shift  Outcome: Progressing  Goal: Increase stability of blood glucose readings by end of shift  Outcome: Progressing  Goal: Decrease in ketones present in urine by end of shift  Outcome: Progressing  Goal: Maintain electrolyte levels within acceptable range throughout shift  Outcome: Progressing  Goal: Maintain glucose levels >70mg/dl to <250mg/dl throughout shift  Outcome: Progressing  Goal: No changes in neurological exam by end of shift  Outcome: Progressing  Goal: Learn about and adhere to nutrition recommendations by end of shift  Outcome: Progressing  Goal: Vital signs within normal range for age by end of shift  Outcome: Progressing  Goal: Increase self care and/or family involovement by end of shift  Outcome: Progressing  Goal: Receive DSME education by end of shift  Outcome: Progressing     Problem: Pain  Goal: Takes deep breaths with improved pain control throughout the shift  Outcome: Progressing  Goal: Turns in bed with improved pain control throughout the shift  Outcome: Progressing  Goal: Walks with improved pain control throughout the shift  Outcome: Progressing  Goal: Performs ADL's with improved pain control throughout shift  Outcome: Progressing  Goal: Participates in PT with improved pain control throughout the shift  Outcome: Progressing  Goal: Free from opioid side effects throughout the shift  Outcome: Progressing  Goal: Free from acute confusion related to pain meds throughout the shift  Outcome: Progressing     Problem: Pain - Adult  Goal: Verbalizes/displays adequate comfort level or baseline comfort level  Outcome: Progressing     Problem: Safety - Adult  Goal: Free from fall injury  Outcome: Progressing     Problem: Discharge Planning  Goal: Discharge to home or other facility with appropriate resources  Outcome: Progressing     Problem: Chronic Conditions and  Co-morbidities  Goal: Patient's chronic conditions and co-morbidity symptoms are monitored and maintained or improved  Outcome: Progressing     Problem: Nutrition  Goal: Oral intake greater 75%  Outcome: Progressing  Goal: Consume prescribed supplement  Outcome: Progressing  Goal: BG  mg/dL  Outcome: Progressing  Goal: Lab values WNL  Outcome: Progressing  Goal: Electrolytes WNL  Outcome: Progressing  Goal: Promote healing  Outcome: Progressing  Goal: Maintain stable weight  Outcome: Progressing  Goal: Reduce weight from edema/fluid  Outcome: Progressing     Problem: Skin  Goal: Decreased wound size/increased tissue granulation at next dressing change  Outcome: Progressing  Flowsheets (Taken 12/21/2024 1926)  Decreased wound size/increased tissue granulation at next dressing change:   Promote sleep for wound healing   Protective dressings over bony prominences  Goal: Participates in plan/prevention/treatment measures  Outcome: Progressing  Flowsheets (Taken 12/21/2024 1926)  Participates in plan/prevention/treatment measures:   Discuss with provider PT/OT consult   Elevate heels   Increase activity/out of bed for meals  Goal: Prevent/manage excess moisture  Outcome: Progressing  Flowsheets (Taken 12/21/2024 1926)  Prevent/manage excess moisture:   Follow provider orders for dressing changes   Monitor for/manage infection if present  Goal: Prevent/minimize sheer/friction injuries  Outcome: Progressing  Flowsheets (Taken 12/21/2024 1926)  Prevent/minimize sheer/friction injuries:   Complete micro-shifts as needed if patient unable. Adjust patient position to relieve pressure points, not a full turn   Increase activity/out of bed for meals   Use pull sheet   HOB 30 degrees or less   Turn/reposition every 2 hours/use positioning/transfer devices  Goal: Promote/optimize nutrition  Outcome: Progressing  Flowsheets (Taken 12/21/2024 1926)  Promote/optimize nutrition:   Consume > 50% meals/supplements   Monitor/record  intake including meals   Offer water/supplements/favorite foods  Goal: Promote skin healing  Outcome: Progressing  Flowsheets (Taken 12/21/2024 1926)  Promote skin healing:   Assess skin/pad under line(s)/device(s)   Protective dressings over bony prominences   Turn/reposition every 2 hours/use positioning/transfer devices   Ensure correct size (line/device) and apply per  instructions   Rotate device position/do not position patient on device     Problem: Fall/Injury  Goal: Not fall by end of shift  Outcome: Progressing  Goal: Verbalize understanding of personal risk factors for fall in the hospital  Outcome: Progressing  Goal: Verbalize understanding of risk factor reduction measures to prevent injury from fall in the home  Outcome: Progressing  Goal: Use assistive devices by end of the shift  Outcome: Progressing  Goal: Pace activities to prevent fatigue by end of the shift  Outcome: Progressing

## 2024-12-22 NOTE — CARE PLAN
The patient's goals for the shift include        Problem: Acute Urinary Retention  Goal: Patient demonstrates ability to care for urinary catheter  Outcome: Progressing     Problem: Acute Urinary Retention  Goal: Patient's catheter remains patent without signs/symptoms of infection  Outcome: Progressing     Problem: Acute Urinary Retention  Goal: I will have no complications from indwelling catheter  Outcome: Progressing

## 2024-12-22 NOTE — CARE PLAN
The patient's goals for the shift include        Problem: Diabetes  Goal: Achieve decreasing blood glucose levels by end of shift  Outcome: Progressing  Goal: Increase stability of blood glucose readings by end of shift  Outcome: Progressing  Goal: Decrease in ketones present in urine by end of shift  Outcome: Progressing  Goal: Maintain electrolyte levels within acceptable range throughout shift  Outcome: Progressing  Goal: Maintain glucose levels >70mg/dl to <250mg/dl throughout shift  Outcome: Progressing  Goal: No changes in neurological exam by end of shift  Outcome: Progressing  Goal: Learn about and adhere to nutrition recommendations by end of shift  Outcome: Progressing  Goal: Vital signs within normal range for age by end of shift  Outcome: Progressing  Goal: Increase self care and/or family involovement by end of shift  Outcome: Progressing  Goal: Receive DSME education by end of shift  Outcome: Progressing     Problem: Pain  Goal: Takes deep breaths with improved pain control throughout the shift  Outcome: Progressing  Goal: Turns in bed with improved pain control throughout the shift  Outcome: Progressing  Goal: Walks with improved pain control throughout the shift  Outcome: Progressing  Goal: Performs ADL's with improved pain control throughout shift  Outcome: Progressing  Goal: Participates in PT with improved pain control throughout the shift  Outcome: Progressing  Goal: Free from opioid side effects throughout the shift  Outcome: Progressing  Goal: Free from acute confusion related to pain meds throughout the shift  Outcome: Progressing     Problem: Pain - Adult  Goal: Verbalizes/displays adequate comfort level or baseline comfort level  Outcome: Progressing     Problem: Safety - Adult  Goal: Free from fall injury  Outcome: Progressing     Problem: Discharge Planning  Goal: Discharge to home or other facility with appropriate resources  Outcome: Progressing     Problem: Chronic Conditions and  Co-morbidities  Goal: Patient's chronic conditions and co-morbidity symptoms are monitored and maintained or improved  Outcome: Progressing     Problem: Nutrition  Goal: Oral intake greater 75%  Outcome: Progressing  Goal: Consume prescribed supplement  Outcome: Progressing  Goal: BG  mg/dL  Outcome: Progressing  Goal: Lab values WNL  Outcome: Progressing  Goal: Electrolytes WNL  Outcome: Progressing  Goal: Promote healing  Outcome: Progressing  Goal: Maintain stable weight  Outcome: Progressing  Goal: Reduce weight from edema/fluid  Outcome: Progressing     Problem: Skin  Goal: Decreased wound size/increased tissue granulation at next dressing change  Outcome: Progressing  Flowsheets (Taken 12/22/2024 0710)  Decreased wound size/increased tissue granulation at next dressing change: Promote sleep for wound healing  Goal: Participates in plan/prevention/treatment measures  Outcome: Progressing  Flowsheets (Taken 12/22/2024 0710)  Participates in plan/prevention/treatment measures:   Discuss with provider PT/OT consult   Elevate heels   Increase activity/out of bed for meals  Goal: Prevent/manage excess moisture  Outcome: Progressing  Flowsheets (Taken 12/22/2024 0710)  Prevent/manage excess moisture:   Cleanse incontinence/protect with barrier cream   Monitor for/manage infection if present   Follow provider orders for dressing changes  Goal: Prevent/minimize sheer/friction injuries  Outcome: Progressing  Flowsheets (Taken 12/22/2024 0710)  Prevent/minimize sheer/friction injuries:   Complete micro-shifts as needed if patient unable. Adjust patient position to relieve pressure points, not a full turn   HOB 30 degrees or less   Increase activity/out of bed for meals   Turn/reposition every 2 hours/use positioning/transfer devices  Goal: Promote/optimize nutrition  Outcome: Progressing  Flowsheets (Taken 12/22/2024 0710)  Promote/optimize nutrition: Monitor/record intake including meals  Goal: Promote skin  healing  Outcome: Progressing  Flowsheets (Taken 12/22/2024 6608)  Promote skin healing:   Ensure correct size (line/device) and apply per  instructions   Assess skin/pad under line(s)/device(s)   Rotate device position/do not position patient on device     Problem: Fall/Injury  Goal: Not fall by end of shift  Outcome: Progressing  Goal: Verbalize understanding of personal risk factors for fall in the hospital  Outcome: Progressing  Goal: Verbalize understanding of risk factor reduction measures to prevent injury from fall in the home  Outcome: Progressing  Goal: Use assistive devices by end of the shift  Outcome: Progressing  Goal: Pace activities to prevent fatigue by end of the shift  Outcome: Progressing

## 2024-12-22 NOTE — PROGRESS NOTES
Dereck Shen is a 59 y.o. male on day 5 of admission presenting with Peripheral artery occlusion (CMS-[facility]).      Subjective   Dereck Shen is a 59 y.o. male with PMHx of CAD s/p CABG, severe lower extremity PAD with prior revascularization (R fem-pop bypass 2/24/20; L fem-pop bypass 9/14/20; L fem PTA of the graft; R iliac PTA 2/2022; LLE and DANYA PTA 4/2024; angioplasty of the left SFA 10/21/24; and left femoral and tibial thrombectomy with patch angioplasty 11/19/24. He presented with L Leg pain, redness, and ice cold to touch for three days. He has been compliant with all his medications including his Eliquis, Brilinta and aspirin. ED workup was significant for creatinine 1.53. CT angio of the lower extremities does show what appears to be reocclusion of his SFA and popliteal. Patient evaluated by Dr. Peace- continue hep gtt, DAPT, statin- Minimal revascularization options at this point and high risk of limb loss, obtain upper extremity vein mapping. Patient is now status post left femoropopliteal bypass with vein patch left distal anastomosis 12/18/24 with Dr. Peace    12/20/2024: No acute events overnight. Vitals stable. Pain 7-8/10. CBC/BMP reviewed, creatinine improving slowly to 1.22 today (baseline ~1.0-1.3).  Glucose 112- appreciate endocrinology recommendations. Start working with PT/OT and trying to wean off IV pain meds   He reports more pain today than previous surgeries. Vascular surgery going to reassess soon.     12/21:               Today patient seen in the bedside chair in the presence of his mother.  He is awake alert and interactive appropriately and appears NAD at the time of visit.  Nurses reported significant bladder scan this morning with straight cath yielding 725 mL and then later bladder scan for 580.  In view of this acute urinary retention will place Douglass catheter and obtain UA/C&S.  Patient does report he has had urinary symptoms of urgency.  Apparently had seen urologist  "years ago before he had the symptoms with unremarkable workup though describes enlarged prostate.  He is on Flomax.  Vascular surgery describes good perfusion postoperatively.  Await their recommendations for timing of discharge.    12/22:               POD #4.  Today once again patient seen in the bedside chair and is eating a meal.  Mother present.  He remains awake alert and interactive appropriately and appears NAD at the time of visit.  However he has been repeatedly requesting IV Dilaudid to the nurses for \"breakthrough pain\" over the oxycodone.  It was explained to him that his blood pressures are too soft to add that.  He appears to be developing worsening hyponatremia of uncertain etiology though consideration for increased ADH related to pain.  He also had acute urinary retention and Douglass placed and may to some extent represent postobstructive diuresis.  With his soft blood pressures well initially try IV fluids and follow labs.  Await further vascular surgery recommendations for timing of discharge.           Review of Systems   Constitutional:  Negative for appetite change, chills, diaphoresis, fatigue and fever.   HENT:  Negative for congestion, ear pain, facial swelling, hearing loss, nosebleeds, sore throat, tinnitus and trouble swallowing.    Eyes:  Negative for pain.   Respiratory:  Negative for cough, chest tightness, shortness of breath and wheezing.    Cardiovascular:  Negative for chest pain, palpitations and leg swelling.   Gastrointestinal:  Negative for abdominal pain, blood in stool, constipation, diarrhea, nausea and vomiting.   Genitourinary:  Positive for urgency. Negative for dysuria, flank pain, frequency and hematuria.   Musculoskeletal:  Negative for back pain and joint swelling.        Bilateral foot pain  L groin pain   Skin:  Negative for rash and wound.   Neurological:  Negative for dizziness, syncope, weakness, light-headedness, numbness and headaches.   Hematological:  Does not " bruise/bleed easily.   Psychiatric/Behavioral:  Negative for behavioral problems, hallucinations and suicidal ideas.           Objective     Last Recorded Vitals  BP 91/55 (BP Location: Right arm, Patient Position: Lying) Comment: RN notified  Pulse 75   Temp 36.8 °C (98.2 °F) (Temporal)   Resp 18   Wt 92.6 kg (204 lb 3.2 oz)   SpO2 98%     Image Results  ECG 12 lead    Result Date: 12/17/2024  Sinus rhythm Inferior infarct, old Baseline wander in lead(s) II,III,aVR,aVL,aVF,V1,V2,V5 See ED provider note for full interpretation and clinical correlation Confirmed by Nay Black ([id number]) on 12/17/2024 11:40:07 AM    Vascular US ankle brachial index (ANSON) without exercise    Result Date: 12/17/2024  Preliminary Cardiology [place] [address]      Phone [phone] Fax [phone]            [facility]  [facility]ANSON[facility] EXERCISE  Patient Name:     CARMENZA Presley Physician: [id number] Darlyn OLEARY MD Study Date:       12/17/2024       Ordering Provider: [id number] CHERELLE POON MRN/PID:          [id number]         Fellow: Accession#:       [id number]     Technologist:      Deb Allen RVT YOB: 1965        Technologist 2: Gender:           M                Encounter#:        [id number] Admission Status: Emergency        Location           [facility]                                    Performed:  Diagnosis/ICD: Peripheral vascular disease, unspecified-I73.9 CPT Codes:     94304 Peripheral artery ANSON Only  Pertinent History: Pt presents to the ER with LLE pain and a cold left foot.  **CRITICAL RESULT** Critical Result: severe arterial disease of LLE Notification called to Cherelle Poon on 12/17/2024 at 9:31:33 AM.  PRELIMINARY CONCLUSIONS:  Right Lower PVR: No evidence of arterial occlusive disease in the right  lower extremity at rest. Normal digital perfusion noted. Multiphasic flow is noted in the right common femoral artery, right popliteal artery, right posterior tibial artery and right dorsalis pedis artery. Left Lower PVR: Evidence of severe arterial occlusive disease in the left lower extremity at rest. Decreased digital perfusion noted. Monophasic flow is noted in the left common femoral artery, left popliteal artery, left posterior tibial artery and left dorsalis pedis artery. PTA is inaudible. Flat toe waveform(TBI=0).  Imaging & Doppler Findings:  RIGHT Lower PVR                Pressures Ratios Right Posterior Tibial (Ankle) 141 mmHg  1.22 Right Dorsalis Pedis (Ankle)   126 mmHg  1.09 Right Digit (Great Toe)        112 mmHg  0.97   LEFT Lower PVR                Pressures Ratios Left Posterior Tibial (Ankle) 0 mmHg    0.00 Left Dorsalis Pedis (Ankle)   33 mmHg   0.28 Left Digit (Great Toe)        0 mmHg    0.00                      Right Brachial Pressure 116 mmHg   VASCULAR PRELIMINARY REPORT completed by Deb Allen YUSUF on 12/17/2024 at 9:32:24 AM  ** Final **     CT angio aorta and bilateral iliofemoral runoff including without contrast if performed    Result Date: 12/17/2024  STUDY: CT Angiogram of the Abdomen, Pelvis, and Bilateral Lower Extremities; 12/17/2024 6:38 AM INDICATION: Cold left foot. COMPARISON: CTA AP w/runoffs 11/19/2024, CTA chest 11/19/2024, CTA AP w/runoffs 09/13/2024. ACCESSION NUMBER(S): [id number] ORDERING CLINICIAN: BASIM POON TECHNIQUE:  Helical CT is performed from the aortic diaphragmatic hiatus through the feet after bolus administration of 90 mL of Omnipaque 350.  Images are reviewed and processed at a workstation according to the CT angiogram protocol with 3-D and/or MIP post processing imaging generated. Automated mA/kV exposure control was utilized and patient examination was performed in strict accordance with principles of ALARA. FINDINGS: Vascular: Abdominal Aorta: The  celiac, SMA, and DENISE demonstrate no significant stenosis.  The right and left renal arteries demonstrate no significant stenosis. The abdominal aorta is not aneurysmal and demonstrates no significant occlusive disease. Right Run-off: The right common iliac artery shows mild stenosis diffusely.  There is a stent along the entire right external iliac and right common femoral arteries.  The stent demonstrates areas of mild in-stent stenosis. There is an occluded right femoral-popliteal bypass graft. The native right femoral popliteal arteries demonstrate mild atherosclerotic contour.  There is a stent extending from the mid SFA to the proximal popliteal artery.  At the distal SFA the stent demonstrates crush distortion but maintains a patent lumen, though with areas of mild in-stent stenosis. The popliteal artery remains patent throughout its course.  There is patent three-vessel runoff to the ankle, and 2 vessels into the foot. Opacification became too weak to follow the vessels to the plantar arch. Left Run-off: The left common iliac artery demonstrates mild diffuse atherosclerotic stenosis.  The left external iliac artery and left common femoral arteries are relatively normal.  The common femoral artery bifurcation is patent.  The native left SFA occludes just beyond its origin.  The profunda femoral artery and its branches remain patent. There is a chronically occluded left femoral-popliteal bypass graft. The native left SFA does not reconstitute, remaining occluded throughout its course. There is a 2 cm length patent segment of the left popliteal artery, which is otherwise occluded.  Distal to the patent segment, the popliteal artery is occluded across the level of the joint. The distal popliteal artery reconstitutes remains patent into its trifurcation. 3 patent infrapopliteal vessels are visualized proximally.  The tibial vessels remain patent to the ankle, where the dorsalis pedis and lateral plantar seen  across the ankle joint.  The vessels cannot be followed to the plantar arch due to weak contrast opacification. Abdomen: The lung bases are clear.  The liver is unremarkable.  The pancreas, spleen, adrenal glands, and kidneys demonstrate no acute pathology. There is no free air or lymph node enlargement. Pelvis: There is no bowel wall thickening or obstruction.  There is no free fluid.  Lymph nodes are not enlarged.  Urinary bladder is unremarkable. At the left inguinal region there is thick soft tissue in the subcutaneous fat overlying the femoral vessels, as well as 2 prominent lymph nodes. Skeleton:  There are no acute fractures.  No suspicious bony lesions.    No significant aortic disease.  Mild bilateral common iliac artery stenosis.  Patent left external iliac and common femoral arteries. 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  Chronic occlusion of the native left superficial femoral artery. A 2 cm segment of the left P1 popliteal artery reconstitutes.  The terminal left popliteal artery also reconstitutes.  Other portions of the popliteal artery are occluded. Patent three-vessel infrapopliteal runoff to the left hindfoot, beyond which contrast opacification was too weak to visualize. Thick subcutaneous soft tissue - presumed scarred tissue - overlying the left femoral vessels at the left groin.  Correlate for possibility of cellulitis. Patent right iliac and right femoral stents.  Patent right popliteal artery.  Patent three-vessel infrapopliteal runoff to the right ankle. No findings of an acute process in the abdomen or pelvis. Signed by Simeon aBrrera MD    Vascular US Lower Extremity Arterial Duplex Bilateral    Result Date: 12/3/2024              [place] [address]      Phone [phone] Fax [phone]  [facility]  [facility]UP[facility] BILATERAL Patient Name:      CARMENZA OLEARY    Reading Physician:   [id number] Britney Rush MD Study Date:        12/2/2024            Ordering Provider:  [id number] ADELA LYLES MRN/PID:           [id number]             Fellow: Accession#:        [id number]         Technologist:       Deb Allen RVT Date of Birth/Age: 1965 / 59 years Technologist 2: Gender:            M                    Encounter#:         [id number] Admission Status:  Outpatient           Location Performed: [facility]  Diagnosis/ICD: Peripheral vascular disease, unspecified-I73.9 CPT Codes:     79819 Peripheral artery Lower arterial Duplex complete  Pertinent History: LLE intervention(11/19/2024).  CONCLUSIONS: Right Lower Arterial: The profunda was monophasic and dampened on prior exam and today is not well visualized and appears occluded. All remaining vessels appear patent. Left Lower Arterial: No occlusion or stenosis noted in the vessels visualized. The distal BARB is dampened and appears nearly occluded. Working around staples and dressings. The EIA is not visualized. The proximal and mid CFA and the profunda is not visualized. Minimal visualization of calf vessels.  Imaging & Doppler Findings:  Right                       Left   PSV                        PSV 89 cm/s         EIA 95 cm/s         CFA        78 cm/s  0 cm/s  Profunda Proximal 80 cm/s    SFA Proximal    88 cm/s 148 cm/s      SFA Mid      100 cm/s 112 cm/s    SFA Distal     108 cm/s 73 cm/s      Popliteal     74 cm/s 100 cm/s   BARB Proximal 86 cm/s       BARB Mid 53 cm/s     BARB Distal     11 cm/s 48 cm/s  Peroneal Proximal 55 cm/s    Peroneal Mid    77 cm/s 40 cm/s   Peroneal Distal 34 cm/s    PTA Proximal    48 cm/s 31 cm/s       PTA Mid 52 cm/s     PTA Distal  [id number] Britney Rush MD Electronically signed by [id number] Britney Rush MD on 12/3/2024 at 11:43:03 AM  ** Final **     Vascular US ankle brachial index (ANSON) without exercise    Result Date: 12/3/2024               [place] [id number]      Phone [phone] Fax [phone]  Vascular Lab Report  [facility] US ANKLE BRACHIAL INDEX (ANSON) WITHOUT EXERCISE Patient Name:      CARMENZA Presley Physician:  [id number] Britney Rush MD Study Date:        12/2/2024            Ordering Provider:  [id number] ADELA LYLES MRN/PID:           [id number]             Fellow: Accession#:        [id number]         Technologist:       Deb Allen RVYUSUF Date of Birth/Age: 1965 / 59 years Technologist 2: Gender:            M                    Encounter#:         [id number] Admission Status:  Outpatient           Location Performed: [facility]  Diagnosis/ICD: Peripheral vascular disease, unspecified-I73.9 CPT Codes:     28860 Peripheral artery ANSON Only  Pertinent History: Recent LLE intervention(11/19/2024).  CONCLUSIONS: Right Lower PVR: No evidence of arterial occlusive disease in the right lower extremity at rest. Normal digital perfusion noted. Multiphasic flow is noted in the right common femoral artery, right posterior tibial artery and right dorsalis pedis artery. Left Lower PVR: Evidence of moderate arterial occlusive disease in the left lower extremity at rest. Decreased digital perfusion noted. Monophasic flow is noted in the left posterior tibial artery and left dorsalis pedis artery. Multiphasic flow is noted in the left common femoral artery.  Comparison: Compared with study from 5/23/2024, The left ANSON decreased from .96 to .67 today. No significant change on the right.  Imaging & Doppler Findings:  RIGHT Lower PVR                Pressures Ratios Right Posterior Tibial (Ankle) 169 mmHg  0.94 Right Dorsalis Pedis (Ankle)   158 mmHg  0.88 Right Digit (Great Toe)        113 mmHg  0.63   LEFT Lower PVR                Pressures Ratios Left Posterior Tibial (Ankle) 109 mmHg  0.61 Left Dorsalis Pedis (Ankle)   120  mmHg  0.67 Left Digit (Great Toe)        105 mmHg  0.58                      Right Brachial Pressure 180 mmHg   [id number] Britney Rush MD Electronically signed by [id number] Rush MD on 12/3/2024 at 11:38:55 AM  ** Final **     Transthoracic Echo (TTE) Complete    Result Date: 11/20/2024              [place] [address]      Phone [phone] Fax [phone] TRANSTHORACIC ECHOCARDIOGRAM REPORT Patient Name:       CARMENZA URBINA MAMTA   Reading Physician:    [id number] Rsuh MD Study Date:         11/20/2024          Ordering Provider:    [id number] DANNA SEARS MRN/PID:            [id number]            Fellow: Accession#:         [id number]        Nurse:                Noemi Villa RN Date of Birth/Age:  1965 / 59      Sonographer:          Laxmi crisostomo RDCS Gender Assigned at  M                   Additional Staff: Birth: Height:             180.34 cm           Admit Date:           11/19/2024 Weight:             87.09 kg            Admission Status:     Inpatient -                                                               Routine BSA / BMI:          2.07 m2 / 26.78     Department Location:  [facility]                     kg/m2 Blood Pressure: 107 /64 mmHg Study Type:    TRANSTHORACIC ECHO (TTE) COMPLETE Diagnosis/ICD: Shortness of breath-R06.02; Unspecified systolic (congestive)                heart failure (CHF)-I50.20 Indication:    CHF, SHORTNESS OF BREATH CPT Codes:     Echo Complete w Full Doppler-50628 Patient History: Pertinent History: CABG x5 2019, HTN. Study Detail: The following Echo studies were performed: 2D, M-Mode, Doppler and               color flow. Technically challenging study due to prominent lung               artifact and body habitus. Optison used as a contrast agent for               endocardial  border definition. Total contrast used for this               procedure was 3 mL via IV push.  PHYSICIAN INTERPRETATION: Left Ventricle: The left ventricular systolic function is moderately decreased, with a Salinas's biplane calculated ejection fraction of 38%. Wall motion is abnormal. The left ventricular cavity size is normal. There is normal septal and normal posterior left ventricular wall thickness. Spectral Doppler shows an abnormal pattern of left ventricular diastolic filling. Akinetic inferolateral segment, and basal inferior wall. Left Atrium: The left atrium is normal in size. Right Ventricle: The right ventricle was not well visualized. Right ventricular systolic function not assessed. Right Atrium: The right atrium is normal in size. Aortic Valve: The aortic valve appears structurally normal. The aortic valve dimensionless index is 0.73. There is no evidence of aortic valve regurgitation. The peak instantaneous gradient of the aortic valve is 9 mmHg. The mean gradient of the aortic valve is 4 mmHg. Mitral Valve: The mitral valve is normal in structure. There is no evidence of mitral valve regurgitation. Tricuspid Valve: The tricuspid valve is structurally normal. There is trace tricuspid regurgitation. Pulmonic Valve: The pulmonic valve is structurally normal. There is no indication of pulmonic valve regurgitation. Pericardium: No pericardial effusion noted. Aorta: The aortic root is normal. Systemic Veins: The inferior vena cava appears normal in size, with IVC inspiratory collapse greater than 50%.  CONCLUSIONS:  1. Poorly visualized anatomical structures due to suboptimal image quality.  2. The left ventricular systolic function is moderately decreased, with a Salinas's biplane calculated ejection fraction of 38%.  3. Abnormal wall motion.  4. Spectral Doppler shows an abnormal pattern of left ventricular diastolic filling.  5. Akinetic inferolateral segment, and basal inferior wall. QUANTITATIVE  DATA SUMMARY:  2D MEASUREMENTS:           Normal Ranges: Ao Root d:       2.80 cm   (2.0-3.7cm) LAs:             3.60 cm   (2.7-4.0cm) IVSd:            0.70 cm   (0.6-1.1cm) LVPWd:           0.70 cm   (0.6-1.1cm) LVIDd:           6.10 cm   (3.9-5.9cm) LVIDs:           5.30 cm LV Mass Index:   78.5 g/m2 LV % FS          13.1 %  LA VOLUME:                    Normal Ranges: LA Vol A4C:        48.7 ml    (22+/-6mL/m2) LA Vol A2C:        49.8 ml LA Vol BP:         51.4 ml LA Vol Index A4C:  23.5ml/m2 LA Vol Index A2C:  24.0 ml/m2 LA Vol Index BP:   24.8 ml/m2 LA Area A4C:       17.6 cm2 LA Area A2C:       18.6 cm2 LA Major Axis A4C: 5.4 cm LA Major Axis A2C: 5.9 cm LA Volume Index:   24.8 ml/m2  RA VOLUME BY A/L METHOD:          Normal Ranges: RA Area A4C:             13.5 cm2  AORTA MEASUREMENTS:         Normal Ranges: Ao Sinus, d:        2.80 cm (2.1-3.5cm) Ao STJ, d:          2.60 cm (1.7-3.4cm) Asc Ao, d:          2.90 cm (2.1-3.4cm)  LV SYSTOLIC FUNCTION BY 2D PLANIMETRY (MOD):                      Normal Ranges: EF-A4C View:    38 % (>=55%) EF-A2C View:    39 % EF-Biplane:     38 % LV EF Reported: 38 %  LV DIASTOLIC FUNCTION:             Normal Ranges: MV Peak E:             0.75 m/s    (0.7-1.2 m/s) MV Peak A:             0.65 m/s    (0.42-0.7 m/s) E/A Ratio:             1.17        (1.0-2.2) MV e'                  0.069 m/s   (>8.0) MV lateral e'          0.08 m/s MV medial e'           0.06 m/s MV A Dur:              109.00 msec E/e' Ratio:            10.91       (<8.0)  MITRAL VALVE:          Normal Ranges: MV DT:        211 msec (150-240msec)  AORTIC VALVE:                     Normal Ranges: AoV Vmax:                1.46 m/s (<=1.7m/s) AoV Peak P.5 mmHg (<20mmHg) AoV Mean P.0 mmHg (1.7-11.5mmHg) LVOT Max Ayad:            1.09 m/s (<=1.1m/s) AoV VTI:                 29.40 cm (18-25cm) LVOT VTI:                21.60 cm LVOT Diameter:           2.20 cm  (1.8-2.4cm) AoV Area, VTI:            2.79 cm2 (2.5-5.5cm2) AoV Area,Vmax:           2.84 cm2 (2.5-4.5cm2) AoV Dimensionless Index: 0.73  RIGHT VENTRICLE: TAPSE: 19.1 mm  TRICUSPID VALVE/RVSP:          Normal Ranges: Peak TR Velocity:     2.00 m/s RV Syst Pressure:     19 mmHg  (< 30mmHg) IVC Diam:             1.60 cm  [id number] Britney Rush MD Electronically signed on 11/20/2024 at 1:49:12 PM  ** Final **     ECG 12 lead    Result Date: 11/19/2024  Sinus rhythm Probable left atrial enlargement Borderline repolarization abnormality ST elevation, consider anterior injury Borderline prolonged QT interval See ED provider note for full interpretation and clinical correlation Confirmed by Zulema Stephen ([id number]) on 11/19/2024 11:07:14 AM    CT angio aorta and bilateral iliofemoral runoff including without contrast if performed    Result Date: 11/19/2024  Interpreted By:  Finkelstein, Evan, STUDY: CT ANGIO AORTA AND BILATERAL ILIOFEMORAL RUN OFF INCLUDING WITHOUT CONTRAST IF PERFORMED;  11/19/2024 4:42 am   INDICATION: Signs/Symptoms:no pulses left leg.     COMPARISON: CT runoff 09/13/2024   ACCESSION NUMBER(S): [id number]   ORDERING CLINICIAN: ACE VALENZUELA   TECHNIQUE: Contiguous axial CTA images were acquired through the abdomen and pelvis and bilateral lower extremities following the administration of 100 mL Omnipaque 350 intravenous contrast. Sagittal and coronal images were reconstructed. Maximum intensity projection and three dimensional images were constructed at a separate workstation.   FINDINGS: VASCULAR FINDINGS:   ABDOMINAL AORTA & ILIAC ARTERIES: No evidence of abdominal aortic aneurysm or dissection.   Moderate atherosclerotic calcifications throughout the abdominal aorta and major proximal vasculature. Celiac axis, superior mesenteric artery, and inferior mesenteric artery are patent without any significant narrowing. Bilateral renal arteries are patent without any significant.   Right common iliac and external iliac  arteries are patent without any significant narrowing. Patent right common/external iliac stent. Moderate to severe narrowing of the internal iliac arteries bilaterally. Left common and external iliac arteries are patent.     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 Common femoral artery is patent.  Native superficial femoral artery is patent. There is a stent within the mid to lower aspect of the native right superficial femoral artery which is patent. Profunda femoral artery is patent.  Popliteal artery is patent. Tibioperoneal trunk, posterior tibial artery, peroneal artery, and anterior tibial artery are patent. Three-vessel runoff at the ankle.   LEFT LOWER EXTREMITY: Common femoral artery is patent. 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 Redemonstrated occlusion of the native superficial femoral artery, similar compared to prior imaging. There is reconstitution of the popliteal artery. Poor opacification of the trifurcation of vessels just below the knee with poor opacification of the vessels throughout the entirety of the calf, ankle and foot, new/worsened compared to prior imaging..   ---------------------------------------------------------------   NON-VASCULAR FINDINGS:   LIVER: Within normal limits.   BILE DUCTS: Nondilated.   GALLBLADDER: No evidence of calcified gallstones or wall thickening.   PANCREAS: Within normal limits.   SPLEEN: A splenule is present. The spleen is otherwise unremarkable..   ADRENALS: Within normal limits.   KIDNEYS, URETERS, URINARY BLADDER: Unremarkable. No evidence of hydronephrosis.   BOWEL: No evidence of abnormal dilatation or obstruction of the small or large bowel. No evidence of pneumoperitoneum.   REPRODUCTIVE ORGANS:  Unremarkable   PERITONEUM / RETROPERITONEUM / LYMPH NODES: No evidence of any free fluid. No evidence of any significantly enlarged intra-abdominal or pelvic lymph nodes.    ABDOMINAL WALL: Fat containing inguinal hernias.   LOWER CHEST: Please refer to separately dictated CT chest report.   BONES: No evidence of suspicious lytic or blastic osseous lesions.       Persistent occlusion of the left femoral/popliteal bypass graft and native left superficial femoral artery. There is also new occlusion versus severe stenosis involving the vessels below the left knee beginning at the level of the trifurcation with the anterior tibial, posterior tibial and peroneal arteries not well seen through the calf, ankle and foot.   No acute abnormality of the abdomen or pelvis.   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 There is good opacification of the native right superficial femoral artery with three-vessel runoff to the right ankle.   MACRO: None.   Signed by: Evan Finkelstein 11/19/2024 5:20 AM Dictation workstation:   [id number]    CT angio chest for pulmonary embolism    Result Date: 11/19/2024  Interpreted By:  Finkelstein, Evan, STUDY: CT ANGIO CHEST FOR PULMONARY EMBOLISM;  11/19/2024 4:27 am   INDICATION: Signs/Symptoms:sob wih hypoxia.     COMPARISON: CT chest 06/23/2021   ACCESSION NUMBER(S): [id number]   ORDERING CLINICIAN: ACE VALENZUELA   TECHNIQUE: Axial CTA images of the chest after intravenous administration of 50 mL Omnipaque 350 using CT angiographic technique. Coronal and sagittal images are reconstructed. MIP images were created and reviewed.   FINDINGS: CHEST WALL AND LOWER NECK: Within normal limits. ABDOMEN: No acute abnormality of the partially visualized abdomen.   VASCULAR: AORTA: No aortic aneurysm. Mild atherosclerotic disease. PULMONARY ARTERY: Normal caliber. No pulmonary embolus to the segmental level.   CHEST:   HEART: Normal size. No pericardial effusion. MEDIASTINUM AND REJI: No pathologically enlarged thoracic lymph nodes. LUNG, PLEURA, LARGE AIRWAYS: Trace left pleural effusion. Thickened interlobular septal markings.  Ground-glass opacities scattered throughout the lungs bilaterally with an upper lung zone predominance.   BONES: No acute osseous abnormality. Median sternotomy wires are present.       No acute pulmonary embolus to the segmental level.   Thickened interlobular septal markings with scattered ground-glass opacities with an upper lung zone predominance. Findings may be seen with mixed interstitial/alveolar pulmonary edema. Other superimposed infectious or inflammatory processes are also not excluded.   Trace left pleural effusion   MACRO: None.   Signed by: Evan Finkelstein 11/19/2024 4:36 AM Dictation workstation:   [id number]       Lab Results  Results for orders placed or performed during the hospital encounter of 12/17/24 (from the past 24 hours)   POCT GLUCOSE   Result Value Ref Range    POCT Glucose 119 (H) 74 - 99 mg/dL   POCT GLUCOSE   Result Value Ref Range    POCT Glucose 152 (H) 74 - 99 mg/dL   CBC   Result Value Ref Range    WBC 5.9 4.4 - 11.3 x10*3/uL    nRBC 0.0 0.0 - 0.0 /100 WBCs    RBC 2.63 (L) 4.50 - 5.90 x10*6/uL    Hemoglobin 8.3 (L) 13.5 - 17.5 g/dL    Hematocrit 23.9 (L) 41.0 - 52.0 %    MCV 91 80 - 100 fL    MCH 31.6 26.0 - 34.0 pg    MCHC 34.7 32.0 - 36.0 g/dL    RDW 12.9 11.5 - 14.5 %    Platelets 166 150 - 450 x10*3/uL   Basic Metabolic Panel   Result Value Ref Range    Glucose 141 (H) 74 - 99 mg/dL    Sodium 128 (L) 136 - 145 mmol/L    Potassium 4.7 3.5 - 5.3 mmol/L    Chloride 100 98 - 107 mmol/L    Bicarbonate 23 21 - 32 mmol/L    Anion Gap 10 10 - 20 mmol/L    Urea Nitrogen 50 (H) 6 - 23 mg/dL    Creatinine 1.63 (H) 0.50 - 1.30 mg/dL    eGFR 49 (L) >60 mL/min/1.73m*2    Calcium 8.0 (L) 8.6 - 10.3 mg/dL   CBC   Result Value Ref Range    WBC 4.8 4.4 - 11.3 x10*3/uL    nRBC 0.0 0.0 - 0.0 /100 WBCs    RBC 2.63 (L) 4.50 - 5.90 x10*6/uL    Hemoglobin 8.4 (L) 13.5 - 17.5 g/dL    Hematocrit 24.1 (L) 41.0 - 52.0 %    MCV 92 80 - 100 fL    MCH 31.9 26.0 - 34.0 pg    MCHC 34.9 32.0 - 36.0 g/dL     RDW 13.1 11.5 - 14.5 %    Platelets 186 150 - 450 x10*3/uL   Magnesium   Result Value Ref Range    Magnesium 2.03 1.60 - 2.40 mg/dL   POCT GLUCOSE   Result Value Ref Range    POCT Glucose 135 (H) 74 - 99 mg/dL   POCT GLUCOSE   Result Value Ref Range    POCT Glucose 189 (H) 74 - 99 mg/dL     *Note: Due to a large number of results and/or encounters for the requested time period, some results have not been displayed. A complete set of results can be found in Results Review.        Medications  Scheduled medications:  acetaminophen, 650 mg, oral, q6h  apixaban, 5 mg, oral, BID  aspirin, 81 mg, oral, Daily  atorvastatin, 80 mg, oral, Nightly  [Held by provider] carvedilol, 6.25 mg, oral, BID  [Held by provider] dapagliflozin propanediol, 10 mg, oral, Daily  docusate sodium, 100 mg, oral, BID  ezetimibe, 10 mg, oral, Nightly  gabapentin, 400 mg, oral, TID  [Held by provider] hydrALAZINE, 50 mg, oral, TID  insulin glargine, 20 Units, subcutaneous, Nightly  insulin lispro, 0-10 Units, subcutaneous, TID AC  insulin lispro, 14 Units, subcutaneous, TID AC  isosorbide mononitrate ER, 60 mg, oral, Daily  nicotine, 1 patch, transdermal, Daily  OXcarbazepine, 450 mg, oral, BID  oxygen, , inhalation, Continuous - Inhalation  pantoprazole, 40 mg, oral, Daily before breakfast  polyethylene glycol, 17 g, oral, Daily  ranolazine, 500 mg, oral, BID  [Held by provider] sacubitriL-valsartan, 1 tablet, oral, BID  tamsulosin, 0.4 mg, oral, Daily  ticagrelor, 90 mg, oral, BID      Continuous medications:  sodium chloride 0.9%, 100 mL/hr, Last Rate: 100 mL/hr (12/22/24 1450)        PRN medications:  PRN medications: albuterol, dextrose, dextrose, glucagon, glucagon, HYDROmorphone, melatonin, naloxone, ondansetron ODT **OR** ondansetron, oxyCODONE, oxyCODONE     Physical Exam  Constitutional:       General: He is not in acute distress.     Appearance: Normal appearance.   HENT:      Head: Normocephalic and atraumatic.      Right Ear: External  ear normal.      Left Ear: External ear normal.      Nose: Nose normal.      Mouth/Throat:      Mouth: Mucous membranes are moist.      Pharynx: Oropharynx is clear.   Eyes:      Extraocular Movements: Extraocular movements intact.      Conjunctiva/sclera: Conjunctivae normal.      Pupils: Pupils are equal, round, and reactive to light.   Cardiovascular:      Rate and Rhythm: Normal rate and regular rhythm.      Pulses: Normal pulses.      Heart sounds: Normal heart sounds.      Comments: Unable to palpate bilateral DP/PT pulses  L foot is pink and warm with reasonable capillary refill  Pulmonary:      Effort: Pulmonary effort is normal. No respiratory distress.      Breath sounds: Normal breath sounds. No wheezing, rhonchi or rales.   Abdominal:      General: Bowel sounds are normal.      Palpations: Abdomen is soft.      Tenderness: There is no abdominal tenderness. There is no right CVA tenderness, left CVA tenderness, guarding or rebound.   Musculoskeletal:      Cervical back: Normal range of motion and neck supple.      Comments: Minimal movement of L ankle   Skin:     General: Skin is warm and dry.      Findings: Lesion (L groin, LLE) present. No rash.      Comments: Post-operative dressing with small amount of bloody strikethrough, did not remove dressing  NVS intact distal to operative site    Neurological:      General: No focal deficit present.      Mental Status: He is alert and oriented to person, place, and time. Mental status is at baseline.   Psychiatric:         Mood and Affect: Mood normal.         Behavior: Behavior normal.                  Assessment/Plan      Critical limb ischemia of LLE with reocclusion of L SFA and popliteal arteries- s/p left femoropopliteal bypass with vein patch left distal anastomosis 12/18/24  Hx severe lower extremity PAD with multiple prior revascularizations  Most recently L femoral and tibial thrombectomy 11/19/24 with 4 compartment fasciotomy   Pt reports he is  compliant with Eliquis, Brilinta and aspirin   Monitor incision sites for signs of bleeding  Neurovascular checks and pulse checks  Wound care consult for wound care of fasciotomy sites   Continue aspirin and brilinta  Heparin drip changed back to PO Eliquis 12/20/24  Pain control as able  12/21: Appears to be doing well postoperatively.  Await vascular surgery discharge timing.    Acute urinary retention  12/21: Nurses noted significant urinary retention which required following straight cath.  Douglass catheter placed and urine studies ordered.  Likely will discharge at this point with Douglass catheter unless can do voiding trial day of discharge.  Will need to follow-up with urology.  Apparently has history of BPH.  12/22: Once again discussed with patient and his mother that perhaps on the day of discharge could do a voiding trial.  Suspect to some extent his opioid analgesia may be contributing to this.  Informed them I thought it was likely he would require Douglass at discharge and follow-up with urology.  Consideration for Flomax though holding off in view of his soft blood pressures.     Hx CAD s/p CABG  Continue home Lipitor and Zetia  Continue home aspirin and brilinta  Continue home ranexa     SAMEERA  Baseline appears to be ~1.0-1.3  Avoid nephrotoxins  Consider nephrology consult if worsening   Hold home farxiga and entresto in setting of SAMEERA, resume as appropriate   12/22: Creatinine reasonably stable though above baseline.  He did have acute urinary retention.  Continue to monitor.     IDDM-II with hyperglycemia  Continue Lantus and humalog TID AC  A1C of 8.2% as of October 2024   Start SSI protocol  Consult endocrinology  Goal glucose <180 for better wound healing      HTN  BP is low- hold home meds and reintroduce as able    History of multiple CVA  History of R PCA stroke in 2019, L MCA stroke in 2021, bihemispheric small infarcts in 2022, and L MCA scattered embolic infarcts in 2023   Continue as  above      COPD without acute exacerbation  Cigarette smoker x48 years, continues to smoke but cut back to 3 cigarettes per day   Albuterol MDI as needed for shortness of breath/wheezing     BRUNA  Noncompliant with CPAP    Tobacco use disorder   Reports that he has cut back to 3 cigarettes per day and has been trying very hard to quit.   Smoking cessation education >3 minutes provided   NRT if needed    Code Status: Full Code          DVT ppx: Eliquis     Please see orders for more complete plan    Teo Carranza MD

## 2024-12-22 NOTE — PROGRESS NOTES
Physical Therapy    Physical Therapy Treatment    Patient Name: Dereck Shen  MRN: [id number]  Department: [facility]  Room: [address] [place], [place] [address]  Today's Date: 12/22/2024  Time Calculation  Start Time: 1310  Stop Time: 1325  Time Calculation (min): 15 min         Assessment/Plan   PT Assessment  PT Assessment Results: Decreased range of motion, Decreased endurance, Impaired balance, Decreased mobility, Pain (pt. reports 8/10 pain this session that stayed constant throughout, but no s/s noted. pt. increased gait to120' with FWW and SBA vc's to lengthen stride R>L. Encouraged increased mobilty and OOB to ready for safe discharge. Pt. now supine in bed, call)  Rehab Prognosis:  (light in reach, all needs met. Mother present.)  End of Session Patient Position: Bed, 3 rail up, Alarm off, caregiver present  PT Plan  Inpatient/Swing Bed or Outpatient: Inpatient  PT Plan  Treatment/Interventions: Bed mobility, Transfer training, Gait training, Neuromuscular re-education, Therapeutic exercise, Therapeutic activity (gentle SELF AROM LLE only)  PT Plan: Ongoing PT  PT Frequency: 5 times per week  PT Discharge Recommendations: Low intensity level of continued care  PT Recommended Transfer Status: Assist x1  PT - OK to Discharge: Yes      General Visit Information:      General  Prior to Session Communication: Bedside nurse, PCT/NA/CTA  Patient Position Received: Up in chair, Alarm on  General Comment: Douglass, Mother present. Pt. requesting ambulation prior to geting back to bed. Encouraged pt. to increase time out of bed. Reports chair is uncomfortanble. Will look for recliner for room if available.    Subjective   Precautions:       Vital Signs (Past 2hrs)        Date/Time Vitals Session Patient Position Pulse Resp SpO2 BP MAP (mmHg)    12/22/24 1328 --  --  75  18  98 %  91/55  67                         Objective   Pain:  Pain Assessment  Pain Assessment: 0-10  0-10 (Numeric) Pain Score: 8  Cognition:  Cognition  Overall Cognitive  "Status: Within Functional Limits  Orientation Level: Oriented X4  Coordination:     Postural Control:     Extremity/Trunk Assessments:    Activity Tolerance:  Activity Tolerance  Endurance: Tolerates 10 - 20 min exercise with multiple rests  Treatments:  Bed Mobility  Bed Mobility: Yes (Sit--->Supine SBA)    Ambulation/Gait Training  Ambulation/Gait Training Performed: Yes (Gait training with FWW x's 120' with SBA and vc's to lengthen stride length YOLIE. LE\"s R>L LE. pt. able to correct this session. VC's to correct posture to neutral.)  Transfers  Transfer: Yes (Sit----> Stand CGA)    Outcome Measures:  [facility] Basic Mobility  Turning from your back to your side while in a flat bed without using bedrails: None  Moving from lying on your back to sitting on the side of a flat bed without using bedrails: None  Moving to and from bed to chair (including a wheelchair): None  Standing up from a chair using your arms (e.g. wheelchair or bedside chair): A little  To walk in hospital room: A little  Climbing 3-5 steps with railing: A lot  Basic Mobility - Total Score: 20    Education Documentation  Body Mechanics, taught by Tarah Rockwell PTA at 12/22/2024  1:35 PM.  Learner: Patient  Readiness: Acceptance  Method: Explanation, Demonstration  Response: Verbalizes Understanding, Demonstrated Understanding    Home Exercise Program, taught by Tarah Rockwell PTA at 12/22/2024  1:35 PM.  Learner: Patient  Readiness: Acceptance  Method: Explanation, Demonstration  Response: Verbalizes Understanding, Demonstrated Understanding    Mobility Training, taught by Tarah Rockwell PTA at 12/22/2024  1:35 PM.  Learner: Patient  Readiness: Acceptance  Method: Explanation, Demonstration  Response: Verbalizes Understanding, Demonstrated Understanding    Education Comments  No comments found.        OP EDUCATION:       Encounter Problems       Encounter Problems (Active)       Mobility       STG - Patient will ambulate household distance " using FWW modified indep safely/consistently (Progressing)       Start:  12/19/24    Expected End:  01/02/25               PT Transfers       STG - Patient will transfer sit to and from stand using FWW modified indep safely/consistently (Progressing)       Start:  12/19/24    Expected End:  01/02/25               Pain - Adult          Strengthening        RLE ONLY: Pt will perform 10+ reps AROM  to improve functional strength needed for improved mobility, SELF/AROM LLE as prateek (Progressing)       Start:  12/19/24    Expected End:  01/02/25

## 2024-12-23 ENCOUNTER — APPOINTMENT (OUTPATIENT)
Dept: RADIOLOGY | Facility: HOSPITAL | Age: 59
End: 2024-12-23
Payer: COMMERCIAL

## 2024-12-23 LAB
ANION GAP SERPL CALC-SCNC: 10 MMOL/L (ref 10–20)
BUN SERPL-MCNC: 55 MG/DL (ref 6–23)
CALCIUM SERPL-MCNC: 8 MG/DL (ref 8.6–10.3)
CARDIAC TROPONIN I PNL SERPL HS: 14 NG/L (ref 0–20)
CARDIAC TROPONIN I PNL SERPL HS: 17 NG/L (ref 0–20)
CHLORIDE SERPL-SCNC: 102 MMOL/L (ref 98–107)
CO2 SERPL-SCNC: 22 MMOL/L (ref 21–32)
CREAT SERPL-MCNC: 1.25 MG/DL (ref 0.5–1.3)
EGFRCR SERPLBLD CKD-EPI 2021: 67 ML/MIN/1.73M*2
ERYTHROCYTE [DISTWIDTH] IN BLOOD BY AUTOMATED COUNT: 13.1 % (ref 11.5–14.5)
GLUCOSE BLD MANUAL STRIP-MCNC: 121 MG/DL (ref 74–99)
GLUCOSE BLD MANUAL STRIP-MCNC: 131 MG/DL (ref 74–99)
GLUCOSE BLD MANUAL STRIP-MCNC: 140 MG/DL (ref 74–99)
GLUCOSE BLD MANUAL STRIP-MCNC: 144 MG/DL (ref 74–99)
GLUCOSE SERPL-MCNC: 127 MG/DL (ref 74–99)
HCT VFR BLD AUTO: 24.9 % (ref 41–52)
HGB BLD-MCNC: 8.3 G/DL (ref 13.5–17.5)
MCH RBC QN AUTO: 30.7 PG (ref 26–34)
MCHC RBC AUTO-ENTMCNC: 33.3 G/DL (ref 32–36)
MCV RBC AUTO: 92 FL (ref 80–100)
NRBC BLD-RTO: 0 /100 WBCS (ref 0–0)
PLATELET # BLD AUTO: 213 X10*3/UL (ref 150–450)
POTASSIUM SERPL-SCNC: 4.6 MMOL/L (ref 3.5–5.3)
RBC # BLD AUTO: 2.7 X10*6/UL (ref 4.5–5.9)
SODIUM SERPL-SCNC: 129 MMOL/L (ref 136–145)
WBC # BLD AUTO: 3.8 X10*3/UL (ref 4.4–11.3)

## 2024-12-23 PROCEDURE — 82374 ASSAY BLOOD CARBON DIOXIDE: CPT | Performed by: FAMILY MEDICINE

## 2024-12-23 PROCEDURE — 99232 SBSQ HOSP IP/OBS MODERATE 35: CPT | Performed by: INTERNAL MEDICINE

## 2024-12-23 PROCEDURE — 97110 THERAPEUTIC EXERCISES: CPT | Mod: GP,CQ

## 2024-12-23 PROCEDURE — 99233 SBSQ HOSP IP/OBS HIGH 50: CPT | Performed by: UROLOGY

## 2024-12-23 PROCEDURE — 84484 ASSAY OF TROPONIN QUANT: CPT | Performed by: STUDENT IN AN ORGANIZED HEALTH CARE EDUCATION/TRAINING PROGRAM

## 2024-12-23 PROCEDURE — 99232 SBSQ HOSP IP/OBS MODERATE 35: CPT | Performed by: HOSPITALIST

## 2024-12-23 PROCEDURE — 82947 ASSAY GLUCOSE BLOOD QUANT: CPT

## 2024-12-23 PROCEDURE — 2500000002 HC RX 250 W HCPCS SELF ADMINISTERED DRUGS (ALT 637 FOR MEDICARE OP, ALT 636 FOR OP/ED): Performed by: PHYSICIAN ASSISTANT

## 2024-12-23 PROCEDURE — 85027 COMPLETE CBC AUTOMATED: CPT | Performed by: FAMILY MEDICINE

## 2024-12-23 PROCEDURE — 36415 COLL VENOUS BLD VENIPUNCTURE: CPT | Performed by: FAMILY MEDICINE

## 2024-12-23 PROCEDURE — 2500000002 HC RX 250 W HCPCS SELF ADMINISTERED DRUGS (ALT 637 FOR MEDICARE OP, ALT 636 FOR OP/ED): Performed by: INTERNAL MEDICINE

## 2024-12-23 PROCEDURE — 2500000005 HC RX 250 GENERAL PHARMACY W/O HCPCS: Performed by: PHYSICIAN ASSISTANT

## 2024-12-23 PROCEDURE — 97116 GAIT TRAINING THERAPY: CPT | Mod: GP,CQ

## 2024-12-23 PROCEDURE — S4991 NICOTINE PATCH NONLEGEND: HCPCS | Performed by: PHYSICIAN ASSISTANT

## 2024-12-23 PROCEDURE — 2500000001 HC RX 250 WO HCPCS SELF ADMINISTERED DRUGS (ALT 637 FOR MEDICARE OP): Performed by: PHYSICIAN ASSISTANT

## 2024-12-23 PROCEDURE — 1200000002 HC GENERAL ROOM WITH TELEMETRY DAILY

## 2024-12-23 PROCEDURE — 36415 COLL VENOUS BLD VENIPUNCTURE: CPT | Performed by: STUDENT IN AN ORGANIZED HEALTH CARE EDUCATION/TRAINING PROGRAM

## 2024-12-23 PROCEDURE — 71045 X-RAY EXAM CHEST 1 VIEW: CPT

## 2024-12-23 PROCEDURE — 2500000004 HC RX 250 GENERAL PHARMACY W/ HCPCS (ALT 636 FOR OP/ED): Performed by: PHYSICIAN ASSISTANT

## 2024-12-23 PROCEDURE — 2500000004 HC RX 250 GENERAL PHARMACY W/ HCPCS (ALT 636 FOR OP/ED): Performed by: FAMILY MEDICINE

## 2024-12-23 RX ORDER — NITROGLYCERIN 0.4 MG/1
0.4 TABLET SUBLINGUAL EVERY 5 MIN PRN
Status: DISCONTINUED | OUTPATIENT
Start: 2024-12-23 | End: 2024-12-24 | Stop reason: HOSPADM

## 2024-12-23 RX ADMIN — SODIUM CHLORIDE 100 ML/HR: 9 INJECTION, SOLUTION INTRAVENOUS at 00:52

## 2024-12-23 RX ADMIN — HYDROMORPHONE HYDROCHLORIDE 0.4 MG: 1 INJECTION, SOLUTION INTRAMUSCULAR; INTRAVENOUS; SUBCUTANEOUS at 20:21

## 2024-12-23 RX ADMIN — ACETAMINOPHEN 650 MG: 325 TABLET ORAL at 14:14

## 2024-12-23 RX ADMIN — DOCUSATE SODIUM 100 MG: 100 CAPSULE, LIQUID FILLED ORAL at 21:23

## 2024-12-23 RX ADMIN — INSULIN LISPRO 14 UNITS: 100 INJECTION, SOLUTION INTRAVENOUS; SUBCUTANEOUS at 08:04

## 2024-12-23 RX ADMIN — ACETAMINOPHEN 650 MG: 325 TABLET ORAL at 01:01

## 2024-12-23 RX ADMIN — OXYCODONE HYDROCHLORIDE 10 MG: 10 TABLET ORAL at 23:24

## 2024-12-23 RX ADMIN — ACETAMINOPHEN 650 MG: 325 TABLET ORAL at 21:23

## 2024-12-23 RX ADMIN — Medication 21 PERCENT: at 08:00

## 2024-12-23 RX ADMIN — TICAGRELOR 90 MG: 90 TABLET ORAL at 21:25

## 2024-12-23 RX ADMIN — ACETAMINOPHEN 650 MG: 325 TABLET ORAL at 08:11

## 2024-12-23 RX ADMIN — RANOLAZINE 500 MG: 500 TABLET, EXTENDED RELEASE ORAL at 08:10

## 2024-12-23 RX ADMIN — TAMSULOSIN HYDROCHLORIDE 0.4 MG: 0.4 CAPSULE ORAL at 08:11

## 2024-12-23 RX ADMIN — OXYCODONE 5 MG: 5 TABLET ORAL at 08:11

## 2024-12-23 RX ADMIN — INSULIN LISPRO 14 UNITS: 100 INJECTION, SOLUTION INTRAVENOUS; SUBCUTANEOUS at 11:05

## 2024-12-23 RX ADMIN — OXCARBAZEPINE 450 MG: 150 TABLET, FILM COATED ORAL at 21:23

## 2024-12-23 RX ADMIN — APIXABAN 5 MG: 5 TABLET, FILM COATED ORAL at 08:11

## 2024-12-23 RX ADMIN — OXCARBAZEPINE 450 MG: 150 TABLET, FILM COATED ORAL at 08:10

## 2024-12-23 RX ADMIN — ATORVASTATIN CALCIUM 80 MG: 40 TABLET, FILM COATED ORAL at 21:23

## 2024-12-23 RX ADMIN — SODIUM CHLORIDE 100 ML/HR: 9 INJECTION, SOLUTION INTRAVENOUS at 08:10

## 2024-12-23 RX ADMIN — INSULIN LISPRO 14 UNITS: 100 INJECTION, SOLUTION INTRAVENOUS; SUBCUTANEOUS at 16:53

## 2024-12-23 RX ADMIN — GABAPENTIN 400 MG: 400 CAPSULE ORAL at 14:16

## 2024-12-23 RX ADMIN — NICOTINE 1 PATCH: 21 PATCH, EXTENDED RELEASE TRANSDERMAL at 08:12

## 2024-12-23 RX ADMIN — HYDROMORPHONE HYDROCHLORIDE 0.4 MG: 1 INJECTION, SOLUTION INTRAMUSCULAR; INTRAVENOUS; SUBCUTANEOUS at 10:04

## 2024-12-23 RX ADMIN — GABAPENTIN 400 MG: 400 CAPSULE ORAL at 21:23

## 2024-12-23 RX ADMIN — DOCUSATE SODIUM 100 MG: 100 CAPSULE, LIQUID FILLED ORAL at 08:11

## 2024-12-23 RX ADMIN — TICAGRELOR 90 MG: 90 TABLET ORAL at 08:11

## 2024-12-23 RX ADMIN — OXYCODONE 5 MG: 5 TABLET ORAL at 01:01

## 2024-12-23 RX ADMIN — PANTOPRAZOLE SODIUM 40 MG: 40 TABLET, DELAYED RELEASE ORAL at 06:32

## 2024-12-23 RX ADMIN — ASPIRIN 81 MG: 81 TABLET, COATED ORAL at 08:11

## 2024-12-23 RX ADMIN — GABAPENTIN 400 MG: 400 CAPSULE ORAL at 08:11

## 2024-12-23 RX ADMIN — Medication 3 L/MIN: at 20:00

## 2024-12-23 RX ADMIN — INSULIN GLARGINE 20 UNITS: 100 INJECTION, SOLUTION SUBCUTANEOUS at 21:25

## 2024-12-23 RX ADMIN — OXYCODONE 5 MG: 5 TABLET ORAL at 15:16

## 2024-12-23 RX ADMIN — RANOLAZINE 500 MG: 500 TABLET, EXTENDED RELEASE ORAL at 21:23

## 2024-12-23 RX ADMIN — ISOSORBIDE MONONITRATE 60 MG: 60 TABLET, EXTENDED RELEASE ORAL at 08:11

## 2024-12-23 RX ADMIN — SODIUM CHLORIDE 100 ML/HR: 9 INJECTION, SOLUTION INTRAVENOUS at 18:55

## 2024-12-23 RX ADMIN — HYDROMORPHONE HYDROCHLORIDE 0.4 MG: 1 INJECTION, SOLUTION INTRAMUSCULAR; INTRAVENOUS; SUBCUTANEOUS at 04:41

## 2024-12-23 RX ADMIN — APIXABAN 5 MG: 5 TABLET, FILM COATED ORAL at 21:23

## 2024-12-23 RX ADMIN — EZETIMIBE 10 MG: 10 TABLET ORAL at 21:23

## 2024-12-23 ASSESSMENT — PAIN DESCRIPTION - LOCATION
LOCATION: LEG

## 2024-12-23 ASSESSMENT — PAIN - FUNCTIONAL ASSESSMENT
PAIN_FUNCTIONAL_ASSESSMENT: 0-10

## 2024-12-23 ASSESSMENT — COGNITIVE AND FUNCTIONAL STATUS - GENERAL
HELP NEEDED FOR BATHING: A LITTLE
STANDING UP FROM CHAIR USING ARMS: A LITTLE
TOILETING: A LITTLE
MOVING TO AND FROM BED TO CHAIR: A LITTLE
CLIMB 3 TO 5 STEPS WITH RAILING: A LOT
CLIMB 3 TO 5 STEPS WITH RAILING: A LOT
MOBILITY SCORE: 18
MOVING TO AND FROM BED TO CHAIR: A LITTLE
DRESSING REGULAR UPPER BODY CLOTHING: A LITTLE
WALKING IN HOSPITAL ROOM: A LOT
MOBILITY SCORE: 18
WALKING IN HOSPITAL ROOM: A LITTLE
DRESSING REGULAR LOWER BODY CLOTHING: A LOT
HELP NEEDED FOR BATHING: A LITTLE
MOBILITY SCORE: 19
CLIMB 3 TO 5 STEPS WITH RAILING: A LOT
DAILY ACTIVITIY SCORE: 19
STANDING UP FROM CHAIR USING ARMS: A LITTLE
DRESSING REGULAR LOWER BODY CLOTHING: A LOT
WALKING IN HOSPITAL ROOM: A LOT
TOILETING: A LITTLE
DRESSING REGULAR UPPER BODY CLOTHING: A LITTLE
MOVING TO AND FROM BED TO CHAIR: A LITTLE
STANDING UP FROM CHAIR USING ARMS: A LITTLE
DAILY ACTIVITIY SCORE: 19

## 2024-12-23 ASSESSMENT — PAIN DESCRIPTION - DESCRIPTORS
DESCRIPTORS: DISCOMFORT
DESCRIPTORS: DISCOMFORT;SORE
DESCRIPTORS: DISCOMFORT
DESCRIPTORS: DISCOMFORT;SORE
DESCRIPTORS: DISCOMFORT;SORE
DESCRIPTORS: DISCOMFORT;ACHING
DESCRIPTORS: DISCOMFORT;SORE

## 2024-12-23 ASSESSMENT — PAIN DESCRIPTION - ORIENTATION
ORIENTATION: LEFT
ORIENTATION: RIGHT
ORIENTATION: LEFT
ORIENTATION: LEFT

## 2024-12-23 ASSESSMENT — PAIN SCALES - GENERAL
PAINLEVEL_OUTOF10: 5 - MODERATE PAIN
PAINLEVEL_OUTOF10: 9
PAINLEVEL_OUTOF10: 6
PAINLEVEL_OUTOF10: 4
PAINLEVEL_OUTOF10: 7
PAINLEVEL_OUTOF10: 5 - MODERATE PAIN
PAINLEVEL_OUTOF10: 10 - WORST POSSIBLE PAIN
PAINLEVEL_OUTOF10: 7
PAINLEVEL_OUTOF10: 6
PAINLEVEL_OUTOF10: 5 - MODERATE PAIN
PAINLEVEL_OUTOF10: 8
PAINLEVEL_OUTOF10: 4
PAINLEVEL_OUTOF10: 0 - NO PAIN
PAINLEVEL_OUTOF10: 6

## 2024-12-23 NOTE — PROGRESS NOTES
"Nutrition Follow Up Assessment:   Nutrition Assessment         Medical history per chart:  60 y/o male with past medical history of CAD s/p CABG, heart failure, HTN, DM, recurrent strokes and severe PAD with history of multiple left lower extremity revascularizations now with critical limb ischemia of the LLE with reocclusion of SFA and popliteal. Per note, high risk of limb loss. S/p creation bypass graft femoral popliteal artery of left limb on 12/18/24. Found to have SAMEERA.   ~developing worsening hyponatremia of uncertain etiology though consideration for increased ADH related to pain.     12/19/24: Discussed care during IDT rounds. Pt endorsed decreased PO intake since admission. Hypothesized consuming ~50% of meals since admit. Denied unintentional wt loss or poor PO intake prior to admission. Requesting Ensure Max Protein TID. Endorsed the need to swallow twice during eating/drinking since a stroke in 2022--hypothesized this occurs 3x/week and denied previous hx of a following modified consistency diet. Discussed wt discrepancy since admission--pt believes 187# from 12/17/24 is most accurate reading.    12/23: Patient reports he continues to tolerate his diet, consuming >75% of most meals. He is drinking the ensure max with every meal.  Would like flavor changed to vanilla.     Nutrition History:     Food and Nutrient History: 12/19/24: Typically eats fruits, vegetables, nuts/seeds, Premier Protein shake before every meal. Occasionally has seafood or filet mignon. Pt hypothesized that he was \"overeating on the good stuff\" prior to admission, though did have a decreased appetite.        Current Diet: Adult diet Consistent Carb; CCD 60 gm/meal      Nutrition Related Findings:   Oral Symptoms: Teeth: Dentures lower, Dentures upper   GI symptoms: no GI issues at this time.   Food allergies: NKFA.   Meds/Labs reviewed.  acetaminophen, 650 mg, oral, q6h  apixaban, 5 mg, oral, BID  aspirin, 81 mg, oral, " "Daily  atorvastatin, 80 mg, oral, Nightly  [Held by provider] carvedilol, 6.25 mg, oral, BID  [Held by provider] dapagliflozin propanediol, 10 mg, oral, Daily  docusate sodium, 100 mg, oral, BID  ezetimibe, 10 mg, oral, Nightly  gabapentin, 400 mg, oral, TID  [Held by provider] hydrALAZINE, 50 mg, oral, TID  insulin glargine, 20 Units, subcutaneous, Nightly  insulin lispro, 0-10 Units, subcutaneous, TID AC  insulin lispro, 14 Units, subcutaneous, TID AC  isosorbide mononitrate ER, 60 mg, oral, Daily  nicotine, 1 patch, transdermal, Daily  OXcarbazepine, 450 mg, oral, BID  oxygen, , inhalation, Continuous - Inhalation  pantoprazole, 40 mg, oral, Daily before breakfast  polyethylene glycol, 17 g, oral, Daily  ranolazine, 500 mg, oral, BID  [Held by provider] sacubitriL-valsartan, 1 tablet, oral, BID  tamsulosin, 0.4 mg, oral, Daily  ticagrelor, 90 mg, oral, BID       sodium chloride 0.9%, 100 mL/hr, Last Rate: 100 mL/hr (12/23/24 1321)         Nutrition Significant Labs:  Results from last 7 days   Lab Units 12/23/24  0445 12/22/24  0511 12/21/24  0500 12/20/24  0437 12/19/24  0416   GLUCOSE mg/dL 127* 141* 147* 112* 162*   SODIUM mmol/L 129* 128* 131* 133* 135*   POTASSIUM mmol/L 4.6 4.7 4.7 4.4 4.4   CHLORIDE mmol/L 102 100 98 99 103   CO2 mmol/L 22 23 23 25 26   BUN mg/dL 55* 50* 38* 28* 31*   CREATININE mg/dL 1.25 1.63* 1.64* 1.22 1.30   EGFR mL/min/1.73m*2 67 49* 48* 69 64   CALCIUM mg/dL 8.0* 8.0* 8.1* 8.3* 8.0*   PHOSPHORUS mg/dL  --   --   --   --  4.0   MAGNESIUM mg/dL  --  2.03 1.97 2.03 2.02     Lab Results   Component Value Date    HGBA1C 7.9 (H) 12/18/2024    HGBA1C 8.2 (A) 10/01/2024     Results from last 7 days   Lab Units 12/23/24  1101 12/23/24  0612 12/22/24 2017 12/22/24  1613 12/22/24  1134 12/22/24  0640 12/21/24 2009 12/21/24  1621   POCT GLUCOSE mg/dL 144* 131* 136* 133* 189* 135* 152* 119*     Anthropometrics:  Height: 180.3 cm (5' 11\")   Weight: 96.2 kg (212 lb)   BMI (Calculated): " 29.58  IBW/kg (Dietitian Calculated): 78.1 kg          Weight History:   Wt Readings from Last 10 Encounters:   12/23/24 96.2 kg (212 lb)   12/03/24 91.2 kg (201 lb)   11/22/24 84.4 kg (186 lb)   11/21/24 94.4 kg (208 lb 1.8 oz)   10/22/24 88.9 kg (196 lb)   10/01/24 85.9 kg (189 lb 6.4 oz)   09/19/24 83.9 kg (185 lb)   09/13/24 83.9 kg (185 lb)   05/30/24 86.7 kg (191 lb 3.2 oz)   05/02/24 88.5 kg (195 lb)        Weight Change %:  Weight History / % Weight Change: 12/19/24: Pt reports he began trying to lose wt in ?2008 at 278#; has lost wt gradually over time and has now been stable at 185-187# x 1-1.5 years. Chart review of wt hx reveals fluctuations between 185-208# since May 2024--may be related to fluid fluctuations with CHF diagnosis.             Nutrition Focused Physical Exam Findings:  defer  Subcutaneous Fat Loss:      Muscle Wasting:     Edema:  Edema: none (No edema present per notes)  Physical Findings:  Skin: Positive (incision wounds x2, pretibial wound x1 per wound RN)    Estimated Needs:   Total Energy Estimated Needs (kCal): 2120 kCal  Method for Estimating Needs: 25 kcal/kg (using admission wt 84.8 kg)  Total Protein Estimated Needs (g): 102 g  Method for Estimating Needs: 1.2 g/kg (using admission wt 84.8 kg)  Total Fluid Estimated Needs (mL): 2000 mL  Method for Estimating Needs: suggested upper limit with CHF, or per provider        Nutrition Diagnosis   Nutrition Diagnosis:  Malnutrition Diagnosis  Patient has Malnutrition Diagnosis: No    Nutrition Diagnosis  Patient has Nutrition Diagnosis: Yes  Diagnosis Status (1): New  Nutrition Diagnosis 1: Increased nutrient needs (protein, vitamins, minerals)  Related to (1): increased metabolic demand secondary to healing  As Evidenced by (1): multiple wounds documented  Additional Nutrition Diagnosis: Diagnosis 2  Nutrition Diagnosis 2: Predicted inadequate energy intake  Related to (2): decreased appetite  As Evidenced by (2): patient report of  consuming approximately 50% of meals during admission       Nutrition Interventions/Recommendations   Nutrition Interventions and Recommendations:        Nutrition Prescription:  Individualized Nutrition Prescription Provided for : Supplements        Nutrition Interventions:   Food and/or Nutrient Delivery Interventions  Interventions: Medical food supplement, Meals and snacks  Meals and Snacks: Carbohydrate-modified diet  Goal: consume >75%  Medical Food Supplement: Commercial beverage  Goal: consume >75%  Additional Interventions: ensure max with meals         Nutrition Education:   Education Documentation  No documentation found.      Nutrition Counseling  Counseling Theoretical Approach: Other (Comment)  Goal: reviewed ons       Nutrition Monitoring and Evaluation   Monitoring/Evaluation:   Food/Nutrient Related History Monitoring  Monitoring and Evaluation Plan: Amount of food  Amount of Food: Estimated amout of food  Criteria: consume >75%    Body Composition/Growth/Weight History  Monitoring and Evaluation Plan: Weight  Weight: Measured weight  Criteria: stable    Biochemical Data, Medical Tests and Procedures  Monitoring and Evaluation Plan: Electrolyte/renal panel  Electrolyte and Renal Panel: Magnesium, Phosphorus, Potassium  Criteria: wnl  Glucose/Endocrine Profile: Glucose, casual  Criteria: wnl    Nutrition Focused Physical Findings  Monitoring and Evaluation Plan: Skin  Skin: Impaired wound healing  Criteria: promote healing            Time Spent/Follow-up Reminder:   Follow Up  Time Spent (min): 30 minutes  Last Date of Nutrition Visit: 12/23/24  Nutrition Follow-Up Needed?: Dietitian to reassess per policy  Follow up Comment: 12/27

## 2024-12-23 NOTE — PROGRESS NOTES
Occupational Therapy                 Therapy Communication Note    Patient Name: Dereck Shen  MRN: [id number]  Department: [facility]  Room: [address] [place] [address]  Today's Date: 12/23/2024     Discipline: Occupational Therapy          Missed Visit Reason: Missed Visit Reason:  (Declining therapy d/t anticipating discharge)    Missed Time: Attempt    Comment:

## 2024-12-23 NOTE — PROGRESS NOTES
Physical Therapy    Physical Therapy Treatment    Patient Name: Dereck Shen  MRN: [id number]  Department: [facility] 3 E  Room: [address] [place] [address]  Today's Date: 12/23/2024  Time Calculation  Start Time: 0850  Stop Time: 0915  Time Calculation (min): 25 min         Assessment/Plan   PT Assessment  PT Assessment Results: Decreased strength, Decreased endurance  Evaluation/Treatment Tolerance: Patient tolerated treatment well  End of Session Communication: Bedside nurse  Assessment Comment: pt perfomred seated exercises EOB with cues to slow down. pt amb with a step to gait at first then started to step through. cues to look up when amb. pt stated he has done this since is back surgery in the 90's. [facility] pt on importance of moving and getting up to the chair muliple times a day.  End of Session Patient Position: Up in chair  PT Plan  Inpatient/Swing Bed or Outpatient: Inpatient  PT Plan  Treatment/Interventions: Bed mobility, Transfer training, Gait training, Neuromuscular re-education, Therapeutic exercise, Therapeutic activity (gentle SELF AROM LLE only)  PT Plan: Ongoing PT  PT Frequency: 5 times per week  PT Discharge Recommendations: Low intensity level of continued care  PT Recommended Transfer Status: Assist x1  PT - OK to Discharge: Yes      General Visit Information:   PT  Visit  PT Received On: 12/23/24       Subjective   Precautions:       Vital Signs (Past 2hrs)                 Objective   Pain:  Pain Assessment  0-10 (Numeric) Pain Score: 5 - Moderate pain  Pain Type: Acute pain  Pain Location: Leg  Pain Orientation: Left  Cognition:  Cognition  Overall Cognitive Status: Within Functional Limits  Orientation Level: Oriented X4  Coordination:          Treatments:  Therapeutic Exercise  Therapeutic Exercise Performed:  (LAQ, hip flexion, ankle pumps x 20 ea)    Bed Mobility 1  Bed Mobility 1: Supine to sitting, Sitting to supine  Level of Assistance 1: Close supervision    Ambulation/Gait Training 1  Device 1: Rolling  walker  Assistance 1: Contact guard  Quality of Gait 1: Wide base of support, Diminished heel strike, Decreased step length  Comments/Distance (ft) 1: 150  Transfer 1  Technique 1: Sit to stand, Stand to sit  Transfer Device 1: Walker  Transfer Level of Assistance 1: Contact guard    Outcome Measures:  [facility] Basic Mobility  Turning from your back to your side while in a flat bed without using bedrails: None  Moving from lying on your back to sitting on the side of a flat bed without using bedrails: None  Moving to and from bed to chair (including a wheelchair): A little  Standing up from a chair using your arms (e.g. wheelchair or bedside chair): A little  To walk in hospital room: A little  Climbing 3-5 steps with railing: A lot  Basic Mobility - Total Score: 19    Education Documentation  Body Mechanics, taught by Chelsey Yo PTA at 12/23/2024  9:33 AM.  Learner: Patient  Readiness: Acceptance  Method: Explanation  Response: Verbalizes Understanding    Home Exercise Program, taught by Chelsey Yo PTA at 12/23/2024  9:33 AM.  Learner: Patient  Readiness: Acceptance  Method: Explanation  Response: Verbalizes Understanding    Mobility Training, taught by Chelsey Yo PTA at 12/23/2024  9:33 AM.  Learner: Patient  Readiness: Acceptance  Method: Explanation  Response: Verbalizes Understanding    Education Comments  No comments found.        OP EDUCATION:       Encounter Problems       Encounter Problems (Active)       Mobility       STG - Patient will ambulate household distance using FWW modified indep safely/consistently (Progressing)       Start:  12/19/24    Expected End:  01/02/25               PT Transfers       STG - Patient will transfer sit to and from stand using FWW modified indep safely/consistently (Progressing)       Start:  12/19/24    Expected End:  01/02/25               Pain - Adult          Strengthening        RLE ONLY: Pt will perform 10+ reps AROM  to improve functional  strength needed for improved mobility, SELF/AROM LLE as prateek (Progressing)       Start:  12/19/24    Expected End:  01/02/25

## 2024-12-23 NOTE — CONSULTS
Reason For Consult  BPH, postop urinary retention, failed voiding trial x 2    History Of Present Illness  Dereck Shen is a 59 y.o. male presenting with status post vascular surgery developed urinary retention.    Known to  service for BPH on Flomax    Tmax 99.2, T-current 98.7    Douglass catheter 1100 cc clear yellow    Creatinine 1.25, white count 3.8, hemoglobin 8.3     Past Medical History  He has a past medical history of (HFpEF) heart failure with preserved ejection fraction, Aphasia, Atherosclerotic heart disease of native coronary artery with unspecified angina pectoris (11/05/2019), Bipolar disorder, unspecified (Multi), BPH (benign prostatic hyperplasia), Diabetes (Multi), DVT (deep venous thrombosis) (Multi) (02/2022), Erectile dysfunction, GERD (gastroesophageal reflux disease), Hepatitis C, HLD (hyperlipidemia), HTN (hypertension), Obstructive sleep apnea (adult) (pediatric), Opioid abuse, in remission (Multi), PAD (peripheral artery disease) (CMS-HCC), Polymyalgia rheumatica (Multi), Post-traumatic stress disorder, unspecified, and Stroke (Multi) (07/24/2023).    Surgical History  He has a past surgical history that includes Knee surgery (Left); Elbow surgery; Back surgery; CT angio aorta and bilateral iliofemoral runoff including without contrast if performed (07/20/2020); MR angio head wo IV contrast (01/04/2022); MR angio neck wo IV contrast (01/04/2022); CT angio aorta and bilateral iliofemoral runoff including without contrast if performed (01/14/2022); Invasive Vascular Procedure (Bilateral, 10/04/2024); Dental surgery; Coronary angioplasty with stent; Coronary artery bypass graft; Tonsillectomy; Transluminal atherectomy femoral-popliteal / tibioperoneal; Invasive Vascular Procedure (N/A, 10/21/2024); and Invasive Vascular Procedure (N/A, 10/21/2024).     Social History  He reports that he has been smoking cigarettes. He has been exposed to tobacco smoke. He has never used smokeless  "tobacco. He reports current drug use. Drug: Marijuana. He reports that he does not drink alcohol.    Family History  Family History   Problem Relation Name Age of Onset    No Known Problems Mother      No Known Problems Father          Allergies  Celecoxib; Ibuprofen; Tetanus toxoid, adsorbed; [id number] antimigraine agents; Cephalexin; Hydrocodone; Latex; and Tryptophan    Review of Systems       Physical Exam       Last Recorded Vitals  Blood pressure 105/61, pulse 72, temperature 36.7 °C (98 °F), temperature source Temporal, resp. rate 16, height 1.803 m (5' 11\"), weight 96.2 kg (212 lb), SpO2 98%.    Relevant Results           Assessment/Plan     Impression  BPH  Postop urinary retention, failed voiding trial x 2    Plan  Continue Douglass catheter  Outpatient voiding trial with Dr. Kulkarni in 1 week  Call if problem    I spent  minutes in the professional and overall care of this patient.      Leonardo Suárez MD    "

## 2024-12-23 NOTE — PROGRESS NOTES
Dereck Shen [id number]   Service: Internal Medicine / Hospitalist Date of service: 12/23/2024                          Full Code          Dereck Shen is a 59 y.o. male presenting with Peripheral artery occlusion         Subjective    Dereck Shen is a 59 y.o. male with PMHx of CAD s/p CABG, severe lower extremity PAD with prior revascularization (R fem-pop bypass 2/24/20; L fem-pop bypass 9/14/20; L fem PTA of the graft; R iliac PTA 2/2022; LLE and DANYA PTA 4/2024; angioplasty of the left SFA 10/21/24; and left femoral and tibial thrombectomy with patch angioplasty 11/19/24. He presented with L Leg pain, redness, and ice cold to touch for three days. He has been compliant with all his medications including his Eliquis, Brilinta and aspirin. ED workup was significant for creatinine 1.53. CT angio of the lower extremities does show what appears to be reocclusion of his SFA and popliteal. Patient evaluated by Dr. Peace- continue hep gtt, DAPT, statin- Minimal revascularization options at this point and high risk of limb loss, obtain upper extremity vein mapping. Patient is now status post left femoropopliteal bypass with vein patch left distal anastomosis 12/18/24 with Dr. Peace     12/20/2024: No acute events overnight. Vitals stable. Pain 7-8/10. CBC/BMP reviewed, creatinine improving slowly to 1.22 today (baseline ~1.0-1.3).  Glucose 112- appreciate endocrinology recommendations. Start working with PT/OT and trying to wean off IV pain meds   He reports more pain today than previous surgeries. Vascular surgery going to reassess soon.      12/21:               Today patient seen in the bedside chair in the presence of his mother.  He is awake alert and interactive appropriately and appears NAD at the time of visit.  Nurses reported significant bladder scan this morning with straight cath yielding 725 mL and then later bladder scan for 580.  In view of this acute urinary retention will place Douglass  "catheter and obtain UA/C&S.  Patient does report he has had urinary symptoms of urgency.  Apparently had seen urologist years ago before he had the symptoms with unremarkable workup though describes enlarged prostate.  He is on Flomax.  Vascular surgery describes good perfusion postoperatively.  Await their recommendations for timing of discharge.     12/22:               POD #4.  Today once again patient seen in the bedside chair and is eating a meal.  Mother present.  He remains awake alert and interactive appropriately and appears NAD at the time of visit.  However he has been repeatedly requesting IV Dilaudid to the nurses for \"breakthrough pain\" over the oxycodone.  It was explained to him that his blood pressures are too soft to add that.  He appears to be developing worsening hyponatremia of uncertain etiology though consideration for increased ADH related to pain.  He also had acute urinary retention and Douglass placed and may to some extent represent postobstructive diuresis.  With his soft blood pressures well initially try IV fluids and follow labs.  Await further vascular surgery recommendations for timing of discharge.    12/23....................No specific complaints voiced by patient at the time of evaluation.  Patient contemplating possible discharge today.  Discharge planning discussed with vascular service admitting service.  No reported : palpitations, syncope, presyncope, nausea, vomiting or chest pains.      Review of Systems:   Review of system otherwise negative if not aforementioned above in subjective.    Objective              Physical Exam     Constitutional:       Appearance: Patient appeared in no acute cardiopulmonary distress.     Comments: Patient alert and oriented to person place time and situation.  HEENT:      Head: Normocephalic and atraumatic.Trachea midline      Nose:No observed congestion or rhinorrhea.     Mouth/Throat: Mucous membranes Moist, Trachea appeared  midline.  Eyes: "      Extraocular Movements: Extraocular movements intact.      Pupils: Pupils are equal, round, and reactive to light.      Comments: No scleral icterus or conjunctival injection appreciated.   Cardiovascular:      Rate and Rhythm: Normal rate and regular rhythm. No clicks rubs or gallops, normal S1 and S2.No peripheral stigmata of endocarditis appreciated.     Pulmonary:      Lungs appeared clear to auscultation, no adventitious sound appreciated.  Abdominal:      General: Abdomen soft, nontender, active bowel sounds, no involuntary guarding or rebound tenderness appreciated.     Comments: None   Musculoskeletal:       Patient appeared to have full active range of motion for upper and lower extremities, no acute apparent joint deformity appreciated on examination.   Comment: Status post left lower extremity femoropopliteal bypass, incision here appears clean, minimal swelling appreciated, no clinical signs of cyanosis appreciated, peripheral pulses appeared intact for left lower extremity.  No appreciation of neuromuscular deficit.         Lymphadenopathy:      No appreciable palpable lymphadenopathy  Skin:     General: Skin is warm.      Coloration:  No jaundice     Findings: No abnormal appearing skin rashes or lesions that appeared acute noted on unclothed area of the skin..   Neurological:      General: No focal sensory or motor deficits appreciated, no meningeal signs or dysmetria noted.      Cranial Nerves: Cranial nerves II to XII appearing grossly intact.     Genitals:  Deferred  Psychiatric:         The patient appears to be displaying normal mood and affect at the time of evaluation.    Labs:     Lab Results   Component Value Date    GLUCOSE 127 (H) 12/23/2024    CALCIUM 8.0 (L) 12/23/2024     (L) 12/23/2024    K 4.6 12/23/2024    CO2 22 12/23/2024     12/23/2024    BUN 55 (H) 12/23/2024    CREATININE 1.25 12/23/2024      Lab Results   Component Value Date    WBC 3.8 (L) 12/23/2024    HGB 8.3  (L) 12/23/2024    HCT 24.9 (L) 12/23/2024    MCV 92 12/23/2024     12/23/2024      [unfilled]   [unfilled]   No results found for the last 90 days.              X-rays/ Images    [unfilled]           Medical Problems       Problem List       * (Principal) Peripheral artery occlusion (CMS-HCC)    PAD (peripheral artery disease) (CMS-HCC)    Abdominal pain, acute, right upper quadrant    Abnormal CXR    Atherosclerosis of native artery of both lower extremities with intermittent claudication (CMS-HCC)    Atherosclerotic heart disease of native coronary artery without angina pectoris    Bacterial skin infection    Bipolar depression (Multi)    BPH (benign prostatic hyperplasia)    Burning pain    Causalgia of lower extremity    Hip pain, right    Pain of right lower extremity    Ischemic stroke (Multi)    Chronic combined systolic and diastolic CHF (congestive heart failure)    Chronic back pain greater than 3 months duration    Chronic hepatitis C virus genotype 1a infection (Multi)    Chronic obstructive pulmonary disease (Multi)    Chronic pain syndrome    Chronic RUQ pain    Claudication (CMS-HCC)    Coordination impairment    Decreased peripheral vision of left eye    Diabetic neuropathy, painful (Multi)    Diabetic polyneuropathy associated with type 2 diabetes mellitus (Multi)    Eschar of lower leg    Frequent falls    Gastroesophageal reflux disease    H/O: CVA (cerebrovascular accident)    Hepatic steatosis    Hepatitis C virus infection cured after antiviral drug therapy    Hepatitis-C    Hyperkalemia    Hyperlipidemia    Hypertension    Joint stiffness of both shoulders    Left ventricular dysfunction    Low back pain    Nasal congestion with rhinorrhea    Constipation    Nausea in adult    Neuropathic pain    Nicotine dependence    Neuropathy, inflammatory or toxic (Multi)    BRUNA (obstructive sleep apnea)    Other symptoms and signs involving the musculoskeletal system    Pancreatic lesion  (HHS-HCC)    Pancreatic malabsorption (HHS-HCC)    Paroxysmal nocturnal dyspnea    Peripheral arterial disease (CMS-HCC)    Poor balance    Other chronic postprocedural pain    Presence of aortocoronary bypass graft    Pulmonary congestion    Recurrent boils    Reduced chest expansion on inspiration    S/P CABG x 5    Right flank pain    Paraparesis (Multi)    Type 2 diabetes mellitus    Spasticity as late effect of cerebrovascular accident (CVA)    Shortness of breath on exertion    Dyspnea    Shingles    S/P PTCA (percutaneous transluminal coronary angioplasty)    Long-term insulin use (Multi)    Right hemiparesis (Multi)    Hospital discharge follow-up    Critical limb ischemia of left lower extremity (Multi)               Above medical problems may be reflective of historical medical problems that may have resolved and may not related to acute clinical condition/medical problems.    Clinical impression/plan:      Critical limb ischemia of LLE with reocclusion of L SFA and popliteal arteries- s/p left femoropopliteal bypass with vein patch left distal anastomosis 12/18/24  Hx severe lower extremity PAD with multiple prior revascularizations  Most recently L femoral and tibial thrombectomy 11/19/24 with 4 compartment fasciotomy   Pt reports he is compliant with Eliquis, Brilinta and aspirin   Monitor incision sites for signs of bleeding  Neurovascular checks and pulse checks  Wound care consult for wound care of fasciotomy sites   Continue aspirin and brilinta  Heparin drip changed back to PO Eliquis 12/20/24  Pain control as able  12/21: Appears to be doing well postoperatively.  Await vascular surgery discharge timing.     Acute urinary retention  12/21: Nurses noted significant urinary retention which required following straight cath.  Douglass catheter placed and urine studies ordered.  Likely will discharge at this point with Douglass catheter unless can do voiding trial day of discharge.  Will need to follow-up  with urology.  Apparently has history of BPH.  12/22: Once again discussed with patient and his mother that perhaps on the day of discharge could do a voiding trial.  Suspect to some extent his opioid analgesia may be contributing to this.  Informed them I thought it was likely he would require Douglass at discharge and follow-up with urology.  Consideration for Flomax though holding off in view of his soft blood pressures.     Hx CAD s/p CABG  Continue home Lipitor and Zetia  Continue home aspirin and brilinta  Continue home ranexa     SAMEERA  Baseline appears to be ~1.0-1.3  Avoid nephrotoxins  Consider nephrology consult if worsening   Hold home farxiga and entresto in setting of SAMEERA, resume as appropriate   12/22: Creatinine reasonably stable though above baseline.  He did have acute urinary retention.  Continue to monitor.     IDDM-II with hyperglycemia  Continue Lantus and humalog TID AC  A1C of 8.2% as of October 2024   Start SSI protocol  Consult endocrinology  Goal glucose <180 for better wound healing      HTN  BP is low- hold home meds and reintroduce as able     History of multiple CVA  History of R PCA stroke in 2019, L MCA stroke in 2021, bihemispheric small infarcts in 2022, and L MCA scattered embolic infarcts in 2023   Continue as above        COPD without acute exacerbation  Cigarette smoker x48 years, continues to smoke but cut back to 3 cigarettes per day   Albuterol MDI as needed for shortness of breath/wheezing      BRUNA  Noncompliant with CPAP     Tobacco use disorder   Reports that he has cut back to 3 cigarettes per day and has been trying very hard to quit.   Smoking cessation education >3 minutes provided   NRT if needed     Code Status: Full Code         DVT ppx: Eliquis        Disposition/additional care plan/interventions: 12/23/2024    Critical limb ischemia............. reported left SFA occlusion, status post left femoral-popliteal bypass.    Analgesics therapy as per surgical  service    Acute urinary retention in the postoperative period with associated BPH, continue stewardship and recommendation as per urology.  Plan for discharge home with Douglass catheter.    Continue postoperative care as per vascular service.    Tobacco cessation advised    COPD without acute exacerbation continue to monitor.    Monitor H&H    Continue stool softeners    Asymptomatic moderate hyponatremia suspicion of multifactorial nature..............Monitor sodium levels           The patient was informed of differential diagnosis , work up , plan of care and possible sequelae of clinical disposition.Patient in agreement with plan of care. Further recommendations forthcoming in accordance with patient's clinical disposition and response to care.    Discharge planning:Anticipate discharge in next 24 hours    Care time:Greater than 35-minutes           Dictation performed with assistance of voice recognition device therefore transcription errors are possible.

## 2024-12-23 NOTE — CARE PLAN
The patient's goals for the shift include        Problem: Diabetes  Goal: Achieve decreasing blood glucose levels by end of shift  Outcome: Progressing  Goal: Increase stability of blood glucose readings by end of shift  Outcome: Progressing  Goal: Decrease in ketones present in urine by end of shift  Outcome: Progressing  Goal: Maintain electrolyte levels within acceptable range throughout shift  Outcome: Progressing  Goal: Maintain glucose levels >70mg/dl to <250mg/dl throughout shift  Outcome: Progressing  Goal: No changes in neurological exam by end of shift  Outcome: Progressing  Goal: Learn about and adhere to nutrition recommendations by end of shift  Outcome: Progressing  Goal: Vital signs within normal range for age by end of shift  Outcome: Progressing  Goal: Increase self care and/or family involovement by end of shift  Outcome: Progressing  Goal: Receive DSME education by end of shift  Outcome: Progressing     Problem: Pain  Goal: Takes deep breaths with improved pain control throughout the shift  Outcome: Progressing  Goal: Turns in bed with improved pain control throughout the shift  Outcome: Progressing  Goal: Walks with improved pain control throughout the shift  Outcome: Progressing  Goal: Performs ADL's with improved pain control throughout shift  Outcome: Progressing  Goal: Participates in PT with improved pain control throughout the shift  Outcome: Progressing  Goal: Free from opioid side effects throughout the shift  Outcome: Progressing  Goal: Free from acute confusion related to pain meds throughout the shift  Outcome: Progressing     Problem: Pain - Adult  Goal: Verbalizes/displays adequate comfort level or baseline comfort level  Outcome: Progressing     Problem: Safety - Adult  Goal: Free from fall injury  Outcome: Progressing     Problem: Discharge Planning  Goal: Discharge to home or other facility with appropriate resources  Outcome: Progressing     Problem: Chronic Conditions and  Co-morbidities  Goal: Patient's chronic conditions and co-morbidity symptoms are monitored and maintained or improved  Outcome: Progressing     Problem: Nutrition  Goal: Oral intake greater 75%  Outcome: Progressing  Goal: Consume prescribed supplement  Outcome: Progressing  Goal: BG  mg/dL  Outcome: Progressing  Goal: Lab values WNL  Outcome: Progressing  Goal: Electrolytes WNL  Outcome: Progressing  Goal: Promote healing  Outcome: Progressing  Goal: Maintain stable weight  Outcome: Progressing  Goal: Reduce weight from edema/fluid  Outcome: Progressing     Problem: Skin  Goal: Decreased wound size/increased tissue granulation at next dressing change  Outcome: Progressing  Flowsheets (Taken 12/23/2024 0713)  Decreased wound size/increased tissue granulation at next dressing change:   Promote sleep for wound healing   Protective dressings over bony prominences  Goal: Participates in plan/prevention/treatment measures  Outcome: Progressing  Flowsheets (Taken 12/23/2024 0713)  Participates in plan/prevention/treatment measures:   Discuss with provider PT/OT consult   Elevate heels   Increase activity/out of bed for meals  Goal: Prevent/manage excess moisture  Outcome: Progressing  Flowsheets (Taken 12/23/2024 0713)  Prevent/manage excess moisture:   Cleanse incontinence/protect with barrier cream   Monitor for/manage infection if present   Follow provider orders for dressing changes  Goal: Prevent/minimize sheer/friction injuries  Outcome: Progressing  Flowsheets (Taken 12/23/2024 0713)  Prevent/minimize sheer/friction injuries:   Complete micro-shifts as needed if patient unable. Adjust patient position to relieve pressure points, not a full turn   Turn/reposition every 2 hours/use positioning/transfer devices   Increase activity/out of bed for meals   HOB 30 degrees or less  Goal: Promote/optimize nutrition  Outcome: Progressing  Flowsheets (Taken 12/23/2024 0713)  Promote/optimize nutrition: Monitor/record  intake including meals  Goal: Promote skin healing  Outcome: Progressing  Flowsheets (Taken 12/23/2024 1461)  Promote skin healing:   Assess skin/pad under line(s)/device(s)   Ensure correct size (line/device) and apply per  instructions   Protective dressings over bony prominences   Rotate device position/do not position patient on device   Turn/reposition every 2 hours/use positioning/transfer devices     Problem: Fall/Injury  Goal: Not fall by end of shift  Outcome: Progressing  Goal: Verbalize understanding of personal risk factors for fall in the hospital  Outcome: Progressing  Goal: Verbalize understanding of risk factor reduction measures to prevent injury from fall in the home  Outcome: Progressing  Goal: Use assistive devices by end of the shift  Outcome: Progressing  Goal: Pace activities to prevent fatigue by end of the shift  Outcome: Progressing  Goal: Be free from injury by end of the shift  Outcome: Progressing     Problem: Acute Urinary Retention  Goal: Patient demonstrates ability to care for urinary catheter  Outcome: Progressing  Goal: Patient's catheter remains patent without signs/symptoms of infection  Outcome: Progressing  Goal: I will have no complications from indwelling catheter  Outcome: Progressing

## 2024-12-23 NOTE — PROGRESS NOTES
12/23/24 1414   Discharge Planning   Home or Post Acute Services In home services   Type of Home Care Services Home nursing visits;Home OT;Home PT   Expected Discharge Disposition Home Health   Does the patient need discharge transport arranged? No   Patient Choice   Patient / Family choosing to utilize agency / facility established prior to hospitalization Yes   Intensity of Service   Intensity of Service 0-30 min     Met with patient to confirm discharge plans. Patient is planning to resume [facility] services at discharge. He confirmed his mom and sister assist with his wound care needs. He is active with the [facility] and will continue to follow there as well.

## 2024-12-23 NOTE — PROGRESS NOTES
"Dereck Shen is a 59 y.o. male on day 6 of admission presenting with Peripheral artery occlusion (CMS-HCC).    Subjective   Patient admits to having left leg pain for which he needs both IV and oral pain medications.  He is tolerating meals well and is only eat 50% of meals in order to control his blood sugars.    He desires to go home.     I have reviewed histories, allergies and medications have been reviewed and there are no changes       Objective     Physical Exam  Vitals and nursing note reviewed.   Constitutional:       General: He is not in acute distress.     Appearance: Normal appearance. He is normal weight.   HENT:      Head: Normocephalic and atraumatic.      Nose: Nose normal.      Mouth/Throat:      Mouth: Mucous membranes are moist.   Eyes:      Extraocular Movements: Extraocular movements intact.   Pulmonary:      Effort: Pulmonary effort is normal.   Genitourinary:     Comments: Douglass catheter in situ with tamy urine   Musculoskeletal:         General: Normal range of motion.   Neurological:      Mental Status: He is alert and oriented to person, place, and time.   Psychiatric:         Mood and Affect: Mood normal.         Last Recorded Vitals  Blood pressure 105/61, pulse 72, temperature 36.7 °C (98 °F), temperature source Temporal, resp. rate 16, height 1.803 m (5' 11\"), weight 96.2 kg (212 lb), SpO2 98%.  Intake/Output last 3 Shifts:  I/O last 3 completed shifts:  In: 2845 (29.6 mL/kg) [P.O.:1410; I.V.:1435 (14.9 mL/kg)]  Out: 1750 (18.2 mL/kg) [Urine:1750 (0.5 mL/kg/hr)]  Weight: 96.2 kg     Relevant Results  Results from last 7 days   Lab Units 12/23/24  1101 12/23/24  0612 12/23/24  0445 12/22/24  2017 12/22/24  1613 12/22/24  1134 12/22/24  0640 12/22/24  0511 12/21/24  0647 12/21/24  0500 12/20/24  0712 12/20/24  0437 12/19/24  0809 12/19/24  0416   POCT GLUCOSE mg/dL 144* 131*  --  136* 133* 189*   < >  --    < >  --    < >  --    < >  --    GLUCOSE mg/dL  --   --  127*  --   --   -- "   --  141*  --  147*  --  112*  --  162*    < > = values in this interval not displayed.     Scheduled medications  acetaminophen, 650 mg, oral, q6h  apixaban, 5 mg, oral, BID  aspirin, 81 mg, oral, Daily  atorvastatin, 80 mg, oral, Nightly  [Held by provider] carvedilol, 6.25 mg, oral, BID  [Held by provider] dapagliflozin propanediol, 10 mg, oral, Daily  docusate sodium, 100 mg, oral, BID  ezetimibe, 10 mg, oral, Nightly  gabapentin, 400 mg, oral, TID  [Held by provider] hydrALAZINE, 50 mg, oral, TID  insulin glargine, 20 Units, subcutaneous, Nightly  insulin lispro, 0-10 Units, subcutaneous, TID AC  insulin lispro, 14 Units, subcutaneous, TID AC  isosorbide mononitrate ER, 60 mg, oral, Daily  nicotine, 1 patch, transdermal, Daily  OXcarbazepine, 450 mg, oral, BID  oxygen, , inhalation, Continuous - Inhalation  pantoprazole, 40 mg, oral, Daily before breakfast  polyethylene glycol, 17 g, oral, Daily  ranolazine, 500 mg, oral, BID  [Held by provider] sacubitriL-valsartan, 1 tablet, oral, BID  tamsulosin, 0.4 mg, oral, Daily  ticagrelor, 90 mg, oral, BID      Continuous medications  sodium chloride 0.9%, 100 mL/hr, Last Rate: 100 mL/hr (12/23/24 1025)      PRN medications  PRN medications: albuterol, dextrose, dextrose, glucagon, glucagon, HYDROmorphone, melatonin, naloxone, ondansetron ODT **OR** ondansetron, oxyCODONE, oxyCODONE    Results for orders placed or performed during the hospital encounter of 12/17/24 (from the past 24 hours)   POCT GLUCOSE   Result Value Ref Range    POCT Glucose 133 (H) 74 - 99 mg/dL   POCT GLUCOSE   Result Value Ref Range    POCT Glucose 136 (H) 74 - 99 mg/dL   CBC   Result Value Ref Range    WBC 3.8 (L) 4.4 - 11.3 x10*3/uL    nRBC 0.0 0.0 - 0.0 /100 WBCs    RBC 2.70 (L) 4.50 - 5.90 x10*6/uL    Hemoglobin 8.3 (L) 13.5 - 17.5 g/dL    Hematocrit 24.9 (L) 41.0 - 52.0 %    MCV 92 80 - 100 fL    MCH 30.7 26.0 - 34.0 pg    MCHC 33.3 32.0 - 36.0 g/dL    RDW 13.1 11.5 - 14.5 %    Platelets  213 150 - 450 x10*3/uL   Basic Metabolic Panel   Result Value Ref Range    Glucose 127 (H) 74 - 99 mg/dL    Sodium 129 (L) 136 - 145 mmol/L    Potassium 4.6 3.5 - 5.3 mmol/L    Chloride 102 98 - 107 mmol/L    Bicarbonate 22 21 - 32 mmol/L    Anion Gap 10 10 - 20 mmol/L    Urea Nitrogen 55 (H) 6 - 23 mg/dL    Creatinine 1.25 0.50 - 1.30 mg/dL    eGFR 67 >60 mL/min/1.73m*2    Calcium 8.0 (L) 8.6 - 10.3 mg/dL   POCT GLUCOSE   Result Value Ref Range    POCT Glucose 131 (H) 74 - 99 mg/dL   POCT GLUCOSE   Result Value Ref Range    POCT Glucose 144 (H) 74 - 99 mg/dL     *Note: Due to a large number of results and/or encounters for the requested time period, some results have not been displayed. A complete set of results can be found in Results Review.      Vascular US ankle brachial index (ANSON) without exercise    Result Date: 12/20/2024              [place] [address]      Phone [phone] Fax [phone]  Vascular Lab Report  [facility] US ANKLE BRACHIAL INDEX (ANSON) WITHOUT EXERCISE Patient Name:      CARMENZA Presley Physician:  [id number] Darlyn Daly MD Study Date:        12/17/2024           Ordering Provider:  [id number] BASIM POON MRN/PID:           [id number]             Fellow: Accession#:        [id number]         Technologist:       Deb Allen RVT Date of Birth/Age: 1965 / 59 years Technologist 2: Gender:            M                    Encounter#:         [id number] Admission Status:  Emergency            Location Performed: [facility]  Diagnosis/ICD: Peripheral vascular disease, unspecified-I73.9 CPT Codes:     60409 Peripheral artery ANSON Only  Pertinent History: Pt presents to the ER with LLE pain and a cold left foot.  **CRITICAL RESULT** Critical Result: severe arterial disease of LLE Notification called to  Cherelle Saldana on 12/17/2024 at 9:31:33 AM.  CONCLUSIONS: Right Lower PVR: No evidence of arterial occlusive disease in the right lower extremity at rest. Normal digital perfusion noted. Multiphasic flow is noted in the right common femoral artery, right popliteal artery, right posterior tibial artery and right dorsalis pedis artery. Left Lower PVR: Evidence of severe arterial occlusive disease in the left lower extremity at rest. Decreased digital perfusion noted. Monophasic flow is noted in the left common femoral artery, left popliteal artery, left posterior tibial artery and left dorsalis pedis artery. PTA is inaudible. Flat toe waveform(TBI=0).  Comparison: Compared with study from 12/2/2024, The left ANSON decreased from 0.67 to 0.28.  Imaging & Doppler Findings:  RIGHT Lower PVR                Pressures Ratios Right Posterior Tibial (Ankle) 141 mmHg  1.22 Right Dorsalis Pedis (Ankle)   126 mmHg  1.09 Right Digit (Great Toe)        112 mmHg  0.97   LEFT Lower PVR                Pressures Ratios Left Posterior Tibial (Ankle) 0 mmHg    0.00 Left Dorsalis Pedis (Ankle)   33 mmHg   0.28 Left Digit (Great Toe)        0 mmHg    0.00                      Right Brachial Pressure 116 mmHg   [id number] Darlyn Daly MD Electronically signed by [id number] Darlyn Daly MD on 12/20/2024 at 4:14:37 PM  ** Final **     Vascular US upper extremity vein mapping bilateral    Result Date: 12/18/2024              [place] [address]      Phone [phone] Fax [phone]  Vascular Lab Report  [facility] US UPPER EXTREMITY VEIN MAPPING BILATERAL Patient Name:      CARMENZA Presley Physician:  [id number] Fabian Phillips MD Study Date:        12/18/2024          Ordering Provider:  [id number] DARIUS BURT MRN/PID:           [id number]            Fellow: Accession#:        [id number]        Technologist:       Danielle Kelley                                                             RVT Date of Birth/Age: 1965 / 59      Technologist 2:     Deb Allen RVT                    years Gender:            M                   Encounter#:         [id number] Admission Status:  Outpatient          Location Performed: [facility]  Diagnosis/ICD: Encounter for other preprocedural examination-Z01.818 CPT Codes:     91681 Vein mapping complete  CONCLUSIONS: Right Upper Vein Mapping: Chronic disease is documented in the right cephalic proximal forearm, cephalic mid forearm, cephalic distal forearm, basilic proximal forearm, basilic mid forearm and basilic distal forearm veins. Left Upper Vein Mapping: Left cephalic and basilic veins appear widely patent with no evidence of thrombosis or fibrosis.  Imaging & Doppler Findings:  Right                   Compress Thrombus  Diam Cephalic Prox Arm         Yes      None   2.4 mm Cephalic Mid Arm          Yes      None   2.3 mm Cephalic Distal Arm       Yes      None   1.7 mm Cephalic Prox Forearm      No    Fibrotic Cephalic Mid Forearm       No    Fibrotic Cephalic Distal Forearm    No    Fibrotic Basilic Prox Arm          Yes      None   4.2 mm Basilic Mid Arm           Yes      None   3.7 mm Basilic Distal Arm        Yes      None   3.5 mm Basilic Prox Forearm       No    Fibrotic Basilic Mid Forearm        No    Fibrotic Basilic Distal Forearm     No    Fibrotic  Left                    Compress Thrombus  Diam Cephalic Prox Arm         Yes      None   2.4 mm Cephalic Mid Arm          Yes      None   2.3 mm Cephalic Distal Arm       Yes      None   2.1 mm Cephalic Prox Forearm     Yes      None   3.1 mm Cephalic Mid Forearm      Yes      None   1.8 mm Cephalic Distal Forearm   Yes      None   1.4 mm Basilic Prox Arm          Yes      None   3.3 mm Basilic Mid Arm           Yes      None   2.7 mm Basilic Distal Arm        Yes      None   2.2 mm Basilic Prox Forearm      Yes      None   1.9 mm Basilic  Mid Forearm       Yes      None   1.1 mm Basilic Distal Forearm    Yes      None   0.7 mm  [id number] Fabian Phillips MD Electronically signed by [id number] Fabian Phillips MD on 12/18/2024 at 6:21:05 PM  ** Final **     ECG 12 lead    Result Date: 12/17/2024  Sinus rhythm Inferior infarct, old Baseline wander in lead(s) II,III,aVR,aVL,aVF,V1,V2,V5 See ED provider note for full interpretation and clinical correlation Confirmed by Nay Black ([id number]) on 12/17/2024 11:40:07 AM    CT angio aorta and bilateral iliofemoral runoff including without contrast if performed    Result Date: 12/17/2024  STUDY: CT Angiogram of the Abdomen, Pelvis, and Bilateral Lower Extremities; 12/17/2024 6:38 AM INDICATION: Cold left foot. COMPARISON: CTA AP w/runoffs 11/19/2024, CTA chest 11/19/2024, CTA AP w/runoffs 09/13/2024. ACCESSION NUMBER(S): [id number] ORDERING CLINICIAN: BASIM POON TECHNIQUE:  Helical CT is performed from the aortic diaphragmatic hiatus through the feet after bolus administration of 90 mL of Omnipaque 350.  Images are reviewed and processed at a workstation according to the CT angiogram protocol with 3-D and/or MIP post processing imaging generated. Automated mA/kV exposure control was utilized and patient examination was performed in strict accordance with principles of ALARA. FINDINGS: Vascular: Abdominal Aorta: The celiac, SMA, and DENISE demonstrate no significant stenosis.  The right and left renal arteries demonstrate no significant stenosis. The abdominal aorta is not aneurysmal and demonstrates no significant occlusive disease. Right Run-off: The right common iliac artery shows mild stenosis diffusely.  There is a stent along the entire right external iliac and right common femoral arteries.  The stent demonstrates areas of mild in-stent stenosis. There is an occluded right femoral-popliteal bypass graft. The native right femoral popliteal arteries demonstrate mild atherosclerotic contour.  There is a stent extending  from the mid SFA to the proximal popliteal artery.  At the distal SFA the stent demonstrates crush distortion but maintains a patent lumen, though with areas of mild in-stent stenosis. The popliteal artery remains patent throughout its course.  There is patent three-vessel runoff to the ankle, and 2 vessels into the foot. Opacification became too weak to follow the vessels to the plantar arch. Left Run-off: The left common iliac artery demonstrates mild diffuse atherosclerotic stenosis.  The left external iliac artery and left common femoral arteries are relatively normal.  The common femoral artery bifurcation is patent.  The native left SFA occludes just beyond its origin.  The profunda femoral artery and its branches remain patent. There is a chronically occluded left femoral-popliteal bypass graft. The native left SFA does not reconstitute, remaining occluded throughout its course. There is a 2 cm length patent segment of the left popliteal artery, which is otherwise occluded.  Distal to the patent segment, the popliteal artery is occluded across the level of the joint. The distal popliteal artery reconstitutes remains patent into its trifurcation. 3 patent infrapopliteal vessels are visualized proximally.  The tibial vessels remain patent to the ankle, where the dorsalis pedis and lateral plantar seen across the ankle joint.  The vessels cannot be followed to the plantar arch due to weak contrast opacification. Abdomen: The lung bases are clear.  The liver is unremarkable.  The pancreas, spleen, adrenal glands, and kidneys demonstrate no acute pathology. There is no free air or lymph node enlargement. Pelvis: There is no bowel wall thickening or obstruction.  There is no free fluid.  Lymph nodes are not enlarged.  Urinary bladder is unremarkable. At the left inguinal region there is thick soft tissue in the subcutaneous fat overlying the femoral vessels, as well as 2 prominent lymph nodes. Skeleton:  There are  no acute fractures.  No suspicious bony lesions.    No significant aortic disease.  Mild bilateral common iliac artery stenosis.  Patent left external iliac and common femoral arteries. 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  Chronic occlusion of the native left superficial femoral artery. A 2 cm segment of the left P1 popliteal artery reconstitutes.  The terminal left popliteal artery also reconstitutes.  Other portions of the popliteal artery are occluded. Patent three-vessel infrapopliteal runoff to the left hindfoot, beyond which contrast opacification was too weak to visualize. Thick subcutaneous soft tissue - presumed scarred tissue - overlying the left femoral vessels at the left groin.  Correlate for possibility of cellulitis. Patent right iliac and right femoral stents.  Patent right popliteal artery.  Patent three-vessel infrapopliteal runoff to the right ankle. No findings of an acute process in the abdomen or pelvis. Signed by Simeon Barrera MD    Vascular US Lower Extremity Arterial Duplex Bilateral    Result Date: 12/3/2024              [place] [id number]      Phone [phone] Fax [phone]  Vascular Lab Report  [facility] US LOWER EXTREMITY ARTERIAL DUPLEX BILATERAL Patient Name:      CARMENZA CIARA OLEARY    Reading Physician:  [id number] Britney Rush MD Study Date:        12/2/2024            Ordering Provider:  [id number] ADELA LYLES MRN/PID:           [id number]             Fellow: Accession#:        [id number]         Technologist:       Deb Allen RVT Date of Birth/Age: 1965 / 59 years Technologist 2: Gender:            M                    Encounter#:         [id number] Admission Status:  Outpatient           Location Performed: [facility]  Diagnosis/ICD: Peripheral vascular disease, unspecified-I73.9 CPT Codes:     12350 Peripheral artery Lower arterial  Duplex complete  Pertinent History: LLE intervention(11/19/2024).  CONCLUSIONS: Right Lower Arterial: The profunda was monophasic and dampened on prior exam and today is not well visualized and appears occluded. All remaining vessels appear patent. Left Lower Arterial: No occlusion or stenosis noted in the vessels visualized. The distal BARB is dampened and appears nearly occluded. Working around staples and dressings. The EIA is not visualized. The proximal and mid CFA and the profunda is not visualized. Minimal visualization of calf vessels.  Imaging & Doppler Findings:  Right                       Left   PSV                        PSV 89 cm/s         EIA 95 cm/s         CFA        78 cm/s  0 cm/s  Profunda Proximal 80 cm/s    SFA Proximal    88 cm/s 148 cm/s      SFA Mid      100 cm/s 112 cm/s    SFA Distal     108 cm/s 73 cm/s      Popliteal     74 cm/s 100 cm/s   BARB Proximal 86 cm/s       BARB Mid 53 cm/s     BARB Distal     11 cm/s 48 cm/s  Peroneal Proximal 55 cm/s    Peroneal Mid    77 cm/s 40 cm/s   Peroneal Distal 34 cm/s    PTA Proximal    48 cm/s 31 cm/s       PTA Mid 52 cm/s     PTA Distal  [id number] Britney Rush MD Electronically signed by [id number] Britney Rush MD on 12/3/2024 at 11:43:03 AM  ** Final **     Vascular US ankle brachial index (ANSON) without exercise    Result Date: 12/3/2024              [place] [address]      Phone [phone] Fax [phone]  [facility] Lab Report  [facility]KLE BRACH[facility] (ANSON) WITHOUT EXERCISE Patient Name:      CARMENZA Presley Physician:  [id number] Britney Rush MD Study Date:        12/2/2024            Ordering Provider:  [id number] ADELA LYLES MRN/PID:           [id number]             Fellow: Accession#:        [id number]         Technologist:       Deb Allen RVT Date of Birth/Age: 1965 / 59 years Technologist 2: Gender:             M                    Encounter#:         [id number] Admission Status:  Outpatient           Location Performed: [facility]  Diagnosis/ICD: Peripheral vascular disease, unspecified-I73.9 CPT Codes:     16337 Peripheral artery ANSON Only  Pertinent History: Recent LLE intervention(11/19/2024).  CONCLUSIONS: Right Lower PVR: No evidence of arterial occlusive disease in the right lower extremity at rest. Normal digital perfusion noted. Multiphasic flow is noted in the right common femoral artery, right posterior tibial artery and right dorsalis pedis artery. Left Lower PVR: Evidence of moderate arterial occlusive disease in the left lower extremity at rest. Decreased digital perfusion noted. Monophasic flow is noted in the left posterior tibial artery and left dorsalis pedis artery. Multiphasic flow is noted in the left common femoral artery.  Comparison: Compared with study from 5/23/2024, The left ANSON decreased from .96 to .67 today. No significant change on the right.  Imaging & Doppler Findings:  RIGHT Lower PVR                Pressures Ratios Right Posterior Tibial (Ankle) 169 mmHg  0.94 Right Dorsalis Pedis (Ankle)   158 mmHg  0.88 Right Digit (Great Toe)        113 mmHg  0.63   LEFT Lower PVR                Pressures Ratios Left Posterior Tibial (Ankle) 109 mmHg  0.61 Left Dorsalis Pedis (Ankle)   120 mmHg  0.67 Left Digit (Great Toe)        105 mmHg  0.58                      Right Brachial Pressure 180 mmHg   [id number] Britney Rush MD Electronically signed by [id number] Britney Rush MD on 12/3/2024 at 11:38:55 AM  ** Final **                Assessment/Plan   Assessment & Plan  Peripheral artery occlusion (CMS-[facility])    Critical limb ischemia of left lower extremity (Multi)    IMPRESSION:  TYPE 2 DIABETES MELLITUS  Long term insulin use   A1C of 7.9%      RECOMMENDATIONS:  To continue Lantus 20 units subcutaneous bedtime  To continue Humalog 14 units TID AC   To continue insulin correction scale TID AC    Diabetic diet   Accu-Cheks ACHS    Hypoglycemic protocol   Will continue to follow         Haylie Tate MD

## 2024-12-23 NOTE — PROGRESS NOTES
Dereck Shen is a 59 y.o. male on day 6 of admission presenting with Peripheral artery occlusion (CMS-HCC).    Subjective   POD#5 s/p left fem-below knee popliteal bypass with vein patch left distal anastomosis     No acute overnight events. OOB and working with PT. Incisional pain of the LLE using PO oxy and prn IV dilaudid. Douglass in place has outpatient urology follow up. Eager to be discharged soon       Objective     Physical Exam  Constitutional:       Appearance: Normal appearance.   HENT:      Head: Normocephalic.      Right Ear: External ear normal.      Left Ear: External ear normal.      Nose: Nose normal.      Mouth/Throat:      Mouth: Mucous membranes are moist.   Eyes:      Extraocular Movements: Extraocular movements intact.   Neck:      Vascular: No carotid bruit.   Cardiovascular:      Rate and Rhythm: Normal rate and regular rhythm.      Pulses: Normal pulses.      Comments: LLE warm to touch well perfused. Motor and sensory function of the left foot intact. Strong PT/AT/DP signals by doppler. Left groin and left prxomal medial calf incisions with staples in place c/d/I soft surrounding with minimal swelling.   Pulmonary:      Effort: Pulmonary effort is normal. No respiratory distress.      Breath sounds: Normal breath sounds.   Abdominal:      Palpations: Abdomen is soft.      Tenderness: There is no abdominal tenderness.   Genitourinary:     Comments: Douglass present with adequate UOP  Musculoskeletal:         General: No swelling or tenderness.      Cervical back: Normal range of motion and neck supple.      Right lower leg: No edema.      Left lower leg: Edema present.   Skin:     General: Skin is warm and dry.      Capillary Refill: Capillary refill takes less than 2 seconds.      Findings: No lesion.   Neurological:      General: No focal deficit present.      Mental Status: He is alert and oriented to person, place, and time. Mental status is at baseline.   Psychiatric:         Mood and  "Affect: Mood normal.         Behavior: Behavior normal.         Thought Content: Thought content normal.         Judgment: Judgment normal.      Last Recorded Vitals  Blood pressure 105/61, pulse 72, temperature 36.7 °C (98 °F), temperature source Temporal, resp. rate 16, height 1.803 m (5' 11\"), weight 96.2 kg (212 lb), SpO2 98%.  Vitals:    12/23/24 0320 12/23/24 0441 12/23/24 0600 12/23/24 0933   BP: 145/65 130/88  105/61   BP Location: Right arm   Right arm   Patient Position: Lying   Lying   Pulse: 105 80  72   Resp: 20   16   Temp: 37.1 °C (98.7 °F)   36.7 °C (98 °F)   TempSrc: Temporal   Temporal   SpO2: 93%   98%   Weight:   96.2 kg (212 lb)    Height:          Intake/Output last 3 Shifts:  I/O last 3 completed shifts:  In: 2845 (29.6 mL/kg) [P.O.:1410; I.V.:1435 (14.9 mL/kg)]  Out: 1750 (18.2 mL/kg) [Urine:1750 (0.5 mL/kg/hr)]  Weight: 96.2 kg     Relevant Results               Results for orders placed or performed during the hospital encounter of 12/17/24 (from the past 24 hours)   POCT GLUCOSE   Result Value Ref Range    POCT Glucose 133 (H) 74 - 99 mg/dL   POCT GLUCOSE   Result Value Ref Range    POCT Glucose 136 (H) 74 - 99 mg/dL   CBC   Result Value Ref Range    WBC 3.8 (L) 4.4 - 11.3 x10*3/uL    nRBC 0.0 0.0 - 0.0 /100 WBCs    RBC 2.70 (L) 4.50 - 5.90 x10*6/uL    Hemoglobin 8.3 (L) 13.5 - 17.5 g/dL    Hematocrit 24.9 (L) 41.0 - 52.0 %    MCV 92 80 - 100 fL    MCH 30.7 26.0 - 34.0 pg    MCHC 33.3 32.0 - 36.0 g/dL    RDW 13.1 11.5 - 14.5 %    Platelets 213 150 - 450 x10*3/uL   Basic Metabolic Panel   Result Value Ref Range    Glucose 127 (H) 74 - 99 mg/dL    Sodium 129 (L) 136 - 145 mmol/L    Potassium 4.6 3.5 - 5.3 mmol/L    Chloride 102 98 - 107 mmol/L    Bicarbonate 22 21 - 32 mmol/L    Anion Gap 10 10 - 20 mmol/L    Urea Nitrogen 55 (H) 6 - 23 mg/dL    Creatinine 1.25 0.50 - 1.30 mg/dL    eGFR 67 >60 mL/min/1.73m*2    Calcium 8.0 (L) 8.6 - 10.3 mg/dL   POCT GLUCOSE   Result Value Ref Range    POCT " Glucose 131 (H) 74 - 99 mg/dL   POCT GLUCOSE   Result Value Ref Range    POCT Glucose 144 (H) 74 - 99 mg/dL     *Note: Due to a large number of results and/or encounters for the requested time period, some results have not been displayed. A complete set of results can be found in Results Review.                       Assessment/Plan   Assessment & Plan  Peripheral artery occlusion (CMS-HCC)    Critical limb ischemia of left lower extremity (Multi)  59 year old male PAD s/p multiple lower extremity revascularizations, critical limb ischemia with Left SFA occlusion now POD#5 s/p left fem-below knee popliteal bypass with vein patch left distal anastomosis. Overall doing well postoperatively. Hemodynamically stable. Pain controlled, though still using IV narcotics for breakthrough. Left foot warm, well perfused strong pedal signals by doppler  -Pain control as needed, wean IV narcotics in anticipation for discharge  -continue peripheral vascular checks   -Encourage IS and deep breathing  -Diet as tolerated, bowel reg  -Brown catheter replaced after urinary retention, seen by urology plan for brown on discharge has urology follow up outpatient  -close monitoring of fluid status, strict I&Os  -Encourage PT/OT, OOB and ambulation  -wound care consult for continued wound care of prior fasciotomy sites  -social work/transitional care coordinator for home care services on discharge vs STR  -continue care per primary hospitalist team  -OK for discharge from vascular surgery perspective  -discussed above course of care and treatment plan with Dr Peace who is in agreement. Discussed with primary hospitalist medical team.         Marla Aguilar PA-C

## 2024-12-23 NOTE — ASSESSMENT & PLAN NOTE
59 year old male PAD s/p multiple lower extremity revascularizations, critical limb ischemia with Left SFA occlusion now POD#5 s/p left fem-below knee popliteal bypass with vein patch left distal anastomosis. Overall doing well postoperatively. Hemodynamically stable. Pain controlled, though still using IV narcotics for breakthrough. Left foot warm, well perfused strong pedal signals by doppler  -Pain control as needed, wean IV narcotics in anticipation for discharge  -continue peripheral vascular checks   -Encourage IS and deep breathing  -Diet as tolerated, bowel reg  -Brown catheter replaced after urinary retention, seen by urology plan for brown on discharge has urology follow up outpatient  -close monitoring of fluid status, strict I&Os  -Encourage PT/OT, OOB and ambulation  -wound care consult for continued wound care of prior fasciotomy sites  -social work/transitional care coordinator for home care services on discharge vs STR  -continue care per primary hospitalist team  -OK for discharge from vascular surgery perspective  -discussed above course of care and treatment plan with Dr Peace who is in agreement. Discussed with primary hospitalist medical team.

## 2024-12-23 NOTE — CARE PLAN
The patient's goals for the shift include        Problem: Diabetes  Goal: Achieve decreasing blood glucose levels by end of shift  Outcome: Progressing  Goal: Increase stability of blood glucose readings by end of shift  Outcome: Progressing  Goal: Decrease in ketones present in urine by end of shift  Outcome: Progressing  Goal: Maintain electrolyte levels within acceptable range throughout shift  Outcome: Progressing  Goal: Maintain glucose levels >70mg/dl to <250mg/dl throughout shift  Outcome: Progressing  Goal: No changes in neurological exam by end of shift  Outcome: Progressing  Goal: Learn about and adhere to nutrition recommendations by end of shift  Outcome: Progressing  Goal: Vital signs within normal range for age by end of shift  Outcome: Progressing  Goal: Increase self care and/or family involovement by end of shift  Outcome: Progressing  Goal: Receive DSME education by end of shift  Outcome: Progressing     Problem: Pain  Goal: Takes deep breaths with improved pain control throughout the shift  Outcome: Progressing  Goal: Turns in bed with improved pain control throughout the shift  Outcome: Progressing  Goal: Walks with improved pain control throughout the shift  Outcome: Progressing  Goal: Performs ADL's with improved pain control throughout shift  Outcome: Progressing  Goal: Participates in PT with improved pain control throughout the shift  Outcome: Progressing  Goal: Free from opioid side effects throughout the shift  Outcome: Progressing  Goal: Free from acute confusion related to pain meds throughout the shift  Outcome: Progressing     Problem: Pain - Adult  Goal: Verbalizes/displays adequate comfort level or baseline comfort level  Outcome: Progressing     Problem: Safety - Adult  Goal: Free from fall injury  Outcome: Progressing     Problem: Discharge Planning  Goal: Discharge to home or other facility with appropriate resources  Outcome: Progressing     Problem: Chronic Conditions and  Co-morbidities  Goal: Patient's chronic conditions and co-morbidity symptoms are monitored and maintained or improved  Outcome: Progressing     Problem: Nutrition  Goal: Oral intake greater 75%  Outcome: Progressing  Goal: Consume prescribed supplement  Outcome: Progressing  Goal: BG  mg/dL  Outcome: Progressing  Goal: Lab values WNL  Outcome: Progressing  Goal: Electrolytes WNL  Outcome: Progressing  Goal: Promote healing  Outcome: Progressing  Goal: Maintain stable weight  Outcome: Progressing  Goal: Reduce weight from edema/fluid  Outcome: Progressing     Problem: Skin  Goal: Decreased wound size/increased tissue granulation at next dressing change  Outcome: Progressing  Flowsheets (Taken 12/22/2024 1912)  Decreased wound size/increased tissue granulation at next dressing change:   Promote sleep for wound healing   Protective dressings over bony prominences  Goal: Participates in plan/prevention/treatment measures  Outcome: Progressing  Flowsheets (Taken 12/22/2024 1912)  Participates in plan/prevention/treatment measures:   Discuss with provider PT/OT consult   Elevate heels   Increase activity/out of bed for meals  Goal: Prevent/manage excess moisture  Outcome: Progressing  Flowsheets (Taken 12/22/2024 1912)  Prevent/manage excess moisture:   Follow provider orders for dressing changes   Monitor for/manage infection if present  Goal: Prevent/minimize sheer/friction injuries  Outcome: Progressing  Flowsheets (Taken 12/22/2024 1912)  Prevent/minimize sheer/friction injuries:   HOB 30 degrees or less   Increase activity/out of bed for meals   Turn/reposition every 2 hours/use positioning/transfer devices   Use pull sheet  Goal: Promote/optimize nutrition  Outcome: Progressing  Flowsheets (Taken 12/22/2024 1912)  Promote/optimize nutrition:   Consume > 50% meals/supplements   Monitor/record intake including meals   Offer water/supplements/favorite foods  Goal: Promote skin healing  Outcome:  Progressing  Flowsheets (Taken 12/22/2024 1912)  Promote skin healing:   Assess skin/pad under line(s)/device(s)   Protective dressings over bony prominences   Turn/reposition every 2 hours/use positioning/transfer devices   Ensure correct size (line/device) and apply per  instructions   Rotate device position/do not position patient on device     Problem: Fall/Injury  Goal: Not fall by end of shift  Outcome: Progressing  Goal: Verbalize understanding of personal risk factors for fall in the hospital  Outcome: Progressing  Goal: Verbalize understanding of risk factor reduction measures to prevent injury from fall in the home  Outcome: Progressing  Goal: Use assistive devices by end of the shift  Outcome: Progressing  Goal: Pace activities to prevent fatigue by end of the shift  Outcome: Progressing     Problem: Acute Urinary Retention  Goal: Patient demonstrates ability to care for urinary catheter  Outcome: Progressing  Goal: Patient's catheter remains patent without signs/symptoms of infection  Outcome: Progressing  Goal: I will have no complications from indwelling catheter  Outcome: Progressing

## 2024-12-24 ENCOUNTER — DOCUMENTATION (OUTPATIENT)
Dept: HOME HEALTH SERVICES | Facility: HOME HEALTH | Age: 59
End: 2024-12-24
Payer: COMMERCIAL

## 2024-12-24 ENCOUNTER — PHARMACY VISIT (OUTPATIENT)
Dept: PHARMACY | Facility: CLINIC | Age: 59
End: 2024-12-24
Payer: MEDICAID

## 2024-12-24 VITALS
HEART RATE: 76 BPM | RESPIRATION RATE: 17 BRPM | HEIGHT: 71 IN | SYSTOLIC BLOOD PRESSURE: 99 MMHG | DIASTOLIC BLOOD PRESSURE: 62 MMHG | BODY MASS INDEX: 29.81 KG/M2 | WEIGHT: 212.96 LBS | TEMPERATURE: 98.8 F | OXYGEN SATURATION: 98 %

## 2024-12-24 LAB
ANION GAP SERPL CALC-SCNC: 10 MMOL/L (ref 10–20)
BUN SERPL-MCNC: 42 MG/DL (ref 6–23)
CALCIUM SERPL-MCNC: 8.1 MG/DL (ref 8.6–10.3)
CARDIAC TROPONIN I PNL SERPL HS: 19 NG/L (ref 0–20)
CHLORIDE SERPL-SCNC: 105 MMOL/L (ref 98–107)
CO2 SERPL-SCNC: 21 MMOL/L (ref 21–32)
CREAT SERPL-MCNC: 1.07 MG/DL (ref 0.5–1.3)
EGFRCR SERPLBLD CKD-EPI 2021: 80 ML/MIN/1.73M*2
GLUCOSE BLD MANUAL STRIP-MCNC: 135 MG/DL (ref 74–99)
GLUCOSE BLD MANUAL STRIP-MCNC: 172 MG/DL (ref 74–99)
GLUCOSE SERPL-MCNC: 146 MG/DL (ref 74–99)
POTASSIUM SERPL-SCNC: 4.5 MMOL/L (ref 3.5–5.3)
SODIUM SERPL-SCNC: 131 MMOL/L (ref 136–145)

## 2024-12-24 PROCEDURE — 2500000001 HC RX 250 WO HCPCS SELF ADMINISTERED DRUGS (ALT 637 FOR MEDICARE OP): Performed by: PHYSICIAN ASSISTANT

## 2024-12-24 PROCEDURE — 2500000004 HC RX 250 GENERAL PHARMACY W/ HCPCS (ALT 636 FOR OP/ED): Performed by: PHYSICIAN ASSISTANT

## 2024-12-24 PROCEDURE — 80048 BASIC METABOLIC PNL TOTAL CA: CPT | Performed by: HOSPITALIST

## 2024-12-24 PROCEDURE — RXMED WILLOW AMBULATORY MEDICATION CHARGE

## 2024-12-24 PROCEDURE — 36415 COLL VENOUS BLD VENIPUNCTURE: CPT | Performed by: HOSPITALIST

## 2024-12-24 PROCEDURE — 84484 ASSAY OF TROPONIN QUANT: CPT | Performed by: STUDENT IN AN ORGANIZED HEALTH CARE EDUCATION/TRAINING PROGRAM

## 2024-12-24 PROCEDURE — 82947 ASSAY GLUCOSE BLOOD QUANT: CPT

## 2024-12-24 PROCEDURE — S4991 NICOTINE PATCH NONLEGEND: HCPCS | Performed by: PHYSICIAN ASSISTANT

## 2024-12-24 PROCEDURE — 2500000002 HC RX 250 W HCPCS SELF ADMINISTERED DRUGS (ALT 637 FOR MEDICARE OP, ALT 636 FOR OP/ED): Performed by: PHYSICIAN ASSISTANT

## 2024-12-24 PROCEDURE — 99232 SBSQ HOSP IP/OBS MODERATE 35: CPT | Performed by: HOSPITALIST

## 2024-12-24 RX ORDER — OXYCODONE HYDROCHLORIDE 5 MG/1
10 TABLET ORAL EVERY 8 HOURS PRN
Qty: 15 TABLET | Refills: 0 | Status: SHIPPED | OUTPATIENT
Start: 2024-12-24 | End: 2024-12-30 | Stop reason: ALTCHOICE

## 2024-12-24 RX ADMIN — RANOLAZINE 500 MG: 500 TABLET, EXTENDED RELEASE ORAL at 09:03

## 2024-12-24 RX ADMIN — ASPIRIN 81 MG: 81 TABLET, COATED ORAL at 09:02

## 2024-12-24 RX ADMIN — TAMSULOSIN HYDROCHLORIDE 0.4 MG: 0.4 CAPSULE ORAL at 09:03

## 2024-12-24 RX ADMIN — ISOSORBIDE MONONITRATE 60 MG: 60 TABLET, EXTENDED RELEASE ORAL at 09:03

## 2024-12-24 RX ADMIN — INSULIN LISPRO 14 UNITS: 100 INJECTION, SOLUTION INTRAVENOUS; SUBCUTANEOUS at 09:54

## 2024-12-24 RX ADMIN — ACETAMINOPHEN 650 MG: 325 TABLET ORAL at 03:47

## 2024-12-24 RX ADMIN — NICOTINE 1 PATCH: 21 PATCH, EXTENDED RELEASE TRANSDERMAL at 09:02

## 2024-12-24 RX ADMIN — OXYCODONE HYDROCHLORIDE 10 MG: 10 TABLET ORAL at 09:01

## 2024-12-24 RX ADMIN — POLYETHYLENE GLYCOL 3350 17 G: 17 POWDER, FOR SOLUTION ORAL at 09:01

## 2024-12-24 RX ADMIN — DOCUSATE SODIUM 100 MG: 100 CAPSULE, LIQUID FILLED ORAL at 09:03

## 2024-12-24 RX ADMIN — OXCARBAZEPINE 450 MG: 150 TABLET, FILM COATED ORAL at 09:02

## 2024-12-24 RX ADMIN — APIXABAN 5 MG: 5 TABLET, FILM COATED ORAL at 09:04

## 2024-12-24 RX ADMIN — ACETAMINOPHEN 650 MG: 325 TABLET ORAL at 09:03

## 2024-12-24 RX ADMIN — TICAGRELOR 90 MG: 90 TABLET ORAL at 09:03

## 2024-12-24 RX ADMIN — INSULIN LISPRO 14 UNITS: 100 INJECTION, SOLUTION INTRAVENOUS; SUBCUTANEOUS at 11:38

## 2024-12-24 RX ADMIN — GABAPENTIN 400 MG: 400 CAPSULE ORAL at 09:03

## 2024-12-24 RX ADMIN — PANTOPRAZOLE SODIUM 40 MG: 40 TABLET, DELAYED RELEASE ORAL at 06:04

## 2024-12-24 RX ADMIN — OXYCODONE HYDROCHLORIDE 10 MG: 10 TABLET ORAL at 03:47

## 2024-12-24 ASSESSMENT — PAIN SCALES - GENERAL
PAINLEVEL_OUTOF10: 8
PAINLEVEL_OUTOF10: 0 - NO PAIN
PAINLEVEL_OUTOF10: 7
PAINLEVEL_OUTOF10: 0 - NO PAIN

## 2024-12-24 ASSESSMENT — PAIN DESCRIPTION - ORIENTATION
ORIENTATION: LEFT
ORIENTATION: LEFT

## 2024-12-24 ASSESSMENT — PAIN - FUNCTIONAL ASSESSMENT
PAIN_FUNCTIONAL_ASSESSMENT: 0-10
PAIN_FUNCTIONAL_ASSESSMENT: 0-10

## 2024-12-24 ASSESSMENT — PAIN DESCRIPTION - LOCATION
LOCATION: LEG
LOCATION: LEG

## 2024-12-24 NOTE — DISCHARGE SUMMARY
"Discharge Diagnosis  Peripheral artery occlusion (CMS-[facility])    Issues Requiring Follow-Up  pad    Test Results Pending At Discharge  Pending Labs       No current pending labs.            Hospital Course   See other completed discharge summary    Pertinent Physical Exam At Time of Discharge  Physical Exam    Home Medications     Medication List      START taking these medications     oxyCODONE 5 mg immediate release tablet; Commonly known as: Roxicodone;   Take 2 tablets (10 mg) by mouth every 8 hours if needed for severe pain (7   - 10) for up to 20 doses.     CONTINUE taking these medications     aspirin 81 mg chewable tablet   atorvastatin 80 mg tablet; Commonly known as: Lipitor; Take 1 tablet (80   mg) by mouth once daily at bedtime.   * pen needle, diabetic 32 gauge x 5/32\" needle; Commonly known as: BD   Ultra-Fine Jocy Pen Needle; Use to inject insulin up to 8 times per day.   * BD Ultra-Fine Micro Pen Needle 32 gauge x 1/4\" needle; Generic drug:   pen needle, diabetic; Use to inject 1-4 times daily as directed   * pen needle, diabetic 32 gauge x 5/32\" needle; Commonly known as: BD   Ultra-Fine Jocy Pen Needle; Pen Needles for Insulin.   carvedilol 6.25 mg tablet; Commonly known as: Coreg   Creon 36,000-114,000- 180,000 unit capsule,delayed release(DR/EC)   capsule; Generic drug: pancrelipase (Lip-Prot-Amyl)   dapagliflozin propanediol 10 mg; Commonly known as: Farxiga; Take 1   tablet (10 mg) by mouth once daily with breakfast.   ELDERBERRY FRUIT ORAL   Eliquis 5 mg tablet; Generic drug: apixaban; Take 2 tablets (10 mg) by   mouth 2 times a day for 6 days, THEN 1 tablet (5 mg) 2 times a day.; Start   taking on: November 21, 2024   Entresto  mg tablet; Generic drug: sacubitriL-valsartan   ezetimibe 10 mg tablet; Commonly known as: Zetia   Flomax 0.4 mg 24 hr capsule; Generic drug: tamsulosin   FreeStyle En 2 Diggs misc; Generic drug: flash glucose scanning   reader; USE TO CHECK SUGARS FOUR TIMES A " DAY   FreeStyle En 2 Sensor kit; Generic drug: flash glucose sensor kit;   use as directed daily and change every 14 days   furosemide 40 mg tablet; Commonly known as: Lasix   gabapentin 400 mg capsule; Commonly known as: Neurontin; Take 1 capsule   (400 mg) by mouth 3 times a day.   GINGER ROOT EXTRACT ORAL   hydrALAZINE 50 mg tablet; Commonly known as: Apresoline; Take 1 tablet   (50 mg) by mouth 3 times a day.   insulin lispro 100 unit/mL injection; Commonly known as: HumaLOG; Inject   10 Units under the skin 3 times a day with meals. Plus sliding scale if BS   is over 200   isosorbide mononitrate ER 60 mg 24 hr tablet; Commonly known as: Imdur;   Take 1 tablet (60 mg) by mouth once daily.   lancets misc; 1 each 4 times a day before meals.   Lantus Solostar U-100 Insulin 100 unit/mL (3 mL) pen; Generic drug:   insulin glargine; Inject 20 Units under the skin once daily at bedtime.   medical cannabis   nicotine 21 mg/24 hr patch; Commonly known as: Nicoderm CQ; APPLY 1   PATCH TO SKIN EVERY 24 HOURS AS DIRECTED   OXcarbazepine 150 mg tablet; Commonly known as: Trileptal   pantoprazole 40 mg EC tablet; Commonly known as: ProtoNix; TAKE 1 TABLET   BY MOUTH ONCE DAILY   ranolazine 500 mg 12 hr tablet; Commonly known as: Ranexa   ticagrelor 90 mg tablet; Commonly known as: Brilinta; Take 1 tablet (90   mg) by mouth 2 times a day.   tiZANidine 2 mg tablet; Commonly known as: Zanaflex; Take 1 tablet (2   mg) by mouth once daily as needed for muscle spasms.  * This list has 3 medication(s) that are the same as other medications   prescribed for you. Read the directions carefully, and ask your doctor or   other care provider to review them with you.     STOP taking these medications     FreeStyle Precision Aristeo Strips strip; Generic drug: blood sugar   diagnostic   oxyCODONE-acetaminophen 5-325 mg tablet; Commonly known as: Percocet       Outpatient Follow-Up  Future Appointments   Date Time Provider Department Center    1/2/2025  2:45 PM Iban Peace, DO [id number] [facility]   1/3/2025 10:00 AM SUMAN Carlin-CNS [id number] [facility]   2/4/2025  4:15 PM Haylie Tate MD [id number] [facility]   3/3/2025 10:00 AM POR VASC 7 [facility]   3/3/2025 11:00 AM POR VASC 8 [facility]   3/11/2025  2:00 PM Iban Peace, DO [id number] [facility]       Amish Campos, DO

## 2024-12-24 NOTE — PROGRESS NOTES
Physical Therapy                 Therapy Communication Note    Patient Name: Dereck Shen  MRN: [id number]  Department: [facility] 3 E  Room: [address] [place], [place] [address]  Today's Date: 12/24/2024     Discipline: Physical Therapy    PT Missed Visit: Yes     Missed Visit Reason: Missed Visit Reason:  (pt ref stating he is waiting for Dr to be DC)    Missed Time: Attempt    Comment:

## 2024-12-24 NOTE — CARE PLAN
The patient's goals for the shift include      The clinical goals for the shift include remainfree from falls and inury and have a decrease in pain    Over the shift, the patient did not make progress toward the following goals. Barriers to progression include n/a. Recommendations to address these barriers include n/a.

## 2024-12-24 NOTE — PROGRESS NOTES
Dereck Shen [id number]   Service: Internal Medicine / Hospitalist Date of service: 12/24/2024                                  Full Code            Dereck Shen is a 59 y.o. male presenting with Peripheral artery occlusion            Subjective     Dereck Shen is a 59 y.o. male with PMHx of CAD s/p CABG, severe lower extremity PAD with prior revascularization (R fem-pop bypass 2/24/20; L fem-pop bypass 9/14/20; L fem PTA of the graft; R iliac PTA 2/2022; LLE and DANYA PTA 4/2024; angioplasty of the left SFA 10/21/24; and left femoral and tibial thrombectomy with patch angioplasty 11/19/24. He presented with L Leg pain, redness, and ice cold to touch for three days. He has been compliant with all his medications including his Eliquis, Brilinta and aspirin. ED workup was significant for creatinine 1.53. CT angio of the lower extremities does show what appears to be reocclusion of his SFA and popliteal. Patient evaluated by Dr. Peace- continue hep gtt, DAPT, statin- Minimal revascularization options at this point and high risk of limb loss, obtain upper extremity vein mapping. Patient is now status post left femoropopliteal bypass with vein patch left distal anastomosis 12/18/24 with Dr. Peace     12/20/2024: No acute events overnight. Vitals stable. Pain 7-8/10. CBC/BMP reviewed, creatinine improving slowly to 1.22 today (baseline ~1.0-1.3).  Glucose 112- appreciate endocrinology recommendations. Start working with PT/OT and trying to wean off IV pain meds   He reports more pain today than previous surgeries. Vascular surgery going to reassess soon.      12/21:               Today patient seen in the bedside chair in the presence of his mother.  He is awake alert and interactive appropriately and appears NAD at the time of visit.  Nurses reported significant bladder scan this morning with straight cath yielding 725 mL and then later bladder scan for 580.  In view of this acute urinary retention will  "place Douglass catheter and obtain UA/C&S.  Patient does report he has had urinary symptoms of urgency.  Apparently had seen urologist years ago before he had the symptoms with unremarkable workup though describes enlarged prostate.  He is on Flomax.  Vascular surgery describes good perfusion postoperatively.  Await their recommendations for timing of discharge.     12/22:               POD #4.  Today once again patient seen in the bedside chair and is eating a meal.  Mother present.  He remains awake alert and interactive appropriately and appears NAD at the time of visit.  However he has been repeatedly requesting IV Dilaudid to the nurses for \"breakthrough pain\" over the oxycodone.  It was explained to him that his blood pressures are too soft to add that.  He appears to be developing worsening hyponatremia of uncertain etiology though consideration for increased ADH related to pain.  He also had acute urinary retention and Douglass placed and may to some extent represent postobstructive diuresis.  With his soft blood pressures well initially try IV fluids and follow labs.  Await further vascular surgery recommendations for timing of discharge.     12/23....................No specific complaints voiced by patient at the time of evaluation.  Patient contemplating possible discharge today.  Discharge planning discussed with vascular service admitting service.  No reported : palpitations, syncope, presyncope, nausea, vomiting or chest pains.      12/24.....................No reported: Chest pains, nausea, vomiting, diaphoresis, palpitations, syncope, dyspnea, fevers or chills.      Review of Systems:   Review of system otherwise negative if not aforementioned above in subjective.     Objective                    Physical Exam      Constitutional:       Appearance: Patient appeared in no acute cardiopulmonary distress.     Comments: Patient alert and oriented to person place time and situation.  HEENT:      Head: " Normocephalic and atraumatic.Trachea midline      Nose:No observed congestion or rhinorrhea.     Mouth/Throat: Mucous membranes Moist, Trachea appeared  midline.  Eyes:      Extraocular Movements: Extraocular movements intact.      Pupils: Pupils are equal, round, and reactive to light.      Comments: No scleral icterus or conjunctival injection appreciated.   Cardiovascular:      Rate and Rhythm: Normal rate and regular rhythm. No clicks rubs or gallops, normal S1 and S2.No peripheral stigmata of endocarditis appreciated.     Pulmonary:      Lungs appeared clear to auscultation, no adventitious sound appreciated.  Abdominal:      General: Abdomen soft, nontender, active bowel sounds, no involuntary guarding or rebound tenderness appreciated.     Comments: None   Musculoskeletal:       Patient appeared to have full active range of motion for upper and lower extremities, no acute apparent joint deformity appreciated on examination.   Comment: Status post left lower extremity femoropopliteal bypass, incision here appears clean, minimal swelling appreciated, no clinical signs of cyanosis appreciated, peripheral pulses appeared intact for left lower extremity.  No appreciation of neuromuscular deficit.          Lymphadenopathy:      No appreciable palpable lymphadenopathy  Skin:     General: Skin is warm.      Coloration:  No jaundice     Findings: No abnormal appearing skin rashes or lesions that appeared acute noted on unclothed area of the skin..   Neurological:      General: No focal sensory or motor deficits appreciated, no meningeal signs or dysmetria noted.      Cranial Nerves: Cranial nerves II to XII appearing grossly intact.     Genitals:  Deferred  Psychiatric:         The patient appears to be displaying normal mood and affect at the time of evaluation.     Labs:           Lab Results   Component Value Date     GLUCOSE 127 (H) 12/23/2024     CALCIUM 8.0 (L) 12/23/2024      (L) 12/23/2024     K 4.6  12/23/2024     CO2 22 12/23/2024      12/23/2024     BUN 55 (H) 12/23/2024     CREATININE 1.25 12/23/2024            Lab Results   Component Value Date     WBC 3.8 (L) 12/23/2024     HGB 8.3 (L) 12/23/2024     HCT 24.9 (L) 12/23/2024     MCV 92 12/23/2024      12/23/2024      Lab Results   Component Value Date    GLUCOSE 146 (H) 12/24/2024    CALCIUM 8.1 (L) 12/24/2024     (L) 12/24/2024    K 4.5 12/24/2024    CO2 21 12/24/2024     12/24/2024    BUN 42 (H) 12/24/2024    CREATININE 1.07 12/24/2024      Lab Results   Component Value Date    WBC 3.8 (L) 12/23/2024    HGB 8.3 (L) 12/23/2024    HCT 24.9 (L) 12/23/2024    MCV 92 12/23/2024     12/23/2024      [unfilled]   [unfilled]   No results found for the last 90 days.                  X-rays/ Images     [unfilled]   Procedure Component Value Units Date/Time   XR chest 1 view [[id number]] Collected: 12/23/24 2135   Order Status: Completed Updated: 12/23/24 2135   Narrative:     Interpreted By:  Lawrence Leahy,  STUDY:  Chest dated  12/23/2024.      INDICATION:  Signs/Symptoms:stat      COMPARISON:  Chest dated 12/13/2021.      ACCESSION NUMBER(S):  [id number]      ORDERING CLINICIAN:  HAMIDA MAURER      TECHNIQUE:  One view of the chest.      FINDINGS:  The lungs are clear.  No pneumothorax or effusion is evident. The  cardiomediastinal silhouette is  not enlarged.Surgical changes are  seen of the mediastinum.The soft tissues are grossly unremarkable.       Impression:     No acute cardiopulmonary process is evident.      MACRO:  None      Signed by: Lawrence Leahy 12/23/2024 9:34 PM  Dictation workstation:   [id number]                 Medical Problems         Problem List         * (Principal) Peripheral artery occlusion (CMS-HCC)     PAD (peripheral artery disease) (CMS-HCC)     Abdominal pain, acute, right upper quadrant     Abnormal CXR     Atherosclerosis of native artery of both lower extremities with intermittent  claudication (CMS-HCC)     Atherosclerotic heart disease of native coronary artery without angina pectoris     Bacterial skin infection     Bipolar depression (Multi)     BPH (benign prostatic hyperplasia)     Burning pain     Causalgia of lower extremity     Hip pain, right     Pain of right lower extremity     Ischemic stroke (Multi)     Chronic combined systolic and diastolic CHF (congestive heart failure)     Chronic back pain greater than 3 months duration     Chronic hepatitis C virus genotype 1a infection (Multi)     Chronic obstructive pulmonary disease (Multi)     Chronic pain syndrome     Chronic RUQ pain     Claudication (CMS-HCC)     Coordination impairment     Decreased peripheral vision of left eye     Diabetic neuropathy, painful (Multi)     Diabetic polyneuropathy associated with type 2 diabetes mellitus (Multi)     Eschar of lower leg     Frequent falls     Gastroesophageal reflux disease     H/O: CVA (cerebrovascular accident)     Hepatic steatosis     Hepatitis C virus infection cured after antiviral drug therapy     Hepatitis-C     Hyperkalemia     Hyperlipidemia     Hypertension     Joint stiffness of both shoulders     Left ventricular dysfunction     Low back pain     Nasal congestion with rhinorrhea     Constipation     Nausea in adult     Neuropathic pain     Nicotine dependence     Neuropathy, inflammatory or toxic (Multi)     BRUNA (obstructive sleep apnea)     Other symptoms and signs involving the musculoskeletal system     Pancreatic lesion (HHS-HCC)     Pancreatic malabsorption (HHS-HCC)     Paroxysmal nocturnal dyspnea     Peripheral arterial disease (CMS-HCC)     Poor balance     Other chronic postprocedural pain     Presence of aortocoronary bypass graft     Pulmonary congestion     Recurrent boils     Reduced chest expansion on inspiration     S/P CABG x 5     Right flank pain     Paraparesis (Multi)     Type 2 diabetes mellitus     Spasticity as late effect of cerebrovascular  accident (CVA)     Shortness of breath on exertion     Dyspnea     Shingles     S/P PTCA (percutaneous transluminal coronary angioplasty)     Long-term insulin use (Multi)     Right hemiparesis (Multi)     Hospital discharge follow-up     Critical limb ischemia of left lower extremity (Multi)                  Above medical problems may be reflective of historical medical problems that may have resolved and may not related to acute clinical condition/medical problems.     Clinical impression/plan:        Critical limb ischemia of LLE with reocclusion of L SFA and popliteal arteries- s/p left femoropopliteal bypass with vein patch left distal anastomosis 12/18/24  Hx severe lower extremity PAD with multiple prior revascularizations  Most recently L femoral and tibial thrombectomy 11/19/24 with 4 compartment fasciotomy   Pt reports he is compliant with Eliquis, Brilinta and aspirin   Monitor incision sites for signs of bleeding  Neurovascular checks and pulse checks  Wound care consult for wound care of fasciotomy sites   Continue aspirin and brilinta  Heparin drip changed back to PO Eliquis 12/20/24  Pain control as able  12/21: Appears to be doing well postoperatively.  Await vascular surgery discharge timing.     Acute urinary retention  12/21: Nurses noted significant urinary retention which required following straight cath.  Douglass catheter placed and urine studies ordered.  Likely will discharge at this point with Douglass catheter unless can do voiding trial day of discharge.  Will need to follow-up with urology.  Apparently has history of BPH.  12/22: Once again discussed with patient and his mother that perhaps on the day of discharge could do a voiding trial.  Suspect to some extent his opioid analgesia may be contributing to this.  Informed them I thought it was likely he would require Douglass at discharge and follow-up with urology.  Consideration for Flomax though holding off in view of his soft blood  pressures.     Hx CAD s/p CABG  Continue home Lipitor and Zetia  Continue home aspirin and brilinta  Continue home ranexa     SAMEERA  Baseline appears to be ~1.0-1.3  Avoid nephrotoxins  Consider nephrology consult if worsening   Hold home farxiga and entresto in setting of SAMEERA, resume as appropriate   12/22: Creatinine reasonably stable though above baseline.  He did have acute urinary retention.  Continue to monitor.     IDDM-II with hyperglycemia  Continue Lantus and humalog TID AC  A1C of 8.2% as of October 2024   Start SSI protocol  Consult endocrinology  Goal glucose <180 for better wound healing      HTN  BP is low- hold home meds and reintroduce as able     History of multiple CVA  History of R PCA stroke in 2019, L MCA stroke in 2021, bihemispheric small infarcts in 2022, and L MCA scattered embolic infarcts in 2023   Continue as above        COPD without acute exacerbation  Cigarette smoker x48 years, continues to smoke but cut back to 3 cigarettes per day   Albuterol MDI as needed for shortness of breath/wheezing      BRUNA  Noncompliant with CPAP     Tobacco use disorder   Reports that he has cut back to 3 cigarettes per day and has been trying very hard to quit.   Smoking cessation education >3 minutes provided   NRT if needed     Code Status: Full Code         DVT ppx: Eliquis           Disposition/additional care plan/interventions: 12/23/2024     Critical limb ischemia............. reported left SFA occlusion, status post left femoral-popliteal bypass.     Analgesics therapy as per surgical service     Acute urinary retention in the postoperative period with associated BPH, continue stewardship and recommendation as per urology.  Plan for discharge home with Douglass catheter.     Continue postoperative care as per vascular service.     Tobacco cessation advised     COPD without acute exacerbation continue to monitor.     Monitor H&H     Continue stool softeners     Asymptomatic moderate hyponatremia  suspicion of multifactorial nature..............Monitor sodium levels              The patient was informed of differential diagnosis , work up , plan of care and possible sequelae of clinical disposition.Patient in agreement with plan of care. Further recommendations forthcoming in accordance with patient's clinical disposition and response to care.     Disposition/additional care plan/interventions: 12/24/2024         It appeared that patient be discharged today by surgical service.    No recurrence of chest pain.    Clinical suspicion chest pain likely musculoskeletal.  However patient was warned that despite negative cardiac biomarkers if  he should have any recurrence of chest pains he  should seek medical attention immediately.  Patient mother present at the bedside.    Follow-up with urology recommended    Patient should seek medical attention immediately if condition should worsen, new symptoms develop , no further improvement or recurrence of presenting symptomatology, patient warned.         Dictation performed with assistance of voice recognition device therefore transcription errors are possible.

## 2024-12-24 NOTE — HH CARE COORDINATION
Home Care received a Referral to Resume Care for Nursing. We have processed the referral for a Resumption of Care on 12/25-12/26.     If you have any questions or concerns, please feel free to contact us at [phone]. Follow the prompts, enter your five digit zip code, and you will be directed to your care team on [facility].

## 2024-12-24 NOTE — SIGNIFICANT EVENT
Pt is a 58 y/o M admitted for CAD s/p CABG, severe lower extremity PAD with prior revascularization admitted for reocclusion of his SFA and popliteal. Pt had a L femoropopliteal bypass with vein patch L distal anastomosis 12/18/24 with Dr. Peace. On evaluation on 12/23/24 the pt had no specific complaints and discharged was contemplated.  Rapid response called around 8 PM for pt complaints of chest pain. Pt reportedly told nursing he felt like he was having a heart attack. Entered room, found patient somewhat somnolent. He was noted to be diaphoretic and somewhat pale. I interviewed him and he said he had been having chest pain for the past hour. He also said his lungs felt very heavy. Pt did not seem to be in acute distress. A&Ox3.  Vital signs stable with pulse 101, 18 rr with bp 130/89. Labs reviewed from earlier in the morning, with no major issues noted.  EKG X2 done showing no ST changes, arrhythmias or changes from prior EKGs.  CXR done, no acute process noted.  Troponin 14 -> 17, will recheck in AM.  PRN Dilaudid given for chest pain. Messaged by nursing approximately 30 mins later that symptoms had resolved and the patient said he felt better. Will continue to monitor.

## 2024-12-24 NOTE — NURSING NOTE
Pt c/o chest pain, RR called, Per Dr Dove - EKG ok, resulted troponin (-), CXR (-), respiratory placed pt on 15L 50% venti mask, Pt give .04 dilaudid for pain, placed on TELE (NSR).  Thirty minutes later pain in chest and right arm was gone and pt said this was the best he felt since he has been here. Continued to monitor.

## 2024-12-24 NOTE — NURSING NOTE
Discharge instructions reviewed with pt and mother. Questions answered. Meds delivered to bedside. Pt assisted to get dressed and transported off floor via w/c and  staff.

## 2024-12-26 ENCOUNTER — HOME CARE VISIT (OUTPATIENT)
Dept: HOME HEALTH SERVICES | Facility: HOME HEALTH | Age: 59
End: 2024-12-26
Payer: COMMERCIAL

## 2024-12-26 ENCOUNTER — TELEPHONE (OUTPATIENT)
Dept: VASCULAR SURGERY | Facility: HOSPITAL | Age: 59
End: 2024-12-26
Payer: COMMERCIAL

## 2024-12-26 ENCOUNTER — PATIENT OUTREACH (OUTPATIENT)
Dept: PRIMARY CARE | Facility: CLINIC | Age: 59
End: 2024-12-26
Payer: COMMERCIAL

## 2024-12-26 ENCOUNTER — PHARMACY VISIT (OUTPATIENT)
Dept: PHARMACY | Facility: CLINIC | Age: 59
End: 2024-12-26
Payer: MEDICAID

## 2024-12-26 VITALS
SYSTOLIC BLOOD PRESSURE: 98 MMHG | HEART RATE: 67 BPM | TEMPERATURE: 97.5 F | DIASTOLIC BLOOD PRESSURE: 58 MMHG | OXYGEN SATURATION: 97 %

## 2024-12-26 DIAGNOSIS — N40.0 BENIGN PROSTATIC HYPERPLASIA, UNSPECIFIED WHETHER LOWER URINARY TRACT SYMPTOMS PRESENT: ICD-10-CM

## 2024-12-26 PROCEDURE — G0299 HHS/HOSPICE OF RN EA 15 MIN: HCPCS

## 2024-12-26 PROCEDURE — RXMED WILLOW AMBULATORY MEDICATION CHARGE

## 2024-12-26 RX ORDER — TAMSULOSIN HYDROCHLORIDE 0.4 MG/1
0.4 CAPSULE ORAL DAILY
Qty: 90 CAPSULE | Refills: 1 | Status: SHIPPED | OUTPATIENT
Start: 2024-12-26 | End: 2025-06-24

## 2024-12-26 ASSESSMENT — ENCOUNTER SYMPTOMS
LAST BOWEL MOVEMENT: 67200
PERSON REPORTING PAIN: PATIENT
SUBJECTIVE PAIN PROGRESSION: UNCHANGED
APPETITE LEVEL: GOOD
STOOL FREQUENCY: DAILY
PAIN: 1
FATIGUES EASILY: 1
PAIN LOCATION - PAIN SEVERITY: 8/10
CHANGE IN APPETITE: UNCHANGED
SHORTNESS OF BREATH: 1
DYSPNEA ON EXERTION: 1
DYSPNEA ACTIVITY LEVEL: AFTER AMBULATING LESS THAN 10 FT
PAIN LOCATION: INCISION
LOWER EXTREMITY EDEMA: 1

## 2024-12-26 ASSESSMENT — ACTIVITIES OF DAILY LIVING (ADL)
ENTERING_EXITING_HOME: SUPERVISION
OASIS_M1830: 04

## 2024-12-27 ENCOUNTER — OFFICE VISIT (OUTPATIENT)
Dept: UROLOGY | Facility: CLINIC | Age: 59
End: 2024-12-27
Payer: COMMERCIAL

## 2024-12-27 ENCOUNTER — PATIENT OUTREACH (OUTPATIENT)
Dept: PRIMARY CARE | Facility: CLINIC | Age: 59
End: 2024-12-27
Payer: COMMERCIAL

## 2024-12-27 ENCOUNTER — TELEPHONE (OUTPATIENT)
Dept: UROLOGY | Facility: CLINIC | Age: 59
End: 2024-12-27

## 2024-12-27 VITALS
WEIGHT: 188 LBS | HEIGHT: 71 IN | DIASTOLIC BLOOD PRESSURE: 77 MMHG | BODY MASS INDEX: 26.32 KG/M2 | HEART RATE: 77 BPM | SYSTOLIC BLOOD PRESSURE: 116 MMHG

## 2024-12-27 DIAGNOSIS — N40.1 BENIGN PROSTATIC HYPERPLASIA WITH LOWER URINARY TRACT SYMPTOMS, SYMPTOM DETAILS UNSPECIFIED: Primary | ICD-10-CM

## 2024-12-27 DIAGNOSIS — Z12.5 ENCOUNTER FOR SCREENING PROSTATE SPECIFIC ANTIGEN (PSA) MEASUREMENT: ICD-10-CM

## 2024-12-27 PROCEDURE — 3049F LDL-C 100-129 MG/DL: CPT | Performed by: UROLOGY

## 2024-12-27 PROCEDURE — 99213 OFFICE O/P EST LOW 20 MIN: CPT | Performed by: UROLOGY

## 2024-12-27 PROCEDURE — 3078F DIAST BP <80 MM HG: CPT | Performed by: UROLOGY

## 2024-12-27 PROCEDURE — 3008F BODY MASS INDEX DOCD: CPT | Performed by: UROLOGY

## 2024-12-27 PROCEDURE — 3051F HG A1C>EQUAL 7.0%<8.0%: CPT | Performed by: UROLOGY

## 2024-12-27 PROCEDURE — 3074F SYST BP LT 130 MM HG: CPT | Performed by: UROLOGY

## 2024-12-27 NOTE — TELEPHONE ENCOUNTER
Patient coming in 1/2 to see Dr. Suárez, [facility] F/U w/cath.  His Mom call saying that this bag has come loose and she cannot fix it.  Asking if she can bring him in to be looked at instead of going to ER.  Please advise  Thank you

## 2024-12-27 NOTE — PROGRESS NOTES
Discharge Facility: [facility]  Discharge Diagnosis: Blood clot in Legs  Admission Date: 17 Dec 24  Discharge Date: 24 Dec 24    PCP Appointment Date: [address] (Set by another)  Specialist Appointment Date: 2 Jan 25 (Urology, Jose Armando)  Hospital Encounter and Summary Linked: Yes    See discharge assessment below for further details     Engagement  Call Start Time: 0912 (Spoke with patient) (12/27/2024  9:22 AM)    Medications  Medications reviewed with patient/caregiver?: Yes (12/27/2024  9:22 AM)  Is the patient having any side effects they believe may be caused by any medication additions or changes?: No (12/27/2024  9:22 AM)  Does the patient have all medications ordered at discharge?: Yes (12/27/2024  9:22 AM)  Prescription Comments: pt able to obtain and afford all meds. pt in contact with Vasc Surg department regarding med issue that he is having. (12/27/2024  9:22 AM)  Is the patient taking all medications as directed (includes completed medication regime)?: Yes (12/27/2024  9:22 AM)  Medication Comments: pt has no questions on medications at this time. (12/27/2024  9:22 AM)    Appointments  Does the patient have a primary care provider?: Yes (follow up set by another for [address]) (12/27/2024  9:22 AM)  Care Management Interventions: Verified appointment date/time/provider (12/27/2024  9:22 AM)  Has the patient kept scheduled appointments due by today?: Yes (12/27/2024  9:22 AM)  Care Management Interventions: Advised patient to keep appointment (12/27/2024  9:22 AM)    Self Management  What is the home health agency?: None (12/27/2024  9:22 AM)  Has home health visited the patient within 72 hours of discharge?: Not applicable (12/27/2024  9:22 AM)  What Durable Medical Equipment (DME) was ordered?: None (12/27/2024  9:22 AM)    Patient Teaching  Does the patient have access to their discharge instructions?: Yes (12/27/2024  9:22 AM)  Care Management Interventions: Reviewed instructions with patient (12/27/2024   9:22 AM)  What is the patient's perception of their health status since discharge?: Improving (12/27/2024  9:22 AM)  Is the patient/caregiver able to teach back the hierarchy of who to call/visit for symptoms/problems? PCP, Specialist, Home Health nurse, Urgent Care, ED, 911: Yes (12/27/2024  9:22 AM)  Patient/Caregiver Education Comments: None (12/27/2024  9:22 AM)    Wrap Up  Wrap Up Additional Comments: Pt states he is doing well, just a little sore at this time. pt in contact with Vasc Surg department regarding issue with medications at this time. pt has no other questions or concerns regarding medications or discharge instructions at this time. (12/27/2024  9:22 AM)  Call End Time: 0922 (12/27/2024  9:22 AM)    Pt grateful for outreach call and is agreeable to being contacted after PCP appt to see how they are doing.

## 2024-12-27 NOTE — PROGRESS NOTES
12/27/2024  Hospital follow-up for urinary retention    Exam: Douglass catheter in place, urine clear yellow    We discussed benign prostate hypertrophy, postop urinary retention, voiding trial  We discussed continue Flomax 0.4 mg daily  We discussed the PSA screening  All the questions were answered, the patient expressed understanding and agreed to the plan.    Impression  BPH  Postop retention  PSA screening    Plan  Continue Flomax 0.4 mg daily  Voiding trial today, ER if needed  PSA screening  Appointment in 3 months for PVR    Chief Complaint   Patient presents with    Urinary Retention     Pt here for a hospital follow up for retention   Pt states Cath is coming losses and they are worried its coming out and is very painful when he walks         Physical Exam     TODAYS LAB RESULTS:  Contains abnormal data Urinalysis Microscopic  Order: [id number] - Reflex for Order [id number]   Status: Final result       Visible to patient: Yes (seen)    0 Result Notes        Component  Ref Range & Units 6 d ago 1 yr ago 5 yr ago   WBC, Urine  1-5, NONE /HPF 1-5 1 R 1 Abnormal  R   RBC, Urine  NONE, 1-2, 3-5 /HPF 3-5 3 R 6 Abnormal  R   Mucus, Urine  Reference range not established. /LPF FEW  1+ R   Hyaline Casts, Urine  NONE /LPF 4+ Abnormal      Resulting Agency [facility]             Specimen Collected: 12/21/24 10:39 Last Resulted: 12/21/24 13:06        Lab Flowsheet        Order Details        View Encounter        Lab and Collection Details        Routing        Result History     View All Conversations on this Encounter     R=Reference range differs from displayed range     Result Care Coordination      Patient Communication     Add Comments   Seen Back to Top      Contains abnormal data Urinalysis with Reflex Culture and Microscopic  Order: [id number] - Part of Panel Order [id number]   Status: Final result       Visible to patient: Yes (seen)    0 Result Notes         Component  Ref  Range & Units 6 d ago  (12/21/24) 1 yr ago  (2/28/23) 5 yr ago  (12/10/19) 5 yr ago  (8/29/19)   Color, Urine  Light-Yellow, Yellow, Dark-Yellow Yellow Yellow R Yellow R STRAW R   Appearance, Urine  Clear Clear Clear R Clear R CLEAR R   Specific Gravity, Urine  1.005 - 1.035 1.021 1.030 1.018 1.013   pH, Urine  5.0, 5.5, 6.0, 6.5, 7.0, 7.5, 8.0 5.0 5.0 R 6.0 R 5.0 R   Protein, Urine  NEGATIVE, 10 (TRACE), 20 (TRACE) mg/dL 30 (1+) Abnormal  100(2+) Abnormal  R 100(2+) Abnormal  R NEGATIVE R   Glucose, Urine  Normal mg/dL OVER (4+) Abnormal  >=500(3+) Abnormal  R Negative R >=500 (3+) Abnormal  R   Blood, Urine  NEGATIVE NEGATIVE Negative SMALL(1+) Abnormal  NEGATIVE   Ketones, Urine  NEGATIVE mg/dL NEGATIVE Negative Negative NEGATIVE   Bilirubin, Urine  NEGATIVE NEGATIVE Negative Negative NEGATIVE   Urobilinogen, Urine  Normal mg/dL Normal <2.0 R <2.0 R <2.0 R   Nitrite, Urine  NEGATIVE NEGATIVE Negative Negative NEGATIVE   Leukocyte Esterase, Urine  NEGATIVE NEGATIVE Negative Negative NEGATIVE   Resulting Agency [facility]                 ASSESSMENT&PLAN:      IMPRESSIONS:          12/20/2024  Patient did fine     Exam: Incision healed     First Semen specimen: no sperm     Plan:  Drop off a second semen specimen in a week          Chief Complaint   Patient presents with    Contraception       Patient is here today for 10 week follow up after vasectomy.  He has non complaints at this time.         Physical Exam      TODAYS LAB RESULTS:        ASSESSMENT&PLAN:        IMPRESSIONS:           10/11/2024  A scalpeless vasectomy was performed under local and patient tolerated it well.     the patient was prepped and draped in the usual sterile fashion.  While in the supine position, the left vas deferens was isolated and the skin and vas were anesthetized using 1% lidocaine.  A small incision was made in the skin using a scalpeless clamp.  The vas was delivered up into the  wound and a segment was freed up and divided.  The proximal and distal ends were fulgurated. Fascial interposition stitch placed using 3-0 chromic   The ends of the vas were placed back into the scrotum and the skin was closed with all suture.  The right vas deferens was isolated and the skin and vas were anesthetized using 1% lidocaine.  A small incision was made in the skin using a scalpeless clamp.   the vas was delivered up into the wound and a segment was freed up and divided.  The proximal and distal ends were fulgurated and tied with 3-0 Chromic suture.  The ends of the vas were placed back into the scrotum and the skin was closed with suture.  The patient tolerated the procedure well and was discharged to home. He was provided with post-vasectomy instructions. He was advised to rest for 24 hours and abstain from sexual intercourse for 1 week. He was advised for the continued need for contraception until he is documented as sterile with semen analysis.     Impression  Family planning  Status post vasectomy     Plan  Norco ×20  Keflex 500 mg twice daily ×7 days  Appointment with specimen in 10 weeks     I have personally reviewed the [facility] report for this patient. This report is scanned into the electronic medical record. I have considered the risk of abuse, dependence, addictions and diversion. I believe that it is clinically appropriate for this patient to be prescribed this medication             Chief Complaint   Patient presents with    elective sterilization       Patient is here today vasectomy.         Physical Exam      TODAYS LAB RESULTS:        ASSESSMENT&PLAN:        IMPRESSIONS:       12/23/2024  Assessment/Plan  Impression  BPH  Postop urinary retention, failed voiding trial x 2     Plan  Continue Douglass catheter  Outpatient voiding trial with Dr. Kulkarni in 1 week  Call if problem                PSA  3/18/2021 0.3

## 2024-12-28 ENCOUNTER — HOME CARE VISIT (OUTPATIENT)
Dept: HOME HEALTH SERVICES | Facility: HOME HEALTH | Age: 59
End: 2024-12-28
Payer: COMMERCIAL

## 2024-12-28 PROCEDURE — G0152 HHCP-SERV OF OT,EA 15 MIN: HCPCS

## 2024-12-28 PROCEDURE — G0151 HHCP-SERV OF PT,EA 15 MIN: HCPCS

## 2024-12-28 SDOH — ECONOMIC STABILITY: HOUSING INSECURITY: TEMPORARY ARRANGEMENT: 1

## 2024-12-28 ASSESSMENT — ENCOUNTER SYMPTOMS
PAIN LOCATION: RIGHT LEG
PERSON REPORTING PAIN: PATIENT
PAIN LOCATION - RELIEVING FACTORS: MEDS
PAIN LOCATION: RIGHT LEG
LOWEST PAIN SEVERITY IN PAST 24 HOURS: 6/10
OCCASIONAL FEELINGS OF UNSTEADINESS: 1
PAIN LOCATION: LEFT LEG
LOWEST PAIN SEVERITY IN PAST 24 HOURS: 6/10
HIGHEST PAIN SEVERITY IN PAST 24 HOURS: 10/10
PAIN: 1
PAIN LOCATION: LEFT LEG
PAIN LOCATION - RELIEVING FACTORS: MEDICATION
PERSON REPORTING PAIN: PATIENT
PAIN: 1
PAIN LOCATION - EXACERBATING FACTORS: WALKING
HIGHEST PAIN SEVERITY IN PAST 24 HOURS: 10/10
PAIN LOCATION - PAIN QUALITY: ACHING

## 2024-12-28 ASSESSMENT — ACTIVITIES OF DAILY LIVING (ADL)
AMBULATION ASSISTANCE ON FLAT SURFACES: 1
AMBULATION_DISTANCE/DURATION_TOLERATED: SHORT HOUSEHOLD

## 2024-12-28 NOTE — CASE COMMUNICATION
Dr. Peace,    The patients left groin incision has a red raised bump.  I took a photo which should be available in the media section.  He has serosanguinous drainage that he says is consistently going through his underwear.  No foul odor.  No purulent drainage observed.      His skin fold in the groin was moist and soft.  I told him I am not capable of prescribing medicine, but that I unofficially would recommend some sort of a barrier  cream for that area provided it remains far away from his incision.     Given this complexity of his wounds, I also wondered whether you might find a wound center referral to be appropriate.      Regards,  Dr. Momo Salguero, PT, DPT

## 2024-12-30 ENCOUNTER — PHARMACY VISIT (OUTPATIENT)
Dept: PHARMACY | Facility: CLINIC | Age: 59
End: 2024-12-30
Payer: MEDICAID

## 2024-12-30 ENCOUNTER — HOSPITAL ENCOUNTER (INPATIENT)
Facility: HOSPITAL | Age: 59
End: 2024-12-30
Attending: STUDENT IN AN ORGANIZED HEALTH CARE EDUCATION/TRAINING PROGRAM | Admitting: STUDENT IN AN ORGANIZED HEALTH CARE EDUCATION/TRAINING PROGRAM
Payer: COMMERCIAL

## 2024-12-30 ENCOUNTER — TELEPHONE (OUTPATIENT)
Dept: PRIMARY CARE | Facility: CLINIC | Age: 59
End: 2024-12-30
Payer: COMMERCIAL

## 2024-12-30 ENCOUNTER — HOME CARE VISIT (OUTPATIENT)
Dept: HOME HEALTH SERVICES | Facility: HOME HEALTH | Age: 59
End: 2024-12-30
Payer: COMMERCIAL

## 2024-12-30 ENCOUNTER — APPOINTMENT (OUTPATIENT)
Dept: RADIOLOGY | Facility: HOSPITAL | Age: 59
End: 2024-12-30
Payer: COMMERCIAL

## 2024-12-30 ENCOUNTER — TELEPHONE (OUTPATIENT)
Dept: VASCULAR SURGERY | Facility: HOSPITAL | Age: 59
End: 2024-12-30
Payer: COMMERCIAL

## 2024-12-30 DIAGNOSIS — I99.8 LIMB ISCHEMIA: Primary | ICD-10-CM

## 2024-12-30 DIAGNOSIS — T81.49XA POSTOPERATIVE ABSCESS: Primary | ICD-10-CM

## 2024-12-30 LAB
ALBUMIN SERPL BCP-MCNC: 3.2 G/DL (ref 3.4–5)
ALP SERPL-CCNC: 79 U/L (ref 33–120)
ALT SERPL W P-5'-P-CCNC: 8 U/L (ref 10–52)
ANION GAP SERPL CALC-SCNC: 11 MMOL/L (ref 10–20)
AST SERPL W P-5'-P-CCNC: 10 U/L (ref 9–39)
BASOPHILS # BLD AUTO: 0.03 X10*3/UL (ref 0–0.1)
BASOPHILS NFR BLD AUTO: 0.5 %
BILIRUB SERPL-MCNC: 0.3 MG/DL (ref 0–1.2)
BUN SERPL-MCNC: 35 MG/DL (ref 6–23)
CALCIUM SERPL-MCNC: 8.2 MG/DL (ref 8.6–10.3)
CHLORIDE SERPL-SCNC: 105 MMOL/L (ref 98–107)
CO2 SERPL-SCNC: 23 MMOL/L (ref 21–32)
CREAT SERPL-MCNC: 1.37 MG/DL (ref 0.5–1.3)
CRP SERPL-MCNC: 7.35 MG/DL
EGFRCR SERPLBLD CKD-EPI 2021: 60 ML/MIN/1.73M*2
EOSINOPHIL # BLD AUTO: 0.13 X10*3/UL (ref 0–0.7)
EOSINOPHIL NFR BLD AUTO: 2.3 %
ERYTHROCYTE [DISTWIDTH] IN BLOOD BY AUTOMATED COUNT: 13.4 % (ref 11.5–14.5)
GLUCOSE SERPL-MCNC: 127 MG/DL (ref 74–99)
HCT VFR BLD AUTO: 25.9 % (ref 41–52)
HGB BLD-MCNC: 8.5 G/DL (ref 13.5–17.5)
HYPOCHROMIA BLD QL SMEAR: NORMAL
IMM GRANULOCYTES # BLD AUTO: 0.03 X10*3/UL (ref 0–0.7)
IMM GRANULOCYTES NFR BLD AUTO: 0.5 % (ref 0–0.9)
LACTATE SERPL-SCNC: 1.5 MMOL/L (ref 0.4–2)
LYMPHOCYTES # BLD AUTO: 1.48 X10*3/UL (ref 1.2–4.8)
LYMPHOCYTES NFR BLD AUTO: 25.9 %
MCH RBC QN AUTO: 31.3 PG (ref 26–34)
MCHC RBC AUTO-ENTMCNC: 32.8 G/DL (ref 32–36)
MCV RBC AUTO: 95 FL (ref 80–100)
MONOCYTES # BLD AUTO: 0.54 X10*3/UL (ref 0.1–1)
MONOCYTES NFR BLD AUTO: 9.4 %
NEUTROPHILS # BLD AUTO: 3.51 X10*3/UL (ref 1.2–7.7)
NEUTROPHILS NFR BLD AUTO: 61.4 %
NRBC BLD-RTO: 0 /100 WBCS (ref 0–0)
PLATELET # BLD AUTO: 429 X10*3/UL (ref 150–450)
POLYCHROMASIA BLD QL SMEAR: NORMAL
POTASSIUM SERPL-SCNC: 5.2 MMOL/L (ref 3.5–5.3)
PROT SERPL-MCNC: 6.8 G/DL (ref 6.4–8.2)
RBC # BLD AUTO: 2.72 X10*6/UL (ref 4.5–5.9)
RBC MORPH BLD: NORMAL
SODIUM SERPL-SCNC: 134 MMOL/L (ref 136–145)
WBC # BLD AUTO: 5.7 X10*3/UL (ref 4.4–11.3)

## 2024-12-30 PROCEDURE — G0158 HHC OT ASSISTANT EA 15: HCPCS | Mod: CO

## 2024-12-30 PROCEDURE — RXMED WILLOW AMBULATORY MEDICATION CHARGE

## 2024-12-30 PROCEDURE — 2500000004 HC RX 250 GENERAL PHARMACY W/ HCPCS (ALT 636 FOR OP/ED): Performed by: STUDENT IN AN ORGANIZED HEALTH CARE EDUCATION/TRAINING PROGRAM

## 2024-12-30 PROCEDURE — 87040 BLOOD CULTURE FOR BACTERIA: CPT | Mod: PORLAB | Performed by: NURSE PRACTITIONER

## 2024-12-30 PROCEDURE — 2550000001 HC RX 255 CONTRASTS: Performed by: STUDENT IN AN ORGANIZED HEALTH CARE EDUCATION/TRAINING PROGRAM

## 2024-12-30 PROCEDURE — 87186 SC STD MICRODIL/AGAR DIL: CPT | Mod: PORLAB | Performed by: NURSE PRACTITIONER

## 2024-12-30 PROCEDURE — 99285 EMERGENCY DEPT VISIT HI MDM: CPT | Mod: 25 | Performed by: STUDENT IN AN ORGANIZED HEALTH CARE EDUCATION/TRAINING PROGRAM

## 2024-12-30 PROCEDURE — 85652 RBC SED RATE AUTOMATED: CPT | Mod: PORLAB | Performed by: NURSE PRACTITIONER

## 2024-12-30 PROCEDURE — 96375 TX/PRO/DX INJ NEW DRUG ADDON: CPT

## 2024-12-30 PROCEDURE — 74177 CT ABD & PELVIS W/CONTRAST: CPT | Mod: FOREIGN READ | Performed by: RADIOLOGY

## 2024-12-30 PROCEDURE — 36415 COLL VENOUS BLD VENIPUNCTURE: CPT | Performed by: NURSE PRACTITIONER

## 2024-12-30 PROCEDURE — 80053 COMPREHEN METABOLIC PANEL: CPT | Performed by: NURSE PRACTITIONER

## 2024-12-30 PROCEDURE — 96367 TX/PROPH/DG ADDL SEQ IV INF: CPT

## 2024-12-30 PROCEDURE — 87070 CULTURE OTHR SPECIMN AEROBIC: CPT | Mod: PORLAB | Performed by: NURSE PRACTITIONER

## 2024-12-30 PROCEDURE — 83605 ASSAY OF LACTIC ACID: CPT | Performed by: NURSE PRACTITIONER

## 2024-12-30 PROCEDURE — 99223 1ST HOSP IP/OBS HIGH 75: CPT | Performed by: STUDENT IN AN ORGANIZED HEALTH CARE EDUCATION/TRAINING PROGRAM

## 2024-12-30 PROCEDURE — 96365 THER/PROPH/DIAG IV INF INIT: CPT | Mod: 59

## 2024-12-30 PROCEDURE — 86140 C-REACTIVE PROTEIN: CPT | Performed by: NURSE PRACTITIONER

## 2024-12-30 PROCEDURE — 85025 COMPLETE CBC W/AUTO DIFF WBC: CPT | Performed by: NURSE PRACTITIONER

## 2024-12-30 PROCEDURE — 74177 CT ABD & PELVIS W/CONTRAST: CPT

## 2024-12-30 RX ORDER — MORPHINE SULFATE 4 MG/ML
4 INJECTION INTRAVENOUS ONCE
Status: DISCONTINUED | OUTPATIENT
Start: 2024-12-30 | End: 2024-12-30

## 2024-12-30 RX ORDER — HYDROMORPHONE HYDROCHLORIDE 1 MG/ML
1 INJECTION, SOLUTION INTRAMUSCULAR; INTRAVENOUS; SUBCUTANEOUS ONCE
Status: COMPLETED | OUTPATIENT
Start: 2024-12-30 | End: 2024-12-30

## 2024-12-30 RX ORDER — ONDANSETRON HYDROCHLORIDE 2 MG/ML
4 INJECTION, SOLUTION INTRAVENOUS EVERY 4 HOURS PRN
Status: DISCONTINUED | OUTPATIENT
Start: 2024-12-30 | End: 2025-01-02

## 2024-12-30 RX ORDER — OXYCODONE HYDROCHLORIDE 5 MG/1
5 TABLET ORAL EVERY 4 HOURS PRN
Qty: 15 TABLET | Refills: 0 | Status: SHIPPED | OUTPATIENT
Start: 2024-12-30 | End: 2025-01-07 | Stop reason: HOSPADM

## 2024-12-30 RX ORDER — HYDROMORPHONE HYDROCHLORIDE 1 MG/ML
1 INJECTION, SOLUTION INTRAMUSCULAR; INTRAVENOUS; SUBCUTANEOUS EVERY 4 HOURS PRN
Status: DISCONTINUED | OUTPATIENT
Start: 2024-12-30 | End: 2024-12-31

## 2024-12-30 RX ADMIN — HYDROMORPHONE HYDROCHLORIDE 1 MG: 1 INJECTION, SOLUTION INTRAMUSCULAR; INTRAVENOUS; SUBCUTANEOUS at 18:45

## 2024-12-30 RX ADMIN — IOHEXOL 75 ML: 350 INJECTION, SOLUTION INTRAVENOUS at 19:19

## 2024-12-30 RX ADMIN — ONDANSETRON 4 MG: 2 INJECTION INTRAMUSCULAR; INTRAVENOUS at 22:53

## 2024-12-30 RX ADMIN — VANCOMYCIN HYDROCHLORIDE 1500 MG: 1.5 INJECTION, POWDER, LYOPHILIZED, FOR SOLUTION INTRAVENOUS at 22:54

## 2024-12-30 RX ADMIN — HYDROMORPHONE HYDROCHLORIDE 1 MG: 1 INJECTION, SOLUTION INTRAMUSCULAR; INTRAVENOUS; SUBCUTANEOUS at 21:49

## 2024-12-30 RX ADMIN — PIPERACILLIN SODIUM AND TAZOBACTAM SODIUM 3.38 G: 3; .375 INJECTION, SOLUTION INTRAVENOUS at 21:31

## 2024-12-30 ASSESSMENT — ENCOUNTER SYMPTOMS
PAIN LOCATION - PAIN FREQUENCY: CONSTANT
CONSTITUTIONAL NEGATIVE: 1
CARDIOVASCULAR NEGATIVE: 1
PAIN LOCATION: LEFT HIP
GASTROINTESTINAL NEGATIVE: 1
PERSON REPORTING PAIN: PATIENT
PAIN: 1
PAIN LOCATION - PAIN SEVERITY: 8/10
NEUROLOGICAL NEGATIVE: 1
PAIN LOCATION - PAIN QUALITY: ACHE
EYES NEGATIVE: 1
ENDOCRINE NEGATIVE: 1
RESPIRATORY NEGATIVE: 1
PSYCHIATRIC NEGATIVE: 1
MUSCULOSKELETAL NEGATIVE: 1

## 2024-12-30 ASSESSMENT — PAIN SCALES - GENERAL
PAINLEVEL_OUTOF10: 8
PAINLEVEL_OUTOF10: 7
PAINLEVEL_OUTOF10: 7
PAINLEVEL_OUTOF10: 5 - MODERATE PAIN

## 2024-12-30 ASSESSMENT — PAIN DESCRIPTION - LOCATION: LOCATION: LEG

## 2024-12-30 ASSESSMENT — PAIN - FUNCTIONAL ASSESSMENT: PAIN_FUNCTIONAL_ASSESSMENT: 0-10

## 2024-12-30 ASSESSMENT — PAIN DESCRIPTION - ORIENTATION: ORIENTATION: LEFT

## 2024-12-30 NOTE — ED PROVIDER NOTES
HPI   Chief Complaint   Patient presents with    Wound Infection       This is a 59-year-old  male presenting to the emergency room for a left groin wound infection.  The patient had a femoropopliteal bypass graft secondary to a peripheral artery occlusion.  Patient originally underwent surgery in November 19th.  He he suffered from reocclusion and had to undergo revision on 12/18.  The surgeries were performed by Dr. Peace.  The patient suffered an infection at the groin site in November.  He was seen by the wound care center and was given 5 days of antibiotics.  He reported that he had improvement after the antibiotic course.  He noticed that he was having some drainage from the left groin 3 days ago.  It was reddened around the wound.  He was evaluated by the home health nurse today and was told that he had an infection to the wound.  He is not having any significant pain to the area.  Denies any fevers, chills, nausea, vomiting..  He reports normal urine and bowel function.  He denies any abdominal pain.  Denies any new or worsening paresthesia to the extremity.  Patient is not having chest pain, shortness of breath, dizziness, palpitations, syncope, or headache.      History provided by:  Patient   used: No            Patient History   Past Medical History:   Diagnosis Date    (HFpEF) heart failure with preserved ejection fraction     Aphasia     Atherosclerotic heart disease of native coronary artery with unspecified angina pectoris 11/05/2019    Bipolar disorder, unspecified (Multi)     BPH (benign prostatic hyperplasia)     Diabetes (Multi)     DVT (deep venous thrombosis) (Multi) 02/2022    RLE    Erectile dysfunction     GERD (gastroesophageal reflux disease)     Hepatitis C     HLD (hyperlipidemia)     HTN (hypertension)     Obstructive sleep apnea (adult) (pediatric)     Opioid abuse, in remission (Multi)     PAD (peripheral artery disease) (CMS-[facility])     Polymyalgia  rheumatica (Multi)     Post-traumatic stress disorder, unspecified     Stroke (Multi) 07/24/2023    multiple     Past Surgical History:   Procedure Laterality Date    BACK SURGERY      CORONARY ANGIOPLASTY WITH STENT PLACEMENT      CORONARY ARTERY BYPASS GRAFT      CT ANGIO AORTA AND BILATERAL ILIOFEMORAL RUN OFF INCLUDING WITHOUT CONTRAST IF PERFORMED  07/20/2020    CT ANGIO AORTA AND BILATERAL ILIOFEMORAL RUN OFF INCLUDING WITHOUT CONTRAST IF PERFORMED  01/14/2022    DENTAL SURGERY      ELBOW SURGERY      INVASIVE VASCULAR PROCEDURE Bilateral 10/04/2024    Procedure: Lower Extremity Angiogram;  Surgeon: Baljinder Wright MD;  Location: [facility];  Service: Cardiovascular;  Laterality: Bilateral;  w / anesthesia  3 hr hydration  / arrive 7:30    INVASIVE VASCULAR PROCEDURE N/A 10/21/2024    Procedure: Lower Extremity Angiogram;  Surgeon: Baljinder Wright MD;  Location: [facility];  Service: Cardiovascular;  Laterality: N/A;  w/ anesthesia    INVASIVE VASCULAR PROCEDURE N/A 10/21/2024    Procedure: Angioplasty - Lower Extremity;  Surgeon: Baljinder Wright MD;  Location: [facility];  Service: Cardiovascular;  Laterality: N/A;    KNEE SURGERY Left     MR HEAD ANGIO WO IV CONTRAST  01/04/2022    MR NECK ANGIO WO IV CONTRAST  01/04/2022    TONSILLECTOMY      TRANSLUMINAL ATHERECTOMY FEMORAL-POPLITEAL / TIBIOPERONEAL       Family History   Problem Relation Name Age of Onset    No Known Problems Mother      No Known Problems Father       Social History     Tobacco Use    Smoking status: Every Day     Current packs/day: 0.50     Types: Cigarettes     Passive exposure: Current (5-7 cigarettes a day)    Smokeless tobacco: Never   Vaping Use    Vaping status: Never Used   Substance Use Topics    Alcohol use: Never    Drug use: Yes     Types: Marijuana     Comment: Medical card       Physical Exam   ED Triage Vitals [12/30/24 1603]   Temperature Heart Rate Respirations BP   36.1 °C (97 °F) 71 18 112/64       Pulse Ox Temp Source Heart Rate Source Patient Position   100 % Tympanic Monitor Sitting      BP Location FiO2 (%)     Left arm --       Physical Exam  Vitals and nursing note reviewed.   Constitutional:       Appearance: Normal appearance.   HENT:      Head: Normocephalic and atraumatic.      Right Ear: External ear normal.      Left Ear: External ear normal.      Nose: Nose normal.      Mouth/Throat:      Pharynx: Oropharynx is clear.   Eyes:      Conjunctiva/sclera: Conjunctivae normal.   Cardiovascular:      Rate and Rhythm: Normal rate and regular rhythm.      Pulses: Normal pulses.   Pulmonary:      Effort: Pulmonary effort is normal.      Breath sounds: Normal breath sounds.   Abdominal:      General: Bowel sounds are normal.      Palpations: Abdomen is soft.   Genitourinary:     Comments: No CVA tenderness or pubic pain.  Musculoskeletal:         General: No deformity. Normal range of motion.   Skin:     Capillary Refill: Capillary refill takes less than 2 seconds.      Comments: The patient has a surgical wound noted to the left groin with staples intact.  He also has surgical wound noted to the medial aspect of the left calf with staples intact.  He has 2 fasciotomy incisions noted to the distal left lower extremity that are open to air.  The left groin incision has mild erythema and purulent drainage noted from the proximal aspect.  No dehiscence.  No bruit or thrill appreciated at the left groin wound.  Please see photo for details.   Neurological:      General: No focal deficit present.      Mental Status: He is alert and oriented to person, place, and time.   Psychiatric:         Mood and Affect: Mood normal.         Judgment: Judgment normal.           ED Course & MDM                  No data recorded     Parsons Coma Scale Score: 15 (12/30/24 1604 : Sonia Loomis RN)                           Medical Decision Making  The patient was seen and evaluated by the nurse practitioner, Kerry Sims,  in triage.  Preliminary orders were placed based on the patient's presentation.  Further evaluation will be provided by oncoming provider.  Orders are subject to change based on provider's evaluation.    Kerry Sims CNP        Procedure  Procedures     SUMAN Rodriguez-CNP  12/30/24 2052

## 2024-12-30 NOTE — ED TRIAGE NOTES
Here for incision infection sent in by [facility] for redness, bleeding, warmth, and pain. Bipass surgery for gortex on Tuesday. Pt denies fever, chills, nausea, or vomiting

## 2024-12-30 NOTE — TELEPHONE ENCOUNTER
Patient called patient got of surgery 12/24 in groin area and and concerned of in infection in groin area. Unable to see the surgeon until Thursday wanted to be seen and see if a antibiotic can be called in please advise

## 2024-12-30 NOTE — TELEPHONE ENCOUNTER
Pts mother called back asking if there was any way for an antibiotic to be called in. I explained again that the doctor would need to assess the wound and see if it is actually infected and if an antibiotic was needed. I did offer for them to go to [facility] and they said it was too far. She stated that they did not want to go and sit at the er. And hung up the phone.

## 2024-12-30 NOTE — TELEPHONE ENCOUNTER
Pt called stating the visiting nurse is worried his incisions are looking infected and needs to be seen ASAP. They are draining and turning red. Warm to the touch  I recommended that he go to the er as  is not in clinic.

## 2024-12-30 NOTE — TELEPHONE ENCOUNTER
Recently had a surgery on leg & now has an infection.  Mom is calling asking for antibiotic.  I talked to Stephanie---per Stephanie , he needs to call surgeon's office for antibiotic

## 2024-12-31 ENCOUNTER — HOME CARE VISIT (OUTPATIENT)
Dept: HOME HEALTH SERVICES | Facility: HOME HEALTH | Age: 59
End: 2024-12-31
Payer: COMMERCIAL

## 2024-12-31 ENCOUNTER — TELEPHONE (OUTPATIENT)
Dept: VASCULAR SURGERY | Facility: HOSPITAL | Age: 59
End: 2024-12-31
Payer: COMMERCIAL

## 2024-12-31 LAB
ANION GAP SERPL CALC-SCNC: 13 MMOL/L (ref 10–20)
APPEARANCE UR: CLEAR
BILIRUB UR STRIP.AUTO-MCNC: NEGATIVE MG/DL
BUN SERPL-MCNC: 34 MG/DL (ref 6–23)
CALCIUM SERPL-MCNC: 8.3 MG/DL (ref 8.6–10.3)
CHLORIDE SERPL-SCNC: 103 MMOL/L (ref 98–107)
CO2 SERPL-SCNC: 24 MMOL/L (ref 21–32)
COLOR UR: YELLOW
CREAT SERPL-MCNC: 1.51 MG/DL (ref 0.5–1.3)
EGFRCR SERPLBLD CKD-EPI 2021: 53 ML/MIN/1.73M*2
ERYTHROCYTE [DISTWIDTH] IN BLOOD BY AUTOMATED COUNT: 13.3 % (ref 11.5–14.5)
ERYTHROCYTE [SEDIMENTATION RATE] IN BLOOD BY WESTERGREN METHOD: 62 MM/H (ref 0–20)
GLUCOSE BLD MANUAL STRIP-MCNC: 131 MG/DL (ref 74–99)
GLUCOSE BLD MANUAL STRIP-MCNC: 167 MG/DL (ref 74–99)
GLUCOSE BLD MANUAL STRIP-MCNC: 198 MG/DL (ref 74–99)
GLUCOSE BLD MANUAL STRIP-MCNC: 212 MG/DL (ref 74–99)
GLUCOSE BLD MANUAL STRIP-MCNC: 324 MG/DL (ref 74–99)
GLUCOSE SERPL-MCNC: 168 MG/DL (ref 74–99)
GLUCOSE UR STRIP.AUTO-MCNC: ABNORMAL MG/DL
HCT VFR BLD AUTO: 27.1 % (ref 41–52)
HGB BLD-MCNC: 8.9 G/DL (ref 13.5–17.5)
HOLD SPECIMEN: NORMAL
HYALINE CASTS #/AREA URNS AUTO: ABNORMAL /LPF
KETONES UR STRIP.AUTO-MCNC: NEGATIVE MG/DL
LEUKOCYTE ESTERASE UR QL STRIP.AUTO: NEGATIVE
MCH RBC QN AUTO: 30.5 PG (ref 26–34)
MCHC RBC AUTO-ENTMCNC: 32.8 G/DL (ref 32–36)
MCV RBC AUTO: 93 FL (ref 80–100)
NITRITE UR QL STRIP.AUTO: NEGATIVE
NRBC BLD-RTO: 0 /100 WBCS (ref 0–0)
PH UR STRIP.AUTO: 5 [PH]
PLATELET # BLD AUTO: 469 X10*3/UL (ref 150–450)
POTASSIUM SERPL-SCNC: 4.9 MMOL/L (ref 3.5–5.3)
PROT UR STRIP.AUTO-MCNC: ABNORMAL MG/DL
RBC # BLD AUTO: 2.92 X10*6/UL (ref 4.5–5.9)
RBC # UR STRIP.AUTO: NEGATIVE /UL
RBC #/AREA URNS AUTO: ABNORMAL /HPF
SODIUM SERPL-SCNC: 135 MMOL/L (ref 136–145)
SP GR UR STRIP.AUTO: 1.03
UROBILINOGEN UR STRIP.AUTO-MCNC: NORMAL MG/DL
WBC # BLD AUTO: 5.4 X10*3/UL (ref 4.4–11.3)
WBC #/AREA URNS AUTO: ABNORMAL /HPF

## 2024-12-31 PROCEDURE — 81001 URINALYSIS AUTO W/SCOPE: CPT | Performed by: NURSE PRACTITIONER

## 2024-12-31 PROCEDURE — 85027 COMPLETE CBC AUTOMATED: CPT | Performed by: STUDENT IN AN ORGANIZED HEALTH CARE EDUCATION/TRAINING PROGRAM

## 2024-12-31 PROCEDURE — 96376 TX/PRO/DX INJ SAME DRUG ADON: CPT

## 2024-12-31 PROCEDURE — 82947 ASSAY GLUCOSE BLOOD QUANT: CPT

## 2024-12-31 PROCEDURE — 2500000004 HC RX 250 GENERAL PHARMACY W/ HCPCS (ALT 636 FOR OP/ED): Performed by: STUDENT IN AN ORGANIZED HEALTH CARE EDUCATION/TRAINING PROGRAM

## 2024-12-31 PROCEDURE — 36415 COLL VENOUS BLD VENIPUNCTURE: CPT | Performed by: STUDENT IN AN ORGANIZED HEALTH CARE EDUCATION/TRAINING PROGRAM

## 2024-12-31 PROCEDURE — 1100000001 HC PRIVATE ROOM DAILY

## 2024-12-31 PROCEDURE — 2500000001 HC RX 250 WO HCPCS SELF ADMINISTERED DRUGS (ALT 637 FOR MEDICARE OP): Performed by: INTERNAL MEDICINE

## 2024-12-31 PROCEDURE — 99233 SBSQ HOSP IP/OBS HIGH 50: CPT | Performed by: INTERNAL MEDICINE

## 2024-12-31 PROCEDURE — S4991 NICOTINE PATCH NONLEGEND: HCPCS | Performed by: STUDENT IN AN ORGANIZED HEALTH CARE EDUCATION/TRAINING PROGRAM

## 2024-12-31 PROCEDURE — 2500000004 HC RX 250 GENERAL PHARMACY W/ HCPCS (ALT 636 FOR OP/ED): Performed by: INTERNAL MEDICINE

## 2024-12-31 PROCEDURE — 2500000001 HC RX 250 WO HCPCS SELF ADMINISTERED DRUGS (ALT 637 FOR MEDICARE OP): Performed by: STUDENT IN AN ORGANIZED HEALTH CARE EDUCATION/TRAINING PROGRAM

## 2024-12-31 PROCEDURE — 51798 US URINE CAPACITY MEASURE: CPT

## 2024-12-31 PROCEDURE — 81003 URINALYSIS AUTO W/O SCOPE: CPT | Performed by: NURSE PRACTITIONER

## 2024-12-31 PROCEDURE — 2500000002 HC RX 250 W HCPCS SELF ADMINISTERED DRUGS (ALT 637 FOR MEDICARE OP, ALT 636 FOR OP/ED): Performed by: STUDENT IN AN ORGANIZED HEALTH CARE EDUCATION/TRAINING PROGRAM

## 2024-12-31 PROCEDURE — 2500000001 HC RX 250 WO HCPCS SELF ADMINISTERED DRUGS (ALT 637 FOR MEDICARE OP)

## 2024-12-31 PROCEDURE — 2500000002 HC RX 250 W HCPCS SELF ADMINISTERED DRUGS (ALT 637 FOR MEDICARE OP, ALT 636 FOR OP/ED)

## 2024-12-31 PROCEDURE — 96365 THER/PROPH/DIAG IV INF INIT: CPT

## 2024-12-31 PROCEDURE — 80048 BASIC METABOLIC PNL TOTAL CA: CPT | Performed by: STUDENT IN AN ORGANIZED HEALTH CARE EDUCATION/TRAINING PROGRAM

## 2024-12-31 PROCEDURE — 96366 THER/PROPH/DIAG IV INF ADDON: CPT

## 2024-12-31 PROCEDURE — 2500000005 HC RX 250 GENERAL PHARMACY W/O HCPCS

## 2024-12-31 RX ORDER — ONDANSETRON 4 MG/1
4 TABLET, ORALLY DISINTEGRATING ORAL EVERY 8 HOURS PRN
Status: ACTIVE | OUTPATIENT
Start: 2024-12-31

## 2024-12-31 RX ORDER — IBUPROFEN 200 MG
1 TABLET ORAL DAILY
Status: DISPENSED | OUTPATIENT
Start: 2024-12-31

## 2024-12-31 RX ORDER — POLYETHYLENE GLYCOL 3350 17 G/17G
17 POWDER, FOR SOLUTION ORAL DAILY
Status: DISPENSED | OUTPATIENT
Start: 2024-12-31

## 2024-12-31 RX ORDER — INSULIN LISPRO 100 [IU]/ML
0-15 INJECTION, SOLUTION INTRAVENOUS; SUBCUTANEOUS
Status: DISPENSED | OUTPATIENT
Start: 2024-12-31

## 2024-12-31 RX ORDER — SACUBITRIL AND VALSARTAN 97; 103 MG/1; MG/1
1 TABLET, FILM COATED ORAL 2 TIMES DAILY
Status: ACTIVE | OUTPATIENT
Start: 2024-12-31

## 2024-12-31 RX ORDER — ACETAMINOPHEN 160 MG/5ML
650 SOLUTION ORAL EVERY 4 HOURS PRN
Status: DISCONTINUED | OUTPATIENT
Start: 2024-12-31 | End: 2024-12-31

## 2024-12-31 RX ORDER — ACETAMINOPHEN 650 MG/1
650 SUPPOSITORY RECTAL EVERY 4 HOURS PRN
Status: DISCONTINUED | OUTPATIENT
Start: 2024-12-31 | End: 2024-12-31

## 2024-12-31 RX ORDER — FUROSEMIDE 40 MG/1
40 TABLET ORAL DAILY
Status: ACTIVE | OUTPATIENT
Start: 2024-12-31

## 2024-12-31 RX ORDER — DEXTROSE 50 % IN WATER (D50W) INTRAVENOUS SYRINGE
25
Status: ACTIVE | OUTPATIENT
Start: 2024-12-31

## 2024-12-31 RX ORDER — LIDOCAINE HYDROCHLORIDE 20 MG/ML
1 JELLY TOPICAL ONCE
Status: COMPLETED | OUTPATIENT
Start: 2024-12-31 | End: 2024-12-31

## 2024-12-31 RX ORDER — NAPROXEN SODIUM 220 MG/1
81 TABLET, FILM COATED ORAL DAILY
Status: DISPENSED | OUTPATIENT
Start: 2024-12-31

## 2024-12-31 RX ORDER — INSULIN GLARGINE 100 [IU]/ML
20 INJECTION, SOLUTION SUBCUTANEOUS EVERY 24 HOURS
Status: DISCONTINUED | OUTPATIENT
Start: 2024-12-31 | End: 2024-12-31

## 2024-12-31 RX ORDER — RANOLAZINE 500 MG/1
500 TABLET, EXTENDED RELEASE ORAL 2 TIMES DAILY
Status: DISPENSED | OUTPATIENT
Start: 2024-12-31

## 2024-12-31 RX ORDER — ONDANSETRON HYDROCHLORIDE 2 MG/ML
4 INJECTION, SOLUTION INTRAVENOUS EVERY 8 HOURS PRN
Status: DISPENSED | OUTPATIENT
Start: 2024-12-31

## 2024-12-31 RX ORDER — EZETIMIBE 10 MG/1
10 TABLET ORAL DAILY
Status: DISPENSED | OUTPATIENT
Start: 2024-12-31

## 2024-12-31 RX ORDER — ATORVASTATIN CALCIUM 40 MG/1
80 TABLET, FILM COATED ORAL NIGHTLY
Status: DISPENSED | OUTPATIENT
Start: 2024-12-31

## 2024-12-31 RX ORDER — HYDRALAZINE HYDROCHLORIDE 50 MG/1
50 TABLET, FILM COATED ORAL 3 TIMES DAILY
Status: DISPENSED | OUTPATIENT
Start: 2024-12-31

## 2024-12-31 RX ORDER — ISOSORBIDE MONONITRATE 60 MG/1
60 TABLET, EXTENDED RELEASE ORAL DAILY
Status: DISPENSED | OUTPATIENT
Start: 2024-12-31

## 2024-12-31 RX ORDER — GABAPENTIN 400 MG/1
400 CAPSULE ORAL 3 TIMES DAILY
Status: DISCONTINUED | OUTPATIENT
Start: 2024-12-31 | End: 2025-01-01

## 2024-12-31 RX ORDER — OXCARBAZEPINE 150 MG/1
450 TABLET, FILM COATED ORAL 2 TIMES DAILY
Status: DISPENSED | OUTPATIENT
Start: 2024-12-31

## 2024-12-31 RX ORDER — OXYCODONE AND ACETAMINOPHEN 5; 325 MG/1; MG/1
1 TABLET ORAL EVERY 6 HOURS PRN
Status: DISCONTINUED | OUTPATIENT
Start: 2024-12-31 | End: 2025-01-01

## 2024-12-31 RX ORDER — INSULIN GLARGINE 100 [IU]/ML
20 INJECTION, SOLUTION SUBCUTANEOUS EVERY 24 HOURS
Status: DISPENSED | OUTPATIENT
Start: 2024-12-31

## 2024-12-31 RX ORDER — DEXTROSE 50 % IN WATER (D50W) INTRAVENOUS SYRINGE
12.5
Status: ACTIVE | OUTPATIENT
Start: 2024-12-31

## 2024-12-31 RX ORDER — LIDOCAINE HYDROCHLORIDE 20 MG/ML
JELLY TOPICAL
Status: COMPLETED
Start: 2024-12-31 | End: 2024-12-31

## 2024-12-31 RX ORDER — VANCOMYCIN HYDROCHLORIDE 1 G/20ML
INJECTION, POWDER, LYOPHILIZED, FOR SOLUTION INTRAVENOUS DAILY PRN
Status: DISCONTINUED | OUTPATIENT
Start: 2024-12-31 | End: 2025-01-02

## 2024-12-31 RX ORDER — TAMSULOSIN HYDROCHLORIDE 0.4 MG/1
0.4 CAPSULE ORAL DAILY
Status: DISPENSED | OUTPATIENT
Start: 2024-12-31

## 2024-12-31 RX ORDER — VANCOMYCIN HYDROCHLORIDE 1 G/200ML
1000 INJECTION, SOLUTION INTRAVENOUS EVERY 12 HOURS
Status: DISCONTINUED | OUTPATIENT
Start: 2024-12-31 | End: 2025-01-01

## 2024-12-31 RX ORDER — ACETAMINOPHEN 325 MG/1
650 TABLET ORAL EVERY 4 HOURS PRN
Status: DISCONTINUED | OUTPATIENT
Start: 2024-12-31 | End: 2024-12-31

## 2024-12-31 RX ORDER — CARVEDILOL 6.25 MG/1
6.25 TABLET ORAL EVERY 12 HOURS
Status: DISCONTINUED | OUTPATIENT
Start: 2024-12-31 | End: 2025-01-01

## 2024-12-31 RX ORDER — INSULIN LISPRO 100 [IU]/ML
5 INJECTION, SOLUTION INTRAVENOUS; SUBCUTANEOUS
Status: ACTIVE | OUTPATIENT
Start: 2024-12-31

## 2024-12-31 RX ORDER — DAPAGLIFLOZIN 5 MG/1
10 TABLET, FILM COATED ORAL
Status: DISPENSED | OUTPATIENT
Start: 2024-12-31

## 2024-12-31 RX ORDER — TIZANIDINE 4 MG/1
2 TABLET ORAL DAILY PRN
Status: DISCONTINUED | OUTPATIENT
Start: 2024-12-31 | End: 2025-01-01

## 2024-12-31 RX ORDER — OXYCODONE HYDROCHLORIDE 5 MG/1
5 TABLET ORAL EVERY 4 HOURS PRN
Status: DISCONTINUED | OUTPATIENT
Start: 2024-12-31 | End: 2024-12-31

## 2024-12-31 RX ADMIN — VANCOMYCIN HYDROCHLORIDE 1000 MG: 1 INJECTION, SOLUTION INTRAVENOUS at 10:56

## 2024-12-31 RX ADMIN — INSULIN LISPRO 3 UNITS: 100 INJECTION, SOLUTION INTRAVENOUS; SUBCUTANEOUS at 16:49

## 2024-12-31 RX ADMIN — OXCARBAZEPINE 450 MG: 150 TABLET, FILM COATED ORAL at 01:50

## 2024-12-31 RX ADMIN — INSULIN LISPRO 6 UNITS: 100 INJECTION, SOLUTION INTRAVENOUS; SUBCUTANEOUS at 11:59

## 2024-12-31 RX ADMIN — GABAPENTIN 400 MG: 400 CAPSULE ORAL at 20:38

## 2024-12-31 RX ADMIN — INSULIN LISPRO 12 UNITS: 100 INJECTION, SOLUTION INTRAVENOUS; SUBCUTANEOUS at 01:58

## 2024-12-31 RX ADMIN — HYDROMORPHONE HYDROCHLORIDE 1 MG: 1 INJECTION, SOLUTION INTRAMUSCULAR; INTRAVENOUS; SUBCUTANEOUS at 10:20

## 2024-12-31 RX ADMIN — HYDROMORPHONE HYDROCHLORIDE 0.4 MG: 1 INJECTION, SOLUTION INTRAMUSCULAR; INTRAVENOUS; SUBCUTANEOUS at 23:48

## 2024-12-31 RX ADMIN — ATORVASTATIN CALCIUM 80 MG: 40 TABLET, FILM COATED ORAL at 20:38

## 2024-12-31 RX ADMIN — HYDRALAZINE HYDROCHLORIDE 50 MG: 50 TABLET ORAL at 20:38

## 2024-12-31 RX ADMIN — INSULIN GLARGINE 20 UNITS: 100 INJECTION, SOLUTION SUBCUTANEOUS at 23:49

## 2024-12-31 RX ADMIN — LIDOCAINE HYDROCHLORIDE 1 APPLICATION: 20 JELLY TOPICAL at 05:32

## 2024-12-31 RX ADMIN — HYDROMORPHONE HYDROCHLORIDE 1 MG: 1 INJECTION, SOLUTION INTRAMUSCULAR; INTRAVENOUS; SUBCUTANEOUS at 15:10

## 2024-12-31 RX ADMIN — PIPERACILLIN SODIUM AND TAZOBACTAM SODIUM 3.38 G: 3; .375 INJECTION, SOLUTION INTRAVENOUS at 23:49

## 2024-12-31 RX ADMIN — VANCOMYCIN HYDROCHLORIDE 1000 MG: 1 INJECTION, SOLUTION INTRAVENOUS at 23:49

## 2024-12-31 RX ADMIN — NICOTINE 1 PATCH: 21 PATCH, EXTENDED RELEASE TRANSDERMAL at 10:19

## 2024-12-31 RX ADMIN — GABAPENTIN 400 MG: 400 CAPSULE ORAL at 15:56

## 2024-12-31 RX ADMIN — APIXABAN 5 MG: 5 TABLET, FILM COATED ORAL at 20:38

## 2024-12-31 RX ADMIN — HYDROMORPHONE HYDROCHLORIDE 1 MG: 1 INJECTION, SOLUTION INTRAMUSCULAR; INTRAVENOUS; SUBCUTANEOUS at 01:36

## 2024-12-31 RX ADMIN — TICAGRELOR 90 MG: 90 TABLET ORAL at 20:38

## 2024-12-31 RX ADMIN — ATORVASTATIN CALCIUM 80 MG: 40 TABLET, FILM COATED ORAL at 01:49

## 2024-12-31 RX ADMIN — PIPERACILLIN SODIUM AND TAZOBACTAM SODIUM 3.38 G: 3; .375 INJECTION, SOLUTION INTRAVENOUS at 06:39

## 2024-12-31 RX ADMIN — PIPERACILLIN SODIUM AND TAZOBACTAM SODIUM 3.38 G: 3; .375 INJECTION, SOLUTION INTRAVENOUS at 15:56

## 2024-12-31 RX ADMIN — HYDROMORPHONE HYDROCHLORIDE 1 MG: 1 INJECTION, SOLUTION INTRAMUSCULAR; INTRAVENOUS; SUBCUTANEOUS at 05:46

## 2024-12-31 RX ADMIN — RANOLAZINE 500 MG: 500 TABLET, FILM COATED, EXTENDED RELEASE ORAL at 20:38

## 2024-12-31 RX ADMIN — OXYCODONE HYDROCHLORIDE AND ACETAMINOPHEN 1 TABLET: 5; 325 TABLET ORAL at 20:37

## 2024-12-31 RX ADMIN — INSULIN LISPRO 5 UNITS: 100 INJECTION, SOLUTION INTRAVENOUS; SUBCUTANEOUS at 16:49

## 2024-12-31 RX ADMIN — INSULIN GLARGINE 20 UNITS: 100 INJECTION, SOLUTION SUBCUTANEOUS at 01:59

## 2024-12-31 SDOH — SOCIAL STABILITY: SOCIAL INSECURITY: WITHIN THE LAST YEAR, HAVE YOU BEEN AFRAID OF YOUR PARTNER OR EX-PARTNER?: NO

## 2024-12-31 SDOH — SOCIAL STABILITY: SOCIAL INSECURITY: HAVE YOU HAD THOUGHTS OF HARMING ANYONE ELSE?: NO

## 2024-12-31 SDOH — SOCIAL STABILITY: SOCIAL INSECURITY: WITHIN THE LAST YEAR, HAVE YOU BEEN HUMILIATED OR EMOTIONALLY ABUSED IN OTHER WAYS BY YOUR PARTNER OR EX-PARTNER?: NO

## 2024-12-31 SDOH — ECONOMIC STABILITY: FOOD INSECURITY: WITHIN THE PAST 12 MONTHS, THE FOOD YOU BOUGHT JUST DIDN'T LAST AND YOU DIDN'T HAVE MONEY TO GET MORE.: NEVER TRUE

## 2024-12-31 SDOH — SOCIAL STABILITY: SOCIAL INSECURITY: WERE YOU ABLE TO COMPLETE ALL THE BEHAVIORAL HEALTH SCREENINGS?: YES

## 2024-12-31 SDOH — ECONOMIC STABILITY: FOOD INSECURITY: WITHIN THE PAST 12 MONTHS, YOU WORRIED THAT YOUR FOOD WOULD RUN OUT BEFORE YOU GOT THE MONEY TO BUY MORE.: NEVER TRUE

## 2024-12-31 SDOH — ECONOMIC STABILITY: INCOME INSECURITY: IN THE PAST 12 MONTHS HAS THE ELECTRIC, GAS, OIL, OR WATER COMPANY THREATENED TO SHUT OFF SERVICES IN YOUR HOME?: NO

## 2024-12-31 ASSESSMENT — COGNITIVE AND FUNCTIONAL STATUS - GENERAL
TURNING FROM BACK TO SIDE WHILE IN FLAT BAD: A LITTLE
PATIENT BASELINE BEDBOUND: NO
DAILY ACTIVITIY SCORE: 21
MOBILITY SCORE: 18
DRESSING REGULAR LOWER BODY CLOTHING: A LITTLE
WALKING IN HOSPITAL ROOM: A LITTLE
MOBILITY SCORE: 24
STANDING UP FROM CHAIR USING ARMS: A LITTLE
CLIMB 3 TO 5 STEPS WITH RAILING: A LITTLE
MOVING FROM LYING ON BACK TO SITTING ON SIDE OF FLAT BED WITH BEDRAILS: A LITTLE
HELP NEEDED FOR BATHING: A LITTLE
MOVING TO AND FROM BED TO CHAIR: A LITTLE
TOILETING: A LITTLE
DAILY ACTIVITIY SCORE: 24

## 2024-12-31 ASSESSMENT — LIFESTYLE VARIABLES
HOW OFTEN DO YOU HAVE 6 OR MORE DRINKS ON ONE OCCASION: NEVER
HOW MANY STANDARD DRINKS CONTAINING ALCOHOL DO YOU HAVE ON A TYPICAL DAY: PATIENT DOES NOT DRINK
AUDIT-C TOTAL SCORE: 0
PRESCIPTION_ABUSE_PAST_12_MONTHS: NO
HOW OFTEN DO YOU HAVE A DRINK CONTAINING ALCOHOL: NEVER
SUBSTANCE_ABUSE_PAST_12_MONTHS: NO
SKIP TO QUESTIONS 9-10: 1
AUDIT-C TOTAL SCORE: 0

## 2024-12-31 ASSESSMENT — ACTIVITIES OF DAILY LIVING (ADL)
LACK_OF_TRANSPORTATION: NO
PATIENT'S MEMORY ADEQUATE TO SAFELY COMPLETE DAILY ACTIVITIES?: YES
HEARING - LEFT EAR: FUNCTIONAL
HEARING - RIGHT EAR: FUNCTIONAL
JUDGMENT_ADEQUATE_SAFELY_COMPLETE_DAILY_ACTIVITIES: YES
WALKS IN HOME: INDEPENDENT
BATHING: INDEPENDENT
LACK_OF_TRANSPORTATION: NO
GROOMING: INDEPENDENT
FEEDING YOURSELF: INDEPENDENT
TOILETING: INDEPENDENT
ADEQUATE_TO_COMPLETE_ADL: YES
DRESSING YOURSELF: INDEPENDENT

## 2024-12-31 ASSESSMENT — ENCOUNTER SYMPTOMS
FEVER: 0
NAUSEA: 0
CHILLS: 0
LIGHT-HEADEDNESS: 0
HEADACHES: 0
FATIGUE: 0
VOMITING: 0
WOUND: 1
CHEST TIGHTNESS: 0
DIZZINESS: 0
COLOR CHANGE: 1
COUGH: 0
PALPITATIONS: 0
SHORTNESS OF BREATH: 0
DIARRHEA: 0
APPETITE CHANGE: 0
ABDOMINAL PAIN: 0

## 2024-12-31 ASSESSMENT — PAIN DESCRIPTION - DESCRIPTORS
DESCRIPTORS: BURNING
DESCRIPTORS: ACHING
DESCRIPTORS: BURNING

## 2024-12-31 ASSESSMENT — PAIN SCALES - GENERAL
PAINLEVEL_OUTOF10: 10 - WORST POSSIBLE PAIN
PAINLEVEL_OUTOF10: 5 - MODERATE PAIN
PAINLEVEL_OUTOF10: 4
PAINLEVEL_OUTOF10: 0 - NO PAIN
PAINLEVEL_OUTOF10: 10 - WORST POSSIBLE PAIN
PAINLEVEL_OUTOF10: 0 - NO PAIN
PAINLEVEL_OUTOF10: 7
PAINLEVEL_OUTOF10: 8
PAINLEVEL_OUTOF10: 5 - MODERATE PAIN
PAINLEVEL_OUTOF10: 10 - WORST POSSIBLE PAIN

## 2024-12-31 ASSESSMENT — PAIN DESCRIPTION - LOCATION
LOCATION: LEG

## 2024-12-31 ASSESSMENT — PAIN DESCRIPTION - ORIENTATION
ORIENTATION: LEFT

## 2024-12-31 ASSESSMENT — PATIENT HEALTH QUESTIONNAIRE - PHQ9
SUM OF ALL RESPONSES TO PHQ9 QUESTIONS 1 & 2: 0
1. LITTLE INTEREST OR PLEASURE IN DOING THINGS: NOT AT ALL
2. FEELING DOWN, DEPRESSED OR HOPELESS: NOT AT ALL

## 2024-12-31 ASSESSMENT — PAIN - FUNCTIONAL ASSESSMENT
PAIN_FUNCTIONAL_ASSESSMENT: 0-10

## 2024-12-31 ASSESSMENT — PAIN DESCRIPTION - PAIN TYPE: TYPE: CHRONIC PAIN

## 2024-12-31 NOTE — CONSULTS
GENERAL SURGERY CONSULTATION NOTE    Dereck Shen   1965   [id number]     Inpatient consult to Vascular Surgery  Consult performed by: Tony Barrera DO  Consult ordered by: Ivanna Lyons MD          Reason For Consult  Left groin wound    History Of Present Illness  Dereck Shen is a 59 y.o. male with history of recent revision of fem-pop bypass graft presenting with left groin drainage. He stats his [facility] nurse noticed some redness and drainage first on Saturday. He then had worsening of the drainage and called Dr. Peace's office. He was then sent into the ED for further evaluation. In the ED his work up showed a fluid collection near the proximal stenosis of his fem-pop graft. Given his graft in close proximity he was admitted and placed on IV abx     Past Medical History  Past Medical History:   Diagnosis Date    (HFpEF) heart failure with preserved ejection fraction     Aphasia     Atherosclerotic heart disease of native coronary artery with unspecified angina pectoris 11/05/2019    Bipolar disorder, unspecified (Multi)     BPH (benign prostatic hyperplasia)     Diabetes (Multi)     DVT (deep venous thrombosis) (Multi) 02/2022    RLE    Erectile dysfunction     GERD (gastroesophageal reflux disease)     Hepatitis C     HLD (hyperlipidemia)     HTN (hypertension)     Obstructive sleep apnea (adult) (pediatric)     Opioid abuse, in remission (Multi)     PAD (peripheral artery disease) (CMS-[facility])     Polymyalgia rheumatica (Multi)     Post-traumatic stress disorder, unspecified     Stroke (Multi) 07/24/2023    multiple       Surgical History  Past Surgical History:   Procedure Laterality Date    BACK SURGERY      CORONARY ANGIOPLASTY WITH STENT PLACEMENT      CORONARY ARTERY BYPASS GRAFT      CT ANGIO AORTA AND BILATERAL ILIOFEMORAL RUN OFF INCLUDING WITHOUT CONTRAST IF PERFORMED  07/20/2020    CT ANGIO AORTA AND BILATERAL ILIOFEMORAL RUN OFF INCLUDING WITHOUT CONTRAST IF PERFORMED  01/14/2022     DENTAL SURGERY      ELBOW SURGERY      INVASIVE VASCULAR PROCEDURE Bilateral 10/04/2024    Procedure: Lower Extremity Angiogram;  Surgeon: Baljinder Wright MD;  Location: [facility];  Service: Cardiovascular;  Laterality: Bilateral;  w / anesthesia  3 hr hydration  / arrive 7:30    INVASIVE VASCULAR PROCEDURE N/A 10/21/2024    Procedure: Lower Extremity Angiogram;  Surgeon: Baljinder Wright MD;  Location: [facility];  Service: Cardiovascular;  Laterality: N/A;  w/ anesthesia    INVASIVE VASCULAR PROCEDURE N/A 10/21/2024    Procedure: Angioplasty - Lower Extremity;  Surgeon: Baljinder Wright MD;  Location: [facility];  Service: Cardiovascular;  Laterality: N/A;    KNEE SURGERY Left     MR HEAD ANGIO WO IV CONTRAST  01/04/2022    MR NECK ANGIO WO IV CONTRAST  01/04/2022    TONSILLECTOMY      TRANSLUMINAL ATHERECTOMY FEMORAL-POPLITEAL / TIBIOPERONEAL         Medications  No current facility-administered medications on file prior to encounter.     Current Outpatient Medications on File Prior to Encounter   Medication Sig Dispense Refill    apixaban (Eliquis) 5 mg tablet Take 2 tablets (10 mg) by mouth 2 times a day for 6 days, THEN 1 tablet (5 mg) 2 times a day. 84 tablet 0    aspirin 81 mg chewable tablet Chew 1 tablet (81 mg) once daily.      atorvastatin (Lipitor) 80 mg tablet Take 1 tablet (80 mg) by mouth once daily at bedtime. 90 tablet 2    carvedilol (Coreg) 6.25 mg tablet Take 1 tablet (6.25 mg) by mouth every 12 hours.      Creon 36,000-114,000- 180,000 unit capsule,delayed release(DR/EC) capsule Take 2 capsules by mouth 3 times a day as needed.      dapagliflozin propanediol (Farxiga) 10 mg Take 1 tablet (10 mg) by mouth once daily with breakfast. 90 tablet 2    ELDERBERRY FRUIT ORAL Take 1 tablet by mouth once daily as needed.      Entresto  mg tablet Take 1 tablet by mouth twice a day.      ezetimibe (Zetia) 10 mg tablet Take 1 tablet (10 mg) by mouth once daily.      flash  "glucose scanning reader misc USE TO CHECK SUGARS FOUR TIMES A DAY 1 each 0    flash glucose sensor kit (FreeStyle En 2 Sensor) kit use as directed daily and change every 14 days 2 each 11    furosemide (Lasix) 40 mg tablet Take 1 tablet (40 mg) by mouth once daily as needed.      gabapentin (Neurontin) 400 mg capsule Take 1 capsule (400 mg) by mouth 3 times a day. 90 capsule 5    GINGER ROOT EXTRACT ORAL Take 1 tablet by mouth once daily as needed.      hydrALAZINE (Apresoline) 50 mg tablet Take 1 tablet (50 mg) by mouth 3 times a day. 270 tablet 1    insulin glargine (Lantus) 100 unit/mL (3 mL) pen Inject 20 Units under the skin once daily at bedtime. 10 mL 5    insulin lispro (HumaLOG) 100 unit/mL injection Inject 10 Units under the skin 3 times a day with meals. Plus sliding scale if BS is over 200 15 mL 0    isosorbide mononitrate ER (Imdur) 60 mg 24 hr tablet Take 1 tablet (60 mg) by mouth once daily. 90 tablet 1    lancets misc 1 each 4 times a day before meals. 200 each 11    medical cannabis Not [facility] prescribed      nicotine (Nicoderm CQ) 21 mg/24 hr patch APPLY 1 PATCH TO SKIN EVERY 24 HOURS AS DIRECTED 28 patch 0    OXcarbazepine (Trileptal) 150 mg tablet Take 3 tablets (450 mg) by mouth twice a day.      oxyCODONE (Roxicodone) 5 mg immediate release tablet Take 1 tablet (5 mg) by mouth every 4 hours if needed for severe pain (7 - 10) for up to 5 days. 15 tablet 0    pantoprazole (ProtoNix) 40 mg EC tablet TAKE 1 TABLET BY MOUTH ONCE DAILY 90 tablet 3    pen needle, diabetic (BD Ultra-Fine Jocy Pen Needle) 32 gauge x 5/32\" needle Pen Needles for Insulin. 100 each 2    pen needle, diabetic (BD Ultra-Fine Jocy Pen Needle) 32 gauge x 5/32\" needle Use to inject insulin up to 8 times per day. 400 each 0    pen needle, diabetic (TechLITE Pen Needle) 32 gauge x 1/4\" needle Use to inject 1-4 times daily as directed 100 each 11    ranolazine (Ranexa) 500 mg 12 hr tablet Take 1 tablet (500 mg) by mouth twice a day. " "     tamsulosin (Flomax) 0.4 mg 24 hr capsule Take 1 capsule (0.4 mg) by mouth once daily as needed.      tamsulosin (Flomax) 0.4 mg 24 hr capsule TAKE 1 CAPSULE BY MOUTH ONCE DAILY 90 capsule 1    ticagrelor (Brilinta) 90 mg tablet Take 1 tablet (90 mg) by mouth 2 times a day. 180 tablet 0    tiZANidine (Zanaflex) 2 mg tablet Take 1 tablet (2 mg) by mouth once daily as needed for muscle spasms. 30 tablet 0    [DISCONTINUED] oxyCODONE (Roxicodone) 5 mg immediate release tablet Take 2 tablets (10 mg) by mouth every 8 hours if needed for severe pain (7 - 10) for up to 20 doses. 15 tablet 0    [DISCONTINUED] tamsulosin (Flomax) 0.4 mg 24 hr capsule TAKE 1 CAPSULE BY MOUTH ONCE DAILY 90 capsule 1       Allergies  Celecoxib; Ibuprofen; Tetanus toxoid, adsorbed; [id number] antimigraine agents; Cephalexin; Hydrocodone; Latex; and Tryptophan     Social History  He reports that he has been smoking cigarettes. He has been exposed to tobacco smoke. He has never used smokeless tobacco. He reports current drug use. Drug: Marijuana. He reports that he does not drink alcohol.    Family History  Family History   Problem Relation Name Age of Onset    No Known Problems Mother      No Known Problems Father          Review of Systems   Constitutional:  Negative for appetite change, chills, fatigue and fever.   Respiratory:  Negative for cough, chest tightness and shortness of breath.    Cardiovascular:  Negative for chest pain and palpitations.   Gastrointestinal:  Negative for abdominal pain, diarrhea, nausea and vomiting.   Skin:  Positive for color change and wound.   Neurological:  Negative for dizziness, light-headedness and headaches.       Last Recorded Vitals  Blood pressure 107/59, pulse 87, temperature 36.7 °C (98.1 °F), temperature source Temporal, resp. rate 17, height 1.803 m (5' 11\"), weight 85.3 kg (188 lb), SpO2 100%.     Physical Exam  Vitals reviewed.   Constitutional:       General: He is not in acute " distress.  HENT:      Head: Normocephalic and atraumatic.      Mouth/Throat:      Mouth: Mucous membranes are moist.      Pharynx: Oropharynx is clear.   Eyes:      General: No scleral icterus.     Conjunctiva/sclera: Conjunctivae normal.      Pupils: Pupils are equal, round, and reactive to light.   Cardiovascular:      Rate and Rhythm: Normal rate.      Pulses: Normal pulses.   Pulmonary:      Effort: Pulmonary effort is normal. No respiratory distress.   Chest:      Chest wall: No tenderness.   Abdominal:      General: Abdomen is flat. There is no distension.      Palpations: Abdomen is soft.      Tenderness: There is no abdominal tenderness.   Musculoskeletal:         General: No swelling. Normal range of motion.   Skin:     General: Skin is warm and dry.      Capillary Refill: Capillary refill takes 2 to 3 seconds.      Comments: Left groin incision site with turbid and bloody drainage coming from the superior portion, fasciotomy sites healing well with granulation tissue, distal anastomosis site c/d/I with staples and no fluctuance or drainage noted.   Neurological:      Mental Status: He is alert.          Relevant Results:  Labs:  Results for orders placed or performed during the hospital encounter of 12/30/24 (from the past 24 hours)   CBC and Auto Differential   Result Value Ref Range    WBC 5.7 4.4 - 11.3 x10*3/uL    nRBC 0.0 0.0 - 0.0 /100 WBCs    RBC 2.72 (L) 4.50 - 5.90 x10*6/uL    Hemoglobin 8.5 (L) 13.5 - 17.5 g/dL    Hematocrit 25.9 (L) 41.0 - 52.0 %    MCV 95 80 - 100 fL    MCH 31.3 26.0 - 34.0 pg    MCHC 32.8 32.0 - 36.0 g/dL    RDW 13.4 11.5 - 14.5 %    Platelets 429 150 - 450 x10*3/uL    Neutrophils % 61.4 40.0 - 80.0 %    Immature Granulocytes %, Automated 0.5 0.0 - 0.9 %    Lymphocytes % 25.9 13.0 - 44.0 %    Monocytes % 9.4 2.0 - 10.0 %    Eosinophils % 2.3 0.0 - 6.0 %    Basophils % 0.5 0.0 - 2.0 %    Neutrophils Absolute 3.51 1.20 - 7.70 x10*3/uL    Immature Granulocytes Absolute, Automated  0.03 0.00 - 0.70 x10*3/uL    Lymphocytes Absolute 1.48 1.20 - 4.80 x10*3/uL    Monocytes Absolute 0.54 0.10 - 1.00 x10*3/uL    Eosinophils Absolute 0.13 0.00 - 0.70 x10*3/uL    Basophils Absolute 0.03 0.00 - 0.10 x10*3/uL   Comprehensive metabolic panel   Result Value Ref Range    Glucose 127 (H) 74 - 99 mg/dL    Sodium 134 (L) 136 - 145 mmol/L    Potassium 5.2 3.5 - 5.3 mmol/L    Chloride 105 98 - 107 mmol/L    Bicarbonate 23 21 - 32 mmol/L    Anion Gap 11 10 - 20 mmol/L    Urea Nitrogen 35 (H) 6 - 23 mg/dL    Creatinine 1.37 (H) 0.50 - 1.30 mg/dL    eGFR 60 (L) >60 mL/min/1.73m*2    Calcium 8.2 (L) 8.6 - 10.3 mg/dL    Albumin 3.2 (L) 3.4 - 5.0 g/dL    Alkaline Phosphatase 79 33 - 120 U/L    Total Protein 6.8 6.4 - 8.2 g/dL    AST 10 9 - 39 U/L    Bilirubin, Total 0.3 0.0 - 1.2 mg/dL    ALT 8 (L) 10 - 52 U/L   C-Reactive Protein   Result Value Ref Range    C-Reactive Protein 7.35 (H) <1.00 mg/dL   Sedimentation Rate   Result Value Ref Range    Sedimentation Rate 62 (H) 0 - 20 mm/h   Lactate   Result Value Ref Range    Lactate 1.5 0.4 - 2.0 mmol/L   Blood Culture    Specimen: Peripheral Venipuncture; Blood culture   Result Value Ref Range    Blood Culture Loaded on Instrument - Culture in progress    Blood Culture    Specimen: Peripheral Venipuncture; Blood culture   Result Value Ref Range    Blood Culture Loaded on Instrument - Culture in progress    Morphology   Result Value Ref Range    RBC Morphology See Below     Polychromasia Mild     Hypochromia Mild    Tissue/Wound Culture/Smear    Specimen: Skin/Superficial Abscess; Tissue/Biopsy   Result Value Ref Range    Gram Stain (2+) Few Polymorphonuclear leukocytes (A)     Gram Stain (4+) Abundant Gram negative bacilli (A)     Gram Stain (2+) Few Gram positive cocci (A)    POCT GLUCOSE   Result Value Ref Range    POCT Glucose 324 (H) 74 - 99 mg/dL   POCT GLUCOSE   Result Value Ref Range    POCT Glucose 167 (H) 74 - 99 mg/dL   Basic metabolic panel   Result Value Ref  Range    Glucose 168 (H) 74 - 99 mg/dL    Sodium 135 (L) 136 - 145 mmol/L    Potassium 4.9 3.5 - 5.3 mmol/L    Chloride 103 98 - 107 mmol/L    Bicarbonate 24 21 - 32 mmol/L    Anion Gap 13 10 - 20 mmol/L    Urea Nitrogen 34 (H) 6 - 23 mg/dL    Creatinine 1.51 (H) 0.50 - 1.30 mg/dL    eGFR 53 (L) >60 mL/min/1.73m*2    Calcium 8.3 (L) 8.6 - 10.3 mg/dL   CBC   Result Value Ref Range    WBC 5.4 4.4 - 11.3 x10*3/uL    nRBC 0.0 0.0 - 0.0 /100 WBCs    RBC 2.92 (L) 4.50 - 5.90 x10*6/uL    Hemoglobin 8.9 (L) 13.5 - 17.5 g/dL    Hematocrit 27.1 (L) 41.0 - 52.0 %    MCV 93 80 - 100 fL    MCH 30.5 26.0 - 34.0 pg    MCHC 32.8 32.0 - 36.0 g/dL    RDW 13.3 11.5 - 14.5 %    Platelets 469 (H) 150 - 450 x10*3/uL   Urinalysis with Reflex Culture and Microscopic   Result Value Ref Range    Color, Urine Yellow Light-Yellow, Yellow, Dark-Yellow    Appearance, Urine Clear Clear    Specific Gravity, Urine 1.031 1.005 - 1.035    pH, Urine 5.0 5.0, 5.5, 6.0, 6.5, 7.0, 7.5, 8.0    Protein, Urine 30 (1+) (A) NEGATIVE, 10 (TRACE), 20 (TRACE) mg/dL    Glucose, Urine OVER (4+) (A) Normal mg/dL    Blood, Urine NEGATIVE NEGATIVE    Ketones, Urine NEGATIVE NEGATIVE mg/dL    Bilirubin, Urine NEGATIVE NEGATIVE    Urobilinogen, Urine Normal Normal mg/dL    Nitrite, Urine NEGATIVE NEGATIVE    Leukocyte Esterase, Urine NEGATIVE NEGATIVE   Extra Urine Gray Tube   Result Value Ref Range    Extra Tube Hold for add-ons.    Urinalysis Microscopic   Result Value Ref Range    WBC, Urine NONE 1-5, NONE /HPF    RBC, Urine NONE NONE, 1-2, 3-5 /HPF    Hyaline Casts, Urine OCCASIONAL (A) NONE /LPF   POCT GLUCOSE   Result Value Ref Range    POCT Glucose 131 (H) 74 - 99 mg/dL   POCT GLUCOSE   Result Value Ref Range    POCT Glucose 212 (H) 74 - 99 mg/dL   POCT GLUCOSE   Result Value Ref Range    POCT Glucose 198 (H) 74 - 99 mg/dL     *Note: Due to a large number of results and/or encounters for the requested time period, some results have not been displayed. A complete  set of results can be found in Results Review.       Imaging:  CT abdomen pelvis w IV contrast  Narrative: STUDY:  CT Abdomen and Pelvis with IV Contrast; 12/30/2024 at 7:20 p.m.  INDICATION:  Left femoral wound infection.  COMPARISON:  CTA AP with runoffs 12/17/2024.  ACCESSION NUMBER(S):  [id number]  ORDERING CLINICIAN:  RICK MACEDO  TECHNIQUE:  CT of the abdomen and pelvis was performed.  Contiguous axial images  were obtained at 3 mm slice thickness through the abdomen and pelvis.   Coronal and sagittal reconstructions at 3 mm slice thickness were  performed.  Omnipaque 350 75 mL was administered intravenously.    FINDINGS:  LOWER CHEST:  No cardiomegaly.  No pericardial effusion.  Small bilateral pleural  effusions     ABDOMEN:     LIVER:  No hepatomegaly.  Smooth surface contour.  Normal attenuation.     BILE DUCTS:  No intrahepatic or extrahepatic biliary ductal dilatation.     GALLBLADDER:  The gallbladder is negative.  STOMACH:  No abnormalities identified.     PANCREAS:  Negative for pancreatitis     SPLEEN:  No splenomegaly or focal splenic lesion.     ADRENAL GLANDS:  No thickening or nodules.     KIDNEYS AND URETERS:  Kidneys are normal in size and location.  No renal or ureteral  calculi.     PELVIS:     BLADDER:  Portion of the right side of bladder extends into a right inguinal  hernia measuring 3 cm diameter     REPRODUCTIVE ORGANS:  No abnormalities identified.     BOWEL:  Constipation without bowel obstruction.  Normal appendix     VESSELS:  The portal veins, superior mesenteric vein and splenic vein are  patent.  Old occluded right groin bypass graft.  Old occluded left  groin bypass graft.  There is a patent bypass graft left groin arising  from the left common femoral artery and extending distally towards the  left thigh.  Abdominal aorta is normal in caliber.      PERITONEUM/RETROPERITONEUM/LYMPH NODES:  No free fluid.  No pneumoperitoneum.  Left inguinal lymphadenopathy.  2.1 cm left  inguinal lymph node image  167 and 2 cm left inguinal lymph node image 163.     ABDOMINAL WALL:  No abnormalities identified.  SOFT TISSUES:   There is a complex fluid collection left groin measuring 2.9 x 4.6 x  7.5 cm.  The fluid collection extends from the skin surface deep  adjacent to the left common femoral artery and surrounds the proximal  portion of the cortex bypass graft.  There are multiple locules of gas  within the fluid collection as well as mild peripheral enhancement.   Proximal left thigh soft tissue swelling.     BONES:  No acute fracture or aggressive osseous lesion.  Degenerative disc  space narrowing L5/S1.  Impression: Complex fluid collection left groin 2.9 x 4.6 x 7.5 cm is suggestive  of abscess.  There are multiple locules of gas within the fluid  collection as well as mild peripheral enhancement.  The fluid  collection extends from just below the skin surface and staples deep  to the level the left common femoral artery and proximal portion of  the [place]-Casey bypass graft.  Reactive left groin lymphadenopathy  adjacent to the abscess as well as proximal left thigh soft tissue  edema.  Critical findings discussed with Dr. Knutson at 9:00 PM  12/30/2024.  Signed by Geovany Joel MD      Assessment and Plan  Assessment & Plan  Postoperative abscess    BPH (benign prostatic hyperplasia)    Chronic pain syndrome    Nicotine dependence    S/P CABG x 5    Type 2 diabetes mellitus    Long-term insulin use (Multi)    59 y.o. male with post operative left groin fluid collection overlying proximal anastomosis site of fem po graft  - Continue broad spectrum abx  - Wound opened drained and packed with betadine gauze at bedside, will change dressing tomorrow AM  - Cultures sent  - OK for brilinta and asa and eliqus  - OK for diet, no urgent surgical intervention at this time  - medical management per primary team  - Will monitor the wound      Discussed with attending Dr. Kobi Barrera, DO -  PGY3  General Surgery

## 2024-12-31 NOTE — TELEPHONE ENCOUNTER
Patient is in the ER. He went there last night and is currently on IV ATB. He was talking about surgery? He wanted to know if Dr. Peace was coming in today to do his surgery because if he wasn't, he wants to go home and come back in Thursday for it. He stated he will not stay if he's not seeing Dr. Peace today

## 2024-12-31 NOTE — PROGRESS NOTES
Vancomycin Dosing by Pharmacy- INITIAL    Dereck Shen is a 59 y.o. year old male who Pharmacy has been consulted for vancomycin dosing for Abdominal Infection/Abscess. Based on the patient's indication and renal status this patient will be dosed based on a goal AUC of 400-600.     Renal function is currently stable.    Visit Vitals  /62   Pulse 75   Temp 36.1 °C (97 °F) (Tympanic)   Resp 18        Lab Results   Component Value Date    CREATININE 1.37 (H) 2024    CREATININE 1.07 2024    CREATININE 1.25 2024    CREATININE 1.63 (H) 2024        Patient weight is as follows:   Vitals:    24 1603   Weight: 85.3 kg (188 lb)       Cultures:  No results found for the encounter in last 14 days.        No intake/output data recorded.  I/O during current shift:  No intake/output data recorded.    Temp (24hrs), Av.1 °C (97 °F), Min:36.1 °C (97 °F), Max:36.1 °C (97 °F)         Assessment/Plan     Patient has already been given a loading dose of 1500 mg.  Will initiate vancomycin maintenance,  1000 mg every 12 hours.    This dosing regimen is predicted by [employer] to result in the following pharmacokinetic parameters:  Regimen: 1000 mg IV every 12 hours.  Start time: 11:00 on 2024  Exposure target: AUC24 (range)400-600 mg/L.hr   [id number]: 483 mg/L.hr  AUC24,ss: 571 mg/L.hr  Probability of AUC24 > 400: 94 %  Ctrough,ss: 17.4 mg/L  Probability of Ctrough,ss > 20: 31 %  Follow-up level will be ordered on 25 at AM Labs unless clinically indicated sooner.  Will continue to monitor renal function daily while on vancomycin and order serum creatinine at least every 48 hours if not already ordered.  Follow for continued vancomycin needs, clinical response, and signs/symptoms of toxicity.       Asuncion Chester, PharmD

## 2024-12-31 NOTE — PROGRESS NOTES
Dereck Shen is a 59 y.o. male admitted for Postoperative abscess. Pharmacy reviewed the patient's [id number] medications and allergies for accuracy.    The list below reflects the PTA list prior to pharmacy medication history. A summary a changes to the PTA medication list has been listed below. Please review each medication in order reconciliation for additional clarification and justification.    Source of information:  Discharge paperwork 12/24/24  No Changes!  Medications added:    Medications modified:    Medications to be removed:    Medications of concern:      Prior to Admission Medications   Prescriptions Last Dose Informant Patient Reported? Taking?   Creon 36,000-114,000- 180,000 unit capsule,delayed release(DR/EC) capsule   Yes No   Sig: Take 2 capsules by mouth 3 times a day as needed.   ELDERBERRY FRUIT ORAL   Yes No   Sig: Take 1 tablet by mouth once daily as needed.   Entresto  mg tablet   Yes No   Sig: Take 1 tablet by mouth twice a day.   GINGER ROOT EXTRACT ORAL   Yes No   Sig: Take 1 tablet by mouth once daily as needed.   OXcarbazepine (Trileptal) 150 mg tablet   Yes No   Sig: Take 3 tablets (450 mg) by mouth twice a day.   apixaban (Eliquis) 5 mg tablet   No No   Sig: Take 2 tablets (10 mg) by mouth 2 times a day for 6 days, THEN 1 tablet (5 mg) 2 times a day.   aspirin 81 mg chewable tablet   Yes No   Sig: Chew 1 tablet (81 mg) once daily.   atorvastatin (Lipitor) 80 mg tablet   No No   Sig: Take 1 tablet (80 mg) by mouth once daily at bedtime.   carvedilol (Coreg) 6.25 mg tablet   Yes No   Sig: Take 1 tablet (6.25 mg) by mouth every 12 hours.   dapagliflozin propanediol (Farxiga) 10 mg   No No   Sig: Take 1 tablet (10 mg) by mouth once daily with breakfast.   ezetimibe (Zetia) 10 mg tablet   Yes No   Sig: Take 1 tablet (10 mg) by mouth once daily.   flash glucose scanning reader misc   No No   Sig: USE TO CHECK SUGARS FOUR TIMES A DAY   flash glucose sensor kit (FreeStyle  "En 2 Sensor) kit   No No   Sig: use as directed daily and change every 14 days   furosemide (Lasix) 40 mg tablet   Yes No   Sig: Take 1 tablet (40 mg) by mouth once daily as needed.   gabapentin (Neurontin) 400 mg capsule   No No   Sig: Take 1 capsule (400 mg) by mouth 3 times a day.   hydrALAZINE (Apresoline) 50 mg tablet   No No   Sig: Take 1 tablet (50 mg) by mouth 3 times a day.   insulin glargine (Lantus) 100 unit/mL (3 mL) pen   No No   Sig: Inject 20 Units under the skin once daily at bedtime.   insulin lispro (HumaLOG) 100 unit/mL injection   No No   Sig: Inject 10 Units under the skin 3 times a day with meals. Plus sliding scale if BS is over 200   isosorbide mononitrate ER (Imdur) 60 mg 24 hr tablet   No No   Sig: Take 1 tablet (60 mg) by mouth once daily.   lancets misc   No No   Si each 4 times a day before meals.   medical cannabis   Yes No   Sig: Not [facility] prescribed   nicotine (Nicoderm CQ) 21 mg/24 hr patch   No No   Sig: APPLY 1 PATCH TO SKIN EVERY 24 HOURS AS DIRECTED   oxyCODONE (Roxicodone) 5 mg immediate release tablet   No No   Sig: Take 1 tablet (5 mg) by mouth every 4 hours if needed for severe pain (7 - 10) for up to 5 days.   pantoprazole (ProtoNix) 40 mg EC tablet   No No   Sig: TAKE 1 TABLET BY MOUTH ONCE DAILY   pen needle, diabetic (BD Ultra-Fine Jocy Pen Needle) 32 gauge x 5/32\" needle   No No   Sig: Pen Needles for Insulin.   pen needle, diabetic (BD Ultra-Fine Jocy Pen Needle) 32 gauge x 5/32\" needle   No No   Sig: Use to inject insulin up to 8 times per day.   pen needle, diabetic (TechLITE Pen Needle) 32 gauge x 1/4\" needle   No No   Sig: Use to inject 1-4 times daily as directed   ranolazine (Ranexa) 500 mg 12 hr tablet   Yes No   Sig: Take 1 tablet (500 mg) by mouth twice a day.   tamsulosin (Flomax) 0.4 mg 24 hr capsule   Yes No   Sig: Take 1 capsule (0.4 mg) by mouth once daily as needed.   tamsulosin (Flomax) 0.4 mg 24 hr capsule   No No   Sig: TAKE 1 CAPSULE BY MOUTH ONCE " DAILY   tiZANidine (Zanaflex) 2 mg tablet   No No   Sig: Take 1 tablet (2 mg) by mouth once daily as needed for muscle spasms.   ticagrelor (Brilinta) 90 mg tablet   No No   Sig: Take 1 tablet (90 mg) by mouth 2 times a day.      Facility-Administered Medications: None       Ale oMtta

## 2024-12-31 NOTE — CARE PLAN
The patient's goals for the shift include        Problem: Pain - Adult  Goal: Verbalizes/displays adequate comfort level or baseline comfort level  Outcome: Progressing     Problem: Safety - Adult  Goal: Free from fall injury  Outcome: Progressing     Problem: Discharge Planning  Goal: Discharge to home or other facility with appropriate resources  Outcome: Progressing     Problem: Chronic Conditions and Co-morbidities  Goal: Patient's chronic conditions and co-morbidity symptoms are monitored and maintained or improved  Outcome: Progressing     Problem: Skin  Goal: Decreased wound size/increased tissue granulation at next dressing change  Outcome: Progressing  Flowsheets (Taken 12/31/2024 1518)  Decreased wound size/increased tissue granulation at next dressing change:   Promote sleep for wound healing   Protective dressings over bony prominences  Goal: Participates in plan/prevention/treatment measures  Outcome: Progressing  Flowsheets (Taken 12/31/2024 1518)  Participates in plan/prevention/treatment measures:   Discuss with provider PT/OT consult   Elevate heels  Goal: Prevent/manage excess moisture  Outcome: Progressing  Flowsheets (Taken 12/31/2024 1518)  Prevent/manage excess moisture:   Follow provider orders for dressing changes   Monitor for/manage infection if present  Goal: Prevent/minimize sheer/friction injuries  Outcome: Progressing  Flowsheets (Taken 12/31/2024 1518)  Prevent/minimize sheer/friction injuries:   HOB 30 degrees or less   Increase activity/out of bed for meals   Use pull sheet   Turn/reposition every 2 hours/use positioning/transfer devices  Goal: Promote/optimize nutrition  Outcome: Progressing  Flowsheets (Taken 12/31/2024 1518)  Promote/optimize nutrition:   Consume > 50% meals/supplements   Monitor/record intake including meals   Offer water/supplements/favorite foods  Goal: Promote skin healing  Outcome: Progressing  Flowsheets (Taken 12/31/2024 1518)  Promote skin healing:    Assess skin/pad under line(s)/device(s)   Protective dressings over bony prominences   Turn/reposition every 2 hours/use positioning/transfer devices   Ensure correct size (line/device) and apply per  instructions   Rotate device position/do not position patient on device     Problem: Fall/Injury  Goal: Not fall by end of shift  Outcome: Progressing  Goal: Be free from injury by end of the shift  Outcome: Progressing  Goal: Verbalize understanding of personal risk factors for fall in the hospital  Outcome: Progressing  Goal: Verbalize understanding of risk factor reduction measures to prevent injury from fall in the home  Outcome: Progressing  Goal: Use assistive devices by end of the shift  Outcome: Progressing  Goal: Pace activities to prevent fatigue by end of the shift  Outcome: Progressing     Problem: Pain  Goal: Takes deep breaths with improved pain control throughout the shift  Outcome: Progressing  Goal: Turns in bed with improved pain control throughout the shift  Outcome: Progressing  Goal: Walks with improved pain control throughout the shift  Outcome: Progressing  Goal: Performs ADL's with improved pain control throughout shift  Outcome: Progressing  Goal: Participates in PT with improved pain control throughout the shift  Outcome: Progressing  Goal: Free from opioid side effects throughout the shift  Outcome: Progressing  Goal: Free from acute confusion related to pain meds throughout the shift  Outcome: Progressing     Problem: Respiratory  Goal: Clear secretions with interventions this shift  Outcome: Progressing  Goal: Minimize anxiety/maximize coping throughout shift  Outcome: Progressing  Goal: Minimal/no exertional discomfort or dyspnea this shift  Outcome: Progressing  Goal: No signs of respiratory distress (eg. Use of accessory muscles. Peds grunting)  Outcome: Progressing  Goal: Patent airway maintained this shift  Outcome: Progressing     Problem: Nutrition  Goal: Adequate PO  fluid intake  Outcome: Progressing  Goal: Nutrition support is meeting 75% of nutrient needs  Outcome: Progressing  Goal: Lab values WNL  Outcome: Progressing  Goal: Electrolytes WNL  Outcome: Progressing  Goal: Promote healing  Outcome: Progressing     Problem: Diabetes  Goal: Achieve decreasing blood glucose levels by end of shift  Outcome: Progressing  Goal: Increase stability of blood glucose readings by end of shift  Outcome: Progressing  Goal: Decrease in ketones present in urine by end of shift  Outcome: Progressing  Goal: Maintain electrolyte levels within acceptable range throughout shift  Outcome: Progressing  Goal: Maintain glucose levels >70mg/dl to <250mg/dl throughout shift  Outcome: Progressing  Goal: No changes in neurological exam by end of shift  Outcome: Progressing  Goal: Learn about and adhere to nutrition recommendations by end of shift  Outcome: Progressing  Goal: Vital signs within normal range for age by end of shift  Outcome: Progressing  Goal: Increase self care and/or family involovement by end of shift  Outcome: Progressing  Goal: Receive DSME education by end of shift  Outcome: Progressing

## 2024-12-31 NOTE — H&P
History Of Present Illness  Dereck Shen is a 59 y.o. male with PMHx of CAD s/p CABG, severe lower extremity PAD with prior revascularization (R fem-pop bypass 2/24/20; L fem-pop bypass 9/14/20; L fem PTA of the graft; R iliac PTA 2/2022; LLE and DANYA PTA 4/2024; angioplasty of the left SFA 10/21/24; and left femoral and tibial thrombectomy with patch angioplasty 11/19/24. He was recently in the hospital in December for critical limb ischemia of left side and had s/p left fem-below knee popliteal bypass with vein patch left distal anastomosis 12/24. He presented today with L groin pain. According to the patient, he was in usual state till Saturday when he started having left groin pain at the surgical site. Home nurse noticed more discharge in last 12 hours and asked him to evaluate with surgeon. Pain comes and goes. Does not radiate. No fever or chills. He therefore came to ER for further evaluation.    He quite smoking after 48 years and uses a nicotine patch. Denied alcohol use. He is compliant with all of his blood thinners including aspirin, Brillinta and apixaban.     ED workup concerning for left groin abscess on the ct scan. Ed contact vascular who recommended medicine admission with vascular consult. Patient was started on broad spectrum abx. Patient will be admitted under hospital medicine for further management.      Past Medical History  He has a past medical history of (HFpEF) heart failure with preserved ejection fraction, Aphasia, Atherosclerotic heart disease of native coronary artery with unspecified angina pectoris (11/05/2019), Bipolar disorder, unspecified (Multi), BPH (benign prostatic hyperplasia), Diabetes (Multi), DVT (deep venous thrombosis) (Multi) (02/2022), Erectile dysfunction, GERD (gastroesophageal reflux disease), Hepatitis C, HLD (hyperlipidemia), HTN (hypertension), Obstructive sleep apnea (adult) (pediatric), Opioid abuse, in remission (Multi), PAD (peripheral artery disease)  (CMS-[facility]), Polymyalgia rheumatica (Multi), Post-traumatic stress disorder, unspecified, and Stroke (Multi) (07/24/2023).    Surgical History  He has a past surgical history that includes Knee surgery (Left); Elbow surgery; Back surgery; CT angio aorta and bilateral iliofemoral runoff including without contrast if performed (07/20/2020); MR angio head wo IV contrast (01/04/2022); MR angio neck wo IV contrast (01/04/2022); CT angio aorta and bilateral iliofemoral runoff including without contrast if performed (01/14/2022); Invasive Vascular Procedure (Bilateral, 10/04/2024); Dental surgery; Coronary angioplasty with stent; Coronary artery bypass graft; Tonsillectomy; Transluminal atherectomy femoral-popliteal / tibioperoneal; Invasive Vascular Procedure (N/A, 10/21/2024); and Invasive Vascular Procedure (N/A, 10/21/2024).     Social History  He reports that he has been smoking cigarettes. He has been exposed to tobacco smoke. He has never used smokeless tobacco. He reports current drug use. Drug: Marijuana. He reports that he does not drink alcohol.    Family History  Family History   Problem Relation Name Age of Onset    No Known Problems Mother      No Known Problems Father          Allergies  Celecoxib; Ibuprofen; Tetanus toxoid, adsorbed; [id number] antimigraine agents; Cephalexin; Hydrocodone; Latex; and Tryptophan    Review of Systems   Constitutional: Negative.    HENT: Negative.     Eyes: Negative.    Respiratory: Negative.     Cardiovascular: Negative.    Gastrointestinal: Negative.    Endocrine: Negative.    Genitourinary: Negative.    Musculoskeletal: Negative.    Skin: Negative.    Neurological: Negative.    Psychiatric/Behavioral: Negative.          Physical Exam  Constitutional:       Appearance: He is ill-appearing.   HENT:      Head: Normocephalic and atraumatic.      Nose: Nose normal.      Mouth/Throat:      Mouth: Mucous membranes are moist.      Pharynx: Oropharynx is clear.   Eyes:       Extraocular Movements: Extraocular movements intact.      Conjunctiva/sclera: Conjunctivae normal.      Pupils: Pupils are equal, round, and reactive to light.   Cardiovascular:      Rate and Rhythm: Normal rate and regular rhythm.      Pulses: Normal pulses.      Heart sounds: Normal heart sounds.   Abdominal:      General: Abdomen is flat. There is distension.      Tenderness: There is abdominal tenderness.      Comments: Left groin tenderness with foul smelling discharge noted on the dressing   Musculoskeletal:         General: Normal range of motion.      Right lower leg: No edema.      Left lower leg: Edema present.   Skin:     General: Skin is warm.      Capillary Refill: Capillary refill takes less than 2 seconds.   Neurological:      General: No focal deficit present.      Mental Status: He is alert and oriented to person, place, and time.   Psychiatric:         Mood and Affect: Mood normal.         Behavior: Behavior normal.         Thought Content: Thought content normal.          Last Recorded Vitals  /89   Pulse 74   Temp 36.1 °C (97 °F) (Tympanic)   Resp 18   Wt 85.3 kg (188 lb)   SpO2 98%     Relevant Results             Assessment/Plan   Assessment & Plan  Postoperative abscess    BPH (benign prostatic hyperplasia)    Chronic pain syndrome    Nicotine dependence    S/P CABG x 5    Type 2 diabetes mellitus    Long-term insulin use (Multi)      Left Groin abscess  Left groin pain  Hx of PAD s/p multiple procedures  -Hx of Multiple LLE surgical procedures  -No evidence of sepsis at this time  -Start Zosyn and Vancomycin  -ID consult  -Wound care  -Vascular consult  -NPO midnight for possible intervention  -Hold home medication apixaban (PAD dosing) for now      Hx CAD s/p CABG   Continue home Lipitor and Zetia  Continue home aspirin   Holding home Birllinta due to possible procedure, resume asap  Continue home ranexa     Elevated renal indices  Baseline appears to be ~1.0-1.3  Avoid  nephrotoxins  Consider nephrology consult if worsening   Hold home lasix and entresto in setting of SAMEERA, resume as appropriate   Continue Farxiga for now    IDDM-II with hyperglycemia  Continue Lantus and humalog TID AC  A1C of 8.2% as of October 2024   Start SSI protocol  Goal glucose <180 for better wound healing   Started home lantus dose  Hold nutr insulin dose once NPO     HTN  Continue home hydralazine      History of multiple CVA  History of R PCA stroke in 2019, L MCA stroke in 2021, bihemispheric small infarcts in 2022, and L MCA scattered embolic infarcts in 2023   Continue as above     COPD without acute exacerbation  Cigarette smoker x48 years, continues to smoke but cut back to 3 cigarettes per day   Albuterol MDI as needed for shortness of breath/wheezing      BRUNA  Noncompliant with CPAP     Tobacco use disorder   Quit last week after 48 years  Nicotine patch    DVT: Holding AC apixaban for possible procedure  Disposition: Patient needs >72 hours of admission for IV abx         Ivanna Lyons MD

## 2025-01-01 ENCOUNTER — APPOINTMENT (OUTPATIENT)
Dept: RADIOLOGY | Facility: HOSPITAL | Age: 60
End: 2025-01-01
Payer: COMMERCIAL

## 2025-01-01 LAB
ANION GAP BLDV CALCULATED.4IONS-SCNC: 5 MMOL/L (ref 10–25)
ANION GAP SERPL CALC-SCNC: 13 MMOL/L (ref 10–20)
ANION GAP SERPL CALC-SCNC: 13 MMOL/L (ref 10–20)
BASE EXCESS BLDV CALC-SCNC: -0.2 MMOL/L (ref -2–3)
BASOPHILS # BLD AUTO: 0.03 X10*3/UL (ref 0–0.1)
BASOPHILS # BLD AUTO: 0.05 X10*3/UL (ref 0–0.1)
BASOPHILS NFR BLD AUTO: 0.4 %
BASOPHILS NFR BLD AUTO: 0.7 %
BNP SERPL-MCNC: 1822 PG/ML (ref 0–99)
BODY TEMPERATURE: 37 DEGREES CELSIUS
BUN SERPL-MCNC: 34 MG/DL (ref 6–23)
BUN SERPL-MCNC: 35 MG/DL (ref 6–23)
CA-I BLDV-SCNC: 1.19 MMOL/L (ref 1.1–1.33)
CALCIUM SERPL-MCNC: 8.5 MG/DL (ref 8.6–10.3)
CALCIUM SERPL-MCNC: 8.5 MG/DL (ref 8.6–10.3)
CHLORIDE BLDV-SCNC: 109 MMOL/L (ref 98–107)
CHLORIDE SERPL-SCNC: 103 MMOL/L (ref 98–107)
CHLORIDE SERPL-SCNC: 105 MMOL/L (ref 98–107)
CO2 SERPL-SCNC: 23 MMOL/L (ref 21–32)
CO2 SERPL-SCNC: 24 MMOL/L (ref 21–32)
CREAT SERPL-MCNC: 1.3 MG/DL (ref 0.5–1.3)
CREAT SERPL-MCNC: 1.35 MG/DL (ref 0.5–1.3)
EGFRCR SERPLBLD CKD-EPI 2021: 61 ML/MIN/1.73M*2
EGFRCR SERPLBLD CKD-EPI 2021: 64 ML/MIN/1.73M*2
EOSINOPHIL # BLD AUTO: 0.02 X10*3/UL (ref 0–0.7)
EOSINOPHIL # BLD AUTO: 0.11 X10*3/UL (ref 0–0.7)
EOSINOPHIL NFR BLD AUTO: 0.3 %
EOSINOPHIL NFR BLD AUTO: 1.6 %
ERYTHROCYTE [DISTWIDTH] IN BLOOD BY AUTOMATED COUNT: 13.3 % (ref 11.5–14.5)
ERYTHROCYTE [DISTWIDTH] IN BLOOD BY AUTOMATED COUNT: 13.3 % (ref 11.5–14.5)
GLUCOSE BLD MANUAL STRIP-MCNC: 167 MG/DL (ref 74–99)
GLUCOSE BLD MANUAL STRIP-MCNC: 169 MG/DL (ref 74–99)
GLUCOSE BLD MANUAL STRIP-MCNC: 172 MG/DL (ref 74–99)
GLUCOSE BLD MANUAL STRIP-MCNC: 178 MG/DL (ref 74–99)
GLUCOSE BLD MANUAL STRIP-MCNC: 179 MG/DL (ref 74–99)
GLUCOSE BLD MANUAL STRIP-MCNC: 209 MG/DL (ref 74–99)
GLUCOSE BLD MANUAL STRIP-MCNC: 210 MG/DL (ref 74–99)
GLUCOSE BLDV-MCNC: 207 MG/DL (ref 74–99)
GLUCOSE SERPL-MCNC: 209 MG/DL (ref 74–99)
GLUCOSE SERPL-MCNC: 230 MG/DL (ref 74–99)
HCO3 BLDV-SCNC: 24.8 MMOL/L (ref 22–26)
HCT VFR BLD AUTO: 28.4 % (ref 41–52)
HCT VFR BLD AUTO: 29.4 % (ref 41–52)
HCT VFR BLD EST: 30 % (ref 41–52)
HGB BLD-MCNC: 9.3 G/DL (ref 13.5–17.5)
HGB BLD-MCNC: 9.7 G/DL (ref 13.5–17.5)
HGB BLDV-MCNC: 10 G/DL (ref 13.5–17.5)
IMM GRANULOCYTES # BLD AUTO: 0.02 X10*3/UL (ref 0–0.7)
IMM GRANULOCYTES # BLD AUTO: 0.03 X10*3/UL (ref 0–0.7)
IMM GRANULOCYTES NFR BLD AUTO: 0.3 % (ref 0–0.9)
IMM GRANULOCYTES NFR BLD AUTO: 0.4 % (ref 0–0.9)
INHALED O2 CONCENTRATION: 97 %
INR PPP: 1.3 (ref 0.9–1.1)
LACTATE BLDV-SCNC: 1.4 MMOL/L (ref 0.4–2)
LACTATE SERPL-SCNC: 1.2 MMOL/L (ref 0.4–2)
LYMPHOCYTES # BLD AUTO: 0.71 X10*3/UL (ref 1.2–4.8)
LYMPHOCYTES # BLD AUTO: 2.11 X10*3/UL (ref 1.2–4.8)
LYMPHOCYTES NFR BLD AUTO: 30 %
LYMPHOCYTES NFR BLD AUTO: 9.6 %
MAGNESIUM SERPL-MCNC: 2.71 MG/DL (ref 1.6–2.4)
MCH RBC QN AUTO: 30.4 PG (ref 26–34)
MCH RBC QN AUTO: 30.9 PG (ref 26–34)
MCHC RBC AUTO-ENTMCNC: 32.7 G/DL (ref 32–36)
MCHC RBC AUTO-ENTMCNC: 33 G/DL (ref 32–36)
MCV RBC AUTO: 93 FL (ref 80–100)
MCV RBC AUTO: 94 FL (ref 80–100)
MONOCYTES # BLD AUTO: 0.26 X10*3/UL (ref 0.1–1)
MONOCYTES # BLD AUTO: 0.53 X10*3/UL (ref 0.1–1)
MONOCYTES NFR BLD AUTO: 3.5 %
MONOCYTES NFR BLD AUTO: 7.5 %
NEUTROPHILS # BLD AUTO: 4.21 X10*3/UL (ref 1.2–7.7)
NEUTROPHILS # BLD AUTO: 6.32 X10*3/UL (ref 1.2–7.7)
NEUTROPHILS NFR BLD AUTO: 59.8 %
NEUTROPHILS NFR BLD AUTO: 85.9 %
NRBC BLD-RTO: 0 /100 WBCS (ref 0–0)
NRBC BLD-RTO: 0 /100 WBCS (ref 0–0)
OXYHGB MFR BLDV: 91.7 % (ref 45–75)
PCO2 BLDV: 41 MM HG (ref 41–51)
PH BLDV: 7.39 PH (ref 7.33–7.43)
PHOSPHATE SERPL-MCNC: 4.3 MG/DL (ref 2.5–4.9)
PLATELET # BLD AUTO: 438 X10*3/UL (ref 150–450)
PLATELET # BLD AUTO: 479 X10*3/UL (ref 150–450)
PO2 BLDV: 65 MM HG (ref 35–45)
POTASSIUM BLDV-SCNC: 5 MMOL/L (ref 3.5–5.3)
POTASSIUM SERPL-SCNC: 4.7 MMOL/L (ref 3.5–5.3)
POTASSIUM SERPL-SCNC: 5.2 MMOL/L (ref 3.5–5.3)
PROTHROMBIN TIME: 15 SECONDS (ref 9.8–12.8)
RBC # BLD AUTO: 3.06 X10*6/UL (ref 4.5–5.9)
RBC # BLD AUTO: 3.14 X10*6/UL (ref 4.5–5.9)
SAO2 % BLDV: 93 % (ref 45–75)
SODIUM BLDV-SCNC: 134 MMOL/L (ref 136–145)
SODIUM SERPL-SCNC: 135 MMOL/L (ref 136–145)
SODIUM SERPL-SCNC: 136 MMOL/L (ref 136–145)
VANCOMYCIN SERPL-MCNC: 29.1 UG/ML (ref 5–20)
WBC # BLD AUTO: 7 X10*3/UL (ref 4.4–11.3)
WBC # BLD AUTO: 7.4 X10*3/UL (ref 4.4–11.3)

## 2025-01-01 PROCEDURE — 2500000001 HC RX 250 WO HCPCS SELF ADMINISTERED DRUGS (ALT 637 FOR MEDICARE OP)

## 2025-01-01 PROCEDURE — 5A09357 ASSISTANCE WITH RESPIRATORY VENTILATION, LESS THAN 24 CONSECUTIVE HOURS, CONTINUOUS POSITIVE AIRWAY PRESSURE: ICD-10-PCS | Performed by: INTERNAL MEDICINE

## 2025-01-01 PROCEDURE — 82435 ASSAY OF BLOOD CHLORIDE: CPT

## 2025-01-01 PROCEDURE — 2500000005 HC RX 250 GENERAL PHARMACY W/O HCPCS

## 2025-01-01 PROCEDURE — 83605 ASSAY OF LACTIC ACID: CPT | Performed by: STUDENT IN AN ORGANIZED HEALTH CARE EDUCATION/TRAINING PROGRAM

## 2025-01-01 PROCEDURE — 71045 X-RAY EXAM CHEST 1 VIEW: CPT

## 2025-01-01 PROCEDURE — S4991 NICOTINE PATCH NONLEGEND: HCPCS

## 2025-01-01 PROCEDURE — 99291 CRITICAL CARE FIRST HOUR: CPT

## 2025-01-01 PROCEDURE — 2500000002 HC RX 250 W HCPCS SELF ADMINISTERED DRUGS (ALT 637 FOR MEDICARE OP, ALT 636 FOR OP/ED)

## 2025-01-01 PROCEDURE — 82435 ASSAY OF BLOOD CHLORIDE: CPT | Performed by: STUDENT IN AN ORGANIZED HEALTH CARE EDUCATION/TRAINING PROGRAM

## 2025-01-01 PROCEDURE — 85610 PROTHROMBIN TIME: CPT | Performed by: STUDENT IN AN ORGANIZED HEALTH CARE EDUCATION/TRAINING PROGRAM

## 2025-01-01 PROCEDURE — 36415 COLL VENOUS BLD VENIPUNCTURE: CPT

## 2025-01-01 PROCEDURE — 2500000004 HC RX 250 GENERAL PHARMACY W/ HCPCS (ALT 636 FOR OP/ED): Mod: JZ | Performed by: STUDENT IN AN ORGANIZED HEALTH CARE EDUCATION/TRAINING PROGRAM

## 2025-01-01 PROCEDURE — 2500000004 HC RX 250 GENERAL PHARMACY W/ HCPCS (ALT 636 FOR OP/ED): Performed by: STUDENT IN AN ORGANIZED HEALTH CARE EDUCATION/TRAINING PROGRAM

## 2025-01-01 PROCEDURE — 71045 X-RAY EXAM CHEST 1 VIEW: CPT | Performed by: RADIOLOGY

## 2025-01-01 PROCEDURE — 80202 ASSAY OF VANCOMYCIN: CPT

## 2025-01-01 PROCEDURE — 80048 BASIC METABOLIC PNL TOTAL CA: CPT | Performed by: STUDENT IN AN ORGANIZED HEALTH CARE EDUCATION/TRAINING PROGRAM

## 2025-01-01 PROCEDURE — 2500000004 HC RX 250 GENERAL PHARMACY W/ HCPCS (ALT 636 FOR OP/ED)

## 2025-01-01 PROCEDURE — 85025 COMPLETE CBC W/AUTO DIFF WBC: CPT

## 2025-01-01 PROCEDURE — 2060000001 HC INTERMEDIATE ICU ROOM DAILY

## 2025-01-01 PROCEDURE — 83735 ASSAY OF MAGNESIUM: CPT

## 2025-01-01 PROCEDURE — 84100 ASSAY OF PHOSPHORUS: CPT

## 2025-01-01 PROCEDURE — 36415 COLL VENOUS BLD VENIPUNCTURE: CPT | Performed by: STUDENT IN AN ORGANIZED HEALTH CARE EDUCATION/TRAINING PROGRAM

## 2025-01-01 PROCEDURE — 85025 COMPLETE CBC W/AUTO DIFF WBC: CPT | Performed by: STUDENT IN AN ORGANIZED HEALTH CARE EDUCATION/TRAINING PROGRAM

## 2025-01-01 PROCEDURE — 94660 CPAP INITIATION&MGMT: CPT

## 2025-01-01 PROCEDURE — 82947 ASSAY GLUCOSE BLOOD QUANT: CPT

## 2025-01-01 PROCEDURE — 83880 ASSAY OF NATRIURETIC PEPTIDE: CPT

## 2025-01-01 PROCEDURE — 2500000001 HC RX 250 WO HCPCS SELF ADMINISTERED DRUGS (ALT 637 FOR MEDICARE OP): Performed by: INTERNAL MEDICINE

## 2025-01-01 PROCEDURE — 84295 ASSAY OF SERUM SODIUM: CPT

## 2025-01-01 PROCEDURE — 94640 AIRWAY INHALATION TREATMENT: CPT

## 2025-01-01 PROCEDURE — 99233 SBSQ HOSP IP/OBS HIGH 50: CPT | Performed by: INTERNAL MEDICINE

## 2025-01-01 RX ORDER — FUROSEMIDE 10 MG/ML
20 INJECTION INTRAMUSCULAR; INTRAVENOUS ONCE
Status: COMPLETED | OUTPATIENT
Start: 2025-01-01 | End: 2025-01-01

## 2025-01-01 RX ORDER — GABAPENTIN 300 MG/1
600 CAPSULE ORAL 3 TIMES DAILY
Status: DISPENSED | OUTPATIENT
Start: 2025-01-01

## 2025-01-01 RX ORDER — ACETAMINOPHEN 10 MG/ML
1000 INJECTION, SOLUTION INTRAVENOUS ONCE
Status: COMPLETED | OUTPATIENT
Start: 2025-01-01 | End: 2025-01-01

## 2025-01-01 RX ORDER — IPRATROPIUM BROMIDE AND ALBUTEROL SULFATE 2.5; .5 MG/3ML; MG/3ML
3 SOLUTION RESPIRATORY (INHALATION) EVERY 2 HOUR PRN
Status: ACTIVE | OUTPATIENT
Start: 2025-01-01

## 2025-01-01 RX ORDER — IPRATROPIUM BROMIDE AND ALBUTEROL SULFATE 2.5; .5 MG/3ML; MG/3ML
3 SOLUTION RESPIRATORY (INHALATION)
Status: ACTIVE | OUTPATIENT
Start: 2025-01-01

## 2025-01-01 RX ORDER — FUROSEMIDE 10 MG/ML
20 INJECTION INTRAMUSCULAR; INTRAVENOUS 2 TIMES DAILY
Status: DISCONTINUED | OUTPATIENT
Start: 2025-01-01 | End: 2025-01-04

## 2025-01-01 RX ORDER — OXYCODONE HYDROCHLORIDE 5 MG/1
5 TABLET ORAL EVERY 6 HOURS PRN
Status: DISPENSED | OUTPATIENT
Start: 2025-01-01

## 2025-01-01 RX ORDER — CEFEPIME 1 G/50ML
2 INJECTION, SOLUTION INTRAVENOUS EVERY 8 HOURS
Status: DISCONTINUED | OUTPATIENT
Start: 2025-01-01 | End: 2025-01-03

## 2025-01-01 RX ORDER — ACETAMINOPHEN 325 MG/1
975 TABLET ORAL 3 TIMES DAILY
Status: DISPENSED | OUTPATIENT
Start: 2025-01-01

## 2025-01-01 RX ORDER — CARVEDILOL 6.25 MG/1
6.25 TABLET ORAL EVERY 12 HOURS
Status: DISPENSED | OUTPATIENT
Start: 2025-01-01

## 2025-01-01 RX ORDER — VANCOMYCIN HYDROCHLORIDE 750 MG/150ML
750 INJECTION, SOLUTION INTRAVENOUS EVERY 12 HOURS
Status: DISCONTINUED | OUTPATIENT
Start: 2025-01-01 | End: 2025-01-02 | Stop reason: DRUGHIGH

## 2025-01-01 RX ORDER — OXYCODONE AND ACETAMINOPHEN 5; 325 MG/1; MG/1
1 TABLET ORAL EVERY 8 HOURS PRN
Status: DISCONTINUED | OUTPATIENT
Start: 2025-01-01 | End: 2025-01-01

## 2025-01-01 RX ADMIN — VANCOMYCIN HYDROCHLORIDE 750 MG: 750 INJECTION, SOLUTION INTRAVENOUS at 09:03

## 2025-01-01 RX ADMIN — IPRATROPIUM BROMIDE AND ALBUTEROL SULFATE 3 ML: 2.5; .5 SOLUTION RESPIRATORY (INHALATION) at 07:26

## 2025-01-01 RX ADMIN — ACETAMINOPHEN 975 MG: 325 TABLET ORAL at 21:05

## 2025-01-01 RX ADMIN — RANOLAZINE 500 MG: 500 TABLET, FILM COATED, EXTENDED RELEASE ORAL at 21:06

## 2025-01-01 RX ADMIN — FUROSEMIDE 20 MG: 10 INJECTION, SOLUTION INTRAMUSCULAR; INTRAVENOUS at 08:30

## 2025-01-01 RX ADMIN — APIXABAN 5 MG: 5 TABLET, FILM COATED ORAL at 21:05

## 2025-01-01 RX ADMIN — OXYCODONE HYDROCHLORIDE 5 MG: 5 TABLET ORAL at 19:59

## 2025-01-01 RX ADMIN — INSULIN LISPRO 5 UNITS: 100 INJECTION, SOLUTION INTRAVENOUS; SUBCUTANEOUS at 12:49

## 2025-01-01 RX ADMIN — OXCARBAZEPINE 450 MG: 150 TABLET, FILM COATED ORAL at 21:06

## 2025-01-01 RX ADMIN — IPRATROPIUM BROMIDE AND ALBUTEROL SULFATE 3 ML: 2.5; .5 SOLUTION RESPIRATORY (INHALATION) at 11:53

## 2025-01-01 RX ADMIN — INSULIN LISPRO 3 UNITS: 100 INJECTION, SOLUTION INTRAVENOUS; SUBCUTANEOUS at 17:57

## 2025-01-01 RX ADMIN — HYDRALAZINE HYDROCHLORIDE 50 MG: 50 TABLET ORAL at 21:05

## 2025-01-01 RX ADMIN — CEFEPIME 2 G: 1 INJECTION, SOLUTION INTRAVENOUS at 17:49

## 2025-01-01 RX ADMIN — HYDROMORPHONE HYDROCHLORIDE 0.4 MG: 1 INJECTION, SOLUTION INTRAMUSCULAR; INTRAVENOUS; SUBCUTANEOUS at 08:52

## 2025-01-01 RX ADMIN — DAPAGLIFLOZIN 10 MG: 5 TABLET, FILM COATED ORAL at 08:10

## 2025-01-01 RX ADMIN — GABAPENTIN 600 MG: 300 CAPSULE ORAL at 21:05

## 2025-01-01 RX ADMIN — OXYCODONE HYDROCHLORIDE 5 MG: 5 TABLET ORAL at 13:16

## 2025-01-01 RX ADMIN — TICAGRELOR 90 MG: 90 TABLET ORAL at 21:05

## 2025-01-01 RX ADMIN — APIXABAN 5 MG: 5 TABLET, FILM COATED ORAL at 08:10

## 2025-01-01 RX ADMIN — RANOLAZINE 500 MG: 500 TABLET, FILM COATED, EXTENDED RELEASE ORAL at 08:09

## 2025-01-01 RX ADMIN — Medication 40 PERCENT: at 01:02

## 2025-01-01 RX ADMIN — NICOTINE 1 PATCH: 21 PATCH, EXTENDED RELEASE TRANSDERMAL at 08:11

## 2025-01-01 RX ADMIN — POLYETHYLENE GLYCOL 3350 17 G: 17 POWDER, FOR SOLUTION ORAL at 08:11

## 2025-01-01 RX ADMIN — HYDRALAZINE HYDROCHLORIDE 50 MG: 50 TABLET ORAL at 08:11

## 2025-01-01 RX ADMIN — HYDROMORPHONE HYDROCHLORIDE 0.4 MG: 1 INJECTION, SOLUTION INTRAMUSCULAR; INTRAVENOUS; SUBCUTANEOUS at 04:41

## 2025-01-01 RX ADMIN — CARVEDILOL 6.25 MG: 6.25 TABLET, FILM COATED ORAL at 08:11

## 2025-01-01 RX ADMIN — ATORVASTATIN CALCIUM 80 MG: 40 TABLET, FILM COATED ORAL at 21:05

## 2025-01-01 RX ADMIN — ISOSORBIDE MONONITRATE 60 MG: 60 TABLET, EXTENDED RELEASE ORAL at 08:10

## 2025-01-01 RX ADMIN — ACETAMINOPHEN 1000 MG: 10 INJECTION INTRAVENOUS at 02:32

## 2025-01-01 RX ADMIN — IPRATROPIUM BROMIDE AND ALBUTEROL SULFATE 3 ML: 2.5; .5 SOLUTION RESPIRATORY (INHALATION) at 19:46

## 2025-01-01 RX ADMIN — INSULIN LISPRO 6 UNITS: 100 INJECTION, SOLUTION INTRAVENOUS; SUBCUTANEOUS at 12:53

## 2025-01-01 RX ADMIN — TAMSULOSIN HYDROCHLORIDE 0.4 MG: 0.4 CAPSULE ORAL at 08:10

## 2025-01-01 RX ADMIN — GABAPENTIN 600 MG: 300 CAPSULE ORAL at 15:33

## 2025-01-01 RX ADMIN — PIPERACILLIN SODIUM AND TAZOBACTAM SODIUM 3.38 G: 3; .375 INJECTION, SOLUTION INTRAVENOUS at 08:11

## 2025-01-01 RX ADMIN — INSULIN LISPRO 5 UNITS: 100 INJECTION, SOLUTION INTRAVENOUS; SUBCUTANEOUS at 17:49

## 2025-01-01 RX ADMIN — FUROSEMIDE 20 MG: 10 INJECTION, SOLUTION INTRAMUSCULAR; INTRAVENOUS at 21:06

## 2025-01-01 RX ADMIN — OXCARBAZEPINE 450 MG: 150 TABLET, FILM COATED ORAL at 08:09

## 2025-01-01 RX ADMIN — HYDROCORTISONE SODIUM SUCCINATE 100 MG: 100 INJECTION, POWDER, FOR SOLUTION INTRAMUSCULAR; INTRAVENOUS at 00:28

## 2025-01-01 RX ADMIN — CARVEDILOL 6.25 MG: 6.25 TABLET, FILM COATED ORAL at 19:59

## 2025-01-01 RX ADMIN — HYDRALAZINE HYDROCHLORIDE 50 MG: 50 TABLET ORAL at 15:33

## 2025-01-01 RX ADMIN — TICAGRELOR 90 MG: 90 TABLET ORAL at 08:10

## 2025-01-01 RX ADMIN — FUROSEMIDE 20 MG: 10 INJECTION, SOLUTION INTRAMUSCULAR; INTRAVENOUS at 00:27

## 2025-01-01 RX ADMIN — EZETIMIBE 10 MG: 10 TABLET ORAL at 08:09

## 2025-01-01 RX ADMIN — GABAPENTIN 400 MG: 400 CAPSULE ORAL at 08:10

## 2025-01-01 RX ADMIN — ONDANSETRON 4 MG: 2 INJECTION INTRAMUSCULAR; INTRAVENOUS at 09:03

## 2025-01-01 RX ADMIN — ACETAMINOPHEN 975 MG: 325 TABLET ORAL at 12:49

## 2025-01-01 RX ADMIN — VANCOMYCIN HYDROCHLORIDE 750 MG: 750 INJECTION, SOLUTION INTRAVENOUS at 21:47

## 2025-01-01 RX ADMIN — ASPIRIN 81 MG CHEWABLE TABLET 81 MG: 81 TABLET CHEWABLE at 08:09

## 2025-01-01 RX ADMIN — Medication 2 L/MIN: at 07:30

## 2025-01-01 ASSESSMENT — ENCOUNTER SYMPTOMS
WHEEZING: 1
COUGH: 1
WOUND: 1
SHORTNESS OF BREATH: 1
DIAPHORESIS: 1

## 2025-01-01 ASSESSMENT — COGNITIVE AND FUNCTIONAL STATUS - GENERAL
MOVING TO AND FROM BED TO CHAIR: A LITTLE
TURNING FROM BACK TO SIDE WHILE IN FLAT BAD: A LITTLE
STANDING UP FROM CHAIR USING ARMS: A LITTLE
MOVING FROM LYING ON BACK TO SITTING ON SIDE OF FLAT BED WITH BEDRAILS: A LITTLE
MOBILITY SCORE: 17
DRESSING REGULAR LOWER BODY CLOTHING: A LITTLE
TOILETING: A LITTLE
HELP NEEDED FOR BATHING: A LITTLE
CLIMB 3 TO 5 STEPS WITH RAILING: A LOT
WALKING IN HOSPITAL ROOM: A LITTLE
DAILY ACTIVITIY SCORE: 20
DRESSING REGULAR UPPER BODY CLOTHING: A LITTLE

## 2025-01-01 ASSESSMENT — PAIN DESCRIPTION - ORIENTATION
ORIENTATION: LEFT

## 2025-01-01 ASSESSMENT — PAIN SCALES - GENERAL
PAINLEVEL_OUTOF10: 4
PAINLEVEL_OUTOF10: 7

## 2025-01-01 ASSESSMENT — PAIN DESCRIPTION - LOCATION
LOCATION: LEG

## 2025-01-01 ASSESSMENT — PAIN - FUNCTIONAL ASSESSMENT: PAIN_FUNCTIONAL_ASSESSMENT: 0-10

## 2025-01-01 NOTE — CONSULTS
Infectious Disease Inpatient Consult    Inpatient consult to Infectious Diseases  Consult performed by: Gorge Quintero MD  Consult ordered by: SUMAN Rose-CNP          Primary MD: SUMAN Carlin-CNS    Reason For Consult  L GROIN ABSCESS      Assessment/Plan:    #Postop left groin abscess  #Enterobacter cloacae organism  #AmpC organism  Abscess overlying proximal anastomosis site of the femoropopliteal graft.  Will discuss with vascular about the concern for graft infection as the duration of the antibiotics will change.  Will DC Zosyn and switch to cefepime, continue vancomycin until cultures are finalized.  Blood cultures so far negative to date.    #Acute kidney injury  Estimated Creatinine Clearance: 71.1 mL/min (A) (by C-G formula based on SCr of 1.35 mg/dL (H)).    CAD s/p CABG  PAD s/p bypass and graft  DVT  DM, HTN    Recommendations:    -DC Zosyn  -Start cefepime and continue vancomycin  -Monitor final culture and susceptibilities  -Renally dose antibiotics  -Pending vascular input if there is concern for graft infection or not  -Thank you for consult.  Will continue to follow    Gorge Quintero MD  Date of service: 1/1/2025  Time of service: 11:37 AM      History Of Present Illness  Dereck Shen is a 59 y.o. male with PMHx of HFpEF, CAD s/p CABG, bipolar, BPH, DM, DVT, GERD, HTN, PAD ((R fem-pop bypass 2/24/20; L fem-pop bypass 9/14/20; L fem PTA of the graft; R iliac PTA 2/2022; LLE and DANYA PTA 4/2024; angioplasty of the left SFA 10/21/24; and left femoral and tibial thrombectomy with patch angioplasty 11/19/24 ) presented to the hospital for evaluation of left foot infection.  Patient recently had left femoral below-knee popliteal bypass with vein patch.  Patient states that on Saturday, started having left groin pain at the surgical site and has home nurse nurses drainage and asked him to follow-up with surgeon but came into the ED for further evaluation.    On admission,  vital stable.  Labs showed WBC count 5.4, creatinine 1.5, CRP 7.3.  Patient had CT abdomen/pelvis done which showed complex fluid collection left groin measuring 2.9 x 4.6 x 7.5 cm.  Patient started on Bactrim antibiotic and admitted for further evaluation.    Yesterday, rapid was called due to respiratory distress.  Concern was if it was due to fluid overload or antibiotic reaction from Zosyn.  Patient was then transferred to ICU and had Lasix administered which helped.  He has been getting Zosyn without any problems.  Vascular saw the patient yesterday and had bedside drainage done and cultures were sent which is positive for Enterobacter cloacae.  ID consulted for further antibiotic recommendation       Past Medical History  He has a past medical history of (HFpEF) heart failure with preserved ejection fraction, Aphasia, Atherosclerotic heart disease of native coronary artery with unspecified angina pectoris (11/05/2019), Bipolar disorder, unspecified (Multi), BPH (benign prostatic hyperplasia), Diabetes (Multi), DVT (deep venous thrombosis) (Multi) (02/2022), Erectile dysfunction, GERD (gastroesophageal reflux disease), Hepatitis C, HLD (hyperlipidemia), HTN (hypertension), Obstructive sleep apnea (adult) (pediatric), Opioid abuse, in remission (Multi), PAD (peripheral artery disease) (CMS-HCC), Polymyalgia rheumatica (Multi), Post-traumatic stress disorder, unspecified, and Stroke (Multi) (07/24/2023).    Surgical History  He has a past surgical history that includes Knee surgery (Left); Elbow surgery; Back surgery; CT angio aorta and bilateral iliofemoral runoff including without contrast if performed (07/20/2020); MR angio head wo IV contrast (01/04/2022); MR angio neck wo IV contrast (01/04/2022); CT angio aorta and bilateral iliofemoral runoff including without contrast if performed (01/14/2022); Invasive Vascular Procedure (Bilateral, 10/04/2024); Dental surgery; Coronary angioplasty with stent; Coronary  artery bypass graft; Tonsillectomy; Transluminal atherectomy femoral-popliteal / tibioperoneal; Invasive Vascular Procedure (N/A, 10/21/2024); and Invasive Vascular Procedure (N/A, 10/21/2024).     Social History     Occupational History    Not on file   Tobacco Use    Smoking status: Every Day     Current packs/day: 0.50     Types: Cigarettes     Passive exposure: Current (5-7 cigarettes a day)    Smokeless tobacco: Never   Vaping Use    Vaping status: Never Used   Substance and Sexual Activity    Alcohol use: Never    Drug use: Yes     Types: Marijuana     Comment: Medical card    Sexual activity: Defer     Travel History   Travel since 12/01/24    No documented travel since 12/01/24              Family History  Family History   Problem Relation Name Age of Onset    No Known Problems Mother      No Known Problems Father       Allergies  Celecoxib; Ibuprofen; Tetanus toxoid, adsorbed; Aivacbwk-1-dx8 antimigraine agents; Cephalexin; Hydrocodone; Latex; and Tryptophan     Immunization History   Administered Date(s) Administered    Moderna SARS-CoV-2 Vaccination 03/31/2021, 04/30/2021, 10/26/2021     Medications  Home medications:  Medications Prior to Admission   Medication Sig Dispense Refill Last Dose/Taking    apixaban (Eliquis) 5 mg tablet Take 2 tablets (10 mg) by mouth 2 times a day for 6 days, THEN 1 tablet (5 mg) 2 times a day. 84 tablet 0     aspirin 81 mg chewable tablet Chew 1 tablet (81 mg) once daily.       atorvastatin (Lipitor) 80 mg tablet Take 1 tablet (80 mg) by mouth once daily at bedtime. 90 tablet 2     carvedilol (Coreg) 6.25 mg tablet Take 1 tablet (6.25 mg) by mouth every 12 hours.       Creon 36,000-114,000- 180,000 unit capsule,delayed release(DR/EC) capsule Take 2 capsules by mouth 3 times a day as needed.       dapagliflozin propanediol (Farxiga) 10 mg Take 1 tablet (10 mg) by mouth once daily with breakfast. 90 tablet 2     ELDERBERRY FRUIT ORAL Take 1 tablet by mouth once daily as  "needed.       Entresto  mg tablet Take 1 tablet by mouth twice a day.       ezetimibe (Zetia) 10 mg tablet Take 1 tablet (10 mg) by mouth once daily.       flash glucose scanning reader misc USE TO CHECK SUGARS FOUR TIMES A DAY 1 each 0     flash glucose sensor kit (FreeStyle En 2 Sensor) kit use as directed daily and change every 14 days 2 each 11     furosemide (Lasix) 40 mg tablet Take 1 tablet (40 mg) by mouth once daily as needed.       gabapentin (Neurontin) 400 mg capsule Take 1 capsule (400 mg) by mouth 3 times a day. 90 capsule 5     GINGER ROOT EXTRACT ORAL Take 1 tablet by mouth once daily as needed.       hydrALAZINE (Apresoline) 50 mg tablet Take 1 tablet (50 mg) by mouth 3 times a day. 270 tablet 1     insulin glargine (Lantus) 100 unit/mL (3 mL) pen Inject 20 Units under the skin once daily at bedtime. 10 mL 5     insulin lispro (HumaLOG) 100 unit/mL injection Inject 10 Units under the skin 3 times a day with meals. Plus sliding scale if BS is over 200 15 mL 0     isosorbide mononitrate ER (Imdur) 60 mg 24 hr tablet Take 1 tablet (60 mg) by mouth once daily. 90 tablet 1     lancets misc 1 each 4 times a day before meals. 200 each 11     medical cannabis Not [facility] prescribed       nicotine (Nicoderm CQ) 21 mg/24 hr patch APPLY 1 PATCH TO SKIN EVERY 24 HOURS AS DIRECTED 28 patch 0     OXcarbazepine (Trileptal) 150 mg tablet Take 3 tablets (450 mg) by mouth twice a day.       oxyCODONE (Roxicodone) 5 mg immediate release tablet Take 1 tablet (5 mg) by mouth every 4 hours if needed for severe pain (7 - 10) for up to 5 days. 15 tablet 0     pantoprazole (ProtoNix) 40 mg EC tablet TAKE 1 TABLET BY MOUTH ONCE DAILY 90 tablet 3     pen needle, diabetic (BD Ultra-Fine Jocy Pen Needle) 32 gauge x 5/32\" needle Pen Needles for Insulin. 100 each 2     pen needle, diabetic (BD Ultra-Fine Jocy Pen Needle) 32 gauge x 5/32\" needle Use to inject insulin up to 8 times per day. 400 each 0     pen needle, diabetic " "(TechLITE Pen Needle) 32 gauge x 1/4\" needle Use to inject 1-4 times daily as directed 100 each 11     ranolazine (Ranexa) 500 mg 12 hr tablet Take 1 tablet (500 mg) by mouth twice a day.       tamsulosin (Flomax) 0.4 mg 24 hr capsule Take 1 capsule (0.4 mg) by mouth once daily as needed.       tamsulosin (Flomax) 0.4 mg 24 hr capsule TAKE 1 CAPSULE BY MOUTH ONCE DAILY 90 capsule 1     ticagrelor (Brilinta) 90 mg tablet Take 1 tablet (90 mg) by mouth 2 times a day. 180 tablet 0     tiZANidine (Zanaflex) 2 mg tablet Take 1 tablet (2 mg) by mouth once daily as needed for muscle spasms. 30 tablet 0      Current medications:  Scheduled medications  acetaminophen, 975 mg, oral, TID  apixaban, 5 mg, oral, BID  aspirin, 81 mg, oral, Daily  atorvastatin, 80 mg, oral, Nightly  carvedilol, 6.25 mg, oral, q12h  dapagliflozin propanediol, 10 mg, oral, Daily with breakfast  ezetimibe, 10 mg, oral, Daily  furosemide, 20 mg, intravenous, BID  [Held by provider] furosemide, 40 mg, oral, Daily  gabapentin, 600 mg, oral, TID  hydrALAZINE, 50 mg, oral, TID  insulin glargine, 20 Units, subcutaneous, q24h  insulin lispro, 0-15 Units, subcutaneous, TID AC  insulin lispro, 5 Units, subcutaneous, TID AC  ipratropium-albuteroL, 3 mL, nebulization, TID  isosorbide mononitrate ER, 60 mg, oral, Daily  nicotine, 1 patch, transdermal, Daily  OXcarbazepine, 450 mg, oral, BID  oxygen, , inhalation, Continuous - Inhalation  piperacillin-tazobactam, 3.375 g, intravenous, q8h  polyethylene glycol, 17 g, oral, Daily  ranolazine, 500 mg, oral, BID  [Held by provider] sacubitriL-valsartan, 1 tablet, oral, BID  tamsulosin, 0.4 mg, oral, Daily  ticagrelor, 90 mg, oral, BID  vancomycin, 750 mg, intravenous, q12h      Continuous medications     PRN medications  PRN medications: dextrose, dextrose, glucagon, glucagon, ipratropium-albuteroL, ondansetron, ondansetron ODT **OR** ondansetron, oxyCODONE, oxygen, vancomycin    Review of Systems     Constitutional: " " Denies appetite change, chills, fever.  HENT:  Denies ear discharge, sore throat    Eyes:  Denies photophobia, eye drainage  Respiratory:  Denies cough.  Reports SOB  Cardiovascular:  Denies chest pain, palpitations  Gastrointestinal:  Denies abdominal pain, diarrhea, nausea and vomiting.   Genitourinary:  Denies dysuria, flank pain.  Reports left groin pain  Musculoskeletal:  Denies joint pain  Skin: Reports drainage and stitches on the left groin  Neurological:  Denies light-headedness, numbness and headaches.    Objective  Range of Vitals (last 24 hours)  Heart Rate:  []   Temp:  [35.9 °C (96.6 °F)-37 °C (98.6 °F)]   Resp:  [12-28]   BP: (107-160)/()   Weight:  [97.7 kg (215 lb 6.2 oz)-100 kg (221 lb 1.9 oz)]   SpO2:  [91 %-100 %]   Daily Weight  25 : 97.7 kg (215 lb 6.2 oz)    Body mass index is 30.04 kg/m².     Physical Exam  /71   Pulse 71   Temp 36.4 °C (97.5 °F)   Resp 16   Ht 1.803 m (5' 11\")   Wt 97.7 kg (215 lb 6.2 oz)   SpO2 100%   BMI 30.04 kg/m²   Temp (24hrs), Av.5 °C (97.7 °F), Min:35.9 °C (96.6 °F), Max:37 °C (98.6 °F)      General: alert, oriented, NAD  HEENT: No conjunctival pallor, no scleral icterus  Lungs: bilaterally clear to auscultation, no wheezing  Heart: regular rate and rhythm  Abdomen: soft, non tender, non distended, BS+  Neuro: AAO x 3, PERRL, CN grossly intact  Extremities: LLE dressing, L groin stitches with packing with open wound without any surrounding erythema or drainage  Skin: As above       Relevant Results    Labs  Results from last 72 hours   Lab Units 25  0450 25  0044 24  0441 24  1753   WBC AUTO x10*3/uL 7.4 7.0 5.4 5.7   HEMOGLOBIN g/dL 9.3* 9.7* 8.9* 8.5*   HEMATOCRIT % 28.4* 29.4* 27.1* 25.9*   PLATELETS AUTO x10*3/uL 438 479* 469* 429   NEUTROS PCT AUTO % 85.9 59.8  --  61.4   LYMPHS PCT AUTO % 9.6 30.0  --  25.9   MONOS PCT AUTO % 3.5 7.5  --  9.4   EOS PCT AUTO % 0.3 1.6  --  2.3     Results from last 72 " hours   Lab Units 01/01/25  0450 01/01/25  0045 12/31/24  0441   SODIUM mmol/L 135* 136 135*   POTASSIUM mmol/L 5.2 4.7 4.9   CHLORIDE mmol/L 103 105 103   CO2 mmol/L 24 23 24   BUN mg/dL 35* 34* 34*   CREATININE mg/dL 1.35* 1.30 1.51*   GLUCOSE mg/dL 230* 209* 168*   CALCIUM mg/dL 8.5* 8.5* 8.3*   ANION GAP mmol/L 13 13 13   EGFR mL/min/1.73m*2 61 64 53*     Results from last 72 hours   Lab Units 12/30/24  1753   ALK PHOS U/L 79   BILIRUBIN TOTAL mg/dL 0.3   PROTEIN TOTAL g/dL 6.8   ALT U/L 8*   AST U/L 10   ALBUMIN g/dL 3.2*     Estimated Creatinine Clearance: 71.1 mL/min (A) (by C-G formula based on SCr of 1.35 mg/dL (H)).  C-Reactive Protein   Date Value Ref Range Status   12/30/2024 7.35 (H) <1.00 mg/dL Final   04/24/2024 0.15 <1.00 mg/dL Final     Sedimentation Rate   Date Value Ref Range Status   12/30/2024 62 (H) 0 - 20 mm/h Final   04/24/2024 15 0 - 20 mm/h Final     HIV 1 and 2 Screen   Date Value Ref Range Status   07/26/2022 NONREACTIVE NONREACTIVE Final     Comment:      HIV Ag/Ab screen is performed using the Siemens Bellabeat   HIV Ag/Ab Combo assay which detects the presence of HIV    p24 antigen as well as antibodies to HIV-1   (Group M and O) and HIV-2.  .  No laboratory evidence of HIV infection. If acute HIV infection is   suspected, consider testing for HIV RNA by PCR (viral load).       Hepatitis C Ab   Date Value Ref Range Status   06/01/2022 REACTIVE (A) NONREACTIVE Final     Comment:      Results from patients taking biotin supplements or receiving   high-dose biotin therapy should be interpreted with caution   due to possible interference with this test. Providers may    contact their local laboratory for further information.   HCV antibody detected. HCV RNA PCR has been ordered   to evaluate the possibility of a current infection.       HCV PCR Quant   Date Value Ref Range Status   02/06/2023 NOT DETECTED IU/mL Final     Comment:     REF VALUE  NOT DETECTED          Microbiology  Susceptibility data from last 14 days.  Collected Specimen Info Organism   12/30/24 Tissue/Biopsy from Skin/Superficial Abscess Enterobacter cloacae complex       Imaging  XR chest 1 view    Result Date: 1/1/2025  As above   MACRO: None   Signed by: Geovany Amin 1/1/2025 11:22 AM Dictation workstation:   [id number]    XR chest 1 view    Result Date: 1/1/2025  Pulmonary edema.   Signed by: Aleks Joshi 1/1/2025 10:48 AM Dictation workstation:   [id number]    CT abdomen pelvis w IV contrast    Result Date: 12/30/2024  Complex fluid collection left groin 2.9 x 4.6 x 7.5 cm is suggestive of abscess.  There are multiple locules of gas within the fluid collection as well as mild peripheral enhancement.  The fluid collection extends from just below the skin surface and staples deep to the level the left common femoral artery and proximal portion of the [place] bypass graft.  Reactive left groin lymphadenopathy adjacent to the abscess as well as proximal left thigh soft tissue edema. Critical findings discussed with Dr. Knutson at 9:00 PM 12/30/2024. Signed by Geovany Joel MD    XR chest 1 view    Result Date: 12/23/2024  No acute cardiopulmonary process is evident.   MACRO: None   Signed by: Lawrence Leahy 12/23/2024 9:34 PM Dictation workstation:   [id number]    CT angio aorta and bilateral iliofemoral runoff including without contrast if performed    Result Date: 12/17/2024  No significant aortic disease.  Mild bilateral common iliac artery stenosis.  Patent left external iliac and common femoral arteries. 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  Chronic occlusion of the native left superficial femoral artery. A 2 cm segment of the left P1 popliteal artery reconstitutes.  The terminal left popliteal artery also reconstitutes.  Other portions of the popliteal artery are occluded. Patent three-vessel infrapopliteal runoff to the left hindfoot, beyond which contrast  opacification was too weak to visualize. Thick subcutaneous soft tissue - presumed scarred tissue - overlying the left femoral vessels at the left groin.  Correlate for possibility of cellulitis. Patent right iliac and right femoral stents.  Patent right popliteal artery.  Patent three-vessel infrapopliteal runoff to the right ankle. No findings of an acute process in the abdomen or pelvis. Signed by Simeon Barrera MD

## 2025-01-01 NOTE — PROGRESS NOTES
Vancomycin Dosing by Pharmacy- FOLLOW UP    Dereck Shen is a 59 y.o. year old male who Pharmacy has been consulted for vancomycin dosing for other abdominal infection/abscess . Based on the patient's indication and renal status this patient is being dosed based on a goal AUC of 500-600.     Renal function is currently stable.    Current vancomycin dose: 1000 mg given every 12 hours    Estimated vancomycin AUC on current dose: 729 mg/L.hr     Visit Vitals  /85   Pulse 81   Temp 36.1 °C (96.9 °F)   Resp 13        Lab Results   Component Value Date    CREATININE 1.35 (H) 2025    CREATININE 1.30 2025    CREATININE 1.51 (H) 2024    CREATININE 1.37 (H) 2024        Patient weight is as follows:   Vitals:    25 0536   Weight: 97.7 kg (215 lb 6.2 oz)       Cultures:  No results found for the encounter in last 14 days.       I/O last 3 completed shifts:  In: 420 (4.9 mL/kg) [P.O.:120; IV Piggyback:300]  Out: 800 (9.4 mL/kg) [Urine:800 (0.3 mL/kg/hr)]  Weight: 85.3 kg   I/O during current shift:  I/O this shift:  In: 350 [IV Piggyback:350]  Out: 800 [Urine:800]    Temp (24hrs), Av.7 °C (98 °F), Min:36.1 °C (96.9 °F), Max:37 °C (98.6 °F)      Assessment/Plan    Above goal AUC. Orders placed for new vancomcyin regimen of 750 every 12 hours to begin at 1000.    This dosing regimen is predicted by [employer]Rx to result in the following pharmacokinetic parameters:  Loading dose: N/A  Regimen: 750 mg IV every 12 hours.  Start time: 11:49 on 2025  Exposure target: AUC24 (range)400-600 mg/L.hr   [id number]: 552 mg/L.hr  AUC24,ss: 553 mg/L.hr  Probability of AUC24 > 400: 99 %  Ctrough,ss: 18 mg/L  Probability of Ctrough,ss > 20: 29 %      The next level will be obtained on 1/3 at 0500. May be obtained sooner if clinically indicated.   Will continue to monitor renal function daily while on vancomycin and order serum creatinine at least every 48 hours if not already ordered.  Follow for  continued vancomycin needs, clinical response, and signs/symptoms of toxicity.       Canelo Muñoz, PharmD

## 2025-01-01 NOTE — SIGNIFICANT EVENT
Rapid Response Note:    Rapid response was called around 12 10 am as patient suddenly became diaphoretic, pale, and sob. As per staff, zosyn was just completed and he had this acute change. He was on room air before. Received Dilaudid 1 hr ago.   Patient mentioned sob and had a hard time breathing    Objective:    HR 110s  No fever  RR 28s      POC >100      Plan:  50 of solu cortef for suspected allergy, he has nausea/vomiting's after abx  IV 20 mg of lasix stat  Cxr  EKG  Labs      Clinically better after lasix.     Tranfers to the ICU for closer monitoring.   Hx of cpap, can benefit from bipap  Wife updated by staff  ICU team consulted, appreciated help

## 2025-01-01 NOTE — CONSULTS
Critical Care Medicine Consult      Reason For Consult  Acute hypoxic respiratory failure    History Of Present Illness  Dereck Shen is a 59 y.o. male with past medical history CAD s/p CABG, severe lower extremity PAD with multiple prior revascularizations (right fem-pop bypass 2/24/20, left fem-pop bypass 9/14/20, left fem PTA of the graft, right iliac PTA 2/22, LLE and DANYA PTA 4/24, angioplasty of left SFA 10/21/24, left femoral and tibial thrombectomy with patch angioplasty 11/19/24, and left femoral-below knee bypass with graft on 12/18/24), HFrEF, HTN, COPD, BRUNA, multiple prior CVA, HCV, neuropathy, pancreatic insufficiency, GERD, BPH, cLBP, bipolar disorder, PTSD, and tobacco use presented to [facility] ED on 12/30/24 with left groin pain and concern for infection. Upon ED workup, 36.1 °C, heart rate 71, respiratory rate 18, blood pressure 112/64, and SPO2 100% on room air. ED labs revealed blood glucose 127, sodium 134, potassium 5.2(mild hemolysis), BUN 35, creatinine 1.37, calcium 8.2, albumin 3.2, ALT 8, CRP 7.35, RBC 2.72, hemoglobin 8.5, hematocrit 25.9, and remainder of chemistry profile and CBC unremarkable. ED interventions included dilaudid 1mg IV, Zosyn 3.375 gm, Vancomycin 1.5gm IV, and admission to general medicine. On 1/1/25, RRT called as patient became suddenly diaphoretic, pale, and short of breath. Per nursing, zosyn had just completed and he had this acute change. He was on room air prior to event and had received Dilaudid 1 hour prior. Interventions during rapid response included lasix 20mg IV x1, solucortef 50mg IV x1, CXR, EKG, labs, and transfer to ICU for closer monitoring. Patient appears clinically improved after IV lasix. Patient transferred to ICU and critical care medicine was consulted.     Patient seen and examined in ICU bed 2. Patient alert and oriented x4 and in mild respiratory distress. Patient stated that he feels like his breathing has improved some since receiving  the lasix. Patient reported that he came to the hospital because he was having drainage and pain to his left groin incision site. He reports that he has had a nonproductive cough for 1 week but denies any shortness of breath prior to this event. He denies any increased leg swelling or weight gain. He reports that his left leg has been swollen. Noted with pitting edema on exam left > right. He stated that he has had this type of event happen 4 times due to too much fluid on his body and he almost cardiac arrested due to too much fluid. Explained why home lasix was on hold on admission and discussed plan of care with him including plan for further diuresis with IV lasix. He denies any alcohol use. He reported that he has been compliant with his medications including his eliquis, brilinta, and aspirin. He reports that he quit smoking. He denies any dizziness, light-headedness, vision changes, chest pain, palpitations, hemoptysis, nausea, vomiting, abdominal pain, constipation, diarrhea, hematemesis, hematochezia, dyschezia, hematuria, dysuria, changed in bowel/bladder function/control, or any focal or lateralizing neurological deficits other than noted.     Past Medical History:   Diagnosis Date    (HFpEF) heart failure with preserved ejection fraction     Aphasia     Atherosclerotic heart disease of native coronary artery with unspecified angina pectoris 11/05/2019    Bipolar disorder, unspecified (Multi)     BPH (benign prostatic hyperplasia)     Diabetes (Multi)     DVT (deep venous thrombosis) (Multi) 02/2022    RLE    Erectile dysfunction     GERD (gastroesophageal reflux disease)     Hepatitis C     HLD (hyperlipidemia)     HTN (hypertension)     Obstructive sleep apnea (adult) (pediatric)     Opioid abuse, in remission (Multi)     PAD (peripheral artery disease) (CMS-[facility])     Polymyalgia rheumatica (Multi)     Post-traumatic stress disorder, unspecified     Stroke (Multi) 07/24/2023    multiple     Past Surgical  History:   Procedure Laterality Date    BACK SURGERY      CORONARY ANGIOPLASTY WITH STENT PLACEMENT      CORONARY ARTERY BYPASS GRAFT      CT ANGIO AORTA AND BILATERAL ILIOFEMORAL RUN OFF INCLUDING WITHOUT CONTRAST IF PERFORMED  07/20/2020    CT ANGIO AORTA AND BILATERAL ILIOFEMORAL RUN OFF INCLUDING WITHOUT CONTRAST IF PERFORMED  01/14/2022    DENTAL SURGERY      ELBOW SURGERY      INVASIVE VASCULAR PROCEDURE Bilateral 10/04/2024    Procedure: Lower Extremity Angiogram;  Surgeon: Baljinder Wright MD;  Location: [facility];  Service: Cardiovascular;  Laterality: Bilateral;  w / anesthesia  3 hr hydration  / arrive 7:30    INVASIVE VASCULAR PROCEDURE N/A 10/21/2024    Procedure: Lower Extremity Angiogram;  Surgeon: Baljinder Wright MD;  Location: [facility];  Service: Cardiovascular;  Laterality: N/A;  w/ anesthesia    INVASIVE VASCULAR PROCEDURE N/A 10/21/2024    Procedure: Angioplasty - Lower Extremity;  Surgeon: Baljinder Wright MD;  Location: [facility];  Service: Cardiovascular;  Laterality: N/A;    KNEE SURGERY Left     MR HEAD ANGIO WO IV CONTRAST  01/04/2022    MR NECK ANGIO WO IV CONTRAST  01/04/2022    TONSILLECTOMY      TRANSLUMINAL ATHERECTOMY FEMORAL-POPLITEAL / TIBIOPERONEAL       Medications Prior to Admission   Medication Sig Dispense Refill Last Dose/Taking    apixaban (Eliquis) 5 mg tablet Take 2 tablets (10 mg) by mouth 2 times a day for 6 days, THEN 1 tablet (5 mg) 2 times a day. 84 tablet 0     aspirin 81 mg chewable tablet Chew 1 tablet (81 mg) once daily.       atorvastatin (Lipitor) 80 mg tablet Take 1 tablet (80 mg) by mouth once daily at bedtime. 90 tablet 2     carvedilol (Coreg) 6.25 mg tablet Take 1 tablet (6.25 mg) by mouth every 12 hours.       Creon 36,000-114,000- 180,000 unit capsule,delayed release(DR/EC) capsule Take 2 capsules by mouth 3 times a day as needed.       dapagliflozin propanediol (Farxiga) 10 mg Take 1 tablet (10 mg) by mouth once daily with  "breakfast. 90 tablet 2     ELDERBERRY FRUIT ORAL Take 1 tablet by mouth once daily as needed.       Entresto  mg tablet Take 1 tablet by mouth twice a day.       ezetimibe (Zetia) 10 mg tablet Take 1 tablet (10 mg) by mouth once daily.       flash glucose scanning reader misc USE TO CHECK SUGARS FOUR TIMES A DAY 1 each 0     flash glucose sensor kit (FreeStyle En 2 Sensor) kit use as directed daily and change every 14 days 2 each 11     furosemide (Lasix) 40 mg tablet Take 1 tablet (40 mg) by mouth once daily as needed.       gabapentin (Neurontin) 400 mg capsule Take 1 capsule (400 mg) by mouth 3 times a day. 90 capsule 5     GINGER ROOT EXTRACT ORAL Take 1 tablet by mouth once daily as needed.       hydrALAZINE (Apresoline) 50 mg tablet Take 1 tablet (50 mg) by mouth 3 times a day. 270 tablet 1     insulin glargine (Lantus) 100 unit/mL (3 mL) pen Inject 20 Units under the skin once daily at bedtime. 10 mL 5     insulin lispro (HumaLOG) 100 unit/mL injection Inject 10 Units under the skin 3 times a day with meals. Plus sliding scale if BS is over 200 15 mL 0     isosorbide mononitrate ER (Imdur) 60 mg 24 hr tablet Take 1 tablet (60 mg) by mouth once daily. 90 tablet 1     lancets misc 1 each 4 times a day before meals. 200 each 11     medical cannabis Not [facility] prescribed       nicotine (Nicoderm CQ) 21 mg/24 hr patch APPLY 1 PATCH TO SKIN EVERY 24 HOURS AS DIRECTED 28 patch 0     OXcarbazepine (Trileptal) 150 mg tablet Take 3 tablets (450 mg) by mouth twice a day.       oxyCODONE (Roxicodone) 5 mg immediate release tablet Take 1 tablet (5 mg) by mouth every 4 hours if needed for severe pain (7 - 10) for up to 5 days. 15 tablet 0     pantoprazole (ProtoNix) 40 mg EC tablet TAKE 1 TABLET BY MOUTH ONCE DAILY 90 tablet 3     pen needle, diabetic (BD Ultra-Fine Jocy Pen Needle) 32 gauge x 5/32\" needle Pen Needles for Insulin. 100 each 2     pen needle, diabetic (BD Ultra-Fine Jocy Pen Needle) 32 gauge x 5/32\" " "needle Use to inject insulin up to 8 times per day. 400 each 0     pen needle, diabetic (TechLITE Pen Needle) 32 gauge x 1/4\" needle Use to inject 1-4 times daily as directed 100 each 11     ranolazine (Ranexa) 500 mg 12 hr tablet Take 1 tablet (500 mg) by mouth twice a day.       tamsulosin (Flomax) 0.4 mg 24 hr capsule Take 1 capsule (0.4 mg) by mouth once daily as needed.       tamsulosin (Flomax) 0.4 mg 24 hr capsule TAKE 1 CAPSULE BY MOUTH ONCE DAILY 90 capsule 1     ticagrelor (Brilinta) 90 mg tablet Take 1 tablet (90 mg) by mouth 2 times a day. 180 tablet 0     tiZANidine (Zanaflex) 2 mg tablet Take 1 tablet (2 mg) by mouth once daily as needed for muscle spasms. 30 tablet 0      Celecoxib; Ibuprofen; Tetanus toxoid, adsorbed; [id number] antimigraine agents; Cephalexin; Hydrocodone; Latex; and Tryptophan  Social History     Tobacco Use    Smoking status: Every Day     Current packs/day: 0.50     Types: Cigarettes     Passive exposure: Current (5-7 cigarettes a day)    Smokeless tobacco: Never   Vaping Use    Vaping status: Never Used   Substance Use Topics    Alcohol use: Never    Drug use: Yes     Types: Marijuana     Comment: Medical card     Family History   Problem Relation Name Age of Onset    No Known Problems Mother      No Known Problems Father         Scheduled Medications:   apixaban, 5 mg, oral, BID  aspirin, 81 mg, oral, Daily  atorvastatin, 80 mg, oral, Nightly  carvedilol, 6.25 mg, oral, q12h  dapagliflozin propanediol, 10 mg, oral, Daily with breakfast  ezetimibe, 10 mg, oral, Daily  [Held by provider] furosemide, 40 mg, oral, Daily  gabapentin, 400 mg, oral, TID  hydrALAZINE, 50 mg, oral, TID  insulin glargine, 20 Units, subcutaneous, q24h  insulin lispro, 0-15 Units, subcutaneous, TID AC  insulin lispro, 5 Units, subcutaneous, TID AC  isosorbide mononitrate ER, 60 mg, oral, Daily  nicotine, 1 patch, transdermal, Daily  OXcarbazepine, 450 mg, oral, BID  oxygen, , inhalation, Continuous - " Inhalation  piperacillin-tazobactam, 3.375 g, intravenous, q8h  polyethylene glycol, 17 g, oral, Daily  ranolazine, 500 mg, oral, BID  [Held by provider] sacubitriL-valsartan, 1 tablet, oral, BID  tamsulosin, 0.4 mg, oral, Daily  ticagrelor, 90 mg, oral, BID  vancomycin, 1,000 mg, intravenous, q12h         Continuous Medications:         PRN Medications:   PRN medications: dextrose, dextrose, glucagon, glucagon, HYDROmorphone, ondansetron, ondansetron ODT **OR** ondansetron, oxyCODONE-acetaminophen, tiZANidine, vancomycin    Review of Systems:  Review of Systems   Constitutional:  Positive for diaphoresis.   Respiratory:  Positive for cough, shortness of breath and wheezing.    Skin:  Positive for wound.        Objective   Vitals:  Most Recent:  Vitals:    01/01/25 0102   BP:    Pulse:    Resp: 23   Temp:    SpO2: 100%       24hr Min/Max:  Temp  Min: 36.1 °C (96.9 °F)  Max: 37 °C (98.6 °F)  Pulse  Min: 65  Max: 121  BP  Min: 94/64  Max: 160/100  Resp  Min: 16  Max: 28  SpO2  Min: 91 %  Max: 100 %    LDA:          Vent settings:  FiO2 (%):  [40 %] 40 %  S RR:  [16] 16    Hemodynamic parameters for last 24 hours:         Intake/Output Summary (Last 24 hours) at 1/1/2025 0107  Last data filed at 12/31/2024 2050  Gross per 24 hour   Intake 420 ml   Output 1600 ml   Net -1180 ml           Physical exam:    Physical Exam  Constitutional:       General: He is in acute distress.      Appearance: He is obese. He is ill-appearing.   HENT:      Head: Normocephalic.      Nose: Nose normal.      Mouth/Throat:      Mouth: Mucous membranes are moist.   Eyes:      Extraocular Movements: Extraocular movements intact.      Conjunctiva/sclera: Conjunctivae normal.      Pupils: Pupils are equal, round, and reactive to light.   Cardiovascular:      Rate and Rhythm: Normal rate and regular rhythm.      Pulses: Normal pulses.      Heart sounds: Normal heart sounds. No murmur heard.  Pulmonary:      Effort: Tachypnea present.      Breath  sounds: Wheezing and rales present.   Abdominal:      General: Bowel sounds are normal. There is distension.      Palpations: Abdomen is soft.      Tenderness: There is no abdominal tenderness. There is no guarding.   Musculoskeletal:         General: Normal range of motion.      Cervical back: Normal range of motion.      Right lower le+ Edema present.      Left lower le+ Edema present.   Skin:     General: Skin is warm and dry.      Capillary Refill: Capillary refill takes 2 to 3 seconds.      Findings: Wound present.      Comments: Left groin wound packed with gauze and covered with ABD. Turbid and sanguinous drainage noted to ABD dressing. Erythema and tenderness surrounding left groin site. Left lower leg fasciotomy sites covered with dressing.    Neurological:      General: No focal deficit present.      Mental Status: He is alert and oriented to person, place, and time. Mental status is at baseline.      Cranial Nerves: Cranial nerves 2-12 are intact.      Sensory: Sensation is intact.      Motor: Motor function is intact.   Psychiatric:         Attention and Perception: Attention normal.         Mood and Affect: Mood normal.         Speech: Speech normal.         Behavior: Behavior normal. Behavior is cooperative.         Thought Content: Thought content normal.         Judgment: Judgment normal.          Lab/Radiology/Diagnostic Review:  Results for orders placed or performed during the hospital encounter of 24 (from the past 24 hours)   POCT GLUCOSE   Result Value Ref Range    POCT Glucose 324 (H) 74 - 99 mg/dL   POCT GLUCOSE   Result Value Ref Range    POCT Glucose 167 (H) 74 - 99 mg/dL   Basic metabolic panel   Result Value Ref Range    Glucose 168 (H) 74 - 99 mg/dL    Sodium 135 (L) 136 - 145 mmol/L    Potassium 4.9 3.5 - 5.3 mmol/L    Chloride 103 98 - 107 mmol/L    Bicarbonate 24 21 - 32 mmol/L    Anion Gap 13 10 - 20 mmol/L    Urea Nitrogen 34 (H) 6 - 23 mg/dL    Creatinine 1.51 (H)  0.50 - 1.30 mg/dL    eGFR 53 (L) >60 mL/min/1.73m*2    Calcium 8.3 (L) 8.6 - 10.3 mg/dL   CBC   Result Value Ref Range    WBC 5.4 4.4 - 11.3 x10*3/uL    nRBC 0.0 0.0 - 0.0 /100 WBCs    RBC 2.92 (L) 4.50 - 5.90 x10*6/uL    Hemoglobin 8.9 (L) 13.5 - 17.5 g/dL    Hematocrit 27.1 (L) 41.0 - 52.0 %    MCV 93 80 - 100 fL    MCH 30.5 26.0 - 34.0 pg    MCHC 32.8 32.0 - 36.0 g/dL    RDW 13.3 11.5 - 14.5 %    Platelets 469 (H) 150 - 450 x10*3/uL   Urinalysis with Reflex Culture and Microscopic   Result Value Ref Range    Color, Urine Yellow Light-Yellow, Yellow, Dark-Yellow    Appearance, Urine Clear Clear    Specific Gravity, Urine 1.031 1.005 - 1.035    pH, Urine 5.0 5.0, 5.5, 6.0, 6.5, 7.0, 7.5, 8.0    Protein, Urine 30 (1+) (A) NEGATIVE, 10 (TRACE), 20 (TRACE) mg/dL    Glucose, Urine OVER (4+) (A) Normal mg/dL    Blood, Urine NEGATIVE NEGATIVE    Ketones, Urine NEGATIVE NEGATIVE mg/dL    Bilirubin, Urine NEGATIVE NEGATIVE    Urobilinogen, Urine Normal Normal mg/dL    Nitrite, Urine NEGATIVE NEGATIVE    Leukocyte Esterase, Urine NEGATIVE NEGATIVE   Extra Urine Gray Tube   Result Value Ref Range    Extra Tube Hold for add-ons.    Urinalysis Microscopic   Result Value Ref Range    WBC, Urine NONE 1-5, NONE /HPF    RBC, Urine NONE NONE, 1-2, 3-5 /HPF    Hyaline Casts, Urine OCCASIONAL (A) NONE /LPF   POCT GLUCOSE   Result Value Ref Range    POCT Glucose 131 (H) 74 - 99 mg/dL   POCT GLUCOSE   Result Value Ref Range    POCT Glucose 212 (H) 74 - 99 mg/dL   POCT GLUCOSE   Result Value Ref Range    POCT Glucose 198 (H) 74 - 99 mg/dL   POCT GLUCOSE   Result Value Ref Range    POCT Glucose 169 (H) 74 - 99 mg/dL   POCT GLUCOSE   Result Value Ref Range    POCT Glucose 178 (H) 74 - 99 mg/dL   CBC and Auto Differential   Result Value Ref Range    WBC 7.0 4.4 - 11.3 x10*3/uL    nRBC 0.0 0.0 - 0.0 /100 WBCs    RBC 3.14 (L) 4.50 - 5.90 x10*6/uL    Hemoglobin 9.7 (L) 13.5 - 17.5 g/dL    Hematocrit 29.4 (L) 41.0 - 52.0 %    MCV 94 80 - 100  fL    MCH 30.9 26.0 - 34.0 pg    MCHC 33.0 32.0 - 36.0 g/dL    RDW 13.3 11.5 - 14.5 %    Platelets 479 (H) 150 - 450 x10*3/uL    Neutrophils % 59.8 40.0 - 80.0 %    Immature Granulocytes %, Automated 0.4 0.0 - 0.9 %    Lymphocytes % 30.0 13.0 - 44.0 %    Monocytes % 7.5 2.0 - 10.0 %    Eosinophils % 1.6 0.0 - 6.0 %    Basophils % 0.7 0.0 - 2.0 %    Neutrophils Absolute 4.21 1.20 - 7.70 x10*3/uL    Immature Granulocytes Absolute, Automated 0.03 0.00 - 0.70 x10*3/uL    Lymphocytes Absolute 2.11 1.20 - 4.80 x10*3/uL    Monocytes Absolute 0.53 0.10 - 1.00 x10*3/uL    Eosinophils Absolute 0.11 0.00 - 0.70 x10*3/uL    Basophils Absolute 0.05 0.00 - 0.10 x10*3/uL   BLOOD GAS VENOUS FULL PANEL   Result Value Ref Range    POCT pH, Venous 7.39 7.33 - 7.43 pH    POCT pCO2, Venous 41 41 - 51 mm Hg    POCT pO2, Venous 65 (H) 35 - 45 mm Hg    POCT SO2, Venous 93 (H) 45 - 75 %    POCT Oxy Hemoglobin, Venous 91.7 (H) 45.0 - 75.0 %    POCT Hematocrit Calculated, Venous 30.0 (L) 41.0 - 52.0 %    POCT Sodium, Venous 134 (L) 136 - 145 mmol/L    POCT Potassium, Venous 5.0 3.5 - 5.3 mmol/L    POCT Chloride, Venous 109 (H) 98 - 107 mmol/L    POCT Ionized Calicum, Venous 1.19 1.10 - 1.33 mmol/L    POCT Glucose, Venous 207 (H) 74 - 99 mg/dL    POCT Lactate, Venous 1.4 0.4 - 2.0 mmol/L    POCT Base Excess, Venous -0.2 -2.0 - 3.0 mmol/L    POCT HCO3 Calculated, Venous 24.8 22.0 - 26.0 mmol/L    POCT Hemoglobin, Venous 10.0 (L) 13.5 - 17.5 g/dL    POCT Anion Gap, Venous 5.0 (L) 10.0 - 25.0 mmol/L    Patient Temperature 37.0 degrees Celsius    FiO2 97 %   Protime-INR   Result Value Ref Range    Protime 15.0 (H) 9.8 - 12.8 seconds    INR 1.3 (H) 0.9 - 1.1     *Note: Due to a large number of results and/or encounters for the requested time period, some results have not been displayed. A complete set of results can be found in Results Review.     CT abdomen pelvis w IV contrast    Result Date: 12/30/2024  STUDY: CT Abdomen and Pelvis with IV  Contrast; 12/30/2024 at 7:20 p.m. INDICATION: Left femoral wound infection. COMPARISON: CTA AP with runoffs 12/17/2024. ACCESSION NUMBER(S): [id number] ORDERING CLINICIAN: RICK MACEDO TECHNIQUE: CT of the abdomen and pelvis was performed.  Contiguous axial images were obtained at 3 mm slice thickness through the abdomen and pelvis. Coronal and sagittal reconstructions at 3 mm slice thickness were performed.  Omnipaque 350 75 mL was administered intravenously.  FINDINGS: LOWER CHEST: No cardiomegaly.  No pericardial effusion.  Small bilateral pleural effusions  ABDOMEN:  LIVER: No hepatomegaly.  Smooth surface contour.  Normal attenuation.  BILE DUCTS: No intrahepatic or extrahepatic biliary ductal dilatation.  GALLBLADDER: The gallbladder is negative. STOMACH: No abnormalities identified.  PANCREAS: Negative for pancreatitis  SPLEEN: No splenomegaly or focal splenic lesion.  ADRENAL GLANDS: No thickening or nodules.  KIDNEYS AND URETERS: Kidneys are normal in size and location.  No renal or ureteral calculi.  PELVIS:  BLADDER: Portion of the right side of bladder extends into a right inguinal hernia measuring 3 cm diameter  REPRODUCTIVE ORGANS: No abnormalities identified.  BOWEL: Constipation without bowel obstruction.  Normal appendix  VESSELS: The portal veins, superior mesenteric vein and splenic vein are patent.  Old occluded right groin bypass graft.  Old occluded left groin bypass graft.  There is a patent bypass graft left groin arising from the left common femoral artery and extending distally towards the left thigh.  Abdominal aorta is normal in caliber.  PERITONEUM/RETROPERITONEUM/LYMPH NODES: No free fluid.  No pneumoperitoneum. Left inguinal lymphadenopathy.  2.1 cm left inguinal lymph node image 167 and 2 cm left inguinal lymph node image 163.  ABDOMINAL WALL: No abnormalities identified. SOFT TISSUES: There is a complex fluid collection left groin measuring 2.9 x 4.6 x 7.5 cm.  The fluid  collection extends from the skin surface deep adjacent to the left common femoral artery and surrounds the proximal portion of the cortex bypass graft.  There are multiple locules of gas within the fluid collection as well as mild peripheral enhancement. Proximal left thigh soft tissue swelling.  BONES: No acute fracture or aggressive osseous lesion.  Degenerative disc space narrowing L5/S1.    Complex fluid collection left groin 2.9 x 4.6 x 7.5 cm is suggestive of abscess.  There are multiple locules of gas within the fluid collection as well as mild peripheral enhancement.  The fluid collection extends from just below the skin surface and staples deep to the level the left common femoral artery and proximal portion of the Arden-Casey bypass graft.  Reactive left groin lymphadenopathy adjacent to the abscess as well as proximal left thigh soft tissue edema. Critical findings discussed with Dr. Knutson at 9:00 PM 12/30/2024. Signed by Geovany Joel MD    XR chest 1 view    Result Date: 12/23/2024  Interpreted By:  Lawrence Leahy, STUDY: Chest dated  12/23/2024.   INDICATION: Signs/Symptoms:stat   COMPARISON: Chest dated 12/13/2021.   ACCESSION NUMBER(S): [id number]   ORDERING CLINICIAN: HAMIDA MAURER   TECHNIQUE: One view of the chest.   FINDINGS: The lungs are clear.  No pneumothorax or effusion is evident. The cardiomediastinal silhouette is  not enlarged.Surgical changes are seen of the mediastinum.The soft tissues are grossly unremarkable.       No acute cardiopulmonary process is evident.   MACRO: None   Signed by: Lawrence Leahy 12/23/2024 9:34 PM Dictation workstation:   [id number]    Vascular US ankle brachial index (ANSON) without exercise    Result Date: 12/20/2024              [place] [address]      Phone [phone] Fax [phone]  Vascular Lab Report  VASC US ANKLE BRACHIAL INDEX (ANSON) WITHOUT EXERCISE Patient Name:      CARMNEZA DOUGHERTYNCE     Reading Physician:  [id number] Darlyn Daly MD Study Date:        12/17/2024           Ordering Provider:  [id number] CHERELLE PAEZ                                                             AYAH MRN/PID:           [id number]             Fellow: Accession#:        [id number]         Technologist:       Deb Allen [id number] Date of Birth/Age: 1965 / 59 years Technologist 2: Gender:            M                    Encounter#:         [id number] Admission Status:  Emergency            Location Performed: [facility]  Diagnosis/ICD: Peripheral vascular disease, unspecified-I73.9 CPT Codes:     67515 Peripheral artery ANSON Only  Pertinent History: Pt presents to the ER with LLE pain and a cold left foot.  **CRITICAL RESULT** Critical Result: severe arterial disease of LLE Notification called to Cherelle Saldana on 12/17/2024 at 9:31:33 AM.  CONCLUSIONS: Right Lower PVR: No evidence of arterial occlusive disease in the right lower extremity at rest. Normal digital perfusion noted. Multiphasic flow is noted in the right common femoral artery, right popliteal artery, right posterior tibial artery and right dorsalis pedis artery. Left Lower PVR: Evidence of severe arterial occlusive disease in the left lower extremity at rest. Decreased digital perfusion noted. Monophasic flow is noted in the left common femoral artery, left popliteal artery, left posterior tibial artery and left dorsalis pedis artery. PTA is inaudible. Flat toe waveform(TBI=0).  Comparison: Compared with study from 12/2/2024, The left ANSON decreased from 0.67 to 0.28.  Imaging & Doppler Findings:  RIGHT Lower PVR                Pressures Ratios Right Posterior Tibial (Ankle) 141 mmHg  1.22 Right Dorsalis Pedis (Ankle)   126 mmHg  1.09 Right Digit (Great Toe)        112 mmHg  0.97   LEFT Lower PVR                Pressures Ratios Left Posterior Tibial (Ankle) 0 mmHg    0.00 Left Dorsalis Pedis  (Ankle)   33 mmHg   0.28 Left Digit (Great Toe)        0 mmHg    0.00                      Right Brachial Pressure 116 mmHg   [id number] Darlyn Daly MD Electronically signed by [id number] Darlyn Daly MD on 12/20/2024 at 4:14:37 PM  ** Final **     Vascular US upper extremity vein mapping bilateral    Result Date: 12/18/2024              [place] [id number]      Phone [phone] Fax [phone]  Vascular Lab Report  [facility] US UPPER EXTREMITY VEIN MAPPING BILATERAL Patient Name:      CAMRENZA CIARA OLEARY   Reading Physician:  [id number] Fabian Phillips MD Study Date:        12/18/2024          Ordering Provider:  [id number] DARIUS BURT MRN/PID:           [id number]            Fellow: Accession#:        [id number]        Technologist:       Danielle Kelley                                                            RVT Date of Birth/Age: 1965 / 59      Technologist 2:     Deb Allen RVT                    years Gender:            M                   Encounter#:         [id number] Admission Status:  Outpatient          Location Performed: [facility]  Diagnosis/ICD: Encounter for other preprocedural examination-Z01.818 CPT Codes:     70559 Vein mapping complete  CONCLUSIONS: Right Upper Vein Mapping: Chronic disease is documented in the right cephalic proximal forearm, cephalic mid forearm, cephalic distal forearm, basilic proximal forearm, basilic mid forearm and basilic distal forearm veins. Left Upper Vein Mapping: Left cephalic and basilic veins appear widely patent with no evidence of thrombosis or fibrosis.  Imaging & Doppler Findings:  Right                   Compress Thrombus  Diam Cephalic Prox Arm         Yes      None   2.4 mm Cephalic Mid Arm          Yes      None   2.3 mm Cephalic Distal Arm       Yes      None   1.7 mm Cephalic Prox Forearm      No    Fibrotic Cephalic Mid Forearm       No     Fibrotic Cephalic Distal Forearm    No    Fibrotic Basilic Prox Arm          Yes      None   4.2 mm Basilic Mid Arm           Yes      None   3.7 mm Basilic Distal Arm        Yes      None   3.5 mm Basilic Prox Forearm       No    Fibrotic Basilic Mid Forearm        No    Fibrotic Basilic Distal Forearm     No    Fibrotic  Left                    Compress Thrombus  Diam Cephalic Prox Arm         Yes      None   2.4 mm Cephalic Mid Arm          Yes      None   2.3 mm Cephalic Distal Arm       Yes      None   2.1 mm Cephalic Prox Forearm     Yes      None   3.1 mm Cephalic Mid Forearm      Yes      None   1.8 mm Cephalic Distal Forearm   Yes      None   1.4 mm Basilic Prox Arm          Yes      None   3.3 mm Basilic Mid Arm           Yes      None   2.7 mm Basilic Distal Arm        Yes      None   2.2 mm Basilic Prox Forearm      Yes      None   1.9 mm Basilic Mid Forearm       Yes      None   1.1 mm Basilic Distal Forearm    Yes      None   0.7 mm  [id number] Fabian Phillips MD Electronically signed by [id number] Fabian Phillips MD on 12/18/2024 at 6:21:05 PM  ** Final **     ECG 12 lead    Result Date: 12/17/2024  Sinus rhythm Inferior infarct, old Baseline wander in lead(s) II,III,aVR,aVL,aVF,V1,V2,V5 See ED provider note for full interpretation and clinical correlation Confirmed by Nay Black ([id number]) on 12/17/2024 11:40:07 AM    CT angio aorta and bilateral iliofemoral runoff including without contrast if performed    Result Date: 12/17/2024  STUDY: CT Angiogram of the Abdomen, Pelvis, and Bilateral Lower Extremities; 12/17/2024 6:38 AM INDICATION: Cold left foot. COMPARISON: CTA AP w/runoffs 11/19/2024, CTA chest 11/19/2024, CTA AP w/runoffs 09/13/2024. ACCESSION NUMBER(S): [id number] ORDERING CLINICIAN: BASIM POON TECHNIQUE:  Helical CT is performed from the aortic diaphragmatic hiatus through the feet after bolus administration of 90 mL of Omnipaque 350.  Images are reviewed and processed at a workstation according to  the CT angiogram protocol with 3-D and/or MIP post processing imaging generated. Automated mA/kV exposure control was utilized and patient examination was performed in strict accordance with principles of ALARA. FINDINGS: Vascular: Abdominal Aorta: The celiac, SMA, and DENISE demonstrate no significant stenosis.  The right and left renal arteries demonstrate no significant stenosis. The abdominal aorta is not aneurysmal and demonstrates no significant occlusive disease. Right Run-off: The right common iliac artery shows mild stenosis diffusely.  There is a stent along the entire right external iliac and right common femoral arteries.  The stent demonstrates areas of mild in-stent stenosis. There is an occluded right femoral-popliteal bypass graft. The native right femoral popliteal arteries demonstrate mild atherosclerotic contour.  There is a stent extending from the mid SFA to the proximal popliteal artery.  At the distal SFA the stent demonstrates crush distortion but maintains a patent lumen, though with areas of mild in-stent stenosis. The popliteal artery remains patent throughout its course.  There is patent three-vessel runoff to the ankle, and 2 vessels into the foot. Opacification became too weak to follow the vessels to the plantar arch. Left Run-off: The left common iliac artery demonstrates mild diffuse atherosclerotic stenosis.  The left external iliac artery and left common femoral arteries are relatively normal.  The common femoral artery bifurcation is patent.  The native left SFA occludes just beyond its origin.  The profunda femoral artery and its branches remain patent. There is a chronically occluded left femoral-popliteal bypass graft. The native left SFA does not reconstitute, remaining occluded throughout its course. There is a 2 cm length patent segment of the left popliteal artery, which is otherwise occluded.  Distal to the patent segment, the popliteal artery is occluded across the level of  the joint. The distal popliteal artery reconstitutes remains patent into its trifurcation. 3 patent infrapopliteal vessels are visualized proximally.  The tibial vessels remain patent to the ankle, where the dorsalis pedis and lateral plantar seen across the ankle joint.  The vessels cannot be followed to the plantar arch due to weak contrast opacification. Abdomen: The lung bases are clear.  The liver is unremarkable.  The pancreas, spleen, adrenal glands, and kidneys demonstrate no acute pathology. There is no free air or lymph node enlargement. Pelvis: There is no bowel wall thickening or obstruction.  There is no free fluid.  Lymph nodes are not enlarged.  Urinary bladder is unremarkable. At the left inguinal region there is thick soft tissue in the subcutaneous fat overlying the femoral vessels, as well as 2 prominent lymph nodes. Skeleton:  There are no acute fractures.  No suspicious bony lesions.    No significant aortic disease.  Mild bilateral common iliac artery stenosis.  Patent left external iliac and common femoral arteries. 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  Chronic occlusion of the native left superficial femoral artery. A 2 cm segment of the left P1 popliteal artery reconstitutes.  The terminal left popliteal artery also reconstitutes.  Other portions of the popliteal artery are occluded. Patent three-vessel infrapopliteal runoff to the left hindfoot, beyond which contrast opacification was too weak to visualize. Thick subcutaneous soft tissue - presumed scarred tissue - overlying the left femoral vessels at the left groin.  Correlate for possibility of cellulitis. Patent right iliac and right femoral stents.  Patent right popliteal artery.  Patent three-vessel infrapopliteal runoff to the right ankle. No findings of an acute process in the abdomen or pelvis. Signed by Simeon Barrera MD    Vascular US Lower Extremity Arterial Duplex Bilateral    Result Date: 12/3/2024               [place] [id number]      Phone [phone] Fax [phone]  [facility] Lab Report  [facility]ATERAL Patient Name:      CARMENZA URBINA MAMTA Presley Physician:  [id number] Britney Rush MD Study Date:        12/2/2024            Ordering Provider:  [id number] ADLEA DAGMAR LYLES MRN/PID:           [id number]             Fellow: Accession#:        [id number]         Technologist:       Deb Allen RVT Date of Birth/Age: 1965 / 59 years Technologist 2: Gender:            M                    Encounter#:         [id number] Admission Status:  Outpatient           Location Performed: [facility]  Diagnosis/ICD: Peripheral vascular disease, unspecified-I73.9 CPT Codes:     84044 Peripheral artery Lower arterial Duplex complete  Pertinent History: LLE intervention(11/19/2024).  CONCLUSIONS: Right Lower Arterial: The profunda was monophasic and dampened on prior exam and today is not well visualized and appears occluded. All remaining vessels appear patent. Left Lower Arterial: No occlusion or stenosis noted in the vessels visualized. The distal BARB is dampened and appears nearly occluded. Working around staples and dressings. The EIA is not visualized. The proximal and mid CFA and the profunda is not visualized. Minimal visualization of calf vessels.  Imaging & Doppler Findings:  Right                       Left   PSV                        PSV 89 cm/s         EIA 95 cm/s         CFA        78 cm/s  0 cm/s  Profunda Proximal 80 cm/s    SFA Proximal    88 cm/s 148 cm/s      SFA Mid      100 cm/s 112 cm/s    SFA Distal     108 cm/s 73 cm/s      Popliteal     74 cm/s 100 cm/s   BARB Proximal 86 cm/s       BARB Mid 53 cm/s     BARB Distal     11 cm/s 48 cm/s  Peroneal Proximal 55 cm/s    Peroneal Mid    77 cm/s 40 cm/s   Peroneal Distal 34 cm/s    PTA Proximal    48 cm/s 31 cm/s        PTA Mid 52 cm/s     PTA Distal  [id number] Britney Rush MD Electronically signed by [id number] Britney Rush MD on 12/3/2024 at 11:43:03 AM  ** Final **     Vascular US ankle brachial index (ANSON) without exercise    Result Date: 12/3/2024              [place] [address]      Phone [phone] Fax [phone]  Vascular Lab Report  VASC US ANKLE BRACHIAL INDEX (ANSON) WITHOUT EXERCISE Patient Name:      CARMENZA Presley Physician:  [id number] Britney Rush MD Study Date:        12/2/2024            Ordering Provider:  [id number] ADELA LYLES MRN/PID:           [id number]             Fellow: Accession#:        [id number]         Technologist:       Deb Allen RVT Date of Birth/Age: 1965 / 59 years Technologist 2: Gender:            M                    Encounter#:         [id number] Admission Status:  Outpatient           Location Performed: [facility]  Diagnosis/ICD: Peripheral vascular disease, unspecified-I73.9 CPT Codes:     07426 Peripheral artery ANSON Only  Pertinent History: Recent LLE intervention(11/19/2024).  CONCLUSIONS: Right Lower PVR: No evidence of arterial occlusive disease in the right lower extremity at rest. Normal digital perfusion noted. Multiphasic flow is noted in the right common femoral artery, right posterior tibial artery and right dorsalis pedis artery. Left Lower PVR: Evidence of moderate arterial occlusive disease in the left lower extremity at rest. Decreased digital perfusion noted. Monophasic flow is noted in the left posterior tibial artery and left dorsalis pedis artery. Multiphasic flow is noted in the left common femoral artery.  Comparison: Compared with study from 5/23/2024, The left ANSON decreased from .96 to .67 today. No significant change on the right.  Imaging & Doppler Findings:  RIGHT Lower PVR                Pressures Ratios Right Posterior  Tibial (Ankle) 169 mmHg  0.94 Right Dorsalis Pedis (Ankle)   158 mmHg  0.88 Right Digit (Great Toe)        113 mmHg  0.63   LEFT Lower PVR                Pressures Ratios Left Posterior Tibial (Ankle) 109 mmHg  0.61 Left Dorsalis Pedis (Ankle)   120 mmHg  0.67 Left Digit (Great Toe)        105 mmHg  0.58                      Right Brachial Pressure 180 mmHg   [id number] Britney Rush MD Electronically signed by [id number] Britney Rush MD on 12/3/2024 at 11:38:55 AM  ** Final **       Assessment/Plan   Assessment & Plan  Postoperative abscess    BPH (benign prostatic hyperplasia)    Chronic pain syndrome    Nicotine dependence    S/P CABG x 5    Type 2 diabetes mellitus    Long-term insulin use (Multi)      Dereck URBINA Ac is a 59 y.o. male with past medical history CAD s/p CABG, severe lower extremity PAD with multiple prior revascularizations (right fem-pop bypass 2/24/20, left fem-pop bypass 9/14/20, left fem PTA of the graft, right iliac PTA 2/22, LLE and DANYA PTA 4/24, angioplasty of left SFA 10/21/24, and left femoral and tibial thrombectomy with patch angioplasty 11/19/24), HFrEF, HTN, COPD, BRUNA, multiple prior CVA, HCV, neuropathy, pancreatic insufficiency, GERD, BPH, cLBP, bipolar disorder, PTSD, and tobacco use presented to [facility] ED on 12/30/24 with left groin pain and concern for infection.      Acute on chronic hypoxic respiratory failure  - RRT on 1/125 for sudden shortness of breath, pallor, and diaphoresis. Nurse reported patient had just completed Zosyn  - Chest x-ray consistent with pulmonary edema  - History of HFrEF with EF of 38%  - TTE 11/20/24 revealed poorly visualized anatomical structures due to suboptimal image quality, left ventricular systolic function is moderately decreased with a Simpsons biplane calculated ejection fraction of 38%, abnormal wall motion, spectral Doppler shows an abnormal pattern of left ventricular diastolic filling, and akinetic inferolateral segment and basal  inferior wall.  - Home lasix on hold since admit given SAMEERA  - Appears volume overloaded with pitting edema to bilateral lower extremities and noted with inspiratory and expiratory wheezes.   - S/p Lasix 20mg IV x1 and solucortef 50mg IV x1  -Appears clinically improved s/p lasix with decreased work of breathing, resolution of tachycardia, SPO2 99% on 6L, and improvement of hypertension  - Add on BNP  - Add on IV Lasix 20mg twice daily  -Follow BMP, magnesium, and phosphorus   - Replete electrolytes as necessary  - Continuous telemetry monitoring  - BiPAP 12/5 with backup rate of 16, encourage use in setting of pulmonary edema  - Daily weights  - Strict intake and output.   -Add on Duonebs scheduled and prn  - RT care consult  - Pulmonary toilet.     Acute on chronic HFrEF  - RRT on 1/125 for sudden shortness of breath, pallor, and diaphoresis. Nurse reported patient had just completed Zosyn  - Chest x-ray consistent with pulmonary edema  - History of HFrEF with EF of 38%  - TTE 11/20/24 revealed poorly visualized anatomical structures due to suboptimal image quality, left ventricular systolic function is moderately decreased with a Simpsons biplane calculated ejection fraction of 38%, abnormal wall motion, spectral Doppler shows an abnormal pattern of left ventricular diastolic filling, and akinetic inferolateral segment and basal inferior wall.  - Home lasix on hold since admit given SAMEERA  - Appears volume overloaded with pitting edema to bilateral lower extremities and noted with inspiratory and expiratory wheezes.   - S/p Lasix 20mg IV x1  - Add on BNP  - Add on IV lasix 20mg twice daily  - Follow BMP, magnesium, and phosphorus  - Replete electrolytes as necessary  - Continuous telemetry monitoring   - Daily weights  - Strict intake and output    Concern for possible allergic reaction  - RRT on 1/125 for sudden shortness of breath, pallor, and diaphoresis. Nurse reported patient had just completed Zosyn  - S/p 3  doses of Zosyn  - No hives, stridor, or rashes noted.   - Appears volume overloaded with peripheral edema and less likely allergic reaction  - Clinically improved after IV lasix  - S/p solucortef 50mg IV x1  -Monitor for signs of reaction with next dose of Zosyn and if reaction suspected at that time, will discontinue and switch antibiotic therapy.     COPD with acute exacerbation   - History of cigarette smoking x48 years and recently quit.  - Inspiratory and expiratory wheezes noted on auscultation  - Appears volume-overloaded  - S/p solucortef 50mg and lasix 20mg IV  - Suspect likely exacerbated from CHF exacerbation  - Complains of shortness of breath and moist cough  - Add scheduled Duoneb TID and every 2 hours prn for shortness of breath/wheezing  - Pulmonary toilet   - Add on IV lasix 20mg twice daily    Left groin abscess  - Presented on 12/30/24 with left groin pain, erythema, and drainage from groin incision site   - CT abdomen/pelvis revealed complex fluid collection left groin 2.9 x 4.6 x 7.5 cm is suggestive of abscess, there are multiple locules of gas within the fluid collection as well as mild peripheral enhancement, the fluid collection extends from just below the skin surface and staples deep to the level of the left femoral artery and proximal portion of the [place]-Casey bypass graft, reactive left groin lymphadenopathy adjacent to the abscess as well as proximal left thigh soft tissue edema.  - S/p incision and drainage of left groin site by vascular surgery  - Wound packed with betadine gauze and vascular surgery to change dressing in morning  - Follow wound culture  - Follow blood culture  - Continue antimicrobial therapy with Zosyn and vancomycin, de-escalate as appropriate  - ID consult, input appreciated.   -Vascular surgery following, input appreciated.     History of severe PAD s/p multiple procedures  - History of severe PAD with multiple revascularizations. Most recently left femoral-below  knee bypass with graft on 12/18/24  - Continue home eliquis, aspirin, and brilinta  - Continue oxycodone for pain control  - Wound care per orders.  - Vascular surgery following.      IDDM-II with hyperglycemia  - Continue Lantus nightly  - Continue Humalog 5 units TID before meals and sliding scale  - POCT glucose checks before meals and at bedtime  - Hypoglycemia protocol prn    SAMEERA on CKD  - Creatinine 1.37>>1.51, baseline ~1-1.3  - Follow BMP, magnesium, and phosphorus  - Replete electrolytes as necessary  - Avoid nephrotoxic agents as able  - Hold home entresto in setting of SAMEERA, resume as appropriate  - Hold home oral lasix  -Plan for additional diuresis with IV lasix pending morning labs  - Continue Farxiga   - Strict intake and output  - Daily weights    Hypertension  - Continue home coreg  -Continue hydralazine    History of CAD s/p CABG  -Denies any chest pain or anginal equivalents  -Continue home Lipitor and Zetia  -Continue home aspirin and Brilinta  -Continue home Ranexa  -Continue home Coreg  -Continuous telemetry monitoring  -ECG as needed for ACS symptoms    History of multiple CVA  -History of right PCA stroke in 2019, left MCA stroke in 2021, bihemispheric small infarcts and 2022, and left MCA scattered embolic infarcts in 2023  -Residual right sided weakness to upper and lower extremity from prior stroke  -Continue home Lipitor and Zetia  -Continue home Eliquis, aspirin, and Brilinta    BRUNA  - Has home CPAP and noncompliant with use.   - Recommend adherence to PAP therapy while inpatient at bedtime and daytime naps    Tobacco use disorder  - Reported stopped smoking after 48 years  - Continue NRT  - Encourage continued cessation of tobacco use.     DVT prophylaxis  - Continue home Eliquis    I spent 50 minutes of cumulative critical care time with the patient.  Greater than 50% of that time was spent in the direct collaboration and or coordination of care of the patient.     Ashley dictation  software was used to dictate this note and thus there may be minor errors in translation/transcription including garbled speech or misspellings. Please contact for clarification if needed.

## 2025-01-01 NOTE — PROGRESS NOTES
"GENERAL SURGERY PROGRESS NOTE    Dereck Shen   1965   [id number]     Dereck Shen is a 59 y.o. male on day 1 of admission presenting with Postoperative abscess.      Subjective  PT PANCHO, Overnight was diaphoretic and tachycardic with chest pressure, rapid called. Currently vitals stable work up did show elevated BNP. Receiving lasix. Groin with decreased drainage and erythema    Review of Systems:  Review of Systems   Constitutional:  Negative for appetite change, chills, fatigue and fever.   Respiratory:  Negative for cough, chest tightness and shortness of breath.    Cardiovascular:  Negative for chest pain and palpitations.   Gastrointestinal:  Negative for abdominal pain, diarrhea, nausea and vomiting.   Skin:  Positive for color change and wound.   Neurological:  Negative for dizziness, light-headedness and headaches.     Objective    Last Recorded Vitals  Blood pressure 135/77, pulse 81, temperature 35.9 °C (96.6 °F), resp. rate 16, height 1.803 m (5' 11\"), weight 97.7 kg (215 lb 6.2 oz), SpO2 95%.    Intake/Output last 3 Shifts:  I/O last 3 completed shifts:  In: 770 (7.9 mL/kg) [P.O.:120; IV Piggyback:650]  Out: 1600 (16.4 mL/kg) [Urine:1600 (0.5 mL/kg/hr)]  Weight: 97.7 kg     Intake/Output Summary (Last 24 hours) at 1/1/2025 0958  Last data filed at 1/1/2025 0243  Gross per 24 hour   Intake 720 ml   Output 800 ml   Net -80 ml       Physical Exam  Vitals reviewed.   Constitutional:       General: He is not in acute distress.  HENT:      Head: Normocephalic and atraumatic.      Mouth/Throat:      Mouth: Mucous membranes are moist.      Pharynx: Oropharynx is clear.   Eyes:      General: No scleral icterus.     Conjunctiva/sclera: Conjunctivae normal.      Pupils: Pupils are equal, round, and reactive to light.   Cardiovascular:      Rate and Rhythm: Normal rate.      Pulses: Normal pulses.   Pulmonary:      Effort: Pulmonary effort is normal. No respiratory distress.   Chest:      Chest wall: No " tenderness.   Abdominal:      General: Abdomen is flat. There is no distension.      Palpations: Abdomen is soft.      Tenderness: There is no abdominal tenderness.   Musculoskeletal:         General: No swelling. Normal range of motion.   Skin:     General: Skin is warm and dry.      Capillary Refill: Capillary refill takes 2 to 3 seconds.      Comments: Left groin incision site with serosanguinous drainage coming from the superior portion erythema improving, packing exchanged, no bleeding or necrosis noted, fasciotomy sites healing well with granulation tissue, distal anastomosis site c/d/I with staples and no fluctuance or drainage noted.   Neurological:      Mental Status: He is alert.     Relevant Results  Labs:  Results for orders placed or performed during the hospital encounter of 12/30/24 (from the past 24 hours)   POCT GLUCOSE   Result Value Ref Range    POCT Glucose 212 (H) 74 - 99 mg/dL   POCT GLUCOSE   Result Value Ref Range    POCT Glucose 198 (H) 74 - 99 mg/dL   POCT GLUCOSE   Result Value Ref Range    POCT Glucose 169 (H) 74 - 99 mg/dL   POCT GLUCOSE   Result Value Ref Range    POCT Glucose 178 (H) 74 - 99 mg/dL   CBC and Auto Differential   Result Value Ref Range    WBC 7.0 4.4 - 11.3 x10*3/uL    nRBC 0.0 0.0 - 0.0 /100 WBCs    RBC 3.14 (L) 4.50 - 5.90 x10*6/uL    Hemoglobin 9.7 (L) 13.5 - 17.5 g/dL    Hematocrit 29.4 (L) 41.0 - 52.0 %    MCV 94 80 - 100 fL    MCH 30.9 26.0 - 34.0 pg    MCHC 33.0 32.0 - 36.0 g/dL    RDW 13.3 11.5 - 14.5 %    Platelets 479 (H) 150 - 450 x10*3/uL    Neutrophils % 59.8 40.0 - 80.0 %    Immature Granulocytes %, Automated 0.4 0.0 - 0.9 %    Lymphocytes % 30.0 13.0 - 44.0 %    Monocytes % 7.5 2.0 - 10.0 %    Eosinophils % 1.6 0.0 - 6.0 %    Basophils % 0.7 0.0 - 2.0 %    Neutrophils Absolute 4.21 1.20 - 7.70 x10*3/uL    Immature Granulocytes Absolute, Automated 0.03 0.00 - 0.70 x10*3/uL    Lymphocytes Absolute 2.11 1.20 - 4.80 x10*3/uL    Monocytes Absolute 0.53 0.10 -  1.00 x10*3/uL    Eosinophils Absolute 0.11 0.00 - 0.70 x10*3/uL    Basophils Absolute 0.05 0.00 - 0.10 x10*3/uL   BLOOD GAS VENOUS FULL PANEL   Result Value Ref Range    POCT pH, Venous 7.39 7.33 - 7.43 pH    POCT pCO2, Venous 41 41 - 51 mm Hg    POCT pO2, Venous 65 (H) 35 - 45 mm Hg    POCT SO2, Venous 93 (H) 45 - 75 %    POCT Oxy Hemoglobin, Venous 91.7 (H) 45.0 - 75.0 %    POCT Hematocrit Calculated, Venous 30.0 (L) 41.0 - 52.0 %    POCT Sodium, Venous 134 (L) 136 - 145 mmol/L    POCT Potassium, Venous 5.0 3.5 - 5.3 mmol/L    POCT Chloride, Venous 109 (H) 98 - 107 mmol/L    POCT Ionized Calicum, Venous 1.19 1.10 - 1.33 mmol/L    POCT Glucose, Venous 207 (H) 74 - 99 mg/dL    POCT Lactate, Venous 1.4 0.4 - 2.0 mmol/L    POCT Base Excess, Venous -0.2 -2.0 - 3.0 mmol/L    POCT HCO3 Calculated, Venous 24.8 22.0 - 26.0 mmol/L    POCT Hemoglobin, Venous 10.0 (L) 13.5 - 17.5 g/dL    POCT Anion Gap, Venous 5.0 (L) 10.0 - 25.0 mmol/L    Patient Temperature 37.0 degrees Celsius    FiO2 97 %   B-type natriuretic peptide   Result Value Ref Range    BNP 1,822 (H) 0 - 99 pg/mL   Basic metabolic panel   Result Value Ref Range    Glucose 209 (H) 74 - 99 mg/dL    Sodium 136 136 - 145 mmol/L    Potassium 4.7 3.5 - 5.3 mmol/L    Chloride 105 98 - 107 mmol/L    Bicarbonate 23 21 - 32 mmol/L    Anion Gap 13 10 - 20 mmol/L    Urea Nitrogen 34 (H) 6 - 23 mg/dL    Creatinine 1.30 0.50 - 1.30 mg/dL    eGFR 64 >60 mL/min/1.73m*2    Calcium 8.5 (L) 8.6 - 10.3 mg/dL   Lactate   Result Value Ref Range    Lactate 1.2 0.4 - 2.0 mmol/L   Protime-INR   Result Value Ref Range    Protime 15.0 (H) 9.8 - 12.8 seconds    INR 1.3 (H) 0.9 - 1.1   Magnesium   Result Value Ref Range    Magnesium 2.71 (H) 1.60 - 2.40 mg/dL   Phosphorus   Result Value Ref Range    Phosphorus 4.3 2.5 - 4.9 mg/dL   Vancomycin   Result Value Ref Range    Vancomycin 29.1 (H) 5.0 - 20.0 ug/mL   CBC and Auto Differential   Result Value Ref Range    WBC 7.4 4.4 - 11.3 x10*3/uL     nRBC 0.0 0.0 - 0.0 /100 WBCs    RBC 3.06 (L) 4.50 - 5.90 x10*6/uL    Hemoglobin 9.3 (L) 13.5 - 17.5 g/dL    Hematocrit 28.4 (L) 41.0 - 52.0 %    MCV 93 80 - 100 fL    MCH 30.4 26.0 - 34.0 pg    MCHC 32.7 32.0 - 36.0 g/dL    RDW 13.3 11.5 - 14.5 %    Platelets 438 150 - 450 x10*3/uL    Neutrophils % 85.9 40.0 - 80.0 %    Immature Granulocytes %, Automated 0.3 0.0 - 0.9 %    Lymphocytes % 9.6 13.0 - 44.0 %    Monocytes % 3.5 2.0 - 10.0 %    Eosinophils % 0.3 0.0 - 6.0 %    Basophils % 0.4 0.0 - 2.0 %    Neutrophils Absolute 6.32 1.20 - 7.70 x10*3/uL    Immature Granulocytes Absolute, Automated 0.02 0.00 - 0.70 x10*3/uL    Lymphocytes Absolute 0.71 (L) 1.20 - 4.80 x10*3/uL    Monocytes Absolute 0.26 0.10 - 1.00 x10*3/uL    Eosinophils Absolute 0.02 0.00 - 0.70 x10*3/uL    Basophils Absolute 0.03 0.00 - 0.10 x10*3/uL   Basic Metabolic Panel   Result Value Ref Range    Glucose 230 (H) 74 - 99 mg/dL    Sodium 135 (L) 136 - 145 mmol/L    Potassium 5.2 3.5 - 5.3 mmol/L    Chloride 103 98 - 107 mmol/L    Bicarbonate 24 21 - 32 mmol/L    Anion Gap 13 10 - 20 mmol/L    Urea Nitrogen 35 (H) 6 - 23 mg/dL    Creatinine 1.35 (H) 0.50 - 1.30 mg/dL    eGFR 61 >60 mL/min/1.73m*2    Calcium 8.5 (L) 8.6 - 10.3 mg/dL   POCT GLUCOSE   Result Value Ref Range    POCT Glucose 210 (H) 74 - 99 mg/dL     *Note: Due to a large number of results and/or encounters for the requested time period, some results have not been displayed. A complete set of results can be found in Results Review.       Images:  CT abdomen pelvis w IV contrast   Final Result   Complex fluid collection left groin 2.9 x 4.6 x 7.5 cm is suggestive   of abscess.  There are multiple locules of gas within the fluid   collection as well as mild peripheral enhancement.  The fluid   collection extends from just below the skin surface and staples deep   to the level the left common femoral artery and proximal portion of   the [place] bypass graft.  Reactive left groin  lymphadenopathy   adjacent to the abscess as well as proximal left thigh soft tissue   edema.   Critical findings discussed with Dr. Knutson at 9:00 PM   12/30/2024.   Signed by Geovany Joel MD      XR chest 1 view    (Results Pending)       Assessment and Plan  Assessment & Plan  Postoperative abscess    BPH (benign prostatic hyperplasia)    Chronic pain syndrome    Nicotine dependence    S/P CABG x 5    Type 2 diabetes mellitus    Long-term insulin use (Multi)    59 y.o. male with post operative left groin fluid collection overlying proximal anastomosis site of fem po graft  - Continue broad spectrum abx  - Wound opened drained and packed with betadine gauze at bedside, exchanged today with iodoform gauze. Will assess tomorrow AM and place wound care orders to follow  - Cultures sent  - OK for brilinta and asa and eliqus  - OK for diet, no urgent surgical intervention at this time  - medical management per primary team  - Will monitor the wound        Discussed with attending Dr. Kobi Barrera, DO - PGY3  General Surgery

## 2025-01-01 NOTE — PROGRESS NOTES
Dereck Shen is a 59 y.o. male on day 0 of admission presenting with Postoperative abscess.      Subjective      Dereck Shen is a 59 y.o. male with PMHx of CAD s/p CABG, severe lower extremity PAD with prior revascularization (R fem-pop bypass 2/24/20; L fem-pop bypass 9/14/20; L fem PTA of the graft; R iliac PTA 2/2022; LLE and DANYA PTA 4/2024; angioplasty of the left SFA 10/21/24; and left femoral and tibial thrombectomy with patch angioplasty 11/19/24. He was recently in the hospital in December for critical limb ischemia of left side and had s/p left fem-below knee popliteal bypass with vein patch left distal anastomosis 12/24. He presented today with L groin pain. According to the patient, he was in usual state till Saturday when he started having left groin pain at the surgical site. Home nurse noticed more discharge in last 12 hours and asked him to evaluate with surgeon. Pain comes and goes. Does not radiate. No fever or chills. He therefore came to ER for further evaluation.     He quite smoking after 48 years and uses a nicotine patch. Denied alcohol use. He is compliant with all of his blood thinners including aspirin, Brillinta and apixaban.      ED workup concerning for left groin abscess on the ct scan. Ed contact vascular who recommended medicine admission with vascular consult. Patient was started on broad spectrum abx. Patient will be admitted under hospital medicine for further management.        Past Medical History  He has a past medical history of (HFpEF) heart failure with preserved ejection fraction, Aphasia, Atherosclerotic heart disease of native coronary artery with unspecified angina pectoris (11/05/2019), Bipolar disorder, unspecified (Multi), BPH (benign prostatic hyperplasia), Diabetes (Multi), DVT (deep venous thrombosis) (Multi) (02/2022), Erectile dysfunction, GERD (gastroesophageal reflux disease), Hepatitis C, HLD (hyperlipidemia), HTN (hypertension), Obstructive sleep  apnea (adult) (pediatric), Opioid abuse, in remission (Multi), PAD (peripheral artery disease) (CMS-HCC), Polymyalgia rheumatica (Multi), Post-traumatic stress disorder, unspecified, and Stroke (Multi) (07/24/2023).      12/21: No acute events overnight.  History and chart reviewed.  Patient seen and examined.  Patient with multiple pains.  He was drowsy during interview.  Surgery recommendations appreciated.  Continue broad-spectrum antibiotics.  Continue pain control.  Patient will require at least another 24 to 48 hours of inpatient service.    Objective     Last Recorded Vitals  /74 (BP Location: Left arm, Patient Position: Lying)   Pulse 82   Temp 37 °C (98.6 °F) (Temporal)   Resp 16   Wt 85.3 kg (188 lb)   SpO2 98%   Intake/Output last 3 Shifts:    Intake/Output Summary (Last 24 hours) at 12/31/2024 2013  Last data filed at 12/31/2024 1838  Gross per 24 hour   Intake 420 ml   Output 800 ml   Net -380 ml       Admission Weight  Weight: 85.3 kg (188 lb) (12/30/24 1603)    Daily Weight  12/30/24 : 85.3 kg (188 lb)    Image Results  CT abdomen pelvis w IV contrast  Narrative: STUDY:  CT Abdomen and Pelvis with IV Contrast; 12/30/2024 at 7:20 p.m.  INDICATION:  Left femoral wound infection.  COMPARISON:  CTA AP with runoffs 12/17/2024.  ACCESSION NUMBER(S):  [id number]  ORDERING CLINICIAN:  RICK MACEDO  TECHNIQUE:  CT of the abdomen and pelvis was performed.  Contiguous axial images  were obtained at 3 mm slice thickness through the abdomen and pelvis.   Coronal and sagittal reconstructions at 3 mm slice thickness were  performed.  Omnipaque 350 75 mL was administered intravenously.    FINDINGS:  LOWER CHEST:  No cardiomegaly.  No pericardial effusion.  Small bilateral pleural  effusions     ABDOMEN:     LIVER:  No hepatomegaly.  Smooth surface contour.  Normal attenuation.     BILE DUCTS:  No intrahepatic or extrahepatic biliary ductal dilatation.     GALLBLADDER:  The gallbladder is  negative.  STOMACH:  No abnormalities identified.     PANCREAS:  Negative for pancreatitis     SPLEEN:  No splenomegaly or focal splenic lesion.     ADRENAL GLANDS:  No thickening or nodules.     KIDNEYS AND URETERS:  Kidneys are normal in size and location.  No renal or ureteral  calculi.     PELVIS:     BLADDER:  Portion of the right side of bladder extends into a right inguinal  hernia measuring 3 cm diameter     REPRODUCTIVE ORGANS:  No abnormalities identified.     BOWEL:  Constipation without bowel obstruction.  Normal appendix     VESSELS:  The portal veins, superior mesenteric vein and splenic vein are  patent.  Old occluded right groin bypass graft.  Old occluded left  groin bypass graft.  There is a patent bypass graft left groin arising  from the left common femoral artery and extending distally towards the  left thigh.  Abdominal aorta is normal in caliber.      PERITONEUM/RETROPERITONEUM/LYMPH NODES:  No free fluid.  No pneumoperitoneum.  Left inguinal lymphadenopathy.  2.1 cm left inguinal lymph node image  167 and 2 cm left inguinal lymph node image 163.     ABDOMINAL WALL:  No abnormalities identified.  SOFT TISSUES:   There is a complex fluid collection left groin measuring 2.9 x 4.6 x  7.5 cm.  The fluid collection extends from the skin surface deep  adjacent to the left common femoral artery and surrounds the proximal  portion of the cortex bypass graft.  There are multiple locules of gas  within the fluid collection as well as mild peripheral enhancement.   Proximal left thigh soft tissue swelling.     BONES:  No acute fracture or aggressive osseous lesion.  Degenerative disc  space narrowing L5/S1.  Impression: Complex fluid collection left groin 2.9 x 4.6 x 7.5 cm is suggestive  of abscess.  There are multiple locules of gas within the fluid  collection as well as mild peripheral enhancement.  The fluid  collection extends from just below the skin surface and staples deep  to the level the left  common femoral artery and proximal portion of  the [place] bypass graft.  Reactive left groin lymphadenopathy  adjacent to the abscess as well as proximal left thigh soft tissue  edema.  Critical findings discussed with Dr. Knutson at 9:00 PM  12/30/2024.  Signed by Geovany Joel MD      Physical Exam  CONSTITUTIONAL -drowsy but arousable  SKIN - normal skin color ,warm  HEAD - no trauma, normocephalic  EYES - pupils are equal and reactive to light  CHEST - clear to auscultation, no wheezing, no crackles and no rales, good effort  CARDIAC - regular rate and regular rhythm, no murmur  ABDOMEN - no organomegaly, soft, nontender, left groin wound with discharge cellulitis  EXTREMITIES - no edema, no deformities  NEUROLOGICAL - alert, oriented x3 and no acute focal signs  PSYCHIATRIC - alert, pleasant and cordial, age-appropriate    Relevant Results               Assessment/Plan                  Assessment & Plan  Postoperative abscess    BPH (benign prostatic hyperplasia)    Chronic pain syndrome    Nicotine dependence    S/P CABG x 5    Type 2 diabetes mellitus    Long-term insulin use (Multi)      Left Groin abscess  Left groin pain  Hx of PAD s/p multiple procedures  -Hx of Multiple LLE surgical procedures  -No evidence of sepsis at this time  -Start Zosyn and Vancomycin  -ID consult  -Wound care  -Vascular consult  -NPO midnight for possible intervention  -Hold home medication apixaban (PAD dosing) for now  12/31: Continue broad-spectrum antibiotics.  Surgery recommendation appreciated.  Patient require at least another 24 to 48 hours of inpatient service.     Hx CAD s/p CABG   Continue home Lipitor and Zetia  Continue home aspirin   Holding home Birllinta due to possible procedure, resume asap  Continue home ranexa     Elevated renal indices  Baseline appears to be ~1.0-1.3  Avoid nephrotoxins  Consider nephrology consult if worsening   Hold home lasix and entresto in setting of SAMEERA, resume as appropriate    Continue Farxiga for now     IDDM-II with hyperglycemia  Continue Lantus and humalog TID AC  A1C of 8.2% as of October 2024   Start SSI protocol  Goal glucose <180 for better wound healing   Started home lantus dose  Hold nutr insulin dose once NPO     HTN  Continue home hydralazine      History of multiple CVA  History of R PCA stroke in 2019, L MCA stroke in 2021, bihemispheric small infarcts in 2022, and L MCA scattered embolic infarcts in 2023   Continue as above     COPD without acute exacerbation  Cigarette smoker x48 years, continues to smoke but cut back to 3 cigarettes per day   Albuterol MDI as needed for shortness of breath/wheezing      BRUNA  Noncompliant with CPAP     Tobacco use disorder   Quit last week after 48 years  Nicotine patch     DVT: Holding AC apixaban for possible procedure  Disposition: Patient needs >72 hours of admission for IV abx            Ashley Celestin MD

## 2025-01-01 NOTE — NURSING NOTE
Post Rapid response Report Called to ICU at 0125.    Pt stated he does not want family notified of the Rapid Response and transfer to ICU he will notify his mother in the morning.

## 2025-01-01 NOTE — PROGRESS NOTES
Dereck Shen is a 59 y.o. male on day 1 of admission presenting with Postoperative abscess.      Subjective      Dereck Shen is a 59 y.o. male with PMHx of CAD s/p CABG, severe lower extremity PAD with prior revascularization (R fem-pop bypass 2/24/20; L fem-pop bypass 9/14/20; L fem PTA of the graft; R iliac PTA 2/2022; LLE and DANYA PTA 4/2024; angioplasty of the left SFA 10/21/24; and left femoral and tibial thrombectomy with patch angioplasty 11/19/24. He was recently in the hospital in December for critical limb ischemia of left side and had s/p left fem-below knee popliteal bypass with vein patch left distal anastomosis 12/24. He presented today with L groin pain. According to the patient, he was in usual state till Saturday when he started having left groin pain at the surgical site. Home nurse noticed more discharge in last 12 hours and asked him to evaluate with surgeon. Pain comes and goes. Does not radiate. No fever or chills. He therefore came to ER for further evaluation.     He quite smoking after 48 years and uses a nicotine patch. Denied alcohol use. He is compliant with all of his blood thinners including aspirin, Brillinta and apixaban.      ED workup concerning for left groin abscess on the ct scan. Ed contact vascular who recommended medicine admission with vascular consult. Patient was started on broad spectrum abx. Patient will be admitted under hospital medicine for further management.        Past Medical History  He has a past medical history of (HFpEF) heart failure with preserved ejection fraction, Aphasia, Atherosclerotic heart disease of native coronary artery with unspecified angina pectoris (11/05/2019), Bipolar disorder, unspecified (Multi), BPH (benign prostatic hyperplasia), Diabetes (Multi), DVT (deep venous thrombosis) (Multi) (02/2022), Erectile dysfunction, GERD (gastroesophageal reflux disease), Hepatitis C, HLD (hyperlipidemia), HTN (hypertension), Obstructive sleep  apnea (adult) (pediatric), Opioid abuse, in remission (Multi), PAD (peripheral artery disease) (CMS-HCC), Polymyalgia rheumatica (Multi), Post-traumatic stress disorder, unspecified, and Stroke (Multi) (07/24/2023).      12/31: No acute events overnight.  History and chart reviewed.  Patient seen and examined.  Patient with multiple pains.  He was drowsy during interview.  Surgery recommendations appreciated.  Continue broad-spectrum antibiotics.  Continue pain control.  Patient will require at least another 24 to 48 hours of inpatient service.  1/1: Acute events overnight patient developed SOB with flash pulmonary edema was transferred to the ICU. Pt responded well to Bipap. Today the patient was seen in the ICU and was alert and orientated and comfortable. Pt denies any SOB, chest pain, palpitations or lightheadedness. The interview was predominated with his pains. I have informed him that he was oversedated yesterday and given his BRUNA this has led to heart failure. Pt did admit that he has chronic pain, and was under pain management 15 years ago. We will adjust his pain regimen. Pt is to continue his home BiPAP. Pt will require at least another 24-48 hrs.      Objective     Last Recorded Vitals  /71   Pulse 71   Temp 36.4 °C (97.5 °F)   Resp 16   Wt 97.7 kg (215 lb 6.2 oz)   SpO2 100%   Intake/Output last 3 Shifts:    Intake/Output Summary (Last 24 hours) at 1/1/2025 1145  Last data filed at 1/1/2025 0243  Gross per 24 hour   Intake 520 ml   Output 800 ml   Net -280 ml       Admission Weight  Weight: 85.3 kg (188 lb) (12/30/24 1603)    Daily Weight  01/01/25 : 97.7 kg (215 lb 6.2 oz)    Image Results  XR chest 1 view  Narrative: Interpreted By:  Geovany Amin,   STUDY:  XR CHEST 1 VIEW;  1/1/2025 10:53 am      INDICATION:  Signs/Symptoms:pulmonary edema.          COMPARISON:  Portable chest earlier same day 1 January 2025; CT abdomen and pelvis  with contrast 30 December 2024      ACCESSION  NUMBER(S):  [id number]      ORDERING CLINICIAN:  KALYAN THOMAS      TECHNIQUE:  Single frontal view of the chest; Portable technique      FINDINGS:  Slight increase in right basilar atelectasis      No other interval change from earlier today      Interstitial edema without alveolar component      No large pleural effusion      Impression: As above      MACRO:  None      Signed by: Geovany Amin 1/1/2025 11:22 AM  Dictation workstation:   [id number]  XR chest 1 view  Narrative: Interpreted By:  Aleks Joshi,   STUDY:  XR CHEST 1 VIEW;  1/1/2025 12:49 am      INDICATION:  Signs/Symptoms:sob.      COMPARISON:  12/23/2024      ACCESSION NUMBER(S):  [id number]      ORDERING CLINICIAN:  BELLO GAR      FINDINGS:  No consolidation. Diffuse interstitial pulmonary opacity suggestive  of pulmonary edema. No pleural effusion or pneumothorax. Normal heart  size. No acute osseous abnormality. Sternotomy wires.      Impression: Pulmonary edema.      Signed by: Aleks Joshi 1/1/2025 10:48 AM  Dictation workstation:   [id number]      Physical Exam  CONSTITUTIONAL -alert and orientated, obese   CHEST - clear to auscultation, no wheezing, no crackles and no rales, good effort  CARDIAC - regular rate and regular rhythm, no murmur  ABDOMEN - no organomegaly, soft, nontender, left groin wound with fresh dressing  EXTREMITIES - no edema, no deformities  NEUROLOGICAL - alert, oriented x3 and no acute focal signs  PSYCHIATRIC - alert, pleasant and cordial, age-appropriate    Relevant Results               Assessment/Plan                  Assessment & Plan  Postoperative abscess    BPH (benign prostatic hyperplasia)    Chronic pain syndrome    Nicotine dependence    S/P CABG x 5    Type 2 diabetes mellitus    Long-term insulin use (Multi)      Left Groin abscess secondary to DM  Left groin pain  Hx of PAD s/p multiple procedures  -Hx of Multiple LLE surgical procedures  -No evidence of sepsis at this time  -Start Zosyn and  Vancomycin  -ID consult  -Wound care  -Vascular consult  -NPO midnight for possible intervention  -Hold home medication apixaban (PAD dosing) for now  12/31: Continue broad-spectrum antibiotics.  Surgery recommendation appreciated.  Patient require at least another 24 to 48 hours of inpatient service.  1/1: Surgery and ID recs appreciated. Continue wound care, follow cultures. We  will change pain regimen to oxycodone 5 mg every 6 hrs as needed, increase gabapentin to 600 mg tid, add scheduled tylenol 1000 mg tid. We will place a formal referral to pain management as an outpatient.      Hx CAD s/p CABG   Continue home Lipitor and Zetia  Continue home aspirin   Holding home Birllinta due to possible procedure, resume asap  Continue home ranexa     Diabetic Nephropathy with CKD3  Baseline appears to be ~1.0-1.3  Avoid nephrotoxins  Consider nephrology consult if worsening   Hold home lasix and entresto in setting of SAMEERA, resume as appropriate   Continue Farxiga for now  1/1: Cr labile but seems to be at his baseline range.     IDDM-II with hyperglycemia  Continue Lantus and humalog TID AC  A1C of 8.2% as of October 2024   Start SSI protocol  Goal glucose <180 for better wound healing   Started home lantus dose  Hold nutr insulin dose once NPO     HTN  Continue home hydralazine      History of multiple CVA  History of R PCA stroke in 2019, L MCA stroke in 2021, bihemispheric small infarcts in 2022, and L MCA scattered embolic infarcts in 2023   Continue as above     COPD without acute exacerbation  Cigarette smoker x48 years, continues to smoke but cut back to 3 cigarettes per day   Albuterol MDI as needed for shortness of breath/wheezing      BRUNA  Noncompliant with CPAP     Tobacco use disorder   Quit last week after 48 years  Nicotine patch     DVT: Holding AC apixaban for possible procedure  Disposition: Patient needs >72 hours of admission for IV abx            Ashley Celestin MD

## 2025-01-01 NOTE — PROGRESS NOTES
Patient seen and examined.  Chart reviewed    -currently on RA, no orthopnea, states breathing is back to baseline  -remains on iv abx for groin infection    -respiratory distress most consistent with volume overload  -there are no peripheral eosinophils, in addition he has tolerated prior doses of zosyn hence not consistent with acute hypersensitivity reaction    -continue with maintenance diuretics   -repeat CXR later today    Transfer out of ICU  Raudel Scott MD

## 2025-01-02 ENCOUNTER — APPOINTMENT (OUTPATIENT)
Dept: RADIOLOGY | Facility: HOSPITAL | Age: 60
End: 2025-01-02
Payer: COMMERCIAL

## 2025-01-02 ENCOUNTER — APPOINTMENT (OUTPATIENT)
Dept: UROLOGY | Facility: CLINIC | Age: 60
End: 2025-01-02
Payer: COMMERCIAL

## 2025-01-02 ENCOUNTER — APPOINTMENT (OUTPATIENT)
Dept: VASCULAR SURGERY | Facility: HOSPITAL | Age: 60
End: 2025-01-02
Payer: COMMERCIAL

## 2025-01-02 LAB
ANION GAP SERPL CALC-SCNC: 11 MMOL/L (ref 10–20)
B-LACTAMASE ORGANISM ISLT: POSITIVE
BACTERIA SPEC CULT: ABNORMAL
BACTERIA SPEC CULT: ABNORMAL
BASOPHILS # BLD AUTO: 0.04 X10*3/UL (ref 0–0.1)
BASOPHILS NFR BLD AUTO: 0.8 %
BUN SERPL-MCNC: 38 MG/DL (ref 6–23)
CALCIUM SERPL-MCNC: 7.9 MG/DL (ref 8.6–10.3)
CHLORIDE SERPL-SCNC: 104 MMOL/L (ref 98–107)
CO2 SERPL-SCNC: 24 MMOL/L (ref 21–32)
CREAT SERPL-MCNC: 1.56 MG/DL (ref 0.5–1.3)
EGFRCR SERPLBLD CKD-EPI 2021: 51 ML/MIN/1.73M*2
EOSINOPHIL # BLD AUTO: 0.11 X10*3/UL (ref 0–0.7)
EOSINOPHIL NFR BLD AUTO: 2.1 %
ERYTHROCYTE [DISTWIDTH] IN BLOOD BY AUTOMATED COUNT: 13.4 % (ref 11.5–14.5)
GLUCOSE BLD MANUAL STRIP-MCNC: 149 MG/DL (ref 74–99)
GLUCOSE BLD MANUAL STRIP-MCNC: 162 MG/DL (ref 74–99)
GLUCOSE BLD MANUAL STRIP-MCNC: 162 MG/DL (ref 74–99)
GLUCOSE BLD MANUAL STRIP-MCNC: 187 MG/DL (ref 74–99)
GLUCOSE BLD MANUAL STRIP-MCNC: 196 MG/DL (ref 74–99)
GLUCOSE SERPL-MCNC: 174 MG/DL (ref 74–99)
GRAM STN SPEC: ABNORMAL
HCT VFR BLD AUTO: 23.8 % (ref 41–52)
HGB BLD-MCNC: 7.8 G/DL (ref 13.5–17.5)
IMM GRANULOCYTES # BLD AUTO: 0.01 X10*3/UL (ref 0–0.7)
IMM GRANULOCYTES NFR BLD AUTO: 0.2 % (ref 0–0.9)
LYMPHOCYTES # BLD AUTO: 1.53 X10*3/UL (ref 1.2–4.8)
LYMPHOCYTES NFR BLD AUTO: 28.9 %
MAGNESIUM SERPL-MCNC: 2.52 MG/DL (ref 1.6–2.4)
MCH RBC QN AUTO: 30.2 PG (ref 26–34)
MCHC RBC AUTO-ENTMCNC: 32.8 G/DL (ref 32–36)
MCV RBC AUTO: 92 FL (ref 80–100)
MONOCYTES # BLD AUTO: 0.45 X10*3/UL (ref 0.1–1)
MONOCYTES NFR BLD AUTO: 8.5 %
NEUTROPHILS # BLD AUTO: 3.16 X10*3/UL (ref 1.2–7.7)
NEUTROPHILS NFR BLD AUTO: 59.5 %
NRBC BLD-RTO: 0 /100 WBCS (ref 0–0)
PHOSPHATE SERPL-MCNC: 5.4 MG/DL (ref 2.5–4.9)
PLATELET # BLD AUTO: 377 X10*3/UL (ref 150–450)
POTASSIUM SERPL-SCNC: 4.7 MMOL/L (ref 3.5–5.3)
RBC # BLD AUTO: 2.58 X10*6/UL (ref 4.5–5.9)
SODIUM SERPL-SCNC: 134 MMOL/L (ref 136–145)
VANCOMYCIN SERPL-MCNC: 29.3 UG/ML (ref 5–20)
WBC # BLD AUTO: 5.3 X10*3/UL (ref 4.4–11.3)

## 2025-01-02 PROCEDURE — 2500000002 HC RX 250 W HCPCS SELF ADMINISTERED DRUGS (ALT 637 FOR MEDICARE OP, ALT 636 FOR OP/ED)

## 2025-01-02 PROCEDURE — 2500000001 HC RX 250 WO HCPCS SELF ADMINISTERED DRUGS (ALT 637 FOR MEDICARE OP): Performed by: INTERNAL MEDICINE

## 2025-01-02 PROCEDURE — 36415 COLL VENOUS BLD VENIPUNCTURE: CPT

## 2025-01-02 PROCEDURE — 36573 INSJ PICC RS&I 5 YR+: CPT

## 2025-01-02 PROCEDURE — 85025 COMPLETE CBC W/AUTO DIFF WBC: CPT

## 2025-01-02 PROCEDURE — 87077 CULTURE AEROBIC IDENTIFY: CPT | Mod: PORLAB

## 2025-01-02 PROCEDURE — 2500000004 HC RX 250 GENERAL PHARMACY W/ HCPCS (ALT 636 FOR OP/ED): Mod: JZ | Performed by: INTERNAL MEDICINE

## 2025-01-02 PROCEDURE — 84100 ASSAY OF PHOSPHORUS: CPT

## 2025-01-02 PROCEDURE — 2500000004 HC RX 250 GENERAL PHARMACY W/ HCPCS (ALT 636 FOR OP/ED)

## 2025-01-02 PROCEDURE — 80048 BASIC METABOLIC PNL TOTAL CA: CPT

## 2025-01-02 PROCEDURE — 87081 CULTURE SCREEN ONLY: CPT | Mod: PORLAB | Performed by: INTERNAL MEDICINE

## 2025-01-02 PROCEDURE — 2500000004 HC RX 250 GENERAL PHARMACY W/ HCPCS (ALT 636 FOR OP/ED): Mod: JZ | Performed by: STUDENT IN AN ORGANIZED HEALTH CARE EDUCATION/TRAINING PROGRAM

## 2025-01-02 PROCEDURE — 2500000002 HC RX 250 W HCPCS SELF ADMINISTERED DRUGS (ALT 637 FOR MEDICARE OP, ALT 636 FOR OP/ED): Performed by: INTERNAL MEDICINE

## 2025-01-02 PROCEDURE — 83735 ASSAY OF MAGNESIUM: CPT

## 2025-01-02 PROCEDURE — 2500000001 HC RX 250 WO HCPCS SELF ADMINISTERED DRUGS (ALT 637 FOR MEDICARE OP)

## 2025-01-02 PROCEDURE — C1751 CATH, INF, PER/CENT/MIDLINE: HCPCS

## 2025-01-02 PROCEDURE — 80202 ASSAY OF VANCOMYCIN: CPT

## 2025-01-02 PROCEDURE — 82947 ASSAY GLUCOSE BLOOD QUANT: CPT

## 2025-01-02 PROCEDURE — 2500000005 HC RX 250 GENERAL PHARMACY W/O HCPCS: Performed by: INTERNAL MEDICINE

## 2025-01-02 PROCEDURE — 94640 AIRWAY INHALATION TREATMENT: CPT

## 2025-01-02 PROCEDURE — 94660 CPAP INITIATION&MGMT: CPT

## 2025-01-02 PROCEDURE — S4991 NICOTINE PATCH NONLEGEND: HCPCS

## 2025-01-02 PROCEDURE — 87070 CULTURE OTHR SPECIMN AEROBIC: CPT | Mod: PORLAB

## 2025-01-02 PROCEDURE — 2780000003 HC OR 278 NO HCPCS

## 2025-01-02 PROCEDURE — 1210000001 HC SEMI-PRIVATE ROOM DAILY

## 2025-01-02 PROCEDURE — 99233 SBSQ HOSP IP/OBS HIGH 50: CPT | Performed by: INTERNAL MEDICINE

## 2025-01-02 PROCEDURE — 2500000004 HC RX 250 GENERAL PHARMACY W/ HCPCS (ALT 636 FOR OP/ED): Performed by: INTERNAL MEDICINE

## 2025-01-02 RX ORDER — VANCOMYCIN HYDROCHLORIDE 1 G/200ML
1 INJECTION, SOLUTION INTRAVENOUS EVERY 24 HOURS
Status: DISCONTINUED | OUTPATIENT
Start: 2025-01-02 | End: 2025-01-02

## 2025-01-02 RX ORDER — OXYCODONE HYDROCHLORIDE 5 MG/1
5 TABLET ORAL EVERY 8 HOURS PRN
Status: DISPENSED | OUTPATIENT
Start: 2025-01-02

## 2025-01-02 RX ORDER — LIDOCAINE HYDROCHLORIDE 10 MG/ML
5 INJECTION, SOLUTION INFILTRATION; PERINEURAL ONCE
Status: ACTIVE | OUTPATIENT
Start: 2025-01-02

## 2025-01-02 RX ORDER — PANTOPRAZOLE SODIUM 40 MG/1
40 TABLET, DELAYED RELEASE ORAL
Status: DISPENSED | OUTPATIENT
Start: 2025-01-03

## 2025-01-02 RX ADMIN — CEFEPIME 2 G: 1 INJECTION, SOLUTION INTRAVENOUS at 01:45

## 2025-01-02 RX ADMIN — TAMSULOSIN HYDROCHLORIDE 0.4 MG: 0.4 CAPSULE ORAL at 08:04

## 2025-01-02 RX ADMIN — TICAGRELOR 90 MG: 90 TABLET ORAL at 21:50

## 2025-01-02 RX ADMIN — GABAPENTIN 600 MG: 300 CAPSULE ORAL at 21:50

## 2025-01-02 RX ADMIN — POLYETHYLENE GLYCOL 3350 17 G: 17 POWDER, FOR SOLUTION ORAL at 08:03

## 2025-01-02 RX ADMIN — NICOTINE 1 PATCH: 21 PATCH, EXTENDED RELEASE TRANSDERMAL at 08:03

## 2025-01-02 RX ADMIN — Medication 21 L/MIN: at 12:55

## 2025-01-02 RX ADMIN — GABAPENTIN 600 MG: 300 CAPSULE ORAL at 15:39

## 2025-01-02 RX ADMIN — GABAPENTIN 600 MG: 300 CAPSULE ORAL at 08:03

## 2025-01-02 RX ADMIN — TICAGRELOR 90 MG: 90 TABLET ORAL at 08:04

## 2025-01-02 RX ADMIN — APIXABAN 5 MG: 5 TABLET, FILM COATED ORAL at 08:04

## 2025-01-02 RX ADMIN — FUROSEMIDE 20 MG: 10 INJECTION, SOLUTION INTRAMUSCULAR; INTRAVENOUS at 08:02

## 2025-01-02 RX ADMIN — CEFEPIME 2 G: 1 INJECTION, SOLUTION INTRAVENOUS at 17:33

## 2025-01-02 RX ADMIN — ACETAMINOPHEN 975 MG: 325 TABLET ORAL at 21:50

## 2025-01-02 RX ADMIN — IPRATROPIUM BROMIDE AND ALBUTEROL SULFATE 3 ML: 2.5; .5 SOLUTION RESPIRATORY (INHALATION) at 11:10

## 2025-01-02 RX ADMIN — APIXABAN 5 MG: 5 TABLET, FILM COATED ORAL at 21:50

## 2025-01-02 RX ADMIN — ACETAMINOPHEN 975 MG: 325 TABLET ORAL at 08:04

## 2025-01-02 RX ADMIN — ACETAMINOPHEN 975 MG: 325 TABLET ORAL at 15:39

## 2025-01-02 RX ADMIN — INSULIN LISPRO 5 UNITS: 100 INJECTION, SOLUTION INTRAVENOUS; SUBCUTANEOUS at 08:09

## 2025-01-02 RX ADMIN — OXYCODONE HYDROCHLORIDE 5 MG: 5 TABLET ORAL at 13:56

## 2025-01-02 RX ADMIN — ASPIRIN 81 MG CHEWABLE TABLET 81 MG: 81 TABLET CHEWABLE at 08:03

## 2025-01-02 RX ADMIN — EZETIMIBE 10 MG: 10 TABLET ORAL at 08:03

## 2025-01-02 RX ADMIN — INSULIN GLARGINE 20 UNITS: 100 INJECTION, SOLUTION SUBCUTANEOUS at 01:52

## 2025-01-02 RX ADMIN — OXCARBAZEPINE 450 MG: 150 TABLET, FILM COATED ORAL at 08:03

## 2025-01-02 RX ADMIN — OXYCODONE HYDROCHLORIDE 5 MG: 5 TABLET ORAL at 15:50

## 2025-01-02 RX ADMIN — ISOSORBIDE MONONITRATE 60 MG: 60 TABLET, EXTENDED RELEASE ORAL at 08:03

## 2025-01-02 RX ADMIN — HYDRALAZINE HYDROCHLORIDE 50 MG: 50 TABLET ORAL at 08:03

## 2025-01-02 RX ADMIN — OXYCODONE HYDROCHLORIDE 5 MG: 5 TABLET ORAL at 22:23

## 2025-01-02 RX ADMIN — FUROSEMIDE 20 MG: 10 INJECTION, SOLUTION INTRAMUSCULAR; INTRAVENOUS at 21:49

## 2025-01-02 RX ADMIN — RANOLAZINE 500 MG: 500 TABLET, FILM COATED, EXTENDED RELEASE ORAL at 08:03

## 2025-01-02 RX ADMIN — INSULIN LISPRO 5 UNITS: 100 INJECTION, SOLUTION INTRAVENOUS; SUBCUTANEOUS at 16:13

## 2025-01-02 RX ADMIN — OXYCODONE HYDROCHLORIDE 5 MG: 5 TABLET ORAL at 07:54

## 2025-01-02 RX ADMIN — RANOLAZINE 500 MG: 500 TABLET, FILM COATED, EXTENDED RELEASE ORAL at 21:50

## 2025-01-02 RX ADMIN — ONDANSETRON 4 MG: 2 INJECTION INTRAMUSCULAR; INTRAVENOUS at 09:12

## 2025-01-02 RX ADMIN — Medication 2 L/MIN: at 20:38

## 2025-01-02 RX ADMIN — ATORVASTATIN CALCIUM 80 MG: 40 TABLET, FILM COATED ORAL at 21:49

## 2025-01-02 RX ADMIN — IPRATROPIUM BROMIDE AND ALBUTEROL SULFATE 3 ML: 2.5; .5 SOLUTION RESPIRATORY (INHALATION) at 07:36

## 2025-01-02 RX ADMIN — INSULIN LISPRO 3 UNITS: 100 INJECTION, SOLUTION INTRAVENOUS; SUBCUTANEOUS at 08:10

## 2025-01-02 RX ADMIN — DAPAGLIFLOZIN 10 MG: 5 TABLET, FILM COATED ORAL at 08:03

## 2025-01-02 RX ADMIN — OXCARBAZEPINE 450 MG: 150 TABLET, FILM COATED ORAL at 21:49

## 2025-01-02 RX ADMIN — OXYCODONE HYDROCHLORIDE 5 MG: 5 TABLET ORAL at 01:45

## 2025-01-02 RX ADMIN — CARVEDILOL 6.25 MG: 6.25 TABLET, FILM COATED ORAL at 08:04

## 2025-01-02 RX ADMIN — CEFEPIME 2 G: 1 INJECTION, SOLUTION INTRAVENOUS at 08:47

## 2025-01-02 RX ADMIN — CARVEDILOL 6.25 MG: 6.25 TABLET, FILM COATED ORAL at 21:53

## 2025-01-02 RX ADMIN — ONDANSETRON 4 MG: 2 INJECTION INTRAMUSCULAR; INTRAVENOUS at 17:33

## 2025-01-02 RX ADMIN — INSULIN LISPRO 3 UNITS: 100 INJECTION, SOLUTION INTRAVENOUS; SUBCUTANEOUS at 16:10

## 2025-01-02 ASSESSMENT — COGNITIVE AND FUNCTIONAL STATUS - GENERAL
TOILETING: A LITTLE
HELP NEEDED FOR BATHING: A LITTLE
STANDING UP FROM CHAIR USING ARMS: A LITTLE
DRESSING REGULAR LOWER BODY CLOTHING: A LITTLE
STANDING UP FROM CHAIR USING ARMS: A LITTLE
DRESSING REGULAR UPPER BODY CLOTHING: A LITTLE
DRESSING REGULAR LOWER BODY CLOTHING: A LITTLE
CLIMB 3 TO 5 STEPS WITH RAILING: A LOT
WALKING IN HOSPITAL ROOM: A LITTLE
MOVING TO AND FROM BED TO CHAIR: A LITTLE
TOILETING: A LITTLE
DAILY ACTIVITIY SCORE: 20
MOVING TO AND FROM BED TO CHAIR: A LITTLE
MOBILITY SCORE: 18
TURNING FROM BACK TO SIDE WHILE IN FLAT BAD: A LITTLE
HELP NEEDED FOR BATHING: A LITTLE
CLIMB 3 TO 5 STEPS WITH RAILING: A LOT
DRESSING REGULAR UPPER BODY CLOTHING: A LITTLE
DAILY ACTIVITIY SCORE: 20
WALKING IN HOSPITAL ROOM: A LITTLE
TURNING FROM BACK TO SIDE WHILE IN FLAT BAD: A LITTLE
MOBILITY SCORE: 18

## 2025-01-02 ASSESSMENT — PAIN DESCRIPTION - LOCATION
LOCATION: LEG

## 2025-01-02 ASSESSMENT — PAIN DESCRIPTION - DESCRIPTORS
DESCRIPTORS: DISCOMFORT
DESCRIPTORS: ACHING;DISCOMFORT

## 2025-01-02 ASSESSMENT — PAIN DESCRIPTION - ORIENTATION
ORIENTATION: LEFT

## 2025-01-02 ASSESSMENT — PAIN SCALES - PAIN ASSESSMENT IN ADVANCED DEMENTIA (PAINAD): TOTALSCORE: MEDICATION (SEE MAR)

## 2025-01-02 ASSESSMENT — PAIN - FUNCTIONAL ASSESSMENT
PAIN_FUNCTIONAL_ASSESSMENT: 0-10

## 2025-01-02 ASSESSMENT — PAIN SCALES - GENERAL
PAINLEVEL_OUTOF10: 7
PAINLEVEL_OUTOF10: 5 - MODERATE PAIN
PAINLEVEL_OUTOF10: 8
PAINLEVEL_OUTOF10: 7
PAINLEVEL_OUTOF10: 4
PAINLEVEL_OUTOF10: 6
PAINLEVEL_OUTOF10: 7
PAINLEVEL_OUTOF10: 5 - MODERATE PAIN
PAINLEVEL_OUTOF10: 8
PAINLEVEL_OUTOF10: 5 - MODERATE PAIN
PAINLEVEL_OUTOF10: 5 - MODERATE PAIN

## 2025-01-02 NOTE — PROGRESS NOTES
Social work consult placed for positive medical risk screen. SW reviewed pt's chart and communicated with TCC. No SW needs foreseen at this time. SW signing off; available upon request.    Fran Guillen, MSW, LSW ([id number])   Care Transitions

## 2025-01-02 NOTE — CARE PLAN
The patient's goals for the shift include        Problem: Pain - Adult  Goal: Verbalizes/displays adequate comfort level or baseline comfort level  Outcome: Progressing     Problem: Safety - Adult  Goal: Free from fall injury  Outcome: Progressing     Problem: Discharge Planning  Goal: Discharge to home or other facility with appropriate resources  Outcome: Progressing     Problem: Chronic Conditions and Co-morbidities  Goal: Patient's chronic conditions and co-morbidity symptoms are monitored and maintained or improved  Outcome: Progressing     Problem: Skin  Goal: Decreased wound size/increased tissue granulation at next dressing change  Outcome: Progressing  Flowsheets (Taken 1/2/2025 1620)  Decreased wound size/increased tissue granulation at next dressing change:   Promote sleep for wound healing   Protective dressings over bony prominences  Goal: Participates in plan/prevention/treatment measures  Outcome: Progressing  Flowsheets (Taken 1/2/2025 1620)  Participates in plan/prevention/treatment measures:   Discuss with provider PT/OT consult   Elevate heels   Increase activity/out of bed for meals  Goal: Prevent/manage excess moisture  Outcome: Progressing  Flowsheets (Taken 1/2/2025 1620)  Prevent/manage excess moisture:   Cleanse incontinence/protect with barrier cream   Follow provider orders for dressing changes   Monitor for/manage infection if present  Goal: Prevent/minimize sheer/friction injuries  Outcome: Progressing  Flowsheets (Taken 1/2/2025 1620)  Prevent/minimize sheer/friction injuries:   HOB 30 degrees or less   Complete micro-shifts as needed if patient unable. Adjust patient position to relieve pressure points, not a full turn   Increase activity/out of bed for meals   Turn/reposition every 2 hours/use positioning/transfer devices  Goal: Promote/optimize nutrition  Outcome: Progressing  Flowsheets (Taken 1/2/2025 1620)  Promote/optimize nutrition: Monitor/record intake including meals  Goal:  Promote skin healing  Outcome: Progressing  Flowsheets (Taken 1/2/2025 3184)  Promote skin healing:   Assess skin/pad under line(s)/device(s)   Ensure correct size (line/device) and apply per  instructions   Protective dressings over bony prominences   Rotate device position/do not position patient on device     Problem: Fall/Injury  Goal: Not fall by end of shift  Outcome: Progressing  Goal: Be free from injury by end of the shift  Outcome: Progressing  Goal: Verbalize understanding of personal risk factors for fall in the hospital  Outcome: Progressing  Goal: Verbalize understanding of risk factor reduction measures to prevent injury from fall in the home  Outcome: Progressing  Goal: Use assistive devices by end of the shift  Outcome: Progressing  Goal: Pace activities to prevent fatigue by end of the shift  Outcome: Progressing     Problem: Pain  Goal: Takes deep breaths with improved pain control throughout the shift  Outcome: Progressing  Goal: Turns in bed with improved pain control throughout the shift  Outcome: Progressing  Goal: Walks with improved pain control throughout the shift  Outcome: Progressing  Goal: Performs ADL's with improved pain control throughout shift  Outcome: Progressing  Goal: Participates in PT with improved pain control throughout the shift  Outcome: Progressing  Goal: Free from opioid side effects throughout the shift  Outcome: Progressing  Goal: Free from acute confusion related to pain meds throughout the shift  Outcome: Progressing     Problem: Respiratory  Goal: Clear secretions with interventions this shift  Outcome: Progressing  Goal: Minimize anxiety/maximize coping throughout shift  Outcome: Progressing  Goal: Minimal/no exertional discomfort or dyspnea this shift  Outcome: Progressing  Goal: No signs of respiratory distress (eg. Use of accessory muscles. Peds grunting)  Outcome: Progressing  Goal: Patent airway maintained this shift  Outcome: Progressing      Problem: Nutrition  Goal: Adequate PO fluid intake  Outcome: Progressing  Goal: Nutrition support is meeting 75% of nutrient needs  Outcome: Progressing  Goal: Lab values WNL  Outcome: Progressing  Goal: Electrolytes WNL  Outcome: Progressing  Goal: Promote healing  Outcome: Progressing     Problem: Diabetes  Goal: Achieve decreasing blood glucose levels by end of shift  Outcome: Progressing  Goal: Increase stability of blood glucose readings by end of shift  Outcome: Progressing  Goal: Decrease in ketones present in urine by end of shift  Outcome: Progressing  Goal: Maintain electrolyte levels within acceptable range throughout shift  Outcome: Progressing  Goal: Maintain glucose levels >70mg/dl to <250mg/dl throughout shift  Outcome: Progressing  Goal: No changes in neurological exam by end of shift  Outcome: Progressing  Goal: Learn about and adhere to nutrition recommendations by end of shift  Outcome: Progressing  Goal: Vital signs within normal range for age by end of shift  Outcome: Progressing  Goal: Increase self care and/or family involovement by end of shift  Outcome: Progressing  Goal: Receive DSME education by end of shift  Outcome: Progressing

## 2025-01-02 NOTE — PROGRESS NOTES
Vancomycin Dosing by Pharmacy- FOLLOW UP    Dereck Shen is a 59 y.o. year old male who Pharmacy has been consulted for vancomycin dosing for other abdominal infection/abscess . Based on the patient's indication and renal status this patient is being dosed based on a goal AUC of 500-600.     Renal function is currently declining.    Current vancomycin dose: 750 mg given every 12 hours    Estimated vancomycin AUC on current dose: 708 mg/L.hr     Visit Vitals  /58   Pulse 64   Temp 36.1 °C (97 °F) (Temporal)   Resp (!) 7        Lab Results   Component Value Date    CREATININE 1.56 (H) 2025    CREATININE 1.35 (H) 2025    CREATININE 1.30 2025    CREATININE 1.51 (H) 2024        Patient weight is as follows:   Vitals:    25 0536   Weight: 97.7 kg (215 lb 6.2 oz)       Cultures:  Susceptibility data for the encounter in last 14 days.  Collected Specimen Info Organism   24 Tissue/Biopsy from Skin/Superficial Abscess Enterobacter cloacae complex        I/O last 3 completed shifts:  In: 650 (6.7 mL/kg) [IV Piggyback:650]  Out: 2500 (25.6 mL/kg) [Urine:2500 (0.7 mL/kg/hr)]  Weight: 97.7 kg   I/O during current shift:  No intake/output data recorded.    Temp (24hrs), Av.3 °C (97.3 °F), Min:36.1 °C (97 °F), Max:36.6 °C (97.9 °F)      Assessment/Plan    Above goal AUC. Orders placed for new vancomcyin regimen of 1000mg every 24 hours to begin at .    This dosing regimen is predicted by [employer] to result in the following pharmacokinetic parameters:  Loading dose: N/A  Regimen: 1000 mg IV every 24 hours.  Start time: 21:47 on 2025  Exposure target: AUC24 (range)400-600 mg/L.hr   [id number]: 552 mg/L.hr  AUC24,ss: 486 mg/L.hr  Probability of AUC24 > 400: 96 %  Ctrough,ss: 14 mg/L  Probability of Ctrough,ss > 20: 1 %    The next level will be obtained on  at 0500. May be obtained sooner if clinically indicated.   Will continue to monitor renal function daily while on  vancomycin and order serum creatinine at least every 48 hours if not already ordered.  Follow for continued vancomycin needs, clinical response, and signs/symptoms of toxicity.       Canelo Muñoz, PharmD

## 2025-01-02 NOTE — CARE PLAN
The patient's goals for the shift include  less pain    The clinical goals for the shift include pt. will remain hemodynamically stable    Over the shift, the patient did not make progress toward the following goal: reduction of pain. Barriers to progression include reduced pain medication and increased duration. Recommendations to address these barriers include  stronger pain medications or increase the frequency of administration..

## 2025-01-02 NOTE — PROGRESS NOTES
Dereck Shen is a 59 y.o. male on day 2 of admission presenting with Postoperative abscess.      Subjective      Dereck Shen is a 59 y.o. male with PMHx of CAD s/p CABG, severe lower extremity PAD with prior revascularization (R fem-pop bypass 2/24/20; L fem-pop bypass 9/14/20; L fem PTA of the graft; R iliac PTA 2/2022; LLE and DANYA PTA 4/2024; angioplasty of the left SFA 10/21/24; and left femoral and tibial thrombectomy with patch angioplasty 11/19/24. He was recently in the hospital in December for critical limb ischemia of left side and had s/p left fem-below knee popliteal bypass with vein patch left distal anastomosis 12/24. He presented today with L groin pain. According to the patient, he was in usual state till Saturday when he started having left groin pain at the surgical site. Home nurse noticed more discharge in last 12 hours and asked him to evaluate with surgeon. Pain comes and goes. Does not radiate. No fever or chills. He therefore came to ER for further evaluation.     He quite smoking after 48 years and uses a nicotine patch. Denied alcohol use. He is compliant with all of his blood thinners including aspirin, Brillinta and apixaban.      ED workup concerning for left groin abscess on the ct scan. Ed contact vascular who recommended medicine admission with vascular consult. Patient was started on broad spectrum abx. Patient will be admitted under hospital medicine for further management.        Past Medical History  He has a past medical history of (HFpEF) heart failure with preserved ejection fraction, Aphasia, Atherosclerotic heart disease of native coronary artery with unspecified angina pectoris (11/05/2019), Bipolar disorder, unspecified (Multi), BPH (benign prostatic hyperplasia), Diabetes (Multi), DVT (deep venous thrombosis) (Multi) (02/2022), Erectile dysfunction, GERD (gastroesophageal reflux disease), Hepatitis C, HLD (hyperlipidemia), HTN (hypertension), Obstructive sleep  apnea (adult) (pediatric), Opioid abuse, in remission (Multi), PAD (peripheral artery disease) (CMS-HCC), Polymyalgia rheumatica (Multi), Post-traumatic stress disorder, unspecified, and Stroke (Multi) (07/24/2023).      12/31: No acute events overnight.  History and chart reviewed.  Patient seen and examined.  Patient with multiple pains.  He was drowsy during interview.  Surgery recommendations appreciated.  Continue broad-spectrum antibiotics.  Continue pain control.  Patient will require at least another 24 to 48 hours of inpatient service.  1/1: Acute events overnight patient developed SOB with flash pulmonary edema was transferred to the ICU. Pt responded well to Bipap. Today the patient was seen in the ICU and was alert and orientated and comfortable. Pt denies any SOB, chest pain, palpitations or lightheadedness. The interview was predominated with his pains. I have informed him that he was oversedated yesterday and given his BRUNA this has led to heart failure. Pt did admit that he has chronic pain, and was under pain management 15 years ago. We will adjust his pain regimen. Pt is to continue his home BiPAP. Pt will require at least another 24-48 hrs.  1/2: No acute events overnight.  Patient doing well.  Again the interview was predominated by pain.  His pain was stepped specifically worse when he was moving and sitting on the chair.  Will add Doxy codon 5 mg every 8 hours as needed for breakthrough pain especially after therapy.  ID recommendations appreciated.  Patient will need IV cefepime till 2/11/2025.  PICC line ordered.  Home health care ordered.  Start discharge process.  Patient will need close management with his PCP and pain management clinic.    Objective     Last Recorded Vitals  BP 81/59   Pulse 61   Temp 36.1 °C (97 °F) (Temporal)   Resp 15   Wt 97.7 kg (215 lb 6.2 oz)   SpO2 91%   Intake/Output last 3 Shifts:    Intake/Output Summary (Last 24 hours) at 1/2/2025 0910  Last data filed at  1/2/2025 0538  Gross per 24 hour   Intake 300 ml   Output 1700 ml   Net -1400 ml       Admission Weight  Weight: 85.3 kg (188 lb) (12/30/24 1603)    Daily Weight  01/01/25 : 97.7 kg (215 lb 6.2 oz)    Image Results  XR chest 1 view  Narrative: Interpreted By:  Geovany Amin,   STUDY:  XR CHEST 1 VIEW;  1/1/2025 10:53 am      INDICATION:  Signs/Symptoms:pulmonary edema.          COMPARISON:  Portable chest earlier same day 1 January 2025; CT abdomen and pelvis  with contrast 30 December 2024      ACCESSION NUMBER(S):  [id number]      ORDERING CLINICIAN:  KALYAN THOMAS      TECHNIQUE:  Single frontal view of the chest; Portable technique      FINDINGS:  Slight increase in right basilar atelectasis      No other interval change from earlier today      Interstitial edema without alveolar component      No large pleural effusion      Impression: As above      MACRO:  None      Signed by: Geovany Amin 1/1/2025 11:22 AM  Dictation workstation:   [id number]  XR chest 1 view  Narrative: Interpreted By:  Aleks Joshi,   STUDY:  XR CHEST 1 VIEW;  1/1/2025 12:49 am      INDICATION:  Signs/Symptoms:sob.      COMPARISON:  12/23/2024      ACCESSION NUMBER(S):  [id number]      ORDERING CLINICIAN:  BELLO GAR      FINDINGS:  No consolidation. Diffuse interstitial pulmonary opacity suggestive  of pulmonary edema. No pleural effusion or pneumothorax. Normal heart  size. No acute osseous abnormality. Sternotomy wires.      Impression: Pulmonary edema.      Signed by: Aleks Joshi 1/1/2025 10:48 AM  Dictation workstation:   [id number]      Physical Exam  CONSTITUTIONAL -alert and orientated, obese   CHEST - clear to auscultation, no wheezing  CARDIAC - regular rate and regular rhythm, no murmur  ABDOMEN - no organomegaly, soft, nontender, left groin wound with fresh dressing  EXTREMITIES - no edema, no deformities  NEUROLOGICAL - alert, oriented x3 and no acute focal signs  PSYCHIATRIC - alert, pleasant and cordial,  age-appropriate    Relevant Results               Assessment/Plan                  Assessment & Plan  Postoperative abscess    BPH (benign prostatic hyperplasia)    Chronic pain syndrome    Nicotine dependence    S/P CABG x 5    Type 2 diabetes mellitus    Long-term insulin use (Multi)      Left Groin abscess secondary to DM  Left groin pain  Hx of PAD s/p multiple procedures  -Hx of Multiple LLE surgical procedures  -No evidence of sepsis at this time  -Start Zosyn and Vancomycin  -ID consult  -Wound care  -Vascular consult  -NPO midnight for possible intervention  -Hold home medication apixaban (PAD dosing) for now  12/31: Continue broad-spectrum antibiotics.  Surgery recommendation appreciated.  Patient require at least another 24 to 48 hours of inpatient service.  1/1: Surgery and ID recs appreciated. Continue wound care, follow cultures. We  will change pain regimen to oxycodone 5 mg every 6 hrs as needed, increase gabapentin to 600 mg tid, add scheduled tylenol 1000 mg tid. We will place a formal referral to pain management as an outpatient.  1/2: Patient remains on IV vancomycin and Zosyn.  Cefepime was also added.  Blood cultures remain negative.  Wound cultures for report still pending.  ID and surgery recommendation appreciated.  Patient will require PICC line placement on IV cefepime for 6 weeks.  PICC line ordered.  Monitor renal function closely as his creatinine is creeping up and is now up to 1.56 which is at the higher level of his baseline range.  Pain management referral on discharge.     Hx CAD s/p CABG   Continue home Lipitor and Zetia  Continue home aspirin   Holding home Birllinta due to possible procedure, resume asap  Continue home ranexa     Diabetic Nephropathy with CKD3  Baseline appears to be ~1.0-1.3  Avoid nephrotoxins  Consider nephrology consult if worsening   Hold home lasix and entresto in setting of SAMEERA, resume as appropriate   Continue Farxiga for now  1/1: Cr labile but seems to  be at his baseline range.     IDDM-II with hyperglycemia  Continue Lantus and humalog TID AC  A1C of 8.2% as of October 2024   Start SSI protocol  Goal glucose <180 for better wound healing   Started home lantus dose  Hold nutr insulin dose once NPO  1/2: Continue current treatment.  Sugars remain well-controlled     HTN  Continue home hydralazine      History of multiple CVA  History of R PCA stroke in 2019, L MCA stroke in 2021, bihemispheric small infarcts in 2022, and L MCA scattered embolic infarcts in 2023   Continue as above     COPD without acute exacerbation  Cigarette smoker x48 years, continues to smoke but cut back to 3 cigarettes per day   Albuterol MDI as needed for shortness of breath/wheezing      BRUNA  Noncompliant with CPAP     Tobacco use disorder   Quit last week after 48 years  Nicotine patch     DVT: Holding AC apixaban for possible procedure  Disposition: Patient needs >72 hours of admission for IV abx            Ashley Celestin MD

## 2025-01-02 NOTE — PROGRESS NOTES
Vancomycin Dosing by Pharmacy- Cessation of Therapy    Consult to pharmacy for vancomycin dosing has been discontinued by the prescriber, pharmacy will sign off at this time.    Please call pharmacy if there are further questions or re-enter a consult if vancomycin is resumed.     Je Nance [facility]

## 2025-01-02 NOTE — PROGRESS NOTES
Dereck Shen is a 59 y.o. male on day 2 of admission presenting with Postoperative abscess.      Assessment/Plan:     #Postop left groin abscess with concern for graft infection  #Enterobacter cloacae organism  #AmpC organism  Abscess overlying proximal anastomosis site of the femoropopliteal graft.  As per vascular, will be more cautious in his case given extensive history of surgeries and will treat it as if the graft is infected.      #Acute kidney injury  Estimated Creatinine Clearance: 61.5 mL/min (A) (by C-G formula based on SCr of 1.56 mg/dL (H)).    CAD s/p CABG  PAD s/p bypass and graft  DVT  DM, HTN     Recommendations:     -DC vancomycin  -Continue Cefepime 2 g every 8 hours through 02/11/25 to finish 6-week course of Antibiotics  -Renally dose antibiotics  -Obtain PICC line  -Will continue to follow      Gorge Quintero MD  Date of service: 1/2/2025  Time of service: 12:25 PM      Subjective   Interval History: No acute events overnight.  Reports episode of nausea with cefepime.        Review of Systems  Denies: fever, chills, vomiting, diarrhea, dysuria    Objective   Range of Vitals (last 24 hours)  Heart Rate:  [61-82]   Temp:  [36.1 °C (97 °F)-36.6 °C (97.9 °F)]   Resp:  [7-30]   BP: ()/(54-89)   SpO2:  [91 %-100 %]  Daily Weight  01/01/25 : 97.7 kg (215 lb 6.2 oz)   Body mass index is 30.04 kg/m².      General: alert, oriented, NAD  HEENT: conjunctival pallor, no scleral icterus  Lungs: bilaterally clear to auscultation, no wheezing  Abdomen: soft, non tender, non distended  Neuro: AAO x 3  Extremities: LLE dressing, L groin stitches with packing with open wound without any surrounding erythema or drainage  Skin: As above       Antibiotics  cefepime - 2 gram/100 mL      Relevant Results  Labs  Results from last 72 hours   Lab Units 01/02/25  0503 01/01/25  0450 01/01/25  0044   WBC AUTO x10*3/uL 5.3 7.4 7.0   HEMOGLOBIN g/dL 7.8* 9.3* 9.7*   HEMATOCRIT % 23.8* 28.4* 29.4*   PLATELETS AUTO  x10*3/uL 377 438 479*   NEUTROS PCT AUTO % 59.5 85.9 59.8   LYMPHS PCT AUTO % 28.9 9.6 30.0   MONOS PCT AUTO % 8.5 3.5 7.5   EOS PCT AUTO % 2.1 0.3 1.6     Results from last 72 hours   Lab Units 01/02/25  0503 01/01/25  0450 01/01/25  0045   SODIUM mmol/L 134* 135* 136   POTASSIUM mmol/L 4.7 5.2 4.7   CHLORIDE mmol/L 104 103 105   CO2 mmol/L 24 24 23   BUN mg/dL 38* 35* 34*   CREATININE mg/dL 1.56* 1.35* 1.30   GLUCOSE mg/dL 174* 230* 209*   CALCIUM mg/dL 7.9* 8.5* 8.5*   ANION GAP mmol/L 11 13 13   EGFR mL/min/1.73m*2 51* 61 64   PHOSPHORUS mg/dL 5.4*  --  4.3     Results from last 72 hours   Lab Units 12/30/24  1753   ALK PHOS U/L 79   BILIRUBIN TOTAL mg/dL 0.3   PROTEIN TOTAL g/dL 6.8   ALT U/L 8*   AST U/L 10   ALBUMIN g/dL 3.2*     Estimated Creatinine Clearance: 61.5 mL/min (A) (by C-G formula based on SCr of 1.56 mg/dL (H)).  C-Reactive Protein   Date Value Ref Range Status   12/30/2024 7.35 (H) <1.00 mg/dL Final   04/24/2024 0.15 <1.00 mg/dL Final       Microbiology  Susceptibility data from last 14 days.  Collected Specimen Info Organism Amoxicillin/Clavulanate Ampicillin Ampicillin/Sulbactam Cefazolin Cefepime Ciprofloxacin Gentamicin Levofloxacin Piperacillin/Tazobactam Trimethoprim/Sulfamethoxazole   12/30/24 Tissue/Biopsy from Skin/Superficial Abscess Enterobacter cloacae complex  R  R  R  R  S  S  S  S  S  S       Imaging    XR chest 1 view    Result Date: 1/1/2025  As above   MACRO: None   Signed by: Geovany Amin 1/1/2025 11:22 AM Dictation workstation:   [id number]    XR chest 1 view    Result Date: 1/1/2025  Pulmonary edema.   Signed by: Aleks Joshi 1/1/2025 10:48 AM Dictation workstation:   [id number]    CT abdomen pelvis w IV contrast    Result Date: 12/30/2024  Complex fluid collection left groin 2.9 x 4.6 x 7.5 cm is suggestive of abscess.  There are multiple locules of gas within the fluid collection as well as mild peripheral enhancement.  The fluid collection extends from just below  the skin surface and staples deep to the level the left common femoral artery and proximal portion of the [place] bypass graft.  Reactive left groin lymphadenopathy adjacent to the abscess as well as proximal left thigh soft tissue edema. Critical findings discussed with Dr. Knutson at 9:00 PM 12/30/2024. Signed by Geovany Joel MD    XR chest 1 view    Result Date: 12/23/2024  No acute cardiopulmonary process is evident.   MACRO: None   Signed by: Lawrence Leahy 12/23/2024 9:34 PM Dictation workstation:   [id number]    CT angio aorta and bilateral iliofemoral runoff including without contrast if performed    Result Date: 12/17/2024  No significant aortic disease.  Mild bilateral common iliac artery stenosis.  Patent left external iliac and common femoral arteries. 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  Chronic occlusion of the native left superficial femoral artery. A 2 cm segment of the left P1 popliteal artery reconstitutes.  The terminal left popliteal artery also reconstitutes.  Other portions of the popliteal artery are occluded. Patent three-vessel infrapopliteal runoff to the left hindfoot, beyond which contrast opacification was too weak to visualize. Thick subcutaneous soft tissue - presumed scarred tissue - overlying the left femoral vessels at the left groin.  Correlate for possibility of cellulitis. Patent right iliac and right femoral stents.  Patent right popliteal artery.  Patent three-vessel infrapopliteal runoff to the right ankle. No findings of an acute process in the abdomen or pelvis. Signed by Simeon Barrera MD

## 2025-01-02 NOTE — DISCHARGE INSTRUCTIONS
Treatment protocols recommended:  Left lower leg open incisions- Cleanse with vashe, apply aquacel ag cut to size, cover with ABD and wrap with kerlix every other day/prn. Saturate aquacel ag with NS when removing old dressing. Leave proximal incision with staples open to air.   Left groin- Defer to vascular recommendations.   Continue to off load, turning at least every 2 hours. Offload heels.   Outpatient wound care follow up upon discharge.     Follow up with PCP in 1-2 weeks. Call to schedule. Bring all your discharge paperwork and medications when you go to your follow up appointment. Return to ED if your symptoms come back.    Follow-up with vascular surgery 1 to 2 weeks.  Follow-up with ID

## 2025-01-02 NOTE — TREATMENT PLAN
Patients Name: Dereck Shen  YOB: 1965  MRN: [id number]  Contact number: [phone]      [facility]UN[facility] OUTPATIENT ANTIMICROBIAL THERAPY ([facility]PAT)    ID diagnosis -  Left groin Enterobacter cloacae graft infection       Antimicrobials -  Meropenem 2 g every 8 hours through 02/11/25 to finish 6-week course of Antibiotics     Labs -   CBC w diff, CMP every monday       Follow-up - 2-3 weeks       Line Instructions:  Please remove PICC after abx completed unless follow up specified above  PICC line dressing changes and flushes per protocol       Contact:-  Please fax to [phone].  Please call [phone] with questions.      Gorge Quintero MD  Date of service: 1/2/2025

## 2025-01-02 NOTE — CONSULTS
Wound Care Consult     Visit Date: 1/2/2025      Patient Name: Dereck Shen         MRN: [id number]           YOB: 1965      Pertinent Labs:   Albumin   Date Value Ref Range Status   12/30/2024 3.2 (L) 3.4 - 5.0 g/dL Final   12/06/2019 4.0 3.4 - 5.0 g/dL Final     ALBUMIN (MG/L) IN URINE   Date Value Ref Range Status   04/04/2023 1,008.6 Not Established mg/L Final   Dietician consult reccommended.     Wound Assessment:  Wound 12/18/24 Incision Groin (Active)   Site Assessment Unable to assess 01/02/25 1143   Kyung-Wound Assessment Unable to assess 12/31/24 1526   Margins Attached edges 12/31/24 0742   Closure Staples 01/02/25 0400   Sutures/Staple Line Approximated 12/31/24 0742   Drainage Description Unable to assess 12/31/24 1526   Drainage Amount Unable to assess 12/31/24 1526   Dressing ABD 01/02/25 0400   Dressing Changed Reinforced 01/01/25 0800   Dressing Status Clean;Dry 01/02/25 1145       Wound 12/18/24 Incision Pretibial Left;Proximal (Active)   Wound Image   01/02/25 1142   Site Assessment Red;Tan;Yellow;Sloughing 01/02/25 1142   Kyung-Wound Assessment Burg[place];Pink 01/02/25 1142   Shape linear 01/02/25 1142   Wound Length (cm) 7 cm 01/02/25 1142   Wound Width (cm) 1 cm 01/02/25 1142   Wound Surface Area (cm^2) 7 cm^2 01/02/25 1142   Wound Depth (cm) 0.3 cm 01/02/25 1142   Wound Volume (cm^3) 2.1 cm^3 01/02/25 1142   Wound Healing % 71 01/02/25 1142   State of Healing Non-healing 01/02/25 1142   Margins Well-defined edges 01/02/25 1142   Treatments Cleansed 01/02/25 1142   Drainage Description Serosanguineous 01/02/25 1142   Drainage Amount Moderate 01/02/25 1142   Dressing Other (Comment) 01/02/25 1142   Dressing Changed Changed 01/02/25 1142   Dressing Status Clean;Dry 01/02/25 1145       Wound 12/31/24 Incision Tibial Dorsal;Left;Lateral (Active)   Wound Image   01/02/25 1143   Site Assessment Red;Yellow;Tan;Sloughing 01/02/25 1143   Kyung-Wound Assessment [place] 01/02/25 1143    Shape linear 01/02/25 1143   Wound Length (cm) 7 cm 01/02/25 1143   Wound Width (cm) 1.5 cm 01/02/25 1143   Wound Surface Area (cm^2) 10.5 cm^2 01/02/25 1143   Wound Depth (cm) 0.2 cm 01/02/25 1143   Wound Volume (cm^3) 2.1 cm^3 01/02/25 1143   State of Healing Non-healing 01/02/25 1143   Margins Well-defined edges 01/02/25 1143   Treatments Cleansed 01/02/25 1143   Drainage Description Serosanguineous 01/02/25 1143   Drainage Amount Moderate 01/02/25 1143   Dressing Other (Comment) 01/02/25 1143   Dressing Changed Changed 01/02/25 1143   Dressing Status Clean;Dry 01/02/25 1145       Wound 01/02/25 Incision Pretibial Left;Proximal (Active)   Site Assessment Pink;Intact 01/02/25 1143   Shape 14 staples 01/02/25 1143   Closure Staples 01/02/25 1143   Dressing Open to air 01/02/25 1143     Patient seen for incisional wounds (present on admission) complicated by PMH: CAD s/p CABG, severe lower extremity PAD with prior revascularization (R fem-pop bypass 2/24/20; L fem-pop bypass 9/14/20; L fem PTA of the graft; R iliac PTA 2/2022; LLE and DANYA PTA 4/2024; angioplasty of the left SFA 10/21/24; and left femoral and tibial thrombectomy with patch angioplasty 11/19/24, he was recently in the hospital in December for critical limb ischemia of left side and had s/p left fem-below knee popliteal bypass with vein patch left distal anastomosis 12/24  per chart. Exam conducted with patient's mother at bedside who cares for him at home. Proximal incision with staples to left leg clean, skin intact, approximated, no drainage. Prior surgical wounds open to left lower leg- anterior and posterior/lateral. Culture obtained of leg wound per order. Left groin previously cultured- positive- see results. Vascular surgery on consult for left groin wound- Tony Barrera had seen patient this AM and changed packing/dressing per patient. Defer to vascular recommendations for wound care of groin. Patients mother states she has concerns regarding  wound care of groin, states she is [age] some years old, works part time and does not think she will be able to manage groin wound. Secure chat sent to Tony Barrera with concerns as well as requesting dressing care order. Patient's mother also states she has not got any shipments for supplies and that she has purchased her own and is running out. Patient previously seen by wound care in [facility]- plan to follow up as well upon discharge. Will notify wound center patient needs supplies ordered. Supplies given to patient for home upon discharge and education provided for wound care of left lower leg wounds.  Skin hygiene and dressing care preformed. See detailed assessment above from flowsheet. Recommendations below, reviewed with Dr. Celestin.     Treatment protocols recommended:  Left lower leg open incisions- Cleanse with vashe, apply aquacel ag cut to size, cover with ABD and wrap with kerlix every other day/prn. Saturate aquacel ag with NS when removing old dressing. Leave proximal incision with staples open to air.   Left groin- Defer to vascular recommendations.   Continue to off load, turning at least every 2 hours. Offload heels.     Therapeutic surface: Patient on Patient on [facility] AIR (ICU bed) during exam. Nate 19. Staff to continue to encourage and assist patient when needed with turning/repositioning atleast every 2 hours. Offload heels.     Nursing updated, continue pressure injury preventions, wound care to be completed by nursing per orders and re-consult wound RN if needed.    See above recommendations for treatment. I would recommend follow up in [facility] upon discharge. Groin follow up recommendations per vascular.     Please contact me with questions or changes in patient condition.  Doretha Chakraborty. RN  Wound/Ostomy Care  [phone]

## 2025-01-02 NOTE — PROGRESS NOTES
"GENERAL SURGERY PROGRESS NOTE    Dereck Shen   1965   [id number]     Dereck Shen is a 59 y.o. male on day 2 of admission presenting with Postoperative abscess.      Subjective  PT PANCHO, NAEON, packing changed at bedside, patient with no acute concerns, ID following planning for 6 wk course of abx    Review of Systems:  Review of Systems   Constitutional:  Negative for appetite change, chills, fatigue and fever.   Respiratory:  Negative for cough, chest tightness and shortness of breath.    Cardiovascular:  Negative for chest pain and palpitations.   Gastrointestinal:  Negative for abdominal pain, diarrhea, nausea and vomiting.   Skin:  Positive for color change and wound.   Neurological:  Negative for dizziness, light-headedness and headaches.     Objective    Last Recorded Vitals  Blood pressure 122/74, pulse 76, temperature 36.2 °C (97.1 °F), temperature source Temporal, resp. rate 18, height 1.803 m (5' 11\"), weight 97.7 kg (215 lb 6.2 oz), SpO2 99%.    Intake/Output last 3 Shifts:  I/O last 3 completed shifts:  In: 650 (6.7 mL/kg) [IV Piggyback:650]  Out: 2500 (25.6 mL/kg) [Urine:2500 (0.7 mL/kg/hr)]  Weight: 97.7 kg     Intake/Output Summary (Last 24 hours) at 1/2/2025 1730  Last data filed at 1/2/2025 1621  Gross per 24 hour   Intake 300 ml   Output 1350 ml   Net -1050 ml       Physical Exam  Vitals reviewed.   Constitutional:       General: He is not in acute distress.  HENT:      Head: Normocephalic and atraumatic.      Mouth/Throat:      Mouth: Mucous membranes are moist.      Pharynx: Oropharynx is clear.   Eyes:      General: No scleral icterus.     Conjunctiva/sclera: Conjunctivae normal.      Pupils: Pupils are equal, round, and reactive to light.   Cardiovascular:      Rate and Rhythm: Normal rate.      Pulses: Normal pulses.   Pulmonary:      Effort: Pulmonary effort is normal. No respiratory distress.   Chest:      Chest wall: No tenderness.   Abdominal:      General: Abdomen is flat. " There is no distension.      Palpations: Abdomen is soft.      Tenderness: There is no abdominal tenderness.   Musculoskeletal:         General: No swelling. Normal range of motion.   Skin:     General: Skin is warm and dry.      Capillary Refill: Capillary refill takes 2 to 3 seconds.      Comments: Left groin incision site with serosanguinous drainage coming from the superior portion erythema improving, packing exchanged, no bleeding or necrosis noted  Neurological:      Mental Status: He is alert.     Relevant Results  Labs:  Results for orders placed or performed during the hospital encounter of 12/30/24 (from the past 24 hours)   POCT GLUCOSE   Result Value Ref Range    POCT Glucose 167 (H) 74 - 99 mg/dL   POCT GLUCOSE   Result Value Ref Range    POCT Glucose 172 (H) 74 - 99 mg/dL   POCT GLUCOSE   Result Value Ref Range    POCT Glucose 162 (H) 74 - 99 mg/dL   CBC and Auto Differential   Result Value Ref Range    WBC 5.3 4.4 - 11.3 x10*3/uL    nRBC 0.0 0.0 - 0.0 /100 WBCs    RBC 2.58 (L) 4.50 - 5.90 x10*6/uL    Hemoglobin 7.8 (L) 13.5 - 17.5 g/dL    Hematocrit 23.8 (L) 41.0 - 52.0 %    MCV 92 80 - 100 fL    MCH 30.2 26.0 - 34.0 pg    MCHC 32.8 32.0 - 36.0 g/dL    RDW 13.4 11.5 - 14.5 %    Platelets 377 150 - 450 x10*3/uL    Neutrophils % 59.5 40.0 - 80.0 %    Immature Granulocytes %, Automated 0.2 0.0 - 0.9 %    Lymphocytes % 28.9 13.0 - 44.0 %    Monocytes % 8.5 2.0 - 10.0 %    Eosinophils % 2.1 0.0 - 6.0 %    Basophils % 0.8 0.0 - 2.0 %    Neutrophils Absolute 3.16 1.20 - 7.70 x10*3/uL    Immature Granulocytes Absolute, Automated 0.01 0.00 - 0.70 x10*3/uL    Lymphocytes Absolute 1.53 1.20 - 4.80 x10*3/uL    Monocytes Absolute 0.45 0.10 - 1.00 x10*3/uL    Eosinophils Absolute 0.11 0.00 - 0.70 x10*3/uL    Basophils Absolute 0.04 0.00 - 0.10 x10*3/uL   Basic Metabolic Panel   Result Value Ref Range    Glucose 174 (H) 74 - 99 mg/dL    Sodium 134 (L) 136 - 145 mmol/L    Potassium 4.7 3.5 - 5.3 mmol/L    Chloride  104 98 - 107 mmol/L    Bicarbonate 24 21 - 32 mmol/L    Anion Gap 11 10 - 20 mmol/L    Urea Nitrogen 38 (H) 6 - 23 mg/dL    Creatinine 1.56 (H) 0.50 - 1.30 mg/dL    eGFR 51 (L) >60 mL/min/1.73m*2    Calcium 7.9 (L) 8.6 - 10.3 mg/dL   Magnesium   Result Value Ref Range    Magnesium 2.52 (H) 1.60 - 2.40 mg/dL   Phosphorus   Result Value Ref Range    Phosphorus 5.4 (H) 2.5 - 4.9 mg/dL   Vancomycin   Result Value Ref Range    Vancomycin 29.3 (H) 5.0 - 20.0 ug/mL   POCT GLUCOSE   Result Value Ref Range    POCT Glucose 187 (H) 74 - 99 mg/dL   POCT GLUCOSE   Result Value Ref Range    POCT Glucose 149 (H) 74 - 99 mg/dL   POCT GLUCOSE   Result Value Ref Range    POCT Glucose 162 (H) 74 - 99 mg/dL     *Note: Due to a large number of results and/or encounters for the requested time period, some results have not been displayed. A complete set of results can be found in Results Review.       Images:  XR chest 1 view   Final Result   As above        MACRO:   None        Signed by: Geovany Amin 1/1/2025 11:22 AM   Dictation workstation:   [id number]      XR chest 1 view   Final Result   Pulmonary edema.        Signed by: Aleks Joshi 1/1/2025 10:48 AM   Dictation workstation:   [id number]      CT abdomen pelvis w IV contrast   Final Result   Complex fluid collection left groin 2.9 x 4.6 x 7.5 cm is suggestive   of abscess.  There are multiple locules of gas within the fluid   collection as well as mild peripheral enhancement.  The fluid   collection extends from just below the skin surface and staples deep   to the level the left common femoral artery and proximal portion of   the [place] bypass graft.  Reactive left groin lymphadenopathy   adjacent to the abscess as well as proximal left thigh soft tissue   edema.   Critical findings discussed with Dr. Knutson at 9:00 PM   12/30/2024.   Signed by Geovany Joel MD      Bedside PICC Imaging    (Results Pending)       Assessment and Plan  Assessment & Plan  Postoperative  abscess    BPH (benign prostatic hyperplasia)    Chronic pain syndrome    Nicotine dependence    S/P CABG x 5    Type 2 diabetes mellitus    Long-term insulin use (Multi)    59 y.o. male with post operative left groin fluid collection overlying proximal anastomosis site of fem po graft  - Continue broad spectrum abx, ID following  - Wound packing exchanged today with iodoform gauze. wound care orders placed  - OK for brilinta and asa and eliqus  - OK for diet, no urgent surgical intervention at this time  - medical management per primary team  - Will monitor the wound  - OK for transfer out of unit        Discussed with attending Dr. Kobi Barrera, DO - PGY3  General Surgery

## 2025-01-02 NOTE — PROGRESS NOTES
01/02/25 1252   Discharge Planning   Living Arrangements Alone   Support Systems Family members   Assistance Needed Wound Clinic, Home Health Care   Type of Residence Private residence   Home or Post Acute Services In home services   Type of Home Care Services Home PT;Home OT;Home nursing visits   Expected Discharge Disposition Home Health   Patient Choice   Patient / Family choosing to utilize agency / facility established prior to hospitalization Yes   Intensity of Service   Intensity of Service 0-30 min     Met with patient, from home. He is currently active with [facility] and will resume those services on discharge. Patient also follows with wound care clinic. He states his mother and sister are able to assist with his wound care as well. He was doing fine since last admission until he started having groin pain, the nurse with home care noticed redness to site and recommended ER visit. He is admitted for likely abscess to area. TCC to continue to follow for discharge planning. Possible need for IV abx, ID on board to assist.    Patient will require IV infusions, Dr Celestin aware to place [facility] in system to start SOC process.     Long discussion with patient and mother at bedside regarding the need for IV infusions, also his wound needs packing and mother does not feel comfortable with that at home. Discussed possible SNF for IV abx and wound care if approved, they would like to talk it over with patient's sister whom may be able to help if she feels comfortable. Information was passed on to TCC covering tomorrow to follow up with choice.

## 2025-01-03 ENCOUNTER — APPOINTMENT (OUTPATIENT)
Dept: PRIMARY CARE | Facility: CLINIC | Age: 60
End: 2025-01-03
Payer: COMMERCIAL

## 2025-01-03 DIAGNOSIS — I99.8 LIMB ISCHEMIA: ICD-10-CM

## 2025-01-03 LAB
ANION GAP SERPL CALC-SCNC: 10 MMOL/L (ref 10–20)
BUN SERPL-MCNC: 32 MG/DL (ref 6–23)
CALCIUM SERPL-MCNC: 7.9 MG/DL (ref 8.6–10.3)
CHLORIDE SERPL-SCNC: 101 MMOL/L (ref 98–107)
CO2 SERPL-SCNC: 25 MMOL/L (ref 21–32)
CREAT SERPL-MCNC: 1.35 MG/DL (ref 0.5–1.3)
EGFRCR SERPLBLD CKD-EPI 2021: 61 ML/MIN/1.73M*2
GLUCOSE BLD MANUAL STRIP-MCNC: 144 MG/DL (ref 74–99)
GLUCOSE BLD MANUAL STRIP-MCNC: 152 MG/DL (ref 74–99)
GLUCOSE BLD MANUAL STRIP-MCNC: 177 MG/DL (ref 74–99)
GLUCOSE BLD MANUAL STRIP-MCNC: 208 MG/DL (ref 74–99)
GLUCOSE SERPL-MCNC: 162 MG/DL (ref 74–99)
POTASSIUM SERPL-SCNC: 4.5 MMOL/L (ref 3.5–5.3)
SODIUM SERPL-SCNC: 131 MMOL/L (ref 136–145)

## 2025-01-03 PROCEDURE — 99233 SBSQ HOSP IP/OBS HIGH 50: CPT | Performed by: INTERNAL MEDICINE

## 2025-01-03 PROCEDURE — 2500000001 HC RX 250 WO HCPCS SELF ADMINISTERED DRUGS (ALT 637 FOR MEDICARE OP): Performed by: INTERNAL MEDICINE

## 2025-01-03 PROCEDURE — 2500000005 HC RX 250 GENERAL PHARMACY W/O HCPCS: Performed by: INTERNAL MEDICINE

## 2025-01-03 PROCEDURE — 80048 BASIC METABOLIC PNL TOTAL CA: CPT | Performed by: INTERNAL MEDICINE

## 2025-01-03 PROCEDURE — 2500000002 HC RX 250 W HCPCS SELF ADMINISTERED DRUGS (ALT 637 FOR MEDICARE OP, ALT 636 FOR OP/ED): Performed by: INTERNAL MEDICINE

## 2025-01-03 PROCEDURE — 1210000001 HC SEMI-PRIVATE ROOM DAILY

## 2025-01-03 PROCEDURE — 94640 AIRWAY INHALATION TREATMENT: CPT

## 2025-01-03 PROCEDURE — 51701 INSERT BLADDER CATHETER: CPT

## 2025-01-03 PROCEDURE — 2500000004 HC RX 250 GENERAL PHARMACY W/ HCPCS (ALT 636 FOR OP/ED): Performed by: STUDENT IN AN ORGANIZED HEALTH CARE EDUCATION/TRAINING PROGRAM

## 2025-01-03 PROCEDURE — S4991 NICOTINE PATCH NONLEGEND: HCPCS | Performed by: INTERNAL MEDICINE

## 2025-01-03 PROCEDURE — 2500000001 HC RX 250 WO HCPCS SELF ADMINISTERED DRUGS (ALT 637 FOR MEDICARE OP): Performed by: PHARMACIST

## 2025-01-03 PROCEDURE — 2500000004 HC RX 250 GENERAL PHARMACY W/ HCPCS (ALT 636 FOR OP/ED): Performed by: INTERNAL MEDICINE

## 2025-01-03 PROCEDURE — 2500000004 HC RX 250 GENERAL PHARMACY W/ HCPCS (ALT 636 FOR OP/ED): Mod: JZ | Performed by: INTERNAL MEDICINE

## 2025-01-03 PROCEDURE — 02HV33Z INSERTION OF INFUSION DEVICE INTO SUPERIOR VENA CAVA, PERCUTANEOUS APPROACH: ICD-10-PCS | Performed by: STUDENT IN AN ORGANIZED HEALTH CARE EDUCATION/TRAINING PROGRAM

## 2025-01-03 PROCEDURE — 82947 ASSAY GLUCOSE BLOOD QUANT: CPT

## 2025-01-03 PROCEDURE — 36569 INSJ PICC 5 YR+ W/O IMAGING: CPT

## 2025-01-03 PROCEDURE — 36415 COLL VENOUS BLD VENIPUNCTURE: CPT | Performed by: INTERNAL MEDICINE

## 2025-01-03 RX ORDER — INSULIN LISPRO 100 [IU]/ML
10 INJECTION, SOLUTION INTRAVENOUS; SUBCUTANEOUS
Qty: 15 ML | Refills: 0 | Status: SHIPPED | OUTPATIENT
Start: 2025-01-03 | End: 2025-02-02

## 2025-01-03 RX ORDER — ERTAPENEM 1 G/1
1 INJECTION, POWDER, LYOPHILIZED, FOR SOLUTION INTRAMUSCULAR; INTRAVENOUS DAILY
Status: DISCONTINUED | OUTPATIENT
Start: 2025-01-03 | End: 2025-01-05

## 2025-01-03 RX ADMIN — CEFEPIME 2 G: 1 INJECTION, SOLUTION INTRAVENOUS at 01:33

## 2025-01-03 RX ADMIN — INSULIN GLARGINE 20 UNITS: 100 INJECTION, SOLUTION SUBCUTANEOUS at 01:33

## 2025-01-03 RX ADMIN — ISOSORBIDE MONONITRATE 60 MG: 60 TABLET, EXTENDED RELEASE ORAL at 09:17

## 2025-01-03 RX ADMIN — GABAPENTIN 600 MG: 300 CAPSULE ORAL at 09:17

## 2025-01-03 RX ADMIN — INSULIN LISPRO 5 UNITS: 100 INJECTION, SOLUTION INTRAVENOUS; SUBCUTANEOUS at 17:00

## 2025-01-03 RX ADMIN — Medication 1 L/MIN: at 09:28

## 2025-01-03 RX ADMIN — OXYCODONE HYDROCHLORIDE 5 MG: 5 TABLET ORAL at 15:41

## 2025-01-03 RX ADMIN — GABAPENTIN 600 MG: 300 CAPSULE ORAL at 21:30

## 2025-01-03 RX ADMIN — RANOLAZINE 500 MG: 500 TABLET, FILM COATED, EXTENDED RELEASE ORAL at 21:30

## 2025-01-03 RX ADMIN — OXCARBAZEPINE 450 MG: 150 TABLET, FILM COATED ORAL at 09:17

## 2025-01-03 RX ADMIN — Medication 21 PERCENT: at 21:38

## 2025-01-03 RX ADMIN — TAMSULOSIN HYDROCHLORIDE 0.4 MG: 0.4 CAPSULE ORAL at 09:17

## 2025-01-03 RX ADMIN — POLYETHYLENE GLYCOL 3350 17 G: 17 POWDER, FOR SOLUTION ORAL at 09:16

## 2025-01-03 RX ADMIN — INSULIN LISPRO 5 UNITS: 100 INJECTION, SOLUTION INTRAVENOUS; SUBCUTANEOUS at 09:26

## 2025-01-03 RX ADMIN — OXCARBAZEPINE 450 MG: 150 TABLET, FILM COATED ORAL at 21:30

## 2025-01-03 RX ADMIN — ATORVASTATIN CALCIUM 80 MG: 40 TABLET, FILM COATED ORAL at 21:30

## 2025-01-03 RX ADMIN — INSULIN LISPRO 6 UNITS: 100 INJECTION, SOLUTION INTRAVENOUS; SUBCUTANEOUS at 12:38

## 2025-01-03 RX ADMIN — ERTAPENEM SODIUM 1 G: 1 INJECTION, POWDER, LYOPHILIZED, FOR SOLUTION INTRAMUSCULAR; INTRAVENOUS at 15:31

## 2025-01-03 RX ADMIN — ACETAMINOPHEN 975 MG: 325 TABLET ORAL at 09:17

## 2025-01-03 RX ADMIN — ASPIRIN 81 MG CHEWABLE TABLET 81 MG: 81 TABLET CHEWABLE at 09:16

## 2025-01-03 RX ADMIN — ONDANSETRON 4 MG: 2 INJECTION INTRAMUSCULAR; INTRAVENOUS at 21:31

## 2025-01-03 RX ADMIN — DAPAGLIFLOZIN 10 MG: 5 TABLET, FILM COATED ORAL at 09:16

## 2025-01-03 RX ADMIN — NICOTINE 1 PATCH: 21 PATCH, EXTENDED RELEASE TRANSDERMAL at 09:18

## 2025-01-03 RX ADMIN — TICAGRELOR 90 MG: 90 TABLET ORAL at 09:18

## 2025-01-03 RX ADMIN — CARVEDILOL 6.25 MG: 6.25 TABLET, FILM COATED ORAL at 09:17

## 2025-01-03 RX ADMIN — CEFEPIME 2 G: 1 INJECTION, SOLUTION INTRAVENOUS at 09:34

## 2025-01-03 RX ADMIN — ONDANSETRON 4 MG: 2 INJECTION INTRAMUSCULAR; INTRAVENOUS at 01:46

## 2025-01-03 RX ADMIN — APIXABAN 5 MG: 5 TABLET, FILM COATED ORAL at 21:30

## 2025-01-03 RX ADMIN — APIXABAN 5 MG: 5 TABLET, FILM COATED ORAL at 09:16

## 2025-01-03 RX ADMIN — INSULIN LISPRO 5 UNITS: 100 INJECTION, SOLUTION INTRAVENOUS; SUBCUTANEOUS at 12:41

## 2025-01-03 RX ADMIN — INSULIN LISPRO 3 UNITS: 100 INJECTION, SOLUTION INTRAVENOUS; SUBCUTANEOUS at 09:19

## 2025-01-03 RX ADMIN — INSULIN LISPRO 3 UNITS: 100 INJECTION, SOLUTION INTRAVENOUS; SUBCUTANEOUS at 17:00

## 2025-01-03 RX ADMIN — TICAGRELOR 90 MG: 90 TABLET ORAL at 21:30

## 2025-01-03 RX ADMIN — ACETAMINOPHEN 975 MG: 325 TABLET ORAL at 21:30

## 2025-01-03 RX ADMIN — CARVEDILOL 6.25 MG: 6.25 TABLET, FILM COATED ORAL at 21:39

## 2025-01-03 RX ADMIN — RANOLAZINE 500 MG: 500 TABLET, FILM COATED, EXTENDED RELEASE ORAL at 09:17

## 2025-01-03 RX ADMIN — IPRATROPIUM BROMIDE AND ALBUTEROL SULFATE 3 ML: 2.5; .5 SOLUTION RESPIRATORY (INHALATION) at 11:27

## 2025-01-03 RX ADMIN — ACETAMINOPHEN 975 MG: 325 TABLET ORAL at 15:31

## 2025-01-03 RX ADMIN — IPRATROPIUM BROMIDE AND ALBUTEROL SULFATE 3 ML: 2.5; .5 SOLUTION RESPIRATORY (INHALATION) at 21:38

## 2025-01-03 RX ADMIN — GABAPENTIN 600 MG: 300 CAPSULE ORAL at 15:31

## 2025-01-03 RX ADMIN — OXYCODONE HYDROCHLORIDE 5 MG: 5 TABLET ORAL at 08:06

## 2025-01-03 RX ADMIN — PANTOPRAZOLE SODIUM 40 MG: 40 TABLET, DELAYED RELEASE ORAL at 06:20

## 2025-01-03 RX ADMIN — EZETIMIBE 10 MG: 10 TABLET ORAL at 09:16

## 2025-01-03 ASSESSMENT — COGNITIVE AND FUNCTIONAL STATUS - GENERAL
TURNING FROM BACK TO SIDE WHILE IN FLAT BAD: A LITTLE
MOBILITY SCORE: 18
DAILY ACTIVITIY SCORE: 20
DRESSING REGULAR UPPER BODY CLOTHING: A LITTLE
STANDING UP FROM CHAIR USING ARMS: A LITTLE
DRESSING REGULAR UPPER BODY CLOTHING: A LITTLE
STANDING UP FROM CHAIR USING ARMS: A LITTLE
TOILETING: A LITTLE
CLIMB 3 TO 5 STEPS WITH RAILING: A LOT
CLIMB 3 TO 5 STEPS WITH RAILING: A LOT
TOILETING: A LITTLE
TURNING FROM BACK TO SIDE WHILE IN FLAT BAD: A LITTLE
WALKING IN HOSPITAL ROOM: A LITTLE
DRESSING REGULAR LOWER BODY CLOTHING: A LITTLE
MOVING TO AND FROM BED TO CHAIR: A LITTLE
HELP NEEDED FOR BATHING: A LITTLE
WALKING IN HOSPITAL ROOM: A LITTLE
MOBILITY SCORE: 18
HELP NEEDED FOR BATHING: A LITTLE
DRESSING REGULAR LOWER BODY CLOTHING: A LITTLE
DAILY ACTIVITIY SCORE: 20
MOVING TO AND FROM BED TO CHAIR: A LITTLE

## 2025-01-03 ASSESSMENT — PAIN DESCRIPTION - LOCATION: LOCATION: LEG

## 2025-01-03 ASSESSMENT — PAIN - FUNCTIONAL ASSESSMENT
PAIN_FUNCTIONAL_ASSESSMENT: 0-10
PAIN_FUNCTIONAL_ASSESSMENT: 0-10

## 2025-01-03 ASSESSMENT — PAIN SCALES - GENERAL
PAINLEVEL_OUTOF10: 6
PAINLEVEL_OUTOF10: 8
PAINLEVEL_OUTOF10: 6
PAINLEVEL_OUTOF10: 8
PAINLEVEL_OUTOF10: 5 - MODERATE PAIN

## 2025-01-03 ASSESSMENT — PAIN DESCRIPTION - ORIENTATION
ORIENTATION: LEFT
ORIENTATION: LEFT

## 2025-01-03 NOTE — CONSULTS
"Nutrition Initial Assessment:   Nutrition Assessment    Reason for Assessment: Provider consult order    Medical history per chart:   Dereck Shen is a 59 y.o. male with PMHx of CAD s/p CABG, severe lower extremity PAD with prior revascularization (R fem-pop bypass 2/24/20; L fem-pop bypass 9/14/20; L fem PTA of the graft; R iliac PTA 2/2022; LLE and DANYA PTA 4/2024; angioplasty of the left SFA 10/21/24; and left femoral and tibial thrombectomy with patch angioplasty 11/19/24. He was recently in the hospital in December for critical limb ischemia of left side and had s/p left fem-below knee popliteal bypass with vein patch left distal anastomosis 12/24. He presented today with L groin pain.   ~wound care following for incisional wounds,   ~ID on consult for Left groin Enterobacter cloacae graft infection, s/p PICC    1/3: Attempted to call patient, however per nursing he is getting a PICC. She stated he did not eat breakfast.  Last admission he reported consuming >75% of meals (improvement from first admitted) and was consuming ensure max with meals. Will readd. Also increased carb allotment to 5 per meal to better meet needs.     Nutrition History:     Food and Nutrient History: Per RD from 12/19/24: Typically eats fruits, vegetables, nuts/seeds, Premier Protein shake before every meal. Occasionally has seafood or filet mignon. Pt hypothesized that he was \"overeating on the good stuff\" prior to admission, though did have a decreased appetite. 1/3: unable to assess at this time        Current Diet: Adult diet Consistent Carb; CCD 75 gm/meal    Nutrition Related Findings:   Oral Symptoms: none     GI symptoms: RD unable to assess GI symptoms at this time.   Food allergies: NKFA.   Meds/Labs reviewed.  acetaminophen, 975 mg, oral, TID  apixaban, 5 mg, oral, BID  aspirin, 81 mg, oral, Daily  atorvastatin, 80 mg, oral, Nightly  carvedilol, 6.25 mg, oral, q12h  cefepime, 2 g, intravenous, q8h  dapagliflozin " "propanediol, 10 mg, oral, Daily with breakfast  ezetimibe, 10 mg, oral, Daily  furosemide, 20 mg, intravenous, BID  [Held by provider] furosemide, 40 mg, oral, Daily  gabapentin, 600 mg, oral, TID  [Held by provider] hydrALAZINE, 50 mg, oral, TID  insulin glargine, 20 Units, subcutaneous, q24h  insulin lispro, 0-15 Units, subcutaneous, TID AC  insulin lispro, 5 Units, subcutaneous, TID AC  ipratropium-albuteroL, 3 mL, nebulization, TID  isosorbide mononitrate ER, 60 mg, oral, Daily  lidocaine, 5 mL, infiltration, Once  nicotine, 1 patch, transdermal, Daily  OXcarbazepine, 450 mg, oral, BID  oxygen, , inhalation, Continuous - Inhalation  pantoprazole, 40 mg, oral, Daily before breakfast  polyethylene glycol, 17 g, oral, Daily  ranolazine, 500 mg, oral, BID  [Held by provider] sacubitriL-valsartan, 1 tablet, oral, BID  tamsulosin, 0.4 mg, oral, Daily  ticagrelor, 90 mg, oral, BID           Nutrition Significant Labs:  Results from last 7 days   Lab Units 01/03/25  0407 01/02/25  0503 01/01/25  0450 01/01/25  0045   GLUCOSE mg/dL 162* 174* 230* 209*   SODIUM mmol/L 131* 134* 135* 136   POTASSIUM mmol/L 4.5 4.7 5.2 4.7   CHLORIDE mmol/L 101 104 103 105   CO2 mmol/L 25 24 24 23   BUN mg/dL 32* 38* 35* 34*   CREATININE mg/dL 1.35* 1.56* 1.35* 1.30   EGFR mL/min/1.73m*2 61 51* 61 64   CALCIUM mg/dL 7.9* 7.9* 8.5* 8.5*   PHOSPHORUS mg/dL  --  5.4*  --  4.3   MAGNESIUM mg/dL  --  2.52*  --  2.71*     Lab Results   Component Value Date    HGBA1C 7.9 (H) 12/18/2024    HGBA1C 8.2 (A) 10/01/2024     Results from last 7 days   Lab Units 01/03/25  1127 01/03/25  0644 01/02/25 2025 01/02/25  1601 01/02/25  1202 01/02/25  0737 01/02/25  0147 01/01/25 2212   POCT GLUCOSE mg/dL 208* 152* 196* 162* 149* 187* 162* 172*       Anthropometrics:  Height: 180.3 cm (5' 11\")   Weight: 96.9 kg (213 lb 11.2 oz)   BMI (Calculated): 29.82           Weight History:   Wt Readings from Last 10 Encounters:   01/03/25 96.9 kg (213 lb 11.2 oz) "   12/27/24 85.3 kg (188 lb)   12/24/24 96.6 kg (212 lb 15.4 oz)   12/03/24 91.2 kg (201 lb)   11/22/24 84.4 kg (186 lb)   11/21/24 94.4 kg (208 lb 1.8 oz)   10/22/24 88.9 kg (196 lb)   10/01/24 85.9 kg (189 lb 6.4 oz)   09/19/24 83.9 kg (185 lb)   09/13/24 83.9 kg (185 lb)      Weight Change %:  Weight History / % Weight Change: Per RD 12/19/24: Pt reports he began trying to lose wt in ?2008 at 278#; has lost wt gradually over time and has now been stable at 185-187# x 1-1.5 years. Chart review of wt hx reveals fluctuations between 185-208# since May 2024--may be related to fluid fluctuations with CHF diagnosis. 1/3: 213 lbs, but with BLE edema. Weight has been fluctuating due to fluid           Nutrition Focused Physical Exam Findings:  defer: RD covering remotely  Subcutaneous Fat Loss:      Muscle Wasting:     Edema:     Physical Findings:       Estimated Needs:   Total Energy Estimated Needs (kCal):  (4471-0574)  Method for Estimating Needs: 25-30 kcal/kg, IBW  Total Protein Estimated Needs (g): 101 g  Method for Estimating Needs: 1.3 g/kg IBW     Method for Estimating Needs: 1 ml/kcal or per MD        Nutrition Diagnosis   Nutrition Diagnosis:  Malnutrition Diagnosis  Patient has Malnutrition Diagnosis:  (insufficient data)    Nutrition Diagnosis  Patient has Nutrition Diagnosis: Yes  Diagnosis Status (1): New  Nutrition Diagnosis 1: Increased nutrient needs  Related to (1): increased metabolic demand secondary to healing  As Evidenced by (1): multiple wounds documented       Nutrition Interventions/Recommendations   Nutrition Interventions and Recommendations:        Nutrition Prescription:  Individualized Nutrition Prescription Provided for : Liberalized diet to 5 carbs per meal to better meet estimated needs. Added ensure max with meals        Nutrition Interventions:   Food and/or Nutrient Delivery Interventions  Interventions: Meals and snacks  Meals and Snacks: Carbohydrate-modified diet  Goal: consume  >75%    Coordination of Nutrition Care by a Nutrition Professional  Collaboration and Referral of Nutrition Care: Collaboration by nutrition professional with other providers  Goal: discussed with RN    Nutrition Education:   Education Documentation  No documentation found.      Nutrition Counseling  Counseling Theoretical Approach: Other (Comment)  Goal: defer       Nutrition Monitoring and Evaluation   Monitoring/Evaluation:   Food/Nutrient Related History Monitoring  Monitoring and Evaluation Plan: Energy intake  Energy Intake: Estimated energy intake  Criteria: meet >75%    Body Composition/Growth/Weight History  Monitoring and Evaluation Plan: Weight  Weight: Weight change  Criteria: stable    Biochemical Data, Medical Tests and Procedures  Monitoring and Evaluation Plan: Electrolyte/renal panel, Glucose/endocrine profile  Electrolyte and Renal Panel: Magnesium, Potassium, Phosphorus, Sodium  Criteria: wnl  Glucose/Endocrine Profile: Glucose, casual  Criteria: wnl    Nutrition Focused Physical Findings  Monitoring and Evaluation Plan: Skin  Skin: Impaired wound healing  Criteria: promote healing            Time Spent/Follow-up Reminder:   Follow Up  Time Spent (min): 30 minutes  Last Date of Nutrition Visit: 01/03/25  Nutrition Follow-Up Needed?: Dietitian to reassess per policy  Follow up Comment: 1/6-7

## 2025-01-03 NOTE — NURSING NOTE
Bladder scanned pt due to complaints of urinary retention after having trouble trying to urinate this morning. He was retaining 1098ml, let  know about it and if he wanted to do a straight cath or brown over secure chat, as well as called extension. Waiting for orders.

## 2025-01-03 NOTE — PROGRESS NOTES
"Dereck Shen is a 59 y.o. male on day 3 of admission presenting with Postoperative abscess.    Subjective   Minimal pain at incisional sites. ID following planning for 6 week course of antibiotics.got PICC line today. Had urinary retention overnight.        Objective     Physical Exam  Vitals reviewed.   Constitutional:       General: He is not in acute distress.  HENT:      Head: Normocephalic and atraumatic.      Mouth/Throat:      Mouth: Mucous membranes are moist.      Pharynx: Oropharynx is clear.   Eyes:      General: No scleral icterus.     Conjunctiva/sclera: Conjunctivae normal.      Pupils: Pupils are equal, round, and reactive to light.   Cardiovascular:      Rate and Rhythm: Normal rate.      Pulses: Normal pulses.   Pulmonary:      Effort: Pulmonary effort is normal. No respiratory distress.   Chest:      Chest wall: No tenderness.   Abdominal:      General: Abdomen is flat. There is no distension.      Palpations: Abdomen is soft.      Tenderness: There is no abdominal tenderness.   Musculoskeletal:         General: No swelling. Normal range of motion.   Skin:     General: Skin is warm and dry.      Capillary Refill: Capillary refill takes 2 to 3 seconds.      Comments: Left groin incision site with serosanguinous drainage coming from the superior portion, packing exchanged with acquacel, no bleeding or necrosis noted  Neurological:      Mental Status: He is alert.   Last Recorded Vitals  Blood pressure 111/64, pulse 71, temperature (!) 31 °C (87.8 °F), temperature source Temporal, resp. rate 14, height 1.803 m (5' 11\"), weight 96.9 kg (213 lb 11.2 oz), SpO2 93%.  Vitals:    01/03/25 0527 01/03/25 0645 01/03/25 0738 01/03/25 0959   BP: 102/63   111/64   BP Location: Left arm   Right arm   Patient Position: Lying   Lying   Pulse: 64   71   Resp: 16   14   Temp: 36.6 °C (97.9 °F)   (!) 31 °C (87.8 °F)   TempSrc: Temporal   Temporal   SpO2: 96%  96% 93%   Weight:  96.9 kg (213 lb 11.2 oz)     Height: "          Intake/Output last 3 Shifts:  I/O last 3 completed shifts:  In: 500 (5.2 mL/kg) [P.O.:100; IV Piggyback:400]  Out: 2050 (21.1 mL/kg) [Urine:2050 (0.6 mL/kg/hr)]  Weight: 96.9 kg     Relevant Results        Results for orders placed or performed during the hospital encounter of 12/30/24 (from the past 24 hours)   Tissue/Wound Culture/Smear    Specimen: Skin/Superficial Abscess; Tissue/Biopsy   Result Value Ref Range    Tissue/Wound Culture/Smear No growth to date     Gram Stain No polymorphonuclear leukocytes seen     Gram Stain No organisms seen    POCT GLUCOSE   Result Value Ref Range    POCT Glucose 149 (H) 74 - 99 mg/dL   POCT GLUCOSE   Result Value Ref Range    POCT Glucose 162 (H) 74 - 99 mg/dL   POCT GLUCOSE   Result Value Ref Range    POCT Glucose 196 (H) 74 - 99 mg/dL   Basic Metabolic Panel   Result Value Ref Range    Glucose 162 (H) 74 - 99 mg/dL    Sodium 131 (L) 136 - 145 mmol/L    Potassium 4.5 3.5 - 5.3 mmol/L    Chloride 101 98 - 107 mmol/L    Bicarbonate 25 21 - 32 mmol/L    Anion Gap 10 10 - 20 mmol/L    Urea Nitrogen 32 (H) 6 - 23 mg/dL    Creatinine 1.35 (H) 0.50 - 1.30 mg/dL    eGFR 61 >60 mL/min/1.73m*2    Calcium 7.9 (L) 8.6 - 10.3 mg/dL   POCT GLUCOSE   Result Value Ref Range    POCT Glucose 152 (H) 74 - 99 mg/dL     *Note: Due to a large number of results and/or encounters for the requested time period, some results have not been displayed. A complete set of results can be found in Results Review.                           Assessment/Plan   59 y.o. male with post operative left groin fluid collection overlying proximal anastomosis site of fem pop graft  - Continue antibiotics per infectious disease  - Wound packing left groin exchanged today with acquacel. wound care orders placed, wound care following  - will need home care services for wound care assistance on discharge  - continue brilinta and asa and eliqus  - OK for diet, no plans for surgical intervention   - medical management  per primary team  - ok for discharge from a vascular surgery standpoint  - dispo planning per medical team         Discussed with attending Dr. Kobi Aguilar PA-C

## 2025-01-03 NOTE — PROCEDURES
Vascular Access Team Procedure Note     Visit Date: 1/3/2025      Patient Name: Dereck Shen         MRN: [id number]             Procedure: Right arm PICC placed for IV Cefepime Q8 until 2/11/25. OK for immediate use. See LDA for details.          Becky Aguilar RN  1/3/2025  11:22 AM

## 2025-01-03 NOTE — CARE PLAN
Problem: Pain - Adult  Goal: Verbalizes/displays adequate comfort level or baseline comfort level  Outcome: Progressing     Problem: Safety - Adult  Goal: Free from fall injury  Outcome: Progressing     Problem: Discharge Planning  Goal: Discharge to home or other facility with appropriate resources  Outcome: Progressing     Problem: Chronic Conditions and Co-morbidities  Goal: Patient's chronic conditions and co-morbidity symptoms are monitored and maintained or improved  Outcome: Progressing     Problem: Skin  Goal: Decreased wound size/increased tissue granulation at next dressing change  Outcome: Progressing  Flowsheets (Taken 1/3/2025 0119)  Decreased wound size/increased tissue granulation at next dressing change:   Protective dressings over bony prominences   Promote sleep for wound healing  Goal: Participates in plan/prevention/treatment measures  Outcome: Progressing  Flowsheets (Taken 1/3/2025 0119)  Participates in plan/prevention/treatment measures:   Elevate heels   Increase activity/out of bed for meals  Goal: Prevent/manage excess moisture  Outcome: Progressing  Flowsheets (Taken 1/3/2025 0119)  Prevent/manage excess moisture:   Monitor for/manage infection if present   Cleanse incontinence/protect with barrier cream  Goal: Prevent/minimize sheer/friction injuries  Outcome: Progressing  Flowsheets (Taken 1/3/2025 0119)  Prevent/minimize sheer/friction injuries:   Increase activity/out of bed for meals   Use pull sheet  Goal: Promote/optimize nutrition  Outcome: Progressing  Flowsheets (Taken 1/3/2025 0119)  Promote/optimize nutrition:   Consume > 50% meals/supplements   Monitor/record intake including meals  Goal: Promote skin healing  Outcome: Progressing  Flowsheets (Taken 1/3/2025 0119)  Promote skin healing:   Protective dressings over bony prominences   Assess skin/pad under line(s)/device(s)   Ensure correct size (line/device) and apply per  instructions     Problem:  Fall/Injury  Goal: Not fall by end of shift  Outcome: Progressing  Goal: Be free from injury by end of the shift  Outcome: Progressing  Goal: Verbalize understanding of personal risk factors for fall in the hospital  Outcome: Progressing  Goal: Verbalize understanding of risk factor reduction measures to prevent injury from fall in the home  Outcome: Progressing  Goal: Use assistive devices by end of the shift  Outcome: Progressing  Goal: Pace activities to prevent fatigue by end of the shift  Outcome: Progressing     Problem: Pain  Goal: Takes deep breaths with improved pain control throughout the shift  Outcome: Progressing  Goal: Turns in bed with improved pain control throughout the shift  Outcome: Progressing  Goal: Walks with improved pain control throughout the shift  Outcome: Progressing  Goal: Performs ADL's with improved pain control throughout shift  Outcome: Progressing  Goal: Participates in PT with improved pain control throughout the shift  Outcome: Progressing  Goal: Free from opioid side effects throughout the shift  Outcome: Progressing  Goal: Free from acute confusion related to pain meds throughout the shift  Outcome: Progressing     Problem: Respiratory  Goal: Clear secretions with interventions this shift  Outcome: Progressing  Goal: Minimize anxiety/maximize coping throughout shift  Outcome: Progressing  Goal: Minimal/no exertional discomfort or dyspnea this shift  Outcome: Progressing  Goal: No signs of respiratory distress (eg. Use of accessory muscles. Peds grunting)  Outcome: Progressing  Goal: Patent airway maintained this shift  Outcome: Progressing     Problem: Nutrition  Goal: Adequate PO fluid intake  Outcome: Progressing  Goal: Nutrition support is meeting 75% of nutrient needs  Outcome: Progressing  Goal: Lab values WNL  Outcome: Progressing  Goal: Electrolytes WNL  Outcome: Progressing  Goal: Promote healing  Outcome: Progressing     Problem: Diabetes  Goal: Achieve decreasing  blood glucose levels by end of shift  Outcome: Progressing  Goal: Increase stability of blood glucose readings by end of shift  Outcome: Progressing  Goal: Decrease in ketones present in urine by end of shift  Outcome: Progressing  Goal: Maintain electrolyte levels within acceptable range throughout shift  Outcome: Progressing  Goal: Maintain glucose levels >70mg/dl to <250mg/dl throughout shift  Outcome: Progressing  Goal: No changes in neurological exam by end of shift  Outcome: Progressing  Goal: Learn about and adhere to nutrition recommendations by end of shift  Outcome: Progressing  Goal: Vital signs within normal range for age by end of shift  Outcome: Progressing  Goal: Increase self care and/or family involovement by end of shift  Outcome: Progressing  Goal: Receive DSME education by end of shift  Outcome: Progressing

## 2025-01-03 NOTE — PROGRESS NOTES
Dereck Shen is a 59 y.o. male on day 3 of admission presenting with Postoperative abscess.      Assessment/Plan:     #Postop left groin abscess with concern for graft infection  #Enterobacter cloacae organism  #AmpC organism  Abscess overlying proximal anastomosis site of the femoropopliteal graft.  As per vascular, will be more cautious in his case given extensive history of surgeries and will treat it as if the graft is infected.  Patient having nausea and vomiting with cefepime.  Will switch to ertapenem     #Acute kidney injury  Estimated Creatinine Clearance: 61.5 mL/min (A) (by C-G formula based on SCr of 1.56 mg/dL (H)).     CAD s/p CABG  PAD s/p bypass and graft  DVT  DM, HTN     Recommendations:     -DC Cefepime   -Start ertapenem 1 g every 24 hours   -Plan to continue antibiotics through through 02/11/25 to finish 6-week course of Antibiotics  -Will continue to follow        Gorge Quintero MD  Date of service: 1/3/2025  Time of service: 1:18 PM      Subjective   Interval History: No acute events overnight.  Patient reports nausea and vomiting with cefepime.        Review of Systems  Denies: fever, chills, diarrhea, dysuria, sore throat, cough    Objective   Range of Vitals (last 24 hours)  Heart Rate:  [64-76]   Temp:  [36.2 °C (97.1 °F)-37.1 °C (98.7 °F)]   Resp:  [14-18]   BP: (102-128)/(63-77)   Weight:  [96.9 kg (213 lb 11.2 oz)]   SpO2:  [93 %-99 %]  Daily Weight  01/03/25 : 96.9 kg (213 lb 11.2 oz)   Body mass index is 29.81 kg/m².      General: alert, oriented, NAD  HEENT: conjunctival pallor, no scleral icterus  Lungs: bilaterally clear to auscultation, no wheezing  Abdomen: soft, non tender, non distended  Neuro: AAO x 3  Extremities: LLE dressing, L groin stitches with packing with open wound without any surrounding erythema or drainage. R arm PICC line  Skin: As above         Antibiotics  cefepime - 2 gram/100 mL      Relevant Results  Labs  Results from last 72 hours   Lab Units  01/02/25  0503 01/01/25  0450 01/01/25  0044   WBC AUTO x10*3/uL 5.3 7.4 7.0   HEMOGLOBIN g/dL 7.8* 9.3* 9.7*   HEMATOCRIT % 23.8* 28.4* 29.4*   PLATELETS AUTO x10*3/uL 377 438 479*   NEUTROS PCT AUTO % 59.5 85.9 59.8   LYMPHS PCT AUTO % 28.9 9.6 30.0   MONOS PCT AUTO % 8.5 3.5 7.5   EOS PCT AUTO % 2.1 0.3 1.6     Results from last 72 hours   Lab Units 01/03/25  0407 01/02/25  0503 01/01/25  0450 01/01/25  0045   SODIUM mmol/L 131* 134* 135* 136   POTASSIUM mmol/L 4.5 4.7 5.2 4.7   CHLORIDE mmol/L 101 104 103 105   CO2 mmol/L 25 24 24 23   BUN mg/dL 32* 38* 35* 34*   CREATININE mg/dL 1.35* 1.56* 1.35* 1.30   GLUCOSE mg/dL 162* 174* 230* 209*   CALCIUM mg/dL 7.9* 7.9* 8.5* 8.5*   ANION GAP mmol/L 10 11 13 13   EGFR mL/min/1.73m*2 61 51* 61 64   PHOSPHORUS mg/dL  --  5.4*  --  4.3         Estimated Creatinine Clearance: 70.8 mL/min (A) (by C-G formula based on SCr of 1.35 mg/dL (H)).  C-Reactive Protein   Date Value Ref Range Status   12/30/2024 7.35 (H) <1.00 mg/dL Final   04/24/2024 0.15 <1.00 mg/dL Final       Microbiology  Susceptibility data from last 14 days.  Collected Specimen Info Organism Amoxicillin/Clavulanate Ampicillin Ampicillin/Sulbactam Cefazolin Cefepime Ciprofloxacin Gentamicin Levofloxacin Piperacillin/Tazobactam Trimethoprim/Sulfamethoxazole   12/30/24 Tissue/Biopsy from Skin/Superficial Abscess Enterobacter cloacae complex  R  R  R  R  S  S  S  S  S  S     Mixed Anaerobic Bacteria                 Imaging    XR chest 1 view    Result Date: 1/1/2025  As above   MACRO: None   Signed by: Geovany Amin 1/1/2025 11:22 AM Dictation workstation:   [id number]    XR chest 1 view    Result Date: 1/1/2025  Pulmonary edema.   Signed by: Aleks Joshi 1/1/2025 10:48 AM Dictation workstation:   [id number]    CT abdomen pelvis w IV contrast    Result Date: 12/30/2024  Complex fluid collection left groin 2.9 x 4.6 x 7.5 cm is suggestive of abscess.  There are multiple locules of gas within the fluid  collection as well as mild peripheral enhancement.  The fluid collection extends from just below the skin surface and staples deep to the level the left common femoral artery and proximal portion of the Conklin-Casey bypass graft.  Reactive left groin lymphadenopathy adjacent to the abscess as well as proximal left thigh soft tissue edema. Critical findings discussed with Dr. Knutson at 9:00 PM 12/30/2024. Signed by Geovany Joel MD    XR chest 1 view    Result Date: 12/23/2024  No acute cardiopulmonary process is evident.   MACRO: None   Signed by: Lawrence Leahy 12/23/2024 9:34 PM Dictation workstation:   [id number]    CT angio aorta and bilateral iliofemoral runoff including without contrast if performed    Result Date: 12/17/2024  No significant aortic disease.  Mild bilateral common iliac artery stenosis.  Patent left external iliac and common femoral arteries. 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  Chronic occlusion of the native left superficial femoral artery. A 2 cm segment of the left P1 popliteal artery reconstitutes.  The terminal left popliteal artery also reconstitutes.  Other portions of the popliteal artery are occluded. Patent three-vessel infrapopliteal runoff to the left hindfoot, beyond which contrast opacification was too weak to visualize. Thick subcutaneous soft tissue - presumed scarred tissue - overlying the left femoral vessels at the left groin.  Correlate for possibility of cellulitis. Patent right iliac and right femoral stents.  Patent right popliteal artery.  Patent three-vessel infrapopliteal runoff to the right ankle. No findings of an acute process in the abdomen or pelvis. Signed by Simeon Barrera MD

## 2025-01-03 NOTE — PROGRESS NOTES
01/03/25 1157   Discharge Planning   Type of Home Care Services Home nursing visits;Home OT;Home PT   Expected Discharge Disposition Home Health   Does the patient need discharge transport arranged? No   Patient Choice   Provider Choice list and CMS website (https://medicare.gov/care-compare#search) for post-acute Quality and Resource Measure Data were provided and reviewed with: Patient   Patient / Family choosing to utilize agency / facility established prior to hospitalization Yes   Stroke Family Assessment   Stroke Family Assessment Needed No   Intensity of Service   Intensity of Service 0-30 min     Met with patient and his mother at bedside. Patient is preferring to return home with resumed [facility]. His mother and sister are additional learners for IV ATBX and wound care. Patient also follows at the [place] wound clinic; he will continue to do so. Dr Celestin was updated in person to send orders to [facility] to start the process for IV ATBX. I spoke with patient's mother at length about the [facility] process with infusions. She is very concerned that Dr Celestin stated patient could leave today, and that he will be kicked out of the hospital. I explained that although patient is medically ready for discharge, he will not leave the hospital until home care services are coordinated.     Update received from Dr Quintero. Patient having N/V on cefepime, so will be switching to ertapenem. I sent a Secure Chat to [facility] to make them aware the current plan is on hold.

## 2025-01-04 ENCOUNTER — APPOINTMENT (OUTPATIENT)
Dept: RADIOLOGY | Facility: HOSPITAL | Age: 60
End: 2025-01-04
Payer: COMMERCIAL

## 2025-01-04 LAB
BACTERIA BLD CULT: NORMAL
BACTERIA BLD CULT: NORMAL
GLUCOSE BLD MANUAL STRIP-MCNC: 112 MG/DL (ref 74–99)
GLUCOSE BLD MANUAL STRIP-MCNC: 119 MG/DL (ref 74–99)
GLUCOSE BLD MANUAL STRIP-MCNC: 119 MG/DL (ref 74–99)
GLUCOSE BLD MANUAL STRIP-MCNC: 120 MG/DL (ref 74–99)
GLUCOSE BLD MANUAL STRIP-MCNC: 125 MG/DL (ref 74–99)
GLUCOSE BLD MANUAL STRIP-MCNC: 139 MG/DL (ref 74–99)
STAPHYLOCOCCUS SPEC CULT: NORMAL

## 2025-01-04 PROCEDURE — 74176 CT ABD & PELVIS W/O CONTRAST: CPT

## 2025-01-04 PROCEDURE — 2500000001 HC RX 250 WO HCPCS SELF ADMINISTERED DRUGS (ALT 637 FOR MEDICARE OP): Performed by: PHARMACIST

## 2025-01-04 PROCEDURE — 2500000005 HC RX 250 GENERAL PHARMACY W/O HCPCS: Performed by: INTERNAL MEDICINE

## 2025-01-04 PROCEDURE — 2500000002 HC RX 250 W HCPCS SELF ADMINISTERED DRUGS (ALT 637 FOR MEDICARE OP, ALT 636 FOR OP/ED): Performed by: INTERNAL MEDICINE

## 2025-01-04 PROCEDURE — 2500000001 HC RX 250 WO HCPCS SELF ADMINISTERED DRUGS (ALT 637 FOR MEDICARE OP): Performed by: INTERNAL MEDICINE

## 2025-01-04 PROCEDURE — 1210000001 HC SEMI-PRIVATE ROOM DAILY

## 2025-01-04 PROCEDURE — 99233 SBSQ HOSP IP/OBS HIGH 50: CPT | Performed by: INTERNAL MEDICINE

## 2025-01-04 PROCEDURE — 2500000004 HC RX 250 GENERAL PHARMACY W/ HCPCS (ALT 636 FOR OP/ED): Performed by: INTERNAL MEDICINE

## 2025-01-04 PROCEDURE — 2500000004 HC RX 250 GENERAL PHARMACY W/ HCPCS (ALT 636 FOR OP/ED): Performed by: STUDENT IN AN ORGANIZED HEALTH CARE EDUCATION/TRAINING PROGRAM

## 2025-01-04 PROCEDURE — 94640 AIRWAY INHALATION TREATMENT: CPT

## 2025-01-04 PROCEDURE — 82947 ASSAY GLUCOSE BLOOD QUANT: CPT

## 2025-01-04 PROCEDURE — 74176 CT ABD & PELVIS W/O CONTRAST: CPT | Performed by: RADIOLOGY

## 2025-01-04 PROCEDURE — S4991 NICOTINE PATCH NONLEGEND: HCPCS | Performed by: INTERNAL MEDICINE

## 2025-01-04 RX ADMIN — APIXABAN 5 MG: 5 TABLET, FILM COATED ORAL at 21:27

## 2025-01-04 RX ADMIN — TICAGRELOR 90 MG: 90 TABLET ORAL at 21:27

## 2025-01-04 RX ADMIN — INSULIN LISPRO 5 UNITS: 100 INJECTION, SOLUTION INTRAVENOUS; SUBCUTANEOUS at 08:43

## 2025-01-04 RX ADMIN — OXCARBAZEPINE 450 MG: 150 TABLET, FILM COATED ORAL at 08:33

## 2025-01-04 RX ADMIN — ISOSORBIDE MONONITRATE 60 MG: 60 TABLET, EXTENDED RELEASE ORAL at 08:33

## 2025-01-04 RX ADMIN — SODIUM PHOSPHATE, DIBASIC AND SODIUM PHOSPHATE, MONOBASIC 1 ENEMA: 7; 19 ENEMA RECTAL at 17:59

## 2025-01-04 RX ADMIN — DAPAGLIFLOZIN 10 MG: 5 TABLET, FILM COATED ORAL at 08:33

## 2025-01-04 RX ADMIN — ACETAMINOPHEN 975 MG: 325 TABLET ORAL at 21:26

## 2025-01-04 RX ADMIN — GABAPENTIN 600 MG: 300 CAPSULE ORAL at 15:17

## 2025-01-04 RX ADMIN — INSULIN GLARGINE 20 UNITS: 100 INJECTION, SOLUTION SUBCUTANEOUS at 02:35

## 2025-01-04 RX ADMIN — ASPIRIN 81 MG CHEWABLE TABLET 81 MG: 81 TABLET CHEWABLE at 08:33

## 2025-01-04 RX ADMIN — NICOTINE 1 PATCH: 21 PATCH, EXTENDED RELEASE TRANSDERMAL at 08:33

## 2025-01-04 RX ADMIN — ONDANSETRON 4 MG: 2 INJECTION INTRAMUSCULAR; INTRAVENOUS at 16:19

## 2025-01-04 RX ADMIN — OXYCODONE HYDROCHLORIDE 5 MG: 5 TABLET ORAL at 08:33

## 2025-01-04 RX ADMIN — GABAPENTIN 600 MG: 300 CAPSULE ORAL at 08:32

## 2025-01-04 RX ADMIN — EZETIMIBE 10 MG: 10 TABLET ORAL at 08:33

## 2025-01-04 RX ADMIN — APIXABAN 5 MG: 5 TABLET, FILM COATED ORAL at 08:33

## 2025-01-04 RX ADMIN — Medication 21 PERCENT: at 07:36

## 2025-01-04 RX ADMIN — RANOLAZINE 500 MG: 500 TABLET, FILM COATED, EXTENDED RELEASE ORAL at 21:26

## 2025-01-04 RX ADMIN — ACETAMINOPHEN 975 MG: 325 TABLET ORAL at 15:17

## 2025-01-04 RX ADMIN — FUROSEMIDE 20 MG: 10 INJECTION, SOLUTION INTRAMUSCULAR; INTRAVENOUS at 08:34

## 2025-01-04 RX ADMIN — GABAPENTIN 600 MG: 300 CAPSULE ORAL at 21:26

## 2025-01-04 RX ADMIN — PANTOPRAZOLE SODIUM 40 MG: 40 TABLET, DELAYED RELEASE ORAL at 06:38

## 2025-01-04 RX ADMIN — TAMSULOSIN HYDROCHLORIDE 0.4 MG: 0.4 CAPSULE ORAL at 08:33

## 2025-01-04 RX ADMIN — OXYCODONE HYDROCHLORIDE 5 MG: 5 TABLET ORAL at 21:27

## 2025-01-04 RX ADMIN — CARVEDILOL 6.25 MG: 6.25 TABLET, FILM COATED ORAL at 21:40

## 2025-01-04 RX ADMIN — TICAGRELOR 90 MG: 90 TABLET ORAL at 08:34

## 2025-01-04 RX ADMIN — IPRATROPIUM BROMIDE AND ALBUTEROL SULFATE 3 ML: 2.5; .5 SOLUTION RESPIRATORY (INHALATION) at 08:34

## 2025-01-04 RX ADMIN — OXYCODONE HYDROCHLORIDE 5 MG: 5 TABLET ORAL at 00:15

## 2025-01-04 RX ADMIN — IPRATROPIUM BROMIDE AND ALBUTEROL SULFATE 3 ML: 2.5; .5 SOLUTION RESPIRATORY (INHALATION) at 19:48

## 2025-01-04 RX ADMIN — CARVEDILOL 6.25 MG: 6.25 TABLET, FILM COATED ORAL at 08:33

## 2025-01-04 RX ADMIN — ACETAMINOPHEN 975 MG: 325 TABLET ORAL at 08:34

## 2025-01-04 RX ADMIN — RANOLAZINE 500 MG: 500 TABLET, FILM COATED, EXTENDED RELEASE ORAL at 08:34

## 2025-01-04 RX ADMIN — OXCARBAZEPINE 450 MG: 150 TABLET, FILM COATED ORAL at 21:26

## 2025-01-04 RX ADMIN — ERTAPENEM SODIUM 1 G: 1 INJECTION, POWDER, LYOPHILIZED, FOR SOLUTION INTRAMUSCULAR; INTRAVENOUS at 15:17

## 2025-01-04 RX ADMIN — INSULIN GLARGINE 20 UNITS: 100 INJECTION, SOLUTION SUBCUTANEOUS at 21:32

## 2025-01-04 ASSESSMENT — COGNITIVE AND FUNCTIONAL STATUS - GENERAL
CLIMB 3 TO 5 STEPS WITH RAILING: A LOT
MOVING TO AND FROM BED TO CHAIR: A LITTLE
MOVING FROM LYING ON BACK TO SITTING ON SIDE OF FLAT BED WITH BEDRAILS: A LITTLE
MOBILITY SCORE: 17
DAILY ACTIVITIY SCORE: 18
STANDING UP FROM CHAIR USING ARMS: A LITTLE
WALKING IN HOSPITAL ROOM: A LITTLE
HELP NEEDED FOR BATHING: A LITTLE
PERSONAL GROOMING: A LITTLE
DRESSING REGULAR LOWER BODY CLOTHING: A LOT
TOILETING: A LITTLE
DRESSING REGULAR UPPER BODY CLOTHING: A LITTLE
TURNING FROM BACK TO SIDE WHILE IN FLAT BAD: A LITTLE

## 2025-01-04 ASSESSMENT — PAIN - FUNCTIONAL ASSESSMENT
PAIN_FUNCTIONAL_ASSESSMENT: 0-10
PAIN_FUNCTIONAL_ASSESSMENT: 0-10

## 2025-01-04 ASSESSMENT — PAIN DESCRIPTION - LOCATION
LOCATION: GROIN
LOCATION: GROIN

## 2025-01-04 ASSESSMENT — PAIN SCALES - GENERAL
PAINLEVEL_OUTOF10: 7
PAINLEVEL_OUTOF10: 7
PAINLEVEL_OUTOF10: 5 - MODERATE PAIN

## 2025-01-04 ASSESSMENT — PAIN DESCRIPTION - DESCRIPTORS: DESCRIPTORS: DISCOMFORT

## 2025-01-04 ASSESSMENT — PAIN DESCRIPTION - ORIENTATION: ORIENTATION: LEFT

## 2025-01-04 NOTE — CARE PLAN
The patient's goals for the shift include      The clinical goals for the shift include patient will remain hemodynamically stabel during this shift      Problem: Pain - Adult  Goal: Verbalizes/displays adequate comfort level or baseline comfort level  Outcome: Progressing     Problem: Safety - Adult  Goal: Free from fall injury  Outcome: Progressing     Problem: Discharge Planning  Goal: Discharge to home or other facility with appropriate resources  Outcome: Progressing     Problem: Chronic Conditions and Co-morbidities  Goal: Patient's chronic conditions and co-morbidity symptoms are monitored and maintained or improved  Outcome: Progressing     Problem: Skin  Goal: Decreased wound size/increased tissue granulation at next dressing change  Outcome: Progressing  Goal: Participates in plan/prevention/treatment measures  Outcome: Progressing  Goal: Prevent/manage excess moisture  Outcome: Progressing  Goal: Prevent/minimize sheer/friction injuries  Outcome: Progressing  Goal: Promote/optimize nutrition  Outcome: Progressing  Goal: Promote skin healing  Outcome: Progressing     Problem: Fall/Injury  Goal: Not fall by end of shift  Outcome: Progressing  Goal: Be free from injury by end of the shift  Outcome: Progressing  Goal: Verbalize understanding of personal risk factors for fall in the hospital  Outcome: Progressing  Goal: Verbalize understanding of risk factor reduction measures to prevent injury from fall in the home  Outcome: Progressing  Goal: Use assistive devices by end of the shift  Outcome: Progressing  Goal: Pace activities to prevent fatigue by end of the shift  Outcome: Progressing     Problem: Pain  Goal: Takes deep breaths with improved pain control throughout the shift  Outcome: Progressing  Goal: Turns in bed with improved pain control throughout the shift  Outcome: Progressing  Goal: Walks with improved pain control throughout the shift  Outcome: Progressing  Goal: Performs ADL's with improved  pain control throughout shift  Outcome: Progressing  Goal: Participates in PT with improved pain control throughout the shift  Outcome: Progressing  Goal: Free from opioid side effects throughout the shift  Outcome: Progressing  Goal: Free from acute confusion related to pain meds throughout the shift  Outcome: Progressing     Problem: Respiratory  Goal: Clear secretions with interventions this shift  Outcome: Progressing  Goal: Minimize anxiety/maximize coping throughout shift  Outcome: Progressing  Goal: Minimal/no exertional discomfort or dyspnea this shift  Outcome: Progressing  Goal: No signs of respiratory distress (eg. Use of accessory muscles. Peds grunting)  Outcome: Progressing  Goal: Patent airway maintained this shift  Outcome: Progressing     Problem: Nutrition  Goal: Adequate PO fluid intake  Outcome: Progressing  Goal: Nutrition support is meeting 75% of nutrient needs  Outcome: Progressing  Goal: Lab values WNL  Outcome: Progressing  Goal: Electrolytes WNL  Outcome: Progressing  Goal: Promote healing  Outcome: Progressing     Problem: Diabetes  Goal: Achieve decreasing blood glucose levels by end of shift  Outcome: Progressing  Goal: Increase stability of blood glucose readings by end of shift  Outcome: Progressing  Goal: Decrease in ketones present in urine by end of shift  Outcome: Progressing  Goal: Maintain electrolyte levels within acceptable range throughout shift  Outcome: Progressing  Goal: Maintain glucose levels >70mg/dl to <250mg/dl throughout shift  Outcome: Progressing  Goal: No changes in neurological exam by end of shift  Outcome: Progressing  Goal: Learn about and adhere to nutrition recommendations by end of shift  Outcome: Progressing  Goal: Vital signs within normal range for age by end of shift  Outcome: Progressing  Goal: Increase self care and/or family involovement by end of shift  Outcome: Progressing  Goal: Receive DSME education by end of shift  Outcome: Progressing

## 2025-01-04 NOTE — CARE PLAN
The patient's goals for the shift include        Problem: Pain - Adult  Goal: Verbalizes/displays adequate comfort level or baseline comfort level  Outcome: Progressing     Problem: Safety - Adult  Goal: Free from fall injury  Outcome: Progressing     Problem: Discharge Planning  Goal: Discharge to home or other facility with appropriate resources  Outcome: Progressing     Problem: Chronic Conditions and Co-morbidities  Goal: Patient's chronic conditions and co-morbidity symptoms are monitored and maintained or improved  Outcome: Progressing     Problem: Skin  Goal: Decreased wound size/increased tissue granulation at next dressing change  Outcome: Progressing  Flowsheets (Taken 1/4/2025 0722)  Decreased wound size/increased tissue granulation at next dressing change:   Promote sleep for wound healing   Protective dressings over bony prominences  Goal: Participates in plan/prevention/treatment measures  Outcome: Progressing  Flowsheets (Taken 1/4/2025 0722)  Participates in plan/prevention/treatment measures:   Discuss with provider PT/OT consult   Elevate heels   Increase activity/out of bed for meals  Goal: Prevent/manage excess moisture  Outcome: Progressing  Flowsheets (Taken 1/4/2025 0722)  Prevent/manage excess moisture:   Cleanse incontinence/protect with barrier cream   Follow provider orders for dressing changes   Monitor for/manage infection if present  Goal: Prevent/minimize sheer/friction injuries  Outcome: Progressing  Flowsheets (Taken 1/4/2025 0722)  Prevent/minimize sheer/friction injuries:   HOB 30 degrees or less   Increase activity/out of bed for meals   Complete micro-shifts as needed if patient unable. Adjust patient position to relieve pressure points, not a full turn   Turn/reposition every 2 hours/use positioning/transfer devices  Goal: Promote/optimize nutrition  Outcome: Progressing  Flowsheets (Taken 1/4/2025 0722)  Promote/optimize nutrition: Monitor/record intake including meals  Goal:  Promote skin healing  Outcome: Progressing  Flowsheets (Taken 1/4/2025 6799)  Promote skin healing:   Assess skin/pad under line(s)/device(s)   Ensure correct size (line/device) and apply per  instructions   Protective dressings over bony prominences   Rotate device position/do not position patient on device     Problem: Fall/Injury  Goal: Not fall by end of shift  Outcome: Progressing  Goal: Be free from injury by end of the shift  Outcome: Progressing  Goal: Verbalize understanding of personal risk factors for fall in the hospital  Outcome: Progressing  Goal: Verbalize understanding of risk factor reduction measures to prevent injury from fall in the home  Outcome: Progressing  Goal: Use assistive devices by end of the shift  Outcome: Progressing  Goal: Pace activities to prevent fatigue by end of the shift  Outcome: Progressing     Problem: Pain  Goal: Takes deep breaths with improved pain control throughout the shift  Outcome: Progressing  Goal: Turns in bed with improved pain control throughout the shift  Outcome: Progressing  Goal: Walks with improved pain control throughout the shift  Outcome: Progressing  Goal: Performs ADL's with improved pain control throughout shift  Outcome: Progressing  Goal: Participates in PT with improved pain control throughout the shift  Outcome: Progressing  Goal: Free from opioid side effects throughout the shift  Outcome: Progressing  Goal: Free from acute confusion related to pain meds throughout the shift  Outcome: Progressing     Problem: Respiratory  Goal: Clear secretions with interventions this shift  Outcome: Progressing  Goal: Minimize anxiety/maximize coping throughout shift  Outcome: Progressing  Goal: Minimal/no exertional discomfort or dyspnea this shift  Outcome: Progressing  Goal: No signs of respiratory distress (eg. Use of accessory muscles. Peds grunting)  Outcome: Progressing  Goal: Patent airway maintained this shift  Outcome: Progressing      Problem: Nutrition  Goal: Adequate PO fluid intake  Outcome: Progressing  Goal: Nutrition support is meeting 75% of nutrient needs  Outcome: Progressing  Goal: Lab values WNL  Outcome: Progressing  Goal: Electrolytes WNL  Outcome: Progressing  Goal: Promote healing  Outcome: Progressing     Problem: Diabetes  Goal: Achieve decreasing blood glucose levels by end of shift  Outcome: Progressing  Goal: Increase stability of blood glucose readings by end of shift  Outcome: Progressing  Goal: Decrease in ketones present in urine by end of shift  Outcome: Progressing  Goal: Maintain electrolyte levels within acceptable range throughout shift  Outcome: Progressing  Goal: Maintain glucose levels >70mg/dl to <250mg/dl throughout shift  Outcome: Progressing  Goal: No changes in neurological exam by end of shift  Outcome: Progressing  Goal: Learn about and adhere to nutrition recommendations by end of shift  Outcome: Progressing  Goal: Vital signs within normal range for age by end of shift  Outcome: Progressing  Goal: Increase self care and/or family involovement by end of shift  Outcome: Progressing  Goal: Receive DSME education by end of shift  Outcome: Progressing

## 2025-01-04 NOTE — PROGRESS NOTES
Dr Quintero that the ID plan is for ertapenem daily through 2.2. [facility] updated, will send for insurance check. Patient having continuing N/V. Imaging ordered.

## 2025-01-04 NOTE — PROGRESS NOTES
Dereck Shen is a 59 y.o. male on day 4 of admission presenting with Postoperative abscess.      Assessment/Plan:     #Postop left groin abscess with concern for graft infection  #Enterobacter cloacae organism  #AmpC organism  Abscess overlying proximal anastomosis site of the femoropopliteal graft.  As per vascular, will be more cautious in his case given extensive history of surgeries and will treat it as if the graft is infected.  Patient having nausea and vomiting with cefepime.  Will switch to ertapenem    #Nausea and vomiting  Unclear if this was because of the antibiotic.  Patient received ertapenem yesterday in the afternoon and had vomiting this morning.  Will obtain CT abdomen pelvis again.     #Acute kidney injury-improving  Estimated Creatinine Clearance: 61.5 mL/min (A) (by C-G formula based on SCr of 1.56 mg/dL (H)).     CAD s/p CABG  PAD s/p bypass and graft  DVT  DM, HTN     Recommendations:     -Continue ertapenem 1 g every 24 hours through 02/11/25 to finish 6-week course of Antibiotics  -Obtain CT abdomen and pelvis without contrast  -Will continue to follow      Gorge Quintero MD  Date of service: 1/4/2025  Time of service: 2:40 PM      Subjective   Interval History: No acute events overnight.  Patient had episode of nausea and vomiting in the morning.        Review of Systems  Denies: fever, chills, diarrhea, dysuria    Objective   Range of Vitals (last 24 hours)  Heart Rate:  [66-80]   Temp:  [36.3 °C (97.4 °F)-36.7 °C (98 °F)]   Resp:  [16-18]   BP: (115-130)/(68-79)   Weight:  [96.5 kg (212 lb 11.2 oz)]   SpO2:  [91 %-96 %]  Daily Weight  01/04/25 : 96.5 kg (212 lb 11.2 oz)   Body mass index is 29.67 kg/m².    General: alert, oriented, NAD  HEENT: conjunctival pallor, no scleral icterus  Abdomen: soft, non tender, non distended  Neuro: AAO x 3  Extremities: LLE dressing, L groin stitches with packing with open wound without any surrounding erythema or drainage. R arm PICC line  Skin: As  above    Antibiotics  ertapenem - 1 gram/50 mL      Relevant Results  Labs  Results from last 72 hours   Lab Units 01/02/25  0503   WBC AUTO x10*3/uL 5.3   HEMOGLOBIN g/dL 7.8*   HEMATOCRIT % 23.8*   PLATELETS AUTO x10*3/uL 377   NEUTROS PCT AUTO % 59.5   LYMPHS PCT AUTO % 28.9   MONOS PCT AUTO % 8.5   EOS PCT AUTO % 2.1     Results from last 72 hours   Lab Units 01/03/25  0407 01/02/25  0503   SODIUM mmol/L 131* 134*   POTASSIUM mmol/L 4.5 4.7   CHLORIDE mmol/L 101 104   CO2 mmol/L 25 24   BUN mg/dL 32* 38*   CREATININE mg/dL 1.35* 1.56*   GLUCOSE mg/dL 162* 174*   CALCIUM mg/dL 7.9* 7.9*   ANION GAP mmol/L 10 11   EGFR mL/min/1.73m*2 61 51*   PHOSPHORUS mg/dL  --  5.4*         Estimated Creatinine Clearance: 70.7 mL/min (A) (by C-G formula based on SCr of 1.35 mg/dL (H)).  C-Reactive Protein   Date Value Ref Range Status   12/30/2024 7.35 (H) <1.00 mg/dL Final   04/24/2024 0.15 <1.00 mg/dL Final       Microbiology  Susceptibility data from last 14 days.  Collected Specimen Info Organism Amoxicillin/Clavulanate Ampicillin Ampicillin/Sulbactam Cefazolin Cefepime Ciprofloxacin Gentamicin Levofloxacin Piperacillin/Tazobactam Trimethoprim/Sulfamethoxazole   01/02/25 Tissue/Biopsy from Skin/Superficial Abscess Enterobacter cloacae complex             12/30/24 Tissue/Biopsy from Skin/Superficial Abscess Enterobacter cloacae complex  R  R  R  R  S  S  S  S  S  S     Mixed Anaerobic Bacteria                 Imaging    XR chest 1 view    Result Date: 1/1/2025  As above   MACRO: None   Signed by: Geovany Amin 1/1/2025 11:22 AM Dictation workstation:   [id number]    XR chest 1 view    Result Date: 1/1/2025  Pulmonary edema.   Signed by: Aleks Joshi 1/1/2025 10:48 AM Dictation workstation:   [id number]    CT abdomen pelvis w IV contrast    Result Date: 12/30/2024  Complex fluid collection left groin 2.9 x 4.6 x 7.5 cm is suggestive of abscess.  There are multiple locules of gas within the fluid collection as well as  mild peripheral enhancement.  The fluid collection extends from just below the skin surface and staples deep to the level the left common femoral artery and proximal portion of the Blencoe-Casey bypass graft.  Reactive left groin lymphadenopathy adjacent to the abscess as well as proximal left thigh soft tissue edema. Critical findings discussed with Dr. Knutson at 9:00 PM 12/30/2024. Signed by Geovany Joel MD    XR chest 1 view    Result Date: 12/23/2024  No acute cardiopulmonary process is evident.   MACRO: None   Signed by: Lawrence Leahy 12/23/2024 9:34 PM Dictation workstation:   [id number]    CT angio aorta and bilateral iliofemoral runoff including without contrast if performed    Result Date: 12/17/2024  No significant aortic disease.  Mild bilateral common iliac artery stenosis.  Patent left external iliac and common femoral arteries. 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  Chronic occlusion of the native left superficial femoral artery. A 2 cm segment of the left P1 popliteal artery reconstitutes.  The terminal left popliteal artery also reconstitutes.  Other portions of the popliteal artery are occluded. Patent three-vessel infrapopliteal runoff to the left hindfoot, beyond which contrast opacification was too weak to visualize. Thick subcutaneous soft tissue - presumed scarred tissue - overlying the left femoral vessels at the left groin.  Correlate for possibility of cellulitis. Patent right iliac and right femoral stents.  Patent right popliteal artery.  Patent three-vessel infrapopliteal runoff to the right ankle. No findings of an acute process in the abdomen or pelvis. Signed by Simeon Barrera MD

## 2025-01-04 NOTE — PROGRESS NOTES
Dereck Shen is a 59 y.o. male on day 4 of admission presenting with Postoperative abscess.      Subjective      Dereck Shen is a 59 y.o. male with PMHx of CAD s/p CABG, severe lower extremity PAD with prior revascularization (R fem-pop bypass 2/24/20; L fem-pop bypass 9/14/20; L fem PTA of the graft; R iliac PTA 2/2022; LLE and DANYA PTA 4/2024; angioplasty of the left SFA 10/21/24; and left femoral and tibial thrombectomy with patch angioplasty 11/19/24. He was recently in the hospital in December for critical limb ischemia of left side and had s/p left fem-below knee popliteal bypass with vein patch left distal anastomosis 12/24. He presented today with L groin pain. According to the patient, he was in usual state till Saturday when he started having left groin pain at the surgical site. Home nurse noticed more discharge in last 12 hours and asked him to evaluate with surgeon. Pain comes and goes. Does not radiate. No fever or chills. He therefore came to ER for further evaluation.     He quite smoking after 48 years and uses a nicotine patch. Denied alcohol use. He is compliant with all of his blood thinners including aspirin, Brillinta and apixaban.      ED workup concerning for left groin abscess on the ct scan. Ed contact vascular who recommended medicine admission with vascular consult. Patient was started on broad spectrum abx. Patient will be admitted under hospital medicine for further management.        Past Medical History  He has a past medical history of (HFpEF) heart failure with preserved ejection fraction, Aphasia, Atherosclerotic heart disease of native coronary artery with unspecified angina pectoris (11/05/2019), Bipolar disorder, unspecified (Multi), BPH (benign prostatic hyperplasia), Diabetes (Multi), DVT (deep venous thrombosis) (Multi) (02/2022), Erectile dysfunction, GERD (gastroesophageal reflux disease), Hepatitis C, HLD (hyperlipidemia), HTN (hypertension), Obstructive sleep  apnea (adult) (pediatric), Opioid abuse, in remission (Multi), PAD (peripheral artery disease) (CMS-HCC), Polymyalgia rheumatica (Multi), Post-traumatic stress disorder, unspecified, and Stroke (Multi) (07/24/2023).      12/31: No acute events overnight.  History and chart reviewed.  Patient seen and examined.  Patient with multiple pains.  He was drowsy during interview.  Surgery recommendations appreciated.  Continue broad-spectrum antibiotics.  Continue pain control.  Patient will require at least another 24 to 48 hours of inpatient service.  1/1: Acute events overnight patient developed SOB with flash pulmonary edema was transferred to the ICU. Pt responded well to Bipap. Today the patient was seen in the ICU and was alert and orientated and comfortable. Pt denies any SOB, chest pain, palpitations or lightheadedness. The interview was predominated with his pains. I have informed him that he was oversedated yesterday and given his BRUNA this has led to heart failure. Pt did admit that he has chronic pain, and was under pain management 15 years ago. We will adjust his pain regimen. Pt is to continue his home BiPAP. Pt will require at least another 24-48 hrs.  1/2: No acute events overnight.  Patient doing well.  Again the interview was predominated by pain.  His pain was stepped specifically worse when he was moving and sitting on the chair.  Will add Doxy codon 5 mg every 8 hours as needed for breakthrough pain especially after therapy.  ID recommendations appreciated.  Patient will need IV cefepime till 2/11/2025.  PICC line ordered.  Home health care ordered.  Start discharge process.  Patient will need close management with his PCP and pain management clinic.  1/3: No acute events overnight.  Patient denies any abdominal pain, leg pains, nausea, and diarrhea.  He is in better spirits today.  He did mention to the ID team that he was nauseated and vomited later in the day.  We had switched the cefepime to  ertapenem.  This would likely change our plans till we see his response and for the home health care agency to be updated.  PICC line was placed.  1/4: No acute events overnight.  Patient remains nauseated and vomiting.  He did admit to not having a bowel motion for at least 5 days.  This is a combination of his pain regimen, decreased mobility, and deconditioning.  ID recommendations appreciated.  Cefepime was switched to ertapenem with no improvement in symptoms.  CT abdomen was ordered that showed no acute abdomen.  There is no bowel obstruction or dilated loops.  Patient had heavy stool burden.  Will place a Fleet enema.  Hold on laxatives given the patient's vomiting.  Patient will require at least another 24 to 48 hours of inpatient service.    Objective     Last Recorded Vitals  /80 (BP Location: Left arm, Patient Position: Lying)   Pulse 72   Temp 36.3 °C (97.3 °F) (Temporal)   Resp 15   Wt 96.5 kg (212 lb 11.2 oz)   SpO2 94%   Intake/Output last 3 Shifts:    Intake/Output Summary (Last 24 hours) at 1/4/2025 1837  Last data filed at 1/4/2025 1817  Gross per 24 hour   Intake 1250 ml   Output 2450 ml   Net -1200 ml       Admission Weight  Weight: 85.3 kg (188 lb) (12/30/24 1603)    Daily Weight  01/04/25 : 96.5 kg (212 lb 11.2 oz)    Image Results  CT abdomen pelvis wo IV contrast  Narrative: Interpreted By:  Raudel Scott,   STUDY:  CT ABDOMEN PELVIS WO IV CONTRAST;  1/4/2025 2:17 pm      INDICATION:  Signs/Symptoms:intractable vomiting.      COMPARISON:  12/30/2024      ACCESSION NUMBER(S):  [id number]      ORDERING CLINICIAN:  ALBINO ROMERO      TECHNIQUE:  CT of the abdomen and pelvis was performed.  Contiguous axial images  were obtained at 3 mm slice thickness through the abdomen and pelvis.  Coronal and sagittal reconstructions at 3 mm slice thickness were  performed. The patient received no intravenous contrast. Please note  evaluation of the solid organs and vessels is limited in the  absence  of intravenous contrast. Given this caveat, the following  observations are made:      FINDINGS:  LOWER CHEST:  Moderate pleural effusions have increased compared to prior exam.  Bibasilar atelectasis and interstitial edema.      ABDOMEN:      LIVER:  Unremarkable      BILE DUCTS:  Not dilated.      GALLBLADDER:  No calcified stone or definite inflammation.      PANCREAS:  Unremarkable      SPLEEN:  Unremarkable      ADRENAL GLANDS:  Unremarkable      KIDNEYS AND URETERS:  Bilateral renal calcifications could be nonobstructing calculi and/or  vascular. Otherwise unremarkable kidneys without hydronephrosis.      PELVIS:      BLADDER:  Decompressed by Douglass catheter.      REPRODUCTIVE ORGANS:  Mild prostatomegaly. Small fat containing inguinal hernias.      BOWEL:  Large colonic stool burden. No findings of bowel obstruction  otherwise identified. Unremarkable appendix.    Unremarkable  mesentery.      VESSELS:  No abdominal aortic aneurysm.Severe atherosclerosis. Stent in the  right external iliac artery. Partially imaged femoral bypass grafts.      PERITONEUM AND RETROPERITONEUM:  No free fluid or free air.  Anasarca. Enlarged left inguinal lymph nodes adjacent to femoral  cutdown site appears similar to prior exam, probably reactive.      BONE AND ABDOMINAL WALL:  There is decreased fluid overlying the left femoral cutdown site.  Small locules of subincisional gas persist. Degenerative changes of  the spine.              Impression: 1.  Bibasilar interstitial edema with enlarging, now moderate pleural  effusions.  2. Anasarca.  3. Decreased fluid overlying left femoral cutdown site. Subincisional  gas with adjacent reactive lymphadenopathy at this site persist.  4. Large colonic stool burden. No findings of bowel obstruction  otherwise.      MACRO:  None.      Signed by: Raudel Scott 1/4/2025 4:16 PM  Dictation workstation:   [id number]      Physical Exam  CONSTITUTIONAL -alert and orientated, obese,  unwell  CHEST - clear to auscultation, no wheezing  CARDIAC - regular rate and regular rhythm, no murmur  ABDOMEN -distended belly, dehydrated bowel sounds, no tenderness, wound on the left groin with clean with no erythema  EXTREMITIES - no edema, no deformities  NEUROLOGICAL - alert, oriented x3 and no acute focal signs  PSYCHIATRIC - alert, pleasant and cordial, age-appropriate    Relevant Results               Assessment/Plan                  Assessment & Plan  Postoperative abscess    BPH (benign prostatic hyperplasia)    Chronic pain syndrome    Nicotine dependence    S/P CABG x 5    Type 2 diabetes mellitus    Long-term insulin use (Multi)      Left Groin abscess secondary to DM  Left groin pain  Hx of PAD s/p multiple procedures  Severe functional constipation with no overt bowel obstruction  -Hx of Multiple LLE surgical procedures  -No evidence of sepsis at this time  -Start Zosyn and Vancomycin  -ID consult  -Wound care  -Vascular consult  -NPO midnight for possible intervention  -Hold home medication apixaban (PAD dosing) for now  12/31: Continue broad-spectrum antibiotics.  Surgery recommendation appreciated.  Patient require at least another 24 to 48 hours of inpatient service.  1/1: Surgery and ID recs appreciated. Continue wound care, follow cultures. We  will change pain regimen to oxycodone 5 mg every 6 hrs as needed, increase gabapentin to 600 mg tid, add scheduled tylenol 1000 mg tid. We will place a formal referral to pain management as an outpatient.  1/2: Patient remains on IV vancomycin and Zosyn.  Cefepime was also added.  Blood cultures remain negative.  Wound cultures for report still pending.  ID and surgery recommendation appreciated.  Patient will require PICC line placement on IV cefepime for 6 weeks.  PICC line ordered.  Monitor renal function closely as his creatinine is creeping up and is now up to 1.56 which is at the higher level of his baseline range.  Pain management referral on  discharge.  1/3: Cefepime switched to ertapenem given the patient's nausea and vomiting.  PICC line was placed.  Anticipate discharge in the next 24 hours.  1/4: ID recommendation appreciated.  Continue ertapenem.  Continue current pain regimen.  Fleet enema.  Will need a stool chart.  Patient require at least another 24 to 48 hours of inpatient service.  Will hold all diuretics and antihypertensives for now.     Hx CAD s/p CABG   Continue home Lipitor and Zetia  Continue home aspirin   Holding home Birllinta due to possible procedure, resume asap  Continue home ranexa     Diabetic Nephropathy with CKD3  Baseline appears to be ~1.0-1.3  Avoid nephrotoxins  Consider nephrology consult if worsening   Hold home lasix and entresto in setting of SAMEERA, resume as appropriate   Continue Farxiga for now  1/1: Cr labile but seems to be at his baseline range.     IDDM-II with hyperglycemia  Continue Lantus and humalog TID AC  A1C of 8.2% as of October 2024   Start SSI protocol  Goal glucose <180 for better wound healing   Started home lantus dose  Hold nutr insulin dose once NPO  1/2: Continue current treatment.  Sugars remain well-controlled     HTN  Continue home hydralazine      History of multiple CVA  History of R PCA stroke in 2019, L MCA stroke in 2021, bihemispheric small infarcts in 2022, and L MCA scattered embolic infarcts in 2023   Continue as above     COPD without acute exacerbation  Cigarette smoker x48 years, continues to smoke but cut back to 3 cigarettes per day   Albuterol MDI as needed for shortness of breath/wheezing      BRUNA  Noncompliant with CPAP     Tobacco use disorder   Quit last week after 48 years  Nicotine patch     DVT: Holding AC apixaban for possible procedure  Disposition: Patient needs >72 hours of admission for IV abx            Ashley Celestin MD

## 2025-01-04 NOTE — PROGRESS NOTES
Dereck Shen is a 59 y.o. male on day 3 of admission presenting with Postoperative abscess.      Subjective      Dereck Shen is a 59 y.o. male with PMHx of CAD s/p CABG, severe lower extremity PAD with prior revascularization (R fem-pop bypass 2/24/20; L fem-pop bypass 9/14/20; L fem PTA of the graft; R iliac PTA 2/2022; LLE and DANYA PTA 4/2024; angioplasty of the left SFA 10/21/24; and left femoral and tibial thrombectomy with patch angioplasty 11/19/24. He was recently in the hospital in December for critical limb ischemia of left side and had s/p left fem-below knee popliteal bypass with vein patch left distal anastomosis 12/24. He presented today with L groin pain. According to the patient, he was in usual state till Saturday when he started having left groin pain at the surgical site. Home nurse noticed more discharge in last 12 hours and asked him to evaluate with surgeon. Pain comes and goes. Does not radiate. No fever or chills. He therefore came to ER for further evaluation.     He quite smoking after 48 years and uses a nicotine patch. Denied alcohol use. He is compliant with all of his blood thinners including aspirin, Brillinta and apixaban.      ED workup concerning for left groin abscess on the ct scan. Ed contact vascular who recommended medicine admission with vascular consult. Patient was started on broad spectrum abx. Patient will be admitted under hospital medicine for further management.        Past Medical History  He has a past medical history of (HFpEF) heart failure with preserved ejection fraction, Aphasia, Atherosclerotic heart disease of native coronary artery with unspecified angina pectoris (11/05/2019), Bipolar disorder, unspecified (Multi), BPH (benign prostatic hyperplasia), Diabetes (Multi), DVT (deep venous thrombosis) (Multi) (02/2022), Erectile dysfunction, GERD (gastroesophageal reflux disease), Hepatitis C, HLD (hyperlipidemia), HTN (hypertension), Obstructive sleep  apnea (adult) (pediatric), Opioid abuse, in remission (Multi), PAD (peripheral artery disease) (CMS-HCC), Polymyalgia rheumatica (Multi), Post-traumatic stress disorder, unspecified, and Stroke (Multi) (07/24/2023).      12/31: No acute events overnight.  History and chart reviewed.  Patient seen and examined.  Patient with multiple pains.  He was drowsy during interview.  Surgery recommendations appreciated.  Continue broad-spectrum antibiotics.  Continue pain control.  Patient will require at least another 24 to 48 hours of inpatient service.  1/1: Acute events overnight patient developed SOB with flash pulmonary edema was transferred to the ICU. Pt responded well to Bipap. Today the patient was seen in the ICU and was alert and orientated and comfortable. Pt denies any SOB, chest pain, palpitations or lightheadedness. The interview was predominated with his pains. I have informed him that he was oversedated yesterday and given his BRUNA this has led to heart failure. Pt did admit that he has chronic pain, and was under pain management 15 years ago. We will adjust his pain regimen. Pt is to continue his home BiPAP. Pt will require at least another 24-48 hrs.  1/2: No acute events overnight.  Patient doing well.  Again the interview was predominated by pain.  His pain was stepped specifically worse when he was moving and sitting on the chair.  Will add Doxy codon 5 mg every 8 hours as needed for breakthrough pain especially after therapy.  ID recommendations appreciated.  Patient will need IV cefepime till 2/11/2025.  PICC line ordered.  Home health care ordered.  Start discharge process.  Patient will need close management with his PCP and pain management clinic.  1/3: No acute events overnight.  Patient denies any abdominal pain, leg pains, nausea, and diarrhea.  He is in better spirits today.  He did mention to the ID team that he was nauseated and vomited later in the day.  We had switched the cefepime to  ertapenem.  This would likely change our plans till we see his response and for the home health care agency to be updated.  PICC line was placed.  Objective     Last Recorded Vitals  /79 (BP Location: Left arm, Patient Position: Lying)   Pulse 68   Temp 36.4 °C (97.5 °F) (Temporal)   Resp 18   Wt 96.9 kg (213 lb 11.2 oz)   SpO2 95%   Intake/Output last 3 Shifts:    Intake/Output Summary (Last 24 hours) at 1/3/2025 2108  Last data filed at 1/3/2025 1531  Gross per 24 hour   Intake 698 ml   Output 1250 ml   Net -552 ml       Admission Weight  Weight: 85.3 kg (188 lb) (12/30/24 1603)    Daily Weight  01/03/25 : 96.9 kg (213 lb 11.2 oz)    Image Results  Bedside PICC Imaging  These images are not reportable by radiology and will not be interpreted   by  Radiologists.      Physical Exam  CONSTITUTIONAL -alert and orientated, obese   CHEST - clear to auscultation, no wheezing  CARDIAC - regular rate and regular rhythm, no murmur  ABDOMEN - no organomegaly, soft, nontender, left groin wound with fresh dressing  EXTREMITIES - no edema, no deformities  NEUROLOGICAL - alert, oriented x3 and no acute focal signs  PSYCHIATRIC - alert, pleasant and cordial, age-appropriate    Relevant Results               Assessment/Plan                  Assessment & Plan  Postoperative abscess    BPH (benign prostatic hyperplasia)    Chronic pain syndrome    Nicotine dependence    S/P CABG x 5    Type 2 diabetes mellitus    Long-term insulin use (Multi)      Left Groin abscess secondary to DM  Left groin pain  Hx of PAD s/p multiple procedures  -Hx of Multiple LLE surgical procedures  -No evidence of sepsis at this time  -Start Zosyn and Vancomycin  -ID consult  -Wound care  -Vascular consult  -NPO midnight for possible intervention  -Hold home medication apixaban (PAD dosing) for now  12/31: Continue broad-spectrum antibiotics.  Surgery recommendation appreciated.  Patient require at least another 24 to 48 hours of inpatient  service.  1/1: Surgery and ID recs appreciated. Continue wound care, follow cultures. We  will change pain regimen to oxycodone 5 mg every 6 hrs as needed, increase gabapentin to 600 mg tid, add scheduled tylenol 1000 mg tid. We will place a formal referral to pain management as an outpatient.  1/2: Patient remains on IV vancomycin and Zosyn.  Cefepime was also added.  Blood cultures remain negative.  Wound cultures for report still pending.  ID and surgery recommendation appreciated.  Patient will require PICC line placement on IV cefepime for 6 weeks.  PICC line ordered.  Monitor renal function closely as his creatinine is creeping up and is now up to 1.56 which is at the higher level of his baseline range.  Pain management referral on discharge.  1/3: Cefepime switched to ertapenem given the patient's nausea and vomiting.  PICC line was placed.  Anticipate discharge in the next 24 hours.     Hx CAD s/p CABG   Continue home Lipitor and Zetia  Continue home aspirin   Holding home Birllinta due to possible procedure, resume asap  Continue home ranexa     Diabetic Nephropathy with CKD3  Baseline appears to be ~1.0-1.3  Avoid nephrotoxins  Consider nephrology consult if worsening   Hold home lasix and entresto in setting of SAMEERA, resume as appropriate   Continue Farxiga for now  1/1: Cr labile but seems to be at his baseline range.     IDDM-II with hyperglycemia  Continue Lantus and humalog TID AC  A1C of 8.2% as of October 2024   Start SSI protocol  Goal glucose <180 for better wound healing   Started home lantus dose  Hold nutr insulin dose once NPO  1/2: Continue current treatment.  Sugars remain well-controlled     HTN  Continue home hydralazine      History of multiple CVA  History of R PCA stroke in 2019, L MCA stroke in 2021, bihemispheric small infarcts in 2022, and L MCA scattered embolic infarcts in 2023   Continue as above     COPD without acute exacerbation  Cigarette smoker x48 years, continues to smoke  but cut back to 3 cigarettes per day   Albuterol MDI as needed for shortness of breath/wheezing      BRUNA  Noncompliant with CPAP     Tobacco use disorder   Quit last week after 48 years  Nicotine patch     DVT: Holding AC apixaban for possible procedure  Disposition: Patient needs >72 hours of admission for IV abx            Ashley Celestin MD

## 2025-01-05 VITALS
TEMPERATURE: 97.6 F | SYSTOLIC BLOOD PRESSURE: 132 MMHG | HEART RATE: 74 BPM | BODY MASS INDEX: 29.51 KG/M2 | RESPIRATION RATE: 16 BRPM | OXYGEN SATURATION: 95 % | DIASTOLIC BLOOD PRESSURE: 81 MMHG | HEIGHT: 71 IN | WEIGHT: 210.8 LBS

## 2025-01-05 LAB
ANION GAP SERPL CALC-SCNC: 9 MMOL/L (ref 10–20)
BACTERIA SPEC CULT: ABNORMAL
BASOPHILS # BLD AUTO: 0.03 X10*3/UL (ref 0–0.1)
BASOPHILS NFR BLD AUTO: 0.5 %
BUN SERPL-MCNC: 23 MG/DL (ref 6–23)
CALCIUM SERPL-MCNC: 7.9 MG/DL (ref 8.6–10.3)
CHLORIDE SERPL-SCNC: 100 MMOL/L (ref 98–107)
CO2 SERPL-SCNC: 26 MMOL/L (ref 21–32)
CREAT SERPL-MCNC: 1.13 MG/DL (ref 0.5–1.3)
EGFRCR SERPLBLD CKD-EPI 2021: 75 ML/MIN/1.73M*2
EOSINOPHIL # BLD AUTO: 0.09 X10*3/UL (ref 0–0.7)
EOSINOPHIL NFR BLD AUTO: 1.6 %
ERYTHROCYTE [DISTWIDTH] IN BLOOD BY AUTOMATED COUNT: 13.5 % (ref 11.5–14.5)
GLUCOSE BLD MANUAL STRIP-MCNC: 104 MG/DL (ref 74–99)
GLUCOSE BLD MANUAL STRIP-MCNC: 144 MG/DL (ref 74–99)
GLUCOSE BLD MANUAL STRIP-MCNC: 172 MG/DL (ref 74–99)
GLUCOSE BLD MANUAL STRIP-MCNC: 82 MG/DL (ref 74–99)
GLUCOSE SERPL-MCNC: 86 MG/DL (ref 74–99)
GRAM STN SPEC: ABNORMAL
GRAM STN SPEC: ABNORMAL
HCT VFR BLD AUTO: 24.8 % (ref 41–52)
HGB BLD-MCNC: 8.3 G/DL (ref 13.5–17.5)
IMM GRANULOCYTES # BLD AUTO: 0.03 X10*3/UL (ref 0–0.7)
IMM GRANULOCYTES NFR BLD AUTO: 0.5 % (ref 0–0.9)
LYMPHOCYTES # BLD AUTO: 1.22 X10*3/UL (ref 1.2–4.8)
LYMPHOCYTES NFR BLD AUTO: 22.3 %
MCH RBC QN AUTO: 30.6 PG (ref 26–34)
MCHC RBC AUTO-ENTMCNC: 33.5 G/DL (ref 32–36)
MCV RBC AUTO: 92 FL (ref 80–100)
MONOCYTES # BLD AUTO: 0.43 X10*3/UL (ref 0.1–1)
MONOCYTES NFR BLD AUTO: 7.8 %
NEUTROPHILS # BLD AUTO: 3.68 X10*3/UL (ref 1.2–7.7)
NEUTROPHILS NFR BLD AUTO: 67.3 %
NRBC BLD-RTO: 0 /100 WBCS (ref 0–0)
PLATELET # BLD AUTO: 349 X10*3/UL (ref 150–450)
POTASSIUM SERPL-SCNC: 4.4 MMOL/L (ref 3.5–5.3)
RBC # BLD AUTO: 2.71 X10*6/UL (ref 4.5–5.9)
SODIUM SERPL-SCNC: 131 MMOL/L (ref 136–145)
WBC # BLD AUTO: 5.5 X10*3/UL (ref 4.4–11.3)

## 2025-01-05 PROCEDURE — 2500000001 HC RX 250 WO HCPCS SELF ADMINISTERED DRUGS (ALT 637 FOR MEDICARE OP): Performed by: INTERNAL MEDICINE

## 2025-01-05 PROCEDURE — 1210000001 HC SEMI-PRIVATE ROOM DAILY

## 2025-01-05 PROCEDURE — 80048 BASIC METABOLIC PNL TOTAL CA: CPT | Performed by: INTERNAL MEDICINE

## 2025-01-05 PROCEDURE — 2500000002 HC RX 250 W HCPCS SELF ADMINISTERED DRUGS (ALT 637 FOR MEDICARE OP, ALT 636 FOR OP/ED): Performed by: INTERNAL MEDICINE

## 2025-01-05 PROCEDURE — 2500000004 HC RX 250 GENERAL PHARMACY W/ HCPCS (ALT 636 FOR OP/ED): Performed by: INTERNAL MEDICINE

## 2025-01-05 PROCEDURE — 94640 AIRWAY INHALATION TREATMENT: CPT

## 2025-01-05 PROCEDURE — 99233 SBSQ HOSP IP/OBS HIGH 50: CPT | Performed by: INTERNAL MEDICINE

## 2025-01-05 PROCEDURE — 82947 ASSAY GLUCOSE BLOOD QUANT: CPT

## 2025-01-05 PROCEDURE — 2500000005 HC RX 250 GENERAL PHARMACY W/O HCPCS: Performed by: INTERNAL MEDICINE

## 2025-01-05 PROCEDURE — 2500000004 HC RX 250 GENERAL PHARMACY W/ HCPCS (ALT 636 FOR OP/ED): Performed by: STUDENT IN AN ORGANIZED HEALTH CARE EDUCATION/TRAINING PROGRAM

## 2025-01-05 PROCEDURE — S4991 NICOTINE PATCH NONLEGEND: HCPCS | Performed by: INTERNAL MEDICINE

## 2025-01-05 PROCEDURE — 85025 COMPLETE CBC W/AUTO DIFF WBC: CPT | Performed by: INTERNAL MEDICINE

## 2025-01-05 RX ORDER — MEROPENEM 1 G/1
2 INJECTION, POWDER, FOR SOLUTION INTRAVENOUS EVERY 8 HOURS
Status: DISPENSED | OUTPATIENT
Start: 2025-01-05

## 2025-01-05 RX ORDER — MEROPENEM 1 G/1
1 INJECTION, POWDER, FOR SOLUTION INTRAVENOUS EVERY 8 HOURS
Status: DISCONTINUED | OUTPATIENT
Start: 2025-01-05 | End: 2025-01-05

## 2025-01-05 RX ADMIN — NICOTINE 1 PATCH: 21 PATCH, EXTENDED RELEASE TRANSDERMAL at 08:20

## 2025-01-05 RX ADMIN — RANOLAZINE 500 MG: 500 TABLET, FILM COATED, EXTENDED RELEASE ORAL at 22:00

## 2025-01-05 RX ADMIN — Medication 21 PERCENT: at 22:03

## 2025-01-05 RX ADMIN — ACETAMINOPHEN 975 MG: 325 TABLET ORAL at 08:20

## 2025-01-05 RX ADMIN — INSULIN LISPRO 3 UNITS: 100 INJECTION, SOLUTION INTRAVENOUS; SUBCUTANEOUS at 11:47

## 2025-01-05 RX ADMIN — APIXABAN 5 MG: 5 TABLET, FILM COATED ORAL at 21:59

## 2025-01-05 RX ADMIN — TICAGRELOR 90 MG: 90 TABLET ORAL at 21:59

## 2025-01-05 RX ADMIN — OXCARBAZEPINE 450 MG: 150 TABLET, FILM COATED ORAL at 08:20

## 2025-01-05 RX ADMIN — IPRATROPIUM BROMIDE AND ALBUTEROL SULFATE 3 ML: 2.5; .5 SOLUTION RESPIRATORY (INHALATION) at 11:29

## 2025-01-05 RX ADMIN — TAMSULOSIN HYDROCHLORIDE 0.4 MG: 0.4 CAPSULE ORAL at 08:20

## 2025-01-05 RX ADMIN — TICAGRELOR 90 MG: 90 TABLET ORAL at 08:20

## 2025-01-05 RX ADMIN — DAPAGLIFLOZIN 10 MG: 5 TABLET, FILM COATED ORAL at 08:20

## 2025-01-05 RX ADMIN — ACETAMINOPHEN 975 MG: 325 TABLET ORAL at 21:59

## 2025-01-05 RX ADMIN — GABAPENTIN 600 MG: 300 CAPSULE ORAL at 16:25

## 2025-01-05 RX ADMIN — CARVEDILOL 6.25 MG: 6.25 TABLET, FILM COATED ORAL at 08:20

## 2025-01-05 RX ADMIN — Medication 21 PERCENT: at 11:52

## 2025-01-05 RX ADMIN — OXYCODONE HYDROCHLORIDE 5 MG: 5 TABLET ORAL at 16:25

## 2025-01-05 RX ADMIN — ASPIRIN 81 MG CHEWABLE TABLET 81 MG: 81 TABLET CHEWABLE at 08:20

## 2025-01-05 RX ADMIN — MEROPENEM 1 G: 1 INJECTION, POWDER, FOR SOLUTION INTRAVENOUS at 09:22

## 2025-01-05 RX ADMIN — GABAPENTIN 600 MG: 300 CAPSULE ORAL at 08:20

## 2025-01-05 RX ADMIN — INSULIN GLARGINE 20 UNITS: 100 INJECTION, SOLUTION SUBCUTANEOUS at 21:58

## 2025-01-05 RX ADMIN — RANOLAZINE 500 MG: 500 TABLET, FILM COATED, EXTENDED RELEASE ORAL at 08:20

## 2025-01-05 RX ADMIN — GABAPENTIN 600 MG: 300 CAPSULE ORAL at 21:59

## 2025-01-05 RX ADMIN — OXCARBAZEPINE 450 MG: 150 TABLET, FILM COATED ORAL at 21:59

## 2025-01-05 RX ADMIN — INSULIN LISPRO 5 UNITS: 100 INJECTION, SOLUTION INTRAVENOUS; SUBCUTANEOUS at 08:25

## 2025-01-05 RX ADMIN — INSULIN LISPRO 5 UNITS: 100 INJECTION, SOLUTION INTRAVENOUS; SUBCUTANEOUS at 11:47

## 2025-01-05 RX ADMIN — CARVEDILOL 6.25 MG: 6.25 TABLET, FILM COATED ORAL at 21:59

## 2025-01-05 RX ADMIN — OXYCODONE HYDROCHLORIDE 5 MG: 5 TABLET ORAL at 22:26

## 2025-01-05 RX ADMIN — INSULIN LISPRO 5 UNITS: 100 INJECTION, SOLUTION INTRAVENOUS; SUBCUTANEOUS at 16:27

## 2025-01-05 RX ADMIN — ACETAMINOPHEN 975 MG: 325 TABLET ORAL at 16:25

## 2025-01-05 RX ADMIN — Medication 21 PERCENT: at 08:22

## 2025-01-05 RX ADMIN — POLYETHYLENE GLYCOL 3350 17 G: 17 POWDER, FOR SOLUTION ORAL at 08:21

## 2025-01-05 RX ADMIN — OXYCODONE HYDROCHLORIDE 5 MG: 5 TABLET ORAL at 04:25

## 2025-01-05 RX ADMIN — MEROPENEM 2 G: 2 INJECTION, POWDER, FOR SOLUTION INTRAVENOUS at 16:32

## 2025-01-05 RX ADMIN — IPRATROPIUM BROMIDE AND ALBUTEROL SULFATE 3 ML: 2.5; .5 SOLUTION RESPIRATORY (INHALATION) at 07:44

## 2025-01-05 RX ADMIN — APIXABAN 5 MG: 5 TABLET, FILM COATED ORAL at 08:20

## 2025-01-05 ASSESSMENT — COGNITIVE AND FUNCTIONAL STATUS - GENERAL
CLIMB 3 TO 5 STEPS WITH RAILING: A LOT
DAILY ACTIVITIY SCORE: 18
DRESSING REGULAR UPPER BODY CLOTHING: A LITTLE
STANDING UP FROM CHAIR USING ARMS: A LITTLE
TURNING FROM BACK TO SIDE WHILE IN FLAT BAD: A LITTLE
MOVING FROM LYING ON BACK TO SITTING ON SIDE OF FLAT BED WITH BEDRAILS: A LITTLE
MOBILITY SCORE: 17
DRESSING REGULAR LOWER BODY CLOTHING: A LOT
PERSONAL GROOMING: A LITTLE
TOILETING: A LITTLE
MOVING TO AND FROM BED TO CHAIR: A LITTLE
HELP NEEDED FOR BATHING: A LITTLE
WALKING IN HOSPITAL ROOM: A LITTLE

## 2025-01-05 ASSESSMENT — PAIN DESCRIPTION - ORIENTATION
ORIENTATION: LEFT
ORIENTATION: LEFT

## 2025-01-05 ASSESSMENT — PAIN SCALES - GENERAL
PAINLEVEL_OUTOF10: 4
PAINLEVEL_OUTOF10: 7
PAINLEVEL_OUTOF10: 8
PAINLEVEL_OUTOF10: 7
PAINLEVEL_OUTOF10: 4
PAINLEVEL_OUTOF10: 6

## 2025-01-05 ASSESSMENT — PAIN - FUNCTIONAL ASSESSMENT
PAIN_FUNCTIONAL_ASSESSMENT: 0-10

## 2025-01-05 ASSESSMENT — PAIN DESCRIPTION - LOCATION
LOCATION: GROIN
LOCATION: LEG

## 2025-01-05 ASSESSMENT — PAIN DESCRIPTION - DESCRIPTORS
DESCRIPTORS: DISCOMFORT;ACHING
DESCRIPTORS: DISCOMFORT

## 2025-01-05 NOTE — PROGRESS NOTES
Dereck Shen is a 59 y.o. male on day 5 of admission presenting with Postoperative abscess.      Assessment/Plan:     #Postop left groin abscess with concern for graft infection  #Enterobacter cloacae organism  #AmpC organism  Abscess overlying proximal anastomosis site of the femoropopliteal graft.  As per vascular, will be more cautious in his case given extensive history of surgeries and will treat it as if the graft is infected.  Patient developed vomiting with cefepime and ertapenem.  Tolerated meropenem well this morning.     #Nausea and vomiting  Unclear if this was because of the antibiotic.  Patient received ertapenem yesterday in the afternoon and had vomiting this morning.  Will obtain CT abdomen pelvis again.     #Acute kidney injury-improving  Estimated Creatinine Clearance: 84.1 mL/min (by C-G formula based on SCr of 1.13 mg/dL).       CAD s/p CABG  PAD s/p bypass and graft  DVT  DM, HTN     Recommendations:     -DC ertapenem   -Start meropenem 2 g every 8 hours through 02/11/25 to finish 6-week course of Antibiotics  -Will continue to follow      Gorge Quintero MD  Date of service: 1/5/2025  Time of service: 11:12 AM      Subjective   Interval History: Yesterday afternoon, patient had an episode of vomiting after the ertapenem.  Had CT abdomen/pelvis done which showed colonic stool burden and had enema followed by bowel movement.  Received meropenem this morning without any issues      Review of Systems  Denies: fever, chills, nausea, vomiting, diarrhea, dysuria    Objective   Range of Vitals (last 24 hours)  Heart Rate:  [72-78]   Temp:  [36.2 °C (97.1 °F)-36.6 °C (97.9 °F)]   Resp:  [15-19]   BP: (118-156)/(67-88)   Weight:  [95.6 kg (210 lb 12.8 oz)]   SpO2:  [93 %-96 %]  Daily Weight  01/05/25 : 95.6 kg (210 lb 12.8 oz)   Body mass index is 29.4 kg/m².    General: alert, oriented, NAD  HEENT: conjunctival pallor, no scleral icterus  Abdomen: soft, non tender, non distended  Neuro: AAO x  3  Extremities: LLE dressing, L groin stitches with packing with open wound without any surrounding erythema or drainage. R arm PICC line  Skin: As above       Antibiotics  meropenem - 1 gram/100 mL      Relevant Results  Labs  Results from last 72 hours   Lab Units 01/05/25  0423   WBC AUTO x10*3/uL 5.5   HEMOGLOBIN g/dL 8.3*   HEMATOCRIT % 24.8*   PLATELETS AUTO x10*3/uL 349   NEUTROS PCT AUTO % 67.3   LYMPHS PCT AUTO % 22.3   MONOS PCT AUTO % 7.8   EOS PCT AUTO % 1.6     Results from last 72 hours   Lab Units 01/05/25  0423 01/03/25  0407   SODIUM mmol/L 131* 131*   POTASSIUM mmol/L 4.4 4.5   CHLORIDE mmol/L 100 101   CO2 mmol/L 26 25   BUN mg/dL 23 32*   CREATININE mg/dL 1.13 1.35*   GLUCOSE mg/dL 86 162*   CALCIUM mg/dL 7.9* 7.9*   ANION GAP mmol/L 9* 10   EGFR mL/min/1.73m*2 75 61         Estimated Creatinine Clearance: 84.1 mL/min (by C-G formula based on SCr of 1.13 mg/dL).  C-Reactive Protein   Date Value Ref Range Status   12/30/2024 7.35 (H) <1.00 mg/dL Final   04/24/2024 0.15 <1.00 mg/dL Final       Microbiology  Susceptibility data from last 14 days.  Collected Specimen Info Organism Amoxicillin/Clavulanate Ampicillin Ampicillin/Sulbactam Cefazolin Cefepime Ciprofloxacin Gentamicin Levofloxacin Piperacillin/Tazobactam Trimethoprim/Sulfamethoxazole   01/02/25 Tissue/Biopsy from Skin/Superficial Abscess Enterobacter cloacae complex             12/30/24 Tissue/Biopsy from Skin/Superficial Abscess Enterobacter cloacae complex  R  R  R  R  S  S  S  S  S  S     Mixed Anaerobic Bacteria                 Imaging    CT abdomen pelvis wo IV contrast    Result Date: 1/4/2025  1.  Bibasilar interstitial edema with enlarging, now moderate pleural effusions. 2. Anasarca. 3. Decreased fluid overlying left femoral cutdown site. Subincisional gas with adjacent reactive lymphadenopathy at this site persist. 4. Large colonic stool burden. No findings of bowel obstruction otherwise.   MACRO: None.   Signed by: Raudel Scott  1/4/2025 4:16 PM Dictation workstation:   [id number]    XR chest 1 view    Result Date: 1/1/2025  As above   MACRO: None   Signed by: Geovany Amin 1/1/2025 11:22 AM Dictation workstation:   [id number]    XR chest 1 view    Result Date: 1/1/2025  Pulmonary edema.   Signed by: Aleks Joshi 1/1/2025 10:48 AM Dictation workstation:   [id number]    CT abdomen pelvis w IV contrast    Result Date: 12/30/2024  Complex fluid collection left groin 2.9 x 4.6 x 7.5 cm is suggestive of abscess.  There are multiple locules of gas within the fluid collection as well as mild peripheral enhancement.  The fluid collection extends from just below the skin surface and staples deep to the level the left common femoral artery and proximal portion of the Heron-Casey bypass graft.  Reactive left groin lymphadenopathy adjacent to the abscess as well as proximal left thigh soft tissue edema. Critical findings discussed with Dr. Knutson at 9:00 PM 12/30/2024. Signed by Geovany Joel MD    XR chest 1 view    Result Date: 12/23/2024  No acute cardiopulmonary process is evident.   MACRO: None   Signed by: Lawrence Leahy 12/23/2024 9:34 PM Dictation workstation:   [id number]    CT angio aorta and bilateral iliofemoral runoff including without contrast if performed    Result Date: 12/17/2024  No significant aortic disease.  Mild bilateral common iliac artery stenosis.  Patent left external iliac and common femoral arteries. 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  Chronic occlusion of the native left superficial femoral artery. A 2 cm segment of the left P1 popliteal artery reconstitutes.  The terminal left popliteal artery also reconstitutes.  Other portions of the popliteal artery are occluded. Patent three-vessel infrapopliteal runoff to the left hindfoot, beyond which contrast opacification was too weak to visualize. Thick subcutaneous soft tissue - presumed scarred tissue - overlying the left femoral vessels  at the left groin.  Correlate for possibility of cellulitis. Patent right iliac and right femoral stents.  Patent right popliteal artery.  Patent three-vessel infrapopliteal runoff to the right ankle. No findings of an acute process in the abdomen or pelvis. Signed by Simeon Barrera MD

## 2025-01-05 NOTE — CARE PLAN
The patient's goals for the shift include        Problem: Pain - Adult  Goal: Verbalizes/displays adequate comfort level or baseline comfort level  Outcome: Progressing     Problem: Safety - Adult  Goal: Free from fall injury  Outcome: Progressing     Problem: Discharge Planning  Goal: Discharge to home or other facility with appropriate resources  Outcome: Progressing     Problem: Chronic Conditions and Co-morbidities  Goal: Patient's chronic conditions and co-morbidity symptoms are monitored and maintained or improved  Outcome: Progressing     Problem: Skin  Goal: Decreased wound size/increased tissue granulation at next dressing change  Outcome: Progressing  Flowsheets (Taken 1/5/2025 0713)  Decreased wound size/increased tissue granulation at next dressing change:   Promote sleep for wound healing   Protective dressings over bony prominences  Goal: Participates in plan/prevention/treatment measures  Outcome: Progressing  Flowsheets (Taken 1/5/2025 0713)  Participates in plan/prevention/treatment measures:   Discuss with provider PT/OT consult   Elevate heels   Increase activity/out of bed for meals  Goal: Prevent/manage excess moisture  Outcome: Progressing  Flowsheets (Taken 1/5/2025 0713)  Prevent/manage excess moisture:   Follow provider orders for dressing changes   Monitor for/manage infection if present   Cleanse incontinence/protect with barrier cream   Moisturize dry skin  Goal: Prevent/minimize sheer/friction injuries  Outcome: Progressing  Flowsheets (Taken 1/5/2025 0713)  Prevent/minimize sheer/friction injuries:   HOB 30 degrees or less   Turn/reposition every 2 hours/use positioning/transfer devices   Complete micro-shifts as needed if patient unable. Adjust patient position to relieve pressure points, not a full turn   Increase activity/out of bed for meals  Goal: Promote/optimize nutrition  Outcome: Progressing  Flowsheets (Taken 1/5/2025 0713)  Promote/optimize nutrition: Monitor/record intake  including meals  Goal: Promote skin healing  Outcome: Progressing  Flowsheets (Taken 1/5/2025 9591)  Promote skin healing:   Assess skin/pad under line(s)/device(s)   Ensure correct size (line/device) and apply per  instructions   Protective dressings over bony prominences   Rotate device position/do not position patient on device     Problem: Fall/Injury  Goal: Not fall by end of shift  Outcome: Progressing  Goal: Be free from injury by end of the shift  Outcome: Progressing  Goal: Verbalize understanding of personal risk factors for fall in the hospital  Outcome: Progressing  Goal: Verbalize understanding of risk factor reduction measures to prevent injury from fall in the home  Outcome: Progressing  Goal: Use assistive devices by end of the shift  Outcome: Progressing  Goal: Pace activities to prevent fatigue by end of the shift  Outcome: Progressing     Problem: Pain  Goal: Takes deep breaths with improved pain control throughout the shift  Outcome: Progressing  Goal: Turns in bed with improved pain control throughout the shift  Outcome: Progressing  Goal: Walks with improved pain control throughout the shift  Outcome: Progressing  Goal: Performs ADL's with improved pain control throughout shift  Outcome: Progressing  Goal: Participates in PT with improved pain control throughout the shift  Outcome: Progressing  Goal: Free from opioid side effects throughout the shift  Outcome: Progressing  Goal: Free from acute confusion related to pain meds throughout the shift  Outcome: Progressing     Problem: Respiratory  Goal: Clear secretions with interventions this shift  Outcome: Progressing  Goal: Minimize anxiety/maximize coping throughout shift  Outcome: Progressing  Goal: Minimal/no exertional discomfort or dyspnea this shift  Outcome: Progressing  Goal: No signs of respiratory distress (eg. Use of accessory muscles. Peds grunting)  Outcome: Progressing  Goal: Patent airway maintained this  shift  Outcome: Progressing     Problem: Nutrition  Goal: Adequate PO fluid intake  Outcome: Progressing  Goal: Nutrition support is meeting 75% of nutrient needs  Outcome: Progressing  Goal: Lab values WNL  Outcome: Progressing  Goal: Electrolytes WNL  Outcome: Progressing  Goal: Promote healing  Outcome: Progressing     Problem: Diabetes  Goal: Achieve decreasing blood glucose levels by end of shift  Outcome: Progressing  Goal: Increase stability of blood glucose readings by end of shift  Outcome: Progressing  Goal: Decrease in ketones present in urine by end of shift  Outcome: Progressing  Goal: Maintain electrolyte levels within acceptable range throughout shift  Outcome: Progressing  Goal: Maintain glucose levels >70mg/dl to <250mg/dl throughout shift  Outcome: Progressing  Goal: No changes in neurological exam by end of shift  Outcome: Progressing  Goal: Learn about and adhere to nutrition recommendations by end of shift  Outcome: Progressing  Goal: Vital signs within normal range for age by end of shift  Outcome: Progressing  Goal: Increase self care and/or family involovement by end of shift  Outcome: Progressing  Goal: Receive DSME education by end of shift  Outcome: Progressing

## 2025-01-06 ENCOUNTER — HOME INFUSION (OUTPATIENT)
Dept: INFUSION THERAPY | Age: 60
End: 2025-01-06
Payer: COMMERCIAL

## 2025-01-06 ENCOUNTER — DOCUMENTATION (OUTPATIENT)
Dept: INFUSION THERAPY | Age: 60
End: 2025-01-06

## 2025-01-06 LAB
GLUCOSE BLD MANUAL STRIP-MCNC: 113 MG/DL (ref 74–99)
GLUCOSE BLD MANUAL STRIP-MCNC: 126 MG/DL (ref 74–99)
GLUCOSE BLD MANUAL STRIP-MCNC: 129 MG/DL (ref 74–99)
GLUCOSE BLD MANUAL STRIP-MCNC: 87 MG/DL (ref 74–99)

## 2025-01-06 PROCEDURE — 82947 ASSAY GLUCOSE BLOOD QUANT: CPT

## 2025-01-06 PROCEDURE — 2500000001 HC RX 250 WO HCPCS SELF ADMINISTERED DRUGS (ALT 637 FOR MEDICARE OP): Performed by: INTERNAL MEDICINE

## 2025-01-06 PROCEDURE — 2500000005 HC RX 250 GENERAL PHARMACY W/O HCPCS: Performed by: INTERNAL MEDICINE

## 2025-01-06 PROCEDURE — 2500000002 HC RX 250 W HCPCS SELF ADMINISTERED DRUGS (ALT 637 FOR MEDICARE OP, ALT 636 FOR OP/ED): Performed by: INTERNAL MEDICINE

## 2025-01-06 PROCEDURE — 2500000004 HC RX 250 GENERAL PHARMACY W/ HCPCS (ALT 636 FOR OP/ED): Performed by: STUDENT IN AN ORGANIZED HEALTH CARE EDUCATION/TRAINING PROGRAM

## 2025-01-06 PROCEDURE — S4991 NICOTINE PATCH NONLEGEND: HCPCS | Performed by: INTERNAL MEDICINE

## 2025-01-06 PROCEDURE — 1210000001 HC SEMI-PRIVATE ROOM DAILY

## 2025-01-06 PROCEDURE — 99233 SBSQ HOSP IP/OBS HIGH 50: CPT | Performed by: INTERNAL MEDICINE

## 2025-01-06 PROCEDURE — 2500000004 HC RX 250 GENERAL PHARMACY W/ HCPCS (ALT 636 FOR OP/ED): Performed by: INTERNAL MEDICINE

## 2025-01-06 RX ORDER — HEPARIN SODIUM,PORCINE/PF 10 UNIT/ML
3 SYRINGE (ML) INTRAVENOUS DAILY
Qty: 108 ML | Refills: 0 | Status: ON HOLD
Start: 2025-01-06 | End: 2025-02-11

## 2025-01-06 RX ORDER — MEROPENEM 1 G/1
2 INJECTION, POWDER, FOR SOLUTION INTRAVENOUS EVERY 8 HOURS
Qty: 216 G | Refills: 0 | Status: ON HOLD
Start: 2025-01-06 | End: 2025-02-11

## 2025-01-06 RX ADMIN — POLYETHYLENE GLYCOL 3350 17 G: 17 POWDER, FOR SOLUTION ORAL at 09:30

## 2025-01-06 RX ADMIN — OXYCODONE HYDROCHLORIDE 5 MG: 5 TABLET ORAL at 17:19

## 2025-01-06 RX ADMIN — GABAPENTIN 600 MG: 300 CAPSULE ORAL at 16:33

## 2025-01-06 RX ADMIN — TAMSULOSIN HYDROCHLORIDE 0.4 MG: 0.4 CAPSULE ORAL at 09:31

## 2025-01-06 RX ADMIN — DAPAGLIFLOZIN 10 MG: 5 TABLET, FILM COATED ORAL at 09:51

## 2025-01-06 RX ADMIN — RANOLAZINE 500 MG: 500 TABLET, FILM COATED, EXTENDED RELEASE ORAL at 21:47

## 2025-01-06 RX ADMIN — TICAGRELOR 90 MG: 90 TABLET ORAL at 21:47

## 2025-01-06 RX ADMIN — OXCARBAZEPINE 450 MG: 150 TABLET, FILM COATED ORAL at 21:47

## 2025-01-06 RX ADMIN — OXCARBAZEPINE 450 MG: 150 TABLET, FILM COATED ORAL at 09:31

## 2025-01-06 RX ADMIN — MEROPENEM 2 G: 2 INJECTION, POWDER, FOR SOLUTION INTRAVENOUS at 01:52

## 2025-01-06 RX ADMIN — MEROPENEM 2 G: 2 INJECTION, POWDER, FOR SOLUTION INTRAVENOUS at 17:19

## 2025-01-06 RX ADMIN — ASPIRIN 81 MG CHEWABLE TABLET 81 MG: 81 TABLET CHEWABLE at 09:30

## 2025-01-06 RX ADMIN — CARVEDILOL 6.25 MG: 6.25 TABLET, FILM COATED ORAL at 21:47

## 2025-01-06 RX ADMIN — MEROPENEM 2 G: 2 INJECTION, POWDER, FOR SOLUTION INTRAVENOUS at 09:32

## 2025-01-06 RX ADMIN — ACETAMINOPHEN 975 MG: 325 TABLET ORAL at 21:47

## 2025-01-06 RX ADMIN — INSULIN LISPRO 5 UNITS: 100 INJECTION, SOLUTION INTRAVENOUS; SUBCUTANEOUS at 11:12

## 2025-01-06 RX ADMIN — ACETAMINOPHEN 975 MG: 325 TABLET ORAL at 16:33

## 2025-01-06 RX ADMIN — APIXABAN 5 MG: 5 TABLET, FILM COATED ORAL at 21:47

## 2025-01-06 RX ADMIN — RANOLAZINE 500 MG: 500 TABLET, FILM COATED, EXTENDED RELEASE ORAL at 09:30

## 2025-01-06 RX ADMIN — Medication 21 PERCENT: at 21:50

## 2025-01-06 RX ADMIN — NICOTINE 1 PATCH: 21 PATCH, EXTENDED RELEASE TRANSDERMAL at 09:30

## 2025-01-06 RX ADMIN — GABAPENTIN 600 MG: 300 CAPSULE ORAL at 09:30

## 2025-01-06 RX ADMIN — GABAPENTIN 600 MG: 300 CAPSULE ORAL at 21:47

## 2025-01-06 RX ADMIN — Medication 1 L/MIN: at 09:41

## 2025-01-06 RX ADMIN — OXYCODONE HYDROCHLORIDE 5 MG: 5 TABLET ORAL at 11:11

## 2025-01-06 RX ADMIN — TICAGRELOR 90 MG: 90 TABLET ORAL at 09:31

## 2025-01-06 RX ADMIN — ACETAMINOPHEN 975 MG: 325 TABLET ORAL at 09:31

## 2025-01-06 RX ADMIN — CARVEDILOL 6.25 MG: 6.25 TABLET, FILM COATED ORAL at 09:52

## 2025-01-06 RX ADMIN — APIXABAN 5 MG: 5 TABLET, FILM COATED ORAL at 09:30

## 2025-01-06 RX ADMIN — INSULIN GLARGINE 20 UNITS: 100 INJECTION, SOLUTION SUBCUTANEOUS at 21:45

## 2025-01-06 ASSESSMENT — COGNITIVE AND FUNCTIONAL STATUS - GENERAL
MOBILITY SCORE: 21
CLIMB 3 TO 5 STEPS WITH RAILING: A LITTLE
WALKING IN HOSPITAL ROOM: A LITTLE
DAILY ACTIVITIY SCORE: 24
STANDING UP FROM CHAIR USING ARMS: A LITTLE

## 2025-01-06 ASSESSMENT — PAIN SCALES - GENERAL
PAINLEVEL_OUTOF10: 8
PAINLEVEL_OUTOF10: 8
PAINLEVEL_OUTOF10: 5 - MODERATE PAIN
PAINLEVEL_OUTOF10: 5 - MODERATE PAIN
PAINLEVEL_OUTOF10: 0 - NO PAIN

## 2025-01-06 ASSESSMENT — PAIN - FUNCTIONAL ASSESSMENT
PAIN_FUNCTIONAL_ASSESSMENT: 0-10

## 2025-01-06 NOTE — PROGRESS NOTES
DIAGNOSIS Postopeartive abscess  Allergies   Allergen Reactions    Celecoxib Hives    Ibuprofen Hives    Tetanus Toxoid, Adsorbed Unknown    [id number] Antimigraine Agents Unknown    Cephalexin Rash    Hydrocodone Rash    Latex Rash    Tryptophan Rash      REVIEW OF LABS AT DISCHARGE  LINE INFO: PT HAS A SL PICC TO BE MANAGED PER [facility] PROTOCOL  PT ORDERED meropenem 2 gm q8h THRU 2/11/25  LABS ORDERED INCLUDE CBC/diff, Cr, hepatic function panel  [employer]  CARE PLAN DONE    Dereck CIARA Shen IS A 59 y.o. male ORDERED meropenem  FOLLOWED BY Dr Quintero    [facility] spoke with pt's mother Danna, informed her of medication name, delivery, dosing, etc. She verbalized understanding of instruction regarding medication and receipt/storage of package. Verified delivery address as [address] [address] (this is her address).  to call on way, delivery ok by 9 pm. Danna's cell # is [phone]; she states this best number to contact her.    RX DISPENSED THE FOLLOWING WITH SUPPLIES TO MATCH WITH DELIVERY 1/6  9x kasandra HP  DOS 1/7-1/9    FOLLOW UP 1/9 PROGRESS, LABS, DELIVERY STRAIGHT (4/3)

## 2025-01-06 NOTE — CARE PLAN
Problem: Pain - Adult  Goal: Verbalizes/displays adequate comfort level or baseline comfort level  Outcome: Progressing     Problem: Safety - Adult  Goal: Free from fall injury  Outcome: Progressing     Problem: Discharge Planning  Goal: Discharge to home or other facility with appropriate resources  Outcome: Progressing     Problem: Chronic Conditions and Co-morbidities  Goal: Patient's chronic conditions and co-morbidity symptoms are monitored and maintained or improved  Outcome: Progressing     Problem: Skin  Goal: Decreased wound size/increased tissue granulation at next dressing change  Outcome: Progressing  Goal: Participates in plan/prevention/treatment measures  Outcome: Progressing  Goal: Prevent/manage excess moisture  Outcome: Progressing  Goal: Prevent/minimize sheer/friction injuries  Outcome: Progressing  Goal: Promote/optimize nutrition  Outcome: Progressing  Goal: Promote skin healing  Outcome: Progressing     Problem: Fall/Injury  Goal: Not fall by end of shift  Outcome: Progressing  Goal: Be free from injury by end of the shift  Outcome: Progressing  Goal: Verbalize understanding of personal risk factors for fall in the hospital  Outcome: Progressing  Goal: Verbalize understanding of risk factor reduction measures to prevent injury from fall in the home  Outcome: Progressing  Goal: Use assistive devices by end of the shift  Outcome: Progressing  Goal: Pace activities to prevent fatigue by end of the shift  Outcome: Progressing     Problem: Pain  Goal: Takes deep breaths with improved pain control throughout the shift  Outcome: Progressing  Goal: Turns in bed with improved pain control throughout the shift  Outcome: Progressing  Goal: Walks with improved pain control throughout the shift  Outcome: Progressing  Goal: Performs ADL's with improved pain control throughout shift  Outcome: Progressing  Goal: Participates in PT with improved pain control throughout the shift  Outcome: Progressing  Goal:  Free from opioid side effects throughout the shift  Outcome: Progressing  Goal: Free from acute confusion related to pain meds throughout the shift  Outcome: Progressing     Problem: Respiratory  Goal: Clear secretions with interventions this shift  Outcome: Progressing  Goal: Minimize anxiety/maximize coping throughout shift  Outcome: Progressing  Goal: Minimal/no exertional discomfort or dyspnea this shift  Outcome: Progressing  Goal: No signs of respiratory distress (eg. Use of accessory muscles. Peds grunting)  Outcome: Progressing  Goal: Patent airway maintained this shift  Outcome: Progressing     Problem: Nutrition  Goal: Adequate PO fluid intake  Outcome: Progressing  Goal: Nutrition support is meeting 75% of nutrient needs  Outcome: Progressing  Goal: Lab values WNL  Outcome: Progressing  Goal: Electrolytes WNL  Outcome: Progressing  Goal: Promote healing  Outcome: Progressing     Problem: Diabetes  Goal: Achieve decreasing blood glucose levels by end of shift  Outcome: Progressing  Goal: Increase stability of blood glucose readings by end of shift  Outcome: Progressing  Goal: Decrease in ketones present in urine by end of shift  Outcome: Progressing  Goal: Maintain electrolyte levels within acceptable range throughout shift  Outcome: Progressing  Goal: Maintain glucose levels >70mg/dl to <250mg/dl throughout shift  Outcome: Progressing  Goal: No changes in neurological exam by end of shift  Outcome: Progressing  Goal: Learn about and adhere to nutrition recommendations by end of shift  Outcome: Progressing  Goal: Vital signs within normal range for age by end of shift  Outcome: Progressing  Goal: Increase self care and/or family involovement by end of shift  Outcome: Progressing  Goal: Receive DSME education by end of shift  Outcome: Progressing       The patient's goals for the shift include      The clinical goals for the shift include pt will remain safe and free of fall and injury throughout the  shift.

## 2025-01-06 NOTE — PROGRESS NOTES
Called pt's mother per voicemail but did not answer and phone number did not have a voicemail to leave message. Will try again.     1606: Pt to discharge tomorrow with PICC and IV antibiotic under [facility]. Agency unable to accommodate for pt discharge today. TCC following.

## 2025-01-06 NOTE — PROGRESS NOTES
Dereck Shen is a 59 y.o. male on day 5 of admission presenting with Postoperative abscess.      Subjective      Dereck Shen is a 59 y.o. male with PMHx of CAD s/p CABG, severe lower extremity PAD with prior revascularization (R fem-pop bypass 2/24/20; L fem-pop bypass 9/14/20; L fem PTA of the graft; R iliac PTA 2/2022; LLE and DANYA PTA 4/2024; angioplasty of the left SFA 10/21/24; and left femoral and tibial thrombectomy with patch angioplasty 11/19/24. He was recently in the hospital in December for critical limb ischemia of left side and had s/p left fem-below knee popliteal bypass with vein patch left distal anastomosis 12/24. He presented today with L groin pain. According to the patient, he was in usual state till Saturday when he started having left groin pain at the surgical site. Home nurse noticed more discharge in last 12 hours and asked him to evaluate with surgeon. Pain comes and goes. Does not radiate. No fever or chills. He therefore came to ER for further evaluation.     He quite smoking after 48 years and uses a nicotine patch. Denied alcohol use. He is compliant with all of his blood thinners including aspirin, Brillinta and apixaban.      ED workup concerning for left groin abscess on the ct scan. Ed contact vascular who recommended medicine admission with vascular consult. Patient was started on broad spectrum abx. Patient will be admitted under hospital medicine for further management.        Past Medical History  He has a past medical history of (HFpEF) heart failure with preserved ejection fraction, Aphasia, Atherosclerotic heart disease of native coronary artery with unspecified angina pectoris (11/05/2019), Bipolar disorder, unspecified (Multi), BPH (benign prostatic hyperplasia), Diabetes (Multi), DVT (deep venous thrombosis) (Multi) (02/2022), Erectile dysfunction, GERD (gastroesophageal reflux disease), Hepatitis C, HLD (hyperlipidemia), HTN (hypertension), Obstructive sleep  apnea (adult) (pediatric), Opioid abuse, in remission (Multi), PAD (peripheral artery disease) (CMS-HCC), Polymyalgia rheumatica (Multi), Post-traumatic stress disorder, unspecified, and Stroke (Multi) (07/24/2023).      12/31: No acute events overnight.  History and chart reviewed.  Patient seen and examined.  Patient with multiple pains.  He was drowsy during interview.  Surgery recommendations appreciated.  Continue broad-spectrum antibiotics.  Continue pain control.  Patient will require at least another 24 to 48 hours of inpatient service.  1/1: Acute events overnight patient developed SOB with flash pulmonary edema was transferred to the ICU. Pt responded well to Bipap. Today the patient was seen in the ICU and was alert and orientated and comfortable. Pt denies any SOB, chest pain, palpitations or lightheadedness. The interview was predominated with his pains. I have informed him that he was oversedated yesterday and given his BRUNA this has led to heart failure. Pt did admit that he has chronic pain, and was under pain management 15 years ago. We will adjust his pain regimen. Pt is to continue his home BiPAP. Pt will require at least another 24-48 hrs.  1/2: No acute events overnight.  Patient doing well.  Again the interview was predominated by pain.  His pain was stepped specifically worse when he was moving and sitting on the chair.  Will add Doxy codon 5 mg every 8 hours as needed for breakthrough pain especially after therapy.  ID recommendations appreciated.  Patient will need IV cefepime till 2/11/2025.  PICC line ordered.  Home health care ordered.  Start discharge process.  Patient will need close management with his PCP and pain management clinic.  1/3: No acute events overnight.  Patient denies any abdominal pain, leg pains, nausea, and diarrhea.  He is in better spirits today.  He did mention to the ID team that he was nauseated and vomited later in the day.  We had switched the cefepime to  ertapenem.  This would likely change our plans till we see his response and for the home health care agency to be updated.  PICC line was placed.  1/4: No acute events overnight.  Patient remains nauseated and vomiting.  He did admit to not having a bowel motion for at least 5 days.  This is a combination of his pain regimen, decreased mobility, and deconditioning.  ID recommendations appreciated.  Cefepime was switched to ertapenem with no improvement in symptoms.  CT abdomen was ordered that showed no acute abdomen.  There is no bowel obstruction or dilated loops.  Patient had heavy stool burden.  Will place a Fleet enema.  Hold on laxatives given the patient's vomiting.  Patient will require at least another 24 to 48 hours of inpatient service.  1/5: No acute events overnight.  Patient's nausea and vomiting has improved.  Patient had bowel motion yesterday and today.  He started MiraLAX.  ID recommendation appreciated.  The family would like to proceed with meropenem as the patient did not have severe nausea as with the other antibiotics.  Start discharge process to home with home health care tomorrow.    Objective     Last Recorded Vitals  /76 (BP Location: Left arm, Patient Position: Lying)   Pulse 74   Temp 36.4 °C (97.6 °F) (Temporal)   Resp 17   Wt 95.6 kg (210 lb 12.8 oz)   SpO2 95%   Intake/Output last 3 Shifts:    Intake/Output Summary (Last 24 hours) at 1/5/2025 2018  Last data filed at 1/5/2025 1921  Gross per 24 hour   Intake 1160 ml   Output 975 ml   Net 185 ml       Admission Weight  Weight: 85.3 kg (188 lb) (12/30/24 1603)    Daily Weight  01/05/25 : 95.6 kg (210 lb 12.8 oz)    Image Results  CT abdomen pelvis wo IV contrast  Narrative: Interpreted By:  Raudel Scott,   STUDY:  CT ABDOMEN PELVIS WO IV CONTRAST;  1/4/2025 2:17 pm      INDICATION:  Signs/Symptoms:intractable vomiting.      COMPARISON:  12/30/2024      ACCESSION NUMBER(S):  [id number]      ORDERING CLINICIAN:  ALBINO ROMERO       TECHNIQUE:  CT of the abdomen and pelvis was performed.  Contiguous axial images  were obtained at 3 mm slice thickness through the abdomen and pelvis.  Coronal and sagittal reconstructions at 3 mm slice thickness were  performed. The patient received no intravenous contrast. Please note  evaluation of the solid organs and vessels is limited in the absence  of intravenous contrast. Given this caveat, the following  observations are made:      FINDINGS:  LOWER CHEST:  Moderate pleural effusions have increased compared to prior exam.  Bibasilar atelectasis and interstitial edema.      ABDOMEN:      LIVER:  Unremarkable      BILE DUCTS:  Not dilated.      GALLBLADDER:  No calcified stone or definite inflammation.      PANCREAS:  Unremarkable      SPLEEN:  Unremarkable      ADRENAL GLANDS:  Unremarkable      KIDNEYS AND URETERS:  Bilateral renal calcifications could be nonobstructing calculi and/or  vascular. Otherwise unremarkable kidneys without hydronephrosis.      PELVIS:      BLADDER:  Decompressed by Douglass catheter.      REPRODUCTIVE ORGANS:  Mild prostatomegaly. Small fat containing inguinal hernias.      BOWEL:  Large colonic stool burden. No findings of bowel obstruction  otherwise identified. Unremarkable appendix.    Unremarkable  mesentery.      VESSELS:  No abdominal aortic aneurysm.Severe atherosclerosis. Stent in the  right external iliac artery. Partially imaged femoral bypass grafts.      PERITONEUM AND RETROPERITONEUM:  No free fluid or free air.  Anasarca. Enlarged left inguinal lymph nodes adjacent to femoral  cutdown site appears similar to prior exam, probably reactive.      BONE AND ABDOMINAL WALL:  There is decreased fluid overlying the left femoral cutdown site.  Small locules of subincisional gas persist. Degenerative changes of  the spine.              Impression: 1.  Bibasilar interstitial edema with enlarging, now moderate pleural  effusions.  2. Anasarca.  3. Decreased fluid overlying  left femoral cutdown site. Subincisional  gas with adjacent reactive lymphadenopathy at this site persist.  4. Large colonic stool burden. No findings of bowel obstruction  otherwise.      MACRO:  None.      Signed by: Raudel Scott 1/4/2025 4:16 PM  Dictation workstation:   [id number]      Physical Exam  CONSTITUTIONAL -alert and orientated, obese, unwell  CHEST - clear to auscultation, no wheezing  CARDIAC - regular rate and regular rhythm, no murmur  ABDOMEN -distended belly, dehydrated bowel sounds, no tenderness, wound on the left groin with clean with no erythema  EXTREMITIES - no edema, no deformities  NEUROLOGICAL - alert, oriented x3 and no acute focal signs  PSYCHIATRIC - alert, pleasant and cordial, age-appropriate    Relevant Results               Assessment/Plan                  Assessment & Plan  Postoperative abscess    BPH (benign prostatic hyperplasia)    Chronic pain syndrome    Nicotine dependence    S/P CABG x 5    Type 2 diabetes mellitus    Long-term insulin use (Multi)      Left Groin abscess secondary to DM  Left groin pain  Hx of PAD s/p multiple procedures  Severe functional constipation with no overt bowel obstruction  -Hx of Multiple LLE surgical procedures  -No evidence of sepsis at this time  -Start Zosyn and Vancomycin  -ID consult  -Wound care  -Vascular consult  -NPO midnight for possible intervention  -Hold home medication apixaban (PAD dosing) for now  12/31: Continue broad-spectrum antibiotics.  Surgery recommendation appreciated.  Patient require at least another 24 to 48 hours of inpatient service.  1/1: Surgery and ID recs appreciated. Continue wound care, follow cultures. We  will change pain regimen to oxycodone 5 mg every 6 hrs as needed, increase gabapentin to 600 mg tid, add scheduled tylenol 1000 mg tid. We will place a formal referral to pain management as an outpatient.  1/2: Patient remains on IV vancomycin and Zosyn.  Cefepime was also added.  Blood cultures remain  negative.  Wound cultures for report still pending.  ID and surgery recommendation appreciated.  Patient will require PICC line placement on IV cefepime for 6 weeks.  PICC line ordered.  Monitor renal function closely as his creatinine is creeping up and is now up to 1.56 which is at the higher level of his baseline range.  Pain management referral on discharge.  1/3: Cefepime switched to ertapenem given the patient's nausea and vomiting.  PICC line was placed.  Anticipate discharge in the next 24 hours.  1/4: ID recommendation appreciated.  Continue ertapenem.  Continue current pain regimen.  Fleet enema.  Will need a stool chart.  Patient require at least another 24 to 48 hours of inpatient service.  Will hold all diuretics and antihypertensives for now.  1/5: ID recommendation appreciated.  Patient tolerated meropenem.  Will need to update [facility].  Start discharge process to home with home health care.     Hx CAD s/p CABG   Continue home Lipitor and Zetia  Continue home aspirin   Holding home Birllinta due to possible procedure, resume asap  Continue home ranexa     Diabetic Nephropathy with CKD3  Baseline appears to be ~1.0-1.3  Avoid nephrotoxins  Consider nephrology consult if worsening   Hold home lasix and entresto in setting of SAMEERA, resume as appropriate   Continue Farxiga for now  1/1: Cr labile but seems to be at his baseline range.  1/5: Creatinine at baseline.     IDDM-II with hyperglycemia  Continue Lantus and humalog TID AC  A1C of 8.2% as of October 2024   Start SSI protocol  Goal glucose <180 for better wound healing   Started home lantus dose  Hold nutr insulin dose once NPO  1/2: Continue current treatment.  Sugars remain well-controlled     HTN  Continue home hydralazine      History of multiple CVA  History of R PCA stroke in 2019, L MCA stroke in 2021, bihemispheric small infarcts in 2022, and L MCA scattered embolic infarcts in 2023   Continue as above     COPD without acute  exacerbation  Cigarette smoker x48 years, continues to smoke but cut back to 3 cigarettes per day   Albuterol MDI as needed for shortness of breath/wheezing      BRUNA  Noncompliant with CPAP     Tobacco use disorder   Quit last week after 48 years  Nicotine patch     DVT: Holding AC apixaban for possible procedure  Disposition: Patient needs >72 hours of admission for IV abx            Ashley Celestin MD

## 2025-01-06 NOTE — PROGRESS NOTES
Ticket made from pharmacist note:    Sending tonight by 9 pm  with the  to call Mom's phone first. Sending to confirmed address (mom's house). Initial delivery, signature requested.    Sending standard supplies for 9 doses of MEROPENEM q8h delivered through  SL PICC line  via [employer] + necessary flushes & initial extras. Sending supplies for entire week with initial delivery.     Patient's mom previously spoke with a pharmacist.

## 2025-01-06 NOTE — PROGRESS NOTES
Dereck Shen is a 59 y.o. male on day 6 of admission presenting with Postoperative abscess.      Subjective      Dereck Shen is a 59 y.o. male with PMHx of CAD s/p CABG, severe lower extremity PAD with prior revascularization (R fem-pop bypass 2/24/20; L fem-pop bypass 9/14/20; L fem PTA of the graft; R iliac PTA 2/2022; LLE and DANYA PTA 4/2024; angioplasty of the left SFA 10/21/24; and left femoral and tibial thrombectomy with patch angioplasty 11/19/24. He was recently in the hospital in December for critical limb ischemia of left side and had s/p left fem-below knee popliteal bypass with vein patch left distal anastomosis 12/24. He presented today with L groin pain. According to the patient, he was in usual state till Saturday when he started having left groin pain at the surgical site. Home nurse noticed more discharge in last 12 hours and asked him to evaluate with surgeon. Pain comes and goes. Does not radiate. No fever or chills. He therefore came to ER for further evaluation.     He quite smoking after 48 years and uses a nicotine patch. Denied alcohol use. He is compliant with all of his blood thinners including aspirin, Brillinta and apixaban.      ED workup concerning for left groin abscess on the ct scan. Ed contact vascular who recommended medicine admission with vascular consult. Patient was started on broad spectrum abx. Patient will be admitted under hospital medicine for further management.        Past Medical History  He has a past medical history of (HFpEF) heart failure with preserved ejection fraction, Aphasia, Atherosclerotic heart disease of native coronary artery with unspecified angina pectoris (11/05/2019), Bipolar disorder, unspecified (Multi), BPH (benign prostatic hyperplasia), Diabetes (Multi), DVT (deep venous thrombosis) (Multi) (02/2022), Erectile dysfunction, GERD (gastroesophageal reflux disease), Hepatitis C, HLD (hyperlipidemia), HTN (hypertension), Obstructive sleep  apnea (adult) (pediatric), Opioid abuse, in remission (Multi), PAD (peripheral artery disease) (CMS-HCC), Polymyalgia rheumatica (Multi), Post-traumatic stress disorder, unspecified, and Stroke (Multi) (07/24/2023).      12/31: No acute events overnight.  History and chart reviewed.  Patient seen and examined.  Patient with multiple pains.  He was drowsy during interview.  Surgery recommendations appreciated.  Continue broad-spectrum antibiotics.  Continue pain control.  Patient will require at least another 24 to 48 hours of inpatient service.  1/1: Acute events overnight patient developed SOB with flash pulmonary edema was transferred to the ICU. Pt responded well to Bipap. Today the patient was seen in the ICU and was alert and orientated and comfortable. Pt denies any SOB, chest pain, palpitations or lightheadedness. The interview was predominated with his pains. I have informed him that he was oversedated yesterday and given his BRUNA this has led to heart failure. Pt did admit that he has chronic pain, and was under pain management 15 years ago. We will adjust his pain regimen. Pt is to continue his home BiPAP. Pt will require at least another 24-48 hrs.  1/2: No acute events overnight.  Patient doing well.  Again the interview was predominated by pain.  His pain was stepped specifically worse when he was moving and sitting on the chair.  Will add Doxy codon 5 mg every 8 hours as needed for breakthrough pain especially after therapy.  ID recommendations appreciated.  Patient will need IV cefepime till 2/11/2025.  PICC line ordered.  Home health care ordered.  Start discharge process.  Patient will need close management with his PCP and pain management clinic.  1/3: No acute events overnight.  Patient denies any abdominal pain, leg pains, nausea, and diarrhea.  He is in better spirits today.  He did mention to the ID team that he was nauseated and vomited later in the day.  We had switched the cefepime to  ertapenem.  This would likely change our plans till we see his response and for the home health care agency to be updated.  PICC line was placed.  1/4: No acute events overnight.  Patient remains nauseated and vomiting.  He did admit to not having a bowel motion for at least 5 days.  This is a combination of his pain regimen, decreased mobility, and deconditioning.  ID recommendations appreciated.  Cefepime was switched to ertapenem with no improvement in symptoms.  CT abdomen was ordered that showed no acute abdomen.  There is no bowel obstruction or dilated loops.  Patient had heavy stool burden.  Will place a Fleet enema.  Hold on laxatives given the patient's vomiting.  Patient will require at least another 24 to 48 hours of inpatient service.  1/5: No acute events overnight.  Patient's nausea and vomiting has improved.  Patient had bowel motion yesterday and today.  He started MiraLAX.  ID recommendation appreciated.  The family would like to proceed with meropenem as the patient did not have severe nausea as with the other antibiotics.  Start discharge process to home with home health care tomorrow.  1/6: Patient seen.  No new complaints.  Anxious for discharge.  Appreciate input from ID.  Will discharge home on meropenem through 2/11.  COPAT completed.  Discussed with [facility], [facility] can start service tomorrow.  Will continue to monitor.  Anticipate discharge tomorrow.    Objective     Last Recorded Vitals  /75 (BP Location: Left arm, Patient Position: Lying)   Pulse 73   Temp 37.1 °C (98.8 °F) (Temporal)   Resp 18   Wt 94.3 kg (207 lb 14.3 oz)   SpO2 94%   Intake/Output last 3 Shifts:    Intake/Output Summary (Last 24 hours) at 1/6/2025 1410  Last data filed at 1/6/2025 1233  Gross per 24 hour   Intake 680 ml   Output 1525 ml   Net -845 ml       Admission Weight  Weight: 85.3 kg (188 lb) (12/30/24 1603)    Daily Weight  01/06/25 : 94.3 kg (207 lb 14.3 oz)    Image Results  CT abdomen pelvis wo IV  contrast  Narrative: Interpreted By:  Raudel Scott,   STUDY:  CT ABDOMEN PELVIS WO IV CONTRAST;  1/4/2025 2:17 pm      INDICATION:  Signs/Symptoms:intractable vomiting.      COMPARISON:  12/30/2024      ACCESSION NUMBER(S):  [id number]      ORDERING CLINICIAN:  ALBINO ROMERO      TECHNIQUE:  CT of the abdomen and pelvis was performed.  Contiguous axial images  were obtained at 3 mm slice thickness through the abdomen and pelvis.  Coronal and sagittal reconstructions at 3 mm slice thickness were  performed. The patient received no intravenous contrast. Please note  evaluation of the solid organs and vessels is limited in the absence  of intravenous contrast. Given this caveat, the following  observations are made:      FINDINGS:  LOWER CHEST:  Moderate pleural effusions have increased compared to prior exam.  Bibasilar atelectasis and interstitial edema.      ABDOMEN:      LIVER:  Unremarkable      BILE DUCTS:  Not dilated.      GALLBLADDER:  No calcified stone or definite inflammation.      PANCREAS:  Unremarkable      SPLEEN:  Unremarkable      ADRENAL GLANDS:  Unremarkable      KIDNEYS AND URETERS:  Bilateral renal calcifications could be nonobstructing calculi and/or  vascular. Otherwise unremarkable kidneys without hydronephrosis.      PELVIS:      BLADDER:  Decompressed by Douglass catheter.      REPRODUCTIVE ORGANS:  Mild prostatomegaly. Small fat containing inguinal hernias.      BOWEL:  Large colonic stool burden. No findings of bowel obstruction  otherwise identified. Unremarkable appendix.    Unremarkable  mesentery.      VESSELS:  No abdominal aortic aneurysm.Severe atherosclerosis. Stent in the  right external iliac artery. Partially imaged femoral bypass grafts.      PERITONEUM AND RETROPERITONEUM:  No free fluid or free air.  Anasarca. Enlarged left inguinal lymph nodes adjacent to femoral  cutdown site appears similar to prior exam, probably reactive.      BONE AND ABDOMINAL WALL:  There is decreased  fluid overlying the left femoral cutdown site.  Small locules of subincisional gas persist. Degenerative changes of  the spine.              Impression: 1.  Bibasilar interstitial edema with enlarging, now moderate pleural  effusions.  2. Anasarca.  3. Decreased fluid overlying left femoral cutdown site. Subincisional  gas with adjacent reactive lymphadenopathy at this site persist.  4. Large colonic stool burden. No findings of bowel obstruction  otherwise.      MACRO:  None.      Signed by: Raudel Scott 1/4/2025 4:16 PM  Dictation workstation:   [id number]      Physical Exam  CONSTITUTIONAL -alert and orientated, obese, unwell  CHEST - clear to auscultation, no wheezing  CARDIAC - regular rate and regular rhythm, no murmur  ABDOMEN -distended belly, dehydrated bowel sounds, no tenderness, wound on the left groin with clean with no erythema  EXTREMITIES - no edema, no deformities  NEUROLOGICAL - alert, oriented x3 and no acute focal signs  PSYCHIATRIC - alert, pleasant and cordial, age-appropriate    Relevant Results               Assessment/Plan                  Assessment & Plan  Postoperative abscess    BPH (benign prostatic hyperplasia)    Chronic pain syndrome    Nicotine dependence    S/P CABG x 5    Type 2 diabetes mellitus    Long-term insulin use (Multi)      Left Groin abscess secondary to DM  Left groin pain  Hx of PAD s/p multiple procedures  Severe functional constipation with no overt bowel obstruction  -Hx of Multiple LLE surgical procedures  -No evidence of sepsis at this time  -Start Zosyn and Vancomycin  -ID consult  -Wound care  -Vascular consult  -NPO midnight for possible intervention  -Hold home medication apixaban (PAD dosing) for now  12/31: Continue broad-spectrum antibiotics.  Surgery recommendation appreciated.  Patient require at least another 24 to 48 hours of inpatient service.  1/1: Surgery and ID recs appreciated. Continue wound care, follow cultures. We  will change pain regimen to  oxycodone 5 mg every 6 hrs as needed, increase gabapentin to 600 mg tid, add scheduled tylenol 1000 mg tid. We will place a formal referral to pain management as an outpatient.  1/2: Patient remains on IV vancomycin and Zosyn.  Cefepime was also added.  Blood cultures remain negative.  Wound cultures for report still pending.  ID and surgery recommendation appreciated.  Patient will require PICC line placement on IV cefepime for 6 weeks.  PICC line ordered.  Monitor renal function closely as his creatinine is creeping up and is now up to 1.56 which is at the higher level of his baseline range.  Pain management referral on discharge.  1/3: Cefepime switched to ertapenem given the patient's nausea and vomiting.  PICC line was placed.  Anticipate discharge in the next 24 hours.  1/4: ID recommendation appreciated.  Continue ertapenem.  Continue current pain regimen.  Fleet enema.  Will need a stool chart.  Patient require at least another 24 to 48 hours of inpatient service.  Will hold all diuretics and antihypertensives for now.  1/5: ID recommendation appreciated.  Patient tolerated meropenem.  Will need to update [facility].  Start discharge process to home with home health care.  1/6: Will complete COPAT.  Discharged on meropenem through 2/11.     Hx CAD s/p CABG   Continue home Lipitor and Zetia  Continue home aspirin   Holding home Birllinta due to possible procedure, resume asap  Continue home ranexa     Diabetic Nephropathy with CKD3  Baseline appears to be ~1.0-1.3  Avoid nephrotoxins  Consider nephrology consult if worsening   Hold home lasix and entresto in setting of SAMEERA, resume as appropriate   Continue Farxiga for now  1/1: Cr labile but seems to be at his baseline range.  1/5: Creatinine at baseline.     IDDM-II with hyperglycemia  Continue Lantus and humalog TID AC  A1C of 8.2% as of October 2024   Start SSI protocol  Goal glucose <180 for better wound healing   Started home lantus dose  Hold nutr insulin  dose once NPO  1/2: Continue current treatment.  Sugars remain well-controlled     HTN  Continue home hydralazine      History of multiple CVA  History of R PCA stroke in 2019, L MCA stroke in 2021, bihemispheric small infarcts in 2022, and L MCA scattered embolic infarcts in 2023   Continue as above     COPD without acute exacerbation  Cigarette smoker x48 years, continues to smoke but cut back to 3 cigarettes per day   Albuterol MDI as needed for shortness of breath/wheezing      BRUNA  Noncompliant with CPAP     Tobacco use disorder   Quit last week after 48 years  Nicotine patch     DVT: Holding AC apixaban for possible procedure  Disposition: Patient needs >72 hours of admission for IV abx            Leonardo Smith MD

## 2025-01-06 NOTE — PROGRESS NOTES
"Nutrition Follow Up Assessment:   Nutrition Assessment         Medical history per chart:   Dereck Shen is a 59 y.o. male with PMHx of CAD s/p CABG, severe lower extremity PAD with prior revascularization (R fem-pop bypass 2/24/20; L fem-pop bypass 9/14/20; L fem PTA of the graft; R iliac PTA 2/2022; LLE and DANYA PTA 4/2024; angioplasty of the left SFA 10/21/24; and left femoral and tibial thrombectomy with patch angioplasty 11/19/24. He was recently in the hospital in December for critical limb ischemia of left side and had s/p left fem-below knee popliteal bypass with vein patch left distal anastomosis 12/24. He presented today with L groin pain.   ~wound care following for incisional wounds,   ~ID on consult for Left groin Enterobacter cloacae graft infection, s/p PICC, n/v-CT abdomen pelvis ordered  ~SAMEERA resolved     1/3: Attempted to call patient, however per nursing he is getting a PICC. She stated he did not eat breakfast.  Last admission he reported consuming >75% of meals (improvement from first admitted) and was consuming ensure max with meals. Will readd. Also increased carb allotment to 5 per meal to better meet needs.     1/6: Spoke with patient and patient's mom. He was eating fairly well prior to admission. His appetite decreased for a bit recently in the hospital due to antibiotics (per mom) causing GI issues. Since his antibiotic changed his appetite has improved. He does not like the ensure max, but would like strawberry ONS. Changed supplement to glucerna. CBW: 209 lbs bed scale.     Nutrition History:     Food and Nutrient History: Per RD from 12/19/24: Typically eats fruits, vegetables, nuts/seeds, Premier Protein shake before every meal. Occasionally has seafood or filet mignon. Pt hypothesized that he was \"overeating on the good stuff\" prior to admission, though did have a decreased appetite. 1/3: unable to assess at this time          Current Diet: Adult diet Consistent Carb; CCD 75 " gm/meal      Nutrition Related Findings:   Oral Symptoms: none     GI symptoms: no GI issues at this time.   Food allergies: NKFA. is allergic to celecoxib; ibuprofen; tetanus toxoid, adsorbed; [id number] antimigraine agents; cephalexin; hydrocodone; latex; and tryptophan.  Meds/Labs reviewed.  acetaminophen, 975 mg, oral, TID  apixaban, 5 mg, oral, BID  aspirin, 81 mg, oral, Daily  [Held by provider] atorvastatin, 80 mg, oral, Nightly  carvedilol, 6.25 mg, oral, q12h  dapagliflozin propanediol, 10 mg, oral, Daily with breakfast  [Held by provider] ezetimibe, 10 mg, oral, Daily  [Held by provider] furosemide, 40 mg, oral, Daily  gabapentin, 600 mg, oral, TID  [Held by provider] hydrALAZINE, 50 mg, oral, TID  insulin glargine, 20 Units, subcutaneous, q24h  insulin lispro, 0-15 Units, subcutaneous, TID AC  insulin lispro, 5 Units, subcutaneous, TID AC  [Held by provider] isosorbide mononitrate ER, 60 mg, oral, Daily  lidocaine, 5 mL, infiltration, Once  meropenem, 2 g, intravenous, q8h  nicotine, 1 patch, transdermal, Daily  OXcarbazepine, 450 mg, oral, BID  oxygen, , inhalation, Continuous - Inhalation  pantoprazole, 40 mg, oral, Daily before breakfast  polyethylene glycol, 17 g, oral, Daily  ranolazine, 500 mg, oral, BID  [Held by provider] sacubitriL-valsartan, 1 tablet, oral, BID  tamsulosin, 0.4 mg, oral, Daily  ticagrelor, 90 mg, oral, BID             Nutrition Significant Labs:  Results from last 7 days   Lab Units 01/05/25  0423 01/03/25  0407 01/02/25  0503 01/01/25  0450 01/01/25  0045   GLUCOSE mg/dL 86 162* 174*   < > 209*   SODIUM mmol/L 131* 131* 134*   < > 136   POTASSIUM mmol/L 4.4 4.5 4.7   < > 4.7   CHLORIDE mmol/L 100 101 104   < > 105   CO2 mmol/L 26 25 24   < > 23   BUN mg/dL 23 32* 38*   < > 34*   CREATININE mg/dL 1.13 1.35* 1.56*   < > 1.30   EGFR mL/min/1.73m*2 75 61 51*   < > 64   CALCIUM mg/dL 7.9* 7.9* 7.9*   < > 8.5*   PHOSPHORUS mg/dL  --   --  5.4*  --  4.3   MAGNESIUM mg/dL  --   --   "2.52*  --  2.71*    < > = values in this interval not displayed.     Lab Results   Component Value Date    HGBA1C 7.9 (H) 12/18/2024    HGBA1C 8.2 (A) 10/01/2024     Results from last 7 days   Lab Units 01/06/25  1103 01/06/25  0630 01/05/25  2042 01/05/25  1610 01/05/25  1122 01/05/25  0639 01/04/25  2119 01/04/25  1644   POCT GLUCOSE mg/dL 126* 87 104* 144* 172* 82 112* 125*       Anthropometrics:  Height: 180.3 cm (5' 11\")   Weight: 94.3 kg (207 lb 14.3 oz)   BMI (Calculated): 29.01             Weight History:   Wt Readings from Last 10 Encounters:   01/06/25 94.3 kg (207 lb 14.3 oz)   12/27/24 85.3 kg (188 lb)   12/24/24 96.6 kg (212 lb 15.4 oz)   12/03/24 91.2 kg (201 lb)   11/22/24 84.4 kg (186 lb)   11/21/24 94.4 kg (208 lb 1.8 oz)   10/22/24 88.9 kg (196 lb)   10/01/24 85.9 kg (189 lb 6.4 oz)   09/19/24 83.9 kg (185 lb)   09/13/24 83.9 kg (185 lb)        Weight Change %:  Weight History / % Weight Change: Per RD 12/19/24: Pt reports he began trying to lose wt in ?2008 at 278#; has lost wt gradually over time and has now been stable at 185-187# x 1-1.5 years. Chart review of wt hx reveals fluctuations between 185-208# since May 2024--may be related to fluid fluctuations with CHF diagnosis. 1/3: 213 lbs, but with BLE edema. Weight has been fluctuating due to fluid             Nutrition Focused Physical Exam Findings:  Subcutaneous Fat Loss:   Orbital Fat Pads: Well nourished (slightly bulging fat pads)  Buccal Fat Pads: Well nourished (full, rounded cheeks)  Triceps: Well nourished (ample fat tissue)  Muscle Wasting:  Temporalis: Well nourished (well-defined muscle)  Pectoralis (Clavicular Region): Well nourished (clavicle not visible)  Interosseous: Well nourished (muscle bulges)  Edema:  Edema: +1 trace  Edema Location: BLE  Physical Findings:  Skin: Positive (incisional wounds)    Estimated Needs:   Total Energy Estimated Needs (kCal):  (2033-1825)  Method for Estimating Needs: 25-30 kcal/kg, IBW  Total " Protein Estimated Needs (g): 101 g  Method for Estimating Needs: 1.3 g/kg IBW     Method for Estimating Needs: 1 ml/kcal or per MD        Nutrition Diagnosis   Nutrition Diagnosis:  Malnutrition Diagnosis  Patient has Malnutrition Diagnosis: No    Nutrition Diagnosis  Patient has Nutrition Diagnosis: Yes  Diagnosis Status (1): Ongoing  Nutrition Diagnosis 1: Increased nutrient needs  Related to (1): increased metabolic demand secondary to healing  As Evidenced by (1): multiple wounds documented       Nutrition Interventions/Recommendations   Nutrition Interventions and Recommendations:        Nutrition Prescription:  Individualized Nutrition Prescription Provided for : Diet, supplements        Nutrition Interventions:   Food and/or Nutrient Delivery Interventions  Interventions: Meals and snacks, Medical food supplement  Meals and Snacks: Carbohydrate-modified diet  Goal: consume >75%  Medical Food Supplement: Commercial beverage  Goal: consume >75% of glucerna         Nutrition Education:   Education Documentation  No documentation found.      Nutrition Counseling  Counseling Theoretical Approach: Other (Comment)  Goal: reviewed ONS, encouraged protein       Nutrition Monitoring and Evaluation   Monitoring/Evaluation:   Food/Nutrient Related History Monitoring  Monitoring and Evaluation Plan: Energy intake  Energy Intake: Estimated energy intake  Criteria: meet >75%    Body Composition/Growth/Weight History  Monitoring and Evaluation Plan: Weight  Weight: Weight change  Criteria: stable    Biochemical Data, Medical Tests and Procedures  Monitoring and Evaluation Plan: Electrolyte/renal panel, Glucose/endocrine profile  Electrolyte and Renal Panel: Magnesium, Potassium, Phosphorus, Sodium  Criteria: wnl  Glucose/Endocrine Profile: Glucose, casual  Criteria: wnl    Nutrition Focused Physical Findings  Monitoring and Evaluation Plan: Skin  Skin: Impaired wound healing  Criteria: promote healing            Time  Spent/Follow-up Reminder:   Follow Up  Time Spent (min): 30 minutes  Last Date of Nutrition Visit: 01/06/25  Nutrition Follow-Up Needed?: Dietitian to reassess per policy  Follow up Comment: 1/10

## 2025-01-06 NOTE — PROGRESS NOTES
Dereck Shen is a 59 y.o. male on day 6 of admission presenting with Postoperative abscess.      Assessment/Plan:     #Postop left groin abscess with concern for graft infection  #Enterobacter cloacae organism  #AmpC organism  Abscess overlying proximal anastomosis site of the femoropopliteal graft.  As per vascular, will be more cautious in his case given extensive history of surgeries and will treat it as if the graft is infected.  Patient developed vomiting with cefepime and ertapenem.  Tolerated meropenem well this morning.     #Nausea and vomiting  Unclear if this was because of the antibiotic.  Patient received ertapenem yesterday in the afternoon and had vomiting this morning.  Will obtain CT abdomen pelvis again.     #Acute kidney injury-resolved  Estimated Creatinine Clearance: 83.6 mL/min (by C-G formula based on SCr of 1.13 mg/dL).     CAD s/p CABG  PAD s/p bypass and graft  DVT  DM, HTN     Recommendations:     -Cont meropenem 2 g every 8 hours through 02/11/25 to finish 6-week course of Antibiotics  -Discussed side effects of meropenem including C. Difficile  -Patient can be discharged from from ID standpoint  -Will continue to follow        Gorge Quintero MD  Date of service: 1/6/2025  Time of service: 10:21 AM      Subjective   Interval History: No acute events overnight.  Patient denies any new complaint.  States he is tolerating meropenem well        Review of Systems  Denies: fever, chills, nausea, vomiting, diarrhea, dysuria    Objective   Range of Vitals (last 24 hours)  Heart Rate:  [67-74]   Temp:  [35.9 °C (96.7 °F)-37.1 °C (98.8 °F)]   Resp:  [16-18]   BP: (106-132)/(69-81)   Weight:  [94.3 kg (207 lb 14.3 oz)]   SpO2:  [94 %-97 %]  Daily Weight  01/06/25 : 94.3 kg (207 lb 14.3 oz)   Body mass index is 29 kg/m².      General: alert, oriented, NAD  HEENT: conjunctival pallor, no scleral icterus  Abdomen: soft, non tender, non distended  Neuro: AAO x 3  Extremities: LLE dressing, L groin  stitches with packing with open wound without any surrounding erythema or drainage. R arm PICC line  Skin: As above        Antibiotics  meropenem - 2 gram/100 mL      Relevant Results  Labs  Results from last 72 hours   Lab Units 01/05/25  0423   WBC AUTO x10*3/uL 5.5   HEMOGLOBIN g/dL 8.3*   HEMATOCRIT % 24.8*   PLATELETS AUTO x10*3/uL 349   NEUTROS PCT AUTO % 67.3   LYMPHS PCT AUTO % 22.3   MONOS PCT AUTO % 7.8   EOS PCT AUTO % 1.6     Results from last 72 hours   Lab Units 01/05/25  0423   SODIUM mmol/L 131*   POTASSIUM mmol/L 4.4   CHLORIDE mmol/L 100   CO2 mmol/L 26   BUN mg/dL 23   CREATININE mg/dL 1.13   GLUCOSE mg/dL 86   CALCIUM mg/dL 7.9*   ANION GAP mmol/L 9*   EGFR mL/min/1.73m*2 75         Estimated Creatinine Clearance: 83.6 mL/min (by C-G formula based on SCr of 1.13 mg/dL).  C-Reactive Protein   Date Value Ref Range Status   12/30/2024 7.35 (H) <1.00 mg/dL Final   04/24/2024 0.15 <1.00 mg/dL Final       Microbiology  Susceptibility data from last 14 days.  Collected Specimen Info Organism Amoxicillin/Clavulanate Ampicillin Ampicillin/Sulbactam Cefazolin Cefepime Ciprofloxacin Gentamicin Levofloxacin Piperacillin/Tazobactam Trimethoprim/Sulfamethoxazole   01/02/25 Tissue/Biopsy from Skin/Superficial Abscess Enterobacter cloacae complex  R  R  R  R  S  S  S  S  S  S   12/30/24 Tissue/Biopsy from Skin/Superficial Abscess Enterobacter cloacae complex  R  R  R  R  S  S  S  S  S  S     Mixed Anaerobic Bacteria                 Imaging    CT abdomen pelvis wo IV contrast    Result Date: 1/4/2025  1.  Bibasilar interstitial edema with enlarging, now moderate pleural effusions. 2. Anasarca. 3. Decreased fluid overlying left femoral cutdown site. Subincisional gas with adjacent reactive lymphadenopathy at this site persist. 4. Large colonic stool burden. No findings of bowel obstruction otherwise.   MACRO: None.   Signed by: Raudel Scott 1/4/2025 4:16 PM Dictation workstation:   [id number]    XR chest 1  view    Result Date: 1/1/2025  As above   MACRO: None   Signed by: Geovany Amin 1/1/2025 11:22 AM Dictation workstation:   [id number]    XR chest 1 view    Result Date: 1/1/2025  Pulmonary edema.   Signed by: Aleks Joshi 1/1/2025 10:48 AM Dictation workstation:   [id number]    CT abdomen pelvis w IV contrast    Result Date: 12/30/2024  Complex fluid collection left groin 2.9 x 4.6 x 7.5 cm is suggestive of abscess.  There are multiple locules of gas within the fluid collection as well as mild peripheral enhancement.  The fluid collection extends from just below the skin surface and staples deep to the level the left common femoral artery and proximal portion of the Hobson-Casey bypass graft.  Reactive left groin lymphadenopathy adjacent to the abscess as well as proximal left thigh soft tissue edema. Critical findings discussed with Dr. Knutson at 9:00 PM 12/30/2024. Signed by Geovany Joel MD    XR chest 1 view    Result Date: 12/23/2024  No acute cardiopulmonary process is evident.   MACRO: None   Signed by: Lawrence Leahy 12/23/2024 9:34 PM Dictation workstation:   [id number]    CT angio aorta and bilateral iliofemoral runoff including without contrast if performed    Result Date: 12/17/2024  No significant aortic disease.  Mild bilateral common iliac artery stenosis.  Patent left external iliac and common femoral arteries. 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  Chronic occlusion of the native left superficial femoral artery. A 2 cm segment of the left P1 popliteal artery reconstitutes.  The terminal left popliteal artery also reconstitutes.  Other portions of the popliteal artery are occluded. Patent three-vessel infrapopliteal runoff to the left hindfoot, beyond which contrast opacification was too weak to visualize. Thick subcutaneous soft tissue - presumed scarred tissue - overlying the left femoral vessels at the left groin.  Correlate for possibility of cellulitis. Patent  right iliac and right femoral stents.  Patent right popliteal artery.  Patent three-vessel infrapopliteal runoff to the right ankle. No findings of an acute process in the abdomen or pelvis. Signed by Simeon Barrera MD

## 2025-01-06 NOTE — CARE PLAN
Problem: Pain - Adult  Goal: Verbalizes/displays adequate comfort level or baseline comfort level  Outcome: Progressing     Problem: Safety - Adult  Goal: Free from fall injury  Outcome: Progressing     Problem: Discharge Planning  Goal: Discharge to home or other facility with appropriate resources  Outcome: Progressing     Problem: Chronic Conditions and Co-morbidities  Goal: Patient's chronic conditions and co-morbidity symptoms are monitored and maintained or improved  Outcome: Progressing     Problem: Skin  Goal: Decreased wound size/increased tissue granulation at next dressing change  Outcome: Progressing  Goal: Participates in plan/prevention/treatment measures  Outcome: Progressing  Goal: Prevent/manage excess moisture  Outcome: Progressing  Goal: Prevent/minimize sheer/friction injuries  Outcome: Progressing  Goal: Promote/optimize nutrition  Outcome: Progressing  Goal: Promote skin healing  Outcome: Progressing     Problem: Pain  Goal: Takes deep breaths with improved pain control throughout the shift  Outcome: Progressing  Goal: Turns in bed with improved pain control throughout the shift  Outcome: Progressing  Goal: Walks with improved pain control throughout the shift  Outcome: Progressing  Goal: Performs ADL's with improved pain control throughout shift  Outcome: Progressing  Goal: Participates in PT with improved pain control throughout the shift  Outcome: Progressing  Goal: Free from opioid side effects throughout the shift  Outcome: Progressing  Goal: Free from acute confusion related to pain meds throughout the shift  Outcome: Progressing     Problem: Nutrition  Goal: Adequate PO fluid intake  Outcome: Progressing  Goal: Nutrition support is meeting 75% of nutrient needs  Outcome: Progressing  Goal: Lab values WNL  Outcome: Progressing  Goal: Electrolytes WNL  Outcome: Progressing  Goal: Promote healing  Outcome: Progressing     Problem: Diabetes  Goal: Achieve decreasing blood glucose levels by  end of shift  Outcome: Progressing  Goal: Increase stability of blood glucose readings by end of shift  Outcome: Progressing  Goal: Decrease in ketones present in urine by end of shift  Outcome: Progressing  Goal: Maintain electrolyte levels within acceptable range throughout shift  Outcome: Progressing  Goal: Maintain glucose levels >70mg/dl to <250mg/dl throughout shift  Outcome: Progressing  Goal: No changes in neurological exam by end of shift  Outcome: Progressing  Goal: Learn about and adhere to nutrition recommendations by end of shift  Outcome: Progressing  Goal: Vital signs within normal range for age by end of shift  Outcome: Progressing  Goal: Increase self care and/or family involovement by end of shift  Outcome: Progressing  Goal: Receive DSME education by end of shift  Outcome: Progressing   The patient's goals for the shift include  pt will remain free of injury    The clinical goals for the shift include free from falls and injuries, tolerate antibiotics, vitals stable

## 2025-01-07 ENCOUNTER — APPOINTMENT (OUTPATIENT)
Dept: WOUND CARE | Facility: CLINIC | Age: 60
End: 2025-01-07
Payer: COMMERCIAL

## 2025-01-07 ENCOUNTER — PHARMACY VISIT (OUTPATIENT)
Dept: PHARMACY | Facility: CLINIC | Age: 60
End: 2025-01-07
Payer: MEDICAID

## 2025-01-07 ENCOUNTER — HOME CARE VISIT (OUTPATIENT)
Dept: HOME HEALTH SERVICES | Facility: HOME HEALTH | Age: 60
End: 2025-01-07
Payer: COMMERCIAL

## 2025-01-07 VITALS
HEART RATE: 68 BPM | OXYGEN SATURATION: 97 % | HEIGHT: 71 IN | DIASTOLIC BLOOD PRESSURE: 87 MMHG | WEIGHT: 207.5 LBS | BODY MASS INDEX: 29.05 KG/M2 | RESPIRATION RATE: 17 BRPM | TEMPERATURE: 97.5 F | SYSTOLIC BLOOD PRESSURE: 131 MMHG

## 2025-01-07 VITALS
RESPIRATION RATE: 16 BRPM | TEMPERATURE: 96.9 F | SYSTOLIC BLOOD PRESSURE: 115 MMHG | OXYGEN SATURATION: 95 % | DIASTOLIC BLOOD PRESSURE: 58 MMHG | HEART RATE: 73 BPM

## 2025-01-07 LAB
ANION GAP SERPL CALC-SCNC: 9 MMOL/L (ref 10–20)
BUN SERPL-MCNC: 19 MG/DL (ref 6–23)
CALCIUM SERPL-MCNC: 8.2 MG/DL (ref 8.6–10.3)
CHLORIDE SERPL-SCNC: 101 MMOL/L (ref 98–107)
CO2 SERPL-SCNC: 27 MMOL/L (ref 21–32)
CREAT SERPL-MCNC: 1.08 MG/DL (ref 0.5–1.3)
EGFRCR SERPLBLD CKD-EPI 2021: 80 ML/MIN/1.73M*2
ERYTHROCYTE [DISTWIDTH] IN BLOOD BY AUTOMATED COUNT: 14 % (ref 11.5–14.5)
GLUCOSE BLD MANUAL STRIP-MCNC: 113 MG/DL (ref 74–99)
GLUCOSE SERPL-MCNC: 95 MG/DL (ref 74–99)
HCT VFR BLD AUTO: 28.3 % (ref 41–52)
HGB BLD-MCNC: 9.1 G/DL (ref 13.5–17.5)
MCH RBC QN AUTO: 29.6 PG (ref 26–34)
MCHC RBC AUTO-ENTMCNC: 32.2 G/DL (ref 32–36)
MCV RBC AUTO: 92 FL (ref 80–100)
NRBC BLD-RTO: 0 /100 WBCS (ref 0–0)
PLATELET # BLD AUTO: 316 X10*3/UL (ref 150–450)
POTASSIUM SERPL-SCNC: 4.3 MMOL/L (ref 3.5–5.3)
RBC # BLD AUTO: 3.07 X10*6/UL (ref 4.5–5.9)
SODIUM SERPL-SCNC: 133 MMOL/L (ref 136–145)
WBC # BLD AUTO: 5.3 X10*3/UL (ref 4.4–11.3)

## 2025-01-07 PROCEDURE — 2500000002 HC RX 250 W HCPCS SELF ADMINISTERED DRUGS (ALT 637 FOR MEDICARE OP, ALT 636 FOR OP/ED): Performed by: INTERNAL MEDICINE

## 2025-01-07 PROCEDURE — 2500000001 HC RX 250 WO HCPCS SELF ADMINISTERED DRUGS (ALT 637 FOR MEDICARE OP): Performed by: INTERNAL MEDICINE

## 2025-01-07 PROCEDURE — 99239 HOSP IP/OBS DSCHRG MGMT >30: CPT | Performed by: INTERNAL MEDICINE

## 2025-01-07 PROCEDURE — 85027 COMPLETE CBC AUTOMATED: CPT | Performed by: INTERNAL MEDICINE

## 2025-01-07 PROCEDURE — 82947 ASSAY GLUCOSE BLOOD QUANT: CPT

## 2025-01-07 PROCEDURE — RXMED WILLOW AMBULATORY MEDICATION CHARGE

## 2025-01-07 PROCEDURE — G0299 HHS/HOSPICE OF RN EA 15 MIN: HCPCS

## 2025-01-07 PROCEDURE — S4991 NICOTINE PATCH NONLEGEND: HCPCS | Performed by: INTERNAL MEDICINE

## 2025-01-07 PROCEDURE — 2500000004 HC RX 250 GENERAL PHARMACY W/ HCPCS (ALT 636 FOR OP/ED): Performed by: STUDENT IN AN ORGANIZED HEALTH CARE EDUCATION/TRAINING PROGRAM

## 2025-01-07 PROCEDURE — 2500000005 HC RX 250 GENERAL PHARMACY W/O HCPCS: Performed by: INTERNAL MEDICINE

## 2025-01-07 PROCEDURE — 80048 BASIC METABOLIC PNL TOTAL CA: CPT | Performed by: INTERNAL MEDICINE

## 2025-01-07 RX ORDER — OXYCODONE HYDROCHLORIDE 5 MG/1
5 TABLET ORAL EVERY 6 HOURS PRN
Qty: 15 TABLET | Refills: 0 | Status: ON HOLD | OUTPATIENT
Start: 2025-01-07 | End: 2025-01-12

## 2025-01-07 RX ADMIN — ASPIRIN 81 MG CHEWABLE TABLET 81 MG: 81 TABLET CHEWABLE at 10:06

## 2025-01-07 RX ADMIN — NICOTINE 1 PATCH: 21 PATCH, EXTENDED RELEASE TRANSDERMAL at 10:06

## 2025-01-07 RX ADMIN — MEROPENEM 2 G: 2 INJECTION, POWDER, FOR SOLUTION INTRAVENOUS at 01:48

## 2025-01-07 RX ADMIN — APIXABAN 5 MG: 5 TABLET, FILM COATED ORAL at 10:06

## 2025-01-07 RX ADMIN — GABAPENTIN 600 MG: 300 CAPSULE ORAL at 10:06

## 2025-01-07 RX ADMIN — TICAGRELOR 90 MG: 90 TABLET ORAL at 10:06

## 2025-01-07 RX ADMIN — CARVEDILOL 6.25 MG: 6.25 TABLET, FILM COATED ORAL at 10:05

## 2025-01-07 RX ADMIN — OXYCODONE HYDROCHLORIDE 5 MG: 5 TABLET ORAL at 03:38

## 2025-01-07 RX ADMIN — DAPAGLIFLOZIN 10 MG: 5 TABLET, FILM COATED ORAL at 10:05

## 2025-01-07 RX ADMIN — OXCARBAZEPINE 450 MG: 150 TABLET, FILM COATED ORAL at 10:05

## 2025-01-07 RX ADMIN — RANOLAZINE 500 MG: 500 TABLET, FILM COATED, EXTENDED RELEASE ORAL at 10:33

## 2025-01-07 RX ADMIN — Medication 21 PERCENT: at 10:13

## 2025-01-07 RX ADMIN — TAMSULOSIN HYDROCHLORIDE 0.4 MG: 0.4 CAPSULE ORAL at 10:05

## 2025-01-07 RX ADMIN — ACETAMINOPHEN 975 MG: 325 TABLET ORAL at 10:06

## 2025-01-07 ASSESSMENT — ENCOUNTER SYMPTOMS
APPETITE LEVEL: FAIR
PAIN LOCATION - PAIN SEVERITY: 8/10
LOSS OF SENSATION: 1
DYSPNEA ACTIVITY LEVEL: AFTER AMBULATING 10 - 20 FT
PAIN: 1
SHORTNESS OF BREATH: 1
PERSON REPORTING PAIN: PATIENT
CHANGE IN APPETITE: INCREASED
PAIN LOCATION: GROIN

## 2025-01-07 ASSESSMENT — COGNITIVE AND FUNCTIONAL STATUS - GENERAL
DAILY ACTIVITIY SCORE: 24
WALKING IN HOSPITAL ROOM: A LITTLE
MOBILITY SCORE: 21
STANDING UP FROM CHAIR USING ARMS: A LITTLE
CLIMB 3 TO 5 STEPS WITH RAILING: A LITTLE

## 2025-01-07 ASSESSMENT — PAIN SCALES - GENERAL
PAINLEVEL_OUTOF10: 8
PAINLEVEL_OUTOF10: 0 - NO PAIN

## 2025-01-07 NOTE — DISCHARGE SUMMARY
"Discharge Diagnosis  Postoperative left groin abscess with Enterobacter cloacae. AmpC organism.  Discharging on meropenem ID.  Will continue through 2/11.  Arranging via home care.  Follow-up closely with vascular surgery and ID as outpatient.  Home care to assist with dressing changes.  Nausea and vomiting.  Resolved.  Does not appear to be secondary to antibiotics.  SAMEERA.  Resolved.  CAD with prior CABG.  PAD with prior bypass and graft.  DVT prophylaxis.  Diabetic neuropathy.,  Type 2 diabetes with hyperglycemia.  Essential hypertension    Issues Requiring Follow-Up  Follow-up with PCP as outpatient.  Call with ID.  Follow-up with vascular surgery.  Follow-up with endocrinology.    Discharge Meds     Medication List      START taking these medications     heparin flush 10 unit/mL injection; 3 mL (30 Units) by intra-catheter   route once daily.   meropenem 1 gram injection; Commonly known as: Merrem; Infuse 2 g into a   venous catheter every 8 hours.     CHANGE how you take these medications     oxyCODONE 5 mg immediate release tablet; Commonly known as: Roxicodone;   Take 1 tablet (5 mg) by mouth every 6 hours if needed for moderate pain (4   - 6) for up to 5 days.; What changed: when to take this, reasons to take   this     CONTINUE taking these medications     aspirin 81 mg chewable tablet   atorvastatin 80 mg tablet; Commonly known as: Lipitor; Take 1 tablet (80   mg) by mouth once daily at bedtime.   * pen needle, diabetic 32 gauge x 5/32\" needle; Commonly known as: BD   Ultra-Fine Jocy Pen Needle; Use to inject insulin up to 8 times per day.   * BD Ultra-Fine Micro Pen Needle 32 gauge x 1/4\" needle; Generic drug:   pen needle, diabetic; Use to inject 1-4 times daily as directed   * pen needle, diabetic 32 gauge x 5/32\" needle; Commonly known as: BD   Ultra-Fine Jocy Pen Needle; Pen Needles for Insulin.   carvedilol 6.25 mg tablet; Commonly known as: Coreg   Creon 36,000-114,000- 180,000 unit capsule,delayed " release(DR/EC)   capsule; Generic drug: pancrelipase (Lip-Prot-Amyl)   dapagliflozin propanediol 10 mg; Commonly known as: Farxiga; Take 1   tablet (10 mg) by mouth once daily with breakfast.   ELDERBERRY FRUIT ORAL   Eliquis 5 mg tablet; Generic drug: apixaban; Take 2 tablets (10 mg) by   mouth 2 times a day for 6 days, THEN 1 tablet (5 mg) 2 times a day.; Start   taking on: November 21, 2024   Entresto  mg tablet; Generic drug: sacubitriL-valsartan   ezetimibe 10 mg tablet; Commonly known as: Zetia   * Flomax 0.4 mg 24 hr capsule; Generic drug: tamsulosin   * tamsulosin 0.4 mg 24 hr capsule; Commonly known as: Flomax; TAKE 1   CAPSULE BY MOUTH ONCE DAILY   FreeStyle En 2 [place] misc; Generic drug: flash glucose scanning   reader; USE TO CHECK SUGARS FOUR TIMES A DAY   FreeStyle En 2 Sensor kit; Generic drug: flash glucose sensor kit;   use as directed daily and change every 14 days   furosemide 40 mg tablet; Commonly known as: Lasix   gabapentin 400 mg capsule; Commonly known as: Neurontin; Take 1 capsule   (400 mg) by mouth 3 times a day.   JAIMEE ROOT EXTRACT ORAL   insulin lispro 100 unit/mL injection; Commonly known as: HumaLOG; Inject   10 Units under the skin 3 times a day with meals. Plus sliding scale if BS   is over 200   isosorbide mononitrate ER 60 mg 24 hr tablet; Commonly known as: Imdur;   Take 1 tablet (60 mg) by mouth once daily.   lancets misc; 1 each 4 times a day before meals.   Lantus Solostar U-100 Insulin 100 unit/mL (3 mL) pen; Generic drug:   insulin glargine; Inject 20 Units under the skin once daily at bedtime.   medical cannabis   nicotine 21 mg/24 hr patch; Commonly known as: Nicoderm CQ; APPLY 1   PATCH TO SKIN EVERY 24 HOURS AS DIRECTED   OXcarbazepine 150 mg tablet; Commonly known as: Trileptal   pantoprazole 40 mg EC tablet; Commonly known as: ProtoNix; TAKE 1 TABLET   BY MOUTH ONCE DAILY   ranolazine 500 mg 12 hr tablet; Commonly known as: Ranexa   ticagrelor 90 mg  tablet; Commonly known as: Brilinta; Take 1 tablet (90   mg) by mouth 2 times a day.   tiZANidine 2 mg tablet; Commonly known as: Zanaflex; Take 1 tablet (2   mg) by mouth once daily as needed for muscle spasms.  * This list has 5 medication(s) that are the same as other medications   prescribed for you. Read the directions carefully, and ask your doctor or   other care provider to review them with you.     STOP taking these medications     hydrALAZINE 50 mg tablet; Commonly known as: Apresoline     ASK your doctor about these medications     alteplase 2 mg injection; Commonly known as: Cathflo Activase; 2 mL (2   mg) by intra-catheter route 1 time for 1 dose.; Ask about: Should I take   this medication?       Test Results Pending At Discharge  Pending Labs       No current pending labs.            Hospital Course   He has a past medical history of (HFpEF) heart failure with preserved ejection fraction, Aphasia, Atherosclerotic heart disease of native coronary artery with unspecified angina pectoris (11/05/2019), Bipolar disorder, unspecified (Multi), BPH (benign prostatic hyperplasia), Diabetes (Multi), DVT (deep venous thrombosis) (Multi) (02/2022), Erectile dysfunction, GERD (gastroesophageal reflux disease), Hepatitis C, HLD (hyperlipidemia), HTN (hypertension), Obstructive sleep apnea (adult) (pediatric), Opioid abuse, in remission (Multi), PAD (peripheral artery disease) (CMS-HCC), Polymyalgia rheumatica (Multi), Post-traumatic stress disorder, unspecified, and Stroke (Multi) (07/24/2023).        12/31: No acute events overnight.  History and chart reviewed.  Patient seen and examined.  Patient with multiple pains.  He was drowsy during interview.  Surgery recommendations appreciated.  Continue broad-spectrum antibiotics.  Continue pain control.  Patient will require at least another 24 to 48 hours of inpatient service.  1/1: Acute events overnight patient developed SOB with flash pulmonary edema was  transferred to the ICU. Pt responded well to Bipap. Today the patient was seen in the ICU and was alert and orientated and comfortable. Pt denies any SOB, chest pain, palpitations or lightheadedness. The interview was predominated with his pains. I have informed him that he was oversedated yesterday and given his BRUNA this has led to heart failure. Pt did admit that he has chronic pain, and was under pain management 15 years ago. We will adjust his pain regimen. Pt is to continue his home BiPAP. Pt will require at least another 24-48 hrs.  1/2: No acute events overnight.  Patient doing well.  Again the interview was predominated by pain.  His pain was stepped specifically worse when he was moving and sitting on the chair.  Will add Doxy codon 5 mg every 8 hours as needed for breakthrough pain especially after therapy.  ID recommendations appreciated.  Patient will need IV cefepime till 2/11/2025.  PICC line ordered.  Home health care ordered.  Start discharge process.  Patient will need close management with his PCP and pain management clinic.  1/3: No acute events overnight.  Patient denies any abdominal pain, leg pains, nausea, and diarrhea.  He is in better spirits today.  He did mention to the ID team that he was nauseated and vomited later in the day.  We had switched the cefepime to ertapenem.  This would likely change our plans till we see his response and for the home health care agency to be updated.  PICC line was placed.  1/4: No acute events overnight.  Patient remains nauseated and vomiting.  He did admit to not having a bowel motion for at least 5 days.  This is a combination of his pain regimen, decreased mobility, and deconditioning.  ID recommendations appreciated.  Cefepime was switched to ertapenem with no improvement in symptoms.  CT abdomen was ordered that showed no acute abdomen.  There is no bowel obstruction or dilated loops.  Patient had heavy stool burden.  Will place a Fleet enema.  Hold  on laxatives given the patient's vomiting.  Patient will require at least another 24 to 48 hours of inpatient service.  1/5: No acute events overnight.  Patient's nausea and vomiting has improved.  Patient had bowel motion yesterday and today.  He started MiraLAX.  ID recommendation appreciated.  The family would like to proceed with meropenem as the patient did not have severe nausea as with the other antibiotics.  Start discharge process to home with home health care tomorrow.  1/6: Patient seen.  No new complaints.  Anxious for discharge.  Appreciate input from ID.  Will discharge home on meropenem through 2/11.  COPAT completed.  Discussed with [facility], [facility] can start service tomorrow.  Will continue to monitor.  Anticipate discharge tomorrow.    Pertinent Physical Exam At Time of Discharge  Physical Exam  CONSTITUTIONAL -alert and orientated, obese, unwell  CHEST - clear to auscultation, no wheezing  CARDIAC - regular rate and regular rhythm, no murmur  ABDOMEN -distended belly, dehydrated bowel sounds, no tenderness, wound on the left groin with clean with no erythema  EXTREMITIES - no edema, no deformities  NEUROLOGICAL - alert, oriented x3 and no acute focal signs  PSYCHIATRIC - alert, pleasant and cordial, age-appropriate    Outpatient Follow-Up  Future Appointments   Date Time Provider Department Center   1/7/2025  3:30 PM Marni Montano APRN-CNP [id number] [facility]   1/7/2025 To Be Determined Alejandra Baker RN [facility]   2/4/2025  4:15 PM Haylie Tate MD [id number] [facility]   3/3/2025 10:00 AM POR VASC 7 PORVSC [facility]   3/3/2025 11:00 AM POR VASC 8 P[facility]   3/11/2025  2:00 PM Iban Peace, DO [id number] [facility]   3/28/2025  2:15 PM [facility]         Leonardo Smith MD

## 2025-01-07 NOTE — CARE PLAN
Problem: Pain - Adult  Goal: Verbalizes/displays adequate comfort level or baseline comfort level  Outcome: Progressing     Problem: Safety - Adult  Goal: Free from fall injury  Outcome: Progressing     Problem: Discharge Planning  Goal: Discharge to home or other facility with appropriate resources  Outcome: Progressing     Problem: Chronic Conditions and Co-morbidities  Goal: Patient's chronic conditions and co-morbidity symptoms are monitored and maintained or improved  Outcome: Progressing     Problem: Skin  Goal: Decreased wound size/increased tissue granulation at next dressing change  Outcome: Progressing  Goal: Participates in plan/prevention/treatment measures  Outcome: Progressing  Goal: Prevent/manage excess moisture  Outcome: Progressing  Goal: Prevent/minimize sheer/friction injuries  Outcome: Progressing  Goal: Promote/optimize nutrition  Outcome: Progressing  Goal: Promote skin healing  Outcome: Progressing     Problem: Fall/Injury  Goal: Not fall by end of shift  Outcome: Progressing  Goal: Be free from injury by end of the shift  Outcome: Progressing  Goal: Verbalize understanding of personal risk factors for fall in the hospital  Outcome: Progressing  Goal: Verbalize understanding of risk factor reduction measures to prevent injury from fall in the home  Outcome: Progressing  Goal: Use assistive devices by end of the shift  Outcome: Progressing  Goal: Pace activities to prevent fatigue by end of the shift  Outcome: Progressing     Problem: Pain  Goal: Takes deep breaths with improved pain control throughout the shift  Outcome: Progressing  Goal: Turns in bed with improved pain control throughout the shift  Outcome: Progressing  Goal: Walks with improved pain control throughout the shift  Outcome: Progressing  Goal: Performs ADL's with improved pain control throughout shift  Outcome: Progressing  Goal: Participates in PT with improved pain control throughout the shift  Outcome: Progressing  Goal:  No Free from opioid side effects throughout the shift  Outcome: Progressing  Goal: Free from acute confusion related to pain meds throughout the shift  Outcome: Progressing     Problem: Respiratory  Goal: Clear secretions with interventions this shift  Outcome: Progressing  Goal: Minimize anxiety/maximize coping throughout shift  Outcome: Progressing  Goal: Minimal/no exertional discomfort or dyspnea this shift  Outcome: Progressing  Goal: No signs of respiratory distress (eg. Use of accessory muscles. Peds grunting)  Outcome: Progressing  Goal: Patent airway maintained this shift  Outcome: Progressing     Problem: Nutrition  Goal: Adequate PO fluid intake  Outcome: Progressing  Goal: Nutrition support is meeting 75% of nutrient needs  Outcome: Progressing  Goal: Lab values WNL  Outcome: Progressing  Goal: Electrolytes WNL  Outcome: Progressing  Goal: Promote healing  Outcome: Progressing     Problem: Diabetes  Goal: Achieve decreasing blood glucose levels by end of shift  Outcome: Progressing  Goal: Increase stability of blood glucose readings by end of shift  Outcome: Progressing  Goal: Decrease in ketones present in urine by end of shift  Outcome: Progressing  Goal: Maintain electrolyte levels within acceptable range throughout shift  Outcome: Progressing  Goal: Maintain glucose levels >70mg/dl to <250mg/dl throughout shift  Outcome: Progressing  Goal: No changes in neurological exam by end of shift  Outcome: Progressing  Goal: Learn about and adhere to nutrition recommendations by end of shift  Outcome: Progressing  Goal: Vital signs within normal range for age by end of shift  Outcome: Progressing  Goal: Increase self care and/or family involovement by end of shift  Outcome: Progressing  Goal: Receive DSME education by end of shift  Outcome: Progressing   The patient's goals for the shift include  Pt will remain safe from injury    The clinical goals for the shift include pt will remain safe and free of fall and  injury throughout the shift.

## 2025-01-07 NOTE — PROGRESS NOTES
Dereck Shen is a 59 y.o. male on day 7 of admission presenting with Postoperative abscess.      Assessment/Plan:     #Postop left groin abscess with concern for graft infection  #Enterobacter cloacae organism  #AmpC organism  Abscess overlying proximal anastomosis site of the femoropopliteal graft.  As per vascular, will be more cautious in his case given extensive history of surgeries and will treat it as if the graft is infected.  Patient developed vomiting with cefepime and ertapenem.  Tolerated meropenem well this morning.     #Nausea and vomiting  Unclear if this was because of the antibiotic.  Patient received ertapenem yesterday in the afternoon and had vomiting this morning.  Will obtain CT abdomen pelvis again.     #Acute kidney injury-resolved  Estimated Creatinine Clearance: 83.6 mL/min (by C-G formula based on SCr of 1.13 mg/dL).     CAD s/p CABG  PAD s/p bypass and graft  DVT  DM, HTN     Recommendations:     -Cont meropenem 2 g every 8 hours through 02/11/25 to finish 6-week course of Antibiotics  -Discussed side effects of meropenem including C. Difficile  -Patient can be discharged from from ID standpoint  -Will continue to follow      Gorge Quintero MD  Date of service: 1/7/2025  Time of service: 10:33 AM      Subjective   Interval History: No acute events overnight.  Patient denies any new complaint.        Review of Systems  Denies: fever, chills, nausea, vomiting, diarrhea, dysuria    Objective   Range of Vitals (last 24 hours)  Heart Rate:  [68-77]   Temp:  [35.7 °C (96.2 °F)-36.7 °C (98.1 °F)]   Resp:  [16-18]   BP: (126-134)/(77-87)   Weight:  [94.1 kg (207 lb 8 oz)]   SpO2:  [94 %-97 %]  Daily Weight  01/07/25 : 94.1 kg (207 lb 8 oz)   Body mass index is 28.94 kg/m².      General: alert, oriented, NAD  HEENT: conjunctival pallor, no scleral icterus  Abdomen: soft, non tender, non distended  Neuro: AAO x 3  Extremities: LLE dressing, L groin stitches with packing with open wound without  any surrounding erythema or drainage. R arm PICC line  Skin: As above       Antibiotics  meropenem - 1 gram, 2 gram/100 mL      Relevant Results  Labs  Results from last 72 hours   Lab Units 01/07/25  0343 01/05/25  0423   WBC AUTO x10*3/uL 5.3 5.5   HEMOGLOBIN g/dL 9.1* 8.3*   HEMATOCRIT % 28.3* 24.8*   PLATELETS AUTO x10*3/uL 316 349   NEUTROS PCT AUTO %  --  67.3   LYMPHS PCT AUTO %  --  22.3   MONOS PCT AUTO %  --  7.8   EOS PCT AUTO %  --  1.6     Results from last 72 hours   Lab Units 01/07/25  0343 01/05/25  0423   SODIUM mmol/L 133* 131*   POTASSIUM mmol/L 4.3 4.4   CHLORIDE mmol/L 101 100   CO2 mmol/L 27 26   BUN mg/dL 19 23   CREATININE mg/dL 1.08 1.13   GLUCOSE mg/dL 95 86   CALCIUM mg/dL 8.2* 7.9*   ANION GAP mmol/L 9* 9*   EGFR mL/min/1.73m*2 80 75         Estimated Creatinine Clearance: 87.3 mL/min (by C-G formula based on SCr of 1.08 mg/dL).  C-Reactive Protein   Date Value Ref Range Status   12/30/2024 7.35 (H) <1.00 mg/dL Final   04/24/2024 0.15 <1.00 mg/dL Final       Microbiology  Susceptibility data from last 14 days.  Collected Specimen Info Organism Amoxicillin/Clavulanate Ampicillin Ampicillin/Sulbactam Cefazolin Cefepime Ciprofloxacin Gentamicin Levofloxacin Piperacillin/Tazobactam Trimethoprim/Sulfamethoxazole   01/02/25 Tissue/Biopsy from Skin/Superficial Abscess Enterobacter cloacae complex  R  R  R  R  S  S  S  S  S  S   12/30/24 Tissue/Biopsy from Skin/Superficial Abscess Enterobacter cloacae complex  R  R  R  R  S  S  S  S  S  S     Mixed Anaerobic Bacteria                 Imaging    CT abdomen pelvis wo IV contrast    Result Date: 1/4/2025  1.  Bibasilar interstitial edema with enlarging, now moderate pleural effusions. 2. Anasarca. 3. Decreased fluid overlying left femoral cutdown site. Subincisional gas with adjacent reactive lymphadenopathy at this site persist. 4. Large colonic stool burden. No findings of bowel obstruction otherwise.   MACRO: None.   Signed by: Raudel Scott  1/4/2025 4:16 PM Dictation workstation:   [id number]    XR chest 1 view    Result Date: 1/1/2025  As above   MACRO: None   Signed by: Geovany Amin 1/1/2025 11:22 AM Dictation workstation:   [id number]    XR chest 1 view    Result Date: 1/1/2025  Pulmonary edema.   Signed by: Aleks Joshi 1/1/2025 10:48 AM Dictation workstation:   [id number]    CT abdomen pelvis w IV contrast    Result Date: 12/30/2024  Complex fluid collection left groin 2.9 x 4.6 x 7.5 cm is suggestive of abscess.  There are multiple locules of gas within the fluid collection as well as mild peripheral enhancement.  The fluid collection extends from just below the skin surface and staples deep to the level the left common femoral artery and proximal portion of the Outing-Casey bypass graft.  Reactive left groin lymphadenopathy adjacent to the abscess as well as proximal left thigh soft tissue edema. Critical findings discussed with Dr. Knutson at 9:00 PM 12/30/2024. Signed by Geovany Joel MD    XR chest 1 view    Result Date: 12/23/2024  No acute cardiopulmonary process is evident.   MACRO: None   Signed by: Lawrence Leahy 12/23/2024 9:34 PM Dictation workstation:   [id number]    CT angio aorta and bilateral iliofemoral runoff including without contrast if performed    Result Date: 12/17/2024  No significant aortic disease.  Mild bilateral common iliac artery stenosis.  Patent left external iliac and common femoral arteries. 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  Chronic occlusion of the native left superficial femoral artery. A 2 cm segment of the left P1 popliteal artery reconstitutes.  The terminal left popliteal artery also reconstitutes.  Other portions of the popliteal artery are occluded. Patent three-vessel infrapopliteal runoff to the left hindfoot, beyond which contrast opacification was too weak to visualize. Thick subcutaneous soft tissue - presumed scarred tissue - overlying the left femoral vessels  at the left groin.  Correlate for possibility of cellulitis. Patent right iliac and right femoral stents.  Patent right popliteal artery.  Patent three-vessel infrapopliteal runoff to the right ankle. No findings of an acute process in the abdomen or pelvis. Signed by Simeon Barrera MD

## 2025-01-07 NOTE — NURSING NOTE
Patient discharged home with home health for continued IV antibiotic therapy. DC with single lumen PICC in R arm. Medications delivered to patient home already and home health in place. Instructions reviewed with the patient and belongings sent with the patient. No further questions at this time.

## 2025-01-07 NOTE — DOCUMENTATION CLARIFICATION NOTE
"    PATIENT:               [facility]  ACCT #:                  [id number]  MRN:                       [id number]  :                       1965  ADMIT DATE:       2024 5:30 PM  DISCH DATE:        2025 11:45 AM  RESPONDING PROVIDER #:        [id number]          PROVIDER RESPONSE TEXT:    Flash pulmonary edema treated. Acute Respiratory Failure ruled out after further workup    CDI QUERY TEXT:    Clarification        Instruction:    Based on your assessment of the patient and the clinical information, please provide the requested documentation by clicking on the appropriate radio button and enter any additional information if prompted.    Question: Please clarify diagnosis of respiratory failure as    When answering this query, please exercise your independent professional judgment. The fact that a question is being asked, does not imply that any particular answer is desired or expected.    The patient's clinical indicators include:  Clinical Information:  - 60yo male admitted with postop wound infection to groin. PMH includes CHF, COPD, HTN, PAD, DM, CAD, CVAs and BRUNA.    Documented Diagnosis: 25 Np Polverine : \" AOC hypoxic respiratory failure\"    Clinical Indicators and Documentation:  - 25  Rapid response Np Polverine CC : \" RRT  for sudden shortness of breath, pallor, and diaphoresis. Nurse reported patient had just completed Zosyn. CXR consistent with pulmonary edema.  HX of HFrEF with EF of 38% ... Home lasix on hold since admit given SAMEERA.  Appears volume overloaded with pitting edema to bilateral lower extremities and noted with inspiratory and expiratory wheezes. S/p Lasix 20mg IV x1 and solucortef 50mg IV x1. Appears clinically improved s/p lasix with decreased work of breathing, resolution of tachycardia, SPO2 99% on 6L, and improvement of hypertension \"    -Vital Signs on arrival 24 : 97.0, hr 71, rr 18, bp 112/64, O2 sat 1005 on room air  -Vital Signs 24 :  98.4, " hr 87, rr 16, bp 118/77, O2 sat 91% on room air  01/01/25  0024 :           hr 121, rr 28, bp 159/93, O2 sat 92% on O2 with amount not documented  01/01/25  1153 :  97.5, hr 71, rr 16, bp 116/71, O2 sat 100 % on room air    -ABG results:  not tested  -Physical Exam Findings during rapid response on 1/1/25  -Breath sounds: Insp and exp wheezes  -Distress: No hives, stridor, or rashes noted.  -Cyanosis: pallor noted  -Oxygen Therapy: Supplemental O2 for less than 24 hours      Treatment: IV Lasix 20 mg stat and daily doses restarted, IV steroids, supplemental O2 for less than 24 hours, transfer to ICU.    Risk Factors: COPD, flash pulmonary edema, possible allergic reaction.  Options provided:  -- Flash pulmonary edema treated. Acute Respiratory Failure ruled out after further workup  -- Acute Respiratory Failure as evidenced by, Please specify additional information below  -- Other - I will add my own diagnosis  -- Refer to Clinical Documentation Reviewer    Query created by: Niru Rizvi on 1/6/2025 9:41 AM      Electronically signed by:  DHARA OLIVER MD 1/7/2025 6:39 PM

## 2025-01-07 NOTE — NURSING NOTE
Patient and patient's mother updated on discharge to home tomorrow per , Raquel Hilario's updated note.

## 2025-01-08 ENCOUNTER — OFFICE VISIT (OUTPATIENT)
Dept: VASCULAR SURGERY | Facility: HOSPITAL | Age: 60
End: 2025-01-08
Payer: COMMERCIAL

## 2025-01-08 ENCOUNTER — PATIENT OUTREACH (OUTPATIENT)
Dept: PRIMARY CARE | Facility: CLINIC | Age: 60
End: 2025-01-08
Payer: COMMERCIAL

## 2025-01-08 VITALS
BODY MASS INDEX: 30.13 KG/M2 | SYSTOLIC BLOOD PRESSURE: 105 MMHG | DIASTOLIC BLOOD PRESSURE: 69 MMHG | HEART RATE: 76 BPM | WEIGHT: 216 LBS

## 2025-01-08 DIAGNOSIS — T81.30XA WOUND DEHISCENCE: ICD-10-CM

## 2025-01-08 DIAGNOSIS — L08.9 WOUND INFECTION: ICD-10-CM

## 2025-01-08 DIAGNOSIS — I73.9 PAD (PERIPHERAL ARTERY DISEASE) (CMS-HCC): Primary | ICD-10-CM

## 2025-01-08 DIAGNOSIS — T14.8XXA WOUND INFECTION: ICD-10-CM

## 2025-01-08 PROCEDURE — 99214 OFFICE O/P EST MOD 30 MIN: CPT | Performed by: PHYSICIAN ASSISTANT

## 2025-01-08 NOTE — PROGRESS NOTES
Discharge Facility: [facility]  Discharge Diagnosis: Wound Infection after Surgery  Admission Date: 30 Dec 24  Discharge Date: 7 Jan 25    PCP Appointment Date: Pt declined  Specialist Appointment Date: 8 Jan 25 (Vasc Surg, Jeff)  Hospital Encounter and Summary Linked: Yes    See discharge assessment below for further details     Engagement  Call Start Time: 1031 (Spoke with patient) (1/8/2025 10:41 AM)    Medications  Medications reviewed with patient/caregiver?: Yes (1/8/2025 10:41 AM)  Is the patient having any side effects they believe may be caused by any medication additions or changes?: No (1/8/2025 10:41 AM)  Does the patient have all medications ordered at discharge?: Yes (1/8/2025 10:41 AM)  Prescription Comments: pt able to obtain and afford all medications (1/8/2025 10:41 AM)  Is the patient taking all medications as directed (includes completed medication regime)?: Yes (1/8/2025 10:41 AM)  Medication Comments: no questions on medications at this time. (1/8/2025 10:41 AM)    Appointments  Does the patient have a primary care provider?: Yes (pt decling follow up at this time.) (1/8/2025 10:41 AM)  Has the patient kept scheduled appointments due by today?: Yes (1/8/2025 10:41 AM)    Self Management  What is the home health agency?: [facility] (1/8/2025 10:41 AM)  Has home health visited the patient within 72 hours of discharge?: Yes (1/8/2025 10:41 AM)  What Durable Medical Equipment (DME) was ordered?: None (1/8/2025 10:41 AM)    Patient Teaching  Does the patient have access to their discharge instructions?: Yes (1/8/2025 10:41 AM)  Care Management Interventions: Reviewed instructions with patient (1/8/2025 10:41 AM)  What is the patient's perception of their health status since discharge?: Improving (1/8/2025 10:41 AM)  Is the patient/caregiver able to teach back the hierarchy of who to call/visit for symptoms/problems? PCP, Specialist, Home Health nurse, Urgent Care, ED, 911: Yes (1/8/2025 10:41  AM)  Patient/Caregiver Education Comments: None (1/8/2025 10:41 AM)    Wrap Up  Wrap Up Additional Comments: Pt states he is doing about as well as can be since his discharge. pt has no questions regarding medications or discharge instructions at this time. pt states [facility] was there to see him yesterday and they got everyone all sorted out. pt not wishing to make appt with PCP at this time but states he will call office when he gets a bit more strenght to schedule a follow up (1/8/2025 10:41 AM)  Call End Time: 1041 (1/8/2025 10:41 AM)

## 2025-01-09 ENCOUNTER — DOCUMENTATION (OUTPATIENT)
Dept: PHARMACY | Facility: CLINIC | Age: 60
End: 2025-01-09

## 2025-01-09 ENCOUNTER — HOME INFUSION (OUTPATIENT)
Dept: INFUSION THERAPY | Age: 60
End: 2025-01-09
Payer: COMMERCIAL

## 2025-01-09 PROCEDURE — RXMED WILLOW AMBULATORY MEDICATION CHARGE

## 2025-01-09 NOTE — PROGRESS NOTES
SPOKE W/ PT - DELIVERY IS SCHEDULED FOR THURSDAY BY 6 PM.  ASKED PT IF THERE ARE ANY QUESTIONS TO A PHARMACIST. PT SAID: NO QUESTIONS.

## 2025-01-10 ENCOUNTER — APPOINTMENT (OUTPATIENT)
Dept: WOUND CARE | Facility: CLINIC | Age: 60
End: 2025-01-10
Payer: COMMERCIAL

## 2025-01-10 ENCOUNTER — HOSPITAL ENCOUNTER (EMERGENCY)
Facility: HOSPITAL | Age: 60
Discharge: HOME | End: 2025-01-10
Payer: COMMERCIAL

## 2025-01-10 VITALS
WEIGHT: 216 LBS | DIASTOLIC BLOOD PRESSURE: 79 MMHG | HEART RATE: 86 BPM | SYSTOLIC BLOOD PRESSURE: 142 MMHG | HEIGHT: 71 IN | BODY MASS INDEX: 30.24 KG/M2 | RESPIRATION RATE: 18 BRPM | OXYGEN SATURATION: 100 % | TEMPERATURE: 98.4 F

## 2025-01-10 DIAGNOSIS — R33.9 URINARY RETENTION: Primary | ICD-10-CM

## 2025-01-10 PROCEDURE — 2500000001 HC RX 250 WO HCPCS SELF ADMINISTERED DRUGS (ALT 637 FOR MEDICARE OP): Performed by: NURSE PRACTITIONER

## 2025-01-10 PROCEDURE — 2500000005 HC RX 250 GENERAL PHARMACY W/O HCPCS

## 2025-01-10 PROCEDURE — 99283 EMERGENCY DEPT VISIT LOW MDM: CPT

## 2025-01-10 PROCEDURE — 51798 US URINE CAPACITY MEASURE: CPT

## 2025-01-10 RX ORDER — LIDOCAINE HYDROCHLORIDE 20 MG/ML
JELLY TOPICAL
Status: COMPLETED
Start: 2025-01-10 | End: 2025-01-10

## 2025-01-10 RX ORDER — OXYCODONE AND ACETAMINOPHEN 5; 325 MG/1; MG/1
1 TABLET ORAL ONCE
Status: COMPLETED | OUTPATIENT
Start: 2025-01-10 | End: 2025-01-10

## 2025-01-10 RX ADMIN — LIDOCAINE HYDROCHLORIDE: 20 JELLY TOPICAL at 19:49

## 2025-01-10 RX ADMIN — OXYCODONE HYDROCHLORIDE AND ACETAMINOPHEN 1 TABLET: 5; 325 TABLET ORAL at 20:12

## 2025-01-10 ASSESSMENT — LIFESTYLE VARIABLES
HAVE PEOPLE ANNOYED YOU BY CRITICIZING YOUR DRINKING: NO
EVER HAD A DRINK FIRST THING IN THE MORNING TO STEADY YOUR NERVES TO GET RID OF A HANGOVER: NO
HAVE YOU EVER FELT YOU SHOULD CUT DOWN ON YOUR DRINKING: NO
EVER FELT BAD OR GUILTY ABOUT YOUR DRINKING: NO
TOTAL SCORE: 0

## 2025-01-10 ASSESSMENT — PAIN SCALES - GENERAL
PAINLEVEL_OUTOF10: 8
PAINLEVEL_OUTOF10: 8

## 2025-01-10 ASSESSMENT — COLUMBIA-SUICIDE SEVERITY RATING SCALE - C-SSRS
2. HAVE YOU ACTUALLY HAD ANY THOUGHTS OF KILLING YOURSELF?: NO
1. IN THE PAST MONTH, HAVE YOU WISHED YOU WERE DEAD OR WISHED YOU COULD GO TO SLEEP AND NOT WAKE UP?: NO
6. HAVE YOU EVER DONE ANYTHING, STARTED TO DO ANYTHING, OR PREPARED TO DO ANYTHING TO END YOUR LIFE?: NO

## 2025-01-10 ASSESSMENT — PAIN - FUNCTIONAL ASSESSMENT: PAIN_FUNCTIONAL_ASSESSMENT: 0-10

## 2025-01-11 RX ADMIN — Medication 20 ML: at 15:29

## 2025-01-11 RX ADMIN — Medication 5 ML: at 15:00

## 2025-01-11 ASSESSMENT — ENCOUNTER SYMPTOMS
CONSTIPATION: 0
NECK PAIN: 0
VOMITING: 0
FATIGUE: 0
CHILLS: 0
WEAKNESS: 0
COUGH: 0
ADENOPATHY: 0
TROUBLE SWALLOWING: 0
DIARRHEA: 0
WOUND: 1
DIFFICULTY URINATING: 0
SORE THROAT: 0
ABDOMINAL PAIN: 0
SLEEP DISTURBANCE: 0
SEIZURES: 0
SHORTNESS OF BREATH: 0
SPEECH DIFFICULTY: 0
DIZZINESS: 0
FEVER: 0
LIGHT-HEADEDNESS: 0
CONFUSION: 0
FACIAL ASYMMETRY: 0
PALPITATIONS: 0
ARTHRALGIAS: 0
NUMBNESS: 0
COLOR CHANGE: 0
WHEEZING: 0
HEADACHES: 0
NAUSEA: 0
JOINT SWELLING: 0

## 2025-01-11 ASSESSMENT — ACTIVITIES OF DAILY LIVING (ADL)
ENTERING_EXITING_HOME: STAND BY ASSIST
OASIS_M1830: 04

## 2025-01-11 NOTE — ED PROVIDER NOTES
Chief Complaint   Patient presents with    Urinary Retention     Has brown in, hasn't had any output since around noon today. Had surgery at [facility] Tuesday for a groin abscess and released Tuesday with brown       HPI       59 year old male presents to the Emergency Department today complaining of the inability to void since 1400 this afternoon. Had urinary retention following a recent surgery and has a brown  in place that is not draining. Denies any associated fever, chills, headache, neck pain, chest pain, shortness of breath, abdominal pain, nausea, vomiting, diarrhea, constipation, or urinary symptoms.       History provided by:  Patient             Patient History   Past Medical History:   Diagnosis Date    (HFpEF) heart failure with preserved ejection fraction     Aphasia     Atherosclerotic heart disease of native coronary artery with unspecified angina pectoris 11/05/2019    Bipolar disorder, unspecified (Multi)     BPH (benign prostatic hyperplasia)     Diabetes (Multi)     DVT (deep venous thrombosis) (Multi) 02/2022    RLE    Erectile dysfunction     GERD (gastroesophageal reflux disease)     Hepatitis C     HLD (hyperlipidemia)     HTN (hypertension)     Obstructive sleep apnea (adult) (pediatric)     Opioid abuse, in remission (Multi)     PAD (peripheral artery disease) (CMS-[facility])     Polymyalgia rheumatica (Multi)     Post-traumatic stress disorder, unspecified     Stroke (Multi) 07/24/2023    multiple     Past Surgical History:   Procedure Laterality Date    BACK SURGERY      CORONARY ANGIOPLASTY WITH STENT PLACEMENT      CORONARY ARTERY BYPASS GRAFT      CT ANGIO AORTA AND BILATERAL ILIOFEMORAL RUN OFF INCLUDING WITHOUT CONTRAST IF PERFORMED  07/20/2020    CT ANGIO AORTA AND BILATERAL ILIOFEMORAL RUN OFF INCLUDING WITHOUT CONTRAST IF PERFORMED  01/14/2022    DENTAL SURGERY      ELBOW SURGERY      INVASIVE VASCULAR PROCEDURE Bilateral 10/04/2024    Procedure: Lower Extremity Angiogram;  Surgeon: Baljinder  MD Kyle;  Location: [facility];  Service: Cardiovascular;  Laterality: Bilateral;  w / anesthesia  3 hr hydration  / arrive 7:30    INVASIVE VASCULAR PROCEDURE N/A 10/21/2024    Procedure: Lower Extremity Angiogram;  Surgeon: Baljinder Wright MD;  Location: [facility];  Service: Cardiovascular;  Laterality: N/A;  w/ anesthesia    INVASIVE VASCULAR PROCEDURE N/A 10/21/2024    Procedure: Angioplasty - Lower Extremity;  Surgeon: Baljinder Wright MD;  Location: [facility];  Service: Cardiovascular;  Laterality: N/A;    KNEE SURGERY Left     MR HEAD ANGIO WO IV CONTRAST  01/04/2022    MR NECK ANGIO WO IV CONTRAST  01/04/2022    TONSILLECTOMY      TRANSLUMINAL ATHERECTOMY FEMORAL-POPLITEAL / TIBIOPERONEAL       Family History   Problem Relation Name Age of Onset    No Known Problems Mother      No Known Problems Father       Social History     Tobacco Use    Smoking status: Every Day     Current packs/day: 0.50     Types: Cigarettes     Passive exposure: Current (5-7 cigarettes a day)    Smokeless tobacco: Never   Vaping Use    Vaping status: Never Used   Substance Use Topics    Alcohol use: Never    Drug use: Yes     Types: Marijuana     Comment: Medical card           Physical Exam  Constitutional:       General: He is awake.      Appearance: Normal appearance.   Cardiovascular:      Rate and Rhythm: Normal rate and regular rhythm.      Pulses:           Radial pulses are 3+ on the right side and 3+ on the left side.      Heart sounds: Normal heart sounds. No murmur heard.     No friction rub. No gallop.   Pulmonary:      Effort: Pulmonary effort is normal.      Breath sounds: Normal breath sounds and air entry.   Genitourinary:     Comments: Suprapubic distention and tenderness.   Neurological:      Mental Status: He is alert.   Psychiatric:         Behavior: Behavior is cooperative.         Labs Reviewed - No data to display    No orders to display            ED Course & MDM   Diagnoses as of  "01/10/25 2108   Urinary retention           Medical Decision Making  Patient was seen and evaluated by myself. Bladder scan showed >1000mL of urine. At that time, the nursing staff changed the brown catheter. Tolerated well. Greater than 1000mL of urine drained. Feels much improved and suprapubic distention and tenderness has resolved. Given a Percocet for his chronic pain. Referred to urology for follow up. Return if worse in any way. Discharged in stable condition with computer instructions.     Diagnostic Impression:    1. Acute urinary retention secondary to brown catheter issue           Your medication list        CONTINUE taking these medications        Instructions Last Dose Given Next Dose Due   pen needle, diabetic 32 gauge x 5/32\" needle  Commonly known as: BD Ultra-Fine Jocy Pen Needle      Use to inject insulin up to 8 times per day.       BD Ultra-Fine Micro Pen Needle 32 gauge x 1/4\" needle  Generic drug: pen needle, diabetic      Use to inject 1-4 times daily as directed       pen needle, diabetic 32 gauge x 5/32\" needle  Commonly known as: BD Ultra-Fine Jocy Pen Needle      Pen Needles for Insulin.       FreeStyle En 2 [place] misc  Generic drug: flash glucose scanning reader      USE TO CHECK SUGARS FOUR TIMES A DAY       FreeStyle En 2 Sensor kit  Generic drug: flash glucose sensor kit      use as directed daily and change every 14 days       lancets misc      1 each 4 times a day before meals.              ASK your doctor about these medications        Instructions Last Dose Given Next Dose Due   alteplase 2 mg injection  Commonly known as: Cathflo Activase  Ask about: Should I take this medication?      2 mL (2 mg) by intra-catheter route 1 time for 1 dose.       aspirin 81 mg chewable tablet           atorvastatin 80 mg tablet  Commonly known as: Lipitor      Take 1 tablet (80 mg) by mouth once daily at bedtime.       carvedilol 6.25 mg tablet  Commonly known as: Coreg           Creon " 36,000-114,000- 180,000 unit capsule,delayed release(DR/EC) capsule  Generic drug: pancrelipase (Lip-Prot-Amyl)           dapagliflozin propanediol 10 mg  Commonly known as: Farxiga      Take 1 tablet (10 mg) by mouth once daily with breakfast.       ELDERBERRY FRUIT ORAL           Eliquis 5 mg tablet  Generic drug: apixaban  Start taking on: November 21, 2024      Take 2 tablets (10 mg) by mouth 2 times a day for 6 days, THEN 1 tablet (5 mg) 2 times a day.       Entresto  mg tablet  Generic drug: sacubitriL-valsartan           ezetimibe 10 mg tablet  Commonly known as: Zetia           Flomax 0.4 mg 24 hr capsule  Generic drug: tamsulosin           tamsulosin 0.4 mg 24 hr capsule  Commonly known as: Flomax      TAKE 1 CAPSULE BY MOUTH ONCE DAILY       furosemide 40 mg tablet  Commonly known as: Lasix           gabapentin 400 mg capsule  Commonly known as: Neurontin      Take 1 capsule (400 mg) by mouth 3 times a day.       JAIMEE ROOT EXTRACT ORAL           heparin flush 10 unit/mL injection      3 mL (30 Units) by intra-catheter route once daily.       insulin lispro 100 unit/mL injection  Commonly known as: HumaLOG      Inject 10 Units under the skin 3 times a day with meals. Plus sliding scale if BS is over 200       isosorbide mononitrate ER 60 mg 24 hr tablet  Commonly known as: Imdur      Take 1 tablet (60 mg) by mouth once daily.       Lantus Solostar U-100 Insulin 100 unit/mL (3 mL) pen  Generic drug: insulin glargine      Inject 20 Units under the skin once daily at bedtime.       medical cannabis           meropenem 1 gram injection  Commonly known as: Merrem      Infuse 2 g into a venous catheter every 8 hours.       nicotine 21 mg/24 hr patch  Commonly known as: Nicoderm CQ      APPLY 1 PATCH TO SKIN EVERY 24 HOURS AS DIRECTED       OXcarbazepine 150 mg tablet  Commonly known as: Trileptal           oxyCODONE 5 mg immediate release tablet  Commonly known as: Roxicodone      Take 1 tablet (5 mg) by  mouth every 6 hours if needed for moderate pain (4 - 6) for up to 5 days.       pantoprazole 40 mg EC tablet  Commonly known as: ProtoNix      TAKE 1 TABLET BY MOUTH ONCE DAILY       ranolazine 500 mg 12 hr tablet  Commonly known as: Ranexa           ticagrelor 90 mg tablet  Commonly known as: Brilinta      Take 1 tablet (90 mg) by mouth 2 times a day.       tiZANidine 2 mg tablet  Commonly known as: Zanaflex      Take 1 tablet (2 mg) by mouth once daily as needed for muscle spasms.                  Procedure  Procedures     SUMAN Whitfield-BULL  01/10/25 2108       ORESTES Whitfield  01/10/25 2110

## 2025-01-11 NOTE — PROGRESS NOTES
Subjective   Patient ID: Dereck Shen is a 59 y.o. male who presents for Follow-up (Hosp discharge fuv ).  HPI  59 year old male follow up from left fem-below knee popliteal bypass with vein patch left distal anastomosis 12/18/24. Readmitted 12/30-1/7 postoperatively for left groin incisional drainage and separation at midportion of incision. Discharged on 6 weeks merepenem. Has home health care for wound care, being packed with acquacel. Drainage amount decreasing per patient.  Overall feeling well. Left foot feels good. No claudication or rest pain. Some LLE edema. No fevers or chills. Also has brown catheter for recurrent urinary retention. Has follow up with urology scheduled.   Review of Systems   Constitutional:  Negative for chills, fatigue and fever.   HENT:  Negative for congestion, sore throat and trouble swallowing.    Eyes:  Negative for visual disturbance.   Respiratory:  Negative for cough, shortness of breath and wheezing.    Cardiovascular:  Negative for chest pain, palpitations and leg swelling.   Gastrointestinal:  Negative for abdominal pain, constipation, diarrhea, nausea and vomiting.   Endocrine: Negative for cold intolerance and heat intolerance.   Genitourinary:  Negative for difficulty urinating.   Musculoskeletal:  Negative for arthralgias, joint swelling and neck pain.   Skin:  Positive for wound. Negative for color change.   Neurological:  Negative for dizziness, seizures, syncope, facial asymmetry, speech difficulty, weakness, light-headedness, numbness and headaches.   Hematological:  Negative for adenopathy.   Psychiatric/Behavioral:  Negative for behavioral problems, confusion and sleep disturbance.      Vitals:    01/08/25 1431   BP: 105/69   Pulse: 76   Weight: 98 kg (216 lb)      Objective   Physical Exam  Constitutional:       Appearance: Normal appearance.   HENT:      Head: Normocephalic.      Right Ear: External ear normal.      Left Ear: External ear normal.      Nose:  Nose normal.      Mouth/Throat:      Mouth: Mucous membranes are moist.   Eyes:      Extraocular Movements: Extraocular movements intact.   Neck:      Vascular: No carotid bruit.   Cardiovascular:      Rate and Rhythm: Normal rate and regular rhythm.      Pulses: Normal pulses.      Comments: LLE warm to the toes, motor and sensory intact. Warm and well perfused. Good cap refill. (+)pedal signals by doppler.     Left medial proximal calf incision with staples, c/d/I staples removed, steris applied.   Left medial and lateral open fasciotomy incisions, healing, acquacel in place with dsd cover.   Kerlex wrap from toes to knee.  Left groin incision with small dime sized opening mid portion of incision, acquacel packing changed, no purulent drainage, remaining staples removed, steris applied.   Pulmonary:      Effort: Pulmonary effort is normal. No respiratory distress.      Breath sounds: Normal breath sounds.   Abdominal:      Palpations: Abdomen is soft.      Tenderness: There is no abdominal tenderness.   Genitourinary:     Comments: Douglass catheter in place draining clear yellow urine.   Musculoskeletal:         General: No swelling or tenderness.      Cervical back: Normal range of motion and neck supple.      Right lower leg: No edema.      Left lower leg: Edema present.   Skin:     General: Skin is warm and dry.      Capillary Refill: Capillary refill takes less than 2 seconds.      Findings: No lesion.   Neurological:      General: No focal deficit present.      Mental Status: He is alert and oriented to person, place, and time. Mental status is at baseline.   Psychiatric:         Mood and Affect: Mood normal.         Behavior: Behavior normal.         Thought Content: Thought content normal.         Judgment: Judgment normal.         Assessment/Plan   Problem List Items Addressed This Visit             ICD-10-CM    PAD (peripheral artery disease) (CMS-[facility]) - Primary I73.9     Other Visit Diagnoses         Codes     Wound dehiscence     T81.30XA    Wound infection     T14.8XXA, L08.9        59 year old male follow up from left fem-below knee popliteal bypass with vein patch left distal anastomosis 12/18/24. Readmitted 12/30-1/7 postoperatively for left groin incisional drainage and separation at midportion of incision. Overall doing well. Foot warm, no claudication or rest pain. Dressings changed and groin wound repacked.   -continue IV abx x6 weeks per ID  -continue local wound care, left groin packing. Home health care to assist with wound care  -ambulation as tolerated  -continue eliquis, brillinta and asa81   -continue brown catheter for urinary retention, follow up with urology as scheduled  -follow up 3-4 weeks  -call with questions or concerns.                 Marla Aguilar PA-C 01/11/25 5:53 AM

## 2025-01-13 ENCOUNTER — PHARMACY VISIT (OUTPATIENT)
Dept: PHARMACY | Facility: CLINIC | Age: 60
End: 2025-01-13
Payer: MEDICAID

## 2025-01-14 ENCOUNTER — HOSPITAL ENCOUNTER (EMERGENCY)
Facility: HOSPITAL | Age: 60
Discharge: HOME | End: 2025-01-14
Attending: EMERGENCY MEDICINE
Payer: COMMERCIAL

## 2025-01-14 VITALS
SYSTOLIC BLOOD PRESSURE: 136 MMHG | BODY MASS INDEX: 29.68 KG/M2 | DIASTOLIC BLOOD PRESSURE: 71 MMHG | HEART RATE: 79 BPM | WEIGHT: 212 LBS | HEIGHT: 71 IN | OXYGEN SATURATION: 100 % | RESPIRATION RATE: 16 BRPM | TEMPERATURE: 98.1 F

## 2025-01-14 DIAGNOSIS — T83.9XXD FOLEY CATHETER PROBLEM, SUBSEQUENT ENCOUNTER: Primary | ICD-10-CM

## 2025-01-14 PROCEDURE — 99282 EMERGENCY DEPT VISIT SF MDM: CPT | Performed by: EMERGENCY MEDICINE

## 2025-01-14 PROCEDURE — 51798 US URINE CAPACITY MEASURE: CPT

## 2025-01-14 PROCEDURE — 99283 EMERGENCY DEPT VISIT LOW MDM: CPT

## 2025-01-14 ASSESSMENT — LIFESTYLE VARIABLES
EVER HAD A DRINK FIRST THING IN THE MORNING TO STEADY YOUR NERVES TO GET RID OF A HANGOVER: NO
HAVE PEOPLE ANNOYED YOU BY CRITICIZING YOUR DRINKING: NO
TOTAL SCORE: 0
HAVE YOU EVER FELT YOU SHOULD CUT DOWN ON YOUR DRINKING: NO
EVER FELT BAD OR GUILTY ABOUT YOUR DRINKING: NO

## 2025-01-14 ASSESSMENT — PAIN DESCRIPTION - PAIN TYPE: TYPE: ACUTE PAIN

## 2025-01-14 ASSESSMENT — PAIN DESCRIPTION - LOCATION: LOCATION: PENIS

## 2025-01-14 ASSESSMENT — PAIN SCALES - GENERAL: PAINLEVEL_OUTOF10: 8

## 2025-01-14 ASSESSMENT — PAIN - FUNCTIONAL ASSESSMENT: PAIN_FUNCTIONAL_ASSESSMENT: 0-10

## 2025-01-14 NOTE — ED PROVIDER NOTES
EMERGENCY MEDICINE EVALUATION NOTE    History of Present Illness     Chief Complaint:   Chief Complaint   Patient presents with    Douglass catheter problem       HPI: Dereck Shen is a 59 y.o. male presents with a chief complaint of Douglass catheter problem.  Patient states that his Douglass is not draining very well.  He does report that he was here a few days ago where he had similar symptoms with a headache change of Douglass secondary to obstruction.  He reports he started to have significant pain and discomfort in his lower abdomen and is concerned that it might not be draining well again.  He states that he did notice that there is a little bit of urine that came out around the Douglass.  Patient reports that he has the Douglass secondary to not being able to urinate while being admitted after surgery.    Previous History     Past Medical History:   Diagnosis Date    (HFpEF) heart failure with preserved ejection fraction     Aphasia     Atherosclerotic heart disease of native coronary artery with unspecified angina pectoris 11/05/2019    Bipolar disorder, unspecified (Multi)     BPH (benign prostatic hyperplasia)     Diabetes (Multi)     DVT (deep venous thrombosis) (Multi) 02/2022    RLE    Erectile dysfunction     GERD (gastroesophageal reflux disease)     Hepatitis C     HLD (hyperlipidemia)     HTN (hypertension)     Obstructive sleep apnea (adult) (pediatric)     Opioid abuse, in remission (Multi)     PAD (peripheral artery disease) (CMS-[facility])     Polymyalgia rheumatica (Multi)     Post-traumatic stress disorder, unspecified     Stroke (Multi) 07/24/2023    multiple     Past Surgical History:   Procedure Laterality Date    BACK SURGERY      CORONARY ANGIOPLASTY WITH STENT PLACEMENT      CORONARY ARTERY BYPASS GRAFT      CT ANGIO AORTA AND BILATERAL ILIOFEMORAL RUN OFF INCLUDING WITHOUT CONTRAST IF PERFORMED  07/20/2020    CT ANGIO AORTA AND BILATERAL ILIOFEMORAL RUN OFF INCLUDING WITHOUT CONTRAST IF PERFORMED   01/14/2022    DENTAL SURGERY      ELBOW SURGERY      INVASIVE VASCULAR PROCEDURE Bilateral 10/04/2024    Procedure: Lower Extremity Angiogram;  Surgeon: Baljinder Wright MD;  Location: [facility];  Service: Cardiovascular;  Laterality: Bilateral;  w / anesthesia  3 hr hydration  / arrive 7:30    INVASIVE VASCULAR PROCEDURE N/A 10/21/2024    Procedure: Lower Extremity Angiogram;  Surgeon: Baljinder Wright MD;  Location: [facility];  Service: Cardiovascular;  Laterality: N/A;  w/ anesthesia    INVASIVE VASCULAR PROCEDURE N/A 10/21/2024    Procedure: Angioplasty - Lower Extremity;  Surgeon: Baljinder Wright MD;  Location: [facility];  Service: Cardiovascular;  Laterality: N/A;    KNEE SURGERY Left     MR HEAD ANGIO WO IV CONTRAST  01/04/2022    MR NECK ANGIO WO IV CONTRAST  01/04/2022    TONSILLECTOMY      TRANSLUMINAL ATHERECTOMY FEMORAL-POPLITEAL / TIBIOPERONEAL       Social History     Tobacco Use    Smoking status: Every Day     Current packs/day: 0.50     Types: Cigarettes     Passive exposure: Current (5-7 cigarettes a day)    Smokeless tobacco: Never   Vaping Use    Vaping status: Never Used   Substance Use Topics    Alcohol use: Never    Drug use: Yes     Types: Marijuana     Comment: Medical card     Family History   Problem Relation Name Age of Onset    No Known Problems Mother      No Known Problems Father       Allergies   Allergen Reactions    Celecoxib Hives    Ibuprofen Hives    Tetanus Toxoid, Adsorbed Unknown    [id number] Antimigraine Agents Unknown    Cephalexin Rash    Hydrocodone Rash    Latex Rash    Tryptophan Rash     Current Outpatient Medications   Medication Instructions    apixaban (Eliquis) 5 mg tablet Take 2 tablets (10 mg) by mouth 2 times a day for 6 days, THEN 1 tablet (5 mg) 2 times a day.    aspirin 81 mg, Daily    atorvastatin (LIPITOR) 80 mg, oral, Nightly    carvedilol (Coreg) 6.25 mg tablet 1 tablet, Every 12 hours    Creon 36,000-114,000- 180,000 unit  "capsule,delayed release(DR/EC) capsule 2 capsules, 3 times daily PRN    dapagliflozin propanediol (FARXIGA) 10 mg, oral, Daily with breakfast    ELDERBERRY FRUIT ORAL 1 tablet, Daily PRN    Entresto  mg tablet 1 tablet, 2 times daily    ezetimibe (Zetia) 10 mg tablet 1 tablet, Daily RT    flash glucose scanning reader misc USE TO CHECK SUGARS FOUR TIMES A DAY    flash glucose sensor kit (FreeStyle En 2 Sensor) kit use as directed daily and change every 14 days    furosemide (Lasix) 40 mg tablet 1 tablet, Daily PRN    gabapentin (NEURONTIN) 400 mg, oral, 3 times daily    GINGER ROOT EXTRACT ORAL 1 tablet, Daily PRN    heparin flush (heparin LockFlush,Porcine,,PF,) 10 unit/mL injection 5 mL, intra-catheter, As needed    heparin flush 30 Units, intra-catheter, Daily    insulin lispro (HumaLOG) 100 unit/mL injection Inject 10 Units under the skin 3 times a day with meals. Plus sliding scale if BS is over 200    isosorbide mononitrate ER (IMDUR) 60 mg, oral, Daily    lancets misc 1 each, miscellaneous, 4 times daily before meals and nightly    Lantus Solostar U-100 Insulin 20 Units, subcutaneous, Nightly    medical cannabis Not [facility] prescribed    meropenem (MERREM) 2 g, intravenous, Every 8 hours    nicotine (Nicoderm CQ) 21 mg/24 hr patch APPLY 1 PATCH TO SKIN EVERY 24 HOURS AS DIRECTED    OXcarbazepine (Trileptal) 150 mg tablet 3 tablets, 2 times daily    pantoprazole (ProtoNix) 40 mg EC tablet TAKE 1 TABLET BY MOUTH ONCE DAILY    pen needle, diabetic (BD Ultra-Fine Jocy Pen Needle) 32 gauge x 5/32\" needle Pen Needles for Insulin.    pen needle, diabetic (BD Ultra-Fine Jocy Pen Needle) 32 gauge x 5/32\" needle Use to inject insulin up to 8 times per day.    pen needle, diabetic (TechLITE Pen Needle) 32 gauge x 1/4\" needle Use to inject 1-4 times daily as directed    ranolazine (Ranexa) 500 mg 12 hr tablet 1 tablet, 2 times daily    sodium chloride 0.9% flush 10 mL, intravenous, 3 times daily PRN    tamsulosin " "(Flomax) 0.4 mg 24 hr capsule TAKE 1 CAPSULE BY MOUTH ONCE DAILY    tamsulosin (FLOMAX) 0.4 mg, Daily PRN    ticagrelor (BRILINTA) 90 mg, oral, 2 times daily    tiZANidine (ZANAFLEX) 2 mg, oral, Daily PRN       Physical Exam     Appearance: Alert, oriented , cooperative.  Patient appears uncomfortable.     Skin: Intact,  dry skin, no lesions, rash, petechiae or purpura.      Eyes: PERRLA, EOMs intact,  Conjunctiva pink      ENT: Hearing grossly intact. Pharynx clear     Neck: Supple. Trachea at midline.      Pulmonary: Clear bilaterally. No rales, rhonchi or wheezing. No accessory muscle use or stridor.     Cardiac: Normal rate and rhythm without murmur     Abdomen: Active bowel sounds.  Slight suprapubic distention with suprapubic discomfort on examination.     Musculoskeletal: Full range of motion.      Neurological:Cranial nerves II through XII are grossly intact, normal sensation, no weakness, no focal findings identified.     Results   Labs Reviewed - No data to display  No orders to display         ED Course & Medical Decision Making   Medications - No data to display  Heart Rate:  [79]   Temperature:  [36.7 °C (98.1 °F)]   Respirations:  [16]   BP: (136)/(71)   Height:  [180.3 cm (5' 11\")]   Weight:  [96.2 kg (212 lb)]   Pulse Ox:  [100 %]    ED Course as of 01/14/25 1725   Tue Jan 14, 2025 1625 Patient had bladder scan performed bedside by myself which showed 1084 mL present in bladder.  Patient is here few days ago and has some crystallization of the tip of his catheter.  1 flush will be attempted and if this does not relieve obstruction patient will have Douglass switched. [CJ]   1639 The patient seen and examined with the nurse practitioner/physician assistant. I personally saw the patient and made/approved the management plan and take responsibility for the patient management.    History: 59-year-old male presents emergency department with Douglass catheter is not draining.  The patient presented to the " emergency department on January 10 with decreased urine output.  Patient had been discharged with functioning work Douglass catheter.  Today, patient has had decreased urine output and suprapubic discomfort.  Patient denies any fever, chills, nausea and vomiting, abdominal pain.  He is currently compliant with his meropenem IV antibiotic 3 times a day  Exam: Appears stated age male, suprapubic discomfort, clean dry intact dressing to left groin  MDM: Patient presents the emergency department with Douglass catheter not emptying.  The patient had a bladder scan performed that shows 1084.  Nursing staff was able to irrigate it and emptied.  Patient has follow-up with urology on Thursday.  Vitals not concerning for sepsis.  Patient has no symptoms concerning for failure of outpatient antibiotic    Chronic Medical Conditions Significantly Affecting Care: Post operative for left groin abscess, discharged on meropenem to be completed on February 11, heart failure, aphasia, CAD, bipolar disorder, insulin-dependent diabetic diabetes, acid reflux, hepatitis C,    External Records Reviewed: I reviewed recent and relevant outside records including: Patient discharge summary from January 7, 2025    Shay Terrell DO  Emergency Medicine [WJ]   1723 Reevaluated patient at this time.  Patient states that he feels much better after the Douglass has been flushed.  We discussed plan of care going forward.  He is in agreement with discharge.  Patient will be given referral to urology only emergency follow-up with primary care provider in 1 to 2 days or return here immediately with any worsening symptoms.  Patient is in agreement with plan of care of discharge at this time. [CJ]      ED Course User Index  [CJ] Malachi Kelly PA-C  [WJ] Fabian Terrell DO         Diagnoses as of 01/14/25 1725   Douglass catheter problem, subsequent encounter       Procedures   Procedures    Diagnosis     1. Douglass catheter problem, subsequent encounter        Disposition    Discharged     ED Prescriptions    None         Disclaimer: This note was dictated by speech recognition. Minor errors in transcription may be present. Please call if questions.       Malachi Kelly PA-C  01/14/25 0260

## 2025-01-14 NOTE — ED TRIAGE NOTES
Pt presents for a brown issue. He states that his urine was not draining into his brown and that he has now urinated on himself and down his leg. Had his brown replaced last Friday here in the ED. Today, he has had pain in the penis and feels like his bladder is very full. Leg bag emptied PTA and has urine in it.

## 2025-01-15 ENCOUNTER — HOME CARE VISIT (OUTPATIENT)
Dept: HOME HEALTH SERVICES | Facility: HOME HEALTH | Age: 60
End: 2025-01-15
Payer: COMMERCIAL

## 2025-01-15 ENCOUNTER — TELEPHONE (OUTPATIENT)
Dept: VASCULAR SURGERY | Facility: HOSPITAL | Age: 60
End: 2025-01-15
Payer: COMMERCIAL

## 2025-01-15 DIAGNOSIS — I73.9 PAD (PERIPHERAL ARTERY DISEASE) (CMS-HCC): Primary | ICD-10-CM

## 2025-01-15 DIAGNOSIS — I70.222 CRITICAL LIMB ISCHEMIA OF LEFT LOWER EXTREMITY (MULTI): ICD-10-CM

## 2025-01-15 PROCEDURE — RXMED WILLOW AMBULATORY MEDICATION CHARGE

## 2025-01-15 RX ORDER — OXYCODONE HYDROCHLORIDE 5 MG/1
5 TABLET ORAL EVERY 6 HOURS PRN
Qty: 15 TABLET | Refills: 0 | Status: ON HOLD | OUTPATIENT
Start: 2025-01-15 | End: 2025-01-22

## 2025-01-15 NOTE — DISCHARGE SUMMARY
"Discharge Diagnosis  Peripheral artery occlusion (CMS-[facility])    Issues Requiring Follow-Up  Left femoral wound    Test Results Pending At Discharge  Pending Labs       No current pending labs.            Hospital Course   Patient admitted for left incision drainage.  Subsequently placed on antibiotics with significant improvement  Discharged home with extend IV antibiotic therapy based on ID recommendations    Pertinent Physical Exam At Time of Discharge  Physical Exam  Physical exam    Constitutional: alert and in no acute distress verbal  Eyes: No erythema swelling or discharge noted  Neck: supple, symmetric, trachea midline, no masses noted  Cardiovascular: Carotid pulses 2+, no obvious bruit, no Jugular distension noted, no thrill, heart regular rate, lower extremity vascular exam intact, cap refill <2 sec  Pulmonary:  Bilateral breath sounds intact, clear with rales rhonchi or wheeze  Abdomen: soft non tender, no pulsatile masses noted, no rebound rigidity or guarding noted  Skin: intact warm no abnormal turgor  Psychiatric: alert without any obvious cognitive issues, oriented to person, place, and time  Left femoral region no erythema minimal drainage  Home Medications     Medication List      CONTINUE taking these medications     aspirin 81 mg chewable tablet   atorvastatin 80 mg tablet; Commonly known as: Lipitor; Take 1 tablet (80   mg) by mouth once daily at bedtime.   * pen needle, diabetic 32 gauge x 5/32\" needle; Commonly known as: BD   Ultra-Fine Jocy Pen Needle; Use to inject insulin up to 8 times per day.   * BD Ultra-Fine Micro Pen Needle 32 gauge x 1/4\" needle; Generic drug:   pen needle, diabetic; Use to inject 1-4 times daily as directed   * pen needle, diabetic 32 gauge x 5/32\" needle; Commonly known as: BD   Ultra-Fine Jocy Pen Needle; Pen Needles for Insulin.   carvedilol 6.25 mg tablet; Commonly known as: Coreg   Creon 36,000-114,000- 180,000 unit capsule,delayed release(DR/EC)   capsule; " Generic drug: pancrelipase (Lip-Prot-Amyl)   dapagliflozin propanediol 10 mg; Commonly known as: Farxiga; Take 1   tablet (10 mg) by mouth once daily with breakfast.   ELDERBERRY FRUIT ORAL   Entresto  mg tablet; Generic drug: sacubitriL-valsartan   ezetimibe 10 mg tablet; Commonly known as: Zetia   Flomax 0.4 mg 24 hr capsule; Generic drug: tamsulosin   FreeStyle En 2 [place] misc; Generic drug: flash glucose scanning   reader; USE TO CHECK SUGARS FOUR TIMES A DAY   FreeStyle En 2 Sensor kit; Generic drug: flash glucose sensor kit;   use as directed daily and change every 14 days   furosemide 40 mg tablet; Commonly known as: Lasix   gabapentin 400 mg capsule; Commonly known as: Neurontin; Take 1 capsule   (400 mg) by mouth 3 times a day.   GINGER ROOT EXTRACT ORAL   isosorbide mononitrate ER 60 mg 24 hr tablet; Commonly known as: Imdur;   Take 1 tablet (60 mg) by mouth once daily.   lancets misc; 1 each 4 times a day before meals.   Lantus Solostar U-100 Insulin 100 unit/mL (3 mL) pen; Generic drug:   insulin glargine; Inject 20 Units under the skin once daily at bedtime.   medical cannabis   nicotine 21 mg/24 hr patch; Commonly known as: Nicoderm CQ; APPLY 1   PATCH TO SKIN EVERY 24 HOURS AS DIRECTED   OXcarbazepine 150 mg tablet; Commonly known as: Trileptal   pantoprazole 40 mg EC tablet; Commonly known as: ProtoNix; TAKE 1 TABLET   BY MOUTH ONCE DAILY   ranolazine 500 mg 12 hr tablet; Commonly known as: Ranexa   ticagrelor 90 mg tablet; Commonly known as: Brilinta; Take 1 tablet (90   mg) by mouth 2 times a day.   tiZANidine 2 mg tablet; Commonly known as: Zanaflex; Take 1 tablet (2   mg) by mouth once daily as needed for muscle spasms.  * This list has 3 medication(s) that are the same as other medications   prescribed for you. Read the directions carefully, and ask your doctor or   other care provider to review them with you.     STOP taking these medications     FreeStyle Precision [place] Strips  strip; Generic drug: blood sugar   diagnostic   oxyCODONE-acetaminophen 5-325 mg tablet; Commonly known as: Percocet       Outpatient Follow-Up  Future Appointments   Date Time Provider Department Center   1/15/2025 To Be Determined Alejandra Baker RN [facility]   1/16/2025  9:00 AM MD Lance Lundberg [facility]   1/17/2025 To Be Determined Alejandra Baker RN [facility]   1/30/2025  2:45 PM Gorge Quintero MD [id number] [facility]   2/4/2025  4:15 PM Haylie Tate MD [id number] [facility]   3/3/2025 10:00 AM P[facility] VASC 7 [facility]   3/3/2025 11:00 AM [facility]ASC 8 [facility]   3/11/2025  2:00 PM Iban Peace DO [id number] [facility]   3/28/2025  2:15 PM MD Lance Lundberg [facility]       Iban Peace DO

## 2025-01-15 NOTE — TELEPHONE ENCOUNTER
Last  3/29/17. See problem list:  anxiety  Eliud Nielson, RN     Pt asking for percocet 5 mg refilled to  retail

## 2025-01-16 ENCOUNTER — APPOINTMENT (OUTPATIENT)
Dept: UROLOGY | Facility: CLINIC | Age: 60
End: 2025-01-16
Payer: COMMERCIAL

## 2025-01-16 ENCOUNTER — PHARMACY VISIT (OUTPATIENT)
Dept: PHARMACY | Facility: CLINIC | Age: 60
End: 2025-01-16
Payer: MEDICAID

## 2025-01-16 ENCOUNTER — HOME INFUSION (OUTPATIENT)
Dept: INFUSION THERAPY | Age: 60
End: 2025-01-16

## 2025-01-16 VITALS
WEIGHT: 195 LBS | HEART RATE: 101 BPM | BODY MASS INDEX: 27.3 KG/M2 | SYSTOLIC BLOOD PRESSURE: 153 MMHG | HEIGHT: 71 IN | DIASTOLIC BLOOD PRESSURE: 84 MMHG

## 2025-01-16 DIAGNOSIS — Z12.5 ENCOUNTER FOR SCREENING PROSTATE SPECIFIC ANTIGEN (PSA) MEASUREMENT: Primary | ICD-10-CM

## 2025-01-16 DIAGNOSIS — T83.9XXD FOLEY CATHETER PROBLEM, SUBSEQUENT ENCOUNTER: ICD-10-CM

## 2025-01-16 DIAGNOSIS — R33.9 URINARY RETENTION: ICD-10-CM

## 2025-01-16 DIAGNOSIS — N40.1 BENIGN PROSTATIC HYPERPLASIA WITH LOWER URINARY TRACT SYMPTOMS, SYMPTOM DETAILS UNSPECIFIED: ICD-10-CM

## 2025-01-16 PROCEDURE — 3008F BODY MASS INDEX DOCD: CPT | Performed by: UROLOGY

## 2025-01-16 PROCEDURE — 3079F DIAST BP 80-89 MM HG: CPT | Performed by: UROLOGY

## 2025-01-16 PROCEDURE — 3077F SYST BP >= 140 MM HG: CPT | Performed by: UROLOGY

## 2025-01-16 PROCEDURE — RXMED WILLOW AMBULATORY MEDICATION CHARGE

## 2025-01-16 PROCEDURE — 99213 OFFICE O/P EST LOW 20 MIN: CPT | Performed by: UROLOGY

## 2025-01-16 NOTE — PROGRESS NOTES
Chart Review: Patient ordered Meropenem 1g IV q8h through 2/11/25 for post op abscess post CABG    Visit with homecare RN scheduled for 1/17 for lab draw and PICC line dressing change    Patient seen in ED on 1/15 and Urology today for issues with Douglass     Telephone call with patient - agreed to delivery of doses of Meropenem in [facility] today   Send supplies to match as not at home to complete inventory.   No questions -    Dispense x12 doses of Meropenem in [facility] for cmpd and delivery on 1/16  Infusions 1/17 - 1/20    NF 1/20 (3,4)

## 2025-01-16 NOTE — PROGRESS NOTES
01/16/2025  Failed voiding trial again, ER visit and the Brown was reinserted    Exam: Brown catheter in place, urine concentrated yellow    Brown catheter was removed without difficulty    We discussed benign prostate hypertrophy repeated urinary retention, BPH workup versus continue Brown catheter versus voiding trial third time  All the questions were answered, the patient expressed understanding and agreed to the plan.    Impression  BPH  Postop retention  PSA screening     Plan  Increase Flomax 0.8 mg daily  Voiding trial today, ER if needed  Call back at 3:00 PM  Appointment in 1 week for PVR  Possible BPH workup and PVP greenlight laser prostatectomy in future    Chief Complaint   Patient presents with    Urinary Retention     Patient is here today for follow up from the ER.  He had a brown cath inserted.        Physical Exam     TODAYS LAB RESULTS:  === 12/30/24 ===    CT ABDOMEN PELVIS WO IV CONTRAST    - Impression -  1.  Bibasilar interstitial edema with enlarging, now moderate pleural  effusions.  2. Anasarca.  3. Decreased fluid overlying left femoral cutdown site. Subincisional  gas with adjacent reactive lymphadenopathy at this site persist.  4. Large colonic stool burden. No findings of bowel obstruction  otherwise.    MACRO:  None.    Signed by: Raudel Scott 1/4/2025 4:16 PM  Dictation workstation:   [id number]     ASSESSMENT&PLAN:      IMPRESSIONS:    12/27/2024  Hospital follow-up for urinary retention     Exam: Brown catheter in place, urine clear yellow     We discussed benign prostate hypertrophy, postop urinary retention, voiding trial  We discussed continue Flomax 0.4 mg daily  We discussed the PSA screening  All the questions were answered, the patient expressed understanding and agreed to the plan.     Impression  BPH  Postop retention  PSA screening     Plan  Continue Flomax 0.4 mg daily  Voiding trial today, ER if needed  PSA screening  Appointment in 3 months for PVR          Chief  Complaint   Patient presents with    Urinary Retention       Pt here for a hospital follow up for retention   Pt states Cath is coming losses and they are worried its coming out and is very painful when he walks          Physical Exam      TODAYS LAB RESULTS:  Contains abnormal data Urinalysis Microscopic  Order: [id number] - Reflex for Order [id number]   Status: Final result       Visible to patient: Yes (seen)    0 Result Notes            Component  Ref Range & Units 6 d ago 1 yr ago 5 yr ago   WBC, Urine  1-5, NONE /HPF 1-5 1 R 1 Abnormal  R   RBC, Urine  NONE, 1-2, 3-5 /HPF 3-5 3 R 6 Abnormal  R   Mucus, Urine  Reference range not established. /LPF FEW   1+ R   Hyaline Casts, Urine  NONE /LPF 4+ Abnormal        Resulting Agency [facility]                Specimen Collected: 12/21/24 10:39 Last Resulted: 12/21/24 13:06        Lab Flowsheet        Order Details        View Encounter        Lab and Collection Details        Routing        Result History     View All Conversations on this Encounter     R=Reference range differs from displayed range     Result Care Coordination       Patient Communication      Add Comments   Seen Back to Top       Contains abnormal data Urinalysis with Reflex Culture and Microscopic  Order: [id number] - Part of Panel Order [id number]   Status: Final result       Visible to patient: Yes (seen)    0 Result Notes              Component  Ref Range & Units 6 d ago  (12/21/24) 1 yr ago  (2/28/23) 5 yr ago  (12/10/19) 5 yr ago  (8/29/19)   Color, Urine  Light-Yellow, Yellow, Dark-Yellow Yellow Yellow R Yellow R STRAW R   Appearance, Urine  Clear Clear Clear R Clear R CLEAR R   Specific Gravity, Urine  1.005 - 1.035 1.021 1.030 1.018 1.013   pH, Urine  5.0, 5.5, 6.0, 6.5, 7.0, 7.5, 8.0 5.0 5.0 R 6.0 R 5.0 R   Protein, Urine  NEGATIVE, 10 (TRACE), 20 (TRACE) mg/dL 30 (1+) Abnormal  100(2+) Abnormal  R 100(2+) Abnormal  R NEGATIVE R   Glucose, Urine  Normal  mg/dL OVER (4+) Abnormal  >=500(3+) Abnormal  R Negative R >=500 (3+) Abnormal  R   Blood, Urine  NEGATIVE NEGATIVE Negative SMALL(1+) Abnormal  NEGATIVE   Ketones, Urine  NEGATIVE mg/dL NEGATIVE Negative Negative NEGATIVE   Bilirubin, Urine  NEGATIVE NEGATIVE Negative Negative NEGATIVE   Urobilinogen, Urine  Normal mg/dL Normal <2.0 R <2.0 R <2.0 R   Nitrite, Urine  NEGATIVE NEGATIVE Negative Negative NEGATIVE   Leukocyte Esterase, Urine  NEGATIVE NEGATIVE Negative Negative NEGATIVE   Resulting Agency [facility]                     ASSESSMENT&PLAN:        IMPRESSIONS:           12/20/2024  Patient did fine     Exam: Incision healed     First Semen specimen: no sperm     Plan:  Drop off a second semen specimen in a week             Chief Complaint   Patient presents with    Contraception       Patient is here today for 10 week follow up after vasectomy.  He has non complaints at this time.         Physical Exam      TODAYS LAB RESULTS:        ASSESSMENT&PLAN:        IMPRESSIONS:           10/11/2024  A scalpeless vasectomy was performed under local and patient tolerated it well.     the patient was prepped and draped in the usual sterile fashion.  While in the supine position, the left vas deferens was isolated and the skin and vas were anesthetized using 1% lidocaine.  A small incision was made in the skin using a scalpeless clamp.  The vas was delivered up into the wound and a segment was freed up and divided.  The proximal and distal ends were fulgurated. Fascial interposition stitch placed using 3-0 chromic   The ends of the vas were placed back into the scrotum and the skin was closed with all suture.  The right vas deferens was isolated and the skin and vas were anesthetized using 1% lidocaine.  A small incision was made in the skin using a scalpeless clamp.   the vas was delivered up into the wound and a segment was freed up and divided.  The proximal and distal  ends were fulgurated and tied with 3-0 Chromic suture.  The ends of the vas were placed back into the scrotum and the skin was closed with suture.  The patient tolerated the procedure well and was discharged to home. He was provided with post-vasectomy instructions. He was advised to rest for 24 hours and abstain from sexual intercourse for 1 week. He was advised for the continued need for contraception until he is documented as sterile with semen analysis.     Impression  Family planning  Status post vasectomy     Plan  Norco ×20  Keflex 500 mg twice daily ×7 days  Appointment with specimen in 10 weeks     I have personally reviewed the OARRS report for this patient. This report is scanned into the electronic medical record. I have considered the risk of abuse, dependence, addictions and diversion. I believe that it is clinically appropriate for this patient to be prescribed this medication             Chief Complaint   Patient presents with    elective sterilization       Patient is here today vasectomy.         Physical Exam      TODAYS LAB RESULTS:        ASSESSMENT&PLAN:        IMPRESSIONS:        12/23/2024  Assessment/Plan  Impression  BPH  Postop urinary retention, failed voiding trial x 2     Plan  Continue Douglass catheter  Outpatient voiding trial with Dr. Kulkarni in 1 week  Call if problem                  PSA  3/18/2021 0.3

## 2025-01-17 ENCOUNTER — LAB REQUISITION (OUTPATIENT)
Dept: LAB | Facility: HOSPITAL | Age: 60
End: 2025-01-17
Payer: COMMERCIAL

## 2025-01-17 ENCOUNTER — HOME INFUSION (OUTPATIENT)
Dept: INFUSION THERAPY | Age: 60
End: 2025-01-17

## 2025-01-17 ENCOUNTER — DOCUMENTATION (OUTPATIENT)
Dept: PHARMACY | Facility: CLINIC | Age: 60
End: 2025-01-17

## 2025-01-17 ENCOUNTER — HOME CARE VISIT (OUTPATIENT)
Dept: HOME HEALTH SERVICES | Facility: HOME HEALTH | Age: 60
End: 2025-01-17
Payer: COMMERCIAL

## 2025-01-17 VITALS
SYSTOLIC BLOOD PRESSURE: 119 MMHG | DIASTOLIC BLOOD PRESSURE: 65 MMHG | OXYGEN SATURATION: 99 % | TEMPERATURE: 97.1 F | RESPIRATION RATE: 16 BRPM | HEART RATE: 71 BPM

## 2025-01-17 DIAGNOSIS — T81.49XA INFECTION FOLLOWING A PROCEDURE, OTHER SURGICAL SITE, INITIAL ENCOUNTER: ICD-10-CM

## 2025-01-17 LAB
ALBUMIN SERPL BCP-MCNC: 3.3 G/DL (ref 3.4–5)
ALP SERPL-CCNC: 100 U/L (ref 33–120)
ALT SERPL W P-5'-P-CCNC: 12 U/L (ref 10–52)
AST SERPL W P-5'-P-CCNC: 12 U/L (ref 9–39)
BASOPHILS # BLD AUTO: 0.03 X10*3/UL (ref 0–0.1)
BASOPHILS NFR BLD AUTO: 0.8 %
BILIRUB DIRECT SERPL-MCNC: 0.1 MG/DL (ref 0–0.3)
BILIRUB SERPL-MCNC: 0.3 MG/DL (ref 0–1.2)
CREAT SERPL-MCNC: 1.04 MG/DL (ref 0.5–1.3)
EGFRCR SERPLBLD CKD-EPI 2021: 83 ML/MIN/1.73M*2
EOSINOPHIL # BLD AUTO: 0.14 X10*3/UL (ref 0–0.7)
EOSINOPHIL NFR BLD AUTO: 3.7 %
ERYTHROCYTE [DISTWIDTH] IN BLOOD BY AUTOMATED COUNT: 14.6 % (ref 11.5–14.5)
HCT VFR BLD AUTO: 28.7 % (ref 41–52)
HGB BLD-MCNC: 9.1 G/DL (ref 13.5–17.5)
IMM GRANULOCYTES # BLD AUTO: 0.01 X10*3/UL (ref 0–0.7)
IMM GRANULOCYTES NFR BLD AUTO: 0.3 % (ref 0–0.9)
LYMPHOCYTES # BLD AUTO: 1.28 X10*3/UL (ref 1.2–4.8)
LYMPHOCYTES NFR BLD AUTO: 33.7 %
MCH RBC QN AUTO: 29.6 PG (ref 26–34)
MCHC RBC AUTO-ENTMCNC: 31.7 G/DL (ref 32–36)
MCV RBC AUTO: 94 FL (ref 80–100)
MONOCYTES # BLD AUTO: 0.44 X10*3/UL (ref 0.1–1)
MONOCYTES NFR BLD AUTO: 11.6 %
NEUTROPHILS # BLD AUTO: 1.9 X10*3/UL (ref 1.2–7.7)
NEUTROPHILS NFR BLD AUTO: 49.9 %
NRBC BLD-RTO: 0 /100 WBCS (ref 0–0)
PLATELET # BLD AUTO: 252 X10*3/UL (ref 150–450)
PROT SERPL-MCNC: 6.3 G/DL (ref 6.4–8.2)
RBC # BLD AUTO: 3.07 X10*6/UL (ref 4.5–5.9)
WBC # BLD AUTO: 3.8 X10*3/UL (ref 4.4–11.3)

## 2025-01-17 PROCEDURE — G0299 HHS/HOSPICE OF RN EA 15 MIN: HCPCS

## 2025-01-17 PROCEDURE — 85025 COMPLETE CBC W/AUTO DIFF WBC: CPT

## 2025-01-17 PROCEDURE — 82565 ASSAY OF CREATININE: CPT

## 2025-01-17 PROCEDURE — 80076 HEPATIC FUNCTION PANEL: CPT

## 2025-01-17 ASSESSMENT — ENCOUNTER SYMPTOMS
PAIN: 1
PAIN LOCATION: GROIN
PERSON REPORTING PAIN: PATIENT
LIMITED RANGE OF MOTION: 1
MUSCLE WEAKNESS: 1
PAIN LOCATION - PAIN SEVERITY: 7/10
DYSURIA: 1
SUBJECTIVE PAIN PROGRESSION: UNCHANGED
LOWER EXTREMITY EDEMA: 1
CHANGE IN APPETITE: UNCHANGED
APPETITE LEVEL: GOOD

## 2025-01-17 ASSESSMENT — ACTIVITIES OF DAILY LIVING (ADL): ENTERING_EXITING_HOME: STAND BY ASSIST

## 2025-01-17 NOTE — PROGRESS NOTES
Dispense Meropenem in HP for cmpd and delivery on 1/20 and 1/24    Labs and PICC line dressing change today with [facility] RN    Dispense x12 doses of Meropenem in HP for cpmd and delivery on 1/20  Infusions 1/21 - 1/24    Dispense x9 doses of Meropenem in HP for cmpd and delivery on 1/24  Infusions 1/25 - 1/27    NF 1/27 4,3 - check labs

## 2025-01-19 ENCOUNTER — APPOINTMENT (OUTPATIENT)
Dept: RADIOLOGY | Facility: HOSPITAL | Age: 60
End: 2025-01-19
Payer: COMMERCIAL

## 2025-01-19 ENCOUNTER — APPOINTMENT (OUTPATIENT)
Dept: CARDIOLOGY | Facility: HOSPITAL | Age: 60
End: 2025-01-19
Payer: COMMERCIAL

## 2025-01-19 ENCOUNTER — HOSPITAL ENCOUNTER (INPATIENT)
Facility: HOSPITAL | Age: 60
End: 2025-01-19
Attending: STUDENT IN AN ORGANIZED HEALTH CARE EDUCATION/TRAINING PROGRAM | Admitting: INTERNAL MEDICINE
Payer: COMMERCIAL

## 2025-01-19 ENCOUNTER — HOME CARE VISIT (OUTPATIENT)
Dept: HOME HEALTH SERVICES | Facility: HOME HEALTH | Age: 60
End: 2025-01-19
Payer: COMMERCIAL

## 2025-01-19 VITALS
HEART RATE: 77 BPM | DIASTOLIC BLOOD PRESSURE: 72 MMHG | HEIGHT: 68 IN | SYSTOLIC BLOOD PRESSURE: 119 MMHG | BODY MASS INDEX: 32.61 KG/M2 | WEIGHT: 215.17 LBS | RESPIRATION RATE: 18 BRPM | OXYGEN SATURATION: 91 % | TEMPERATURE: 98 F

## 2025-01-19 DIAGNOSIS — L02.214 ABSCESS OF LEFT GROIN: ICD-10-CM

## 2025-01-19 DIAGNOSIS — I70.222 CRITICAL LIMB ISCHEMIA OF LEFT LOWER EXTREMITY (MULTI): ICD-10-CM

## 2025-01-19 DIAGNOSIS — I73.9 PAD (PERIPHERAL ARTERY DISEASE) (CMS-HCC): ICD-10-CM

## 2025-01-19 DIAGNOSIS — I25.728 ATHEROSCLEROSIS OF AUTOLOGOUS ARTERY CORONARY ARTERY BYPASS GRAFT(S) WITH OTHER FORMS OF ANGINA PECTORIS: ICD-10-CM

## 2025-01-19 DIAGNOSIS — I50.21 ACUTE SYSTOLIC HEART FAILURE: ICD-10-CM

## 2025-01-19 DIAGNOSIS — I77.9: ICD-10-CM

## 2025-01-19 DIAGNOSIS — J81.0 FLASH PULMONARY EDEMA: Primary | ICD-10-CM

## 2025-01-19 DIAGNOSIS — B96.89 BACTERIAL SKIN INFECTION: ICD-10-CM

## 2025-01-19 DIAGNOSIS — L08.9 BACTERIAL SKIN INFECTION: ICD-10-CM

## 2025-01-19 DIAGNOSIS — T81.49XA POSTOPERATIVE ABSCESS: ICD-10-CM

## 2025-01-19 DIAGNOSIS — J96.01 ACUTE RESPIRATORY FAILURE WITH HYPOXIA AND HYPERCAPNIA (MULTI): ICD-10-CM

## 2025-01-19 DIAGNOSIS — J96.02 ACUTE RESPIRATORY FAILURE WITH HYPOXIA AND HYPERCAPNIA (MULTI): ICD-10-CM

## 2025-01-19 DIAGNOSIS — I50.9 ACUTE HEART FAILURE, UNSPECIFIED HEART FAILURE TYPE: ICD-10-CM

## 2025-01-19 DIAGNOSIS — R09.89 PULMONARY CONGESTION: ICD-10-CM

## 2025-01-19 DIAGNOSIS — I50.23 ACUTE ON CHRONIC SYSTOLIC (CONGESTIVE) HEART FAILURE: ICD-10-CM

## 2025-01-19 DIAGNOSIS — Z95.5 S/P CORONARY ARTERY STENT PLACEMENT: ICD-10-CM

## 2025-01-19 DIAGNOSIS — R52 PAIN: ICD-10-CM

## 2025-01-19 DIAGNOSIS — I24.89 DEMAND ISCHEMIA (MULTI): ICD-10-CM

## 2025-01-19 DIAGNOSIS — Z98.61 S/P PTCA (PERCUTANEOUS TRANSLUMINAL CORONARY ANGIOPLASTY): ICD-10-CM

## 2025-01-19 DIAGNOSIS — E61.1 IRON DEFICIENCY: ICD-10-CM

## 2025-01-19 DIAGNOSIS — K59.00 CONSTIPATION, UNSPECIFIED CONSTIPATION TYPE: ICD-10-CM

## 2025-01-19 DIAGNOSIS — E53.8 VITAMIN B 12 DEFICIENCY: ICD-10-CM

## 2025-01-19 DIAGNOSIS — Z95.1 S/P CABG X 5: ICD-10-CM

## 2025-01-19 DIAGNOSIS — R06.00 DYSPNEA: ICD-10-CM

## 2025-01-19 LAB
ALBUMIN SERPL BCP-MCNC: 3.8 G/DL (ref 3.4–5)
ALP SERPL-CCNC: 135 U/L (ref 33–120)
ALT SERPL W P-5'-P-CCNC: 14 U/L (ref 10–52)
ANION GAP BLDV CALCULATED.4IONS-SCNC: 14 MMOL/L (ref 10–25)
ANION GAP BLDV CALCULATED.4IONS-SCNC: 8 MMOL/L (ref 10–25)
ANION GAP SERPL CALC-SCNC: 12 MMOL/L (ref 10–20)
AST SERPL W P-5'-P-CCNC: 17 U/L (ref 9–39)
BASE EXCESS BLDV CALC-SCNC: -0.9 MMOL/L (ref -2–3)
BASE EXCESS BLDV CALC-SCNC: -4.7 MMOL/L (ref -2–3)
BASOPHILS # BLD MANUAL: 0.18 X10*3/UL (ref 0–0.1)
BASOPHILS NFR BLD MANUAL: 1 %
BILIRUB SERPL-MCNC: 0.5 MG/DL (ref 0–1.2)
BNP SERPL-MCNC: 1231 PG/ML (ref 0–99)
BODY TEMPERATURE: 37 DEGREES CELSIUS
BODY TEMPERATURE: 37 DEGREES CELSIUS
BUN SERPL-MCNC: 20 MG/DL (ref 6–23)
CA-I BLDV-SCNC: 1.17 MMOL/L (ref 1.1–1.33)
CA-I BLDV-SCNC: 1.25 MMOL/L (ref 1.1–1.33)
CALCIUM SERPL-MCNC: 8.9 MG/DL (ref 8.6–10.3)
CARDIAC TROPONIN I PNL SERPL HS: 12 NG/L (ref 0–20)
CARDIAC TROPONIN I PNL SERPL HS: 20 NG/L (ref 0–20)
CHLORIDE BLDV-SCNC: 105 MMOL/L (ref 98–107)
CHLORIDE BLDV-SCNC: 105 MMOL/L (ref 98–107)
CHLORIDE SERPL-SCNC: 104 MMOL/L (ref 98–107)
CO2 SERPL-SCNC: 24 MMOL/L (ref 21–32)
CREAT SERPL-MCNC: 1.3 MG/DL (ref 0.5–1.3)
EGFRCR SERPLBLD CKD-EPI 2021: 64 ML/MIN/1.73M*2
EOSINOPHIL # BLD MANUAL: 0.18 X10*3/UL (ref 0–0.7)
EOSINOPHIL NFR BLD MANUAL: 1 %
ERYTHROCYTE [DISTWIDTH] IN BLOOD BY AUTOMATED COUNT: 14.7 % (ref 11.5–14.5)
FLUAV RNA RESP QL NAA+PROBE: NOT DETECTED
FLUBV RNA RESP QL NAA+PROBE: NOT DETECTED
GLUCOSE BLD MANUAL STRIP-MCNC: 134 MG/DL (ref 74–99)
GLUCOSE BLD MANUAL STRIP-MCNC: 148 MG/DL (ref 74–99)
GLUCOSE BLD MANUAL STRIP-MCNC: 210 MG/DL (ref 74–99)
GLUCOSE BLDV-MCNC: 323 MG/DL (ref 74–99)
GLUCOSE BLDV-MCNC: 412 MG/DL (ref 74–99)
GLUCOSE SERPL-MCNC: 284 MG/DL (ref 74–99)
HCO3 BLDV-SCNC: 24.2 MMOL/L (ref 22–26)
HCO3 BLDV-SCNC: 25.3 MMOL/L (ref 22–26)
HCT VFR BLD AUTO: 36.7 % (ref 41–52)
HCT VFR BLD EST: 31 % (ref 41–52)
HCT VFR BLD EST: 37 % (ref 41–52)
HGB BLD-MCNC: 11.7 G/DL (ref 13.5–17.5)
HGB BLDV-MCNC: 10.2 G/DL (ref 13.5–17.5)
HGB BLDV-MCNC: 12.4 G/DL (ref 13.5–17.5)
IMM GRANULOCYTES # BLD AUTO: 0.09 X10*3/UL (ref 0–0.7)
IMM GRANULOCYTES NFR BLD AUTO: 0.5 % (ref 0–0.9)
INHALED O2 CONCENTRATION: 100 %
INHALED O2 CONCENTRATION: 70 %
LACTATE BLDV-SCNC: 1.6 MMOL/L (ref 0.4–2)
LACTATE BLDV-SCNC: 3.9 MMOL/L (ref 0.4–2)
LYMPHOCYTES # BLD MANUAL: 6.22 X10*3/UL (ref 1.2–4.8)
LYMPHOCYTES NFR BLD MANUAL: 34 %
MCH RBC QN AUTO: 29.6 PG (ref 26–34)
MCHC RBC AUTO-ENTMCNC: 31.9 G/DL (ref 32–36)
MCV RBC AUTO: 93 FL (ref 80–100)
MONOCYTES # BLD MANUAL: 1.1 X10*3/UL (ref 0.1–1)
MONOCYTES NFR BLD MANUAL: 6 %
NEUTS SEG # BLD MANUAL: 10.61 X10*3/UL (ref 1.2–7)
NEUTS SEG NFR BLD MANUAL: 58 %
NRBC BLD-RTO: 0 /100 WBCS (ref 0–0)
OXYHGB MFR BLDV: 65.6 % (ref 45–75)
OXYHGB MFR BLDV: 75.9 % (ref 45–75)
PCO2 BLDV: 48 MM HG (ref 41–51)
PCO2 BLDV: 62 MM HG (ref 41–51)
PH BLDV: 7.2 PH (ref 7.33–7.43)
PH BLDV: 7.33 PH (ref 7.33–7.43)
PLATELET # BLD AUTO: 389 X10*3/UL (ref 150–450)
PO2 BLDV: 45 MM HG (ref 35–45)
PO2 BLDV: 55 MM HG (ref 35–45)
POTASSIUM BLDV-SCNC: 5.7 MMOL/L (ref 3.5–5.3)
POTASSIUM BLDV-SCNC: 5.8 MMOL/L (ref 3.5–5.3)
POTASSIUM SERPL-SCNC: 5.3 MMOL/L (ref 3.5–5.3)
PROT SERPL-MCNC: 7.7 G/DL (ref 6.4–8.2)
RBC # BLD AUTO: 3.95 X10*6/UL (ref 4.5–5.9)
RBC MORPH BLD: ABNORMAL
RSV RNA RESP QL NAA+PROBE: NOT DETECTED
SAO2 % BLDV: 68 % (ref 45–75)
SAO2 % BLDV: 79 % (ref 45–75)
SARS-COV-2 RNA RESP QL NAA+PROBE: NOT DETECTED
SODIUM BLDV-SCNC: 133 MMOL/L (ref 136–145)
SODIUM BLDV-SCNC: 137 MMOL/L (ref 136–145)
SODIUM SERPL-SCNC: 135 MMOL/L (ref 136–145)
TOTAL CELLS COUNTED BLD: 100
WBC # BLD AUTO: 18.3 X10*3/UL (ref 4.4–11.3)

## 2025-01-19 PROCEDURE — 84484 ASSAY OF TROPONIN QUANT: CPT | Performed by: STUDENT IN AN ORGANIZED HEALTH CARE EDUCATION/TRAINING PROGRAM

## 2025-01-19 PROCEDURE — 84132 ASSAY OF SERUM POTASSIUM: CPT | Performed by: STUDENT IN AN ORGANIZED HEALTH CARE EDUCATION/TRAINING PROGRAM

## 2025-01-19 PROCEDURE — 027034Z DILATION OF CORONARY ARTERY, ONE ARTERY WITH DRUG-ELUTING INTRALUMINAL DEVICE, PERCUTANEOUS APPROACH: ICD-10-PCS | Performed by: INTERNAL MEDICINE

## 2025-01-19 PROCEDURE — 96366 THER/PROPH/DIAG IV INF ADDON: CPT

## 2025-01-19 PROCEDURE — 2500000002 HC RX 250 W HCPCS SELF ADMINISTERED DRUGS (ALT 637 FOR MEDICARE OP, ALT 636 FOR OP/ED): Performed by: NURSE PRACTITIONER

## 2025-01-19 PROCEDURE — 99291 CRITICAL CARE FIRST HOUR: CPT | Performed by: STUDENT IN AN ORGANIZED HEALTH CARE EDUCATION/TRAINING PROGRAM

## 2025-01-19 PROCEDURE — 71045 X-RAY EXAM CHEST 1 VIEW: CPT | Mod: FOREIGN READ | Performed by: RADIOLOGY

## 2025-01-19 PROCEDURE — 2500000001 HC RX 250 WO HCPCS SELF ADMINISTERED DRUGS (ALT 637 FOR MEDICARE OP): Performed by: STUDENT IN AN ORGANIZED HEALTH CARE EDUCATION/TRAINING PROGRAM

## 2025-01-19 PROCEDURE — 4A023N8 MEASUREMENT OF CARDIAC SAMPLING AND PRESSURE, BILATERAL, PERCUTANEOUS APPROACH: ICD-10-PCS | Performed by: INTERNAL MEDICINE

## 2025-01-19 PROCEDURE — 2500000004 HC RX 250 GENERAL PHARMACY W/ HCPCS (ALT 636 FOR OP/ED)

## 2025-01-19 PROCEDURE — 2500000001 HC RX 250 WO HCPCS SELF ADMINISTERED DRUGS (ALT 637 FOR MEDICARE OP): Performed by: NURSE PRACTITIONER

## 2025-01-19 PROCEDURE — 83880 ASSAY OF NATRIURETIC PEPTIDE: CPT | Performed by: STUDENT IN AN ORGANIZED HEALTH CARE EDUCATION/TRAINING PROGRAM

## 2025-01-19 PROCEDURE — 5A09357 ASSISTANCE WITH RESPIRATORY VENTILATION, LESS THAN 24 CONSECUTIVE HOURS, CONTINUOUS POSITIVE AIRWAY PRESSURE: ICD-10-PCS | Performed by: INTERNAL MEDICINE

## 2025-01-19 PROCEDURE — 71045 X-RAY EXAM CHEST 1 VIEW: CPT

## 2025-01-19 PROCEDURE — 96365 THER/PROPH/DIAG IV INF INIT: CPT

## 2025-01-19 PROCEDURE — B2111ZZ FLUOROSCOPY OF MULTIPLE CORONARY ARTERIES USING LOW OSMOLAR CONTRAST: ICD-10-PCS | Performed by: INTERNAL MEDICINE

## 2025-01-19 PROCEDURE — 96375 TX/PRO/DX INJ NEW DRUG ADDON: CPT

## 2025-01-19 PROCEDURE — 2060000001 HC INTERMEDIATE ICU ROOM DAILY

## 2025-01-19 PROCEDURE — 93005 ELECTROCARDIOGRAM TRACING: CPT

## 2025-01-19 PROCEDURE — 82947 ASSAY GLUCOSE BLOOD QUANT: CPT

## 2025-01-19 PROCEDURE — 2500000004 HC RX 250 GENERAL PHARMACY W/ HCPCS (ALT 636 FOR OP/ED): Performed by: STUDENT IN AN ORGANIZED HEALTH CARE EDUCATION/TRAINING PROGRAM

## 2025-01-19 PROCEDURE — 99222 1ST HOSP IP/OBS MODERATE 55: CPT | Performed by: NURSE PRACTITIONER

## 2025-01-19 PROCEDURE — B2131ZZ FLUOROSCOPY OF MULTIPLE CORONARY ARTERY BYPASS GRAFTS USING LOW OSMOLAR CONTRAST: ICD-10-PCS | Performed by: INTERNAL MEDICINE

## 2025-01-19 PROCEDURE — 87636 SARSCOV2 & INF A&B AMP PRB: CPT | Performed by: STUDENT IN AN ORGANIZED HEALTH CARE EDUCATION/TRAINING PROGRAM

## 2025-01-19 PROCEDURE — 96376 TX/PRO/DX INJ SAME DRUG ADON: CPT

## 2025-01-19 PROCEDURE — 2500000005 HC RX 250 GENERAL PHARMACY W/O HCPCS: Performed by: STUDENT IN AN ORGANIZED HEALTH CARE EDUCATION/TRAINING PROGRAM

## 2025-01-19 PROCEDURE — 94660 CPAP INITIATION&MGMT: CPT

## 2025-01-19 PROCEDURE — 85027 COMPLETE CBC AUTOMATED: CPT | Performed by: STUDENT IN AN ORGANIZED HEALTH CARE EDUCATION/TRAINING PROGRAM

## 2025-01-19 PROCEDURE — S4991 NICOTINE PATCH NONLEGEND: HCPCS | Performed by: NURSE PRACTITIONER

## 2025-01-19 PROCEDURE — 2500000004 HC RX 250 GENERAL PHARMACY W/ HCPCS (ALT 636 FOR OP/ED): Performed by: NURSE PRACTITIONER

## 2025-01-19 PROCEDURE — 85007 BL SMEAR W/DIFF WBC COUNT: CPT | Performed by: STUDENT IN AN ORGANIZED HEALTH CARE EDUCATION/TRAINING PROGRAM

## 2025-01-19 RX ORDER — OXYCODONE HYDROCHLORIDE 5 MG/1
5 TABLET ORAL ONCE
Status: COMPLETED | OUTPATIENT
Start: 2025-01-19 | End: 2025-01-19

## 2025-01-19 RX ORDER — NAPROXEN SODIUM 220 MG/1
81 TABLET, FILM COATED ORAL DAILY
Status: DISCONTINUED | OUTPATIENT
Start: 2025-01-19 | End: 2025-01-24 | Stop reason: HOSPADM

## 2025-01-19 RX ORDER — PANTOPRAZOLE SODIUM 40 MG/1
40 TABLET, DELAYED RELEASE ORAL DAILY
Status: DISCONTINUED | OUTPATIENT
Start: 2025-01-19 | End: 2025-01-24 | Stop reason: HOSPADM

## 2025-01-19 RX ORDER — TAMSULOSIN HYDROCHLORIDE 0.4 MG/1
0.4 CAPSULE ORAL DAILY
Status: DISCONTINUED | OUTPATIENT
Start: 2025-01-19 | End: 2025-01-24 | Stop reason: HOSPADM

## 2025-01-19 RX ORDER — EZETIMIBE 10 MG/1
10 TABLET ORAL
Status: DISCONTINUED | OUTPATIENT
Start: 2025-01-19 | End: 2025-01-24 | Stop reason: HOSPADM

## 2025-01-19 RX ORDER — OXCARBAZEPINE 150 MG/1
450 TABLET, FILM COATED ORAL 2 TIMES DAILY
Status: DISCONTINUED | OUTPATIENT
Start: 2025-01-19 | End: 2025-01-24 | Stop reason: HOSPADM

## 2025-01-19 RX ORDER — FUROSEMIDE 10 MG/ML
40 INJECTION INTRAMUSCULAR; INTRAVENOUS ONCE
Status: COMPLETED | OUTPATIENT
Start: 2025-01-19 | End: 2025-01-19

## 2025-01-19 RX ORDER — NITROGLYCERIN 20 MG/100ML
INJECTION INTRAVENOUS
Status: COMPLETED
Start: 2025-01-19 | End: 2025-01-19

## 2025-01-19 RX ORDER — ISOSORBIDE MONONITRATE 60 MG/1
60 TABLET, EXTENDED RELEASE ORAL DAILY
Status: DISCONTINUED | OUTPATIENT
Start: 2025-01-19 | End: 2025-01-24 | Stop reason: HOSPADM

## 2025-01-19 RX ORDER — MORPHINE SULFATE 2 MG/ML
2 INJECTION, SOLUTION INTRAMUSCULAR; INTRAVENOUS EVERY 4 HOURS PRN
Status: DISCONTINUED | OUTPATIENT
Start: 2025-01-19 | End: 2025-01-24 | Stop reason: HOSPADM

## 2025-01-19 RX ORDER — DAPAGLIFLOZIN 5 MG/1
10 TABLET, FILM COATED ORAL
Status: DISCONTINUED | OUTPATIENT
Start: 2025-01-20 | End: 2025-01-24 | Stop reason: HOSPADM

## 2025-01-19 RX ORDER — ACETAMINOPHEN 325 MG/1
650 TABLET ORAL EVERY 4 HOURS PRN
Status: DISCONTINUED | OUTPATIENT
Start: 2025-01-19 | End: 2025-01-24 | Stop reason: HOSPADM

## 2025-01-19 RX ORDER — INSULIN GLARGINE 100 [IU]/ML
20 INJECTION, SOLUTION SUBCUTANEOUS NIGHTLY
Status: DISCONTINUED | OUTPATIENT
Start: 2025-01-19 | End: 2025-01-22

## 2025-01-19 RX ORDER — NITROGLYCERIN 20 MG/100ML
5-1000 INJECTION INTRAVENOUS CONTINUOUS
Status: DISCONTINUED | OUTPATIENT
Start: 2025-01-19 | End: 2025-01-23

## 2025-01-19 RX ORDER — NITROGLYCERIN 20 MG/100ML
400 INJECTION INTRAVENOUS ONCE
Status: COMPLETED | OUTPATIENT
Start: 2025-01-19 | End: 2025-01-19

## 2025-01-19 RX ORDER — DEXTROSE 50 % IN WATER (D50W) INTRAVENOUS SYRINGE
12.5
Status: DISCONTINUED | OUTPATIENT
Start: 2025-01-19 | End: 2025-01-24 | Stop reason: HOSPADM

## 2025-01-19 RX ORDER — CARVEDILOL 6.25 MG/1
6.25 TABLET ORAL 2 TIMES DAILY
Status: DISCONTINUED | OUTPATIENT
Start: 2025-01-19 | End: 2025-01-20

## 2025-01-19 RX ORDER — IBUPROFEN 200 MG
1 TABLET ORAL DAILY
Status: DISCONTINUED | OUTPATIENT
Start: 2025-01-19 | End: 2025-01-24 | Stop reason: HOSPADM

## 2025-01-19 RX ORDER — GABAPENTIN 400 MG/1
400 CAPSULE ORAL 3 TIMES DAILY
Status: DISCONTINUED | OUTPATIENT
Start: 2025-01-19 | End: 2025-01-24 | Stop reason: HOSPADM

## 2025-01-19 RX ORDER — MEROPENEM 2 G/1
2 INJECTION, POWDER, FOR SOLUTION INTRAVENOUS EVERY 8 HOURS
Status: DISCONTINUED | OUTPATIENT
Start: 2025-01-19 | End: 2025-01-19

## 2025-01-19 RX ORDER — SACUBITRIL AND VALSARTAN 97; 103 MG/1; MG/1
1 TABLET, FILM COATED ORAL 2 TIMES DAILY
Status: DISCONTINUED | OUTPATIENT
Start: 2025-01-19 | End: 2025-01-24 | Stop reason: HOSPADM

## 2025-01-19 RX ORDER — DEXTROSE 50 % IN WATER (D50W) INTRAVENOUS SYRINGE
25
Status: DISCONTINUED | OUTPATIENT
Start: 2025-01-19 | End: 2025-01-24 | Stop reason: HOSPADM

## 2025-01-19 RX ORDER — SENNOSIDES 8.6 MG/1
2 TABLET ORAL 2 TIMES DAILY
Status: DISCONTINUED | OUTPATIENT
Start: 2025-01-19 | End: 2025-01-24 | Stop reason: HOSPADM

## 2025-01-19 RX ORDER — ATORVASTATIN CALCIUM 40 MG/1
80 TABLET, FILM COATED ORAL NIGHTLY
Status: DISCONTINUED | OUTPATIENT
Start: 2025-01-19 | End: 2025-01-24 | Stop reason: HOSPADM

## 2025-01-19 RX ORDER — INSULIN LISPRO 100 [IU]/ML
0-5 INJECTION, SOLUTION INTRAVENOUS; SUBCUTANEOUS
Status: DISCONTINUED | OUTPATIENT
Start: 2025-01-19 | End: 2025-01-24 | Stop reason: HOSPADM

## 2025-01-19 RX ORDER — MORPHINE SULFATE 2 MG/ML
1 INJECTION, SOLUTION INTRAMUSCULAR; INTRAVENOUS EVERY 4 HOURS PRN
Status: DISCONTINUED | OUTPATIENT
Start: 2025-01-19 | End: 2025-01-24 | Stop reason: HOSPADM

## 2025-01-19 RX ORDER — RANOLAZINE 500 MG/1
500 TABLET, EXTENDED RELEASE ORAL 2 TIMES DAILY
Status: DISCONTINUED | OUTPATIENT
Start: 2025-01-19 | End: 2025-01-24 | Stop reason: HOSPADM

## 2025-01-19 RX ORDER — HYDRALAZINE HYDROCHLORIDE 50 MG/1
50 TABLET, FILM COATED ORAL 3 TIMES DAILY
COMMUNITY

## 2025-01-19 RX ORDER — FUROSEMIDE 10 MG/ML
40 INJECTION INTRAMUSCULAR; INTRAVENOUS 2 TIMES DAILY
Status: DISCONTINUED | OUTPATIENT
Start: 2025-01-19 | End: 2025-01-24

## 2025-01-19 RX ADMIN — PANTOPRAZOLE SODIUM 40 MG: 40 TABLET, DELAYED RELEASE ORAL at 13:03

## 2025-01-19 RX ADMIN — TICAGRELOR 90 MG: 90 TABLET ORAL at 21:31

## 2025-01-19 RX ADMIN — GABAPENTIN 400 MG: 400 CAPSULE ORAL at 16:32

## 2025-01-19 RX ADMIN — ASPIRIN 81 MG CHEWABLE TABLET 81 MG: 81 TABLET CHEWABLE at 13:04

## 2025-01-19 RX ADMIN — MORPHINE SULFATE 2 MG: 2 INJECTION, SOLUTION INTRAMUSCULAR; INTRAVENOUS at 16:40

## 2025-01-19 RX ADMIN — OXCARBAZEPINE 450 MG: 150 TABLET, FILM COATED ORAL at 13:05

## 2025-01-19 RX ADMIN — APIXABAN 5 MG: 5 TABLET, FILM COATED ORAL at 13:04

## 2025-01-19 RX ADMIN — APIXABAN 5 MG: 5 TABLET, FILM COATED ORAL at 21:29

## 2025-01-19 RX ADMIN — NITROGLYCERIN 400 MCG: 20 INJECTION INTRAVENOUS at 09:20

## 2025-01-19 RX ADMIN — OXCARBAZEPINE 450 MG: 150 TABLET, FILM COATED ORAL at 21:29

## 2025-01-19 RX ADMIN — RANOLAZINE 500 MG: 500 TABLET, FILM COATED, EXTENDED RELEASE ORAL at 21:30

## 2025-01-19 RX ADMIN — MEROPENEM 2 G: 2 INJECTION, POWDER, FOR SOLUTION INTRAVENOUS at 23:51

## 2025-01-19 RX ADMIN — Medication 3 L/MIN: at 13:48

## 2025-01-19 RX ADMIN — FUROSEMIDE 40 MG: 10 INJECTION, SOLUTION INTRAMUSCULAR; INTRAVENOUS at 21:29

## 2025-01-19 RX ADMIN — FUROSEMIDE 40 MG: 10 INJECTION, SOLUTION INTRAMUSCULAR; INTRAVENOUS at 10:56

## 2025-01-19 RX ADMIN — ISOSORBIDE MONONITRATE 60 MG: 60 TABLET, EXTENDED RELEASE ORAL at 13:03

## 2025-01-19 RX ADMIN — Medication 3 L/MIN: at 11:04

## 2025-01-19 RX ADMIN — NICOTINE 1 PATCH: 21 PATCH, EXTENDED RELEASE TRANSDERMAL at 13:05

## 2025-01-19 RX ADMIN — SACUBITRIL AND VALSARTAN 1 TABLET: 97; 103 TABLET, FILM COATED ORAL at 13:04

## 2025-01-19 RX ADMIN — GABAPENTIN 400 MG: 400 CAPSULE ORAL at 21:30

## 2025-01-19 RX ADMIN — OXYCODONE 5 MG: 5 TABLET ORAL at 11:36

## 2025-01-19 RX ADMIN — TICAGRELOR 90 MG: 90 TABLET ORAL at 13:03

## 2025-01-19 RX ADMIN — INSULIN GLARGINE 20 UNITS: 100 INJECTION, SOLUTION SUBCUTANEOUS at 21:32

## 2025-01-19 RX ADMIN — RANOLAZINE 500 MG: 500 TABLET, FILM COATED, EXTENDED RELEASE ORAL at 13:04

## 2025-01-19 RX ADMIN — NITROGLYCERIN 41 MCG/MIN: 20 INJECTION INTRAVENOUS at 10:57

## 2025-01-19 RX ADMIN — CARVEDILOL 6.25 MG: 6.25 TABLET, FILM COATED ORAL at 13:04

## 2025-01-19 RX ADMIN — MORPHINE SULFATE 2 MG: 2 INJECTION, SOLUTION INTRAMUSCULAR; INTRAVENOUS at 13:02

## 2025-01-19 RX ADMIN — Medication 3 L/MIN: at 21:13

## 2025-01-19 RX ADMIN — NITROGLYCERIN 166.67 MCG/MIN: 20 INJECTION INTRAVENOUS at 09:20

## 2025-01-19 RX ADMIN — EZETIMIBE 10 MG: 10 TABLET ORAL at 13:04

## 2025-01-19 RX ADMIN — TAMSULOSIN HYDROCHLORIDE 0.4 MG: 0.4 CAPSULE ORAL at 13:03

## 2025-01-19 RX ADMIN — ATORVASTATIN CALCIUM 80 MG: 40 TABLET, FILM COATED ORAL at 21:29

## 2025-01-19 RX ADMIN — SACUBITRIL AND VALSARTAN 1 TABLET: 97; 103 TABLET, FILM COATED ORAL at 23:51

## 2025-01-19 RX ADMIN — MORPHINE SULFATE 2 MG: 2 INJECTION, SOLUTION INTRAMUSCULAR; INTRAVENOUS at 21:31

## 2025-01-19 RX ADMIN — INSULIN LISPRO 2 UNITS: 100 INJECTION, SOLUTION INTRAVENOUS; SUBCUTANEOUS at 13:49

## 2025-01-19 RX ADMIN — CARVEDILOL 6.25 MG: 6.25 TABLET, FILM COATED ORAL at 21:29

## 2025-01-19 RX ADMIN — MEROPENEM 2 G: 2 INJECTION, POWDER, FOR SOLUTION INTRAVENOUS at 12:27

## 2025-01-19 RX ADMIN — Medication 70 PERCENT: at 09:17

## 2025-01-19 SDOH — SOCIAL STABILITY: SOCIAL INSECURITY: WITHIN THE LAST YEAR, HAVE YOU BEEN HUMILIATED OR EMOTIONALLY ABUSED IN OTHER WAYS BY YOUR PARTNER OR EX-PARTNER?: NO

## 2025-01-19 SDOH — SOCIAL STABILITY: SOCIAL INSECURITY: WITHIN THE LAST YEAR, HAVE YOU BEEN AFRAID OF YOUR PARTNER OR EX-PARTNER?: NO

## 2025-01-19 SDOH — ECONOMIC STABILITY: FOOD INSECURITY: WITHIN THE PAST 12 MONTHS, YOU WORRIED THAT YOUR FOOD WOULD RUN OUT BEFORE YOU GOT THE MONEY TO BUY MORE.: NEVER TRUE

## 2025-01-19 SDOH — HEALTH STABILITY: MENTAL HEALTH: HOW OFTEN DO YOU HAVE A DRINK CONTAINING ALCOHOL?: NEVER

## 2025-01-19 SDOH — ECONOMIC STABILITY: INCOME INSECURITY: IN THE PAST 12 MONTHS HAS THE ELECTRIC, GAS, OIL, OR WATER COMPANY THREATENED TO SHUT OFF SERVICES IN YOUR HOME?: NO

## 2025-01-19 SDOH — ECONOMIC STABILITY: FOOD INSECURITY: WITHIN THE PAST 12 MONTHS, THE FOOD YOU BOUGHT JUST DIDN'T LAST AND YOU DIDN'T HAVE MONEY TO GET MORE.: NEVER TRUE

## 2025-01-19 SDOH — HEALTH STABILITY: MENTAL HEALTH: HOW OFTEN DO YOU HAVE SIX OR MORE DRINKS ON ONE OCCASION?: NEVER

## 2025-01-19 SDOH — HEALTH STABILITY: MENTAL HEALTH: HOW MANY DRINKS CONTAINING ALCOHOL DO YOU HAVE ON A TYPICAL DAY WHEN YOU ARE DRINKING?: PATIENT DOES NOT DRINK

## 2025-01-19 ASSESSMENT — ACTIVITIES OF DAILY LIVING (ADL)
TOILETING: INDEPENDENT
WALKS IN HOME: INDEPENDENT
HEARING - LEFT EAR: FUNCTIONAL
BATHING: INDEPENDENT
WALKS IN HOME: INDEPENDENT
FEEDING YOURSELF: INDEPENDENT
PATIENT'S MEMORY ADEQUATE TO SAFELY COMPLETE DAILY ACTIVITIES?: YES
HEARING - RIGHT EAR: FUNCTIONAL
ADEQUATE_TO_COMPLETE_ADL: YES
DRESSING YOURSELF: INDEPENDENT
HEARING - LEFT EAR: FUNCTIONAL
OASIS_M1830: 05
LACK_OF_TRANSPORTATION: NO
HEARING - RIGHT EAR: FUNCTIONAL
JUDGMENT_ADEQUATE_SAFELY_COMPLETE_DAILY_ACTIVITIES: YES
GROOMING: INDEPENDENT

## 2025-01-19 ASSESSMENT — COGNITIVE AND FUNCTIONAL STATUS - GENERAL
MOBILITY SCORE: 21
DAILY ACTIVITIY SCORE: 24
WALKING IN HOSPITAL ROOM: A LITTLE
CLIMB 3 TO 5 STEPS WITH RAILING: A LITTLE
STANDING UP FROM CHAIR USING ARMS: A LITTLE

## 2025-01-19 ASSESSMENT — LIFESTYLE VARIABLES
SKIP TO QUESTIONS 9-10: 1
AUDIT-C TOTAL SCORE: 0

## 2025-01-19 ASSESSMENT — PAIN - FUNCTIONAL ASSESSMENT
PAIN_FUNCTIONAL_ASSESSMENT: 0-10

## 2025-01-19 ASSESSMENT — PAIN SCALES - GENERAL
PAINLEVEL_OUTOF10: 8
PAINLEVEL_OUTOF10: 8
PAINLEVEL_OUTOF10: 0 - NO PAIN
PAINLEVEL_OUTOF10: 8
PAINLEVEL_OUTOF10: 6
PAINLEVEL_OUTOF10: 8
PAINLEVEL_OUTOF10: 0 - NO PAIN
PAINLEVEL_OUTOF10: 0 - NO PAIN

## 2025-01-19 ASSESSMENT — PAIN DESCRIPTION - LOCATION: LOCATION: GROIN

## 2025-01-19 NOTE — Clinical Note
The DP pulses are detected w/ doppler bilaterally. The PT pulses are detected w/ doppler bilaterally. The left radial pulse is 1+.

## 2025-01-19 NOTE — Clinical Note
Single view of the saphenous vein graft to the obtuse marginal obtained using hand injection. Radial artery graft

## 2025-01-19 NOTE — PROGRESS NOTES
Dereck Shen is a 59 y.o. male admitted for Flash pulmonary edema. Pharmacy reviewed the patient's [id number] medications and allergies for accuracy.    The list below reflects the PTA list prior to pharmacy medication history. A summary a changes to the PTA medication list has been listed below. Please review each medication in order reconciliation for additional clarification and justification.    Source of information:  PATIENT  SURE [facility]IPTS  Medications added:  HYDRALAZINE 50MG 1 TID    Medications modified:    Medications to be removed:    Medications of concern:      Prior to Admission Medications   Prescriptions Last Dose Informant Patient Reported? Taking?   Creon 36,000-114,000- 180,000 unit capsule,delayed release(DR/EC) capsule   Yes No   Sig: Take 2 capsules by mouth 3 times a day as needed.   ELDERBERRY FRUIT ORAL   Yes No   Sig: Take 1 tablet by mouth once daily as needed.   Entresto  mg tablet   Yes No   Sig: Take 1 tablet by mouth twice a day.   GINGER ROOT EXTRACT ORAL   Yes No   Sig: Take 1 tablet by mouth once daily as needed.   OXcarbazepine (Trileptal) 150 mg tablet   Yes No   Sig: Take 3 tablets (450 mg) by mouth twice a day.   apixaban (Eliquis) 5 mg tablet   No No   Sig: Take 1 tablet (5 mg) by mouth 2 times a day.   aspirin 81 mg chewable tablet   Yes No   Sig: Chew 1 tablet (81 mg) once daily.   atorvastatin (Lipitor) 80 mg tablet   No No   Sig: Take 1 tablet (80 mg) by mouth once daily at bedtime.   carvedilol (Coreg) 6.25 mg tablet   Yes No   Sig: Take 1 tablet (6.25 mg) by mouth every 12 hours.   dapagliflozin propanediol (Farxiga) 10 mg   No No   Sig: Take 1 tablet (10 mg) by mouth once daily with breakfast.   ezetimibe (Zetia) 10 mg tablet   Yes No   Sig: Take 1 tablet (10 mg) by mouth once daily.   flash glucose scanning reader misc   No No   Sig: USE TO CHECK SUGARS FOUR TIMES A DAY   flash glucose sensor kit (FreeStyle En 2 Sensor) kit   No No   Sig: use as  "directed daily and change every 14 days   furosemide (Lasix) 40 mg tablet   Yes No   Sig: Take 1 tablet (40 mg) by mouth once daily as needed.   gabapentin (Neurontin) 400 mg capsule   No No   Sig: Take 1 capsule (400 mg) by mouth 3 times a day.   heparin flush (heparin LockFlush,Porcine,,PF,) 10 unit/mL injection   Yes No   Si mL by intra-catheter route if needed for line care (SASH and after specimen collection through central line). Indications: prevent clot from blocking an intravenous catheter   heparin flush 10 unit/mL injection   No No   Sig: 3 mL (30 Units) by intra-catheter route once daily.   insulin glargine (Lantus) 100 unit/mL (3 mL) pen   No No   Sig: Inject 20 Units under the skin once daily at bedtime.   insulin lispro (HumaLOG) 100 unit/mL injection   No No   Sig: Inject 10 Units under the skin 3 times a day with meals. Plus sliding scale if BS is over 200   isosorbide mononitrate ER (Imdur) 60 mg 24 hr tablet   No No   Sig: Take 1 tablet (60 mg) by mouth once daily.   lancets misc   No No   Si each 4 times a day before meals.   medical cannabis   Yes No   Sig: Not [facility] prescribed   meropenem (Merrem) 1 gram injection   No No   Sig: Infuse 2 g into a venous catheter every 8 hours.   nicotine (Nicoderm CQ) 21 mg/24 hr patch   No No   Sig: APPLY 1 PATCH TO SKIN EVERY 24 HOURS AS DIRECTED   oxyCODONE (Roxicodone) 5 mg immediate release tablet   No No   Sig: Take 1 tablet (5 mg) by mouth every 6 hours if needed for moderate pain (4 - 6) for up to 5 days.   oxyCODONE (Roxicodone) 5 mg immediate release tablet   No No   Sig: Take 1 tablet (5 mg) by mouth every 6 hours if needed for severe pain (7 - 10) for up to 7 days.   pantoprazole (ProtoNix) 40 mg EC tablet   No No   Sig: TAKE 1 TABLET BY MOUTH ONCE DAILY   pen needle, diabetic (BD Ultra-Fine Jocy Pen Needle) 32 gauge x \" needle   No No   Sig: Pen Needles for Insulin.   pen needle, diabetic (BD Ultra-Fine Jocy Pen Needle) 32 gauge x 5\" " "needle   No No   Sig: Use to inject insulin up to 8 times per day.   pen needle, diabetic (TechLITE Pen Needle) 32 gauge x 1/4\" needle   No No   Sig: Use to inject 1-4 times daily as directed   ranolazine (Ranexa) 500 mg 12 hr tablet   Yes No   Sig: Take 1 tablet (500 mg) by mouth twice a day.   sodium chloride 0.9% flush   Yes No   Sig: Infuse 10 mL into a venous catheter 3 times a day as needed for line care. Indications: PATENCY MAINTENANCE OF INDWELLING CATHETER [Shell Record], SASH + 20 ml after specimen collection through central line   tamsulosin (Flomax) 0.4 mg 24 hr capsule   No No   Sig: TAKE 1 CAPSULE BY MOUTH ONCE DAILY   tiZANidine (Zanaflex) 2 mg tablet   No No   Sig: Take 1 tablet (2 mg) by mouth once daily as needed for muscle spasms.   ticagrelor (Brilinta) 90 mg tablet   No No   Sig: Take 1 tablet (90 mg) by mouth 2 times a day.      Facility-Administered Medications: None       Cristy Peterson"

## 2025-01-19 NOTE — Clinical Note
Patient Clipped and Prepped: right groin, left radial and left brachial. Prepped with ChloraPrep, a minimum of 3 minute dry time, longer if needed, no pooling noted, patient draped in sterile fashion.

## 2025-01-19 NOTE — H&P
[facility] MEDICINE HISTORY AND PHYSICAL    History Of Present Illness     Dereck Shen is a 59 y.o. male with PMHx of CAD s/p CABG, severe lower extremity PAD with prior revascularization (R fem-pop bypass 2/24/20; L fem-pop bypass 9/14/20; L fem PTA of the graft; R iliac PTA 2/2022; LLE and DANYA PTA 4/2024; angioplasty of the left SFA 10/21/24; and left femoral and tibial thrombectomy with patch angioplasty 11/19/24 who presented with shortness of breath that woke him from sleep at 5 am today and worsening. He choked on some pudding last night but was not SOB. He denies recent dysphagia and any cough. EMS noted him to be altered and hypoxic and placed him on oxygen. and brought him in for further evaluation. He was immediately placed on BIPAP; bedside echo evidently revealed bilateral B-lines as well as EF<40%. He was bolused with nitroglycerin and Lasix and started on a nitroglycerin drip. Initial VBG demonstrated  significant respiratory and metabolic acidosis which improved with resuscitation. He was ultimately weaned off nitroglycerin and BIPAP to supplemental O2. At the bedside he is alert and engaging.  ED workup was significant for blood glucose 284, potassium 5.3, creatinine 1.30, alk phos 135, troponins 12/20, lactate BNP 1231, WBC 18.3k. Influenza and COVID screens were negative. CXR revealed pulmonary vascular congestion and bilateral lower lobe atelectasis/pneumonitis. The pt was tachycardic to 133 bpm, tachypneic to 38, hypertensive to 199/105 mmHg and hypoxic to 85% on NRB.   Remainder of ROS reviewed and negative except as indicated in HPI.     Objective     Past Medical History  He has a past medical history of Aphasia, Atherosclerotic heart disease of native coronary artery with unspecified angina pectoris (11/05/2019), Bipolar disorder, unspecified (Multi), BPH (benign prostatic hyperplasia), Diabetes (Multi), DVT (deep venous thrombosis) (Multi) (02/2022), Erectile dysfunction, GERD  (gastroesophageal reflux disease), Hepatitis C, HFrEF (heart failure with reduced ejection fraction), HLD (hyperlipidemia), HTN (hypertension), Obstructive sleep apnea (adult) (pediatric), Opioid abuse, in remission (Multi), PAD (peripheral artery disease) (CMS-HCC), Polymyalgia rheumatica (Multi), Post-traumatic stress disorder, unspecified, and Stroke (Multi) (07/24/2023).    Surgical History  He has a past surgical history that includes Knee surgery (Left); Elbow surgery; Back surgery; CT angio aorta and bilateral iliofemoral runoff including without contrast if performed (07/20/2020); MR angio head wo IV contrast (01/04/2022); MR angio neck wo IV contrast (01/04/2022); CT angio aorta and bilateral iliofemoral runoff including without contrast if performed (01/14/2022); Invasive Vascular Procedure (Bilateral, 10/04/2024); Dental surgery; Coronary angioplasty with stent; Coronary artery bypass graft; Tonsillectomy; Transluminal atherectomy femoral-popliteal / tibioperoneal; Invasive Vascular Procedure (N/A, 10/21/2024); and Invasive Vascular Procedure (N/A, 10/21/2024).    Social History     Tobacco Use    Smoking status: Every Day     Current packs/day: 0.50     Types: Cigarettes     Passive exposure: Current (5-7 cigarettes a day)    Smokeless tobacco: Never   Vaping Use    Vaping status: Never Used   Substance Use Topics    Alcohol use: Never    Drug use: Yes     Types: Marijuana     Comment: Medical card       Family History  Family History   Problem Relation Name Age of Onset    No Known Problems Mother      No Known Problems Father         Allergies  Celecoxib; Ibuprofen; Tetanus toxoid, adsorbed; [id number] antimigraine agents; Cephalexin; Hydrocodone; Latex; and Tryptophan    Vitals:    01/19/25 1315   BP: 130/77   Pulse: 82   Resp: 18   Temp:    SpO2: 97%       Vitals:    01/19/25 0909   Weight: 97.6 kg (215 lb 2.7 oz)       Scheduled medications  apixaban, 5 mg, oral, BID  aspirin, 81 mg, oral,  Daily  atorvastatin, 80 mg, oral, Nightly  carvedilol, 6.25 mg, oral, BID  [START ON 1/20/2025] dapagliflozin propanediol, 10 mg, oral, Daily with breakfast  ezetimibe, 10 mg, oral, Daily  furosemide, 40 mg, intravenous, BID  gabapentin, 400 mg, oral, TID  insulin glargine, 20 Units, subcutaneous, Nightly  insulin lispro, 0-5 Units, subcutaneous, TID AC  isosorbide mononitrate ER, 60 mg, oral, Daily  meropenem (Merrem) 2 g in sodium chloride 0.9 % 100 mL IV, 2 g, intravenous, q8h  nicotine, 1 patch, transdermal, Daily  OXcarbazepine, 450 mg, oral, BID  oxygen, , inhalation, Continuous - Inhalation  pantoprazole, 40 mg, oral, Daily  ranolazine, 500 mg, oral, BID  sacubitriL-valsartan, 1 tablet, oral, BID  sennosides, 2 tablet, oral, BID  tamsulosin, 0.4 mg, oral, Daily  ticagrelor, 90 mg, oral, BID      Continuous medications  nitroglycerin, 5-1,000 mcg/min, Last Rate: 41 mcg/min (01/19/25 1057)      PRN medications  PRN medications: acetaminophen, dextrose, dextrose, glucagon, glucagon, morphine, morphine    Results for orders placed or performed during the hospital encounter of 01/19/25 (from the past 24 hours)   CBC and Auto Differential   Result Value Ref Range    WBC 18.3 (H) 4.4 - 11.3 x10*3/uL    nRBC 0.0 0.0 - 0.0 /100 WBCs    RBC 3.95 (L) 4.50 - 5.90 x10*6/uL    Hemoglobin 11.7 (L) 13.5 - 17.5 g/dL    Hematocrit 36.7 (L) 41.0 - 52.0 %    MCV 93 80 - 100 fL    MCH 29.6 26.0 - 34.0 pg    MCHC 31.9 (L) 32.0 - 36.0 g/dL    RDW 14.7 (H) 11.5 - 14.5 %    Platelets 389 150 - 450 x10*3/uL    Immature Granulocytes %, Automated 0.5 0.0 - 0.9 %    Immature Granulocytes Absolute, Automated 0.09 0.00 - 0.70 x10*3/uL   Comprehensive metabolic panel   Result Value Ref Range    Glucose 284 (H) 74 - 99 mg/dL    Sodium 135 (L) 136 - 145 mmol/L    Potassium 5.3 3.5 - 5.3 mmol/L    Chloride 104 98 - 107 mmol/L    Bicarbonate 24 21 - 32 mmol/L    Anion Gap 12 10 - 20 mmol/L    Urea Nitrogen 20 6 - 23 mg/dL    Creatinine 1.30  0.50 - 1.30 mg/dL    eGFR 64 >60 mL/min/1.73m*2    Calcium 8.9 8.6 - 10.3 mg/dL    Albumin 3.8 3.4 - 5.0 g/dL    Alkaline Phosphatase 135 (H) 33 - 120 U/L    Total Protein 7.7 6.4 - 8.2 g/dL    AST 17 9 - 39 U/L    Bilirubin, Total 0.5 0.0 - 1.2 mg/dL    ALT 14 10 - 52 U/L   B-Type Natriuretic Peptide   Result Value Ref Range    BNP 1,231 (H) 0 - 99 pg/mL   Blood Gas Venous Full Panel   Result Value Ref Range    POCT pH, Venous 7.20 (LL) 7.33 - 7.43 pH    POCT pCO2, Venous 62 (H) 41 - 51 mm Hg    POCT pO2, Venous 55 (H) 35 - 45 mm Hg    POCT SO2, Venous 79 (H) 45 - 75 %    POCT Oxy Hemoglobin, Venous 75.9 (H) 45.0 - 75.0 %    POCT Hematocrit Calculated, Venous 37.0 (L) 41.0 - 52.0 %    POCT Sodium, Venous 137 136 - 145 mmol/L    POCT Potassium, Venous 5.8 (H) 3.5 - 5.3 mmol/L    POCT Chloride, Venous 105 98 - 107 mmol/L    POCT Ionized Calicum, Venous 1.25 1.10 - 1.33 mmol/L    POCT Glucose, Venous 323 (H) 74 - 99 mg/dL    POCT Lactate, Venous 3.9 (H) 0.4 - 2.0 mmol/L    POCT Base Excess, Venous -4.7 (L) -2.0 - 3.0 mmol/L    POCT HCO3 Calculated, Venous 24.2 22.0 - 26.0 mmol/L    POCT Hemoglobin, Venous 12.4 (L) 13.5 - 17.5 g/dL    POCT Anion Gap, Venous 14.0 10.0 - 25.0 mmol/L    Patient Temperature 37.0 degrees Celsius    FiO2 100 %   Troponin I, High Sensitivity, Initial   Result Value Ref Range    Troponin I, High Sensitivity 12 0 - 20 ng/L   Manual Differential   Result Value Ref Range    Neutrophils %, Manual 58.0 40.0 - 80.0 %    Lymphocytes %, Manual 34.0 13.0 - 44.0 %    Monocytes %, Manual 6.0 2.0 - 10.0 %    Eosinophils %, Manual 1.0 0.0 - 6.0 %    Basophils %, Manual 1.0 0.0 - 2.0 %    Seg Neutrophils Absolute, Manual 10.61 (H) 1.20 - 7.00 x10*3/uL    Lymphocytes Absolute, Manual 6.22 (H) 1.20 - 4.80 x10*3/uL    Monocytes Absolute, Manual 1.10 (H) 0.10 - 1.00 x10*3/uL    Eosinophils Absolute, Manual 0.18 0.00 - 0.70 x10*3/uL    Basophils Absolute, Manual 0.18 (H) 0.00 - 0.10 x10*3/uL    Total Cells  Counted 100     RBC Morphology No significant RBC morphology present    Sars-CoV-2 and Influenza A/B PCR   Result Value Ref Range    Flu A Result Not Detected Not Detected    Flu B Result Not Detected Not Detected    Coronavirus 2019, PCR Not Detected Not Detected   Troponin, High Sensitivity, 1 Hour   Result Value Ref Range    Troponin I, High Sensitivity 20 0 - 20 ng/L   BLOOD GAS VENOUS FULL PANEL   Result Value Ref Range    POCT pH, Venous 7.33 7.33 - 7.43 pH    POCT pCO2, Venous 48 41 - 51 mm Hg    POCT pO2, Venous 45 35 - 45 mm Hg    POCT SO2, Venous 68 45 - 75 %    POCT Oxy Hemoglobin, Venous 65.6 45.0 - 75.0 %    POCT Hematocrit Calculated, Venous 31.0 (L) 41.0 - 52.0 %    POCT Sodium, Venous 133 (L) 136 - 145 mmol/L    POCT Potassium, Venous 5.7 (H) 3.5 - 5.3 mmol/L    POCT Chloride, Venous 105 98 - 107 mmol/L    POCT Ionized Calicum, Venous 1.17 1.10 - 1.33 mmol/L    POCT Glucose, Venous 412 (H) 74 - 99 mg/dL    POCT Lactate, Venous 1.6 0.4 - 2.0 mmol/L    POCT Base Excess, Venous -0.9 -2.0 - 3.0 mmol/L    POCT HCO3 Calculated, Venous 25.3 22.0 - 26.0 mmol/L    POCT Hemoglobin, Venous 10.2 (L) 13.5 - 17.5 g/dL    POCT Anion Gap, Venous 8.0 (L) 10.0 - 25.0 mmol/L    Patient Temperature 37.0 degrees Celsius    FiO2 70 %   RSV PCR   Result Value Ref Range    RSV PCR Not Detected Not Detected     *Note: Due to a large number of results and/or encounters for the requested time period, some results have not been displayed. A complete set of results can be found in Results Review.         I personally reviewed all pertinent labwork, imaging and vital signs, as well as medications, nursing, therapy, discharge planning and consult notes.     Constitutional: Well developed, awake, alert, calm, oriented x4, no acute distress, cooperative   Eyes: EOMI, clear sclerae   ENMT: mucous membranes moist, no lesions seen   Head/Neck: Neck supple, no apparent injury, head atraumatic   Respiratory/Thorax: CTAB anteriorly, good  chest expansion, respirations even and unlabored, pt on 4 lpm O2   Cardiovascular: Regular rate and rhythm, mildly tachycardic, no murmurs/rubs/gallops, normal S1 and S 2   Gastrointestinal: Abdomen nondistended, soft, nontender, +BS, no bruits   Musculoskeletal: ROM intact, no joint swelling, normal  strength   Extremities: no cyanosis, contusions or clubbing, or edema   Neurological: no focal deficit, pt alert and oriented x4   Psychological: Appropriate affect and behavior, pleasant   Skin: Warm and dry, no lesions, no rashes       Assessment/Plan     Acute hypoxic hypercapnic respiratory distress  Flash pulmonary edema  Acute exacerbation of HFrEF  Evidently bedside limited echo revealed ED < 40%  Full echo last November revealed LVEF 38% with IW akinesis  Pt weaned off NTG gtt and BIPAP in ED, presently on 4 lpm O2  Will consult cardiology, continue IV Lasix and home GDMT  Wean O2 as tolerated to maintain O2 sat >91%, pt may need home O2 eval     Hx severe PAD  Hx CAD s/p CABG  Pt was hospitalized last December for critical LLE ischemia and underwent left fem-below knee popliteal bypass 12/24/24  He was readmitted 12/30/24 for postoperative left groin abscess 2/2 Enterobacter/#AmpC organism and was discharged on meropenem through 2/11/25 He will need close followup with vascular surgery and ID  Continue apixaban, ASA, Lipitor, Zetia and Brilinta    Diabetes  Hold home diabetes meds (except Lantus) and start insulin sliding scale    Mild SAMEERA  Monitor creatinine, BMP in am     HTN  Pt was hypertensive to 199/105 mmHg, has now normalized, continue home meds    Tobacco dependence  Continue nicotine patch     DVT ppx: apixaban    Discharge disposition  Home when stable        Sarah Cooper CNP  [facility] Medicine

## 2025-01-19 NOTE — Clinical Note
Angioplasty of the left main lesion. Inflation 1: Pressure = 16 thomas; Duration = 17 sec. In to LCX

## 2025-01-19 NOTE — Clinical Note
Angioplasty of the left main lesion. Inflation 1: Pressure = 8 thomas; Duration = 15 sec. Inflation 2: Pressure = 8 thomas; Duration = 14 sec. In to prox LCX

## 2025-01-19 NOTE — ED PROVIDER NOTES
HPI   Chief Complaint   Patient presents with    Respiratory Distress       HPI: Patient is a 59-year-old male, he has a past medical history of peripheral artery disease status post David popliteal bypass with graft, status post infection, currently on IV antibiotics through a PICC line, history of coronary artery disease status post CABG, he is presenting to the emergency department today for shortness of breath.  Full history unable to be obtained from the patient due to his extremis.  EMS reports they were called for shortness of breath.  Noted him to be altered, hypoxic, placed him on oxygen and brought him in for further evaluation.  Unable to obtain any further history due to the patient's extremis and altered mental status at present time.      ROS: Complete review of systems is unable to be performed due to the patient's current altered mental status    PMH: Reviewed, documented below in note. Pertinents in HPI  PSH: Reviewed and documented below in note. Pertinents in HPI  SH: Lives at home with family.  Unknown alcohol or illicit drug use  Fam: Reviewed, noncontributory to patients current complaint  MEDS: Reviewed and documented below in note. Pertinents in HPI  ALLERGIES: Reviewed and documented below in note.        History provided by:  Medical records and EMS personnel  History limited by:  Acuity of condition, mental status change and severe respiratory distress   used: No                          Nella Coma Scale Score: 14                  Patient History   Past Medical History:   Diagnosis Date    (HFpEF) heart failure with preserved ejection fraction     Aphasia     Atherosclerotic heart disease of native coronary artery with unspecified angina pectoris 11/05/2019    Bipolar disorder, unspecified (Multi)     BPH (benign prostatic hyperplasia)     Diabetes (Multi)     DVT (deep venous thrombosis) (Multi) 02/2022    RLE    Erectile dysfunction     GERD (gastroesophageal  reflux disease)     Hepatitis C     HLD (hyperlipidemia)     HTN (hypertension)     Obstructive sleep apnea (adult) (pediatric)     Opioid abuse, in remission (Multi)     PAD (peripheral artery disease) (CMS-[facility])     Polymyalgia rheumatica (Multi)     Post-traumatic stress disorder, unspecified     Stroke (Multi) 07/24/2023    multiple     Past Surgical History:   Procedure Laterality Date    BACK SURGERY      CORONARY ANGIOPLASTY WITH STENT PLACEMENT      CORONARY ARTERY BYPASS GRAFT      CT ANGIO AORTA AND BILATERAL ILIOFEMORAL RUN OFF INCLUDING WITHOUT CONTRAST IF PERFORMED  07/20/2020    CT ANGIO AORTA AND BILATERAL ILIOFEMORAL RUN OFF INCLUDING WITHOUT CONTRAST IF PERFORMED  01/14/2022    DENTAL SURGERY      ELBOW SURGERY      INVASIVE VASCULAR PROCEDURE Bilateral 10/04/2024    Procedure: Lower Extremity Angiogram;  Surgeon: Baljinder Wright MD;  Location: [facility];  Service: Cardiovascular;  Laterality: Bilateral;  w / anesthesia  3 hr hydration  / arrive 7:30    INVASIVE VASCULAR PROCEDURE N/A 10/21/2024    Procedure: Lower Extremity Angiogram;  Surgeon: Baljinder Wright MD;  Location: [facility];  Service: Cardiovascular;  Laterality: N/A;  w/ anesthesia    INVASIVE VASCULAR PROCEDURE N/A 10/21/2024    Procedure: Angioplasty - Lower Extremity;  Surgeon: Baljinder Wright MD;  Location: [facility];  Service: Cardiovascular;  Laterality: N/A;    KNEE SURGERY Left     MR HEAD ANGIO WO IV CONTRAST  01/04/2022    MR NECK ANGIO WO IV CONTRAST  01/04/2022    TONSILLECTOMY      TRANSLUMINAL ATHERECTOMY FEMORAL-POPLITEAL / TIBIOPERONEAL       Family History   Problem Relation Name Age of Onset    No Known Problems Mother      No Known Problems Father       Social History     Tobacco Use    Smoking status: Every Day     Current packs/day: 0.50     Types: Cigarettes     Passive exposure: Current (5-7 cigarettes a day)    Smokeless tobacco: Never   Vaping Use    Vaping status: Never Used   Substance  "Use Topics    Alcohol use: Never    Drug use: Yes     Types: Marijuana     Comment: Medical card       Physical Exam   Visit Vitals  /79   Pulse (!) 101   Temp 36.8 °C (98.2 °F) (Axillary)   Resp 20   Ht 1.727 m (5' 8\")   Wt 97.6 kg (215 lb 2.7 oz)   SpO2 96%   BMI 32.72 kg/m²   Smoking Status Every Day   BSA 2.16 m²      Physical Exam  Vitals and nursing note reviewed.   Constitutional:       General: He is in acute distress.      Appearance: Normal appearance. He is ill-appearing, toxic-appearing and diaphoretic.   HENT:      Head: Normocephalic and atraumatic.   Neck:      Vascular: No carotid bruit.   Cardiovascular:      Rate and Rhythm: Regular rhythm. Tachycardia present.      Pulses: Normal pulses.      Heart sounds: Normal heart sounds.   Pulmonary:      Effort: Respiratory distress present.      Breath sounds: Rhonchi and rales present.   Abdominal:      General: There is no distension.      Palpations: Abdomen is soft.      Tenderness: There is no abdominal tenderness. There is no guarding or rebound.   Musculoskeletal:      Cervical back: Normal range of motion. No rigidity.      Right lower leg: No edema.      Left lower leg: No edema.   Skin:     General: Skin is warm.      Capillary Refill: Capillary refill takes 2 to 3 seconds.   Neurological:      General: No focal deficit present.      Mental Status: He is alert and oriented to person, place, and time.      Sensory: No sensory deficit.      Motor: No weakness.   Psychiatric:         Mood and Affect: Mood normal.         Behavior: Behavior normal.         XR chest 1 view   Final Result   1.  Pulmonary vascular prominence suggestive of pulmonary vascular   congestion.   2.  Patchy opacities within the lower lobes most consistent with   atelectasis/pneumonitis.   Signed by Simeon Cummins MD      Point of Care Ultrasound    (Results Pending)       Labs Reviewed   CBC WITH AUTO DIFFERENTIAL - Abnormal       Result Value    WBC 18.3 (*)     nRBC 0.0  "     RBC 3.95 (*)     Hemoglobin 11.7 (*)     Hematocrit 36.7 (*)     MCV 93      MCH 29.6      MCHC 31.9 (*)     RDW 14.7 (*)     Platelets 389      Immature Granulocytes %, Automated 0.5      Immature Granulocytes Absolute, Automated 0.09     COMPREHENSIVE METABOLIC PANEL - Abnormal    Glucose 284 (*)     Sodium 135 (*)     Potassium 5.3      Chloride 104      Bicarbonate 24      Anion Gap 12      Urea Nitrogen 20      Creatinine 1.30      eGFR 64      Calcium 8.9      Albumin 3.8      Alkaline Phosphatase 135 (*)     Total Protein 7.7      AST 17      Bilirubin, Total 0.5      ALT 14     B-TYPE NATRIURETIC PEPTIDE - Abnormal    BNP 1,231 (*)     Narrative:        <100 pg/mL - Heart failure unlikely  100-299 pg/mL - Intermediate probability of acute heart                  failure exacerbation. Correlate with clinical                  context and patient history.    >=300 pg/mL - Heart Failure likely. Correlate with clinical                  context and patient history.    BNP testing is performed using different testing methodology at [facility] ([facility]) than at other [facility]. Direct result comparisons should only be made within the same method.      BLOOD GAS VENOUS FULL PANEL - Abnormal    POCT pH, Venous 7.20 (*)     POCT pCO2, Venous 62 (*)     POCT pO2, Venous 55 (*)     POCT SO2, Venous 79 (*)     POCT Oxy Hemoglobin, Venous 75.9 (*)     POCT Hematocrit Calculated, Venous 37.0 (*)     POCT Sodium, Venous 137      POCT Potassium, Venous 5.8 (*)     POCT Chloride, Venous 105      POCT Ionized Calicum, Venous 1.25      POCT Glucose, Venous 323 (*)     POCT Lactate, Venous 3.9 (*)     POCT Base Excess, Venous -4.7 (*)     POCT HCO3 Calculated, Venous 24.2      POCT Hemoglobin, Venous 12.4 (*)     POCT Anion Gap, Venous 14.0      Patient Temperature 37.0      FiO2 100     BLOOD GAS VENOUS FULL PANEL - Abnormal    POCT pH, Venous 7.33      POCT pCO2, Venous 48      POCT pO2, Venous 45      POCT SO2,  Venous 68      POCT Oxy Hemoglobin, Venous 65.6      POCT Hematocrit Calculated, Venous 31.0 (*)     POCT Sodium, Venous 133 (*)     POCT Potassium, Venous 5.7 (*)     POCT Chloride, Venous 105      POCT Ionized Calicum, Venous 1.17      POCT Glucose, Venous 412 (*)     POCT Lactate, Venous 1.6      POCT Base Excess, Venous -0.9      POCT HCO3 Calculated, Venous 25.3      POCT Hemoglobin, Venous 10.2 (*)     POCT Anion Gap, Venous 8.0 (*)     Patient Temperature 37.0      FiO2 70     MANUAL DIFFERENTIAL - Abnormal    Neutrophils %, Manual 58.0      Lymphocytes %, Manual 34.0      Monocytes %, Manual 6.0      Eosinophils %, Manual 1.0      Basophils %, Manual 1.0      Seg Neutrophils Absolute, Manual 10.61 (*)     Lymphocytes Absolute, Manual 6.22 (*)     Monocytes Absolute, Manual 1.10 (*)     Eosinophils Absolute, Manual 0.18      Basophils Absolute, Manual 0.18 (*)     Total Cells Counted 100      RBC Morphology No significant RBC morphology present     SARS-COV-2 AND INFLUENZA A/B PCR - Normal    Flu A Result Not Detected      Flu B Result Not Detected      Coronavirus 2019, PCR Not Detected      Narrative:     This assay has received FDA Emergency Use Authorization (EUA) and  is only authorized for the duration of time that circumstances exist to justify the authorization of the emergency use of in vitro diagnostic tests for the detection of SARS-CoV-2 virus and/or diagnosis of COVID-19 infection under section 564(b)(1) of the Act, 21 U.S.C. 360bbb-3(b)(1). Testing for SARS-CoV-2 is only recommended for patients who meet current clinical and/or epidemiological criteria as defined by federal, state, or local public health directives. This assay is an in vitro diagnostic nucleic acid amplification test for the qualitative detection of SARS-CoV-2, Influenza A, and Influenza B from nasopharyngeal specimens and has been validated for use at [facility]. Negative results do not preclude COVID-19  infections or Influenza A/B infections, and should not be used as the sole basis for diagnosis, treatment, or other management decisions. If Influenza A/B and RSV PCR results are negative, testing for Parainfluenza virus, Adenovirus and Metapneumovirus is routinely performed for [facility] pediatric oncology and intensive care inpatients, and is available on other patients by placing an add-on request.    SERIAL TROPONIN-INITIAL - Normal    Troponin I, High Sensitivity 12      Narrative:     Less than 99th percentile of normal range cutoff-  Female and children under 18 years old <14 ng/L; Male <21 ng/L: Negative  Repeat testing should be performed if clinically indicated.     Female and children under 18 years old 14-50 ng/L; Male 21-50 ng/L:  Consistent with possible cardiac damage and possible increased clinical   risk. Serial measurements may help to assess extent of myocardial damage.     >50 ng/L: Consistent with cardiac damage, increased clinical risk and  myocardial infarction. Serial measurements may help assess extent of   myocardial damage.      NOTE: Children less than 1 year old may have higher baseline troponin   levels and results should be interpreted in conjunction with the overall   clinical context.     NOTE: Troponin I testing is performed using a different   testing methodology at [facility] than at other   [facility]. Direct result comparisons should only   be made within the same method.   SERIAL TROPONIN, 1 HOUR - Normal    Troponin I, High Sensitivity 20      Narrative:     Less than 99th percentile of normal range cutoff-  Female and children under 18 years old <14 ng/L; Male <21 ng/L: Negative  Repeat testing should be performed if clinically indicated.     Female and children under 18 years old 14-50 ng/L; Male 21-50 ng/L:  Consistent with possible cardiac damage and possible increased clinical   risk. Serial measurements may help to assess extent of myocardial damage.      >50 ng/L: Consistent with cardiac damage, increased clinical risk and  myocardial infarction. Serial measurements may help assess extent of   myocardial damage.      NOTE: Children less than 1 year old may have higher baseline troponin   levels and results should be interpreted in conjunction with the overall   clinical context.     NOTE: Troponin I testing is performed using a different   testing methodology at [facility] than at other   [facility]. Direct result comparisons should only   be made within the same method.   TROPONIN SERIES- (INITIAL, 1 HR)    Narrative:     The following orders were created for panel order Troponin I Series, High Sensitivity (0, 1 HR).  Procedure                               Abnormality         Status                     ---------                               -----------         ------                     Troponin I, High Sensiti...[[id number]]  Normal              Final result               Troponin, High Sensitivi...[[id number]]  Normal              Final result                 Please view results for these tests on the individual orders.   RSV PCR         ED Course & MDM   ED Course as of 01/19/25 1133   Sun Jan 19, 2025   0920 EKG is interpreted by myself demonstrates sinus tachycardia with a rate of 133, normal axis, normal intervals, no evidence of an acute STEMI. [NS]      ED Course User Index  [NS] Kenny Quinteros MD         Diagnoses as of 01/19/25 1133   Flash pulmonary edema   Acute heart failure, unspecified heart failure type   Demand ischemia (Multi)   Acute respiratory failure with hypoxia and hypercapnia (Multi)           Medical Decision Making  All mentioned lab results, ECGs, and imaging were independently reviewed by myself  - Patient evaluated. Patient presented to the emergency department in acute respiratory distress. Differential includes SCAPE, ACS, anemia, spontaneous ptx, infection to name a few. He was immediately placed on  BiPAP, ultrasound demonstrates bilateral B-lines as well as diminished EF concerning for scape.  Patient was bolused with nitroglycerin and started on a nitroglycerin drip, IV Lasix was administered.  Patient's initial blood gas demonstrated a significant respiratory and metabolic acidosis, this improved with resuscitation here in the emergency department.  Patient's initial white count also demonstrated elevation at 18.3.  Patient is already on IV antibiotics through a PICC line, patient technically meeting SIRS criteria with the white count, tachycardia, and tachypnea, however I do not feel that escalation of antibiotics is warranted at this time as I believe that the white count is due to the acute reactive state of the patient's acute illness.  IV fluids were not initiated due to concern for fluid overload and heart failure.  BNP is elevated, troponins are within normal limits, viral swabs negative.  Chest x-ray did demonstrate pulmonary vascular prominence with congestion and patchy opacities which I believe are likely in the setting of pulmonary edema.  We are able to wean the patient off of his nitroglycerin drip as well as off of BiPAP to supplemental O2.  At this time I feel the patient is stable for admission, not warranting intensive care at this present time as he responded appropriately to our resuscitative efforts.  Patient was admitted to general medicine for continued treatment and management.    - Monitored for any changes in stability or symptomatology. Patient remained stable.   - Counseled regarding labs, imaging, diagnosis, and plan. Patient was agreeable. All questions were answered. The patient was receptive and agreeable to the plan of care.       *Disclaimer: This note was dictated by speech recognition. Minor errors in transcription may be present. Please call with questions.    Gonzalez Quinteros MD             Your medication list        CONTINUE taking these medications         "Instructions Last Dose Given Next Dose Due   pen needle, diabetic 32 gauge x 5/32\" needle  Commonly known as: BD Ultra-Fine Jocy Pen Needle      Use to inject insulin up to 8 times per day.       BD Ultra-Fine Micro Pen Needle 32 gauge x 1/4\" needle  Generic drug: pen needle, diabetic      Use to inject 1-4 times daily as directed       pen needle, diabetic 32 gauge x 5/32\" needle  Commonly known as: BD Ultra-Fine Jocy Pen Needle      Pen Needles for Insulin.       FreeStyle En 2 [place] misc  Generic drug: flash glucose scanning reader      USE TO CHECK SUGARS FOUR TIMES A DAY       FreeStyle En 2 Sensor kit  Generic drug: flash glucose sensor kit      use as directed daily and change every 14 days       lancets misc      1 each 4 times a day before meals.              ASK your doctor about these medications        Instructions Last Dose Given Next Dose Due   aspirin 81 mg chewable tablet           atorvastatin 80 mg tablet  Commonly known as: Lipitor      Take 1 tablet (80 mg) by mouth once daily at bedtime.       carvedilol 6.25 mg tablet  Commonly known as: Coreg           Creon 36,000-114,000- 180,000 unit capsule,delayed release(DR/EC) capsule  Generic drug: pancrelipase (Lip-Prot-Amyl)           dapagliflozin propanediol 10 mg  Commonly known as: Farxiga      Take 1 tablet (10 mg) by mouth once daily with breakfast.       ELDERBERRY FRUIT ORAL           Eliquis 5 mg tablet  Generic drug: apixaban      Take 1 tablet (5 mg) by mouth 2 times a day.       Entresto  mg tablet  Generic drug: sacubitriL-valsartan           ezetimibe 10 mg tablet  Commonly known as: Zetia           Flomax 0.4 mg 24 hr capsule  Generic drug: tamsulosin           tamsulosin 0.4 mg 24 hr capsule  Commonly known as: Flomax      TAKE 1 CAPSULE BY MOUTH ONCE DAILY       furosemide 40 mg tablet  Commonly known as: Lasix           gabapentin 400 mg capsule  Commonly known as: Neurontin      Take 1 capsule (400 mg) by mouth 3 times " a day.       GINGER ROOT EXTRACT ORAL           heparin LockFlush(Porcine)(PF) 10 unit/mL injection  Generic drug: heparin flush           heparin flush 10 unit/mL injection      3 mL (30 Units) by intra-catheter route once daily.       insulin lispro 100 unit/mL injection  Commonly known as: HumaLOG      Inject 10 Units under the skin 3 times a day with meals. Plus sliding scale if BS is over 200       isosorbide mononitrate ER 60 mg 24 hr tablet  Commonly known as: Imdur      Take 1 tablet (60 mg) by mouth once daily.       Lantus Solostar U-100 Insulin 100 unit/mL (3 mL) pen  Generic drug: insulin glargine      Inject 20 Units under the skin once daily at bedtime.       medical cannabis           meropenem 1 gram injection  Commonly known as: Merrem      Infuse 2 g into a venous catheter every 8 hours.       nicotine 21 mg/24 hr patch  Commonly known as: Nicoderm CQ      APPLY 1 PATCH TO SKIN EVERY 24 HOURS AS DIRECTED       OXcarbazepine 150 mg tablet  Commonly known as: Trileptal           oxyCODONE 5 mg immediate release tablet  Commonly known as: Roxicodone      Take 1 tablet (5 mg) by mouth every 6 hours if needed for severe pain (7 - 10) for up to 7 days.       pantoprazole 40 mg EC tablet  Commonly known as: ProtoNix      TAKE 1 TABLET BY MOUTH ONCE DAILY       ranolazine 500 mg 12 hr tablet  Commonly known as: Ranexa           sodium chloride 0.9% flush           ticagrelor 90 mg tablet  Commonly known as: Brilinta      Take 1 tablet (90 mg) by mouth 2 times a day.       tiZANidine 2 mg tablet  Commonly known as: Zanaflex      Take 1 tablet (2 mg) by mouth once daily as needed for muscle spasms.                Procedure  Critical Care    Performed by: Kenny Quinteros MD  Authorized by: Kenny Quinteros MD    Critical care provider statement:     Critical care time (minutes):  40    Critical care time was exclusive of:  Separately billable procedures and treating other patients    Critical  care was necessary to treat or prevent imminent or life-threatening deterioration of the following conditions:  Respiratory failure    Critical care was time spent personally by me on the following activities:  Development of treatment plan with patient or surrogate, evaluation of patient's response to treatment, examination of patient, obtaining history from patient or surrogate, ordering and performing treatments and interventions, ordering and review of laboratory studies, ordering and review of radiographic studies, re-evaluation of patient's condition and ventilator management    Care discussed with: admitting provider         *This report was transcribed using voice recognition software.  Every effort was made to ensure accuracy; however, inadvertent computerized transcription errors may be present.*  Kenny Quinteros MD  01/19/25         Kenny Quinteros MD  01/19/25 5295

## 2025-01-19 NOTE — Clinical Note
Angioplasty of the left main lesion. Inflation 1: Pressure = 8 thomas; Duration = 12 sec. Inflation 2: Pressure = 6 thomas; Duration = 8 sec. In prox LCX

## 2025-01-20 ENCOUNTER — APPOINTMENT (OUTPATIENT)
Dept: CARDIOLOGY | Facility: HOSPITAL | Age: 60
End: 2025-01-20
Payer: COMMERCIAL

## 2025-01-20 ENCOUNTER — HOME CARE VISIT (OUTPATIENT)
Dept: HOME HEALTH SERVICES | Facility: HOME HEALTH | Age: 60
End: 2025-01-20
Payer: COMMERCIAL

## 2025-01-20 ENCOUNTER — DOCUMENTATION (OUTPATIENT)
Dept: PHARMACY | Facility: CLINIC | Age: 60
End: 2025-01-20

## 2025-01-20 LAB
ALBUMIN SERPL BCP-MCNC: 2.9 G/DL (ref 3.4–5)
ALP SERPL-CCNC: 90 U/L (ref 33–120)
ALT SERPL W P-5'-P-CCNC: 9 U/L (ref 10–52)
ANION GAP SERPL CALC-SCNC: 10 MMOL/L (ref 10–20)
AORTIC VALVE MEAN GRADIENT: 5 MMHG
AORTIC VALVE PEAK VELOCITY: 1.48 M/S
AST SERPL W P-5'-P-CCNC: 9 U/L (ref 9–39)
ATRIAL RATE: 133 BPM
AV PEAK GRADIENT: 9 MMHG
AVA (PEAK VEL): 2.39 CM2
AVA (VTI): 2.43 CM2
BILIRUB SERPL-MCNC: 0.4 MG/DL (ref 0–1.2)
BUN SERPL-MCNC: 23 MG/DL (ref 6–23)
CALCIUM SERPL-MCNC: 8.3 MG/DL (ref 8.6–10.3)
CHLORIDE SERPL-SCNC: 104 MMOL/L (ref 98–107)
CO2 SERPL-SCNC: 29 MMOL/L (ref 21–32)
CREAT SERPL-MCNC: 1.12 MG/DL (ref 0.5–1.3)
EGFRCR SERPLBLD CKD-EPI 2021: 76 ML/MIN/1.73M*2
EJECTION FRACTION APICAL 4 CHAMBER: 35
EJECTION FRACTION: 36 %
ERYTHROCYTE [DISTWIDTH] IN BLOOD BY AUTOMATED COUNT: 14.7 % (ref 11.5–14.5)
FERRITIN SERPL-MCNC: 50 NG/ML (ref 20–300)
FOLATE SERPL-MCNC: 11 NG/ML
GLUCOSE BLD MANUAL STRIP-MCNC: 122 MG/DL (ref 74–99)
GLUCOSE BLD MANUAL STRIP-MCNC: 123 MG/DL (ref 74–99)
GLUCOSE BLD MANUAL STRIP-MCNC: 164 MG/DL (ref 74–99)
GLUCOSE BLD MANUAL STRIP-MCNC: 177 MG/DL (ref 74–99)
GLUCOSE SERPL-MCNC: 106 MG/DL (ref 74–99)
HCT VFR BLD AUTO: 27 % (ref 41–52)
HGB BLD-MCNC: 8.6 G/DL (ref 13.5–17.5)
IRON SATN MFR SERPL: 15 % (ref 25–45)
IRON SERPL-MCNC: 35 UG/DL (ref 35–150)
LEFT ATRIUM VOLUME AREA LENGTH INDEX BSA: 30 ML/M2
LEFT VENTRICLE INTERNAL DIMENSION DIASTOLE: 5.78 CM (ref 3.5–6)
LEFT VENTRICULAR OUTFLOW TRACT DIAMETER: 1.95 CM
LV EJECTION FRACTION BIPLANE: 36 %
MCH RBC QN AUTO: 30.1 PG (ref 26–34)
MCHC RBC AUTO-ENTMCNC: 31.9 G/DL (ref 32–36)
MCV RBC AUTO: 94 FL (ref 80–100)
MITRAL VALVE E/A RATIO: 2.83
NRBC BLD-RTO: 0 /100 WBCS (ref 0–0)
P AXIS: 1 DEGREES
PLATELET # BLD AUTO: 247 X10*3/UL (ref 150–450)
POTASSIUM SERPL-SCNC: 4.2 MMOL/L (ref 3.5–5.3)
PR INTERVAL: 138 MS
PROT SERPL-MCNC: 5.7 G/DL (ref 6.4–8.2)
Q ONSET: 253 MS
QRS COUNT: 21 BEATS
QRS DURATION: 117 MS
QT INTERVAL: 288 MS
QTC CALCULATION(BAZETT): 429 MS
QTC FREDERICIA: 375 MS
R AXIS: 26 DEGREES
RBC # BLD AUTO: 2.86 X10*6/UL (ref 4.5–5.9)
RIGHT VENTRICLE FREE WALL PEAK S': 10.06 CM/S
RIGHT VENTRICLE PEAK SYSTOLIC PRESSURE: 44.3 MMHG
SODIUM SERPL-SCNC: 139 MMOL/L (ref 136–145)
T AXIS: 205 DEGREES
T OFFSET: 397 MS
TIBC SERPL-MCNC: 235 UG/DL (ref 240–445)
TRICUSPID ANNULAR PLANE SYSTOLIC EXCURSION: 1.8 CM
UIBC SERPL-MCNC: 200 UG/DL (ref 110–370)
VENTRICULAR RATE: 133 BPM
VIT B12 SERPL-MCNC: 185 PG/ML (ref 211–911)
WBC # BLD AUTO: 6.3 X10*3/UL (ref 4.4–11.3)

## 2025-01-20 PROCEDURE — 93306 TTE W/DOPPLER COMPLETE: CPT | Performed by: INTERNAL MEDICINE

## 2025-01-20 PROCEDURE — 2500000004 HC RX 250 GENERAL PHARMACY W/ HCPCS (ALT 636 FOR OP/ED): Performed by: INTERNAL MEDICINE

## 2025-01-20 PROCEDURE — 2500000001 HC RX 250 WO HCPCS SELF ADMINISTERED DRUGS (ALT 637 FOR MEDICARE OP): Performed by: NURSE PRACTITIONER

## 2025-01-20 PROCEDURE — 83540 ASSAY OF IRON: CPT | Performed by: NURSE PRACTITIONER

## 2025-01-20 PROCEDURE — 82746 ASSAY OF FOLIC ACID SERUM: CPT | Performed by: NURSE PRACTITIONER

## 2025-01-20 PROCEDURE — S4991 NICOTINE PATCH NONLEGEND: HCPCS | Performed by: NURSE PRACTITIONER

## 2025-01-20 PROCEDURE — 82607 VITAMIN B-12: CPT | Performed by: NURSE PRACTITIONER

## 2025-01-20 PROCEDURE — 99254 IP/OBS CNSLTJ NEW/EST MOD 60: CPT | Performed by: INTERNAL MEDICINE

## 2025-01-20 PROCEDURE — C8929 TTE W OR WO FOL WCON,DOPPLER: HCPCS

## 2025-01-20 PROCEDURE — 82728 ASSAY OF FERRITIN: CPT | Performed by: NURSE PRACTITIONER

## 2025-01-20 PROCEDURE — 2500000004 HC RX 250 GENERAL PHARMACY W/ HCPCS (ALT 636 FOR OP/ED): Performed by: NURSE PRACTITIONER

## 2025-01-20 PROCEDURE — 85027 COMPLETE CBC AUTOMATED: CPT | Performed by: NURSE PRACTITIONER

## 2025-01-20 PROCEDURE — 2500000005 HC RX 250 GENERAL PHARMACY W/O HCPCS: Performed by: NURSE PRACTITIONER

## 2025-01-20 PROCEDURE — 2500000001 HC RX 250 WO HCPCS SELF ADMINISTERED DRUGS (ALT 637 FOR MEDICARE OP): Performed by: INTERNAL MEDICINE

## 2025-01-20 PROCEDURE — 2500000002 HC RX 250 W HCPCS SELF ADMINISTERED DRUGS (ALT 637 FOR MEDICARE OP, ALT 636 FOR OP/ED): Performed by: NURSE PRACTITIONER

## 2025-01-20 PROCEDURE — 99232 SBSQ HOSP IP/OBS MODERATE 35: CPT | Performed by: NURSE PRACTITIONER

## 2025-01-20 PROCEDURE — 2060000001 HC INTERMEDIATE ICU ROOM DAILY

## 2025-01-20 PROCEDURE — 82947 ASSAY GLUCOSE BLOOD QUANT: CPT

## 2025-01-20 PROCEDURE — 80053 COMPREHEN METABOLIC PANEL: CPT | Performed by: NURSE PRACTITIONER

## 2025-01-20 RX ORDER — VIT C/E/ZN/COPPR/LUTEIN/ZEAXAN 250MG-90MG
500 CAPSULE ORAL DAILY
Status: DISCONTINUED | OUTPATIENT
Start: 2025-01-20 | End: 2025-01-24 | Stop reason: HOSPADM

## 2025-01-20 RX ORDER — HYDRALAZINE HYDROCHLORIDE 50 MG/1
50 TABLET, FILM COATED ORAL 3 TIMES DAILY
Status: DISCONTINUED | OUTPATIENT
Start: 2025-01-20 | End: 2025-01-24 | Stop reason: HOSPADM

## 2025-01-20 RX ORDER — FERROUS SULFATE 325(65) MG
65 TABLET ORAL
Status: DISCONTINUED | OUTPATIENT
Start: 2025-01-21 | End: 2025-01-24 | Stop reason: HOSPADM

## 2025-01-20 RX ORDER — CARVEDILOL 12.5 MG/1
12.5 TABLET ORAL 2 TIMES DAILY
Status: DISCONTINUED | OUTPATIENT
Start: 2025-01-20 | End: 2025-01-23

## 2025-01-20 RX ADMIN — Medication 1 L/MIN: at 19:43

## 2025-01-20 RX ADMIN — INSULIN GLARGINE 20 UNITS: 100 INJECTION, SOLUTION SUBCUTANEOUS at 20:24

## 2025-01-20 RX ADMIN — APIXABAN 5 MG: 5 TABLET, FILM COATED ORAL at 08:02

## 2025-01-20 RX ADMIN — RANOLAZINE 500 MG: 500 TABLET, FILM COATED, EXTENDED RELEASE ORAL at 08:02

## 2025-01-20 RX ADMIN — CARVEDILOL 6.25 MG: 6.25 TABLET, FILM COATED ORAL at 08:02

## 2025-01-20 RX ADMIN — MEROPENEM 2 G: 2 INJECTION, POWDER, FOR SOLUTION INTRAVENOUS at 22:32

## 2025-01-20 RX ADMIN — NICOTINE 1 PATCH: 21 PATCH, EXTENDED RELEASE TRANSDERMAL at 08:02

## 2025-01-20 RX ADMIN — SACUBITRIL AND VALSARTAN 1 TABLET: 97; 103 TABLET, FILM COATED ORAL at 08:02

## 2025-01-20 RX ADMIN — GABAPENTIN 400 MG: 400 CAPSULE ORAL at 20:22

## 2025-01-20 RX ADMIN — MORPHINE SULFATE 2 MG: 2 INJECTION, SOLUTION INTRAMUSCULAR; INTRAVENOUS at 12:39

## 2025-01-20 RX ADMIN — MORPHINE SULFATE 2 MG: 2 INJECTION, SOLUTION INTRAMUSCULAR; INTRAVENOUS at 03:08

## 2025-01-20 RX ADMIN — HYDRALAZINE HYDROCHLORIDE 50 MG: 50 TABLET ORAL at 16:13

## 2025-01-20 RX ADMIN — MEROPENEM 2 G: 2 INJECTION, POWDER, FOR SOLUTION INTRAVENOUS at 16:13

## 2025-01-20 RX ADMIN — HYDRALAZINE HYDROCHLORIDE 50 MG: 50 TABLET ORAL at 08:02

## 2025-01-20 RX ADMIN — GABAPENTIN 400 MG: 400 CAPSULE ORAL at 16:13

## 2025-01-20 RX ADMIN — MORPHINE SULFATE 2 MG: 2 INJECTION, SOLUTION INTRAMUSCULAR; INTRAVENOUS at 21:29

## 2025-01-20 RX ADMIN — TICAGRELOR 90 MG: 90 TABLET ORAL at 20:22

## 2025-01-20 RX ADMIN — ISOSORBIDE MONONITRATE 60 MG: 60 TABLET, EXTENDED RELEASE ORAL at 08:02

## 2025-01-20 RX ADMIN — CARVEDILOL 12.5 MG: 12.5 TABLET, FILM COATED ORAL at 20:22

## 2025-01-20 RX ADMIN — GABAPENTIN 400 MG: 400 CAPSULE ORAL at 08:02

## 2025-01-20 RX ADMIN — MORPHINE SULFATE 2 MG: 2 INJECTION, SOLUTION INTRAMUSCULAR; INTRAVENOUS at 08:02

## 2025-01-20 RX ADMIN — SACUBITRIL AND VALSARTAN 1 TABLET: 97; 103 TABLET, FILM COATED ORAL at 20:23

## 2025-01-20 RX ADMIN — MORPHINE SULFATE 2 MG: 2 INJECTION, SOLUTION INTRAMUSCULAR; INTRAVENOUS at 16:49

## 2025-01-20 RX ADMIN — ASPIRIN 81 MG CHEWABLE TABLET 81 MG: 81 TABLET CHEWABLE at 08:02

## 2025-01-20 RX ADMIN — APIXABAN 5 MG: 5 TABLET, FILM COATED ORAL at 20:22

## 2025-01-20 RX ADMIN — HUMAN ALBUMIN MICROSPHERES AND PERFLUTREN 0.5 ML: 10; .22 INJECTION, SOLUTION INTRAVENOUS at 15:52

## 2025-01-20 RX ADMIN — MEROPENEM 2 G: 2 INJECTION, POWDER, FOR SOLUTION INTRAVENOUS at 06:37

## 2025-01-20 RX ADMIN — OXCARBAZEPINE 450 MG: 150 TABLET, FILM COATED ORAL at 08:02

## 2025-01-20 RX ADMIN — TICAGRELOR 90 MG: 90 TABLET ORAL at 08:02

## 2025-01-20 RX ADMIN — EZETIMIBE 10 MG: 10 TABLET ORAL at 06:37

## 2025-01-20 RX ADMIN — RANOLAZINE 500 MG: 500 TABLET, FILM COATED, EXTENDED RELEASE ORAL at 20:23

## 2025-01-20 RX ADMIN — TAMSULOSIN HYDROCHLORIDE 0.4 MG: 0.4 CAPSULE ORAL at 08:02

## 2025-01-20 RX ADMIN — OXCARBAZEPINE 450 MG: 150 TABLET, FILM COATED ORAL at 20:22

## 2025-01-20 RX ADMIN — DAPAGLIFLOZIN 10 MG: 5 TABLET, FILM COATED ORAL at 08:02

## 2025-01-20 RX ADMIN — INSULIN LISPRO 1 UNITS: 100 INJECTION, SOLUTION INTRAVENOUS; SUBCUTANEOUS at 16:50

## 2025-01-20 RX ADMIN — HYDRALAZINE HYDROCHLORIDE 50 MG: 50 TABLET ORAL at 22:32

## 2025-01-20 RX ADMIN — PANTOPRAZOLE SODIUM 40 MG: 40 TABLET, DELAYED RELEASE ORAL at 08:02

## 2025-01-20 RX ADMIN — ATORVASTATIN CALCIUM 80 MG: 40 TABLET, FILM COATED ORAL at 20:23

## 2025-01-20 RX ADMIN — INSULIN LISPRO 1 UNITS: 100 INJECTION, SOLUTION INTRAVENOUS; SUBCUTANEOUS at 12:42

## 2025-01-20 RX ADMIN — CYANOCOBALAMIN TAB 500 MCG 500 MCG: 500 TAB at 16:13

## 2025-01-20 RX ADMIN — SENNOSIDES 17.2 MG: 8.6 TABLET, FILM COATED ORAL at 20:22

## 2025-01-20 SDOH — SOCIAL STABILITY: SOCIAL INSECURITY: HAVE YOU HAD ANY THOUGHTS OF HARMING ANYONE ELSE?: YES

## 2025-01-20 SDOH — SOCIAL STABILITY: SOCIAL INSECURITY: HAS ANYONE EVER THREATENED TO HURT YOUR FAMILY OR YOUR PETS?: NO

## 2025-01-20 SDOH — SOCIAL STABILITY: SOCIAL INSECURITY: DO YOU FEEL ANYONE HAS EXPLOITED OR TAKEN ADVANTAGE OF YOU FINANCIALLY OR OF YOUR PERSONAL PROPERTY?: NO

## 2025-01-20 SDOH — SOCIAL STABILITY: SOCIAL INSECURITY: ABUSE: ADULT

## 2025-01-20 SDOH — SOCIAL STABILITY: SOCIAL INSECURITY: DOES ANYONE TRY TO KEEP YOU FROM HAVING/CONTACTING OTHER FRIENDS OR DOING THINGS OUTSIDE YOUR HOME?: NO

## 2025-01-20 SDOH — SOCIAL STABILITY: SOCIAL INSECURITY: ARE THERE ANY APPARENT SIGNS OF INJURIES/BEHAVIORS THAT COULD BE RELATED TO ABUSE/NEGLECT?: NO

## 2025-01-20 SDOH — SOCIAL STABILITY: SOCIAL INSECURITY: ARE YOU OR HAVE YOU BEEN THREATENED OR ABUSED PHYSICALLY, EMOTIONALLY, OR SEXUALLY BY ANYONE?: NO

## 2025-01-20 SDOH — SOCIAL STABILITY: SOCIAL INSECURITY: WERE YOU ABLE TO COMPLETE ALL THE BEHAVIORAL HEALTH SCREENINGS?: YES

## 2025-01-20 SDOH — SOCIAL STABILITY: SOCIAL INSECURITY: HAVE YOU HAD THOUGHTS OF HARMING ANYONE ELSE?: YES

## 2025-01-20 SDOH — SOCIAL STABILITY: SOCIAL INSECURITY: DO YOU FEEL UNSAFE GOING BACK TO THE PLACE WHERE YOU ARE LIVING?: NO

## 2025-01-20 ASSESSMENT — PAIN - FUNCTIONAL ASSESSMENT
PAIN_FUNCTIONAL_ASSESSMENT: 0-10

## 2025-01-20 ASSESSMENT — PAIN DESCRIPTION - ORIENTATION
ORIENTATION: LEFT

## 2025-01-20 ASSESSMENT — COGNITIVE AND FUNCTIONAL STATUS - GENERAL
MOBILITY SCORE: 21
STANDING UP FROM CHAIR USING ARMS: A LITTLE
CLIMB 3 TO 5 STEPS WITH RAILING: A LITTLE
WALKING IN HOSPITAL ROOM: A LITTLE
PATIENT BASELINE BEDBOUND: NO
DAILY ACTIVITIY SCORE: 24

## 2025-01-20 ASSESSMENT — PATIENT HEALTH QUESTIONNAIRE - PHQ9
2. FEELING DOWN, DEPRESSED OR HOPELESS: NEARLY EVERY DAY
SUM OF ALL RESPONSES TO PHQ9 QUESTIONS 1 & 2: 6
1. LITTLE INTEREST OR PLEASURE IN DOING THINGS: NEARLY EVERY DAY

## 2025-01-20 ASSESSMENT — PAIN SCALES - GENERAL
PAINLEVEL_OUTOF10: 8
PAINLEVEL_OUTOF10: 8
PAINLEVEL_OUTOF10: 5 - MODERATE PAIN
PAINLEVEL_OUTOF10: 0 - NO PAIN
PAINLEVEL_OUTOF10: 3
PAINLEVEL_OUTOF10: 7
PAINLEVEL_OUTOF10: 3
PAINLEVEL_OUTOF10: 6
PAINLEVEL_OUTOF10: 8
PAINLEVEL_OUTOF10: 5 - MODERATE PAIN
PAINLEVEL_OUTOF10: 8
PAINLEVEL_OUTOF10: 6
PAINLEVEL_OUTOF10: 5 - MODERATE PAIN

## 2025-01-20 ASSESSMENT — PAIN DESCRIPTION - LOCATION
LOCATION: GROIN

## 2025-01-20 ASSESSMENT — ACTIVITIES OF DAILY LIVING (ADL): LACK_OF_TRANSPORTATION: NO

## 2025-01-20 NOTE — CARE PLAN
The patient's goals for the shift include      The clinical goals for the shift include SPO2 >88%      Problem: Pain - Adult  Goal: Verbalizes/displays adequate comfort level or baseline comfort level  Outcome: Progressing     Problem: Safety - Adult  Goal: Free from fall injury  Outcome: Progressing     Problem: Discharge Planning  Goal: Discharge to home or other facility with appropriate resources  Outcome: Progressing     Problem: Chronic Conditions and Co-morbidities  Goal: Patient's chronic conditions and co-morbidity symptoms are monitored and maintained or improved  Outcome: Progressing     Problem: Fall/Injury  Goal: Not fall by end of shift  Outcome: Progressing  Goal: Be free from injury by end of the shift  Outcome: Progressing  Goal: Verbalize understanding of personal risk factors for fall in the hospital  Outcome: Progressing  Goal: Verbalize understanding of risk factor reduction measures to prevent injury from fall in the home  Outcome: Progressing  Goal: Use assistive devices by end of the shift  Outcome: Progressing  Goal: Pace activities to prevent fatigue by end of the shift  Outcome: Progressing     Problem: Respiratory  Goal: No signs of respiratory distress (eg. Use of accessory muscles. Peds grunting)  Outcome: Progressing  Goal: Verbalize decreased shortness of breath this shift  Outcome: Progressing  Goal: Wean oxygen to maintain O2 saturation per order/standard this shift  Outcome: Progressing     Problem: Pain  Goal: Takes deep breaths with improved pain control throughout the shift  Outcome: Progressing  Goal: Turns in bed with improved pain control throughout the shift  Outcome: Progressing  Goal: Walks with improved pain control throughout the shift  Outcome: Progressing  Goal: Performs ADL's with improved pain control throughout shift  Outcome: Progressing  Goal: Participates in PT with improved pain control throughout the shift  Outcome: Progressing

## 2025-01-20 NOTE — PROGRESS NOTES
01/20/25 1211   Discharge Planning   Living Arrangements Alone   Support Systems Parent;Family members   Assistance Needed recently d/t infection/IVs   Type of Residence Private residence   Number of Stairs to Enter Residence 26   Home or Post Acute Services In home services   Type of Home Care Services Home nursing visits   Expected Discharge Disposition Home Health   Housing Stability   At any time in the past 12 months, were you homeless or living in a shelter (including now)? N   Transportation Needs   In the past 12 months, has lack of transportation kept you from meetings, work, or from getting things needed for daily living? No   Patient Choice   Provider Choice list and CMS website (https://medicare.gov/care-compare#search) for post-acute Quality and Resource Measure Data were provided and reviewed with: Patient   Patient / Family choosing to utilize agency / facility established prior to hospitalization Yes   Intensity of Service   Intensity of Service 0-30 min     I spoke with patient to introduce discharge planning and explain role of TCC. Pt consented to speaking with me in presence of his mom.  Pt states he typically is independent at home where je lives alone in a 3rd floor apt.  He has been staying with his mom d/t dressings s/p faciitis requiring dressing changes and IV therapy.  He does use a walker but denies any falls. He confirmed PCP is Dr. Stephanie Arce.  His mom and sister have been administering antibiotics (meropenum q8hr) through PICC line and also doing groin dressing changes. IV therapy stop date is 2/11.  He is planning on returning to his moms house on discharge.  He is active with [facility] for the above and they plan on continuing on discharge.  Will follow for any additional needs.

## 2025-01-20 NOTE — CARE PLAN
The clinical goals for the shift include pt will remain free from SOB throughout shift      Problem: Pain - Adult  Goal: Verbalizes/displays adequate comfort level or baseline comfort level  Outcome: Progressing     Problem: Safety - Adult  Goal: Free from fall injury  Outcome: Progressing     Problem: Discharge Planning  Goal: Discharge to home or other facility with appropriate resources  Outcome: Progressing     Problem: Chronic Conditions and Co-morbidities  Goal: Patient's chronic conditions and co-morbidity symptoms are monitored and maintained or improved  Outcome: Progressing     Problem: Fall/Injury  Goal: Not fall by end of shift  Outcome: Progressing  Goal: Be free from injury by end of the shift  Outcome: Progressing  Goal: Verbalize understanding of personal risk factors for fall in the hospital  Outcome: Progressing  Goal: Verbalize understanding of risk factor reduction measures to prevent injury from fall in the home  Outcome: Progressing  Goal: Use assistive devices by end of the shift  Outcome: Progressing  Goal: Pace activities to prevent fatigue by end of the shift  Outcome: Progressing     Problem: Respiratory  Goal: No signs of respiratory distress (eg. Use of accessory muscles. Peds grunting)  Outcome: Progressing  Goal: Verbalize decreased shortness of breath this shift  Outcome: Progressing  Goal: Wean oxygen to maintain O2 saturation per order/standard this shift  Outcome: Progressing     Problem: Pain  Goal: Takes deep breaths with improved pain control throughout the shift  Outcome: Progressing  Goal: Turns in bed with improved pain control throughout the shift  Outcome: Progressing  Goal: Walks with improved pain control throughout the shift  Outcome: Progressing  Goal: Performs ADL's with improved pain control throughout shift  Outcome: Progressing  Goal: Participates in PT with improved pain control throughout the shift  Outcome: Progressing

## 2025-01-20 NOTE — PROGRESS NOTES
[facility] MEDICINE PROGRESS NOTE    Subjective     Pt denies SOB and chest pain. He wants to go home and is upset to know the echo may not be done until tomorrow. I advised him that leaving now would be considered AMA.    Objective     I personally reviewed all pertinent labwork, imaging and vital signs, as well as nursing, therapy, discharge planning and consult notes.     Vitals:    01/20/25 0744   BP: 175/82   Pulse: 82   Resp: 18   Temp: 36.2 °C (97.1 °F)   SpO2: 98%       Vitals:    01/20/25 0348   Weight: 98.2 kg (216 lb 7.9 oz)       Scheduled medications  apixaban, 5 mg, oral, BID  aspirin, 81 mg, oral, Daily  atorvastatin, 80 mg, oral, Nightly  carvedilol, 12.5 mg, oral, BID  dapagliflozin propanediol, 10 mg, oral, Daily with breakfast  ezetimibe, 10 mg, oral, Daily  furosemide, 40 mg, intravenous, BID  gabapentin, 400 mg, oral, TID  hydrALAZINE, 50 mg, oral, TID  insulin glargine, 20 Units, subcutaneous, Nightly  insulin lispro, 0-5 Units, subcutaneous, TID AC  isosorbide mononitrate ER, 60 mg, oral, Daily  meropenem (Merrem) 2 g in sodium chloride 0.9 % 100 mL IV, 2 g, intravenous, q8h  nicotine, 1 patch, transdermal, Daily  OXcarbazepine, 450 mg, oral, BID  pantoprazole, 40 mg, oral, Daily  ranolazine, 500 mg, oral, BID  sacubitriL-valsartan, 1 tablet, oral, BID  sennosides, 2 tablet, oral, BID  tamsulosin, 0.4 mg, oral, Daily  ticagrelor, 90 mg, oral, BID      Continuous medications  nitroglycerin, 5-1,000 mcg/min, Last Rate: Stopped (01/19/25 1430)      PRN medications  PRN medications: acetaminophen, dextrose, dextrose, glucagon, glucagon, morphine, morphine, oxygen    Results for orders placed or performed during the hospital encounter of 01/19/25 (from the past 24 hours)   RSV PCR   Result Value Ref Range    RSV PCR Not Detected Not Detected   POCT GLUCOSE   Result Value Ref Range    POCT Glucose 210 (H) 74 - 99 mg/dL   POCT GLUCOSE   Result Value Ref Range    POCT Glucose 134 (H) 74 - 99  mg/dL   POCT GLUCOSE   Result Value Ref Range    POCT Glucose 148 (H) 74 - 99 mg/dL   CBC   Result Value Ref Range    WBC 6.3 4.4 - 11.3 x10*3/uL    nRBC 0.0 0.0 - 0.0 /100 WBCs    RBC 2.86 (L) 4.50 - 5.90 x10*6/uL    Hemoglobin 8.6 (L) 13.5 - 17.5 g/dL    Hematocrit 27.0 (L) 41.0 - 52.0 %    MCV 94 80 - 100 fL    MCH 30.1 26.0 - 34.0 pg    MCHC 31.9 (L) 32.0 - 36.0 g/dL    RDW 14.7 (H) 11.5 - 14.5 %    Platelets 247 150 - 450 x10*3/uL   Comprehensive Metabolic Panel   Result Value Ref Range    Glucose 106 (H) 74 - 99 mg/dL    Sodium 139 136 - 145 mmol/L    Potassium 4.2 3.5 - 5.3 mmol/L    Chloride 104 98 - 107 mmol/L    Bicarbonate 29 21 - 32 mmol/L    Anion Gap 10 10 - 20 mmol/L    Urea Nitrogen 23 6 - 23 mg/dL    Creatinine 1.12 0.50 - 1.30 mg/dL    eGFR 76 >60 mL/min/1.73m*2    Calcium 8.3 (L) 8.6 - 10.3 mg/dL    Albumin 2.9 (L) 3.4 - 5.0 g/dL    Alkaline Phosphatase 90 33 - 120 U/L    Total Protein 5.7 (L) 6.4 - 8.2 g/dL    AST 9 9 - 39 U/L    Bilirubin, Total 0.4 0.0 - 1.2 mg/dL    ALT 9 (L) 10 - 52 U/L   POCT GLUCOSE   Result Value Ref Range    POCT Glucose 123 (H) 74 - 99 mg/dL     *Note: Due to a large number of results and/or encounters for the requested time period, some results have not been displayed. A complete set of results can be found in Results Review.       Constitutional: Well developed, awake, alert, calm, oriented x4, no acute distress, cooperative but irritable   Eyes: EOMI, clear sclerae   ENMT: mucous membranes moist, no lesions seen   Head/Neck: Neck supple, no apparent injury, head atraumatic   Respiratory/Thorax: CTAB anteriorly, good chest expansion, respirations even and unlabored, pt on RA   Cardiovascular: Regular rate and rhythm, mildly tachycardic, no murmurs/rubs/gallops, normal S1 and S 2   Gastrointestinal: Abdomen nondistended, soft, nontender, +BS, no bruits   Musculoskeletal: ROM intact, no joint swelling, normal  strength   Extremities: no cyanosis, contusions or clubbing,  or edema   Neurological: no focal deficit, pt alert and oriented x4   Psychological: Appropriate affect and behavior, pt irritable   Skin: Warm and dry, no lesions, no rashes       Past Medical History:   Diagnosis Date    Aphasia     Atherosclerotic heart disease of native coronary artery with unspecified angina pectoris 11/05/2019    Bipolar disorder, unspecified (Multi)     BPH (benign prostatic hyperplasia)     Diabetes (Multi)     DVT (deep venous thrombosis) (Multi) 02/2022    RLE    Erectile dysfunction     GERD (gastroesophageal reflux disease)     Hepatitis C     HFrEF (heart failure with reduced ejection fraction)     HLD (hyperlipidemia)     HTN (hypertension)     Obstructive sleep apnea (adult) (pediatric)     Opioid abuse, in remission (Multi)     PAD (peripheral artery disease) (CMS-[facility])     Polymyalgia rheumatica (Multi)     Post-traumatic stress disorder, unspecified     Stroke (Multi) 07/24/2023    multiple        Assessment/Plan     Acute hypoxic hypercapnic respiratory distress  Flash pulmonary edema  Acute exacerbation of HFrEF  Evidently bedside limited echo revealed ED < 40%  Full echo last November revealed LVEF 38% with IW akinesis  Pt weaned off NTG gtt and BIPAP in ED, presently on RA   Cardiology on board, carvedilol added and full echo ordered, continue IV Lasix and home GDMT     Hx severe PAD  Hx CAD s/p CABG  Pt was hospitalized last December for critical LLE ischemia and underwent left fem-below knee popliteal bypass 12/24/24  He was readmitted 12/30/24 for postoperative left groin abscess 2/2 Enterobacter/AmpC organism and was discharged on meropenem through 2/11/25   He will need close followup with vascular surgery and ID  Continue apixaban, ASA, Lipitor, Zetia and Brilinta, carvedilol added     Diabetes  Home diabetes meds held, continue Lantus and insulin sliding scale     HTN  Pt was hypertensive to 199/105 mmHg, has now normalized, continue home meds    Recent onset anemia  Hgb  has been downtrending since last last year, 8.6 today  Will check iron studies, folate and B12     Tobacco dependence  Continue nicotine patch      DVT ppx: apixaban     Discharge disposition  Home when stable      Sarah Cooper CNP  [facility]

## 2025-01-20 NOTE — CONSULTS
Inpatient consult to Cardiology  Consult performed by: Baljinder Wright MD  Consult ordered by: SUMAN Damon-CNP        History Of Present Illness:    Dereck Shen is a 59 y.o. male with PMHx of CAD s/p CABG, severe lower extremity PAD with prior revascularization (R fem-pop bypass 2/24/20; L fem-pop bypass 9/14/20; L fem PTA of the graft; R iliac PTA 2/2022; LLE and DANYA PTA 4/2024; angioplasty of the left SFA 10/21/24; and left femoral and tibial thrombectomy with patch angioplasty 11/19/24 who presented with shortness of breath that woke him from sleep at 5 am today and worsening. He choked on some pudding last night but was not SOB. He denies recent dysphagia and any cough. EMS noted him to be altered and hypoxic and placed him on oxygen. and brought him in for further evaluation. He was immediately placed on BIPAP; bedside echo evidently revealed bilateral B-lines as well as EF<40%. He was bolused with nitroglycerin and Lasix and started on a nitroglycerin drip. Initial VBG demonstrated  significant respiratory and metabolic acidosis which improved with resuscitation. He was ultimately weaned off nitroglycerin and BIPAP to supplemental O2. At the bedside he is alert and engaging.  ED workup was significant for blood glucose 284, potassium 5.3, creatinine 1.30, alk phos 135, troponins 12/20, lactate BNP 1231, WBC 18.3k. Influenza and COVID screens were negative. CXR revealed pulmonary vascular congestion and bilateral lower lobe atelectasis/pneumonitis. The pt was tachycardic to 133 bpm, tachypneic to 38, hypertensive to 199/105 mmHg and hypoxic to 85% on NRB.   Remainder of ROS reviewed and negative except as indicated in HPI.         Last Recorded Vitals:  Vitals:    01/19/25 2123 01/19/25 2328 01/20/25 0348 01/20/25 0744   BP: 156/82 119/72 117/73 175/82   BP Location: Left arm Left arm Left arm Left arm   Patient Position: Lying Lying Lying Sitting   Pulse: 87 77 74 82   Resp: 18 18 18 18   Temp:  36.5 °C (97.7 °F) 36.7 °C (98 °F) 36.3 °C (97.3 °F) 36.2 °C (97.1 °F)   TempSrc: Temporal Temporal Temporal Temporal   SpO2: 98% 91% 96% 98%   Weight:   98.2 kg (216 lb 7.9 oz)    Height:           Last Labs:  CBC - 1/20/2025:  5:51 AM  6.3 8.6 247    27.0      CMP - 1/20/2025:  5:51 AM  8.3 5.7 9 --- 0.4   5.4 2.9 9 90      PTT - 12/17/2024:  3:52 AM  1.3   15.0 33     Troponin I, High Sensitivity   Date/Time Value Ref Range Status   01/19/2025 10:27 AM 20 0 - 20 ng/L Final   01/19/2025 09:24 AM 12 0 - 20 ng/L Final   12/24/2024 03:53 AM 19 0 - 20 ng/L Final     BNP   Date/Time Value Ref Range Status   01/19/2025 09:24 AM 1,231 (H) 0 - 99 pg/mL Final   01/01/2025 12:44 AM 1,822 (H) 0 - 99 pg/mL Final     POC HEMOGLOBIN A1c   Date/Time Value Ref Range Status   10/01/2024 01:47 PM 8.2 (A) 4.2 - 6.5 % Final   05/01/2024 11:33 AM 7.3 (A) 4.2 - 6.5 % Final     Hemoglobin A1C   Date/Time Value Ref Range Status   12/18/2024 04:49 AM 7.9 (H) See comment % Final     LDL Calculated   Date/Time Value Ref Range Status   10/22/2024 05:13  (H) <=99 mg/dL Final     Comment:                                 Near   Borderline      AGE      Desirable  Optimal    High     High     Very High     0-19 Y     0 - 109     ---    110-129   >/= 130     ----    20-24 Y     0 - 119     ---    120-159   >/= 160     ----      >24 Y     0 -  99   100-129  130-159   160-189     >/=190       VLDL   Date/Time Value Ref Range Status   10/22/2024 05:13 AM 29 0 - 40 mg/dL Final   04/04/2023 02:31 PM 46 (H) 0 - 40 mg/dL Final   04/18/2022 04:03 PM 20 0 - 40 mg/dL Final   01/05/2022 06:04 AM 25 0 - 40 mg/dL Final      Last I/O:  I/O last 3 completed shifts:  In: 114.7 (1.2 mL/kg) [I.V.:114.7 (1.2 mL/kg)]  Out: 900 (9.2 mL/kg) [Urine:900 (0.3 mL/kg/hr)]  Weight: 98.2 kg     Past Cardiology Tests (Last 3 Years):  EKG:  ECG 12 lead 12/17/2024      ECG 12 lead 11/19/2024      ECG 12 lead STAT 10/04/2024      Electrocardiogram, 12-lead PRN ACS symptoms  04/24/2024      ECG 12 lead 02/04/2024    Echo:  Transthoracic Echo (TTE) Complete 11/20/2024    Ejection Fractions:  EF   Date/Time Value Ref Range Status   11/20/2024 09:49 AM 38 %      Cath:  No results found for this or any previous visit from the past 1095 days.    Stress Test:  NUCLEAR STRESS TEST 05/10/2023    Cardiac Imaging:  No results found for this or any previous visit from the past 1095 days.      Past Medical History:  He has a past medical history of Aphasia, Atherosclerotic heart disease of native coronary artery with unspecified angina pectoris (11/05/2019), Bipolar disorder, unspecified (Multi), BPH (benign prostatic hyperplasia), Diabetes (Multi), DVT (deep venous thrombosis) (Multi) (02/2022), Erectile dysfunction, GERD (gastroesophageal reflux disease), Hepatitis C, HFrEF (heart failure with reduced ejection fraction), HLD (hyperlipidemia), HTN (hypertension), Obstructive sleep apnea (adult) (pediatric), Opioid abuse, in remission (Multi), PAD (peripheral artery disease) (CMS-HCC), Polymyalgia rheumatica (Multi), Post-traumatic stress disorder, unspecified, and Stroke (Multi) (07/24/2023).    Past Surgical History:  He has a past surgical history that includes Knee surgery (Left); Elbow surgery; Back surgery; CT angio aorta and bilateral iliofemoral runoff including without contrast if performed (07/20/2020); MR angio head wo IV contrast (01/04/2022); MR angio neck wo IV contrast (01/04/2022); CT angio aorta and bilateral iliofemoral runoff including without contrast if performed (01/14/2022); Invasive Vascular Procedure (Bilateral, 10/04/2024); Dental surgery; Coronary angioplasty with stent; Coronary artery bypass graft; Tonsillectomy; Transluminal atherectomy femoral-popliteal / tibioperoneal; Invasive Vascular Procedure (N/A, 10/21/2024); and Invasive Vascular Procedure (N/A, 10/21/2024).      Social History:  He reports that he has been smoking cigarettes. He has been exposed to tobacco  smoke. He has never used smokeless tobacco. He reports current drug use. Drug: Marijuana. He reports that he does not drink alcohol.    Family History:  Family History   Problem Relation Name Age of Onset    No Known Problems Mother      No Known Problems Father          Allergies:  Celecoxib; Ibuprofen; Tetanus toxoid, adsorbed; [id number] antimigraine agents; Cephalexin; Hydrocodone; Latex; and Tryptophan    Inpatient Medications:  Scheduled medications   Medication Dose Route Frequency    apixaban  5 mg oral BID    aspirin  81 mg oral Daily    atorvastatin  80 mg oral Nightly    carvedilol  6.25 mg oral BID    dapagliflozin propanediol  10 mg oral Daily with breakfast    ezetimibe  10 mg oral Daily    furosemide  40 mg intravenous BID    gabapentin  400 mg oral TID    hydrALAZINE  50 mg oral TID    insulin glargine  20 Units subcutaneous Nightly    insulin lispro  0-5 Units subcutaneous TID AC    isosorbide mononitrate ER  60 mg oral Daily    meropenem (Merrem) 2 g in sodium chloride 0.9 % 100 mL IV  2 g intravenous q8h    nicotine  1 patch transdermal Daily    OXcarbazepine  450 mg oral BID    pantoprazole  40 mg oral Daily    ranolazine  500 mg oral BID    sacubitriL-valsartan  1 tablet oral BID    sennosides  2 tablet oral BID    tamsulosin  0.4 mg oral Daily    ticagrelor  90 mg oral BID     PRN medications   Medication    acetaminophen    dextrose    dextrose    glucagon    glucagon    morphine    morphine    oxygen     Continuous Medications   Medication Dose Last Rate    nitroglycerin  5-1,000 mcg/min Stopped (01/19/25 1430)     Outpatient Medications:  Current Outpatient Medications   Medication Instructions    aspirin 81 mg, Daily    atorvastatin (LIPITOR) 80 mg, oral, Nightly    carvedilol (Coreg) 6.25 mg tablet 1 tablet, Every 12 hours    Creon 36,000-114,000- 180,000 unit capsule,delayed release(DR/EC) capsule 2 capsules, 3 times daily PRN    dapagliflozin propanediol (FARXIGA) 10 mg, oral, Daily  "with breakfast    ELDERBERRY FRUIT ORAL 1 tablet, Daily PRN    Eliquis 5 mg, oral, 2 times daily    Entresto  mg tablet 1 tablet, 2 times daily    ezetimibe (Zetia) 10 mg tablet 1 tablet, Daily RT    flash glucose scanning reader misc USE TO CHECK SUGARS FOUR TIMES A DAY    flash glucose sensor kit (FreeStyle En 2 Sensor) kit use as directed daily and change every 14 days    furosemide (Lasix) 40 mg tablet 1 tablet, Daily PRN    gabapentin (NEURONTIN) 400 mg, oral, 3 times daily    GINGER ROOT EXTRACT ORAL 1 tablet, Daily PRN    heparin flush (heparin LockFlush,Porcine,,PF,) 10 unit/mL injection 5 mL, intra-catheter, As needed    heparin flush 30 Units, intra-catheter, Daily    hydrALAZINE (APRESOLINE) 50 mg, oral, 3 times daily    insulin lispro (HumaLOG) 100 unit/mL injection Inject 10 Units under the skin 3 times a day with meals. Plus sliding scale if BS is over 200    isosorbide mononitrate ER (IMDUR) 60 mg, oral, Daily    lancets misc 1 each, miscellaneous, 4 times daily before meals and nightly    Lantus Solostar U-100 Insulin 20 Units, subcutaneous, Nightly    medical cannabis Not [facility] prescribed    meropenem (MERREM) 2 g, intravenous, Every 8 hours    nicotine (Nicoderm CQ) 21 mg/24 hr patch APPLY 1 PATCH TO SKIN EVERY 24 HOURS AS DIRECTED    OXcarbazepine (Trileptal) 150 mg tablet 3 tablets, 2 times daily    oxyCODONE (ROXICODONE) 5 mg, oral, Every 6 hours PRN    pantoprazole (ProtoNix) 40 mg EC tablet TAKE 1 TABLET BY MOUTH ONCE DAILY    pen needle, diabetic (BD Ultra-Fine Jocy Pen Needle) 32 gauge x 5/32\" needle Pen Needles for Insulin.    pen needle, diabetic (BD Ultra-Fine Jocy Pen Needle) 32 gauge x 5/32\" needle Use to inject insulin up to 8 times per day.    pen needle, diabetic (TechLITE Pen Needle) 32 gauge x 1/4\" needle Use to inject 1-4 times daily as directed    ranolazine (Ranexa) 500 mg 12 hr tablet 1 tablet, 2 times daily    sodium chloride 0.9% flush 10 mL, intravenous, 3 times daily " PRN    tamsulosin (Flomax) 0.4 mg 24 hr capsule TAKE 1 CAPSULE BY MOUTH ONCE DAILY    ticagrelor (BRILINTA) 90 mg, oral, 2 times daily    tiZANidine (ZANAFLEX) 2 mg, oral, Daily PRN       Physical Exam:  Constitutional: Well developed, awake, alert, calm, oriented x4, no acute distress, cooperative   Eyes: EOMI, clear sclerae   ENMT: mucous membranes moist, no lesions seen   Head/Neck: Neck supple, no apparent injury, head atraumatic   Respiratory/Thorax: CTAB anteriorly, good chest expansion, respirations even and unlabored, pt on 4 lpm O2   Cardiovascular: Regular rate and rhythm, mildly tachycardic, no murmurs/rubs/gallops, normal S1 and S 2   Gastrointestinal: Abdomen nondistended, soft, nontender, +BS, no bruits   Musculoskeletal: ROM intact, no joint swelling, normal  strength   Extremities: no cyanosis, contusions or clubbing, or edema   Neurological: no focal deficit, pt alert and oriented x4   Psychological: Appropriate affect and behavior, pleasant   Skin: Warm and dry, no lesions, no rashes         Assessment/Plan   1) Acute Pulmonary edema  Hx of CABG s/p PCI of CX in 2021 and Known EF 35%  Repeat echo  IV diuresis  Echo  Further recommendations based on results of echo       Code Status:  Full Code    I spent 30 minutes in the professional and overall care of this patient.        Baljinder Wright MD

## 2025-01-21 ENCOUNTER — ANESTHESIA EVENT (OUTPATIENT)
Dept: CARDIOLOGY | Facility: HOSPITAL | Age: 60
End: 2025-01-21
Payer: COMMERCIAL

## 2025-01-21 PROBLEM — I50.9 ACUTE HEART FAILURE: Status: ACTIVE | Noted: 2025-01-19

## 2025-01-21 LAB
ANION GAP SERPL CALC-SCNC: 8 MMOL/L (ref 10–20)
BUN SERPL-MCNC: 25 MG/DL (ref 6–23)
CALCIUM SERPL-MCNC: 8.2 MG/DL (ref 8.6–10.3)
CHLORIDE SERPL-SCNC: 107 MMOL/L (ref 98–107)
CO2 SERPL-SCNC: 28 MMOL/L (ref 21–32)
CREAT SERPL-MCNC: 1.11 MG/DL (ref 0.5–1.3)
EGFRCR SERPLBLD CKD-EPI 2021: 77 ML/MIN/1.73M*2
ERYTHROCYTE [DISTWIDTH] IN BLOOD BY AUTOMATED COUNT: 14.7 % (ref 11.5–14.5)
GLUCOSE BLD MANUAL STRIP-MCNC: 131 MG/DL (ref 74–99)
GLUCOSE BLD MANUAL STRIP-MCNC: 132 MG/DL (ref 74–99)
GLUCOSE BLD MANUAL STRIP-MCNC: 137 MG/DL (ref 74–99)
GLUCOSE BLD MANUAL STRIP-MCNC: 215 MG/DL (ref 74–99)
GLUCOSE SERPL-MCNC: 106 MG/DL (ref 74–99)
HCT VFR BLD AUTO: 26.4 % (ref 41–52)
HGB BLD-MCNC: 8.4 G/DL (ref 13.5–17.5)
MCH RBC QN AUTO: 29.9 PG (ref 26–34)
MCHC RBC AUTO-ENTMCNC: 31.8 G/DL (ref 32–36)
MCV RBC AUTO: 94 FL (ref 80–100)
NRBC BLD-RTO: 0 /100 WBCS (ref 0–0)
PLATELET # BLD AUTO: 229 X10*3/UL (ref 150–450)
POTASSIUM SERPL-SCNC: 4.1 MMOL/L (ref 3.5–5.3)
RBC # BLD AUTO: 2.81 X10*6/UL (ref 4.5–5.9)
SODIUM SERPL-SCNC: 139 MMOL/L (ref 136–145)
WBC # BLD AUTO: 5.8 X10*3/UL (ref 4.4–11.3)

## 2025-01-21 PROCEDURE — 2500000004 HC RX 250 GENERAL PHARMACY W/ HCPCS (ALT 636 FOR OP/ED): Performed by: NURSE PRACTITIONER

## 2025-01-21 PROCEDURE — 2060000001 HC INTERMEDIATE ICU ROOM DAILY

## 2025-01-21 PROCEDURE — 82374 ASSAY BLOOD CARBON DIOXIDE: CPT | Performed by: NURSE PRACTITIONER

## 2025-01-21 PROCEDURE — 2500000001 HC RX 250 WO HCPCS SELF ADMINISTERED DRUGS (ALT 637 FOR MEDICARE OP): Performed by: NURSE PRACTITIONER

## 2025-01-21 PROCEDURE — 82947 ASSAY GLUCOSE BLOOD QUANT: CPT

## 2025-01-21 PROCEDURE — 2500000004 HC RX 250 GENERAL PHARMACY W/ HCPCS (ALT 636 FOR OP/ED): Performed by: HOSPITALIST

## 2025-01-21 PROCEDURE — S4991 NICOTINE PATCH NONLEGEND: HCPCS | Performed by: NURSE PRACTITIONER

## 2025-01-21 PROCEDURE — 85027 COMPLETE CBC AUTOMATED: CPT | Performed by: NURSE PRACTITIONER

## 2025-01-21 PROCEDURE — 2500000005 HC RX 250 GENERAL PHARMACY W/O HCPCS: Performed by: NURSE PRACTITIONER

## 2025-01-21 PROCEDURE — 2500000001 HC RX 250 WO HCPCS SELF ADMINISTERED DRUGS (ALT 637 FOR MEDICARE OP): Performed by: INTERNAL MEDICINE

## 2025-01-21 PROCEDURE — 2500000002 HC RX 250 W HCPCS SELF ADMINISTERED DRUGS (ALT 637 FOR MEDICARE OP, ALT 636 FOR OP/ED): Performed by: NURSE PRACTITIONER

## 2025-01-21 PROCEDURE — 99232 SBSQ HOSP IP/OBS MODERATE 35: CPT | Performed by: HOSPITALIST

## 2025-01-21 RX ORDER — SODIUM CITRATE AND CITRIC ACID MONOHYDRATE 334; 500 MG/5ML; MG/5ML
30 SOLUTION ORAL ONCE
Status: CANCELLED | OUTPATIENT
Start: 2025-01-23

## 2025-01-21 RX ORDER — ENOXAPARIN SODIUM 100 MG/ML
40 INJECTION SUBCUTANEOUS DAILY
Status: DISCONTINUED | OUTPATIENT
Start: 2025-01-21 | End: 2025-01-23

## 2025-01-21 RX ORDER — ALBUTEROL SULFATE 0.83 MG/ML
2.5 SOLUTION RESPIRATORY (INHALATION) ONCE
Status: CANCELLED | OUTPATIENT
Start: 2025-01-23

## 2025-01-21 RX ORDER — REGADENOSON 0.08 MG/ML
0.4 INJECTION, SOLUTION INTRAVENOUS
Status: CANCELLED | OUTPATIENT
Start: 2025-01-21

## 2025-01-21 RX ORDER — AMINOPHYLLINE 25 MG/ML
125 INJECTION, SOLUTION INTRAVENOUS AS NEEDED
Status: CANCELLED | OUTPATIENT
Start: 2025-01-21

## 2025-01-21 RX ORDER — SODIUM CHLORIDE 9 MG/ML
50 INJECTION, SOLUTION INTRAVENOUS CONTINUOUS
Status: CANCELLED | OUTPATIENT
Start: 2025-01-23 | End: 2025-01-23

## 2025-01-21 RX ORDER — METOCLOPRAMIDE HYDROCHLORIDE 5 MG/ML
10 INJECTION INTRAMUSCULAR; INTRAVENOUS ONCE
Status: CANCELLED | OUTPATIENT
Start: 2025-01-23

## 2025-01-21 RX ORDER — FAMOTIDINE 10 MG/ML
20 INJECTION INTRAVENOUS ONCE
Status: CANCELLED | OUTPATIENT
Start: 2025-01-23

## 2025-01-21 RX ADMIN — GABAPENTIN 400 MG: 400 CAPSULE ORAL at 20:28

## 2025-01-21 RX ADMIN — OXCARBAZEPINE 450 MG: 150 TABLET, FILM COATED ORAL at 08:26

## 2025-01-21 RX ADMIN — HYDRALAZINE HYDROCHLORIDE 50 MG: 50 TABLET ORAL at 08:26

## 2025-01-21 RX ADMIN — EZETIMIBE 10 MG: 10 TABLET ORAL at 06:35

## 2025-01-21 RX ADMIN — CARVEDILOL 12.5 MG: 12.5 TABLET, FILM COATED ORAL at 20:28

## 2025-01-21 RX ADMIN — DAPAGLIFLOZIN 10 MG: 5 TABLET, FILM COATED ORAL at 08:26

## 2025-01-21 RX ADMIN — SACUBITRIL AND VALSARTAN 1 TABLET: 97; 103 TABLET, FILM COATED ORAL at 20:29

## 2025-01-21 RX ADMIN — CARVEDILOL 12.5 MG: 12.5 TABLET, FILM COATED ORAL at 08:26

## 2025-01-21 RX ADMIN — RANOLAZINE 500 MG: 500 TABLET, FILM COATED, EXTENDED RELEASE ORAL at 20:28

## 2025-01-21 RX ADMIN — MEROPENEM 2 G: 2 INJECTION, POWDER, FOR SOLUTION INTRAVENOUS at 06:32

## 2025-01-21 RX ADMIN — MORPHINE SULFATE 2 MG: 2 INJECTION, SOLUTION INTRAMUSCULAR; INTRAVENOUS at 16:12

## 2025-01-21 RX ADMIN — ISOSORBIDE MONONITRATE 60 MG: 60 TABLET, EXTENDED RELEASE ORAL at 08:26

## 2025-01-21 RX ADMIN — Medication 1 L/MIN: at 00:44

## 2025-01-21 RX ADMIN — GABAPENTIN 400 MG: 400 CAPSULE ORAL at 16:08

## 2025-01-21 RX ADMIN — TAMSULOSIN HYDROCHLORIDE 0.4 MG: 0.4 CAPSULE ORAL at 08:26

## 2025-01-21 RX ADMIN — PANTOPRAZOLE SODIUM 40 MG: 40 TABLET, DELAYED RELEASE ORAL at 08:26

## 2025-01-21 RX ADMIN — MORPHINE SULFATE 2 MG: 2 INJECTION, SOLUTION INTRAMUSCULAR; INTRAVENOUS at 11:37

## 2025-01-21 RX ADMIN — GABAPENTIN 400 MG: 400 CAPSULE ORAL at 08:26

## 2025-01-21 RX ADMIN — ASPIRIN 81 MG CHEWABLE TABLET 81 MG: 81 TABLET CHEWABLE at 08:26

## 2025-01-21 RX ADMIN — MORPHINE SULFATE 2 MG: 2 INJECTION, SOLUTION INTRAMUSCULAR; INTRAVENOUS at 02:03

## 2025-01-21 RX ADMIN — MORPHINE SULFATE 2 MG: 2 INJECTION, SOLUTION INTRAMUSCULAR; INTRAVENOUS at 20:38

## 2025-01-21 RX ADMIN — SACUBITRIL AND VALSARTAN 1 TABLET: 97; 103 TABLET, FILM COATED ORAL at 08:26

## 2025-01-21 RX ADMIN — ENOXAPARIN SODIUM 40 MG: 40 INJECTION SUBCUTANEOUS at 20:28

## 2025-01-21 RX ADMIN — FUROSEMIDE 40 MG: 10 INJECTION, SOLUTION INTRAMUSCULAR; INTRAVENOUS at 16:42

## 2025-01-21 RX ADMIN — MEROPENEM 2 G: 2 INJECTION, POWDER, FOR SOLUTION INTRAVENOUS at 16:08

## 2025-01-21 RX ADMIN — MEROPENEM 2 G: 2 INJECTION, POWDER, FOR SOLUTION INTRAVENOUS at 23:25

## 2025-01-21 RX ADMIN — TICAGRELOR 90 MG: 90 TABLET ORAL at 20:28

## 2025-01-21 RX ADMIN — FUROSEMIDE 40 MG: 10 INJECTION, SOLUTION INTRAMUSCULAR; INTRAVENOUS at 08:26

## 2025-01-21 RX ADMIN — OXCARBAZEPINE 450 MG: 150 TABLET, FILM COATED ORAL at 20:28

## 2025-01-21 RX ADMIN — MORPHINE SULFATE 2 MG: 2 INJECTION, SOLUTION INTRAMUSCULAR; INTRAVENOUS at 06:57

## 2025-01-21 RX ADMIN — CYANOCOBALAMIN TAB 500 MCG 500 MCG: 500 TAB at 08:26

## 2025-01-21 RX ADMIN — INSULIN GLARGINE 20 UNITS: 100 INJECTION, SOLUTION SUBCUTANEOUS at 20:26

## 2025-01-21 RX ADMIN — HYDRALAZINE HYDROCHLORIDE 50 MG: 50 TABLET ORAL at 16:08

## 2025-01-21 RX ADMIN — INSULIN LISPRO 2 UNITS: 100 INJECTION, SOLUTION INTRAVENOUS; SUBCUTANEOUS at 13:02

## 2025-01-21 RX ADMIN — TICAGRELOR 90 MG: 90 TABLET ORAL at 08:26

## 2025-01-21 RX ADMIN — FERROUS SULFATE TAB 325 MG (65 MG ELEMENTAL FE) 325 MG: 325 (65 FE) TAB at 08:26

## 2025-01-21 RX ADMIN — HYDRALAZINE HYDROCHLORIDE 50 MG: 50 TABLET ORAL at 20:28

## 2025-01-21 RX ADMIN — ATORVASTATIN CALCIUM 80 MG: 40 TABLET, FILM COATED ORAL at 20:29

## 2025-01-21 RX ADMIN — RANOLAZINE 500 MG: 500 TABLET, FILM COATED, EXTENDED RELEASE ORAL at 08:26

## 2025-01-21 RX ADMIN — NICOTINE 1 PATCH: 21 PATCH, EXTENDED RELEASE TRANSDERMAL at 08:26

## 2025-01-21 ASSESSMENT — PAIN SCALES - GENERAL
PAINLEVEL_OUTOF10: 2
PAINLEVEL_OUTOF10: 8
PAINLEVEL_OUTOF10: 4
PAINLEVEL_OUTOF10: 3
PAINLEVEL_OUTOF10: 8
PAINLEVEL_OUTOF10: 8
PAINLEVEL_OUTOF10: 0 - NO PAIN
PAINLEVEL_OUTOF10: 3
PAINLEVEL_OUTOF10: 8
PAINLEVEL_OUTOF10: 8

## 2025-01-21 ASSESSMENT — PAIN DESCRIPTION - ORIENTATION
ORIENTATION: LEFT

## 2025-01-21 ASSESSMENT — COGNITIVE AND FUNCTIONAL STATUS - GENERAL
MOBILITY SCORE: 21
MOBILITY SCORE: 21
STANDING UP FROM CHAIR USING ARMS: A LITTLE
CLIMB 3 TO 5 STEPS WITH RAILING: A LITTLE
WALKING IN HOSPITAL ROOM: A LITTLE
DAILY ACTIVITIY SCORE: 24
CLIMB 3 TO 5 STEPS WITH RAILING: A LITTLE
STANDING UP FROM CHAIR USING ARMS: A LITTLE
WALKING IN HOSPITAL ROOM: A LITTLE
DAILY ACTIVITIY SCORE: 24

## 2025-01-21 ASSESSMENT — PAIN - FUNCTIONAL ASSESSMENT
PAIN_FUNCTIONAL_ASSESSMENT: 0-10

## 2025-01-21 ASSESSMENT — PAIN DESCRIPTION - LOCATION
LOCATION: GROIN

## 2025-01-21 NOTE — PROGRESS NOTES
Dereck Shen [id number]   Service: Internal Medicine / Hospitalist Date of service: 1/21/2025                          Full Code                    Subjective    Dereck Shen is a 59 y.o. male with PMHx of CAD s/p CABG, severe lower extremity PAD with prior revascularization (R fem-pop bypass 2/24/20; L fem-pop bypass 9/14/20; L fem PTA of the graft; R iliac PTA 2/2022; LLE and DANYA PTA 4/2024; angioplasty of the left SFA 10/21/24; and left femoral and tibial thrombectomy with patch angioplasty 11/19/24 who presented with shortness of breath that woke him from sleep at 5 am today and worsening. He choked on some pudding last night but was not SOB. He denies recent dysphagia and any cough. EMS noted him to be altered and hypoxic and placed him on oxygen. and brought him in for further evaluation. He was immediately placed on BIPAP; bedside echo evidently revealed bilateral B-lines as well as EF<40%. He was bolused with nitroglycerin and Lasix and started on a nitroglycerin drip. Initial VBG demonstrated  significant respiratory and metabolic acidosis which improved with resuscitation. He was ultimately weaned off nitroglycerin and BIPAP to supplemental O2. At the bedside he is alert and engaging.  ED workup was significant for blood glucose 284, potassium 5.3, creatinine 1.30, alk phos 135, troponins 12/20, lactate BNP 1231, WBC 18.3k. Influenza and COVID screens were negative. CXR revealed pulmonary vascular congestion and bilateral lower lobe atelectasis/pneumonitis. The pt was tachycardic to 133 bpm, tachypneic to 38, hypertensive to 199/105 mmHg and hypoxic to 85% on NRB.   Remainder of ROS reviewed and negative except as indicated in HPI.     1/20.................  Pt denies SOB and chest pain. He wants to go home and is upset to know the echo may not be done until tomorrow. I advised him that leaving now would be considered AMA.     1/21..............Patient reported initially he refused Lasix because  he felt that he just did not work with his BPH.  He reports that he has an upcoming follow-up with his urologist concerning surgical procedure to aid is BPH.  No reported: Headaches, fevers, chills, nausea, vomiting, chest pains or palpitations.    Review of Systems:   Review of system otherwise negative if not aforementioned above in subjective.    Objective              Physical Exam     Constitutional:       Appearance: Patient appeared in no acute cardiopulmonary distress.     Comments: Patient alert and oriented to person place time and situation.  HEENT:      Head: Normocephalic and atraumatic.Trachea midline      Nose:No observed congestion or rhinorrhea.     Mouth/Throat: Mucous membranes Moist, Trachea appeared  midline.  Eyes:      Extraocular Movements: Extraocular movements intact.      Pupils: Pupils are equal, round, and reactive to light.      Comments: No scleral icterus or conjunctival injection appreciated.   Cardiovascular:      Rate and Rhythm: Normal rate and regular rhythm. No clicks rubs or gallops, normal S1 and S2.No peripheral stigmata of endocarditis appreciated.     Pulmonary:      Diminished lung bases appreciated bilaterally but no adventitious sound appreciated.  Abdominal:      General: Abdomen soft, nontender, active bowel sounds, no involuntary guarding or rebound tenderness appreciated.     Comments: None   Musculoskeletal:       Patient appeared to have full active range of motion for upper and lower extremities, no acute apparent joint deformity appreciated on examination.   No pitting edema or cyanosis appreciated.       Lymphadenopathy:      No appreciable palpable lymphadenopathy  Skin:     General: Skin is warm.      Coloration:  No jaundice     Findings: No abnormal appearing skin rashes or lesions that appeared acute noted on unclothed area of the skin..   Neurological:      General: No focal sensory or motor deficits appreciated, no meningeal signs or dysmetria noted.       Cranial Nerves: Cranial nerves II to XII appearing grossly intact.     Genitals:  Deferred  Psychiatric:         The patient appears to be displaying normal mood and affect at the time of evaluation.    Labs:     Lab Results   Component Value Date    GLUCOSE 106 (H) 01/21/2025    CALCIUM 8.2 (L) 01/21/2025     01/21/2025    K 4.1 01/21/2025    CO2 28 01/21/2025     01/21/2025    BUN 25 (H) 01/21/2025    CREATININE 1.11 01/21/2025      Lab Results   Component Value Date    WBC 5.8 01/21/2025    HGB 8.4 (L) 01/21/2025    HCT 26.4 (L) 01/21/2025    MCV 94 01/21/2025     01/21/2025      [unfilled]   [unfilled]   Susceptibility data from last 90 days.  Collected Specimen Info Organism Amoxicillin/Clavulanate Ampicillin Ampicillin/Sulbactam Cefazolin Cefepime Ciprofloxacin Gentamicin Levofloxacin Piperacillin/Tazobactam Trimethoprim/Sulfamethoxazole   01/02/25 Tissue/Biopsy from Skin/Superficial Abscess Enterobacter cloacae complex  R  R  R  R  S  S  S  S  S  S   12/30/24 Tissue/Biopsy from Skin/Superficial Abscess Enterobacter cloacae complex  R  R  R  R  S  S  S  S  S  S     Mixed Anaerobic Bacteria                         X-rays/ Images    [unfilled]       Radiology Results (last 21 days)    Procedure Component Value Units Date/Time   XR chest 1 view [[id number]] Collected: 01/19/25 0954   Order Status: Completed Updated: 01/19/25 1017   Narrative:     STUDY:  Chest Radiograph; 01/19/2025 9:46 AM  INDICATION:  Shortness of breath.  COMPARISON:  XR chest 01/01/2025 10:53:00, 00:49:00.  ACCESSION NUMBER(S):  [id number]  ORDERING CLINICIAN:  GINGER BREWSTER  TECHNIQUE:  Frontal chest was obtained at 09:45:00 hours.  FINDINGS:  CARDIOMEDIASTINAL SILHOUETTE:  Cardiomediastinal silhouette is normal in size and configuration.  There is a right upper extremity PICC tip overlying the region of the  caval atrial junction.     LUNGS:  The lungs demonstrate pulmonary vascular prominence which  may be  consistent with vascular congestion.  Patchy changes are noted at the  lung bases which may represent atelectasis or pneumonitis..     ABDOMEN:  No remarkable upper abdominal findings.     BONES:  No acute osseous changes.  Median sternotomy wires are visualized.   Impression:     1.  Pulmonary vascular prominence suggestive of pulmonary vascular  congestion.  2.  Patchy opacities within the lower lobes most consistent with  atelectasis/pneumonitis.  Signed by Simeon Cummins MD         Medical Problems       Problem List       * (Principal) Flash pulmonary edema    PAD (peripheral artery disease) (CMS-HCC)    Abdominal pain, acute, right upper quadrant    Abnormal CXR    Atherosclerosis of native artery of both lower extremities with intermittent claudication (CMS-HCC)    Atherosclerotic heart disease of native coronary artery without angina pectoris    Bacterial skin infection    Bipolar depression (Multi)    BPH (benign prostatic hyperplasia)    Burning pain    Causalgia of lower extremity    Hip pain, right    Pain of right lower extremity    Ischemic stroke (Multi)    Chronic combined systolic and diastolic CHF (congestive heart failure)    Chronic back pain greater than 3 months duration    Chronic hepatitis C virus genotype 1a infection (Multi)    Chronic obstructive pulmonary disease (Multi)    Chronic pain syndrome    Chronic RUQ pain    Claudication (CMS-HCC)    Coordination impairment    Decreased peripheral vision of left eye    Diabetic neuropathy, painful (Multi)    Diabetic polyneuropathy associated with type 2 diabetes mellitus (Multi)    Eschar of lower leg    Frequent falls    Gastroesophageal reflux disease    H/O: CVA (cerebrovascular accident)    Hepatic steatosis    Hepatitis C virus infection cured after antiviral drug therapy    Hepatitis-C    Hyperkalemia    Hyperlipidemia    Hypertension    Joint stiffness of both shoulders    Left ventricular dysfunction    Low back pain    Nasal  congestion with rhinorrhea    Constipation    Nausea in adult    Neuropathic pain    Nicotine dependence    Neuropathy, inflammatory or toxic (Multi)    BRUNA (obstructive sleep apnea)    Other symptoms and signs involving the musculoskeletal system    Pancreatic lesion (HHS-HCC)    Pancreatic malabsorption (HHS-HCC)    Paroxysmal nocturnal dyspnea    Peripheral arterial disease (CMS-HCC)    Poor balance    Other chronic postprocedural pain    Presence of aortocoronary bypass graft    Pulmonary congestion    Recurrent boils    Reduced chest expansion on inspiration    S/P CABG x 5    Right flank pain    Paraparesis (Multi)    Type 2 diabetes mellitus    Spasticity as late effect of cerebrovascular accident (CVA)    Shortness of breath on exertion    Dyspnea    Shingles    S/P PTCA (percutaneous transluminal coronary angioplasty)    Long-term insulin use (Multi)    Right hemiparesis (Multi)    Hospital discharge follow-up    Critical limb ischemia of left lower extremity (Multi)    Peripheral artery occlusion (CMS-HCC)    Postoperative abscess               Above medical problems may be reflective of historical medical problems that may have resolved and may not related to acute clinical condition/medical problems.    Clinical impression/plan:        Acute hypoxic hypercapnic respiratory distress  Flash pulmonary edema  Acute exacerbation of HFrEF  Evidently bedside limited echo revealed ED < 40%  Full echo last November revealed LVEF 38% with IW akinesis  Pt weaned off NTG gtt and BIPAP in ED, presently on RA   Cardiology on board, carvedilol added and full echo ordered, continue IV Lasix and home GDMT     Hx severe PAD  Hx CAD s/p CABG  Pt was hospitalized last December for critical LLE ischemia and underwent left fem-below knee popliteal bypass 12/24/24  He was readmitted 12/30/24 for postoperative left groin abscess 2/2 Enterobacter/AmpC organism and was discharged on meropenem through 2/11/25   He will need close  followup with vascular surgery and ID  Continue apixaban, ASA, Lipitor, Zetia and Brilinta, carvedilol added     Diabetes  Home diabetes meds held, continue Lantus and insulin sliding scale     HTN  Pt was hypertensive to 199/105 mmHg, has now normalized, continue home meds     Recent onset anemia  Hgb has been downtrending since last last year, 8.6 today  Will check iron studies, folate and B12     Tobacco dependence  Continue nicotine patch      DVT ppx: apixaban        Disposition/additional care plan/interventions: 1/21/2025    Communicated with Dr. Wright cardiologist who saw patient before I did this morning.  Patient was  refusing Lasix, refused stress test, Dr. Wright considering heart catheterization this coming Thursday and currently holding Eliquis.      However patient later was okay with Lasix therapy as ordered by cardiology.    Known history of coronary artery disease status post PCI and CABG presenting with acute pulmonary edema will continue IV diuresis.    Plan heart catheterization this coming Thursday, Eliquis on hold    Monitor electrolytes    SCDs    The patient was informed of differential diagnosis , work up , plan of care and possible sequelae of clinical disposition.Patient in agreement with plan of care. Further recommendations forthcoming in accordance with patient's clinical disposition and response to care.    Discharge planning:Discharge timing to be determined.    Care time: >35 mins         Dictation performed with assistance of voice recognition device therefore transcription errors are possible.

## 2025-01-21 NOTE — PROGRESS NOTES
Social work consult placed for positive medical risk screen. SW reviewed pt's chart and communicated with TCC. SW familiar with pt from previous admissions. SW met with pt to assess needs and provide support, introduced self and my role as  with care transition team. Pt did not identify any concerns and planning to return home with his mom and resume [facility]. Pt did inquire about getting new walker at dc as the wheels are broken on his current one. Unlikely insurance will cover due to pt just getting the one he currently has during previous admission. Pt aware. No other needs identified at this time, SW signing off,  pt and care team aware of SW availability while inpt.    Laurie Prather, MSW, LSW ([id number])   Care Transitions

## 2025-01-21 NOTE — CARE PLAN
The patient's goals for the shift include  pain management    The clinical goals for the shift include SPO2 >88%

## 2025-01-21 NOTE — CARE PLAN
The patient's goals for the shift include      The clinical goals for the shift include maintain spo2 >88%      Problem: Pain - Adult  Goal: Verbalizes/displays adequate comfort level or baseline comfort level  Outcome: Progressing     Problem: Safety - Adult  Goal: Free from fall injury  Outcome: Progressing     Problem: Discharge Planning  Goal: Discharge to home or other facility with appropriate resources  Outcome: Progressing     Problem: Chronic Conditions and Co-morbidities  Goal: Patient's chronic conditions and co-morbidity symptoms are monitored and maintained or improved  Outcome: Progressing     Problem: Fall/Injury  Goal: Not fall by end of shift  Outcome: Progressing  Goal: Be free from injury by end of the shift  Outcome: Progressing  Goal: Verbalize understanding of personal risk factors for fall in the hospital  Outcome: Progressing  Goal: Verbalize understanding of risk factor reduction measures to prevent injury from fall in the home  Outcome: Progressing  Goal: Use assistive devices by end of the shift  Outcome: Progressing  Goal: Pace activities to prevent fatigue by end of the shift  Outcome: Progressing     Problem: Respiratory  Goal: No signs of respiratory distress (eg. Use of accessory muscles. Peds grunting)  Outcome: Progressing  Goal: Verbalize decreased shortness of breath this shift  Outcome: Progressing  Goal: Wean oxygen to maintain O2 saturation per order/standard this shift  Outcome: Progressing     Problem: Pain  Goal: Takes deep breaths with improved pain control throughout the shift  Outcome: Progressing  Goal: Turns in bed with improved pain control throughout the shift  Outcome: Progressing  Goal: Walks with improved pain control throughout the shift  Outcome: Progressing  Goal: Performs ADL's with improved pain control throughout shift  Outcome: Progressing  Goal: Participates in PT with improved pain control throughout the shift  Outcome: Progressing     Problem: Skin  Goal:  Decreased wound size/increased tissue granulation at next dressing change  Outcome: Progressing  Goal: Participates in plan/prevention/treatment measures  Outcome: Progressing  Goal: Prevent/manage excess moisture  Outcome: Progressing  Goal: Prevent/minimize sheer/friction injuries  Outcome: Progressing  Goal: Promote/optimize nutrition  Outcome: Progressing  Goal: Promote skin healing  Outcome: Progressing

## 2025-01-21 NOTE — CONSULTS
Consults  History Of Present Illness:    Dereck Shen is a 59 y.o. male with PMHx of CAD s/p CABG, severe lower extremity PAD with prior revascularization (R fem-pop bypass 2/24/20; L fem-pop bypass 9/14/20; L fem PTA of the graft; R iliac PTA 2/2022; LLE and DANYA PTA 4/2024; angioplasty of the left SFA 10/21/24; and left femoral and tibial thrombectomy with patch angioplasty 11/19/24 who presented with shortness of breath that woke him from sleep at 5 am today and worsening. He choked on some pudding last night but was not SOB. He denies recent dysphagia and any cough. EMS noted him to be altered and hypoxic and placed him on oxygen. and brought him in for further evaluation. He was immediately placed on BIPAP; bedside echo evidently revealed bilateral B-lines as well as EF<40%. He was bolused with nitroglycerin and Lasix and started on a nitroglycerin drip. Initial VBG demonstrated  significant respiratory and metabolic acidosis which improved with resuscitation. He was ultimately weaned off nitroglycerin and BIPAP to supplemental O2. At the bedside he is alert and engaging.  ED workup was significant for blood glucose 284, potassium 5.3, creatinine 1.30, alk phos 135, troponins 12/20, lactate BNP 1231, WBC 18.3k. Influenza and COVID screens were negative. CXR revealed pulmonary vascular congestion and bilateral lower lobe atelectasis/pneumonitis. The pt was tachycardic to 133 bpm, tachypneic to 38, hypertensive to 199/105 mmHg and hypoxic to 85% on NRB.   1/21/2025 : Feeling SOB. Refusing Lasix as he has urine output problems     Last Recorded Vitals:  Vitals:    01/20/25 2324 01/21/25 0203 01/21/25 0346 01/21/25 0725   BP: 92/65 104/72 107/74 138/81   BP Location: Left arm  Left arm Left arm   Patient Position: Lying  Lying Sitting   Pulse: 83  80 68   Resp: 18  18 18   Temp: 37.1 °C (98.7 °F)  36.7 °C (98 °F) 36.5 °C (97.7 °F)   TempSrc: Temporal  Temporal Temporal   SpO2: 94%  96% 98%   Weight:   98.6 kg  (217 lb 6 oz)    Height:           Last Labs:  CBC - 1/21/2025:  4:43 AM  5.8 8.4 229    26.4      CMP - 1/21/2025:  4:43 AM  8.2 5.7 9 --- 0.4   5.4 2.9 9 90      PTT - 12/17/2024:  3:52 AM  1.3   15.0 33     Troponin I, High Sensitivity   Date/Time Value Ref Range Status   01/19/2025 10:27 AM 20 0 - 20 ng/L Final   01/19/2025 09:24 AM 12 0 - 20 ng/L Final   12/24/2024 03:53 AM 19 0 - 20 ng/L Final     BNP   Date/Time Value Ref Range Status   01/19/2025 09:24 AM 1,231 (H) 0 - 99 pg/mL Final   01/01/2025 12:44 AM 1,822 (H) 0 - 99 pg/mL Final     POC HEMOGLOBIN A1c   Date/Time Value Ref Range Status   10/01/2024 01:47 PM 8.2 (A) 4.2 - 6.5 % Final   05/01/2024 11:33 AM 7.3 (A) 4.2 - 6.5 % Final     Hemoglobin A1C   Date/Time Value Ref Range Status   12/18/2024 04:49 AM 7.9 (H) See comment % Final     LDL Calculated   Date/Time Value Ref Range Status   10/22/2024 05:13  (H) <=99 mg/dL Final     Comment:                                 Near   Borderline      AGE      Desirable  Optimal    High     High     Very High     0-19 Y     0 - 109     ---    110-129   >/= 130     ----    20-24 Y     0 - 119     ---    120-159   >/= 160     ----      >24 Y     0 -  99   100-129  130-159   160-189     >/=190       VLDL   Date/Time Value Ref Range Status   10/22/2024 05:13 AM 29 0 - 40 mg/dL Final   04/04/2023 02:31 PM 46 (H) 0 - 40 mg/dL Final   04/18/2022 04:03 PM 20 0 - 40 mg/dL Final   01/05/2022 06:04 AM 25 0 - 40 mg/dL Final      Last I/O:  I/O last 3 completed shifts:  In: 714.7 (7.2 mL/kg) [I.V.:114.7 (1.2 mL/kg); IV Piggyback:600]  Out: - (0 mL/kg)   Weight: 98.6 kg     Past Cardiology Tests (Last 3 Years):  EKG:  ECG 12 lead 01/19/2025 (Preliminary)      ECG 12 lead 12/17/2024      ECG 12 lead 11/19/2024      ECG 12 lead STAT 10/04/2024      Electrocardiogram, 12-lead PRN ACS symptoms 04/24/2024      ECG 12 lead 02/04/2024    Echo:  Transthoracic Echo (TTE) Complete 01/20/2025      Transthoracic Echo (TTE) Complete  11/20/2024    Ejection Fractions:  EF   Date/Time Value Ref Range Status   01/20/2025 04:32 PM 36 %    11/20/2024 09:49 AM 38 %      Cath:  No results found for this or any previous visit from the past 1095 days.    Stress Test:  NUCLEAR STRESS TEST 05/10/2023    Cardiac Imaging:  No results found for this or any previous visit from the past 1095 days.      Past Medical History:  He has a past medical history of Aphasia, Atherosclerotic heart disease of native coronary artery with unspecified angina pectoris (11/05/2019), Bipolar disorder, unspecified (Multi), BPH (benign prostatic hyperplasia), Diabetes (Multi), DVT (deep venous thrombosis) (Multi) (02/2022), Erectile dysfunction, GERD (gastroesophageal reflux disease), Hepatitis C, HFrEF (heart failure with reduced ejection fraction), HLD (hyperlipidemia), HTN (hypertension), Obstructive sleep apnea (adult) (pediatric), Opioid abuse, in remission (Multi), PAD (peripheral artery disease) (CMS-HCC), Polymyalgia rheumatica (Multi), Post-traumatic stress disorder, unspecified, and Stroke (Multi) (07/24/2023).    Past Surgical History:  He has a past surgical history that includes Knee surgery (Left); Elbow surgery; Back surgery; CT angio aorta and bilateral iliofemoral runoff including without contrast if performed (07/20/2020); MR angio head wo IV contrast (01/04/2022); MR angio neck wo IV contrast (01/04/2022); CT angio aorta and bilateral iliofemoral runoff including without contrast if performed (01/14/2022); Invasive Vascular Procedure (Bilateral, 10/04/2024); Dental surgery; Coronary angioplasty with stent; Coronary artery bypass graft; Tonsillectomy; Transluminal atherectomy femoral-popliteal / tibioperoneal; Invasive Vascular Procedure (N/A, 10/21/2024); and Invasive Vascular Procedure (N/A, 10/21/2024).      Social History:  He reports that he has been smoking cigarettes. He has been exposed to tobacco smoke. He has never used smokeless tobacco. He reports  current drug use. Drug: Marijuana. He reports that he does not drink alcohol.    Family History:  Family History   Problem Relation Name Age of Onset    No Known Problems Mother      No Known Problems Father          Allergies:  Celecoxib; Ibuprofen; Tetanus toxoid, adsorbed; [id number] antimigraine agents; Cephalexin; Hydrocodone; Latex; and Tryptophan    Inpatient Medications:  Scheduled medications   Medication Dose Route Frequency    [Held by provider] apixaban  5 mg oral BID    aspirin  81 mg oral Daily    atorvastatin  80 mg oral Nightly    carvedilol  12.5 mg oral BID    cyanocobalamin  500 mcg oral Daily    dapagliflozin propanediol  10 mg oral Daily with breakfast    ezetimibe  10 mg oral Daily    ferrous sulfate (325 mg ferrous sulfate)  65 mg of iron oral Daily with breakfast    furosemide  40 mg intravenous BID    gabapentin  400 mg oral TID    hydrALAZINE  50 mg oral TID    insulin glargine  20 Units subcutaneous Nightly    insulin lispro  0-5 Units subcutaneous TID AC    isosorbide mononitrate ER  60 mg oral Daily    meropenem (Merrem) 2 g in sodium chloride 0.9 % 100 mL IV  2 g intravenous q8h    nicotine  1 patch transdermal Daily    OXcarbazepine  450 mg oral BID    pantoprazole  40 mg oral Daily    ranolazine  500 mg oral BID    sacubitriL-valsartan  1 tablet oral BID    sennosides  2 tablet oral BID    tamsulosin  0.4 mg oral Daily    ticagrelor  90 mg oral BID     PRN medications   Medication    acetaminophen    dextrose    dextrose    glucagon    glucagon    morphine    morphine    oxygen     Continuous Medications   Medication Dose Last Rate    nitroglycerin  5-1,000 mcg/min Stopped (01/19/25 1430)     Outpatient Medications:  Current Outpatient Medications   Medication Instructions    aspirin 81 mg, Daily    atorvastatin (LIPITOR) 80 mg, oral, Nightly    carvedilol (Coreg) 6.25 mg tablet 1 tablet, Every 12 hours    Creon 36,000-114,000- 180,000 unit capsule,delayed release(DR/EC) capsule 2  "capsules, 3 times daily PRN    dapagliflozin propanediol (FARXIGA) 10 mg, oral, Daily with breakfast    ELDERBERRY FRUIT ORAL 1 tablet, Daily PRN    Eliquis 5 mg, oral, 2 times daily    Entresto  mg tablet 1 tablet, 2 times daily    ezetimibe (Zetia) 10 mg tablet 1 tablet, Daily RT    flash glucose scanning reader misc USE TO CHECK SUGARS FOUR TIMES A DAY    flash glucose sensor kit (FreeStyle En 2 Sensor) kit use as directed daily and change every 14 days    furosemide (Lasix) 40 mg tablet 1 tablet, Daily PRN    gabapentin (NEURONTIN) 400 mg, oral, 3 times daily    GINGER ROOT EXTRACT ORAL 1 tablet, Daily PRN    heparin flush (heparin LockFlush,Porcine,,PF,) 10 unit/mL injection 5 mL, intra-catheter, As needed    heparin flush 30 Units, intra-catheter, Daily    hydrALAZINE (APRESOLINE) 50 mg, oral, 3 times daily    insulin lispro (HumaLOG) 100 unit/mL injection Inject 10 Units under the skin 3 times a day with meals. Plus sliding scale if BS is over 200    isosorbide mononitrate ER (IMDUR) 60 mg, oral, Daily    lancets misc 1 each, miscellaneous, 4 times daily before meals and nightly    Lantus Solostar U-100 Insulin 20 Units, subcutaneous, Nightly    medical cannabis Not [facility] prescribed    meropenem (MERREM) 2 g, intravenous, Every 8 hours    nicotine (Nicoderm CQ) 21 mg/24 hr patch APPLY 1 PATCH TO SKIN EVERY 24 HOURS AS DIRECTED    OXcarbazepine (Trileptal) 150 mg tablet 3 tablets, 2 times daily    oxyCODONE (ROXICODONE) 5 mg, oral, Every 6 hours PRN    pantoprazole (ProtoNix) 40 mg EC tablet TAKE 1 TABLET BY MOUTH ONCE DAILY    pen needle, diabetic (BD Ultra-Fine Jocy Pen Needle) 32 gauge x 5/32\" needle Pen Needles for Insulin.    pen needle, diabetic (BD Ultra-Fine Jocy Pen Needle) 32 gauge x 5/32\" needle Use to inject insulin up to 8 times per day.    pen needle, diabetic (TechLITE Pen Needle) 32 gauge x 1/4\" needle Use to inject 1-4 times daily as directed    ranolazine (Ranexa) 500 mg 12 hr tablet 1 " tablet, 2 times daily    sodium chloride 0.9% flush 10 mL, intravenous, 3 times daily PRN    tamsulosin (Flomax) 0.4 mg 24 hr capsule TAKE 1 CAPSULE BY MOUTH ONCE DAILY    ticagrelor (BRILINTA) 90 mg, oral, 2 times daily    tiZANidine (ZANAFLEX) 2 mg, oral, Daily PRN       Physical Exam:  Constitutional: Well developed, awake, alert, calm, oriented x4, no acute distress, cooperative   Eyes: EOMI, clear sclerae   ENMT: mucous membranes moist, no lesions seen   Head/Neck: Neck supple, no apparent injury, head atraumatic   Respiratory/Thorax: CTAB anteriorly, good chest expansion, respirations even and unlabored, pt on 4 lpm O2   Cardiovascular: Regular rate and rhythm, mildly tachycardic, no murmurs/rubs/gallops, normal S1 and S 2   Gastrointestinal: Abdomen nondistended, soft, nontender, +BS, no bruits   Musculoskeletal: ROM intact, no joint swelling, normal  strength   Extremities: no cyanosis, contusions or clubbing, or edema   Neurological: no focal deficit, pt alert and oriented x4   Psychological: Appropriate affect and behavior, pleasant   Skin: Warm and dry, no lesions, no rashes        Assessment/Plan   1) Acute Pulmonary edema  Hx of CABG s/p PCI of CX in 2021 and Known EF 35%  Repeat echo  IV diuresis  Echo  Further recommendations based on results of echo  1/21/2025 : Echo reviewed  He is refusing stress test  Will perform cardiac cath on Thursday  Hold Eliquis  Suggest urology to see        PICC - Adult 01/03/25 Single lumen Right Basilic vein (Active)   Line Necessity Intravenous medication therapy 01/21/25 0350   Site Assessment Clean;Dry;Intact 01/21/25 0350   Proximal Lumen Status Normal saline locked 01/21/25 0350   Dressing Type Antimicrobial patch;Transparent 01/21/25 0350   Dressing Status Clean;Dry 01/21/25 0350   Dressing Change Due 01/24/25 01/21/25 0350   Number of days: 18       Code Status:  Full Code    I spent 30 minutes in the professional and overall care of this patient.        Baljinder  MD Kyle

## 2025-01-21 NOTE — CONSULTS
Wound Care Consult     Visit Date: 1/21/2025      Patient Name: Dereck Shen         MRN: [id number]           YOB: 1965      Pertinent Labs:   Albumin   Date Value Ref Range Status   01/20/2025 2.9 (L) 3.4 - 5.0 g/dL Final   12/06/2019 4.0 3.4 - 5.0 g/dL Final     ALBUMIN (MG/L) IN URINE   Date Value Ref Range Status   04/04/2023 1,008.6 Not Established mg/L Final   Dietician consult recommended    Wound Assessment:  Wound 12/18/24 Incision Groin (Active)   Wound Image   01/21/25 1250   Site Assessment Red 01/21/25 1250   Kyung-Wound Assessment Red 01/21/25 1250   Wound Length (cm) 0.3 cm 01/21/25 1250   Wound Width (cm) 0.3 cm 01/21/25 1250   Wound Surface Area (cm^2) 0.09 cm^2 01/21/25 1250   Wound Depth (cm) 0.1 cm 01/21/25 1250   Wound Volume (cm^3) 0.009 cm^3 01/21/25 1250   Wound Healing % 99 01/21/25 1250   State of Healing Closed wound edges 01/20/25 1326   Closure Dehisced 01/20/25 1326   Treatments Cleansed;Site care 01/21/25 1250   Drainage Description Serosanguineous 01/21/25 1250   Drainage Amount Small 01/21/25 1250   Dressing Foam 01/21/25 1250   Dressing Changed Changed 01/21/25 1250   Dressing Status Clean;Dry 01/21/25 0044       Wound 12/18/24 Incision Pretibial Left;Proximal (Active)   Wound Image   01/21/25 1241   Site Assessment Dry;Red;Brown 01/21/25 1241   Kyung-Wound Assessment Dry;Intact 01/21/25 1241   Shape linear 01/21/25 1241   Wound Length (cm) 5.7 cm 01/21/25 1241   Wound Width (cm) 0.5 cm 01/21/25 1241   Wound Surface Area (cm^2) 2.85 cm^2 01/21/25 1241   Wound Depth (cm) 0.2 cm 01/21/25 1241   Wound Volume (cm^3) 0.57 cm^3 01/21/25 1241   Wound Healing % 92 01/21/25 1241   State of Healing Slough 01/20/25 1239   Margins Well-defined edges 01/21/25 1241   Treatments Cleansed;Site care 01/21/25 1241   Sutures/Staple Line Non approximated 01/21/25 1241   Drainage Description Serosanguineous 01/21/25 1241   Drainage Amount Scant 01/21/25 1241   Dressing Other (Comment)  01/21/25 1241   Dressing Changed Changed 01/21/25 1241   Dressing Status Clean;Dry 01/21/25 0044       Wound 12/31/24 Incision Tibial Dorsal;Left;Lateral (Active)   Wound Image   01/21/25 1242   Site Assessment Red;Tan;White;Sloughing 01/21/25 1242   Kyung-Wound Assessment Red 01/21/25 1242   Shape linear 01/21/25 1242   Wound Length (cm) 6.5 cm 01/21/25 1242   Wound Width (cm) 1 cm 01/21/25 1242   Wound Surface Area (cm^2) 6.5 cm^2 01/21/25 1242   Wound Depth (cm) 0.2 cm 01/21/25 1242   Wound Volume (cm^3) 1.3 cm^3 01/21/25 1242   Wound Healing % 38 01/21/25 1242   State of Healing Slough 01/20/25 1239   Margins Well-defined edges 01/21/25 1242   Treatments Cleansed;Site care 01/21/25 1242   Sutures/Staple Line Non approximated 01/20/25 1239   Drainage Description Serosanguineous 01/21/25 1242   Drainage Amount Small 01/21/25 1242   Dressing Other (Comment) 01/21/25 1242   Dressing Changed Changed 01/21/25 1242   Dressing Status Clean;Dry 01/21/25 0044       Wound 01/02/25 Incision Pretibial Left;Proximal (Active)   Wound Image   01/21/25 1242   Site Assessment Dry;Red 01/21/25 1242   Kyugn-Wound Assessment Pink 01/21/25 1242   Wound Length (cm) 11 cm 01/21/25 1242   Wound Width (cm) 0.3 cm 01/21/25 1242   Wound Surface Area (cm^2) 3.3 cm^2 01/21/25 1242   Wound Depth (cm) 0 cm 01/20/25 1325   Wound Volume (cm^3) 0 cm^3 01/20/25 1325   Wound Healing % 100 01/20/25 1325   State of Healing Closed wound edges 01/21/25 1242   Margins Attached edges 01/21/25 1242   Closure Approximated 01/21/25 1242   Treatments Cleansed;Site care 01/21/25 1242   Sutures/Staple Line Approximated 01/21/25 1242   Drainage Description None 01/21/25 1242   Drainage Amount None 01/21/25 1242   Dressing Non adherent;Gauze;Kerlix/rolled gauze 01/21/25 1242   Dressing Changed Changed 01/21/25 1242   Dressing Status Clean;Dry 01/21/25 0044     Patient seen for left leg wound and left groin wound (present on admission) complicated by PMH: CAD s/p  CABG, severe lower extremity PAD with prior revascularization (R fem-pop bypass 2/24/20; L fem-pop bypass 9/14/20; L fem PTA of the graft; R iliac PTA 2/2022; LLE and DANYA PTA 4/2024; angioplasty of the left SFA 10/21/24; and left femoral and tibial thrombectomy with patch angioplasty 11/19/24 per chart. Exam conducted with bedside RN Alma. Patient known to this RN from prior admission/wound consult on 1/2/2. Wounds to left lower leg appear improved from last exam. Patient lives at home with his mother and has home health care following. Patient has been getting IV antibiotics for left groin abscess. Groin incision site very small, surrounding area distal to incision opening firm. Patient states his groin/ pubic area has been swollen and causing him pain. No swelling noted during exam. Discussed with Dr. Aguirre. Skin hygiene and dressing care preformed. See detailed assessment above from flowsheet. Recommendations below, reviewed with Dr. Aguirre.     Treatment protocols recommended:  Left groin- Cleanse with vashe, cover with clean dry dressing (foam or gauze). Change daily/prn.  Left lower leg- Cleanse with vashe, apply aquacel ag cut to size to left lateral incision and distal left pretibial incision, cover left proximal incision with adaptic, cover with ABD and wrap with kerlix. Change every other day/prn.   Continue to off load, turning at least every 2 hours. Offload heels.     Therapeutic surface: Patient on [place] [employer]. Nate 18. Patient demonstrates ability to turn/reposition with little assistance. Staff to continue to encourage and assist patient with turning/repositioning atleast every 2 hours. Offload heels.     Nursing updated, continue pressure injury preventions, wound care to be completed by nursing per orders and re-consult wound RN if needed.    See above recommendations for treatment. Patient plans to return home with [facility] to assist with wound care.     Please contact me with questions or  changes in patient condition.  Doretha Chakraborty. RN  Wound/Ostomy Care  [phone]

## 2025-01-21 NOTE — PROGRESS NOTES
Cardiology is planning for heart cath on Thursday 1/23. Anticipate discharge home late Thursday vs Friday depending on findings. Patient will resume [facility] services at discharge.

## 2025-01-22 LAB
ANION GAP SERPL CALC-SCNC: 9 MMOL/L (ref 10–20)
BUN SERPL-MCNC: 27 MG/DL (ref 6–23)
CALCIUM SERPL-MCNC: 8.2 MG/DL (ref 8.6–10.3)
CHLORIDE SERPL-SCNC: 104 MMOL/L (ref 98–107)
CO2 SERPL-SCNC: 31 MMOL/L (ref 21–32)
CREAT SERPL-MCNC: 1.27 MG/DL (ref 0.5–1.3)
EGFRCR SERPLBLD CKD-EPI 2021: 65 ML/MIN/1.73M*2
ERYTHROCYTE [DISTWIDTH] IN BLOOD BY AUTOMATED COUNT: 14.6 % (ref 11.5–14.5)
GLUCOSE BLD MANUAL STRIP-MCNC: 107 MG/DL (ref 74–99)
GLUCOSE BLD MANUAL STRIP-MCNC: 139 MG/DL (ref 74–99)
GLUCOSE BLD MANUAL STRIP-MCNC: 176 MG/DL (ref 74–99)
GLUCOSE BLD MANUAL STRIP-MCNC: 179 MG/DL (ref 74–99)
GLUCOSE SERPL-MCNC: 111 MG/DL (ref 74–99)
HCT VFR BLD AUTO: 28.4 % (ref 41–52)
HGB BLD-MCNC: 9 G/DL (ref 13.5–17.5)
MAGNESIUM SERPL-MCNC: 1.89 MG/DL (ref 1.6–2.4)
MCH RBC QN AUTO: 28.9 PG (ref 26–34)
MCHC RBC AUTO-ENTMCNC: 31.7 G/DL (ref 32–36)
MCV RBC AUTO: 91 FL (ref 80–100)
NRBC BLD-RTO: 0 /100 WBCS (ref 0–0)
PLATELET # BLD AUTO: 259 X10*3/UL (ref 150–450)
POTASSIUM SERPL-SCNC: 3.7 MMOL/L (ref 3.5–5.3)
RBC # BLD AUTO: 3.11 X10*6/UL (ref 4.5–5.9)
SODIUM SERPL-SCNC: 140 MMOL/L (ref 136–145)
WBC # BLD AUTO: 5 X10*3/UL (ref 4.4–11.3)

## 2025-01-22 PROCEDURE — 80048 BASIC METABOLIC PNL TOTAL CA: CPT | Performed by: HOSPITALIST

## 2025-01-22 PROCEDURE — 99232 SBSQ HOSP IP/OBS MODERATE 35: CPT | Performed by: HOSPITALIST

## 2025-01-22 PROCEDURE — 99232 SBSQ HOSP IP/OBS MODERATE 35: CPT | Performed by: NURSE PRACTITIONER

## 2025-01-22 PROCEDURE — 2500000004 HC RX 250 GENERAL PHARMACY W/ HCPCS (ALT 636 FOR OP/ED): Performed by: NURSE PRACTITIONER

## 2025-01-22 PROCEDURE — 82947 ASSAY GLUCOSE BLOOD QUANT: CPT

## 2025-01-22 PROCEDURE — 2500000002 HC RX 250 W HCPCS SELF ADMINISTERED DRUGS (ALT 637 FOR MEDICARE OP, ALT 636 FOR OP/ED): Performed by: NURSE PRACTITIONER

## 2025-01-22 PROCEDURE — 2500000004 HC RX 250 GENERAL PHARMACY W/ HCPCS (ALT 636 FOR OP/ED): Performed by: HOSPITALIST

## 2025-01-22 PROCEDURE — 2500000001 HC RX 250 WO HCPCS SELF ADMINISTERED DRUGS (ALT 637 FOR MEDICARE OP): Performed by: NURSE PRACTITIONER

## 2025-01-22 PROCEDURE — 83735 ASSAY OF MAGNESIUM: CPT | Performed by: HOSPITALIST

## 2025-01-22 PROCEDURE — 85027 COMPLETE CBC AUTOMATED: CPT | Performed by: HOSPITALIST

## 2025-01-22 PROCEDURE — S4991 NICOTINE PATCH NONLEGEND: HCPCS | Performed by: NURSE PRACTITIONER

## 2025-01-22 PROCEDURE — 2060000001 HC INTERMEDIATE ICU ROOM DAILY

## 2025-01-22 PROCEDURE — 2500000001 HC RX 250 WO HCPCS SELF ADMINISTERED DRUGS (ALT 637 FOR MEDICARE OP): Performed by: INTERNAL MEDICINE

## 2025-01-22 PROCEDURE — 2500000002 HC RX 250 W HCPCS SELF ADMINISTERED DRUGS (ALT 637 FOR MEDICARE OP, ALT 636 FOR OP/ED): Performed by: HOSPITALIST

## 2025-01-22 RX ORDER — POTASSIUM CHLORIDE 20 MEQ/1
20 TABLET, EXTENDED RELEASE ORAL ONCE
Status: COMPLETED | OUTPATIENT
Start: 2025-01-22 | End: 2025-01-22

## 2025-01-22 RX ORDER — LANOLIN ALCOHOL/MO/W.PET/CERES
400 CREAM (GRAM) TOPICAL 2 TIMES DAILY
Status: COMPLETED | OUTPATIENT
Start: 2025-01-22 | End: 2025-01-23

## 2025-01-22 RX ORDER — INSULIN GLARGINE 100 [IU]/ML
10 INJECTION, SOLUTION SUBCUTANEOUS NIGHTLY
Status: DISCONTINUED | OUTPATIENT
Start: 2025-01-22 | End: 2025-01-24 | Stop reason: HOSPADM

## 2025-01-22 RX ADMIN — OXCARBAZEPINE 450 MG: 150 TABLET, FILM COATED ORAL at 21:02

## 2025-01-22 RX ADMIN — ISOSORBIDE MONONITRATE 60 MG: 60 TABLET, EXTENDED RELEASE ORAL at 08:32

## 2025-01-22 RX ADMIN — TICAGRELOR 90 MG: 90 TABLET ORAL at 20:57

## 2025-01-22 RX ADMIN — ATORVASTATIN CALCIUM 80 MG: 40 TABLET, FILM COATED ORAL at 20:58

## 2025-01-22 RX ADMIN — CYANOCOBALAMIN TAB 500 MCG 500 MCG: 500 TAB at 08:32

## 2025-01-22 RX ADMIN — ENOXAPARIN SODIUM 40 MG: 40 INJECTION SUBCUTANEOUS at 20:57

## 2025-01-22 RX ADMIN — EZETIMIBE 10 MG: 10 TABLET ORAL at 06:30

## 2025-01-22 RX ADMIN — RANOLAZINE 500 MG: 500 TABLET, FILM COATED, EXTENDED RELEASE ORAL at 20:58

## 2025-01-22 RX ADMIN — MORPHINE SULFATE 2 MG: 2 INJECTION, SOLUTION INTRAMUSCULAR; INTRAVENOUS at 06:32

## 2025-01-22 RX ADMIN — DAPAGLIFLOZIN 10 MG: 5 TABLET, FILM COATED ORAL at 08:32

## 2025-01-22 RX ADMIN — PANTOPRAZOLE SODIUM 40 MG: 40 TABLET, DELAYED RELEASE ORAL at 08:32

## 2025-01-22 RX ADMIN — HYDRALAZINE HYDROCHLORIDE 50 MG: 50 TABLET ORAL at 20:58

## 2025-01-22 RX ADMIN — POTASSIUM CHLORIDE 20 MEQ: 1500 TABLET, EXTENDED RELEASE ORAL at 21:02

## 2025-01-22 RX ADMIN — INSULIN LISPRO 1 UNITS: 100 INJECTION, SOLUTION INTRAVENOUS; SUBCUTANEOUS at 12:21

## 2025-01-22 RX ADMIN — HYDRALAZINE HYDROCHLORIDE 50 MG: 50 TABLET ORAL at 08:32

## 2025-01-22 RX ADMIN — MORPHINE SULFATE 2 MG: 2 INJECTION, SOLUTION INTRAMUSCULAR; INTRAVENOUS at 12:46

## 2025-01-22 RX ADMIN — CARVEDILOL 12.5 MG: 12.5 TABLET, FILM COATED ORAL at 20:58

## 2025-01-22 RX ADMIN — OXCARBAZEPINE 450 MG: 150 TABLET, FILM COATED ORAL at 08:32

## 2025-01-22 RX ADMIN — SACUBITRIL AND VALSARTAN 1 TABLET: 97; 103 TABLET, FILM COATED ORAL at 08:32

## 2025-01-22 RX ADMIN — SACUBITRIL AND VALSARTAN 1 TABLET: 97; 103 TABLET, FILM COATED ORAL at 20:58

## 2025-01-22 RX ADMIN — CARVEDILOL 12.5 MG: 12.5 TABLET, FILM COATED ORAL at 08:32

## 2025-01-22 RX ADMIN — RANOLAZINE 500 MG: 500 TABLET, FILM COATED, EXTENDED RELEASE ORAL at 08:32

## 2025-01-22 RX ADMIN — FERROUS SULFATE TAB 325 MG (65 MG ELEMENTAL FE) 325 MG: 325 (65 FE) TAB at 08:32

## 2025-01-22 RX ADMIN — TAMSULOSIN HYDROCHLORIDE 0.4 MG: 0.4 CAPSULE ORAL at 08:32

## 2025-01-22 RX ADMIN — ASPIRIN 81 MG CHEWABLE TABLET 81 MG: 81 TABLET CHEWABLE at 08:32

## 2025-01-22 RX ADMIN — GABAPENTIN 400 MG: 400 CAPSULE ORAL at 15:57

## 2025-01-22 RX ADMIN — MORPHINE SULFATE 2 MG: 2 INJECTION, SOLUTION INTRAMUSCULAR; INTRAVENOUS at 00:41

## 2025-01-22 RX ADMIN — MEROPENEM 2 G: 2 INJECTION, POWDER, FOR SOLUTION INTRAVENOUS at 06:30

## 2025-01-22 RX ADMIN — HYDRALAZINE HYDROCHLORIDE 50 MG: 50 TABLET ORAL at 15:57

## 2025-01-22 RX ADMIN — GABAPENTIN 400 MG: 400 CAPSULE ORAL at 08:32

## 2025-01-22 RX ADMIN — FUROSEMIDE 40 MG: 10 INJECTION, SOLUTION INTRAMUSCULAR; INTRAVENOUS at 08:32

## 2025-01-22 RX ADMIN — MEROPENEM 2 G: 2 INJECTION, POWDER, FOR SOLUTION INTRAVENOUS at 15:57

## 2025-01-22 RX ADMIN — MORPHINE SULFATE 2 MG: 2 INJECTION, SOLUTION INTRAMUSCULAR; INTRAVENOUS at 20:58

## 2025-01-22 RX ADMIN — INSULIN GLARGINE 10 UNITS: 100 INJECTION, SOLUTION SUBCUTANEOUS at 20:57

## 2025-01-22 RX ADMIN — GABAPENTIN 400 MG: 400 CAPSULE ORAL at 20:58

## 2025-01-22 RX ADMIN — TICAGRELOR 90 MG: 90 TABLET ORAL at 08:32

## 2025-01-22 RX ADMIN — NICOTINE 1 PATCH: 21 PATCH, EXTENDED RELEASE TRANSDERMAL at 08:32

## 2025-01-22 RX ADMIN — Medication 400 MG: at 20:58

## 2025-01-22 RX ADMIN — MORPHINE SULFATE 2 MG: 2 INJECTION, SOLUTION INTRAMUSCULAR; INTRAVENOUS at 16:48

## 2025-01-22 RX ADMIN — FUROSEMIDE 40 MG: 10 INJECTION, SOLUTION INTRAMUSCULAR; INTRAVENOUS at 16:49

## 2025-01-22 ASSESSMENT — ENCOUNTER SYMPTOMS
CONSTITUTIONAL NEGATIVE: 1
COUGH: 0
GASTROINTESTINAL NEGATIVE: 1
DYSPNEA ON EXERTION: 1
DEPRESSION: 0
DECREASED APPETITE: 0
FOCAL WEAKNESS: 0
FALLS: 0
IRREGULAR HEARTBEAT: 0
RESPIRATORY NEGATIVE: 1
LIGHT-HEADEDNESS: 0
SYNCOPE: 0
MEMORY LOSS: 0
BLURRED VISION: 0
ORTHOPNEA: 0
SHORTNESS OF BREATH: 0
PALPITATIONS: 0

## 2025-01-22 ASSESSMENT — COGNITIVE AND FUNCTIONAL STATUS - GENERAL
CLIMB 3 TO 5 STEPS WITH RAILING: A LITTLE
DAILY ACTIVITIY SCORE: 24
STANDING UP FROM CHAIR USING ARMS: A LITTLE
WALKING IN HOSPITAL ROOM: A LITTLE
MOBILITY SCORE: 21

## 2025-01-22 ASSESSMENT — PAIN - FUNCTIONAL ASSESSMENT
PAIN_FUNCTIONAL_ASSESSMENT: 0-10

## 2025-01-22 ASSESSMENT — PAIN SCALES - GENERAL
PAINLEVEL_OUTOF10: 0 - NO PAIN
PAINLEVEL_OUTOF10: 8
PAINLEVEL_OUTOF10: 8
PAINLEVEL_OUTOF10: 2
PAINLEVEL_OUTOF10: 3
PAINLEVEL_OUTOF10: 0 - NO PAIN
PAINLEVEL_OUTOF10: 6
PAINLEVEL_OUTOF10: 8

## 2025-01-22 ASSESSMENT — PAIN DESCRIPTION - LOCATION
LOCATION: GROIN

## 2025-01-22 ASSESSMENT — PAIN DESCRIPTION - ORIENTATION
ORIENTATION: LEFT

## 2025-01-22 NOTE — CARE PLAN
The patient's goals for the shift include      The clinical goals for the shift include SPO2 >88%      Problem: Pain - Adult  Goal: Verbalizes/displays adequate comfort level or baseline comfort level  Outcome: Progressing     Problem: Safety - Adult  Goal: Free from fall injury  Outcome: Progressing     Problem: Discharge Planning  Goal: Discharge to home or other facility with appropriate resources  Outcome: Progressing     Problem: Chronic Conditions and Co-morbidities  Goal: Patient's chronic conditions and co-morbidity symptoms are monitored and maintained or improved  Outcome: Progressing     Problem: Fall/Injury  Goal: Not fall by end of shift  Outcome: Progressing  Goal: Be free from injury by end of the shift  Outcome: Progressing  Goal: Verbalize understanding of personal risk factors for fall in the hospital  Outcome: Progressing  Goal: Verbalize understanding of risk factor reduction measures to prevent injury from fall in the home  Outcome: Progressing  Goal: Use assistive devices by end of the shift  Outcome: Progressing  Goal: Pace activities to prevent fatigue by end of the shift  Outcome: Progressing     Problem: Respiratory  Goal: No signs of respiratory distress (eg. Use of accessory muscles. Peds grunting)  Outcome: Progressing  Goal: Verbalize decreased shortness of breath this shift  Outcome: Progressing  Goal: Wean oxygen to maintain O2 saturation per order/standard this shift  Outcome: Progressing     Problem: Pain  Goal: Takes deep breaths with improved pain control throughout the shift  Outcome: Progressing  Goal: Turns in bed with improved pain control throughout the shift  Outcome: Progressing  Goal: Walks with improved pain control throughout the shift  Outcome: Progressing  Goal: Performs ADL's with improved pain control throughout shift  Outcome: Progressing  Goal: Participates in PT with improved pain control throughout the shift  Outcome: Progressing     Problem: Skin  Goal:  Decreased wound size/increased tissue granulation at next dressing change  Outcome: Progressing  Goal: Participates in plan/prevention/treatment measures  Outcome: Progressing  Goal: Prevent/manage excess moisture  Outcome: Progressing  Goal: Prevent/minimize sheer/friction injuries  Outcome: Progressing  Goal: Promote/optimize nutrition  Outcome: Progressing  Goal: Promote skin healing  Outcome: Progressing

## 2025-01-22 NOTE — PROGRESS NOTES
HPI:  Dereck Shen is a 59 y.o. male with PMHx of CAD s/p CABG, severe lower extremity PAD with prior revascularization (R fem-pop bypass 2/24/20; L fem-pop bypass 9/14/20; L fem PTA of the graft; R iliac PTA 2/2022; LLE and DANYA PTA 4/2024; angioplasty of the left SFA 10/21/24; and left femoral and tibial thrombectomy with patch angioplasty 11/19/24 who presented with shortness of breath that woke him from sleep at 5 am today and worsening. He choked on some pudding last night but was not SOB. He denies recent dysphagia and any cough. EMS noted him to be altered and hypoxic and placed him on oxygen. and brought him in for further evaluation. He was immediately placed on BIPAP; bedside echo evidently revealed bilateral B-lines as well as EF<40%. He was bolused with nitroglycerin and Lasix and started on a nitroglycerin drip. Initial VBG demonstrated  significant respiratory and metabolic acidosis which improved with resuscitation. He was ultimately weaned off nitroglycerin and BIPAP to supplemental O2. At the bedside he is alert and engaging.  ED workup was significant for blood glucose 284, potassium 5.3, creatinine 1.30, alk phos 135, troponins 12/20, lactate BNP 1231, WBC 18.3k. Influenza and COVID screens were negative. CXR revealed pulmonary vascular congestion and bilateral lower lobe atelectasis/pneumonitis. The pt was tachycardic to 133 bpm, tachypneic to 38, hypertensive to 199/105 mmHg and hypoxic to 85% on NRB.       Subjective Data:  1/21/2025 : Feeling SOB. Refusing Lasix as he has urine output problems  1-22-25: No CP/pressure.  Has some GUO and currently on O2 at 1 lpm    Overnight Events:    None     Objective Data:  Last Recorded Vitals:  Vitals:    01/21/25 1909 01/21/25 2324 01/22/25 0227 01/22/25 0747   BP: 113/71 116/70 112/68 135/70   BP Location:    Right arm   Patient Position:    Lying   Pulse: 70 74 74 69   Resp: 16 16 16 19   Temp: 36.8 °C (98.3 °F) 36.7 °C (98 °F) 36.6 °C (97.8 °F)  36.7 °C (98.1 °F)   TempSrc:    Temporal   SpO2: 95% 97% 98% 99%   Weight:       Height:           Last Labs:    Results from last 7 days   Lab Units 01/22/25  0408 01/21/25  0443 01/20/25  0551   SODIUM mmol/L 140 139 139   POTASSIUM mmol/L 3.7 4.1 4.2   CHLORIDE mmol/L 104 107 104   CO2 mmol/L 31 28 29   BUN mg/dL 27* 25* 23   CREATININE mg/dL 1.27 1.11 1.12   GLUCOSE mg/dL 111* 106* 106*   CALCIUM mg/dL 8.2* 8.2* 8.3*        Results from last 7 days   Lab Units 01/22/25  0408 01/21/25  0443 01/20/25  0551   WBC AUTO x10*3/uL 5.0 5.8 6.3   HEMOGLOBIN g/dL 9.0* 8.4* 8.6*   HEMATOCRIT % 28.4* 26.4* 27.0*   PLATELETS AUTO x10*3/uL 259 229 247               Last I/O:  I/O last 3 completed shifts:  In: 650 (6.6 mL/kg) [P.O.:350; IV Piggyback:300]  Out: - (0 mL/kg)   Weight: 98.6 kg     Past Cardiology Tests (Last 3 Years):    Echo:  CONCLUSIONS:   1. The left ventricular systolic function is moderately decreased, with a Salinas's biplane calculated ejection fraction of 36%.   2. There is global hypokinesis of the left ventricle with minor regional variations.   3. Spectral Doppler shows a Grade II (pseudonormal pattern) of left ventricular diastolic filling with an elevated left atrial pressure.   4. Left ventricular cavity size is mildly dilated.   5. There is mildly reduced right ventricular systolic function.   6. Mildly elevated right ventricular systolic pressure.       Inpatient Medications:  Scheduled medications   Medication Dose Route Frequency    [Held by provider] apixaban  5 mg oral BID    aspirin  81 mg oral Daily    atorvastatin  80 mg oral Nightly    carvedilol  12.5 mg oral BID    cyanocobalamin  500 mcg oral Daily    dapagliflozin propanediol  10 mg oral Daily with breakfast    enoxaparin  40 mg subcutaneous Daily    ezetimibe  10 mg oral Daily    ferrous sulfate (325 mg ferrous sulfate)  65 mg of iron oral Daily with breakfast    furosemide  40 mg intravenous BID    gabapentin  400 mg oral TID     hydrALAZINE  50 mg oral TID    insulin glargine  20 Units subcutaneous Nightly    insulin lispro  0-5 Units subcutaneous TID AC    isosorbide mononitrate ER  60 mg oral Daily    meropenem (Merrem) 2 g in sodium chloride 0.9 % 100 mL IV  2 g intravenous q8h    nicotine  1 patch transdermal Daily    OXcarbazepine  450 mg oral BID    pantoprazole  40 mg oral Daily    ranolazine  500 mg oral BID    sacubitriL-valsartan  1 tablet oral BID    sennosides  2 tablet oral BID    tamsulosin  0.4 mg oral Daily    ticagrelor  90 mg oral BID     PRN medications   Medication    acetaminophen    dextrose    dextrose    glucagon    glucagon    morphine    morphine    oxygen     Continuous Medications   Medication Dose Last Rate    nitroglycerin  5-1,000 mcg/min Stopped (01/19/25 1430)       ROS:  Review of Systems   Constitutional: Negative. Negative for decreased appetite and malaise/fatigue.   HENT: Negative.     Eyes:  Negative for blurred vision and visual disturbance.   Cardiovascular:  Positive for dyspnea on exertion. Negative for chest pain, irregular heartbeat, leg swelling, orthopnea, palpitations and syncope.   Respiratory: Negative.  Negative for cough and shortness of breath.    Musculoskeletal:  Negative for arthritis and falls.   Gastrointestinal: Negative.    Neurological:  Negative for focal weakness and light-headedness.   Psychiatric/Behavioral:  Negative for depression and memory loss.         Physical Exam:  Physical Exam  Constitutional:       Appearance: Normal appearance.   HENT:      Head: Normocephalic.   Eyes:      Conjunctiva/sclera: Conjunctivae normal.   Cardiovascular:      Rate and Rhythm: Normal rate and regular rhythm.      Pulses: Normal pulses.      Heart sounds: S1 normal and S2 normal. No murmur heard.     No friction rub. No gallop.   Pulmonary:      Effort: Pulmonary effort is normal.      Breath sounds: Normal breath sounds.   Abdominal:      General: Bowel sounds are normal.      Palpations:  Abdomen is soft.      Tenderness: There is no abdominal tenderness.   Musculoskeletal:      Cervical back: Neck supple.      Right lower leg: No edema.      Left lower leg: No edema.      Comments: LLE with dressing/wrap   Skin:     General: Skin is warm and dry.   Neurological:      General: No focal deficit present.      Mental Status: He is alert and oriented to person, place, and time.   Psychiatric:         Attention and Perception: Attention normal.         Mood and Affect: Mood normal.          Assessment/Plan   1) Acute Pulmonary edema  Hx of CABG s/p PCI of CX in 2021 and Known EF 35%  Repeat echo  IV diuresis  Echo  Further recommendations based on results of echo  1/21/2025 : Echo reviewed  He is refusing stress test  Will perform cardiac cath on Thursday  Hold Eliquis  Suggest urology to see  1-22-25: Refused stress. For [facility] tomorrow with Dr Wright. NPO after midnight. Would avoid IV fluids at this point.  Continue on carvedilol, Farxiga, IV Lasix, hydralazine, Imdur, and Entresto. Lungs clear, no JVD, no edema.  Has improving with diuresis.  Eliquis on hold.         Code Status:  Full Code          Katarina Diaz, APRN-CNP

## 2025-01-22 NOTE — CONSULTS
"Nutrition Initial Assessment:   Nutrition Assessment    Reason for Assessment: Admission nursing screening    Patient is a 59 y.o. male presenting with respiratory distress and shortness of breath. Consulted by [facility] for wounds. Pt with wounds to pretibial L, tibial dorsal, and incision in groin. Rec; abdi 2x daily for wounds. No concerns with appetite or weight changes.    Nutrition History:  Energy Intake: Good > 75 %  Pain affecting nutrition status: N/A  Food and Nutrient History: Per RD from 12/19/24: Typically eats fruits, vegetables, nuts/seeds, Premier Protein shake before every meal. Occasionally has seafood or filet mignon. Pt hypothesized that he was \"overeating on the good stuff\" prior to admission. Pt tired upon interview today, atempted education but pt declined.     Anthropometrics:  Height: 172.7 cm (5' 8\")   Weight: 98.6 kg (217 lb 6 oz)   BMI (Calculated): 33.06    Weight Change %:  Weight History / % Weight Change: Weights up and down over past 1-1.5 years. Pt reports losing weight over the years, highest was around 285#s. No significant weight changes to note currently.    Nutrition Focused Physical Exam Findings:  Subcutaneous Fat Loss:   Orbital Fat Pads: Mild-Moderate (slight dark circles and slight hollowing)  Buccal Fat Pads: Mild-Moderate (flat cheeks, minimal bounce)  Muscle Wasting:  Temporalis: Mild-Moderate (slight depression)  Pectoralis (Clavicular Region): Well nourished (clavicle not visible)  Edema:     Physical Findings:  Hair: Negative  Eyes: Negative  Nails: Negative    Nutrition Significant Labs:  CBC Trend:   Results from last 7 days   Lab Units 01/22/25  0408 01/21/25  0443 01/20/25  0551 01/19/25  0924   WBC AUTO x10*3/uL 5.0 5.8 6.3 18.3*   RBC AUTO x10*6/uL 3.11* 2.81* 2.86* 3.95*   HEMOGLOBIN g/dL 9.0* 8.4* 8.6* 11.7*   HEMATOCRIT % 28.4* 26.4* 27.0* 36.7*   MCV fL 91 94 94 93   PLATELETS AUTO x10*3/uL 259 229 247 389    , BMP Trend:   Results from last 7 days   Lab Units " 01/22/25  0408 01/21/25  0443 01/20/25  0551 01/19/25  0924   GLUCOSE mg/dL 111* 106* 106* 284*   CALCIUM mg/dL 8.2* 8.2* 8.3* 8.9   SODIUM mmol/L 140 139 139 135*   POTASSIUM mmol/L 3.7 4.1 4.2 5.3   CO2 mmol/L 31 28 29 24   CHLORIDE mmol/L 104 107 104 104   BUN mg/dL 27* 25* 23 20   CREATININE mg/dL 1.27 1.11 1.12 1.30    , A1C:  Lab Results   Component Value Date    HGBA1C 7.9 (H) 12/18/2024     Nutrition Specific Medications:  [Held by provider] apixaban, 5 mg, oral, BID  aspirin, 81 mg, oral, Daily  atorvastatin, 80 mg, oral, Nightly  carvedilol, 12.5 mg, oral, BID  cyanocobalamin, 500 mcg, oral, Daily  dapagliflozin propanediol, 10 mg, oral, Daily with breakfast  enoxaparin, 40 mg, subcutaneous, Daily  ezetimibe, 10 mg, oral, Daily  ferrous sulfate (325 mg ferrous sulfate), 65 mg of iron, oral, Daily with breakfast  furosemide, 40 mg, intravenous, BID  gabapentin, 400 mg, oral, TID  hydrALAZINE, 50 mg, oral, TID  insulin glargine, 20 Units, subcutaneous, Nightly  insulin lispro, 0-5 Units, subcutaneous, TID AC  isosorbide mononitrate ER, 60 mg, oral, Daily  meropenem (Merrem) 2 g in sodium chloride 0.9 % 100 mL IV, 2 g, intravenous, q8h  nicotine, 1 patch, transdermal, Daily  OXcarbazepine, 450 mg, oral, BID  pantoprazole, 40 mg, oral, Daily  ranolazine, 500 mg, oral, BID  sacubitriL-valsartan, 1 tablet, oral, BID  sennosides, 2 tablet, oral, BID  tamsulosin, 0.4 mg, oral, Daily  ticagrelor, 90 mg, oral, BID       Dietary Orders (From admission, onward)       Start     Ordered    01/23/25 0001  NPO Diet; Effective midnight  Diet effective midnight         01/21/25 0827    01/22/25 1225  Oral nutritional supplements  Until discontinued        Question Answer Comment   Deliver with Lunch    Deliver with Dinner    Select supplement: García        01/22/25 1224    01/19/25 2225  May Participate in Room Service  ( ROOM SERVICE MAY PARTICIPATE)  Once        Question:  .  Answer:  Yes    01/19/25 2224 01/19/25  "1152  Adult diet Consistent Carb; CCD 60 gm/meal  Diet effective now        Question Answer Comment   Diet type Consistent Carb    Carb diet selection: CCD 60 gm/meal        01/19/25 1154                     Estimated Needs:   Total Energy Estimated Needs in 24 hours (kCal):  (7862-0841)  Method for Estimating Needs: 25-30kcals/kg IBW  Total Protein Estimated Needs in 24 Hours (g): 101 g  Method for Estimating 24 Hour Protein Needs: 1.3g/kg IBW  Total Fluid Estimated Needs in 24 Hours (mL): 2000 mL  Method for Estimating 24 Hour Fluid Needs: 1 ml/kcal or per MD        Nutrition Diagnosis        Nutrition Diagnosis  Patient has Nutrition Diagnosis: Yes  Diagnosis Status (1): New  Nutrition Diagnosis 1: Increased nutrient needs  Related to (1): increased metabolic demand secondary to healing  As Evidenced by (1): wounds       Nutrition Interventions/Recommendations   Nutrition prescription for oral nutrition    Nutrition Recommendations:  Individualized Nutrition Prescription Provided for : Individualized nutrition prescription for 2000 kcals and 103g of protein to be provided with diet order. Continue with Cons CHO diet. García 2x daily for wounds.    Nutrition Interventions/Goals:   Interventions: Meals and snacks, Medical food supplement  Meals and Snacks: Carbohydrate-modified diet  Goal: Consume 3 meals daily.  Medical Food Supplement: Commercial beverage medical food supplement therapy  Goal: García 2x for wounds      Education Documentation  No documentation found.    Attempted to give education on protein needs for wounds and diabetes ed for the elevated BG. Pt drowsy and wants to \"go home\".      Nutrition Monitoring and Evaluation   Food/Nutrient Related History Monitoring  Monitoring and Evaluation Plan: Estimated Energy Intake  Estimated Energy Intake: Energy intake greater or equal to 75% of estimated energy needs  Additional Plans: García 2x daily    Anthropometric Measurements  Monitoring and Evaluation " Plan: Body weight  Body Weight: Body weight - Maintain stable weight    Biochemical Data, Medical Tests and Procedures  Monitoring and Evaluation Plan: Glucose/endocrine profile  Glucose/Endocrine Profile: Glucose within normal limits ( mg/dL)    Time Spent (min): 60 minutes

## 2025-01-22 NOTE — PROGRESS NOTES
Dereck Shen [id number]   Service: Internal Medicine / Hospitalist Date of service: 1/22/2025                                  Full Code                           Subjective     Dereck Shen is a 59 y.o. male with PMHx of CAD s/p CABG, severe lower extremity PAD with prior revascularization (R fem-pop bypass 2/24/20; L fem-pop bypass 9/14/20; L fem PTA of the graft; R iliac PTA 2/2022; LLE and DANYA PTA 4/2024; angioplasty of the left SFA 10/21/24; and left femoral and tibial thrombectomy with patch angioplasty 11/19/24 who presented with shortness of breath that woke him from sleep at 5 am today and worsening. He choked on some pudding last night but was not SOB. He denies recent dysphagia and any cough. EMS noted him to be altered and hypoxic and placed him on oxygen. and brought him in for further evaluation. He was immediately placed on BIPAP; bedside echo evidently revealed bilateral B-lines as well as EF<40%. He was bolused with nitroglycerin and Lasix and started on a nitroglycerin drip. Initial VBG demonstrated  significant respiratory and metabolic acidosis which improved with resuscitation. He was ultimately weaned off nitroglycerin and BIPAP to supplemental O2. At the bedside he is alert and engaging.  ED workup was significant for blood glucose 284, potassium 5.3, creatinine 1.30, alk phos 135, troponins 12/20, lactate BNP 1231, WBC 18.3k. Influenza and COVID screens were negative. CXR revealed pulmonary vascular congestion and bilateral lower lobe atelectasis/pneumonitis. The pt was tachycardic to 133 bpm, tachypneic to 38, hypertensive to 199/105 mmHg and hypoxic to 85% on NRB.   Remainder of ROS reviewed and negative except as indicated in HPI.      1/20.................  Pt denies SOB and chest pain. He wants to go home and is upset to know the echo may not be done until tomorrow. I advised him that leaving now would be considered AMA.      1/21..............Patient reported initially he  refused Lasix because he felt that he just did not work with his BPH.  He reports that he has an upcoming follow-up with his urologist concerning surgical procedure to aid is BPH.  No reported: Headaches, fevers, chills, nausea, vomiting, chest pains or palpitations.    1/22......................Patient reported overall that he was feeling fair today.  He reported that he feels very good on oxygen and was hoping that he could have oxygen when needed also hospital.  No reported: Palpitations, fevers, chills, nausea, vomiting or orthopnea today.  Patient was able to lie supine in bed shorter conversation without appreciable discomfort.     Review of Systems:   Review of system otherwise negative if not aforementioned above in subjective.     Objective     Physical Exam      Constitutional:       Appearance: Patient appeared in no acute cardiopulmonary distress.     Comments: Patient alert and oriented to person place time and situation.  HEENT:      Head: Normocephalic and atraumatic.Trachea midline      Nose:No observed congestion or rhinorrhea.     Mouth/Throat: Mucous membranes Moist, Trachea appeared  midline.  Eyes:      Extraocular Movements: Extraocular movements intact.      Pupils: Pupils are equal, round, and reactive to light.      Comments: No scleral icterus or conjunctival injection appreciated.   Cardiovascular:      Rate and Rhythm: Normal rate and regular rhythm. No clicks rubs or gallops, normal S1 and S2.No peripheral stigmata of endocarditis appreciated.     Pulmonary:      Diminished lung bases appreciated bilaterally but no adventitious sound appreciated.  Abdominal:      General: Abdomen soft, nontender, active bowel sounds, no involuntary guarding or rebound tenderness appreciated.     Comments: None   Musculoskeletal:       Patient appeared to have full active range of motion for upper and lower extremities, no acute apparent joint deformity appreciated on examination.   No pitting edema or  cyanosis appreciated.  Comment: Chronic left lower extremity wound, bandage, no appreciable drainage.       Lymphadenopathy:      No appreciable palpable lymphadenopathy  Skin:     General: Skin is warm.      Coloration:  No jaundice     Findings: No abnormal appearing skin rashes or lesions that appeared acute noted on unclothed area of the skin..   Neurological:      General: No focal sensory or motor deficits appreciated, no meningeal signs or dysmetria noted.      Cranial Nerves: Cranial nerves II to XII appearing grossly intact.  Comment: No new focal deficits appreciated known prior CVA with residual right-sided weakness and right foot drop       Genitals:  Deferred  Psychiatric:         The patient appears to be displaying normal mood and affect at the time of evaluation.     Labs:           Lab Results   Component Value Date     GLUCOSE 106 (H) 01/21/2025     CALCIUM 8.2 (L) 01/21/2025      01/21/2025     K 4.1 01/21/2025     CO2 28 01/21/2025      01/21/2025     BUN 25 (H) 01/21/2025     CREATININE 1.11 01/21/2025       Lab Results   Component Value Date    GLUCOSE 111 (H) 01/22/2025    CALCIUM 8.2 (L) 01/22/2025     01/22/2025    K 3.7 01/22/2025    CO2 31 01/22/2025     01/22/2025    BUN 27 (H) 01/22/2025    CREATININE 1.27 01/22/2025         Lab Results   Component Value Date     WBC 5.8 01/21/2025     HGB 8.4 (L) 01/21/2025     HCT 26.4 (L) 01/21/2025     MCV 94 01/21/2025      01/21/2025      Lab Results   Component Value Date    WBC 5.0 01/22/2025    HGB 9.0 (L) 01/22/2025    HCT 28.4 (L) 01/22/2025    MCV 91 01/22/2025     01/22/2025      [unfilled]   [unfilled]   Susceptibility data from last 90 days.  Collected Specimen Info Organism Amoxicillin/Clavulanate Ampicillin Ampicillin/Sulbactam Cefazolin Cefepime Ciprofloxacin Gentamicin Levofloxacin Piperacillin/Tazobactam Trimethoprim/Sulfamethoxazole   01/02/25 Tissue/Biopsy from Skin/Superficial  Abscess Enterobacter cloacae complex  R  R  R  R  S  S  S  S  S  S   12/30/24 Tissue/Biopsy from Skin/Superficial Abscess Enterobacter cloacae complex  R  R  R  R  S  S  S  S  S  S       Mixed Anaerobic Bacteria                                       X-rays/ Images     [unfilled]        Radiology Results (last 21 days)     Procedure Component Value Units Date/Time   XR chest 1 view [[id number]] Collected: 01/19/25 0954   Order Status: Completed Updated: 01/19/25 1017   Narrative:     STUDY:  Chest Radiograph; 01/19/2025 9:46 AM  INDICATION:  Shortness of breath.  COMPARISON:  XR chest 01/01/2025 10:53:00, 00:49:00.  ACCESSION NUMBER(S):  [id number]  ORDERING CLINICIAN:  GINGER BREWSTER  TECHNIQUE:  Frontal chest was obtained at 09:45:00 hours.  FINDINGS:  CARDIOMEDIASTINAL SILHOUETTE:  Cardiomediastinal silhouette is normal in size and configuration.  There is a right upper extremity PICC tip overlying the region of the  caval atrial junction.     LUNGS:  The lungs demonstrate pulmonary vascular prominence which may be  consistent with vascular congestion.  Patchy changes are noted at the  lung bases which may represent atelectasis or pneumonitis..     ABDOMEN:  No remarkable upper abdominal findings.     BONES:  No acute osseous changes.  Median sternotomy wires are visualized.   Impression:     1.  Pulmonary vascular prominence suggestive of pulmonary vascular  congestion.  2.  Patchy opacities within the lower lobes most consistent with  atelectasis/pneumonitis.  Signed by Simeon Cummins MD            Medical Problems         Problem List         * (Principal) Flash pulmonary edema     PAD (peripheral artery disease) (CMS-HCC)     Abdominal pain, acute, right upper quadrant     Abnormal CXR     Atherosclerosis of native artery of both lower extremities with intermittent claudication (CMS-HCC)     Atherosclerotic heart disease of native coronary artery without angina pectoris     Bacterial skin infection      Bipolar depression (Multi)     BPH (benign prostatic hyperplasia)     Burning pain     Causalgia of lower extremity     Hip pain, right     Pain of right lower extremity     Ischemic stroke (Multi)     Chronic combined systolic and diastolic CHF (congestive heart failure)     Chronic back pain greater than 3 months duration     Chronic hepatitis C virus genotype 1a infection (Multi)     Chronic obstructive pulmonary disease (Multi)     Chronic pain syndrome     Chronic RUQ pain     Claudication (CMS-HCC)     Coordination impairment     Decreased peripheral vision of left eye     Diabetic neuropathy, painful (Multi)     Diabetic polyneuropathy associated with type 2 diabetes mellitus (Multi)     Eschar of lower leg     Frequent falls     Gastroesophageal reflux disease     H/O: CVA (cerebrovascular accident)     Hepatic steatosis     Hepatitis C virus infection cured after antiviral drug therapy     Hepatitis-C     Hyperkalemia     Hyperlipidemia     Hypertension     Joint stiffness of both shoulders     Left ventricular dysfunction     Low back pain     Nasal congestion with rhinorrhea     Constipation     Nausea in adult     Neuropathic pain     Nicotine dependence     Neuropathy, inflammatory or toxic (Multi)     BRUNA (obstructive sleep apnea)     Other symptoms and signs involving the musculoskeletal system     Pancreatic lesion (HHS-HCC)     Pancreatic malabsorption (HHS-HCC)     Paroxysmal nocturnal dyspnea     Peripheral arterial disease (CMS-HCC)     Poor balance     Other chronic postprocedural pain     Presence of aortocoronary bypass graft     Pulmonary congestion     Recurrent boils     Reduced chest expansion on inspiration     S/P CABG x 5     Right flank pain     Paraparesis (Multi)     Type 2 diabetes mellitus     Spasticity as late effect of cerebrovascular accident (CVA)     Shortness of breath on exertion     Dyspnea     Shingles     S/P PTCA (percutaneous transluminal coronary angioplasty)      Long-term insulin use (Multi)     Right hemiparesis (Multi)     Hospital discharge follow-up     Critical limb ischemia of left lower extremity (Multi)     Peripheral artery occlusion (CMS-HCC)     Postoperative abscess                  Above medical problems may be reflective of historical medical problems that may have resolved and may not related to acute clinical condition/medical problems.     Clinical impression/plan:           Acute hypoxic hypercapnic respiratory distress  Flash pulmonary edema  Acute exacerbation of HFrEF  Evidently bedside limited echo revealed ED < 40%  Full echo last November revealed LVEF 38% with IW akinesis  Pt weaned off NTG gtt and BIPAP in ED, presently on RA   Cardiology on board, carvedilol added and full echo ordered, continue IV Lasix and home GDMT     Hx severe PAD  Hx CAD s/p CABG  Pt was hospitalized last December for critical LLE ischemia and underwent left fem-below knee popliteal bypass 12/24/24  He was readmitted 12/30/24 for postoperative left groin abscess 2/2 Enterobacter/AmpC organism and was discharged on meropenem through 2/11/25   He will need close followup with vascular surgery and ID  Continue apixaban, ASA, Lipitor, Zetia and Brilinta, carvedilol added     Diabetes  Home diabetes meds held, continue Lantus and insulin sliding scale     HTN  Pt was hypertensive to 199/105 mmHg, has now normalized, continue home meds     Recent onset anemia  Hgb has been downtrending since last last year, 8.6 today  Will check iron studies, folate and B12     Tobacco dependence  Continue nicotine patch      DVT ppx: apixaban           Disposition/additional care plan/interventions: 1/21/2025     Communicated with Dr. Wright cardiologist who saw patient before I did this morning.  Patient was  refusing Lasix, refused stress test, Dr. Wright considering heart catheterization this coming Thursday and currently holding Eliquis.        However patient later was okay with Lasix therapy  as ordered by cardiology.     Known history of coronary artery disease status post PCI and CABG presenting with acute pulmonary edema will continue IV diuresis.     Plan heart catheterization this coming Thursday, Eliquis on hold     Monitor electrolytes     SCDs    Disposition/additional care plan/interventions: 1/22/2025     Patient appearing clinically improved appears to responded well to diuretics.    Close monitoring of  renal indices,currently on IV diuretics with plan heart catheterization    Continue  diuretics as per cardiology    Monitor electrolytes.....................Mag and potassium supplementation    Patient complaining of left groin tenderness palpation at this juncture reflected some tenderness in the inguinal region however no appreciation of this juncture of palpable hematoma .  Will continue patient on current antibiotic therapy    CT scan of abdomen and pelvis repeat before discharge to rule out any underlying new pathology.  Care plan discussed in detail with patient, patient agreement with plan of care.    Plan heart catheterization in the  next 24 hours.      With n.p.o. status overnight for  planned heart catheterization, will decrease basal insulin to 50% reintroduce chronic daily dosing post heart cath    The patient was informed of differential diagnosis , work up , plan of care and possible sequelae of clinical disposition.Patient in agreement with plan of care. Further recommendations forthcoming in accordance with patient's clinical disposition and response to care.     Discharge planning:Discharge timing to be determined.     Care time: >35 mins           Dictation performed with assistance of voice recognition device therefore transcription errors are possible.

## 2025-01-22 NOTE — PROGRESS NOTES
SW notified by bedside RN, Pt interested in POA paperwork. SW provided requested paperwork and educated pt re: utilization and completion, including needing a notary or two witnesses for signatures. This writer and bedside RN acted as witnesses for signatures. Pt named his mother Danna as HCPOA. Additional copies made for chart, pt's mother and sister, returned original copy to pt. No other needs identified at this time, SW signing off,  pt and care team aware of SW availability while inpt.    LANETTE Riggs ([id number])   Care Transitions

## 2025-01-23 ENCOUNTER — APPOINTMENT (OUTPATIENT)
Dept: RADIOLOGY | Facility: HOSPITAL | Age: 60
End: 2025-01-23
Payer: COMMERCIAL

## 2025-01-23 ENCOUNTER — ANESTHESIA (OUTPATIENT)
Dept: CARDIOLOGY | Facility: HOSPITAL | Age: 60
End: 2025-01-23
Payer: COMMERCIAL

## 2025-01-23 LAB
ABO GROUP (TYPE) IN BLOOD: NORMAL
ACT BLD: 244 SEC (ref 83–199)
ACT BLD: NORMAL S
ANION GAP SERPL CALC-SCNC: 9 MMOL/L (ref 10–20)
ANTIBODY SCREEN: NORMAL
BASE EXCESS BLDMV CALC-SCNC: 5.1 MMOL/L (ref -2–3)
BASE EXCESS BLDV CALC-SCNC: 4 MMOL/L (ref -2–3)
BODY TEMPERATURE: 37 DEGREES CELSIUS
BODY TEMPERATURE: 37 DEGREES CELSIUS
BUN SERPL-MCNC: 31 MG/DL (ref 6–23)
CALCIUM SERPL-MCNC: 8.2 MG/DL (ref 8.6–10.3)
CHLORIDE SERPL-SCNC: 103 MMOL/L (ref 98–107)
CO2 SERPL-SCNC: 32 MMOL/L (ref 21–32)
CREAT SERPL-MCNC: 1.41 MG/DL (ref 0.5–1.3)
EGFRCR SERPLBLD CKD-EPI 2021: 58 ML/MIN/1.73M*2
GLUCOSE BLD MANUAL STRIP-MCNC: 120 MG/DL (ref 74–99)
GLUCOSE BLD MANUAL STRIP-MCNC: 142 MG/DL (ref 74–99)
GLUCOSE BLD MANUAL STRIP-MCNC: 147 MG/DL (ref 74–99)
GLUCOSE SERPL-MCNC: 100 MG/DL (ref 74–99)
HCO3 BLDMV-SCNC: 30.5 MMOL/L (ref 22–26)
HCO3 BLDV-SCNC: 29.7 MMOL/L (ref 22–26)
OXYHGB MFR BLDMV: 65 % (ref 45–75)
OXYHGB MFR BLDV: 65.6 % (ref 45–75)
PCO2 BLDMV: 47 MM HG (ref 41–51)
PCO2 BLDV: 48 MM HG (ref 41–51)
PH BLDMV: 7.42 PH (ref 7.33–7.43)
PH BLDV: 7.4 PH (ref 7.33–7.43)
PO2 BLDMV: 39 MM HG (ref 35–45)
PO2 BLDV: 42 MM HG (ref 35–45)
POTASSIUM SERPL-SCNC: 3.7 MMOL/L (ref 3.5–5.3)
RH FACTOR (ANTIGEN D): NORMAL
SAO2 % BLDMV: 66 % (ref 45–75)
SAO2 % BLDV: 67 % (ref 45–75)
SODIUM SERPL-SCNC: 140 MMOL/L (ref 136–145)
UFH PPP CHRO-ACNC: 0.2 IU/ML

## 2025-01-23 PROCEDURE — S4991 NICOTINE PATCH NONLEGEND: HCPCS | Performed by: NURSE PRACTITIONER

## 2025-01-23 PROCEDURE — 76882 US LMTD JT/FCL EVL NVASC XTR: CPT

## 2025-01-23 PROCEDURE — 82805 BLOOD GASES W/O2 SATURATION: CPT

## 2025-01-23 PROCEDURE — 37799 UNLISTED PX VASCULAR SURGERY: CPT | Performed by: NURSE PRACTITIONER

## 2025-01-23 PROCEDURE — 2500000001 HC RX 250 WO HCPCS SELF ADMINISTERED DRUGS (ALT 637 FOR MEDICARE OP): Performed by: INTERNAL MEDICINE

## 2025-01-23 PROCEDURE — C9600 PERC DRUG-EL COR STENT SING: HCPCS | Mod: LC | Performed by: INTERNAL MEDICINE

## 2025-01-23 PROCEDURE — 2500000001 HC RX 250 WO HCPCS SELF ADMINISTERED DRUGS (ALT 637 FOR MEDICARE OP): Performed by: NURSE PRACTITIONER

## 2025-01-23 PROCEDURE — 2500000004 HC RX 250 GENERAL PHARMACY W/ HCPCS (ALT 636 FOR OP/ED): Performed by: HOSPITALIST

## 2025-01-23 PROCEDURE — C1874 STENT, COATED/COV W/DEL SYS: HCPCS | Performed by: INTERNAL MEDICINE

## 2025-01-23 PROCEDURE — 3700000002 HC GENERAL ANESTHESIA TIME - EACH INCREMENTAL 1 MINUTE: Performed by: INTERNAL MEDICINE

## 2025-01-23 PROCEDURE — 3700000001 HC GENERAL ANESTHESIA TIME - INITIAL BASE CHARGE: Performed by: INTERNAL MEDICINE

## 2025-01-23 PROCEDURE — 2500000002 HC RX 250 W HCPCS SELF ADMINISTERED DRUGS (ALT 637 FOR MEDICARE OP, ALT 636 FOR OP/ED): Performed by: NURSE PRACTITIONER

## 2025-01-23 PROCEDURE — C1887 CATHETER, GUIDING: HCPCS | Performed by: INTERNAL MEDICINE

## 2025-01-23 PROCEDURE — 2500000004 HC RX 250 GENERAL PHARMACY W/ HCPCS (ALT 636 FOR OP/ED): Performed by: INTERNAL MEDICINE

## 2025-01-23 PROCEDURE — 2500000004 HC RX 250 GENERAL PHARMACY W/ HCPCS (ALT 636 FOR OP/ED): Performed by: STUDENT IN AN ORGANIZED HEALTH CARE EDUCATION/TRAINING PROGRAM

## 2025-01-23 PROCEDURE — 80048 BASIC METABOLIC PNL TOTAL CA: CPT | Performed by: NURSE PRACTITIONER

## 2025-01-23 PROCEDURE — 82947 ASSAY GLUCOSE BLOOD QUANT: CPT

## 2025-01-23 PROCEDURE — 2550000001 HC RX 255 CONTRASTS: Performed by: INTERNAL MEDICINE

## 2025-01-23 PROCEDURE — 2500000001 HC RX 250 WO HCPCS SELF ADMINISTERED DRUGS (ALT 637 FOR MEDICARE OP): Performed by: STUDENT IN AN ORGANIZED HEALTH CARE EDUCATION/TRAINING PROGRAM

## 2025-01-23 PROCEDURE — 2780000003 HC OR 278 NO HCPCS: Performed by: INTERNAL MEDICINE

## 2025-01-23 PROCEDURE — C1753 CATH, INTRAVAS ULTRASOUND: HCPCS | Performed by: INTERNAL MEDICINE

## 2025-01-23 PROCEDURE — 93461 R&L HRT ART/VENTRICLE ANGIO: CPT | Performed by: INTERNAL MEDICINE

## 2025-01-23 PROCEDURE — 85347 COAGULATION TIME ACTIVATED: CPT

## 2025-01-23 PROCEDURE — 2020000001 HC ICU ROOM DAILY

## 2025-01-23 PROCEDURE — 92978 ENDOLUMINL IVUS OCT C 1ST: CPT | Performed by: INTERNAL MEDICINE

## 2025-01-23 PROCEDURE — 2500000004 HC RX 250 GENERAL PHARMACY W/ HCPCS (ALT 636 FOR OP/ED): Performed by: ANESTHESIOLOGY

## 2025-01-23 PROCEDURE — 92979 ENDOLUMINL IVUS OCT C EA: CPT | Performed by: INTERNAL MEDICINE

## 2025-01-23 PROCEDURE — 2500000004 HC RX 250 GENERAL PHARMACY W/ HCPCS (ALT 636 FOR OP/ED): Performed by: NURSE PRACTITIONER

## 2025-01-23 PROCEDURE — 92928 PRQ TCAT PLMT NTRAC ST 1 LES: CPT | Performed by: INTERNAL MEDICINE

## 2025-01-23 PROCEDURE — 99232 SBSQ HOSP IP/OBS MODERATE 35: CPT | Performed by: HOSPITALIST

## 2025-01-23 PROCEDURE — 85520 HEPARIN ASSAY: CPT | Performed by: NURSE PRACTITIONER

## 2025-01-23 PROCEDURE — C1769 GUIDE WIRE: HCPCS | Performed by: INTERNAL MEDICINE

## 2025-01-23 PROCEDURE — 2720000007 HC OR 272 NO HCPCS: Performed by: INTERNAL MEDICINE

## 2025-01-23 PROCEDURE — 2500000004 HC RX 250 GENERAL PHARMACY W/ HCPCS (ALT 636 FOR OP/ED): Performed by: NURSE ANESTHETIST, CERTIFIED REGISTERED

## 2025-01-23 PROCEDURE — C1894 INTRO/SHEATH, NON-LASER: HCPCS | Performed by: INTERNAL MEDICINE

## 2025-01-23 PROCEDURE — 2500000004 HC RX 250 GENERAL PHARMACY W/ HCPCS (ALT 636 FOR OP/ED)

## 2025-01-23 PROCEDURE — C1725 CATH, TRANSLUMIN NON-LASER: HCPCS | Performed by: INTERNAL MEDICINE

## 2025-01-23 PROCEDURE — 92978 ENDOLUMINL IVUS OCT C 1ST: CPT | Mod: LC | Performed by: INTERNAL MEDICINE

## 2025-01-23 PROCEDURE — 2500000005 HC RX 250 GENERAL PHARMACY W/O HCPCS: Performed by: NURSE ANESTHETIST, CERTIFIED REGISTERED

## 2025-01-23 PROCEDURE — 86901 BLOOD TYPING SEROLOGIC RH(D): CPT | Performed by: NURSE PRACTITIONER

## 2025-01-23 DEVICE — STENT ONYXNG30018UX ONYX 3.00X18RX
Type: IMPLANTABLE DEVICE | Site: HEART | Status: FUNCTIONAL
Brand: ONYX FRONTIER™

## 2025-01-23 RX ORDER — HEPARIN SODIUM 1000 [USP'U]/ML
INJECTION, SOLUTION INTRAVENOUS; SUBCUTANEOUS AS NEEDED
Status: DISCONTINUED | OUTPATIENT
Start: 2025-01-23 | End: 2025-01-23

## 2025-01-23 RX ORDER — HEPARIN SODIUM 1000 [USP'U]/ML
INJECTION, SOLUTION INTRAVENOUS; SUBCUTANEOUS AS NEEDED
Status: DISCONTINUED | OUTPATIENT
Start: 2025-01-23 | End: 2025-01-23 | Stop reason: HOSPADM

## 2025-01-23 RX ORDER — SODIUM CHLORIDE 9 MG/ML
1000 INJECTION, SOLUTION INTRAVENOUS ONCE AS NEEDED
Status: ACTIVE | OUTPATIENT
Start: 2025-01-23 | End: 2025-01-23

## 2025-01-23 RX ORDER — FENTANYL CITRATE 50 UG/ML
INJECTION, SOLUTION INTRAMUSCULAR; INTRAVENOUS AS NEEDED
Status: DISCONTINUED | OUTPATIENT
Start: 2025-01-23 | End: 2025-01-23

## 2025-01-23 RX ORDER — PROPOFOL 10 MG/ML
INJECTION, EMULSION INTRAVENOUS AS NEEDED
Status: DISCONTINUED | OUTPATIENT
Start: 2025-01-23 | End: 2025-01-23

## 2025-01-23 RX ORDER — MORPHINE SULFATE 4 MG/ML
4 INJECTION INTRAVENOUS EVERY 5 MIN PRN
Status: DISCONTINUED | OUTPATIENT
Start: 2025-01-23 | End: 2025-01-23

## 2025-01-23 RX ORDER — ONDANSETRON HYDROCHLORIDE 2 MG/ML
4 INJECTION, SOLUTION INTRAVENOUS ONCE AS NEEDED
Status: DISCONTINUED | OUTPATIENT
Start: 2025-01-23 | End: 2025-01-23

## 2025-01-23 RX ORDER — PHENYLEPHRINE HYDROCHLORIDE 10 MG/ML
INJECTION INTRAVENOUS AS NEEDED
Status: DISCONTINUED | OUTPATIENT
Start: 2025-01-23 | End: 2025-01-23

## 2025-01-23 RX ORDER — MIDAZOLAM HYDROCHLORIDE 1 MG/ML
INJECTION, SOLUTION INTRAMUSCULAR; INTRAVENOUS AS NEEDED
Status: DISCONTINUED | OUTPATIENT
Start: 2025-01-23 | End: 2025-01-23

## 2025-01-23 RX ORDER — PHENYLEPHRINE 10 MG/250 ML(40 MCG/ML)IN 0.9 % SOD.CHLORIDE INTRAVENOUS
CONTINUOUS PRN
Status: DISCONTINUED | OUTPATIENT
Start: 2025-01-23 | End: 2025-01-23

## 2025-01-23 RX ORDER — SODIUM CHLORIDE 9 MG/ML
75 INJECTION, SOLUTION INTRAVENOUS CONTINUOUS
Status: ACTIVE | OUTPATIENT
Start: 2025-01-23 | End: 2025-01-23

## 2025-01-23 RX ORDER — MORPHINE SULFATE 2 MG/ML
2 INJECTION, SOLUTION INTRAMUSCULAR; INTRAVENOUS EVERY 5 MIN PRN
Status: DISCONTINUED | OUTPATIENT
Start: 2025-01-23 | End: 2025-01-23

## 2025-01-23 RX ORDER — VANCOMYCIN HYDROCHLORIDE 1 G/20ML
INJECTION, POWDER, LYOPHILIZED, FOR SOLUTION INTRAVENOUS AS NEEDED
Status: DISCONTINUED | OUTPATIENT
Start: 2025-01-23 | End: 2025-01-23

## 2025-01-23 RX ORDER — CARVEDILOL 6.25 MG/1
6.25 TABLET ORAL 2 TIMES DAILY
Status: DISCONTINUED | OUTPATIENT
Start: 2025-01-23 | End: 2025-01-24 | Stop reason: HOSPADM

## 2025-01-23 RX ORDER — LIDOCAINE HYDROCHLORIDE 20 MG/ML
INJECTION, SOLUTION INFILTRATION; PERINEURAL AS NEEDED
Status: DISCONTINUED | OUTPATIENT
Start: 2025-01-23 | End: 2025-01-23 | Stop reason: HOSPADM

## 2025-01-23 RX ORDER — LIDOCAINE HCL/PF 100 MG/5ML
SYRINGE (ML) INTRAVENOUS AS NEEDED
Status: DISCONTINUED | OUTPATIENT
Start: 2025-01-23 | End: 2025-01-23

## 2025-01-23 RX ORDER — NORETHINDRONE AND ETHINYL ESTRADIOL 0.5-0.035
KIT ORAL AS NEEDED
Status: DISCONTINUED | OUTPATIENT
Start: 2025-01-23 | End: 2025-01-23

## 2025-01-23 RX ORDER — FENTANYL CITRATE 50 UG/ML
50 INJECTION, SOLUTION INTRAMUSCULAR; INTRAVENOUS ONCE
Status: COMPLETED | OUTPATIENT
Start: 2025-01-23 | End: 2025-01-23

## 2025-01-23 RX ADMIN — HYDROMORPHONE HYDROCHLORIDE 0.4 MG: 0.5 INJECTION, SOLUTION INTRAMUSCULAR; INTRAVENOUS; SUBCUTANEOUS at 14:16

## 2025-01-23 RX ADMIN — FERROUS SULFATE TAB 325 MG (65 MG ELEMENTAL FE) 325 MG: 325 (65 FE) TAB at 09:00

## 2025-01-23 RX ADMIN — MEROPENEM 2 G: 2 INJECTION, POWDER, FOR SOLUTION INTRAVENOUS at 16:42

## 2025-01-23 RX ADMIN — EPHEDRINE SULFATE 10 MG: 50 INJECTION, SOLUTION INTRAVENOUS at 12:06

## 2025-01-23 RX ADMIN — FENTANYL CITRATE 25 MCG: 50 INJECTION INTRAMUSCULAR; INTRAVENOUS at 11:29

## 2025-01-23 RX ADMIN — PHENYLEPHRINE HYDROCHLORIDE 250 MCG: 10 INJECTION INTRAVENOUS at 12:02

## 2025-01-23 RX ADMIN — PHENYLEPHRINE HYDROCHLORIDE 150 MCG: 10 INJECTION INTRAVENOUS at 11:58

## 2025-01-23 RX ADMIN — OXCARBAZEPINE 450 MG: 150 TABLET, FILM COATED ORAL at 08:59

## 2025-01-23 RX ADMIN — SODIUM CHLORIDE 75 ML/HR: 9 INJECTION, SOLUTION INTRAVENOUS at 10:27

## 2025-01-23 RX ADMIN — MORPHINE SULFATE 4 MG: 4 INJECTION, SOLUTION INTRAMUSCULAR; INTRAVENOUS at 15:00

## 2025-01-23 RX ADMIN — PHENYLEPHRINE HYDROCHLORIDE 200 MCG: 10 INJECTION INTRAVENOUS at 12:04

## 2025-01-23 RX ADMIN — VANCOMYCIN HYDROCHLORIDE 1 G: 1 INJECTION, POWDER, LYOPHILIZED, FOR SOLUTION INTRAVENOUS at 12:11

## 2025-01-23 RX ADMIN — OXCARBAZEPINE 450 MG: 150 TABLET, FILM COATED ORAL at 22:52

## 2025-01-23 RX ADMIN — GABAPENTIN 400 MG: 400 CAPSULE ORAL at 09:00

## 2025-01-23 RX ADMIN — PROTAMINE SULFATE 40 MG: 10 INJECTION, SOLUTION INTRAVENOUS at 14:46

## 2025-01-23 RX ADMIN — TICAGRELOR 90 MG: 90 TABLET ORAL at 20:50

## 2025-01-23 RX ADMIN — PHENYLEPHRINE HYDROCHLORIDE 100 MCG: 10 INJECTION INTRAVENOUS at 11:48

## 2025-01-23 RX ADMIN — NICOTINE 1 PATCH: 21 PATCH, EXTENDED RELEASE TRANSDERMAL at 08:59

## 2025-01-23 RX ADMIN — EPHEDRINE SULFATE 10 MG: 50 INJECTION, SOLUTION INTRAVENOUS at 11:51

## 2025-01-23 RX ADMIN — HYDRALAZINE HYDROCHLORIDE 50 MG: 50 TABLET ORAL at 16:27

## 2025-01-23 RX ADMIN — RANOLAZINE 500 MG: 500 TABLET, FILM COATED, EXTENDED RELEASE ORAL at 09:00

## 2025-01-23 RX ADMIN — INSULIN GLARGINE 10 UNITS: 100 INJECTION, SOLUTION SUBCUTANEOUS at 20:51

## 2025-01-23 RX ADMIN — GABAPENTIN 400 MG: 400 CAPSULE ORAL at 16:27

## 2025-01-23 RX ADMIN — CARVEDILOL 12.5 MG: 12.5 TABLET, FILM COATED ORAL at 09:00

## 2025-01-23 RX ADMIN — CYANOCOBALAMIN TAB 500 MCG 500 MCG: 500 TAB at 09:00

## 2025-01-23 RX ADMIN — SACUBITRIL AND VALSARTAN 1 TABLET: 97; 103 TABLET, FILM COATED ORAL at 20:50

## 2025-01-23 RX ADMIN — MORPHINE SULFATE 2 MG: 2 INJECTION, SOLUTION INTRAMUSCULAR; INTRAVENOUS at 01:49

## 2025-01-23 RX ADMIN — PROPOFOL 620 MG: 10 INJECTION, EMULSION INTRAVENOUS at 11:29

## 2025-01-23 RX ADMIN — HYDRALAZINE HYDROCHLORIDE 50 MG: 50 TABLET ORAL at 20:50

## 2025-01-23 RX ADMIN — MORPHINE SULFATE 2 MG: 2 INJECTION, SOLUTION INTRAMUSCULAR; INTRAVENOUS at 22:28

## 2025-01-23 RX ADMIN — PHENYLEPHRINE HYDROCHLORIDE 200 MCG: 10 INJECTION INTRAVENOUS at 12:12

## 2025-01-23 RX ADMIN — HEPARIN SODIUM 5000 UNITS: 1000 INJECTION INTRAVENOUS; SUBCUTANEOUS at 12:17

## 2025-01-23 RX ADMIN — ATORVASTATIN CALCIUM 80 MG: 40 TABLET, FILM COATED ORAL at 20:49

## 2025-01-23 RX ADMIN — SODIUM CHLORIDE 75 ML/HR: 9 INJECTION, SOLUTION INTRAVENOUS at 15:06

## 2025-01-23 RX ADMIN — EPHEDRINE SULFATE 10 MG: 50 INJECTION, SOLUTION INTRAVENOUS at 11:58

## 2025-01-23 RX ADMIN — RANOLAZINE 500 MG: 500 TABLET, FILM COATED, EXTENDED RELEASE ORAL at 20:49

## 2025-01-23 RX ADMIN — MORPHINE SULFATE 2 MG: 2 INJECTION, SOLUTION INTRAMUSCULAR; INTRAVENOUS at 17:13

## 2025-01-23 RX ADMIN — MEROPENEM 2 G: 2 INJECTION, POWDER, FOR SOLUTION INTRAVENOUS at 08:59

## 2025-01-23 RX ADMIN — LIDOCAINE HYDROCHLORIDE 50 MG: 20 INJECTION, SOLUTION INTRAVENOUS at 11:29

## 2025-01-23 RX ADMIN — SODIUM CHLORIDE: 9 INJECTION, SOLUTION INTRAVENOUS at 11:26

## 2025-01-23 RX ADMIN — PHENYLEPHRINE-NACL IV SOLUTION 10 MG/250ML-0.9% 0.2 MCG/KG/MIN: 10-0.9/25 SOLUTION at 12:16

## 2025-01-23 RX ADMIN — TICAGRELOR 90 MG: 90 TABLET ORAL at 08:59

## 2025-01-23 RX ADMIN — HEPARIN SODIUM 5000 UNITS: 1000 INJECTION INTRAVENOUS; SUBCUTANEOUS at 11:52

## 2025-01-23 RX ADMIN — EPHEDRINE SULFATE 10 MG: 50 INJECTION, SOLUTION INTRAVENOUS at 12:11

## 2025-01-23 RX ADMIN — GABAPENTIN 400 MG: 400 CAPSULE ORAL at 20:50

## 2025-01-23 RX ADMIN — EPHEDRINE SULFATE 10 MG: 50 INJECTION, SOLUTION INTRAVENOUS at 11:55

## 2025-01-23 RX ADMIN — MIDAZOLAM 2 MG: 1 INJECTION INTRAMUSCULAR; INTRAVENOUS at 11:19

## 2025-01-23 RX ADMIN — SENNOSIDES 17.2 MG: 8.6 TABLET, FILM COATED ORAL at 09:00

## 2025-01-23 RX ADMIN — SACUBITRIL AND VALSARTAN 1 TABLET: 97; 103 TABLET, FILM COATED ORAL at 08:59

## 2025-01-23 RX ADMIN — ASPIRIN 81 MG CHEWABLE TABLET 81 MG: 81 TABLET CHEWABLE at 09:00

## 2025-01-23 RX ADMIN — PHENYLEPHRINE HYDROCHLORIDE 100 MCG: 10 INJECTION INTRAVENOUS at 12:08

## 2025-01-23 RX ADMIN — CARVEDILOL 6.25 MG: 6.25 TABLET, FILM COATED ORAL at 22:47

## 2025-01-23 RX ADMIN — MEROPENEM 2 G: 2 INJECTION, POWDER, FOR SOLUTION INTRAVENOUS at 00:00

## 2025-01-23 RX ADMIN — PANTOPRAZOLE SODIUM 40 MG: 40 TABLET, DELAYED RELEASE ORAL at 08:59

## 2025-01-23 RX ADMIN — HYDRALAZINE HYDROCHLORIDE 50 MG: 50 TABLET ORAL at 08:59

## 2025-01-23 RX ADMIN — ISOSORBIDE MONONITRATE 60 MG: 60 TABLET, EXTENDED RELEASE ORAL at 09:00

## 2025-01-23 RX ADMIN — TAMSULOSIN HYDROCHLORIDE 0.4 MG: 0.4 CAPSULE ORAL at 09:00

## 2025-01-23 RX ADMIN — MEROPENEM 2 G: 2 INJECTION, POWDER, FOR SOLUTION INTRAVENOUS at 22:48

## 2025-01-23 RX ADMIN — Medication 400 MG: at 08:59

## 2025-01-23 RX ADMIN — MORPHINE SULFATE 2 MG: 2 INJECTION, SOLUTION INTRAMUSCULAR; INTRAVENOUS at 06:51

## 2025-01-23 RX ADMIN — FENTANYL CITRATE 50 MCG: 50 INJECTION INTRAMUSCULAR; INTRAVENOUS at 20:06

## 2025-01-23 RX ADMIN — SENNOSIDES 17.2 MG: 8.6 TABLET, FILM COATED ORAL at 20:51

## 2025-01-23 SDOH — HEALTH STABILITY: MENTAL HEALTH: CURRENT SMOKER: 0

## 2025-01-23 ASSESSMENT — PAIN DESCRIPTION - ORIENTATION
ORIENTATION: LEFT

## 2025-01-23 ASSESSMENT — PAIN - FUNCTIONAL ASSESSMENT
PAIN_FUNCTIONAL_ASSESSMENT: 0-10

## 2025-01-23 ASSESSMENT — PAIN SCALES - GENERAL
PAINLEVEL_OUTOF10: 8
PAINLEVEL_OUTOF10: 8
PAINLEVEL_OUTOF10: 10 - WORST POSSIBLE PAIN
PAINLEVEL_OUTOF10: 5 - MODERATE PAIN
PAINLEVEL_OUTOF10: 0 - NO PAIN
PAINLEVEL_OUTOF10: 8
PAINLEVEL_OUTOF10: 3
PAINLEVEL_OUTOF10: 0 - NO PAIN
PAINLEVEL_OUTOF10: 8
PAINLEVEL_OUTOF10: 8
PAIN_LEVEL: 2
PAINLEVEL_OUTOF10: 8
PAINLEVEL_OUTOF10: 4

## 2025-01-23 ASSESSMENT — PAIN DESCRIPTION - LOCATION
LOCATION: ELBOW
LOCATION: GROIN
LOCATION: ARM
LOCATION: GROIN

## 2025-01-23 ASSESSMENT — PAIN SCALES - PAIN ASSESSMENT IN ADVANCED DEMENTIA (PAINAD): TOTALSCORE: MEDICATION (SEE MAR)

## 2025-01-23 NOTE — CARE PLAN
The patient's goals for the shift include      The clinical goals for the shift include SPO2 > 88%      Problem: Pain - Adult  Goal: Verbalizes/displays adequate comfort level or baseline comfort level  Outcome: Progressing     Problem: Safety - Adult  Goal: Free from fall injury  Outcome: Progressing     Problem: Discharge Planning  Goal: Discharge to home or other facility with appropriate resources  Outcome: Progressing     Problem: Chronic Conditions and Co-morbidities  Goal: Patient's chronic conditions and co-morbidity symptoms are monitored and maintained or improved  Outcome: Progressing     Problem: Fall/Injury  Goal: Not fall by end of shift  Outcome: Progressing  Goal: Be free from injury by end of the shift  Outcome: Progressing  Goal: Verbalize understanding of personal risk factors for fall in the hospital  Outcome: Progressing  Goal: Verbalize understanding of risk factor reduction measures to prevent injury from fall in the home  Outcome: Progressing  Goal: Use assistive devices by end of the shift  Outcome: Progressing  Goal: Pace activities to prevent fatigue by end of the shift  Outcome: Progressing     Problem: Respiratory  Goal: No signs of respiratory distress (eg. Use of accessory muscles. Peds grunting)  Outcome: Progressing  Goal: Verbalize decreased shortness of breath this shift  Outcome: Progressing  Goal: Wean oxygen to maintain O2 saturation per order/standard this shift  Outcome: Progressing     Problem: Pain  Goal: Takes deep breaths with improved pain control throughout the shift  Outcome: Progressing  Goal: Turns in bed with improved pain control throughout the shift  Outcome: Progressing  Goal: Walks with improved pain control throughout the shift  Outcome: Progressing  Goal: Performs ADL's with improved pain control throughout shift  Outcome: Progressing  Goal: Participates in PT with improved pain control throughout the shift  Outcome: Progressing     Problem: Skin  Goal:  Decreased wound size/increased tissue granulation at next dressing change  Outcome: Progressing  Goal: Participates in plan/prevention/treatment measures  Outcome: Progressing  Goal: Prevent/manage excess moisture  Outcome: Progressing  Goal: Prevent/minimize sheer/friction injuries  Outcome: Progressing  Goal: Promote/optimize nutrition  Outcome: Progressing  Goal: Promote skin healing  Outcome: Progressing     Problem: Diabetes  Goal: Achieve decreasing blood glucose levels by end of shift  Outcome: Progressing  Goal: Increase stability of blood glucose readings by end of shift  Outcome: Progressing  Goal: Decrease in ketones present in urine by end of shift  Outcome: Progressing  Goal: Maintain electrolyte levels within acceptable range throughout shift  Outcome: Progressing  Goal: Maintain glucose levels >70mg/dl to <250mg/dl throughout shift  Outcome: Progressing  Goal: No changes in neurological exam by end of shift  Outcome: Progressing  Goal: Learn about and adhere to nutrition recommendations by end of shift  Outcome: Progressing  Goal: Vital signs within normal range for age by end of shift  Outcome: Progressing  Goal: Increase self care and/or family involovement by end of shift  Outcome: Progressing  Goal: Receive DSME education by end of shift  Outcome: Progressing     Problem: Heart Failure  Goal: Improved gas exchange this shift  Outcome: Progressing  Goal: Improved urinary output this shift  Outcome: Progressing  Goal: Reduction in peripheral edema within 24 hours  Outcome: Progressing  Goal: Report improvement of dyspnea/breathlessness this shift  Outcome: Progressing  Goal: Weight from fluid excess reduced over 2-3 days, then stabilize  Outcome: Progressing  Goal: Increase self care and/or family involvement in 24 hours  Outcome: Progressing

## 2025-01-23 NOTE — POST-PROCEDURE NOTE
Physician Transition of Care Summary  Invasive Cardiovascular Lab    Procedure Date: 1/23/2025  Attending:    Leonard Wright - Primary  Resident/Fellow/Other Assistant: Surgeons and Role:  * No surgeons found with a matching role *    Indications:   Pre-op Diagnosis      * Acute heart failure, unspecified heart failure type [I50.9]     * S/P PTCA (percutaneous transluminal coronary angioplasty) [Z98.61]    Post-procedure diagnosis:   Post-op Diagnosis     * Acute heart failure, unspecified heart failure type [I50.9]     * S/P PTCA (percutaneous transluminal coronary angioplasty) [Z98.61]    Procedure(s):   Left & Right Heart Cath w Angiography & LV  36297 - ME R & L HRT CATH WINJX HRT ART& L VENTR IMG    PCI JUNIOR Stent- Coronary    IVUS - Coronary  44105 - ME ENDOLUMINAL CORONARY IVUS OCT I&R INITIAL VESSEL    Procedure Findings:   Severe distal LM/LCX stenosis   Radial graft OM patent   Native right is     Right Heart Catheterization    RHC:  RA: 5  RV: 27/1, 6  PA: 27/12 (18)  PCWP: 8  PA Sat %: 66  RA Sat %: 67  SVR: 518  CO & CI: 6.95/3.4    Description of the Procedure:   L&RHC   Successful PCI of the distal LM/LCX  FRANCHESCA>TR band   RBV>manual pressure     Complications:   None     Stents/Implants:   Implants       Stent    Stent, Andrea Auburn Junior, 3.00 X 18rx - [id number] - Implanted        Inventory item: STENT, ANDREA FRONTIER JUNIOR, 3.00 X 18RX Model/Cat number: [id number]    : [employer] Lot number: [id number]    Device identifier: [id number]        GUDID Information       Request status Successful        Brand name: Andrea Auburn™ Version/Model: [id number]    Company name: [employer] MRI safety info as of 1/23/25: MR Conditional    Contains dry or latex rubber: No      GMDN P.T. name: Drug-eluting coronary artery stent, non-bioabsorbable-polymer-coated                As of 1/23/2025       Status: Implanted                              Anticoagulation/Antiplatelet Plan:   Triple  therapy for 1 month: ASA 81 mg daily, Eliquis BID and Brilinta   After one month continue with Eliquis BID and Brilinta BID   Discontinue lovenox subcutaneous     Estimated Blood Loss:   5 mL    Anesthesia: Moderate Sedation Anesthesia Staff: CRNA: PAU Mejia  SRNA: Leonardo Mcneil    Any Specimen(s) Removed:   Mixed venous gases, RA saturation, Arterial saturation     Disposition:   ICU     Recs:   Triple therapy for 1 month: ASA 81 mg daily, Eliquis BID and Brilinta   After one month continue with Eliquis BID and Brilinta BID   Contiune high intensity statins   IV hydration post procedure -filling pressures low normal   Cardiac rehab as outpatient   Follow up with cardiology in 1-2 weeks.         Electronically signed by: ORESTES Land, 1/23/2025 1:10 PM   negative

## 2025-01-23 NOTE — CARE PLAN
The clinical goals for the shift include maintain hemodynamic stability throughout shift      Problem: Pain - Adult  Goal: Verbalizes/displays adequate comfort level or baseline comfort level  Outcome: Progressing     Problem: Safety - Adult  Goal: Free from fall injury  Outcome: Progressing     Problem: Discharge Planning  Goal: Discharge to home or other facility with appropriate resources  Outcome: Progressing     Problem: Chronic Conditions and Co-morbidities  Goal: Patient's chronic conditions and co-morbidity symptoms are monitored and maintained or improved  Outcome: Progressing     Problem: Fall/Injury  Goal: Not fall by end of shift  Outcome: Progressing  Goal: Be free from injury by end of the shift  Outcome: Progressing  Goal: Verbalize understanding of personal risk factors for fall in the hospital  Outcome: Progressing  Goal: Verbalize understanding of risk factor reduction measures to prevent injury from fall in the home  Outcome: Progressing  Goal: Use assistive devices by end of the shift  Outcome: Progressing  Goal: Pace activities to prevent fatigue by end of the shift  Outcome: Progressing     Problem: Respiratory  Goal: No signs of respiratory distress (eg. Use of accessory muscles. Peds grunting)  Outcome: Progressing  Goal: Verbalize decreased shortness of breath this shift  Outcome: Progressing  Goal: Wean oxygen to maintain O2 saturation per order/standard this shift  Outcome: Progressing     Problem: Pain  Goal: Takes deep breaths with improved pain control throughout the shift  Outcome: Progressing  Goal: Turns in bed with improved pain control throughout the shift  Outcome: Progressing  Goal: Walks with improved pain control throughout the shift  Outcome: Progressing  Goal: Performs ADL's with improved pain control throughout shift  Outcome: Progressing  Goal: Participates in PT with improved pain control throughout the shift  Outcome: Progressing     Problem: Skin  Goal: Decreased wound  size/increased tissue granulation at next dressing change  Outcome: Progressing  Flowsheets (Taken 1/23/2025 1801)  Decreased wound size/increased tissue granulation at next dressing change:   Promote sleep for wound healing   Protective dressings over bony prominences  Goal: Participates in plan/prevention/treatment measures  Outcome: Progressing  Flowsheets (Taken 1/23/2025 1801)  Participates in plan/prevention/treatment measures:   Discuss with provider PT/OT consult   Elevate heels   Increase activity/out of bed for meals  Goal: Prevent/manage excess moisture  Outcome: Progressing  Flowsheets (Taken 1/23/2025 1801)  Prevent/manage excess moisture:   Cleanse incontinence/protect with barrier cream   Moisturize dry skin   Monitor for/manage infection if present  Goal: Prevent/minimize sheer/friction injuries  Outcome: Progressing  Flowsheets (Taken 1/23/2025 1801)  Prevent/minimize sheer/friction injuries: Increase activity/out of bed for meals  Goal: Promote/optimize nutrition  Outcome: Progressing  Flowsheets (Taken 1/23/2025 1801)  Promote/optimize nutrition: Monitor/record intake including meals  Goal: Promote skin healing  Outcome: Progressing  Flowsheets (Taken 1/23/2025 1801)  Promote skin healing:   Assess skin/pad under line(s)/device(s)   Rotate device position/do not position patient on device     Problem: Diabetes  Goal: Achieve decreasing blood glucose levels by end of shift  Outcome: Progressing  Goal: Increase stability of blood glucose readings by end of shift  Outcome: Progressing  Goal: Decrease in ketones present in urine by end of shift  Outcome: Progressing  Goal: Maintain electrolyte levels within acceptable range throughout shift  Outcome: Progressing  Goal: Maintain glucose levels >70mg/dl to <250mg/dl throughout shift  Outcome: Progressing  Goal: No changes in neurological exam by end of shift  Outcome: Progressing  Goal: Learn about and adhere to nutrition recommendations by end of  shift  Outcome: Progressing  Goal: Vital signs within normal range for age by end of shift  Outcome: Progressing  Goal: Increase self care and/or family involovement by end of shift  Outcome: Progressing  Goal: Receive DSME education by end of shift  Outcome: Progressing     Problem: Heart Failure  Goal: Improved gas exchange this shift  Outcome: Progressing  Goal: Improved urinary output this shift  Outcome: Progressing  Goal: Reduction in peripheral edema within 24 hours  Outcome: Progressing  Goal: Report improvement of dyspnea/breathlessness this shift  Outcome: Progressing  Goal: Weight from fluid excess reduced over 2-3 days, then stabilize  Outcome: Progressing  Goal: Increase self care and/or family involvement in 24 hours  Outcome: Progressing

## 2025-01-23 NOTE — PROGRESS NOTES
Dereck Shen [id number]   Service: Internal Medicine / Hospitalist Date of service: 1/23/2025                                  Full Code                           Subjective     Dereck Shen is a 59 y.o. male with PMHx of CAD s/p CABG, severe lower extremity PAD with prior revascularization (R fem-pop bypass 2/24/20; L fem-pop bypass 9/14/20; L fem PTA of the graft; R iliac PTA 2/2022; LLE and DANYA PTA 4/2024; angioplasty of the left SFA 10/21/24; and left femoral and tibial thrombectomy with patch angioplasty 11/19/24 who presented with shortness of breath that woke him from sleep at 5 am today and worsening. He choked on some pudding last night but was not SOB. He denies recent dysphagia and any cough. EMS noted him to be altered and hypoxic and placed him on oxygen. and brought him in for further evaluation. He was immediately placed on BIPAP; bedside echo evidently revealed bilateral B-lines as well as EF<40%. He was bolused with nitroglycerin and Lasix and started on a nitroglycerin drip. Initial VBG demonstrated  significant respiratory and metabolic acidosis which improved with resuscitation. He was ultimately weaned off nitroglycerin and BIPAP to supplemental O2. At the bedside he is alert and engaging.  ED workup was significant for blood glucose 284, potassium 5.3, creatinine 1.30, alk phos 135, troponins 12/20, lactate BNP 1231, WBC 18.3k. Influenza and COVID screens were negative. CXR revealed pulmonary vascular congestion and bilateral lower lobe atelectasis/pneumonitis. The pt was tachycardic to 133 bpm, tachypneic to 38, hypertensive to 199/105 mmHg and hypoxic to 85% on NRB.   Remainder of ROS reviewed and negative except as indicated in HPI.      1/20.................  Pt denies SOB and chest pain. He wants to go home and is upset to know the echo may not be done until tomorrow. I advised him that leaving now would be considered AMA.      1/21..............Patient reported initially he  refused Lasix because he felt that he just did not work with his BPH.  He reports that he has an upcoming follow-up with his urologist concerning surgical procedure to aid is BPH.  No reported: Headaches, fevers, chills, nausea, vomiting, chest pains or palpitations.     1/22......................Patient reported overall that he was feeling fair today.  He reported that he feels very good on oxygen and was hoping that he could have oxygen when needed also hospital.  No reported: Palpitations, fevers, chills, nausea, vomiting or orthopnea today.  Patient was able to lie supine in bed shorter conversation without appreciable discomfort.       1/23.....................Patient seen and examined in presence of his mother planned heart catheterization later this morning.  No complaints voiced by patient at the time of evaluation except for his left groin where he felt t was a bit tender.  Review of Systems:   Review of system otherwise negative if not aforementioned above in subjective.     Objective     Physical Exam      Constitutional:       Appearance: Patient appeared in no acute cardiopulmonary distress.     Comments: Patient alert and oriented to person place time and situation.  HEENT:      Head: Normocephalic and atraumatic.Trachea midline      Nose:No observed congestion or rhinorrhea.     Mouth/Throat: Mucous membranes Moist, Trachea appeared  midline.  Eyes:      Extraocular Movements: Extraocular movements intact.      Pupils: Pupils are equal, round, and reactive to light.      Comments: No scleral icterus or conjunctival injection appreciated.   Cardiovascular:      Rate and Rhythm: Normal rate and regular rhythm. No clicks rubs or gallops, normal S1 and S2.No peripheral stigmata of endocarditis appreciated.     Pulmonary:      Diminished lung bases appreciated bilaterally but no adventitious sound appreciated.  Abdominal:      General: Abdomen soft, nontender, active bowel sounds, no involuntary guarding  or rebound tenderness appreciated.     Comments: None   Musculoskeletal:       Patient appeared to have full active range of motion for upper and lower extremities, no acute apparent joint deformity appreciated on examination.   No pitting edema or cyanosis appreciated.  Comment: Chronic left lower extremity wound, bandage, no appreciable drainage.       Lymphadenopathy:      No appreciable palpable lymphadenopathy  Skin:     General: Skin is warm.      Coloration:  No jaundice     Findings: No abnormal appearing skin rashes or lesions that appeared acute noted on unclothed area of the skin..   Neurological:      General: No focal sensory or motor deficits appreciated, no meningeal signs or dysmetria noted.      Cranial Nerves: Cranial nerves II to XII appearing grossly intact.  Comment: No new focal deficits appreciated known prior CVA with residual right-sided weakness and right foot drop        Genitals:  Deferred  Psychiatric:         The patient appears to be displaying normal mood and affect at the time of evaluation.     Labs:               Lab Results   Component Value Date     GLUCOSE 106 (H) 01/21/2025     CALCIUM 8.2 (L) 01/21/2025      01/21/2025     K 4.1 01/21/2025     CO2 28 01/21/2025      01/21/2025     BUN 25 (H) 01/21/2025     CREATININE 1.11 01/21/2025              Lab Results   Component Value Date     GLUCOSE 111 (H) 01/22/2025     CALCIUM 8.2 (L) 01/22/2025      01/22/2025     K 3.7 01/22/2025     CO2 31 01/22/2025      01/22/2025     BUN 27 (H) 01/22/2025     CREATININE 1.27 01/22/2025           Lab Results   Component Value Date     WBC 5.8 01/21/2025     HGB 8.4 (L) 01/21/2025     HCT 26.4 (L) 01/21/2025     MCV 94 01/21/2025      01/21/2025            Lab Results   Component Value Date     WBC 5.0 01/22/2025     HGB 9.0 (L) 01/22/2025     HCT 28.4 (L) 01/22/2025     MCV 91 01/22/2025      01/22/2025      Lab Results   Component Value Date    GLUCOSE  100 (H) 01/23/2025    CALCIUM 8.2 (L) 01/23/2025     01/23/2025    K 3.7 01/23/2025    CO2 32 01/23/2025     01/23/2025    BUN 31 (H) 01/23/2025    CREATININE 1.41 (H) 01/23/2025      Lab Results   Component Value Date    WBC 5.0 01/22/2025    HGB 9.0 (L) 01/22/2025    HCT 28.4 (L) 01/22/2025    MCV 91 01/22/2025     01/22/2025      [unfilled]   [unfilled]   Susceptibility data from last 90 days.  Collected Specimen Info Organism Amoxicillin/Clavulanate Ampicillin Ampicillin/Sulbactam Cefazolin Cefepime Ciprofloxacin Gentamicin Levofloxacin Piperacillin/Tazobactam Trimethoprim/Sulfamethoxazole   01/02/25 Tissue/Biopsy from Skin/Superficial Abscess Enterobacter cloacae complex  R  R  R  R  S  S  S  S  S  S   12/30/24 Tissue/Biopsy from Skin/Superficial Abscess Enterobacter cloacae complex  R  R  R  R  S  S  S  S  S  S       Mixed Anaerobic Bacteria                                       X-rays/ Images     [unfilled]        Radiology Results (last 21 days)     Procedure Component Value Units Date/Time   XR chest 1 view [[id number]] Collected: 01/19/25 0954   Order Status: Completed Updated: 01/19/25 1017   Narrative:     STUDY:  Chest Radiograph; 01/19/2025 9:46 AM  INDICATION:  Shortness of breath.  COMPARISON:  XR chest 01/01/2025 10:53:00, 00:49:00.  ACCESSION NUMBER(S):  [id number]  ORDERING CLINICIAN:  GINGER BREWSTER  TECHNIQUE:  Frontal chest was obtained at 09:45:00 hours.  FINDINGS:  CARDIOMEDIASTINAL SILHOUETTE:  Cardiomediastinal silhouette is normal in size and configuration.  There is a right upper extremity PICC tip overlying the region of the  caval atrial junction.     LUNGS:  The lungs demonstrate pulmonary vascular prominence which may be  consistent with vascular congestion.  Patchy changes are noted at the  lung bases which may represent atelectasis or pneumonitis..     ABDOMEN:  No remarkable upper abdominal findings.     BONES:  No acute osseous changes.   Median sternotomy wires are visualized.   Impression:     1.  Pulmonary vascular prominence suggestive of pulmonary vascular  congestion.  2.  Patchy opacities within the lower lobes most consistent with  atelectasis/pneumonitis.  Signed by Simeon Cummins MD            Medical Problems         Problem List         * (Principal) Flash pulmonary edema     PAD (peripheral artery disease) (CMS-HCC)     Abdominal pain, acute, right upper quadrant     Abnormal CXR     Atherosclerosis of native artery of both lower extremities with intermittent claudication (CMS-HCC)     Atherosclerotic heart disease of native coronary artery without angina pectoris     Bacterial skin infection     Bipolar depression (Multi)     BPH (benign prostatic hyperplasia)     Burning pain     Causalgia of lower extremity     Hip pain, right     Pain of right lower extremity     Ischemic stroke (Multi)     Chronic combined systolic and diastolic CHF (congestive heart failure)     Chronic back pain greater than 3 months duration     Chronic hepatitis C virus genotype 1a infection (Multi)     Chronic obstructive pulmonary disease (Multi)     Chronic pain syndrome     Chronic RUQ pain     Claudication (CMS-HCC)     Coordination impairment     Decreased peripheral vision of left eye     Diabetic neuropathy, painful (Multi)     Diabetic polyneuropathy associated with type 2 diabetes mellitus (Multi)     Eschar of lower leg     Frequent falls     Gastroesophageal reflux disease     H/O: CVA (cerebrovascular accident)     Hepatic steatosis     Hepatitis C virus infection cured after antiviral drug therapy     Hepatitis-C     Hyperkalemia     Hyperlipidemia     Hypertension     Joint stiffness of both shoulders     Left ventricular dysfunction     Low back pain     Nasal congestion with rhinorrhea     Constipation     Nausea in adult     Neuropathic pain     Nicotine dependence     Neuropathy, inflammatory or toxic (Multi)     BRUNA (obstructive sleep apnea)      Other symptoms and signs involving the musculoskeletal system     Pancreatic lesion (HHS-HCC)     Pancreatic malabsorption (HHS-HCC)     Paroxysmal nocturnal dyspnea     Peripheral arterial disease (CMS-HCC)     Poor balance     Other chronic postprocedural pain     Presence of aortocoronary bypass graft     Pulmonary congestion     Recurrent boils     Reduced chest expansion on inspiration     S/P CABG x 5     Right flank pain     Paraparesis (Multi)     Type 2 diabetes mellitus     Spasticity as late effect of cerebrovascular accident (CVA)     Shortness of breath on exertion     Dyspnea     Shingles     S/P PTCA (percutaneous transluminal coronary angioplasty)     Long-term insulin use (Multi)     Right hemiparesis (Multi)     Hospital discharge follow-up     Critical limb ischemia of left lower extremity (Multi)     Peripheral artery occlusion (CMS-HCC)     Postoperative abscess                  Above medical problems may be reflective of historical medical problems that may have resolved and may not related to acute clinical condition/medical problems.     Clinical impression/plan:           Acute hypoxic hypercapnic respiratory distress  Flash pulmonary edema  Acute exacerbation of HFrEF  Evidently bedside limited echo revealed ED < 40%  Full echo last November revealed LVEF 38% with IW akinesis  Pt weaned off NTG gtt and BIPAP in ED, presently on RA   Cardiology on board, carvedilol added and full echo ordered, continue IV Lasix and home GDMT     Hx severe PAD  Hx CAD s/p CABG  Pt was hospitalized last December for critical LLE ischemia and underwent left fem-below knee popliteal bypass 12/24/24  He was readmitted 12/30/24 for postoperative left groin abscess 2/2 Enterobacter/AmpC organism and was discharged on meropenem through 2/11/25   He will need close followup with vascular surgery and ID  Continue apixaban, ASA, Lipitor, Zetia and Brilinta, carvedilol added     Diabetes  Home diabetes meds held,  continue Lantus and insulin sliding scale     HTN  Pt was hypertensive to 199/105 mmHg, has now normalized, continue home meds     Recent onset anemia  Hgb has been downtrending since last last year, 8.6 today  Will check iron studies, folate and B12     Tobacco dependence  Continue nicotine patch      DVT ppx: apixaban           Disposition/additional care plan/interventions: 1/21/2025     Communicated with Dr. Wright cardiologist who saw patient before I did this morning.  Patient was  refusing Lasix, refused stress test, Dr. Wright considering heart catheterization this coming Thursday and currently holding Eliquis.        However patient later was okay with Lasix therapy as ordered by cardiology.     Known history of coronary artery disease status post PCI and CABG presenting with acute pulmonary edema will continue IV diuresis.     Plan heart catheterization this coming Thursday, Eliquis on hold     Monitor electrolytes     SCDs     Disposition/additional care plan/interventions: 1/22/2025     Patient appearing clinically improved appears to responded well to diuretics.     Close monitoring of  renal indices,currently on IV diuretics with plan heart catheterization     Continue  diuretics as per cardiology     Monitor electrolytes.....................Mag and potassium supplementation     Patient complaining of left groin tenderness palpation at this juncture reflected some tenderness in the inguinal region however no appreciation of this juncture of palpable hematoma .  Will continue patient on current antibiotic therapy     CT scan of abdomen and pelvis repeat before discharge to rule out any underlying new pathology.  Care plan discussed in detail with patient, patient agreement with plan of care.     Plan heart catheterization in the  next 24 hours.        With n.p.o. status overnight for  planned heart catheterization, will decrease basal insulin to 50% reintroduce chronic daily dosing post heart cath        Disposition/additional care plan/interventions: 1/23/2025  Plan heart catheterization today.    Noncontrast CT scan may not provide enough information concerning possible seroma or abscess of the left groin.  Will obtain an ultrasound.    Care plan also discussed in detail at bedside with patient's mother.    Monitor renal indices closely patient at risk for dye induced nephropathy      The patient was informed of differential diagnosis , work up , plan of care and possible sequelae of clinical disposition.Patient in agreement with plan of care. Further recommendations forthcoming in accordance with patient's clinical disposition and response to care.     Discharge planning:Discharge timing to be determined.     Care time: >35 mins           Dictation performed with assistance of voice recognition device therefore transcription errors are possible.

## 2025-01-23 NOTE — ANESTHESIA PREPROCEDURE EVALUATION
Patient: Dereck Shen    Procedure Information       Date/Time: 01/23/25 1130    Procedure: Left & Right Heart Cath w Angiography & LV - w / anesthesia    Location: [facility] / [facility]    Providers: Baljinder Wright MD            Relevant Problems   Anesthesia (within normal limits)      Cardiac   (+) Atherosclerotic heart disease of native coronary artery without angina pectoris   (+) Critical limb ischemia of left lower extremity (Multi)   (+) Hyperlipidemia   (+) Hypertension   (+) PAD (peripheral artery disease) (CMS-HCC)   (+) Peripheral arterial disease (CMS-HCC)   (+) Peripheral artery occlusion (CMS-HCC)      Pulmonary   (+) Chronic obstructive pulmonary disease (Multi)   (+) BRUNA (obstructive sleep apnea)   (+) Shortness of breath on exertion      Neuro   (+) Bipolar depression (Multi)   (+) Causalgia of lower extremity   (+) Diabetic polyneuropathy associated with type 2 diabetes mellitus (Multi)   (+) Ischemic stroke (Multi)      GI   (+) Gastroesophageal reflux disease      /Renal   (+) BPH (benign prostatic hyperplasia)      Liver   (+) Chronic hepatitis C virus genotype 1a infection (Multi)   (+) Hepatic steatosis   (+) Hepatitis-C      Endocrine   (+) Diabetic neuropathy, painful (Multi)   (+) Diabetic polyneuropathy associated with type 2 diabetes mellitus (Multi)   (+) Type 2 diabetes mellitus      Musculoskeletal   (+) Causalgia of lower extremity   (+) Chronic pain syndrome      ID   (+) Bacterial skin infection   (+) Chronic hepatitis C virus genotype 1a infection (Multi)   (+) Hepatitis-C   (+) Recurrent boils   (+) Shingles       Clinical information reviewed:    Allergies    Med Hx  Surg Hx            NPO Detail:  No data recorded     Physical Exam    Airway  Mallampati: II  TM distance: >3 FB  Neck ROM: full     Cardiovascular - normal exam     Dental   (+) upper dentures, lower dentures     Pulmonary - normal exam     Abdominal   (+) obese              Anesthesia Plan    History of general anesthesia?: yes  History of complications of general anesthesia?: no    ASA 4     MAC     The patient is not a current smoker.  Patient was previously instructed to abstain from smoking on day of procedure.  Patient did not smoke on day of procedure.    intravenous induction   Anesthetic plan and risks discussed with patient.  Use of blood products discussed with patient who consented to blood products.    Plan discussed with CRNA.

## 2025-01-23 NOTE — ANESTHESIA POSTPROCEDURE EVALUATION
Patient: Dereck Shen    Procedure Summary       Date: 01/23/25 Room / Location: [facility]    Anesthesia Start: 1120 Anesthesia Stop: 1310    Procedures:       Left & Right Heart Cath w Angiography & LV      PCI SHIMON Stent- Coronary      IVUS - Coronary Diagnosis:       Acute heart failure, unspecified heart failure type      S/P PTCA (percutaneous transluminal coronary angioplasty)    Providers: Baljinder Wright MD Responsible Provider: SUMAN Mejia-CRNA    Anesthesia Type: MAC ASA Status: 4            Anesthesia Type: MAC    Vitals Value Taken Time   /76 01/23/25 1312   Temp 97.2 01/23/25 1312   Pulse 60 01/23/25 1312   Resp 14 01/23/25 1312   SpO2 99% 01/23/25 1312       Anesthesia Post Evaluation    Patient location during evaluation: ICU  Patient participation: complete - patient participated  Level of consciousness: sleepy but conscious  Pain score: 2  Pain management: satisfactory to patient  Airway patency: patent  Cardiovascular status: acceptable and hemodynamically stable  Respiratory status: acceptable, spontaneous ventilation and face mask  Hydration status: acceptable  Postoperative Nausea and Vomiting: none        There were no known notable events for this encounter.

## 2025-01-23 NOTE — PRE-SEDATION DOCUMENTATION
"Cardiology Preprocedure Note    Dereck Shen   Indication for procedure: The primary encounter diagnosis was Flash pulmonary edema. Diagnoses of Acute heart failure, unspecified heart failure type, Demand ischemia (Multi), Acute respiratory failure with hypoxia and hypercapnia (Multi), S/P CABG x 5, Dyspnea, Pulmonary congestion, and S/P PTCA (percutaneous transluminal coronary angioplasty) were also pertinent to this visit. Patient here for L&RHC r/o obstructive CAD.         /71 (BP Location: Left arm, Patient Position: Lying)   Pulse 68   Temp 37.1 °C (98.7 °F) (Temporal)   Resp 18   Ht 1.727 m (5' 8\")   Wt 91 kg (200 lb 9.9 oz)   SpO2 95%   BMI 30.50 kg/m²    Relevant Labs:   Lab Results   Component Value Date    CREATININE 1.41 (H) 01/23/2025    EGFR 58 (L) 01/23/2025    INR 1.3 (H) 01/01/2025    PROTIME 15.0 (H) 01/01/2025       Planned Sedation/Anesthesia: see anesthesia note     Airway assessment: normal    Directed physical examination: General: Alert and Oriented, No distress, cooperative. Lungs: Clear to auscultation bilaterally, no wheezes, rhonci, or rales. respirations unlabored Heart: regular rate and rhythm, S1 and S2, no murmur, pulses are palpable.     Mallampati:  see anesthesia note.     ASA Score: ASA 3 - Patient with moderate systemic disease with functional limitations    Benefits, risks and alternatives of procedure and planned sedation have been discussed with the patient and/or their representative. All questions answered and they agree to proceed.     Mariel Purvis, APRN-CNP   "

## 2025-01-23 NOTE — NURSING NOTE
1400 - Call to cath lab NP, Eugenie, to assess LFA. Patient with pain and fore arm tight and hard to palpation from TR Band up to elbow. Eugenie at bedside  1410 - 0.4mg IVP Dilaudid given per verbal order from Eugenie NP  1428 - Dr. Wright at bedside  1435 - Protamine given  1440 - Per Eugenie, for 1hr apply 60mmHg of pressure from BP cuff to LFA for 5 minutes and deflate for 5 minutes

## 2025-01-24 ENCOUNTER — PHARMACY VISIT (OUTPATIENT)
Dept: PHARMACY | Facility: CLINIC | Age: 60
End: 2025-01-24
Payer: MEDICAID

## 2025-01-24 VITALS
WEIGHT: 201.72 LBS | SYSTOLIC BLOOD PRESSURE: 119 MMHG | RESPIRATION RATE: 8 BRPM | TEMPERATURE: 96.1 F | OXYGEN SATURATION: 98 % | BODY MASS INDEX: 30.57 KG/M2 | DIASTOLIC BLOOD PRESSURE: 62 MMHG | HEIGHT: 68 IN | HEART RATE: 71 BPM

## 2025-01-24 LAB
ALBUMIN SERPL BCP-MCNC: 3.1 G/DL (ref 3.4–5)
ANION GAP SERPL CALC-SCNC: 9 MMOL/L (ref 10–20)
BUN SERPL-MCNC: 24 MG/DL (ref 6–23)
CALCIUM SERPL-MCNC: 8.2 MG/DL (ref 8.6–10.3)
CHLORIDE SERPL-SCNC: 105 MMOL/L (ref 98–107)
CHOLEST SERPL-MCNC: 102 MG/DL (ref 0–199)
CHOLESTEROL/HDL RATIO: 3.7
CO2 SERPL-SCNC: 28 MMOL/L (ref 21–32)
CREAT SERPL-MCNC: 1.09 MG/DL (ref 0.5–1.3)
EGFRCR SERPLBLD CKD-EPI 2021: 79 ML/MIN/1.73M*2
ERYTHROCYTE [DISTWIDTH] IN BLOOD BY AUTOMATED COUNT: 14.8 % (ref 11.5–14.5)
GLUCOSE BLD MANUAL STRIP-MCNC: 109 MG/DL (ref 74–99)
GLUCOSE BLD MANUAL STRIP-MCNC: 122 MG/DL (ref 74–99)
GLUCOSE BLD MANUAL STRIP-MCNC: 126 MG/DL (ref 74–99)
GLUCOSE SERPL-MCNC: 105 MG/DL (ref 74–99)
HCT VFR BLD AUTO: 28 % (ref 41–52)
HDLC SERPL-MCNC: 27.5 MG/DL
HGB BLD-MCNC: 9 G/DL (ref 13.5–17.5)
LDLC SERPL CALC-MCNC: 39 MG/DL
MCH RBC QN AUTO: 29.3 PG (ref 26–34)
MCHC RBC AUTO-ENTMCNC: 32.1 G/DL (ref 32–36)
MCV RBC AUTO: 91 FL (ref 80–100)
NON HDL CHOLESTEROL: 75 MG/DL (ref 0–149)
NRBC BLD-RTO: 0 /100 WBCS (ref 0–0)
PHOSPHATE SERPL-MCNC: 2.3 MG/DL (ref 2.5–4.9)
PLATELET # BLD AUTO: 241 X10*3/UL (ref 150–450)
POTASSIUM SERPL-SCNC: 4 MMOL/L (ref 3.5–5.3)
RBC # BLD AUTO: 3.07 X10*6/UL (ref 4.5–5.9)
SODIUM SERPL-SCNC: 138 MMOL/L (ref 136–145)
TRIGL SERPL-MCNC: 179 MG/DL (ref 0–149)
VLDL: 36 MG/DL (ref 0–40)
WBC # BLD AUTO: 4.1 X10*3/UL (ref 4.4–11.3)

## 2025-01-24 PROCEDURE — RXMED WILLOW AMBULATORY MEDICATION CHARGE

## 2025-01-24 PROCEDURE — 2500000002 HC RX 250 W HCPCS SELF ADMINISTERED DRUGS (ALT 637 FOR MEDICARE OP, ALT 636 FOR OP/ED): Performed by: INTERNAL MEDICINE

## 2025-01-24 PROCEDURE — 2500000002 HC RX 250 W HCPCS SELF ADMINISTERED DRUGS (ALT 637 FOR MEDICARE OP, ALT 636 FOR OP/ED): Performed by: NURSE PRACTITIONER

## 2025-01-24 PROCEDURE — 99239 HOSP IP/OBS DSCHRG MGMT >30: CPT | Performed by: HOSPITALIST

## 2025-01-24 PROCEDURE — 80061 LIPID PANEL: CPT | Performed by: NURSE PRACTITIONER

## 2025-01-24 PROCEDURE — 82947 ASSAY GLUCOSE BLOOD QUANT: CPT

## 2025-01-24 PROCEDURE — 2500000004 HC RX 250 GENERAL PHARMACY W/ HCPCS (ALT 636 FOR OP/ED): Performed by: NURSE PRACTITIONER

## 2025-01-24 PROCEDURE — 2500000001 HC RX 250 WO HCPCS SELF ADMINISTERED DRUGS (ALT 637 FOR MEDICARE OP): Performed by: STUDENT IN AN ORGANIZED HEALTH CARE EDUCATION/TRAINING PROGRAM

## 2025-01-24 PROCEDURE — S4991 NICOTINE PATCH NONLEGEND: HCPCS | Performed by: NURSE PRACTITIONER

## 2025-01-24 PROCEDURE — 2500000001 HC RX 250 WO HCPCS SELF ADMINISTERED DRUGS (ALT 637 FOR MEDICARE OP): Performed by: INTERNAL MEDICINE

## 2025-01-24 PROCEDURE — 2500000001 HC RX 250 WO HCPCS SELF ADMINISTERED DRUGS (ALT 637 FOR MEDICARE OP): Performed by: NURSE PRACTITIONER

## 2025-01-24 PROCEDURE — 84100 ASSAY OF PHOSPHORUS: CPT | Performed by: NURSE PRACTITIONER

## 2025-01-24 PROCEDURE — 99232 SBSQ HOSP IP/OBS MODERATE 35: CPT | Performed by: INTERNAL MEDICINE

## 2025-01-24 PROCEDURE — 85027 COMPLETE CBC AUTOMATED: CPT | Performed by: NURSE PRACTITIONER

## 2025-01-24 PROCEDURE — 37799 UNLISTED PX VASCULAR SURGERY: CPT | Performed by: NURSE PRACTITIONER

## 2025-01-24 RX ORDER — SPIRONOLACTONE 25 MG/1
25 TABLET ORAL DAILY
Qty: 30 TABLET | Refills: 0 | Status: SHIPPED | OUTPATIENT
Start: 2025-01-25 | End: 2025-02-24

## 2025-01-24 RX ORDER — FERROUS SULFATE 325(65) MG
65 TABLET ORAL
Qty: 30 TABLET | Refills: 0 | Status: SHIPPED | OUTPATIENT
Start: 2025-01-25 | End: 2025-02-24

## 2025-01-24 RX ORDER — ACETAMINOPHEN 325 MG/1
650 TABLET ORAL EVERY 4 HOURS PRN
Start: 2025-01-24

## 2025-01-24 RX ORDER — FUROSEMIDE 20 MG/1
20 TABLET ORAL DAILY
Qty: 30 TABLET | Refills: 0 | Status: SHIPPED | OUTPATIENT
Start: 2025-01-25 | End: 2025-02-24

## 2025-01-24 RX ORDER — OXYCODONE HYDROCHLORIDE 5 MG/1
5 TABLET ORAL EVERY 6 HOURS PRN
Qty: 15 TABLET | Refills: 0 | Status: SHIPPED | OUTPATIENT
Start: 2025-01-24 | End: 2025-01-28 | Stop reason: SDUPTHER

## 2025-01-24 RX ORDER — SENNOSIDES 8.6 MG/1
2 TABLET ORAL 2 TIMES DAILY PRN
Qty: 30 TABLET | Refills: 0 | Status: SHIPPED | OUTPATIENT
Start: 2025-01-24 | End: 2025-02-23

## 2025-01-24 RX ORDER — VIT C/E/ZN/COPPR/LUTEIN/ZEAXAN 250MG-90MG
500 CAPSULE ORAL DAILY
Qty: 30 TABLET | Refills: 0 | Status: SHIPPED | OUTPATIENT
Start: 2025-01-25 | End: 2025-02-24

## 2025-01-24 RX ORDER — OXYCODONE HYDROCHLORIDE 5 MG/1
5 TABLET ORAL EVERY 6 HOURS PRN
Qty: 15 TABLET | Refills: 0 | Status: SHIPPED | OUTPATIENT
Start: 2025-01-24

## 2025-01-24 RX ORDER — SPIRONOLACTONE 25 MG/1
25 TABLET ORAL DAILY
Status: DISCONTINUED | OUTPATIENT
Start: 2025-01-24 | End: 2025-01-24 | Stop reason: HOSPADM

## 2025-01-24 RX ORDER — FUROSEMIDE 20 MG/1
20 TABLET ORAL DAILY
Status: DISCONTINUED | OUTPATIENT
Start: 2025-01-24 | End: 2025-01-24 | Stop reason: HOSPADM

## 2025-01-24 RX ORDER — NALOXONE HYDROCHLORIDE 4 MG/.1ML
4 SPRAY NASAL AS NEEDED
Qty: 2 EACH | Refills: 11 | Status: SHIPPED | OUTPATIENT
Start: 2025-01-24

## 2025-01-24 RX ADMIN — TICAGRELOR 90 MG: 90 TABLET ORAL at 10:17

## 2025-01-24 RX ADMIN — NICOTINE 1 PATCH: 21 PATCH, EXTENDED RELEASE TRANSDERMAL at 09:38

## 2025-01-24 RX ADMIN — SACUBITRIL AND VALSARTAN 1 TABLET: 97; 103 TABLET, FILM COATED ORAL at 10:17

## 2025-01-24 RX ADMIN — MORPHINE SULFATE 2 MG: 2 INJECTION, SOLUTION INTRAMUSCULAR; INTRAVENOUS at 11:52

## 2025-01-24 RX ADMIN — GABAPENTIN 400 MG: 400 CAPSULE ORAL at 10:19

## 2025-01-24 RX ADMIN — PANTOPRAZOLE SODIUM 40 MG: 40 TABLET, DELAYED RELEASE ORAL at 09:36

## 2025-01-24 RX ADMIN — OXCARBAZEPINE 450 MG: 150 TABLET, FILM COATED ORAL at 10:16

## 2025-01-24 RX ADMIN — MEROPENEM 2 G: 2 INJECTION, POWDER, FOR SOLUTION INTRAVENOUS at 15:25

## 2025-01-24 RX ADMIN — CYANOCOBALAMIN TAB 500 MCG 500 MCG: 500 TAB at 10:19

## 2025-01-24 RX ADMIN — MORPHINE SULFATE 1 MG: 2 INJECTION, SOLUTION INTRAMUSCULAR; INTRAVENOUS at 15:28

## 2025-01-24 RX ADMIN — CARVEDILOL 6.25 MG: 6.25 TABLET, FILM COATED ORAL at 10:19

## 2025-01-24 RX ADMIN — GABAPENTIN 400 MG: 400 CAPSULE ORAL at 15:25

## 2025-01-24 RX ADMIN — RANOLAZINE 500 MG: 500 TABLET, FILM COATED, EXTENDED RELEASE ORAL at 10:19

## 2025-01-24 RX ADMIN — TAMSULOSIN HYDROCHLORIDE 0.4 MG: 0.4 CAPSULE ORAL at 10:17

## 2025-01-24 RX ADMIN — MEROPENEM 2 G: 2 INJECTION, POWDER, FOR SOLUTION INTRAVENOUS at 06:43

## 2025-01-24 RX ADMIN — HYDRALAZINE HYDROCHLORIDE 50 MG: 50 TABLET ORAL at 15:25

## 2025-01-24 RX ADMIN — MORPHINE SULFATE 2 MG: 2 INJECTION, SOLUTION INTRAMUSCULAR; INTRAVENOUS at 02:28

## 2025-01-24 RX ADMIN — EZETIMIBE 10 MG: 10 TABLET ORAL at 06:43

## 2025-01-24 RX ADMIN — ISOSORBIDE MONONITRATE 60 MG: 60 TABLET, EXTENDED RELEASE ORAL at 10:18

## 2025-01-24 RX ADMIN — APIXABAN 5 MG: 5 TABLET, FILM COATED ORAL at 10:19

## 2025-01-24 RX ADMIN — FUROSEMIDE 20 MG: 20 TABLET ORAL at 10:19

## 2025-01-24 RX ADMIN — HYDRALAZINE HYDROCHLORIDE 50 MG: 50 TABLET ORAL at 10:19

## 2025-01-24 RX ADMIN — MORPHINE SULFATE 2 MG: 2 INJECTION, SOLUTION INTRAMUSCULAR; INTRAVENOUS at 06:43

## 2025-01-24 RX ADMIN — SPIRONOLACTONE 25 MG: 25 TABLET ORAL at 10:18

## 2025-01-24 RX ADMIN — ASPIRIN 81 MG CHEWABLE TABLET 81 MG: 81 TABLET CHEWABLE at 10:17

## 2025-01-24 RX ADMIN — FERROUS SULFATE TAB 325 MG (65 MG ELEMENTAL FE) 325 MG: 325 (65 FE) TAB at 10:25

## 2025-01-24 ASSESSMENT — PAIN SCALES - GENERAL
PAINLEVEL_OUTOF10: 9
PAINLEVEL_OUTOF10: 5 - MODERATE PAIN
PAINLEVEL_OUTOF10: 8
PAINLEVEL_OUTOF10: 4
PAINLEVEL_OUTOF10: 4
PAINLEVEL_OUTOF10: 7
PAINLEVEL_OUTOF10: 4
PAINLEVEL_OUTOF10: 4
PAINLEVEL_OUTOF10: 8

## 2025-01-24 ASSESSMENT — COGNITIVE AND FUNCTIONAL STATUS - GENERAL
CLIMB 3 TO 5 STEPS WITH RAILING: A LITTLE
HELP NEEDED FOR BATHING: A LITTLE
DAILY ACTIVITIY SCORE: 23
WALKING IN HOSPITAL ROOM: A LITTLE
MOBILITY SCORE: 22

## 2025-01-24 ASSESSMENT — PAIN DESCRIPTION - ORIENTATION
ORIENTATION: LEFT
ORIENTATION: LEFT

## 2025-01-24 ASSESSMENT — ENCOUNTER SYMPTOMS
GASTROINTESTINAL NEGATIVE: 1
FALLS: 0
SHORTNESS OF BREATH: 0
MEMORY LOSS: 0
DECREASED APPETITE: 0
RESPIRATORY NEGATIVE: 1
PALPITATIONS: 0
COUGH: 0
DEPRESSION: 0
CONSTITUTIONAL NEGATIVE: 1
ORTHOPNEA: 0
IRREGULAR HEARTBEAT: 0
FOCAL WEAKNESS: 0
DYSPNEA ON EXERTION: 1
SYNCOPE: 0
LIGHT-HEADEDNESS: 0
BLURRED VISION: 0

## 2025-01-24 ASSESSMENT — PAIN - FUNCTIONAL ASSESSMENT
PAIN_FUNCTIONAL_ASSESSMENT: 0-10

## 2025-01-24 ASSESSMENT — PAIN DESCRIPTION - LOCATION: LOCATION: ARM

## 2025-01-24 NOTE — CONSULTS
Infectious Disease Inpatient Consult    Inpatient consult to Infectious Diseases  Consult performed by: Gorge Quintero MD  Consult ordered by: Foster Aguirre DO          Primary MD: SUMAN Carlin-CNS    Reason For Consult  On long term abx      Assessment/Plan:     #Postop left groin abscess with concern for graft infection  #Enterobacter cloacae organism  #AmpC organism  Abscess overlying proximal anastomosis site of the femoropopliteal graft.  As per vascular, will be more cautious in his case given extensive history of surgeries and will treat it as if the graft is infected.  Patient developed vomiting with cefepime and ertapenem.  Tolerating meropenem well    #Leucopenia  unclear if meropenem is the cause. Monitor WBC count. Liver enzymes and kidney fxn normal    CAD s/p CABG  PAD s/p bypass and graft  DVT  DM, HTN     Recommendations:     -Cont meropenem 2 g every 8 hours through 02/11/25 to finish 6-week course of Antibiotics  -Monitor WBC count  -Will continue to follow      Gorge Quintero MD  Date of service: 1/24/2025  Time of service: 3:51 PM      History Of Present Illness  Dereck Shen is a 59 y.o. male with PMHx of HFpEF, CAD s/p CABG, bipolar, BPH, DM, DVT, GERD, HTN, PAD ((R fem-pop bypass 2/24/20; L fem-pop bypass 9/14/20; L fem PTA of the graft; R iliac PTA 2/2022; LLE and DANYA PTA 4/2024; angioplasty of the left SFA 10/21/24; and left femoral and tibial thrombectomy with patch angioplasty 11/19/24 ) was admitted to the hospital on 01/19 for shortness of breath. Patient was found to be in HF exacerbation and was started on nitroglycerine drip and bipap. COVID negative. Cardiac cath done yday and SHIMON was placed.     In January 2025, patient was found to had L groin E cloacae infection with concern for graft infection. Currenlty on meropenem 2gm q8h through 2/11/25 to finish 6 weeks ogf abx     Past Medical History  He has a past medical history of Aphasia, Atherosclerotic heart  disease of native coronary artery with unspecified angina pectoris (11/05/2019), Bipolar disorder, unspecified (Multi), BPH (benign prostatic hyperplasia), Diabetes (Multi), DVT (deep venous thrombosis) (Multi) (02/2022), Erectile dysfunction, GERD (gastroesophageal reflux disease), Hepatitis C, HFrEF (heart failure with reduced ejection fraction), HLD (hyperlipidemia), HTN (hypertension), Obstructive sleep apnea (adult) (pediatric), Opioid abuse, in remission (Multi), PAD (peripheral artery disease) (CMS-HCC), Polymyalgia rheumatica (Multi), Post-traumatic stress disorder, unspecified, and Stroke (Multi) (07/24/2023).    Surgical History  He has a past surgical history that includes Knee surgery (Left); Elbow surgery; Back surgery; CT angio aorta and bilateral iliofemoral runoff including without contrast if performed (07/20/2020); MR angio head wo IV contrast (01/04/2022); MR angio neck wo IV contrast (01/04/2022); CT angio aorta and bilateral iliofemoral runoff including without contrast if performed (01/14/2022); Invasive Vascular Procedure (Bilateral, 10/04/2024); Dental surgery; Coronary angioplasty with stent; Coronary artery bypass graft; Tonsillectomy; Transluminal atherectomy femoral-popliteal / tibioperoneal; Invasive Vascular Procedure (N/A, 10/21/2024); Invasive Vascular Procedure (N/A, 10/21/2024); Cardiac catheterization (N/A, 1/23/2025); Cardiac catheterization (N/A, 1/23/2025); and Cardiac catheterization (N/A, 1/23/2025).     Social History     Occupational History    Not on file   Tobacco Use    Smoking status: Every Day     Current packs/day: 0.50     Types: Cigarettes     Passive exposure: Current (5-7 cigarettes a day)    Smokeless tobacco: Never   Vaping Use    Vaping status: Never Used   Substance and Sexual Activity    Alcohol use: Never    Drug use: Yes     Types: Marijuana     Comment: Medical card    Sexual activity: Defer     Travel History   Travel since 12/24/24    No documented travel  since 12/24/24                Family History  Family History   Problem Relation Name Age of Onset    No Known Problems Mother      No Known Problems Father       Allergies  Celecoxib; Ibuprofen; Tetanus toxoid, adsorbed; [id number] antimigraine agents; Cephalexin; Hydrocodone; Latex; and Tryptophan     Immunization History   Administered Date(s) Administered    Moderna COVID-19 vaccine, 12 years and older (50mcg/0.5mL)(Spikevax) 10/20/2023, 11/13/2024    Moderna SARS-CoV-2 Vaccination 03/31/2021, 04/30/2021, 10/26/2021     Medications  Home medications:  Medications Prior to Admission   Medication Sig Dispense Refill Last Dose/Taking    apixaban (Eliquis) 5 mg tablet Take 1 tablet (5 mg) by mouth 2 times a day. 60 tablet 0 1/18/2025 Evening    atorvastatin (Lipitor) 80 mg tablet Take 1 tablet (80 mg) by mouth once daily at bedtime. 90 tablet 2 1/18/2025 Evening    carvedilol (Coreg) 6.25 mg tablet Take 1 tablet (6.25 mg) by mouth every 12 hours.   1/18/2025 Evening    dapagliflozin propanediol (Farxiga) 10 mg Take 1 tablet (10 mg) by mouth once daily with breakfast. 90 tablet 2 Unknown    flash glucose scanning reader misc USE TO CHECK SUGARS FOUR TIMES A DAY 1 each 0 1/18/2025 Evening    flash glucose sensor kit (FreeStyle En 2 Sensor) kit use as directed daily and change every 14 days 2 each 11 1/18/2025 Morning    gabapentin (Neurontin) 400 mg capsule Take 1 capsule (400 mg) by mouth 3 times a day. 90 capsule 5 1/18/2025 Morning    heparin flush 10 unit/mL injection 3 mL (30 Units) by intra-catheter route once daily. 108 mL 0 1/18/2025 Morning    aspirin 81 mg chewable tablet Chew 1 tablet (81 mg) once daily.   Unknown    Creon 36,000-114,000- 180,000 unit capsule,delayed release(DR/EC) capsule Take 2 capsules by mouth 3 times a day as needed.   Unknown    ELDERBERRY FRUIT ORAL Take 1 tablet by mouth once daily as needed.   Unknown    Entresto  mg tablet Take 1 tablet by mouth twice a day.   Unknown  "   ezetimibe (Zetia) 10 mg tablet Take 1 tablet (10 mg) by mouth once daily.   Unknown    furosemide (Lasix) 40 mg tablet Take 1 tablet (40 mg) by mouth once daily as needed.       GINGER ROOT EXTRACT ORAL Take 1 tablet by mouth once daily as needed.   Unknown    heparin flush (heparin LockFlush,Porcine,,PF,) 10 unit/mL injection 5 mL (50 Units) by intra-catheter route if needed for line care ([facility] and after specimen collection through central line).   Unknown    hydrALAZINE (Apresoline) 50 mg tablet Take 1 tablet (50 mg) by mouth 3 times a day.   Unknown    insulin glargine (Lantus) 100 unit/mL (3 mL) pen Inject 20 Units under the skin once daily at bedtime. 10 mL 5 Unknown    insulin lispro (HumaLOG) 100 unit/mL injection Inject 10 Units under the skin 3 times a day with meals. Plus sliding scale if BS is over 200 15 mL 0 Unknown    isosorbide mononitrate ER (Imdur) 60 mg 24 hr tablet Take 1 tablet (60 mg) by mouth once daily. 90 tablet 1 Unknown    lancets misc 1 each 4 times a day before meals. 200 each 11 Unknown    medical cannabis Not [facility] prescribed   Unknown    meropenem (Merrem) 1 gram injection Infuse 2 g into a venous catheter every 8 hours. 216 g 0 Unknown    nicotine (Nicoderm CQ) 21 mg/24 hr patch APPLY 1 PATCH TO SKIN EVERY 24 HOURS AS DIRECTED 28 patch 0 Unknown    OXcarbazepine (Trileptal) 150 mg tablet Take 3 tablets (450 mg) by mouth twice a day.   Unknown    [] oxyCODONE (Roxicodone) 5 mg immediate release tablet Take 1 tablet (5 mg) by mouth every 6 hours if needed for moderate pain (4 - 6) for up to 5 days. 15 tablet 0     [] oxyCODONE (Roxicodone) 5 mg immediate release tablet Take 1 tablet (5 mg) by mouth every 6 hours if needed for severe pain (7 - 10) for up to 7 days. 15 tablet 0 Unknown    pantoprazole (ProtoNix) 40 mg EC tablet TAKE 1 TABLET BY MOUTH ONCE DAILY 90 tablet 3 Unknown    pen needle, diabetic (BD Ultra-Fine Jocy Pen Needle) 32 gauge x /32\" needle Pen Needles for " "Insulin. 100 each 2 Unknown    pen needle, diabetic (BD Ultra-Fine Jocy Pen Needle) 32 gauge x 5/32\" needle Use to inject insulin up to 8 times per day. 400 each 0 Unknown    pen needle, diabetic (TechLITE Pen Needle) 32 gauge x 1/4\" needle Use to inject 1-4 times daily as directed 100 each 11 Unknown    ranolazine (Ranexa) 500 mg 12 hr tablet Take 1 tablet (500 mg) by mouth twice a day.   Unknown    sodium chloride 0.9% flush Infuse 10 mL into a venous catheter 3 times a day as needed for line care.   Unknown    tamsulosin (Flomax) 0.4 mg 24 hr capsule TAKE 1 CAPSULE BY MOUTH ONCE DAILY 90 capsule 1 Unknown    ticagrelor (Brilinta) 90 mg tablet Take 1 tablet (90 mg) by mouth 2 times a day. 180 tablet 0 Unknown    tiZANidine (Zanaflex) 2 mg tablet Take 1 tablet (2 mg) by mouth once daily as needed for muscle spasms. 30 tablet 0      Current medications:  Scheduled medications  apixaban, 5 mg, oral, BID  aspirin, 81 mg, oral, Daily  atorvastatin, 80 mg, oral, Nightly  carvedilol, 6.25 mg, oral, BID  cyanocobalamin, 500 mcg, oral, Daily  dapagliflozin propanediol, 10 mg, oral, Daily with breakfast  ezetimibe, 10 mg, oral, Daily  ferrous sulfate (325 mg ferrous sulfate), 65 mg of iron, oral, Daily with breakfast  furosemide, 20 mg, oral, Daily  gabapentin, 400 mg, oral, TID  hydrALAZINE, 50 mg, oral, TID  insulin glargine, 10 Units, subcutaneous, Nightly  insulin lispro, 0-5 Units, subcutaneous, TID AC  isosorbide mononitrate ER, 60 mg, oral, Daily  meropenem (Merrem) 2 g in sodium chloride 0.9 % 100 mL IV, 2 g, intravenous, q8h  nicotine, 1 patch, transdermal, Daily  OXcarbazepine, 450 mg, oral, BID  pantoprazole, 40 mg, oral, Daily  ranolazine, 500 mg, oral, BID  sacubitriL-valsartan, 1 tablet, oral, BID  sennosides, 2 tablet, oral, BID  spironolactone, 25 mg, oral, Daily  tamsulosin, 0.4 mg, oral, Daily  ticagrelor, 90 mg, oral, BID      Continuous medications     PRN medications  PRN medications: acetaminophen, " "dextrose, dextrose, glucagon, glucagon, morphine, morphine, oxygen    Review of Systems     Constitutional:  Denies appetite change, chills  HENT:  Denies ear discharge, ear pain, sore throat    Eyes:  Denies photophobia, eye drainage  Respiratory:  Reports SOB  Cardiovascular:  Denies chest pain, palpitations  Gastrointestinal:  Denies abdominal pain, diarrhea, nausea and vomiting.   Genitourinary:  Denies dysuria, flank pain, frequency  Musculoskeletal:  Denies joint pain  Skin:  Reports healing incision L groin  Neurological:  Denies light-headedness, numbness and headaches.    Objective  Range of Vitals (last 24 hours)  Heart Rate:  [61-80]   Temp:  [36 °C (96.8 °F)-36.8 °C (98.2 °F)]   Resp:  [5-20]   BP: ()/()   Weight:  [91 kg (200 lb 9.9 oz)-91.5 kg (201 lb 11.5 oz)]   SpO2:  [89 %-100 %]   Daily Weight  25 : 91.5 kg (201 lb 11.5 oz)    Body mass index is 30.67 kg/m².     Physical Exam  /75   Pulse 76   Temp 36.2 °C (97.1 °F) (Temporal)   Resp (!) 8   Ht 1.727 m (5' 8\")   Wt 91.5 kg (201 lb 11.5 oz)   SpO2 95%   BMI 30.67 kg/m²   Temp (24hrs), Av.4 °C (97.5 °F), Min:36 °C (96.8 °F), Max:36.8 °C (98.2 °F)      General: alert, oriented, NAD  HEENT: No conjunctival pallor, no scleral icterus  Lungs: bilaterally clear to auscultation  Abdomen: soft, non tender  Neuro: AAO x 3, PERRL  Skin: L groin healed surgical scar  MSK: No joint inflammation     Relevant Results    Labs  Results from last 72 hours   Lab Units 25  0459 25  0408   WBC AUTO x10*3/uL 4.1* 5.0   HEMOGLOBIN g/dL 9.0* 9.0*   HEMATOCRIT % 28.0* 28.4*   PLATELETS AUTO x10*3/uL 241 259     Results from last 72 hours   Lab Units 25  0459 25  0414 25  0408   SODIUM mmol/L 138 140 140   POTASSIUM mmol/L 4.0 3.7 3.7   CHLORIDE mmol/L 105 103 104   CO2 mmol/L 28 32 31   BUN mg/dL 24* 31* 27*   CREATININE mg/dL 1.09 1.41* 1.27   GLUCOSE mg/dL 105* 100* 111*   CALCIUM mg/dL 8.2* 8.2* 8.2* "   ANION GAP mmol/L 9* 9* 9*   EGFR mL/min/1.73m*2 79 58* 65     Results from last 72 hours   Lab Units 01/24/25  0459   ALBUMIN g/dL 3.1*     Estimated Creatinine Clearance: 81.1 mL/min (by C-G formula based on SCr of 1.09 mg/dL).  C-Reactive Protein   Date Value Ref Range Status   12/30/2024 7.35 (H) <1.00 mg/dL Final   04/24/2024 0.15 <1.00 mg/dL Final     Sedimentation Rate   Date Value Ref Range Status   12/30/2024 62 (H) 0 - 20 mm/h Final   04/24/2024 15 0 - 20 mm/h Final     HIV 1 and 2 Screen   Date Value Ref Range Status   07/26/2022 NONREACTIVE NONREACTIVE Final     Comment:      HIV Ag/Ab screen is performed using the Siemens Last Guide   HIV Ag/Ab Combo assay which detects the presence of HIV    p24 antigen as well as antibodies to HIV-1   (Group M and O) and HIV-2.  .  No laboratory evidence of HIV infection. If acute HIV infection is   suspected, consider testing for HIV RNA by PCR (viral load).       Hepatitis C Ab   Date Value Ref Range Status   06/01/2022 REACTIVE (A) NONREACTIVE Final     Comment:      Results from patients taking biotin supplements or receiving   high-dose biotin therapy should be interpreted with caution   due to possible interference with this test. Providers may    contact their local laboratory for further information.   HCV antibody detected. HCV RNA PCR has been ordered   to evaluate the possibility of a current infection.       HCV PCR Quant   Date Value Ref Range Status   02/06/2023 NOT DETECTED IU/mL Final     Comment:     REF VALUE  NOT DETECTED         Microbiology  No results found for the last 14 days.      Imaging  US extremity nonvascular real time w image documentation limited anatomic specific    Result Date: 1/24/2025  Lymphadenopathy, granulation tissue/scar and very minimal cleft of fluid is noted in the area of the incision extending to the area of the anastomosis and around adjacent vessels in the area of the previous drained abscess. This does not have  appearance of current abscess and there is no drainable fluid collection in this area.   Small amount of perigraft fluid which is probably postoperative seroma around the new graft at proximal to mid thigh, distal to the area included on the postoperative CTs of abdomen pelvis.   Common femoral artery immediately proximal to the graft anastomosis and the new graft are patent by color Doppler.   Nonocclusive thrombus is incidentally noted in venous structures immediately posterior to the anastomosis which would be consistent with the proximal femoral vein. There also appears to be nonocclusive thrombus in the adjacent deep femoral venous branch.     MACRO: Alvina Esparza discussed the significance and urgency of this critical finding in person with  Foster Aguirre on 1/24/2025 at 12:50 pm.  (**-RCF-**) Findings:  See findings.   Signed by: Alvina Esparza 1/24/2025 1:01 PM Dictation workstation:   [id number]    XR chest 1 view    Result Date: 1/19/2025  1.  Pulmonary vascular prominence suggestive of pulmonary vascular congestion. 2.  Patchy opacities within the lower lobes most consistent with atelectasis/pneumonitis. Signed by Simeon Cummins MD    CT abdomen pelvis wo IV contrast    Result Date: 1/4/2025  1.  Bibasilar interstitial edema with enlarging, now moderate pleural effusions. 2. Anasarca. 3. Decreased fluid overlying left femoral cutdown site. Subincisional gas with adjacent reactive lymphadenopathy at this site persist. 4. Large colonic stool burden. No findings of bowel obstruction otherwise.   MACRO: None.   Signed by: Raudel Scott 1/4/2025 4:16 PM Dictation workstation:   [id number]    XR chest 1 view    Result Date: 1/1/2025  As above   MACRO: None   Signed by: Geovany Amin 1/1/2025 11:22 AM Dictation workstation:   [id number]    XR chest 1 view    Result Date: 1/1/2025  Pulmonary edema.   Signed by: Aleks Joshi 1/1/2025 10:48 AM Dictation workstation:   [id number]    CT abdomen pelvis w IV  contrast    Result Date: 12/30/2024  Complex fluid collection left groin 2.9 x 4.6 x 7.5 cm is suggestive of abscess.  There are multiple locules of gas within the fluid collection as well as mild peripheral enhancement.  The fluid collection extends from just below the skin surface and staples deep to the level the left common femoral artery and proximal portion of the Melville-Casey bypass graft.  Reactive left groin lymphadenopathy adjacent to the abscess as well as proximal left thigh soft tissue edema. Critical findings discussed with Dr. Knutson at 9:00 PM 12/30/2024. Signed by Geovany Joel MD

## 2025-01-24 NOTE — DISCHARGE SUMMARY
Discharge Summary    Dereck Shen    [id number]     1/19/2025  9:06 AM , admit date    1/24/2025, discharge date      .      Discharge Diagnosis:    Acute hypoxic hypercapnic respiratory distress  Flash pulmonary edema  Acute exacerbation of HFrEF       Severe PAD, status post heart catheterization with PCI with stent deployment.      Left groin abscess 2/2 Enterobacter/AmpC organism and was discharged on meropenem through 2/11/25        Diabetes       HTN      Tobacco dependence  Continue nicotine patch        Nonocclusive thrombus is incidentally noted in venous structures   immediately posterior to the anastomosis which would be consistent   with the proximal femoral vein. There also appears to be nonocclusive   thrombus in the adjacent deep femoral venous branch.        Problem List Items Addressed This Visit             ICD-10-CM       Cardiac and Vasculature    S/P CABG x 5 Z95.1    Relevant Orders    Transthoracic Echo (TTE) Complete (Completed)    S/P PTCA (percutaneous transluminal coronary angioplasty) Z98.61    Relevant Orders    Case Request Cath Lab: Left & Right Heart Cath w Angiography & LV (Completed)    Cardiac Catheterization Procedure (Completed)    Acute heart failure I50.9    Relevant Medications    sacubitriL-valsartan (Entresto)  mg per tablet 1 tablet    isosorbide mononitrate ER (Imdur) 24 hr tablet 60 mg    ranolazine (Ranexa) 12 hr tablet 500 mg    ticagrelor (Brilinta) tablet 90 mg    carvedilol (Coreg) tablet 6.25 mg    Other Relevant Orders    Case Request Cath Lab: Left & Right Heart Cath w Angiography & LV (Completed)    Cardiac Catheterization Procedure (Completed)       Infectious Diseases    Postoperative abscess T81.49XA    Relevant Orders    US extremity nonvascular real time w image documentation limited anatomic specific (Completed)       Pulmonary and Pneumonias    Pulmonary congestion R09.89    Dyspnea R06.00    Relevant Orders    Transthoracic Echo (TTE) Complete  (Completed)    * (Principal) Flash pulmonary edema - Primary J81.0     Other Visit Diagnoses         Codes    Demand ischemia (Multi)     I24.89    Relevant Medications    nitroglycerin (Tridil) 50 mg in dextrose 5% 250 mL (0.2 mg/mL) infusion (premix) (Completed)    sacubitriL-valsartan (Entresto)  mg per tablet 1 tablet    isosorbide mononitrate ER (Imdur) 24 hr tablet 60 mg    ranolazine (Ranexa) 12 hr tablet 500 mg    ticagrelor (Brilinta) tablet 90 mg    carvedilol (Coreg) tablet 6.25 mg    Acute respiratory failure with hypoxia and hypercapnia (Multi)     J96.01, J96.02    S/P coronary artery stent placement     Z95.5    Relevant Orders    Referral to Cardiology Prevention Program    Referral to Cardiac Rehab - outpatient (for providers who will be following patient along cardiac rehab)    Acute on chronic systolic (congestive) heart failure     I50.23    Relevant Medications    nitroglycerin (Tridil) 50 mg in dextrose 5% 250 mL (0.2 mg/mL) infusion (premix) (Completed)    sacubitriL-valsartan (Entresto)  mg per tablet 1 tablet    isosorbide mononitrate ER (Imdur) 24 hr tablet 60 mg    ranolazine (Ranexa) 12 hr tablet 500 mg    ticagrelor (Brilinta) tablet 90 mg    carvedilol (Coreg) tablet 6.25 mg    Atherosclerosis of autologous artery coronary artery bypass graft(s) with other forms of angina pectoris     I25.728    Relevant Medications    nitroglycerin (Tridil) 50 mg in dextrose 5% 250 mL (0.2 mg/mL) infusion (premix) (Completed)    sacubitriL-valsartan (Entresto)  mg per tablet 1 tablet    isosorbide mononitrate ER (Imdur) 24 hr tablet 60 mg    ranolazine (Ranexa) 12 hr tablet 500 mg    ticagrelor (Brilinta) tablet 90 mg    carvedilol (Coreg) tablet 6.25 mg               Hospital Course/Progress note on the date of  discharge.       Dereck Shen [id number]   Service: Internal Medicine / Hospitalist Date of service: 1/24/2025                                  Full Code                            Subjective     Dereck Shen is a 59 y.o. male with PMHx of CAD s/p CABG, severe lower extremity PAD with prior revascularization (R fem-pop bypass 2/24/20; L fem-pop bypass 9/14/20; L fem PTA of the graft; R iliac PTA 2/2022; LLE and DANYA PTA 4/2024; angioplasty of the left SFA 10/21/24; and left femoral and tibial thrombectomy with patch angioplasty 11/19/24 who presented with shortness of breath that woke him from sleep at 5 am today and worsening. He choked on some pudding last night but was not SOB. He denies recent dysphagia and any cough. EMS noted him to be altered and hypoxic and placed him on oxygen. and brought him in for further evaluation. He was immediately placed on BIPAP; bedside echo evidently revealed bilateral B-lines as well as EF<40%. He was bolused with nitroglycerin and Lasix and started on a nitroglycerin drip. Initial VBG demonstrated  significant respiratory and metabolic acidosis which improved with resuscitation. He was ultimately weaned off nitroglycerin and BIPAP to supplemental O2. At the bedside he is alert and engaging.  ED workup was significant for blood glucose 284, potassium 5.3, creatinine 1.30, alk phos 135, troponins 12/20, lactate BNP 1231, WBC 18.3k. Influenza and COVID screens were negative. CXR revealed pulmonary vascular congestion and bilateral lower lobe atelectasis/pneumonitis. The pt was tachycardic to 133 bpm, tachypneic to 38, hypertensive to 199/105 mmHg and hypoxic to 85% on NRB.   Remainder of ROS reviewed and negative except as indicated in HPI.      1/20.................  Pt denies SOB and chest pain. He wants to go home and is upset to know the echo may not be done until tomorrow. I advised him that leaving now would be considered AMA.      1/21..............Patient reported initially he refused Lasix because he felt that he just did not work with his BPH.  He reports that he has an upcoming follow-up with his urologist concerning surgical  procedure to aid is BPH.  No reported: Headaches, fevers, chills, nausea, vomiting, chest pains or palpitations.     1/22......................Patient reported overall that he was feeling fair today.  He reported that he feels very good on oxygen and was hoping that he could have oxygen when needed also hospital.  No reported: Palpitations, fevers, chills, nausea, vomiting or orthopnea today.  Patient was able to lie supine in bed shorter conversation without appreciable discomfort.        1/23.....................Patient seen and examined in presence of his mother planned heart catheterization later this morning.  No complaints voiced by patient at the time of evaluation except for his left groin where he felt t was a bit tender.        1/24.................Patient reported he is feeling fantastic today.  Patient requesting discharge home.  He felt that his left arm is doing well today no reported: Fevers, chills, dyspnea, orthopnea, cough, hemoptysis, melena, hematuria or hematochezia.  Headaches, chest pains, nausea or vomiting.        Review of Systems:   Review of system otherwise negative if not aforementioned above in subjective.     Objective     Physical Exam      Constitutional:       Appearance: Patient appeared in no acute cardiopulmonary distress.     Comments: Patient alert and oriented to person place time and situation.  HEENT:      Head: Normocephalic and atraumatic.Trachea midline      Nose:No observed congestion or rhinorrhea.     Mouth/Throat: Mucous membranes Moist, Trachea appeared  midline.  Eyes:      Extraocular Movements: Extraocular movements intact.      Pupils: Pupils are equal, round, and reactive to light.      Comments: No scleral icterus or conjunctival injection appreciated.   Cardiovascular:      Rate and Rhythm: Normal rate and regular rhythm. No clicks rubs or gallops, normal S1 and S2.No peripheral stigmata of endocarditis appreciated.     Pulmonary:      Diminished lung bases  appreciated bilaterally but no adventitious sound appreciated.  Abdominal:      General: Abdomen soft, nontender, active bowel sounds, no involuntary guarding or rebound tenderness appreciated.     Comments: None   Musculoskeletal:       Patient appeared to have full active range of motion for upper and lower extremities, no acute apparent joint deformity appreciated on examination.   No pitting edema or cyanosis appreciated.  Comment: Chronic left lower extremity wound, bandage, no appreciable drainage.       Lymphadenopathy:      No appreciable palpable lymphadenopathy  Skin:     General: Skin is warm.      Coloration:  No jaundice     Findings: No abnormal appearing skin rashes or lesions that appeared acute noted on unclothed area of the skin..   Neurological:      General: No focal sensory or motor deficits appreciated, no meningeal signs or dysmetria noted.      Cranial Nerves: Cranial nerves II to XII appearing grossly intact.  Comment: No new focal deficits appreciated known prior CVA with residual right-sided weakness and right foot drop        Genitals:  Deferred  Psychiatric:         The patient appears to be displaying normal mood and affect at the time of evaluation.     Labs:               Lab Results   Component Value Date     GLUCOSE 106 (H) 01/21/2025     CALCIUM 8.2 (L) 01/21/2025      01/21/2025     K 4.1 01/21/2025     CO2 28 01/21/2025      01/21/2025     BUN 25 (H) 01/21/2025     CREATININE 1.11 01/21/2025                  Lab Results   Component Value Date     GLUCOSE 111 (H) 01/22/2025     CALCIUM 8.2 (L) 01/22/2025      01/22/2025     K 3.7 01/22/2025     CO2 31 01/22/2025      01/22/2025     BUN 27 (H) 01/22/2025     CREATININE 1.27 01/22/2025           Lab Results   Component Value Date     WBC 5.8 01/21/2025     HGB 8.4 (L) 01/21/2025     HCT 26.4 (L) 01/21/2025     MCV 94 01/21/2025      01/21/2025                Lab Results   Component Value Date     WBC  5.0 01/22/2025     HGB 9.0 (L) 01/22/2025     HCT 28.4 (L) 01/22/2025     MCV 91 01/22/2025      01/22/2025                Lab Results   Component Value Date     GLUCOSE 100 (H) 01/23/2025     CALCIUM 8.2 (L) 01/23/2025      01/23/2025     K 3.7 01/23/2025     CO2 32 01/23/2025      01/23/2025     BUN 31 (H) 01/23/2025     CREATININE 1.41 (H) 01/23/2025                Lab Results   Component Value Date     WBC 5.0 01/22/2025     HGB 9.0 (L) 01/22/2025     HCT 28.4 (L) 01/22/2025     MCV 91 01/22/2025      01/22/2025            Lab Results   Component Value Date     GLUCOSE 105 (H) 01/24/2025     CALCIUM 8.2 (L) 01/24/2025      01/24/2025     K 4.0 01/24/2025     CO2 28 01/24/2025      01/24/2025     BUN 24 (H) 01/24/2025     CREATININE 1.09 01/24/2025            Lab Results   Component Value Date     WBC 4.1 (L) 01/24/2025     HGB 9.0 (L) 01/24/2025     HCT 28.0 (L) 01/24/2025     MCV 91 01/24/2025      01/24/2025      [unfilled]   [unfilled]   Susceptibility data from last 90 days.  Collected Specimen Info Organism Amoxicillin/Clavulanate Ampicillin Ampicillin/Sulbactam Cefazolin Cefepime Ciprofloxacin Gentamicin Levofloxacin Piperacillin/Tazobactam Trimethoprim/Sulfamethoxazole   01/02/25 Tissue/Biopsy from Skin/Superficial Abscess Enterobacter cloacae complex  R  R  R  R  S  S  S  S  S  S   12/30/24 Tissue/Biopsy from Skin/Superficial Abscess Enterobacter cloacae complex  R  R  R  R  S  S  S  S  S  S       Mixed Anaerobic Bacteria                                       X-rays/ Images     [unfilled]        Radiology Results (last 21 days)     Procedure Component Value Units Date/Time   XR chest 1 view [[id number]] Collected: 01/19/25 0954   Order Status: Completed Updated: 01/19/25 1017   Narrative:     STUDY:  Chest Radiograph; 01/19/2025 9:46 AM  INDICATION:  Shortness of breath.  COMPARISON:  XR chest 01/01/2025 10:53:00, 00:49:00.  ACCESSION  NUMBER(S):  [id number]  ORDERING CLINICIAN:  GINGER BREWSTER  TECHNIQUE:  Frontal chest was obtained at 09:45:00 hours.  FINDINGS:  CARDIOMEDIASTINAL SILHOUETTE:  Cardiomediastinal silhouette is normal in size and configuration.  There is a right upper extremity PICC tip overlying the region of the  caval atrial junction.     LUNGS:  The lungs demonstrate pulmonary vascular prominence which may be  consistent with vascular congestion.  Patchy changes are noted at the  lung bases which may represent atelectasis or pneumonitis..     ABDOMEN:  No remarkable upper abdominal findings.     BONES:  No acute osseous changes.  Median sternotomy wires are visualized.   Impression:     1.  Pulmonary vascular prominence suggestive of pulmonary vascular  congestion.  2.  Patchy opacities within the lower lobes most consistent with  atelectasis/pneumonitis.  Signed by Simeon Cummins MD            Medical Problems         Problem List         * (Principal) Flash pulmonary edema     PAD (peripheral artery disease) (CMS-HCC)     Abdominal pain, acute, right upper quadrant     Abnormal CXR     Atherosclerosis of native artery of both lower extremities with intermittent claudication (CMS-HCC)     Atherosclerotic heart disease of native coronary artery without angina pectoris     Bacterial skin infection     Bipolar depression (Multi)     BPH (benign prostatic hyperplasia)     Burning pain     Causalgia of lower extremity     Hip pain, right     Pain of right lower extremity     Ischemic stroke (Multi)     Chronic combined systolic and diastolic CHF (congestive heart failure)     Chronic back pain greater than 3 months duration     Chronic hepatitis C virus genotype 1a infection (Multi)     Chronic obstructive pulmonary disease (Multi)     Chronic pain syndrome     Chronic RUQ pain     Claudication (CMS-HCC)     Coordination impairment     Decreased peripheral vision of left eye     Diabetic neuropathy, painful (Multi)      Diabetic polyneuropathy associated with type 2 diabetes mellitus (Multi)     Eschar of lower leg     Frequent falls     Gastroesophageal reflux disease     H/O: CVA (cerebrovascular accident)     Hepatic steatosis     Hepatitis C virus infection cured after antiviral drug therapy     Hepatitis-C     Hyperkalemia     Hyperlipidemia     Hypertension     Joint stiffness of both shoulders     Left ventricular dysfunction     Low back pain     Nasal congestion with rhinorrhea     Constipation     Nausea in adult     Neuropathic pain     Nicotine dependence     Neuropathy, inflammatory or toxic (Multi)     BRUNA (obstructive sleep apnea)     Other symptoms and signs involving the musculoskeletal system     Pancreatic lesion (HHS-HCC)     Pancreatic malabsorption (HHS-HCC)     Paroxysmal nocturnal dyspnea     Peripheral arterial disease (CMS-HCC)     Poor balance     Other chronic postprocedural pain     Presence of aortocoronary bypass graft     Pulmonary congestion     Recurrent boils     Reduced chest expansion on inspiration     S/P CABG x 5     Right flank pain     Paraparesis (Multi)     Type 2 diabetes mellitus     Spasticity as late effect of cerebrovascular accident (CVA)     Shortness of breath on exertion     Dyspnea     Shingles     S/P PTCA (percutaneous transluminal coronary angioplasty)     Long-term insulin use (Multi)     Right hemiparesis (Multi)     Hospital discharge follow-up     Critical limb ischemia of left lower extremity (Multi)     Peripheral artery occlusion (CMS-HCC)     Postoperative abscess                  Above medical problems may be reflective of historical medical problems that may have resolved and may not related to acute clinical condition/medical problems.     Clinical impression/plan:           Acute hypoxic hypercapnic respiratory distress  Flash pulmonary edema  Acute exacerbation of HFrEF  Evidently bedside limited echo revealed ED < 40%  Full echo last November revealed LVEF 38%  with IW akinesis  Pt weaned off NTG gtt and BIPAP in ED, presently on RA   Cardiology on board, carvedilol added and full echo ordered, continue IV Lasix and home GDMT     Hx severe PAD  Hx CAD s/p CABG  Pt was hospitalized last December for critical LLE ischemia and underwent left fem-below knee popliteal bypass 12/24/24  He was readmitted 12/30/24 for postoperative left groin abscess 2/2 Enterobacter/AmpC organism and was discharged on meropenem through 2/11/25   He will need close followup with vascular surgery and ID  Continue apixaban, ASA, Lipitor, Zetia and Brilinta, carvedilol added     Diabetes  Home diabetes meds held, continue Lantus and insulin sliding scale     HTN  Pt was hypertensive to 199/105 mmHg, has now normalized, continue home meds     Recent onset anemia  Hgb has been downtrending since last last year, 8.6 today  Will check iron studies, folate and B12     Tobacco dependence  Continue nicotine patch      DVT ppx: apixaban           Disposition/additional care plan/interventions: 1/21/2025     Communicated with Dr. Wright cardiologist who saw patient before I did this morning.  Patient was  refusing Lasix, refused stress test, Dr. Wright considering heart catheterization this coming Thursday and currently holding Eliquis.        However patient later was okay with Lasix therapy as ordered by cardiology.     Known history of coronary artery disease status post PCI and CABG presenting with acute pulmonary edema will continue IV diuresis.     Plan heart catheterization this coming Thursday, Eliquis on hold     Monitor electrolytes     SCDs     Disposition/additional care plan/interventions: 1/22/2025     Patient appearing clinically improved appears to responded well to diuretics.     Close monitoring of  renal indices,currently on IV diuretics with plan heart catheterization     Continue  diuretics as per cardiology     Monitor electrolytes.....................Mag and potassium supplementation      Patient complaining of left groin tenderness palpation at this juncture reflected some tenderness in the inguinal region however no appreciation of this juncture of palpable hematoma .  Will continue patient on current antibiotic therapy     CT scan of abdomen and pelvis repeat before discharge to rule out any underlying new pathology.  Care plan discussed in detail with patient, patient agreement with plan of care.     Plan heart catheterization in the  next 24 hours.        With n.p.o. status overnight for  planned heart catheterization, will decrease basal insulin to 50% reintroduce chronic daily dosing post heart cath        Disposition/additional care plan/interventions: 1/23/2025  Plan heart catheterization today.     Noncontrast CT scan may not provide enough information concerning possible seroma or abscess of the left groin.  Will obtain an ultrasound.     Care plan also discussed in detail at bedside with patient's mother.     Monitor renal indices closely patient at risk for dye induced nephropathy      Disposition/additional care plan/interventions: 1/24/2025     Ultrasound performed of the left groin for evaluation for possible phlegmon/abscess: Results discussed in detail with reading radiologist Dr. Alvina Esparza............. overt abscess radiographically appreciated by her.  Result finding discussed in detail with patient and patient's mother.  Patient wish for discharge today:Lymphadenopathy, granulation tissue/scar and very minimal cleft of   fluid is noted in the area of the incision extending to the area of   the anastomosis and around adjacent vessels in the area of the   previous drained abscess. This does not have appearance of current   abscess and there is no drainable fluid collection in this area.       Small amount of perigraft fluid which is probably postoperative   seroma around the new graft at proximal to mid thigh, distal to the   area included on the postoperative CTs of  "abdomen pelvis.       Common femoral artery immediately proximal to the graft anastomosis   and the new graft are patent by color Doppler.       Nonocclusive thrombus is incidentally noted in venous structures   immediately posterior to the anastomosis which would be consistent   with the proximal femoral vein. There also appears to be nonocclusive   thrombus in the adjacent deep femoral venous branch.            Intercritical event, acute urinary retention with history of chronic urinary retention, patient opted for [place] catheter placement will follow-up outpatient with urology.        Status post heart catheterization with drug-eluting stent deployment     Discharge planning: Patient requesting discharge today.  Patient does not wish for further inpatient evaluation and monitoring.  Patient requested discharge home today.     Patient in agreement with discharge.  Patient should seek medical attention immediately if condition should worsen, new symptoms develop , no further improvement or recurrence of presenting symptomatology, patient warned.           Dictation performed with assistance of voice recognition device therefore transcription errors are possible.        Consultants         Infectious disease    Cardiology      Surgeries/Procedures:        Left and right heart catheterization with drug-eluting stent deployment              Your medication list        CONTINUE taking these medications        Instructions Last Dose Given Next Dose Due   pen needle, diabetic 32 gauge x 5/32\" needle  Commonly known as: BD Ultra-Fine Jocy Pen Needle      Use to inject insulin up to 8 times per day.       BD Ultra-Fine Micro Pen Needle 32 gauge x 1/4\" needle  Generic drug: pen needle, diabetic      Use to inject 1-4 times daily as directed       pen needle, diabetic 32 gauge x 5/32\" needle  Commonly known as: BD Ultra-Fine Jocy Pen Needle      Pen Needles for Insulin.       FreeStyle En 2 [place] misc  Generic drug: " flash glucose scanning reader      USE TO CHECK SUGARS FOUR TIMES A DAY       FreeStyle En 2 Sensor kit  Generic drug: flash glucose sensor kit      use as directed daily and change every 14 days       lancets misc      1 each 4 times a day before meals.              ASK your doctor about these medications        Instructions Last Dose Given Next Dose Due   aspirin 81 mg chewable tablet           atorvastatin 80 mg tablet  Commonly known as: Lipitor      Take 1 tablet (80 mg) by mouth once daily at bedtime.       carvedilol 6.25 mg tablet  Commonly known as: Coreg           Creon 36,000-114,000- 180,000 unit capsule,delayed release(DR/EC) capsule  Generic drug: pancrelipase (Lip-Prot-Amyl)           dapagliflozin propanediol 10 mg  Commonly known as: Farxiga      Take 1 tablet (10 mg) by mouth once daily with breakfast.       ELDERBERRY FRUIT ORAL           Eliquis 5 mg tablet  Generic drug: apixaban      Take 1 tablet (5 mg) by mouth 2 times a day.       Entresto  mg tablet  Generic drug: sacubitriL-valsartan           ezetimibe 10 mg tablet  Commonly known as: Zetia           furosemide 40 mg tablet  Commonly known as: Lasix           gabapentin 400 mg capsule  Commonly known as: Neurontin      Take 1 capsule (400 mg) by mouth 3 times a day.       JAIMEE ROOT EXTRACT ORAL           heparin LockFlush(Porcine)(PF) 10 unit/mL injection  Generic drug: heparin flush           heparin flush 10 unit/mL injection      3 mL (30 Units) by intra-catheter route once daily.       hydrALAZINE 50 mg tablet  Commonly known as: Apresoline           insulin lispro 100 unit/mL injection  Commonly known as: HumaLOG      Inject 10 Units under the skin 3 times a day with meals. Plus sliding scale if BS is over 200       isosorbide mononitrate ER 60 mg 24 hr tablet  Commonly known as: Imdur      Take 1 tablet (60 mg) by mouth once daily.       Lantus Solostar U-100 Insulin 100 unit/mL (3 mL) pen  Generic drug: insulin  glargine      Inject 20 Units under the skin once daily at bedtime.       medical cannabis           meropenem 1 gram injection  Commonly known as: Merrem      Infuse 2 g into a venous catheter every 8 hours.       nicotine 21 mg/24 hr patch  Commonly known as: Nicoderm CQ      APPLY 1 PATCH TO SKIN EVERY 24 HOURS AS DIRECTED       OXcarbazepine 150 mg tablet  Commonly known as: Trileptal           oxyCODONE 5 mg immediate release tablet  Commonly known as: Roxicodone  Ask about: Should I take this medication?      Take 1 tablet (5 mg) by mouth every 6 hours if needed for severe pain (7 - 10) for up to 7 days.       pantoprazole 40 mg EC tablet  Commonly known as: ProtoNix      TAKE 1 TABLET BY MOUTH ONCE DAILY       ranolazine 500 mg 12 hr tablet  Commonly known as: Ranexa           sodium chloride 0.9% flush           tamsulosin 0.4 mg 24 hr capsule  Commonly known as: Flomax  Ask about: Which instructions should I use?      TAKE 1 CAPSULE BY MOUTH ONCE DAILY       ticagrelor 90 mg tablet  Commonly known as: Brilinta      Take 1 tablet (90 mg) by mouth 2 times a day.       tiZANidine 2 mg tablet  Commonly known as: Zanaflex      Take 1 tablet (2 mg) by mouth once daily as needed for muscle spasms.                   Disposition/Additional Hospital course/events:;    59-year-old  male with significant past medical history include but limited to diabetes mellitus, severe coronary artery disease, ongoing intravenous antibiotic therapy secondary to left groin abscess secondary to Enterobacter.  Patient was admitted and treated for decompensated congestive heart failure.  He was seen in consultation by cardiology and heart catheterization was recommended in light of suspicion of underlying coronary artery disease contributing to his disposition.    Patient subsequently underwent heart catheterization 1/23/2025, procedure findings included including: Radiograph OM patent, severe distal LM and LCx stenosis  appreciated.  Successful placement/PCI of the distal LM/L CX    Postoperatively left forearm tightness and pain appreciated patient evaluated by cardiology protamine given along with blood pressure cuff 60 mmHg pressure.  Patient was seen and evaluated today by cardiology patient deemed okay for discharge.  Left hand evaluation disclosed palpable pulses without neurovascular compromise at this juncture.  No appreciation of cyanosis.  Hematoma.  Cardiology will likely dissolve over time.  Patient clinical disposition however warrants dual antiplatelet therapy along with anticoagulation therapy at this time.    Patient did not wish for further inpatient management.  Patient and mother notified of possible renal sequelae including dye induced nephropathy.  Recommend outpatient BMP with home health care and follow-up with family physician.    Patient requested discharge.  Patient in agreement with discharge.    Patient should seek medical attention immediately if condition should worsen, new symptoms develop , no further improvement or recurrence of presenting symptomatology, patient warned.    Follow-up:    Follow -up with family physician within one week. Follow-up recommended with personal cardiologist Dr. Newby, infectious disease and vascular service as directed.      Discharge Time : 38 mins      Patient should seek medical attention immediately if condition should worsen , presenting symptoms/conditions do not resolve or new symptoms should developed,patient warned.     Dictation performed with assistance of voice recognition device therefore transcription errors are possible.

## 2025-01-24 NOTE — DISCHARGE INSTRUCTIONS
Continue  meropenem 2 g every 8 hours through 02/11/25 to finish 6-week course of  antibiotics .    Urology follow-up follow -up in one week.      Patient should seek medical attention immediately if condition should worsen, new symptoms develop , no further improvement or recurrence of presenting symptomatology, patient warned.    Follow-up with Dr. Newby at erroneous available appointment.    Recommendation of triple therapy for 1 month and then double therapy thereafter.  Please disclose this to Dr. Newby, Dr Wright's recommendation.

## 2025-01-24 NOTE — PROGRESS NOTES
HPI:  Dereck Shen is a 59 y.o. male with PMHx of CAD s/p CABG, severe lower extremity PAD with prior revascularization (R fem-pop bypass 2/24/20; L fem-pop bypass 9/14/20; L fem PTA of the graft; R iliac PTA 2/2022; LLE and DANYA PTA 4/2024; angioplasty of the left SFA 10/21/24; and left femoral and tibial thrombectomy with patch angioplasty 11/19/24 who presented with shortness of breath that woke him from sleep at 5 am today and worsening. He choked on some pudding last night but was not SOB. He denies recent dysphagia and any cough. EMS noted him to be altered and hypoxic and placed him on oxygen. and brought him in for further evaluation. He was immediately placed on BIPAP; bedside echo evidently revealed bilateral B-lines as well as EF<40%. He was bolused with nitroglycerin and Lasix and started on a nitroglycerin drip. Initial VBG demonstrated  significant respiratory and metabolic acidosis which improved with resuscitation. He was ultimately weaned off nitroglycerin and BIPAP to supplemental O2. At the bedside he is alert and engaging.  ED workup was significant for blood glucose 284, potassium 5.3, creatinine 1.30, alk phos 135, troponins 12/20, lactate BNP 1231, WBC 18.3k. Influenza and COVID screens were negative. CXR revealed pulmonary vascular congestion and bilateral lower lobe atelectasis/pneumonitis. The pt was tachycardic to 133 bpm, tachypneic to 38, hypertensive to 199/105 mmHg and hypoxic to 85% on NRB.       Subjective Data:  1/21/2025 : Feeling SOB. Refusing Lasix as he has urine output problems  1-22-25: No CP/pressure.  Has some GUO and currently on O2 at 1 lpm  1-24/2025 : Feeling well. Has some left arm swelling and discomfort    Overnight Events:    None     Objective Data:  Last Recorded Vitals:  Vitals:    01/24/25 0600 01/24/25 0700 01/24/25 0730 01/24/25 0800   BP: 122/63 101/79  127/69   Pulse: 69 71  71   Resp: (!) 7 13  (!) 8   Temp:   36.2 °C (97.1 °F)    TempSrc:   Temporal     SpO2: 97% 98%  97%   Weight:   91.5 kg (201 lb 11.5 oz)    Height:           Last Labs:    Results from last 7 days   Lab Units 01/24/25  0459 01/23/25  0414 01/22/25  0408   SODIUM mmol/L 138 140 140   POTASSIUM mmol/L 4.0 3.7 3.7   CHLORIDE mmol/L 105 103 104   CO2 mmol/L 28 32 31   BUN mg/dL 24* 31* 27*   CREATININE mg/dL 1.09 1.41* 1.27   GLUCOSE mg/dL 105* 100* 111*   CALCIUM mg/dL 8.2* 8.2* 8.2*        Results from last 7 days   Lab Units 01/24/25  0459 01/22/25  0408 01/21/25  0443   WBC AUTO x10*3/uL 4.1* 5.0 5.8   HEMOGLOBIN g/dL 9.0* 9.0* 8.4*   HEMATOCRIT % 28.0* 28.4* 26.4*   PLATELETS AUTO x10*3/uL 241 259 229        Results from last 7 days   Lab Units 01/24/25  0459   CHOLESTEROL mg/dL 102   TRIGLYCERIDES mg/dL 179*   HDL mg/dL 27.5        Last I/O:  I/O last 3 completed shifts:  In: 450 (4.9 mL/kg) [IV Piggyback:450]  Out: 5 (0.1 mL/kg) [Blood:5]  Weight: 91 kg     Past Cardiology Tests (Last 3 Years):    Echo:  CONCLUSIONS:   1. The left ventricular systolic function is moderately decreased, with a Salinas's biplane calculated ejection fraction of 36%.   2. There is global hypokinesis of the left ventricle with minor regional variations.   3. Spectral Doppler shows a Grade II (pseudonormal pattern) of left ventricular diastolic filling with an elevated left atrial pressure.   4. Left ventricular cavity size is mildly dilated.   5. There is mildly reduced right ventricular systolic function.   6. Mildly elevated right ventricular systolic pressure.       Inpatient Medications:  Scheduled medications   Medication Dose Route Frequency    [Held by provider] apixaban  5 mg oral BID    aspirin  81 mg oral Daily    atorvastatin  80 mg oral Nightly    carvedilol  6.25 mg oral BID    cyanocobalamin  500 mcg oral Daily    [Held by provider] dapagliflozin propanediol  10 mg oral Daily with breakfast    ezetimibe  10 mg oral Daily    ferrous sulfate (325 mg ferrous sulfate)  65 mg of iron oral Daily with  breakfast    [Held by provider] furosemide  40 mg intravenous BID    gabapentin  400 mg oral TID    hydrALAZINE  50 mg oral TID    insulin glargine  10 Units subcutaneous Nightly    insulin lispro  0-5 Units subcutaneous TID AC    isosorbide mononitrate ER  60 mg oral Daily    meropenem (Merrem) 2 g in sodium chloride 0.9 % 100 mL IV  2 g intravenous q8h    nicotine  1 patch transdermal Daily    OXcarbazepine  450 mg oral BID    pantoprazole  40 mg oral Daily    ranolazine  500 mg oral BID    sacubitriL-valsartan  1 tablet oral BID    sennosides  2 tablet oral BID    tamsulosin  0.4 mg oral Daily    ticagrelor  90 mg oral BID     PRN medications   Medication    acetaminophen    dextrose    dextrose    glucagon    glucagon    morphine    morphine    oxygen     Continuous Medications   Medication Dose Last Rate       ROS:  Review of Systems   Constitutional: Negative. Negative for decreased appetite and malaise/fatigue.   HENT: Negative.     Eyes:  Negative for blurred vision and visual disturbance.   Cardiovascular:  Positive for dyspnea on exertion. Negative for chest pain, irregular heartbeat, leg swelling, orthopnea, palpitations and syncope.   Respiratory: Negative.  Negative for cough and shortness of breath.    Musculoskeletal:  Negative for arthritis and falls.   Gastrointestinal: Negative.    Neurological:  Negative for focal weakness and light-headedness.   Psychiatric/Behavioral:  Negative for depression and memory loss.         Physical Exam:  Physical Exam  Constitutional:       Appearance: Normal appearance.   HENT:      Head: Normocephalic.   Eyes:      Conjunctiva/sclera: Conjunctivae normal.   Cardiovascular:      Rate and Rhythm: Normal rate and regular rhythm.      Pulses: Normal pulses.      Heart sounds: S1 normal and S2 normal. No murmur heard.     No friction rub. No gallop.   Pulmonary:      Effort: Pulmonary effort is normal.      Breath sounds: Normal breath sounds.   Abdominal:      General:  Bowel sounds are normal.      Palpations: Abdomen is soft.      Tenderness: There is no abdominal tenderness.   Musculoskeletal:      Cervical back: Neck supple.      Right lower leg: No edema.      Left lower leg: No edema.      Comments: LLE with dressing/wrap   Skin:     General: Skin is warm and dry.   Neurological:      General: No focal deficit present.      Mental Status: He is alert and oriented to person, place, and time.   Psychiatric:         Attention and Perception: Attention normal.         Mood and Affect: Mood normal.          Assessment/Plan   1) Acute Pulmonary edema  Hx of CABG s/p PCI of CX in 2021 and Known EF 35%  Repeat echo  IV diuresis  Echo  Further recommendations based on results of echo  1/21/2025 : Echo reviewed  He is refusing stress test  Will perform cardiac cath on Thursday  Hold Eliquis  Suggest urology to see  1-22-25: Refused stress. For [facility] tomorrow with Dr Wright. NPO after midnight. Would avoid IV fluids at this point.  Continue on carvedilol, Farxiga, IV Lasix, hydralazine, Imdur, and Entresto. Lungs clear, no JVD, no edema.  Has improving with diuresis.  Eliquis on hold.   1/23/2025 : S/P left and right heart cath yesterday  Heart failure Compensated  Restart Farxiga   On Coreg, Entresto, Hydralazine/Imdur  Add Aldactone    2) CAD s/p PCI of LM/Cx  Other grafts patent  Triple therapy for 1 month and then double therapy            Code Status:  Full Code          Baljinder Wright MD

## 2025-01-24 NOTE — NURSING NOTE
Patient discharged with all belongings, discharge paperwork reviewed, and medication brought to bedside. Brown catheter remains in place, plan of care was to discharge with brown and family given additional leg bag. Patient voices understands discharge instructions and follow up. No additional questions at this time.

## 2025-01-24 NOTE — PROGRESS NOTES
Dereck Shen [id number]   Service: Internal Medicine / Hospitalist Date of service: 1/24/2025                                  Full Code                           Subjective     Dereck Shen is a 59 y.o. male with PMHx of CAD s/p CABG, severe lower extremity PAD with prior revascularization (R fem-pop bypass 2/24/20; L fem-pop bypass 9/14/20; L fem PTA of the graft; R iliac PTA 2/2022; LLE and DANYA PTA 4/2024; angioplasty of the left SFA 10/21/24; and left femoral and tibial thrombectomy with patch angioplasty 11/19/24 who presented with shortness of breath that woke him from sleep at 5 am today and worsening. He choked on some pudding last night but was not SOB. He denies recent dysphagia and any cough. EMS noted him to be altered and hypoxic and placed him on oxygen. and brought him in for further evaluation. He was immediately placed on BIPAP; bedside echo evidently revealed bilateral B-lines as well as EF<40%. He was bolused with nitroglycerin and Lasix and started on a nitroglycerin drip. Initial VBG demonstrated  significant respiratory and metabolic acidosis which improved with resuscitation. He was ultimately weaned off nitroglycerin and BIPAP to supplemental O2. At the bedside he is alert and engaging.  ED workup was significant for blood glucose 284, potassium 5.3, creatinine 1.30, alk phos 135, troponins 12/20, lactate BNP 1231, WBC 18.3k. Influenza and COVID screens were negative. CXR revealed pulmonary vascular congestion and bilateral lower lobe atelectasis/pneumonitis. The pt was tachycardic to 133 bpm, tachypneic to 38, hypertensive to 199/105 mmHg and hypoxic to 85% on NRB.   Remainder of ROS reviewed and negative except as indicated in HPI.      1/20.................  Pt denies SOB and chest pain. He wants to go home and is upset to know the echo may not be done until tomorrow. I advised him that leaving now would be considered AMA.      1/21..............Patient reported initially he  refused Lasix because he felt that he just did not work with his BPH.  He reports that he has an upcoming follow-up with his urologist concerning surgical procedure to aid is BPH.  No reported: Headaches, fevers, chills, nausea, vomiting, chest pains or palpitations.     1/22......................Patient reported overall that he was feeling fair today.  He reported that he feels very good on oxygen and was hoping that he could have oxygen when needed also hospital.  No reported: Palpitations, fevers, chills, nausea, vomiting or orthopnea today.  Patient was able to lie supine in bed shorter conversation without appreciable discomfort.        1/23.....................Patient seen and examined in presence of his mother planned heart catheterization later this morning.  No complaints voiced by patient at the time of evaluation except for his left groin where he felt t was a bit tender.      1/24.................Patient reported he is feeling fantastic today.  Patient requesting discharge home.  He felt that his left arm is doing well today no reported: Fevers, chills, dyspnea, orthopnea, cough, hemoptysis, melena, hematuria or hematochezia.  Headaches, chest pains, nausea or vomiting.      Review of Systems:   Review of system otherwise negative if not aforementioned above in subjective.     Objective     Physical Exam      Constitutional:       Appearance: Patient appeared in no acute cardiopulmonary distress.     Comments: Patient alert and oriented to person place time and situation.  HEENT:      Head: Normocephalic and atraumatic.Trachea midline      Nose:No observed congestion or rhinorrhea.     Mouth/Throat: Mucous membranes Moist, Trachea appeared  midline.  Eyes:      Extraocular Movements: Extraocular movements intact.      Pupils: Pupils are equal, round, and reactive to light.      Comments: No scleral icterus or conjunctival injection appreciated.   Cardiovascular:      Rate and Rhythm: Normal rate and  regular rhythm. No clicks rubs or gallops, normal S1 and S2.No peripheral stigmata of endocarditis appreciated.     Pulmonary:      Diminished lung bases appreciated bilaterally but no adventitious sound appreciated.  Abdominal:      General: Abdomen soft, nontender, active bowel sounds, no involuntary guarding or rebound tenderness appreciated.     Comments: None   Musculoskeletal:       Patient appeared to have full active range of motion for upper and lower extremities, no acute apparent joint deformity appreciated on examination.   No pitting edema or cyanosis appreciated.  Comment: Chronic left lower extremity wound, bandage, no appreciable drainage.       Lymphadenopathy:      No appreciable palpable lymphadenopathy  Skin:     General: Skin is warm.      Coloration:  No jaundice     Findings: No abnormal appearing skin rashes or lesions that appeared acute noted on unclothed area of the skin..   Neurological:      General: No focal sensory or motor deficits appreciated, no meningeal signs or dysmetria noted.      Cranial Nerves: Cranial nerves II to XII appearing grossly intact.  Comment: No new focal deficits appreciated known prior CVA with residual right-sided weakness and right foot drop        Genitals:  Deferred  Psychiatric:         The patient appears to be displaying normal mood and affect at the time of evaluation.     Labs:               Lab Results   Component Value Date     GLUCOSE 106 (H) 01/21/2025     CALCIUM 8.2 (L) 01/21/2025      01/21/2025     K 4.1 01/21/2025     CO2 28 01/21/2025      01/21/2025     BUN 25 (H) 01/21/2025     CREATININE 1.11 01/21/2025                  Lab Results   Component Value Date     GLUCOSE 111 (H) 01/22/2025     CALCIUM 8.2 (L) 01/22/2025      01/22/2025     K 3.7 01/22/2025     CO2 31 01/22/2025      01/22/2025     BUN 27 (H) 01/22/2025     CREATININE 1.27 01/22/2025           Lab Results   Component Value Date     WBC 5.8 01/21/2025      HGB 8.4 (L) 01/21/2025     HCT 26.4 (L) 01/21/2025     MCV 94 01/21/2025      01/21/2025                Lab Results   Component Value Date     WBC 5.0 01/22/2025     HGB 9.0 (L) 01/22/2025     HCT 28.4 (L) 01/22/2025     MCV 91 01/22/2025      01/22/2025            Lab Results   Component Value Date     GLUCOSE 100 (H) 01/23/2025     CALCIUM 8.2 (L) 01/23/2025      01/23/2025     K 3.7 01/23/2025     CO2 32 01/23/2025      01/23/2025     BUN 31 (H) 01/23/2025     CREATININE 1.41 (H) 01/23/2025            Lab Results   Component Value Date     WBC 5.0 01/22/2025     HGB 9.0 (L) 01/22/2025     HCT 28.4 (L) 01/22/2025     MCV 91 01/22/2025      01/22/2025      Lab Results   Component Value Date    GLUCOSE 105 (H) 01/24/2025    CALCIUM 8.2 (L) 01/24/2025     01/24/2025    K 4.0 01/24/2025    CO2 28 01/24/2025     01/24/2025    BUN 24 (H) 01/24/2025    CREATININE 1.09 01/24/2025      Lab Results   Component Value Date    WBC 4.1 (L) 01/24/2025    HGB 9.0 (L) 01/24/2025    HCT 28.0 (L) 01/24/2025    MCV 91 01/24/2025     01/24/2025      [unfilled]   [unfilled]   Susceptibility data from last 90 days.  Collected Specimen Info Organism Amoxicillin/Clavulanate Ampicillin Ampicillin/Sulbactam Cefazolin Cefepime Ciprofloxacin Gentamicin Levofloxacin Piperacillin/Tazobactam Trimethoprim/Sulfamethoxazole   01/02/25 Tissue/Biopsy from Skin/Superficial Abscess Enterobacter cloacae complex  R  R  R  R  S  S  S  S  S  S   12/30/24 Tissue/Biopsy from Skin/Superficial Abscess Enterobacter cloacae complex  R  R  R  R  S  S  S  S  S  S       Mixed Anaerobic Bacteria                                       X-rays/ Images     [unfilled]        Radiology Results (last 21 days)     Procedure Component Value Units Date/Time   XR chest 1 view [[id number]] Collected: 01/19/25 0954   Order Status: Completed Updated: 01/19/25 1017   Narrative:     STUDY:  Chest Radiograph; 01/19/2025  9:46 AM  INDICATION:  Shortness of breath.  COMPARISON:  XR chest 01/01/2025 10:53:00, 00:49:00.  ACCESSION NUMBER(S):  [id number]  ORDERING CLINICIAN:  GINGER BREWSTER  TECHNIQUE:  Frontal chest was obtained at 09:45:00 hours.  FINDINGS:  CARDIOMEDIASTINAL SILHOUETTE:  Cardiomediastinal silhouette is normal in size and configuration.  There is a right upper extremity PICC tip overlying the region of the  caval atrial junction.     LUNGS:  The lungs demonstrate pulmonary vascular prominence which may be  consistent with vascular congestion.  Patchy changes are noted at the  lung bases which may represent atelectasis or pneumonitis..     ABDOMEN:  No remarkable upper abdominal findings.     BONES:  No acute osseous changes.  Median sternotomy wires are visualized.   Impression:     1.  Pulmonary vascular prominence suggestive of pulmonary vascular  congestion.  2.  Patchy opacities within the lower lobes most consistent with  atelectasis/pneumonitis.  Signed by Simeon Cummins MD            Medical Problems         Problem List         * (Principal) Flash pulmonary edema     PAD (peripheral artery disease) (CMS-HCC)     Abdominal pain, acute, right upper quadrant     Abnormal CXR     Atherosclerosis of native artery of both lower extremities with intermittent claudication (CMS-HCC)     Atherosclerotic heart disease of native coronary artery without angina pectoris     Bacterial skin infection     Bipolar depression (Multi)     BPH (benign prostatic hyperplasia)     Burning pain     Causalgia of lower extremity     Hip pain, right     Pain of right lower extremity     Ischemic stroke (Multi)     Chronic combined systolic and diastolic CHF (congestive heart failure)     Chronic back pain greater than 3 months duration     Chronic hepatitis C virus genotype 1a infection (Multi)     Chronic obstructive pulmonary disease (Multi)     Chronic pain syndrome     Chronic RUQ pain     Claudication (CMS-HCC)      Coordination impairment     Decreased peripheral vision of left eye     Diabetic neuropathy, painful (Multi)     Diabetic polyneuropathy associated with type 2 diabetes mellitus (Multi)     Eschar of lower leg     Frequent falls     Gastroesophageal reflux disease     H/O: CVA (cerebrovascular accident)     Hepatic steatosis     Hepatitis C virus infection cured after antiviral drug therapy     Hepatitis-C     Hyperkalemia     Hyperlipidemia     Hypertension     Joint stiffness of both shoulders     Left ventricular dysfunction     Low back pain     Nasal congestion with rhinorrhea     Constipation     Nausea in adult     Neuropathic pain     Nicotine dependence     Neuropathy, inflammatory or toxic (Multi)     BRUNA (obstructive sleep apnea)     Other symptoms and signs involving the musculoskeletal system     Pancreatic lesion (HHS-HCC)     Pancreatic malabsorption (HHS-HCC)     Paroxysmal nocturnal dyspnea     Peripheral arterial disease (CMS-HCC)     Poor balance     Other chronic postprocedural pain     Presence of aortocoronary bypass graft     Pulmonary congestion     Recurrent boils     Reduced chest expansion on inspiration     S/P CABG x 5     Right flank pain     Paraparesis (Multi)     Type 2 diabetes mellitus     Spasticity as late effect of cerebrovascular accident (CVA)     Shortness of breath on exertion     Dyspnea     Shingles     S/P PTCA (percutaneous transluminal coronary angioplasty)     Long-term insulin use (Multi)     Right hemiparesis (Multi)     Hospital discharge follow-up     Critical limb ischemia of left lower extremity (Multi)     Peripheral artery occlusion (CMS-HCC)     Postoperative abscess                  Above medical problems may be reflective of historical medical problems that may have resolved and may not related to acute clinical condition/medical problems.     Clinical impression/plan:           Acute hypoxic hypercapnic respiratory distress  Flash pulmonary edema  Acute  exacerbation of HFrEF  Evidently bedside limited echo revealed ED < 40%  Full echo last November revealed LVEF 38% with IW akinesis  Pt weaned off NTG gtt and BIPAP in ED, presently on RA   Cardiology on board, carvedilol added and full echo ordered, continue IV Lasix and home GDMT     Hx severe PAD  Hx CAD s/p CABG  Pt was hospitalized last December for critical LLE ischemia and underwent left fem-below knee popliteal bypass 12/24/24  He was readmitted 12/30/24 for postoperative left groin abscess 2/2 Enterobacter/AmpC organism and was discharged on meropenem through 2/11/25   He will need close followup with vascular surgery and ID  Continue apixaban, ASA, Lipitor, Zetia and Brilinta, carvedilol added     Diabetes  Home diabetes meds held, continue Lantus and insulin sliding scale     HTN  Pt was hypertensive to 199/105 mmHg, has now normalized, continue home meds     Recent onset anemia  Hgb has been downtrending since last last year, 8.6 today  Will check iron studies, folate and B12     Tobacco dependence  Continue nicotine patch      DVT ppx: apixaban           Disposition/additional care plan/interventions: 1/21/2025     Communicated with Dr. Wright cardiologist who saw patient before I did this morning.  Patient was  refusing Lasix, refused stress test, Dr. Wright considering heart catheterization this coming Thursday and currently holding Eliquis.        However patient later was okay with Lasix therapy as ordered by cardiology.     Known history of coronary artery disease status post PCI and CABG presenting with acute pulmonary edema will continue IV diuresis.     Plan heart catheterization this coming Thursday, Eliquis on hold     Monitor electrolytes     SCDs     Disposition/additional care plan/interventions: 1/22/2025     Patient appearing clinically improved appears to responded well to diuretics.     Close monitoring of  renal indices,currently on IV diuretics with plan heart catheterization      Continue  diuretics as per cardiology     Monitor electrolytes.....................Mag and potassium supplementation     Patient complaining of left groin tenderness palpation at this juncture reflected some tenderness in the inguinal region however no appreciation of this juncture of palpable hematoma .  Will continue patient on current antibiotic therapy     CT scan of abdomen and pelvis repeat before discharge to rule out any underlying new pathology.  Care plan discussed in detail with patient, patient agreement with plan of care.     Plan heart catheterization in the  next 24 hours.        With n.p.o. status overnight for  planned heart catheterization, will decrease basal insulin to 50% reintroduce chronic daily dosing post heart cath        Disposition/additional care plan/interventions: 1/23/2025  Plan heart catheterization today.     Noncontrast CT scan may not provide enough information concerning possible seroma or abscess of the left groin.  Will obtain an ultrasound.     Care plan also discussed in detail at bedside with patient's mother.     Monitor renal indices closely patient at risk for dye induced nephropathy      Disposition/additional care plan/interventions: 1/24/2025    Ultrasound performed of the left groin for evaluation for possible phlegmon/abscess: Results discussed in detail with reading radiologist Dr. Alvina Esparza............. overt abscess radiographically appreciated by her.  Result finding discussed in detail with patient and patient's mother.  Patient wish for discharge today:Lymphadenopathy, granulation tissue/scar and very minimal cleft of   fluid is noted in the area of the incision extending to the area of   the anastomosis and around adjacent vessels in the area of the   previous drained abscess. This does not have appearance of current   abscess and there is no drainable fluid collection in this area.       Small amount of perigraft fluid which is probably postoperative    seroma around the new graft at proximal to mid thigh, distal to the   area included on the postoperative CTs of abdomen pelvis.       Common femoral artery immediately proximal to the graft anastomosis   and the new graft are patent by color Doppler.       Nonocclusive thrombus is incidentally noted in venous structures   immediately posterior to the anastomosis which would be consistent   with the proximal femoral vein. There also appears to be nonocclusive   thrombus in the adjacent deep femoral venous branch.         Intercritical event, acute urinary retention with history of chronic urinary retention, patient opted for [place] catheter placement will follow-up outpatient with urology.      Status post heart catheterization with drug-eluting stent deployment    Discharge planning: Patient requesting discharge today.  Patient does not wish for further inpatient evaluation and monitoring.  Patient requested discharge home today.    Patient in agreement with discharge.  Patient should seek medical attention immediately if condition should worsen, new symptoms develop , no further improvement or recurrence of presenting symptomatology, patient warned.           Dictation performed with assistance of voice recognition device therefore transcription errors are possible.

## 2025-01-24 NOTE — NURSING NOTE
Heart Failure Nurse Navigator    The role of the HF nurse navigator is to (1) characterize risk profiles of patients with heart failure transitioning from [id number] after hospitalization, (2) recommend interventions to improve care and reduce risks of worse post-hospitalization outcomes. Potential recommendations may touch base on patient/family education, additional consults that may bring value, and appointment scheduling.    History    I met with Dereck Shen at the bedside, and my impressions include: Pt resting in bed with mom, Danna, at the bedside. Pt agreeable to discuss HF with mother present. Pt and mom have a good understanding of heart failure, the patient was diagnosed with HF in 2008 and has a strong family hx of HF. He also has a hx of CABG. Pt sts all of a sudden he experienced increased SOB and his pulse ox dropped to 83% RA. Pt has been receiving IV ABX at home for a groin infection s/p surgery. Pt has his own apartment but has been staying with him mom since his surgery and is planning to return there with a Home Health RN that visits once a month. Pt declined [facility] referral. Pt requesting financial assistance as he is on disability only. Pt also requesting assistance with obtaining a smaller walker that he could use inside of his house, especially since his left leg is weak s/p surgery. Pt does have a cane but also has a brown and feels it would be better to use a walker vs the cane.    Pt had a HC this admission and received a stent to the LMx    Patients Cardiologist(s): Dr. Newby    Patients Primary Care Provider: Dr. Mancilla    1. Medical Domain  What is the patient's most recent LVEF? 36% Grade 2 LVDF and increased LA pressure  HFrEF (LVEF <= 40%) Quadruple therapy recommended  HFmrEF (LVEF 41-49%) Quadruple therapy recommended  HFpEF (LVEF >= 50%) Minimum recommendations: SGLT2i and MRA  Is the patient on IP GDMT for their condition?   ARNI/ACEI/ARB: Sacubitril-valsartan  " mg BID  BB: Carvedilol 12.5 mg BID  MRA: spironolactone 25 mg daily  SGLT2i: dapaglifozin 10 mg daily  Is the patient prescribed a diuretic? Yes  Furosemide 20 mg daily  Does this patient have an implanted device (ie cardioMEMS, ICD, CRT-D)?  Device type: n/a  Could this patient have advanced heart failure (Stage D heart failure)?: N/A   If yes, the potential markers of advanced heart failure include:     REFERENCE: Potential markers of advanced HF   Inotrope (dobutamine or milrinone) used during this admission?   LVEF<=25%?   >=2 hospitalizations for ADHF in the last year?   Severe symptoms of HF (fatigue, dyspnea, confusion, edema) despite medical therapy?   Downtitration of GDMT as compared to home medications?   Discontinuation of GDMT because of hypotension or renal intolerance?   Recurrent arrhythmias (AF, VT with ICD shocks)?   Cardiac cachexia (i.e., unintentional weight loss due to HF)?   High-risk biomarker profile (e.g., hyponatremia [Na<135], very elevated BNP, worsening kidney function)   Escalating doses of diuretics or persistent edema despite escalation     2. Mind and Emotion  Does this patient have possible cognitive impairment?: No (The Mini-Cog score 5/5)  Ask the patient to memorize these 3 words: banana, sunrise, chair  Ask the patient to draw a clock with hands pointing at \"20 minutes after 8\"  Ask the patient to recall the 3 words  Score: Add number of words recalled + clock drawing (0 points for any errors, 2 points if correct)  Interpretation: A score of 0-2 suggests cognitive impairment is present, a score of 3-5 suggests cognitive impairment is absent  Does this patient have major depression?: Yes (PH-Q2 score 4/6)  Over the last 2 weeks: Little interest or pleasure in doing things? (Not at all +0, Several days +1, More than half the days +2, Nearly every day +3)  Over the last 2 weeks: Feeling down, depressed or hopeless? (Not at all +0, Several days +1, More than half the days " +2, Nearly every day +3)  Score: Add points  Interpretation: A score of 3 or more suggests that major depression is likely.     3. Physical Function  Could this patient be frail?: N/A   Defined by presence of all of these: slowness, weakness, shrinking, inactivity, exhaustion  Is this patient at risk for falling?: Yes   Defined by having experienced a fall in the last 12 months.    4. Social Determinants of Health  Transportation deficits?: No   Lack of insurance?: No   Poor financial resources?: Yes On disability income only.  Living conditions (homelessness, unstable home)?: No   Poor family/social support?: No   Poor personal care?: No   Food insecurity?: No   Lack of HCPOA?: No - Primary is momDanna. Secondary is sisterTarah    I provided the following heart failure education:  - Living With Heart Failure book provided.  - HF signs and symptoms, heart failure zones and when to call cardiologist.   - Controlling Heart Failure at Home: medication adherence, following up with cardiologist at least once yearly, staying healthy and active, limiting sodium and fluid intake as directed by cardiologist.  - Daily Weight Education    Assessment  Pt declined [facility] at this time, will go home with mom and Home health RN that visits once a week.  2.   Requesting possible financial assistance as his only income is disability  3.   Requesting a new walker that would be able to be used inside the home      Recommendations/ Plan  Recommend evaluation by , geriatrician, or psychiatrist (Patient may have cognitive impairment and/or depression). Pt has a hx of PTSD, bipolar, anxiety and panic attacks, these sx come and go.  Patient has one or more psychosocial barriers, recommend: Disability income only, requesting financial assistance if there are any available.   Pt sts need for new walker, this has been addressed in social works note         *Cardiology Discharge Appointment Follow-up Plan:  Follow up with   Odin dockery DC.    Asuncion Quiros, RN-BSN  Clinical Nurse Navigator- Stroke & CHF  Ext. [phone]

## 2025-01-24 NOTE — CARE PLAN
Problem: Pain - Adult  Goal: Verbalizes/displays adequate comfort level or baseline comfort level  1/24/2025 0745 by Ant Menjivar RN  Outcome: Progressing  1/23/2025 1801 by Ant Menjivar RN  Outcome: Progressing     Problem: Safety - Adult  Goal: Free from fall injury  1/24/2025 0745 by Ant Menjivar RN  Outcome: Progressing  1/23/2025 1801 by Ant Menjivar RN  Outcome: Progressing     Problem: Discharge Planning  Goal: Discharge to home or other facility with appropriate resources  1/24/2025 0745 by Ant Menjivar RN  Outcome: Progressing  1/23/2025 1801 by Ant Menjivar RN  Outcome: Progressing     Problem: Chronic Conditions and Co-morbidities  Goal: Patient's chronic conditions and co-morbidity symptoms are monitored and maintained or improved  1/24/2025 0745 by Ant Menjivar RN  Outcome: Progressing  1/23/2025 1801 by Ant Menjivar RN  Outcome: Progressing     Problem: Fall/Injury  Goal: Not fall by end of shift  1/24/2025 0745 by Ant Menjivar RN  Outcome: Progressing  1/23/2025 1801 by Ant Menjivar RN  Outcome: Progressing  Goal: Be free from injury by end of the shift  1/24/2025 0745 by Ant Menjivar RN  Outcome: Progressing  1/23/2025 1801 by Ant Menjivar RN  Outcome: Progressing  Goal: Verbalize understanding of personal risk factors for fall in the hospital  1/24/2025 0745 by Ant Menjivar RN  Outcome: Progressing  1/23/2025 1801 by Ant Menjivar RN  Outcome: Progressing  Goal: Verbalize understanding of risk factor reduction measures to prevent injury from fall in the home  1/24/2025 0745 by Ant Menjivar RN  Outcome: Progressing  1/23/2025 1801 by Ant Menjivar RN  Outcome: Progressing  Goal: Use assistive devices by end of the shift  1/24/2025 0745 by Ant Menjivar RN  Outcome: Progressing  1/23/2025 1801 by Ant Menjivar RN  Outcome: Progressing  Goal: Pace activities to prevent fatigue by end of the shift  1/24/2025 0745 by Ant Menjivar RN  Outcome: Progressing  1/23/2025 1801 by Ant Menjivar RN  Outcome: Progressing     Problem:  Respiratory  Goal: No signs of respiratory distress (eg. Use of accessory muscles. Peds grunting)  1/24/2025 0745 by Ant Menjivar RN  Outcome: Progressing  1/23/2025 1801 by Ant Menjivar RN  Outcome: Progressing  Goal: Verbalize decreased shortness of breath this shift  1/24/2025 0745 by Ant Menjivar RN  Outcome: Progressing  1/23/2025 1801 by Ant Menjivar RN  Outcome: Progressing  Goal: Wean oxygen to maintain O2 saturation per order/standard this shift  1/24/2025 0745 by Ant Menjivar RN  Outcome: Progressing  1/23/2025 1801 by Ant Menjivar RN  Outcome: Progressing     Problem: Pain  Goal: Takes deep breaths with improved pain control throughout the shift  1/24/2025 0745 by Ant Menjivar RN  Outcome: Progressing  1/23/2025 1801 by Ant Menjivar RN  Outcome: Progressing  Goal: Turns in bed with improved pain control throughout the shift  1/24/2025 0745 by Ant Menjivar RN  Outcome: Progressing  1/23/2025 1801 by Ant Menjivar RN  Outcome: Progressing  Goal: Walks with improved pain control throughout the shift  1/24/2025 0745 by Ant Menjivar RN  Outcome: Progressing  1/23/2025 1801 by Ant Menjivar RN  Outcome: Progressing  Goal: Performs ADL's with improved pain control throughout shift  1/24/2025 0745 by Ant Menjivar RN  Outcome: Progressing  1/23/2025 1801 by Ant Menjivar RN  Outcome: Progressing  Goal: Participates in PT with improved pain control throughout the shift  1/24/2025 0745 by Ant Menjivar RN  Outcome: Progressing  1/23/2025 1801 by Ant Menjivar RN  Outcome: Progressing     Problem: Skin  Goal: Decreased wound size/increased tissue granulation at next dressing change  1/24/2025 0745 by Ant Menjivar RN  Outcome: Progressing  Flowsheets (Taken 1/24/2025 0745)  Decreased wound size/increased tissue granulation at next dressing change:   Promote sleep for wound healing   Protective dressings over bony prominences  1/23/2025 1801 by Ant Menjivar RN  Outcome: Progressing  Flowsheets (Taken 1/23/2025 1801)  Decreased wound  size/increased tissue granulation at next dressing change:   Promote sleep for wound healing   Protective dressings over bony prominences  Goal: Participates in plan/prevention/treatment measures  1/24/2025 0745 by Ant Menjivar RN  Outcome: Progressing  Flowsheets (Taken 1/24/2025 0745)  Participates in plan/prevention/treatment measures:   Elevate heels   Increase activity/out of bed for meals   Discuss with provider PT/OT consult  1/23/2025 1801 by Ant Menjivar RN  Outcome: Progressing  Flowsheets (Taken 1/23/2025 1801)  Participates in plan/prevention/treatment measures:   Discuss with provider PT/OT consult   Elevate heels   Increase activity/out of bed for meals  Goal: Prevent/manage excess moisture  1/24/2025 0745 by Ant Menjivar RN  Outcome: Progressing  Flowsheets (Taken 1/24/2025 0745)  Prevent/manage excess moisture:   Follow provider orders for dressing changes   Moisturize dry skin   Monitor for/manage infection if present  1/23/2025 1801 by nAt Menjivar RN  Outcome: Progressing  Flowsheets (Taken 1/23/2025 1801)  Prevent/manage excess moisture:   Cleanse incontinence/protect with barrier cream   Moisturize dry skin   Monitor for/manage infection if present  Goal: Prevent/minimize sheer/friction injuries  1/24/2025 0745 by Ant Menjivar RN  Outcome: Progressing  Flowsheets (Taken 1/24/2025 0745)  Prevent/minimize sheer/friction injuries:   Complete micro-shifts as needed if patient unable. Adjust patient position to relieve pressure points, not a full turn   Increase activity/out of bed for meals   Turn/reposition every 2 hours/use positioning/transfer devices  1/23/2025 1801 by Ant Menjivar RN  Outcome: Progressing  Flowsheets (Taken 1/23/2025 1801)  Prevent/minimize sheer/friction injuries: Increase activity/out of bed for meals  Goal: Promote/optimize nutrition  1/24/2025 0745 by Ant Menjivar RN  Outcome: Progressing  Flowsheets (Taken 1/24/2025 0745)  Promote/optimize nutrition:   Consume > 50%  meals/supplements   Reassess MST if dietician not consulted   Monitor/record intake including meals  1/23/2025 1801 by Ant Menjivar RN  Outcome: Progressing  Flowsheets (Taken 1/23/2025 1801)  Promote/optimize nutrition: Monitor/record intake including meals  Goal: Promote skin healing  1/24/2025 0745 by Ant Menjivar RN  Outcome: Progressing  Flowsheets (Taken 1/24/2025 0745)  Promote skin healing:   Assess skin/pad under line(s)/device(s)   Protective dressings over bony prominences   Ensure correct size (line/device) and apply per  instructions   Rotate device position/do not position patient on device  1/23/2025 1801 by Ant Menjivar RN  Outcome: Progressing  Flowsheets (Taken 1/23/2025 1801)  Promote skin healing:   Assess skin/pad under line(s)/device(s)   Rotate device position/do not position patient on device     Problem: Diabetes  Goal: Achieve decreasing blood glucose levels by end of shift  1/24/2025 0745 by Ant Menjivar RN  Outcome: Progressing  1/23/2025 1801 by Ant Menjivar RN  Outcome: Progressing  Goal: Increase stability of blood glucose readings by end of shift  1/24/2025 0745 by Ant Menjivar RN  Outcome: Progressing  1/23/2025 1801 by Ant Menjivar RN  Outcome: Progressing  Goal: Decrease in ketones present in urine by end of shift  1/24/2025 0745 by Ant Menjivar RN  Outcome: Progressing  1/23/2025 1801 by Ant Menjivar RN  Outcome: Progressing  Goal: Maintain electrolyte levels within acceptable range throughout shift  1/24/2025 0745 by Ant Menjivar RN  Outcome: Progressing  1/23/2025 1801 by Ant Menjivar RN  Outcome: Progressing  Goal: Maintain glucose levels >70mg/dl to <250mg/dl throughout shift  1/24/2025 0745 by Ant Menjivar RN  Outcome: Progressing  1/23/2025 1801 by Ant Menjivar RN  Outcome: Progressing  Goal: No changes in neurological exam by end of shift  1/24/2025 0745 by Ant Menjivar RN  Outcome: Progressing  1/23/2025 1801 by Ant Menjivar RN  Outcome: Progressing  Goal: Learn about and  adhere to nutrition recommendations by end of shift  1/24/2025 0745 by Ant Menjivar RN  Outcome: Progressing  1/23/2025 1801 by Ant Menjivar RN  Outcome: Progressing  Goal: Vital signs within normal range for age by end of shift  1/24/2025 0745 by Ant Menjivar RN  Outcome: Progressing  1/23/2025 1801 by Ant Menjivar RN  Outcome: Progressing  Goal: Increase self care and/or family involovement by end of shift  1/24/2025 0745 by Ant Menjivar RN  Outcome: Progressing  1/23/2025 1801 by Ant Menjivar RN  Outcome: Progressing  Goal: Receive DSME education by end of shift  1/24/2025 0745 by Ant Menjivar RN  Outcome: Progressing  1/23/2025 1801 by Ant Menjivar RN  Outcome: Progressing     Problem: Heart Failure  Goal: Improved gas exchange this shift  1/24/2025 0745 by Ant Menjivar RN  Outcome: Progressing  1/23/2025 1801 by Ant Menjivar RN  Outcome: Progressing  Goal: Improved urinary output this shift  1/24/2025 0745 by Ant Menjivar RN  Outcome: Progressing  1/23/2025 1801 by Ant Menjivar RN  Outcome: Progressing  Goal: Reduction in peripheral edema within 24 hours  1/24/2025 0745 by Ant Menjivar RN  Outcome: Progressing  1/23/2025 1801 by Ant Menjivar RN  Outcome: Progressing  Goal: Report improvement of dyspnea/breathlessness this shift  1/24/2025 0745 by Ant Menjivar RN  Outcome: Progressing  1/23/2025 1801 by Ant Menjivar RN  Outcome: Progressing  Goal: Weight from fluid excess reduced over 2-3 days, then stabilize  1/24/2025 0745 by Ant Menjivar RN  Outcome: Progressing  1/23/2025 1801 by Ant Menjivar RN  Outcome: Progressing  Goal: Increase self care and/or family involvement in 24 hours  1/24/2025 0745 by Ant Menjivar RN  Outcome: Progressing  1/23/2025 1801 by Ant Menjivar RN  Outcome: Progressing     The clinical goals for the shift include patient will be hemodynamically stable throughout shift

## 2025-01-25 ENCOUNTER — PHARMACY VISIT (OUTPATIENT)
Dept: PHARMACY | Facility: CLINIC | Age: 60
End: 2025-01-25
Payer: MEDICAID

## 2025-01-25 PROCEDURE — RXMED WILLOW AMBULATORY MEDICATION CHARGE

## 2025-01-25 NOTE — DOCUMENTATION CLARIFICATION NOTE
"    PATIENT:               CARMENZA OLEARY  ACCT #:                  [id number]  MRN:                       [id number]  :                       1965  ADMIT DATE:       2025 9:06 AM  DISCH DATE:        2025 5:31 PM  RESPONDING PROVIDER #:        [id number]          PROVIDER RESPONSE TEXT:    Non-infectious SIRS with acute organ dysfunction of acute respiratory failure    CDI QUERY TEXT:    Clarification    Instruction:    Based on your assessment of the patient and the clinical information, please provide the requested documentation by clicking on the appropriate radio button and enter any additional information if prompted.    Question: Please further clarify if there is a diagnosis related to the clinical information    When answering this query, please exercise your independent professional judgment. The fact that a question is being asked, does not imply that any particular answer is desired or expected.    The patient's clinical indicators include:  Clinical Information: Patient admitted with acute exacerbation of HFrEF    Clinical Indicators: Per ED, \"Acute respiratory failure with hypoxia and hypercapnia.\"    Per H and P, \"Acute hypoxic hypercapnic respiratory distress...  He was readmitted 24 for postoperative left groin abscess  Enterobacter/#AmpC organism and was discharged on meropenem through 25 He will need close followup with vascular surgery and ID.\"    : P 103-133, R 27-38, satting 85% on non-rebreather, WBC 18.3, abs neutrophils 10.61    Treatment: IV merrem- active, wound consult, monitoring labs/vitals    Risk Factors: 59yom admitted with acute exacerbation of HFrEF  Options provided:  -- Non-infectious SIRS with acute organ dysfunction of acute respiratory failure  -- Sepsis due to left groin abscess with acute organ dysfunction of acute respiratory failure, POA  -- Other - I will add my own diagnosis  -- Refer to Clinical Documentation Reviewer    Query created by: " Alvina Calderon on 1/22/2025 1:07 PM      Electronically signed by:  ENMANUEL MAHAJAN DO 1/25/2025 8:59 AM

## 2025-01-26 ENCOUNTER — HOME INFUSION (OUTPATIENT)
Dept: INFUSION THERAPY | Age: 60
End: 2025-01-26
Payer: COMMERCIAL

## 2025-01-26 ENCOUNTER — DOCUMENTATION (OUTPATIENT)
Dept: HOME HEALTH SERVICES | Facility: HOME HEALTH | Age: 60
End: 2025-01-26
Payer: COMMERCIAL

## 2025-01-26 NOTE — HH CARE COORDINATION
Home Care received a Referral to Resume Care for Infusion and Nursing. We have processed the referral for a Resumption of Care on 1/27.     If you have any questions or concerns, please feel free to contact us at [phone]. Follow the prompts, enter your five digit zip code, and you will be directed to your care team on [facility].

## 2025-01-26 NOTE — PROGRESS NOTES
Patient admitted to  [facility] on  with SOB - discharged on  no change to POC, Meropenem 2g IV q8h to continue through  - follow up appt with Dr Leon Jordan ID on .   Homecare made aware of discharge on     - (delivery had been made to patients home on  of 9 doses of Meropenem 2g in [facility] as previously dispensed prior to this admit)    Telephone call with patient on  - instructed patient to discard  meds including where to find expiration date on label - patient verbalized understanding.  Infusing doses from  delivery     Dispense x12 doses of Meropenem 2g in HP for cmpd and delivery on   Infusions  through     NF  3,4 - confirm no changes after  ID appt       
No significant past surgical history

## 2025-01-27 ENCOUNTER — TELEPHONE (OUTPATIENT)
Dept: PRIMARY CARE | Facility: CLINIC | Age: 60
End: 2025-01-27

## 2025-01-27 ENCOUNTER — OFFICE VISIT (OUTPATIENT)
Dept: UROLOGY | Facility: CLINIC | Age: 60
End: 2025-01-27
Payer: COMMERCIAL

## 2025-01-27 ENCOUNTER — HOME CARE VISIT (OUTPATIENT)
Dept: HOME HEALTH SERVICES | Facility: HOME HEALTH | Age: 60
End: 2025-01-27
Payer: COMMERCIAL

## 2025-01-27 ENCOUNTER — LAB REQUISITION (OUTPATIENT)
Dept: LAB | Facility: HOSPITAL | Age: 60
End: 2025-01-27
Payer: COMMERCIAL

## 2025-01-27 ENCOUNTER — PATIENT OUTREACH (OUTPATIENT)
Dept: PRIMARY CARE | Facility: CLINIC | Age: 60
End: 2025-01-27

## 2025-01-27 VITALS
BODY MASS INDEX: 28 KG/M2 | WEIGHT: 200 LBS | HEIGHT: 71 IN | SYSTOLIC BLOOD PRESSURE: 117 MMHG | DIASTOLIC BLOOD PRESSURE: 72 MMHG | HEART RATE: 84 BPM

## 2025-01-27 DIAGNOSIS — T83.9XXD FOLEY CATHETER PROBLEM, SUBSEQUENT ENCOUNTER: Primary | ICD-10-CM

## 2025-01-27 DIAGNOSIS — T81.49XA POSTOPERATIVE ABSCESS: ICD-10-CM

## 2025-01-27 DIAGNOSIS — G89.4 CHRONIC PAIN SYNDROME: ICD-10-CM

## 2025-01-27 DIAGNOSIS — T81.49XD INFECTION FOLLOWING A PROCEDURE, OTHER SURGICAL SITE, SUBSEQUENT ENCOUNTER: ICD-10-CM

## 2025-01-27 DIAGNOSIS — R33.9 URINARY RETENTION: ICD-10-CM

## 2025-01-27 LAB
ALBUMIN SERPL BCP-MCNC: 3.2 G/DL (ref 3.4–5)
ALP SERPL-CCNC: 103 U/L (ref 33–120)
ALT SERPL W P-5'-P-CCNC: 10 U/L (ref 10–52)
AST SERPL W P-5'-P-CCNC: 11 U/L (ref 9–39)
BASOPHILS # BLD AUTO: 0.03 X10*3/UL (ref 0–0.1)
BASOPHILS NFR BLD AUTO: 0.6 %
BILIRUB DIRECT SERPL-MCNC: 0.1 MG/DL (ref 0–0.3)
BILIRUB SERPL-MCNC: 0.3 MG/DL (ref 0–1.2)
CREAT SERPL-MCNC: 1.4 MG/DL (ref 0.5–1.3)
EGFRCR SERPLBLD CKD-EPI 2021: 58 ML/MIN/1.73M*2
EOSINOPHIL # BLD AUTO: 0.38 X10*3/UL (ref 0–0.7)
EOSINOPHIL NFR BLD AUTO: 7.7 %
ERYTHROCYTE [DISTWIDTH] IN BLOOD BY AUTOMATED COUNT: 15.3 % (ref 11.5–14.5)
HCT VFR BLD AUTO: 27.1 % (ref 41–52)
HGB BLD-MCNC: 8.4 G/DL (ref 13.5–17.5)
HOLD SPECIMEN: NORMAL
IMM GRANULOCYTES # BLD AUTO: 0.02 X10*3/UL (ref 0–0.7)
IMM GRANULOCYTES NFR BLD AUTO: 0.4 % (ref 0–0.9)
LYMPHOCYTES # BLD AUTO: 1.05 X10*3/UL (ref 1.2–4.8)
LYMPHOCYTES NFR BLD AUTO: 21.2 %
MCH RBC QN AUTO: 29.8 PG (ref 26–34)
MCHC RBC AUTO-ENTMCNC: 31 G/DL (ref 32–36)
MCV RBC AUTO: 96 FL (ref 80–100)
MONOCYTES # BLD AUTO: 0.39 X10*3/UL (ref 0.1–1)
MONOCYTES NFR BLD AUTO: 7.9 %
NEUTROPHILS # BLD AUTO: 3.09 X10*3/UL (ref 1.2–7.7)
NEUTROPHILS NFR BLD AUTO: 62.2 %
NRBC BLD-RTO: 0 /100 WBCS (ref 0–0)
PLATELET # BLD AUTO: 274 X10*3/UL (ref 150–450)
PROT SERPL-MCNC: 6 G/DL (ref 6.4–8.2)
RBC # BLD AUTO: 2.82 X10*6/UL (ref 4.5–5.9)
WBC # BLD AUTO: 5 X10*3/UL (ref 4.4–11.3)

## 2025-01-27 PROCEDURE — 3008F BODY MASS INDEX DOCD: CPT | Performed by: UROLOGY

## 2025-01-27 PROCEDURE — 80076 HEPATIC FUNCTION PANEL: CPT | Mod: OUT | Performed by: STUDENT IN AN ORGANIZED HEALTH CARE EDUCATION/TRAINING PROGRAM

## 2025-01-27 PROCEDURE — 85025 COMPLETE CBC W/AUTO DIFF WBC: CPT | Mod: OUT | Performed by: STUDENT IN AN ORGANIZED HEALTH CARE EDUCATION/TRAINING PROGRAM

## 2025-01-27 PROCEDURE — G0299 HHS/HOSPICE OF RN EA 15 MIN: HCPCS

## 2025-01-27 PROCEDURE — 82565 ASSAY OF CREATININE: CPT | Mod: OUT | Performed by: STUDENT IN AN ORGANIZED HEALTH CARE EDUCATION/TRAINING PROGRAM

## 2025-01-27 PROCEDURE — 99213 OFFICE O/P EST LOW 20 MIN: CPT | Performed by: UROLOGY

## 2025-01-27 PROCEDURE — 3048F LDL-C <100 MG/DL: CPT | Performed by: UROLOGY

## 2025-01-27 PROCEDURE — 3074F SYST BP LT 130 MM HG: CPT | Performed by: UROLOGY

## 2025-01-27 PROCEDURE — 3078F DIAST BP <80 MM HG: CPT | Performed by: UROLOGY

## 2025-01-27 NOTE — TELEPHONE ENCOUNTER
Patient requesting referral for pain management   And refill for oxycodone 5 mg every 6 hours.   pharmacy

## 2025-01-27 NOTE — PATIENT INSTRUCTIONS
Impression  BPH  Postop retention  PSA screening     Plan  Increase Flomax 0.8 mg daily  Voiding trial today, ER if needed  Call back at 3:00 PM  Appointment in 1 week for PVR  Possible BPH workup and PVP greenlight laser prostatectomy in future

## 2025-01-27 NOTE — PROGRESS NOTES
Discharge Facility: [facility]   Discharge Diagnosis: Acute hypoxic hypercapnic respiratory distress  Flash pulmonary edema  Acute exacerbation of HFrEF  Admission Date:  1/19/25  Discharge Date:  1/24/25    PCP Appointment Date: 2/6/25  Specialist Appointment Date: 2/4/25 [facility]   Hospital Encounter and Summary Linked: Yes  See discharge assessment below for further details    SN in home on this date. Labs completed     Engagement  Call Start Time: 1438 (Call completed with Dereck) (1/27/2025  2:38 PM)    Medications  Medications reviewed with patient/caregiver?: Yes (1/27/2025  2:38 PM)  Is the patient having any side effects they believe may be caused by any medication additions or changes?: No (1/27/2025  2:38 PM)  Does the patient have all medications ordered at discharge?: Yes (1/27/2025  2:38 PM)  Care Management Interventions: No intervention needed (1/27/2025  2:38 PM)  Prescription Comments: START taking: acetaminophen (Tylenol) cyanocobalamin (Vitamin B-12) Start taking on: January 25, 2025 ferrous sulfate (325 mg ferrous sulfate) Start taking on: January 25, 2025 meropenem 2 g in sodium chloride 0.9% 100 mL IV naloxone (Narcan) sennosides (Senokot) spironolactone (Aldactone) Start taking on: January 25, 2025 (1/27/2025  2:38 PM)  Is the patient taking all medications as directed (includes completed medication regime)?: Yes (1/27/2025  2:38 PM)  Care Management Interventions: Provided patient education (1/27/2025  2:38 PM)  Medication Comments: Mom and sister are doing IV admin for patient. (1/27/2025  2:38 PM)    Appointments  Does the patient have a primary care provider?: Yes (1/27/2025  2:38 PM)  Care Management Interventions: Verified appointment date/time/provider (1/27/2025  2:38 PM)  Has the patient kept scheduled appointments due by today?: Yes (1/27/2025  2:38 PM)  Care Management Interventions: Advised patient to keep appointment (1/27/2025  2:38 PM)    Self Management  What is the home health  agency?: [facility] (1/27/2025  2:38 PM)  What Durable Medical Equipment (DME) was ordered?: n/a- Patient is requesting a RX for Oxygen. (1/27/2025  2:38 PM)    Patient Teaching  Does the patient have access to their discharge instructions?: Yes (1/27/2025  2:38 PM)  Care Management Interventions: Reviewed instructions with patient (1/27/2025  2:38 PM)  What is the patient's perception of their health status since discharge?: Improving (1/27/2025  2:38 PM)  Is the patient/caregiver able to teach back the hierarchy of who to call/visit for symptoms/problems? PCP, Specialist, Home Health nurse, Urgent Care, ED, 911: Yes (1/27/2025  2:38 PM)  Patient/Caregiver Education Comments: Patient would like to know if PCP is going to call pain management, and if you are going to right his RX for him. - Patient is requesting home O2 denies SOB unless he is up moving around.. He denies any further questions/concners at this time. (1/27/2025  2:38 PM)

## 2025-01-27 NOTE — TELEPHONE ENCOUNTER
Pt discharged after heart cath / stent and had an ultrasound and it is showing blood clots in left leg, please advise.

## 2025-01-27 NOTE — TELEPHONE ENCOUNTER
See previous documentation note as well for referral.  Referral to Pain Management.   We will not be taking over his prescription pain medication. I am not comfortable with co prescribing.   Looks like Dr. Peace prescribed his previous pain medication per [facility]. Can contact their office and see prior to Pain Management.   Patient is also on Medical Marijuana per [facility] can not co prescribe.

## 2025-01-28 ENCOUNTER — PHARMACY VISIT (OUTPATIENT)
Dept: PHARMACY | Facility: CLINIC | Age: 60
End: 2025-01-28
Payer: MEDICAID

## 2025-01-28 VITALS
SYSTOLIC BLOOD PRESSURE: 104 MMHG | TEMPERATURE: 97.5 F | OXYGEN SATURATION: 97 % | RESPIRATION RATE: 12 BRPM | WEIGHT: 200 LBS | DIASTOLIC BLOOD PRESSURE: 60 MMHG | BODY MASS INDEX: 28 KG/M2 | HEIGHT: 71 IN | HEART RATE: 96 BPM

## 2025-01-28 PROCEDURE — RXMED WILLOW AMBULATORY MEDICATION CHARGE

## 2025-01-28 RX ORDER — OXYCODONE HYDROCHLORIDE 5 MG/1
5 TABLET ORAL EVERY 6 HOURS PRN
Qty: 15 TABLET | Refills: 0 | Status: SHIPPED | OUTPATIENT
Start: 2025-01-28

## 2025-01-28 ASSESSMENT — ENCOUNTER SYMPTOMS
PAIN LOCATION - PAIN FREQUENCY: INTERMITTENT
PAIN SEVERITY GOAL: 1/10
DYSPNEA ACTIVITY LEVEL: AFTER AMBULATING MORE THAN 20 FT
CHANGE IN APPETITE: UNCHANGED
LOWEST PAIN SEVERITY IN PAST 24 HOURS: 3/10
PAIN LOCATION - PAIN DURATION: VARIES
PAIN: 1
FATIGUE: 1
PERSON REPORTING PAIN: PATIENT
PAIN LOCATION: GENERALIZED
FATIGUES EASILY: 1
MUSCLE WEAKNESS: 1
SUBJECTIVE PAIN PROGRESSION: UNCHANGED
PAIN LOCATION - PAIN SEVERITY: 8/10
HIGHEST PAIN SEVERITY IN PAST 24 HOURS: 7/10
SHORTNESS OF BREATH: 1
PAIN LOCATION - PAIN QUALITY: ACHING, THROBBING
PAIN LOCATION - RELIEVING FACTORS: MEDICATION, REST
APPETITE LEVEL: GOOD

## 2025-01-28 ASSESSMENT — ACTIVITIES OF DAILY LIVING (ADL)
AMBULATION ASSISTANCE: 1
ENTERING_EXITING_HOME: MODERATE ASSIST
AMBULATION ASSISTANCE: STAND BY ASSIST
OASIS_M1830: 03

## 2025-01-30 ENCOUNTER — APPOINTMENT (OUTPATIENT)
Dept: INFECTIOUS DISEASES | Facility: HOSPITAL | Age: 60
End: 2025-01-30
Payer: COMMERCIAL

## 2025-01-30 ENCOUNTER — HOME CARE VISIT (OUTPATIENT)
Dept: HOME HEALTH SERVICES | Facility: HOME HEALTH | Age: 60
End: 2025-01-30
Payer: COMMERCIAL

## 2025-01-30 PROCEDURE — G0299 HHS/HOSPICE OF RN EA 15 MIN: HCPCS

## 2025-01-31 ENCOUNTER — HOME INFUSION (OUTPATIENT)
Dept: INFUSION THERAPY | Age: 60
End: 2025-01-31
Payer: COMMERCIAL

## 2025-01-31 LAB
CREAT SERPL-MCNC: 1.15 MG/DL (ref 0.7–1.3)
EGFRCR SERPLBLD CKD-EPI 2021: 74 ML/MIN/1.73M2

## 2025-01-31 NOTE — PROGRESS NOTES
Dereck Shen is receiving meropenem for post-op abscess thru 2/11     Followed by Dr Quintero    Labs from 1/27 show Cr 1.4. Redraw on 1/30 down to 1.15.     RX contacted pt, left VM stating that pharmacy would be out today and Mon 2/3 with medication and supplies/flushes to match to last thru Fri 2/7. Pt to call with questions for Rph.    Dispensing 1/31 with supplies to match   9x kasandra HP  DOS 2/1-2/3    Dispensing 2/3 with supplies to match   12x kasandra HP  DOS 2/4-2/7    Follow up 2/7 check labs, RMDR straight

## 2025-02-02 VITALS
RESPIRATION RATE: 12 BRPM | DIASTOLIC BLOOD PRESSURE: 72 MMHG | HEART RATE: 88 BPM | TEMPERATURE: 97.2 F | SYSTOLIC BLOOD PRESSURE: 124 MMHG | OXYGEN SATURATION: 98 %

## 2025-02-02 ASSESSMENT — ENCOUNTER SYMPTOMS
SKIN LESIONS: 1
SHORTNESS OF BREATH: 1
APPETITE LEVEL: GOOD
PAIN LOCATION - RELIEVING FACTORS: MEDICATION, REST
HIGHEST PAIN SEVERITY IN PAST 24 HOURS: 6/10
PAIN SEVERITY GOAL: 1/10
LOWEST PAIN SEVERITY IN PAST 24 HOURS: 4/10
MUSCLE WEAKNESS: 1
CHANGE IN APPETITE: UNCHANGED
PAIN LOCATION - PAIN DURATION: VARIES
FATIGUES EASILY: 1
PAIN LOCATION - PAIN QUALITY: ACHING THROBBING
PAIN LOCATION - PAIN SEVERITY: 4/10
DYSPNEA ACTIVITY LEVEL: AFTER AMBULATING MORE THAN 20 FT
PAIN LOCATION - EXACERBATING FACTORS: WOUND
PAIN: 1
PAIN LOCATION - PAIN FREQUENCY: INTERMITTENT
FATIGUE: 1
SUBJECTIVE PAIN PROGRESSION: GRADUALLY IMPROVING
PERSON REPORTING PAIN: PATIENT
PAIN LOCATION: LEFT LEG

## 2025-02-02 ASSESSMENT — ACTIVITIES OF DAILY LIVING (ADL)
AMBULATION ASSISTANCE: STAND BY ASSIST
CURRENT_FUNCTION: STAND BY ASSIST

## 2025-02-03 ENCOUNTER — PHARMACY VISIT (OUTPATIENT)
Dept: PHARMACY | Facility: CLINIC | Age: 60
End: 2025-02-03
Payer: MEDICAID

## 2025-02-03 DIAGNOSIS — I73.9 PAD (PERIPHERAL ARTERY DISEASE) (CMS-HCC): ICD-10-CM

## 2025-02-03 PROCEDURE — RXMED WILLOW AMBULATORY MEDICATION CHARGE

## 2025-02-03 RX ORDER — OXYCODONE HYDROCHLORIDE 5 MG/1
5 TABLET ORAL EVERY 6 HOURS PRN
Qty: 15 TABLET | Refills: 0 | Status: SHIPPED | OUTPATIENT
Start: 2025-02-03

## 2025-02-04 ENCOUNTER — APPOINTMENT (OUTPATIENT)
Dept: ENDOCRINOLOGY | Facility: CLINIC | Age: 60
End: 2025-02-04
Payer: COMMERCIAL

## 2025-02-06 ENCOUNTER — APPOINTMENT (OUTPATIENT)
Dept: PRIMARY CARE | Facility: CLINIC | Age: 60
End: 2025-02-06
Payer: COMMERCIAL

## 2025-02-06 ENCOUNTER — HOME INFUSION (OUTPATIENT)
Dept: INFUSION THERAPY | Age: 60
End: 2025-02-06

## 2025-02-06 ENCOUNTER — HOME CARE VISIT (OUTPATIENT)
Dept: HOME HEALTH SERVICES | Facility: HOME HEALTH | Age: 60
End: 2025-02-06
Payer: COMMERCIAL

## 2025-02-06 VITALS
DIASTOLIC BLOOD PRESSURE: 66 MMHG | HEIGHT: 71 IN | WEIGHT: 212 LBS | SYSTOLIC BLOOD PRESSURE: 116 MMHG | HEART RATE: 75 BPM | BODY MASS INDEX: 29.68 KG/M2

## 2025-02-06 DIAGNOSIS — G62.2 NEUROPATHY, INFLAMMATORY OR TOXIC (MULTI): ICD-10-CM

## 2025-02-06 DIAGNOSIS — F17.200 TOBACCO DEPENDENCE: ICD-10-CM

## 2025-02-06 DIAGNOSIS — E11.51 TYPE 2 DIABETES MELLITUS WITH DIABETIC PERIPHERAL ANGIOPATHY WITHOUT GANGRENE, WITH LONG-TERM CURRENT USE OF INSULIN (MULTI): Primary | ICD-10-CM

## 2025-02-06 DIAGNOSIS — G61.9 NEUROPATHY, INFLAMMATORY OR TOXIC (MULTI): ICD-10-CM

## 2025-02-06 DIAGNOSIS — Z79.4 TYPE 2 DIABETES MELLITUS WITH DIABETIC PERIPHERAL ANGIOPATHY WITHOUT GANGRENE, WITH LONG-TERM CURRENT USE OF INSULIN (MULTI): Primary | ICD-10-CM

## 2025-02-06 DIAGNOSIS — Z09 HOSPITAL DISCHARGE FOLLOW-UP: ICD-10-CM

## 2025-02-06 DIAGNOSIS — B18.2 CHRONIC HEPATITIS C VIRUS GENOTYPE 1A INFECTION (MULTI): ICD-10-CM

## 2025-02-06 DIAGNOSIS — I73.9 PERIPHERAL ARTERIAL DISEASE (CMS-HCC): ICD-10-CM

## 2025-02-06 DIAGNOSIS — F31.9 BIPOLAR AFFECTIVE DISORDER, REMISSION STATUS UNSPECIFIED (MULTI): ICD-10-CM

## 2025-02-06 PROBLEM — G82.20 PARAPARESIS (MULTI): Status: RESOLVED | Noted: 2023-09-22 | Resolved: 2025-02-06

## 2025-02-06 PROBLEM — G81.91 RIGHT HEMIPARESIS (MULTI): Status: RESOLVED | Noted: 2023-12-22 | Resolved: 2025-02-06

## 2025-02-06 PROCEDURE — G0299 HHS/HOSPICE OF RN EA 15 MIN: HCPCS

## 2025-02-06 PROCEDURE — 3078F DIAST BP <80 MM HG: CPT | Performed by: CLINICAL NURSE SPECIALIST

## 2025-02-06 PROCEDURE — RXMED WILLOW AMBULATORY MEDICATION CHARGE

## 2025-02-06 PROCEDURE — 3074F SYST BP LT 130 MM HG: CPT | Performed by: CLINICAL NURSE SPECIALIST

## 2025-02-06 PROCEDURE — 99495 TRANSJ CARE MGMT MOD F2F 14D: CPT | Performed by: CLINICAL NURSE SPECIALIST

## 2025-02-06 PROCEDURE — 99406 BEHAV CHNG SMOKING 3-10 MIN: CPT | Performed by: CLINICAL NURSE SPECIALIST

## 2025-02-06 PROCEDURE — 3008F BODY MASS INDEX DOCD: CPT | Performed by: CLINICAL NURSE SPECIALIST

## 2025-02-06 PROCEDURE — 3048F LDL-C <100 MG/DL: CPT | Performed by: CLINICAL NURSE SPECIALIST

## 2025-02-06 RX ORDER — IBUPROFEN 200 MG
1 TABLET ORAL EVERY 24 HOURS
Qty: 30 PATCH | Refills: 0 | Status: SHIPPED | OUTPATIENT
Start: 2025-02-06 | End: 2025-03-08

## 2025-02-06 ASSESSMENT — ENCOUNTER SYMPTOMS
SHORTNESS OF BREATH: 0
LOSS OF SENSATION IN FEET: 0
SLEEP DISTURBANCE: 0
JOINT SWELLING: 0
OCCASIONAL FEELINGS OF UNSTEADINESS: 0
FATIGUE: 0
SORE THROAT: 0
DIARRHEA: 0
PHOTOPHOBIA: 0
WHEEZING: 0
CHEST TIGHTNESS: 0
SEIZURES: 0
CONSTIPATION: 0
ABDOMINAL PAIN: 0
CHILLS: 0
COUGH: 0
TROUBLE SWALLOWING: 0
DIZZINESS: 0
BLOOD IN STOOL: 0
APPETITE CHANGE: 0
CONFUSION: 0
ACTIVITY CHANGE: 0
EYE PAIN: 0
DYSURIA: 0
FEVER: 0
MYALGIAS: 0
NAUSEA: 0
VOMITING: 0
NECK PAIN: 0
BRUISES/BLEEDS EASILY: 0
WOUND: 1
ARTHRALGIAS: 1
POLYDIPSIA: 0
BACK PAIN: 1
HEMATURIA: 0
PALPITATIONS: 0
FLANK PAIN: 0
HEADACHES: 0
DEPRESSION: 1
UNEXPECTED WEIGHT CHANGE: 0

## 2025-02-06 ASSESSMENT — PATIENT HEALTH QUESTIONNAIRE - PHQ9
SUM OF ALL RESPONSES TO PHQ QUESTIONS 1-9: 13
5. POOR APPETITE OR OVEREATING: NOT AT ALL
1. LITTLE INTEREST OR PLEASURE IN DOING THINGS: SEVERAL DAYS
4. FEELING TIRED OR HAVING LITTLE ENERGY: NEARLY EVERY DAY
9. THOUGHTS THAT YOU WOULD BE BETTER OFF DEAD, OR OF HURTING YOURSELF: NOT AT ALL
7. TROUBLE CONCENTRATING ON THINGS, SUCH AS READING THE NEWSPAPER OR WATCHING TELEVISION: MORE THAN HALF THE DAYS
8. MOVING OR SPEAKING SO SLOWLY THAT OTHER PEOPLE COULD HAVE NOTICED. OR THE OPPOSITE, BEING SO FIGETY OR RESTLESS THAT YOU HAVE BEEN MOVING AROUND A LOT MORE THAN USUAL: SEVERAL DAYS
2. FEELING DOWN, DEPRESSED OR HOPELESS: NEARLY EVERY DAY
SUM OF ALL RESPONSES TO PHQ9 QUESTIONS 1 AND 2: 4
6. FEELING BAD ABOUT YOURSELF - OR THAT YOU ARE A FAILURE OR HAVE LET YOURSELF OR YOUR FAMILY DOWN: SEVERAL DAYS
3. TROUBLE FALLING OR STAYING ASLEEP OR SLEEPING TOO MUCH: MORE THAN HALF THE DAYS

## 2025-02-06 NOTE — PROGRESS NOTES
"Patient: Dereck Shen  : 1965  PCP: SUMAN Carlin-CNS  MRN: [id number]  Program: Chronic Care Management (CMS)  Status: Identified  Start Date: 3/1/2024  Responsible Staff: Tamanna Dawn RN  Social Drivers to be Addressed: No information to display    Transitional Care Management  Status: Enrolled  Effective Dates: 2025 - present  Responsible Staff: Luis Macedo LPN  Social Drivers to be Addressed: No information to display         Dereck Shen is a 59 y.o. male presenting today for follow-up after being discharged from the hospital 13 days ago. The main problem requiring admission was Severe PAD, Acute Hypoxic Hypercapnic Respiratory Distress, Flash Pulmonary Edema, Acute Exacerbation of HFrEF, Left Groin Abscess, Diabetes. The discharge summary and/or Transitional Care Management documentation was reviewed. Medication reconciliation was performed as indicated via the \"Geovany as Reviewed\" timestamp.     Dereck Shen was contacted by Transitional Care Management services two days after his discharge. This encounter and supporting documentation was reviewed.    Here today as a follow up appointment from the Hospital. Multiple hospitalizations int he past few months.      Following with Cardiology and CHF Clinic.      History of Stroke, follow up with Neurologist outpatient. Patient following with [facility] Neurology. Multiple strokes since 2019. Further work up with [facility].       Patient followed with Dr. Bowser for Rheumatology. No longer seeing Damari Watkins through [facility]. Does not feel that his Latuda is effective anymore. Not interested in following back with [facility] or being referred to traditional Psychiatry.      Followed with [facility] regarding bilateral sciatic nerve pain and lower back discomfort. Then followed with Dr. Obando. Now receiving Medical Marijuana. Chronic complaints of pain. History of Drug Abuse. After " "multiple hospitalizations and severe PAD pain has been worse. Restarted Narcotic Therapy but has also continued Medical Marijuana. Pain Management appointment scheduled for March. Currently receiving pain medication through Vascular.      Patient followed with GI.     Patient has been taking Atorvastatin for elevated Cholesterol. Tolerating medication at this time. Denies any associated signs or symptoms.     Patient was diagnosed with Diabetes in 2009. Patient has been more consistent in his Insulin dosages recently followed with Endo, Insulin adjusted.      Restarted smoking, now smoking 5-7 cigarettes. Willing to restart Nicotine Patches for management.     Review of Systems   Constitutional:  Negative for activity change, appetite change, chills, fatigue, fever and unexpected weight change.   HENT:  Negative for ear pain, hearing loss, nosebleeds, sore throat, tinnitus and trouble swallowing.    Eyes:  Negative for photophobia, pain and visual disturbance.   Respiratory:  Negative for cough, chest tightness, shortness of breath and wheezing.    Cardiovascular:  Negative for chest pain, palpitations and leg swelling.   Gastrointestinal:  Negative for abdominal pain, blood in stool, constipation, diarrhea, nausea and vomiting.   Endocrine: Negative for cold intolerance, heat intolerance, polydipsia and polyuria.   Genitourinary:  Negative for dysuria, flank pain and hematuria.   Musculoskeletal:  Positive for arthralgias and back pain. Negative for joint swelling, myalgias and neck pain.   Skin:  Positive for wound. Negative for pallor and rash.   Allergic/Immunologic: Negative for immunocompromised state.   Neurological:  Negative for dizziness, seizures and headaches.   Hematological:  Does not bruise/bleed easily.   Psychiatric/Behavioral:  Negative for confusion and sleep disturbance.        /66 (BP Location: Left arm)   Pulse 75   Ht 1.803 m (5' 11\")   Wt 96.2 kg (212 lb)   BMI 29.57 kg/m² "     Physical Exam  Vitals and nursing note reviewed.   Constitutional:       General: He is not in acute distress.     Appearance: Normal appearance.   HENT:      Head: Normocephalic.      Nose: Nose normal.   Eyes:      Conjunctiva/sclera: Conjunctivae normal.   Neck:      Vascular: No carotid bruit.   Cardiovascular:      Rate and Rhythm: Normal rate and regular rhythm.      Pulses: Normal pulses.      Heart sounds: Normal heart sounds.   Pulmonary:      Effort: Pulmonary effort is normal.      Breath sounds: Normal breath sounds.   Abdominal:      General: Bowel sounds are normal.      Palpations: Abdomen is soft.   Musculoskeletal:         General: Normal range of motion.      Cervical back: Normal range of motion.   Skin:     General: Skin is warm and dry.   Neurological:      Mental Status: He is alert and oriented to person, place, and time. Mental status is at baseline.   Psychiatric:         Mood and Affect: Mood normal.         Behavior: Behavior normal.         The complexity of medical decision making for this patient's transitional care is moderate.    Assessment/Plan         Reviewed most recent lab work completed with patient.      TCM, Peripheral arterial disease: Following with Dr. Peace for management. Status post multiple Surgeries.  Following with Vascular and Cardiology for management. Follow up appointment scheduled.    Diabetes mellitus type 2 with complications including peripheral vascular disease, retinopathy, nephropathy and peripheral neuropathy. He will continue to follow with Endocrinology.   COPD. Respiratory status at baseline. Continue to promote smoking cessation.   Pancreatic lesion. Seen on CAT scan November 2020. Dr. Alegria. Stable on last examination in June 2023.   History of chronic back pain. Now following for Medical Marijuana. Does have an appointment with Pain Management to establish and discuss options.   History of stroke with continued loss of left peripheral vision.  Stable. Follows with Neurology through [facility].   Combined heart failure. He will continue to follow with the heart failure clinic.   Sleep apnea diagnosed on sleep study February 2021. Managed by pulmonology with CPAP.  Hypertension. Blood pressure controlled at OV today. Continue to monitor.   Hyperlipidemia. Continue medications as prescribed.   Diabetic nephropathy. He will continue to follow with Specialist, previously prescribed by Endo.   History of chronic hepatitis C. Patient was previously seen by gastroenterology but deemed not a candidate for further treatment.   Bipolar: Declined referral to traditional Psychiatry.   BPH: Flomax as prescribed.    Tobacco use: 3 minutes were spent counseling the patient on tobacco cessation.  Benefits of cessation were discussed as well as techniques to help quit.       Pneumovax: November 2016.   Colonoscopy: March 2016.   Unable to update immunizations due to Insurance.     Patient was identified as a fall risk. Risk prevention instructions provided.

## 2025-02-07 ENCOUNTER — HOME INFUSION (OUTPATIENT)
Dept: INFUSION THERAPY | Age: 60
End: 2025-02-07
Payer: COMMERCIAL

## 2025-02-07 ENCOUNTER — PHARMACY VISIT (OUTPATIENT)
Dept: PHARMACY | Facility: CLINIC | Age: 60
End: 2025-02-07
Payer: MEDICAID

## 2025-02-07 VITALS
DIASTOLIC BLOOD PRESSURE: 66 MMHG | OXYGEN SATURATION: 98 % | SYSTOLIC BLOOD PRESSURE: 134 MMHG | RESPIRATION RATE: 12 BRPM | HEART RATE: 71 BPM | TEMPERATURE: 97.3 F

## 2025-02-07 DIAGNOSIS — T81.49XA POSTOPERATIVE ABSCESS: Primary | ICD-10-CM

## 2025-02-07 LAB
ALBUMIN SERPL-MCNC: 3.8 G/DL (ref 3.6–5.1)
ALBUMIN/GLOB SERPL: 1.2 (CALC) (ref 1–2.5)
ALP SERPL-CCNC: 134 U/L (ref 35–144)
ALT SERPL-CCNC: 16 U/L (ref 9–46)
AST SERPL-CCNC: 12 U/L (ref 10–35)
BASOPHILS # BLD AUTO: 49 CELLS/UL (ref 0–200)
BASOPHILS NFR BLD AUTO: 0.9 %
BILIRUB DIRECT SERPL-MCNC: 0.1 MG/DL
BILIRUB INDIRECT SERPL-MCNC: 0.4 MG/DL (CALC) (ref 0.2–1.2)
BILIRUB SERPL-MCNC: 0.5 MG/DL (ref 0.2–1.2)
CREAT SERPL-MCNC: 1.27 MG/DL (ref 0.7–1.3)
EGFRCR SERPLBLD CKD-EPI 2021: 65 ML/MIN/1.73M2
EOSINOPHIL # BLD AUTO: 643 CELLS/UL (ref 15–500)
EOSINOPHIL NFR BLD AUTO: 11.9 %
ERYTHROCYTE [DISTWIDTH] IN BLOOD BY AUTOMATED COUNT: 15.1 % (ref 11–15)
GLOBULIN SER CALC-MCNC: 3.1 G/DL (CALC) (ref 1.9–3.7)
HCT VFR BLD AUTO: 29.9 % (ref 38.5–50)
HGB BLD-MCNC: 9.6 G/DL (ref 13.2–17.1)
LYMPHOCYTES # BLD AUTO: 1555 CELLS/UL (ref 850–3900)
LYMPHOCYTES NFR BLD AUTO: 28.8 %
MCH RBC QN AUTO: 29.5 PG (ref 27–33)
MCHC RBC AUTO-ENTMCNC: 32.1 G/DL (ref 32–36)
MCV RBC AUTO: 92 FL (ref 80–100)
MONOCYTES # BLD AUTO: 302 CELLS/UL (ref 200–950)
MONOCYTES NFR BLD AUTO: 5.6 %
NEUTROPHILS # BLD AUTO: 2851 CELLS/UL (ref 1500–7800)
NEUTROPHILS NFR BLD AUTO: 52.8 %
PLATELET # BLD AUTO: 274 THOUSAND/UL (ref 140–400)
PMV BLD REES-ECKER: 10.4 FL (ref 7.5–12.5)
PROT SERPL-MCNC: 6.9 G/DL (ref 6.1–8.1)
RBC # BLD AUTO: 3.25 MILLION/UL (ref 4.2–5.8)
WBC # BLD AUTO: 5.4 THOUSAND/UL (ref 3.8–10.8)

## 2025-02-07 ASSESSMENT — ACTIVITIES OF DAILY LIVING (ADL)
AMBULATION ASSISTANCE: STAND BY ASSIST
CURRENT_FUNCTION: STAND BY ASSIST

## 2025-02-07 ASSESSMENT — ENCOUNTER SYMPTOMS
PAIN: 1
MUSCLE WEAKNESS: 1
PAIN LOCATION - RELIEVING FACTORS: MEDICATION, REST
SUBJECTIVE PAIN PROGRESSION: UNCHANGED
PAIN LOCATION - EXACERBATING FACTORS: WOUNDS
PAIN LOCATION - PAIN FREQUENCY: INTERMITTENT
PAIN LOCATION - PAIN SEVERITY: 7/10
PERSON REPORTING PAIN: PATIENT
PAIN LOCATION - PAIN DURATION: VARIES
PAIN SEVERITY GOAL: 0/10
PAIN LOCATION - PAIN QUALITY: ACHING THROBBING
HIGHEST PAIN SEVERITY IN PAST 24 HOURS: 7/10
CHANGE IN APPETITE: UNCHANGED
FATIGUES EASILY: 1
FATIGUE: 1
LOWEST PAIN SEVERITY IN PAST 24 HOURS: 3/10
PAIN LOCATION: LEFT LEG
APPETITE LEVEL: GOOD

## 2025-02-07 NOTE — PROGRESS NOTES
PATIENT CONTINUES IV MERREM THROUGH 2/11 FOR POST OB ABSCESS POST CABG.  NO NEW LABS IN COMPUTER.    HIS ID APPT. FROM 1/30 WAS CANCELLED BY DR. ROMERO.  PATIENT IS NOT AWARE OF ANY OTHER APPOINTMENTS.  PATIENT STATES HE IS DOING WELL.  HIS MOM AND SISTER HANDELING ALL THE INFUSIONS.  HE SAID RN DDRAWING LABS ON FRIDAY.  HE AGREED TO DELIVERY FRIDAY WITH STANDARD SUPPLIES.  PROCESSED FILL FOR 12 MERREM FOR MIX AND DEL. FRI.  DOS 2/8 THRU 2/11.  RPH TO F/U WITH POC ON 2/11. DSL

## 2025-02-07 NOTE — PROGRESS NOTES
Received orders from Carolyn at Dr. Quintero's office via tel clal  Stop antibiotic as ordered after dose on 2/11. HC RN to remove line. Discharge from RX services    Order entered into Epic. Gold copy to intake. Secure Chat RN as FYI    Pt care rep to discharge pt from [facility] and discharge chart

## 2025-02-10 ENCOUNTER — PATIENT OUTREACH (OUTPATIENT)
Dept: PRIMARY CARE | Facility: CLINIC | Age: 60
End: 2025-02-10
Payer: COMMERCIAL

## 2025-02-10 DIAGNOSIS — I73.9 PAD (PERIPHERAL ARTERY DISEASE) (CMS-HCC): ICD-10-CM

## 2025-02-10 NOTE — PROGRESS NOTES
Pt referred by tcm and provider    Mx hospital admits, hypoxic resp distress and flash pulmonary edema  Outreach to pt  Discussed chronic care management  Pt declined services but should he change mind or find he would want to enroll, instructed to call office and provider can get iin touch with me.   Updated provider

## 2025-02-10 NOTE — PROGRESS NOTES
Unable to reach patient for call back after recent hospitalization.   [facility] with call back number for patient to call if needed   If no voicemail available call attempts x 2 were made to contact the patient to assist with any questions or concerns patient may have.    Patient had hospital follow up with PCP On 2/6/25

## 2025-02-11 RX ORDER — OXYCODONE HYDROCHLORIDE 5 MG/1
5 TABLET ORAL EVERY 6 HOURS PRN
Qty: 15 TABLET | Refills: 0 | OUTPATIENT
Start: 2025-02-11

## 2025-02-12 PROCEDURE — RXMED WILLOW AMBULATORY MEDICATION CHARGE

## 2025-02-13 ENCOUNTER — HOME CARE VISIT (OUTPATIENT)
Dept: HOME HEALTH SERVICES | Facility: HOME HEALTH | Age: 60
End: 2025-02-13
Payer: COMMERCIAL

## 2025-02-13 VITALS
DIASTOLIC BLOOD PRESSURE: 70 MMHG | TEMPERATURE: 97.8 F | HEART RATE: 72 BPM | RESPIRATION RATE: 16 BRPM | SYSTOLIC BLOOD PRESSURE: 125 MMHG

## 2025-02-13 LAB
ATRIAL RATE: 133 BPM
P AXIS: 1 DEGREES
PR INTERVAL: 138 MS
Q ONSET: 253 MS
QRS COUNT: 21 BEATS
QRS DURATION: 117 MS
QT INTERVAL: 288 MS
QTC CALCULATION(BAZETT): 429 MS
QTC FREDERICIA: 375 MS
R AXIS: 26 DEGREES
T AXIS: 205 DEGREES
T OFFSET: 397 MS
VENTRICULAR RATE: 133 BPM

## 2025-02-13 PROCEDURE — G0299 HHS/HOSPICE OF RN EA 15 MIN: HCPCS

## 2025-02-13 ASSESSMENT — ENCOUNTER SYMPTOMS
PAIN SEVERITY GOAL: 0/10
DENIES PAIN: 1
HIGHEST PAIN SEVERITY IN PAST 24 HOURS: 0/10
SUBJECTIVE PAIN PROGRESSION: UNCHANGED

## 2025-02-13 ASSESSMENT — ACTIVITIES OF DAILY LIVING (ADL)
OASIS_M1830: 00
HOME_HEALTH_OASIS: 00

## 2025-02-15 PROCEDURE — RXMED WILLOW AMBULATORY MEDICATION CHARGE

## 2025-02-17 ENCOUNTER — PHARMACY VISIT (OUTPATIENT)
Dept: PHARMACY | Facility: CLINIC | Age: 60
End: 2025-02-17
Payer: MEDICAID

## 2025-02-17 DIAGNOSIS — I70.222 CRITICAL LIMB ISCHEMIA OF LEFT LOWER EXTREMITY (MULTI): ICD-10-CM

## 2025-02-17 PROCEDURE — RXMED WILLOW AMBULATORY MEDICATION CHARGE

## 2025-02-18 ENCOUNTER — PHARMACY VISIT (OUTPATIENT)
Dept: PHARMACY | Facility: CLINIC | Age: 60
End: 2025-02-18
Payer: MEDICAID

## 2025-02-19 ENCOUNTER — PHARMACY VISIT (OUTPATIENT)
Dept: PHARMACY | Facility: CLINIC | Age: 60
End: 2025-02-19
Payer: MEDICAID

## 2025-02-19 DIAGNOSIS — E61.1 IRON DEFICIENCY: ICD-10-CM

## 2025-02-19 DIAGNOSIS — I50.23 ACUTE ON CHRONIC SYSTOLIC (CONGESTIVE) HEART FAILURE: ICD-10-CM

## 2025-02-19 DIAGNOSIS — I50.21 ACUTE SYSTOLIC HEART FAILURE: ICD-10-CM

## 2025-02-19 DIAGNOSIS — E53.8 VITAMIN B 12 DEFICIENCY: ICD-10-CM

## 2025-02-19 DIAGNOSIS — K59.00 CONSTIPATION, UNSPECIFIED CONSTIPATION TYPE: ICD-10-CM

## 2025-02-19 PROCEDURE — RXMED WILLOW AMBULATORY MEDICATION CHARGE

## 2025-02-19 RX ORDER — SPIRONOLACTONE 25 MG/1
25 TABLET ORAL DAILY
Qty: 30 TABLET | Refills: 0 | Status: SHIPPED | OUTPATIENT
Start: 2025-02-19 | End: 2025-03-22

## 2025-02-19 RX ORDER — VIT C/E/ZN/COPPR/LUTEIN/ZEAXAN 250MG-90MG
500 CAPSULE ORAL DAILY
Qty: 30 TABLET | Refills: 0 | Status: SHIPPED | OUTPATIENT
Start: 2025-02-19 | End: 2025-03-23

## 2025-02-19 RX ORDER — SENNOSIDES 8.6 MG/1
2 TABLET ORAL 2 TIMES DAILY PRN
Qty: 30 TABLET | Refills: 0 | Status: SHIPPED | OUTPATIENT
Start: 2025-02-19 | End: 2025-03-21

## 2025-02-19 RX ORDER — FERROUS SULFATE 325(65) MG
65 TABLET ORAL
Qty: 30 TABLET | Refills: 0 | Status: CANCELLED | OUTPATIENT
Start: 2025-02-19 | End: 2025-03-21

## 2025-02-19 RX ORDER — FERROUS SULFATE 325(65) MG
TABLET ORAL
Qty: 30 TABLET | Refills: 0 | Status: SHIPPED | OUTPATIENT
Start: 2025-02-19

## 2025-02-20 ENCOUNTER — PHARMACY VISIT (OUTPATIENT)
Dept: PHARMACY | Facility: CLINIC | Age: 60
End: 2025-02-20
Payer: MEDICAID

## 2025-02-20 PROCEDURE — RXMED WILLOW AMBULATORY MEDICATION CHARGE

## 2025-02-21 ENCOUNTER — PHARMACY VISIT (OUTPATIENT)
Dept: PHARMACY | Facility: CLINIC | Age: 60
End: 2025-02-21

## 2025-02-21 PROCEDURE — RXMED WILLOW AMBULATORY MEDICATION CHARGE

## 2025-02-21 RX ORDER — RIVAROXABAN 20 MG/1
20 TABLET, FILM COATED ORAL DAILY
Qty: 90 TABLET | Refills: 0 | OUTPATIENT
Start: 2025-02-21

## 2025-02-21 RX ORDER — CARVEDILOL 6.25 MG/1
6.25 TABLET ORAL EVERY 12 HOURS
Qty: 180 TABLET | Refills: 0 | OUTPATIENT
Start: 2025-02-21

## 2025-02-21 RX ORDER — FUROSEMIDE 20 MG/1
20 TABLET ORAL DAILY
Qty: 30 TABLET | Refills: 0 | Status: SHIPPED | OUTPATIENT
Start: 2025-02-21 | End: 2025-03-23

## 2025-02-21 RX ORDER — SACUBITRIL AND VALSARTAN 97; 103 MG/1; MG/1
1 TABLET, FILM COATED ORAL 2 TIMES DAILY
Qty: 180 TABLET | Refills: 0 | OUTPATIENT
Start: 2025-02-21

## 2025-02-21 RX ORDER — EZETIMIBE 10 MG/1
10 TABLET ORAL DAILY
Qty: 90 TABLET | Refills: 0 | OUTPATIENT
Start: 2025-02-21

## 2025-02-21 RX ORDER — RANOLAZINE 500 MG/1
500 TABLET, EXTENDED RELEASE ORAL 2 TIMES DAILY
Qty: 180 TABLET | Refills: 0 | OUTPATIENT
Start: 2025-02-21

## 2025-02-21 NOTE — TELEPHONE ENCOUNTER
Discussed patient medications with  pharmacy. They received Rx's from [facility] today.     Patient was discharged on Eliquis 5mg BID. [facility] NP Lizzeth De La Garza sent Rx to  Pharm for Xarelto today.    Called [facility] at [phone] to discuss with NP Lizzeth De La Garza.     Need to clarify who patient will continue care with. Earlier today patient stated he planned to follow up with Dr Wright, not [facility].     Will await call back from [facility] NP.

## 2025-02-21 NOTE — TELEPHONE ENCOUNTER
Patient last seen in office in 2023. Recent  hospital admission-Dr Wright was on consult  (Note attached below).    Patient requesting refills on the following medications:    Eliquis 5mg bid, coreg 6.25mg bid, zetia 10mg daily, furosemide 20mg daily, ranexa 500mg er bid, and entresto 97-103mg bid and Spironolactone 25mg daily.     Will route to Dr Wright to see if agreeable. Advised patient to schedule OV with Dr Wright if he will no longer be with [facility]. Will also route to team for scheduling.     Consult note from admission: 01/24/2025    Assessment/Plan  1) Acute Pulmonary edema  Hx of CABG s/p PCI of CX in 2021 and Known EF 35%  Repeat echo  IV diuresis  Echo  Further recommendations based on results of echo  1/21/2025 : Echo reviewed  He is refusing stress test  Will perform cardiac cath on Thursday  Hold Eliquis  Suggest urology to see  1-22-25: Refused stress. For [facility] tomorrow with Dr Wright. NPO after midnight. Would avoid IV fluids at this point.  Continue on carvedilol, Farxiga, IV Lasix, hydralazine, Imdur, and Entresto. Lungs clear, no JVD, no edema.  Has improving with diuresis.  Eliquis on hold.   1/23/2025 : S/P left and right heart cath yesterday  Heart failure Compensated  Restart Farxiga   On Coreg, Entresto, Hydralazine/Imdur  Add Aldactone     2) CAD s/p PCI of LM/Cx  Other grafts patent  Triple therapy for 1 month and then double therapy    Cath:  CONCLUSIONS:   1. Triple vessel disease.   2. Culprit vessel(s): circumflex.   3. Successful PTCA/stent of LM/CX.   4. Left Ventricular end-diastolic pressure = 10.   5. Normal LV filling pressures.

## 2025-02-22 DIAGNOSIS — I99.8 LIMB ISCHEMIA: ICD-10-CM

## 2025-02-22 RX ORDER — INSULIN LISPRO 100 [IU]/ML
10 INJECTION, SOLUTION INTRAVENOUS; SUBCUTANEOUS
Qty: 15 ML | Refills: 0 | Status: SHIPPED | OUTPATIENT
Start: 2025-02-22 | End: 2025-03-24

## 2025-02-24 ENCOUNTER — PHARMACY VISIT (OUTPATIENT)
Dept: PHARMACY | Facility: CLINIC | Age: 60
End: 2025-02-24
Payer: MEDICAID

## 2025-02-24 PROCEDURE — RXMED WILLOW AMBULATORY MEDICATION CHARGE

## 2025-02-24 NOTE — TELEPHONE ENCOUNTER
Spoke to patient. [facility] sent in all his prescription refills except instead of Eliquis they sent Xarelto. Patient states he is on Eliquis and plans to continue this, not Xarelto. Pt requesting Rx to be sent to  [facility].     Patient advised to schedule follow up with Dr Wright. Will send one month of Eliquis for patient.

## 2025-02-25 ENCOUNTER — TELEPHONE (OUTPATIENT)
Dept: CARDIOLOGY | Facility: HOSPITAL | Age: 60
End: 2025-02-25
Payer: COMMERCIAL

## 2025-02-25 ENCOUNTER — PHARMACY VISIT (OUTPATIENT)
Dept: PHARMACY | Facility: CLINIC | Age: 60
End: 2025-02-25
Payer: MEDICAID

## 2025-02-25 NOTE — TELEPHONE ENCOUNTER
Pt called to set up follow up with Dr. Wright s/p [facility] w/ stenting 1/23. Pt agreeable to next available 3/20 but added to waitlist if a sooner appt should become available.

## 2025-02-27 ENCOUNTER — PATIENT OUTREACH (OUTPATIENT)
Dept: PRIMARY CARE | Facility: CLINIC | Age: 60
End: 2025-02-27
Payer: COMMERCIAL

## 2025-03-03 ENCOUNTER — HOSPITAL ENCOUNTER (OUTPATIENT)
Dept: VASCULAR MEDICINE | Facility: HOSPITAL | Age: 60
Discharge: HOME | End: 2025-03-03
Payer: COMMERCIAL

## 2025-03-03 DIAGNOSIS — I99.8 LIMB ISCHEMIA: ICD-10-CM

## 2025-03-03 DIAGNOSIS — I73.9 PAD (PERIPHERAL ARTERY DISEASE) (CMS-HCC): ICD-10-CM

## 2025-03-03 PROCEDURE — 93926 LOWER EXTREMITY STUDY: CPT

## 2025-03-03 PROCEDURE — 93922 UPR/L XTREMITY ART 2 LEVELS: CPT

## 2025-03-03 PROCEDURE — 93922 UPR/L XTREMITY ART 2 LEVELS: CPT | Performed by: INTERNAL MEDICINE

## 2025-03-03 PROCEDURE — 93926 LOWER EXTREMITY STUDY: CPT | Performed by: INTERNAL MEDICINE

## 2025-03-11 ENCOUNTER — OFFICE VISIT (OUTPATIENT)
Dept: VASCULAR SURGERY | Facility: HOSPITAL | Age: 60
End: 2025-03-11
Payer: COMMERCIAL

## 2025-03-11 VITALS
HEART RATE: 76 BPM | BODY MASS INDEX: 27.06 KG/M2 | DIASTOLIC BLOOD PRESSURE: 66 MMHG | WEIGHT: 194 LBS | SYSTOLIC BLOOD PRESSURE: 112 MMHG

## 2025-03-11 DIAGNOSIS — I99.8 LIMB ISCHEMIA: Primary | ICD-10-CM

## 2025-03-11 PROCEDURE — 99213 OFFICE O/P EST LOW 20 MIN: CPT | Performed by: SURGERY

## 2025-03-11 NOTE — PROGRESS NOTES
Subjective   Patient ID: Dereck Shen is a 59 y.o. male who presents for Follow-up (3 mo Limb ischemia /PAD ).  HPI  Patient is follow-up secondary to recent revascularization of the left lower extremity  At this time he is ambulatory he is having vague symptoms in both lower extremities with regard to neuropathic and burning discomfort however no obvious calf claudication no ulcer sores noted previous fasciotomy sites are healed clean dry and intact.    Review of Systems  Review of Systems    Constitutional:  no generalized malaise overall feels well, energy levels intact, no complaints specifically noted  HEENT:  No blurry vision, no visual aides noted, no hearing loss no ear ache no nose bleeds noted, no dysphagia, no congestion otherwise no pertinent positives noted  Cardiovascular:  no palpitations, chest pain or heaviness noted, no leg swelling, no numbness or tingling in the lower extremity noted  Respiratory:  no shortness of breath, no productive cough noted, no conversation dyspnea or difficulty breathing  Gastrointestinal:  no abdominal pain, no nausea or vomiting, appetite intact, no bowel irregularities noted  Genitourinary:   no urinary incontinence, frequency or urgency issues noted, no hematuria or burning sensation issues  Musculoskeletal:  No muscle aches or pains, no joint discomfort noted, no back pain noted otherwise feels well  Skin: no ulcerations, skin color issues or wounds upper or lower extremities  Neurologic: no dizziness, no hemiplegia, no hemiparesis, no obvious visual deficits noted  Psychiatric: no depression, no memory loss noted, no suicidal ideation  Endocrine: no weight loss or gain, no temperature concerns hot or cold intolerance  Hemogolotic/Lymphatic: no bruising, excessive bleeding, no swelling in the groins or neck noted    Objective   Physical Exam  Physical exam    Constitutional: alert and in no acute distress verbal  Eyes: No erythema swelling or discharge  noted  Neck: supple, symmetric, trachea midline, no masses noted  Cardiovascular: Carotid pulses 2+, no obvious bruit, no Jugular distension noted, no thrill, heart regular rate, lower extremity vascular exam intact, cap refill <2 sec  Pulmonary:  Bilateral breath sounds intact, clear with rales rhonchi or wheeze  Abdomen: soft non tender, no pulsatile masses noted, no rebound rigidity or guarding noted  Skin: intact warm no abnormal turgor  Psychiatric: alert without any obvious cognitive issues, oriented to person, place, and time    Assessment/Plan   Peripheral arterial disease status post left lower extremity thrombectomy fasciotomy  PVR reviewed demonstrating normal ANSON bilateral extremities  Continue activity ambulation as tolerated  Follow-up in 3 months         Iban Peace DO 03/11/25 3:12 PM

## 2025-03-13 PROCEDURE — RXMED WILLOW AMBULATORY MEDICATION CHARGE

## 2025-03-14 ENCOUNTER — PHARMACY VISIT (OUTPATIENT)
Dept: PHARMACY | Facility: CLINIC | Age: 60
End: 2025-03-14
Payer: MEDICAID

## 2025-03-17 PROCEDURE — RXMED WILLOW AMBULATORY MEDICATION CHARGE

## 2025-03-18 NOTE — PROGRESS NOTES
Counseling:  The patient was counseled regarding diagnostic results, instructions for management, risk factor reductions, prognosis, patient and family education, impressions, risks and benefits of treatment options and importance of compliance with treatment.      Chief Complaint:   The patient presents today for post-hospitalization followup of heart failure and CAD s/p PCI.      History Of Present Illness:    Dereck Shen is a 59 y.o. male patient whose PMH is significant for PAD with prior revascularization (R fem-pop bypass 2/24/20; L fem-pop bypass 9/14/20; L fem PTA of the graft; R iliac PTA 2/2022; LLE and DANYA PTA 4/2024; angioplasty of the left SFA 10/21/24; and left femoral and tibial thrombectomy with patch angioplasty 11/19/24), heart failure, CAD s/p CABG x5 in 09/2019, PCI of Cx, 01/04/2021 and s/p PCI of LM/Cx 01/23/2025; HTN, hyperlipidemia, DM type 2, Hepatitis C, bipolar depression, ischemic stroke, COPD, BRUNA and nicotine dependence. He presents today for post-hospitalization followup of heart failure and CAD s/p PCI. The patient was admitted to hospital from 01/19/2025 to 01/24/2025 after presenting to the ED in respiratory distress. While in hospital, CXR revealed pulmonary vascular prominence suggestive of pulmonary vascular congestion and patchy opacities within the lower lobes most consistent with atelectasis/pneumonitis, echocardiogram demonstrated an EF of 36%, Grade II pseudonormal pattern of left ventricular diastolic filling with an elevated left atrial pressure, mildly reduced RV systolic function and mildly elevated RVSP, RHC revealed normal filling pressures, normal ventricular filling pressure, normal CO, preserved CO at rest, no evidence of shunt and no pulmonary vascular resistance, and LHC revealed triple vessel disease for which he underwent PCI of PTCA/stent of LM/CX. Today, the patient states that he is feeling much improved since undergoing PCI. He denies any CP, chest  "discomfort or SOB. He reports LE fatigue, and is scheduled for followup with Dr. Peace in June 2025. BP has been stable. The patient is compliant with his prescribed medications.      Last Recorded Vitals:  Vitals:    03/20/25 1426   BP: 128/60   BP Location: Right arm   Pulse: 73   Weight: 92.5 kg (204 lb)   Height: 1.803 m (5' 11\")       Past Medical History:  He has a past medical history of Aphasia, Atherosclerotic heart disease of native coronary artery with unspecified angina pectoris (11/05/2019), Bipolar disorder, unspecified (Multi), BPH (benign prostatic hyperplasia), Diabetes (Multi), DVT (deep venous thrombosis) (Multi) (02/2022), Erectile dysfunction, GERD (gastroesophageal reflux disease), Hepatitis C, HFrEF (heart failure with reduced ejection fraction), HLD (hyperlipidemia), HTN (hypertension), Obstructive sleep apnea (adult) (pediatric), Opioid abuse, in remission (Multi), PAD (peripheral artery disease) (CMS-HCC), Polymyalgia rheumatica (Multi), Post-traumatic stress disorder, unspecified, and Stroke (Multi) (07/24/2023).    Past Surgical History:  He has a past surgical history that includes Knee surgery (Left); Elbow surgery; Back surgery; CT angio aorta and bilateral iliofemoral runoff including without contrast if performed (07/20/2020); MR angio head wo IV contrast (01/04/2022); MR angio neck wo IV contrast (01/04/2022); CT angio aorta and bilateral iliofemoral runoff including without contrast if performed (01/14/2022); Invasive Vascular Procedure (Bilateral, 10/04/2024); Dental surgery; Coronary angioplasty with stent; Coronary artery bypass graft; Tonsillectomy; Transluminal atherectomy femoral-popliteal / tibioperoneal; Invasive Vascular Procedure (N/A, 10/21/2024); Invasive Vascular Procedure (N/A, 10/21/2024); Cardiac catheterization (N/A, 1/23/2025); Cardiac catheterization (N/A, 1/23/2025); and Cardiac catheterization (N/A, 1/23/2025).      Social History:  He reports that he has quit " smoking. His smoking use included cigarettes. He has been exposed to tobacco smoke. He has never used smokeless tobacco. He reports current drug use. Drug: Marijuana. He reports that he does not drink alcohol.    Family History:  Family History   Problem Relation Name Age of Onset    No Known Problems Mother      No Known Problems Father          Allergies:  Celecoxib; Ibuprofen; Tetanus toxoid, adsorbed; Adjpltto-0-xd9 antimigraine agents; Cephalexin; Hydrocodone; Latex; and Tryptophan    Outpatient Medications:  Current Outpatient Medications   Medication Instructions    acetaminophen (TYLENOL) 650 mg, oral, Every 4 hours PRN    aspirin 81 mg, Daily    atorvastatin (LIPITOR) 80 mg, oral, Nightly    Blood glucose monitoring meter To be used 3 times daily    blood sugar diagnostic (Blood Glucose Test) strip 1 each, miscellaneous, 3 times daily    carvedilol (Coreg) 6.25 mg tablet 1 tablet, Every 12 hours    carvedilol (Coreg) 6.25 mg tablet Take 1 tablet by mouth every 12 hours    Creon 36,000-114,000- 180,000 unit capsule,delayed release(DR/EC) capsule 2 capsules, 3 times daily PRN    cyanocobalamin (VITAMIN B-12) 500 mcg, oral, Daily    ELDERBERRY FRUIT ORAL 1 tablet, Daily PRN    Eliquis 5 mg, oral, 2 times daily    Entresto  mg tablet 1 tablet, 2 times daily    ezetimibe (Zetia) 10 mg tablet 1 tablet, Daily RT    ezetimibe (Zetia) 10 mg tablet Take 1 tablet by mouth once daily.    Farxiga 10 mg, oral, Daily with breakfast    ferrous sulfate (FeroSuL) tablet Take 1 tablet (325 mg) by mouth once daily with breakfast.    flash glucose scanning reader misc USE TO CHECK SUGARS FOUR TIMES A DAY    flash glucose sensor kit (FreeStyle En 2 Sensor) kit use as directed daily and change every 14 days    furosemide (LASIX) 20 mg, oral, Daily    gabapentin (NEURONTIN) 400 mg, oral, 3 times daily    JAIMEE ROOT EXTRACT ORAL 1 tablet, Daily PRN    heparin flush (heparin LockFlush,Porcine,,PF,) 10 unit/mL injection 5  "mL, As needed    hydrALAZINE (APRESOLINE) 50 mg, 3 times daily    insulin glargine (LANTUS) 10 Units, subcutaneous, Nightly    insulin lispro (HUMALOG) 14 Units, subcutaneous, 3 times daily (morning, midday, late afternoon)    isosorbide mononitrate ER (IMDUR) 60 mg, oral, Daily    lancets 30 gauge misc 1 each, miscellaneous, 3 times daily    lancets misc 1 each, miscellaneous, 4 times daily before meals and nightly    medical cannabis Not [facility] prescribed    naloxone (NARCAN) 4 mg, nasal, As needed, May repeat every 2-3 minutes if needed, alternating nostrils, until medical assistance becomes available.    nicotine (Nicoderm CQ) 21 mg/24 hr patch 1 patch, transdermal, Every 24 hours    OXcarbazepine (TRILEPTAL) 450 mg, oral, 2 times daily    oxyCODONE (ROXICODONE) 5 mg, oral, Every 6 hours PRN    oxyCODONE (ROXICODONE) 5 mg, oral, Every 6 hours PRN    pantoprazole (ProtoNix) 40 mg EC tablet TAKE 1 TABLET BY MOUTH ONCE DAILY    pen needle, diabetic (BD Ultra-Fine Jocy Pen Needle) 32 gauge x 5/32\" needle Pen Needles for Insulin.    pen needle, diabetic (BD Ultra-Fine Jocy Pen Needle) 32 gauge x 5/32\" needle Use to inject insulin up to 8 times per day.    pen needle, diabetic (TechLITE Pen Needle) 32 gauge x 1/4\" needle Use to inject 1-4 times daily as directed    ranolazine (Ranexa) 500 mg 12 hr tablet 1 tablet, 2 times daily    ranolazine (Ranexa) 500 mg 12 hr tablet Take 1 tablet by mouth twice daily.    rivaroxaban (Xarelto) 20 mg tablet Take 1 tablet by mouth once daily.    sacubitriL-valsartan (Entresto)  mg tablet Take 1 tablet by mouth twice daily.    senna 17.2 mg, oral, 2 times daily PRN    sodium chloride 0.9% flush 10 mL, 3 times daily PRN    spironolactone (ALDACTONE) 25 mg, oral, Daily    tamsulosin (Flomax) 0.4 mg 24 hr capsule TAKE 1 CAPSULE BY MOUTH ONCE DAILY    ticagrelor (BRILINTA) 90 mg, oral, 2 times daily    tiZANidine (ZANAFLEX) 2 mg, oral, Daily PRN     Review of Systems   Cardiovascular:  "        LE fatigue   All other systems reviewed and are negative.     Physical Exam:  Constitutional:       Appearance: Healthy appearance. Not in distress.   Neck:      Vascular: No JVR. JVD normal.   Pulmonary:      Effort: Pulmonary effort is normal.      Breath sounds: Normal breath sounds. No wheezing. No rhonchi. No rales.   Chest:      Chest wall: Not tender to palpatation.   Cardiovascular:      PMI at left midclavicular line. Normal rate. Regular rhythm. Normal S1. Normal S2.       Murmurs: There is no murmur.      No gallop.  No click. No rub.   Pulses:     Intact distal pulses.   Edema:     Peripheral edema absent.   Abdominal:      General: Bowel sounds are normal.      Palpations: Abdomen is soft.      Tenderness: There is no abdominal tenderness.   Musculoskeletal: Normal range of motion.         General: No tenderness. Skin:     General: Skin is warm and dry.   Neurological:      General: No focal deficit present.      Mental Status: Alert and oriented to person, place and time.          Last Labs:  CBC -  Lab Results   Component Value Date    WBC 5.4 02/06/2025    HGB 9.6 (L) 02/06/2025    HCT 29.9 (L) 02/06/2025    MCV 92.0 02/06/2025     02/06/2025       CMP -  Lab Results   Component Value Date    CALCIUM 8.2 (L) 01/24/2025    PHOS 2.3 (L) 01/24/2025    PROT 6.9 02/06/2025    ALBUMIN 3.8 02/06/2025    AST 12 02/06/2025    ALT 16 02/06/2025    ALKPHOS 134 02/06/2025    BILITOT 0.5 02/06/2025       LIPID PANEL -   Lab Results   Component Value Date    CHOL 102 01/24/2025    TRIG 179 (H) 01/24/2025    HDL 27.5 01/24/2025    CHHDL 3.7 01/24/2025    LDLF 173 (H) 04/04/2023    VLDL 36 01/24/2025    NHDL 75 01/24/2025       RENAL FUNCTION PANEL -   Lab Results   Component Value Date    GLUCOSE 105 (H) 01/24/2025     01/24/2025    K 4.0 01/24/2025     01/24/2025    CO2 28 01/24/2025    ANIONGAP 9 (L) 01/24/2025    BUN 24 (H) 01/24/2025    CREATININE 1.27 02/06/2025    GFRMALE 57 (A)  08/10/2023    CALCIUM 8.2 (L) 01/24/2025    PHOS 2.3 (L) 01/24/2025    ALBUMIN 3.8 02/06/2025        Lab Results   Component Value Date    BNP 1,231 (H) 01/19/2025    HGBA1C 7.9 (H) 12/18/2024    HGBA1C 8.2 (A) 10/01/2024       Last Cardiology Tests:  01/23/2025 - Cardiac Catheterization (LH)  1. Triple vessel disease.  2. Culprit vessel(s): circumflex.  3. Successful PTCA/stent of LM/CX.  4. Left Ventricular end-diastolic pressure = 10.  5. Normal LV filling pressures.    01/20/2025 - TTE  1. The left ventricular systolic function is moderately decreased, with a Salinas's biplane calculated ejection fraction of 36%.  2. There is global hypokinesis of the left ventricle with minor regional variations.  3. Spectral Doppler shows a Grade II (pseudonormal pattern) of left ventricular diastolic filling with an elevated left atrial pressure.  4. Left ventricular cavity size is mildly dilated.  5. There is mildly reduced right ventricular systolic function.  6. Mildly elevated right ventricular systolic pressure.    01/19/2025 - CXR  1.  Pulmonary vascular prominence suggestive of pulmonary vascular congestion.  2.  Patchy opacities within the lower lobes most consistent with atelectasis/pneumonitis.    11/20/2024 - TTE  1. Poorly visualized anatomical structures due to suboptimal image quality.  2. The left ventricular systolic function is moderately decreased, with a Salinas's biplane calculated ejection fraction of 38%.  3. Abnormal wall motion.  4. Spectral Doppler shows an abnormal pattern of left ventricular diastolic filling.  5. Akinetic inferolateral segment, and basal inferior wall.    10/21/2024 - Vascular Intervention   Successful recannalization of totally occluded left SFA.     10/04/2024 - Vascular Procedure  Chronically occluded left SFA and left fem-pop bypass.     05/10/2023 - Nuclear Stress Test  1. SPECT Perfusion Study: Abnormal.   2. There is no scintigraphic evidence for inducible ischemia.   3.  There is a large (>20%) fixed perfusion defect in the LCX territory.   4. There is a moderate (10-20%) fixed perfusion defect in the RCA territory.   5. Left ventricle is moderately dilated. The left ventricle systolic function is moderately decreased.   6. Right ventricle is normal in size. The right ventricle systolic function is normal.   7. This is an intermediate risk scan. Large Lcx scar with RCA scar, no ischemia. EF 40%.    01/04/2022 - TTE  1. The left ventricular systolic function is mildly decreased with a 35% estimated ejection fraction.  2. Multiple segmental abnormalities exist. See findings.  3. Spectral Doppler shows an impaired relaxation pattern of left ventricular diastolic filling.     02/19/2021 - TTE  Limited echocardiogram, LV function appears reduced although multiple segments were not well visualized.     01/04/2021 - Cardiac Catheterization (LH)  1. Triple vessel coronary artery disease with proximal left anterior descending involvement.  2. Culprit vessel(s): circumflex.  3. Successful PCI of Cx.  4. Patent LIMA to LAD/Dg, SVG to RCA and Radial graft to OM.    12/16/2020 - TTE  1. The left ventricular systolic function is moderately decreased with a 35-40% estimated ejection fraction.  2. Basal and mid inferior septum, basal and mid inferior wall, basal and mid inferolateral wall, and basal anteroseptal segment are abnormal.  3. There are multiple wall motion abnormalities.     04/14/2020 - TTE  1. The left ventricular systolic function is mildly to moderately decreased with a 40-45% estimated ejection fraction.  2. Hypokinesis of basal inferior, inferolateral and inferoseptal LV segments.  3. Spectral Doppler shows an impaired relaxation pattern of left ventricular diastolic filling.    09/20/2019 - TTE  1. The left ventricular systolic function is mildly to moderately decreased with a 40% estimated ejection fraction.  2. Abnormal septal motion consistent with post-operative status.  3.  Spectral Doppler shows an impaired relaxation pattern of left ventricular diastolic filling.  4. There is no evidence of left ventricular hypertrophy.  5. The pulmonary artery is not well visualized.  6. There is global hypokinesis of the left ventricle with minor regional variations.    07/16/2019 - TTE  1. Moderately-severe left ventricular systolic dysfunction with regional abnormalities with an ejection fraction of 30%.  2. Inferolateral and anterolateral wall segments are akinetic; inferior and inferoseptal wall segments are severely hypokinetic; anterior and anteroseptal wall segments are mildly hypokinetic.  3. Normal right ventricular size and systolic function.  4. Trace mitral and tricuspid regurgitation.     04/22/2019 - Cardiac Catheterization (LH)  Triple vessel coronary artery disease with proximal left anterior descending involvement.     03/30/2019 - TTE  1. Severe segment and generalized left ventricular systolic dysfunction with an ejection fraction of 20%.  2. Inferior and inferolateral wall segments are akinetic; remaining LV wall segments are severely hypokinetic.  3. Restrictive diastolic filling pattern (Stage III-IV diastolic dysfunction).  4. Dilated right ventricle with severe systolic dysfunction.  5. Trace mitral regurgitation.     Lab review: I have personally reviewed the laboratory result(s).  Diagnostic review: I have personally reviewed the result(s) of the Echocardiogram and LHC.    Assessment/Plan   1) CAD s/p Prior CABG x5 09/2019, s/p PCI of Cx, 01/04/2021, now s/p PCI of LM/Cx 01/23/2025 - HFrEF  On DAPT - ASA 81 mg daily, Brilinta 90 mg BID  On atorvastatin 80 mg daily, carvedilol 6.25 mg BID, Farxiga 10 mg daily, Entresto  mg BID, Zetia 10 mg daily, furosemide 20 mg daily, hydralazine 50 mg TID, Imdur 60 mg daily, Ranexa 500 mg BID, spironolactone 25 mg daily.  Hospital admission 01/19/2025 to 01/24/2025 with respiratory distress, acute pulmonary edema  CXR with  pulmonary vascular prominence suggestive of pulmonary vascular congestion, patchy opacities within lower lobes most consistent with atelectasis/pneumonitis.  TTE with LVEF 36%, Grade II pseudonormal pattern of LV diastolic filling with an elevated left atrial pressure, mildly reduced RV systolic function, mildly elevated RVSP.  LHC with triple vessel disease s/p PTCA/stent of LM/CX  RHC with normal filling pressures, normal ventricular filling pressure, normal CO, preserved CO at rest, no evidence of shunt, no pulmonary vascular resistance.   Denies CP, chest discomfort or SOB  BP stable  Continue current medical Rx   Repeat echo 3 months  F/U 3 months    2) Hyperlipidemia  Goal LDL <70  On atorvastatin 80 mg daily, Zetia 10 mg daily   Lipid panel 01/24/2025 with total cholesterol, LDL and triglycerides of 102, 39 and 179 respectively   Continue current medical Rx   F/U 3 months    3) HTN  Stable   On carvedilol 6.25 mg BID, Entresto  mg BID, furosemide 20 mg daily, hydralazine 50 mg TID  Continue current medical Rx   F/U 3 months    4) PAD s/p Prior Revascularization   On Eliquis 5 mg BID  Management per Dr. Peace   S/P right fem-pop bypass 02/24/2020  S/P left fem-pop bypass 09/14/2020  S/P left fem PTA of graft, right iliac PTA 02/2022  S/P LLE and DANYA PTA 04/2024  S/P angioplasty of left SFA 10/21/2024  S/P left femoral and tibial thrombectomy with patch angioplasty 11/19/2024  Reports LE fatigue   Scheduled for followup with Dr. Peaec 06/12/2025    5) Anemia   Labs 01/20/2025 with low B12 of 185, low TIBC of 235, low iron saturation of 15  CBC 02/06/2025 with low RBCs of 3.25, hemoglobin of 9.6  Referral placed to [facility]ARGAR[facility] Brito Attestation  By signing my name below, I, Mk Bundy   attest that this documentation has been prepared under the direction and in the presence of Baljinder Wright MD.

## 2025-03-19 PROCEDURE — RXMED WILLOW AMBULATORY MEDICATION CHARGE

## 2025-03-19 NOTE — PATIENT INSTRUCTIONS
Thank you for choosing [facility]  for your health care needs.  If you have any questions, concerns or medical needs, please feel free to contact our office at [phone].    Please ensure you complete your blood work one week before the next scheduled appointment.    To continue Lantus 10 units SQ at bedtime   To increase Lispro to 14 units before meals  To continue sliding scale with Lispro insulin with meals:  150-200 - 2 units  201-250 - 4 units  251-300 - 6 units  301-350 - 8 units  >251 - 10 units  To continue Farxiga 10g once daily   To continue the use of your CGM for the intensive glucose monitoring due to the dynamic nature of insulin requirements, the narrow therapeutic index of insulin and the potentially fatal consequences of treatment  Counseled that the time in range should be >70%  Counseled that the goal A1C should be 7% or less  Counseled glycemic control is warranted to prevent microvascular complications  Clinical pharmacy referral   For follow up in 4- months with glucose log

## 2025-03-19 NOTE — PROGRESS NOTES
Subjective   Dereck Shen is a 59 y.o. male who presents for follow-up of Type 2 diabetes mellitus. The initial diagnosis of diabetes was made in 2009 .     He has been seeing vascular every three months.  He has fatigued to his legs.  His right leg goes numb.  He has had several surgical interventions over the last few months.  He may need to have an amputation if critical limb ischemia develops again.      He can sometimes bleed through his sensors and thus he does not believe some of the values to be accurate.    He has last 21 pounds.     Known complications due to diabetes included peripheral neuropathy, cardiovascular disease, cerebrovascular disease, and CAD s/p CABG and stent in January 2025 .  The patient underwent a right femoropopliteal to his right lower extremity in February 2020. He underwent a left femoral popliteal to his left lower extremity in September 2020. He had CABG in September 2019. He has CAD s/p 1 stent. He may need to undergo angioplasty of his left femoropopliteal bypass. He has had multiple CVAs. He recently had critical limb ischemia.      Cardiovascular risk factors include advanced age (older than 55 for men, 65 for women), diabetes mellitus, male gender, and microalbuminuria. The patient is on an ACE inhibitor or angiotensin II receptor blocker.  The patient has not been previously hospitalized due to diabetic ketoacidosis.     Current symptoms/problems include none. His clinical course has been stable.     The patient is currently checking the blood glucose multiple times per day.    Patient is using: continuous glucose monitor                                                        Hypoglycemia frequency: 0%  Hypoglycemia awareness: N/A      Current Regimen  Lantus 10 units SQ bedtime  Lispro 10 units four times daily   Farxiga 10mg once daily        MEALS -   Breakfast - oatmeal with strawberries   Lunch - salad, tuna fish   Dinner - salmon, salads  Snacks - nuts   Beverages  "- diet coke, coffee, water, Glucerna     Review of Systems   Constitutional:  Positive for diaphoresis, fatigue and unexpected weight change (Weight loss).   HENT:  Positive for tinnitus.    Eyes:         Dry eye    Respiratory:  Positive for apnea and shortness of breath.    Cardiovascular:  Positive for palpitations.   Gastrointestinal:  Positive for constipation, diarrhea, nausea and vomiting.   Musculoskeletal:  Positive for arthralgias, back pain and myalgias.   Neurological:  Positive for dizziness, weakness and numbness.        Tingling    Psychiatric/Behavioral:  Positive for confusion and sleep disturbance. The patient is nervous/anxious.         Depression  Panic attacks  Memory loss   All other systems reviewed and are negative.      Objective   Visit Vitals  Smoking Status Former   Visit Vitals  /60   Pulse 72   Ht 1.803 m (5' 11\")   Wt 92.3 kg (203 lb 6.4 oz)   BMI 28.37 kg/m²   Smoking Status Former   BSA 2.15 m²           Physical Exam  Vitals and nursing note reviewed.   Constitutional:       General: He is not in acute distress.     Appearance: Normal appearance. He is normal weight.   HENT:      Head: Normocephalic and atraumatic.      Nose: Nose normal.      Mouth/Throat:      Mouth: Mucous membranes are moist.   Eyes:      Extraocular Movements: Extraocular movements intact.   Pulmonary:      Effort: Pulmonary effort is normal.   Musculoskeletal:         General: Normal range of motion.   Neurological:      Mental Status: He is alert and oriented to person, place, and time.   Psychiatric:         Mood and Affect: Mood normal.         Lab Review  Lab Results   Component Value Date    HGBA1C 7.9 (H) 12/18/2024     Glucose (mg/dL)   Date Value   01/24/2025 105 (H)   01/23/2025 100 (H)   01/22/2025 111 (H)     POC HEMOGLOBIN A1c (%)   Date Value   10/01/2024 8.2 (A)   05/01/2024 7.3 (A)     Hemoglobin A1C (%)   Date Value   12/18/2024 7.9 (H)   12/13/2023 7.0 (H)   09/27/2023 7.5 (H) "   04/04/2023 7.4 (A)   08/30/2022 6.8 (A)   01/05/2022 7.8 (A)     Bicarbonate (mmol/L)   Date Value   01/24/2025 28   01/23/2025 32   01/22/2025 31     Urea Nitrogen (mg/dL)   Date Value   01/24/2025 24 (H)   01/23/2025 31 (H)   01/22/2025 27 (H)     Creatinine (mg/dL)   Date Value   01/27/2025 1.40 (H)   01/24/2025 1.09   01/23/2025 1.41 (H)     CREATININE (mg/dL)   Date Value   02/06/2025 1.27   01/30/2025 1.15     Lab Results   Component Value Date    CHOL 102 01/24/2025    CHOL 172 10/22/2024    CHOL 257 (H) 04/04/2023     Lab Results   Component Value Date    HDL 27.5 01/24/2025    HDL 35.8 10/22/2024    HDL 38.3 (A) 04/04/2023     Lab Results   Component Value Date    LDLCALC 39 01/24/2025    LDLCALC 107 (H) 10/22/2024     Lab Results   Component Value Date    TRIG 179 (H) 01/24/2025    TRIG 147 10/22/2024    TRIG 229 (H) 04/04/2023     Lab Results   Component Value Date    TSH 2.77 04/18/2022       Health Maintenance:   Foot Exam:  2019  Eye Exam:  April 18, 2024  Urine Albumin:  April 4, 2023    CGM Interpretation  14 day CGM download was reviewed in detail as documented above and will be attached to chart.  A minimum of 72 hours of glucose data was used to inform the management plan outlined below.    Sufficient data to analyze:  Yes; CGM active 15% of the time  70% of values is above target range, which is at goal.    30% of values is spent in target range, and thus at goal   1% of values is spent with hypoglycemia, and thus at goal         Assessment/Plan      59-year-old male presents for follow-up for type 2 diabetes.  His blood pressure is at goal. He is noted to be on a statin.    Type 2 diabetes mellitus  To continue Lantus 10 units SQ at bedtime   To increase Lispro to 14 units before meals  To continue sliding scale with Lispro insulin with meals:  150-200 - 2 units  201-250 - 4 units  251-300 - 6 units  301-350 - 8 units  >251 - 10 units  To continue Farxiga 10g once daily   To continue the use  of your CGM for the intensive glucose monitoring due to the dynamic nature of insulin requirements, the narrow therapeutic index of insulin and the potentially fatal consequences of treatment  Counseled that the time in range should be >70%  Counseled that the goal A1C should be 7% or less  Counseled glycemic control is warranted to prevent microvascular complications  Clinical pharmacy referral   To obtain blood and urine tests          Long-term insulin use (Multi)  Please rotate insulin injection sites        Diabetic neuropathy, painful (Multi)  To continue Gabapentin 400mg three times daily     For follow up in 4- months with glucose log

## 2025-03-20 ENCOUNTER — OFFICE VISIT (OUTPATIENT)
Dept: CARDIOLOGY | Facility: HOSPITAL | Age: 60
End: 2025-03-20
Payer: COMMERCIAL

## 2025-03-20 ENCOUNTER — PHARMACY VISIT (OUTPATIENT)
Dept: PHARMACY | Facility: CLINIC | Age: 60
End: 2025-03-20
Payer: MEDICAID

## 2025-03-20 ENCOUNTER — APPOINTMENT (OUTPATIENT)
Dept: ENDOCRINOLOGY | Facility: CLINIC | Age: 60
End: 2025-03-20
Payer: COMMERCIAL

## 2025-03-20 VITALS
HEIGHT: 71 IN | WEIGHT: 203.4 LBS | DIASTOLIC BLOOD PRESSURE: 60 MMHG | BODY MASS INDEX: 28.48 KG/M2 | HEART RATE: 72 BPM | SYSTOLIC BLOOD PRESSURE: 114 MMHG

## 2025-03-20 VITALS
SYSTOLIC BLOOD PRESSURE: 128 MMHG | HEART RATE: 73 BPM | DIASTOLIC BLOOD PRESSURE: 60 MMHG | WEIGHT: 204 LBS | BODY MASS INDEX: 28.56 KG/M2 | HEIGHT: 71 IN

## 2025-03-20 DIAGNOSIS — G62.9 NEUROPATHY: ICD-10-CM

## 2025-03-20 DIAGNOSIS — D64.9 ANEMIA: Primary | ICD-10-CM

## 2025-03-20 DIAGNOSIS — E11.9 TYPE 2 DIABETES MELLITUS WITHOUT COMPLICATION, WITH LONG-TERM CURRENT USE OF INSULIN: ICD-10-CM

## 2025-03-20 DIAGNOSIS — E61.1 IRON DEFICIENCY: ICD-10-CM

## 2025-03-20 DIAGNOSIS — Z79.4 TYPE 2 DIABETES MELLITUS WITHOUT COMPLICATION, WITH LONG-TERM CURRENT USE OF INSULIN: ICD-10-CM

## 2025-03-20 DIAGNOSIS — I70.222 CRITICAL LIMB ISCHEMIA OF LEFT LOWER EXTREMITY (MULTI): ICD-10-CM

## 2025-03-20 DIAGNOSIS — E11.40 DIABETIC NEUROPATHY, PAINFUL (MULTI): ICD-10-CM

## 2025-03-20 DIAGNOSIS — I50.23 ACUTE ON CHRONIC SYSTOLIC (CONGESTIVE) HEART FAILURE: ICD-10-CM

## 2025-03-20 DIAGNOSIS — I99.8 LIMB ISCHEMIA: ICD-10-CM

## 2025-03-20 DIAGNOSIS — Z79.4 LONG-TERM INSULIN USE (MULTI): Primary | ICD-10-CM

## 2025-03-20 LAB — POC FINGERSTICK BLOOD GLUCOSE: 236 MG/DL (ref 70–100)

## 2025-03-20 PROCEDURE — 3008F BODY MASS INDEX DOCD: CPT | Performed by: INTERNAL MEDICINE

## 2025-03-20 PROCEDURE — 3078F DIAST BP <80 MM HG: CPT | Performed by: INTERNAL MEDICINE

## 2025-03-20 PROCEDURE — 3074F SYST BP LT 130 MM HG: CPT | Performed by: INTERNAL MEDICINE

## 2025-03-20 PROCEDURE — 99214 OFFICE O/P EST MOD 30 MIN: CPT | Performed by: INTERNAL MEDICINE

## 2025-03-20 PROCEDURE — RXMED WILLOW AMBULATORY MEDICATION CHARGE

## 2025-03-20 PROCEDURE — 3048F LDL-C <100 MG/DL: CPT | Performed by: INTERNAL MEDICINE

## 2025-03-20 PROCEDURE — 95251 CONT GLUC MNTR ANALYSIS I&R: CPT | Performed by: INTERNAL MEDICINE

## 2025-03-20 PROCEDURE — 82962 GLUCOSE BLOOD TEST: CPT | Performed by: INTERNAL MEDICINE

## 2025-03-20 PROCEDURE — 99213 OFFICE O/P EST LOW 20 MIN: CPT | Mod: 25 | Performed by: INTERNAL MEDICINE

## 2025-03-20 PROCEDURE — 99213 OFFICE O/P EST LOW 20 MIN: CPT | Performed by: INTERNAL MEDICINE

## 2025-03-20 RX ORDER — FERROUS SULFATE 325(65) MG
TABLET ORAL
Qty: 30 TABLET | Refills: 3 | Status: SHIPPED | OUTPATIENT
Start: 2025-03-20 | End: 2026-03-20

## 2025-03-20 RX ORDER — ATORVASTATIN CALCIUM 80 MG/1
80 TABLET, FILM COATED ORAL NIGHTLY
Qty: 90 TABLET | Refills: 2 | Status: SHIPPED | OUTPATIENT
Start: 2025-03-20 | End: 2026-03-20

## 2025-03-20 RX ORDER — BLOOD-GLUCOSE CONTROL, NORMAL
1 EACH MISCELLANEOUS 3 TIMES DAILY
Qty: 100 EACH | Refills: 11 | Status: SHIPPED | OUTPATIENT
Start: 2025-03-20 | End: 2026-03-20

## 2025-03-20 RX ORDER — DEXTROSE 4 G
TABLET,CHEWABLE ORAL
Qty: 1 EACH | Refills: 0 | Status: SHIPPED | OUTPATIENT
Start: 2025-03-20

## 2025-03-20 RX ORDER — INSULIN LISPRO 100 [IU]/ML
14 INJECTION, SOLUTION INTRAVENOUS; SUBCUTANEOUS
Qty: 30 ML | Refills: 5 | Status: SHIPPED | OUTPATIENT
Start: 2025-03-20 | End: 2025-09-16

## 2025-03-20 RX ORDER — INSULIN GLARGINE 100 [IU]/ML
10 INJECTION, SOLUTION SUBCUTANEOUS NIGHTLY
Qty: 15 ML | Refills: 5 | Status: SHIPPED | OUTPATIENT
Start: 2025-03-20 | End: 2025-09-16

## 2025-03-20 RX ORDER — SPIRONOLACTONE 25 MG/1
25 TABLET ORAL DAILY
Qty: 90 TABLET | Refills: 3 | Status: SHIPPED | OUTPATIENT
Start: 2025-03-20 | End: 2026-03-20

## 2025-03-20 RX ORDER — GABAPENTIN 400 MG/1
400 CAPSULE ORAL 3 TIMES DAILY
Qty: 90 CAPSULE | Refills: 5 | Status: SHIPPED | OUTPATIENT
Start: 2025-03-20 | End: 2025-09-16

## 2025-03-20 RX ORDER — RANOLAZINE 500 MG/1
500 TABLET, EXTENDED RELEASE ORAL 2 TIMES DAILY
Qty: 180 TABLET | Refills: 3 | Status: SHIPPED | OUTPATIENT
Start: 2025-03-20 | End: 2026-03-20

## 2025-03-20 RX ORDER — EZETIMIBE 10 MG/1
10 TABLET ORAL DAILY
Qty: 90 TABLET | Refills: 3 | Status: SHIPPED | OUTPATIENT
Start: 2025-03-20 | End: 2026-03-20

## 2025-03-20 RX ORDER — CARVEDILOL 6.25 MG/1
6.25 TABLET ORAL EVERY 12 HOURS
Qty: 180 TABLET | Refills: 3 | Status: SHIPPED | OUTPATIENT
Start: 2025-03-20 | End: 2026-03-20

## 2025-03-20 RX ORDER — IBUPROFEN 200 MG
1 CAPSULE ORAL 3 TIMES DAILY
Qty: 100 STRIP | Refills: 11 | Status: SHIPPED | OUTPATIENT
Start: 2025-03-20 | End: 2026-03-20

## 2025-03-20 RX ORDER — FLASH GLUCOSE SENSOR
KIT MISCELLANEOUS
Qty: 2 EACH | Refills: 11 | Status: SHIPPED | OUTPATIENT
Start: 2025-03-20

## 2025-03-20 RX ORDER — HYDRALAZINE HYDROCHLORIDE 50 MG/1
50 TABLET, FILM COATED ORAL 3 TIMES DAILY
Qty: 270 TABLET | Refills: 3 | Status: SHIPPED | OUTPATIENT
Start: 2025-03-20 | End: 2026-03-20

## 2025-03-20 RX ORDER — ISOSORBIDE MONONITRATE 60 MG/1
60 TABLET, EXTENDED RELEASE ORAL DAILY
Qty: 90 TABLET | Refills: 3 | Status: SHIPPED | OUTPATIENT
Start: 2025-03-20 | End: 2026-03-20

## 2025-03-20 ASSESSMENT — ENCOUNTER SYMPTOMS
DEPRESSION: 1
DIAPHORESIS: 1
OCCASIONAL FEELINGS OF UNSTEADINESS: 1
BACK PAIN: 1
CONFUSION: 1
CONSTIPATION: 1
MYALGIAS: 1
SLEEP DISTURBANCE: 1
NERVOUS/ANXIOUS: 1
ARTHRALGIAS: 1
VOMITING: 1
PALPITATIONS: 1
SHORTNESS OF BREATH: 1
UNEXPECTED WEIGHT CHANGE: 1
FATIGUE: 1
DIZZINESS: 1
LOSS OF SENSATION IN FEET: 1
NAUSEA: 1
APNEA: 1
NUMBNESS: 1
DIARRHEA: 1
WEAKNESS: 1

## 2025-03-20 NOTE — PATIENT INSTRUCTIONS
Continue all current medications as prescribed. Refills have been sent to your pharmacy as per your request.   Dr. Wright has placed a referral to Dr. Martin, gastroenterologist, to evaluate for a GI cause to your anemia.   Repeat echocardiogram (ultrasound of the heart) in 3 months to followup on your heart function and structure.   Followup with Dr. Wright in 3 months.    If you have any questions or cardiac concerns, please call our office at [phone].

## 2025-03-21 ENCOUNTER — PHARMACY VISIT (OUTPATIENT)
Dept: PHARMACY | Facility: CLINIC | Age: 60
End: 2025-03-21
Payer: MEDICAID

## 2025-03-22 LAB
ALBUMIN SERPL-MCNC: 4.4 G/DL (ref 3.6–5.1)
ALBUMIN/CREAT UR: 378 MG/G CREAT
ALP SERPL-CCNC: 127 U/L (ref 35–144)
ALT SERPL-CCNC: 14 U/L (ref 9–46)
ANION GAP SERPL CALCULATED.4IONS-SCNC: 8 MMOL/L (CALC) (ref 7–17)
AST SERPL-CCNC: 13 U/L (ref 10–35)
BILIRUB SERPL-MCNC: 0.4 MG/DL (ref 0.2–1.2)
BUN SERPL-MCNC: 55 MG/DL (ref 7–25)
CALCIUM SERPL-MCNC: 9 MG/DL (ref 8.6–10.3)
CHLORIDE SERPL-SCNC: 103 MMOL/L (ref 98–110)
CO2 SERPL-SCNC: 23 MMOL/L (ref 20–32)
CREAT SERPL-MCNC: 1.7 MG/DL (ref 0.7–1.3)
CREAT UR-MCNC: 45 MG/DL (ref 20–320)
EGFRCR SERPLBLD CKD-EPI 2021: 46 ML/MIN/1.73M2
EST. AVERAGE GLUCOSE BLD GHB EST-MCNC: 180 MG/DL
EST. AVERAGE GLUCOSE BLD GHB EST-SCNC: 10 MMOL/L
GLUCOSE SERPL-MCNC: 243 MG/DL (ref 65–139)
HBA1C MFR BLD: 7.9 % OF TOTAL HGB
MICROALBUMIN UR-MCNC: 17 MG/DL
POTASSIUM SERPL-SCNC: 6 MMOL/L (ref 3.5–5.3)
PROT SERPL-MCNC: 7.7 G/DL (ref 6.1–8.1)
SODIUM SERPL-SCNC: 134 MMOL/L (ref 135–146)

## 2025-03-23 PROBLEM — G62.9 NEUROPATHY: Status: ACTIVE | Noted: 2023-09-22

## 2025-03-23 NOTE — RESULT ENCOUNTER NOTE
Labs have been reviewed.  There is spillage of protein in the urine. The kidney function is impaired which is an acute change from one month ago.   The A1C was found to be 7.9%.  Please go to the ER to have acute kidney function evaluated as some of the cardiac medications may need to be adjusted.

## 2025-03-23 NOTE — ASSESSMENT & PLAN NOTE
To continue Lantus 10 units SQ at bedtime   To increase Lispro to 14 units before meals  To continue sliding scale with Lispro insulin with meals:  150-200 - 2 units  201-250 - 4 units  251-300 - 6 units  301-350 - 8 units  >251 - 10 units  To continue Farxiga 10g once daily   To continue the use of your CGM for the intensive glucose monitoring due to the dynamic nature of insulin requirements, the narrow therapeutic index of insulin and the potentially fatal consequences of treatment  Counseled that the time in range should be >70%  Counseled that the goal A1C should be 7% or less  Counseled glycemic control is warranted to prevent microvascular complications  Clinical pharmacy referral   To obtain blood and urine tests

## 2025-03-24 ENCOUNTER — PHARMACY VISIT (OUTPATIENT)
Dept: PHARMACY | Facility: CLINIC | Age: 60
End: 2025-03-24

## 2025-03-24 ENCOUNTER — TELEPHONE (OUTPATIENT)
Dept: CARDIOLOGY | Facility: HOSPITAL | Age: 60
End: 2025-03-24
Payer: COMMERCIAL

## 2025-03-24 DIAGNOSIS — I50.23 ACUTE ON CHRONIC SYSTOLIC (CONGESTIVE) HEART FAILURE: Primary | ICD-10-CM

## 2025-03-24 DIAGNOSIS — R79.89 ELEVATED SERUM CREATININE: ICD-10-CM

## 2025-03-24 NOTE — TELEPHONE ENCOUNTER
Patient called and left voicemail asking about his test results, if a nurse can please call him back.

## 2025-03-24 NOTE — TELEPHONE ENCOUNTER
3/24/25  1312  Returned phone call where patient was informed of creatinine results and was directed to the ED, by endocrine provider; patient informed nurse he is out of town at a .    Informed Dr. Wright of patient status. Per Dr. Wright does not need to do to ED for this but should have repeat bmp in two weeks.    Informed patient of such; patient verbalized understanding and was agreeable.    BMP ordered with instructions for patient to have done in two weeks with patient verbalizing understanding.

## 2025-03-28 ENCOUNTER — APPOINTMENT (OUTPATIENT)
Dept: UROLOGY | Facility: CLINIC | Age: 60
End: 2025-03-28
Payer: COMMERCIAL

## 2025-03-28 DIAGNOSIS — R33.9 URINARY RETENTION: Primary | ICD-10-CM

## 2025-03-28 DIAGNOSIS — Z12.5 ENCOUNTER FOR SCREENING PROSTATE SPECIFIC ANTIGEN (PSA) MEASUREMENT: ICD-10-CM

## 2025-03-28 DIAGNOSIS — R33.9 RETENTION OF URINE: ICD-10-CM

## 2025-03-28 PROCEDURE — 99213 OFFICE O/P EST LOW 20 MIN: CPT | Performed by: UROLOGY

## 2025-03-28 PROCEDURE — 3048F LDL-C <100 MG/DL: CPT | Performed by: UROLOGY

## 2025-03-28 NOTE — PROGRESS NOTES
03/28/2025  Voiding okay, nocturia x 1, on Flomax 0.8 mg daily    Patient has no nausea, no vomiting, no fever.    MARIAN: Deferred    PSA pending    We discussed benign prostate hypertrophy with moderate voiding symptoms, continue Flomax 0.8 mg daily  We discussed PSA screening  We discussed erectile dysfunction  All the questions were answered, the patient expressed understanding and agreed to the plan.    Impression  BPH  Postop retention  PSA screening  ED     Plan  Continue Flomax 0.8 mg daily  PSA now and in a year  Appointment in a year    Chief Complaint   Patient presents with    Urinary Retention     Pt is here today for a 3 month follow up for post op retention and BPH. He denies any new issues at this time.   Pt unable to void.     Physical Exam     TODAYS LAB RESULTS:  LPZ=625pt    ASSESSMENT&PLAN:      IMPRESSIONS:         01/27/2025  Failed voiding trial again, Douglass catheter was inserted in the ER, PVR 1200 ml     Patient has no nausea, no vomiting, no fever.     Exam: Douglass catheter in place, urine concentrated yellow     Douglass catheter was removed     We discussed benign prostate hypertrophy repeated urinary retention, BPH workup versus continue Douglass catheter versus voiding trial third time  All the questions were answered, the patient expressed understanding and agreed to the plan.     Impression  BPH  Postop retention  PSA screening     Plan  Increase Flomax 0.8 mg daily  Voiding trial today, ER if needed  Call back at 3:00 PM  Appointment in 1 week for PVR  Possible BPH workup and PVP greenlight laser prostatectomy in future          Chief Complaint   Patient presents with    Urinary Retention       Patient is here today for ER follow up and had cath reinserted.          Physical Exam      TODAYS LAB RESULTS:        Lab Results   Component Value Date     PSA 0.3 03/18/2021    === 12/30/24 ===     CT ABDOMEN PELVIS WO IV CONTRAST     - Impression -  1.  Bibasilar interstitial edema with enlarging,  now moderate pleural  effusions.  2. Anasarca.  3. Decreased fluid overlying left femoral cutdown site. Subincisional  gas with adjacent reactive lymphadenopathy at this site persist.  4. Large colonic stool burden. No findings of bowel obstruction  otherwise.     MACRO:  None.     Signed by: Raudel Scott 1/4/2025 4:16 PM  Dictation workstation:   [id number]      ASSESSMENT&PLAN:        IMPRESSIONS:           01/16/2025  Failed voiding trial again, ER visit and the Brown was reinserted     Exam: Brown catheter in place, urine concentrated yellow     Brown catheter was removed without difficulty     We discussed benign prostate hypertrophy repeated urinary retention, BPH workup versus continue Brown catheter versus voiding trial third time  All the questions were answered, the patient expressed understanding and agreed to the plan.     Impression  BPH  Postop retention  PSA screening     Plan  Increase Flomax 0.8 mg daily  Voiding trial today, ER if needed  Call back at 3:00 PM  Appointment in 1 week for PVR  Possible BPH workup and PVP greenlight laser prostatectomy in future             Chief Complaint   Patient presents with    Urinary Retention       Patient is here today for follow up from the ER.  He had a brown cath inserted.         Physical Exam      TODAYS LAB RESULTS:  === 12/30/24 ===     CT ABDOMEN PELVIS WO IV CONTRAST     - Impression -  1.  Bibasilar interstitial edema with enlarging, now moderate pleural  effusions.  2. Anasarca.  3. Decreased fluid overlying left femoral cutdown site. Subincisional  gas with adjacent reactive lymphadenopathy at this site persist.  4. Large colonic stool burden. No findings of bowel obstruction  otherwise.     MACRO:  None.     Signed by: Raudel Scott 1/4/2025 4:16 PM  Dictation workstation:   [id number]      ASSESSMENT&PLAN:        IMPRESSIONS:     12/27/2024  Hospital follow-up for urinary retention     Exam: Brown catheter in place, urine clear yellow     We  discussed benign prostate hypertrophy, postop urinary retention, voiding trial  We discussed continue Flomax 0.4 mg daily  We discussed the PSA screening  All the questions were answered, the patient expressed understanding and agreed to the plan.     Impression  BPH  Postop retention  PSA screening     Plan  Continue Flomax 0.4 mg daily  Voiding trial today, ER if needed  PSA screening  Appointment in 3 months for PVR             Chief Complaint   Patient presents with    Urinary Retention       Pt here for a hospital follow up for retention   Pt states Cath is coming losses and they are worried its coming out and is very painful when he walks          Physical Exam      TODAYS LAB RESULTS:  Contains abnormal data Urinalysis Microscopic  Order: [id number] - Reflex for Order [id number]   Status: Final result       Visible to patient: Yes (seen)    0 Result Notes            Component  Ref Range & Units 6 d ago 1 yr ago 5 yr ago   WBC, Urine  1-5, NONE /HPF 1-5 1 R 1 Abnormal  R   RBC, Urine  NONE, 1-2, 3-5 /HPF 3-5 3 R 6 Abnormal  R   Mucus, Urine  Reference range not established. /LPF FEW   1+ R   Hyaline Casts, Urine  NONE /LPF 4+ Abnormal        Resulting Agency [facility]                Specimen Collected: 12/21/24 10:39 Last Resulted: 12/21/24 13:06        Lab Flowsheet        Order Details        View Encounter        Lab and Collection Details        Routing        Result History     View All Conversations on this Encounter     R=Reference range differs from displayed range     Result Care Coordination       Patient Communication      Add Comments   Seen Back to Top       Contains abnormal data Urinalysis with Reflex Culture and Microscopic  Order: [id number] - Part of Panel Order [id number]   Status: Final result       Visible to patient: Yes (seen)    0 Result Notes              Component  Ref Range & Units 6 d ago  (12/21/24) 1 yr ago  (2/28/23) 5 yr ago  (12/10/19) 5 yr  ago  (8/29/19)   Color, Urine  Light-Yellow, Yellow, Dark-Yellow Yellow Yellow R Yellow R STRAW R   Appearance, Urine  Clear Clear Clear R Clear R CLEAR R   Specific Gravity, Urine  1.005 - 1.035 1.021 1.030 1.018 1.013   pH, Urine  5.0, 5.5, 6.0, 6.5, 7.0, 7.5, 8.0 5.0 5.0 R 6.0 R 5.0 R   Protein, Urine  NEGATIVE, 10 (TRACE), 20 (TRACE) mg/dL 30 (1+) Abnormal  100(2+) Abnormal  R 100(2+) Abnormal  R NEGATIVE R   Glucose, Urine  Normal mg/dL OVER (4+) Abnormal  >=500(3+) Abnormal  R Negative R >=500 (3+) Abnormal  R   Blood, Urine  NEGATIVE NEGATIVE Negative SMALL(1+) Abnormal  NEGATIVE   Ketones, Urine  NEGATIVE mg/dL NEGATIVE Negative Negative NEGATIVE   Bilirubin, Urine  NEGATIVE NEGATIVE Negative Negative NEGATIVE   Urobilinogen, Urine  Normal mg/dL Normal <2.0 R <2.0 R <2.0 R   Nitrite, Urine  NEGATIVE NEGATIVE Negative Negative NEGATIVE   Leukocyte Esterase, Urine  NEGATIVE NEGATIVE Negative Negative NEGATIVE   Resulting Agency [facility]                     ASSESSMENT&PLAN:        IMPRESSIONS:           12/20/2024  Patient did fine     Exam: Incision healed     First Semen specimen: no sperm     Plan:  Drop off a second semen specimen in a week             Chief Complaint   Patient presents with    Contraception       Patient is here today for 10 week follow up after vasectomy.  He has non complaints at this time.         Physical Exam      TODAYS LAB RESULTS:        ASSESSMENT&PLAN:        IMPRESSIONS:           10/11/2024  A scalpeless vasectomy was performed under local and patient tolerated it well.     the patient was prepped and draped in the usual sterile fashion.  While in the supine position, the left vas deferens was isolated and the skin and vas were anesthetized using 1% lidocaine.  A small incision was made in the skin using a scalpeless clamp.  The vas was delivered up into the wound and a segment was freed up and divided.  The proximal and distal  ends were fulgurated. Fascial interposition stitch placed using 3-0 chromic   The ends of the vas were placed back into the scrotum and the skin was closed with all suture.  The right vas deferens was isolated and the skin and vas were anesthetized using 1% lidocaine.  A small incision was made in the skin using a scalpeless clamp.   the vas was delivered up into the wound and a segment was freed up and divided.  The proximal and distal ends were fulgurated and tied with 3-0 Chromic suture.  The ends of the vas were placed back into the scrotum and the skin was closed with suture.  The patient tolerated the procedure well and was discharged to home. He was provided with post-vasectomy instructions. He was advised to rest for 24 hours and abstain from sexual intercourse for 1 week. He was advised for the continued need for contraception until he is documented as sterile with semen analysis.     Impression  Family planning  Status post vasectomy     Plan  Norco ×20  Keflex 500 mg twice daily ×7 days  Appointment with specimen in 10 weeks     I have personally reviewed the [facility] report for this patient. This report is scanned into the electronic medical record. I have considered the risk of abuse, dependence, addictions and diversion. I believe that it is clinically appropriate for this patient to be prescribed this medication             Chief Complaint   Patient presents with    elective sterilization       Patient is here today vasectomy.         Physical Exam      TODAYS LAB RESULTS:        ASSESSMENT&PLAN:        IMPRESSIONS:        12/23/2024  Assessment/Plan  Impression  BPH  Postop urinary retention, failed voiding trial x 2     Plan  Continue Douglass catheter  Outpatient voiding trial with Dr. Kulkarni in 1 week  Call if problem                  PSA  3/18/2021 0.3

## 2025-03-28 NOTE — PATIENT INSTRUCTIONS
Impression  BPH  Postop retention  PSA screening  ED     Plan  Continue Flomax 0.8 mg daily  PSA now and in a year  Appointment in a year

## 2025-04-01 ENCOUNTER — APPOINTMENT (OUTPATIENT)
Dept: RADIOLOGY | Facility: HOSPITAL | Age: 60
End: 2025-04-01
Payer: COMMERCIAL

## 2025-04-01 ENCOUNTER — HOSPITAL ENCOUNTER (EMERGENCY)
Facility: HOSPITAL | Age: 60
Discharge: HOME | End: 2025-04-01
Attending: EMERGENCY MEDICINE
Payer: COMMERCIAL

## 2025-04-01 ENCOUNTER — PHARMACY VISIT (OUTPATIENT)
Dept: PHARMACY | Facility: CLINIC | Age: 60
End: 2025-04-01
Payer: MEDICAID

## 2025-04-01 VITALS
BODY MASS INDEX: 27.02 KG/M2 | HEART RATE: 61 BPM | RESPIRATION RATE: 18 BRPM | OXYGEN SATURATION: 100 % | HEIGHT: 71 IN | SYSTOLIC BLOOD PRESSURE: 158 MMHG | TEMPERATURE: 98 F | WEIGHT: 193 LBS | DIASTOLIC BLOOD PRESSURE: 83 MMHG

## 2025-04-01 DIAGNOSIS — T14.8XXA MUSCLE TEAR: Primary | ICD-10-CM

## 2025-04-01 LAB
ALBUMIN SERPL BCP-MCNC: 4.1 G/DL (ref 3.4–5)
ALP SERPL-CCNC: 106 U/L (ref 33–120)
ALT SERPL W P-5'-P-CCNC: 10 U/L (ref 10–52)
ANION GAP SERPL CALC-SCNC: 13 MMOL/L (ref 10–20)
AST SERPL W P-5'-P-CCNC: 9 U/L (ref 9–39)
BASOPHILS # BLD AUTO: 0.03 X10*3/UL (ref 0–0.1)
BASOPHILS NFR BLD AUTO: 0.4 %
BILIRUB SERPL-MCNC: 0.3 MG/DL (ref 0–1.2)
BUN SERPL-MCNC: 41 MG/DL (ref 6–23)
CALCIUM SERPL-MCNC: 9 MG/DL (ref 8.6–10.3)
CHLORIDE SERPL-SCNC: 106 MMOL/L (ref 98–107)
CK SERPL-CCNC: 65 U/L (ref 0–325)
CO2 SERPL-SCNC: 22 MMOL/L (ref 21–32)
CREAT SERPL-MCNC: 1.43 MG/DL (ref 0.5–1.3)
EGFRCR SERPLBLD CKD-EPI 2021: 57 ML/MIN/1.73M*2
EOSINOPHIL # BLD AUTO: 0.1 X10*3/UL (ref 0–0.7)
EOSINOPHIL NFR BLD AUTO: 1.2 %
ERYTHROCYTE [DISTWIDTH] IN BLOOD BY AUTOMATED COUNT: 14.7 % (ref 11.5–14.5)
GLUCOSE SERPL-MCNC: 216 MG/DL (ref 74–99)
HCT VFR BLD AUTO: 38.3 % (ref 41–52)
HGB BLD-MCNC: 12.9 G/DL (ref 13.5–17.5)
IMM GRANULOCYTES # BLD AUTO: 0.02 X10*3/UL (ref 0–0.7)
IMM GRANULOCYTES NFR BLD AUTO: 0.2 % (ref 0–0.9)
LYMPHOCYTES # BLD AUTO: 2.08 X10*3/UL (ref 1.2–4.8)
LYMPHOCYTES NFR BLD AUTO: 25 %
MCH RBC QN AUTO: 31 PG (ref 26–34)
MCHC RBC AUTO-ENTMCNC: 33.7 G/DL (ref 32–36)
MCV RBC AUTO: 92 FL (ref 80–100)
MONOCYTES # BLD AUTO: 0.52 X10*3/UL (ref 0.1–1)
MONOCYTES NFR BLD AUTO: 6.3 %
NEUTROPHILS # BLD AUTO: 5.57 X10*3/UL (ref 1.2–7.7)
NEUTROPHILS NFR BLD AUTO: 66.9 %
NRBC BLD-RTO: 0 /100 WBCS (ref 0–0)
PLATELET # BLD AUTO: 211 X10*3/UL (ref 150–450)
POTASSIUM SERPL-SCNC: 5.6 MMOL/L (ref 3.5–5.3)
POTASSIUM SERPL-SCNC: 5.9 MMOL/L (ref 3.5–5.3)
PROT SERPL-MCNC: 7.6 G/DL (ref 6.4–8.2)
PSA SERPL-MCNC: 1.19 NG/ML
RBC # BLD AUTO: 4.16 X10*6/UL (ref 4.5–5.9)
SODIUM SERPL-SCNC: 135 MMOL/L (ref 136–145)
WBC # BLD AUTO: 8.3 X10*3/UL (ref 4.4–11.3)

## 2025-04-01 PROCEDURE — 2500000001 HC RX 250 WO HCPCS SELF ADMINISTERED DRUGS (ALT 637 FOR MEDICARE OP): Performed by: EMERGENCY MEDICINE

## 2025-04-01 PROCEDURE — 2500000004 HC RX 250 GENERAL PHARMACY W/ HCPCS (ALT 636 FOR OP/ED): Performed by: NURSE PRACTITIONER

## 2025-04-01 PROCEDURE — RXMED WILLOW AMBULATORY MEDICATION CHARGE

## 2025-04-01 PROCEDURE — 75635 CT ANGIO ABDOMINAL ARTERIES: CPT

## 2025-04-01 PROCEDURE — 96374 THER/PROPH/DIAG INJ IV PUSH: CPT

## 2025-04-01 PROCEDURE — 99285 EMERGENCY DEPT VISIT HI MDM: CPT | Mod: 25 | Performed by: EMERGENCY MEDICINE

## 2025-04-01 PROCEDURE — 75635 CT ANGIO ABDOMINAL ARTERIES: CPT | Performed by: RADIOLOGY

## 2025-04-01 PROCEDURE — 93971 EXTREMITY STUDY: CPT

## 2025-04-01 PROCEDURE — 82550 ASSAY OF CK (CPK): CPT | Performed by: NURSE PRACTITIONER

## 2025-04-01 PROCEDURE — 84132 ASSAY OF SERUM POTASSIUM: CPT | Performed by: NURSE PRACTITIONER

## 2025-04-01 PROCEDURE — 93971 EXTREMITY STUDY: CPT | Mod: FOREIGN READ | Performed by: RADIOLOGY

## 2025-04-01 PROCEDURE — 80053 COMPREHEN METABOLIC PANEL: CPT | Performed by: NURSE PRACTITIONER

## 2025-04-01 PROCEDURE — 85025 COMPLETE CBC W/AUTO DIFF WBC: CPT | Performed by: NURSE PRACTITIONER

## 2025-04-01 PROCEDURE — 2550000001 HC RX 255 CONTRASTS: Performed by: NURSE PRACTITIONER

## 2025-04-01 RX ORDER — MORPHINE SULFATE 4 MG/ML
4 INJECTION INTRAVENOUS ONCE
Status: COMPLETED | OUTPATIENT
Start: 2025-04-01 | End: 2025-04-01

## 2025-04-01 RX ORDER — OXYCODONE AND ACETAMINOPHEN 5; 325 MG/1; MG/1
1 TABLET ORAL EVERY 6 HOURS PRN
Qty: 2 TABLET | Refills: 0 | Status: SHIPPED | OUTPATIENT
Start: 2025-04-01 | End: 2025-04-04

## 2025-04-01 RX ORDER — OXYCODONE AND ACETAMINOPHEN 5; 325 MG/1; MG/1
1 TABLET ORAL ONCE
Status: COMPLETED | OUTPATIENT
Start: 2025-04-01 | End: 2025-04-01

## 2025-04-01 RX ORDER — TRAMADOL HYDROCHLORIDE 50 MG/1
50 TABLET ORAL EVERY 6 HOURS PRN
Qty: 15 TABLET | Refills: 0 | Status: SHIPPED | OUTPATIENT
Start: 2025-04-01 | End: 2025-04-01

## 2025-04-01 RX ADMIN — MORPHINE SULFATE 4 MG: 4 INJECTION, SOLUTION INTRAMUSCULAR; INTRAVENOUS at 12:38

## 2025-04-01 RX ADMIN — OXYCODONE HYDROCHLORIDE AND ACETAMINOPHEN 1 TABLET: 5; 325 TABLET ORAL at 16:25

## 2025-04-01 RX ADMIN — IOHEXOL 90 ML: 350 INJECTION, SOLUTION INTRAVENOUS at 13:49

## 2025-04-01 ASSESSMENT — PAIN SCALES - GENERAL
PAINLEVEL_OUTOF10: 10 - WORST POSSIBLE PAIN
PAINLEVEL_OUTOF10: 7

## 2025-04-01 ASSESSMENT — PAIN DESCRIPTION - LOCATION
LOCATION: LEG
LOCATION: LEG

## 2025-04-01 ASSESSMENT — LIFESTYLE VARIABLES
TOTAL SCORE: 0
HAVE YOU EVER FELT YOU SHOULD CUT DOWN ON YOUR DRINKING: NO
EVER FELT BAD OR GUILTY ABOUT YOUR DRINKING: NO
EVER HAD A DRINK FIRST THING IN THE MORNING TO STEADY YOUR NERVES TO GET RID OF A HANGOVER: NO
HAVE PEOPLE ANNOYED YOU BY CRITICIZING YOUR DRINKING: NO

## 2025-04-01 ASSESSMENT — PAIN DESCRIPTION - ORIENTATION
ORIENTATION: LEFT
ORIENTATION: LEFT

## 2025-04-01 ASSESSMENT — PAIN - FUNCTIONAL ASSESSMENT
PAIN_FUNCTIONAL_ASSESSMENT: 0-10
PAIN_FUNCTIONAL_ASSESSMENT: 0-10

## 2025-04-01 ASSESSMENT — PAIN DESCRIPTION - PAIN TYPE: TYPE: ACUTE PAIN

## 2025-04-01 NOTE — ED TRIAGE NOTES
"Pt. Arrived to the ED via private car for c/o leg pain. Pt. States that this morning he was walking around when he felt a \"pop\" in the back of his left knee. Pt. Endorses difficulty ambulating on leg   "

## 2025-04-01 NOTE — ED PROVIDER NOTES
Chief Complaint   Patient presents with    Leg Pain       HPI       59 year old male presents to the Emergency Department today complaining of left calf pain that he describes as throbbing in nature, constant, radiates up his left leg, and varies in intensity. Notes that the pain started after feeling the pain while walking. Denies any associated fever, chills, headache, neck pain, chest pain, shortness of breath, abdominal pain, nausea, vomiting, diarrhea, constipation, or urinary symptoms.       History provided by:  Patient             Patient History   Past Medical History:   Diagnosis Date    Aphasia     Atherosclerotic heart disease of native coronary artery with unspecified angina pectoris 11/05/2019    Bipolar disorder, unspecified (Multi)     BPH (benign prostatic hyperplasia)     Diabetes (Multi)     DVT (deep venous thrombosis) (Multi) 02/2022    RLE    Erectile dysfunction     GERD (gastroesophageal reflux disease)     Hepatitis C     HFrEF (heart failure with reduced ejection fraction)     HLD (hyperlipidemia)     HTN (hypertension)     Obstructive sleep apnea (adult) (pediatric)     Opioid abuse, in remission (Multi)     PAD (peripheral artery disease) (CMS-[facility])     Polymyalgia rheumatica (Multi)     Post-traumatic stress disorder, unspecified     Stroke (Multi) 07/24/2023    multiple     Past Surgical History:   Procedure Laterality Date    BACK SURGERY      CARDIAC CATHETERIZATION N/A 1/23/2025    Procedure: Left & Right Heart Cath w Angiography & LV;  Surgeon: Baljinder Wright MD;  Location: [facility];  Service: Cardiovascular;  Laterality: N/A;  w / anesthesia    CARDIAC CATHETERIZATION N/A 1/23/2025    Procedure: PCI SHIMON Stent- Coronary;  Surgeon: Baljinder Wright MD;  Location: [facility];  Service: Cardiovascular;  Laterality: N/A;    CARDIAC CATHETERIZATION N/A 1/23/2025    Procedure: IVUS - Coronary;  Surgeon: Baljinder Wright MD;  Location: [facility];  Service:  Cardiovascular;  Laterality: N/A;    CORONARY ANGIOPLASTY WITH STENT PLACEMENT      CORONARY ARTERY BYPASS GRAFT      CT ANGIO AORTA AND BILATERAL ILIOFEMORAL RUN OFF INCLUDING WITHOUT CONTRAST IF PERFORMED  07/20/2020    CT ANGIO AORTA AND BILATERAL ILIOFEMORAL RUN OFF INCLUDING WITHOUT CONTRAST IF PERFORMED  01/14/2022    DENTAL SURGERY      ELBOW SURGERY      INVASIVE VASCULAR PROCEDURE Bilateral 10/04/2024    Procedure: Lower Extremity Angiogram;  Surgeon: Baljinder Wright MD;  Location: [facility];  Service: Cardiovascular;  Laterality: Bilateral;  w / anesthesia  3 hr hydration  / arrive 7:30    INVASIVE VASCULAR PROCEDURE N/A 10/21/2024    Procedure: Lower Extremity Angiogram;  Surgeon: Baljinder Wright MD;  Location: [facility];  Service: Cardiovascular;  Laterality: N/A;  w/ anesthesia    INVASIVE VASCULAR PROCEDURE N/A 10/21/2024    Procedure: Angioplasty - Lower Extremity;  Surgeon: Baljinder Wright MD;  Location: [facility];  Service: Cardiovascular;  Laterality: N/A;    KNEE SURGERY Left     MR HEAD ANGIO WO IV CONTRAST  01/04/2022    MR NECK ANGIO WO IV CONTRAST  01/04/2022    TONSILLECTOMY      TRANSLUMINAL ATHERECTOMY FEMORAL-POPLITEAL / TIBIOPERONEAL       Family History   Problem Relation Name Age of Onset    No Known Problems Mother      No Known Problems Father       Social History     Tobacco Use    Smoking status: Former     Types: Cigarettes     Passive exposure: Current (5-7 cigarettes a day)    Smokeless tobacco: Never   Vaping Use    Vaping status: Never Used   Substance Use Topics    Alcohol use: Never    Drug use: Yes     Types: Marijuana     Comment: Medical card           Physical Exam  Constitutional:       Appearance: Normal appearance.   HENT:      Head: Normocephalic.      Right Ear: Tympanic membrane, ear canal and external ear normal.      Left Ear: Tympanic membrane, ear canal and external ear normal.      Nose: Nose normal.      Mouth/Throat:      Mouth: Mucous  membranes are moist.      Pharynx: Oropharynx is clear. No oropharyngeal exudate or posterior oropharyngeal erythema.   Eyes:      Conjunctiva/sclera: Conjunctivae normal.      Pupils: Pupils are equal, round, and reactive to light.   Cardiovascular:      Rate and Rhythm: Normal rate and regular rhythm.      Pulses:           Radial pulses are 3+ on the right side and 3+ on the left side.        Dorsalis pedis pulses are 3+ on the right side and 3+ on the left side.      Heart sounds: Normal heart sounds. No murmur heard.     No friction rub. No gallop.   Pulmonary:      Effort: Pulmonary effort is normal. No respiratory distress.      Breath sounds: Normal breath sounds. No wheezing, rhonchi or rales.   Abdominal:      General: Abdomen is flat. Bowel sounds are normal.      Palpations: Abdomen is soft.      Tenderness: There is no abdominal tenderness. There is no right CVA tenderness, left CVA tenderness, guarding or rebound. Negative signs include Oquendo's sign and McBurney's sign.   Musculoskeletal:         General: No swelling or deformity.      Cervical back: Full passive range of motion without pain.      Right lower leg: No edema.      Left lower leg: No edema.      Comments: No obvious deformity, ecchymosis, or bony tenderness noted to the left calf, but there is diffuse tenderness noted to the calf. No redness, warmth, erythema, discharge, drainage, or any other signs of infection noted. Compartments are soft. Left dorsalis pedis pulse is strong and regular. Capillary refill was within normal limits. Sensation is at baseline for his neuropathy.      Lymphadenopathy:      Cervical: No cervical adenopathy.   Skin:     Capillary Refill: Capillary refill takes less than 2 seconds.      Coloration: Skin is not jaundiced.      Findings: No rash.   Neurological:      General: No focal deficit present.      Mental Status: He is alert and oriented to person, place, and time. Mental status is at baseline.      Gait:  Gait is intact.   Psychiatric:         Mood and Affect: Mood normal.         Behavior: Behavior is cooperative.         Labs Reviewed   CBC WITH AUTO DIFFERENTIAL - Abnormal       Result Value    WBC 8.3      nRBC 0.0      RBC 4.16 (*)     Hemoglobin 12.9 (*)     Hematocrit 38.3 (*)     MCV 92      MCH 31.0      MCHC 33.7      RDW 14.7 (*)     Platelets 211      Neutrophils % 66.9      Immature Granulocytes %, Automated 0.2      Lymphocytes % 25.0      Monocytes % 6.3      Eosinophils % 1.2      Basophils % 0.4      Neutrophils Absolute 5.57      Immature Granulocytes Absolute, Automated 0.02      Lymphocytes Absolute 2.08      Monocytes Absolute 0.52      Eosinophils Absolute 0.10      Basophils Absolute 0.03     COMPREHENSIVE METABOLIC PANEL - Abnormal    Glucose 216 (*)     Sodium 135 (*)     Potassium 5.9 (*)     Chloride 106      Bicarbonate 22      Anion Gap 13      Urea Nitrogen 41 (*)     Creatinine 1.43 (*)     eGFR 57 (*)     Calcium 9.0      Albumin 4.1      Alkaline Phosphatase 106      Total Protein 7.6      AST 9      Bilirubin, Total 0.3      ALT 10     CREATINE KINASE - Normal    Creatine Kinase 65         Lower extremity venous duplex left   Final Result   No evidence for DVT within the left lower extremity.   Signed by Sincere Damico MD      CT angio aorta and bilateral iliofemoral runoff including without contrast if performed    (Results Pending)            ED Course & MDM            Medical Decision Making  Patient was seen and evaluated by Dr. Taylor. Saline lock was established with labs drawn. Given Morphine with improvement in his pain. Kidney function is at baseline. Elevated potassium secondary to hemolysis. Blood counts, remainder of electrolytes, liver function, and CK were unremarkable. Left lower extremity US showed no evidence for DVT within the left lower extremity. At that time, a CTA of his abdomen and pelvis with runoffs was ordered.            Your medication list        CONTINUE  "taking these medications        Instructions Last Dose Given Next Dose Due   pen needle, diabetic 32 gauge x 5/32\" needle  Commonly known as: BD Ultra-Fine Jocy Pen Needle      Use to inject insulin up to 8 times per day.       BD Ultra-Fine Micro Pen Needle 32 gauge x 1/4\" needle  Generic drug: pen needle, diabetic      Use to inject 1-4 times daily as directed       pen needle, diabetic 32 gauge x 5/32\" needle  Commonly known as: BD Ultra-Fine Jocy Pen Needle      Pen Needles for Insulin.       FreeStyle En 2 [place] misc  Generic drug: flash glucose scanning reader      USE TO CHECK SUGARS FOUR TIMES A DAY       FreeStyle En 2 Sensor kit  Generic drug: flash glucose sensor kit      use as directed daily and change every 14 days       lancets misc      1 each 4 times a day before meals.       TRUEplus Lancets 30 gauge misc  Generic drug: lancets      1 each 3 times a day.       True Metrix Glucose Meter misc  Generic drug: blood-glucose meter      To be used 3 times daily              ASK your doctor about these medications        Instructions Last Dose Given Next Dose Due   acetaminophen 325 mg tablet  Commonly known as: Tylenol      Take 2 tablets (650 mg) by mouth every 4 hours if needed for fever (temp greater than 38.0 C), headaches or mild pain (1 - 3).       aspirin 81 mg chewable tablet           atorvastatin 80 mg tablet  Commonly known as: Lipitor      Take 1 tablet (80 mg) by mouth once daily at bedtime.       Brilinta 90 mg tablet  Generic drug: ticagrelor      Take 1 tablet (90 mg) by mouth 2 times a day.       carvedilol 6.25 mg tablet  Commonly known as: Coreg      Take 1 tablet by mouth every 12 hours       Creon 36,000-114,000- 180,000 unit capsule,delayed release(DR/EC) capsule  Generic drug: pancrelipase (Lip-Prot-Amyl)           ELDERBERRY FRUIT ORAL           Eliquis 5 mg tablet  Generic drug: apixaban      Take 1 tablet (5 mg) by mouth 2 times a day.       ezetimibe 10 mg " tablet  Commonly known as: Zetia      Take 1 tablet by mouth once daily.       Farxiga 10 mg tablet  Generic drug: dapagliflozin propanediol      Take 1 tablet (10 mg) by mouth once daily with breakfast.       FeroSuL 325 mg (65 mg iron) tablet  Generic drug: ferrous sulfate      Take 1 tablet (325 mg) by mouth once daily with breakfast.       furosemide 20 mg tablet  Commonly known as: Lasix      Take 1 tablet (20 mg) by mouth once daily.       gabapentin 400 mg capsule  Commonly known as: Neurontin      Take 1 capsule (400 mg) by mouth 3 times a day.       GINGER ROOT EXTRACT ORAL           hydrALAZINE 50 mg tablet  Commonly known as: Apresoline      Take 1 tablet (50 mg) by mouth 3 times a day.       insulin glargine 100 unit/mL (3 mL) pen  Commonly known as: Lantus      Inject 10 Units under the skin once daily at bedtime.       insulin lispro 100 unit/mL pen  Commonly known as: HumaLOG      Inject 14 Units under the skin 3 times daily (morning, midday, late afternoon).       isosorbide mononitrate ER 60 mg 24 hr tablet  Commonly known as: Imdur      Take 1 tablet (60 mg) by mouth once daily.       medical cannabis           OXcarbazepine 150 mg tablet  Commonly known as: Trileptal      Take 3 tablets (450 mg) by mouth 2 times a day.       pantoprazole 40 mg EC tablet  Commonly known as: ProtoNix      TAKE 1 TABLET BY MOUTH ONCE DAILY       ranolazine 500 mg 12 hr tablet  Commonly known as: Ranexa      Take 1 tablet by mouth twice daily.       sacubitriL-valsartan  mg tablet  Commonly known as: Entresto      Take 1 tablet by mouth twice daily.       spironolactone 25 mg tablet  Commonly known as: Aldactone      Take 1 tablet (25 mg) by mouth once daily.       tamsulosin 0.4 mg 24 hr capsule  Commonly known as: Flomax      TAKE 1 CAPSULE BY MOUTH ONCE DAILY       tiZANidine 2 mg tablet  Commonly known as: Zanaflex      Take 1 tablet (2 mg) by mouth once daily as needed for muscle spasms.       True Metrix  Glucose Test Strip  Generic drug: blood sugar diagnostic      1 each 3 times a day.                  Procedure  Procedures     Peter Cole, APRN-CNP  04/01/25 [id number]

## 2025-04-02 ENCOUNTER — TELEPHONE (OUTPATIENT)
Dept: PRIMARY CARE | Facility: CLINIC | Age: 60
End: 2025-04-02
Payer: COMMERCIAL

## 2025-04-02 NOTE — TELEPHONE ENCOUNTER
Patient called in stating that he went to the ER on 4/1 and they told him he has a tar in the muscle of his calf. He is to schedule an appointment  Please advise

## 2025-04-02 NOTE — TELEPHONE ENCOUNTER
Ok to schedule acute visit but also was referred to orthopedics from ER. Make sure that he has a follow up scheduled with ortho.     Thank you!

## 2025-04-03 ENCOUNTER — TELEPHONE (OUTPATIENT)
Dept: VASCULAR SURGERY | Facility: HOSPITAL | Age: 60
End: 2025-04-03
Payer: COMMERCIAL

## 2025-04-03 NOTE — TELEPHONE ENCOUNTER
was seen in ER 4/1/25 for leg pain. ER did a CT angio aorta and venous duplex. He has an appointment with ortho 4/7/25 for calf injury. He wanted to make an appoinment with Vascular but I told him i'd see if he needed to see us or not d/t the nature of appointment. Please review ER testing

## 2025-04-03 NOTE — PROGRESS NOTES
Patient is sent at the request of No ref. provider found for my opinion regarding diabetes.  My recommendations below will be communicated back to the requesting provider by way of shared medical record.    Recommendations: ***  Upgrade to En 3 with phone?  ________________________________________________________________________    Subjective   Past Medical History:  Patient Active Problem List   Diagnosis    Abdominal pain, acute, right upper quadrant    Abnormal CXR    Atherosclerosis of native artery of both lower extremities with intermittent claudication    Atherosclerotic heart disease of native coronary artery without angina pectoris    Bacterial skin infection    Bipolar depression (Multi)    BPH (benign prostatic hyperplasia)    Burning pain    Causalgia of lower extremity    Hip pain, right    Pain of right lower extremity    Ischemic stroke (Multi)    Chronic combined systolic and diastolic CHF (congestive heart failure)    Chronic back pain greater than 3 months duration    Chronic hepatitis C virus genotype 1a infection (Multi)    Chronic obstructive pulmonary disease (Multi)    Chronic pain syndrome    Chronic RUQ pain    Claudication    Coordination impairment    Decreased peripheral vision of left eye    Diabetic neuropathy, painful (Multi)    Diabetic polyneuropathy associated with type 2 diabetes mellitus    Eschar of lower leg    Frequent falls    Gastroesophageal reflux disease    H/O: CVA (cerebrovascular accident)    Hepatic steatosis    Hepatitis C virus infection cured after antiviral drug therapy    Hepatitis-C    Hyperkalemia    Hyperlipidemia    Hypertension    Joint stiffness of both shoulders    Left ventricular dysfunction    Low back pain    Nasal congestion with rhinorrhea    Constipation    Nausea in adult    Neuropathic pain    Nicotine dependence    Neuropathy    BRUNA (obstructive sleep apnea)    Other symptoms and signs involving the musculoskeletal system    Pancreatic lesion  ([facility])    Pancreatic malabsorption ([facility])    Paroxysmal nocturnal dyspnea    Peripheral arterial disease (CMS-HCC)    Poor balance    Other chronic postprocedural pain    Presence of aortocoronary bypass graft    Pulmonary congestion    Recurrent boils    Reduced chest expansion on inspiration    S/P CABG x 5    Right flank pain    Type 2 diabetes mellitus    Spasticity as late effect of cerebrovascular accident (CVA)    Shortness of breath on exertion    Dyspnea    Shingles    S/P PTCA (percutaneous transluminal coronary angioplasty)    Long-term insulin use (Multi)    Hospital discharge follow-up    PAD (peripheral artery disease) (CMS-HCC)    Critical limb ischemia of left lower extremity    Peripheral artery occlusion (CMS-[facility])    Postoperative abscess    Flash pulmonary edema    Acute heart failure     HPI:  Dereck Shen is a 59 y.o. male with a PMH significant for HepC, acute heart failure, CABG, CVA  Pt presents today for new patient visit with endo PharmD for Type 2 Diabetes Mellitus  Last seen by Dr. Zahira Tate MD on 3/20/25  where A1c was found to by 7.9%. Acute drop in kidney function- recommended ER visit. Increased lispro.    Today: ***      ****************************************            ****************************************        ****************************************      Diabetes Pharmacotherapy:  ***  Lantus 10 units at bedtime  Lispro 14 units plus sliding scale 2 before meals  Farxiga 10mg daily    Adherence: {Adherence:63137}    Previously trialed meds: ***  Metformin- why stopped?  Glipizide    Social:  Current diet: {diet habits:59514}  Breakfast -   Lunch -   Dinner -   Snack -   Fluids -   Current exercise: {exercise types:98790}      Allergies:  Celecoxib; Ibuprofen; Tetanus toxoid, adsorbed; [id number] antimigraine agents; Cephalexin; Hydrocodone; Latex; and Tryptophan    Medication list:  Current Outpatient Medications   Medication Instructions     "acetaminophen (TYLENOL) 650 mg, oral, Every 4 hours PRN    aspirin 81 mg, Daily    atorvastatin (LIPITOR) 80 mg, oral, Nightly    blood sugar diagnostic (Blood Glucose Test) strip 1 each, miscellaneous, 3 times daily    blood-glucose meter misc To be used 3 times daily    Brilinta 90 mg, oral, 2 times daily    carvedilol (Coreg) 6.25 mg tablet Take 1 tablet by mouth every 12 hours    Creon 36,000-114,000- 180,000 unit capsule,delayed release(DR/EC) capsule 2 capsules, 3 times daily PRN    ELDERBERRY FRUIT ORAL 1 tablet, Daily PRN    Eliquis 5 mg, oral, 2 times daily    ezetimibe (Zetia) 10 mg tablet Take 1 tablet by mouth once daily.    Farxiga 10 mg, oral, Daily with breakfast    ferrous sulfate (FeroSuL) tablet Take 1 tablet (325 mg) by mouth once daily with breakfast.    flash glucose scanning reader misc USE TO CHECK SUGARS FOUR TIMES A DAY    flash glucose sensor kit (FreeStyle En 2 Sensor) kit use as directed daily and change every 14 days    furosemide (LASIX) 20 mg, oral, Daily    gabapentin (NEURONTIN) 400 mg, oral, 3 times daily    [place] ROOT EXTRACT ORAL 1 tablet, Daily PRN    hydrALAZINE (APRESOLINE) 50 mg, oral, 3 times daily    insulin glargine (LANTUS) 10 Units, subcutaneous, Nightly    insulin lispro (HUMALOG) 14 Units, subcutaneous, 3 times daily (morning, midday, late afternoon)    isosorbide mononitrate ER (IMDUR) 60 mg, oral, Daily    lancets 30 gauge misc 1 each, miscellaneous, 3 times daily    lancets misc 1 each, miscellaneous, 4 times daily before meals and nightly    medical cannabis Not [facility] prescribed    OXcarbazepine (TRILEPTAL) 450 mg, oral, 2 times daily    oxyCODONE-acetaminophen (Percocet) 5-325 mg tablet 1 tablet, oral, Every 6 hours PRN    pantoprazole (ProtoNix) 40 mg EC tablet TAKE 1 TABLET BY MOUTH ONCE DAILY    pen needle, diabetic (BD Ultra-Fine Jocy Pen Needle) 32 gauge x 5/32\" needle Pen Needles for Insulin.    pen needle, diabetic (BD Ultra-Fine Jocy Pen Needle) 32 gauge x " "5/32\" needle Use to inject insulin up to 8 times per day.    pen needle, diabetic (TechLITE Pen Needle) 32 gauge x 1/4\" needle Use to inject 1-4 times daily as directed    ranolazine (Ranexa) 500 mg 12 hr tablet Take 1 tablet by mouth twice daily.    sacubitriL-valsartan (Entresto)  mg tablet Take 1 tablet by mouth twice daily.    spironolactone (ALDACTONE) 25 mg, oral, Daily    tamsulosin (Flomax) 0.4 mg 24 hr capsule TAKE 1 CAPSULE BY MOUTH ONCE DAILY    tiZANidine (ZANAFLEX) 2 mg, oral, Daily PRN        Objective   Last Recorded Vitals:  BP Readings from Last 3 Encounters:   04/01/25 158/83   03/20/25 128/60   03/20/25 114/60     Wt Readings from Last 3 Encounters:   04/01/25 87.5 kg (193 lb)   03/20/25 92.5 kg (204 lb)   03/20/25 92.3 kg (203 lb 6.4 oz)     BMI Readings from Last 1 Encounters:   04/01/25 26.92 kg/m²      Labs  A1C  Lab Results   Component Value Date    HGBA1C 7.9 (H) 03/20/2025    HGBA1C 7.9 (H) 12/18/2024    HGBA1C 8.2 (A) 10/01/2024     BMP/LFTs  Lab Results   Component Value Date    CREATININE 1.43 (H) 04/01/2025    CREATININE 1.70 (H) 03/20/2025    CREATININE 1.27 02/06/2025    EGFR 57 (L) 04/01/2025    EGFR 46 (L) 03/20/2025    EGFR 65 02/06/2025    GLUCOSE 216 (H) 04/01/2025     (L) 04/01/2025    K 5.6 (H) 04/01/2025     04/01/2025    CALCIUM 9.0 04/01/2025    CO2 22 04/01/2025    BUN 41 (H) 04/01/2025    ALT 10 04/01/2025    AST 9 04/01/2025    ALKPHOS 106 04/01/2025    BILITOT 0.3 04/01/2025     Lipids  Lab Results   Component Value Date    TRIG 179 (H) 01/24/2025    CHOL 102 01/24/2025    LDLF 173 (H) 04/04/2023    LDLCALC 39 01/24/2025    LDLDIRECT 56 04/13/2021    HDL 27.5 01/24/2025     Urine Albumin Creatinine Ratio  Lab Results   Component Value Date    MICROALBCREA 378 (H) 03/20/2025    MICROALBCREA 1,748.0 (H) 04/04/2023    MICROALBCREA 499.1 (H) 05/18/2021     ASCVD risk  The ASCVD Risk score (Lalo DK, et al., 2019) failed to calculate for the following " "reasons:    Risk score cannot be calculated because patient has a medical history suggesting prior/existing ASCVD    Additional labs:  No results found for: \"FRUCTOSAMINE\", \"CPEPTIDE\", \"PLO80JM\", \"NTIB\", \"ZNT8A\", \"INSAB\"    Home glucose monitoring:  Hypoglycemia: {Lows:17126}  ***      Assessment/Plan   {Diabetes type:12744::\"Type 2 Diabetes Mellitus\"}  Goal A1C <***  Plan:  Start ***  Discontinue ***  Increase ***  Decrease ***  Continue ***  Home glucose monitoring:   {Home glucose monitorin}  A minimum of 72 hours of CGM data was reviewed and used to make therapy changes. ***  Education Provided to Patient:   ***   Hypertension:   Goal BP <***   ***  Current pharmacotherapy: ***  ***  {Primary/Secondary:26874} prevention:   Therapy: {Statin:07779}   {Lipid specific results and goals:5735} (***/***)  Renal:  CKD: {stage:33898702}  ACR: {ACR:07123} (***/***)  Renal protective agents: {Renal protective agents:38927}  DM medications are dosed appropriately for renal function  Labs: up to date ***  PharmD follow-up: {follow up:11724}  Endo follow-up: ***    Patient agreeable to plan as above, contact information provided for any future questions or concerns.    Laurie Lake, Ariel    Type of encounter: {visit type:08482}  Provider on site: {Endo providers:91101}    Continue all meds under the continuation of care with the referring provider and clinical pharmacy team.    "

## 2025-04-04 ENCOUNTER — APPOINTMENT (OUTPATIENT)
Dept: ENDOCRINOLOGY | Facility: CLINIC | Age: 60
End: 2025-04-04
Payer: COMMERCIAL

## 2025-04-04 DIAGNOSIS — M25.572 LEFT ANKLE PAIN, UNSPECIFIED CHRONICITY: ICD-10-CM

## 2025-04-07 ENCOUNTER — HOSPITAL ENCOUNTER (OUTPATIENT)
Dept: RADIOLOGY | Facility: CLINIC | Age: 60
Discharge: HOME | End: 2025-04-07
Payer: COMMERCIAL

## 2025-04-07 ENCOUNTER — APPOINTMENT (OUTPATIENT)
Dept: ORTHOPEDIC SURGERY | Facility: CLINIC | Age: 60
End: 2025-04-07
Payer: COMMERCIAL

## 2025-04-07 VITALS — HEIGHT: 71 IN | WEIGHT: 193 LBS | BODY MASS INDEX: 27.02 KG/M2

## 2025-04-07 DIAGNOSIS — M25.572 LEFT ANKLE PAIN, UNSPECIFIED CHRONICITY: ICD-10-CM

## 2025-04-07 DIAGNOSIS — M79.605 LEFT LEG PAIN: ICD-10-CM

## 2025-04-07 PROCEDURE — 3008F BODY MASS INDEX DOCD: CPT | Performed by: STUDENT IN AN ORGANIZED HEALTH CARE EDUCATION/TRAINING PROGRAM

## 2025-04-07 PROCEDURE — 99214 OFFICE O/P EST MOD 30 MIN: CPT | Performed by: STUDENT IN AN ORGANIZED HEALTH CARE EDUCATION/TRAINING PROGRAM

## 2025-04-07 PROCEDURE — 3048F LDL-C <100 MG/DL: CPT | Performed by: STUDENT IN AN ORGANIZED HEALTH CARE EDUCATION/TRAINING PROGRAM

## 2025-04-07 PROCEDURE — 73610 X-RAY EXAM OF ANKLE: CPT | Mod: LEFT SIDE | Performed by: RADIOLOGY

## 2025-04-07 PROCEDURE — 73610 X-RAY EXAM OF ANKLE: CPT | Mod: LT

## 2025-04-07 NOTE — PROGRESS NOTES
PRIMARY CARE PHYSICIAN: Stephanie Arce, APRN-CNS  REFERRING PROVIDER: No referring provider defined for this encounter.     CONSULT ORTHOPAEDIC: Ankle Evaluation    ASSESSMENT & PLAN    Impression: 59 y.o. male with a left calf strain in the setting of a complex vascular history.    Plan:   I explained to the patient the nature of their diagnosis.  I reviewed their imaging studies with them.    Based on the history, physical exam and imaging studies above, the patient's presentation is consistent with the above diagnosis.  I had a long discussion with the patient regarding their presentation and the treatment options.  We discussed initial nonoperative versus operative management options as well as potential further diagnostic imaging.  I reviewed his x-ray findings with him.  His presentation is consistent with a left-sided calf strain in the setting of a complex vascular history of the left lower extremity.  Given his complex vascular history, I recommend close follow-up with his vascular surgeon.  Regarding his calf strain, I recommend gentle stretching and progressive return to normal activities.  I do not recommend putting him in a boot at this time given his vascular history and risk of a potential blood clot.  He did undergo imaging in the emergency room which demonstrated no evidence of DVT.    Follow-Up: Patient will follow-up with me as needed    At the end of the visit, all questions were answered in full. The patient is in agreement with the plan and recommendations. They will call the office with any questions/concerns.    Note dictated with Scytl software. Completed without full typed error editing and sent to avoid delay.     SUBJECTIVE  CHIEF COMPLAINT: Left calf injury     HPI: Dereck Shen is a 59 y.o. male who presents today with left calf pain.  Patient was seen at ED on 4/1/25. Patient was walking when he felt a pop in the leg. He has had fairly significant pain  since.  He localizes pain to the medial head of the gastroc.  He has pain with pushoff.  Imaging in the ER demonstrated no evidence of DVT.  He has a complex vascular history of the left lower extremity. Patient is a diabetic.    They deny any constant or progressive numbness or tingling in their legs.     REVIEW OF SYSTEMS  Constitutional: See HPI for pain assessment, No significant weight loss, recent trauma  Cardiovascular: No chest pain, shortness of breath  Respiratory: No difficulty breathing, cough  Gastrointestinal: No nausea, vomiting, diarrhea, constipation  Musculoskeletal: Noted in HPI, positive for pain, restricted motion, stiffness  Integumentary: No rashes, easy bruising, redness   Neurological: no numbness or tingling in extremities, no gait disturbances   Psychiatric: No mood changes, memory changes, social issues  Heme/Lymph: no excessive swelling, easy bruising, excessive bleeding  ENT: No hearing changes  Eyes: No vision changes    Past Medical History:   Diagnosis Date    Aphasia     Atherosclerotic heart disease of native coronary artery with unspecified angina pectoris 11/05/2019    Bipolar disorder, unspecified (Multi)     BPH (benign prostatic hyperplasia)     Diabetes (Multi)     DVT (deep venous thrombosis) (Multi) 02/2022    RLE    Erectile dysfunction     GERD (gastroesophageal reflux disease)     Hepatitis C     HFrEF (heart failure with reduced ejection fraction)     HLD (hyperlipidemia)     HTN (hypertension)     Obstructive sleep apnea (adult) (pediatric)     Opioid abuse, in remission (Multi)     PAD (peripheral artery disease) ([facility])     Polymyalgia rheumatica (Multi)     Post-traumatic stress disorder, unspecified     Stroke (Multi) 07/24/2023    multiple        Allergies   Allergen Reactions    Celecoxib Hives    Ibuprofen Hives    Tetanus Toxoid, Adsorbed Unknown    [id number] Antimigraine Agents Unknown    Cephalexin Rash    Hydrocodone Rash    Latex Rash    Tryptophan  Rash        Past Surgical History:   Procedure Laterality Date    BACK SURGERY      CARDIAC CATHETERIZATION N/A 1/23/2025    Procedure: Left & Right Heart Cath w Angiography & LV;  Surgeon: Baljinder Wright MD;  Location: [facility];  Service: Cardiovascular;  Laterality: N/A;  w / anesthesia    CARDIAC CATHETERIZATION N/A 1/23/2025    Procedure: PCI SHIMON Stent- Coronary;  Surgeon: Baljinder Wright MD;  Location: [facility];  Service: Cardiovascular;  Laterality: N/A;    CARDIAC CATHETERIZATION N/A 1/23/2025    Procedure: IVUS - Coronary;  Surgeon: Baljinder Wright MD;  Location: [facility];  Service: Cardiovascular;  Laterality: N/A;    CORONARY ANGIOPLASTY WITH STENT PLACEMENT      CORONARY ARTERY BYPASS GRAFT      CT ANGIO AORTA AND BILATERAL ILIOFEMORAL RUN OFF INCLUDING WITHOUT CONTRAST IF PERFORMED  07/20/2020    CT ANGIO AORTA AND BILATERAL ILIOFEMORAL RUN OFF INCLUDING WITHOUT CONTRAST IF PERFORMED  01/14/2022    DENTAL SURGERY      ELBOW SURGERY      INVASIVE VASCULAR PROCEDURE Bilateral 10/04/2024    Procedure: Lower Extremity Angiogram;  Surgeon: Baljinder Wright MD;  Location: [facility];  Service: Cardiovascular;  Laterality: Bilateral;  w / anesthesia  3 hr hydration  / arrive 7:30    INVASIVE VASCULAR PROCEDURE N/A 10/21/2024    Procedure: Lower Extremity Angiogram;  Surgeon: Baljinder Wright MD;  Location: [facility];  Service: Cardiovascular;  Laterality: N/A;  w/ anesthesia    INVASIVE VASCULAR PROCEDURE N/A 10/21/2024    Procedure: Angioplasty - Lower Extremity;  Surgeon: Baljinder Wright MD;  Location: [facility];  Service: Cardiovascular;  Laterality: N/A;    KNEE SURGERY Left     MR HEAD ANGIO WO IV CONTRAST  01/04/2022    MR NECK ANGIO WO IV CONTRAST  01/04/2022    TONSILLECTOMY      TRANSLUMINAL ATHERECTOMY FEMORAL-POPLITEAL / TIBIOPERONEAL          Family History   Problem Relation Name Age of Onset    No Known Problems Mother      No Known Problems Father           Social History     Socioeconomic History    Marital status: Single     Spouse name: Not on file    Number of children: Not on file    Years of education: Not on file    Highest education level: Not on file   Occupational History    Not on file   Tobacco Use    Smoking status: Former     Types: Cigarettes     Passive exposure: Current (5-7 cigarettes a day)    Smokeless tobacco: Never   Vaping Use    Vaping status: Never Used   Substance and Sexual Activity    Alcohol use: Never    Drug use: Yes     Types: Marijuana     Comment: Medical card    Sexual activity: Defer   Other Topics Concern    Not on file   Social History Narrative    Not on file     Social Drivers of Health     Financial Resource Strain: Low Risk  (1/19/2025)    Overall Financial Resource Strain (CARDIA)     Difficulty of Paying Living Expenses: Not very hard   Food Insecurity: No Food Insecurity (1/19/2025)    Hunger Vital Sign     Worried About Running Out of Food in the Last Year: Never true     Ran Out of Food in the Last Year: Never true   Recent Concern: Food Insecurity - Food Insecurity Present (10/22/2024)    Hunger Vital Sign     Worried About Running Out of Food in the Last Year: Sometimes true     Ran Out of Food in the Last Year: Sometimes true   Transportation Needs: No Transportation Needs (2/13/2025)    OASIS : Transportation     Lack of Transportation (Medical): No     Lack of Transportation (Non-Medical): No     Patient Unable or Declines to Respond: No   Physical Activity: Inactive (12/17/2024)    Exercise Vital Sign     Days of Exercise per Week: 0 days     Minutes of Exercise per Session: 0 min   Stress: Stress Concern Present (12/17/2024)    [place] [employer] - Occupational Stress Questionnaire     Feeling of Stress : Very much   Social Connections: Feeling Socially Integrated (2/13/2025)    OASIS : Social Isolation     Frequency of experiencing loneliness or isolation: Never   Recent  Concern: Social Connections - Feeling Somewhat Isolated (1/27/2025)    OASIS : Social Isolation     Frequency of experiencing loneliness or isolation: Sometimes   Intimate Partner Violence: Not At Risk (1/19/2025)    Humiliation, Afraid, Rape, and Kick questionnaire     Fear of Current or Ex-Partner: No     Emotionally Abused: No     Physically Abused: No     Sexually Abused: No   Housing Stability: Low Risk  (1/20/2025)    Housing Stability Vital Sign     Unable to Pay for Housing in the Last Year: No     Number of Times Moved in the Last Year: 0     Homeless in the Last Year: No        CURRENT MEDICATIONS:   Current Outpatient Medications   Medication Sig Dispense Refill    acetaminophen (Tylenol) 325 mg tablet Take 2 tablets (650 mg) by mouth every 4 hours if needed for fever (temp greater than 38.0 C), headaches or mild pain (1 - 3). (Patient not taking: Reported on 3/20/2025)      apixaban (Eliquis) 5 mg tablet Take 1 tablet (5 mg) by mouth 2 times a day. 180 tablet 3    aspirin 81 mg chewable tablet Chew 1 tablet (81 mg) once daily.      atorvastatin (Lipitor) 80 mg tablet Take 1 tablet (80 mg) by mouth once daily at bedtime. 90 tablet 2    blood sugar diagnostic (Blood Glucose Test) strip 1 each 3 times a day. 100 strip 11    blood-glucose meter misc To be used 3 times daily 1 each 0    carvedilol (Coreg) 6.25 mg tablet Take 1 tablet by mouth every 12 hours 180 tablet 3    Creon 36,000-114,000- 180,000 unit capsule,delayed release(DR/EC) capsule Take 2 capsules by mouth 3 times a day as needed.      dapagliflozin propanediol (Farxiga) 10 mg Take 1 tablet (10 mg) by mouth once daily with breakfast. 90 tablet 2    ELDERBERRY FRUIT ORAL Take 1 tablet by mouth once daily as needed.      ezetimibe (Zetia) 10 mg tablet Take 1 tablet by mouth once daily. 90 tablet 3    ferrous sulfate (FeroSuL) tablet Take 1 tablet (325 mg) by mouth once daily with breakfast. 30 tablet 3    flash glucose scanning reader misc  "USE TO CHECK SUGARS FOUR TIMES A DAY 1 each 0    flash glucose sensor kit (FreeStyle En 2 Sensor) kit use as directed daily and change every 14 days 2 each 11    furosemide (Lasix) 20 mg tablet Take 1 tablet (20 mg) by mouth once daily. 30 tablet 0    gabapentin (Neurontin) 400 mg capsule Take 1 capsule (400 mg) by mouth 3 times a day. 90 capsule 5    GINGER ROOT EXTRACT ORAL Take 1 tablet by mouth once daily as needed.      hydrALAZINE (Apresoline) 50 mg tablet Take 1 tablet (50 mg) by mouth 3 times a day. 270 tablet 3    insulin glargine (Lantus) 100 unit/mL (3 mL) pen Inject 10 Units under the skin once daily at bedtime. 15 mL 5    insulin lispro (HumaLOG) 100 unit/mL pen Inject 14 Units under the skin 3 times daily (morning, midday, late afternoon). 30 mL 5    isosorbide mononitrate ER (Imdur) 60 mg 24 hr tablet Take 1 tablet (60 mg) by mouth once daily. 90 tablet 3    lancets 30 gauge misc 1 each 3 times a day. 100 each 11    lancets misc 1 each 4 times a day before meals. 200 each 11    medical cannabis Not [facility] prescribed      OXcarbazepine (Trileptal) 150 mg tablet Take 3 tablets (450 mg) by mouth 2 times a day. 540 tablet 3    pantoprazole (ProtoNix) 40 mg EC tablet TAKE 1 TABLET BY MOUTH ONCE DAILY 90 tablet 3    pen needle, diabetic (BD Ultra-Fine Jocy Pen Needle) 32 gauge x 5/32\" needle Pen Needles for Insulin. 100 each 2    pen needle, diabetic (BD Ultra-Fine Jocy Pen Needle) 32 gauge x 5/32\" needle Use to inject insulin up to 8 times per day. 400 each 0    pen needle, diabetic (TechLITE Pen Needle) 32 gauge x 1/4\" needle Use to inject 1-4 times daily as directed 100 each 11    ranolazine (Ranexa) 500 mg 12 hr tablet Take 1 tablet by mouth twice daily. 180 tablet 3    sacubitriL-valsartan (Entresto)  mg tablet Take 1 tablet by mouth twice daily. 180 tablet 3    spironolactone (Aldactone) 25 mg tablet Take 1 tablet (25 mg) by mouth once daily. 90 tablet 3    tamsulosin (Flomax) 0.4 mg 24 hr " "capsule TAKE 1 CAPSULE BY MOUTH ONCE DAILY 90 capsule 1    ticagrelor (Brilinta) 90 mg tablet Take 1 tablet (90 mg) by mouth 2 times a day. 180 tablet 3    tiZANidine (Zanaflex) 2 mg tablet Take 1 tablet (2 mg) by mouth once daily as needed for muscle spasms. 30 tablet 0     No current facility-administered medications for this visit.        OBJECTIVE    PHYSICAL EXAM      2/6/2025     1:48 PM 2/13/2025     1:07 PM 3/11/2025     1:50 PM 3/20/2025     8:29 AM 3/20/2025     2:26 PM 4/1/2025    10:50 AM 4/1/2025     4:28 PM   Vitals   Systolic 134 125 112 114 128 168 158   Diastolic 66 70 66 60 60 79 83   BP Location Left arm Right arm   Right arm     Heart Rate 71 72 76 72 73 68 61   Temp 36.3 °C (97.3 °F) 36.6 °C (97.8 °F)    36.7 °C (98 °F)    Resp 12 16    18 18   Height    1.803 m (5' 11\") 1.803 m (5' 11\") 1.803 m (5' 11\")    Weight (lb)   194 203.4 204 193    BMI   27.06 kg/m2 28.37 kg/m2 28.45 kg/m2 26.92 kg/m2    BSA (m2)   2.1 m2 2.15 m2 2.15 m2 2.09 m2    Visit Report Report  Report Report    Report Report    Report        There is no height or weight on file to calculate BMI.    GENERAL: A/Ox3, NAD. Appears healthy, well nourished  PSYCHIATRIC: Mood stable, appropriate memory recall  EYES: EOM intact, no scleral icterus  CARDIOVASCULAR: Palpable peripheral pulses  LUNGS: Breathing non-labored on room air  SKIN: no erythema, rashes, or ecchymoses     MUSCULOSKELETAL:  Laterality: left lower leg exam  - Skin intact, multiple scars of the left lower extremity intact  - No erythema or warmth  - No ecchymosis, positive baseline soft tissue swelling  - Alignment: neutral  - Palpation: Positive tenderness of the medial head of the gastroc  - ROM: Gentle range of motion intact  - Effusion: None  - Strength: EHL/PF/DF motor intact  - Achilles tendon palpable and intact; Coker test negative although painful at the medial head of the gastroc  - Gait: Antalgic  - Special Tests: Pain with pushoff    NEUROVASCULAR:  - " Neurovascular Status: sensation intact to light touch distally, lower extremity motor intact  - Capillary refill brisk at extremities, Bilateral dorsalis pedis pulse 2+    Imaging: Multiple views of the affected left tib-fib demonstrate: No significant osseous abnormality, no fracture, no significant degenerative change.   X-rays were personally reviewed and interpreted by me.  Radiology reports were reviewed by me as well, if readily available at the time.        Ant Johns MD  Attending Surgeon    Sports Medicine Orthopaedic Surgery  [facility]

## 2025-04-12 PROCEDURE — RXMED WILLOW AMBULATORY MEDICATION CHARGE

## 2025-04-14 ENCOUNTER — APPOINTMENT (OUTPATIENT)
Dept: ENDOCRINOLOGY | Facility: CLINIC | Age: 60
End: 2025-04-14
Payer: COMMERCIAL

## 2025-04-14 DIAGNOSIS — Z79.4 TYPE 2 DIABETES MELLITUS WITHOUT COMPLICATION, WITH LONG-TERM CURRENT USE OF INSULIN: ICD-10-CM

## 2025-04-14 DIAGNOSIS — E11.9 TYPE 2 DIABETES MELLITUS WITHOUT COMPLICATION, WITH LONG-TERM CURRENT USE OF INSULIN: ICD-10-CM

## 2025-04-14 PROCEDURE — 99211 OFF/OP EST MAY X REQ PHY/QHP: CPT

## 2025-04-14 PROCEDURE — RXMED WILLOW AMBULATORY MEDICATION CHARGE

## 2025-04-14 PROCEDURE — 95251 CONT GLUC MNTR ANALYSIS I&R: CPT

## 2025-04-14 RX ORDER — BLOOD-GLUCOSE SENSOR
EACH MISCELLANEOUS
Qty: 2 EACH | Refills: 11 | Status: SHIPPED | OUTPATIENT
Start: 2025-04-14

## 2025-04-14 RX ORDER — BLOOD-GLUCOSE,RECEIVER,CONT
EACH MISCELLANEOUS
Qty: 1 EACH | Refills: 0 | Status: SHIPPED | OUTPATIENT
Start: 2025-04-14

## 2025-04-14 NOTE — PATIENT INSTRUCTIONS
Increase Lantus to 12 units daily  Continue Humalog 14 units plus sliding scale  Farxiga 10mg daily    Call Laurie with questions or concerns  [phone]

## 2025-04-14 NOTE — Clinical Note
Patient seeking clarification on furosemide dosing given recent fluctuations in eGFR and potassium levels. Patient stopped lasix and started spironolactone in March. Would you like him to be back on lasix? Or complete any repeat bloodwork?

## 2025-04-14 NOTE — PROGRESS NOTES
Patient is sent at the request of Tyler Hensley* for my opinion regarding diabetes.  My recommendations below will be communicated back to the requesting provider by way of shared medical record.    Recommendations:   Upgrade to Freestyle En 3   Increase Lantus to 12 units daily  Continue Humalog 14 units plus sliding scale. Take 15 minutes before meal  Will seek clarification from Dr. Wright regarding lasix dosing and repeat labs.  ________________________________________________________________________    Subjective   Past Medical History:  Patient Active Problem List   Diagnosis    Abdominal pain, acute, right upper quadrant    Abnormal CXR    Atherosclerosis of native artery of both lower extremities with intermittent claudication    Atherosclerotic heart disease of native coronary artery without angina pectoris    Bacterial skin infection    Bipolar depression (Multi)    BPH (benign prostatic hyperplasia)    Burning pain    Causalgia of lower extremity    Hip pain, right    Pain of right lower extremity    Ischemic stroke (Multi)    Chronic combined systolic and diastolic CHF (congestive heart failure)    Chronic back pain greater than 3 months duration    Chronic hepatitis C virus genotype 1a infection (Multi)    Chronic obstructive pulmonary disease (Multi)    Chronic pain syndrome    Chronic RUQ pain    Claudication    Coordination impairment    Decreased peripheral vision of left eye    Diabetic neuropathy, painful (Multi)    Diabetic polyneuropathy associated with type 2 diabetes mellitus    Eschar of lower leg    Frequent falls    Gastroesophageal reflux disease    H/O: CVA (cerebrovascular accident)    Hepatic steatosis    Hepatitis C virus infection cured after antiviral drug therapy    Hepatitis-C    Hyperkalemia    Hyperlipidemia    Hypertension    Joint stiffness of both shoulders    Left ventricular dysfunction    Low back pain    Nasal congestion with rhinorrhea    Constipation     Nausea in adult    Neuropathic pain    Nicotine dependence    Neuropathy    BRUNA (obstructive sleep apnea)    Other symptoms and signs involving the musculoskeletal system    Pancreatic lesion (HHS-HCC)    Pancreatic malabsorption (HHS-HCC)    Paroxysmal nocturnal dyspnea    Peripheral arterial disease (CMS-HCC)    Poor balance    Other chronic postprocedural pain    Presence of aortocoronary bypass graft    Pulmonary congestion    Recurrent boils    Reduced chest expansion on inspiration    S/P CABG x 5    Right flank pain    Type 2 diabetes mellitus    Spasticity as late effect of cerebrovascular accident (CVA)    Shortness of breath on exertion    Dyspnea    Shingles    S/P PTCA (percutaneous transluminal coronary angioplasty)    Long-term insulin use (Multi)    Hospital discharge follow-up    PAD (peripheral artery disease) (CMS-HCC)    Critical limb ischemia of left lower extremity    Peripheral artery occlusion (CMS-HCC)    Postoperative abscess    Flash pulmonary edema    Acute heart failure     HPI:  Dereck Shen is a 59 y.o. male with a PMH significant for HepC, acute heart failure, CABG, CVA  Pt presents today for new patient visit with endo PharmD for Type 2 Diabetes Mellitus  Last seen by Dr. Zahira Tate MD on 3/20/25  where A1c was found to by 7.9%. Acute drop in kidney function- recommended ER visit. Increased lispro.  Potassium level consistently 5.5-5.9 on recent lab tests    Today:   Discussed complex CV history including CVA, CABG, CHF.   On many medications- gets tiring but knows he has to take them  Recently started spironolactone and stopped lasix. Since this change, potassium level has been elevated  Patient uncertain what long term plan for lasix is  Weighs himself once/wk. Sometimes gains and sometimes loses.  Discussed change in eGFR, UACR. Potential contributors dehydration, medication changes  eGFR 46 ml/min/1.73m2 (3/23)--> 57ml/min/1.73m2 (4/1)  UACR >300mg/g creat, but  "improved from >3000mg/g creat in 2023  Trying to stay more hydrated. Cut back on coffee  SOB on exertion. Has been better since last stent  Patient states he has a tumor on his pancreas        Diabetes Pharmacotherapy:    Lantus 10 units at bedtime.   Lispro 14 units plus sliding scale 2 before meals  14 units 45 minutes before meal. Then checks again 1 hour later.  Farxiga 10mg daily    Adherence: denies non-adherence    Previously trialed meds:   Metformin- why stopped?. Did not sit well.   Glipizide    Social:  Current diet: in general, a \"healthy\" diet    Breakfast - blackberries.  Lunch - salad with tuna fish, grilled chicken throw out bread  Dinner - filet, salmon. Lots of vegetables  Snack - meals  Fluids -   Current exercise: Limited by pain in L leg      Allergies:  Celecoxib; Ibuprofen; Tetanus toxoid, adsorbed; [id number] antimigraine agents; Cephalexin; Hydrocodone; Latex; and Tryptophan    Medication list:  Current Outpatient Medications   Medication Instructions    acetaminophen (TYLENOL) 650 mg, oral, Every 4 hours PRN    aspirin 81 mg, Daily    atorvastatin (LIPITOR) 80 mg, oral, Nightly    blood sugar diagnostic (Blood Glucose Test) strip 1 each, miscellaneous, 3 times daily    blood-glucose meter misc To be used 3 times daily    Brilinta 90 mg, oral, 2 times daily    carvedilol (Coreg) 6.25 mg tablet Take 1 tablet by mouth every 12 hours    Creon 36,000-114,000- 180,000 unit capsule,delayed release(DR/EC) capsule 2 capsules, 3 times daily PRN    ELDERBERRY FRUIT ORAL 1 tablet, Daily PRN    Eliquis 5 mg, oral, 2 times daily    ezetimibe (Zetia) 10 mg tablet Take 1 tablet by mouth once daily.    Farxiga 10 mg, oral, Daily with breakfast    ferrous sulfate (FeroSuL) tablet Take 1 tablet (325 mg) by mouth once daily with breakfast.    flash glucose scanning reader misc USE TO CHECK SUGARS FOUR TIMES A DAY    flash glucose sensor kit (FreeStyle En 2 Sensor) kit use as directed daily and change " "every 14 days    furosemide (LASIX) 20 mg, oral, Daily    gabapentin (NEURONTIN) 400 mg, oral, 3 times daily    JAIMEE ROOT EXTRACT ORAL 1 tablet, Daily PRN    hydrALAZINE (APRESOLINE) 50 mg, oral, 3 times daily    insulin glargine (LANTUS) 10 Units, subcutaneous, Nightly    insulin lispro (HUMALOG) 14 Units, subcutaneous, 3 times daily (morning, midday, late afternoon)    isosorbide mononitrate ER (IMDUR) 60 mg, oral, Daily    lancets 30 gauge misc 1 each, miscellaneous, 3 times daily    lancets misc 1 each, miscellaneous, 4 times daily before meals and nightly    medical cannabis Not [facility] prescribed    OXcarbazepine (TRILEPTAL) 450 mg, oral, 2 times daily    pantoprazole (ProtoNix) 40 mg EC tablet TAKE 1 TABLET BY MOUTH ONCE DAILY    pen needle, diabetic (BD Ultra-Fine Jocy Pen Needle) 32 gauge x 5/32\" needle Pen Needles for Insulin.    pen needle, diabetic (BD Ultra-Fine Jocy Pen Needle) 32 gauge x 5/32\" needle Use to inject insulin up to 8 times per day.    pen needle, diabetic (TechLITE Pen Needle) 32 gauge x 1/4\" needle Use to inject 1-4 times daily as directed    ranolazine (Ranexa) 500 mg 12 hr tablet Take 1 tablet by mouth twice daily.    sacubitriL-valsartan (Entresto)  mg tablet Take 1 tablet by mouth twice daily.    spironolactone (ALDACTONE) 25 mg, oral, Daily    tamsulosin (Flomax) 0.4 mg 24 hr capsule TAKE 1 CAPSULE BY MOUTH ONCE DAILY    tiZANidine (ZANAFLEX) 2 mg, oral, Daily PRN        Objective   Last Recorded Vitals:  BP Readings from Last 3 Encounters:   04/01/25 158/83   03/20/25 128/60   03/20/25 114/60     Wt Readings from Last 3 Encounters:   04/07/25 87.5 kg (193 lb)   04/01/25 87.5 kg (193 lb)   03/20/25 92.5 kg (204 lb)     BMI Readings from Last 1 Encounters:   04/07/25 26.92 kg/m²      Labs  A1C  Lab Results   Component Value Date    HGBA1C 7.9 (H) 03/20/2025    HGBA1C 7.9 (H) 12/18/2024    HGBA1C 8.2 (A) 10/01/2024     BMP/LFTs  Lab Results   Component Value Date    CREATININE 1.43 " "(H) 04/01/2025    CREATININE 1.70 (H) 03/20/2025    CREATININE 1.27 02/06/2025    EGFR 57 (L) 04/01/2025    EGFR 46 (L) 03/20/2025    EGFR 65 02/06/2025    GLUCOSE 216 (H) 04/01/2025     (L) 04/01/2025    K 5.6 (H) 04/01/2025     04/01/2025    CALCIUM 9.0 04/01/2025    CO2 22 04/01/2025    BUN 41 (H) 04/01/2025    ALT 10 04/01/2025    AST 9 04/01/2025    ALKPHOS 106 04/01/2025    BILITOT 0.3 04/01/2025     Lipids  Lab Results   Component Value Date    TRIG 179 (H) 01/24/2025    CHOL 102 01/24/2025    LDLF 173 (H) 04/04/2023    LDLCALC 39 01/24/2025    LDLDIRECT 56 04/13/2021    HDL 27.5 01/24/2025     Urine Albumin Creatinine Ratio  Lab Results   Component Value Date    MICROALBCREA 378 (H) 03/20/2025    MICROALBCREA 1,748.0 (H) 04/04/2023    MICROALBCREA 499.1 (H) 05/18/2021     ASCVD risk  The ASCVD Risk score (Lalo MCKAY, et al., 2019) failed to calculate for the following reasons:    Risk score cannot be calculated because patient has a medical history suggesting prior/existing ASCVD    Additional labs:  No results found for: \"FRUCTOSAMINE\", \"CPEPTIDE\", \"TBM76UN\", \"NTIB\", \"ZNT8A\", \"INSAB\"    Home glucose monitoring:  Hypoglycemia: denies readings <70 mg/dL or s/sx of hypoglycemia        Assessment/Plan   Type 2 Diabetes Mellitus  Goal A1C <7%  Uncontrolled T2DM; however, control greatly improved over the last month (TIR 30%-->61%). CGM active 55%. Large gaps in data.  Patient states glucose typically 180-220 on waking  Plan:  Increase Lantus 12 units daily  Continue Humalog 14 units plus sliding scale with meals, Farxiga 10mg daily  Home glucose monitoring:   Upgrade to FSL3+  A minimum of 72 hours of CGM data was reviewed and used to make therapy changes.   Education Provided to Patient:   Best way to maintain good renal function is BP and BG control  Benefits of upgrading to En 3  Follow-up with Dr. Wright regarding lasix dose  Take Humalog 15 minutes before meal   Hypertension:   Goal BP <130/80 "   BP above goal  Managed by Dr. Wright  Secondary prevention:   Therapy: High intensity statin and Ezetimibe   LDL result meets goal   Renal:  CKD: stage 3 - GFR 30-59  ACR: >300 (03/2025)  Renal protective agents: ACEi/ARB and SGLT2i  DM medications are dosed appropriately for renal function  Labs: up to date   PharmD follow-up: 1 month  Endo follow-up: 6/10/25    Patient agreeable to plan as above, contact information provided for any future questions or concerns.    Laurie Lake, Ariel    Type of encounter: in person  Provider on site: Dr. Zahira Tate MD    Continue all meds under the continuation of care with the referring provider and clinical pharmacy team.

## 2025-04-15 ENCOUNTER — PHARMACY VISIT (OUTPATIENT)
Dept: PHARMACY | Facility: CLINIC | Age: 60
End: 2025-04-15
Payer: MEDICAID

## 2025-04-15 DIAGNOSIS — I50.21 ACUTE SYSTOLIC HEART FAILURE: ICD-10-CM

## 2025-04-15 DIAGNOSIS — I50.23 ACUTE ON CHRONIC SYSTOLIC (CONGESTIVE) HEART FAILURE: ICD-10-CM

## 2025-04-15 PROCEDURE — RXMED WILLOW AMBULATORY MEDICATION CHARGE

## 2025-04-15 RX ORDER — FUROSEMIDE 20 MG/1
20 TABLET ORAL DAILY
Qty: 30 TABLET | Refills: 1 | Status: SHIPPED | OUTPATIENT
Start: 2025-04-15 | End: 2025-06-14

## 2025-04-15 NOTE — TELEPHONE ENCOUNTER
RN called pt at this time results and recommendations. Per Dr. Wright pt needs to come off of spironolactone and back on lasix 20 mg daily due to high potassium levels. Rn sent over a script for approval and pt verbalized understanding,.

## 2025-04-16 ENCOUNTER — PHARMACY VISIT (OUTPATIENT)
Dept: PHARMACY | Facility: CLINIC | Age: 60
End: 2025-04-16
Payer: MEDICAID

## 2025-04-16 ENCOUNTER — TELEPHONE (OUTPATIENT)
Dept: CARDIOLOGY | Facility: HOSPITAL | Age: 60
End: 2025-04-16
Payer: COMMERCIAL

## 2025-04-16 DIAGNOSIS — I50.23 ACUTE ON CHRONIC SYSTOLIC (CONGESTIVE) HEART FAILURE: Primary | ICD-10-CM

## 2025-04-16 NOTE — TELEPHONE ENCOUNTER
RN called pt at this time      ----- Message from Baljinder Wright sent at 4/15/2025 11:13 AM EDT -----  He needs a BMP/BNP in 2 weeks  ----- Message -----  From: Laurie Lake, AnyD  Sent: 4/14/2025   3:41 PM EDT  To: Baljinder Wright MD    Patient seeking clarification on furosemide dosing given recent fluctuations in eGFR and potassium levels. Patient stopped lasix and started spironolactone in March. Would you like him to be back on lasix? Or complete any repeat bloodwork?

## 2025-04-25 ENCOUNTER — PHARMACY VISIT (OUTPATIENT)
Dept: PHARMACY | Facility: CLINIC | Age: 60
End: 2025-04-25
Payer: MEDICAID

## 2025-04-25 PROCEDURE — RXMED WILLOW AMBULATORY MEDICATION CHARGE

## 2025-04-25 RX ORDER — CARBAMIDE PEROXIDE - 6.5% 65 MG/ML
SOLUTION/ DROPS TOPICAL
Qty: 15 ML | Refills: 0 | OUTPATIENT
Start: 2025-04-25

## 2025-04-25 RX ORDER — GUAIFENESIN AND DEXTROMETHORPHAN HYDROBROMIDE 600; 30 MG/1; MG/1
TABLET, EXTENDED RELEASE ORAL
Qty: 40 TABLET | Refills: 0 | OUTPATIENT
Start: 2025-04-25

## 2025-04-27 LAB
ANION GAP SERPL CALCULATED.4IONS-SCNC: 12 MMOL/L (CALC) (ref 7–17)
BNP SERPL-MCNC: 140 PG/ML
BUN SERPL-MCNC: 51 MG/DL (ref 7–25)
BUN/CREAT SERPL: 28 (CALC) (ref 6–22)
CALCIUM SERPL-MCNC: 8.8 MG/DL (ref 8.6–10.3)
CHLORIDE SERPL-SCNC: 103 MMOL/L (ref 98–110)
CO2 SERPL-SCNC: 21 MMOL/L (ref 20–32)
CREAT SERPL-MCNC: 1.83 MG/DL (ref 0.7–1.3)
EGFRCR SERPLBLD CKD-EPI 2021: 42 ML/MIN/1.73M2
GLUCOSE SERPL-MCNC: 228 MG/DL (ref 65–99)
POTASSIUM SERPL-SCNC: 5 MMOL/L (ref 3.5–5.3)
SODIUM SERPL-SCNC: 136 MMOL/L (ref 135–146)

## 2025-04-28 ENCOUNTER — PATIENT OUTREACH (OUTPATIENT)
Dept: PRIMARY CARE | Facility: CLINIC | Age: 60
End: 2025-04-28
Payer: COMMERCIAL

## 2025-04-28 ENCOUNTER — TELEPHONE (OUTPATIENT)
Dept: CARDIOLOGY | Facility: HOSPITAL | Age: 60
End: 2025-04-28
Payer: COMMERCIAL

## 2025-04-28 DIAGNOSIS — I50.42 CHRONIC COMBINED SYSTOLIC AND DIASTOLIC CHF (CONGESTIVE HEART FAILURE): Primary | ICD-10-CM

## 2025-04-28 NOTE — TELEPHONE ENCOUNTER
Left leg having swelling up to his thigh.  He is post CABG with vein harvest (November of 2024) of that leg and ruptured calf muscle, 4/1/25.  Since he has been back on daily lasix 20 mg he hs been dizzy all day.  His blood pressure has been good 115/70-80 on average.  Sounds more like vertigo because he says it is more like the feeling when you get off a spinning ride not the feeling you get when you stand up too fast.  He also reports he was having trouble hearing and was told he had wax build up.  He was prescribed earwax removal ear drops which he has been taking.  Thinking the problem was the lasix, he tried to decrease his dose to every other day but that did not help.  He continues to have abdominal bloating feeling which he attributes to being fluid overloaded.  He is going to reach out to his PCP about possibly having vertigo.  Will review with Dr. Wright.

## 2025-04-30 DIAGNOSIS — I50.23 ACUTE ON CHRONIC SYSTOLIC (CONGESTIVE) HEART FAILURE: ICD-10-CM

## 2025-04-30 NOTE — TELEPHONE ENCOUNTER
Dr. Wright responded:  He is dry. His BUN/Creatinine are elevated. Stop Lasix. Also encourage him to drink more fluids. Also have him start seeing Stacia in CHF clinic for closer follow up on his fluid volume status.    Called and left a message with recommendations.  Will also answer his [employer] message.  Sending a message to CHF clinic staff to assist with scheduling as well.      He called back.  Reviewed recommendations with him.  He reports he has been taking the lasix daily and his swelling has improved.  As his ears have cleared up with wax removal, the dizziness has subsided.

## 2025-05-07 ENCOUNTER — PHARMACY VISIT (OUTPATIENT)
Dept: PHARMACY | Facility: CLINIC | Age: 60
End: 2025-05-07
Payer: MEDICAID

## 2025-05-07 PROCEDURE — RXMED WILLOW AMBULATORY MEDICATION CHARGE

## 2025-05-08 ENCOUNTER — TELEPHONE (OUTPATIENT)
Dept: CARDIOLOGY | Facility: HOSPITAL | Age: 60
End: 2025-05-08
Payer: COMMERCIAL

## 2025-05-08 NOTE — TELEPHONE ENCOUNTER
[facility] TO RESCHEDULE UPCOMING APPT 5/15 TO AFTER HIS ECHO 6/20. IF HE CALLS BACK PLEASE RESCHEDULE THAT APPT     THANK YOU JONATHAN

## 2025-05-09 ENCOUNTER — OFFICE VISIT (OUTPATIENT)
Dept: CARDIOLOGY | Facility: HOSPITAL | Age: 60
End: 2025-05-09
Payer: COMMERCIAL

## 2025-05-09 ENCOUNTER — PHARMACY VISIT (OUTPATIENT)
Dept: PHARMACY | Facility: CLINIC | Age: 60
End: 2025-05-09
Payer: MEDICAID

## 2025-05-09 VITALS
SYSTOLIC BLOOD PRESSURE: 140 MMHG | HEART RATE: 74 BPM | WEIGHT: 207 LBS | DIASTOLIC BLOOD PRESSURE: 84 MMHG | OXYGEN SATURATION: 100 % | BODY MASS INDEX: 28.87 KG/M2 | RESPIRATION RATE: 20 BRPM

## 2025-05-09 DIAGNOSIS — I25.10 ATHEROSCLEROSIS OF NATIVE CORONARY ARTERY OF NATIVE HEART WITHOUT ANGINA PECTORIS: ICD-10-CM

## 2025-05-09 DIAGNOSIS — I50.42 CHRONIC COMBINED SYSTOLIC AND DIASTOLIC CHF (CONGESTIVE HEART FAILURE): Primary | ICD-10-CM

## 2025-05-09 DIAGNOSIS — I50.23 ACUTE ON CHRONIC SYSTOLIC (CONGESTIVE) HEART FAILURE: ICD-10-CM

## 2025-05-09 DIAGNOSIS — N18.4 CKD (CHRONIC KIDNEY DISEASE) STAGE 4, GFR 15-29 ML/MIN (MULTI): ICD-10-CM

## 2025-05-09 DIAGNOSIS — I50.21 ACUTE SYSTOLIC HEART FAILURE: ICD-10-CM

## 2025-05-09 DIAGNOSIS — E11.42 DIABETIC POLYNEUROPATHY ASSOCIATED WITH TYPE 2 DIABETES MELLITUS: ICD-10-CM

## 2025-05-09 DIAGNOSIS — I73.9 PAD (PERIPHERAL ARTERY DISEASE) (CMS-HCC): ICD-10-CM

## 2025-05-09 PROCEDURE — 3079F DIAST BP 80-89 MM HG: CPT | Performed by: NURSE PRACTITIONER

## 2025-05-09 PROCEDURE — RXMED WILLOW AMBULATORY MEDICATION CHARGE

## 2025-05-09 PROCEDURE — 3077F SYST BP >= 140 MM HG: CPT | Performed by: NURSE PRACTITIONER

## 2025-05-09 PROCEDURE — 99213 OFFICE O/P EST LOW 20 MIN: CPT | Performed by: NURSE PRACTITIONER

## 2025-05-09 PROCEDURE — 3048F LDL-C <100 MG/DL: CPT | Performed by: NURSE PRACTITIONER

## 2025-05-09 RX ORDER — FUROSEMIDE 20 MG/1
20 TABLET ORAL EVERY OTHER DAY
Qty: 45 TABLET | Refills: 1 | Status: SHIPPED | OUTPATIENT
Start: 2025-05-09 | End: 2025-08-07

## 2025-05-09 ASSESSMENT — COLUMBIA-SUICIDE SEVERITY RATING SCALE - C-SSRS
1. IN THE PAST MONTH, HAVE YOU WISHED YOU WERE DEAD OR WISHED YOU COULD GO TO SLEEP AND NOT WAKE UP?: NO
6. HAVE YOU EVER DONE ANYTHING, STARTED TO DO ANYTHING, OR PREPARED TO DO ANYTHING TO END YOUR LIFE?: NO
2. HAVE YOU ACTUALLY HAD ANY THOUGHTS OF KILLING YOURSELF?: NO
6. HAVE YOU EVER DONE ANYTHING, STARTED TO DO ANYTHING, OR PREPARED TO DO ANYTHING TO END YOUR LIFE?: NO
1. IN THE PAST MONTH, HAVE YOU WISHED YOU WERE DEAD OR WISHED YOU COULD GO TO SLEEP AND NOT WAKE UP?: NO
2. HAVE YOU ACTUALLY HAD ANY THOUGHTS OF KILLING YOURSELF?: NO

## 2025-05-09 ASSESSMENT — ENCOUNTER SYMPTOMS
SHORTNESS OF BREATH: 0
UNEXPECTED WEIGHT CHANGE: 0
CHEST PRESSURE: 0
ABDOMINAL PAIN: 0
CLAUDICATION: 0
LOSS OF SENSATION IN FEET: 0
NEAR-SYNCOPE: 0
PALPITATIONS: 0
FATIGUE: 0
OCCASIONAL FEELINGS OF UNSTEADINESS: 0
EDEMA: 0
ORTHOPNEA: 0

## 2025-05-09 ASSESSMENT — PATIENT HEALTH QUESTIONNAIRE - PHQ9
1. LITTLE INTEREST OR PLEASURE IN DOING THINGS: SEVERAL DAYS
2. FEELING DOWN, DEPRESSED OR HOPELESS: SEVERAL DAYS
10. IF YOU CHECKED OFF ANY PROBLEMS, HOW DIFFICULT HAVE THESE PROBLEMS MADE IT FOR YOU TO DO YOUR WORK, TAKE CARE OF THINGS AT HOME, OR GET ALONG WITH OTHER PEOPLE: NOT DIFFICULT AT ALL
SUM OF ALL RESPONSES TO PHQ9 QUESTIONS 1 AND 2: 2

## 2025-05-09 ASSESSMENT — PAIN SCALES - GENERAL: PAINLEVEL_OUTOF10: 6

## 2025-05-09 NOTE — PATIENT INSTRUCTIONS
Thank you for coming in today.  If you have any questions you may contact the office Monday through Friday at [phone] or on week ends at [phone].    Please take the Furosemide 20 mg every other day.     Please get lab work completed in 2 weeks.     Please follow  a 2 GM sodium diet and limit fluid intake to 2 liters per day or 8 servings ( serving size = 8 oz. = 1 cup = 240 ml) per day.   Please avoid processed meat products (luncheon meats, sausages, garcia, hot dogs for example) eat 4 servings of vegetables and 1-2 whole servings of whole fruits per day.   Please weigh daily and call [phone] for weight gain of 3 pounds in 24 hours or 5 pounds or if you experience increased swelling or shortness of breath.     Please make an appointment to follow up with nephrology to follow up on kidney function    Follow up:   4 weeks.     Please be sure to follow up with your cardiologist at [facility] once every year. Call [phone] to schedule appointment if you do not have a follow up appointment scheduled already.

## 2025-05-09 NOTE — PROGRESS NOTES
Subjective   Patient ID: Dereck Shen is a 59 y.o. male who presents for follow-up of congestive heart failure.   Current Medications[1]     PMH: significant for PAD with prior revascularization (R fem-pop bypass 2/24/20; L fem-pop bypass 9/14/20; L fem PTA of the graft; R iliac PTA 2/2022; LLE and DANYA PTA 4/2024; angioplasty of the left SFA 10/21/24; and left femoral and tibial thrombectomy with patch angioplasty 11/19/24), heart failure, CAD s/p CABG x5 in 09/2019, PCI of Cx, 01/04/2021 and s/p PCI of LM/Cx 01/23/2025; HTN, hyperlipidemia, DM type 2, Hepatitis C, bipolar depression, ischemic stroke, COPD, BRUNA and nicotine dependence.     Congestive Heart Failure  Presents for follow-up visit. Pertinent negatives include no abdominal pain, chest pain, chest pressure, claudication, edema, fatigue, muscle weakness, near-syncope, nocturia, orthopnea, palpitations, paroxysmal nocturnal dyspnea, shortness of breath or unexpected weight change. The symptoms have been stable. Compliance with total regimen is %. Compliance with diet is %. Compliance with medications is %.        Last Hospitalization for HF: 01/19/2025 to 01/24/2025     Review of Systems   Constitutional:  Negative for fatigue and unexpected weight change.   Respiratory:  Negative for shortness of breath.    Cardiovascular:  Negative for chest pain, palpitations, claudication and near-syncope.   Gastrointestinal:  Negative for abdominal pain.   Genitourinary:  Negative for nocturia.   Musculoskeletal:  Negative for muscle weakness.       Objective     /84 (BP Location: Left arm, Patient Position: Sitting)   Pulse 74   Resp 20   Wt 93.9 kg (207 lb)   SpO2 100%   BMI 28.87 kg/m²       Transthoracic Echo (TTE) Complete  Result Date: 1/20/2025              [place] [id number]      Phone [phone] Fax [phone] TRANSTHORACIC ECHOCARDIOGRAM REPORT Patient Name:       DERECK  CIARA Presley Physician:    [id number] Nahun Omar BEARD Study Date:         1/20/2025            Ordering Provider:    [id number] DANNATRINITY MEDLEYPTA MRN/PID:            [id number]             Fellow: Accession#:         [id number]         Nurse:                Jennifer Durham Date of Birth/Age:  1965 / 59 years Sonographer:          Luba Castellano RDCS Gender Assigned at                      Additional Staff: Birth: Height:             180.34 cm            Admit Date:           1/19/2025 Weight:             97.98 kg             Admission Status:     Inpatient -                                                                Routine BSA / BMI:          2.18 m2 / 30.13      Department Location:  [facility]                     kg/m2 Blood Pressure: 112 /61 mmHg Study Type:    TRANSTHORACIC ECHO (TTE) COMPLETE Diagnosis/ICD: Presence of aortocoronary bypass graft-Z95.1; Dyspnea,                unspecified-R06.00 Indication:    CABG, CHF, Dyspnea CPT Codes:     Echo Complete w Full Doppler-[id number] Patient History: Pertinent History: CHF, HTN and CAD. Study Detail: The following Echo studies were performed: 2D, M-Mode, Doppler and               color flow. Optison used as a contrast agent for endocardial               border definition. Total contrast used for this procedure was 3 mL               via IV push.  PHYSICIAN INTERPRETATION: Left Ventricle: The left ventricular systolic function is moderately decreased, with a Salinas's biplane calculated ejection fraction of 36%. There is global hypokinesis of the left ventricle with minor regional variations. The left ventricular cavity size is mildly dilated. There is normal septal and normal posterior left ventricular wall thickness. Spectral Doppler shows a Grade II (pseudonormal pattern) of left ventricular diastolic filling with an elevated left atrial  pressure. Left Atrium: The left atrium is normal in size. Right Ventricle: The right ventricle is normal in size. There is mildly reduced right ventricular systolic function. Right Atrium: The right atrium is normal in size. Aortic Valve: The aortic valve is trileaflet. The aortic valve dimensionless index is 0.81. There is no evidence of aortic valve regurgitation. The peak instantaneous gradient of the aortic valve is 9 mmHg. The mean gradient of the aortic valve is 5 mmHg. Mitral Valve: The mitral valve is normal in structure. There is mild mitral valve regurgitation. Tricuspid Valve: The tricuspid valve is structurally normal. There is mild tricuspid regurgitation. The Doppler estimated RVSP is mildly elevated right ventricular systolic pressure at 44.3 mmHg. Pulmonic Valve: The pulmonic valve is not well visualized. There is trace to mild pulmonic valve regurgitation. Pericardium: No pericardial effusion noted. Aorta: The aortic root is normal.  CONCLUSIONS:  1. The left ventricular systolic function is moderately decreased, with a Salinas's biplane calculated ejection fraction of 36%.  2. There is global hypokinesis of the left ventricle with minor regional variations.  3. Spectral Doppler shows a Grade II (pseudonormal pattern) of left ventricular diastolic filling with an elevated left atrial pressure.  4. Left ventricular cavity size is mildly dilated.  5. There is mildly reduced right ventricular systolic function.  6. Mildly elevated right ventricular systolic pressure. QUANTITATIVE DATA SUMMARY:  2D MEASUREMENTS:           Normal Ranges: LAs:             5.00 cm   (2.7-4.0cm) IVSd:            0.72 cm   (0.6-1.1cm) LVPWd:           0.74 cm   (0.6-1.1cm) LVIDd:           5.78 cm   (3.9-5.9cm) LVIDs:           5.07 cm LV Mass Index:   71.0 g/m2 LV % FS          12.3 %  LA VOLUME:                    Normal Ranges: LA Vol A4C:        63.5 ml    (22+/-6mL/m2) LA Vol A2C:        64.0 ml LA Vol BP:         65.3  ml LA Vol Index A4C:  29.2ml/m2 LA Vol Index A2C:  29.4 ml/m2 LA Vol Index BP:   30.0 ml/m2 LA Area A4C:       21.0 cm2 LA Area A2C:       21.6 cm2 LA Major Axis A4C: 5.9 cm LA Major Axis A2C: 6.2 cm LA Vol A4C:        61.0 ml LA Vol A2C:        61.1 ml LA Vol Index BSA:  28.0 ml/m2  RA VOLUME BY A/L METHOD:          Normal Ranges: RA Area A4C:             16.3 cm2  AORTA MEASUREMENTS:         Normal Ranges: Ao Sinus, d:        3.10 cm (2.1-3.5cm) Ao STJ, d:          2.40 cm (1.7-3.4cm) Asc Ao, d:          3.20 cm (2.1-3.4cm)  LV SYSTOLIC FUNCTION BY 2D PLANIMETRY (MOD):                      Normal Ranges: EF-A4C View:    35 % (>=55%) EF-A2C View:    36 % EF-Biplane:     36 % LV EF Reported: 36 %  LV DIASTOLIC FUNCTION:             Normal Ranges: MV Peak E:             1.17 m/s    (0.7-1.2 m/s) MV Peak A:             0.41 m/s    (0.42-0.7 m/s) E/A Ratio:             2.83        (1.0-2.2) MV e'                  0.061 m/s   (>8.0) MV lateral e'          0.06 m/s MV medial e'           0.06 m/s MV A Dur:              114.18 msec E/e' Ratio:            19.23       (<8.0)  MITRAL VALVE:          Normal Ranges: MV DT:        148 msec (150-240msec)  AORTIC VALVE:                     Normal Ranges: AoV Vmax:                1.48 m/s (<=1.7m/s) AoV Peak P.8 mmHg (<20mmHg) AoV Mean P.5 mmHg (1.7-11.5mmHg) LVOT Max Ayad:            1.18 m/s (<=1.1m/s) AoV VTI:                 25.83 cm (18-25cm) LVOT VTI:                20.91 cm LVOT Diameter:           1.95 cm  (1.8-2.4cm) AoV Area, VTI:           2.43 cm2 (2.5-5.5cm2) AoV Area,Vmax:           2.39 cm2 (2.5-4.5cm2) AoV Dimensionless Index: 0.81  RIGHT VENTRICLE: RV Basal 3.71 cm RV Mid   3.50 cm RV Major 8.3 cm TAPSE:   18.0 mm RV s'    0.10 m/s  TRICUSPID VALVE/RVSP:          Normal Ranges: Peak TR Velocity:     3.21 m/s RV Syst Pressure:     44 mmHg  (< 30mmHg) IVC Diam:             1.94 cm  AORTA: Asc Ao Diam 3.18 cm  [id number] Nahun Nelson DO  Electronically signed on 1/20/2025 at 5:05:14 PM  ** Final **     Transthoracic Echo (TTE) Complete  Result Date: 11/20/2024              [place] [address]      Phone [phone] Fax [phone] TRANSTHORACIC ECHOCARDIOGRAM REPORT Patient Name:       [facility]   Reading Physician:    [id number] Rush MD Study Date:         11/20/2024          Ordering Provider:    [id number] DANNA SEARS MRN/PID:            [id number]            Fellow: Accession#:         [id number]        Nurse:                Noemi Villa RN Date of Birth/Age:  1965 / 59      Sonographer:          Laxmi crisostomo RDCS Gender Assigned at  M                   Additional Staff: Birth: Height:             180.34 cm           Admit Date:           11/19/2024 Weight:             87.09 kg            Admission Status:     Inpatient -                                                               Routine BSA / BMI:          2.07 m2 / 26.78     Department Location:  [facility]                     kg/m2 Blood Pressure: 107 /64 mmHg Study Type:    TRANSTHORACIC ECHO (TTE) COMPLETE Diagnosis/ICD: Shortness of breath-R06.02; Unspecified systolic (congestive)                heart failure (CHF)-I50.20 Indication:    CHF, SHORTNESS OF BREATH CPT Codes:     Echo Complete w Full Doppler-[id number] Patient History: Pertinent History: CABG x5 2019, HTN. Study Detail: The following Echo studies were performed: 2D, M-Mode, Doppler and               color flow. Technically challenging study due to prominent lung               artifact and body habitus. Optison used as a contrast agent for               endocardial border definition. Total contrast used for this               procedure was 3 mL via IV push.  PHYSICIAN INTERPRETATION: Left Ventricle: The left ventricular systolic  function is moderately decreased, with a Salinas's biplane calculated ejection fraction of 38%. Wall motion is abnormal. The left ventricular cavity size is normal. There is normal septal and normal posterior left ventricular wall thickness. Spectral Doppler shows an abnormal pattern of left ventricular diastolic filling. Akinetic inferolateral segment, and basal inferior wall. Left Atrium: The left atrium is normal in size. Right Ventricle: The right ventricle was not well visualized. Right ventricular systolic function not assessed. Right Atrium: The right atrium is normal in size. Aortic Valve: The aortic valve appears structurally normal. The aortic valve dimensionless index is 0.73. There is no evidence of aortic valve regurgitation. The peak instantaneous gradient of the aortic valve is 9 mmHg. The mean gradient of the aortic valve is 4 mmHg. Mitral Valve: The mitral valve is normal in structure. There is no evidence of mitral valve regurgitation. Tricuspid Valve: The tricuspid valve is structurally normal. There is trace tricuspid regurgitation. Pulmonic Valve: The pulmonic valve is structurally normal. There is no indication of pulmonic valve regurgitation. Pericardium: No pericardial effusion noted. Aorta: The aortic root is normal. Systemic Veins: The inferior vena cava appears normal in size, with IVC inspiratory collapse greater than 50%.  CONCLUSIONS:  1. Poorly visualized anatomical structures due to suboptimal image quality.  2. The left ventricular systolic function is moderately decreased, with a Salinas's biplane calculated ejection fraction of 38%.  3. Abnormal wall motion.  4. Spectral Doppler shows an abnormal pattern of left ventricular diastolic filling.  5. Akinetic inferolateral segment, and basal inferior wall. QUANTITATIVE DATA SUMMARY:  2D MEASUREMENTS:           Normal Ranges: Ao Root d:       2.80 cm   (2.0-3.7cm) LAs:             3.60 cm   (2.7-4.0cm) IVSd:            0.70 cm    (0.6-1.1cm) LVPWd:           0.70 cm   (0.6-1.1cm) LVIDd:           6.10 cm   (3.9-5.9cm) LVIDs:           5.30 cm LV Mass Index:   78.5 g/m2 LV % FS          13.1 %  LA VOLUME:                    Normal Ranges: LA Vol A4C:        48.7 ml    (22+/-6mL/m2) LA Vol A2C:        49.8 ml LA Vol BP:         51.4 ml LA Vol Index A4C:  23.5ml/m2 LA Vol Index A2C:  24.0 ml/m2 LA Vol Index BP:   24.8 ml/m2 LA Area A4C:       17.6 cm2 LA Area A2C:       18.6 cm2 LA Major Axis A4C: 5.4 cm LA Major Axis A2C: 5.9 cm LA Volume Index:   24.8 ml/m2  RA VOLUME BY A/L METHOD:          Normal Ranges: RA Area A4C:             13.5 cm2  AORTA MEASUREMENTS:         Normal Ranges: Ao Sinus, d:        2.80 cm (2.1-3.5cm) Ao STJ, d:          2.60 cm (1.7-3.4cm) Asc Ao, d:          2.90 cm (2.1-3.4cm)  LV SYSTOLIC FUNCTION BY 2D PLANIMETRY (MOD):                      Normal Ranges: EF-A4C View:    38 % (>=55%) EF-A2C View:    39 % EF-Biplane:     38 % LV EF Reported: 38 %  LV DIASTOLIC FUNCTION:             Normal Ranges: MV Peak E:             0.75 m/s    (0.7-1.2 m/s) MV Peak A:             0.65 m/s    (0.42-0.7 m/s) E/A Ratio:             1.17        (1.0-2.2) MV e'                  0.069 m/s   (>8.0) MV lateral e'          0.08 m/s MV medial e'           0.06 m/s MV A Dur:              109.00 msec E/e' Ratio:            10.91       (<8.0)  MITRAL VALVE:          Normal Ranges: MV DT:        211 msec (150-240msec)  AORTIC VALVE:                     Normal Ranges: AoV Vmax:                1.46 m/s (<=1.7m/s) AoV Peak P.5 mmHg (<20mmHg) AoV Mean P.0 mmHg (1.7-11.5mmHg) LVOT Max Ayad:            1.09 m/s (<=1.1m/s) AoV VTI:                 29.40 cm (18-25cm) LVOT VTI:                21.60 cm LVOT Diameter:           2.20 cm  (1.8-2.4cm) AoV Area, VTI:           2.79 cm2 (2.5-5.5cm2) AoV Area,Vmax:           2.84 cm2 (2.5-4.5cm2) AoV Dimensionless Index: 0.73  RIGHT VENTRICLE: TAPSE: 19.1 mm  TRICUSPID VALVE/RVSP:           Normal Ranges: Peak TR Velocity:     2.00 m/s RV Syst Pressure:     19 mmHg  (< 30mmHg) IVC Diam:             1.60 cm  [id number] Britney Rush MD Electronically signed on 11/20/2024 at 1:49:12 PM  ** Final **      1/2025 [facility]  Recommendations:  Maximize medical therapy.  Agressive risk factor modification efforts.  Consider referral to cardiac rehabilitation.     ____________________________________________________________________________________  CONCLUSIONS:   1. Triple vessel disease.   2. Culprit vessel(s): circumflex.   3. Successful PTCA/stent of LM/CX.   4. Left Ventricular end-diastolic pressure = 10.   5. Normal LV filling pressures.     LAST EKG:    Lab Results   Component Value Date    BUN 51 (H) 04/25/2025    CREATININE 1.83 (H) 04/25/2025     (H) 04/25/2025    MG 1.89 01/22/2025    K 5.0 04/25/2025     04/25/2025       Last Hemoglobin/Hematocrit    Constitutional:       General:  NAD   HENT:      Head: Normocephalic     Neck:  No JVD or HJR   Eyes:      Conjunctiva/sclera: Conjunctivae normal.    Cardiovascular:      Rate and Rhythm: Normal rate and regular rhythm      Heart sounds:  S1 S2 normal, no murmur, diastolic murmur, no S3 or S4   Pulmonary:      Pulmonary effort is normal.  Normal breath sounds, No wheezing or rales.   Abdominal:      General: Bowel sounds are normal, non- tender to palpitations.   Musculoskeletal:         General: Mild edema of the left lower leg and no pitting edema of the right LE.  CHOI well.   Skin:     General: Skin is warm and dry   Neurological:      General: No focal deficit present.      Mental Status: alert and oriented to person, place, and time. Mental status is at baseline.     Psychiatric:         Mood and Affect: Normal Affect.       Assessment/Plan     Problem List Items Addressed This Visit    None   See below.     Chronic systolic heart failure   EF%36  Etiology Ischemic heart disease  AHA Stage: C   NYHA class: 2-3   Volume Status:    GFR: 28    GDMT:  BB- Carvedilol 6.25 mg 1 tablet twice a day   ARB/ACEI/ARNI -  Entresto 97/103 mg twice a day.   MRA -  contraindicated with low GFR  SGLT2i - Farxiga 10 mg daily   Diuretic -   Hydralazine 50 mg three times a day.   Isosorbide mononitrate 60 mg daily   Apixaban 5 mg twice a day   Device Therapy:       CHF: weight is up just a bit over baseline.    Mild edema of the left leg.   He will take the Lasix 20 mg every other day.  and continue to work on the 2 gm salt and 2 liter fluid diet.       Disease process and medications discussed. Questions answered fully.  Emphasized salt restriction.  Encouraged daily monitoring of the patient's weight.  Encouraged regular exercise.  Labs ordered today.  Follow up in 4 weeks.    2.  ASHD with hx of PCI of  LM/Cx in January 2025:   ASA/ Ticagrelor/ lipitor/ BB.   He wants to go to the cardiac rehab.      3.  CKD - 4     GFR per last labs 28 . Referral to nephrology for evaluation           4. PAD s/p Revascularition: per Dr. Kobi MORGAN with apixaban.     5.  Type 2 insulin dependent : managed by endocrinology.         Hilda Hammonds, APRN-CNP                     [1]   Current Outpatient Medications:     acetaminophen (Tylenol) 325 mg tablet, Take 2 tablets (650 mg) by mouth every 4 hours if needed for fever (temp greater than 38.0 C), headaches or mild pain (1 - 3)., Disp: , Rfl:     apixaban (Eliquis) 5 mg tablet, Take 1 tablet (5 mg) by mouth 2 times a day., Disp: 180 tablet, Rfl: 3    aspirin 81 mg chewable tablet, Chew 1 tablet (81 mg) once daily., Disp: , Rfl:     atorvastatin (Lipitor) 80 mg tablet, Take 1 tablet (80 mg) by mouth once daily at bedtime., Disp: 90 tablet, Rfl: 2    blood sugar diagnostic (Blood Glucose Test) strip, 1 each 3 times a day., Disp: 100 strip, Rfl: 11    blood-glucose meter misc, To be used 3 times daily, Disp: 1 each, Rfl: 0    blood-glucose sensor (FreeStyle En 3 Plus Sensor) device, Apply sensor every 15 days to monitor  blood sugar, Disp: 2 each, Rfl: 11    carbamide peroxide (Ear Wax Removal Drops) 6.5 % otic solution, Instill 5 drops into both ears daily for 4 days, Disp: 15 mL, Rfl: 0    carvedilol (Coreg) 6.25 mg tablet, Take 1 tablet by mouth every 12 hours, Disp: 180 tablet, Rfl: 3    Creon 36,000-114,000- 180,000 unit capsule,delayed release(DR/EC) capsule, Take 2 capsules by mouth 3 times a day as needed., Disp: , Rfl:     dapagliflozin propanediol (Farxiga) 10 mg, Take 1 tablet (10 mg) by mouth once daily with breakfast., Disp: 90 tablet, Rfl: 2    dextromethorphan-guaifenesin (Mucinex DM)  mg 12 hr tablet, Take 2 tablets by mouth every 12 hours, Disp: 40 tablet, Rfl: 0    ELDERBERRY FRUIT ORAL, Take 1 tablet by mouth once daily as needed., Disp: , Rfl:     ezetimibe (Zetia) 10 mg tablet, Take 1 tablet by mouth once daily., Disp: 90 tablet, Rfl: 3    ferrous sulfate (FeroSuL) 325 mg (65 mg elemental) tablet, Take 1 tablet (325 mg) by mouth once daily with breakfast., Disp: 30 tablet, Rfl: 3    FreeStyle En 3 [place] misc, Use as instructed, Disp: 1 each, Rfl: 0    furosemide (Lasix) 20 mg tablet, Take 1 tablet (20 mg) by mouth once daily. (Patient taking differently: Take 1 tablet (20 mg) by mouth once daily as needed (As needed for swelling, weight gain of 4 lbs or more, or shortness of breath.).), Disp: 30 tablet, Rfl: 1    gabapentin (Neurontin) 400 mg capsule, Take 1 capsule (400 mg) by mouth 3 times a day., Disp: 90 capsule, Rfl: 5    GINGER ROOT EXTRACT ORAL, Take 1 tablet by mouth once daily as needed., Disp: , Rfl:     hydrALAZINE (Apresoline) 50 mg tablet, Take 1 tablet (50 mg) by mouth 3 times a day., Disp: 270 tablet, Rfl: 3    insulin glargine (Lantus) 100 unit/mL (3 mL) pen, Inject 10 Units under the skin once daily at bedtime., Disp: 15 mL, Rfl: 5    insulin lispro (HumaLOG) 100 unit/mL pen, Inject 14 Units under the skin 3 times daily (morning, midday, late afternoon)., Disp: 30 mL, Rfl: 5     "isosorbide mononitrate ER (Imdur) 60 mg 24 hr tablet, Take 1 tablet (60 mg) by mouth once daily., Disp: 90 tablet, Rfl: 3    lancets 30 gauge misc, 1 each 3 times a day., Disp: 100 each, Rfl: 11    medical cannabis, Not [facility] prescribed, Disp: , Rfl:     OXcarbazepine (Trileptal) 150 mg tablet, Take 3 tablets (450 mg) by mouth 2 times a day., Disp: 540 tablet, Rfl: 3    pantoprazole (ProtoNix) 40 mg EC tablet, TAKE 1 TABLET BY MOUTH ONCE DAILY, Disp: 90 tablet, Rfl: 3    pen needle, diabetic (BD Ultra-Fine Jocy Pen Needle) 32 gauge x 5/32\" needle, Pen Needles for Insulin., Disp: 100 each, Rfl: 2    pen needle, diabetic (BD Ultra-Fine Jocy Pen Needle) 32 gauge x 5/32\" needle, Use to inject insulin up to 8 times per day., Disp: 400 each, Rfl: 0    pen needle, diabetic (TechLITE Pen Needle) 32 gauge x 1/4\" needle, Use to inject 1-4 times daily as directed, Disp: 100 each, Rfl: 11    ranolazine (Ranexa) 500 mg 12 hr tablet, Take 1 tablet by mouth twice daily., Disp: 180 tablet, Rfl: 3    sacubitriL-valsartan (Entresto)  mg tablet, Take 1 tablet by mouth twice daily., Disp: 180 tablet, Rfl: 3    tamsulosin (Flomax) 0.4 mg 24 hr capsule, TAKE 1 CAPSULE BY MOUTH ONCE DAILY (Patient taking differently: Take 2 capsules (0.8 mg) by mouth once daily.), Disp: 90 capsule, Rfl: 1    ticagrelor (Brilinta) 90 mg tablet, Take 1 tablet (90 mg) by mouth 2 times a day., Disp: 180 tablet, Rfl: 3    tiZANidine (Zanaflex) 2 mg tablet, Take 1 tablet (2 mg) by mouth once daily as needed for muscle spasms., Disp: 30 tablet, Rfl: 0    "

## 2025-05-12 ENCOUNTER — APPOINTMENT (OUTPATIENT)
Dept: ENDOCRINOLOGY | Facility: CLINIC | Age: 60
End: 2025-05-12
Payer: COMMERCIAL

## 2025-05-12 DIAGNOSIS — Z79.4 TYPE 2 DIABETES MELLITUS WITHOUT COMPLICATION, WITH LONG-TERM CURRENT USE OF INSULIN: ICD-10-CM

## 2025-05-12 DIAGNOSIS — E11.9 TYPE 2 DIABETES MELLITUS WITHOUT COMPLICATION, WITH LONG-TERM CURRENT USE OF INSULIN: ICD-10-CM

## 2025-05-12 LAB
ANION GAP SERPL CALCULATED.4IONS-SCNC: 12 MMOL/L (CALC) (ref 7–17)
BNP SERPL-MCNC: 221 PG/ML
BUN SERPL-MCNC: 29 MG/DL (ref 7–25)
BUN/CREAT SERPL: 22 (CALC) (ref 6–22)
CALCIUM SERPL-MCNC: 8.8 MG/DL (ref 8.6–10.3)
CHLORIDE SERPL-SCNC: 105 MMOL/L (ref 98–110)
CO2 SERPL-SCNC: 23 MMOL/L (ref 20–32)
CREAT SERPL-MCNC: 1.29 MG/DL (ref 0.7–1.3)
EGFRCR SERPLBLD CKD-EPI 2021: 64 ML/MIN/1.73M2
GLUCOSE SERPL-MCNC: 105 MG/DL (ref 65–139)
MAGNESIUM SERPL-MCNC: 2.6 MG/DL (ref 1.5–2.5)
POTASSIUM SERPL-SCNC: 4.4 MMOL/L (ref 3.5–5.3)
SODIUM SERPL-SCNC: 140 MMOL/L (ref 135–146)

## 2025-05-12 PROCEDURE — 95251 CONT GLUC MNTR ANALYSIS I&R: CPT

## 2025-05-12 PROCEDURE — 99211 OFF/OP EST MAY X REQ PHY/QHP: CPT

## 2025-05-12 NOTE — PROGRESS NOTES
Patient is sent at the request of Tyler Hensley* for my opinion regarding diabetes.  My recommendations below will be communicated back to the requesting provider by way of shared medical record.    Recommendations:   Continue Lantus 10 units daily at bedtime. If BG<100mg/dL, may give 8 units  Continue Humalog 14 units PLUS sliding scale. Take 15 minutes before meal  Clarified instructions  Continue Farxiga 10mg daily  Establish with nephrology to discuss kidney function  Stop smoking  Follow up with PharmD in 1 month per patient request  ________________________________________________________________________      Subjective   Past Medical History:  Patient Active Problem List   Diagnosis    Abdominal pain, acute, right upper quadrant    Abnormal CXR    Atherosclerosis of native artery of both lower extremities with intermittent claudication    Atherosclerotic heart disease of native coronary artery without angina pectoris    Bacterial skin infection    Bipolar depression (Multi)    BPH (benign prostatic hyperplasia)    Burning pain    Causalgia of lower extremity    Hip pain, right    Pain of right lower extremity    Ischemic stroke (Multi)    Chronic combined systolic and diastolic CHF (congestive heart failure)    Chronic back pain greater than 3 months duration    Chronic hepatitis C virus genotype 1a infection (Multi)    Chronic obstructive pulmonary disease (Multi)    Chronic pain syndrome    Chronic RUQ pain    Claudication    Coordination impairment    Decreased peripheral vision of left eye    Diabetic neuropathy, painful (Multi)    Diabetic polyneuropathy associated with type 2 diabetes mellitus    Eschar of lower leg    Frequent falls    Gastroesophageal reflux disease    H/O: CVA (cerebrovascular accident)    Hepatic steatosis    Hepatitis C virus infection cured after antiviral drug therapy    Hepatitis-C    Hyperkalemia    Hyperlipidemia    Hypertension    Joint stiffness of both shoulders     Left ventricular dysfunction    Low back pain    Nasal congestion with rhinorrhea    Constipation    Nausea in adult    Neuropathic pain    Nicotine dependence    Neuropathy    BRUNA (obstructive sleep apnea)    Other symptoms and signs involving the musculoskeletal system    Pancreatic lesion (HHS-HCC)    Pancreatic malabsorption (HHS-HCC)    Paroxysmal nocturnal dyspnea    Peripheral arterial disease (CMS-HCC)    Poor balance    Other chronic postprocedural pain    Presence of aortocoronary bypass graft    Pulmonary congestion    Recurrent boils    Reduced chest expansion on inspiration    S/P CABG x 5    Right flank pain    Type 2 diabetes mellitus    Spasticity as late effect of cerebrovascular accident (CVA)    Shortness of breath on exertion    Dyspnea    Shingles    S/P PTCA (percutaneous transluminal coronary angioplasty)    Long-term insulin use (Multi)    Hospital discharge follow-up    PAD (peripheral artery disease) (CMS-[facility])    Critical limb ischemia of left lower extremity    Peripheral artery occlusion (CMS-[facility])    Postoperative abscess    Flash pulmonary edema    Acute heart failure    CKD (chronic kidney disease) stage 4, GFR 15-29 ml/min (Multi)     HPI:  Dereck Shen is a 59 y.o. male with a PMH significant for HepC, acute heart failure, CABG, CVA  Pt presents today for new patient visit with endo PharmCHARLES for Type 2 Diabetes Mellitus  Last seen by myself on 4/14/25 where regimen was continued. Discussed CKD    Today:   Discussed complex CV history including CVA, CABG, CHF.   On many medications- gets tiring but knows he has to take them  Lasix every other day per cardiology  Potassium and renal function numbers normalized  Discussed change in eGFR, UACR. Potential contributors dehydration, medication changes  UACR >300mg/g creat, but improved from >3000mg/g creat in 2023  Trying to stay more hydrated. Cut back on coffee  SOB on exertion. Has been better since last stent  Restarting cardiac  "rehab. All they will let him do is the bike.   Fearful to walk outside. Uses cane for balance  Patient states he has a tumor on his pancreas  Very concerned regarding CKD4 diagnosis from Stacia Hammonds- cardiology NP. States he saw diagnoses on paperwork, but it was never discussed. Started smoking again this weekend due to stress. Reviewed labs together. Unable to identify an eGFR <42 mL/min/1.73m2. Labs from last week show eGFR 64 mL/min/1.73m2. Discussed that patient should establish with nephrology, but I am unable to locate any evidence of CKD4.   Discovered that patient is taking lispro 14 units before meal, then taking second injection of sliding scale depending how high his sugar goes.        Diabetes Pharmacotherapy:    Lantus 10 units at bedtime.   Lispro 14 units plus sliding scale 2 before meals starting at 200  15-30 minutes before meal  Farxiga 10mg daily    Adherence: denies non-adherence    Previously trialed meds:   Metformin- why stopped?. Did not sit well.   Glipizide    Social:  Current diet: in general, a \"healthy\" diet    Breakfast - blackberries. Berries- oatmeal, cream of wheat. Piece of toast with brown bread  Lunch - salad with tuna fish, grilled chicken throw out bread  Dinner - filet, salmon. Lots of vegetables  Snack - meals  Fluids -   Current exercise: Limited by pain in L leg      Allergies:  Celecoxib; Ibuprofen; Tetanus toxoid, adsorbed; [id number] antimigraine agents; Cephalexin; Hydrocodone; Latex; and Tryptophan    Medication list:  Current Outpatient Medications   Medication Instructions    acetaminophen (TYLENOL) 650 mg, oral, Every 4 hours PRN    aspirin 81 mg, Daily    atorvastatin (LIPITOR) 80 mg, oral, Nightly    blood sugar diagnostic (Blood Glucose Test) strip 1 each, miscellaneous, 3 times daily    blood-glucose meter misc To be used 3 times daily    blood-glucose sensor (FreeStyle En 3 Plus Sensor) device Apply sensor every 15 days to monitor blood sugar    " "Brilinta 90 mg, oral, 2 times daily    carbamide peroxide (Ear Wax Removal Drops) 6.5 % otic solution Instill 5 drops into both ears daily for 4 days    carvedilol (Coreg) 6.25 mg tablet Take 1 tablet by mouth every 12 hours    Creon 36,000-114,000- 180,000 unit capsule,delayed release(DR/EC) capsule 2 capsules, 3 times daily PRN    dextromethorphan-guaifenesin (Mucinex DM)  mg 12 hr tablet Take 2 tablets by mouth every 12 hours    ELDERBERRY FRUIT ORAL 1 tablet, Daily PRN    Eliquis 5 mg, oral, 2 times daily    ezetimibe (Zetia) 10 mg tablet Take 1 tablet by mouth once daily.    Farxiga 10 mg, oral, Daily with breakfast    ferrous sulfate (FeroSuL) 325 mg (65 mg elemental) tablet Take 1 tablet (325 mg) by mouth once daily with breakfast.    FreeStyle En 3 [place] misc Use as instructed    furosemide (LASIX) 20 mg, oral, Every other day    gabapentin (NEURONTIN) 400 mg, oral, 3 times daily    [place] ROOT EXTRACT ORAL 1 tablet, Daily PRN    hydrALAZINE (APRESOLINE) 50 mg, oral, 3 times daily    insulin glargine (LANTUS) 10 Units, subcutaneous, Nightly    insulin lispro (HUMALOG) 14 Units, subcutaneous, 3 times daily (morning, midday, late afternoon)    isosorbide mononitrate ER (IMDUR) 60 mg, oral, Daily    lancets 30 gauge misc 1 each, miscellaneous, 3 times daily    medical cannabis Not [facility] prescribed    OXcarbazepine (TRILEPTAL) 450 mg, oral, 2 times daily    pantoprazole (ProtoNix) 40 mg EC tablet TAKE 1 TABLET BY MOUTH ONCE DAILY    pen needle, diabetic (BD Ultra-Fine Jocy Pen Needle) 32 gauge x 5/32\" needle Pen Needles for Insulin.    pen needle, diabetic (BD Ultra-Fine Jocy Pen Needle) 32 gauge x 5/32\" needle Use to inject insulin up to 8 times per day.    pen needle, diabetic (TechLITE Pen Needle) 32 gauge x 1/4\" needle Use to inject 1-4 times daily as directed    ranolazine (Ranexa) 500 mg 12 hr tablet Take 1 tablet by mouth twice daily.    sacubitriL-valsartan (Entresto)  mg tablet Take 1 " "tablet by mouth twice daily.    tamsulosin (Flomax) 0.4 mg 24 hr capsule TAKE 1 CAPSULE BY MOUTH ONCE DAILY    tiZANidine (ZANAFLEX) 2 mg, oral, Daily PRN        Objective   Last Recorded Vitals:  BP Readings from Last 3 Encounters:   05/09/25 140/84   04/01/25 158/83   03/20/25 128/60     Wt Readings from Last 3 Encounters:   05/09/25 93.9 kg (207 lb)   04/07/25 87.5 kg (193 lb)   04/01/25 87.5 kg (193 lb)     BMI Readings from Last 1 Encounters:   05/09/25 28.87 kg/m²      Labs  A1C  Lab Results   Component Value Date    HGBA1C 7.9 (H) 03/20/2025    HGBA1C 7.9 (H) 12/18/2024    HGBA1C 8.2 (A) 10/01/2024     BMP/LFTs  Lab Results   Component Value Date    CREATININE 1.29 05/09/2025    CREATININE 1.83 (H) 04/25/2025    CREATININE 1.43 (H) 04/01/2025    EGFR 64 05/09/2025    EGFR 42 (L) 04/25/2025    EGFR 57 (L) 04/01/2025    GLUCOSE 105 05/09/2025     05/09/2025    K 4.4 05/09/2025     05/09/2025    CALCIUM 8.8 05/09/2025    CO2 23 05/09/2025    BUN 29 (H) 05/09/2025    ALT 10 04/01/2025    AST 9 04/01/2025    ALKPHOS 106 04/01/2025    BILITOT 0.3 04/01/2025     Lipids  Lab Results   Component Value Date    TRIG 179 (H) 01/24/2025    CHOL 102 01/24/2025    LDLF 173 (H) 04/04/2023    LDLCALC 39 01/24/2025    LDLDIRECT 56 04/13/2021    HDL 27.5 01/24/2025     Urine Albumin Creatinine Ratio  Lab Results   Component Value Date    MICROALBCREA 378 (H) 03/20/2025    MICROALBCREA 1,748.0 (H) 04/04/2023    MICROALBCREA 499.1 (H) 05/18/2021     ASCVD risk  The ASCVD Risk score (Lalo MCKAY, et al., 2019) failed to calculate for the following reasons:    Risk score cannot be calculated because patient has a medical history suggesting prior/existing ASCVD    Additional labs:  No results found for: \"FRUCTOSAMINE\", \"CPEPTIDE\", \"CHY19HB\", \"NTIB\", \"ZNT8A\", \"INSAB\"    Home glucose monitoring:  Hypoglycemia: denies readings <70 mg/dL or s/sx of hypoglycemia                Assessment/Plan   Type 2 Diabetes Mellitus  Goal " A1C <7%  T2DM relatively well controlled (TIR 65%) on basal-bolus insulin regimen plus Farxiga. Today, it was identified that patient misunderstood Humalog dosing instructions. Has been giving base dose before meal, then adding sliding scale after meal depending how high his sugar goes. Advised to add sliding scale to bolus dose and give all at once 15 minutes before meal.  Plan:  Continue Lantus 10 units daily  Continue Humalog 14 units PLUS  sliding scale with meals, Farxiga 10mg daily  Home glucose monitoring:   Upgrade to FSL3+  A minimum of 72 hours of CGM data was reviewed and used to make therapy changes.   Education Provided to Patient:   Best way to maintain good renal function is BP and BG control  Benefits of upgrading to En 3  Follow-up with Dr. Wright regarding lasix dose  Take Humalog 15 minutes before meal   Hypertension:   Goal BP <130/80   BP above goal  Managed by Dr. Wright  Secondary prevention:   Therapy: High intensity statin and Ezetimibe   LDL result meets goal   Renal:  CKD: stage 3 - GFR 30-59  ACR: >300 (03/2025)  Renal protective agents: ACEi/ARB and SGLT2i  DM medications are dosed appropriately for renal function  Labs: up to date   PharmD follow-up: 1 month per patient request  Endo follow-up: 7/30/25    Patient agreeable to plan as above, contact information provided for any future questions or concerns.    Laurie Lake, Ariel    Type of encounter: in person  Provider on site: Dr. Zahira Tate MD    Continue all meds under the continuation of care with the referring provider and clinical pharmacy team.

## 2025-05-12 NOTE — PATIENT INSTRUCTIONS
Continue Lantus 10 units at bedtime. If blood sugar less than 100 at bedtime, give 8 units    Lispro 14 units with meals PLUS sliding scale  To continue sliding scale with Lispro insulin with meals:  150-200 - 2 units  201-250 - 4 units  251-300 - 6 units  301-350 - 8 units  >251 - 10 units    Continue Farxiga 10mg daily

## 2025-05-15 ENCOUNTER — APPOINTMENT (OUTPATIENT)
Dept: CARDIOLOGY | Facility: HOSPITAL | Age: 60
End: 2025-05-15
Payer: COMMERCIAL

## 2025-05-20 ENCOUNTER — TELEPHONE (OUTPATIENT)
Dept: PHARMACY | Facility: HOSPITAL | Age: 60
End: 2025-05-20
Payer: COMMERCIAL

## 2025-05-20 NOTE — TELEPHONE ENCOUNTER
"Patient called with concerns that Freestyle En 3 sensor was giving inaccurate readings. Read \"high\" all weekend. Finger sticks have shown glucose in the 100-200mg/dL range. Advised patient to remove current senor and replace with new sensor. Call [employer] to report faulty sensor and request replacement. Call me if discrepancy continues. Patient expresses understanding.    Laurie Lake, PharmD    "

## 2025-05-23 DIAGNOSIS — I50.42 CHRONIC COMBINED SYSTOLIC AND DIASTOLIC CHF (CONGESTIVE HEART FAILURE): ICD-10-CM

## 2025-05-24 PROCEDURE — RXMED WILLOW AMBULATORY MEDICATION CHARGE

## 2025-05-25 ENCOUNTER — PHARMACY VISIT (OUTPATIENT)
Dept: PHARMACY | Facility: CLINIC | Age: 60
End: 2025-05-25
Payer: MEDICAID

## 2025-05-27 DIAGNOSIS — Z95.5 S/P CORONARY ARTERY STENT PLACEMENT: Primary | ICD-10-CM

## 2025-05-27 DIAGNOSIS — I25.10 ASHD (ARTERIOSCLEROTIC HEART DISEASE): ICD-10-CM

## 2025-05-28 ENCOUNTER — PHARMACY VISIT (OUTPATIENT)
Dept: PHARMACY | Facility: CLINIC | Age: 60
End: 2025-05-28

## 2025-05-28 ENCOUNTER — APPOINTMENT (OUTPATIENT)
Facility: CLINIC | Age: 60
End: 2025-05-28
Payer: COMMERCIAL

## 2025-05-28 VITALS
SYSTOLIC BLOOD PRESSURE: 146 MMHG | HEART RATE: 69 BPM | WEIGHT: 209 LBS | DIASTOLIC BLOOD PRESSURE: 86 MMHG | BODY MASS INDEX: 29.26 KG/M2 | OXYGEN SATURATION: 96 % | HEIGHT: 71 IN

## 2025-05-28 DIAGNOSIS — D64.9 ANEMIA: ICD-10-CM

## 2025-05-28 DIAGNOSIS — R10.11 RUQ PAIN: Primary | ICD-10-CM

## 2025-05-28 PROCEDURE — 3077F SYST BP >= 140 MM HG: CPT | Performed by: NURSE PRACTITIONER

## 2025-05-28 PROCEDURE — 3008F BODY MASS INDEX DOCD: CPT | Performed by: NURSE PRACTITIONER

## 2025-05-28 PROCEDURE — 99215 OFFICE O/P EST HI 40 MIN: CPT | Performed by: NURSE PRACTITIONER

## 2025-05-28 PROCEDURE — 99417 PROLNG OP E/M EACH 15 MIN: CPT | Performed by: NURSE PRACTITIONER

## 2025-05-28 PROCEDURE — 3048F LDL-C <100 MG/DL: CPT | Performed by: NURSE PRACTITIONER

## 2025-05-28 PROCEDURE — 3079F DIAST BP 80-89 MM HG: CPT | Performed by: NURSE PRACTITIONER

## 2025-05-28 ASSESSMENT — ENCOUNTER SYMPTOMS
CONFUSION: 0
JOINT SWELLING: 0
FEVER: 0
DIZZINESS: 0
DIFFICULTY URINATING: 0
BRUISES/BLEEDS EASILY: 0
CHILLS: 0
COUGH: 0
WEAKNESS: 0
ARTHRALGIAS: 0
PALPITATIONS: 0
TROUBLE SWALLOWING: 1
ADENOPATHY: 0
WOUND: 0
SORE THROAT: 0
ROS GI COMMENTS: SEE HPI
SHORTNESS OF BREATH: 0

## 2025-05-28 NOTE — PROGRESS NOTES
Subjective   Patient ID: Dereck Shen is a 59 y.o. male with PMH of PAD (R fem-pop bypass 2/24/20; L fem-pop bypass 9/14/20; L fem PTA of the graft; R iliac PTA 2/2022; LLE and DANYA PTA 4/2024; angioplasty of the left SFA 10/21/24; and left femoral and tibial thrombectomy with patch angioplasty 11/19/24), CAD s/p CABG x5 (2019) and PCI of Cx, 01/04/2021 and s/p PCI of LM/Cx 01/23/2025, CHF (EF 35%), HTN, HLD, ischemic stroke (reports 8 strokes between 2018 - 2023), seizure, DM2, GERD, HCV cured with epclusa, pNET (follows with pancreas clinic), EPI, BPH, CKD, bipolar, diabetic neuropathy, COPD, BRUNA who was referred by Baljinder Wright MD for Anemia.     Patient's PCP is SUMAN Carlin-C[facility]    HPI  Patient referred by Dr. Wright (cardiology) for anemia.     He was hospitalized in January 2025 for CHF and CAD. He had a PCI of PTCA/stent of LM/CX. He was also found to have iron deficiency and B12 deficiency with anemia and hgb 8-9. His most recent H&H 4/1/25 was 12.9/38.3. He is taking iron supplements at home. His EF has been around 35% for 2 years now.     He reports RUQ pain at times. It is worse with overeating and worse on palpation. He also has intermittent dysphagia that occurs only every few weeks. He denies any N/V, hematemesis, melena, change in bowel habits, or hematochezia. He has Creon he uses for EPI.     He is currently on brilinta and eliquis. Brilinta is managed by cardiology, and eliquis is managed by vascular (Dr. Peace).     No prior scopes.       Summary of endoscopies:      Social Hx:  Tobacco: off/on 49 years; currently smoking   Etoh: none   Recreational drug use: daily marijuana   NSAIDs: none      Family Hx:  No colon cancer, IBD, or pancreatitis   Cousin -- esophageal cancer     Review of Systems:  Review of Systems   Constitutional:  Negative for chills and fever.   HENT:  Positive for trouble swallowing. Negative for sore throat.    Respiratory:  Negative for cough and shortness of  breath.    Cardiovascular:  Negative for chest pain and palpitations.   Gastrointestinal:         SEE HPI   Endocrine: Negative for cold intolerance and heat intolerance.   Genitourinary:  Negative for difficulty urinating.   Musculoskeletal:  Negative for arthralgias and joint swelling.   Skin:  Negative for rash and wound.   Neurological:  Negative for dizziness and weakness.   Hematological:  Negative for adenopathy. Does not bruise/bleed easily.   Psychiatric/Behavioral:  Negative for confusion.         Medications:  Prior to Admission medications    Medication Sig Start Date End Date Taking? Authorizing Provider   acetaminophen (Tylenol) 325 mg tablet Take 2 tablets (650 mg) by mouth every 4 hours if needed for fever (temp greater than 38.0 C), headaches or mild pain (1 - 3). 1/24/25   Foster Aguirre DO   apixaban (Eliquis) 5 mg tablet Take 1 tablet (5 mg) by mouth 2 times a day. 3/20/25 3/20/26  Baljinder Wright MD   aspirin 81 mg chewable tablet Chew 1 tablet (81 mg) once daily.    Historical Provider, MD   atorvastatin (Lipitor) 80 mg tablet Take 1 tablet (80 mg) by mouth once daily at bedtime. 3/20/25 3/20/26  Baljinder Wright MD   blood sugar diagnostic (Blood Glucose Test) strip 1 each 3 times a day. 3/20/25 3/20/26  Haylie Tate MD   blood-glucose meter misc To be used 3 times daily 3/20/25   Haylie Tate MD   blood-glucose sensor (FreeStyle En 3 Plus Sensor) device Apply sensor every 15 days to monitor blood sugar 4/14/25   Haylie Tate MD   carbamide peroxide (Ear Wax Removal Drops) 6.5 % otic solution Instill 5 drops into both ears daily for 4 days 4/25/25      carvedilol (Coreg) 6.25 mg tablet Take 1 tablet by mouth every 12 hours 3/20/25 3/20/26  Baljinder Wright MD   Creon 36,000-114,000- 180,000 unit capsule,delayed release(DR/EC) capsule Take 2 capsules by mouth 3 times a day as needed. 5/20/21   Historical Provider, MD   dapagliflozin propanediol (Farxiga)  10 mg tablet Take 1 tablet (10 mg) by mouth once daily with breakfast. 10/11/24      dextromethorphan-guaifenesin (Mucinex DM)  mg 12 hr tablet Take 2 tablets by mouth every 12 hours 4/25/25      ELDERBERRY FRUIT ORAL Take 1 tablet by mouth once daily as needed.    Historical Provider, MD   ezetimibe (Zetia) 10 mg tablet Take 1 tablet by mouth once daily. 3/20/25 3/20/26  Baljinder Wright MD   ferrous sulfate (FeroSuL) 325 mg (65 mg elemental) tablet Take 1 tablet (325 mg) by mouth once daily with breakfast. 3/20/25 3/20/26  Baljinder Wright MD   FreeStyle En 3 [place] misc Use as instructed 4/14/25   Haylie Tate MD   furosemide (Lasix) 20 mg tablet Take 1 tablet (20 mg) by mouth every other day. 5/9/25 8/7/25  Hilda Hammonds, APRN-CNP   gabapentin (Neurontin) 400 mg capsule Take 1 capsule (400 mg) by mouth 3 times a day. 3/20/25 9/16/25  Haylie Tate MD   GINGER ROOT EXTRACT ORAL Take 1 tablet by mouth once daily as needed.    Historical Provider, MD   hydrALAZINE (Apresoline) 50 mg tablet Take 1 tablet (50 mg) by mouth 3 times a day. 3/20/25 3/20/26  Baljinder Wright MD   insulin glargine (Lantus) 100 unit/mL (3 mL) pen Inject 10 Units under the skin once daily at bedtime. 3/20/25 9/16/25  Haylie Tate MD   insulin lispro (HumaLOG) 100 unit/mL pen Inject 14 Units under the skin 3 times daily (morning, midday, late afternoon). 3/20/25 9/16/25  Haylie Tate MD   isosorbide mononitrate ER (Imdur) 60 mg 24 hr tablet Take 1 tablet (60 mg) by mouth once daily. 3/20/25 3/20/26  Baljinder Wright MD   lancets 30 gauge misc 1 each 3 times a day. 3/20/25 3/20/26  Haylie Tate MD   medical cannabis Not  prescribed    Historical Provider, MD   OXcarbazepine (Trileptal) 150 mg tablet Take 3 tablets (450 mg) by mouth 2 times a day. 11/8/24      pantoprazole (ProtoNix) 40 mg EC tablet TAKE 1 TABLET BY MOUTH ONCE DAILY 11/21/24 11/16/25  Stephanie Arce,  "APRN-CNS   pen needle, diabetic (BD Ultra-Fine Jocy Pen Needle) 32 gauge x 5/32\" needle Pen Needles for Insulin. 2/27/24   DELMA Carlin   pen needle, diabetic (BD Ultra-Fine Jocy Pen Needle) 32 gauge x 5/32\" needle Use to inject insulin up to 8 times per day. 12/22/23      pen needle, diabetic (TechLITE Pen Needle) 32 gauge x 1/4\" needle Use to inject 1-4 times daily as directed 2/27/24   DELMA Carlin   ranolazine (Ranexa) 500 mg 12 hr tablet Take 1 tablet by mouth twice daily. 3/20/25 3/20/26  Baljinder Wright MD   sacubitriL-valsartan (Entresto)  mg tablet Take 1 tablet by mouth twice daily. 3/20/25 3/20/26  Baljinder Wright MD   tamsulosin (Flomax) 0.4 mg 24 hr capsule TAKE 1 CAPSULE BY MOUTH ONCE DAILY  Patient taking differently: Take 2 capsules (0.8 mg) by mouth once daily. 12/26/24 6/24/25  DELMA Carlin   ticagrelor (Brilinta) 90 mg tablet Take 1 tablet (90 mg) by mouth 2 times a day. 3/20/25 3/20/26  Baljinder Wright MD   tiZANidine (Zanaflex) 2 mg tablet Take 1 tablet (2 mg) by mouth once daily as needed for muscle spasms. 12/22/23 3/20/25  DELMA Carlin       Allergies:  Celecoxib; Ibuprofen; Tetanus toxoid, adsorbed; [id number] antimigraine agents; Cephalexin; Hydrocodone; Latex; and Tryptophan    Past Medical History:  He has a past medical history of Aphasia, Atherosclerotic heart disease of native coronary artery with unspecified angina pectoris (11/05/2019), Bipolar disorder, unspecified (Multi), BPH (benign prostatic hyperplasia), Diabetes (Multi), DVT (deep venous thrombosis) (Multi) (02/2022), Erectile dysfunction, GERD (gastroesophageal reflux disease), Hepatitis C, HFrEF (heart failure with reduced ejection fraction), HLD (hyperlipidemia), HTN (hypertension), Obstructive sleep apnea (adult) (pediatric), Opioid abuse, in remission, PAD (peripheral artery disease), Polymyalgia rheumatica (Multi), Post-traumatic stress disorder, unspecified, and Stroke " (Multi) (07/24/2023).    Past Surgical History:  He has a past surgical history that includes Knee surgery (Left); Elbow surgery; Back surgery; CT angio aorta and bilateral iliofemoral runoff including without contrast if performed (07/20/2020); MR angio head wo IV contrast (01/04/2022); MR angio neck wo IV contrast (01/04/2022); CT angio aorta and bilateral iliofemoral runoff including without contrast if performed (01/14/2022); Invasive Vascular Procedure (Bilateral, 10/04/2024); Dental surgery; Coronary angioplasty with stent; Coronary artery bypass graft; Tonsillectomy; Transluminal atherectomy femoral-popliteal / tibioperoneal; Invasive Vascular Procedure (N/A, 10/21/2024); Invasive Vascular Procedure (N/A, 10/21/2024); Cardiac catheterization (N/A, 1/23/2025); Cardiac catheterization (N/A, 1/23/2025); and Cardiac catheterization (N/A, 1/23/2025).    Social History:  He reports that he has quit smoking. His smoking use included cigarettes. He has been exposed to tobacco smoke. He has never used smokeless tobacco. He reports current drug use. Frequency: 7.00 times per week. Drug: Marijuana. He reports that he does not drink alcohol.    Objective   Physical exam:  Physical Exam  Constitutional:       General: He is not in acute distress.     Appearance: Normal appearance.   HENT:      Mouth/Throat:      Mouth: Mucous membranes are moist.      Comments: pink  Eyes:      Conjunctiva/sclera: Conjunctivae normal.      Pupils: Pupils are equal, round, and reactive to light.   Cardiovascular:      Rate and Rhythm: Normal rate and regular rhythm.      Heart sounds: No murmur heard.  Pulmonary:      Effort: Pulmonary effort is normal.      Breath sounds: Normal breath sounds.   Abdominal:      General: Bowel sounds are normal. There is no distension.      Palpations: Abdomen is soft.      Tenderness: There is abdominal tenderness (epigastric, RUQ). There is no guarding.   Skin:     General: Skin is warm and dry.       Coloration: Skin is not jaundiced.   Neurological:      Mental Status: He is alert and oriented to person, place, and time.   Psychiatric:         Mood and Affect: Mood normal.         Behavior: Behavior normal.          Assessment/Plan     Anemia   Intermittent anemia since 2022. Has had evidence of iron deficiency on labs and currently on iron supplementation. Will check CBC and TTG IgA. Patient has chronic disease with heart failure, kidney disease, and vascular disease which are also likely contributing to anemia. We discussed EGD/colonoscopy and the risks/benefits of procedures, including increased risks with his multiple comorbidities. Patient wants to pursue endoscopy procedures. Will check with cardiology/vascular regarding holding his eliquis and brilinta.     RUQ pain   Will check CBC/CMP. Recent imaging without etiology of this pain. Could consider hida after endoscopy procedures.         66 minutes spent in total care of patient        Amanda Escobedo, APRN-CNP

## 2025-05-28 NOTE — PATIENT INSTRUCTIONS
Thank you for coming to your appointment today   - do your blood work in the outpatient lab   - I will discuss with cardiology and vascular about scopes   - we will be in touch with you     Please call [phone] with any questions or concerns       If you utilize [employer] messages, please understand that these are intended to be used for simple and straightforward medical questions. If you have a more complex question or numerous complaints, an office appointment may be needed.

## 2025-05-29 ENCOUNTER — TELEPHONE (OUTPATIENT)
Facility: CLINIC | Age: 60
End: 2025-05-29
Payer: COMMERCIAL

## 2025-05-29 LAB
ALBUMIN SERPL-MCNC: 4.4 G/DL (ref 3.6–5.1)
ALP SERPL-CCNC: 113 U/L (ref 35–144)
ALT SERPL-CCNC: 12 U/L (ref 9–46)
ANION GAP SERPL CALCULATED.4IONS-SCNC: 14 MMOL/L (CALC) (ref 7–17)
AST SERPL-CCNC: 13 U/L (ref 10–35)
BILIRUB SERPL-MCNC: 0.6 MG/DL (ref 0.2–1.2)
BUN SERPL-MCNC: 22 MG/DL (ref 7–25)
CALCIUM SERPL-MCNC: 9 MG/DL (ref 8.6–10.3)
CHLORIDE SERPL-SCNC: 103 MMOL/L (ref 98–110)
CO2 SERPL-SCNC: 18 MMOL/L (ref 20–32)
CREAT SERPL-MCNC: 1.19 MG/DL (ref 0.7–1.3)
EGFRCR SERPLBLD CKD-EPI 2021: 71 ML/MIN/1.73M2
ERYTHROCYTE [DISTWIDTH] IN BLOOD BY AUTOMATED COUNT: 13 % (ref 11–15)
GLUCOSE SERPL-MCNC: 182 MG/DL (ref 65–99)
HCT VFR BLD AUTO: 46.5 % (ref 38.5–50)
HGB BLD-MCNC: 15.7 G/DL (ref 13.2–17.1)
MCH RBC QN AUTO: 32.3 PG (ref 27–33)
MCHC RBC AUTO-ENTMCNC: 33.8 G/DL (ref 32–36)
MCV RBC AUTO: 95.7 FL (ref 80–100)
PLATELET # BLD AUTO: 242 THOUSAND/UL (ref 140–400)
PMV BLD REES-ECKER: 10 FL (ref 7.5–12.5)
POTASSIUM SERPL-SCNC: 5.1 MMOL/L (ref 3.5–5.3)
PROT SERPL-MCNC: 7.5 G/DL (ref 6.1–8.1)
RBC # BLD AUTO: 4.86 MILLION/UL (ref 4.2–5.8)
SODIUM SERPL-SCNC: 135 MMOL/L (ref 135–146)
TTG IGA SER-ACNC: <1 U/ML
WBC # BLD AUTO: 6.5 THOUSAND/UL (ref 3.8–10.8)

## 2025-05-29 NOTE — TELEPHONE ENCOUNTER
----- Message from Cherelle MIMS sent at 2025  1:45 PM EDT -----  Regarding: cardiac clearance - sundeep ferrari Dr.,      Your patient, Dereck Shen (: 1965), is being scheduled for an endoscopic procedure (EGD and/or colonoscopy). They are currently taking an anticoagulant or antiplatelet medication that is being managed by your office. Prior to scheduling the procedure we need to know if it is okay for the patient to temporarily hold this medication for their procedure.    Low dose Aspirin (81 mg per day) is considered safe for endoscopic procedures and should be continued through the day of their procedure.      Current Anticoagulation: Brilinta    This medication would need to be held starting the following number of days before the procedure(s).    Farxiga: 3 days before procedure  Brilinta (Ticagrelor): 5 days before procedure            Please Respond to this Message at your Earliest Convenience (for ease you may copy and paste one of the following responses into your reply)      _______  It is CURRENTLY OKAY (safe) for this patient to temporarily hold the above medication for the duration listed.      _______  It is okay (safe) for this patient to temporarily hold the above medication, BUT ONLY for ______ days prior to the planned procedure      _______  If it is NOT currently safe for the patient to temporarily hold this medication, please indicate how long they would need to wait until it might be safe to hold it.          Patients are typically able to restart these medications the day after the procedure(s). If there are specific instructions for restarting this medication (i.e. dose titration) that you would like your patient to follow, please contact them directly.      If you have any questions or need additional information please reply to this message or call our office at [phone].      Thank you.        Cherelle Alvarado   Medical Assistant      /Nikki  Gastroenterology    [facility]  [address] [place] [address]    Phone: [phone]  Fax: [phone]

## 2025-05-29 NOTE — TELEPHONE ENCOUNTER
----- Message from Cherelle MIMS sent at 2025  1:47 PM EDT -----  Regarding: Clearance - eliquis    Dr. Peace,      Your patient, Dereck Shen (: 1965), is being scheduled for an endoscopic procedure (EGD and/or colonoscopy). They are currently taking an anticoagulant or antiplatelet medication that is being managed by your office. Prior to scheduling the procedure we need to know if it is okay for the patient to temporarily hold this medication for their procedure.    Low dose Aspirin (81 mg per day) is considered safe for endoscopic procedures and should be continued through the day of their procedure.      Current Anticoagulation: ELIQUIS    This medication would need to be held starting the following number of days before the procedure(s).      Eliquis (Apixaban): 3 days before procedure (based on patient's most recent kidney function)        Please Respond to this Message at your Earliest Convenience (for ease you may copy and paste one of the following responses into your reply)      _______  It is CURRENTLY OKAY (safe) for this patient to temporarily hold the above medication for the duration listed.      _______  It is okay (safe) for this patient to temporarily hold the above medication, BUT ONLY for ______ days prior to the planned procedure      _______  If it is NOT currently safe for the patient to temporarily hold this medication, please indicate how long they would need to wait until it might be safe to hold it.          Patients are typically able to restart these medications the day after the procedure(s). If there are specific instructions for restarting this medication (i.e. dose titration) that you would like your patient to follow, please contact them directly.      If you have any questions or need additional information please reply to this message or call our office at [phone].      Thank you.        Delisa Pinedo  Medical Assistant      /Nikki  Gastroenterology    [facility]  [address] [place] [address]    Phone: [phone]  Fax: [phone]

## 2025-06-02 NOTE — TELEPHONE ENCOUNTER
----- Message from Iban Peace sent at 5/30/2025 11:27 AM EDT -----  Regarding: RE: Jaqui - eliquis  Okay to hold thinners and resume after procedure from vascular surgical standpoint

## 2025-06-05 ENCOUNTER — DOCUMENTATION (OUTPATIENT)
Dept: CARDIOLOGY | Facility: HOSPITAL | Age: 60
End: 2025-06-05
Payer: COMMERCIAL

## 2025-06-05 NOTE — PROGRESS NOTES
Request from /[facility] for EGD and/or Colonoscopy     Diagnosis/what are we seeing for:   heart failure and CAD s/p PCI.     Last ischemic work up left heart cath  Date: 2025  Last echocardiogram 2025  Anticoagulation: Apixaban  Antiplatelet: ASA and Ticagrelor (Brilinta)  Has patient had a recent cardioversion or ablation?  no     Last seen by  2025    Next appointment:  2025                         [address] [place] [address]                   Phone# [phone]              Fax# [phone]      Date: 25    RE: Dereck Shen            : 1965       Surgical/Procedural Clearance for:  EGD/Colonoscopy  Patient is at: LOW cardiovascular risk for this LOW risk procedure.           Can not hold Antiplatelet Ticagrelor (Brilinta) Patient had stent in 2025 of LM/Cx     Can hold Anticoagulant Apixaban for 3 days prior                     Is further cardiac workup is needed prior to the procedure?  No     Patient should continue Beta Blocker in the perioperative period.  Yes     Patient should resume antiplatelet/anticoagulation as soon as cleared by surgeon/procedure physician.  Yes       Thank You,    25 at 9:17 AM - Baljinder Wright MD

## 2025-06-09 ENCOUNTER — PHARMACY VISIT (OUTPATIENT)
Dept: PHARMACY | Facility: CLINIC | Age: 60
End: 2025-06-09
Payer: MEDICAID

## 2025-06-09 ENCOUNTER — APPOINTMENT (OUTPATIENT)
Dept: ENDOCRINOLOGY | Facility: CLINIC | Age: 60
End: 2025-06-09
Payer: COMMERCIAL

## 2025-06-09 VITALS — WEIGHT: 212 LBS | BODY MASS INDEX: 29.57 KG/M2

## 2025-06-09 DIAGNOSIS — Z79.4 TYPE 2 DIABETES MELLITUS WITHOUT COMPLICATION, WITH LONG-TERM CURRENT USE OF INSULIN: ICD-10-CM

## 2025-06-09 DIAGNOSIS — E11.9 TYPE 2 DIABETES MELLITUS WITHOUT COMPLICATION, WITH LONG-TERM CURRENT USE OF INSULIN: ICD-10-CM

## 2025-06-09 PROCEDURE — 99211 OFF/OP EST MAY X REQ PHY/QHP: CPT

## 2025-06-09 PROCEDURE — RXMED WILLOW AMBULATORY MEDICATION CHARGE

## 2025-06-09 PROCEDURE — 95251 CONT GLUC MNTR ANALYSIS I&R: CPT

## 2025-06-09 RX ORDER — FUROSEMIDE 40 MG/1
TABLET ORAL
Qty: 48 TABLET | Refills: 3 | OUTPATIENT
Start: 2025-06-09

## 2025-06-09 RX ORDER — ALIROCUMAB 75 MG/ML
INJECTION, SOLUTION SUBCUTANEOUS
Qty: 2 ML | Refills: 11 | OUTPATIENT
Start: 2025-06-09

## 2025-06-09 NOTE — PROGRESS NOTES
Patient is sent at the request of Tyler Hensley* for my opinion regarding diabetes.  My recommendations below will be communicated back to the requesting provider by way of shared medical record.    Recommendations:   Continue Lantus 10 units daily at bedtime.  Continue Humalog 14 units PLUS sliding scale. Take 15 minutes before meal  Continue Farxiga 10mg daily  Set goal to stop smoking by next visit with me (1 month)  Cardiology starting Praluent for LDL elevation  Follow up with PharmD in 1 month per patient request for accountability  ________________________________________________________________________    Subjective   Past Medical History:  Patient Active Problem List   Diagnosis    Abdominal pain, acute, right upper quadrant    Abnormal CXR    Atherosclerosis of native artery of both lower extremities with intermittent claudication    Atherosclerotic heart disease of native coronary artery without angina pectoris    Bacterial skin infection    Bipolar depression (Multi)    BPH (benign prostatic hyperplasia)    Burning pain    Causalgia of lower extremity    Hip pain, right    Pain of right lower extremity    Ischemic stroke (Multi)    Chronic combined systolic and diastolic CHF (congestive heart failure)    Chronic back pain greater than 3 months duration    Chronic hepatitis C virus genotype 1a infection (Multi)    Chronic obstructive pulmonary disease (Multi)    Chronic pain syndrome    Chronic RUQ pain    Claudication    Coordination impairment    Decreased peripheral vision of left eye    Diabetic neuropathy, painful (Multi)    Diabetic polyneuropathy associated with type 2 diabetes mellitus    Eschar of lower leg    Frequent falls    Gastroesophageal reflux disease    H/O: CVA (cerebrovascular accident)    Hepatic steatosis    Hepatitis C virus infection cured after antiviral drug therapy    Hepatitis-C    Hyperkalemia    Hyperlipidemia    Hypertension    Joint stiffness of both shoulders     Left ventricular dysfunction    Low back pain    Nasal congestion with rhinorrhea    Constipation    Nausea in adult    Neuropathic pain    Nicotine dependence    Neuropathy    BRUNA (obstructive sleep apnea)    Other symptoms and signs involving the musculoskeletal system    Pancreatic lesion (HHS-HCC)    Pancreatic malabsorption (HHS-HCC)    Paroxysmal nocturnal dyspnea    Peripheral arterial disease    Poor balance    Other chronic postprocedural pain    Presence of aortocoronary bypass graft    Pulmonary congestion    Recurrent boils    Reduced chest expansion on inspiration    S/P CABG x 5    Right flank pain    Type 2 diabetes mellitus    Spasticity as late effect of cerebrovascular accident (CVA)    Shortness of breath on exertion    Dyspnea    Shingles    S/P PTCA (percutaneous transluminal coronary angioplasty)    Long-term insulin use (Multi)    Hospital discharge follow-up    PAD (peripheral artery disease)    Critical limb ischemia of left lower extremity    Peripheral artery occlusion    Postoperative abscess    Flash pulmonary edema    Acute heart failure    CKD (chronic kidney disease) stage 4, GFR 15-29 ml/min (Multi)     HPI:  Dereck Shen is a 59 y.o. male with a PMH significant for HepC, acute heart failure, CABG, CVA  Pt presents today for new patient visit with endo Ariel for Type 2 Diabetes Mellitus  Last seen by myself on 5/12/25 where Humalog regimen was clarified- patient had not been adding sliding scale to Humalog doses.    Today:     Patient excited about improvement in blood glucose levels (74% TIR)  Has been adding sliding scale to Humalog 14 units base dose and giving 15 minutes before meal. Eats the same thing every day.  Started smoking after CKD diagnosis. Knows he needs to quit. 5-6 cigarettes per day.   Neuropathy in left leg very bothersome. Has been using more gabapentin than prescribed. 800mg TID.  Discussed complex CV history including CVA, CABG, CHF.   On many  "medications- gets tiring but knows he has to take them  Lasix 5 days per week  Discussed change in eGFR, UACR. Potential contributors dehydration, medication changes  UACR >300mg/g creat, but improved from >3000mg/g creat in 2023  Trying to stay more hydrated. Cut back on coffee  SOB on exertion. Has been better since last stent  Restarting cardiac rehab. All they will let him do is the bike.   Fearful to walk outside. Uses cane for balance  Patient states he has a tumor on his pancreas  Very concerned regarding CKD4 diagnosis from Stacia Hammonds- cardiology NP. States he saw diagnoses on paperwork, but it was never discussed. Started smoking again this weekend due to stress. Reviewed labs together. Unable to identify an eGFR <42 mL/min/1.73m2. Labs from last week show eGFR 64 mL/min/1.73m2. Discussed that patient should establish with nephrology, but I am unable to locate any evidence of CKD4.   Discovered that patient is taking lispro 14 units before meal, then taking second injection of sliding scale depending how high his sugar goes.    Has started cheating on diet now that he has figured out insulin doses  One low to 62mg/dL. Drank cup of OJ. Brought up to 89mg/dL      Diabetes Pharmacotherapy:    Lantus 10 units at bedtime.  Lispro 14 units plus sliding scale 2 before meals starting at 150  15-30 minutes before meal  Farxiga 10mg daily    Adherence: denies non-adherence    Previously trialed meds:   Metformin- why stopped?. Did not sit well.   Glipizide    Social:  Current diet: in general, a \"healthy\" diet    Breakfast - blackberries. Berries- oatmeal, cream of wheat. Piece of toast with brown bread  Lunch - salad with tuna fish, grilled chicken throw out bread  Dinner - filet, salmon. Lots of vegetables  Snack - meals  Fluids -   Current exercise: Limited by pain in L leg      Allergies:  Celecoxib; Ibuprofen; Tetanus toxoid, adsorbed; [id number] antimigraine agents; Cephalexin; Hydrocodone; Latex; " and Tryptophan    Medication list:  Current Outpatient Medications   Medication Instructions    acetaminophen (TYLENOL) 650 mg, oral, Every 4 hours PRN    aspirin 81 mg, Daily    atorvastatin (LIPITOR) 80 mg, oral, Nightly    blood sugar diagnostic (Blood Glucose Test) strip 1 each, miscellaneous, 3 times daily    blood-glucose meter misc To be used 3 times daily    blood-glucose sensor (FreeStyle En 3 Plus Sensor) device Apply sensor every 15 days to monitor blood sugar    Brilinta 90 mg, oral, 2 times daily    carbamide peroxide (Ear Wax Removal Drops) 6.5 % otic solution Instill 5 drops into both ears daily for 4 days    carvedilol (Coreg) 6.25 mg tablet Take 1 tablet by mouth every 12 hours    Creon 36,000-114,000- 180,000 unit capsule,delayed release(DR/EC) capsule 2 capsules, 3 times daily PRN    dextromethorphan-guaifenesin (Mucinex DM)  mg 12 hr tablet Take 2 tablets by mouth every 12 hours    ELDERBERRY FRUIT ORAL 1 tablet, Daily PRN    Eliquis 5 mg, oral, 2 times daily    ezetimibe (Zetia) 10 mg tablet Take 1 tablet by mouth once daily.    Farxiga 10 mg, oral, Daily with breakfast    ferrous sulfate (FeroSuL) 325 mg (65 mg elemental) tablet Take 1 tablet (325 mg) by mouth once daily with breakfast.    FreeStyle En 3 [place] misc Use as instructed    furosemide (LASIX) 20 mg, oral, Every other day    gabapentin (NEURONTIN) 400 mg, oral, 3 times daily    JAIMEE ROOT EXTRACT ORAL 1 tablet, Daily PRN    hydrALAZINE (APRESOLINE) 50 mg, oral, 3 times daily    insulin glargine (LANTUS) 10 Units, subcutaneous, Nightly    insulin lispro (HUMALOG) 14 Units, subcutaneous, 3 times daily (morning, midday, late afternoon)    isosorbide mononitrate ER (IMDUR) 60 mg, oral, Daily    lancets 30 gauge misc 1 each, miscellaneous, 3 times daily    medical cannabis Not [facility] prescribed    OXcarbazepine (TRILEPTAL) 450 mg, oral, 2 times daily    pantoprazole (ProtoNix) 40 mg EC tablet TAKE 1 TABLET BY MOUTH ONCE DAILY     "pen needle, diabetic (BD Ultra-Fine Jocy Pen Needle) 32 gauge x 5/32\" needle Pen Needles for Insulin.    pen needle, diabetic (BD Ultra-Fine Jocy Pen Needle) 32 gauge x 5/32\" needle Use to inject insulin up to 8 times per day.    pen needle, diabetic (TechLITE Pen Needle) 32 gauge x 1/4\" needle Use to inject 1-4 times daily as directed    ranolazine (Ranexa) 500 mg 12 hr tablet Take 1 tablet by mouth twice daily.    sacubitriL-valsartan (Entresto)  mg tablet Take 1 tablet by mouth twice daily.    tamsulosin (Flomax) 0.4 mg 24 hr capsule TAKE 1 CAPSULE BY MOUTH ONCE DAILY    tiZANidine (ZANAFLEX) 2 mg, oral, Daily PRN        Objective   Last Recorded Vitals:  BP Readings from Last 3 Encounters:   05/28/25 146/86   05/09/25 140/84   04/01/25 158/83     Wt Readings from Last 3 Encounters:   06/09/25 96.2 kg (212 lb)   05/28/25 94.8 kg (209 lb)   05/09/25 93.9 kg (207 lb)     BMI Readings from Last 1 Encounters:   06/09/25 29.57 kg/m²      Labs  A1C  Lab Results   Component Value Date    HGBA1C 7.9 (H) 03/20/2025    HGBA1C 7.9 (H) 12/18/2024    HGBA1C 8.2 (A) 10/01/2024     BMP/LFTs  Lab Results   Component Value Date    CREATININE 1.19 05/28/2025    CREATININE 1.29 05/09/2025    CREATININE 1.83 (H) 04/25/2025    EGFR 71 05/28/2025    EGFR 64 05/09/2025    EGFR 42 (L) 04/25/2025    GLUCOSE 182 (H) 05/28/2025     05/28/2025    K 5.1 05/28/2025     05/28/2025    CALCIUM 9.0 05/28/2025    CO2 18 (L) 05/28/2025    BUN 22 05/28/2025    ALT 12 05/28/2025    AST 13 05/28/2025    ALKPHOS 113 05/28/2025    BILITOT 0.6 05/28/2025     Lipids  Lab Results   Component Value Date    TRIG 179 (H) 01/24/2025    CHOL 102 01/24/2025    LDLF 173 (H) 04/04/2023    LDLCALC 39 01/24/2025    LDLDIRECT 56 04/13/2021    HDL 27.5 01/24/2025     Urine Albumin Creatinine Ratio  Lab Results   Component Value Date    MICROALBCREA 378 (H) 03/20/2025    MICROALBCREA 1,748.0 (H) 04/04/2023    MICROALBCREA 499.1 (H) 05/18/2021 " "    ASCVD risk  The ASCVD Risk score (Lalo MCKAY, et al., 2019) failed to calculate for the following reasons:    Risk score cannot be calculated because patient has a medical history suggesting prior/existing ASCVD    Additional labs:  No results found for: \"FRUCTOSAMINE\", \"CPEPTIDE\", \"WBE00BP\", \"NTIB\", \"ZNT8A\", \"INSAB\"    Home glucose monitoring:  Hypoglycemia: denies readings <70 mg/dL or s/sx of hypoglycemia                                                                      Assessment/Plan   Type 2 Diabetes Mellitus  Goal A1C <7%  T2DM controlled (TIR 74%) on basal-bolus insulin regimen plus Farxiga. Clarified insulin instructions at last visit and this has really helped to improve his glucose readings. Today, it was identified that patient misunderstood Humalog dosing instructions. Has been giving base dose before meal, then adding sliding scale after meal depending how high his sugar goes. Advised to add sliding scale to bolus dose and give all at once 15 minutes before meal.  Plan:  Continue Lantus 10 units daily  Continue Humalog 14 units PLUS sliding scale with meals, Farxiga 10mg daily  Home glucose monitoring:   Upgrade to FSL3+  A minimum of 72 hours of CGM data was reviewed and used to make therapy changes.   Education Provided to Patient:   Best way to maintain good renal function is BP and BG control  Benefits of upgrading to En 3  Follow-up with Dr. Wright regarding lasix dose  Take Humalog 15 minutes before meal   Hypertension:   Goal BP <130/80   BP above goal  Managed by Dr. Wright  Secondary prevention:   Therapy: High intensity statin and Ezetimibe   LDL result meets goal   Renal:  CKD: stage 3 - GFR 30-59  ACR: >300 (03/2025)  Renal protective agents: ACEi/ARB and SGLT2i  DM medications are dosed appropriately for renal function  Labs: up to date   PharmD follow-up: 1 month per patient request  Endo follow-up: 7/30/25    Patient agreeable to plan as above, contact information provided for any " future questions or concerns.    Laurie Lake, Ariel    Type of encounter: in person  Provider on site: Dr. Zahira Tate MD    Continue all meds under the continuation of care with the referring provider and clinical pharmacy team.

## 2025-06-10 ENCOUNTER — APPOINTMENT (OUTPATIENT)
Dept: PRIMARY CARE | Facility: CLINIC | Age: 60
End: 2025-06-10
Payer: COMMERCIAL

## 2025-06-10 VITALS
SYSTOLIC BLOOD PRESSURE: 150 MMHG | HEART RATE: 78 BPM | DIASTOLIC BLOOD PRESSURE: 84 MMHG | WEIGHT: 210 LBS | HEIGHT: 71 IN | BODY MASS INDEX: 29.4 KG/M2

## 2025-06-10 DIAGNOSIS — K86.9 PANCREATIC LESION (HHS-HCC): ICD-10-CM

## 2025-06-10 DIAGNOSIS — E78.5 HYPERLIPIDEMIA, UNSPECIFIED HYPERLIPIDEMIA TYPE: ICD-10-CM

## 2025-06-10 DIAGNOSIS — E11.51 TYPE 2 DIABETES MELLITUS WITH DIABETIC PERIPHERAL ANGIOPATHY WITHOUT GANGRENE, WITH LONG-TERM CURRENT USE OF INSULIN (MULTI): ICD-10-CM

## 2025-06-10 DIAGNOSIS — I73.9 PERIPHERAL ARTERIAL DISEASE: Primary | ICD-10-CM

## 2025-06-10 DIAGNOSIS — I10 HYPERTENSION, UNSPECIFIED TYPE: ICD-10-CM

## 2025-06-10 DIAGNOSIS — Z00.00 HEALTHCARE MAINTENANCE: ICD-10-CM

## 2025-06-10 DIAGNOSIS — Z79.4 TYPE 2 DIABETES MELLITUS WITH DIABETIC PERIPHERAL ANGIOPATHY WITHOUT GANGRENE, WITH LONG-TERM CURRENT USE OF INSULIN (MULTI): ICD-10-CM

## 2025-06-10 PROBLEM — B02.9 SHINGLES: Status: RESOLVED | Noted: 2023-09-22 | Resolved: 2025-06-10

## 2025-06-10 PROCEDURE — 3008F BODY MASS INDEX DOCD: CPT | Performed by: CLINICAL NURSE SPECIALIST

## 2025-06-10 PROCEDURE — 4004F PT TOBACCO SCREEN RCVD TLK: CPT | Performed by: CLINICAL NURSE SPECIALIST

## 2025-06-10 PROCEDURE — 3048F LDL-C <100 MG/DL: CPT | Performed by: CLINICAL NURSE SPECIALIST

## 2025-06-10 PROCEDURE — 3077F SYST BP >= 140 MM HG: CPT | Performed by: CLINICAL NURSE SPECIALIST

## 2025-06-10 PROCEDURE — 3079F DIAST BP 80-89 MM HG: CPT | Performed by: CLINICAL NURSE SPECIALIST

## 2025-06-10 PROCEDURE — 99396 PREV VISIT EST AGE 40-64: CPT | Performed by: CLINICAL NURSE SPECIALIST

## 2025-06-10 PROCEDURE — RXMED WILLOW AMBULATORY MEDICATION CHARGE

## 2025-06-10 PROCEDURE — 99406 BEHAV CHNG SMOKING 3-10 MIN: CPT | Performed by: CLINICAL NURSE SPECIALIST

## 2025-06-10 ASSESSMENT — ENCOUNTER SYMPTOMS
LOSS OF SENSATION IN FEET: 0
CONSTIPATION: 0
DYSURIA: 0
CONFUSION: 0
CHEST TIGHTNESS: 0
FLANK PAIN: 0
NECK PAIN: 0
DIARRHEA: 0
PALPITATIONS: 0
DIZZINESS: 0
APPETITE CHANGE: 0
FEVER: 0
ACTIVITY CHANGE: 0
EYE PAIN: 0
CHILLS: 0
SORE THROAT: 0
POLYDIPSIA: 0
COUGH: 0
VOMITING: 0
ABDOMINAL PAIN: 0
FATIGUE: 0
SEIZURES: 0
HEADACHES: 0
SLEEP DISTURBANCE: 0
HEMATURIA: 0
UNEXPECTED WEIGHT CHANGE: 0
WHEEZING: 0
JOINT SWELLING: 0
OCCASIONAL FEELINGS OF UNSTEADINESS: 0
BLOOD IN STOOL: 0
WOUND: 0
BACK PAIN: 1
TROUBLE SWALLOWING: 0
NAUSEA: 0
SHORTNESS OF BREATH: 0
PHOTOPHOBIA: 0
ARTHRALGIAS: 1
BRUISES/BLEEDS EASILY: 0
DEPRESSION: 0
MYALGIAS: 0

## 2025-06-10 ASSESSMENT — PATIENT HEALTH QUESTIONNAIRE - PHQ9
1. LITTLE INTEREST OR PLEASURE IN DOING THINGS: NOT AT ALL
2. FEELING DOWN, DEPRESSED OR HOPELESS: NOT AT ALL
SUM OF ALL RESPONSES TO PHQ9 QUESTIONS 1 AND 2: 0

## 2025-06-10 NOTE — PROGRESS NOTES
Subjective   Patient ID: Dereck Shen is a 59 y.o. male who presents for No chief complaint on file..  HPI    Here today as a follow up appointment.      Following with Cardiology and CHF Clinic.      History of Stroke, follow up with Neurologist outpatient. Patient following with [facility] Neurology. Multiple strokes since 2019. Further work up with [facility].       Patient followed with Dr. Bowser for Rheumatology. No longer seeing Damari Watkins through [facility]. Does not feel that his Latuda is effective anymore. Not interested in following back with [facility] or being referred to traditional Psychiatry.      Followed with [facility] regarding bilateral sciatic nerve pain and lower back discomfort. Then followed with Dr. Obando. Now receiving Medical Marijuana. Chronic complaints of pain. History of Drug Abuse. After multiple hospitalizations and severe PAD pain has been worse. Restarted Narcotic Therapy but has also continued Medical Marijuana. Follow up with Vascular scheduled for this week.      Patient followed with GI.     Patient has been taking Atorvastatin for elevated Cholesterol. Tolerating medication at this time. Denies any associated signs or symptoms. Recently medication added.      Patient was diagnosed with Diabetes in 2009. Patient has been more consistent in his Insulin dosages recently followed with Endo, Insulin adjusted. Now following with Pharmacy for Endocrinology as well.      Restarted smoking, now smoking 5-7 cigarettes. Quit smoking for 2 months.     Last vision exam about 1 year ago.     Review of Systems   Constitutional:  Negative for activity change, appetite change, chills, fatigue, fever and unexpected weight change.   HENT:  Negative for ear pain, hearing loss, nosebleeds, sore throat, tinnitus and trouble swallowing.    Eyes:  Negative for photophobia, pain and visual disturbance.   Respiratory:  Negative for cough, chest tightness,  shortness of breath and wheezing.    Cardiovascular:  Negative for chest pain, palpitations and leg swelling.   Gastrointestinal:  Negative for abdominal pain, blood in stool, constipation, diarrhea, nausea and vomiting.   Endocrine: Negative for cold intolerance, heat intolerance, polydipsia and polyuria.   Genitourinary:  Negative for dysuria, flank pain and hematuria.   Musculoskeletal:  Positive for arthralgias and back pain. Negative for joint swelling, myalgias and neck pain.   Skin:  Negative for pallor, rash and wound.   Allergic/Immunologic: Negative for immunocompromised state.   Neurological:  Negative for dizziness, seizures and headaches.   Hematological:  Does not bruise/bleed easily.   Psychiatric/Behavioral:  Negative for confusion and sleep disturbance.        Objective   Physical Exam  Vitals and nursing note reviewed.   Constitutional:       General: He is not in acute distress.     Appearance: Normal appearance.   HENT:      Head: Normocephalic.      Nose: Nose normal.   Eyes:      Conjunctiva/sclera: Conjunctivae normal.   Neck:      Vascular: No carotid bruit.   Cardiovascular:      Rate and Rhythm: Normal rate and regular rhythm.      Pulses: Normal pulses.      Heart sounds: Normal heart sounds.   Pulmonary:      Effort: Pulmonary effort is normal.      Breath sounds: Normal breath sounds.   Abdominal:      General: Bowel sounds are normal.      Palpations: Abdomen is soft.   Musculoskeletal:         General: Normal range of motion.      Cervical back: Normal range of motion.   Skin:     General: Skin is warm and dry.   Neurological:      Mental Status: He is alert and oriented to person, place, and time. Mental status is at baseline.   Psychiatric:         Mood and Affect: Mood normal.         Behavior: Behavior normal.       Assessment/Plan         Reviewed most recent lab work completed with patient.      Peripheral arterial disease: Following with Dr. Peace for management. Status post  multiple Surgeries.  Following with Vascular and Cardiology for management. Follow up appointment scheduled.    Diabetes mellitus type 2 with complications including peripheral vascular disease, retinopathy, nephropathy and peripheral neuropathy. He will continue to follow with Endocrinology.   COPD. Respiratory status at baseline. Continue to promote smoking cessation.   Pancreatic lesion. Seen on CAT scan November 2020. Dr. Alegria. Stable on last examination.   History of chronic back pain. Now following for Medical Marijuana.  History of stroke with continued loss of left peripheral vision. Stable. Follows with Neurology through [facility].   Combined heart failure. He will continue to follow with the heart failure clinic.   Sleep apnea diagnosed on sleep study February 2021. Managed by pulmonology with CPAP.  Hypertension. Blood pressure borderline controlled at OV today. Continue to monitor. Encourage to continue ambulatory monitoring, states normally well controlled at home.   Hyperlipidemia. Continue medications as prescribed. Recently started Praluent by Cardiology for LDL.   Diabetic nephropathy. He will continue to follow with Specialist, previously prescribed by Endo.   History of chronic hepatitis C. Patient was previously seen by gastroenterology but deemed not a candidate for further treatment.   Bipolar: Declined referral to traditional Psychiatry.   BPH: Flomax as prescribed.    Tobacco use: 3 minutes were spent counseling the patient on tobacco cessation.  Benefits of cessation were discussed as well as techniques to help quit.    Wellness: Routine and age appropriate recommendations discussed with the patient today and patient verbalized understanding of the recommendations.  Questions answered.  Age appropriate immunizations and preventative screenings discussed with the patient and ordered as appropriate. Labs updated and ordered as indicated. Recommend healthy diet and daily exercise to  maintain healthy body weight.      Pneumovax: November 2016.   Colonoscopy: March 2016.   Unable to update immunizations due to Insurance.   Wellness: June 2025.     Stephanie Arce, APRN-CNS 06/10/25 2:32 PM Patient was identified as a fall risk. Risk prevention instructions provided.

## 2025-06-11 ENCOUNTER — PHARMACY VISIT (OUTPATIENT)
Dept: PHARMACY | Facility: CLINIC | Age: 60
End: 2025-06-11
Payer: MEDICAID

## 2025-06-12 ENCOUNTER — OFFICE VISIT (OUTPATIENT)
Dept: VASCULAR SURGERY | Facility: HOSPITAL | Age: 60
End: 2025-06-12
Payer: COMMERCIAL

## 2025-06-12 ENCOUNTER — OFFICE VISIT (OUTPATIENT)
Dept: CARDIOLOGY | Facility: HOSPITAL | Age: 60
End: 2025-06-12
Payer: COMMERCIAL

## 2025-06-12 VITALS
DIASTOLIC BLOOD PRESSURE: 88 MMHG | WEIGHT: 200 LBS | SYSTOLIC BLOOD PRESSURE: 156 MMHG | HEART RATE: 71 BPM | BODY MASS INDEX: 27.89 KG/M2

## 2025-06-12 VITALS
BODY MASS INDEX: 27.89 KG/M2 | SYSTOLIC BLOOD PRESSURE: 150 MMHG | HEART RATE: 68 BPM | OXYGEN SATURATION: 100 % | WEIGHT: 200 LBS | DIASTOLIC BLOOD PRESSURE: 89 MMHG | RESPIRATION RATE: 20 BRPM

## 2025-06-12 DIAGNOSIS — I10 HYPERTENSION, UNSPECIFIED TYPE: ICD-10-CM

## 2025-06-12 DIAGNOSIS — I73.9 PAD (PERIPHERAL ARTERY DISEASE): Primary | ICD-10-CM

## 2025-06-12 DIAGNOSIS — N18.4 CKD (CHRONIC KIDNEY DISEASE) STAGE 4, GFR 15-29 ML/MIN (MULTI): ICD-10-CM

## 2025-06-12 DIAGNOSIS — E87.5 HYPERKALEMIA: ICD-10-CM

## 2025-06-12 DIAGNOSIS — I50.42 CHRONIC COMBINED SYSTOLIC AND DIASTOLIC CHF (CONGESTIVE HEART FAILURE): Primary | ICD-10-CM

## 2025-06-12 PROCEDURE — 3079F DIAST BP 80-89 MM HG: CPT | Performed by: SURGERY

## 2025-06-12 PROCEDURE — 3077F SYST BP >= 140 MM HG: CPT | Performed by: SURGERY

## 2025-06-12 PROCEDURE — 99213 OFFICE O/P EST LOW 20 MIN: CPT | Performed by: SURGERY

## 2025-06-12 PROCEDURE — 4004F PT TOBACCO SCREEN RCVD TLK: CPT | Performed by: NURSE PRACTITIONER

## 2025-06-12 PROCEDURE — 3048F LDL-C <100 MG/DL: CPT | Performed by: SURGERY

## 2025-06-12 PROCEDURE — 99213 OFFICE O/P EST LOW 20 MIN: CPT

## 2025-06-12 PROCEDURE — 99213 OFFICE O/P EST LOW 20 MIN: CPT | Performed by: NURSE PRACTITIONER

## 2025-06-12 PROCEDURE — 4004F PT TOBACCO SCREEN RCVD TLK: CPT | Performed by: SURGERY

## 2025-06-12 PROCEDURE — 3077F SYST BP >= 140 MM HG: CPT | Performed by: NURSE PRACTITIONER

## 2025-06-12 PROCEDURE — 3048F LDL-C <100 MG/DL: CPT | Performed by: NURSE PRACTITIONER

## 2025-06-12 PROCEDURE — 3079F DIAST BP 80-89 MM HG: CPT | Performed by: NURSE PRACTITIONER

## 2025-06-12 ASSESSMENT — ENCOUNTER SYMPTOMS
EDEMA: 1
DEPRESSION: 0
LOSS OF SENSATION IN FEET: 0
CLAUDICATION: 0
CHEST PRESSURE: 0
OCCASIONAL FEELINGS OF UNSTEADINESS: 0
ABDOMINAL PAIN: 0
FATIGUE: 0

## 2025-06-12 ASSESSMENT — PAIN SCALES - GENERAL: PAINLEVEL_OUTOF10: 7

## 2025-06-12 ASSESSMENT — PATIENT HEALTH QUESTIONNAIRE - PHQ9
SUM OF ALL RESPONSES TO PHQ9 QUESTIONS 1 AND 2: 2
1. LITTLE INTEREST OR PLEASURE IN DOING THINGS: SEVERAL DAYS
2. FEELING DOWN, DEPRESSED OR HOPELESS: SEVERAL DAYS

## 2025-06-12 NOTE — PATIENT INSTRUCTIONS
Thank you for coming in today.  If you have any questions you may contact the office Monday through Friday at [phone] or on week ends at [phone].    Please continue current medications.     Please get BMP drawn.      Please follow  a 2 GM sodium diet and limit fluid intake to 2 liters per day or 8 servings ( serving size = 8 oz. = 1 cup = 240 ml) per day.   Please avoid processed meat products (luncheon meats, sausages, garcia, hot dogs for example) eat 4 servings of vegetables and 1-2 whole servings of whole fruits per day.   Please weigh daily and call [phone] for weight gain of 3 pounds in 24 hours or 5 pounds or if you experience increased swelling or shortness of breath.         Follow up:   4-6 weeks.     Please be sure to follow up with your cardiologist at [facility] once every year. Call [phone] to schedule appointment if you do not have a follow up appointment scheduled already.

## 2025-06-12 NOTE — PROGRESS NOTES
Subjective   Patient ID: Dereck Shen is a 59 y.o. male who presents for Follow-up (3 mo PAD ).  HPI  Patient is overall well  No evidence of rest pain ulcer however very short distance claudication 50 yards or less  No active ulcers or sores previous fasciotomy sites have healed without event    Review of Systems  Review of Systems    Constitutional:  no generalized malaise overall feels well, energy levels intact, no complaints specifically noted  HEENT:  No blurry vision, no visual aides noted, no hearing loss no ear ache no nose bleeds noted, no dysphagia, no congestion otherwise no pertinent positives noted  Cardiovascular:  no palpitations, chest pain or heaviness noted, no leg swelling, no numbness or tingling in the lower extremity noted  Respiratory:  no shortness of breath, no productive cough noted, no conversation dyspnea or difficulty breathing  Gastrointestinal:  no abdominal pain, no nausea or vomiting, appetite intact, no bowel irregularities noted  Genitourinary:   no urinary incontinence, frequency or urgency issues noted, no hematuria or burning sensation issues  Musculoskeletal:  No muscle aches or pains, no joint discomfort noted, no back pain noted otherwise feels well  Skin: no ulcerations, skin color issues or wounds upper or lower extremities  Neurologic: no dizziness, no hemiplegia, no hemiparesis, no obvious visual deficits noted  Psychiatric: no depression, no memory loss noted, no suicidal ideation  Endocrine: no weight loss or gain, no temperature concerns hot or cold intolerance  Hemogolotic/Lymphatic: no bruising, excessive bleeding, no swelling in the groins or neck noted    Objective   Physical Exam  Physical exam    Constitutional: alert and in no acute distress verbal  Eyes: No erythema swelling or discharge noted  Neck: supple, symmetric, trachea midline, no masses noted  Cardiovascular: Carotid pulses 2+, no obvious bruit, no Jugular distension noted, no thrill, heart  regular rate, lower extremity vascular exam intact, cap refill <2 sec  Pulmonary:  Bilateral breath sounds intact, clear with rales rhonchi or wheeze  Abdomen: soft non tender, no pulsatile masses noted, no rebound rigidity or guarding noted  Skin: intact warm no abnormal turgor  Psychiatric: alert without any obvious cognitive issues, oriented to person, place, and time    Assessment/Plan   Status post revascularization of lower extremity with fasciotomy  Fasciotomy sites healed without event  Continue antiplatelet therapy and anticoagulation  Follow-up 3 months  Arterial duplex ordered and pending           Iban Peace DO 06/12/25 1:58 PM

## 2025-06-12 NOTE — PROGRESS NOTES
Subjective   Patient ID: Dereck Shen is a 59 y.o. male who presents for follow-up of congestive heart failure.   Current Medications[1]     PMH: significant for PAD with prior revascularization (R fem-pop bypass 2/24/20; L fem-pop bypass 9/14/20; L fem PTA of the graft; R iliac PTA 2/2022; LLE and DANYA PTA 4/2024; angioplasty of the left SFA 10/21/24; and left femoral and tibial thrombectomy with patch angioplasty 11/19/24), heart failure, CAD s/p CABG x5 in 09/2019, PCI of Cx, 01/04/2021 and s/p PCI of LM/Cx 01/23/2025; HTN, hyperlipidemia, DM type 2, Hepatitis C, bipolar depression, ischemic stroke, COPD, BRUNA and nicotine dependence.       Congestive Heart Failure  Presents for follow-up visit. Associated symptoms include edema (Chronic edema of LLE). Pertinent negatives include no abdominal pain, chest pain, chest pressure, claudication, fatigue or muscle weakness. The symptoms have been improving. Compliance with total regimen is %. Compliance with diet is %. Compliance with medications is %.       Review of Systems   Constitutional:  Negative for fatigue.   Cardiovascular:  Negative for chest pain and claudication.   Gastrointestinal:  Negative for abdominal pain.   Musculoskeletal:  Negative for muscle weakness.       Objective     /89 (BP Location: Left arm, Patient Position: Sitting)   Pulse 68   Resp 20   Wt 90.7 kg (200 lb)   SpO2 100%   BMI 27.89 kg/m²     Home -150 mmhg at home.     Transthoracic Echo (TTE) Complete  Result Date: 1/20/2025              [place] [address]      Phone [phone] Fax [phone] TRANSTHORACIC ECHOCARDIOGRAM REPORT Patient Name:       DERECK SHEN    Reading Physician:    [id number] Nahun Nelson DO Study Date:         1/20/2025            Ordering Provider:    [id number] SEARS MRN/PID:            [id number]              Fellow: Accession#:         [id number]         Nurse:                Jennifer Durham Date of Birth/Age:  1965 / 59 years Sonographer:          Luba Castellano RDCS Gender Assigned at  M                    Additional Staff: Birth: Height:             180.34 cm            Admit Date:           1/19/2025 Weight:             97.98 kg             Admission Status:     Inpatient -                                                                Routine BSA / BMI:          2.18 m2 / 30.13      Department Location:  [facility]                     kg/m2 Blood Pressure: 112 /61 mmHg Study Type:    TRANSTHORACIC ECHO (TTE) COMPLETE Diagnosis/ICD: Presence of aortocoronary bypass graft-Z95.1; Dyspnea,                unspecified-R06.00 Indication:    CABG, CHF, Dyspnea CPT Codes:     Echo Complete w Full Doppler-[id number] Patient History: Pertinent History: CHF, HTN and CAD. Study Detail: The following Echo studies were performed: 2D, M-Mode, Doppler and               color flow. Optison used as a contrast agent for endocardial               border definition. Total contrast used for this procedure was 3 mL               via IV push.  PHYSICIAN INTERPRETATION: Left Ventricle: The left ventricular systolic function is moderately decreased, with a Salinas's biplane calculated ejection fraction of 36%. There is global hypokinesis of the left ventricle with minor regional variations. The left ventricular cavity size is mildly dilated. There is normal septal and normal posterior left ventricular wall thickness. Spectral Doppler shows a Grade II (pseudonormal pattern) of left ventricular diastolic filling with an elevated left atrial pressure. Left Atrium: The left atrium is normal in size. Right Ventricle: The right ventricle is normal in size. There is mildly reduced right ventricular systolic function. Right Atrium: The right atrium is normal in size. Aortic Valve: The  aortic valve is trileaflet. The aortic valve dimensionless index is 0.81. There is no evidence of aortic valve regurgitation. The peak instantaneous gradient of the aortic valve is 9 mmHg. The mean gradient of the aortic valve is 5 mmHg. Mitral Valve: The mitral valve is normal in structure. There is mild mitral valve regurgitation. Tricuspid Valve: The tricuspid valve is structurally normal. There is mild tricuspid regurgitation. The Doppler estimated RVSP is mildly elevated right ventricular systolic pressure at 44.3 mmHg. Pulmonic Valve: The pulmonic valve is not well visualized. There is trace to mild pulmonic valve regurgitation. Pericardium: No pericardial effusion noted. Aorta: The aortic root is normal.  CONCLUSIONS:  1. The left ventricular systolic function is moderately decreased, with a Salinas's biplane calculated ejection fraction of 36%.  2. There is global hypokinesis of the left ventricle with minor regional variations.  3. Spectral Doppler shows a Grade II (pseudonormal pattern) of left ventricular diastolic filling with an elevated left atrial pressure.  4. Left ventricular cavity size is mildly dilated.  5. There is mildly reduced right ventricular systolic function.  6. Mildly elevated right ventricular systolic pressure. QUANTITATIVE DATA SUMMARY:  2D MEASUREMENTS:           Normal Ranges: LAs:             5.00 cm   (2.7-4.0cm) IVSd:            0.72 cm   (0.6-1.1cm) LVPWd:           0.74 cm   (0.6-1.1cm) LVIDd:           5.78 cm   (3.9-5.9cm) LVIDs:           5.07 cm LV Mass Index:   71.0 g/m2 LV % FS          12.3 %  LA VOLUME:                    Normal Ranges: LA Vol A4C:        63.5 ml    (22+/-6mL/m2) LA Vol A2C:        64.0 ml LA Vol BP:         65.3 ml LA Vol Index A4C:  29.2ml/m2 LA Vol Index A2C:  29.4 ml/m2 LA Vol Index BP:   30.0 ml/m2 LA Area A4C:       21.0 cm2 LA Area A2C:       21.6 cm2 LA Major Axis A4C: 5.9 cm LA Major Axis A2C: 6.2 cm LA Vol A4C:        61.0 ml LA Vol A2C:         61.1 ml LA Vol Index BSA:  28.0 ml/m2  RA VOLUME BY A/L METHOD:          Normal Ranges: RA Area A4C:             16.3 cm2  AORTA MEASUREMENTS:         Normal Ranges: Ao Sinus, d:        3.10 cm (2.1-3.5cm) Ao STJ, d:          2.40 cm (1.7-3.4cm) Asc Ao, d:          3.20 cm (2.1-3.4cm)  LV SYSTOLIC FUNCTION BY 2D PLANIMETRY (MOD):                      Normal Ranges: EF-A4C View:    35 % (>=55%) EF-A2C View:    36 % EF-Biplane:     36 % LV EF Reported: 36 %  LV DIASTOLIC FUNCTION:             Normal Ranges: MV Peak E:             1.17 m/s    (0.7-1.2 m/s) MV Peak A:             0.41 m/s    (0.42-0.7 m/s) E/A Ratio:             2.83        (1.0-2.2) MV e'                  0.061 m/s   (>8.0) MV lateral e'          0.06 m/s MV medial e'           0.06 m/s MV A Dur:              114.18 msec E/e' Ratio:            19.23       (<8.0)  MITRAL VALVE:          Normal Ranges: MV DT:        148 msec (150-240msec)  AORTIC VALVE:                     Normal Ranges: AoV Vmax:                1.48 m/s (<=1.7m/s) AoV Peak P.8 mmHg (<20mmHg) AoV Mean P.5 mmHg (1.7-11.5mmHg) LVOT Max Ayad:            1.18 m/s (<=1.1m/s) AoV VTI:                 25.83 cm (18-25cm) LVOT VTI:                20.91 cm LVOT Diameter:           1.95 cm  (1.8-2.4cm) AoV Area, VTI:           2.43 cm2 (2.5-5.5cm2) AoV Area,Vmax:           2.39 cm2 (2.5-4.5cm2) AoV Dimensionless Index: 0.81  RIGHT VENTRICLE: RV Basal 3.71 cm RV Mid   3.50 cm RV Major 8.3 cm TAPSE:   18.0 mm RV s'    0.10 m/s  TRICUSPID VALVE/RVSP:          Normal Ranges: Peak TR Velocity:     3.21 m/s RV Syst Pressure:     44 mmHg  (< 30mmHg) IVC Diam:             1.94 cm  AORTA: Asc Ao Diam 3.18 cm  [id number] Nahun Nelson DO Electronically signed on 2025 at 5:05:14 PM  ** Final **     Transthoracic Echo (TTE) Complete  Result Date: 2024              [place] [id number]      Phone [phone] Fax [phone]  TRANSTHORACIC ECHOCARDIOGRAM REPORT Patient Name:       CARMENZA URBINA MAMTA   Reading Physician:    [id number] Britney Rush MD Study Date:         11/20/2024          Ordering Provider:    [id number] DANNA SEARS MRN/PID:            [id number]            Fellow: Accession#:         [id number]        Nurse:                Noemi Villa RN Date of Birth/Age:  1965 / 59      Sonographer:          Laxmi crisostomo RDCS Gender Assigned at                     Additional Staff: Birth: Height:             180.34 cm           Admit Date:           11/19/2024 Weight:             87.09 kg            Admission Status:     Inpatient -                                                               Routine BSA / BMI:          2.07 m2 / 26.78     Department Location:  [facility]                     kg/m2 Blood Pressure: 107 /64 mmHg Study Type:    TRANSTHORACIC ECHO (TTE) COMPLETE Diagnosis/ICD: Shortness of breath-R06.02; Unspecified systolic (congestive)                heart failure (CHF)-I50.20 Indication:    CHF, SHORTNESS OF BREATH CPT Codes:     Echo Complete w Full Doppler-[id number] Patient History: Pertinent History: CABG x5 2019, HTN. Study Detail: The following Echo studies were performed: 2D, M-Mode, Doppler and               color flow. Technically challenging study due to prominent lung               artifact and body habitus. Optison used as a contrast agent for               endocardial border definition. Total contrast used for this               procedure was 3 mL via IV push.  PHYSICIAN INTERPRETATION: Left Ventricle: The left ventricular systolic function is moderately decreased, with a Salinas's biplane calculated ejection fraction of 38%. Wall motion is abnormal. The left ventricular cavity size is normal. There is normal septal and normal posterior left ventricular wall thickness. Spectral  Doppler shows an abnormal pattern of left ventricular diastolic filling. Akinetic inferolateral segment, and basal inferior wall. Left Atrium: The left atrium is normal in size. Right Ventricle: The right ventricle was not well visualized. Right ventricular systolic function not assessed. Right Atrium: The right atrium is normal in size. Aortic Valve: The aortic valve appears structurally normal. The aortic valve dimensionless index is 0.73. There is no evidence of aortic valve regurgitation. The peak instantaneous gradient of the aortic valve is 9 mmHg. The mean gradient of the aortic valve is 4 mmHg. Mitral Valve: The mitral valve is normal in structure. There is no evidence of mitral valve regurgitation. Tricuspid Valve: The tricuspid valve is structurally normal. There is trace tricuspid regurgitation. Pulmonic Valve: The pulmonic valve is structurally normal. There is no indication of pulmonic valve regurgitation. Pericardium: No pericardial effusion noted. Aorta: The aortic root is normal. Systemic Veins: The inferior vena cava appears normal in size, with IVC inspiratory collapse greater than 50%.  CONCLUSIONS:  1. Poorly visualized anatomical structures due to suboptimal image quality.  2. The left ventricular systolic function is moderately decreased, with a Salinas's biplane calculated ejection fraction of 38%.  3. Abnormal wall motion.  4. Spectral Doppler shows an abnormal pattern of left ventricular diastolic filling.  5. Akinetic inferolateral segment, and basal inferior wall. QUANTITATIVE DATA SUMMARY:  2D MEASUREMENTS:           Normal Ranges: Ao Root d:       2.80 cm   (2.0-3.7cm) LAs:             3.60 cm   (2.7-4.0cm) IVSd:            0.70 cm   (0.6-1.1cm) LVPWd:           0.70 cm   (0.6-1.1cm) LVIDd:           6.10 cm   (3.9-5.9cm) LVIDs:           5.30 cm LV Mass Index:   78.5 g/m2 LV % FS          13.1 %  LA VOLUME:                    Normal Ranges: LA Vol A4C:        48.7 ml    (22+/-6mL/m2) LA  Vol A2C:        49.8 ml LA Vol BP:         51.4 ml LA Vol Index A4C:  23.5ml/m2 LA Vol Index A2C:  24.0 ml/m2 LA Vol Index BP:   24.8 ml/m2 LA Area A4C:       17.6 cm2 LA Area A2C:       18.6 cm2 LA Major Axis A4C: 5.4 cm LA Major Axis A2C: 5.9 cm LA Volume Index:   24.8 ml/m2  RA VOLUME BY A/L METHOD:          Normal Ranges: RA Area A4C:             13.5 cm2  AORTA MEASUREMENTS:         Normal Ranges: Ao Sinus, d:        2.80 cm (2.1-3.5cm) Ao STJ, d:          2.60 cm (1.7-3.4cm) Asc Ao, d:          2.90 cm (2.1-3.4cm)  LV SYSTOLIC FUNCTION BY 2D PLANIMETRY (MOD):                      Normal Ranges: EF-A4C View:    38 % (>=55%) EF-A2C View:    39 % EF-Biplane:     38 % LV EF Reported: 38 %  LV DIASTOLIC FUNCTION:             Normal Ranges: MV Peak E:             0.75 m/s    (0.7-1.2 m/s) MV Peak A:             0.65 m/s    (0.42-0.7 m/s) E/A Ratio:             1.17        (1.0-2.2) MV e'                  0.069 m/s   (>8.0) MV lateral e'          0.08 m/s MV medial e'           0.06 m/s MV A Dur:              109.00 msec E/e' Ratio:            10.91       (<8.0)  MITRAL VALVE:          Normal Ranges: MV DT:        211 msec (150-240msec)  AORTIC VALVE:                     Normal Ranges: AoV Vmax:                1.46 m/s (<=1.7m/s) AoV Peak P.5 mmHg (<20mmHg) AoV Mean P.0 mmHg (1.7-11.5mmHg) LVOT Max Ayad:            1.09 m/s (<=1.1m/s) AoV VTI:                 29.40 cm (18-25cm) LVOT VTI:                21.60 cm LVOT Diameter:           2.20 cm  (1.8-2.4cm) AoV Area, VTI:           2.79 cm2 (2.5-5.5cm2) AoV Area,Vmax:           2.84 cm2 (2.5-4.5cm2) AoV Dimensionless Index: 0.73  RIGHT VENTRICLE: TAPSE: 19.1 mm  TRICUSPID VALVE/RVSP:          Normal Ranges: Peak TR Velocity:     2.00 m/s RV Syst Pressure:     19 mmHg  (< 30mmHg) IVC Diam:             1.60 cm  [id number] Britney Rush MD Electronically signed on 2024 at 1:49:12 PM  ** Final **        Lab Results   Component Value  Date    BUN 22 05/28/2025    CREATININE 1.19 05/28/2025     (H) 05/09/2025    MG 2.6 (H) 05/09/2025    K 5.1 05/28/2025     05/28/2025       Constitutional:       General:  NAD   HENT:      Head: Normocephalic     Mouth: Mucous membranes are moist.      Neck:  No JVD or HJR   Eyes:      Conjunctiva/sclera: Conjunctivae normal.    Cardiovascular:      Rate and Rhythm: Normal rate and regular rhythm      Heart sounds:  S1 S2 normal, no murmur, no S3 or S4   Pulmonary:      Pulmonary effort is normal.  Normal breath sounds No wheezing or rales.   Abdominal:      General: Bowel sounds are normal, non- tender to palpitations.  Musculoskeletal:         General: trace edema LLE .  CHOI well.   Skin:     General: Skin is warm and dry   Neurological:      General: No focal deficit present.      Mental Status: alert and oriented to person, place, and time. Mental status is at baseline.     Psychiatric:         Mood and Affect: Normal Affect.     Assessment/Plan     Problem List Items Addressed This Visit       Chronic combined systolic and diastolic CHF (congestive heart failure)      Chronic systolic heart failure   EF%36  Etiology Ischemic heart disease  AHA Stage: C   NYHA class: 2-3   Volume Status:   GFR: 70    GDMT:  BB- Carvedilol 6.25 mg 1 tablet twice a day   ARB/ACEI/ARNI -  Entresto 97/103 mg twice a day.   MRA -  contraindicated with low GFR  SGLT2i - Farxiga 10 mg daily   Diuretic - Furosemide 40 mg 1 tablet daily.   Hydralazine 50 mg three times a day.   Isosorbide mononitrate 60 mg daily   Apixaban 5 mg twice a day   Device Therapy:       CHF: weight is  down 7 # from last visit. Will continue current medications.   and continue to work on the 2 gm salt and 2 liter fluid diet.       Disease process and medications discussed. Questions answered fully.  Emphasized salt restriction.  Encouraged daily monitoring of the patient's weight.  Encouraged regular exercise.  Labs ordered today.  Follow up in 4  weeks.    2.  ASHD with hx of PCI of  LM/Cx in January 2025:   ASA/ Ticagrelor/ lipitor/ BB.   Patient will be starting cardiac rehab.     3.  CKD - 3:  Patient has a hx of CKD with GFR in the 40's previously.    6/2/25 GFR improved at 70   -- He has referral to nephrology.          4. PAD s/p Revascularition: per Dr. Kobi MORGAN with apixaban.     5.  Type 2 insulin dependent : managed by endocrinology.     6. Hyperkalemia:  Will recheck potassium level today     7. Hyperlipidemia:  Statin, Zetia, praulent.     Hilda Hammonds, APRN-CNP         [1]    Current Outpatient Medications:   •  acetaminophen (Tylenol) 325 mg tablet, Take 2 tablets (650 mg) by mouth every 4 hours if needed for fever (temp greater than 38.0 C), headaches or mild pain (1 - 3)., Disp: , Rfl:   •  alirocumab (Praluent Pen) 75 mg/mL pen injector, Inject 1 mL subcutaneously every other week., Disp: 2 mL, Rfl: 11  •  apixaban (Eliquis) 5 mg tablet, Take 1 tablet (5 mg) by mouth 2 times a day., Disp: 180 tablet, Rfl: 3  •  aspirin 81 mg chewable tablet, Chew 1 tablet (81 mg) once daily., Disp: , Rfl:   •  atorvastatin (Lipitor) 80 mg tablet, Take 1 tablet (80 mg) by mouth once daily at bedtime., Disp: 90 tablet, Rfl: 2  •  blood sugar diagnostic (Blood Glucose Test) strip, 1 each 3 times a day., Disp: 100 strip, Rfl: 11  •  blood-glucose meter misc, To be used 3 times daily, Disp: 1 each, Rfl: 0  •  blood-glucose sensor (FreeStyle En 3 Plus Sensor) device, Apply sensor every 15 days to monitor blood sugar, Disp: 2 each, Rfl: 11  •  carbamide peroxide (Ear Wax Removal Drops) 6.5 % otic solution, Instill 5 drops into both ears daily for 4 days, Disp: 15 mL, Rfl: 0  •  carvedilol (Coreg) 6.25 mg tablet, Take 1 tablet by mouth every 12 hours, Disp: 180 tablet, Rfl: 3  •  Creon 36,000-114,000- 180,000 unit capsule,delayed release(DR/EC) capsule, Take 2 capsules by mouth 3 times a day as needed., Disp: , Rfl:   •  dapagliflozin propanediol (Farxiga)  10 mg tablet, Take 1 tablet (10 mg) by mouth once daily with breakfast., Disp: 90 tablet, Rfl: 2  •  dextromethorphan-guaifenesin (Mucinex DM)  mg 12 hr tablet, Take 2 tablets by mouth every 12 hours, Disp: 40 tablet, Rfl: 0  •  ELDERBERRY FRUIT ORAL, Take 1 tablet by mouth once daily as needed., Disp: , Rfl:   •  ezetimibe (Zetia) 10 mg tablet, Take 1 tablet by mouth once daily., Disp: 90 tablet, Rfl: 3  •  ferrous sulfate (FeroSuL) 325 mg (65 mg elemental) tablet, Take 1 tablet (325 mg) by mouth once daily with breakfast., Disp: 30 tablet, Rfl: 3  •  FreeStyle En 3 [place] misc, Use as instructed, Disp: 1 each, Rfl: 0  •  furosemide (Lasix) 20 mg tablet, Take 1 tablet (20 mg) by mouth every other day. (Patient taking differently: Take 2 tablets (40 mg) by mouth once daily.), Disp: 45 tablet, Rfl: 1  •  furosemide (Lasix) 40 mg tablet, Take 1 tablet by mouth four times a week., Disp: 48 tablet, Rfl: 3  •  gabapentin (Neurontin) 400 mg capsule, Take 1 capsule (400 mg) by mouth 3 times a day. (Patient taking differently: Take 2 capsules (800 mg) by mouth 2 times a day.), Disp: 90 capsule, Rfl: 5  •  GINGER ROOT EXTRACT ORAL, Take 1 tablet by mouth once daily as needed., Disp: , Rfl:   •  hydrALAZINE (Apresoline) 50 mg tablet, Take 1 tablet (50 mg) by mouth 3 times a day., Disp: 270 tablet, Rfl: 3  •  insulin glargine (Lantus) 100 unit/mL (3 mL) pen, Inject 10 Units under the skin once daily at bedtime., Disp: 15 mL, Rfl: 5  •  insulin lispro (HumaLOG) 100 unit/mL pen, Inject 14 Units under the skin 3 times daily (morning, midday, late afternoon)., Disp: 30 mL, Rfl: 5  •  isosorbide mononitrate ER (Imdur) 60 mg 24 hr tablet, Take 1 tablet (60 mg) by mouth once daily., Disp: 90 tablet, Rfl: 3  •  lancets 30 gauge misc, 1 each 3 times a day., Disp: 100 each, Rfl: 11  •  medical cannabis, Not [facility] prescribed, Disp: , Rfl:   •  OXcarbazepine (Trileptal) 150 mg tablet, Take 3 tablets (450 mg) by mouth 2 times a day.,  "Disp: 540 tablet, Rfl: 3  •  pantoprazole (ProtoNix) 40 mg EC tablet, TAKE 1 TABLET BY MOUTH ONCE DAILY, Disp: 90 tablet, Rfl: 3  •  pen needle, diabetic (BD Ultra-Fine Jocy Pen Needle) 32 gauge x 5/32\" needle, Pen Needles for Insulin., Disp: 100 each, Rfl: 2  •  pen needle, diabetic (BD Ultra-Fine Jocy Pen Needle) 32 gauge x 5/32\" needle, Use to inject insulin up to 8 times per day., Disp: 400 each, Rfl: 0  •  pen needle, diabetic (TechLITE Pen Needle) 32 gauge x 1/4\" needle, Use to inject 1-4 times daily as directed, Disp: 100 each, Rfl: 11  •  ranolazine (Ranexa) 500 mg 12 hr tablet, Take 1 tablet by mouth twice daily., Disp: 180 tablet, Rfl: 3  •  sacubitriL-valsartan (Entresto)  mg tablet, Take 1 tablet by mouth twice daily., Disp: 180 tablet, Rfl: 3  •  tamsulosin (Flomax) 0.4 mg 24 hr capsule, TAKE 1 CAPSULE BY MOUTH ONCE DAILY, Disp: 90 capsule, Rfl: 1  •  ticagrelor (Brilinta) 90 mg tablet, Take 1 tablet (90 mg) by mouth 2 times a day., Disp: 180 tablet, Rfl: 3  •  tiZANidine (Zanaflex) 2 mg tablet, Take 1 tablet (2 mg) by mouth once daily as needed for muscle spasms., Disp: 30 tablet, Rfl: 0  "

## 2025-06-13 ENCOUNTER — TELEPHONE (OUTPATIENT)
Facility: CLINIC | Age: 60
End: 2025-06-13
Payer: COMMERCIAL

## 2025-06-13 LAB
ANION GAP SERPL CALCULATED.4IONS-SCNC: 9 MMOL/L (CALC) (ref 7–17)
BUN SERPL-MCNC: 34 MG/DL (ref 7–25)
BUN/CREAT SERPL: 21 (CALC) (ref 6–22)
CALCIUM SERPL-MCNC: 8.9 MG/DL (ref 8.6–10.3)
CHLORIDE SERPL-SCNC: 104 MMOL/L (ref 98–110)
CO2 SERPL-SCNC: 25 MMOL/L (ref 20–32)
CREAT SERPL-MCNC: 1.61 MG/DL (ref 0.7–1.3)
EGFRCR SERPLBLD CKD-EPI 2021: 49 ML/MIN/1.73M2
GLUCOSE SERPL-MCNC: 226 MG/DL (ref 65–99)
POTASSIUM SERPL-SCNC: 4.8 MMOL/L (ref 3.5–5.3)
SODIUM SERPL-SCNC: 138 MMOL/L (ref 135–146)

## 2025-06-13 NOTE — TELEPHONE ENCOUNTER
Discussed patient on phone. At this time have received paperwork from both Dr. Wright and Dr. Newby regarding cardiac risk assessment and clearance to hold eliquis and brilinta. At this time, we are NOT able to hold brilinta or eliquis.     Discussed options for diagnostic ONLY procedure while on brilinta and eliquis. Discussed risks/benefits. Patient states at this time he does not want to do diagnostic only procedures and will defer procedures at this time. Discussed risks of mass/malignancy, etc. Pt understands.     Will have him follow up in 4-6 months.

## 2025-06-16 ENCOUNTER — CLINICAL SUPPORT (OUTPATIENT)
Dept: CARDIAC REHAB | Facility: HOSPITAL | Age: 60
End: 2025-06-16
Payer: COMMERCIAL

## 2025-06-16 VITALS
OXYGEN SATURATION: 99 % | SYSTOLIC BLOOD PRESSURE: 165 MMHG | HEIGHT: 71 IN | DIASTOLIC BLOOD PRESSURE: 83 MMHG | WEIGHT: 208.2 LBS | BODY MASS INDEX: 29.15 KG/M2 | HEART RATE: 89 BPM | RESPIRATION RATE: 16 BRPM

## 2025-06-16 DIAGNOSIS — I25.10 ASHD (ARTERIOSCLEROTIC HEART DISEASE): ICD-10-CM

## 2025-06-16 DIAGNOSIS — Z95.5 S/P CORONARY ARTERY STENT PLACEMENT: Primary | ICD-10-CM

## 2025-06-16 ASSESSMENT — ENCOUNTER SYMPTOMS
DEPRESSION: 1
OCCASIONAL FEELINGS OF UNSTEADINESS: 1
LOSS OF SENSATION IN FEET: 1

## 2025-06-16 ASSESSMENT — PATIENT HEALTH QUESTIONNAIRE - PHQ9
SUM OF ALL RESPONSES TO PHQ9 QUESTIONS 1 & 2: 6
4. FEELING TIRED OR HAVING LITTLE ENERGY: NEARLY EVERY DAY
1. LITTLE INTEREST OR PLEASURE IN DOING THINGS: NEARLY EVERY DAY
5. POOR APPETITE OR OVEREATING: NOT AT ALL
8. MOVING OR SPEAKING SO SLOWLY THAT OTHER PEOPLE COULD HAVE NOTICED. OR THE OPPOSITE, BEING SO FIGETY OR RESTLESS THAT YOU HAVE BEEN MOVING AROUND A LOT MORE THAN USUAL: NEARLY EVERY DAY
7. TROUBLE CONCENTRATING ON THINGS, SUCH AS READING THE NEWSPAPER OR WATCHING TELEVISION: NEARLY EVERY DAY
9. THOUGHTS THAT YOU WOULD BE BETTER OFF DEAD, OR OF HURTING YOURSELF: NOT AT ALL
SUM OF ALL RESPONSES TO PHQ QUESTIONS 1-9: 20
6. FEELING BAD ABOUT YOURSELF - OR THAT YOU ARE A FAILURE OR HAVE LET YOURSELF OR YOUR FAMILY DOWN: NEARLY EVERY DAY
2. FEELING DOWN, DEPRESSED OR HOPELESS: NEARLY EVERY DAY
3. TROUBLE FALLING OR STAYING ASLEEP: MORE THAN HALF THE DAYS

## 2025-06-16 NOTE — PROGRESS NOTES
Cardiac Rehabilitation Initial Treatment Plan    Name: Dereck Shen  Medical Record Number: [id number]  YOB: 1965  Age: 59 y.o.    Today’s Date: 6/16/2025  Primary Care Physician: SUMAN Carlin-CNS  Referring Physician: Baljinder Wright MD  Program Location: [facility] [place]  Primary Diagnosis:   1. S/P coronary artery stent placement  Referral to Cardiac Rehab      2. ASHD (arteriosclerotic heart disease)  Referral to Cardiac Rehab         Onset/Date of Diagnosis: 01/23/2025    Cardiac Rehab Session #: 1    AACVPR Risk Stratification: Moderate      Falls Risk: Medium  Psychosocial Assessment   06/16/2025 - Orientation Eval -- Pt has hx of PTSD and Biopolar Affective Disorder which he has chosen not to treat and it remains stable. He quit taking his medications for these several years ago. His PHQ 9 score is 20. He has no professional treating him at this time. Will reassess as is needed. He denies any stress issues at this time and is aware of the use of Stress Management techniques. Pt is encouraged to attend Stress/Anxiety/Depression education session. 3 Minute Breathing Space Stress Management tool reviewed with pt.     Sent PH-Q 9 to MD if score > 20: No: Score is 20.     Pt reported/currently experiencing stress: No  Patient uses stress management skills: Yes   History of: Pt has hx of PTSD and Bipolar Personality Disorder which he chooses not to treat.  Currently seeing a mental health provider: No  Social Support: Yes, Whom:Mother  Quality of Life Survey: PHQ 9  Learning Assessment:  Learning assessment/barriers: Motivation  Preferred learning method: Auditory  Barriers: None    Stages of Change:Contemplation    Psychosocial Plan    Goal Status: Initial Assessment; goals not yet started    Psychosocial Goals: Demonstrating proper techniques for stress management and Maintain or lower PH-Q 9 score by discharge    Psychosocial Interventions/Education:   Encouraged attendance for  "stress management lecture.  Instructed patient on 3 minute breathing space  Reevaluate PHQ-9 and review results with patient if needed    Nutrition Assessment:  06/16/2025 - Orientation Eval -- Pt is trying to eat as well as he can and is watching his carbs and his sodium intake. He is diabetic on insulin. We discussed heart healthy diet. We encouraged him to eat a Heart Healthy, low sodium, low fat, ADA diet.  Hyperlipidemia: Yes     Lipids:   Lab Results   Component Value Date    CHOL 102 01/24/2025    HDL 27.5 01/24/2025    LDLF 173 (H) 04/04/2023    TRIG 179 (H) 01/24/2025       Current Dietary Guidelines: Low fat, Low sodium, ADA  Barriers to dietary change: no    Diet Habit Survey: Block Fat Screener  Pre: Initial survey given. Pending completion and return from patient.  Post: To be done at discharge.    Diabetes Assessment    Lab Results   Component Value Date    HGBA1C 7.9 (H) 03/20/2025       History of Diabetes: Yes Pt monitors BS at home: Yes   Frequency: 2-3 /day  Hypoglycemic Episodes: No     Weight Management    Height: 180.3 cm (5' 11\")  Weight: 94.4 kg (208 lb 3.2 oz)  BMI (Calculated): 29.05    Nutrition Plan    Goal Status: Initial Assessment; goals not yet started    Nutrition Goals: Lipid Goal: HDL>45, LDL <70, Total <180, Trigs <150, Learn how to read and interpret nutrition labels prior to discharge, Lose 1lb/week while enrolled in program, and Check blood glucose daily    Nutrition Interventions/Education:   Booklet given \"Heart Attack - Bouncing Back\"  Encourage patient to follow a heart healthy, low fat, low sodium, ADA diet  Lipid profile reviewed    Exercise Assessment  06/16/2025 - Orientation Eval -- Pt has been very inactive. No significant exercise at home.   No  Mode: NA  Frequency: NA  Duration: NA    Exercise Prescription     Exercise Prescription based on: 6 Minute Walk Test          Frequency:  3 days/week   Mode: Treadmill, Recumbent Cycle, Arm Ergometer, and Stepper   Duration: " 40 total aerobic minutes   Intensity: RPE 11-13  Target HR:    MET Level: 3-6  Patient wears supplemental O2: No     Modality Workload METs Duration (minutes)   1 Pre-Exercise      2 Arm Ergometer 5 carranza  ? 6 :00   3 Recumbent Bike 1  ? 6 :00   4 Treadmill 1.3  ? 6 :00   5 Stepper 1.5  ? 6 :00   6 Post-Exercise        Resistance Training: No   Home Exercise Prescription given: To be given prior to discharge from program.    Exercise Plan    Goal Status: Initial Assessment; goals not yet started    Exercise Goals: Increase exercise MET level by 5-10% each week, Increase total exercise duration to 30-45 minutes, Obtain 150 minutes/week of moderate intensity aerobic exercise, Initiate strength training 2-3 days a week, and Establish a home exercise program before discharge    Exercise Interventions/Education:   Exercise prescription based on 6 MWT  Exercise changes made based on HR & RPE scale in response to exercise  Increase duration by 1-2 minutes per modality for a total of 40 minutes per session  Target goal for duration 40 minutes per session, increase MET levels by 5-10% every 30 days or as tolerated  Encouraged home exercise on days not in rehab      Other Core Components/Risk Factor Assessment:  06/16/2025 - Orientation Eval -- Pt is taking his medications as prescribed. His BP is elevated today. Will watch to see what his BP is and notify Dr Newby and [facility] if it remains elevated. Pt is a smoker and is not ready to quit and not interested in the Smoking Cessation program.   Medication adherence  Current Medications:   Medication Documentation Review Audit       Reviewed by eTo Cain RN (Registered Nurse) on 06/16/25 at 1338      Medication Order Taking? Sig Documenting Provider Last Dose Status   acetaminophen (Tylenol) 325 mg tablet [id number]  Take 2 tablets (650 mg) by mouth every 4 hours if needed for fever (temp greater than 38.0 C), headaches or mild pain (1 - 3). Foster GREENE  DO Timothy  Active   alirocumab (Praluent Pen) 75 mg/mL pen injector [id number]  Inject 1 mL subcutaneously every other week.   Active   apixaban (Eliquis) 5 mg tablet [id number]  Take 1 tablet (5 mg) by mouth 2 times a day. Baljinder Wright MD  Active   aspirin 81 mg chewable tablet [id number]  Chew 1 tablet (81 mg) once daily. Historical Provider, MD  Active   atorvastatin (Lipitor) 80 mg tablet [id number]  Take 1 tablet (80 mg) by mouth once daily at bedtime. Baljinder Wright MD  Active   blood sugar diagnostic (Blood Glucose Test) strip [id number]  1 each 3 times a day. Haylie Taet MD  Active   blood-glucose meter misc [id number]  To be used 3 times daily Haylie Tate MD  Active   blood-glucose sensor (FreeStyle En 3 Plus Sensor) device [id number]  Apply sensor every 15 days to monitor blood sugar Haylie Tate MD  Active   carbamide peroxide (Ear Wax Removal Drops) 6.5 % otic solution [id number]  Instill 5 drops into both ears daily for 4 days   Active   carvedilol (Coreg) 6.25 mg tablet [id number]  Take 1 tablet by mouth every 12 hours Baljinder Wright MD  Active   Creon 36,000-114,000- 180,000 unit capsule,delayed release(DR/EC) capsule [id number] No Take 2 capsules by mouth 3 times a day as needed. Historical Provider, MD Unknown Active   dapagliflozin propanediol (Farxiga) 10 mg tablet [id number] No Take 1 tablet (10 mg) by mouth once daily with breakfast.  Unknown Active   dextromethorphan-guaifenesin (Mucinex DM)  mg 12 hr tablet [id number]  Take 2 tablets by mouth every 12 hours   Active   ELDERBERRY FRUIT ORAL [id number] No Take 1 tablet by mouth once daily as needed. Historical Provider, MD Unknown Active   ezetimibe (Zetia) 10 mg tablet [id number]  Take 1 tablet by mouth once daily. Baljinder Wright MD  Active   ferrous sulfate (FeroSuL) 325 mg (65 mg elemental) tablet [id number]  Take 1 tablet (325 mg) by mouth once daily with breakfast. Baljinder Wright MD  Active   FreeStyle  "En 3 [employer] misc [id number]  Use as instructed Haylie Tate MD  Active    Patient taking differently:   Discontinued 06/12/25 1358   furosemide (Lasix) 40 mg tablet [id number]  Take 1 tablet by mouth four times a week.   Active   gabapentin (Neurontin) 400 mg capsule [id number]  Take 1 capsule (400 mg) by mouth 3 times a day.   Patient taking differently: Take 2 capsules (800 mg) by mouth 2 times a day.    Haylie Tate MD  Active   GINGER ROOT EXTRACT ORAL [id number] No Take 1 tablet by mouth once daily as needed. Historical Provider, MD Unknown Active   hydrALAZINE (Apresoline) 50 mg tablet [id number]  Take 1 tablet (50 mg) by mouth 3 times a day. Baljinder Wright MD  Active   insulin glargine (Lantus) 100 unit/mL (3 mL) pen [id number]  Inject 10 Units under the skin once daily at bedtime. Haylie Tate MD  Active   insulin lispro (HumaLOG) 100 unit/mL pen [id number]  Inject 14 Units under the skin 3 times daily (morning, midday, late afternoon). Haylie Tate MD  Active   isosorbide mononitrate ER (Imdur) 60 mg 24 hr tablet [id number]  Take 1 tablet (60 mg) by mouth once daily. Baljinder Wright MD  Active   lancets 30 gauge misc [id number]  1 each 3 times a day. Haylie Tate MD  Active   medical cannabis [id number] No Not [facility] prescribed Historical Provider, MD Unknown Active   OXcarbazepine (Trileptal) 150 mg tablet [id number]  Take 3 tablets (450 mg) by mouth 2 times a day.   Active   pantoprazole (ProtoNix) 40 mg EC tablet [id number] No TAKE 1 TABLET BY MOUTH ONCE DAILY SUMAN Carlin-CNS Unknown Active   pen needle, diabetic (BD Ultra-Fine Jocy Pen Needle) 32 gauge x 5/32\" needle [id number] No Pen Needles for Insulin. SUMAN Carlin-CNS Unknown Active   pen needle, diabetic (BD Ultra-Fine Jocy Pen Needle) 32 gauge x 5/32\" needle [id number] No Use to inject insulin up to 8 times per day.  Unknown Active   pen needle, diabetic (TechLITE Pen Needle) " "32 gauge x \" needle [id number] No Use to inject 1-4 times daily as directed DELMA Carlin Unknown Active   ranolazine (Ranexa) 500 mg 12 hr tablet [id number]  Take 1 tablet by mouth twice daily. Baljinder Wright MD  Active   sacubitriL-valsartan (Entresto)  mg tablet [id number]  Take 1 tablet by mouth twice daily. Baljinder Wright MD  Active   tamsulosin (Flomax) 0.4 mg 24 hr capsule [id number] No TAKE 1 CAPSULE BY MOUTH ONCE DAILY DELMA Carlin Unknown Active   ticagrelor (Brilinta) 90 mg tablet [id number]  Take 1 tablet (90 mg) by mouth 2 times a day. Baljinder Wright MD  Active   tiZANidine (Zanaflex) 2 mg tablet [id number] No Take 1 tablet (2 mg) by mouth once daily as needed for muscle spasms. DELMA Carlin Past Month  25 3179                                 Medication compliance: Yes   Uses pill box/organizer: Yes    Carries medication list: No     Blood Pressure Management  History of Hypertension: Yes   Medication Changes: Yes   Resting BP:  Visit Vitals  /83 (BP Location: Right arm, Patient Position: Sitting, BP Cuff Size: Adult)   Pulse 89   Resp 16        Heart Failure Management  Hx of Heart Failure: Yes;   Type (selection): HFrEF  Most recent EF:   36%    Onset of heart failure diagnosis: ?  Last heart failure hospitalization: ?  Number of HF admissions per year: ?    Symptoms: Fatigue with exertion, Shortness of breath, and LE edema  Is there a family Hx of HF: No   Does patient obtain daily weight No     Heart Failure Reassessment: Initial Treatment Plan. Will reassess in 30 days.    Heart Failure Goals: Able to verbalize signs and symptoms of fluid retention and when to contact MD, Adhere to proper fluid restrictions, Adapt a low sodium diet and verbalize guidelines, and Obtain daily weight and verbalize proper method of obtaining weights    Smoking/Tobacco Assessment  Tobacco Use History[1]    Other Core Component Plan    Goal Status: Initial Assessment; " goals not yet started    Other Core Component Goals: Medication compliance, Verbalize medication usage and drug actions by discharge, and Achieve resting BP of < 130/80 by discharge    Other Core Component Interventions/Education:   Pt is taking all meds as prescribed and carries an updated medication list  Blood pressure remains within target goal  Check resting BP pre and post exercise  Following a low sodium/2300 mg sodium diet  Encouraged to carry an updated medication list  Reviewed effects of exercise on Blood Pressure  Maintain smoking cessation        Individual Patient Goals:    Pt wants to exercise to return to previous levels of ability/tolerance/fitness  Pt would like to lose weight    Goal Status: Initial Assessment; goals not yet started    Staff Comments:  06/16/2025 - Orientation Eval -- Pt plans to attend 36 sessions of Cardiac Rehab in order to achieve his goals.  Orientation: BP R arm sitting 177/93, L arm sitting 1788, standing 172/98, PO2 99%, HR 75 bpm. Lungs CTA, Heart Sounds S1S2 RRR, Radial pulses 1-2+ bilat, +1 Pretibial edema bilat to knees.  6 MWT: 1020 ft = 1.9 mph = 2.5 METs. RPE 11.5, /117, PO2 99%,  bpm. Pt has significant peripheral vascular disease, neuropathy and R foot drop all which affect his balance/gait. Will use Treadmill with caution or not at all.   Cool Down: /83, PO2 99%, HR 92 bpm.   Pt will need follow up careful observance of his gait and his BP and reassessment of his PHQ 9.     Rehab Staff Signature: Teo Cain RN             [1]   Social History  Tobacco Use   Smoking Status Some Days    Types: Cigarettes    Passive exposure: Current (5-7 cigarettes a day)   Smokeless Tobacco Never

## 2025-06-18 ENCOUNTER — TELEPHONE (OUTPATIENT)
Dept: CARDIOLOGY | Facility: HOSPITAL | Age: 60
End: 2025-06-18

## 2025-06-18 ENCOUNTER — APPOINTMENT (OUTPATIENT)
Dept: PRIMARY CARE | Facility: CLINIC | Age: 60
End: 2025-06-18
Payer: COMMERCIAL

## 2025-06-19 DIAGNOSIS — N18.4 CKD (CHRONIC KIDNEY DISEASE) STAGE 4, GFR 15-29 ML/MIN (MULTI): ICD-10-CM

## 2025-06-19 DIAGNOSIS — I50.42 CHRONIC COMBINED SYSTOLIC AND DIASTOLIC CHF (CONGESTIVE HEART FAILURE): Primary | ICD-10-CM

## 2025-06-19 NOTE — PROGRESS NOTES
"Counseling:  The patient was counseled regarding diagnostic results, instructions for management, risk factor reductions, prognosis, patient and family education, impressions, risks and benefits of treatment options and importance of compliance with treatment.      Chief Complaint:   The patient presents today for 3-month followup of CAD, heart failure and HTN s/p echocardiogram.      History Of Present Illness:    Dereck Shen is a 59 y.o. male patient who presents today for 3-month followup of CAD, heart failure and HTN s/p echocardiogram. His PMH is significant for PAD with prior revascularization (R fem-pop bypass 2/24/20; L fem-pop bypass 9/14/20; L fem PTA of the graft; R iliac PTA 2/2022; LLE and DANYA PTA 4/2024; angioplasty of the left SFA 10/21/24; and left femoral and tibial thrombectomy with patch angioplasty 11/19/24), heart failure, CAD s/p CABG x5 in 09/2019, PCI of Cx, 01/04/2021 and s/p PCI of LM/Cx 01/23/2025; HTN, hyperlipidemia, DM type 2, Hepatitis C, bipolar depression, ischemic stroke, COPD, BRUNA and nicotine dependence. Echocardiogram performed today, 06/20/2025, demonstrated an LVEF of 37%. Over the past 3 months, the patient states that he has done relatively well from a cardiac standpoint. He denies any CP or discomfort. He reports persistent, although stable exertional SOB. BP has been stable. The patient is compliant with his prescribed medications.      Last Recorded Vitals:  Vitals:    06/20/25 1242   BP: 98/58   BP Location: Left arm   Patient Position: Sitting   BP Cuff Size: Adult   Pulse: 67   SpO2: 98%   Weight: 90.7 kg (200 lb)   Height: 1.803 m (5' 11\")       Past Surgical History:  He has a past surgical history that includes Knee surgery (Left); Elbow surgery; Back surgery; CT angio aorta and bilateral iliofemoral runoff including without contrast if performed (07/20/2020); MR angio head wo IV contrast (01/04/2022); MR angio neck wo IV contrast (01/04/2022); CT angio aorta " and bilateral iliofemoral runoff including without contrast if performed (01/14/2022); Invasive Vascular Procedure (Bilateral, 10/04/2024); Dental surgery; Coronary angioplasty with stent; Coronary artery bypass graft; Tonsillectomy; Transluminal atherectomy femoral-popliteal / tibioperoneal; Invasive Vascular Procedure (N/A, 10/21/2024); Invasive Vascular Procedure (N/A, 10/21/2024); Cardiac catheterization (N/A, 1/23/2025); Cardiac catheterization (N/A, 1/23/2025); and Cardiac catheterization (N/A, 1/23/2025).      Social History:  He reports that he has been smoking cigarettes. He has been exposed to tobacco smoke. He has never used smokeless tobacco. He reports that he does not currently use alcohol. He reports current drug use. Frequency: 7.00 times per week. Drug: Marijuana.    Family History:  Family History   Problem Relation Name Age of Onset    No Known Problems Mother      No Known Problems Father          Allergies:  Celecoxib; Ibuprofen; Tetanus toxoid, adsorbed; [id number] antimigraine agents; Cephalexin; Hydrocodone; Latex; and Tryptophan    Outpatient Medications:  Current Outpatient Medications   Medication Instructions    acetaminophen (TYLENOL) 650 mg, oral, Every 4 hours PRN    alirocumab (Praluent Pen) 75 mg/mL pen injector Inject 1 mL subcutaneously every other week.    aspirin 81 mg, Daily    atorvastatin (LIPITOR) 80 mg, oral, Nightly    blood sugar diagnostic (Blood Glucose Test) strip 1 each, miscellaneous, 3 times daily    blood-glucose meter misc To be used 3 times daily    blood-glucose sensor (FreeStyle En 3 Plus Sensor) device Apply sensor every 15 days to monitor blood sugar    Brilinta 90 mg, oral, 2 times daily    carbamide peroxide (Ear Wax Removal Drops) 6.5 % otic solution Instill 5 drops into both ears daily for 4 days    carvedilol (Coreg) 6.25 mg tablet Take 1 tablet by mouth every 12 hours    Creon 36,000-114,000- 180,000 unit capsule,delayed release(DR/EC) capsule 2  "capsules, 3 times daily PRN    dextromethorphan-guaifenesin (Mucinex DM)  mg 12 hr tablet Take 2 tablets by mouth every 12 hours    ELDERBERRY FRUIT ORAL 1 tablet, Daily PRN    Eliquis 5 mg, oral, 2 times daily    ezetimibe (Zetia) 10 mg tablet Take 1 tablet by mouth once daily.    Farxiga 10 mg, oral, Daily with breakfast    ferrous sulfate (FeroSuL) 325 mg (65 mg elemental) tablet Take 1 tablet (325 mg) by mouth once daily with breakfast.    FreeStyle En 3 Inlet misc Use as instructed    furosemide (Lasix) 40 mg tablet Take 1 tablet by mouth four times a week.    gabapentin (NEURONTIN) 400 mg, oral, 3 times daily    JAIMEE ROOT EXTRACT ORAL 1 tablet, Daily PRN    hydrALAZINE (APRESOLINE) 50 mg, oral, 3 times daily    insulin glargine (LANTUS) 10 Units, subcutaneous, Nightly    insulin lispro (HUMALOG) 14 Units, subcutaneous, 3 times daily (morning, midday, late afternoon)    isosorbide mononitrate ER (IMDUR) 60 mg, oral, Daily    lancets 30 gauge misc 1 each, miscellaneous, 3 times daily    medical cannabis Not [facility] prescribed    OXcarbazepine (TRILEPTAL) 450 mg, oral, 2 times daily    pantoprazole (ProtoNix) 40 mg EC tablet TAKE 1 TABLET BY MOUTH ONCE DAILY    pen needle, diabetic (BD Ultra-Fine Jocy Pen Needle) 32 gauge x 5/32\" needle Pen Needles for Insulin.    pen needle, diabetic (BD Ultra-Fine Jocy Pen Needle) 32 gauge x 5/32\" needle Use to inject insulin up to 8 times per day.    pen needle, diabetic (TechLITE Pen Needle) 32 gauge x 1/4\" needle Use to inject 1-4 times daily as directed    ranolazine (Ranexa) 500 mg 12 hr tablet Take 1 tablet by mouth twice daily.    sacubitriL-valsartan (Entresto)  mg tablet Take 1 tablet by mouth twice daily.    tamsulosin (Flomax) 0.4 mg 24 hr capsule TAKE 1 CAPSULE BY MOUTH ONCE DAILY    tiZANidine (ZANAFLEX) 2 mg, oral, Daily PRN     Review of Systems   All other systems reviewed and are negative.     Physical Exam:  Constitutional:       Appearance: " Healthy appearance. Not in distress.   Neck:      Vascular: No JVR. JVD normal.   Pulmonary:      Effort: Pulmonary effort is normal.      Breath sounds: Normal breath sounds. No wheezing. No rhonchi. No rales.   Chest:      Chest wall: Not tender to palpatation.   Cardiovascular:      PMI at left midclavicular line. Normal rate. Regular rhythm. Normal S1. Normal S2.       Murmurs: There is no murmur.      No gallop.  No click. No rub.   Pulses:     Intact distal pulses.   Edema:     Peripheral edema absent.   Abdominal:      General: Bowel sounds are normal.      Palpations: Abdomen is soft.      Tenderness: There is no abdominal tenderness.   Musculoskeletal: Normal range of motion.         General: No tenderness. Skin:     General: Skin is warm and dry.   Neurological:      General: No focal deficit present.      Mental Status: Alert and oriented to person, place and time.          Last Labs:  CBC -  Lab Results   Component Value Date    WBC 6.5 05/28/2025    HGB 15.7 05/28/2025    HCT 46.5 05/28/2025    MCV 95.7 05/28/2025     05/28/2025       CMP -  Lab Results   Component Value Date    CALCIUM 8.9 06/12/2025    PHOS 2.3 (L) 01/24/2025    PROT 7.5 05/28/2025    ALBUMIN 4.4 05/28/2025    AST 13 05/28/2025    ALT 12 05/28/2025    ALKPHOS 113 05/28/2025    BILITOT 0.6 05/28/2025       LIPID PANEL -   Lab Results   Component Value Date    CHOL 102 01/24/2025    TRIG 179 (H) 01/24/2025    HDL 27.5 01/24/2025    CHHDL 3.7 01/24/2025    LDLF 173 (H) 04/04/2023    VLDL 36 01/24/2025    NHDL 75 01/24/2025       RENAL FUNCTION PANEL -   Lab Results   Component Value Date    GLUCOSE 226 (H) 06/12/2025     06/12/2025    K 4.8 06/12/2025     06/12/2025    CO2 25 06/12/2025    ANIONGAP 9 06/12/2025    BUN 34 (H) 06/12/2025    CREATININE 1.61 (H) 06/12/2025    GFRMALE 57 (A) 08/10/2023    CALCIUM 8.9 06/12/2025    PHOS 2.3 (L) 01/24/2025    ALBUMIN 4.4 05/28/2025        Lab Results   Component Value Date      (H) 05/09/2025    HGBA1C 7.9 (H) 03/20/2025    HGBA1C 8.2 (A) 10/01/2024       Last Cardiology Tests:  01/23/2025 - Cardiac Catheterization (LH)  1. Triple vessel disease.  2. Culprit vessel(s): circumflex.  3. Successful PTCA/stent of LM/CX.  4. Left Ventricular end-diastolic pressure = 10.  5. Normal LV filling pressures.    01/20/2025 - TTE  1. The left ventricular systolic function is moderately decreased, with a Salinas's biplane calculated ejection fraction of 36%.  2. There is global hypokinesis of the left ventricle with minor regional variations.  3. Spectral Doppler shows a Grade II (pseudonormal pattern) of left ventricular diastolic filling with an elevated left atrial pressure.  4. Left ventricular cavity size is mildly dilated.  5. There is mildly reduced right ventricular systolic function.  6. Mildly elevated right ventricular systolic pressure.    01/19/2025 - CXR  1.  Pulmonary vascular prominence suggestive of pulmonary vascular congestion.  2.  Patchy opacities within the lower lobes most consistent with atelectasis/pneumonitis.    11/20/2024 - TTE  1. Poorly visualized anatomical structures due to suboptimal image quality.  2. The left ventricular systolic function is moderately decreased, with a Salinas's biplane calculated ejection fraction of 38%.  3. Abnormal wall motion.  4. Spectral Doppler shows an abnormal pattern of left ventricular diastolic filling.  5. Akinetic inferolateral segment, and basal inferior wall.    10/21/2024 - Vascular Intervention   Successful recannalization of totally occluded left SFA.     10/04/2024 - Vascular Procedure  Chronically occluded left SFA and left fem-pop bypass.     05/10/2023 - Nuclear Stress Test  1. SPECT Perfusion Study: Abnormal.   2. There is no scintigraphic evidence for inducible ischemia.   3. There is a large (>20%) fixed perfusion defect in the LCX territory.   4. There is a moderate (10-20%) fixed perfusion defect in the RCA  territory.   5. Left ventricle is moderately dilated. The left ventricle systolic function is moderately decreased.   6. Right ventricle is normal in size. The right ventricle systolic function is normal.   7. This is an intermediate risk scan. Large Lcx scar with RCA scar, no ischemia. EF 40%.    01/04/2022 - TTE  1. The left ventricular systolic function is mildly decreased with a 35% estimated ejection fraction.  2. Multiple segmental abnormalities exist. See findings.  3. Spectral Doppler shows an impaired relaxation pattern of left ventricular diastolic filling.     02/19/2021 - TTE  Limited echocardiogram, LV function appears reduced although multiple segments were not well visualized.     01/04/2021 - Cardiac Catheterization (LH)  1. Triple vessel coronary artery disease with proximal left anterior descending involvement.  2. Culprit vessel(s): circumflex.  3. Successful PCI of Cx.  4. Patent LIMA to LAD/Dg, SVG to RCA and Radial graft to OM.    12/16/2020 - TTE  1. The left ventricular systolic function is moderately decreased with a 35-40% estimated ejection fraction.  2. Basal and mid inferior septum, basal and mid inferior wall, basal and mid inferolateral wall, and basal anteroseptal segment are abnormal.  3. There are multiple wall motion abnormalities.     04/14/2020 - TTE  1. The left ventricular systolic function is mildly to moderately decreased with a 40-45% estimated ejection fraction.  2. Hypokinesis of basal inferior, inferolateral and inferoseptal LV segments.  3. Spectral Doppler shows an impaired relaxation pattern of left ventricular diastolic filling.    09/20/2019 - TTE  1. The left ventricular systolic function is mildly to moderately decreased with a 40% estimated ejection fraction.  2. Abnormal septal motion consistent with post-operative status.  3. Spectral Doppler shows an impaired relaxation pattern of left ventricular diastolic filling.  4. There is no evidence of left ventricular  hypertrophy.  5. The pulmonary artery is not well visualized.  6. There is global hypokinesis of the left ventricle with minor regional variations.    07/16/2019 - TTE  1. Moderately-severe left ventricular systolic dysfunction with regional abnormalities with an ejection fraction of 30%.  2. Inferolateral and anterolateral wall segments are akinetic; inferior and inferoseptal wall segments are severely hypokinetic; anterior and anteroseptal wall segments are mildly hypokinetic.  3. Normal right ventricular size and systolic function.  4. Trace mitral and tricuspid regurgitation.     04/22/2019 - Cardiac Catheterization (LH)  Triple vessel coronary artery disease with proximal left anterior descending involvement.     03/30/2019 - TTE  1. Severe segment and generalized left ventricular systolic dysfunction with an ejection fraction of 20%.  2. Inferior and inferolateral wall segments are akinetic; remaining LV wall segments are severely hypokinetic.  3. Restrictive diastolic filling pattern (Stage III-IV diastolic dysfunction).  4. Dilated right ventricle with severe systolic dysfunction.  5. Trace mitral regurgitation.     Lab review: I have personally reviewed the laboratory result(s).  Diagnostic review: I have personally reviewed the result(s) of the Echocardiogram.    Assessment/Plan   1) CAD s/p Prior CABG x5 09/2019, s/p PCI of Cx, 01/04/2021, now s/p PCI of LM/Cx 01/23/2025 - HFrEF  On DAPT - ASA 81 mg daily, Brilinta 90 mg BID  On atorvastatin 80 mg daily, carvedilol 6.25 mg BID, Farxiga 10 mg daily, Entresto  mg BID, Zetia 10 mg daily, furosemide 40 mg daily, hydralazine 50 mg TID, Imdur 60 mg daily, Ranexa 500 mg BID.  Hospital admission 01/19/2025 to 01/24/2025 with respiratory distress, acute pulmonary edema  CXR with pulmonary vascular prominence suggestive of pulmonary vascular congestion, patchy opacities within lower lobes most consistent with atelectasis/pneumonitis.  TTE with LVEF 36%, Grade  II pseudonormal pattern of LV diastolic filling with an elevated left atrial pressure, mildly reduced RV systolic function, mildly elevated RVSP.  LHC with triple vessel disease s/p PTCA/stent of LM/CX  RHC with normal filling pressures, normal ventricular filling pressure, normal CO, preserved CO at rest, no evidence of shunt, no pulmonary vascular resistance.   TTE 06/20/2025 with LVEF 37%  Denies CP or discomforts  Reports exertional SOB  BP stable  Referral placed to Dr. Banerjee re: possible ICD   Continue current medical Rx   F/U 6 months    2) Hyperlipidemia  Goal LDL <70  On atorvastatin 80 mg daily, Zetia 10 mg daily   Lipid panel 01/24/2025 with total cholesterol, LDL and triglycerides of 102, 39 and 179 respectively   Continue current medical Rx   F/U 6 months    3) HTN  Stable   On carvedilol 6.25 mg BID, Entresto  mg BID, furosemide 40 mg daily, hydralazine 50 mg TID  Continue current medical Rx   F/U 6 months    4) PAD s/p Prior Revascularization   On Eliquis 5 mg BID  Management per Dr. Peace   S/P right fem-pop bypass 02/24/2020  S/P left fem-pop bypass 09/14/2020  S/P left fem PTA of graft, right iliac PTA 02/2022  S/P LLE and DANYA PTA 04/2024  S/P angioplasty of left SFA 10/21/2024  S/P left femoral and tibial thrombectomy with patch angioplasty 11/19/2024  Stable   F/U 6 months    5) Anemia   Labs 01/20/2025 with low B12 of 185, low TIBC of 235, low iron saturation of 15  CBC 02/06/2025 with low RBCs of 3.25, hemoglobin of 9.6  Referral placed to MARGARETH Brito Attestation  By signing my name below, I, Mk Bundy   attest that this documentation has been prepared under the direction and in the presence of Baljinder Wright MD.

## 2025-06-20 ENCOUNTER — HOSPITAL ENCOUNTER (OUTPATIENT)
Dept: CARDIOLOGY | Facility: HOSPITAL | Age: 60
Discharge: HOME | End: 2025-06-20
Payer: COMMERCIAL

## 2025-06-20 ENCOUNTER — OFFICE VISIT (OUTPATIENT)
Dept: CARDIOLOGY | Facility: HOSPITAL | Age: 60
End: 2025-06-20
Payer: COMMERCIAL

## 2025-06-20 VITALS
DIASTOLIC BLOOD PRESSURE: 58 MMHG | HEIGHT: 71 IN | OXYGEN SATURATION: 98 % | BODY MASS INDEX: 28 KG/M2 | HEART RATE: 67 BPM | SYSTOLIC BLOOD PRESSURE: 98 MMHG | WEIGHT: 200 LBS

## 2025-06-20 DIAGNOSIS — I70.213 ATHEROSCLEROSIS OF NATIVE ARTERY OF BOTH LOWER EXTREMITIES WITH INTERMITTENT CLAUDICATION: ICD-10-CM

## 2025-06-20 DIAGNOSIS — I50.23 ACUTE ON CHRONIC SYSTOLIC (CONGESTIVE) HEART FAILURE: ICD-10-CM

## 2025-06-20 DIAGNOSIS — I50.41 ACUTE COMBINED SYSTOLIC AND DIASTOLIC HEART FAILURE: ICD-10-CM

## 2025-06-20 DIAGNOSIS — I73.9 PAD (PERIPHERAL ARTERY DISEASE): Primary | ICD-10-CM

## 2025-06-20 DIAGNOSIS — I50.42 CHRONIC COMBINED SYSTOLIC AND DIASTOLIC CHF (CONGESTIVE HEART FAILURE): ICD-10-CM

## 2025-06-20 DIAGNOSIS — I25.10 ATHEROSCLEROSIS OF NATIVE CORONARY ARTERY OF NATIVE HEART WITHOUT ANGINA PECTORIS: ICD-10-CM

## 2025-06-20 DIAGNOSIS — D64.9 ANEMIA: ICD-10-CM

## 2025-06-20 DIAGNOSIS — I70.222 CRITICAL LIMB ISCHEMIA OF LEFT LOWER EXTREMITY: ICD-10-CM

## 2025-06-20 LAB
EJECTION FRACTION APICAL 4 CHAMBER: 34.4
EJECTION FRACTION: 33 %
LEFT VENTRICLE INTERNAL DIMENSION DIASTOLE: 5.56 CM (ref 3.5–6)
LV EJECTION FRACTION BIPLANE: 37 %

## 2025-06-20 PROCEDURE — 99212 OFFICE O/P EST SF 10 MIN: CPT | Mod: 25 | Performed by: INTERNAL MEDICINE

## 2025-06-20 PROCEDURE — C8924 2D TTE W OR W/O FOL W/CON,FU: HCPCS

## 2025-06-20 PROCEDURE — 2500000004 HC RX 250 GENERAL PHARMACY W/ HCPCS (ALT 636 FOR OP/ED): Performed by: INTERNAL MEDICINE

## 2025-06-20 PROCEDURE — 93308 TTE F-UP OR LMTD: CPT | Performed by: STUDENT IN AN ORGANIZED HEALTH CARE EDUCATION/TRAINING PROGRAM

## 2025-06-20 PROCEDURE — RXMED WILLOW AMBULATORY MEDICATION CHARGE

## 2025-06-20 RX ORDER — FUROSEMIDE 40 MG/1
TABLET ORAL
Qty: 48 TABLET | Refills: 3 | Status: SHIPPED | OUTPATIENT
Start: 2025-06-20

## 2025-06-20 RX ORDER — TICAGRELOR 90 MG/1
90 TABLET, FILM COATED ORAL 2 TIMES DAILY
Qty: 180 TABLET | Refills: 3 | Status: SHIPPED | OUTPATIENT
Start: 2025-06-20 | End: 2026-06-20

## 2025-06-20 RX ORDER — ISOSORBIDE MONONITRATE 60 MG/1
60 TABLET, EXTENDED RELEASE ORAL DAILY
Qty: 90 TABLET | Refills: 3 | Status: SHIPPED | OUTPATIENT
Start: 2025-06-20 | End: 2026-06-20

## 2025-06-20 RX ORDER — ATORVASTATIN CALCIUM 80 MG/1
80 TABLET, FILM COATED ORAL NIGHTLY
Qty: 90 TABLET | Refills: 2 | Status: SHIPPED | OUTPATIENT
Start: 2025-06-20 | End: 2026-06-20

## 2025-06-20 RX ORDER — RANOLAZINE 500 MG/1
500 TABLET, EXTENDED RELEASE ORAL 2 TIMES DAILY
Qty: 180 TABLET | Refills: 3 | Status: SHIPPED | OUTPATIENT
Start: 2025-06-20 | End: 2026-06-20

## 2025-06-20 RX ORDER — HYDRALAZINE HYDROCHLORIDE 50 MG/1
50 TABLET, FILM COATED ORAL 3 TIMES DAILY
Qty: 270 TABLET | Refills: 3 | Status: SHIPPED | OUTPATIENT
Start: 2025-06-20 | End: 2026-06-20

## 2025-06-20 RX ADMIN — HUMAN ALBUMIN MICROSPHERES AND PERFLUTREN 0.5 ML: 10; .22 INJECTION, SOLUTION INTRAVENOUS at 10:03

## 2025-06-20 ASSESSMENT — ENCOUNTER SYMPTOMS
OCCASIONAL FEELINGS OF UNSTEADINESS: 0
DEPRESSION: 1
LOSS OF SENSATION IN FEET: 1

## 2025-06-20 NOTE — PATIENT INSTRUCTIONS
Continue all current medications as prescribed.   Dr. Wright has placed a referral to Dr. Banerjee, electrical specialist of the heart.   Followup with Dr. Wright in 6 months.    If you have any questions or cardiac concerns, please call our office at [phone].

## 2025-06-21 ENCOUNTER — PHARMACY VISIT (OUTPATIENT)
Dept: PHARMACY | Facility: CLINIC | Age: 60
End: 2025-06-21
Payer: MEDICAID

## 2025-06-24 ENCOUNTER — APPOINTMENT (OUTPATIENT)
Dept: CARDIOLOGY | Facility: HOSPITAL | Age: 60
End: 2025-06-24
Payer: COMMERCIAL

## 2025-06-26 PROCEDURE — RXMED WILLOW AMBULATORY MEDICATION CHARGE

## 2025-06-27 ENCOUNTER — PHARMACY VISIT (OUTPATIENT)
Dept: PHARMACY | Facility: CLINIC | Age: 60
End: 2025-06-27
Payer: MEDICAID

## 2025-06-27 ENCOUNTER — TELEPHONE (OUTPATIENT)
Dept: CARDIOLOGY | Facility: HOSPITAL | Age: 60
End: 2025-06-27
Payer: COMMERCIAL

## 2025-06-27 NOTE — TELEPHONE ENCOUNTER
LVM for pt to schedule EP appt w/ Dr. Banerjee.     Pt returned my call and is agreeable to schedule next available with Dr. Banerjee at [place] 8/26 2pm to discuss ICD.

## 2025-07-04 NOTE — PROGRESS NOTES
Discussed in rounds with Dr Aguirre. Discharge anticipated later today vs tomorrow. Dr Aguirre is aware he will need to reorder all of the infusion orders for [facility], as as wound care orders for discharge.    Spouse

## 2025-07-07 ENCOUNTER — APPOINTMENT (OUTPATIENT)
Dept: ENDOCRINOLOGY | Facility: CLINIC | Age: 60
End: 2025-07-07
Payer: COMMERCIAL

## 2025-07-10 ENCOUNTER — PHARMACY VISIT (OUTPATIENT)
Dept: PHARMACY | Facility: CLINIC | Age: 60
End: 2025-07-10
Payer: MEDICAID

## 2025-07-10 PROCEDURE — RXMED WILLOW AMBULATORY MEDICATION CHARGE

## 2025-07-15 ENCOUNTER — DOCUMENTATION (OUTPATIENT)
Dept: CARDIAC REHAB | Facility: HOSPITAL | Age: 60
End: 2025-07-15
Payer: COMMERCIAL

## 2025-07-15 NOTE — PROGRESS NOTES
Cardiac Rehabilitation 30 Day Reassessment    Name: Dereck Shen  Medical Record Number: [id number]  YOB: 1965  Age: 59 y.o.    Today’s Date: 7/15/2025  Primary Care Physician: SUMAN Carlin-CNS  Referring Physician: Baljinder Wright MD   Program Location: [facility]  Primary Diagnosis:   S/P coronary artery stent placement (Z95.5)  ASHD (arteriosclerotic heart disease)      Onset/Date of Diagnosis: 01/23/2025    Cardiac Rehab Session #: 1  starting 7/21/2025     AACVPR Risk Stratification: Moderate    Falls Risk: Medium  Psychosocial Assessment   7/15/2025 - 30 Day Eval -  Due to patient being out of town, he will be starting exercise sessions on 7/21/2025.      06/16/2025 - Orientation Eval -- Pt has hx of PTSD and Biopolar Affective Disorder which he has chosen not to treat and it remains stable. He quit taking his medications for these several years ago. His PHQ 9 score is 20. He has no professional treating him at this time. Will reassess as is needed. He denies any stress issues at this time and is aware of the use of Stress Management techniques. Pt is encouraged to attend Stress/Anxiety/Depression education session. 3 Minute Breathing Space Stress Management tool reviewed with pt.     Sent PH-Q 9 to MD if score > 20: No: Score is 20.     Pt reported/currently experiencing stress: No  Patient uses stress management skills: Yes   History of: Pt has hx of PTSD and Bipolar Personality Disorder which he chooses not to treat.  Currently seeing a mental health provider: No  Social Support: Yes, Whom:Mother  Quality of Life Survey: PHQ 9  Learning Assessment:  Learning assessment/barriers: Motivation  Preferred learning method: Auditory  Barriers: None    Stages of Change:Contemplation    Psychosocial Plan    Goal Status: Initial Assessment; goals not yet started    Psychosocial Goals: Demonstrating proper techniques for stress management and Maintain or lower PH-Q 9 score by  "discharge    Psychosocial Interventions/Education:   Encouraged attendance for stress management lecture.  Instructed patient on 3 minute breathing space  Reevaluate PHQ-9 and review results with patient if needed    Nutrition Assessment:    7/15/2025 - 30 Day Eval -  Due to patient being out of town, he will be starting exercise sessions on 7/21/2025.    06/16/2025 - Orientation Eval -- Pt is trying to eat as well as he can and is watching his carbs and his sodium intake. He is diabetic on insulin. We discussed heart healthy diet. We encouraged him to eat a Heart Healthy, low sodium, low fat, ADA diet.  Hyperlipidemia: Yes     Lipids:   Lab Results   Component Value Date    CHOL 102 01/24/2025    HDL 27.5 01/24/2025    LDLF 173 (H) 04/04/2023    TRIG 179 (H) 01/24/2025       Current Dietary Guidelines: Low fat, Low sodium, ADA  Barriers to dietary change: no    Diet Habit Survey: Block Fat Screener  Pre: Initial survey given. Pending completion and return from patient.  Post: To be done at discharge.    Diabetes Assessment    Lab Results   Component Value Date    HGBA1C 7.9 (H) 03/20/2025       History of Diabetes: Yes Pt monitors BS at home: Yes   Frequency: 2-3 /day  Hypoglycemic Episodes: No     Weight Management         Nutrition Plan    Goal Status: Initial Assessment; goals not yet started    Nutrition Goals: Lipid Goal: HDL>45, LDL <70, Total <180, Trigs <150, Learn how to read and interpret nutrition labels prior to discharge, Lose 1lb/week while enrolled in program, and Check blood glucose daily    Nutrition Interventions/Education:   Booklet given \"Heart Attack - Bouncing Back\"  Encourage patient to follow a heart healthy, low fat, low sodium, ADA diet  Lipid profile reviewed    Exercise Assessment    7/15/2025 - 30 Day Eval -  Due to patient being out of town, he will be starting exercise sessions on 7/21/2025.    06/16/2025 - Orientation Eval -- Pt has been very inactive. No significant exercise at " home.   No  Mode: NA  Frequency: NA  Duration: NA    Exercise Prescription     Exercise Prescription based on: 6 Minute Walk Test          Frequency:  3 days/week   Mode: Treadmill, Recumbent Cycle, Arm Ergometer, and Stepper   Duration: 40 total aerobic minutes   Intensity: RPE 11-13  Target HR:    MET Level: 3-6  Patient wears supplemental O2: No     Modality Workload METs Duration (minutes)   1 Pre-Exercise      2 Arm Ergometer 5 carranza  ? 6 :00   3 Recumbent Bike 1  ? 6 :00   4 Treadmill 1.3  ? 6 :00   5 Stepper 1.5  ? 6 :00   6 Post-Exercise        Resistance Training: No   Home Exercise Prescription given: To be given prior to discharge from program.    Exercise Plan    Goal Status: Initial Assessment; goals not yet started    Exercise Goals: Increase exercise MET level by 5-10% each week, Increase total exercise duration to 30-45 minutes, Obtain 150 minutes/week of moderate intensity aerobic exercise, Initiate strength training 2-3 days a week, and Establish a home exercise program before discharge    Exercise Interventions/Education:   Exercise prescription based on 6 MWT  Exercise changes made based on HR & RPE scale in response to exercise  Increase duration by 1-2 minutes per modality for a total of 40 minutes per session  Target goal for duration 40 minutes per session, increase MET levels by 5-10% every 30 days or as tolerated  Encouraged home exercise on days not in rehab      Other Core Components/Risk Factor Assessment:  7/15/2025 - 30 Day Eval -  Due to patient being out of town, he will be starting exercise sessions on 7/21/2025.    06/16/2025 - Orientation Eval -- Pt is taking his medications as prescribed. His BP is elevated today. Will watch to see what his BP is and notify Dr Newby and [facility] if it remains elevated. Pt is a smoker and is not ready to quit and not interested in the Smoking Cessation program.   Medication adherence  Current Medications:   Medication  Documentation Review Audit       Reviewed by Marylou Titus MA (Medical Assistant) on 06/20/25 at 1242      Medication Order Taking? Sig Documenting Provider Last Dose Status   acetaminophen (Tylenol) 325 mg tablet [id number] Yes Take 2 tablets (650 mg) by mouth every 4 hours if needed for fever (temp greater than 38.0 C), headaches or mild pain (1 - 3). Foster Aguirre,   Active   alirocumab (Praluent Pen) 75 mg/mL pen injector [id number] Yes Inject 1 mL subcutaneously every other week.   Active   apixaban (Eliquis) 5 mg tablet [id number] Yes Take 1 tablet (5 mg) by mouth 2 times a day. Baljinder Wright MD  Active   aspirin 81 mg chewable tablet [id number] Yes Chew 1 tablet (81 mg) once daily. Historical Provider, MD  Active   atorvastatin (Lipitor) 80 mg tablet [id number] Yes Take 1 tablet (80 mg) by mouth once daily at bedtime. Baljinder Wright MD  Active   blood sugar diagnostic (Blood Glucose Test) strip [id number] Yes 1 each 3 times a day. Haylie Tate MD  Active   blood-glucose meter misc [id number] Yes To be used 3 times daily Haylie Tate MD  Active   blood-glucose sensor (FreeStyle En 3 Plus Sensor) device [id number] Yes Apply sensor every 15 days to monitor blood sugar Haylie Tate MD  Active   carbamide peroxide (Ear Wax Removal Drops) 6.5 % otic solution [id number] Yes Instill 5 drops into both ears daily for 4 days   Active   carvedilol (Coreg) 6.25 mg tablet [id number] Yes Take 1 tablet by mouth every 12 hours Baljinder Wright MD  Active   Creon 36,000-114,000- 180,000 unit capsule,delayed release(DR/EC) capsule [id number]  Take 2 capsules by mouth 3 times a day as needed. Historical Provider, MD  Active   dapagliflozin propanediol (Farxiga) 10 mg tablet [id number] Yes Take 1 tablet (10 mg) by mouth once daily with breakfast.   Active   dextromethorphan-guaifenesin (Mucinex DM)  mg 12 hr tablet [id number] Yes Take 2 tablets by mouth every 12 hours   Active  "  ELDERBERRY FRUIT ORAL [id number] Yes Take 1 tablet by mouth once daily as needed. Historical Provider, MD  Active   ezetimibe (Zetia) 10 mg tablet [id number] Yes Take 1 tablet by mouth once daily. Baljinder Wright MD  Active   ferrous sulfate (FeroSuL) 325 mg (65 mg elemental) tablet [id number] Yes Take 1 tablet (325 mg) by mouth once daily with breakfast. Baljinder Wright MD  Active   FreeStyle En 3 [place] misc [id number] Yes Use as instructed Haylie Tate MD  Active   furosemide (Lasix) 40 mg tablet [id number] Yes Take 1 tablet by mouth four times a week.   Active   gabapentin (Neurontin) 400 mg capsule [id number] Yes Take 1 capsule (400 mg) by mouth 3 times a day. Haylie Tate MD  Active   GINGER ROOT EXTRACT ORAL [id number] Yes Take 1 tablet by mouth once daily as needed. Historical Provider, MD  Active   hydrALAZINE (Apresoline) 50 mg tablet [id number] Yes Take 1 tablet (50 mg) by mouth 3 times a day. Baljinder Wright MD  Active   insulin glargine (Lantus) 100 unit/mL (3 mL) pen [id number] Yes Inject 10 Units under the skin once daily at bedtime. Haylie Tate MD  Active   insulin lispro (HumaLOG) 100 unit/mL pen [id number] Yes Inject 14 Units under the skin 3 times daily (morning, midday, late afternoon). Haylie Tate MD  Active   isosorbide mononitrate ER (Imdur) 60 mg 24 hr tablet [id number] Yes Take 1 tablet (60 mg) by mouth once daily. Baljinder Wright MD  Active   lancets 30 gauge misc [id number] Yes 1 each 3 times a day. Haylie Tate MD  Active   medical cannabis [id number] Yes Not [facility] prescribed Historical Provider, MD  Active   OXcarbazepine (Trileptal) 150 mg tablet [id number] Yes Take 3 tablets (450 mg) by mouth 2 times a day.   Active   pantoprazole (ProtoNix) 40 mg EC tablet [id number] Yes TAKE 1 TABLET BY MOUTH ONCE DAILY Stephanie Arce APRN-CNS  Active   pen needle, diabetic (BD Ultra-Fine Jocy Pen Needle) 32 gauge x 5/32\" needle [id number] Yes Pen " "[place] for Insulin. DELMA Carlin  Active   pen needle, diabetic (BD Ultra-Fine Jocy Pen Needle) 32 gauge x 5/32\" needle [id number] Yes Use to inject insulin up to 8 times per day.   Active   pen needle, diabetic (TechLITE Pen Needle) 32 gauge x 1/4\" needle [id number] Yes Use to inject 1-4 times daily as directed DELMA Carlin  Active   ranolazine (Ranexa) 500 mg 12 hr tablet [id number] Yes Take 1 tablet by mouth twice daily. Baljinder Wright MD  Active   sacubitriL-valsartan (Entresto)  mg tablet [id number] Yes Take 1 tablet by mouth twice daily. Baljinder Wright MD  Active   tamsulosin (Flomax) 0.4 mg 24 hr capsule [id number] Yes TAKE 1 CAPSULE BY MOUTH ONCE DAILY DELMA Carlin  Active   ticagrelor (Brilinta) 90 mg tablet [id number] Yes Take 1 tablet (90 mg) by mouth 2 times a day. Baljinder Wright MD  Active   tiZANidine (Zanaflex) 2 mg tablet [id number] Yes Take 1 tablet (2 mg) by mouth once daily as needed for muscle spasms. DELMA Carlin  Active                                 Medication compliance: Yes   Uses pill box/organizer: Yes    Carries medication list: No     Blood Pressure Management  History of Hypertension: Yes   Medication Changes: Yes   Resting BP:  There were no vitals taken for this visit.       Heart Failure Management  Hx of Heart Failure: Yes;   Type (selection): HFrEF  Most recent EF:   36%    Onset of heart failure diagnosis: ?  Last heart failure hospitalization: ?  Number of HF admissions per year: ?    Symptoms: Fatigue with exertion, Shortness of breath, and LE edema  Is there a family Hx of HF: No   Does patient obtain daily weight No     Heart Failure Reassessment: Initial Treatment Plan. Will reassess in 30 days.    Heart Failure Goals: Able to verbalize signs and symptoms of fluid retention and when to contact MD, Adhere to proper fluid restrictions, Adapt a low sodium diet and verbalize guidelines, and Obtain daily weight and verbalize proper method " of obtaining weights    Smoking/Tobacco Assessment  Tobacco Use History[1]    Other Core Component Plan    Goal Status: Initial Assessment; goals not yet started    Other Core Component Goals: Medication compliance, Verbalize medication usage and drug actions by discharge, and Achieve resting BP of < 130/80 by discharge    Other Core Component Interventions/Education:   Pt is taking all meds as prescribed and carries an updated medication list  Blood pressure remains within target goal  Check resting BP pre and post exercise  Following a low sodium/2300 mg sodium diet  Encouraged to carry an updated medication list  Reviewed effects of exercise on Blood Pressure  Maintain smoking cessation        Individual Patient Goals:    Pt wants to exercise to return to previous levels of ability/tolerance/fitness  Pt would like to lose weight    Goal Status: Initial Assessment; goals not yet started    Staff Comments:  7/15/2025 - 30 Day Eval -  Due to patient being out of town, he will be starting exercise sessions on 7/21/2025.    06/16/2025 - Orientation Eval -- Pt plans to attend 36 sessions of Cardiac Rehab in order to achieve his goals.  Orientation: BP R arm sitting 177/93, L arm sitting 1788, standing 172/98, PO2 99%, HR 75 bpm. Lungs CTA, Heart Sounds S1S2 RRR, Radial pulses 1-2+ bilat, +1 Pretibial edema bilat to knees.  6 MWT: 1020 ft = 1.9 mph = 2.5 METs. RPE 11.5, /117, PO2 99%,  bpm. Pt has significant peripheral vascular disease, neuropathy and R foot drop all which affect his balance/gait. Will use Treadmill with caution or not at all.   Cool Down: /83, PO2 99%, HR 92 bpm.   Pt will need follow up careful observance of his gait and his BP and reassessment of his PHQ 9.     Rehab Staff Signature: Letha Davalos RN             [1]   Social History  Tobacco Use   Smoking Status Some Days    Types: Cigarettes    Passive exposure: Current (5-7 cigarettes a day)   Smokeless Tobacco Never

## 2025-07-16 ENCOUNTER — TELEPHONE (OUTPATIENT)
Dept: CARDIOLOGY | Facility: HOSPITAL | Age: 60
End: 2025-07-16
Payer: COMMERCIAL

## 2025-07-19 DIAGNOSIS — I50.42 CHRONIC COMBINED SYSTOLIC AND DIASTOLIC CHF (CONGESTIVE HEART FAILURE): ICD-10-CM

## 2025-07-19 DIAGNOSIS — N18.4 CKD (CHRONIC KIDNEY DISEASE) STAGE 4, GFR 15-29 ML/MIN (MULTI): ICD-10-CM

## 2025-07-21 ENCOUNTER — HOSPITAL ENCOUNTER (INPATIENT)
Facility: HOSPITAL | Age: 60
LOS: 1 days | Discharge: LEFT BEFORE DISCHARGE | End: 2025-07-22
Attending: EMERGENCY MEDICINE | Admitting: INTERNAL MEDICINE
Payer: COMMERCIAL

## 2025-07-21 ENCOUNTER — APPOINTMENT (OUTPATIENT)
Dept: CARDIAC REHAB | Facility: HOSPITAL | Age: 60
End: 2025-07-21
Payer: COMMERCIAL

## 2025-07-21 ENCOUNTER — APPOINTMENT (OUTPATIENT)
Dept: RADIOLOGY | Facility: HOSPITAL | Age: 60
End: 2025-07-21
Payer: COMMERCIAL

## 2025-07-21 ENCOUNTER — OFFICE VISIT (OUTPATIENT)
Dept: CARDIOLOGY | Facility: HOSPITAL | Age: 60
End: 2025-07-21
Payer: COMMERCIAL

## 2025-07-21 ENCOUNTER — APPOINTMENT (OUTPATIENT)
Dept: CARDIOLOGY | Facility: HOSPITAL | Age: 60
End: 2025-07-21
Payer: COMMERCIAL

## 2025-07-21 VITALS
OXYGEN SATURATION: 98 % | WEIGHT: 212.2 LBS | SYSTOLIC BLOOD PRESSURE: 149 MMHG | HEART RATE: 68 BPM | BODY MASS INDEX: 29.6 KG/M2 | RESPIRATION RATE: 22 BRPM | DIASTOLIC BLOOD PRESSURE: 89 MMHG

## 2025-07-21 DIAGNOSIS — I21.4 NSTEMI (NON-ST ELEVATED MYOCARDIAL INFARCTION) (MULTI): Primary | ICD-10-CM

## 2025-07-21 DIAGNOSIS — Z95.5 S/P CORONARY ARTERY STENT PLACEMENT: ICD-10-CM

## 2025-07-21 DIAGNOSIS — I50.42 CHRONIC COMBINED SYSTOLIC AND DIASTOLIC CHF (CONGESTIVE HEART FAILURE): Primary | ICD-10-CM

## 2025-07-21 DIAGNOSIS — I25.10 ATHEROSCLEROSIS OF NATIVE CORONARY ARTERY OF NATIVE HEART WITHOUT ANGINA PECTORIS: ICD-10-CM

## 2025-07-21 DIAGNOSIS — I10 PRIMARY HYPERTENSION: ICD-10-CM

## 2025-07-21 DIAGNOSIS — R06.02 SHORTNESS OF BREATH: ICD-10-CM

## 2025-07-21 LAB
ALBUMIN SERPL BCP-MCNC: 4 G/DL (ref 3.4–5)
ALP SERPL-CCNC: 110 U/L (ref 33–120)
ALT SERPL W P-5'-P-CCNC: 9 U/L (ref 10–52)
ANION GAP SERPL CALC-SCNC: 12 MMOL/L (ref 10–20)
APTT PPP: 37 SECONDS (ref 26–36)
AST SERPL W P-5'-P-CCNC: 10 U/L (ref 9–39)
BASOPHILS # BLD AUTO: 0.03 X10*3/UL (ref 0–0.1)
BASOPHILS NFR BLD AUTO: 0.4 %
BILIRUB SERPL-MCNC: 0.5 MG/DL (ref 0–1.2)
BUN SERPL-MCNC: 35 MG/DL (ref 6–23)
CALCIUM SERPL-MCNC: 8.9 MG/DL (ref 8.6–10.3)
CARDIAC TROPONIN I PNL SERPL HS: 1218 NG/L (ref 0–20)
CARDIAC TROPONIN I PNL SERPL HS: 1275 NG/L (ref 0–20)
CHLORIDE SERPL-SCNC: 101 MMOL/L (ref 98–107)
CO2 SERPL-SCNC: 26 MMOL/L (ref 21–32)
CREAT SERPL-MCNC: 1.56 MG/DL (ref 0.5–1.3)
EGFRCR SERPLBLD CKD-EPI 2021: 51 ML/MIN/1.73M*2
EOSINOPHIL # BLD AUTO: 0.08 X10*3/UL (ref 0–0.7)
EOSINOPHIL NFR BLD AUTO: 1 %
ERYTHROCYTE [DISTWIDTH] IN BLOOD BY AUTOMATED COUNT: 12.9 % (ref 11.5–14.5)
GLUCOSE BLD MANUAL STRIP-MCNC: 175 MG/DL (ref 74–99)
GLUCOSE BLD MANUAL STRIP-MCNC: 182 MG/DL (ref 74–99)
GLUCOSE BLD MANUAL STRIP-MCNC: 195 MG/DL (ref 74–99)
GLUCOSE SERPL-MCNC: 174 MG/DL (ref 74–99)
HCT VFR BLD AUTO: 43.4 % (ref 41–52)
HGB BLD-MCNC: 15.1 G/DL (ref 13.5–17.5)
IMM GRANULOCYTES # BLD AUTO: 0.03 X10*3/UL (ref 0–0.7)
IMM GRANULOCYTES NFR BLD AUTO: 0.4 % (ref 0–0.9)
LIPASE SERPL-CCNC: 10 U/L (ref 9–82)
LYMPHOCYTES # BLD AUTO: 1.7 X10*3/UL (ref 1.2–4.8)
LYMPHOCYTES NFR BLD AUTO: 22.3 %
MCH RBC QN AUTO: 31.9 PG (ref 26–34)
MCHC RBC AUTO-ENTMCNC: 34.8 G/DL (ref 32–36)
MCV RBC AUTO: 92 FL (ref 80–100)
MONOCYTES # BLD AUTO: 0.41 X10*3/UL (ref 0.1–1)
MONOCYTES NFR BLD AUTO: 5.4 %
NEUTROPHILS # BLD AUTO: 5.37 X10*3/UL (ref 1.2–7.7)
NEUTROPHILS NFR BLD AUTO: 70.5 %
NRBC BLD-RTO: 0 /100 WBCS (ref 0–0)
PLATELET # BLD AUTO: 223 X10*3/UL (ref 150–450)
POTASSIUM SERPL-SCNC: 5.3 MMOL/L (ref 3.5–5.3)
PROT SERPL-MCNC: 7.3 G/DL (ref 6.4–8.2)
RBC # BLD AUTO: 4.73 X10*6/UL (ref 4.5–5.9)
SODIUM SERPL-SCNC: 134 MMOL/L (ref 136–145)
UFH PPP CHRO-ACNC: 0.9 IU/ML (ref ?–1.1)
WBC # BLD AUTO: 7.6 X10*3/UL (ref 4.4–11.3)

## 2025-07-21 PROCEDURE — 99285 EMERGENCY DEPT VISIT HI MDM: CPT | Performed by: EMERGENCY MEDICINE

## 2025-07-21 PROCEDURE — 99214 OFFICE O/P EST MOD 30 MIN: CPT | Performed by: NURSE PRACTITIONER

## 2025-07-21 PROCEDURE — 99223 1ST HOSP IP/OBS HIGH 75: CPT | Performed by: NURSE PRACTITIONER

## 2025-07-21 PROCEDURE — 84075 ASSAY ALKALINE PHOSPHATASE: CPT | Performed by: EMERGENCY MEDICINE

## 2025-07-21 PROCEDURE — 85025 COMPLETE CBC W/AUTO DIFF WBC: CPT | Performed by: EMERGENCY MEDICINE

## 2025-07-21 PROCEDURE — 2500000001 HC RX 250 WO HCPCS SELF ADMINISTERED DRUGS (ALT 637 FOR MEDICARE OP): Performed by: EMERGENCY MEDICINE

## 2025-07-21 PROCEDURE — 2500000001 HC RX 250 WO HCPCS SELF ADMINISTERED DRUGS (ALT 637 FOR MEDICARE OP): Performed by: NURSE PRACTITIONER

## 2025-07-21 PROCEDURE — 3079F DIAST BP 80-89 MM HG: CPT | Performed by: NURSE PRACTITIONER

## 2025-07-21 PROCEDURE — 99215 OFFICE O/P EST HI 40 MIN: CPT

## 2025-07-21 PROCEDURE — 82947 ASSAY GLUCOSE BLOOD QUANT: CPT

## 2025-07-21 PROCEDURE — 85730 THROMBOPLASTIN TIME PARTIAL: CPT | Performed by: EMERGENCY MEDICINE

## 2025-07-21 PROCEDURE — 93005 ELECTROCARDIOGRAM TRACING: CPT

## 2025-07-21 PROCEDURE — 83690 ASSAY OF LIPASE: CPT | Performed by: EMERGENCY MEDICINE

## 2025-07-21 PROCEDURE — 96374 THER/PROPH/DIAG INJ IV PUSH: CPT

## 2025-07-21 PROCEDURE — 36415 COLL VENOUS BLD VENIPUNCTURE: CPT | Performed by: EMERGENCY MEDICINE

## 2025-07-21 PROCEDURE — 3077F SYST BP >= 140 MM HG: CPT | Performed by: NURSE PRACTITIONER

## 2025-07-21 PROCEDURE — 71045 X-RAY EXAM CHEST 1 VIEW: CPT | Performed by: STUDENT IN AN ORGANIZED HEALTH CARE EDUCATION/TRAINING PROGRAM

## 2025-07-21 PROCEDURE — 2500000004 HC RX 250 GENERAL PHARMACY W/ HCPCS (ALT 636 FOR OP/ED): Performed by: EMERGENCY MEDICINE

## 2025-07-21 PROCEDURE — 84484 ASSAY OF TROPONIN QUANT: CPT | Performed by: EMERGENCY MEDICINE

## 2025-07-21 PROCEDURE — 99291 CRITICAL CARE FIRST HOUR: CPT | Performed by: EMERGENCY MEDICINE

## 2025-07-21 PROCEDURE — 85520 HEPARIN ASSAY: CPT | Performed by: EMERGENCY MEDICINE

## 2025-07-21 PROCEDURE — 2500000002 HC RX 250 W HCPCS SELF ADMINISTERED DRUGS (ALT 637 FOR MEDICARE OP, ALT 636 FOR OP/ED): Performed by: NURSE PRACTITIONER

## 2025-07-21 PROCEDURE — 2500000005 HC RX 250 GENERAL PHARMACY W/O HCPCS: Performed by: EMERGENCY MEDICINE

## 2025-07-21 PROCEDURE — 96375 TX/PRO/DX INJ NEW DRUG ADDON: CPT

## 2025-07-21 PROCEDURE — 71045 X-RAY EXAM CHEST 1 VIEW: CPT

## 2025-07-21 PROCEDURE — 2500000004 HC RX 250 GENERAL PHARMACY W/ HCPCS (ALT 636 FOR OP/ED): Performed by: STUDENT IN AN ORGANIZED HEALTH CARE EDUCATION/TRAINING PROGRAM

## 2025-07-21 PROCEDURE — 2060000001 HC INTERMEDIATE ICU ROOM DAILY

## 2025-07-21 RX ORDER — ONDANSETRON HYDROCHLORIDE 2 MG/ML
4 INJECTION, SOLUTION INTRAVENOUS EVERY 8 HOURS PRN
Status: DISCONTINUED | OUTPATIENT
Start: 2025-07-21 | End: 2025-07-23 | Stop reason: HOSPADM

## 2025-07-21 RX ORDER — ISOSORBIDE MONONITRATE 60 MG/1
60 TABLET, EXTENDED RELEASE ORAL DAILY
Status: DISCONTINUED | OUTPATIENT
Start: 2025-07-21 | End: 2025-07-23 | Stop reason: HOSPADM

## 2025-07-21 RX ORDER — IBUPROFEN 200 MG
1 TABLET ORAL DAILY
Status: DISCONTINUED | OUTPATIENT
Start: 2025-09-02 | End: 2025-07-23 | Stop reason: HOSPADM

## 2025-07-21 RX ORDER — BISACODYL 5 MG
10 TABLET, DELAYED RELEASE (ENTERIC COATED) ORAL DAILY PRN
Status: DISCONTINUED | OUTPATIENT
Start: 2025-07-21 | End: 2025-07-23 | Stop reason: HOSPADM

## 2025-07-21 RX ORDER — NAPROXEN SODIUM 220 MG/1
324 TABLET, FILM COATED ORAL ONCE
Status: COMPLETED | OUTPATIENT
Start: 2025-07-21 | End: 2025-07-21

## 2025-07-21 RX ORDER — HYDRALAZINE HYDROCHLORIDE 50 MG/1
50 TABLET, FILM COATED ORAL 3 TIMES DAILY
Status: DISCONTINUED | OUTPATIENT
Start: 2025-07-21 | End: 2025-07-23 | Stop reason: HOSPADM

## 2025-07-21 RX ORDER — HEPARIN SODIUM 10000 [USP'U]/100ML
0-4000 INJECTION, SOLUTION INTRAVENOUS CONTINUOUS
Status: DISCONTINUED | OUTPATIENT
Start: 2025-07-21 | End: 2025-07-22

## 2025-07-21 RX ORDER — PANTOPRAZOLE SODIUM 40 MG/10ML
40 INJECTION, POWDER, LYOPHILIZED, FOR SOLUTION INTRAVENOUS
Status: DISCONTINUED | OUTPATIENT
Start: 2025-07-22 | End: 2025-07-23 | Stop reason: HOSPADM

## 2025-07-21 RX ORDER — RANOLAZINE 500 MG/1
500 TABLET, EXTENDED RELEASE ORAL 2 TIMES DAILY
Status: DISCONTINUED | OUTPATIENT
Start: 2025-07-21 | End: 2025-07-23 | Stop reason: HOSPADM

## 2025-07-21 RX ORDER — ACETAMINOPHEN 325 MG/1
650 TABLET ORAL EVERY 4 HOURS PRN
Status: DISCONTINUED | OUTPATIENT
Start: 2025-07-21 | End: 2025-07-23 | Stop reason: HOSPADM

## 2025-07-21 RX ORDER — NITROGLYCERIN 20 MG/G
1 OINTMENT TOPICAL ONCE
Status: COMPLETED | OUTPATIENT
Start: 2025-07-21 | End: 2025-07-21

## 2025-07-21 RX ORDER — DEXTROSE 50 % IN WATER (D50W) INTRAVENOUS SYRINGE
12.5
Status: DISCONTINUED | OUTPATIENT
Start: 2025-07-21 | End: 2025-07-23 | Stop reason: HOSPADM

## 2025-07-21 RX ORDER — GABAPENTIN 400 MG/1
400 CAPSULE ORAL 3 TIMES DAILY
Status: DISCONTINUED | OUTPATIENT
Start: 2025-07-21 | End: 2025-07-23 | Stop reason: HOSPADM

## 2025-07-21 RX ORDER — IBUPROFEN 200 MG
1 TABLET ORAL DAILY
Status: DISCONTINUED | OUTPATIENT
Start: 2025-07-22 | End: 2025-07-23 | Stop reason: HOSPADM

## 2025-07-21 RX ORDER — OXCARBAZEPINE 150 MG/1
450 TABLET, FILM COATED ORAL 2 TIMES DAILY
Status: DISCONTINUED | OUTPATIENT
Start: 2025-07-21 | End: 2025-07-23 | Stop reason: HOSPADM

## 2025-07-21 RX ORDER — PANTOPRAZOLE SODIUM 40 MG/1
40 TABLET, DELAYED RELEASE ORAL
Status: DISCONTINUED | OUTPATIENT
Start: 2025-07-22 | End: 2025-07-23 | Stop reason: HOSPADM

## 2025-07-21 RX ORDER — TICAGRELOR 90 MG/1
90 TABLET, FILM COATED ORAL 2 TIMES DAILY
Status: DISCONTINUED | OUTPATIENT
Start: 2025-07-21 | End: 2025-07-23 | Stop reason: HOSPADM

## 2025-07-21 RX ORDER — ONDANSETRON 4 MG/1
4 TABLET, ORALLY DISINTEGRATING ORAL EVERY 8 HOURS PRN
Status: DISCONTINUED | OUTPATIENT
Start: 2025-07-21 | End: 2025-07-23 | Stop reason: HOSPADM

## 2025-07-21 RX ORDER — GUAIFENESIN 600 MG/1
600 TABLET, EXTENDED RELEASE ORAL EVERY 12 HOURS PRN
Status: DISCONTINUED | OUTPATIENT
Start: 2025-07-21 | End: 2025-07-23 | Stop reason: HOSPADM

## 2025-07-21 RX ORDER — INSULIN GLARGINE 100 [IU]/ML
10 INJECTION, SOLUTION SUBCUTANEOUS NIGHTLY
Status: DISCONTINUED | OUTPATIENT
Start: 2025-07-21 | End: 2025-07-23 | Stop reason: HOSPADM

## 2025-07-21 RX ORDER — POLYETHYLENE GLYCOL 3350 17 G/17G
17 POWDER, FOR SOLUTION ORAL DAILY
Status: DISCONTINUED | OUTPATIENT
Start: 2025-07-21 | End: 2025-07-23 | Stop reason: HOSPADM

## 2025-07-21 RX ORDER — ACETAMINOPHEN 650 MG/1
650 SUPPOSITORY RECTAL EVERY 4 HOURS PRN
Status: DISCONTINUED | OUTPATIENT
Start: 2025-07-21 | End: 2025-07-23 | Stop reason: HOSPADM

## 2025-07-21 RX ORDER — NAPROXEN SODIUM 220 MG/1
81 TABLET, FILM COATED ORAL DAILY
Status: DISCONTINUED | OUTPATIENT
Start: 2025-07-22 | End: 2025-07-23 | Stop reason: HOSPADM

## 2025-07-21 RX ORDER — DAPAGLIFLOZIN 5 MG/1
10 TABLET, FILM COATED ORAL
Status: DISCONTINUED | OUTPATIENT
Start: 2025-07-22 | End: 2025-07-23 | Stop reason: HOSPADM

## 2025-07-21 RX ORDER — ACETAMINOPHEN 160 MG/5ML
650 SOLUTION ORAL EVERY 4 HOURS PRN
Status: DISCONTINUED | OUTPATIENT
Start: 2025-07-21 | End: 2025-07-23 | Stop reason: HOSPADM

## 2025-07-21 RX ORDER — DEXTROSE 50 % IN WATER (D50W) INTRAVENOUS SYRINGE
25
Status: DISCONTINUED | OUTPATIENT
Start: 2025-07-21 | End: 2025-07-23 | Stop reason: HOSPADM

## 2025-07-21 RX ORDER — NICOTINE 7MG/24HR
1 PATCH, TRANSDERMAL 24 HOURS TRANSDERMAL DAILY
Status: DISCONTINUED | OUTPATIENT
Start: 2025-09-16 | End: 2025-07-23 | Stop reason: HOSPADM

## 2025-07-21 RX ORDER — TALC
3 POWDER (GRAM) TOPICAL NIGHTLY PRN
Status: DISCONTINUED | OUTPATIENT
Start: 2025-07-21 | End: 2025-07-23 | Stop reason: HOSPADM

## 2025-07-21 RX ORDER — MORPHINE SULFATE 4 MG/ML
4 INJECTION INTRAVENOUS EVERY 4 HOURS PRN
Status: DISCONTINUED | OUTPATIENT
Start: 2025-07-21 | End: 2025-07-23 | Stop reason: HOSPADM

## 2025-07-21 RX ORDER — EZETIMIBE 10 MG/1
10 TABLET ORAL DAILY
Status: DISCONTINUED | OUTPATIENT
Start: 2025-07-21 | End: 2025-07-23 | Stop reason: HOSPADM

## 2025-07-21 RX ORDER — INSULIN LISPRO 100 [IU]/ML
0-15 INJECTION, SOLUTION INTRAVENOUS; SUBCUTANEOUS
Status: DISCONTINUED | OUTPATIENT
Start: 2025-07-21 | End: 2025-07-23 | Stop reason: HOSPADM

## 2025-07-21 RX ORDER — CARVEDILOL 6.25 MG/1
6.25 TABLET ORAL EVERY 12 HOURS
Status: DISCONTINUED | OUTPATIENT
Start: 2025-07-21 | End: 2025-07-23 | Stop reason: HOSPADM

## 2025-07-21 RX ORDER — MORPHINE SULFATE 4 MG/ML
4 INJECTION INTRAVENOUS ONCE
Status: COMPLETED | OUTPATIENT
Start: 2025-07-21 | End: 2025-07-21

## 2025-07-21 RX ORDER — BISACODYL 10 MG/1
10 SUPPOSITORY RECTAL DAILY PRN
Status: DISCONTINUED | OUTPATIENT
Start: 2025-07-21 | End: 2025-07-23 | Stop reason: HOSPADM

## 2025-07-21 RX ORDER — ATORVASTATIN CALCIUM 40 MG/1
80 TABLET, FILM COATED ORAL NIGHTLY
Status: DISCONTINUED | OUTPATIENT
Start: 2025-07-21 | End: 2025-07-23 | Stop reason: HOSPADM

## 2025-07-21 RX ORDER — MORPHINE SULFATE 2 MG/ML
2 INJECTION, SOLUTION INTRAMUSCULAR; INTRAVENOUS EVERY 4 HOURS PRN
Status: DISCONTINUED | OUTPATIENT
Start: 2025-07-21 | End: 2025-07-23 | Stop reason: HOSPADM

## 2025-07-21 RX ADMIN — SACUBITRIL AND VALSARTAN 1 TABLET: 97; 103 TABLET, FILM COATED ORAL at 20:38

## 2025-07-21 RX ADMIN — INSULIN LISPRO 3 UNITS: 100 INJECTION, SOLUTION INTRAVENOUS; SUBCUTANEOUS at 17:27

## 2025-07-21 RX ADMIN — GABAPENTIN 400 MG: 400 CAPSULE ORAL at 20:37

## 2025-07-21 RX ADMIN — ASPIRIN 81 MG CHEWABLE TABLET 324 MG: 81 TABLET CHEWABLE at 16:06

## 2025-07-21 RX ADMIN — HEPARIN SODIUM 800 UNITS/HR: 10000 INJECTION, SOLUTION INTRAVENOUS at 21:07

## 2025-07-21 RX ADMIN — NITROGLYCERIN 1 INCH: 20 OINTMENT TOPICAL at 16:06

## 2025-07-21 RX ADMIN — HYDRALAZINE HYDROCHLORIDE 50 MG: 25 TABLET ORAL at 20:38

## 2025-07-21 RX ADMIN — CARVEDILOL 6.25 MG: 6.25 TABLET, FILM COATED ORAL at 20:41

## 2025-07-21 RX ADMIN — GABAPENTIN 400 MG: 400 CAPSULE ORAL at 17:23

## 2025-07-21 RX ADMIN — MORPHINE SULFATE 4 MG: 4 INJECTION INTRAVENOUS at 20:38

## 2025-07-21 RX ADMIN — MORPHINE SULFATE 4 MG: 4 INJECTION INTRAVENOUS at 16:06

## 2025-07-21 RX ADMIN — ATORVASTATIN CALCIUM 80 MG: 40 TABLET, FILM COATED ORAL at 20:38

## 2025-07-21 RX ADMIN — RANOLAZINE 500 MG: 500 TABLET, FILM COATED, EXTENDED RELEASE ORAL at 20:38

## 2025-07-21 RX ADMIN — HYDRALAZINE HYDROCHLORIDE 50 MG: 25 TABLET ORAL at 17:23

## 2025-07-21 RX ADMIN — HEPARIN SODIUM 1000 UNITS/HR: 10000 INJECTION, SOLUTION INTRAVENOUS at 16:17

## 2025-07-21 RX ADMIN — TICAGRELOR 90 MG: 90 TABLET, FILM COATED ORAL at 20:38

## 2025-07-21 RX ADMIN — INSULIN GLARGINE 10 UNITS: 100 INJECTION, SOLUTION SUBCUTANEOUS at 20:38

## 2025-07-21 SDOH — SOCIAL STABILITY: SOCIAL INSECURITY: WITHIN THE LAST YEAR, HAVE YOU BEEN AFRAID OF YOUR PARTNER OR EX-PARTNER?: NO

## 2025-07-21 SDOH — SOCIAL STABILITY: SOCIAL INSECURITY: ARE THERE ANY APPARENT SIGNS OF INJURIES/BEHAVIORS THAT COULD BE RELATED TO ABUSE/NEGLECT?: NO

## 2025-07-21 SDOH — ECONOMIC STABILITY: INCOME INSECURITY: IN THE PAST 12 MONTHS HAS THE ELECTRIC, GAS, OIL, OR WATER COMPANY THREATENED TO SHUT OFF SERVICES IN YOUR HOME?: NO

## 2025-07-21 SDOH — ECONOMIC STABILITY: HOUSING INSECURITY: IN THE LAST 12 MONTHS, WAS THERE A TIME WHEN YOU WERE NOT ABLE TO PAY THE MORTGAGE OR RENT ON TIME?: NO

## 2025-07-21 SDOH — SOCIAL STABILITY: SOCIAL INSECURITY: WITHIN THE LAST YEAR, HAVE YOU BEEN HUMILIATED OR EMOTIONALLY ABUSED IN OTHER WAYS BY YOUR PARTNER OR EX-PARTNER?: NO

## 2025-07-21 SDOH — ECONOMIC STABILITY: HOUSING INSECURITY: IN THE PAST 12 MONTHS, HOW MANY TIMES HAVE YOU MOVED WHERE YOU WERE LIVING?: 0

## 2025-07-21 SDOH — SOCIAL STABILITY: SOCIAL INSECURITY: DOES ANYONE TRY TO KEEP YOU FROM HAVING/CONTACTING OTHER FRIENDS OR DOING THINGS OUTSIDE YOUR HOME?: NO

## 2025-07-21 SDOH — ECONOMIC STABILITY: FOOD INSECURITY: HOW HARD IS IT FOR YOU TO PAY FOR THE VERY BASICS LIKE FOOD, HOUSING, MEDICAL CARE, AND HEATING?: NOT HARD AT ALL

## 2025-07-21 SDOH — SOCIAL STABILITY: SOCIAL INSECURITY: DO YOU FEEL UNSAFE GOING BACK TO THE PLACE WHERE YOU ARE LIVING?: NO

## 2025-07-21 SDOH — ECONOMIC STABILITY: FOOD INSECURITY: WITHIN THE PAST 12 MONTHS, THE FOOD YOU BOUGHT JUST DIDN'T LAST AND YOU DIDN'T HAVE MONEY TO GET MORE.: NEVER TRUE

## 2025-07-21 SDOH — ECONOMIC STABILITY: HOUSING INSECURITY: AT ANY TIME IN THE PAST 12 MONTHS, WERE YOU HOMELESS OR LIVING IN A SHELTER (INCLUDING NOW)?: NO

## 2025-07-21 SDOH — ECONOMIC STABILITY: FOOD INSECURITY: WITHIN THE PAST 12 MONTHS, YOU WORRIED THAT YOUR FOOD WOULD RUN OUT BEFORE YOU GOT THE MONEY TO BUY MORE.: NEVER TRUE

## 2025-07-21 SDOH — SOCIAL STABILITY: SOCIAL INSECURITY: HAVE YOU HAD THOUGHTS OF HARMING ANYONE ELSE?: YES

## 2025-07-21 SDOH — SOCIAL STABILITY: SOCIAL INSECURITY: WERE YOU ABLE TO COMPLETE ALL THE BEHAVIORAL HEALTH SCREENINGS?: YES

## 2025-07-21 SDOH — SOCIAL STABILITY: SOCIAL INSECURITY: ABUSE: ADULT

## 2025-07-21 SDOH — SOCIAL STABILITY: SOCIAL INSECURITY: DO YOU FEEL ANYONE HAS EXPLOITED OR TAKEN ADVANTAGE OF YOU FINANCIALLY OR OF YOUR PERSONAL PROPERTY?: NO

## 2025-07-21 SDOH — SOCIAL STABILITY: SOCIAL INSECURITY: HAS ANYONE EVER THREATENED TO HURT YOUR FAMILY OR YOUR PETS?: NO

## 2025-07-21 SDOH — ECONOMIC STABILITY: TRANSPORTATION INSECURITY: IN THE PAST 12 MONTHS, HAS LACK OF TRANSPORTATION KEPT YOU FROM MEDICAL APPOINTMENTS OR FROM GETTING MEDICATIONS?: NO

## 2025-07-21 SDOH — SOCIAL STABILITY: SOCIAL INSECURITY: ARE YOU OR HAVE YOU BEEN THREATENED OR ABUSED PHYSICALLY, EMOTIONALLY, OR SEXUALLY BY ANYONE?: NO

## 2025-07-21 ASSESSMENT — ACTIVITIES OF DAILY LIVING (ADL)
ADEQUATE_TO_COMPLETE_ADL: YES
BATHING: INDEPENDENT
LACK_OF_TRANSPORTATION: NO
FEEDING YOURSELF: INDEPENDENT
JUDGMENT_ADEQUATE_SAFELY_COMPLETE_DAILY_ACTIVITIES: YES
WALKS IN HOME: INDEPENDENT
LACK_OF_TRANSPORTATION: NO
PATIENT'S MEMORY ADEQUATE TO SAFELY COMPLETE DAILY ACTIVITIES?: YES
HEARING - LEFT EAR: FUNCTIONAL
GROOMING: INDEPENDENT
ASSISTIVE_DEVICE: CANE;WALKER
HEARING - RIGHT EAR: FUNCTIONAL
TOILETING: INDEPENDENT
DRESSING YOURSELF: INDEPENDENT

## 2025-07-21 ASSESSMENT — PAIN - FUNCTIONAL ASSESSMENT
PAIN_FUNCTIONAL_ASSESSMENT: 0-10

## 2025-07-21 ASSESSMENT — ENCOUNTER SYMPTOMS
LOSS OF SENSATION IN FEET: 1
FATIGUE: 0
NEAR-SYNCOPE: 0
DEPRESSION: 1
CLAUDICATION: 0
SHORTNESS OF BREATH: 0
CHEST PRESSURE: 0
ORTHOPNEA: 0
EDEMA: 0
UNEXPECTED WEIGHT CHANGE: 0
PALPITATIONS: 0
ABDOMINAL PAIN: 0
OCCASIONAL FEELINGS OF UNSTEADINESS: 1

## 2025-07-21 ASSESSMENT — COGNITIVE AND FUNCTIONAL STATUS - GENERAL
CLIMB 3 TO 5 STEPS WITH RAILING: A LITTLE
PATIENT BASELINE BEDBOUND: NO
WALKING IN HOSPITAL ROOM: A LITTLE
DAILY ACTIVITIY SCORE: 24
MOBILITY SCORE: 22

## 2025-07-21 ASSESSMENT — PAIN DESCRIPTION - DESCRIPTORS
DESCRIPTORS: DISCOMFORT
DESCRIPTORS: DISCOMFORT

## 2025-07-21 ASSESSMENT — LIFESTYLE VARIABLES
EVER FELT BAD OR GUILTY ABOUT YOUR DRINKING: NO
HAVE PEOPLE ANNOYED YOU BY CRITICIZING YOUR DRINKING: NO
HOW OFTEN DO YOU HAVE A DRINK CONTAINING ALCOHOL: NEVER
AUDIT-C TOTAL SCORE: 0
AUDIT-C TOTAL SCORE: 0
HAVE YOU EVER FELT YOU SHOULD CUT DOWN ON YOUR DRINKING: NO
HOW MANY STANDARD DRINKS CONTAINING ALCOHOL DO YOU HAVE ON A TYPICAL DAY: PATIENT DOES NOT DRINK
HOW OFTEN DO YOU HAVE 6 OR MORE DRINKS ON ONE OCCASION: NEVER
SKIP TO QUESTIONS 9-10: 1
EVER HAD A DRINK FIRST THING IN THE MORNING TO STEADY YOUR NERVES TO GET RID OF A HANGOVER: NO
TOTAL SCORE: 0

## 2025-07-21 ASSESSMENT — COLUMBIA-SUICIDE SEVERITY RATING SCALE - C-SSRS
1. IN THE PAST MONTH, HAVE YOU WISHED YOU WERE DEAD OR WISHED YOU COULD GO TO SLEEP AND NOT WAKE UP?: NO
2. HAVE YOU ACTUALLY HAD ANY THOUGHTS OF KILLING YOURSELF?: NO

## 2025-07-21 ASSESSMENT — PAIN DESCRIPTION - ORIENTATION: ORIENTATION: MID

## 2025-07-21 ASSESSMENT — PATIENT HEALTH QUESTIONNAIRE - PHQ9
2. FEELING DOWN, DEPRESSED OR HOPELESS: NOT AT ALL
SUM OF ALL RESPONSES TO PHQ9 QUESTIONS 1 & 2: 0
2. FEELING DOWN, DEPRESSED OR HOPELESS: SEVERAL DAYS
1. LITTLE INTEREST OR PLEASURE IN DOING THINGS: NOT AT ALL
SUM OF ALL RESPONSES TO PHQ9 QUESTIONS 1 AND 2: 2
1. LITTLE INTEREST OR PLEASURE IN DOING THINGS: SEVERAL DAYS

## 2025-07-21 ASSESSMENT — PAIN SCALES - GENERAL
PAINLEVEL_OUTOF10: 7
PAINLEVEL_OUTOF10: 7
PAINLEVEL_OUTOF10: 2
PAINLEVEL_OUTOF10: 3

## 2025-07-21 ASSESSMENT — PAIN DESCRIPTION - LOCATION: LOCATION: CHEST

## 2025-07-21 NOTE — Clinical Note
Vessel: circumflex (distal). Stent inserted. Inflation 1: Pressure = 12 thomas; Duration = 14 sec. Inflation 2: Pressure = 16 thomas; Duration = 12 sec.

## 2025-07-21 NOTE — CARE PLAN
Problem: Pain - Adult  Goal: Verbalizes/displays adequate comfort level or baseline comfort level  Outcome: Progressing     Problem: Safety - Adult  Goal: Free from fall injury  Outcome: Progressing     Problem: Discharge Planning  Goal: Discharge to home or other facility with appropriate resources  Outcome: Progressing     Problem: Chronic Conditions and Co-morbidities  Goal: Patient's chronic conditions and co-morbidity symptoms are monitored and maintained or improved  Outcome: Progressing     Problem: Nutrition  Goal: Nutrient intake appropriate for maintaining nutritional needs  Outcome: Progressing     Problem: Diabetes  Goal: Achieve decreasing blood glucose levels by end of shift  Outcome: Progressing  Goal: Increase stability of blood glucose readings by end of shift  Outcome: Progressing  Goal: Decrease in ketones present in urine by end of shift  Outcome: Progressing  Goal: Maintain electrolyte levels within acceptable range throughout shift  Outcome: Progressing  Goal: Maintain glucose levels >70mg/dl to <250mg/dl throughout shift  Outcome: Progressing  Goal: No changes in neurological exam by end of shift  Outcome: Progressing  Goal: Learn about and adhere to nutrition recommendations by end of shift  Outcome: Progressing  Goal: Vital signs within normal range for age by end of shift  Outcome: Progressing  Goal: Increase self care and/or family involovement by end of shift  Outcome: Progressing  Goal: Receive DSME education by end of shift  Outcome: Progressing     Problem: Pain  Goal: Takes deep breaths with improved pain control throughout the shift  Outcome: Progressing  Goal: Turns in bed with improved pain control throughout the shift  Outcome: Progressing  Goal: Walks with improved pain control throughout the shift  Outcome: Progressing  Goal: Performs ADL's with improved pain control throughout shift  Outcome: Progressing  Goal: Participates in PT with improved pain control throughout the  shift  Outcome: Progressing  Goal: Free from opioid side effects throughout the shift  Outcome: Progressing  Goal: Free from acute confusion related to pain meds throughout the shift  Outcome: Progressing

## 2025-07-21 NOTE — Clinical Note
Angioplasty of the middle circumflex lesion. Inflation 1: Pressure = 12 thomas; Duration = 10 sec. Inflation 2: Pressure = 18 thomas; Duration = 12 sec.

## 2025-07-21 NOTE — PROGRESS NOTES
Pt to be transferred to ER per Hilda for complaints of chest pain for several weks and right sided weakness x 3 days. Pt taken to room 12 Er, report given to Rafy OJE.

## 2025-07-21 NOTE — Clinical Note
Angioplasty of the middle circumflex lesion. Inflation 1: Pressure = 15 thomas; Duration = 8 sec. Inflation 2: Pressure = 16 thomas; Duration = 8 sec. Inflation 3: Pressure = 20 thomas; Duration = 20 sec. Inflation 4: Pressure = 22 thomas; Duration = 12 sec.

## 2025-07-21 NOTE — Clinical Note
Closure device placed in the artery. Site closed by radial compression system. 13 ml of air in TR band at ulnar insertion site

## 2025-07-21 NOTE — PATIENT INSTRUCTIONS
Thank you for coming in today.  If you have any questions you may contact the office Monday through Friday at [phone] or on week ends at [phone].        Please follow  a 2 GM sodium diet and limit fluid intake to 2 liters per day or 8 servings ( serving size = 8 oz. = 1 cup = 240 ml) per day.   Please avoid processed meat products (luncheon meats, sausages, garcia, hot dogs for example) eat 4 servings of vegetables and 1-2 whole servings of whole fruits per day.   Please weigh daily and call [phone] for weight gain of 3 pounds in 24 hours or 5 pounds or if you experience increased swelling or shortness of breath.         Follow up:  In clinic once he is released from hospital.     Please be sure to follow up with your cardiologist at [facility] once every year. Call [phone] to schedule appointment if you do not have a follow up appointment scheduled already.

## 2025-07-21 NOTE — ED PROVIDER NOTES
HPI   Chief Complaint   Patient presents with    Chest Pain     CP for a couple of days and residual right sided weakness from previous stroke.        Chief complaint: Chest pain    History of present illness: Patient is a 59-year-old male with history of coronary artery disease, peripheral vascular disease, congestive heart failure chronic kidney disease chronic kidney disease presenting to the emergency department with complaints of chest discomfort.  According to the patient, his chest pain has been ongoing for the past 4 to 6 weeks.  The patient states that it is intermittent.  Recently, the patient's pain became constant.  Patient states that he has had no diaphoresis.  The patient admits that intermittent shortness of breath.  The patient has been taking his Eliquis.  Patient was seen by his primary care physician today and was referred to the emergency department for further evaluation given his history of coronary artery disease.  The patient has no other complaints this time he states that he has been having pain ranging at about 7.      History provided by:  Patient   used: No            Patient History   Medical History[1]  Surgical History[2]  Family History[3]  Social History[4]    Physical Exam   ED Triage Vitals [07/21/25 1419]   Temperature Heart Rate Respirations BP   36.8 °C (98.2 °F) 66 18 153/84      Pulse Ox Temp src Heart Rate Source Patient Position   98 % -- -- --      BP Location FiO2 (%)     -- --       Physical Exam  Constitutional:       General: He is not in acute distress.     Appearance: Normal appearance. He is not ill-appearing, toxic-appearing or diaphoretic.   HENT:      Head: Normocephalic and atraumatic.      Right Ear: External ear normal.      Left Ear: External ear normal.      Nose: Nose normal.     Eyes:      General: Lids are normal. No scleral icterus.     Extraocular Movements: Extraocular movements intact.      Pupils: Pupils are equal, round, and  reactive to light.       Cardiovascular:      Rate and Rhythm: Normal rate and regular rhythm.      Heart sounds:      No friction rub.   Pulmonary:      Effort: Pulmonary effort is normal. No tachypnea, bradypnea, accessory muscle usage, respiratory distress or retractions.      Breath sounds: No stridor. No wheezing, rhonchi or rales.   Abdominal:      General: Abdomen is flat. There is no distension.      Palpations: Abdomen is soft.      Tenderness: There is no abdominal tenderness. There is no guarding or rebound.     Musculoskeletal:         General: No signs of injury. Normal range of motion.      Cervical back: Normal range of motion. No rigidity.     Skin:     General: Skin is warm.      Capillary Refill: Capillary refill takes less than 2 seconds.      Coloration: Skin is not jaundiced.     Neurological:      General: No focal deficit present.      Mental Status: He is alert and oriented to person, place, and time.      Sensory: No sensory deficit.     Psychiatric:         Mood and Affect: Mood and affect normal.         Behavior: Behavior normal.           ED Course & MDM   Diagnoses as of 07/24/25 2897   NSTEMI (non-ST elevated myocardial infarction) (Multi)                 No data recorded     Nella Coma Scale Score: 15 (07/21/25 1421 : Huy Schumacher RN)                           Medical Decision Making  Medical Decision Making: Patient remained stable during my time with him in the emergency department.  CBC demonstrated no significant abnormalities Chem-7 and LFTs demonstrated creatinine of 1.56 but no other significant abnormalities.  The patient's initial troponin was 1270 5 repeat troponin is 1218.  Chest x-ray demonstrated no significant acute abnormalities while the patient's EKG demonstrated a normal sinus rhythm with a rate of 69 bpm isoelectric ST segments narrow QRS complexes and a QTc of 450.    Patient presents to the emergency department with complaints of chest pain.  Workup was  performed as above and demonstrates the present presence of an NSTEMI.  Patient was given aspirin morphine and nitroglycerin.  I also started the patient on a heparin drip and not bolus given his history of anticoagulation use.  I spoke with the hospitalist who agreed the patient could be admitted to their service for further evaluation of therapy given this finding.  The patient was then admitted to the hospital in otherwise stable condition.    Amount and/or Complexity of Data Reviewed  Labs: ordered. Decision-making details documented in ED Course.  Radiology: ordered. Decision-making details documented in ED Course.  ECG/medicine tests: ordered and independent interpretation performed. Decision-making details documented in ED Course.        Procedure  Critical Care    Performed by: Mario Luz MD  Authorized by: Mario Luz MD    Critical care provider statement:     Critical care time (minutes):  45    Critical care time was exclusive of:  Separately billable procedures and treating other patients    Critical care was necessary to treat or prevent imminent or life-threatening deterioration of the following conditions:  Cardiac failure (NSTEMI)    Critical care was time spent personally by me on the following activities:  Blood draw for specimens, discussions with primary provider, evaluation of patient's response to treatment, examination of patient, obtaining history from patient or surrogate, ordering and performing treatments and interventions, ordering and review of laboratory studies, ordering and review of radiographic studies, pulse oximetry and re-evaluation of patient's condition    Care discussed with: admitting provider             [1]   Past Medical History:  Diagnosis Date    Aphasia     Atherosclerotic heart disease of native coronary artery with unspecified angina pectoris 11/05/2019    Bipolar disorder, unspecified (Multi)     BPH (benign prostatic hyperplasia)     Diabetes (Multi)      DVT (deep venous thrombosis) (Multi) 02/2022    RLE    Erectile dysfunction     GERD (gastroesophageal reflux disease)     Hepatitis C     HFrEF (heart failure with reduced ejection fraction)     HLD (hyperlipidemia)     HTN (hypertension)     Obstructive sleep apnea (adult) (pediatric)     Opioid abuse, in remission     PAD (peripheral artery disease)     Polymyalgia rheumatica (Multi)     Post-traumatic stress disorder, unspecified     Stroke (Multi) 07/24/2023    multiple   [2]   Past Surgical History:  Procedure Laterality Date    BACK SURGERY      CARDIAC CATHETERIZATION N/A 1/23/2025    Procedure: Left & Right Heart Cath w Angiography & LV;  Surgeon: Baljinder Wright MD;  Location: [facility];  Service: Cardiovascular;  Laterality: N/A;  w / anesthesia    CARDIAC CATHETERIZATION N/A 1/23/2025    Procedure: PCI SHIMON Stent- Coronary;  Surgeon: Baljinder Wright MD;  Location: [facility];  Service: Cardiovascular;  Laterality: N/A;    CARDIAC CATHETERIZATION N/A 1/23/2025    Procedure: IVUS - Coronary;  Surgeon: Baljinder Wright MD;  Location: [facility];  Service: Cardiovascular;  Laterality: N/A;    CORONARY ANGIOPLASTY WITH STENT PLACEMENT      CORONARY ARTERY BYPASS GRAFT      CT ANGIO AORTA AND BILATERAL ILIOFEMORAL RUN OFF INCLUDING WITHOUT CONTRAST IF PERFORMED  07/20/2020    CT ANGIO AORTA AND BILATERAL ILIOFEMORAL RUN OFF INCLUDING WITHOUT CONTRAST IF PERFORMED  01/14/2022    DENTAL SURGERY      ELBOW SURGERY      INVASIVE VASCULAR PROCEDURE Bilateral 10/04/2024    Procedure: Lower Extremity Angiogram;  Surgeon: Baljinder Wright MD;  Location: [facility];  Service: Cardiovascular;  Laterality: Bilateral;  w / anesthesia  3 hr hydration  / arrive 7:30    INVASIVE VASCULAR PROCEDURE N/A 10/21/2024    Procedure: Lower Extremity Angiogram;  Surgeon: Baljinder Wright MD;  Location: [facility];  Service: Cardiovascular;  Laterality: N/A;  w/ anesthesia    INVASIVE VASCULAR PROCEDURE  N/A 10/21/2024    Procedure: Angioplasty - Lower Extremity;  Surgeon: Baljinder Wright MD;  Location: [facility];  Service: Cardiovascular;  Laterality: N/A;    KNEE SURGERY Left     MR HEAD ANGIO WO IV CONTRAST  01/04/2022    MR NECK ANGIO WO IV CONTRAST  01/04/2022    TONSILLECTOMY      TRANSLUMINAL ATHERECTOMY FEMORAL-POPLITEAL / TIBIOPERONEAL     [3]   Family History  Problem Relation Name Age of Onset    No Known Problems Mother      No Known Problems Father     [4]   Social History  Tobacco Use    Smoking status: Some Days     Types: Cigarettes     Passive exposure: Current (5-7 cigarettes a day)    Smokeless tobacco: Never   Vaping Use    Vaping status: Never Used   Substance Use Topics    Alcohol use: Not Currently    Drug use: Yes     Frequency: 7.0 times per week     Types: Marijuana     Comment: Medical card (every night)        Mario Luz MD  07/24/25 6216

## 2025-07-21 NOTE — H&P
[facility] - GENERAL MEDICINE HISTORY AND PHYSICAL    HISTORY OF PRESENT ILLNESS     History Obtained From (Primary Source): The patient  Collateral History (Secondary Sources): D/w ED    History Of Present Illness (HPI):  Dereck Shen is a 59 y.o. male with PMHx s/f tobacco dependence, COPD, hypertension, dyslipidemia, hepatitis C, insulin-dependent diabetes, old CVA, bipolar disorder, hypertension, peripheral arterial disease with multiple interventions, coronary artery disease with prior CABG and multiple other interventions most recently to left main and circumflex in January 2025.  At that time patient was documented to have reduced EF 36%.  Patient had been doing fairly well up until about a month ago was about to start cardiac rehab today.  Patient was having complaints of chest pain ongoing for the last month.  He also had reported some weakness and falls in the emergency department his weakness was felt to be related to prior stroke.  Some shortness of breath and lower extremity edema has been persistent.  No syncope or near syncope no nausea or vomiting no fevers chills no hemoptysis.  In emergency department patient's workup was significant for elevated troponin 1275 he was felt to be having a non-STEMI patient was started on heparin drip given topical nitroglycerin paste and referred for admission.  Case was discussed between myself and the ED provider decision was made to admit patient for non-STEMI length of stay will likely exceed 2 midnights patient's prior history is very complicated from vascular perspective length of stay might exceed 2 midnights.      ED Course:   Vitals on presentation: T 36.8 °C (98.2 °F)  HR 66  /84  RR 18  O2 98 % None (Room air)  Labs:   CBC with WBC 7.6, Hgb 15.1, Plts 223.   CMP with glucose 174, Na 134, K 5.3, BUN 35, sCr 1.56, alk phos 110, ALT 9, AST 10, bilirubin 0.5. Magnesium 2.6.   . Trop 1,275.   Lactate 1.2  EKG: Not available  for review  Imaging - agree with radiology interpretation(s):   Interventions: Full dose aspirin, Nitro-Bid, IV heparin no bolus    12-point ROS reviewed and found to be negative aside from aforementioned positives in HPI and/or noted in dedicated ROS section below.     Decision made to admit the patient to the hospitalist service after evaluation of the patient, review of the above, and discussion with ED provider.     LABS AND IMAGING     I have personally reviewed the following labs from 07/21/25: CBC, CMP, Troponin, and BNP  I have personally reviewed any obtained EKGs on 07/21/25, with my interpretation as listed above in the ED summary course.   I have personally reviewed the patient's vitals on presentation to the ED and any/all changes through to time of admission (on 07/21/25).     ED Course (From ED Provider):  Diagnoses as of 07/21/25 1624   NSTEMI (non-ST elevated myocardial infarction) (Multi)     Relevant Results  Results for orders placed or performed during the hospital encounter of 07/21/25 (from the past 24 hours)   CBC and Auto Differential   Result Value Ref Range    WBC 7.6 4.4 - 11.3 x10*3/uL    nRBC 0.0 0.0 - 0.0 /100 WBCs    RBC 4.73 4.50 - 5.90 x10*6/uL    Hemoglobin 15.1 13.5 - 17.5 g/dL    Hematocrit 43.4 41.0 - 52.0 %    MCV 92 80 - 100 fL    MCH 31.9 26.0 - 34.0 pg    MCHC 34.8 32.0 - 36.0 g/dL    RDW 12.9 11.5 - 14.5 %    Platelets 223 150 - 450 x10*3/uL    Neutrophils % 70.5 40.0 - 80.0 %    Immature Granulocytes %, Automated 0.4 0.0 - 0.9 %    Lymphocytes % 22.3 13.0 - 44.0 %    Monocytes % 5.4 2.0 - 10.0 %    Eosinophils % 1.0 0.0 - 6.0 %    Basophils % 0.4 0.0 - 2.0 %    Neutrophils Absolute 5.37 1.20 - 7.70 x10*3/uL    Immature Granulocytes Absolute, Automated 0.03 0.00 - 0.70 x10*3/uL    Lymphocytes Absolute 1.70 1.20 - 4.80 x10*3/uL    Monocytes Absolute 0.41 0.10 - 1.00 x10*3/uL    Eosinophils Absolute 0.08 0.00 - 0.70 x10*3/uL    Basophils Absolute 0.03 0.00 - 0.10 x10*3/uL    Comprehensive metabolic panel   Result Value Ref Range    Glucose 174 (H) 74 - 99 mg/dL    Sodium 134 (L) 136 - 145 mmol/L    Potassium 5.3 3.5 - 5.3 mmol/L    Chloride 101 98 - 107 mmol/L    Bicarbonate 26 21 - 32 mmol/L    Anion Gap 12 10 - 20 mmol/L    Urea Nitrogen 35 (H) 6 - 23 mg/dL    Creatinine 1.56 (H) 0.50 - 1.30 mg/dL    eGFR 51 (L) >60 mL/min/1.73m*2    Calcium 8.9 8.6 - 10.3 mg/dL    Albumin 4.0 3.4 - 5.0 g/dL    Alkaline Phosphatase 110 33 - 120 U/L    Total Protein 7.3 6.4 - 8.2 g/dL    AST 10 9 - 39 U/L    Bilirubin, Total 0.5 0.0 - 1.2 mg/dL    ALT 9 (L) 10 - 52 U/L   Lipase   Result Value Ref Range    Lipase 10 9 - 82 U/L   Troponin I, High Sensitivity, Initial   Result Value Ref Range    Troponin I, High Sensitivity 1,275 (HH) 0 - 20 ng/L   POCT GLUCOSE   Result Value Ref Range    POCT Glucose 195 (H) 74 - 99 mg/dL   aPTT - baseline   Result Value Ref Range    aPTT 37 (H) 26 - 36 seconds     *Note: Due to a large number of results and/or encounters for the requested time period, some results have not been displayed. A complete set of results can be found in Results Review.      Imaging  XR chest 1 view  Result Date: 7/21/2025  1.  No evidence of acute cardiopulmonary process.       MACRO: None   Signed by: Jorge Alvarado 7/21/2025 3:36 PM Dictation workstation:   [id number]      Cardiology, Vascular, and Other Imaging  No other imaging results found for the past 2 days       PAST HISTORIES AND ALLERGIES     Past Medical History  He has a past medical history of Aphasia, Atherosclerotic heart disease of native coronary artery with unspecified angina pectoris (11/05/2019), Bipolar disorder, unspecified (Multi), BPH (benign prostatic hyperplasia), Diabetes (Multi), DVT (deep venous thrombosis) (Multi) (02/2022), Erectile dysfunction, GERD (gastroesophageal reflux disease), Hepatitis C, HFrEF (heart failure with reduced ejection fraction), HLD (hyperlipidemia), HTN (hypertension), Obstructive sleep  apnea (adult) (pediatric), Opioid abuse, in remission, PAD (peripheral artery disease), Polymyalgia rheumatica (Multi), Post-traumatic stress disorder, unspecified, and Stroke (Multi) (07/24/2023).    Surgical History  He has a past surgical history that includes Knee surgery (Left); Elbow surgery; Back surgery; CT angio aorta and bilateral iliofemoral runoff including without contrast if performed (07/20/2020); MR angio head wo IV contrast (01/04/2022); MR angio neck wo IV contrast (01/04/2022); CT angio aorta and bilateral iliofemoral runoff including without contrast if performed (01/14/2022); Invasive Vascular Procedure (Bilateral, 10/04/2024); Dental surgery; Coronary angioplasty with stent; Coronary artery bypass graft; Tonsillectomy; Transluminal atherectomy femoral-popliteal / tibioperoneal; Invasive Vascular Procedure (N/A, 10/21/2024); Invasive Vascular Procedure (N/A, 10/21/2024); Cardiac catheterization (N/A, 1/23/2025); Cardiac catheterization (N/A, 1/23/2025); and Cardiac catheterization (N/A, 1/23/2025).     Social History  He reports that he has been smoking cigarettes. He has been exposed to tobacco smoke. He has never used smokeless tobacco. He reports that he does not currently use alcohol. He reports current drug use. Frequency: 7.00 times per week. Drug: Marijuana.    Family History  Family History[1]    Allergies  Celecoxib; Ibuprofen; Tetanus toxoid, adsorbed; [id number] antimigraine agents; Cephalexin; Hydrocodone; Latex; and Tryptophan    MEDICATIONS     Scheduled Medications:  Scheduled Medications[2]  Continuous Medications:  Continuous Medications[3]  PRN Medications:  PRN Medications[4]     REVIEW OF SYSTEMS     Review of Systems    OBJECTIVE     Last Recorded Vitals  /81   Pulse 67   Temp 36.8 °C (98.2 °F)   Resp 12   Wt 95.3 kg (210 lb)   SpO2 98%      Physical Exam:  Vital signs and nursing notes reviewed.   Constitutional: Pleasant and cooperative. Laying in bed in no  acute distress. Conversant.   Skin: Warm and dry; no obvious lesions, rashes, pallor, or jaundice.   Eyes: EOMI. Anicteric sclera.   ENT: Mucous membranes moist; no obvious injury or deformity appreciated.   Head and Neck: Normocephalic, atraumatic. ROM preserved. Trachea midline. No appreciable JVD.   Respiratory: Nonlabored on room air. Lungs clear to auscultation bilaterally without obvious adventitious sounds. Chest rise is equal.  Cardiovascular: RRR. No gross murmur, gallop, or rub. Extremities are warm and well-perfused with good capillary refill (< 3 seconds). No chest wall tenderness.   GI: Abdomen soft, nontender, nondistended. No obvious organomegaly appreciated. Bowel sounds are present.  : No CVA tenderness.   MSK: No gross abnormalities appreciated. No limitations to AROM/PROM appreciated.   Extremities: No cyanosis, there is moderate edema both lower extremities  Neuro: A&Ox3. CN 2-12 grossly intact. Able to respond to questions appropriately and clearly. No acute focal neurologic deficits appreciated.  Psych: Appropriate mood and behavior.    ASSESSMENT AND PLAN   Assessment/Plan     59 y.o. male with PMHx s/f tobacco dependence, COPD, hypertension, dyslipidemia, hepatitis C, insulin-dependent diabetes, old CVA, bipolar disorder, hypertension, peripheral arterial disease with multiple interventions, coronary artery disease with prior CABG and multiple other interventions most recently to left main and circumflex in January 2025.  Presenting to the emergency department with complaint of chest pain and some weakness affecting the right side felt to be related to prior stroke    Coronary artery disease with prior history CABG, PCI and stenting most recently January 2025 to left main and circumflex  Continue patient on heparin drip as started in the emergency department aspirin and Brilinta, continue patient on his Ranexa as well as his Imdur, high-dose statin ezetimibe and carvedilol   Patient also on  Praluent as outpatient  Check limited echocardiogram consult cardiology    Chronic systolic heart failure  Patient appears compensated  Continue the patient's home medications including carvedilol and Entresto  Hold Farxiga for now patient may need to be n.p.o. for intervention    Insulin-dependent diabetes type 2 with peripheral vascular complications  Continue patient on Lantus in the evening with sliding scale coverage    Hypertension  Patient is continued on routine antihypertensive medications including his hydralazine    Elevated Creatinine, probable CKD   CR is 1.56 on admission recent range 1.19-1.83  Continue to monitor           Michael Park, APRN-BULL    Dragon dictation software was used to dictate this note and thus there may be minor errors in translation/transcription including garbled speech or misspellings. Please contact for clarification if needed.         [1]   Family History  Problem Relation Name Age of Onset    No Known Problems Mother      No Known Problems Father     [2]    [3] heparin, 0-4,000 Units/hr, Last Rate: 1,000 Units/hr (07/21/25 1617)    [4] PRN medications: heparin

## 2025-07-21 NOTE — Clinical Note
no hematoma and slight oozing. Slight ooze, air increased to 20 ml in compression band, doppler pulse present

## 2025-07-21 NOTE — PROGRESS NOTES
Subjective   Patient ID: Dereck Shen is a 59 y.o. male who presents for follow-up of congestive heart failure.   Current Medications[1]     PMH: ignificant for PAD with prior revascularization (R fem-pop bypass 2/24/20; L fem-pop bypass 9/14/20; L fem PTA of the graft; R iliac PTA 2/2022; LLE and DANYA PTA 4/2024; angioplasty of the left SFA 10/21/24; and left femoral and tibial thrombectomy with patch angioplasty 11/19/24), heart failure, CAD s/p CABG x5 in 09/2019, PCI of Cx, 01/04/2021 and s/p PCI of LM/Cx 01/23/2025; HTN, hyperlipidemia, DM type 2, Hepatitis C, bipolar depression, ischemic stroke, COPD, BRUNA and nicotine dependence.     Patient resents today with frequent falls (3-4 per week) over the past 1-2 weeks.  Complaining of new significant right sided weakness.  He is falling about 3-4 times per week over the past 2 weeks.  He also is complaining of some dull type chest discomfort across chest off and on.   Denies sob or associated nausea.  No other signs of congestion.         Congestive Heart Failure  Presents for follow-up visit. Associated symptoms include chest pain (dull, pressure type discomfort accross chest.  for the past few weeks.) and muscle weakness (complaining or right arm and leg weakness and right jaw pain.). Pertinent negatives include no abdominal pain, chest pressure, claudication, edema, fatigue, near-syncope, nocturia, orthopnea, palpitations, paroxysmal nocturnal dyspnea, shortness of breath or unexpected weight change.       Review of Systems   Constitutional:  Negative for fatigue and unexpected weight change.   Respiratory:  Negative for shortness of breath.    Cardiovascular:  Positive for chest pain (dull, pressure type discomfort accross chest.  for the past few weeks.). Negative for palpitations, claudication and near-syncope.   Gastrointestinal:  Negative for abdominal pain.   Genitourinary:  Negative for nocturia.   Musculoskeletal:  Positive for muscle weakness  (complaining or right arm and leg weakness and right jaw pain.).       Objective     /89 (BP Location: Left arm, Patient Position: Sitting, BP Cuff Size: Adult)   Pulse 68   Resp 22   Wt 96.3 kg (212 lb 3.2 oz)   SpO2 98%   BMI 29.60 kg/m²     6/20/2025  CONCLUSIONS:   1. The left ventricular systolic function is moderately decreased with a visually estimated ejection fraction of 30-35%.   2. There is global hypokinesis of the left ventricle with minor regional variations.       01/20/2025 - TTE  1. The left ventricular systolic function is moderately decreased, with a Salinas's biplane calculated ejection fraction of 36%.  2. There is global hypokinesis of the left ventricle with minor regional variations.  3. Spectral Doppler shows a Grade II (pseudonormal pattern) of left ventricular diastolic filling with an elevated left atrial pressure.  4. Left ventricular cavity size is mildly dilated.  5. There is mildly reduced right ventricular systolic function.  6. Mildly elevated right ventricular systolic pressure.     01/19/2025 - CXR  1.  Pulmonary vascular prominence suggestive of pulmonary vascular congestion.  2.  Patchy opacities within the lower lobes most consistent with atelectasis/pneumonitis.     11/20/2024 - TTE  1. Poorly visualized anatomical structures due to suboptimal image quality.  2. The left ventricular systolic function is moderately decreased, with a Salinas's biplane calculated ejection fraction of 38%.  3. Abnormal wall motion.  4. Spectral Doppler shows an abnormal pattern of left ventricular diastolic filling.  5. Akinetic inferolateral segment, and basal inferior wall.     10/21/2024 - Vascular Intervention   Successful recannalization of totally occluded left SFA.      10/04/2024 - Vascular Procedure  Chronically occluded left SFA and left fem-pop bypass.      05/10/2023 - Nuclear Stress Test  1. SPECT Perfusion Study: Abnormal.   2. There is no scintigraphic evidence for  inducible ischemia.   3. There is a large (>20%) fixed perfusion defect in the LCX territory.   4. There is a moderate (10-20%) fixed perfusion defect in the RCA territory.   5. Left ventricle is moderately dilated. The left ventricle systolic function is moderately decreased.   6. Right ventricle is normal in size. The right ventricle systolic function is normal.   7. This is an intermediate risk scan. Large Lcx scar with RCA scar, no ischemia. EF 40%.     01/04/2022 - TTE  1. The left ventricular systolic function is mildly decreased with a 35% estimated ejection fraction.  2. Multiple segmental abnormalities exist. See findings.  3. Spectral Doppler shows an impaired relaxation pattern of left ventricular diastolic filling.      02/19/2021 - TTE  Limited echocardiogram, LV function appears reduced although multiple segments were not well visualized.      01/04/2021 - Cardiac Catheterization (LH)  1. Triple vessel coronary artery disease with proximal left anterior descending involvement.  2. Culprit vessel(s): circumflex.  3. Successful PCI of Cx.  4. Patent LIMA to LAD/Dg, SVG to RCA and Radial graft to OM.     12/16/2020 - TTE  1. The left ventricular systolic function is moderately decreased with a 35-40% estimated ejection fraction.  2. Basal and mid inferior septum, basal and mid inferior wall, basal and mid inferolateral wall, and basal anteroseptal segment are abnormal.  3. There are multiple wall motion abnormalities.     04/14/2020 - TTE  1. The left ventricular systolic function is mildly to moderately decreased with a 40-45% estimated ejection fraction.  2. Hypokinesis of basal inferior, inferolateral and inferoseptal LV segments.  3. Spectral Doppler shows an impaired relaxation pattern of left ventricular diastolic filling.     09/20/2019 - TTE  1. The left ventricular systolic function is mildly to moderately decreased with a 40% estimated ejection fraction.  2. Abnormal septal motion consistent  with post-operative status.  3. Spectral Doppler shows an impaired relaxation pattern of left ventricular diastolic filling.  4. There is no evidence of left ventricular hypertrophy.  5. The pulmonary artery is not well visualized.  6. There is global hypokinesis of the left ventricle with minor regional variations.     07/16/2019 - TTE  1. Moderately-severe left ventricular systolic dysfunction with regional abnormalities with an ejection fraction of 30%.  2. Inferolateral and anterolateral wall segments are akinetic; inferior and inferoseptal wall segments are severely hypokinetic; anterior and anteroseptal wall segments are mildly hypokinetic.  3. Normal right ventricular size and systolic function.  4. Trace mitral and tricuspid regurgitation.      04/22/2019 - Cardiac Catheterization (LH)  Triple vessel coronary artery disease with proximal left anterior descending involvement.      03/30/2019 - TTE  1. Severe segment and generalized left ventricular systolic dysfunction with an ejection fraction of 20%.  2. Inferior and inferolateral wall segments are akinetic; remaining LV wall segments are severely hypokinetic.  3. Restrictive diastolic filling pattern (Stage III-IV diastolic dysfunction).  4. Dilated right ventricle with severe systolic dysfunction.  5. Trace mitral regurgitation.     Lab Results   Component Value Date    BUN 34 (H) 06/12/2025    CREATININE 1.61 (H) 06/12/2025     (H) 05/09/2025    MG 2.6 (H) 05/09/2025    K 4.8 06/12/2025     06/12/2025       Constitutional:       General:  NAD   HENT:      Head: Normocephalic     Mouth: Mucous membranes are moist.      Neck:  No JVD or HJR   Eyes:      Conjunctiva/sclera: Conjunctivae normal.    Cardiovascular:      Rate and Rhythm: Normal rate and regular rhythm     Heart sounds:  S1 S2 normal, no murmur, no S3 or S4   Pulmonary:      Pulmonary effort is normal.  Normal breath sounds. No wheezing or rales.   Abdominal:      General: Bowel  sounds are normal, non- tender to palpitations.   Musculoskeletal:         General: No  edema.  CHOI well.   Skin:     General: Skin is warm and dry   Neurological:      General:   He has fairly significant right sided weakness today.      Mental Status: alert and oriented to person, place, and time. Mental status is at baseline.     Psychiatric:         Mood and Affect: Normal Affect.     Assessment/Plan     Problem List Items Addressed This Visit       Atherosclerotic heart disease of native coronary artery without angina pectoris    Chronic combined systolic and diastolic CHF (congestive heart failure) - Primary    Hypertension      Chronic systolic heart failure   EF%36  Etiology Ischemic heart disease  AHA Stage: C   NYHA class: 2-3   Volume Status:   GFR: 28    GDMT:  BB- Carvedilol 6.25 mg 1 tablet twice a day   ARB/ACEI/ARNI -  Entresto 97/103 mg twice a day.   MRA -  contraindicated with low GFR  SGLT2i - Farxiga 10 mg daily   Diuretic -   Hydralazine 50 mg three times a day.   Isosorbide mononitrate 60 mg daily   Apixaban 5 mg twice a day   Device Therapy:       CHF: He appears compensated on exam.   On good GDMT. Did not tolerate MRA due to renal insufficiency.  Continue to work on the 2 gm salt and 2 liter fluid diet.       Disease process and medications discussed. Questions answered fully.  Emphasized salt restriction.  Encouraged daily monitoring of the patient's weight.  Encouraged regular exercise.    2.  ASHD with hx of PCI of  LM/Cx in January 2025:   ASA/ Ticagrelor/Apixaban/ lipitor/ BB.   He wants to go to the cardiac rehab.   I do not think the chest discomfort is typical of angina per his description.   EKG today.     3.  CKD - 4     GFR per last labs 54 .   He has followed with nephrology.       4. PAD s/p Revascularition: per Dr. Kobi MORGAN with apixaban.     5.  Type 2 insulin dependent : managed by endocrinology.     6.  Falls/right sided weakness:  He has marked weakness of right hand  "grasp and lower leg strength on exam today.  This is new.  Will send patient to ER for further evaluation.  He has been of the baby ASA since Wednesday because he \"ran out\".     7.  Tobacco use:  Still smoking daily.     8.  HTN:  BP is suboptimal today in clinic.     Hilda Hammonds, APRN-CNP         [1]   Current Outpatient Medications:     acetaminophen (Tylenol) 325 mg tablet, Take 2 tablets (650 mg) by mouth every 4 hours if needed for fever (temp greater than 38.0 C), headaches or mild pain (1 - 3)., Disp: , Rfl:     apixaban (Eliquis) 5 mg tablet, Take 1 tablet (5 mg) by mouth 2 times a day., Disp: 180 tablet, Rfl: 3    aspirin 81 mg chewable tablet, Chew and swallow 1 tablet (81 mg) once daily., Disp: , Rfl:     atorvastatin (Lipitor) 80 mg tablet, Take 1 tablet (80 mg) by mouth once daily at bedtime., Disp: 90 tablet, Rfl: 2    blood sugar diagnostic (Blood Glucose Test) strip, 1 each 3 times a day., Disp: 100 strip, Rfl: 11    blood-glucose meter misc, To be used 3 times daily, Disp: 1 each, Rfl: 0    blood-glucose sensor (FreeStyle En 3 Plus Sensor) device, Apply sensor every 15 days to monitor blood sugar, Disp: 2 each, Rfl: 11    carbamide peroxide (Ear Wax Removal Drops) 6.5 % otic solution, Instill 5 drops into both ears daily for 4 days, Disp: 15 mL, Rfl: 0    carvedilol (Coreg) 6.25 mg tablet, Take 1 tablet by mouth every 12 hours, Disp: 180 tablet, Rfl: 3    Creon 36,000-114,000- 180,000 unit capsule,delayed release(DR/EC) capsule, Take 2 capsules by mouth 3 times a day as needed., Disp: , Rfl:     dapagliflozin propanediol (Farxiga) 10 mg tablet, Take 1 tablet (10 mg) by mouth once daily with breakfast., Disp: 90 tablet, Rfl: 2    dextromethorphan-guaifenesin (Mucinex DM)  mg 12 hr tablet, Take 2 tablets by mouth every 12 hours, Disp: 40 tablet, Rfl: 0    ELDERBERRY FRUIT ORAL, Take 1 tablet by mouth once daily as needed., Disp: , Rfl:     ezetimibe (Zetia) 10 mg tablet, Take 1 tablet " "by mouth once daily., Disp: 90 tablet, Rfl: 3    ferrous sulfate (FeroSuL) 325 mg (65 mg elemental) tablet, Take 1 tablet (325 mg) by mouth once daily with breakfast., Disp: 30 tablet, Rfl: 3    FreeStyle En 3 [place] misc, Use as instructed, Disp: 1 each, Rfl: 0    furosemide (Lasix) 40 mg tablet, Take 1 tablet by mouth four times a week., Disp: 48 tablet, Rfl: 3    gabapentin (Neurontin) 400 mg capsule, Take 1 capsule (400 mg) by mouth 3 times a day., Disp: 90 capsule, Rfl: 5    GINGER ROOT EXTRACT ORAL, Take 1 tablet by mouth once daily as needed., Disp: , Rfl:     hydrALAZINE (Apresoline) 50 mg tablet, Take 1 tablet (50 mg) by mouth 3 times a day., Disp: 270 tablet, Rfl: 3    insulin glargine (Lantus) 100 unit/mL (3 mL) pen, Inject 10 Units under the skin once daily at bedtime., Disp: 15 mL, Rfl: 5    insulin lispro (HumaLOG) 100 unit/mL pen, Inject 14 Units under the skin 3 times daily (morning, midday, late afternoon)., Disp: 30 mL, Rfl: 5    isosorbide mononitrate ER (Imdur) 60 mg 24 hr tablet, Take 1 tablet (60 mg) by mouth once daily., Disp: 90 tablet, Rfl: 3    lancets 30 gauge misc, 1 each 3 times a day., Disp: 100 each, Rfl: 11    medical cannabis, Not [facility] prescribed, Disp: , Rfl:     OXcarbazepine (Trileptal) 150 mg tablet, Take 3 tablets (450 mg) by mouth 2 times a day., Disp: 540 tablet, Rfl: 3    pantoprazole (ProtoNix) 40 mg EC tablet, TAKE 1 TABLET BY MOUTH ONCE DAILY, Disp: 90 tablet, Rfl: 3    pen needle, diabetic (BD Ultra-Fine Jocy Pen Needle) 32 gauge x 5/32\" needle, Pen Needles for Insulin., Disp: 100 each, Rfl: 2    pen needle, diabetic (BD Ultra-Fine Jocy Pen Needle) 32 gauge x 5/32\" needle, Use to inject insulin up to 8 times per day., Disp: 400 each, Rfl: 0    pen needle, diabetic (TechLITE Pen Needle) 32 gauge x 1/4\" needle, Use to inject 1-4 times daily as directed, Disp: 100 each, Rfl: 11    ranolazine (Ranexa) 500 mg 12 hr tablet, Take 1 tablet by mouth twice daily., Disp: 180 " tablet, Rfl: 3    sacubitriL-valsartan (Entresto)  mg tablet, Take 1 tablet by mouth twice daily., Disp: 180 tablet, Rfl: 3    ticagrelor (Brilinta) 90 mg tablet, Take 1 tablet (90 mg) by mouth 2 times a day., Disp: 180 tablet, Rfl: 3    alirocumab (Praluent Pen) 75 mg/mL pen injector, Inject 1 mL subcutaneously every other week. (Patient not taking: Reported on 7/21/2025), Disp: 2 mL, Rfl: 11    tiZANidine (Zanaflex) 2 mg tablet, Take 1 tablet (2 mg) by mouth once daily as needed for muscle spasms., Disp: 30 tablet, Rfl: 0

## 2025-07-22 ENCOUNTER — APPOINTMENT (OUTPATIENT)
Dept: CARDIOLOGY | Facility: HOSPITAL | Age: 60
End: 2025-07-22
Payer: COMMERCIAL

## 2025-07-22 VITALS
SYSTOLIC BLOOD PRESSURE: 132 MMHG | TEMPERATURE: 97.9 F | OXYGEN SATURATION: 96 % | DIASTOLIC BLOOD PRESSURE: 75 MMHG | WEIGHT: 210 LBS | HEART RATE: 62 BPM | RESPIRATION RATE: 18 BRPM | HEIGHT: 71 IN | BODY MASS INDEX: 29.4 KG/M2

## 2025-07-22 LAB
ACT BLD: >397 SEC (ref 83–199)
ACT BLD: >397 SEC (ref 83–199)
ANION GAP SERPL CALC-SCNC: 11 MMOL/L (ref 10–20)
BUN SERPL-MCNC: 31 MG/DL (ref 6–23)
CALCIUM SERPL-MCNC: 8.4 MG/DL (ref 8.6–10.3)
CHLORIDE SERPL-SCNC: 101 MMOL/L (ref 98–107)
CHOLEST SERPL-MCNC: 183 MG/DL (ref 0–199)
CHOLESTEROL/HDL RATIO: 6
CO2 SERPL-SCNC: 28 MMOL/L (ref 21–32)
CREAT SERPL-MCNC: 1.39 MG/DL (ref 0.5–1.3)
EGFRCR SERPLBLD CKD-EPI 2021: 59 ML/MIN/1.73M*2
EJECTION FRACTION APICAL 4 CHAMBER: 42.7
EJECTION FRACTION: 39 %
ERYTHROCYTE [DISTWIDTH] IN BLOOD BY AUTOMATED COUNT: 12.7 % (ref 11.5–14.5)
GLUCOSE BLD MANUAL STRIP-MCNC: 149 MG/DL (ref 74–99)
GLUCOSE BLD MANUAL STRIP-MCNC: 194 MG/DL (ref 74–99)
GLUCOSE BLD MANUAL STRIP-MCNC: 234 MG/DL (ref 74–99)
GLUCOSE SERPL-MCNC: 137 MG/DL (ref 74–99)
HCT VFR BLD AUTO: 41 % (ref 41–52)
HDLC SERPL-MCNC: 30.7 MG/DL
HGB BLD-MCNC: 14 G/DL (ref 13.5–17.5)
LDLC SERPL CALC-MCNC: 97 MG/DL
LEFT ATRIUM VOLUME AREA LENGTH INDEX BSA: 30.4 ML/M2
LEFT VENTRICLE INTERNAL DIMENSION DIASTOLE: 5.6 CM (ref 3.5–6)
LV EJECTION FRACTION BIPLANE: 39 %
MCH RBC QN AUTO: 31.2 PG (ref 26–34)
MCHC RBC AUTO-ENTMCNC: 34.1 G/DL (ref 32–36)
MCV RBC AUTO: 91 FL (ref 80–100)
MITRAL VALVE E/A RATIO: 1.8
NON HDL CHOLESTEROL: 152 MG/DL (ref 0–149)
NRBC BLD-RTO: 0 /100 WBCS (ref 0–0)
PLATELET # BLD AUTO: 180 X10*3/UL (ref 150–450)
POTASSIUM SERPL-SCNC: 4.5 MMOL/L (ref 3.5–5.3)
RBC # BLD AUTO: 4.49 X10*6/UL (ref 4.5–5.9)
SODIUM SERPL-SCNC: 135 MMOL/L (ref 136–145)
TRIGL SERPL-MCNC: 279 MG/DL (ref 0–149)
UFH PPP CHRO-ACNC: 0.4 IU/ML (ref ?–1.1)
UFH PPP CHRO-ACNC: 0.5 IU/ML (ref ?–1.1)
UFH PPP CHRO-ACNC: 0.7 IU/ML (ref ?–1.1)
VLDL: 56 MG/DL (ref 0–40)
WBC # BLD AUTO: 5.9 X10*3/UL (ref 4.4–11.3)

## 2025-07-22 PROCEDURE — 85347 COAGULATION TIME ACTIVATED: CPT

## 2025-07-22 PROCEDURE — C1760 CLOSURE DEV, VASC: HCPCS | Performed by: INTERNAL MEDICINE

## 2025-07-22 PROCEDURE — 2500000004 HC RX 250 GENERAL PHARMACY W/ HCPCS (ALT 636 FOR OP/ED): Mod: JW | Performed by: NURSE PRACTITIONER

## 2025-07-22 PROCEDURE — 85027 COMPLETE CBC AUTOMATED: CPT | Performed by: NURSE PRACTITIONER

## 2025-07-22 PROCEDURE — 2500000002 HC RX 250 W HCPCS SELF ADMINISTERED DRUGS (ALT 637 FOR MEDICARE OP, ALT 636 FOR OP/ED): Performed by: NURSE PRACTITIONER

## 2025-07-22 PROCEDURE — 99152 MOD SED SAME PHYS/QHP 5/>YRS: CPT | Performed by: INTERNAL MEDICINE

## 2025-07-22 PROCEDURE — B2121ZZ FLUOROSCOPY OF SINGLE CORONARY ARTERY BYPASS GRAFT USING LOW OSMOLAR CONTRAST: ICD-10-PCS | Performed by: INTERNAL MEDICINE

## 2025-07-22 PROCEDURE — 93308 TTE F-UP OR LMTD: CPT | Performed by: INTERNAL MEDICINE

## 2025-07-22 PROCEDURE — 80061 LIPID PANEL: CPT | Performed by: NURSE PRACTITIONER

## 2025-07-22 PROCEDURE — 92928 PRQ TCAT PLMT NTRAC ST 1 LES: CPT | Performed by: INTERNAL MEDICINE

## 2025-07-22 PROCEDURE — 99254 IP/OBS CNSLTJ NEW/EST MOD 60: CPT | Performed by: INTERNAL MEDICINE

## 2025-07-22 PROCEDURE — C1894 INTRO/SHEATH, NON-LASER: HCPCS | Performed by: INTERNAL MEDICINE

## 2025-07-22 PROCEDURE — 36415 COLL VENOUS BLD VENIPUNCTURE: CPT | Performed by: EMERGENCY MEDICINE

## 2025-07-22 PROCEDURE — C1753 CATH, INTRAVAS ULTRASOUND: HCPCS | Performed by: INTERNAL MEDICINE

## 2025-07-22 PROCEDURE — 2500000001 HC RX 250 WO HCPCS SELF ADMINISTERED DRUGS (ALT 637 FOR MEDICARE OP): Performed by: NURSE PRACTITIONER

## 2025-07-22 PROCEDURE — C1769 GUIDE WIRE: HCPCS | Performed by: INTERNAL MEDICINE

## 2025-07-22 PROCEDURE — 92978 ENDOLUMINL IVUS OCT C 1ST: CPT | Mod: LC | Performed by: INTERNAL MEDICINE

## 2025-07-22 PROCEDURE — C1874 STENT, COATED/COV W/DEL SYS: HCPCS | Performed by: INTERNAL MEDICINE

## 2025-07-22 PROCEDURE — C1725 CATH, TRANSLUMIN NON-LASER: HCPCS | Performed by: INTERNAL MEDICINE

## 2025-07-22 PROCEDURE — 93459 L HRT ART/GRFT ANGIO: CPT | Performed by: INTERNAL MEDICINE

## 2025-07-22 PROCEDURE — 2780000003 HC OR 278 NO HCPCS: Performed by: INTERNAL MEDICINE

## 2025-07-22 PROCEDURE — 99153 MOD SED SAME PHYS/QHP EA: CPT | Performed by: INTERNAL MEDICINE

## 2025-07-22 PROCEDURE — C1887 CATHETER, GUIDING: HCPCS | Performed by: INTERNAL MEDICINE

## 2025-07-22 PROCEDURE — C9600 PERC DRUG-EL COR STENT SING: HCPCS | Mod: LC | Performed by: INTERNAL MEDICINE

## 2025-07-22 PROCEDURE — 92920 PRQ TRLUML C ANGIOP 1ART&/BR: CPT | Performed by: INTERNAL MEDICINE

## 2025-07-22 PROCEDURE — 92978 ENDOLUMINL IVUS OCT C 1ST: CPT | Performed by: INTERNAL MEDICINE

## 2025-07-22 PROCEDURE — 2500000004 HC RX 250 GENERAL PHARMACY W/ HCPCS (ALT 636 FOR OP/ED): Performed by: INTERNAL MEDICINE

## 2025-07-22 PROCEDURE — 2500000002 HC RX 250 W HCPCS SELF ADMINISTERED DRUGS (ALT 637 FOR MEDICARE OP, ALT 636 FOR OP/ED): Performed by: STUDENT IN AN ORGANIZED HEALTH CARE EDUCATION/TRAINING PROGRAM

## 2025-07-22 PROCEDURE — 2550000001 HC RX 255 CONTRASTS: Performed by: INTERNAL MEDICINE

## 2025-07-22 PROCEDURE — 2720000007 HC OR 272 NO HCPCS: Performed by: INTERNAL MEDICINE

## 2025-07-22 PROCEDURE — 2500000004 HC RX 250 GENERAL PHARMACY W/ HCPCS (ALT 636 FOR OP/ED): Performed by: STUDENT IN AN ORGANIZED HEALTH CARE EDUCATION/TRAINING PROGRAM

## 2025-07-22 PROCEDURE — B2181ZZ FLUOROSCOPY OF LEFT INTERNAL MAMMARY BYPASS GRAFT USING LOW OSMOLAR CONTRAST: ICD-10-PCS | Performed by: INTERNAL MEDICINE

## 2025-07-22 PROCEDURE — 36415 COLL VENOUS BLD VENIPUNCTURE: CPT | Performed by: NURSE PRACTITIONER

## 2025-07-22 PROCEDURE — 027034Z DILATION OF CORONARY ARTERY, ONE ARTERY WITH DRUG-ELUTING INTRALUMINAL DEVICE, PERCUTANEOUS APPROACH: ICD-10-PCS | Performed by: INTERNAL MEDICINE

## 2025-07-22 PROCEDURE — 80048 BASIC METABOLIC PNL TOTAL CA: CPT | Performed by: NURSE PRACTITIONER

## 2025-07-22 PROCEDURE — B240ZZ3 ULTRASONOGRAPHY OF SINGLE CORONARY ARTERY, INTRAVASCULAR: ICD-10-PCS | Performed by: INTERNAL MEDICINE

## 2025-07-22 PROCEDURE — 85520 HEPARIN ASSAY: CPT | Performed by: EMERGENCY MEDICINE

## 2025-07-22 PROCEDURE — C8924 2D TTE W OR W/O FOL W/CON,FU: HCPCS

## 2025-07-22 PROCEDURE — 82947 ASSAY GLUCOSE BLOOD QUANT: CPT

## 2025-07-22 PROCEDURE — 99233 SBSQ HOSP IP/OBS HIGH 50: CPT | Performed by: INTERNAL MEDICINE

## 2025-07-22 PROCEDURE — 4A023N7 MEASUREMENT OF CARDIAC SAMPLING AND PRESSURE, LEFT HEART, PERCUTANEOUS APPROACH: ICD-10-PCS | Performed by: INTERNAL MEDICINE

## 2025-07-22 PROCEDURE — B2111ZZ FLUOROSCOPY OF MULTIPLE CORONARY ARTERIES USING LOW OSMOLAR CONTRAST: ICD-10-PCS | Performed by: INTERNAL MEDICINE

## 2025-07-22 PROCEDURE — 2500000004 HC RX 250 GENERAL PHARMACY W/ HCPCS (ALT 636 FOR OP/ED): Performed by: NURSE PRACTITIONER

## 2025-07-22 DEVICE — STENT ONYXNG25018UX ONYX 2.50X18RX
Type: IMPLANTABLE DEVICE | Site: CORONARY | Status: FUNCTIONAL
Brand: ONYX FRONTIER™

## 2025-07-22 RX ORDER — LIDOCAINE HYDROCHLORIDE 20 MG/ML
INJECTION, SOLUTION INFILTRATION; PERINEURAL AS NEEDED
Status: DISCONTINUED | OUTPATIENT
Start: 2025-07-22 | End: 2025-07-22 | Stop reason: HOSPADM

## 2025-07-22 RX ORDER — FENTANYL CITRATE 50 UG/ML
INJECTION, SOLUTION INTRAMUSCULAR; INTRAVENOUS AS NEEDED
Status: DISCONTINUED | OUTPATIENT
Start: 2025-07-22 | End: 2025-07-22 | Stop reason: HOSPADM

## 2025-07-22 RX ORDER — SODIUM CHLORIDE 9 MG/ML
1 INJECTION, SOLUTION INTRAVENOUS CONTINUOUS
Status: ACTIVE | OUTPATIENT
Start: 2025-07-22 | End: 2025-07-22

## 2025-07-22 RX ORDER — MIDAZOLAM HYDROCHLORIDE 1 MG/ML
INJECTION, SOLUTION INTRAMUSCULAR; INTRAVENOUS AS NEEDED
Status: DISCONTINUED | OUTPATIENT
Start: 2025-07-22 | End: 2025-07-22 | Stop reason: HOSPADM

## 2025-07-22 RX ORDER — NITROGLYCERIN 20 MG/100ML
1-200 INJECTION INTRAVENOUS CONTINUOUS
Status: DISCONTINUED | OUTPATIENT
Start: 2025-07-22 | End: 2025-07-22

## 2025-07-22 RX ORDER — SODIUM CHLORIDE 9 MG/ML
75 INJECTION, SOLUTION INTRAVENOUS CONTINUOUS
Status: DISCONTINUED | OUTPATIENT
Start: 2025-07-22 | End: 2025-07-22

## 2025-07-22 RX ORDER — HEPARIN SODIUM 1000 [USP'U]/ML
INJECTION, SOLUTION INTRAVENOUS; SUBCUTANEOUS AS NEEDED
Status: DISCONTINUED | OUTPATIENT
Start: 2025-07-22 | End: 2025-07-22 | Stop reason: HOSPADM

## 2025-07-22 RX ORDER — SODIUM CHLORIDE 9 MG/ML
1000 INJECTION, SOLUTION INTRAVENOUS ONCE AS NEEDED
Status: DISCONTINUED | OUTPATIENT
Start: 2025-07-22 | End: 2025-07-22 | Stop reason: HOSPADM

## 2025-07-22 RX ADMIN — ACETAMINOPHEN 650 MG: 325 TABLET ORAL at 20:46

## 2025-07-22 RX ADMIN — RANOLAZINE 500 MG: 500 TABLET, FILM COATED, EXTENDED RELEASE ORAL at 20:46

## 2025-07-22 RX ADMIN — SODIUM CHLORIDE 1 ML/KG/HR: 9 INJECTION, SOLUTION INTRAVENOUS at 13:30

## 2025-07-22 RX ADMIN — ASPIRIN 81 MG CHEWABLE TABLET 81 MG: 81 TABLET CHEWABLE at 09:06

## 2025-07-22 RX ADMIN — SODIUM CHLORIDE 75 ML/HR: 0.9 INJECTION, SOLUTION INTRAVENOUS at 09:07

## 2025-07-22 RX ADMIN — HYDRALAZINE HYDROCHLORIDE 50 MG: 25 TABLET ORAL at 09:06

## 2025-07-22 RX ADMIN — INSULIN GLARGINE 10 UNITS: 100 INJECTION, SOLUTION SUBCUTANEOUS at 20:46

## 2025-07-22 RX ADMIN — MORPHINE SULFATE 4 MG: 4 INJECTION INTRAVENOUS at 14:26

## 2025-07-22 RX ADMIN — ATORVASTATIN CALCIUM 80 MG: 40 TABLET, FILM COATED ORAL at 20:46

## 2025-07-22 RX ADMIN — RANOLAZINE 500 MG: 500 TABLET, FILM COATED, EXTENDED RELEASE ORAL at 09:06

## 2025-07-22 RX ADMIN — OXCARBAZEPINE 450 MG: 150 TABLET, FILM COATED ORAL at 20:46

## 2025-07-22 RX ADMIN — OXCARBAZEPINE 450 MG: 150 TABLET, FILM COATED ORAL at 09:06

## 2025-07-22 RX ADMIN — INSULIN LISPRO 6 UNITS: 100 INJECTION, SOLUTION INTRAVENOUS; SUBCUTANEOUS at 15:55

## 2025-07-22 RX ADMIN — GABAPENTIN 400 MG: 400 CAPSULE ORAL at 09:06

## 2025-07-22 RX ADMIN — HUMAN ALBUMIN MICROSPHERES AND PERFLUTREN 0.5 ML: 10; .22 INJECTION, SOLUTION INTRAVENOUS at 08:14

## 2025-07-22 RX ADMIN — ISOSORBIDE MONONITRATE 60 MG: 60 TABLET, EXTENDED RELEASE ORAL at 09:06

## 2025-07-22 RX ADMIN — CARVEDILOL 6.25 MG: 6.25 TABLET, FILM COATED ORAL at 09:06

## 2025-07-22 RX ADMIN — SACUBITRIL AND VALSARTAN 1 TABLET: 97; 103 TABLET, FILM COATED ORAL at 09:06

## 2025-07-22 RX ADMIN — PANTOPRAZOLE SODIUM 40 MG: 40 TABLET, DELAYED RELEASE ORAL at 06:16

## 2025-07-22 RX ADMIN — HYDRALAZINE HYDROCHLORIDE 50 MG: 25 TABLET ORAL at 20:45

## 2025-07-22 RX ADMIN — HYDRALAZINE HYDROCHLORIDE 50 MG: 25 TABLET ORAL at 15:55

## 2025-07-22 RX ADMIN — TICAGRELOR 90 MG: 90 TABLET, FILM COATED ORAL at 09:06

## 2025-07-22 RX ADMIN — GABAPENTIN 400 MG: 400 CAPSULE ORAL at 15:55

## 2025-07-22 RX ADMIN — GABAPENTIN 400 MG: 400 CAPSULE ORAL at 20:46

## 2025-07-22 RX ADMIN — EZETIMIBE 10 MG: 10 TABLET ORAL at 09:06

## 2025-07-22 RX ADMIN — CARVEDILOL 6.25 MG: 6.25 TABLET, FILM COATED ORAL at 20:45

## 2025-07-22 RX ADMIN — SACUBITRIL AND VALSARTAN 1 TABLET: 97; 103 TABLET, FILM COATED ORAL at 20:46

## 2025-07-22 RX ADMIN — TICAGRELOR 90 MG: 90 TABLET, FILM COATED ORAL at 20:46

## 2025-07-22 ASSESSMENT — COGNITIVE AND FUNCTIONAL STATUS - GENERAL
CLIMB 3 TO 5 STEPS WITH RAILING: A LITTLE
DAILY ACTIVITIY SCORE: 24
WALKING IN HOSPITAL ROOM: A LITTLE
MOBILITY SCORE: 22

## 2025-07-22 ASSESSMENT — PAIN SCALES - GENERAL
PAINLEVEL_OUTOF10: 8
PAINLEVEL_OUTOF10: 5 - MODERATE PAIN
PAINLEVEL_OUTOF10: 0 - NO PAIN
PAINLEVEL_OUTOF10: 8
PAINLEVEL_OUTOF10: 3
PAINLEVEL_OUTOF10: 3
PAINLEVEL_OUTOF10: 4
PAINLEVEL_OUTOF10: 8

## 2025-07-22 ASSESSMENT — PAIN - FUNCTIONAL ASSESSMENT
PAIN_FUNCTIONAL_ASSESSMENT: 0-10

## 2025-07-22 ASSESSMENT — PAIN DESCRIPTION - DESCRIPTORS
DESCRIPTORS: DISCOMFORT
DESCRIPTORS: PRESSURE
DESCRIPTORS: ACHING

## 2025-07-22 ASSESSMENT — PAIN DESCRIPTION - LOCATION
LOCATION: CHEST
LOCATION: HAND

## 2025-07-22 ASSESSMENT — PAIN DESCRIPTION - ORIENTATION
ORIENTATION: LEFT
ORIENTATION: LEFT

## 2025-07-22 ASSESSMENT — ACTIVITIES OF DAILY LIVING (ADL): LACK_OF_TRANSPORTATION: NO

## 2025-07-22 NOTE — CARE PLAN
The patient's goals for the shift include      The clinical goals for the shift include pt will have a therapeutic heparin drip      Problem: Pain - Adult  Goal: Verbalizes/displays adequate comfort level or baseline comfort level  Outcome: Progressing     Problem: Safety - Adult  Goal: Free from fall injury  Outcome: Progressing     Problem: Discharge Planning  Goal: Discharge to home or other facility with appropriate resources  Outcome: Progressing     Problem: Chronic Conditions and Co-morbidities  Goal: Patient's chronic conditions and co-morbidity symptoms are monitored and maintained or improved  Outcome: Progressing     Problem: Nutrition  Goal: Nutrient intake appropriate for maintaining nutritional needs  Outcome: Progressing     Problem: Diabetes  Goal: Achieve decreasing blood glucose levels by end of shift  Outcome: Progressing  Goal: Increase stability of blood glucose readings by end of shift  Outcome: Progressing  Goal: Decrease in ketones present in urine by end of shift  Outcome: Progressing  Goal: Maintain electrolyte levels within acceptable range throughout shift  Outcome: Progressing  Goal: Maintain glucose levels >70mg/dl to <250mg/dl throughout shift  Outcome: Progressing  Goal: No changes in neurological exam by end of shift  Outcome: Progressing  Goal: Learn about and adhere to nutrition recommendations by end of shift  Outcome: Progressing  Goal: Vital signs within normal range for age by end of shift  Outcome: Progressing  Goal: Increase self care and/or family involovement by end of shift  Outcome: Progressing  Goal: Receive DSME education by end of shift  Outcome: Progressing     Problem: Pain  Goal: Takes deep breaths with improved pain control throughout the shift  Outcome: Progressing  Goal: Turns in bed with improved pain control throughout the shift  Outcome: Progressing  Goal: Walks with improved pain control throughout the shift  Outcome: Progressing  Goal: Performs ADL's with  improved pain control throughout shift  Outcome: Progressing  Goal: Participates in PT with improved pain control throughout the shift  Outcome: Progressing  Goal: Free from opioid side effects throughout the shift  Outcome: Progressing  Goal: Free from acute confusion related to pain meds throughout the shift  Outcome: Progressing     Problem: Fall/Injury  Goal: Not fall by end of shift  Outcome: Progressing  Goal: Be free from injury by end of the shift  Outcome: Progressing  Goal: Verbalize understanding of personal risk factors for fall in the hospital  Outcome: Progressing  Goal: Verbalize understanding of risk factor reduction measures to prevent injury from fall in the home  Outcome: Progressing  Goal: Use assistive devices by end of the shift  Outcome: Progressing  Goal: Pace activities to prevent fatigue by end of the shift  Outcome: Progressing

## 2025-07-22 NOTE — CONSULTS
Inpatient consult to Cardiology  Consult performed by: Baljinder Wright MD  Consult ordered by: SUMAN Putnam-CNP        History Of Present Illness:    Dereck Shen is a 59 y.o. male with PMHx s/f tobacco dependence, COPD, hypertension, dyslipidemia, hepatitis C, insulin-dependent diabetes, old CVA, bipolar disorder, hypertension, peripheral arterial disease with multiple interventions, coronary artery disease with prior CABG and multiple other interventions most recently to left main and circumflex in January 2025.  At that time patient was documented to have reduced EF 36%.  Patient had been doing fairly well up until about a month ago was about to start cardiac rehab today.  Patient was having complaints of chest pain ongoing for the last month.  He also had reported some weakness and falls in the emergency department his weakness was felt to be related to prior stroke.  Some shortness of breath and lower extremity edema has been persistent.  No syncope or near syncope no nausea or vomiting no fevers chills no hemoptysis.  In emergency department patient's workup was significant for elevated troponin 1275 he was felt to be having a non-STEMI patient was started on heparin drip given topical nitroglycerin paste and referred for admission.  Case was discussed between myself and the ED provider decision was made to admit patient for non-STEMI length of stay will likely exceed 2 midnights patient's prior history is very complicated from vascular perspective length of stay might exceed 2 midnights.      Last Recorded Vitals:  Vitals:    07/21/25 1911 07/21/25 2303 07/22/25 0309 07/22/25 0717   BP: 141/85 151/88 126/79 132/76   BP Location: Left arm Left arm Left arm Left arm   Patient Position: Lying Lying Lying Lying   Pulse: 68 63 65 60   Resp: 17 17 17 18   Temp: 36.1 °C (97 °F) 36.2 °C (97.1 °F) 36.2 °C (97.2 °F) 36.4 °C (97.5 °F)   TempSrc: Temporal Temporal Temporal Temporal   SpO2: 98% 97% 96% 95%    Weight:       Height:           Last Labs:  CBC - 7/22/2025:  5:34 AM  5.9 14.0 180    41.0      CMP - 7/22/2025:  5:34 AM  8.4 7.3 10 --- 0.5   2.3 4.0 9 110      PTT - 7/21/2025:  3:53 PM  1.3   15.0 37     Troponin I, High Sensitivity   Date/Time Value Ref Range Status   07/21/2025 03:53 PM 1,218 (HH) 0 - 20 ng/L Final     Comment:     Previous result verified on 7/21/2025 1534 on specimen/case [id number] called with component [facility] for procedure Troponin I, High Sensitivity, Initial with value 1,275 ng/L.   07/21/2025 02:55 PM 1,275 (HH) 0 - 20 ng/L Final   01/19/2025 10:27 AM 20 0 - 20 ng/L Final     B TYPE NATRIURETIC PEPTIDE (BNP)   Date/Time Value Ref Range Status   05/09/2025 04:14  (H) <100 pg/mL Final     Comment:        BNP levels increase with age in the general  population with the highest values seen in  individuals greater than 75 years of age.  Reference: J. Am. Michael. Cardiol. 2002; 40:976-982.        04/25/2025 03:29  (H) <100 pg/mL Final     Comment:        BNP levels increase with age in the general  population with the highest values seen in  individuals greater than 75 years of age.  Reference: J. Am. Michael. Cardiol. 2002; 40:976-982.          POC HEMOGLOBIN A1c   Date/Time Value Ref Range Status   10/01/2024 01:47 PM 8.2 (A) 4.2 - 6.5 % Final   05/01/2024 11:33 AM 7.3 (A) 4.2 - 6.5 % Final     HEMOGLOBIN A1c   Date/Time Value Ref Range Status   03/20/2025 09:32 AM 7.9 (H) <5.7 % of total Hgb Final     Comment:     For someone without known diabetes, a hemoglobin A1c  value of 6.5% or greater indicates that they may have   diabetes and this should be confirmed with a follow-up   test.     For someone with known diabetes, a value <7% indicates   that their diabetes is well controlled and a value   greater than or equal to 7% indicates suboptimal   control. A1c targets should be individualized based on   duration of diabetes, age, comorbid conditions, and   other  considerations.     Currently, no consensus exists regarding use of  hemoglobin A1c for diagnosis of diabetes for children.           Hemoglobin A1C   Date/Time Value Ref Range Status   12/18/2024 04:49 AM 7.9 (H) See comment % Final     LDL Calculated   Date/Time Value Ref Range Status   07/22/2025 05:34 AM 97 <=99 mg/dL Final     Comment:                                 Near   Borderline      AGE      Desirable  Optimal    High     High     Very High     0-19 Y     0 - 109     ---    110-129   >/= 130     ----    20-24 Y     0 - 119     ---    120-159   >/= 160     ----      >24 Y     0 -  99   100-129  130-159   160-189     >/=190    LDL Cholesterol is calculated using the Friedewald equation.   01/24/2025 04:59 AM 39 <=99 mg/dL Final     Comment:                                 Near   Borderline      AGE      Desirable  Optimal    High     High     Very High     0-19 Y     0 - 109     ---    110-129   >/= 130     ----    20-24 Y     0 - 119     ---    120-159   >/= 160     ----      >24 Y     0 -  99   100-129  130-159   160-189     >/=190     10/22/2024 05:13  (H) <=99 mg/dL Final     Comment:                                 Near   Borderline      AGE      Desirable  Optimal    High     High     Very High     0-19 Y     0 - 109     ---    110-129   >/= 130     ----    20-24 Y     0 - 119     ---    120-159   >/= 160     ----      >24 Y     0 -  99   100-129  130-159   160-189     >/=190       VLDL   Date/Time Value Ref Range Status   07/22/2025 05:34 AM 56 (H) 0 - 40 mg/dL Final   01/24/2025 04:59 AM 36 0 - 40 mg/dL Final   10/22/2024 05:13 AM 29 0 - 40 mg/dL Final      Last I/O:  I/O last 3 completed shifts:  In: 85 (0.9 mL/kg) [I.V.:85 (0.9 mL/kg)]  Out: - (0 mL/kg)   Weight: 95.3 kg     Past Cardiology Tests (Last 3 Years):  EKG:  ECG 12 lead 01/19/2025      ECG 12 lead 12/17/2024      ECG 12 lead 11/19/2024      ECG 12 lead STAT 10/04/2024      Electrocardiogram, 12-lead PRN ACS symptoms  04/24/2024      ECG 12 lead 02/04/2024    Echo:  Transthoracic Echo Limited 06/20/2025      Transthoracic Echo (TTE) Complete 01/20/2025      Transthoracic Echo (TTE) Complete 11/20/2024    Ejection Fractions:  EF   Date/Time Value Ref Range Status   06/20/2025 10:02 AM 33 %    01/20/2025 04:32 PM 36 %    11/20/2024 09:49 AM 38 %      Cath:  Cardiac Catheterization Procedure 01/23/2025    Stress Test:  NUCLEAR STRESS TEST 05/10/2023    Cardiac Imaging:  No results found for this or any previous visit from the past 1095 days.      Past Medical History:  He has a past medical history of Aphasia, Atherosclerotic heart disease of native coronary artery with unspecified angina pectoris (11/05/2019), Bipolar disorder, unspecified (Multi), BPH (benign prostatic hyperplasia), Diabetes (Multi), DVT (deep venous thrombosis) (Multi) (02/2022), Erectile dysfunction, GERD (gastroesophageal reflux disease), Hepatitis C, HFrEF (heart failure with reduced ejection fraction), HLD (hyperlipidemia), HTN (hypertension), Obstructive sleep apnea (adult) (pediatric), Opioid abuse, in remission, PAD (peripheral artery disease), Polymyalgia rheumatica (Multi), Post-traumatic stress disorder, unspecified, and Stroke (Multi) (07/24/2023).    Past Surgical History:  He has a past surgical history that includes Knee surgery (Left); Elbow surgery; Back surgery; CT angio aorta and bilateral iliofemoral runoff including without contrast if performed (07/20/2020); MR angio head wo IV contrast (01/04/2022); MR angio neck wo IV contrast (01/04/2022); CT angio aorta and bilateral iliofemoral runoff including without contrast if performed (01/14/2022); Invasive Vascular Procedure (Bilateral, 10/04/2024); Dental surgery; Coronary angioplasty with stent; Coronary artery bypass graft; Tonsillectomy; Transluminal atherectomy femoral-popliteal / tibioperoneal; Invasive Vascular Procedure (N/A, 10/21/2024); Invasive Vascular Procedure (N/A, 10/21/2024);  Cardiac catheterization (N/A, 1/23/2025); Cardiac catheterization (N/A, 1/23/2025); and Cardiac catheterization (N/A, 1/23/2025).      Social History:  He reports that he has been smoking cigarettes. He has been exposed to tobacco smoke. He has never used smokeless tobacco. He reports that he does not currently use alcohol. He reports current drug use. Frequency: 7.00 times per week. Drug: Marijuana.    Family History:  Family History[1]     Allergies:  Celecoxib; Ibuprofen; Tetanus toxoid, adsorbed; Bpzrergn-3-cr6 antimigraine agents; Cephalexin; Hydrocodone; Latex; and Tryptophan    Inpatient Medications:  Scheduled Medications[2]  PRN Medications[3]  Continuous Medications[4]  Outpatient Medications:  Current Outpatient Medications   Medication Instructions    acetaminophen (TYLENOL) 650 mg, oral, Every 4 hours PRN    alirocumab (Praluent Pen) 75 mg/mL pen injector Inject 1 mL subcutaneously every other week.    aspirin 81 mg, Daily    atorvastatin (LIPITOR) 80 mg, oral, Nightly    blood sugar diagnostic (Blood Glucose Test) strip 1 each, miscellaneous, 3 times daily    blood-glucose meter misc To be used 3 times daily    blood-glucose sensor (FreeStyle En 3 Plus Sensor) device Apply sensor every 15 days to monitor blood sugar    Brilinta 90 mg, oral, 2 times daily    carbamide peroxide (Ear Wax Removal Drops) 6.5 % otic solution Instill 5 drops into both ears daily for 4 days    carvedilol (Coreg) 6.25 mg tablet Take 1 tablet by mouth every 12 hours    Creon 36,000-114,000- 180,000 unit capsule,delayed release(DR/EC) capsule 2 capsules, 3 times daily PRN    dextromethorphan-guaifenesin (Mucinex DM)  mg 12 hr tablet Take 2 tablets by mouth every 12 hours    ELDERBERRY FRUIT ORAL 1 tablet, Daily PRN    Eliquis 5 mg, oral, 2 times daily    ezetimibe (Zetia) 10 mg tablet Take 1 tablet by mouth once daily.    Farxiga 10 mg, oral, Daily with breakfast    ferrous sulfate (FeroSuL) 325 mg (65 mg elemental)  "tablet Take 1 tablet (325 mg) by mouth once daily with breakfast.    FreeStyle En 3 [place] misc Use as instructed    furosemide (Lasix) 40 mg tablet Take 1 tablet by mouth four times a week.    gabapentin (NEURONTIN) 400 mg, oral, 3 times daily    JAIMEE ROOT EXTRACT ORAL 1 tablet, Daily PRN    hydrALAZINE (APRESOLINE) 50 mg, oral, 3 times daily    insulin glargine (LANTUS) 10 Units, subcutaneous, Nightly    insulin lispro (HUMALOG) 14 Units, subcutaneous, 3 times daily (morning, midday, late afternoon)    isosorbide mononitrate ER (IMDUR) 60 mg, oral, Daily    lancets 30 gauge misc 1 each, miscellaneous, 3 times daily    medical cannabis Not [facility] prescribed    OXcarbazepine (TRILEPTAL) 450 mg, oral, 2 times daily    pantoprazole (ProtoNix) 40 mg EC tablet TAKE 1 TABLET BY MOUTH ONCE DAILY    pen needle, diabetic (BD Ultra-Fine Jocy Pen Needle) 32 gauge x 5/32\" needle Pen Needles for Insulin.    pen needle, diabetic (BD Ultra-Fine Jocy Pen Needle) 32 gauge x 5/32\" needle Use to inject insulin up to 8 times per day.    pen needle, diabetic (TechLITE Pen Needle) 32 gauge x 1/4\" needle Use to inject 1-4 times daily as directed    ranolazine (Ranexa) 500 mg 12 hr tablet Take 1 tablet by mouth twice daily.    sacubitriL-valsartan (Entresto)  mg tablet Take 1 tablet by mouth twice daily.    tiZANidine (ZANAFLEX) 2 mg, oral, Daily PRN       Physical Exam:  Constitutional: Pleasant and cooperative. Laying in bed in no acute distress. Conversant.   Skin: Warm and dry; no obvious lesions, rashes, pallor, or jaundice.   Eyes: EOMI. Anicteric sclera.   ENT: Mucous membranes moist; no obvious injury or deformity appreciated.   Head and Neck: Normocephalic, atraumatic. ROM preserved. Trachea midline. No appreciable JVD.   Respiratory: Nonlabored on room air. Lungs clear to auscultation bilaterally without obvious adventitious sounds. Chest rise is equal.  Cardiovascular: RRR. No gross murmur, gallop, or rub. Extremities " are warm and well-perfused with good capillary refill (< 3 seconds). No chest wall tenderness.   GI: Abdomen soft, nontender, nondistended. No obvious organomegaly appreciated. Bowel sounds are present.  : No CVA tenderness.   MSK: No gross abnormalities appreciated. No limitations to AROM/PROM appreciated.   Extremities: No cyanosis, there is moderate edema both lower extremities  Neuro: A&Ox3. CN 2-12 grossly intact. Able to respond to questions appropriately and clearly. No acute focal neurologic deficits appreciated.  Psych: Appropriate mood and behavior.     Assessment/Plan   1) NSTEMI  Hx of CABG and multiple interventions in past  Continues to smoke  Plan for left heart cath  Risks,benefits and alternatives of Cardiac Cath possible PTCA including risk of bleeding,stroke,renal failure were explained to patient and he/she agrees to proceed   Smoking cessation strongly encouraged    2) Ischemic CMP  Currently compensated    3) PVD  Stable  Peripheral IV 07/21/25 20 G Right Antecubital (Active)   Site Assessment Clean;Dry;Intact 07/22/25 0854   Dressing Type Transparent 07/22/25 0854   Line Status Flushed;Saline locked 07/22/25 0854   Dressing Status Clean;Dry 07/22/25 0854   Number of days: 1       Peripheral IV 07/21/25 22 G Left Antecubital (Active)   Site Assessment Clean;Dry;Intact 07/22/25 0854   Dressing Type Transparent 07/22/25 0854   Line Status Flushed;Saline locked 07/22/25 0854   Dressing Status Clean;Dry 07/22/25 0854   Number of days: 1       Code Status:  Full Code    I spent 30 minutes in the professional and overall care of this patient.        Baljinder Wright MD       [1]   Family History  Problem Relation Name Age of Onset    No Known Problems Mother      No Known Problems Father     [2]   Scheduled medications   Medication Dose Route Frequency    aspirin  81 mg oral Daily    atorvastatin  80 mg oral Nightly    carvedilol  6.25 mg oral q12h    [Held by provider] dapagliflozin propanediol  10 mg  oral Daily with breakfast    ezetimibe  10 mg oral Daily    gabapentin  400 mg oral TID    hydrALAZINE  50 mg oral TID    insulin glargine  10 Units subcutaneous Nightly    insulin lispro  0-15 Units subcutaneous TID AC    isosorbide mononitrate ER  60 mg oral Daily    nicotine  1 patch transdermal Daily    Followed by    [START ON 9/2/2025] nicotine  1 patch transdermal Daily    Followed by    [START ON 9/16/2025] nicotine  1 patch transdermal Daily    OXcarbazepine  450 mg oral BID    pantoprazole  40 mg oral Daily before breakfast    Or    pantoprazole  40 mg intravenous Daily before breakfast    polyethylene glycol  17 g oral Daily    ranolazine  500 mg oral BID    sacubitriL-valsartan  1 tablet oral BID    ticagrelor  90 mg oral BID   [3]   PRN medications   Medication    acetaminophen    Or    acetaminophen    Or    acetaminophen    bisacodyl    bisacodyl    dextrose    dextrose    glucagon    glucagon    guaiFENesin    heparin    melatonin    morphine    morphine    ondansetron ODT    Or    ondansetron   [4]   Continuous Medications   Medication Dose Last Rate    heparin  0-4,000 Units/hr 600 Units/hr (07/22/25 0142)    sodium chloride 0.9%  75 mL/hr 75 mL/hr (07/22/25 0907)

## 2025-07-22 NOTE — PROGRESS NOTES
Dereck Shen is a 59 y.o. male on day 1 of admission presenting with NSTEMI (non-ST elevated myocardial infarction) (Multi).      Subjective      59 y.o. male with PMHx s/f tobacco dependence, COPD, hypertension, dyslipidemia, hepatitis C, insulin-dependent diabetes, old CVA, bipolar disorder, hypertension, peripheral arterial disease with multiple interventions, coronary artery disease with prior CABG and multiple other interventions most recently to left main and circumflex in January 2025.  Presenting to the emergency department with complaint of chest pain and some weakness affecting the right side felt to be related to prior stroke.  Workup in ER: Vitals-98.2, 66, 153/84, 18, 98 on room air.  CBC and BMP within acceptable range, , troponin 1200s, lactate 1.2.    Patient was admitted with a diagnosis of NSTEMI-started on heparin drip, aspirin/Brilinta, continue on Ranexa, Imdur, statin and Coreg-cardiology consulted.    7/22/2025.  Patient was evaluated this morning, no more chest pain or shortness of breath.  Evaluated by cardiology and plan for cardiac cath today.    Objective     Last Recorded Vitals  /76 (BP Location: Left arm, Patient Position: Lying)   Pulse 60   Temp 36.4 °C (97.5 °F) (Temporal)   Resp 18   Wt 95.3 kg (210 lb)   SpO2 95%   Intake/Output last 3 Shifts:    Intake/Output Summary (Last 24 hours) at 7/22/2025 0807  Last data filed at 7/22/2025 0142  Gross per 24 hour   Intake 85 ml   Output --   Net 85 ml       Admission Weight  Weight: 95.3 kg (210 lb) (07/21/25 1419)    Daily Weight  07/21/25 : 95.3 kg (210 lb)    Image Results  XR chest 1 view  Narrative: Interpreted By:  Jorge Alvarado,   STUDY:  XR CHEST 1 VIEW;  7/21/2025 3:18 pm      INDICATION:  Signs/Symptoms:CP.          COMPARISON:  01/19/2025      ACCESSION NUMBER(S):  [id number]      ORDERING CLINICIAN:  PAULINA MARTINEZ      FINDINGS:  AP radiograph of the chest was provided.      Median sternotomy wires.       CARDIOMEDIASTINAL SILHOUETTE:  Cardiomediastinal silhouette is normal in size and configuration.      LUNGS:  No focal consolidation, pleural effusion, or pneumothorax.      ABDOMEN:  No remarkable upper abdominal findings.      BONES:  No acute osseous changes.      Impression: 1.  No evidence of acute cardiopulmonary process.              MACRO:  None      Signed by: Jorgezeeshan Rosasjosseline 7/21/2025 3:36 PM  Dictation workstation:   [id number]      Physical Exam  Patient is awake and orient, not in apparent distress  Eyes: PERRLA, no conjunctival congestion  Chest: Bilateral Air entry, no crackles or wheezing  Heart: s1S2 regular, no murmur  Abdomen: Soft, non tender, BS present  Ext:    Relevant Results               This patient currently has cardiac telemetry ordered; if you would like to modify or discontinue the telemetry order, click here to go to the orders activity to modify/discontinue the order.              Assessment & Plan    NSTEMI  Continue on heparin drip, DAPT, high intensity statin, nitro, BB and Ranexa  Follow limited echo  Cardiology consulted and plan for heart cath today.     Chronic systolic heart failure  Patient appears compensated  Continue the patient's home medications including carvedilol and Entresto  Hold Farxiga for now patient may need to be n.p.o. for intervention     Insulin-dependent diabetes type 2 with peripheral vascular complications  Continue patient on Lantus in the evening with sliding scale coverage     Hypertension  Patient is continued on routine antihypertensive medications including his hydralazine     CKD stage III  CR is 1.56 on admission recent range 1.19-1.83  Continue to monitor         Tim Lockhart MD

## 2025-07-22 NOTE — POST-PROCEDURE NOTE
Physician Transition of Care Summary  Invasive Cardiovascular Lab    Procedure Date: 7/22/2025  Attending:    Leonard Wright - Primary  Resident/Fellow/Other Assistant: Surgeons and Role:  * No surgeons found with a matching role *    Indications:   Pre-op Diagnosis      * NSTEMI (non-ST elevated myocardial infarction) (Multi) [I21.4]    Post-procedure diagnosis:   Post-op Diagnosis     * NSTEMI (non-ST elevated myocardial infarction) (Multi) [I21.4]    Procedure(s):   Left Heart Catheterization With Coronary Angiography And Grafts  94201 - LA CATH PLMT L HRT/ARTS/GRFTS WNJX & ANGIO IMG S&I      Procedure Findings:   LHC via L ulnar A 6F long sheath  PCI of mid LCX JUNIOR x 1, plain balloon angioplasty of LM-LCX x 2  SVG to Marginal, and LIMA to LAD patent, SVG to acute marginal not engaged due to difficulty with spasm   LVEDP 29 mmHg    Description of the Procedure:   LHC  PCI LCX JUNIOR x 1    Complications:   None     Stents/Implants:   Implants          Stent          Stent, [place] Junior, 2.50 X 18rx - [id number] - Implanted        Inventory item: STENT, ANDREA FRONTIER JUNIOR, 2.50 X 18RX Model/Cat number: [id number]    : [employer] Lot number: [id number]    Device identifier: [id number]        GUDID Information       Request status Successful        Brand name: [place]™ Version/Model: [id number]    Company name: [employer] MRI safety info as of 7/22/25: MR Conditional    Contains dry or latex rubber: No      GMDN P.T. name: Drug-eluting coronary artery stent, non-bioabsorbable-polymer-coated                As of 7/22/2025       Status: Implanted                              Anticoagulation/Antiplatelet Plan:   Cont DAPT/Max tolerated statin, and GDMT    Estimated Blood Loss:   * No values recorded between 7/22/2025 11:45 AM and 7/22/2025  1:09 PM *    Anesthesia: Moderate Sedation Anesthesia Staff: No anesthesia staff entered.    Any Specimen(s) Removed:   No specimens collected  during this procedure.    Disposition:   Tele       Electronically signed by: Mohsin S Mughal, MD, 7/22/2025 1:09 PM

## 2025-07-22 NOTE — PROGRESS NOTES
Social work consult placed for discharge planning. SW reviewed pt's chart and communicated with TCC. No SW needs foreseen at this time. SW signing off; available upon request.    LANETTE Riggs ([id number])   Care Transitions

## 2025-07-22 NOTE — PRE-SEDATION DOCUMENTATION
"Cardiology Preprocedure Note    Dereck Shen   Indication for procedure: The primary encounter diagnosis was NSTEMI (non-ST elevated myocardial infarction) (Multi). A diagnosis of Shortness of breath was also pertinent to this visit. Patient here, having chest pain even at rest. He also was unable to urinate after sedation and a brown was placed after. We will assess for uinrary retention post procedure.         BP (!) 182/85   Pulse 72   Temp 36.4 °C (97.6 °F)   Resp 16   Ht 1.803 m (5' 11\")   Wt 95.3 kg (210 lb)   SpO2 97%   BMI 29.29 kg/m²    Relevant Labs:   Lab Results   Component Value Date    CREATININE 1.39 (H) 07/22/2025    EGFR 59 (L) 07/22/2025    INR 1.3 (H) 01/01/2025    PROTIME 15.0 (H) 01/01/2025       Planned Sedation/Anesthesia: Moderate    Airway assessment: normal    Directed physical examination: General: Alert and Oriented, No distress, cooperative. Lungs: Clear to auscultation bilaterally, no wheezes, rhonci, or rales. respirations unlabored Heart: regular rate and rhythm, S1 and S2, no murmur, pulses palpable     Mallampati: II (hard and soft palate, upper portion of tonsils and uvula visible)    ASA Score: ASA 3 - Patient with moderate systemic disease with functional limitations    Benefits, risks and alternatives of procedure and planned sedation have been discussed with the patient and/or their representative. All questions answered and they agree to proceed.     Mariel Purvis, APRN-CNP   "

## 2025-07-22 NOTE — PROGRESS NOTES
07/22/25 1617   Discharge Planning   Living Arrangements Alone   Support Systems Family members   Assistance Needed none   Type of Residence Private residence   Expected Discharge Disposition Home   Housing Stability   At any time in the past 12 months, were you homeless or living in a shelter (including now)? N   Transportation Needs   In the past 12 months, has lack of transportation kept you from meetings, work, or from getting things needed for daily living? No   Stroke Family Assessment   Stroke Family Assessment Needed No   Intensity of Service   Intensity of Service 0-30 min     I spoke with patient to introduce discharge planning and explain role of TCC. Pt is her for NSTEMI, s/p heart cath today.  Pt states he lives alone in an apartment.  He said he does have a 'girl friend' that does come over weekly to help him straighten up.  He tells me that he is essentially independent though, uses a walking stick or walker, admits to falls, and is able to drive himself.  He is not sure how he falls.  He confirmed PCP Stephanie Arce.  He is eager to be discharged home.  No discharge needs identified at this time.

## 2025-07-22 NOTE — PROGRESS NOTES
Dereck Shen is a 59 y.o. male admitted for NSTEMI (non-ST elevated myocardial infarction) (Multi). Pharmacy reviewed the patient's [id number] medications and allergies for accuracy.    The list below reflects the PTA list prior to pharmacy medication history. A summary a changes to the PTA medication list has been listed below. Please review each medication in order reconciliation for additional clarification and justification.    Source of information: surescripts, t2p    Medications added:    Medications modified:  Ear wax removal drops --> added prn   Lasix 40mg-->1 daily  mon-thursday    Medications to be removed:  Praluent injector- allergy(rash)     Medications of concern:  Creon 36,000- no fill history   Lantus insulin- last filled 2/20 [id number]  Humalog insulin- last filled 3/24 [id number]  Trileptal 150mg - last filled 2/18 [id number]      Prior to Admission Medications   Prescriptions Last Dose Informant Patient Reported? Taking?   Creon 36,000-114,000- 180,000 unit capsule,delayed release(DR/EC) capsule   Yes No   Sig: Take 2 capsules by mouth 3 times a day as needed.   ELDERBERRY FRUIT ORAL   Yes No   Sig: Take 1 tablet by mouth once daily as needed.   FreeStyle En 3 [place] misc   No No   Sig: Use as instructed   GINGER ROOT EXTRACT ORAL   Yes No   Sig: Take 1 tablet by mouth once daily as needed.   OXcarbazepine (Trileptal) 150 mg tablet   No No   Sig: Take 3 tablets (450 mg) by mouth 2 times a day.   acetaminophen (Tylenol) 325 mg tablet   No No   Sig: Take 2 tablets (650 mg) by mouth every 4 hours if needed for fever (temp greater than 38.0 C), headaches or mild pain (1 - 3).   alirocumab (Praluent Pen) 75 mg/mL pen injector   No No   Sig: Inject 1 mL subcutaneously every other week.   Patient not taking: Reported on 7/21/2025   apixaban (Eliquis) 5 mg tablet   No No   Sig: Take 1 tablet (5 mg) by mouth 2 times a day.   aspirin 81 mg chewable tablet   Yes No   Sig: Chew and swallow 1 tablet  (81 mg) once daily.   atorvastatin (Lipitor) 80 mg tablet   No No   Sig: Take 1 tablet (80 mg) by mouth once daily at bedtime.   blood sugar diagnostic (Blood Glucose Test) strip   No No   Si each 3 times a day.   blood-glucose meter misc   No No   Sig: To be used 3 times daily   blood-glucose sensor (FreeStyle En 3 Plus Sensor) device   No No   Sig: Apply sensor every 15 days to monitor blood sugar   carbamide peroxide (Ear Wax Removal Drops) 6.5 % otic solution   No No   Sig: Instill 5 drops into both ears daily for 4 days   carvedilol (Coreg) 6.25 mg tablet   No No   Sig: Take 1 tablet by mouth every 12 hours   dapagliflozin propanediol (Farxiga) 10 mg tablet   No No   Sig: Take 1 tablet (10 mg) by mouth once daily with breakfast.   dextromethorphan-guaifenesin (Mucinex DM)  mg 12 hr tablet   No No   Sig: Take 2 tablets by mouth every 12 hours   ezetimibe (Zetia) 10 mg tablet   No No   Sig: Take 1 tablet by mouth once daily.   ferrous sulfate (FeroSuL) 325 mg (65 mg elemental) tablet   No No   Sig: Take 1 tablet (325 mg) by mouth once daily with breakfast.   furosemide (Lasix) 40 mg tablet   No No   Sig: Take 1 tablet by mouth four times a week.   gabapentin (Neurontin) 400 mg capsule   No No   Sig: Take 1 capsule (400 mg) by mouth 3 times a day.   hydrALAZINE (Apresoline) 50 mg tablet   No No   Sig: Take 1 tablet (50 mg) by mouth 3 times a day.   insulin glargine (Lantus) 100 unit/mL (3 mL) pen   No No   Sig: Inject 10 Units under the skin once daily at bedtime.   insulin lispro (HumaLOG) 100 unit/mL pen   No No   Sig: Inject 14 Units under the skin 3 times daily (morning, midday, late afternoon).   isosorbide mononitrate ER (Imdur) 60 mg 24 hr tablet   No No   Sig: Take 1 tablet (60 mg) by mouth once daily.   lancets 30 gauge misc   No No   Si each 3 times a day.   medical cannabis   Yes No   Sig: Not  prescribed   pantoprazole (ProtoNix) 40 mg EC tablet   No No   Sig: TAKE 1 TABLET BY MOUTH  "ONCE DAILY   pen needle, diabetic (BD Ultra-Fine Jocy Pen Needle) 32 gauge x 5/32\" needle   No No   Sig: Pen Needles for Insulin.   pen needle, diabetic (BD Ultra-Fine Jocy Pen Needle) 32 gauge x 5/32\" needle   No No   Sig: Use to inject insulin up to 8 times per day.   pen needle, diabetic (TechLITE Pen Needle) 32 gauge x 1/4\" needle   No No   Sig: Use to inject 1-4 times daily as directed   ranolazine (Ranexa) 500 mg 12 hr tablet   No No   Sig: Take 1 tablet by mouth twice daily.   sacubitriL-valsartan (Entresto)  mg tablet   No No   Sig: Take 1 tablet by mouth twice daily.   tiZANidine (Zanaflex) 2 mg tablet   No No   Sig: Take 1 tablet (2 mg) by mouth once daily as needed for muscle spasms.   ticagrelor (Brilinta) 90 mg tablet   No No   Sig: Take 1 tablet (90 mg) by mouth 2 times a day.      Facility-Administered Medications: None       ZANE COULTER        "

## 2025-07-22 NOTE — DISCHARGE INSTRUCTIONS
If you have any questions, please call the Cath Lab Nurse Practitioner Eugeniejose m Purvis at [phone] and leave a message. She will return your call the same day if calling before 3 PM M-F. If you call on the weekend you can expect a call back on Monday morning. You may also call the Cath Lab at [phone] M-F, 7-3:30 with any questions. Weekends and after hours please call your cardiologist office number [phone] to reach a physician on call.          CARDIAC CATHETERIZATION DISCHARGE INSTRUCTIONS     FOR SUDDEN AND SEVERE CHEST PAIN, SHORTNESS OF BREATH, EXCESSIVE BLEEDING, SIGNS OF STROKE, OR CHANGES IN MENTAL STATUS YOU SHOULD CALL 911 IMMEDIATELY.     If your provider has prescribed aspirin and/or clopidogrel (Plavix), or prasugrel (Effient), or ticagrelor (Brilinta), DO NOT STOP THESE MEDICATIONS for any reason without talking to your cardiologist first. If any of these were prescribed, you must take them every day without missing a single dose. If you are getting low on these medications, contact your provider immediately for a refill.     FOR NEXT 24 HOURS  - Upon discharge, you should return home and rest for the remainder of the day and evening. You do not have to stay on bed rest but should not be very active.  It is recommended a responsible adult be with you for the first 24 hours after the procedure.    - No driving for 24 hours after procedure. Please arrange for someone to drive you home from the hospital today.     - Do not drive, operate machinery, or use power tools for 24 hours after your procedure.     - Do not make any legal decisions for 24 hours after your procedure.     - Do not drink alcoholic beverages for 24 hours after your procedure.    WOUND CARE   *FOR FEMORAL (LEG) ACCESS*  ·      Avoid heavy lifting (over 10 pounds) for 7 days, squatting or excessive bending for 2 days, and strenuous exercise for 7 days.  ·      No submerged bathing, swimming, or hot tubs for the next 7 days, or  until fully healed.  ·      Avoid sexual activity for 3-4 days until any groin discomfort has ceased.     *FOR RADIAL (WRIST) ACCESS*  ·      No excessive use of the wrist for 24 hours - for example, treat your wrist as if it is sprained.  '      Do not lift anymore than 5 lbs. For 5 days with the same arm as your wrist/access site is on.  ·      Do not engage in vigorous activities (tennis, golf, bowling, weights) for at least 48 hours after the procedure.  ·      Do not submerge the wrist for 7 days after the procedure.  ·      You should expect mild tingling in your hand and tenderness at the puncture site for up to 3 days.    - The transparent dressing should be removed from the site 24 hours after the procedure.  Wash the site gently with soap and water. Rinse well and pat dry. Keep the area clean and dry. You may apply a Band-Aid to the site. Avoid lotions, ointments, or powders until fully healed.     - You may shower the day after your procedure.      - It is normal to notice a small bruise around the puncture site and/or a small grape sized or smaller lump. Any large bruising or large lump warrants a call to the office.     - If bleeding should occur, lay down and apply pressure to the affected area for 10 minutes.  If the bleeding stops notify your physician.  If there is a large amount of bleeding or spurting of blood CALL 911 immediately.  DO NOT drive yourself to the hospital.    - You may experience some tenderness, bruising or minimal inflammation.  If you have any concerns, you may contact the Cath Lab or if any of these symptoms become excessive, contact your cardiologist or go to the emergency room.     OTHER INSTRUCTIONS  - You may take acetaminophen (Tylenol) as directed for discomfort.  If pain is not relieved with acetaminophen (Tylenol), contact your doctor.    - If you notice or experience any of the following, you should notify your doctor or seek medical attention  Chest pain or  discomfort  Change in mental status or weakness in extremities.  Dizziness, light headedness, or feeling faint.  Change in the site where the procedure was performed, such as bleeding or an increased area of bruising or swelling.  Tingling, numbness, pain, or coolness in the leg/arm beyond the site where the procedure was performed.  Signs of infection (i.e. shaking chills, temperature > 100 degrees Fahrenheit, warmth, redness) in the leg/arm area where the procedure was performed.  Changes in urination   Bloody or black stools  Vomiting blood  Severe nose bleeds  Any excessive bleeding    - If you DO NOT have an appointment with your cardiologist within 2-4 weeks following your procedure, please contact their office.      Lifestyle Recommendations for a Healthier Life:    The following lifestyle changes can have a significant positive impact on your overall health - helping you to achieve healthy weight, lower metabolic and heart disease risk and manage stress more effectively:      1. Eat whole, fresh foods. Food is one of the biggest drivers of our overall health.  Make your meals whole foods based, focusing on a wide variety of non starchy vegetables and fruits.  It also beneficial to include various nuts, seeds and high-quality animal proteins for overall balanced diet.    2. Remove the sweeteners from your diet.  Artificial sweeteners have a negative impact on our metabolic health - they can cause increase in both glucose and insulin, increasing the risk or potential for insulin resistance and abnormal blood sugar levels.  Artificial sweeteners are also excessively sweeter than regular sugar, they trick our taste buds into craving extreme forms of sweet, like an addiction.  I encourage you to avoid sugar, but also stevia, aspartame, sucralose, sugar alcohols like xylitol and maltitol.    3. Get rid of the inflammatory factors in your diet - this mainly comes from sugars of all kinds and refined vegetable oils.   These can increase our risk for changes in our metabolic health which can lead to diabetes, heart disease and other chronic disease.  You can reverse or combat the effective of inflammatory foods by increasing your intake of anti-inflammatory foods like wild-caught fish, fresh ground flax seed, fish oil and variety of fruits & vegetables.      4. Eat plenty of fiber!!  The standard American diet has very low levels of daily dietary fiber, many people barely get 15 grams per day.  There is plenty of nutrition research that shows how high-fiber foods can help lower our blood sugar.   Eat a wide variety of fiber-rich plant-based foods including nuts, seeds, fruits, vegetables, and legumes.    5. Get enough sleep. Studies from experts in endocrinology & metabolism have shown that even a few nights of poor sleep can increase the risk of insulin resistance and abnormal blood sugar values. Make sleep a priority - it is an important factor in your health.  Develop a sleep routine including: avoid eating two-three hours before bed, practice relaxation techniques (warm bath, meditation, yoga, mindfulness) to help relax your muscles and prepare you for sleep.    Go to bed and wake up at consistent times.  Avoid screen time for at least 60-90 minutes before bedtime.    6. Make exercise part of your regular routine.  Exercise helps us manage blood sugar more effectively and makes our cells more receptive to the effect of the insulin our body makes (lowers blood sugar).  Regular aerobic exercise AND interval or strength training are ideal to help maintain a healthy weight, metabolism & lower the risk of chronic disease.      7. Control stress levels. Chronic stress can lead to elevated blood sugar, elevated blood pressure, accumulation of fat around the belly and these things increase the risk for metabolic syndrome, diabetes and heart disease.  We all have various levels of stress in our lives, we can't control all of it, but  we can control how we respond to it and manage it's impact.  Find healthy outlets for stress management like meditation, yoga, deep breathing, or exercise.    Home BP monitoring instructions:   Goal < 130 / 80   Remain still, Avoid smoking, caffeinated beverages, or exercise within 30 min before BP measurements.  Ensure 5 min of quiet rest before BP measurements.  Sit correctly with back straight and supported (on a straight-backed dining chair, for example, rather than a sofa).  Sit with feet flat on the floor and legs uncrossed.  Keep arm supported on a flat surface (such as a table), with the upper arm at heart level.  Bottom of the cuff should be placed directly above the bend of the elbow  Take a reading in the AM before breakfast 2-3 times / week   Record all readings accurately:  A written log should be brought to all appointments   Monitors with built-in memory should be brought to all clinic appointments and calibrated to our machines      Weight Management:  Since food equals calories, in order to lose weight you must either eat fewer calories, exercise more to burn off calories with activity, or both. Food that is not used to fuel the body is stored as fat.    A major component of losing weight is to make smarter food choices. Here's how:    Limit non-nutritious foods, such as:  Sugar, honey, syrups and candy  Pastries, donuts, pies, cakes and cookies  Soft drinks, sweetened juices and alcoholic beverages    Cut down on high-fat foods by:  Choosing poultry, fish or lean red meat  Choosing low-fat cooking methods, such as baking, broiling, steaming, grilling and boiling  Using low-fat or non-fat dairy products  Using vinaigrette, herbs, lemon or fat-free salad dressings  Avoiding fatty meats, such as garcia, sausage, nelson, ribs and luncheon meats  Avoiding high-fat snacks like nuts, chips and chocolate  Avoiding fried foods  Using less butter, margarine, oil and mayonnaise  Avoiding high-fat gravies,  cream sauces and cream-based soups    Eat a variety of foods, including:  Fruit and vegetables that are raw, steamed or baked  Whole grains, breads, cereal, rice and pasta  Dairy products, such as low-fat or non-fat milk or yogurt, low-fat cottage cheese and low-fat cheese  Protein-rich foods like chicken, turkey, fish, lean meat and legumes, or beans    Change your eating habits:  Eat three balanced meals a day to help control your hunger  Watch portion sizes and eat small servings of a variety of foods  Choose low-calorie snacks  Eat only when you are hungry and stop when you are satisfied  Eat slowly and try not to perform other tasks while eating  Find other activities to distract you from food, such as walking, taking up a hobby or being involved in the community  Include regular exercise in your daily routine  Find a support group, if necessary, for emotional support in your weight loss effort    Patient Education: Smoking Cessation  Making the commitment to quit smoking is one of the best choices that smokers can make for themselves, but also a difficult one. There are various opportunities for support available. Here is an overview of them.  There are various forms of nicotine replacement therapy available to assist with minimizing these symptoms. They are nicotine gum, nicotine patch, nicotine nasal spray, nicotine inhaler, and nicotine lozenge.  Which kind of nicotine replacement therapy will best work for you can be discussed with your doctor or nurse to help you understand the pros and cons of each.  Non-nicotine replacement therapy is also available. Bupropion (Wellbutrin, Zyban) is a mild anti-depressant that is also effective in helping people to quit smoking. It can be used alone or with nicotine replacement therapy.   Varenicline (Chantix) is another medication that helps people who what to quit. This medication is not a form of nicotine replacement therapy, but it helps with the withdrawal  symptoms.  Studies have shown that the best success rates for people trying to quit include counseling and/or support groups such as the National Hel[employer] that is a free, confidential, 24/7, 365-day-a-year treatment referral and information service:  9-204-136-HWDN (8139)    Some helpful hints include:  Avoidance. Stay away from smokers and places where you are tempted to smoke.  Activities. Exercise or do hobbies that keep your hands busy.  Alternatives. Use oral substitutes such as sugarless gum, hard candy, and carrot sticks.  Change of habits. For example, if you usually smoke during a coffee break, then go for a walk instead.  Deep breathing. When you have the urge to smoke, do a deep breathing exercise and remind yourself why you quit.  Delay tactic. If you feel that you are about to light up, delay. Tell yourself you must wait 10 minutes. Often this simple trick will allow you to move beyond the urge  Discussion. Call a friend for support.  Drink of water. Use as an oral substitute such as water.  Many people say the reason they smoke is to deal with stress. Unfortunately, stress is a part of all our lives. When quitting, you will need to find new strategies to deal with stress. There are stress-management classes, and self-help books that can help you discover new ways to reduce and deal with stress.  The bottom line is: don't just try to go it alone-reach out for support to help you achieve your goal.

## 2025-07-23 ENCOUNTER — PATIENT OUTREACH (OUTPATIENT)
Dept: PRIMARY CARE | Facility: CLINIC | Age: 60
End: 2025-07-23
Payer: COMMERCIAL

## 2025-07-23 ENCOUNTER — TELEPHONE (OUTPATIENT)
Dept: PRIMARY CARE | Facility: CLINIC | Age: 60
End: 2025-07-23
Payer: COMMERCIAL

## 2025-07-23 ENCOUNTER — APPOINTMENT (OUTPATIENT)
Dept: CARDIAC REHAB | Facility: HOSPITAL | Age: 60
End: 2025-07-23
Payer: COMMERCIAL

## 2025-07-23 LAB
ATRIAL RATE: 69 BPM
P AXIS: 28 DEGREES
PR INTERVAL: 173 MS
Q ONSET: 252 MS
QRS COUNT: 11 BEATS
QRS DURATION: 101 MS
QT INTERVAL: 420 MS
QTC CALCULATION(BAZETT): 450 MS
QTC FREDERICIA: 440 MS
R AXIS: 19 DEGREES
T AXIS: 80 DEGREES
T OFFSET: 462 MS
VENTRICULAR RATE: 69 BPM

## 2025-07-23 NOTE — SIGNIFICANT EVENT
"Alerted by PCA that patient is requesting \"Percocet or Vicodin\" for left wrist pain. Pt states the cath site is causing him 8/10 pain due to the procedure \"being messed up\". Upon assessment, cath site is not bruised, swollen, or bleeding. Pulses are +2, and no hardness is noted around cath site. I told him narcotics are not appropriate for this, and tylenol was given prior to this event. Pt was offered cold/warm compress and declined. He then voiced desire to leave AMA. Dr Dove was notified but pt refused to speak to him. Pt refused all education about cath site care and the importance of not using his left wrist. Pt ignored education and used his left hand to walk out with his cane.     Of note, this patient attempted to leave AMA last night, 7/21. Extensive education was given by this RN on the importance of waiting for the cardiology consult and possible heart cath given his extensive cardiac history, current chest pain, and heparin drip. Pt voiced understanding and agreed to stay.     IV's and telemetry removed. Pt did NOT take post cath instructions and walked out with all belongings. This RN, Dr Dove, Francia RN, Leigh RN, Itzel PCNA, and Carlyle RT were all present for conversation and attempted education with patient.   "

## 2025-07-23 NOTE — DISCHARGE SUMMARY
Admission Date: 7/21/2025  2:10 PM  Discharge Date: 07/22/25   Condition at Discharge: Guarded - pt left AMA    Discharge Diagnoses:  NSTEMI (non-ST elevated myocardial infarction) (Multi)  Chronic systolic heart failure  ID DM2 with peripheral vascular complications  HTN  CKD III  PCI with SHIMON placed to LCx      Test Results Pending At Discharge:  Pending Labs       No current pending labs.            Hospital Course:  Dereck Shen is a 59 y.o. male with PMHx s/f tobacco dependence, COPD, hypertension, dyslipidemia, hepatitis C, insulin-dependent diabetes, old CVA, bipolar disorder, hypertension, peripheral arterial disease with multiple interventions, coronary artery disease with prior CABG and multiple other interventions most recently to left main and circumflex in January 2025.  At that time patient was documented to have reduced EF 36%.  Patient had been doing fairly well up until about a month ago was about to start cardiac rehab today.  Patient was having complaints of chest pain ongoing for the last month.  He also had reported some weakness and falls in the emergency department his weakness was felt to be related to prior stroke.  Some shortness of breath and lower extremity edema has been persistent.  No syncope or near syncope no nausea or vomiting no fevers chills no hemoptysis.  In emergency department patient's workup was significant for elevated troponin 1275 he was felt to be having a non-STEMI patient was started on heparin drip given topical nitroglycerin paste and referred for admission.    Pt was admitted for NSTEMI with troponins around 1200. Heparin drip was started along with aspirin and Brilinta. Cardiology performed a LHC and performed PCI and placed a SHIMON in his LCX. He was monitored on the floor afterwards overnight. During the night he demanded opiates for wrist pain where his heart cath access site was and decided to leave AMA when he was not given this. He refused to speak to me  "before leaving AMA, but I did observe him walk down the glass and leave. He did not sign the AMA form. Per nursing staff he was at his mental status baseline, was A&O X4, was clinically sober, free of distracting injury, and had intact reasoning, insight and judgement. Staff advised him to take all of his medications and to follow up with PCP/cardiology in the future.    Consultations: Cardiology. Care plan was discussed, coordinated, and agreed upon with the help of these services.    Pertinent Physical Exam At Time of Discharge:  N/A (I did not examine the pt as he refused to see me before leaving AM).    Code Status:  Full Code     Home Medications:     Medication List      CONTINUE taking these medications     * pen needle, diabetic 32 gauge x 5/32\" needle; Commonly known as: BD   Ultra-Fine Jocy Pen Needle; Use to inject insulin up to 8 times per day.   * BD Ultra-Fine Micro Pen Needle 32 gauge x 1/4\" needle; Generic drug:   pen needle, diabetic; Use to inject 1-4 times daily as directed   * pen needle, diabetic 32 gauge x 5/32\" needle; Commonly known as: BD   Ultra-Fine Jocy Pen Needle; Pen Needles for Insulin.   FreeStyle En 3 Plus Sensor device; Generic drug: blood-glucose   sensor; Apply sensor every 15 days to monitor blood sugar   FreeStyle En 3 [place] misc; Generic drug:   blood-glucose,,cont; Use as instructed   True Metrix Glucose Meter misc; Generic drug: blood-glucose meter; To be   used 3 times daily   TRUEplus Lancets 30 gauge misc; Generic drug: lancets; 1 each 3 times a   day.  * This list has 3 medication(s) that are the same as other medications   prescribed for you. Read the directions carefully, and ask your doctor or   other care provider to review them with you.     ASK your doctor about these medications     acetaminophen 325 mg tablet; Commonly known as: Tylenol; Take 2 tablets   (650 mg) by mouth every 4 hours if needed for fever (temp greater than   38.0 C), headaches or " mild pain (1 - 3).   aspirin 81 mg chewable tablet   atorvastatin 80 mg tablet; Commonly known as: Lipitor; Take 1 tablet (80   mg) by mouth once daily at bedtime.   Brilinta 90 mg tablet; Generic drug: ticagrelor; Take 1 tablet (90 mg)   by mouth 2 times a day.   carvedilol 6.25 mg tablet; Commonly known as: Coreg; Take 1 tablet by   mouth every 12 hours   Creon 36,000-114,000- 180,000 unit capsule,delayed release(DR/EC)   capsule; Generic drug: lipase-protease-amylase   Ear Wax Removal Drops 6.5 % otic solution; Generic drug: carbamide   peroxide; Instill 5 drops into both ears daily for 4 days   ELDERBERRY FRUIT ORAL   Eliquis 5 mg tablet; Generic drug: apixaban; Take 1 tablet (5 mg) by   mouth 2 times a day.   ezetimibe 10 mg tablet; Commonly known as: Zetia; Take 1 tablet by mouth   once daily.   Farxiga 10 mg tablet; Generic drug: dapagliflozin propanediol; Take 1   tablet (10 mg) by mouth once daily with breakfast.   FeroSuL 325 mg (65 mg iron) tablet; Generic drug: ferrous sulfate; Take   1 tablet (325 mg) by mouth once daily with breakfast.   furosemide 40 mg tablet; Commonly known as: Lasix; Take 1 tablet by   mouth four times a week.   gabapentin 400 mg capsule; Commonly known as: Neurontin; Take 1 capsule   (400 mg) by mouth 3 times a day.   JAIMEE ROOT EXTRACT ORAL   hydrALAZINE 50 mg tablet; Commonly known as: Apresoline; Take 1 tablet   (50 mg) by mouth 3 times a day.   insulin glargine 100 unit/mL (3 mL) pen; Commonly known as: Lantus;   Inject 10 Units under the skin once daily at bedtime.   insulin lispro 100 unit/mL pen; Commonly known as: HumaLOG; Inject 14   Units under the skin 3 times daily (morning, midday, late afternoon).   isosorbide mononitrate ER 60 mg 24 hr tablet; Commonly known as: Imdur;   Take 1 tablet (60 mg) by mouth once daily.   medical cannabis   Mucus DM  mg 12 hr tablet; Generic drug:   dextromethorphan-guaifenesin; Take 2 tablets by mouth every 12 hours   OXcarbazepine  150 mg tablet; Commonly known as: Trileptal; Take 3   tablets (450 mg) by mouth 2 times a day.   pantoprazole 40 mg EC tablet; Commonly known as: ProtoNix; TAKE 1 TABLET   BY MOUTH ONCE DAILY   Praluent Pen 75 mg/mL pen injector; Generic drug: alirocumab; Inject 1   mL subcutaneously every other week.   ranolazine 500 mg 12 hr tablet; Commonly known as: Ranexa; Take 1 tablet   by mouth twice daily.   sacubitriL-valsartan  mg tablet; Commonly known as: Entresto; Take   1 tablet by mouth twice daily.   tiZANidine 2 mg tablet; Commonly known as: Zanaflex; Take 1 tablet (2   mg) by mouth once daily as needed for muscle spasms.   True Metrix Glucose Test Strip; Generic drug: blood sugar diagnostic; 1   each 3 times a day.       Outpatient Follow-Up:  Future Appointments   Date Time Provider Department Center   7/30/2025  8:30 AM Haylie Tate MD [id number] [facility]   8/26/2025  2:00 PM Mauricio Banerjee MD [id number] [facility]   9/17/2025  1:30 PM Marla Aguilar PA-C [id number] [facility]   10/17/2025  3:00 PM Christa Freeman MD PhD [id number] [facility]   12/10/2025  2:40 PM Clifford Siddiqi, APRN-CNP [id number] [facility]   12/15/2025  2:30 PM Baljinder Wright MD [id number] [facility]   4/2/2026  2:00 PM Leonardo Suárez MD [facility]           Anticipated Discharge Destination: Home/street    Non-billable note.    Please see orders for more complete plan    Enrique Dove, DO  Internal Medicine / Hospitalist Service  [facility]

## 2025-07-23 NOTE — CARE PLAN
The patient's goals for the shift include      The clinical goals for the shift include pt will remain free of chest pain throughout shift      Problem: Pain - Adult  Goal: Verbalizes/displays adequate comfort level or baseline comfort level  Outcome: Progressing     Problem: Safety - Adult  Goal: Free from fall injury  Outcome: Progressing     Problem: Discharge Planning  Goal: Discharge to home or other facility with appropriate resources  Outcome: Progressing     Problem: Chronic Conditions and Co-morbidities  Goal: Patient's chronic conditions and co-morbidity symptoms are monitored and maintained or improved  Outcome: Progressing     Problem: Nutrition  Goal: Nutrient intake appropriate for maintaining nutritional needs  Outcome: Progressing     Problem: Diabetes  Goal: Achieve decreasing blood glucose levels by end of shift  Outcome: Progressing  Goal: Increase stability of blood glucose readings by end of shift  Outcome: Progressing  Goal: Decrease in ketones present in urine by end of shift  Outcome: Progressing  Goal: Maintain electrolyte levels within acceptable range throughout shift  Outcome: Progressing  Goal: Maintain glucose levels >70mg/dl to <250mg/dl throughout shift  Outcome: Progressing  Goal: No changes in neurological exam by end of shift  Outcome: Progressing  Goal: Learn about and adhere to nutrition recommendations by end of shift  Outcome: Progressing  Goal: Vital signs within normal range for age by end of shift  Outcome: Progressing  Goal: Increase self care and/or family involovement by end of shift  Outcome: Progressing  Goal: Receive DSME education by end of shift  Outcome: Progressing     Problem: Pain  Goal: Takes deep breaths with improved pain control throughout the shift  Outcome: Progressing  Goal: Turns in bed with improved pain control throughout the shift  Outcome: Progressing  Goal: Walks with improved pain control throughout the shift  Outcome: Progressing  Goal: Performs  ADL's with improved pain control throughout shift  Outcome: Progressing  Goal: Participates in PT with improved pain control throughout the shift  Outcome: Progressing  Goal: Free from opioid side effects throughout the shift  Outcome: Progressing  Goal: Free from acute confusion related to pain meds throughout the shift  Outcome: Progressing     Problem: Fall/Injury  Goal: Not fall by end of shift  Outcome: Progressing  Goal: Be free from injury by end of the shift  Outcome: Progressing  Goal: Verbalize understanding of personal risk factors for fall in the hospital  Outcome: Progressing  Goal: Verbalize understanding of risk factor reduction measures to prevent injury from fall in the home  Outcome: Progressing  Goal: Use assistive devices by end of the shift  Outcome: Progressing  Goal: Pace activities to prevent fatigue by end of the shift  Outcome: Progressing

## 2025-07-23 NOTE — TELEPHONE ENCOUNTER
Patient feels he doesn't need a hospital follow up visit because he is feeling fine. I informed patient if he starts not feeling well to please call our office.

## 2025-07-23 NOTE — TELEPHONE ENCOUNTER
Patient left AMA. I spoke with Larisa to see if we can do a TCM since her left AMA. Larisa states yes we can. Okay to schedule  with any provider with a  hospital discharge opening.    Detail Level: Zone

## 2025-07-23 NOTE — PROGRESS NOTES
Discharge Facility: [place]  Discharge Diagnosis:  NSTEMI (non-ST elevated myocardial infarction) (Multi)  Chronic systolic heart failure  ID DM2 with peripheral vascular complications  HTN  CKD III  PCI with SHIMON placed to LCx  Admission Date: 21 July 25  Discharge Date: 22 July 25    PCP Appointment Date: Tasked to office  Specialist Appointment Date: 30 July 25 (Baudilio Patel)  Hospital Encounter and Summary Linked: Yes  ED to Hosp-Admission (Discharged) with Tim Lockhart MD; Mario Luz MD (07/21/2025)     No contact on discharge outreach after 2 attempts. Tasked to office for scheduling. Will attempt outreach again in 2 wks.

## 2025-07-23 NOTE — TELEPHONE ENCOUNTER
----- Message from Brit URBINA sent at 7/23/2025  3:46 PM EDT -----  Regarding: FW: No contact on discharge outreach after 2 attempts.    ----- Message -----  From: Kade Boyer RN  Sent: 7/23/2025   3:44 PM EDT  To: [facility] [place] [id number] Clinical Support Staff  Subject: No contact on discharge outreach after 2 att#    Discharge facility: [place]  Discharge diagnosis:   NSTEMI (non-ST elevated myocardial infarction) (Multi)  Chronic systolic heart failure  ID DM2 with peripheral vascular complications  HTN  CKD III  PCI with SHIMON placed to LCx  Date of discharge: 22 July 25        Unsuccessful attempts x2 to reach patient for PCP Follow-up  Please have office staff reach out to patient and schedule an appointment within 14 days from discharge date.

## 2025-07-24 DIAGNOSIS — E61.1 IRON DEFICIENCY: ICD-10-CM

## 2025-07-24 PROCEDURE — RXMED WILLOW AMBULATORY MEDICATION CHARGE

## 2025-07-24 RX ORDER — FERROUS SULFATE 325(65) MG
TABLET ORAL
Qty: 30 TABLET | Refills: 3 | Status: CANCELLED | OUTPATIENT
Start: 2025-07-24 | End: 2026-07-24

## 2025-07-25 ENCOUNTER — APPOINTMENT (OUTPATIENT)
Dept: CARDIAC REHAB | Facility: HOSPITAL | Age: 60
End: 2025-07-25
Payer: COMMERCIAL

## 2025-07-25 ENCOUNTER — PHARMACY VISIT (OUTPATIENT)
Dept: PHARMACY | Facility: CLINIC | Age: 60
End: 2025-07-25
Payer: MEDICAID

## 2025-07-28 ENCOUNTER — OFFICE VISIT (OUTPATIENT)
Dept: CARDIOLOGY | Facility: HOSPITAL | Age: 60
End: 2025-07-28
Payer: COMMERCIAL

## 2025-07-28 ENCOUNTER — PHARMACY VISIT (OUTPATIENT)
Dept: PHARMACY | Facility: CLINIC | Age: 60
End: 2025-07-28
Payer: MEDICAID

## 2025-07-28 ENCOUNTER — TELEPHONE (OUTPATIENT)
Dept: PRIMARY CARE | Facility: CLINIC | Age: 60
End: 2025-07-28
Payer: COMMERCIAL

## 2025-07-28 ENCOUNTER — APPOINTMENT (OUTPATIENT)
Dept: CARDIAC REHAB | Facility: HOSPITAL | Age: 60
End: 2025-07-28
Payer: COMMERCIAL

## 2025-07-28 VITALS
SYSTOLIC BLOOD PRESSURE: 137 MMHG | HEART RATE: 78 BPM | DIASTOLIC BLOOD PRESSURE: 74 MMHG | OXYGEN SATURATION: 97 % | RESPIRATION RATE: 20 BRPM | WEIGHT: 215.3 LBS | BODY MASS INDEX: 30.03 KG/M2

## 2025-07-28 DIAGNOSIS — Z79.4 TYPE 2 DIABETES MELLITUS WITHOUT COMPLICATION, WITH LONG-TERM CURRENT USE OF INSULIN: ICD-10-CM

## 2025-07-28 DIAGNOSIS — I50.42 CHRONIC COMBINED SYSTOLIC AND DIASTOLIC CHF (CONGESTIVE HEART FAILURE): Primary | ICD-10-CM

## 2025-07-28 DIAGNOSIS — I10 PRIMARY HYPERTENSION: ICD-10-CM

## 2025-07-28 DIAGNOSIS — I70.213 ATHEROSCLEROSIS OF NATIVE ARTERY OF BOTH LOWER EXTREMITIES WITH INTERMITTENT CLAUDICATION: ICD-10-CM

## 2025-07-28 DIAGNOSIS — I25.10 ATHEROSCLEROSIS OF NATIVE CORONARY ARTERY OF NATIVE HEART WITHOUT ANGINA PECTORIS: ICD-10-CM

## 2025-07-28 DIAGNOSIS — I70.222 CRITICAL LIMB ISCHEMIA OF LEFT LOWER EXTREMITY: ICD-10-CM

## 2025-07-28 DIAGNOSIS — E61.1 IRON DEFICIENCY: ICD-10-CM

## 2025-07-28 DIAGNOSIS — I73.9 PAD (PERIPHERAL ARTERY DISEASE): ICD-10-CM

## 2025-07-28 DIAGNOSIS — E11.9 TYPE 2 DIABETES MELLITUS WITHOUT COMPLICATION, WITH LONG-TERM CURRENT USE OF INSULIN: ICD-10-CM

## 2025-07-28 DIAGNOSIS — I50.23 ACUTE ON CHRONIC SYSTOLIC (CONGESTIVE) HEART FAILURE: ICD-10-CM

## 2025-07-28 DIAGNOSIS — I50.41 ACUTE COMBINED SYSTOLIC AND DIASTOLIC HEART FAILURE: ICD-10-CM

## 2025-07-28 PROCEDURE — 99213 OFFICE O/P EST LOW 20 MIN: CPT

## 2025-07-28 PROCEDURE — 99213 OFFICE O/P EST LOW 20 MIN: CPT | Performed by: NURSE PRACTITIONER

## 2025-07-28 PROCEDURE — RXMED WILLOW AMBULATORY MEDICATION CHARGE

## 2025-07-28 PROCEDURE — 3078F DIAST BP <80 MM HG: CPT | Performed by: NURSE PRACTITIONER

## 2025-07-28 PROCEDURE — 3075F SYST BP GE 130 - 139MM HG: CPT | Performed by: NURSE PRACTITIONER

## 2025-07-28 RX ORDER — FERROUS SULFATE 325(65) MG
TABLET ORAL
Qty: 30 TABLET | Refills: 11 | Status: SHIPPED | OUTPATIENT
Start: 2025-07-28 | End: 2026-07-28

## 2025-07-28 RX ORDER — FUROSEMIDE 40 MG/1
40 TABLET ORAL EVERY OTHER DAY
Qty: 45 TABLET | Refills: 3 | Status: SHIPPED | OUTPATIENT
Start: 2025-07-28

## 2025-07-28 ASSESSMENT — PATIENT HEALTH QUESTIONNAIRE - PHQ9
2. FEELING DOWN, DEPRESSED OR HOPELESS: NOT AT ALL
SUM OF ALL RESPONSES TO PHQ9 QUESTIONS 1 AND 2: 0
1. LITTLE INTEREST OR PLEASURE IN DOING THINGS: NOT AT ALL

## 2025-07-28 ASSESSMENT — ENCOUNTER SYMPTOMS
DEPRESSION: 0
PALPITATIONS: 0
SHORTNESS OF BREATH: 0
NEAR-SYNCOPE: 0
CHEST PRESSURE: 0
LOSS OF SENSATION IN FEET: 0
ORTHOPNEA: 0
UNEXPECTED WEIGHT CHANGE: 0
ABDOMINAL PAIN: 0
FATIGUE: 0
OCCASIONAL FEELINGS OF UNSTEADINESS: 0
CLAUDICATION: 0

## 2025-07-28 ASSESSMENT — PAIN SCALES - GENERAL: PAINLEVEL_OUTOF10: 0-NO PAIN

## 2025-07-28 NOTE — DOCUMENTATION CLARIFICATION NOTE
"    PATIENT:               [facility]  ACCT #:                  [id number]  MRN:                       [id number]  :                       1965  ADMIT DATE:       2025 2:10 PM  DISCH DATE:        2025 11:12 PM  RESPONDING PROVIDER #:        [id number]          PROVIDER RESPONSE TEXT:    NSTEMI due to in-stent restenosis of LM-LCX    CDI QUERY TEXT:    Clarification    Instruction:    Based on your assessment of the patient and the clinical information, please provide the requested documentation by clicking on the appropriate radio button and enter any additional information if prompted.    Question: Please further clarify the relationship between the in-stent stenosis and NSTEMI    When answering this query, please exercise your independent professional judgment. The fact that a question is being asked, does not imply that any particular answer is desired or expected.    The patient's clinical indicators include:  Clinical Information: Patient admitted with NSTEMI with noted prior history of CABG    Clinical Indicators: Per ED, \"NSTEMI.\"    Per H and P, \"Coronary artery disease with prior history CABG, PCI and stenting most recently 2025 to left main and circumflex  Continue patient on heparin drip as started in the emergency department aspirin and Brilinta, continue patient on his Ranexa as well as his Imdur, high-dose statin ezetimibe and carvedilol  Patient also on Praluent as outpatient  Check limited echocardiogram consult cardiology.\"    Per cardiac consult, \"NSTEMI.\"    Per procedure PN, \"Post-procedure diagnosis: NSTEMI.\"    Per  cath report, \"The left main coronary artery is a normal caliber vessel. The left main arises normally from the left coronary sinus of Valsalva. This lesion was LM-LCX stent has moderate ISR....    The circumflex coronary artery is a normal caliber vessel. The circumflex revealed LM-LCX stent has moderate ISR, distal to prior stent there is 90% de tali " "stenosis that is culprit for the ACS. The mid circumflex coronary artery showed 90% stenosis.\"    Treatment: cardiac cath with PCI    Risk Factors: 59yom admitted with NSTEMI with noted prior history of CABG  Options provided:  -- NSTEMI due to in-stent restenosis of LM-LCX  -- NSTEMI due to in-stent restenosis of other - If so document the culprit lesion site, Please specify additional information below  -- NSTEMI multifactorial due to in-stent restenosis of LM-LCX and occlusion of native vessel  -- NSTEMI not related to in-stent restenosis  -- Other - I will add my own diagnosis  -- Refer to Clinical Documentation Reviewer    Query created by: Alvina Calderon on 7/28/2025 10:28 AM      Electronically signed by:  LETTY WILKINS MD 7/28/2025 10:34 AM          "

## 2025-07-28 NOTE — PATIENT INSTRUCTIONS
Thank you for coming in today.  If you have any questions you may contact the office Monday through Friday at [phone] or on week ends at [phone].      Please continue current medications.     You can take the furosemide 40 mg every other day.  You may also take an additional furosemide tablet as needed for weight gain 3#, increased swelling or shortness of breath.       Lab work in 4 weeks.       Please follow  a 2 GM sodium diet and limit fluid intake to 2 liters per day or 8 servings ( serving size = 8 oz. = 1 cup = 240 ml) per day.   Please avoid processed meat products (luncheon meats, sausages, garcia, hot dogs for example) eat 4 servings of vegetables and 1-2 whole servings of whole fruits per day.   Please weigh daily and call [phone] for weight gain of 3 pounds in 24 hours or 5 pounds or if you experience increased swelling or shortness of breath.         Follow up:  4-6 weeks.     Please be sure to follow up with your cardiologist at [facility] once every year. Call [phone] to schedule appointment if you do not have a follow up appointment scheduled already.

## 2025-07-28 NOTE — TELEPHONE ENCOUNTER
Medication: ferrous sulfate     Dosage: 325 mg     Sig: Take 1 tablet (325 mg) by mouth once daily with breakfast.     Dispense Quantity:    Refills:     Pharmacy: [facility]     LOV:    NOV:

## 2025-07-29 NOTE — PATIENT INSTRUCTIONS
Thank you for choosing [facility]  for your health care needs.  If you have any questions, concerns or medical needs, please feel free to contact our office at [phone].    Please ensure you complete your blood work one week before the next scheduled appointment.    To increase Lantus to 12 units SQ at bedtime   To increase Insulin Lispro to 16 units before breakfast and lunch; to continue 14 units before dinner   To continue sliding scale with Lispro insulin with meals:  150-200 - 2 units  201-250 - 4 units  251-300 - 6 units  301-350 - 8 units  >251 - 10 units  To continue Farxiga 10g once daily   To continue the use of your CGM for the intensive glucose monitoring due to the dynamic nature of insulin requirements, the narrow therapeutic index of insulin and the potentially fatal consequences of treatment  Counseled that the time in range should be >70%  Counseled that the goal A1C should be 7% or less  Counseled glycemic control is warranted to prevent microvascular complications  For follow up in 4 months with glucose log if physically in [place]

## 2025-07-29 NOTE — PROGRESS NOTES
Subjective   Dereck Shen is a 59 y.o. male who presents for follow-up of Type 2 diabetes mellitus. The initial diagnosis of diabetes was made in 2009.     He was admitted for NSTEMI in July 2025.  He had a new stent placed to left circumflex artery.      He plans to spend the winter in [place] and desires to buy property there.        He has been having frequent falls     Known complications due to diabetes included peripheral neuropathy, cardiovascular disease, cerebrovascular disease, and CAD s/p CABG and stent in January & July 2025.  The patient underwent a right femoropopliteal to his right lower extremity in February 2020. He underwent a left femoral popliteal to his left lower extremity in September 2020. He had CABG in September 2019. He has CAD s/p 1 stent. He may need to undergo angioplasty of his left femoropopliteal bypass. He has had multiple CVAs. He recently had critical limb ischemia.      Cardiovascular risk factors include advanced age (older than 55 for men, 65 for women), diabetes mellitus, male gender, and microalbuminuria. The patient is on an ACE inhibitor or angiotensin II receptor blocker.  The patient has not been previously hospitalized due to diabetic ketoacidosis.     Current symptoms/problems include none. His clinical course has been stable.     The patient is currently checking the blood glucose multiple times per day.    Patient is using: continuous glucose monitor                                                              Hypoglycemia frequency: 0%  Hypoglycemia awareness: N/A      Current Regimen  Lantus 10 units SQ bedtime  Insulin Lispro 14 units TID AC   Farxiga 10mg once daily        MEALS -   Breakfast - oatmeal with berries, cream of wheat, special K   Lunch - salad, tuna fish   Dinner - vegetables, fruit, protein, steak twice per week, fish  Snacks - peanuts   Beverages - diet coke, coffee, water, Glucerna     Review of Systems   Respiratory:  Negative for shortness  "of breath.    Cardiovascular:  Positive for leg swelling. Negative for chest pain.   All other systems reviewed and are negative.      Objective   Visit Vitals  Smoking Status Former   Visit Vitals  /72   Pulse 67   Ht 1.803 m (5' 11\")   Wt 96.1 kg (211 lb 12.8 oz)   BMI 29.54 kg/m²   Smoking Status Former   BSA 2.19 m²       Physical Exam  Vitals and nursing note reviewed.   Constitutional:       General: He is not in acute distress.     Appearance: Normal appearance. He is normal weight.   HENT:      Head: Normocephalic and atraumatic.      Nose: Nose normal.      Mouth/Throat:      Mouth: Mucous membranes are moist.     Eyes:      Extraocular Movements: Extraocular movements intact.       Cardiovascular:      Rate and Rhythm: Normal rate and regular rhythm.   Pulmonary:      Effort: Pulmonary effort is normal.      Breath sounds: Normal breath sounds.     Musculoskeletal:         General: Normal range of motion.      Right lower leg: No edema.      Left lower leg: No edema.     Skin:     General: Skin is warm.      Comments: Healed surgical scars to legs      Neurological:      Mental Status: He is alert and oriented to person, place, and time.     Psychiatric:         Mood and Affect: Mood normal.         Lab Review  Lab Results   Component Value Date    HGBA1C 7.7 (A) 07/30/2025       Lab Results   Component Value Date    HGBA1C 7.9 (H) 03/20/2025     GLUCOSE (mg/dL)   Date Value   06/12/2025 226 (H)   05/28/2025 182 (H)   05/09/2025 105     Glucose (mg/dL)   Date Value   07/22/2025 137 (H)   07/21/2025 174 (H)     POC HEMOGLOBIN A1c (%)   Date Value   10/01/2024 8.2 (A)   05/01/2024 7.3 (A)     HEMOGLOBIN A1c (% of total Hgb)   Date Value   03/20/2025 7.9 (H)     Hemoglobin A1C (%)   Date Value   12/18/2024 7.9 (H)   12/13/2023 7.0 (H)   09/27/2023 7.5 (H)   04/04/2023 7.4 (A)   08/30/2022 6.8 (A)   01/05/2022 7.8 (A)     CARBON DIOXIDE (mmol/L)   Date Value   06/12/2025/2025 25 05/28/2025 18 (L) "   05/09/2025 23     Bicarbonate (mmol/L)   Date Value   07/22/2025 28   07/21/2025 26     UREA NITROGEN (BUN) (mg/dL)   Date Value   06/12/2025 34 (H)   05/28/2025 22   05/09/2025 29 (H)     Urea Nitrogen (mg/dL)   Date Value   07/22/2025 31 (H)   07/21/2025 35 (H)     Creatinine (mg/dL)   Date Value   07/22/2025 1.39 (H)   07/21/2025 1.56 (H)     CREATININE (mg/dL)   Date Value   06/12/2025 1.61 (H)   05/28/2025 1.19   05/09/2025 1.29     Lab Results   Component Value Date    CHOL 183 07/22/2025    CHOL 102 01/24/2025    CHOL 172 10/22/2024     Lab Results   Component Value Date    HDL 30.7 07/22/2025    HDL 27.5 01/24/2025    HDL 35.8 10/22/2024     Lab Results   Component Value Date    LDLCALC 97 07/22/2025    LDLCALC 39 01/24/2025    LDLCALC 107 (H) 10/22/2024     Lab Results   Component Value Date    TRIG 279 (H) 07/22/2025    TRIG 179 (H) 01/24/2025    TRIG 147 10/22/2024     Lab Results   Component Value Date    TSH 2.77 04/18/2022       Health Maintenance:   Foot Exam:  2025  Eye Exam:  April 18, 2024  Urine Albumin:  March 20, 2025    CGM Interpretation  14 day CGM download was reviewed in detail as documented above and will be attached to chart.  A minimum of 72 hours of glucose data was used to inform the management plan outlined below.    Sufficient data to analyze:  Yes; CGM active 94% of the time  45% of values is above target range, which is elevated above the desired goal.    55% of values is spent in target range, and thus is below the desired goal   0% of values is spent with hypoglycemia, and thus at goal     Assessment/Plan      59-year-old male presents for follow-up for type 2 diabetes.  His blood pressure is at goal. He is noted to be on a statin.    Type 2 diabetes mellitus  To increase Lantus to 12 units SQ at bedtime   To increase Insulin Lispro to 16 units before breakfast and lunch; to continue 14 units before dinner   To continue sliding scale with Lispro insulin with meals:  150-200 - 2  units  201-250 - 4 units  251-300 - 6 units  301-350 - 8 units  >251 - 10 units  To continue Farxiga 10g once daily   To continue the use of your CGM for the intensive glucose monitoring due to the dynamic nature of insulin requirements, the narrow therapeutic index of insulin and the potentially fatal consequences of treatment  Counseled that the time in range should be >70%  Counseled that the goal A1C should be 7% or less  Counseled glycemic control is warranted to prevent microvascular complications      Long-term insulin use (Multi)  Please rotate insulin injection sites  Adjustments as noted above    Neuropathy  To continue Gabapentin 400mg three times daily     For follow up in 4 months with glucose log if physically in [place]

## 2025-07-30 ENCOUNTER — APPOINTMENT (OUTPATIENT)
Dept: CARDIAC REHAB | Facility: HOSPITAL | Age: 60
End: 2025-07-30
Payer: COMMERCIAL

## 2025-07-30 ENCOUNTER — APPOINTMENT (OUTPATIENT)
Dept: ENDOCRINOLOGY | Facility: CLINIC | Age: 60
End: 2025-07-30
Payer: COMMERCIAL

## 2025-07-30 VITALS
DIASTOLIC BLOOD PRESSURE: 72 MMHG | HEIGHT: 71 IN | SYSTOLIC BLOOD PRESSURE: 126 MMHG | HEART RATE: 67 BPM | WEIGHT: 211.8 LBS | BODY MASS INDEX: 29.65 KG/M2

## 2025-07-30 DIAGNOSIS — E11.9 TYPE 2 DIABETES MELLITUS WITHOUT COMPLICATION, WITH LONG-TERM CURRENT USE OF INSULIN: Primary | ICD-10-CM

## 2025-07-30 DIAGNOSIS — G62.9 NEUROPATHY: ICD-10-CM

## 2025-07-30 DIAGNOSIS — Z79.4 LONG-TERM INSULIN USE (MULTI): ICD-10-CM

## 2025-07-30 DIAGNOSIS — Z79.4 TYPE 2 DIABETES MELLITUS WITHOUT COMPLICATION, WITH LONG-TERM CURRENT USE OF INSULIN: Primary | ICD-10-CM

## 2025-07-30 DIAGNOSIS — I99.8 LIMB ISCHEMIA: ICD-10-CM

## 2025-07-30 LAB — POC HEMOGLOBIN A1C: 7.7 % (ref 4.2–6.5)

## 2025-07-30 PROCEDURE — 3078F DIAST BP <80 MM HG: CPT | Performed by: INTERNAL MEDICINE

## 2025-07-30 PROCEDURE — 3008F BODY MASS INDEX DOCD: CPT | Performed by: INTERNAL MEDICINE

## 2025-07-30 PROCEDURE — 99214 OFFICE O/P EST MOD 30 MIN: CPT | Performed by: INTERNAL MEDICINE

## 2025-07-30 PROCEDURE — 3074F SYST BP LT 130 MM HG: CPT | Performed by: INTERNAL MEDICINE

## 2025-07-30 PROCEDURE — 83036 HEMOGLOBIN GLYCOSYLATED A1C: CPT | Performed by: INTERNAL MEDICINE

## 2025-07-30 PROCEDURE — 3051F HG A1C>EQUAL 7.0%<8.0%: CPT | Performed by: INTERNAL MEDICINE

## 2025-07-30 PROCEDURE — 95251 CONT GLUC MNTR ANALYSIS I&R: CPT | Performed by: INTERNAL MEDICINE

## 2025-07-30 RX ORDER — GABAPENTIN 400 MG/1
800 CAPSULE ORAL 3 TIMES DAILY
Qty: 180 CAPSULE | Refills: 5 | Status: SHIPPED | OUTPATIENT
Start: 2025-07-30 | End: 2026-01-26

## 2025-07-30 ASSESSMENT — ENCOUNTER SYMPTOMS: SHORTNESS OF BREATH: 0

## 2025-08-03 RX ORDER — INSULIN LISPRO 100 [IU]/ML
16 INJECTION, SOLUTION INTRAVENOUS; SUBCUTANEOUS
Qty: 30 ML | Refills: 5 | Status: SHIPPED | OUTPATIENT
Start: 2025-08-03 | End: 2026-01-30

## 2025-08-03 RX ORDER — INSULIN GLARGINE 100 [IU]/ML
12 INJECTION, SOLUTION SUBCUTANEOUS NIGHTLY
Qty: 15 ML | Refills: 5 | Status: SHIPPED | OUTPATIENT
Start: 2025-08-03 | End: 2026-01-30

## 2025-08-03 NOTE — ASSESSMENT & PLAN NOTE
To continue Gabapentin 400mg three times daily     For follow up in 4 months with glucose log if physically in [place]

## 2025-08-03 NOTE — ASSESSMENT & PLAN NOTE
To increase Lantus to 12 units SQ at bedtime   To increase Insulin Lispro to 16 units before breakfast and lunch; to continue 14 units before dinner   To continue sliding scale with Lispro insulin with meals:  150-200 - 2 units  201-250 - 4 units  251-300 - 6 units  301-350 - 8 units  >251 - 10 units  To continue Farxiga 10g once daily   To continue the use of your CGM for the intensive glucose monitoring due to the dynamic nature of insulin requirements, the narrow therapeutic index of insulin and the potentially fatal consequences of treatment  Counseled that the time in range should be >70%  Counseled that the goal A1C should be 7% or less  Counseled glycemic control is warranted to prevent microvascular complications

## 2025-08-11 ENCOUNTER — PHARMACY VISIT (OUTPATIENT)
Dept: PHARMACY | Facility: CLINIC | Age: 60
End: 2025-08-11
Payer: MEDICAID

## 2025-08-11 PROCEDURE — RXMED WILLOW AMBULATORY MEDICATION CHARGE

## 2025-08-14 PROCEDURE — RXMED WILLOW AMBULATORY MEDICATION CHARGE

## 2025-08-15 ENCOUNTER — PHARMACY VISIT (OUTPATIENT)
Dept: PHARMACY | Facility: CLINIC | Age: 60
End: 2025-08-15
Payer: MEDICAID

## 2025-08-15 DIAGNOSIS — D3A.8 PRIMARY PANCREATIC NEUROENDOCRINE TUMOR: Primary | ICD-10-CM

## 2025-08-25 ENCOUNTER — TELEPHONE (OUTPATIENT)
Dept: CARDIOLOGY | Facility: HOSPITAL | Age: 60
End: 2025-08-25
Payer: COMMERCIAL

## 2025-08-25 PROCEDURE — RXMED WILLOW AMBULATORY MEDICATION CHARGE

## 2025-08-26 ENCOUNTER — APPOINTMENT (OUTPATIENT)
Dept: CARDIOLOGY | Facility: HOSPITAL | Age: 60
End: 2025-08-26
Payer: COMMERCIAL

## 2025-08-26 ENCOUNTER — OFFICE VISIT (OUTPATIENT)
Dept: CARDIOLOGY | Facility: HOSPITAL | Age: 60
End: 2025-08-26
Payer: COMMERCIAL

## 2025-08-26 ENCOUNTER — TELEPHONE (OUTPATIENT)
Dept: SURGICAL ONCOLOGY | Facility: HOSPITAL | Age: 60
End: 2025-08-26
Payer: COMMERCIAL

## 2025-08-26 VITALS
SYSTOLIC BLOOD PRESSURE: 110 MMHG | WEIGHT: 211 LBS | BODY MASS INDEX: 29.54 KG/M2 | HEART RATE: 70 BPM | HEIGHT: 71 IN | DIASTOLIC BLOOD PRESSURE: 70 MMHG

## 2025-08-26 DIAGNOSIS — I50.42 CHRONIC COMBINED SYSTOLIC AND DIASTOLIC CHF (CONGESTIVE HEART FAILURE): Primary | ICD-10-CM

## 2025-08-26 DIAGNOSIS — I73.9 PAD (PERIPHERAL ARTERY DISEASE): ICD-10-CM

## 2025-08-26 LAB
ATRIAL RATE: 70 BPM
P AXIS: 51 DEGREES
P OFFSET: 186 MS
P ONSET: 132 MS
PR INTERVAL: 170 MS
Q ONSET: 217 MS
QRS COUNT: 11 BEATS
QRS DURATION: 90 MS
QT INTERVAL: 390 MS
QTC CALCULATION(BAZETT): 421 MS
QTC FREDERICIA: 410 MS
R AXIS: 68 DEGREES
T AXIS: -35 DEGREES
T OFFSET: 412 MS
VENTRICULAR RATE: 70 BPM

## 2025-08-26 PROCEDURE — 3051F HG A1C>EQUAL 7.0%<8.0%: CPT | Performed by: STUDENT IN AN ORGANIZED HEALTH CARE EDUCATION/TRAINING PROGRAM

## 2025-08-26 PROCEDURE — 99213 OFFICE O/P EST LOW 20 MIN: CPT | Performed by: STUDENT IN AN ORGANIZED HEALTH CARE EDUCATION/TRAINING PROGRAM

## 2025-08-26 PROCEDURE — 3048F LDL-C <100 MG/DL: CPT | Performed by: STUDENT IN AN ORGANIZED HEALTH CARE EDUCATION/TRAINING PROGRAM

## 2025-08-26 PROCEDURE — 3074F SYST BP LT 130 MM HG: CPT | Performed by: STUDENT IN AN ORGANIZED HEALTH CARE EDUCATION/TRAINING PROGRAM

## 2025-08-26 PROCEDURE — 93005 ELECTROCARDIOGRAM TRACING: CPT | Performed by: STUDENT IN AN ORGANIZED HEALTH CARE EDUCATION/TRAINING PROGRAM

## 2025-08-26 PROCEDURE — 3078F DIAST BP <80 MM HG: CPT | Performed by: STUDENT IN AN ORGANIZED HEALTH CARE EDUCATION/TRAINING PROGRAM

## 2025-08-26 PROCEDURE — 3008F BODY MASS INDEX DOCD: CPT | Performed by: STUDENT IN AN ORGANIZED HEALTH CARE EDUCATION/TRAINING PROGRAM

## 2025-08-28 ENCOUNTER — PHARMACY VISIT (OUTPATIENT)
Dept: PHARMACY | Facility: CLINIC | Age: 60
End: 2025-08-28
Payer: MEDICAID

## 2025-08-28 DIAGNOSIS — I10 PRIMARY HYPERTENSION: ICD-10-CM

## 2025-08-28 DIAGNOSIS — I50.42 CHRONIC COMBINED SYSTOLIC AND DIASTOLIC CHF (CONGESTIVE HEART FAILURE): ICD-10-CM

## 2025-08-28 DIAGNOSIS — I25.10 ATHEROSCLEROSIS OF NATIVE CORONARY ARTERY OF NATIVE HEART WITHOUT ANGINA PECTORIS: ICD-10-CM

## 2025-09-02 ENCOUNTER — TELEPHONE (OUTPATIENT)
Dept: CARDIOLOGY | Facility: HOSPITAL | Age: 60
End: 2025-09-02
Payer: COMMERCIAL

## 2025-09-05 ENCOUNTER — APPOINTMENT (OUTPATIENT)
Dept: ENDOCRINOLOGY | Facility: CLINIC | Age: 60
End: 2025-09-05
Payer: COMMERCIAL

## 2025-10-17 ENCOUNTER — APPOINTMENT (OUTPATIENT)
Dept: CARDIOLOGY | Facility: HOSPITAL | Age: 60
End: 2025-10-17
Payer: COMMERCIAL

## 2025-11-06 ENCOUNTER — APPOINTMENT (OUTPATIENT)
Dept: ENDOCRINOLOGY | Facility: CLINIC | Age: 60
End: 2025-11-06
Payer: COMMERCIAL

## 2025-12-10 ENCOUNTER — APPOINTMENT (OUTPATIENT)
Dept: PRIMARY CARE | Facility: CLINIC | Age: 60
End: 2025-12-10
Payer: COMMERCIAL

## 2026-04-02 ENCOUNTER — APPOINTMENT (OUTPATIENT)
Dept: UROLOGY | Facility: CLINIC | Age: 61
End: 2026-04-02
Payer: COMMERCIAL

## (undated) DEVICE — SYRINGE, 30 CC, LUER LOCK

## (undated) DEVICE — SHEATH, GLIDESHEATH, SLENDER, 6FR 10CM

## (undated) DEVICE — WIPE, INSTRUMENT, POLYVINYL ALCOHOL, 2 1/2 X 2 1/2

## (undated) DEVICE — SUTURE, PROLENE, 7-0, 30 IN, BV1, DA, BLUE

## (undated) DEVICE — DRAPE, SORBX, PERIPHERAL SHIELD, SINGLE LAYER

## (undated) DEVICE — DEVICE, INFLATION, PRESTO ID40ATM 30CC

## (undated) DEVICE — CATHETER, EAGLE EYE, PLATNIUM (IVUS)

## (undated) DEVICE — CATHETER, URETHRAL, FOLEY, 2 WAY, 18 FR, 5 CC, SILICONE

## (undated) DEVICE — CAUTERY, PENCIL, PUSH BUTTON, SMOKE EVAC, 70MM

## (undated) DEVICE — CATHETER, WEDGE PRESSURE, BALLOON, DOUBLE LUMEN, 5 FR, 110 CM

## (undated) DEVICE — APPLIER, LIGACLIP, MULTIPLE, SMALL 9-3/8IN

## (undated) DEVICE — SEALANT, HEMOSTATIC, FLOSEAL, 10 ML

## (undated) DEVICE — GLOVE, RADIATION, ATTENUATION, SIZE-7.5, PF

## (undated) DEVICE — SHEATH, GLIDESHEATH, SLENDER, 5FR 10CM

## (undated) DEVICE — BANDAGE, GAUZE, CONFORMING, KERLIX, 6 PLY, 4.5 IN X 4.1 YD

## (undated) DEVICE — APPLIER, MULTIPLE CLIP, LIGACLIP, W/20 MEDIUM, 11.5 APPLIER

## (undated) DEVICE — SYRINGE, HYPODERMIC, CONTROL, LUER LOCK, 10 CC, PLASTIC, STERILE

## (undated) DEVICE — CATHETER, IMPRESS, MODIFIED HOOK FLUSH, 65CM, NON BRAIDED

## (undated) DEVICE — Device

## (undated) DEVICE — GEL, ULTRASOUND, AQUASONIC 100, 20 GM, STERILE

## (undated) DEVICE — CATHETER, GUIDING, LAUNCHER, 6FR AL 1.0

## (undated) DEVICE — GUIDEWIRE, STIFF SHAFT, ANGLE TIP, .035 DIA, 260 CM,  3 CM TIP"

## (undated) DEVICE — GUIDE WIRE, 035/300CM, HI-TORGUE, SUPRA CORE

## (undated) DEVICE — GUIDEWIRE, INQWIRE, 3MM J, .035 X 210CM, FIXED

## (undated) DEVICE — FRAZIER SUCTION, 10 FR.

## (undated) DEVICE — CATHETER, CROSSING SUPPORT, SEEKER, .035 X 135CM, 5FR

## (undated) DEVICE — COUNTER, FOAM STRIP & NEEDLE

## (undated) DEVICE — SUTURE, SILK, 3-0, 18 IN, MULTIPACK, BLACK

## (undated) DEVICE — NEEDLE, HYPODERMIC, REGULAR WALL, REGULAR BEVEL, 22 G X 1.5 IN

## (undated) DEVICE — GLOVE, PROTEXIS PI CLASSIC, SZ-7.0, PF, LF

## (undated) DEVICE — GUIDEWIRE, NITINOL, 0.018 IN X 80CM, PLATINUM TIP

## (undated) DEVICE — CATHETER, WOLVERINE CB MR, US 10 X 2.50MM

## (undated) DEVICE — GUIDE WIRE, 260CM, HI-TORQUE, VERSACORE, MODIFIED J

## (undated) DEVICE — SUTURE, MONOCRYL, 3-0, 18 IN, PS2, UNDYED

## (undated) DEVICE — SUTURE, MONOCRYL, 3-0, PS-1 27IN, UNDYED

## (undated) DEVICE — GUIDEWIRE, INQUIRE, J TIP, .035 X 210CM, FIXED CORE, DIAGNOSTIC

## (undated) DEVICE — COVER HANDLE LIGHT, STERIS, BLUE, STERILE

## (undated) DEVICE — SYRINGE, 3 CC, LUER LOCK

## (undated) DEVICE — CATHETER, DIAGNOSTIC, DXTERITY, 6FR 100CM, JL35

## (undated) DEVICE — SHEATH, GLIDESHEATH, SLENDER, 6FR 16CM

## (undated) DEVICE — COUNTER, NEEDLE, 1315 MAGNATIC/ FOAM, W/ BOXLOCKS

## (undated) DEVICE — SUTURE, PROLENE, 6-0, 30 IN, C-1, CV-11, BLUE

## (undated) DEVICE — ACCESS KIT, S-MAK MINI, 4FR 10CM 0.018IN 40CM, NT/PT, ECHO ENHANCE NEEDLE

## (undated) DEVICE — TR BAND, RADIAL COMPRESSION, STANDARD, 24CM

## (undated) DEVICE — DRAPE, X-RAY, CASSETTE, LARGE

## (undated) DEVICE — CATHETER, IV, INSYTE, AUTOGUARD, SHIELDED, 18 G X 1.16 IN, VIALON

## (undated) DEVICE — COVER PROBE, SOFT FLEX W/ GEL, 5 X 48 IN (13X122CM)

## (undated) DEVICE — SPONGE, GAUZE, XRAY DECT, 16 PLY, 4 X 4, W/MASTER WDMT,STERILE

## (undated) DEVICE — SOLUTION, IRRIGATION, STERILE WATER, 1000 ML, POUR BOTTLE

## (undated) DEVICE — GOWN, ASTOUND, XL

## (undated) DEVICE — CATHETER, VISIONS PV .018 (IVUS)

## (undated) DEVICE — CATHETER, DIAGNOSTIC, DXTERITY, IMA 5, 100CM

## (undated) DEVICE — TOWEL PACK, STERILE, 4/PACK, BLUE

## (undated) DEVICE — SHEATH, PINNACLE, 10 CM,  5FR INTRODUCER, 5FR DIA, 2.5 CM DIALATOR

## (undated) DEVICE — SYRINGE, INJECTOR, W/HANDI-FILL, ILLUMENA, LUER, 150ML

## (undated) DEVICE — CATHETER, DIAGNOSTIC, DXTERITY, 6FR JR 4.0, 100CM

## (undated) DEVICE — SUTURE, SILK, 2-0, 18 IN, BLACK

## (undated) DEVICE — DRESSING, TRANSPARENT, TEGADERM, 4 X 4-3/4 IN

## (undated) DEVICE — GUIDEWIRE, COMMAND ST, HI-TORQUE, 0.18 X 300CM

## (undated) DEVICE — SUTURE, PROLENE, 5-0, 36 IN, C1, DA, BLUE

## (undated) DEVICE — SUTURE, SILK, 2-0, 18IN, BR, FS, BLACK

## (undated) DEVICE — NEEDLE, HYPODERMIC, REGULAR WALL, REGULAR BEVEL, 25 G X 1.5 IN

## (undated) DEVICE — APPLICATOR, CHLORAPREP, W/ORANGE TINT, 26ML

## (undated) DEVICE — DRAPE, EQUIPMENT, 50 X 25IN, F/XRAY C-ARM

## (undated) DEVICE — SUTURE, SILK, 4-0, 18 IN, LABYRINTH, BLACK

## (undated) DEVICE — PAD, GROUNDING, ELECTROSURGICAL, W/9 FT CABLE, POLYHESIVE II, ADULT, LF

## (undated) DEVICE — EXTENSION SET, IV, MICROBORE, 20IN, W/FEMALE ADAPTER

## (undated) DEVICE — SYRINGE, 1 CC, TUBERCULIN, LUER SLIP

## (undated) DEVICE — DRESSING, GAUZE, SPONGE, VERSALON, ALL PURPOSE, 4 X 4 IN, SOFT

## (undated) DEVICE — DRESSING, WOUND, POST OP, 10 X 4

## (undated) DEVICE — SHEATH, PINNACLE, 10 CM,  6FR INTRODUCER, 6FR DIA, 2.5 CM DIALATOR

## (undated) DEVICE — DRESSING, NON-ADHERENT, TELFA, OUCHLESS, 3 X 8 IN, STERILE

## (undated) DEVICE — SUTURE, PROLENE, 6-0, 30 IN, BV-1 BV-1

## (undated) DEVICE — DRAPE, SHEET, THREE QUARTER, FAN FOLD, 57 X 77 IN

## (undated) DEVICE — BOOT, SUTURE-AID, YELLOW, STERILE, LF

## (undated) DEVICE — TUBING, FLEXCIL,CONTRAST INJECTION, HIGH PRESSURE, 48 IN

## (undated) DEVICE — CONTROL WIRE, .018 X 300

## (undated) DEVICE — SUTURE, VICRYL, 2-0, 36 IN, CT-1, UNDYED

## (undated) DEVICE — CLAMP, BULLDOG, VASCU-STATT, MAXI, 45D ANGLE, FIRM

## (undated) DEVICE — CATHETER, IV, INSYTE, AUTOGUARD, SHIELDED, 14 G X 1.75 IN, VIALON

## (undated) DEVICE — SHEATH, 45CM, W/DILATOR, 6 FR DIA, ST CURVE STYLE W/CROSS-CUT VALVE

## (undated) DEVICE — CATHETER, BALLOON, EMERGE, PTCA, 15 X 2.0MM

## (undated) DEVICE — GUIDEWIRE, AMPLATZ SUPER STIFF, STR, .035 IN X 75CM

## (undated) DEVICE — LABELING SYSTEM, CORRECT MEDICATION, CATH LAB

## (undated) DEVICE — KIT, PERIPHERAL VASCULAR, CUSTOM, PORTAGE

## (undated) DEVICE — TRAY, SURESTEP, URINE METER, 16FR, COMPLETE, W/STATLOCK

## (undated) DEVICE — BAG, DECANTER

## (undated) DEVICE — CATHETER, EMBOLECTOMY, 4F X 80CM, SYNTEL, SILICONE

## (undated) DEVICE — ELECTRODE, MULTIFUNCTION, QUICK-COMBO, EDGE SYSTEM, 2 FT

## (undated) DEVICE — CATHETER, IMAGER II, ANGIOGRAPHY, 5F, 65CML BERN .038

## (undated) DEVICE — SPONGE, LAP, XRAY DECT, 18IN X 18IN, W/MASTER DMT, STERILE

## (undated) DEVICE — GUIDEWIRE, UNIVERSAL BALANCE MID WEIGHT

## (undated) DEVICE — SUTURE, MONOCRYL, 4-0, 18 IN, PS2, UNDYED

## (undated) DEVICE — GUIDEWIRE, ANGLE TIP,  .035 DIA, 180 CM, 3 CM TIP"

## (undated) DEVICE — VALVE, HEMO, GUARDIAN II, W/GUIDEWIRE INSERTION TOOL & TORQUE

## (undated) DEVICE — GUIDEWIRE, STRAIGHT, AMPLATZ SUPER STIFF ST-1, 0.035 IN X 260 CM

## (undated) DEVICE — INTRODUCER, PINNACLE, 6 FR, 10 CM

## (undated) DEVICE — CATHETER, DIAGNOSTIC, DXTERITY, AL 1.0, 100CM

## (undated) DEVICE — CATHETER, TRAILBLAZER, .035 5FR 90CM

## (undated) DEVICE — STAPLER, SKIN, MULTIFIRE, PREMIUM, WIDE, 35 W

## (undated) DEVICE — SET-UP PACK, BASIC, MAYO STAND COVER, SUTURE BAG, TABLE COVER, LF

## (undated) DEVICE — CATHETER, DILATATION, CORONARY, MINI TREK, 2.5MM X 15MM

## (undated) DEVICE — DRAPE, SHEET, LAPAROTOMY, TRANSVERSE, W/FENESTRATION, 77 X 122 IN, DISPOSABLE, LF, STERILE

## (undated) DEVICE — SYRINGE, 5 CC, LUER LOCK

## (undated) DEVICE — BANDAGE, ELASTIC, SELF-CLOSE, 4 IN, HONEYCOMB, STERILE

## (undated) DEVICE — COVER, MAYO STAND, W/PAD, 23 IN, DISPOSABLE, PLASTIC, LF, STERILE

## (undated) DEVICE — CATHETER, ANGIO, IMPULSE, IM, 5 FR X 100 CM

## (undated) DEVICE — CATHETER, INPACT ADMIRAL, PACLITAXEL COATED , 6FR, 5MM X 250MM X 130CM

## (undated) DEVICE — NEEDLE, PERCUTANEOUS ENTRY, THINWALL, W/O BASEPLATE, 18 G X 7 CM

## (undated) DEVICE — STRIP, SKIN CLOSURE, STERI STRIP, REINFORCED, 0.5 X 4 IN

## (undated) DEVICE — CATHETER, BALLOON, NC EUPHORA NONCOMPLIANT 3.0 X 15 X 142CM

## (undated) DEVICE — SYRINGE, HYPODERMIC, LEUR LOCK, 3 CC, PLASTIC, STERILE

## (undated) DEVICE — SUTURE, VICRYL, 3-0, 27 IN, SH